# Patient Record
Sex: MALE | Race: WHITE | Employment: OTHER | URBAN - METROPOLITAN AREA
[De-identification: names, ages, dates, MRNs, and addresses within clinical notes are randomized per-mention and may not be internally consistent; named-entity substitution may affect disease eponyms.]

---

## 2017-01-03 DIAGNOSIS — I50.23 ACUTE ON CHRONIC SYSTOLIC CONGESTIVE HEART FAILURE (H): Primary | ICD-10-CM

## 2017-01-03 NOTE — TELEPHONE ENCOUNTER
Lasix       Last Written Prescription Date: 9/8/2016  Last Fill Quantity: 18, # refills: 3  Last Office Visit with [facility] or [facility] prescribing provider: 12/22/2016       POTASSIUM   Date Value Ref Range Status   12/13/2016 4.4 3.4 - 5.3 mmol/L Final     CREATININE   Date Value Ref Range Status   12/13/2016 1.07 0.66 - 1.25 mg/dL Final     BP Readings from Last 3 Encounters:   12/22/16 122/74   12/20/16 110/70   10/17/16 116/76

## 2017-01-04 RX ORDER — FUROSEMIDE 20 MG
20 TABLET ORAL DAILY
Qty: 18 TABLET | Refills: 3 | Status: SHIPPED | OUTPATIENT
Start: 2017-01-04 | End: 2017-03-16

## 2017-01-09 ENCOUNTER — OFFICE VISIT (OUTPATIENT)
Dept: CARDIOLOGY | Facility: CLINIC | Age: 82
End: 2017-01-09
Attending: INTERNAL MEDICINE
Payer: COMMERCIAL

## 2017-01-09 ENCOUNTER — HOSPITAL ENCOUNTER (OUTPATIENT)
Dept: CARDIOLOGY | Facility: CLINIC | Age: 82
Discharge: HOME | End: 2017-01-09
Attending: INTERNAL MEDICINE | Admitting: INTERNAL MEDICINE
Payer: MEDICARE

## 2017-01-09 VITALS
SYSTOLIC BLOOD PRESSURE: 112 MMHG | RESPIRATION RATE: 12 BRPM | HEIGHT: 66 IN | HEART RATE: 64 BPM | DIASTOLIC BLOOD PRESSURE: 62 MMHG | WEIGHT: 150.3 LBS | BODY MASS INDEX: 24.15 KG/M2 | OXYGEN SATURATION: 99 %

## 2017-01-09 DIAGNOSIS — I48.0 PAROXYSMAL ATRIAL FIBRILLATION (H): Primary | ICD-10-CM

## 2017-01-09 DIAGNOSIS — C61 MALIGNANT NEOPLASM OF PROSTATE (H): ICD-10-CM

## 2017-01-09 DIAGNOSIS — C61 MALIGNANT NEOPLASM OF PROSTATE (H): Primary | ICD-10-CM

## 2017-01-09 DIAGNOSIS — I42.8 CARDIOMYOPATHY, NONISCHEMIC (H): ICD-10-CM

## 2017-01-09 DIAGNOSIS — M05.79 RHEUMATOID ARTHRITIS INVOLVING MULTIPLE SITES WITH POSITIVE RHEUMATOID FACTOR (H): ICD-10-CM

## 2017-01-09 LAB
ALBUMIN SERPL-MCNC: 3.3 G/DL (ref 3.4–5)
ALP SERPL-CCNC: 63 U/L (ref 40–150)
ALT SERPL W P-5'-P-CCNC: 24 U/L (ref 0–70)
ANION GAP SERPL CALCULATED.3IONS-SCNC: 6 MMOL/L (ref 3–14)
AST SERPL W P-5'-P-CCNC: 33 U/L (ref 0–45)
BASOPHILS # BLD AUTO: 0 10E9/L (ref 0–0.2)
BASOPHILS NFR BLD AUTO: 0.8 %
BILIRUB SERPL-MCNC: 0.5 MG/DL (ref 0.2–1.3)
BUN SERPL-MCNC: 20 MG/DL (ref 7–30)
CALCIUM SERPL-MCNC: 8.8 MG/DL (ref 8.5–10.1)
CHLORIDE SERPL-SCNC: 101 MMOL/L (ref 94–109)
CO2 SERPL-SCNC: 32 MMOL/L (ref 20–32)
CREAT SERPL-MCNC: 1 MG/DL (ref 0.66–1.25)
DIFFERENTIAL METHOD BLD: ABNORMAL
EOSINOPHIL # BLD AUTO: 0 10E9/L (ref 0–0.7)
EOSINOPHIL NFR BLD AUTO: 0.6 %
ERYTHROCYTE [DISTWIDTH] IN BLOOD BY AUTOMATED COUNT: 14.5 % (ref 10–15)
GFR SERPL CREATININE-BSD FRML MDRD: 70 ML/MIN/1.7M2
GLUCOSE SERPL-MCNC: 116 MG/DL (ref 70–99)
HCT VFR BLD AUTO: 35 % (ref 40–53)
HGB BLD-MCNC: 11.2 G/DL (ref 13.3–17.7)
IMM GRANULOCYTES # BLD: 0 10E9/L (ref 0–0.4)
IMM GRANULOCYTES NFR BLD: 0.2 %
LYMPHOCYTES # BLD AUTO: 0.8 10E9/L (ref 0.8–5.3)
LYMPHOCYTES NFR BLD AUTO: 16.2 %
MCH RBC QN AUTO: 32.4 PG (ref 26.5–33)
MCHC RBC AUTO-ENTMCNC: 32 G/DL (ref 31.5–36.5)
MCV RBC AUTO: 101 FL (ref 78–100)
MONOCYTES # BLD AUTO: 0.7 10E9/L (ref 0–1.3)
MONOCYTES NFR BLD AUTO: 13.8 %
NEUTROPHILS # BLD AUTO: 3.4 10E9/L (ref 1.6–8.3)
NEUTROPHILS NFR BLD AUTO: 68.4 %
PLATELET # BLD AUTO: 261 10E9/L (ref 150–450)
POTASSIUM SERPL-SCNC: 4.8 MMOL/L (ref 3.4–5.3)
PROT SERPL-MCNC: 6.2 G/DL (ref 6.8–8.8)
PSA SERPL-MCNC: 0.53 UG/L (ref 0–4)
RBC # BLD AUTO: 3.46 10E12/L (ref 4.4–5.9)
SODIUM SERPL-SCNC: 139 MMOL/L (ref 133–144)
WBC # BLD AUTO: 5 10E9/L (ref 4–11)

## 2017-01-09 PROCEDURE — 93010 ELECTROCARDIOGRAM REPORT: CPT | Performed by: INTERNAL MEDICINE

## 2017-01-09 PROCEDURE — 99214 OFFICE O/P EST MOD 30 MIN: CPT | Performed by: INTERNAL MEDICINE

## 2017-01-09 PROCEDURE — 84153 ASSAY OF PSA TOTAL: CPT | Mod: 90 | Performed by: UROLOGY

## 2017-01-09 PROCEDURE — 80053 COMPREHEN METABOLIC PANEL: CPT | Mod: 90 | Performed by: INTERNAL MEDICINE

## 2017-01-09 PROCEDURE — 93005 ELECTROCARDIOGRAM TRACING: CPT | Performed by: REHABILITATION PRACTITIONER

## 2017-01-09 PROCEDURE — 85025 COMPLETE CBC W/AUTO DIFF WBC: CPT | Mod: 90 | Performed by: INTERNAL MEDICINE

## 2017-01-09 PROCEDURE — 36415 COLL VENOUS BLD VENIPUNCTURE: CPT | Performed by: UROLOGY

## 2017-01-09 PROCEDURE — 99000 SPECIMEN HANDLING OFFICE-LAB: CPT | Performed by: UROLOGY

## 2017-01-09 ASSESSMENT — PAIN SCALES - GENERAL: PAINLEVEL: NO PAIN (0)

## 2017-01-09 NOTE — Clinical Note
"1/9/2017    Camron Aragon MD  [facility]   [address] [place] [address]    RE: Bud JOHNSON Shaina       Dear Colleague,    I had the pleasure of seeing Bud Merritt in the [facility].    Chief Complaint: atrial fibrillation     HPI: I was happy to see Mr. Merritt in Electrophysiology consultation for the above at the request of Dr. Colon.    He reports he has been having \"spell\". It is very hard to pin him down on what he means by this. At times he reports the spells as \"dizziness\", later he described them as \"pain in his chest\". This makes it difficult to correlate with his paroxysms of atrial fibrillation. During the episodes his heart rates are fairly fast but during sinus rhythm his heart rate is relatively slow. In addition, he has first degree AV block and RBBB (bifascicular block).    In addition to the spells, he was noted to have LV systolic dysfunction. He was started on amiodarone and his EF improved. It should be noted that he was also having frequent PVCs and the reduction in PVC burden may have also contributed to partial recovery of his systolic function. He had a coronary angiogram that showed only non-obstructive disease.    The amiodarone had to be stopped due to a drop in diffusion capacity.    Current Outpatient Prescriptions   Medication Sig Dispense Refill     furosemide (LASIX) 20 MG tablet Take 1 tablet (20 mg) by mouth daily Or as directed by cardiology 18 tablet 03     traMADol (ULTRAM) 50 MG tablet TAKE 1 TABLET 3 TIMES DAILY AS NEEDED FOR MODERATE PAIN 90 tablet 0     apixaban ANTICOAGULANT (ELIQUIS) 2.5 MG tablet Take 1 tablet (2.5 mg) by mouth 2 times daily 180 tablet 3     Leuprolide Acetate (LUPRON DEPOT IM) Inject into the muscle every 4 months       leflunomide (ARAVA) 10 MG tablet Take 1 tablet (10 mg) by mouth daily 30 tablet 11     lisinopril (PRINIVIL,ZESTRIL) 10 MG tablet Take 1 tablet (10 mg) by mouth 2 times daily " 62 tablet 11     spironolactone (ALDACTONE) 25 MG tablet Take 1 tablet (25 mg) by mouth daily 30 tablet 11     acetaminophen (TYLENOL) 650 MG CR tablet Take 650 mg by mouth 2 times daily       metoprolol (TOPROL-XL) 25 MG 24 hr tablet Take 1 tablet (25 mg) by mouth daily 90 tablet 3     atorvastatin (LIPITOR) 40 MG tablet Take 1 tablet (40 mg) by mouth daily 90 tablet 3     alendronate (FOSAMAX) 70 MG tablet Take 1 tablet (70 mg) by mouth every 7 days Take with over 8 ounces water and stay upright for at least 30 minutes after dose.  Take at least 60 minutes before breakfast 12 tablet 3     predniSONE (DELTASONE) 5 MG tablet Take 1 tablet (5 mg) by mouth daily 100 tablet 4     carboxymethylcellulose (REFRESH PLUS) 0.5 % SOLN Place 1 drop into both eyes 3 times daily as needed for dry eyes       OMEPRAZOLE PO Take 20 mg by mouth daily as needed        calcium carbonate (TUMS) 500 MG chewable tablet Take 1 chew tab by mouth every 4 hours as needed for heartburn       tamsulosin (FLOMAX) 0.4 MG 24 hr capsule Take 1 capsule (0.4 mg) by mouth 2 times daily 60 capsule      ascorbic acid (VITAMIN C) 500 MG CPCR Take 500 mg by mouth daily       VITAMIN D, CHOLECALCIFEROL, PO Take 400 Units by mouth daily       calcium carbonate (OS-SHELIA 500 MG [place]. CA) 500 MG tablet Take 600 mg by mouth daily         Past Medical History   Diagnosis Date     Unspecified essential hypertension      Pure hypercholesterolemia      Rheumatoid arthritis(714.0)      Cardiomyopathy (H)      Atrial fibrillation (H) 07/01/2016       Past Surgical History   Procedure Laterality Date     Hc repair ing hernia,5+y/o,reducibl  1996     Marlex mesh repair of bilateral inguinal hernias and drainage of bilateral scrotal hydroceles.      colonoscopy thru stoma w biopsy/cautery tumor/polyp/lesion  8/31/2004     Colonoscopy  08/24/09     Arthroplasty knee Left 1/12/2016     Procedure: ARTHROPLASTY KNEE;  Surgeon: Cesar Walker DO;  Location:   "OR     Irrigation and debridement soft tissue lower extremity, combined Left 3/15/2016     Procedure: COMBINED IRRIGATION AND DEBRIDEMENT SOFT TISSUE LOWER EXTREMITY;  Surgeon: Cesar Walker DO;  Location:  OR       Family History   Problem Relation Age of Onset     CANCER Mother      CANCER Son      Unknown/Adopted Father      Alcoholism Brother        Social History   Substance Use Topics     Smoking status: Former Smoker -- 0.50 packs/day for 15 years     Types: Cigarettes     Quit date: 11/12/1998     Smokeless tobacco: Never Used      Comment: quit 15 yrs ago     Alcohol Use: No       Allergies   Allergen Reactions     Amiodarone Other (See Comments)     Drop in DLCO     No Known Drug Allergies          ROS: his only other significant complaint on the ten system review is arthritis.    Physical Examination:  Vitals: /62 mmHg  Pulse 64  Resp 12  Ht 1.676 m (5' 6\")  Wt 68.176 kg (150 lb 4.8 oz)  BMI 24.27 kg/m2  SpO2 99%  BMI= Body mass index is 24.27 kg/(m^2).    GENERAL APPEARANCE: elderly, alert and no distress  HEENT: no icterus, no xanthelasmas,  mucosa moist, no cyanosis.  NECK: no adenopathy, no asymmetry, masses, or scars, thyroid normal to palpation, carotid upstrokes are normal bilaterally, no cervical bruits (radiated murmur), JVP is not visible  CHEST: lungs clear to auscultation - no rales, rhonchi or wheezes, no use of accessory muscles, no retractions, respirations are unlabored, normal respiratory rate, no kyphosis, no scoliosis  CARDIOVASCULAR: regular rhythm, normal S1, single S2, S3, S4, click or rub, 3/6 mid peaking murmur at aortic area with radiation to the suprasternal notch and lower neck, no diastolic murmur heard, precordium quiet with normal PMI.  ABDOMEN: soft, non tender, without hepatosplenomegaly, no masses palpable, bowel sounds normal, aorta not enlarged by palpation, no abdominal bruits  EXTREMITIES: no clubbing, cyanosis or edema  NEURO: alert and " oriented to person/place/time, normal speech, gait and affect  VASC: Radial pulses normal, dorsalis pedis and posterior tibialis pulses diminished bilaterally. Warm. No vascular bruits heard.  SKIN: no ecchymoses, no rashes      Laboratory:    I personally reviewed the ECG from today. It shows sinus rhythm with first degree AV block and RBBB. I measure the QT at 432.    I also reviewed the CardioNet tracings available. They show atrial fibrillation with heart rates in the 120-130s. None are labelled as being associated with symptoms.    Results for GALLO FLOYD (MRN [id number]) as of 1/10/2017 14:39   Ref. Range 1/9/2017 10:43   Sodium Latest Ref Range: 133-144 mmol/L 139   Potassium Latest Ref Range: 3.4-5.3 mmol/L 4.8   Chloride Latest Ref Range:  mmol/L 101   Carbon Dioxide Latest Ref Range: 20-32 mmol/L 32   Urea Nitrogen Latest Ref Range: 7-30 mg/dL 20   Creatinine Latest Ref Range: 0.66-1.25 mg/dL 1.00   GFR Estimate Latest Ref Range: >60 mL/min/1.7m2 70       Assessment and recommendations:    1) Spells  2) Paroxsymal atrial fibrillation   3) Sinus node dysfunction  4) Bifascicular block    I discussed various options wth Mr. Floyd and his wife. I discussed that it is hard to know to what extent the improvement in his LVEF was due to treatment of his atrial fibrillation with amiodarone. His EF has improved and he is not in heart failure. We discussed sotalol but this is liable to worsen his sinus node disease and possible his conduction system disease.    I recommended he consider admission to start dofetilide. This drug is the least likely to exacerbate his bradycardia.    We discussed this requires a 72 hour admission for monitoring.     Finally, we discussed the possibility he may need a permanent pacemaker at some point, even without antiarrhythmic drug treatment.    I appreciate the chance to help with Mr. Floyd's care. Please let me know if you have any questions or concerns.    Sincerely,      Khoa Li MD     [facility]      CC Berhane Colon

## 2017-01-09 NOTE — MR AVS SNAPSHOT
After Visit Summary   1/9/2017    Bud Merritt    MRN: [id number]           Patient Information     Date Of Birth          5/9/1927        Visit Information        Provider Department      1/9/2017 9:30 AM Khoa Li MD [facility]        Today's Diagnoses     Paroxysmal atrial fibrillation (H)    -  1     Cardiomyopathy, nonischemic (H)           Care Instructions    Recommend admission for initiation of Tikosyn to treat your atrial fibrillation.     Treatment is recommended for two reasons:    1) your heart function improved with treatment of your atrial fibrillation   2) hopefully control of atrial fibrillation will reduce spells of dizziness, weak and shaky    The Tikosyn has to be started in the hospital for monitoring.     We also need to watch out for slow heart rhythms. Your ECG shows that the heart's conduction system (the wiring) is not working as well as it should. It is not uncommon for patients with atrial fibrillation to need a permanent pacemaker.    After you talk to your family please call the Cardiology Clinic in [place] to let us know how you'd like to proceed.            Follow-ups after your visit        Who to contact     If you have questions or need follow up information about today's clinic visit or your schedule please contact [facility] directly at [phone].  Normal or non-critical lab and imaging results will be communicated to you by MyChart, letter or phone within 4 business days after the clinic has received the results. If you do not hear from us within 7 days, please contact the clinic through MyChart or phone. If you have a critical or abnormal lab result, we will notify you by phone as soon as possible.  Submit refill requests through [facility] [employer] or call your pharmacy and they will forward the refill request to us. Please allow 3 business days for your refill to be completed.          Additional Information About Your  "Visit        MyChart Information     [employer]ii lets you send messages to your doctor, view your test results, renew your prescriptions, schedule appointments and more. To sign up, go to [email or web address] . Click on \"Log in\" on the left side of the screen, which will take you to the Welcome page. Then click on \"Sign up Now\" on the right side of the page.     You will be asked to enter the access code listed below, as well as some personal information. Please follow the directions to create your username and password.     Your access code is: [email or web address]  Expires: 3/20/2017 10:06 AM     Your access code will  in 90 days. If you need help or a new code, please call your [facility] clinic or [phone].        Care EveryWhere ID     This is your Care EveryWhere ID. This could be used by other organizations to access your [facility] medical records  [id number]        Your Vitals Were     Pulse Respirations Height BMI (Body Mass Index) Pulse Oximetry       64 12 1.676 m (5' 6\") 24.27 kg/m2 99%        Blood Pressure from Last 3 Encounters:   17 112/62   16 122/74   16 110/70    Weight from Last 3 Encounters:   17 68.176 kg (150 lb 4.8 oz)   16 66.225 kg (146 lb)   16 67.994 kg (149 lb 14.4 oz)              We Performed the Following     Follow-Up with Cardiac Advanced Practice Provider        Primary Care Provider Office Phone # Fax #    Camron Aragon [phone]       [facility] [address] [place] [address]        Thank you!     Thank you for choosing [facility]  for your care. Our goal is always to provide you with excellent care. Hearing back from our patients is one way we can continue to improve our services. Please take a few minutes to complete the written survey that you may receive in the mail after your visit with us. Thank you!             Your Updated Medication List - Protect others around you: Learn " how to safely use, store and throw away your medicines at www.disposemymeds.org.          This list is accurate as of: 1/9/17  9:38 AM.  Always use your most recent med list.                   Brand Name Dispense Instructions for use    acetaminophen 650 MG CR tablet    TYLENOL     Take 650 mg by mouth 2 times daily       alendronate 70 MG tablet    FOSAMAX    12 tablet    Take 1 tablet (70 mg) by mouth every 7 days Take with over 8 ounces water and stay upright for at least 30 minutes after dose.  Take at least 60 minutes before breakfast       apixaban ANTICOAGULANT 2.5 MG tablet    ELIQUIS    180 tablet    Take 1 tablet (2.5 mg) by mouth 2 times daily       ascorbic acid 500 MG Cpcr CR capsule    vitamin C     Take 500 mg by mouth daily       atorvastatin 40 MG tablet    LIPITOR    90 tablet    Take 1 tablet (40 mg) by mouth daily       calcium carbonate 500 MG chewable tablet    TUMS     Take 1 chew tab by mouth every 4 hours as needed for heartburn       calcium carbonate 500 MG tablet    OS-SHELIA 500 mg [place]. Ca     Take 600 mg by mouth daily       carboxymethylcellulose 0.5 % Soln ophthalmic solution    REFRESH PLUS     Place 1 drop into both eyes 3 times daily as needed for dry eyes       FLOMAX 0.4 MG capsule   Generic drug:  tamsulosin     60 capsule    Take 1 capsule (0.4 mg) by mouth 2 times daily       furosemide 20 MG tablet    LASIX    18 tablet    Take 1 tablet (20 mg) by mouth daily Or as directed by cardiology       leflunomide 10 MG tablet    ARAVA    30 tablet    Take 1 tablet (10 mg) by mouth daily       lisinopril 10 MG tablet    PRINIVIL/ZESTRIL    62 tablet    Take 1 tablet (10 mg) by mouth 2 times daily       LUPRON DEPOT IM      Inject into the muscle every 4 months       metoprolol 25 MG 24 hr tablet    TOPROL-XL    90 tablet    Take 1 tablet (25 mg) by mouth daily       OMEPRAZOLE PO      Take 20 mg by mouth daily as needed       predniSONE 5 MG tablet    DELTASONE    100 tablet    Take 1  tablet (5 mg) by mouth daily       spironolactone 25 MG tablet    ALDACTONE    30 tablet    Take 1 tablet (25 mg) by mouth daily       traMADol 50 MG tablet    ULTRAM    90 tablet    TAKE 1 TABLET 3 TIMES DAILY AS NEEDED FOR MODERATE PAIN       VITAMIN D (CHOLECALCIFEROL) PO      Take 400 Units by mouth daily

## 2017-01-09 NOTE — PATIENT INSTRUCTIONS
Recommend admission for initiation of Tikosyn to treat your atrial fibrillation.     Treatment is recommended for two reasons:    1) your heart function improved with treatment of your atrial fibrillation   2) hopefully control of atrial fibrillation will reduce spells of dizziness, weak and shaky    The Tikosyn has to be started in the hospital for monitoring.     We also need to watch out for slow heart rhythms. Your ECG shows that the heart's conduction system (the wiring) is not working as well as it should. It is not uncommon for patients with atrial fibrillation to need a permanent pacemaker.    After you talk to your family please call the Cardiology Clinic in [place] to let us know how you'd like to proceed.

## 2017-01-09 NOTE — PROGRESS NOTES
"      Clinical Cardiac Electrophysiology Consultation    Chief Complaint: atrial fibrillation     HPI: I was happy to see Mr. Merritt in Electrophysiology consultation for the above at the request of Dr. Colon.    He reports he has been having \"spell\". It is very hard to pin him down on what he means by this. At times he reports the spells as \"dizziness\", later he described them as \"pain in his chest\". This makes it difficult to correlate with his paroxysms of atrial fibrillation. During the episodes his heart rates are fairly fast but during sinus rhythm his heart rate is relatively slow. In addition, he has first degree AV block and RBBB (bifascicular block).    In addition to the spells, he was noted to have LV systolic dysfunction. He was started on amiodarone and his EF improved. It should be noted that he was also having frequent PVCs and the reduction in PVC burden may have also contributed to partial recovery of his systolic function. He had a coronary angiogram that showed only non-obstructive disease.    The amiodarone had to be stopped due to a drop in diffusion capacity.    Current Outpatient Prescriptions   Medication Sig Dispense Refill     furosemide (LASIX) 20 MG tablet Take 1 tablet (20 mg) by mouth daily Or as directed by cardiology 18 tablet 03     traMADol (ULTRAM) 50 MG tablet TAKE 1 TABLET 3 TIMES DAILY AS NEEDED FOR MODERATE PAIN 90 tablet 0     apixaban ANTICOAGULANT (ELIQUIS) 2.5 MG tablet Take 1 tablet (2.5 mg) by mouth 2 times daily 180 tablet 3     Leuprolide Acetate (LUPRON DEPOT IM) Inject into the muscle every 4 months       leflunomide (ARAVA) 10 MG tablet Take 1 tablet (10 mg) by mouth daily 30 tablet 11     lisinopril (PRINIVIL,ZESTRIL) 10 MG tablet Take 1 tablet (10 mg) by mouth 2 times daily 62 tablet 11     spironolactone (ALDACTONE) 25 MG tablet Take 1 tablet (25 mg) by mouth daily 30 tablet 11     acetaminophen (TYLENOL) 650 MG CR tablet Take 650 mg by mouth 2 times daily       " metoprolol (TOPROL-XL) 25 MG 24 hr tablet Take 1 tablet (25 mg) by mouth daily 90 tablet 3     atorvastatin (LIPITOR) 40 MG tablet Take 1 tablet (40 mg) by mouth daily 90 tablet 3     alendronate (FOSAMAX) 70 MG tablet Take 1 tablet (70 mg) by mouth every 7 days Take with over 8 ounces water and stay upright for at least 30 minutes after dose.  Take at least 60 minutes before breakfast 12 tablet 3     predniSONE (DELTASONE) 5 MG tablet Take 1 tablet (5 mg) by mouth daily 100 tablet 4     carboxymethylcellulose (REFRESH PLUS) 0.5 % SOLN Place 1 drop into both eyes 3 times daily as needed for dry eyes       OMEPRAZOLE PO Take 20 mg by mouth daily as needed        calcium carbonate (TUMS) 500 MG chewable tablet Take 1 chew tab by mouth every 4 hours as needed for heartburn       tamsulosin (FLOMAX) 0.4 MG 24 hr capsule Take 1 capsule (0.4 mg) by mouth 2 times daily 60 capsule      ascorbic acid (VITAMIN C) 500 MG CPCR Take 500 mg by mouth daily       VITAMIN D, CHOLECALCIFEROL, PO Take 400 Units by mouth daily       calcium carbonate (OS-SHELIA 500 MG [place]. [place]) 500 MG tablet Take 600 mg by mouth daily         Past Medical History   Diagnosis Date     Unspecified essential hypertension      Pure hypercholesterolemia      Rheumatoid arthritis(714.0)      Cardiomyopathy (H)      Atrial fibrillation (H) 07/01/2016       Past Surgical History   Procedure Laterality Date     Hc repair ing hernia,5+y/o,reducibl  1996     Marlex mesh repair of bilateral inguinal hernias and drainage of bilateral scrotal hydroceles.     Hc colonoscopy thru stoma w biopsy/cautery tumor/polyp/lesion  8/31/2004     Colonoscopy  08/24/09     Arthroplasty knee Left 1/12/2016     Procedure: ARTHROPLASTY KNEE;  Surgeon: Cesar Walker DO;  Location: [facility] OR     Irrigation and debridement soft tissue lower extremity, combined Left 3/15/2016     Procedure: COMBINED IRRIGATION AND DEBRIDEMENT SOFT TISSUE LOWER EXTREMITY;  Surgeon: Cesar Walker  "DO Jay;  Location:  OR       Family History   Problem Relation Age of Onset     CANCER Mother      CANCER Son      Unknown/Adopted Father      Alcoholism Brother        Social History   Substance Use Topics     Smoking status: Former Smoker -- 0.50 packs/day for 15 years     Types: Cigarettes     Quit date: 11/12/1998     Smokeless tobacco: Never Used      Comment: quit 15 yrs ago     Alcohol Use: No       Allergies   Allergen Reactions     Amiodarone Other (See Comments)     Drop in DLCO     No Known Drug Allergies          ROS: his only other significant complaint on the ten system review is arthritis.    Physical Examination:  Vitals: /62 mmHg  Pulse 64  Resp 12  Ht 1.676 m (5' 6\")  Wt 68.176 kg (150 lb 4.8 oz)  BMI 24.27 kg/m2  SpO2 99%  BMI= Body mass index is 24.27 kg/(m^2).    GENERAL APPEARANCE: elderly, alert and no distress  HEENT: no icterus, no xanthelasmas,  mucosa moist, no cyanosis.  NECK: no adenopathy, no asymmetry, masses, or scars, thyroid normal to palpation, carotid upstrokes are normal bilaterally, no cervical bruits (radiated murmur), JVP is not visible  CHEST: lungs clear to auscultation - no rales, rhonchi or wheezes, no use of accessory muscles, no retractions, respirations are unlabored, normal respiratory rate, no kyphosis, no scoliosis  CARDIOVASCULAR: regular rhythm, normal S1, single S2, S3, S4, click or rub, 3/6 mid peaking murmur at aortic area with radiation to the suprasternal notch and lower neck, no diastolic murmur heard, precordium quiet with normal PMI.  ABDOMEN: soft, non tender, without hepatosplenomegaly, no masses palpable, bowel sounds normal, aorta not enlarged by palpation, no abdominal bruits  EXTREMITIES: no clubbing, cyanosis or edema  NEURO: alert and oriented to person/place/time, normal speech, gait and affect  VASC: Radial pulses normal, dorsalis pedis and posterior tibialis pulses diminished bilaterally. Warm. No vascular bruits " heard.  SKIN: no ecchymoses, no rashes      Laboratory:    I personally reviewed the ECG from today. It shows sinus rhythm with first degree AV block and RBBB. I measure the QT at 432.    I also reviewed the CardioNet tracings available. They show atrial fibrillation with heart rates in the 120-130s. None are labelled as being associated with symptoms.    Results for GALLO FLOYD (MRN [id number]) as of 1/10/2017 14:39   Ref. Range 1/9/2017 10:43   Sodium Latest Ref Range: 133-144 mmol/L 139   Potassium Latest Ref Range: 3.4-5.3 mmol/L 4.8   Chloride Latest Ref Range:  mmol/L 101   Carbon Dioxide Latest Ref Range: 20-32 mmol/L 32   Urea Nitrogen Latest Ref Range: 7-30 mg/dL 20   Creatinine Latest Ref Range: 0.66-1.25 mg/dL 1.00   GFR Estimate Latest Ref Range: >60 mL/min/1.7m2 70       Assessment and recommendations:    1) Spells  2) Paroxsymal atrial fibrillation   3) Sinus node dysfunction  4) Bifascicular block    I discussed various options wth Mr. Floyd and his wife. I discussed that it is hard to know to what extent the improvement in his LVEF was due to treatment of his atrial fibrillation with amiodarone. His EF has improved and he is not in heart failure. We discussed sotalol but this is liable to worsen his sinus node disease and possible his conduction system disease.    I recommended he consider admission to start dofetilide. This drug is the least likely to exacerbate his bradycardia.    We discussed this requires a 72 hour admission for monitoring.     Finally, we discussed the possibility he may need a permanent pacemaker at some point, even without antiarrhythmic drug treatment.    I appreciate the chance to help with Mr. Floyd's care. Please let me know if you have any questions or concerns.    MD ARBEN Main Steven R Vats, Shashank

## 2017-01-18 DIAGNOSIS — M06.9 RHEUMATOID ARTHRITIS INVOLVING MULTIPLE SITES, UNSPECIFIED RHEUMATOID FACTOR PRESENCE: Primary | ICD-10-CM

## 2017-01-18 RX ORDER — TRAMADOL HYDROCHLORIDE 50 MG/1
TABLET ORAL
Qty: 90 TABLET | Refills: 0 | Status: SHIPPED | OUTPATIENT
Start: 2017-01-18 | End: 2017-02-15

## 2017-01-23 ENCOUNTER — TELEPHONE (OUTPATIENT)
Dept: CARDIOLOGY | Facility: CLINIC | Age: 82
End: 2017-01-23

## 2017-01-23 ENCOUNTER — HOSPITAL ENCOUNTER (INPATIENT)
Facility: CLINIC | Age: 82
LOS: 3 days | Discharge: HOME | DRG: 309 | End: 2017-01-26
Attending: INTERNAL MEDICINE | Admitting: INTERNAL MEDICINE
Payer: MEDICARE

## 2017-01-23 DIAGNOSIS — I48.0 PAROXYSMAL ATRIAL FIBRILLATION (H): Primary | ICD-10-CM

## 2017-01-23 PROBLEM — Z79.899 VISIT FOR MONITORING TIKOSYN THERAPY: Status: ACTIVE | Noted: 2017-01-23

## 2017-01-23 PROBLEM — Z51.81 VISIT FOR MONITORING TIKOSYN THERAPY: Status: ACTIVE | Noted: 2017-01-23

## 2017-01-23 LAB
ALBUMIN SERPL-MCNC: 3.2 G/DL (ref 3.4–5)
ALP SERPL-CCNC: 76 U/L (ref 40–150)
ALT SERPL W P-5'-P-CCNC: 28 U/L (ref 0–70)
ANION GAP SERPL CALCULATED.3IONS-SCNC: 8 MMOL/L (ref 3–14)
AST SERPL W P-5'-P-CCNC: 38 U/L (ref 0–45)
BILIRUB DIRECT SERPL-MCNC: 0.1 MG/DL (ref 0–0.2)
BILIRUB SERPL-MCNC: 0.5 MG/DL (ref 0.2–1.3)
BUN SERPL-MCNC: 20 MG/DL (ref 7–30)
CALCIUM SERPL-MCNC: 8.7 MG/DL (ref 8.5–10.1)
CHLORIDE SERPL-SCNC: 102 MMOL/L (ref 94–109)
CO2 SERPL-SCNC: 26 MMOL/L (ref 20–32)
CREAT SERPL-MCNC: 1.03 MG/DL (ref 0.66–1.25)
CREAT SERPL-MCNC: 1.04 MG/DL (ref 0.66–1.25)
ERYTHROCYTE [DISTWIDTH] IN BLOOD BY AUTOMATED COUNT: 13.9 % (ref 10–15)
GFR SERPL CREATININE-BSD FRML MDRD: 67 ML/MIN/1.7M2
GFR SERPL CREATININE-BSD FRML MDRD: 68 ML/MIN/1.7M2
GLUCOSE SERPL-MCNC: 111 MG/DL (ref 70–99)
HCT VFR BLD AUTO: 36 % (ref 40–53)
HGB BLD-MCNC: 11.7 G/DL (ref 13.3–17.7)
MAGNESIUM SERPL-MCNC: 2.1 MG/DL (ref 1.6–2.3)
MCH RBC QN AUTO: 32.5 PG (ref 26.5–33)
MCHC RBC AUTO-ENTMCNC: 32.5 G/DL (ref 31.5–36.5)
MCV RBC AUTO: 100 FL (ref 78–100)
PLATELET # BLD AUTO: 232 10E9/L (ref 150–450)
POTASSIUM SERPL-SCNC: 4.3 MMOL/L (ref 3.4–5.3)
POTASSIUM SERPL-SCNC: 4.8 MMOL/L (ref 3.4–5.3)
PROT SERPL-MCNC: 6.9 G/DL (ref 6.8–8.8)
RBC # BLD AUTO: 3.6 10E12/L (ref 4.4–5.9)
SODIUM SERPL-SCNC: 136 MMOL/L (ref 133–144)
TSH SERPL DL<=0.005 MIU/L-ACNC: 1.07 MU/L (ref 0.4–4)
WBC # BLD AUTO: 5.3 10E9/L (ref 4–11)

## 2017-01-23 PROCEDURE — 84132 ASSAY OF SERUM POTASSIUM: CPT | Performed by: STUDENT IN AN ORGANIZED HEALTH CARE EDUCATION/TRAINING PROGRAM

## 2017-01-23 PROCEDURE — 83735 ASSAY OF MAGNESIUM: CPT | Performed by: STUDENT IN AN ORGANIZED HEALTH CARE EDUCATION/TRAINING PROGRAM

## 2017-01-23 PROCEDURE — 99222 1ST HOSP IP/OBS MODERATE 55: CPT | Mod: AI | Performed by: INTERNAL MEDICINE

## 2017-01-23 PROCEDURE — 84443 ASSAY THYROID STIM HORMONE: CPT | Performed by: STUDENT IN AN ORGANIZED HEALTH CARE EDUCATION/TRAINING PROGRAM

## 2017-01-23 PROCEDURE — 85027 COMPLETE CBC AUTOMATED: CPT | Performed by: STUDENT IN AN ORGANIZED HEALTH CARE EDUCATION/TRAINING PROGRAM

## 2017-01-23 PROCEDURE — 93005 ELECTROCARDIOGRAM TRACING: CPT

## 2017-01-23 PROCEDURE — 80048 BASIC METABOLIC PNL TOTAL CA: CPT | Performed by: STUDENT IN AN ORGANIZED HEALTH CARE EDUCATION/TRAINING PROGRAM

## 2017-01-23 PROCEDURE — 40000802 ZZH SITE CHECK

## 2017-01-23 PROCEDURE — 36415 COLL VENOUS BLD VENIPUNCTURE: CPT | Performed by: STUDENT IN AN ORGANIZED HEALTH CARE EDUCATION/TRAINING PROGRAM

## 2017-01-23 PROCEDURE — 93010 ELECTROCARDIOGRAM REPORT: CPT | Performed by: INTERNAL MEDICINE

## 2017-01-23 PROCEDURE — 21400006 ZZH R&B CCU INTERMEDIATE UMMC

## 2017-01-23 PROCEDURE — 80076 HEPATIC FUNCTION PANEL: CPT | Performed by: STUDENT IN AN ORGANIZED HEALTH CARE EDUCATION/TRAINING PROGRAM

## 2017-01-23 PROCEDURE — 25000132 ZZH RX MED GY IP 250 OP 250 PS 637: Mod: GY | Performed by: INTERNAL MEDICINE

## 2017-01-23 PROCEDURE — A9270 NON-COVERED ITEM OR SERVICE: HCPCS | Mod: GY | Performed by: STUDENT IN AN ORGANIZED HEALTH CARE EDUCATION/TRAINING PROGRAM

## 2017-01-23 PROCEDURE — 40000141 ZZH STATISTIC PERIPHERAL IV START W/O US GUIDANCE

## 2017-01-23 PROCEDURE — A9270 NON-COVERED ITEM OR SERVICE: HCPCS | Mod: GY | Performed by: INTERNAL MEDICINE

## 2017-01-23 PROCEDURE — 82565 ASSAY OF CREATININE: CPT | Performed by: STUDENT IN AN ORGANIZED HEALTH CARE EDUCATION/TRAINING PROGRAM

## 2017-01-23 PROCEDURE — 25000132 ZZH RX MED GY IP 250 OP 250 PS 637: Mod: GY | Performed by: STUDENT IN AN ORGANIZED HEALTH CARE EDUCATION/TRAINING PROGRAM

## 2017-01-23 RX ORDER — ACETAMINOPHEN 650 MG/1
650 SUPPOSITORY RECTAL EVERY 4 HOURS PRN
Status: DISCONTINUED | OUTPATIENT
Start: 2017-01-23 | End: 2017-01-26 | Stop reason: HOSPADM

## 2017-01-23 RX ORDER — NALOXONE HYDROCHLORIDE 0.4 MG/ML
.1-.4 INJECTION, SOLUTION INTRAMUSCULAR; INTRAVENOUS; SUBCUTANEOUS
Status: DISCONTINUED | OUTPATIENT
Start: 2017-01-23 | End: 2017-01-26 | Stop reason: HOSPADM

## 2017-01-23 RX ORDER — ALENDRONATE SODIUM 70 MG/1
70 TABLET ORAL
Status: DISCONTINUED | OUTPATIENT
Start: 2017-01-30 | End: 2017-01-26 | Stop reason: HOSPADM

## 2017-01-23 RX ORDER — LEFLUNOMIDE 10 MG/1
10 TABLET ORAL DAILY
Status: DISCONTINUED | OUTPATIENT
Start: 2017-01-24 | End: 2017-01-26 | Stop reason: HOSPADM

## 2017-01-23 RX ORDER — TRAMADOL HYDROCHLORIDE 50 MG/1
50 TABLET ORAL 3 TIMES DAILY PRN
Status: DISCONTINUED | OUTPATIENT
Start: 2017-01-23 | End: 2017-01-26 | Stop reason: HOSPADM

## 2017-01-23 RX ORDER — LIDOCAINE 40 MG/G
CREAM TOPICAL
Status: DISCONTINUED | OUTPATIENT
Start: 2017-01-23 | End: 2017-01-26 | Stop reason: HOSPADM

## 2017-01-23 RX ORDER — ACETAMINOPHEN 325 MG/1
650 TABLET ORAL EVERY 4 HOURS PRN
Status: DISCONTINUED | OUTPATIENT
Start: 2017-01-23 | End: 2017-01-26 | Stop reason: HOSPADM

## 2017-01-23 RX ORDER — ACETAMINOPHEN 325 MG/1
650 TABLET ORAL 2 TIMES DAILY
Status: DISCONTINUED | OUTPATIENT
Start: 2017-01-23 | End: 2017-01-26 | Stop reason: HOSPADM

## 2017-01-23 RX ORDER — CALCIUM CARBONATE 500(1250)
1 TABLET ORAL DAILY
Status: DISCONTINUED | OUTPATIENT
Start: 2017-01-24 | End: 2017-01-26 | Stop reason: HOSPADM

## 2017-01-23 RX ORDER — SPIRONOLACTONE 25 MG/1
25 TABLET ORAL DAILY
Status: DISCONTINUED | OUTPATIENT
Start: 2017-01-24 | End: 2017-01-26 | Stop reason: HOSPADM

## 2017-01-23 RX ORDER — NITROGLYCERIN 0.4 MG/1
0.4 TABLET SUBLINGUAL EVERY 5 MIN PRN
Status: DISCONTINUED | OUTPATIENT
Start: 2017-01-23 | End: 2017-01-26 | Stop reason: HOSPADM

## 2017-01-23 RX ORDER — CALCIUM CARBONATE 500 MG/1
500 TABLET, CHEWABLE ORAL EVERY 4 HOURS PRN
Status: DISCONTINUED | OUTPATIENT
Start: 2017-01-23 | End: 2017-01-26 | Stop reason: HOSPADM

## 2017-01-23 RX ORDER — POTASSIUM CHLORIDE 750 MG/1
20-40 TABLET, EXTENDED RELEASE ORAL
Status: DISCONTINUED | OUTPATIENT
Start: 2017-01-23 | End: 2017-01-26 | Stop reason: HOSPADM

## 2017-01-23 RX ORDER — LISINOPRIL 10 MG/1
10 TABLET ORAL 2 TIMES DAILY
Status: DISCONTINUED | OUTPATIENT
Start: 2017-01-23 | End: 2017-01-26 | Stop reason: HOSPADM

## 2017-01-23 RX ORDER — FUROSEMIDE 20 MG
20 TABLET ORAL DAILY
Status: DISCONTINUED | OUTPATIENT
Start: 2017-01-24 | End: 2017-01-26 | Stop reason: HOSPADM

## 2017-01-23 RX ORDER — MAGNESIUM SULFATE HEPTAHYDRATE 40 MG/ML
4 INJECTION, SOLUTION INTRAVENOUS EVERY 4 HOURS PRN
Status: DISCONTINUED | OUTPATIENT
Start: 2017-01-23 | End: 2017-01-26 | Stop reason: HOSPADM

## 2017-01-23 RX ORDER — TAMSULOSIN HYDROCHLORIDE 0.4 MG/1
0.4 CAPSULE ORAL 2 TIMES DAILY
Status: DISCONTINUED | OUTPATIENT
Start: 2017-01-23 | End: 2017-01-26 | Stop reason: HOSPADM

## 2017-01-23 RX ORDER — METOPROLOL SUCCINATE 25 MG/1
25 TABLET, EXTENDED RELEASE ORAL DAILY
Status: DISCONTINUED | OUTPATIENT
Start: 2017-01-24 | End: 2017-01-26 | Stop reason: HOSPADM

## 2017-01-23 RX ORDER — POTASSIUM CHLORIDE 29.8 MG/ML
20 INJECTION INTRAVENOUS
Status: DISCONTINUED | OUTPATIENT
Start: 2017-01-23 | End: 2017-01-26 | Stop reason: HOSPADM

## 2017-01-23 RX ORDER — DOFETILIDE 0.12 MG/1
125 CAPSULE ORAL EVERY 12 HOURS SCHEDULED
Status: DISCONTINUED | OUTPATIENT
Start: 2017-01-24 | End: 2017-01-26 | Stop reason: HOSPADM

## 2017-01-23 RX ORDER — ALUMINA, MAGNESIA, AND SIMETHICONE 2400; 2400; 240 MG/30ML; MG/30ML; MG/30ML
15-30 SUSPENSION ORAL EVERY 4 HOURS PRN
Status: DISCONTINUED | OUTPATIENT
Start: 2017-01-23 | End: 2017-01-26 | Stop reason: HOSPADM

## 2017-01-23 RX ORDER — POTASSIUM CHLORIDE 7.45 MG/ML
10 INJECTION INTRAVENOUS
Status: DISCONTINUED | OUTPATIENT
Start: 2017-01-23 | End: 2017-01-26 | Stop reason: HOSPADM

## 2017-01-23 RX ORDER — DOFETILIDE 0.25 MG/1
250 CAPSULE ORAL EVERY 12 HOURS SCHEDULED
Status: DISCONTINUED | OUTPATIENT
Start: 2017-01-23 | End: 2017-01-23

## 2017-01-23 RX ORDER — PREDNISONE 5 MG/1
5 TABLET ORAL DAILY
Status: DISCONTINUED | OUTPATIENT
Start: 2017-01-24 | End: 2017-01-26 | Stop reason: HOSPADM

## 2017-01-23 RX ORDER — POTASSIUM CHLORIDE 1.5 G/1.58G
20-40 POWDER, FOR SOLUTION ORAL
Status: DISCONTINUED | OUTPATIENT
Start: 2017-01-23 | End: 2017-01-26 | Stop reason: HOSPADM

## 2017-01-23 RX ORDER — CARBOXYMETHYLCELLULOSE SODIUM 5 MG/ML
1 SOLUTION/ DROPS OPHTHALMIC 3 TIMES DAILY PRN
Status: DISCONTINUED | OUTPATIENT
Start: 2017-01-23 | End: 2017-01-26 | Stop reason: HOSPADM

## 2017-01-23 RX ORDER — ATORVASTATIN CALCIUM 40 MG/1
40 TABLET, FILM COATED ORAL DAILY
Status: DISCONTINUED | OUTPATIENT
Start: 2017-01-24 | End: 2017-01-26 | Stop reason: HOSPADM

## 2017-01-23 RX ADMIN — APIXABAN 2.5 MG: 2.5 TABLET, FILM COATED ORAL at 20:01

## 2017-01-23 RX ADMIN — TAMSULOSIN HYDROCHLORIDE 0.4 MG: 0.4 CAPSULE ORAL at 20:00

## 2017-01-23 RX ADMIN — LISINOPRIL 10 MG: 10 TABLET ORAL at 20:00

## 2017-01-23 RX ADMIN — DOFETILIDE 250 MCG: 0.25 CAPSULE ORAL at 20:02

## 2017-01-23 RX ADMIN — TRAMADOL HYDROCHLORIDE 50 MG: 50 TABLET, COATED ORAL at 16:32

## 2017-01-23 RX ADMIN — ACETAMINOPHEN 650 MG: 325 TABLET, FILM COATED ORAL at 20:02

## 2017-01-23 ASSESSMENT — ACTIVITIES OF DAILY LIVING (ADL)
COGNITION: 0 - NO COGNITION ISSUES REPORTED
WHICH_OF_THE_ABOVE_FUNCTIONAL_RISKS_HAD_A_RECENT_ONSET_OR_CHANGE?: FALL HISTORY

## 2017-01-23 NOTE — PLAN OF CARE
Problem: Goal Outcome Summary  Goal: Goal Outcome Summary  Outcome: No Change  Pt A/O. VSS. RA. Afib with R BBB, HR in 80s-low 100s. One episode of dizziness/SOB noted after using the bathroom, pt HR spiked to 170s. Tramadol x1 given. Plan to initiate Tikosyn therapy at 2000 tonight. Will closely monitor pt for initiation of therapy. Continue with plan of care and report changes to Cards 1.

## 2017-01-23 NOTE — PHARMACY-PHARMACOTHERAPY NOTE
Pharmacotherapy Consult    Asked to review drug interactions for new dofetilide start.  The patient is currently on atorvastatin, which is a CY inhibitor.     Up to Date:  Summary CY Inhibitors (Weak) may increase the serum concentration of Dofetilide.  Severity Moderate   Reliability Rating Fair:   Patient Management Administer CY inhibitors cautiously with dofetilide, carefully monitoring for signs and symptoms of increased dofetilide exposure.    CY mediated metabolism is expected to make a minor contribution to total dofetilide clearance.1,2 Therefore the magnitude of interaction of dofetilide with CY inhibitors is expected to be small.  Na Anthony, PharmD, pager [phone]

## 2017-01-23 NOTE — TELEPHONE ENCOUNTER
Report called to Azeb the nurse on 6C at the [facility] for patient's admission of Tikosyn and 72 hour monitoring ordered by Dr. Li.

## 2017-01-23 NOTE — H&P
"                                             CARDS 1 Admission History and Physical  Bud Merritt MRN: [id number]  5/9/1927  Date of Admission:1/23/2017  Primary care provider: Camron Aragon  ___________________________________          Assessment and Plan:   Bud Merritt is a 89 year old male pmh moderate to severe AS (valve area 1-1.1cm2 with MSG 23 mmHg), atrial fibrillation, NICM with LVEF 40-45%, RA, BPH here for planned admission for dofetilide initiation.     # Atrial fibrillation, paroxysmal  # Sinus node dysfunction  # Bifascicular block  # \"spells\"  Planned admission for dofetilide loading/monitoring. Hope to control symptoms of lightheadedness.  -dofetilide q12h to begin this evening at 8pm  -EKG 30 minutes after each dose, monitoring qtc.   -If qtc>500ms, then half the dose. If >550ms, hold the dose.  -anticoagulated on apixaban  -If not in NSR at the end of 72 hours of loading, will cardiovert.     #NICM (LVEF 40-45%)  Appears compensated and euvolemic.   -continue pta atorvastatin, lasix, lisinopril, metoprolol succinate, spironolactone    Chronic Issues  #CVA prevention  -continue pta dose-adjusted apixaban 2.5 mg BID  #BPH  -continue pta tamsulosin  #GERD  -continue pta PPI, alendronate  #Rheumatoid arthritis  -continue pta prednisone and leflunamide    FEN: regular diet, electrolyte replacement protocol  Prophylaxis: on apixaban  Consults: none  Code Status: full  Disposition: admit to [place] [address] for 72 hr dofetilide loading/monitoring    Patient seen and discussed with Dr. Li.     Beth Cohn MD  Internal Medicine PGY-3  [phone]    Attending: Patient seen and examined with Dr. Cohn ([facility] PGY3). The H&P is as noted. Any additional findings, if any, have been added to the body of the note. All labs and imaging studies reviewed personally. The assessment and recommendations outlined above reflect our joint plan and was reached after careful review and discussion of the " "case.     Khoa Li MD          Chief Complaint:   Light-headedness, \"spells\"         History of Present Illness:   Bud Merritt is a 89 year old male pmh moderate to severe AS (valve area 1-1.1cm2 with MSG 23 mmHg), atrial fibrillation, NICM with LVEF 40-45%, RA, BPH here for planned admission for dofetilide initiation.     Patient was seen in [place] by Dr. Li on 1/9/17 to address patient's \"spells\", which he reports as light-headedness or dizziness and/or pain in his chest. During episodes, HR's fast but during NSR, HR's are pretty slow. Furthermore, he has 1st Degree AV block and RBBB.    Also with LV dysfunction that improved while on amiodarone. Significant PVC burden improved on amiodarone. However this was discontinued due to decrease in diffusion capacity. Coronary angiogram with non-obstructive disease.    Patient denies current chest pain, shortness of breath, orthopnea. Endorses chronic cough and phlegm production that wakes him up in the middle of the night. Denies LE edema, abdominal fullness.     ROS:  Complete 10 point ROS was negative unless noted in the HPI.         Past Cardiac History:     Recent Cardiac Admissions: none    Last ECHO:   Interpretation Summary     Moderate to severe valvular aortic stenosis.  Left ventricular systolic function is moderately reduced.  The visual ejection fraction is estimated at 40-45%.  The left ventricle is normal in size.  There is mild concentric left ventricular hypertrophy.  There is moderate biatrial enlargement.  Mild aortic root dilatation.  The ascending aorta is Mildly dilated.  The rhythm was normal sinus.     Global LV systolic performance appears better than described on the last  study 6/27/2016, perhaps because the degree of ventricular ectopy is less  than was present on the last study ( EF preivously estimated 25 to 30%, now  40 to 45%). There appears to be some progression in the degree of aortic  stenosis ( MSG had been 18 mmHg, " now 23 mmHg). Using a LVOT diameter of 2.5  cm, continuity equation calculation of aortic valve area yeilds an TYREL of 1.0  to 1.1 cm2.  ______________________________________________________________________________           Left Ventricle  The left ventricle is normal in size. There is mild concentric left  ventricular hypertrophy. Left ventricular systolic function is moderately  reduced. The visual ejection fraction is estimated at 40-45%. No regional  wall motion abnormalities noted. There is no thrombus seen in the left  ventricle.     Right Ventricle  The right ventricle is normal in structure, function and size. There is no  mass or thrombus in the right ventricle.  Atria  There is moderate biatrial enlargement. There is no atrial shunt seen.     Mitral Valve  The mitral valve leaflets are mildly thickened. There is trace to mild mitral  regurgitation. There is no mitral valve stenosis.     Tricuspid Valve  Normal tricuspid valve. The right ventricular systolic pressure is elevated  at 33.5 mmHg. Right ventricular systolic pressure is elevated, consistent  with mild pulmonary hypertension. There is no tricuspid stenosis.     Aortic Valve  There is severe trileaflet aortic sclerosis. There is mild (1+) aortic  regurgitation. Moderate to severe valvular aortic stenosis. The mean AoV  pressure gradient is 23.9 mmHg.     Pulmonic Valve  Normal pulmonic valve. There is trace pulmonic valvular regurgitation. There  is no pulmonic valvular stenosis.     Vessels  Mild aortic root dilatation. The ascending aorta is Mildly dilated. The IVC  is normal in size and reactivity with respiration, suggesting normal central  venous pressure. The pulmonary artery is normal size.  Pericardial/Pleural  The pericardium appears normal. There is no pleural effusion.     Rhythm  The rhythm was normal sinus.     ______________________________________________________________________________  MMode/2D Measurements & Calculations  IVSd:  1.2 cm  LVIDd: 4.7 cm  LVIDs: 3.7 cm  LVPWd: 1.1 cm  FS: 20.9 %  EDV(Teich): 103.7 ml  ESV(Teich): 59.5 ml  LV mass(C)d: 201.7 grams  Ao root diam: 4.0 cm  asc Aorta Diam: 3.8 cm  LVOT diam: 2.4 cm  LVOT area: 4.6 cm2  LA Volume (BP): 81.8 ml     LA Volume Index (BP): 48.1 ml/m2        Doppler Measurements & Calculations  MV E max phyllis: 73.3 cm/sec  MV A max phyllis: 132.9 cm/sec  MV E/A: 0.55  MV dec time: 0.31 sec  Ao V2 max: 325.8 cm/sec  Ao max P.7 mmHg  Ao V2 mean: 227.5 cm/sec  Ao mean P.9 mmHg  Ao V2 VTI: 66.4 cm  TYREL(I,D): 1.0 cm2  TYREL(V,D): 1.1 cm2  LV V1 max P.3 mmHg  LV V1 max: 76.2 cm/sec  LV V1 VTI: 15.0 cm  SV(LVOT): 69.4 ml  PA acc time: 0.09 sec  TR max phyllis: 289.1 cm/sec  TR max P.5 mmHg  TYREL Index (cm2/m2): 0.61  Lateral E/e': 18.1  Medial E/e': 17.8     Last Angiogram:   Angiographic Results   Right coronary artery has diffuse 30-40% stenoses throughout. Right  coronary artery is a dominant vessel. Proximal lesion may be as tight  is 50%.  Left main appears to have distal tapering in the 40% range.  Circumflex coronary artery has diffuse disease with multiple 30-40%  stenoses.  Proximal left anterior descending artery is moderately calcified;  again with diffuse mild to moderate disease, tightest stenosis  proximally is in the 30-40% range. Mid left anterior descending artery  also is moderately calcified with 25-30% stenoses. There is a large  second diagonal that has an ostial narrowing in the 40-50% range.  No ventriculogram performed.    Conclusion:   1. Scattered mild to moderate disease throughout all coronaries.  Tightest stenosis appears to be 40-50% RCA stenosis. Right dominant  circulation. L Main is 40%  2. Aortic valve was not crossed.  3. Patient with rapid atrial fibrillation with minimal symptoms.    Recommendations:   1. Start metoprolol succinate 25 mg daily for his paroxysmal atrial  fibrillation.  2. Start anticoagulant for paroxysmal atrial fibrillation. I  will  discuss options with the family.  3. Start atorvastatin 40 mg daily for his diffuse moderate coronary  artery disease.   4. Followup in 7-10 days with an [facility] with EKG on arrival. Holter in  one month. Followup in 2-3 months with Dr. Colon with  a repeat  echocardiogram to see if cardiomyopathy has improved. Follow-up  fasting lipid profile and ALT.    VINCENT SOMERS MD          Past Medical History:     Past Medical History   Diagnosis Date     Unspecified essential hypertension      Pure hypercholesterolemia      Rheumatoid arthritis(714.0)      Cardiomyopathy (H)      Atrial fibrillation (H) 07/01/2016             Past Surgical History:      Past Surgical History   Procedure Laterality Date     Hc repair ing hernia,5+y/o,reducibl  1996     Marlex mesh repair of bilateral inguinal hernias and drainage of bilateral scrotal hydroceles.     Hc colonoscopy thru stoma w biopsy/cautery tumor/polyp/lesion  8/31/2004     Colonoscopy  08/24/09     Arthroplasty knee Left 1/12/2016     Procedure: ARTHROPLASTY KNEE;  Surgeon: Cesar Walker DO;  Location: [facility] OR     Irrigation and debridement soft tissue lower extremity, combined Left 3/15/2016     Procedure: COMBINED IRRIGATION AND DEBRIDEMENT SOFT TISSUE LOWER EXTREMITY;  Surgeon: Cesar Walker DO;  Location: [facility] OR             Social History:     Social History   Substance Use Topics     Smoking status: Former Smoker -- 0.50 packs/day for 15 years     Types: Cigarettes     Quit date: 11/12/1998     Smokeless tobacco: Never Used      Comment: quit 15 yrs ago     Alcohol Use: No             Family History:     Family History   Problem Relation Age of Onset     CANCER Mother      CANCER Son      Unknown/Adopted Father      Alcoholism Brother              Allergies:     Allergies   Allergen Reactions     Amiodarone Other (See Comments)     Drop in DLCO     No Known Drug Allergies              Medications:     No current facility-administered  "medications on file prior to encounter.  Current Outpatient Prescriptions on File Prior to Encounter:  traMADol (ULTRAM) 50 MG tablet TAKE 1 TABLET 3 TIMES DAILY AS NEEDED FOR MODERATE PAIN   furosemide (LASIX) 20 MG tablet Take 1 tablet (20 mg) by mouth daily Or as directed by cardiology   apixaban ANTICOAGULANT (ELIQUIS) 2.5 MG tablet Take 1 tablet (2.5 mg) by mouth 2 times daily   Leuprolide Acetate (LUPRON DEPOT IM) Inject into the muscle every 4 months   leflunomide (ARAVA) 10 MG tablet Take 1 tablet (10 mg) by mouth daily   lisinopril (PRINIVIL,ZESTRIL) 10 MG tablet Take 1 tablet (10 mg) by mouth 2 times daily   spironolactone (ALDACTONE) 25 MG tablet Take 1 tablet (25 mg) by mouth daily   acetaminophen (TYLENOL) 650 MG CR tablet Take 650 mg by mouth 2 times daily   metoprolol (TOPROL-XL) 25 MG 24 hr tablet Take 1 tablet (25 mg) by mouth daily   atorvastatin (LIPITOR) 40 MG tablet Take 1 tablet (40 mg) by mouth daily   alendronate (FOSAMAX) 70 MG tablet Take 1 tablet (70 mg) by mouth every 7 days Take with over 8 ounces water and stay upright for at least 30 minutes after dose.  Take at least 60 minutes before breakfast   predniSONE (DELTASONE) 5 MG tablet Take 1 tablet (5 mg) by mouth daily   carboxymethylcellulose (REFRESH PLUS) 0.5 % SOLN Place 1 drop into both eyes 3 times daily as needed for dry eyes   OMEPRAZOLE PO Take 20 mg by mouth daily as needed    calcium carbonate (TUMS) 500 MG chewable tablet Take 1 chew tab by mouth every 4 hours as needed for heartburn   tamsulosin (FLOMAX) 0.4 MG 24 hr capsule Take 1 capsule (0.4 mg) by mouth 2 times daily   ascorbic acid (VITAMIN C) 500 MG CPCR Take 500 mg by mouth daily   VITAMIN D, CHOLECALCIFEROL, PO Take 400 Units by mouth daily   calcium carbonate (OS-SHELIA 500 MG [place]. [place]) 500 MG tablet Take 600 mg by mouth daily            Physical Exam:   Blood pressure 149/79, temperature 98.3  F (36.8  C), temperature source Oral, height 1.6 m (5' 3\"), weight 65.817 " kg (145 lb 1.6 oz), SpO2 99 %.    General: NAD, pleasant  HEENT: NCAT, PERRL, EOMI, OP clear and non-erythematous  Neck: No LAD, no JVD  CV: RRR, 2/6 systolic murmur best heard at LLSB  Resp: CTAB, no rales, rhonchi, wheezes  Abd: s/nt/nd, bs normoactive  Extremities: wwp, trace LE edema  Skin: no lesions or rashes appreciated  Neuro/Psych: AAOx4, CN 2-12 grossly intact, moving all four extremities         Data:        CMP    Recent Labs  Lab 01/23/17  1338      POTASSIUM 4.8   CHLORIDE 102   CO2 26   ANIONGAP 8   *   BUN 20   CR 1.04   GFRESTIMATED 67   SHELIA 8.7   PROTTOTAL 6.9   ALBUMIN 3.2*   BILITOTAL 0.5   ALKPHOS 76   AST 38   ALT 28     CBC    Recent Labs  Lab 01/23/17  1338   WBC 5.3   RBC 3.60*   HGB 11.7*   HCT 36.0*      MCH 32.5   MCHC 32.5   RDW 13.9        EKG: sinus rhythm, first degree AV block, RBBB with rate 84. No st/t wave changes concerning for ischemia. QTc 477    IMAGING:   CXR: ordered

## 2017-01-23 NOTE — IP AVS SNAPSHOT
MRN:[id number]                      After Visit Summary   1/23/2017    Bud Merritt    MRN: [id number]           Thank you!     Thank you for choosing [facility] for your care. Our goal is always to provide you with excellent care. Hearing back from our patients is one way we can continue to improve our services. Please take a few minutes to complete the written survey that you may receive in the mail after you visit with us. Thank you!        Patient Information     Date Of Birth          5/9/1927        About your hospital stay     You were admitted on:  January 23, 2017 You last received care in the:  [facility] [facility]    You were discharged on:  January 26, 2017        Reason for your hospital stay       You were admitted for initiation of dofetilide which you tolerated well. You are currently back in a normal heart rhythm.                  Who to Call     For medical emergencies, please call 911.  For non-urgent questions about your medical care, please call your primary care provider or clinic, [phone]          Attending Provider     Provider    Khoa Li MD       Primary Care Provider Office Phone # Fax #    Camron Aragon [phone]       [facility] [address] [place] [address]        After Care Instructions     Activity       Your activity upon discharge: activity as tolerated            Diet       Follow this diet upon discharge: Orders Placed This Encounter  Regular Diet Adult                  Follow-up Appointments     Adult [facility] Follow-up and recommended labs and tests       Follow up with primary care provider, Camron Aragon, within 14 days for hospital follow- up.  The following labs/tests are recommended: bmp, mg.    Follow up with Dr. Li in [place] in 1 month.    Appointments on [place] and/or [facility] (with [facility] or [facility] provider or service). Call [phone] if you haven't heard  "regarding these appointments within 7 days of discharge.                  Pending Results     Date and Time Order Name Status Description    2017 0954 EKG 12-lead, tracing only Preliminary     2017 2242 EKG 12-lead, tracing only Preliminary     2017 1004 EKG 12-lead, tracing only Preliminary             Statement of Approval     Ordered          17 1148  I have reviewed and agree with all the recommendations and orders detailed in this document.   EFFECTIVE NOW     Approved and electronically signed by:  Luis Eduardo Stack MD             Admission Information        Provider Department Dept Phone    2017 Khoa Li MD [facility] [phone]      Your Vitals Were     Blood Pressure Pulse Temperature    103/61 mmHg 98 97.7  F (36.5  C) (Oral)    Respirations Height Weight    16 1.6 m (5' 3\") 64.864 kg (143 lb)    BMI (Body Mass Index) Pulse Oximetry       25.34 kg/m2 92%       MyChart Information     [facility] lets you send messages to your doctor, view your test results, renew your prescriptions, schedule appointments and more. To sign up, go to [email or web address] . Click on \"Log in\" on the left side of the screen, which will take you to the Welcome page. Then click on \"Sign up Now\" on the right side of the page.     You will be asked to enter the access code listed below, as well as some personal information. Please follow the directions to create your username and password.     Your access code is: [email or web address]  Expires: 3/20/2017 10:06 AM     Your access code will  in 90 days. If you need help or a new code, please call your [facility] clinic or [phone].        Care EveryWhere ID     This is your Care EveryWhere ID. This could be used by other organizations to access your [facility] medical records  [id number]           Review of your medicines      START taking        Dose / Directions    dofetilide 125 MCG capsule   Commonly known as:  TIKOSYN   Used for:  " Paroxysmal atrial fibrillation (H)        Dose:  125 mcg   Take 1 capsule (125 mcg) by mouth 2 times daily   Quantity:  60 capsule   Refills:  5         CONTINUE these medicines which have NOT CHANGED        Dose / Directions    acetaminophen 650 MG CR tablet   Commonly known as:  TYLENOL        Dose:  650 mg   Take 650 mg by mouth 2 times daily   Refills:  0       alendronate 70 MG tablet   Commonly known as:  FOSAMAX   Used for:  Osteopenia        Dose:  70 mg   Take 1 tablet (70 mg) by mouth every 7 days Take with over 8 ounces water and stay upright for at least 30 minutes after dose.  Take at least 60 minutes before breakfast   Quantity:  12 tablet   Refills:  3       apixaban ANTICOAGULANT 2.5 MG tablet   Commonly known as:  ELIQUIS   Used for:  Paroxysmal atrial fibrillation (H)        Dose:  2.5 mg   Take 1 tablet (2.5 mg) by mouth 2 times daily   Quantity:  180 tablet   Refills:  3       ascorbic acid 500 MG Cpcr CR capsule   Commonly known as:  vitamin C        Dose:  500 mg   Take 500 mg by mouth daily   Refills:  0       atorvastatin 40 MG tablet   Commonly known as:  LIPITOR   Used for:  Atherosclerosis of native coronary artery of native heart without angina pectoris        Dose:  40 mg   Take 1 tablet (40 mg) by mouth daily   Quantity:  90 tablet   Refills:  3       calcium carbonate 500 MG chewable tablet   Commonly known as:  TUMS        Dose:  1 chew tab   Take 1 chew tab by mouth every 4 hours as needed for heartburn   Refills:  0       calcium carbonate 500 MG tablet   Commonly known as:  OS-SHELIA 500 mg [place]. Ca        Dose:  600 mg   Take 600 mg by mouth daily   Refills:  0       carboxymethylcellulose 0.5 % Soln ophthalmic solution   Commonly known as:  REFRESH PLUS        Dose:  1 drop   Place 1 drop into both eyes 3 times daily as needed for dry eyes   Refills:  0       FLOMAX 0.4 MG capsule   Generic drug:  tamsulosin        Dose:  0.4 mg   Take 1 capsule (0.4 mg) by mouth 2 times daily    Quantity:  60 capsule   Refills:  0       furosemide 20 MG tablet   Commonly known as:  LASIX   Used for:  Acute on chronic systolic congestive heart failure (H)        Dose:  20 mg   Take 1 tablet (20 mg) by mouth daily Or as directed by cardiology   Quantity:  18 tablet   Refills:  03       leflunomide 10 MG tablet   Commonly known as:  ARAVA   Used for:  Rheumatoid arthritis involving multiple sites with positive rheumatoid factor (H)        Dose:  10 mg   Take 1 tablet (10 mg) by mouth daily   Quantity:  30 tablet   Refills:  11       lisinopril 10 MG tablet   Commonly known as:  PRINIVIL/ZESTRIL   Used for:  Acute on chronic systolic congestive heart failure (H)        Dose:  10 mg   Take 1 tablet (10 mg) by mouth 2 times daily   Quantity:  62 tablet   Refills:  11       LUPRON DEPOT IM        Inject into the muscle every 4 months   Refills:  0       metoprolol 25 MG 24 hr tablet   Commonly known as:  TOPROL-XL   Used for:  Atherosclerosis of native coronary artery of native heart without angina pectoris        Dose:  25 mg   Take 1 tablet (25 mg) by mouth daily   Quantity:  90 tablet   Refills:  3       OMEPRAZOLE PO        Dose:  20 mg   Take 20 mg by mouth daily as needed   Refills:  0       predniSONE 5 MG tablet   Commonly known as:  DELTASONE   Used for:  Osteopenia        Dose:  5 mg   Take 1 tablet (5 mg) by mouth daily   Quantity:  100 tablet   Refills:  4       spironolactone 25 MG tablet   Commonly known as:  ALDACTONE   Used for:  Cardiomyopathy, nonischemic (H)        Dose:  25 mg   Take 1 tablet (25 mg) by mouth daily   Quantity:  30 tablet   Refills:  11       traMADol 50 MG tablet   Commonly known as:  ULTRAM   Used for:  Rheumatoid arthritis involving multiple sites, unspecified rheumatoid factor presence (H)        TAKE 1 TABLET 3 TIMES DAILY AS NEEDED FOR MODERATE PAIN   Quantity:  90 tablet   Refills:  0       VITAMIN D (CHOLECALCIFEROL) PO        Dose:  400 Units   Take 400 Units by mouth  daily   Refills:  0            Where to get your medicines      These medications were sent to [facility] - [place], [place] - [address]  [address] [facility] [address]     Phone:  [phone]    - dofetilide 125 MCG capsule             Protect others around you: Learn how to safely use, store and throw away your medicines at www.disposemymeds.org.             Medication List: This is a list of all your medications and when to take them. Check marks below indicate your daily home schedule. Keep this list as a reference.      Medications           Morning Afternoon Evening Bedtime As Needed    acetaminophen 650 MG CR tablet   Commonly known as:  TYLENOL   Take 650 mg by mouth 2 times daily                                      alendronate 70 MG tablet   Commonly known as:  FOSAMAX   Take 1 tablet (70 mg) by mouth every 7 days Take with over 8 ounces water and stay upright for at least 30 minutes after dose.  Take at least 60 minutes before breakfast                                apixaban ANTICOAGULANT 2.5 MG tablet   Commonly known as:  ELIQUIS   Take 1 tablet (2.5 mg) by mouth 2 times daily   Last time this was given:  2.5 mg on 1/26/2017  8:04 AM                                      ascorbic acid 500 MG Cpcr CR capsule   Commonly known as:  vitamin C   Take 500 mg by mouth daily                                   atorvastatin 40 MG tablet   Commonly known as:  LIPITOR   Take 1 tablet (40 mg) by mouth daily   Last time this was given:  40 mg on 1/26/2017  8:05 AM                                   calcium carbonate 500 MG chewable tablet   Commonly known as:  TUMS   Take 1 chew tab by mouth every 4 hours as needed for heartburn                                   calcium carbonate 500 MG tablet   Commonly known as:  OS-SHELIA 500 mg [place]. Ca   Take 600 mg by mouth daily   Last time this was given:  500 mg on 1/26/2017  8:06 AM                                   carboxymethylcellulose  0.5 % Soln ophthalmic solution   Commonly known as:  REFRESH PLUS   Place 1 drop into both eyes 3 times daily as needed for dry eyes                                   dofetilide 125 MCG capsule   Commonly known as:  TIKOSYN   Take 1 capsule (125 mcg) by mouth 2 times daily   Last time this was given:  125 mcg on 1/26/2017  8:06 AM                                   FLOMAX 0.4 MG capsule   Take 1 capsule (0.4 mg) by mouth 2 times daily   Last time this was given:  0.4 mg on 1/26/2017  8:06 AM   Generic drug:  tamsulosin                                   furosemide 20 MG tablet   Commonly known as:  LASIX   Take 1 tablet (20 mg) by mouth daily Or as directed by cardiology   Last time this was given:  20 mg on 1/26/2017  8:06 AM                                   leflunomide 10 MG tablet   Commonly known as:  ARAVA   Take 1 tablet (10 mg) by mouth daily   Last time this was given:  10 mg on 1/26/2017  8:07 AM                                   lisinopril 10 MG tablet   Commonly known as:  PRINIVIL/ZESTRIL   Take 1 tablet (10 mg) by mouth 2 times daily   Last time this was given:  10 mg on 1/26/2017  8:06 AM                                      LUPRON DEPOT IM   Inject into the muscle every 4 months                                metoprolol 25 MG 24 hr tablet   Commonly known as:  TOPROL-XL   Take 1 tablet (25 mg) by mouth daily   Last time this was given:  25 mg on 1/26/2017  8:04 AM                                   OMEPRAZOLE PO   Take 20 mg by mouth daily as needed   Last time this was given:  20 mg on 1/26/2017  8:05 AM                                   predniSONE 5 MG tablet   Commonly known as:  DELTASONE   Take 1 tablet (5 mg) by mouth daily   Last time this was given:  5 mg on 1/26/2017  8:07 AM                                   spironolactone 25 MG tablet   Commonly known as:  ALDACTONE   Take 1 tablet (25 mg) by mouth daily   Last time this was given:  25 mg on 1/26/2017  8:05 AM                                    traMADol 50 MG tablet   Commonly known as:  ULTRAM   TAKE 1 TABLET 3 TIMES DAILY AS NEEDED FOR MODERATE PAIN   Last time this was given:  50 mg on 1/26/2017 10:37 AM                                   VITAMIN D (CHOLECALCIFEROL) PO   Take 400 Units by mouth daily

## 2017-01-23 NOTE — PROGRESS NOTES
Admission          1/23/2017 12:04 PM  -----------------------------------------------------------  Diagnosis: Afib, initiation/monitoring of Tikosyn therapy    Admitted from: Home  Accompanied by: Spouse  Belongings: Placed in closet; valuables sent home with family  Admission Profile: Complete  Teaching: Orientation to unit, call don't fall, use of console, meal times, visiting hours,  when to call for the RN (angina/sob/dizzyness, etc.), and enforced importance of safety   Access: PIV  Telemetry: Placed on pt  Ht./Wt.: Complete    Temp:  [98.3  F (36.8  C)] 98.3  F (36.8  C)  Heart Rate:  [86] 86  BP: (149)/(79) 149/79 mmHg  SpO2:  [99 %] 99 %

## 2017-01-23 NOTE — IP AVS SNAPSHOT
[address] [address]    Phone:  [phone]                                       After Visit Summary   1/23/2017    Bud Merritt    MRN: [id number]           After Visit Summary Signature Page     I have received my discharge instructions, and my questions have been answered. I have discussed any challenges I see with this plan with the nurse or doctor.    ..........................................................................................................................................  Patient/Patient Representative Signature      ..........................................................................................................................................  Patient Representative Print Name and Relationship to Patient    ..................................................               ................................................  Date                                            Time    ..........................................................................................................................................  Reviewed by Signature/Title    ...................................................              ..............................................  Date                                                            Time

## 2017-01-24 LAB
ANION GAP SERPL CALCULATED.3IONS-SCNC: 11 MMOL/L (ref 3–14)
BUN SERPL-MCNC: 20 MG/DL (ref 7–30)
CALCIUM SERPL-MCNC: 9.5 MG/DL (ref 8.5–10.1)
CHLORIDE SERPL-SCNC: 101 MMOL/L (ref 94–109)
CO2 SERPL-SCNC: 25 MMOL/L (ref 20–32)
CREAT SERPL-MCNC: 0.95 MG/DL (ref 0.66–1.25)
GFR SERPL CREATININE-BSD FRML MDRD: 74 ML/MIN/1.7M2
GLUCOSE SERPL-MCNC: 98 MG/DL (ref 70–99)
LACTATE BLD-SCNC: 2.2 MMOL/L (ref 0.7–2.1)
MAGNESIUM SERPL-MCNC: 2.4 MG/DL (ref 1.6–2.3)
POTASSIUM SERPL-SCNC: 4.2 MMOL/L (ref 3.4–5.3)
SODIUM SERPL-SCNC: 137 MMOL/L (ref 133–144)

## 2017-01-24 PROCEDURE — A9270 NON-COVERED ITEM OR SERVICE: HCPCS | Mod: GY | Performed by: INTERNAL MEDICINE

## 2017-01-24 PROCEDURE — 84132 ASSAY OF SERUM POTASSIUM: CPT | Performed by: STUDENT IN AN ORGANIZED HEALTH CARE EDUCATION/TRAINING PROGRAM

## 2017-01-24 PROCEDURE — 25000132 ZZH RX MED GY IP 250 OP 250 PS 637: Mod: GY | Performed by: INTERNAL MEDICINE

## 2017-01-24 PROCEDURE — 80048 BASIC METABOLIC PNL TOTAL CA: CPT | Performed by: STUDENT IN AN ORGANIZED HEALTH CARE EDUCATION/TRAINING PROGRAM

## 2017-01-24 PROCEDURE — 25000132 ZZH RX MED GY IP 250 OP 250 PS 637: Mod: GY | Performed by: STUDENT IN AN ORGANIZED HEALTH CARE EDUCATION/TRAINING PROGRAM

## 2017-01-24 PROCEDURE — 93005 ELECTROCARDIOGRAM TRACING: CPT

## 2017-01-24 PROCEDURE — 83605 ASSAY OF LACTIC ACID: CPT | Performed by: INTERNAL MEDICINE

## 2017-01-24 PROCEDURE — 25000125 ZZHC RX 250: Performed by: STUDENT IN AN ORGANIZED HEALTH CARE EDUCATION/TRAINING PROGRAM

## 2017-01-24 PROCEDURE — 36415 COLL VENOUS BLD VENIPUNCTURE: CPT | Performed by: INTERNAL MEDICINE

## 2017-01-24 PROCEDURE — A9270 NON-COVERED ITEM OR SERVICE: HCPCS | Mod: GY | Performed by: STUDENT IN AN ORGANIZED HEALTH CARE EDUCATION/TRAINING PROGRAM

## 2017-01-24 PROCEDURE — 83735 ASSAY OF MAGNESIUM: CPT | Performed by: STUDENT IN AN ORGANIZED HEALTH CARE EDUCATION/TRAINING PROGRAM

## 2017-01-24 PROCEDURE — 93010 ELECTROCARDIOGRAM REPORT: CPT | Performed by: INTERNAL MEDICINE

## 2017-01-24 PROCEDURE — 21400006 ZZH R&B CCU INTERMEDIATE UMMC

## 2017-01-24 PROCEDURE — 36415 COLL VENOUS BLD VENIPUNCTURE: CPT | Performed by: STUDENT IN AN ORGANIZED HEALTH CARE EDUCATION/TRAINING PROGRAM

## 2017-01-24 RX ADMIN — DOFETILIDE 125 MCG: 0.12 CAPSULE ORAL at 20:44

## 2017-01-24 RX ADMIN — TAMSULOSIN HYDROCHLORIDE 0.4 MG: 0.4 CAPSULE ORAL at 20:44

## 2017-01-24 RX ADMIN — TRAMADOL HYDROCHLORIDE 50 MG: 50 TABLET, COATED ORAL at 08:12

## 2017-01-24 RX ADMIN — LISINOPRIL 10 MG: 10 TABLET ORAL at 20:44

## 2017-01-24 RX ADMIN — DOFETILIDE 125 MCG: 0.12 CAPSULE ORAL at 09:05

## 2017-01-24 RX ADMIN — ACETAMINOPHEN 650 MG: 325 TABLET, FILM COATED ORAL at 20:44

## 2017-01-24 RX ADMIN — ACETAMINOPHEN 650 MG: 325 TABLET, FILM COATED ORAL at 08:11

## 2017-01-24 RX ADMIN — METOPROLOL SUCCINATE 25 MG: 25 TABLET, FILM COATED, EXTENDED RELEASE ORAL at 08:12

## 2017-01-24 RX ADMIN — ATORVASTATIN CALCIUM 40 MG: 40 TABLET, FILM COATED ORAL at 08:11

## 2017-01-24 RX ADMIN — APIXABAN 2.5 MG: 2.5 TABLET, FILM COATED ORAL at 08:11

## 2017-01-24 RX ADMIN — CALCIUM 500 MG: 500 TABLET ORAL at 08:11

## 2017-01-24 RX ADMIN — OMEPRAZOLE 20 MG: 20 CAPSULE, DELAYED RELEASE ORAL at 08:11

## 2017-01-24 RX ADMIN — SPIRONOLACTONE 25 MG: 25 TABLET ORAL at 08:11

## 2017-01-24 RX ADMIN — PREDNISONE 5 MG: 5 TABLET ORAL at 08:11

## 2017-01-24 RX ADMIN — FUROSEMIDE 20 MG: 20 TABLET ORAL at 08:11

## 2017-01-24 RX ADMIN — TAMSULOSIN HYDROCHLORIDE 0.4 MG: 0.4 CAPSULE ORAL at 08:11

## 2017-01-24 RX ADMIN — APIXABAN 2.5 MG: 2.5 TABLET, FILM COATED ORAL at 20:44

## 2017-01-24 RX ADMIN — ACETAMINOPHEN 650 MG: 325 TABLET, FILM COATED ORAL at 16:45

## 2017-01-24 RX ADMIN — LEFLUNOMIDE 10 MG: 10 TABLET, FILM COATED ORAL at 08:11

## 2017-01-24 NOTE — PROGRESS NOTES
[facility] Cardiology 1 Progress Note          Bud Merritt MRN: [id number]  5/9/1927  Date of Admission:1/23/2017  ___________________________________  Nurses/overnight notes reviewed.  Interval Hx: No acute overnight events; Pt w/o complaints, tolerating diet, and ambulating; denies N/V/F/C/CP/SOB/HA; pain is well controlled; No spells since admission    ROS: Negative except as noted in Interval History.    Medications:  Dofetilide  125 mcg po BID  Atorvastatin  apixaban  Lasix  Metoprolol  Lisinopril  spironolactone    Physical Examination:  Vitals:  Temp:  [97.8  F (36.6  C)-98.2  F (36.8  C)] 97.8  F (36.6  C)  Heart Rate:  [] 67  Resp:  [18-22] 18  BP: ()/(63-84) 95/64 mmHg  SpO2:  [92 %-98 %] 96 %    Intake/Output Summary (Last 24 hours) at 01/24/17 1543  Last data filed at 01/24/17 1200   Gross per 24 hour   Intake   1410 ml   Output    125 ml   Net   1285 ml     General: NAD, pleasant  HEENT: NCAT, PERRL, EOMI, OP clear and non-erythematous  Neck: No LAD, no JVD  CV: RRR, 2/6 systolic murmur best heard at LLSB  Resp: CTAB, no rales, rhonchi, wheezes  Abd: s/nt/nd, bs normoactive  Extremities: wwp, trace LE edema  Skin: no lesions or rashes appreciated  Neuro/Psych: AAOx4, CN 2-12 grossly intact, moving all four extremities    Labs (Last four results): Reviewed, please see EPIC for complete details.   CMP  Recent Labs  Lab 01/24/17  0735 01/23/17  1703 01/23/17  1338     --  136   POTASSIUM 4.2 4.3 4.8   CHLORIDE 101  --  102   CO2 25  --  26   ANIONGAP 11  --  8   GLC 98  --  111*   BUN 20  --  20   CR 0.95 1.03 1.04   GFRESTIMATED 74 68 67   GFRESTBLACK 90 82 81   SHELIA 9.5  --  8.7   MAG 2.4* 2.1  --    PROTTOTAL  --   --  6.9   ALBUMIN  --   --  3.2*   BILITOTAL  --   --  0.5   ALKPHOS  --   --  76   AST  --   --  38   ALT  --   --  28     CBC  Recent Labs  Lab 01/23/17  1338   WBC 5.3   RBC 3.60*   HGB 11.7*   HCT 36.0*      MCH 32.5   MCHC 32.5   RDW 13.9        INRNo lab  "results found in last 7 days.    EKG/strip results:   reviewed    Radiology: Reviewed, please see EPIC for complete details.   -none from today.    Assessment/Plan  Bud Merritt is a 89 year old male pmh moderate to severe AS (valve area 1-1.1cm2 with MSG 23 mmHg), atrial fibrillation, NICM with LVEF 40-45%, RA, BPH here for planned admission for dofetilide initiation.     # Atrial fibrillation, paroxysmal  # Sinus node dysfunction  # Bifascicular block  # \"spells\"  Planned admission for dofetilide loading/monitoring. Hope to control symptoms of lightheadedness.  -dofetilide q12h initiated evening 1/23  -EKG 2 hrs after each dose, monitoring qtc.    -If qtc>500ms, then half the dose. If >550ms, hold the dose.  -had to decrease dose this am to 125 BID. Next step would be to decrease to 125 once daily  -anticoagulated on apixaban  -If not in NSR at the end of 72 hours of loading, will cardiovert.      #NICM (LVEF 40-45%)  Appears compensated and euvolemic.    -continue pta atorvastatin, lasix, lisinopril, metoprolol succinate, spironolactone    Chronic Issues  #CVA prevention  -continue pta dose-adjusted apixaban 2.5 mg BID  #BPH  -continue pta tamsulosin  #GERD  -continue pta PPI, alendronate  #Rheumatoid arthritis  -continue pta prednisone and leflunamide    FEN: regular diet, electrolyte replacement protocol  Prophylaxis: on apixaban  Consults: none  Code Status: full  Disposition: admit to [place] [address] for 72 hr dofetilide loading/monitoring    Patient seen and discussed with Dr. Haider.    Beth Cohn MD  Internal Medicine, PGY-3  Cardiology 1 Service  [phone]    I very much appreciated the opportunity to see and assess Mr Bud Merritt in the hospital with Dr Palmer  and [place] [address] resident team. He appears to be tolerating meds well to this point. Family was present and patient's questions re plan were addressed.     I agree with the note above which summarizes my findings and current " recommendations.  Please do not hesitate to contact my office if you have any questions or concerns.      Jerome Haider MD  Cardiac Arrhythmia Service  [facility]  [phone]

## 2017-01-24 NOTE — PLAN OF CARE
Problem: Goal Outcome Summary  Goal: Goal Outcome Summary  Outcome: No Change  Patient is  A&OX4 and AVSS and on room air. Patient EKGs can be Afib with R BBB and sometimes SR with PAC/PVC.  Patient is [place] on the L-ear used hearing aid and eye glasses.  Started the patient on Tikosyn PO at 2000 and EKG was done at 2200 and provider was called.  Tikosyn dose lowered to 125mcg from 250mcg.  SBA to the bathroom with cane.  Continue with plan of care and report changes to Cards 1.

## 2017-01-24 NOTE — PROGRESS NOTES
D: Tikosyn therapy initiation.     I: Monitored vitals and assessed pt status.   Running: PIV SL  PRN: tramadol    A: A0x4. VSS. Afebrile. Intermittent afib, otherwise SR with 1st degree AVB/PACs. Held lisinopril dose this AM per MD d/t low BPs- high 80s low 90s systolic. Generalized pain r/t arthritis- controlled with PRN tramadol, scheduled tylenol. Up independently in room with cane. Very independent- doesn't like to ask for things. Tikosyn dose decreased to 125mcg this AM- EKG done 2 hours after, no QT/QTc concerns. Pt triggered sepsis protocol- lactic came back as 2.2- sx aware, not concerned. Voiding, BM today. Eating fair amounts. Pleasant, cooperative.    P: 72 hour tikosyn loading/monitoring, then dc to home. Continue to monitor Pt status and report changes to treatment team.

## 2017-01-25 LAB
INTERPRETATION ECG - MUSE: NORMAL
MAGNESIUM SERPL-MCNC: 2 MG/DL (ref 1.6–2.3)

## 2017-01-25 PROCEDURE — 36415 COLL VENOUS BLD VENIPUNCTURE: CPT | Performed by: STUDENT IN AN ORGANIZED HEALTH CARE EDUCATION/TRAINING PROGRAM

## 2017-01-25 PROCEDURE — 25000125 ZZHC RX 250: Performed by: STUDENT IN AN ORGANIZED HEALTH CARE EDUCATION/TRAINING PROGRAM

## 2017-01-25 PROCEDURE — 99232 SBSQ HOSP IP/OBS MODERATE 35: CPT | Mod: GC | Performed by: INTERNAL MEDICINE

## 2017-01-25 PROCEDURE — 83735 ASSAY OF MAGNESIUM: CPT | Performed by: STUDENT IN AN ORGANIZED HEALTH CARE EDUCATION/TRAINING PROGRAM

## 2017-01-25 PROCEDURE — 93010 ELECTROCARDIOGRAM REPORT: CPT | Performed by: INTERNAL MEDICINE

## 2017-01-25 PROCEDURE — A9270 NON-COVERED ITEM OR SERVICE: HCPCS | Mod: GY | Performed by: STUDENT IN AN ORGANIZED HEALTH CARE EDUCATION/TRAINING PROGRAM

## 2017-01-25 PROCEDURE — 93005 ELECTROCARDIOGRAM TRACING: CPT

## 2017-01-25 PROCEDURE — 25000132 ZZH RX MED GY IP 250 OP 250 PS 637: Mod: GY | Performed by: STUDENT IN AN ORGANIZED HEALTH CARE EDUCATION/TRAINING PROGRAM

## 2017-01-25 PROCEDURE — 21400006 ZZH R&B CCU INTERMEDIATE UMMC

## 2017-01-25 PROCEDURE — 25000132 ZZH RX MED GY IP 250 OP 250 PS 637: Mod: GY | Performed by: INTERNAL MEDICINE

## 2017-01-25 PROCEDURE — A9270 NON-COVERED ITEM OR SERVICE: HCPCS | Mod: GY | Performed by: INTERNAL MEDICINE

## 2017-01-25 RX ORDER — DOFETILIDE 0.12 MG/1
125 CAPSULE ORAL 2 TIMES DAILY
Qty: 60 CAPSULE | Refills: 5 | Status: SHIPPED | OUTPATIENT
Start: 2017-01-25 | End: 2017-08-15

## 2017-01-25 RX ADMIN — ACETAMINOPHEN 650 MG: 325 TABLET, FILM COATED ORAL at 08:36

## 2017-01-25 RX ADMIN — DOFETILIDE 125 MCG: 0.12 CAPSULE ORAL at 08:38

## 2017-01-25 RX ADMIN — SPIRONOLACTONE 25 MG: 25 TABLET ORAL at 08:37

## 2017-01-25 RX ADMIN — LISINOPRIL 10 MG: 10 TABLET ORAL at 08:37

## 2017-01-25 RX ADMIN — TAMSULOSIN HYDROCHLORIDE 0.4 MG: 0.4 CAPSULE ORAL at 08:35

## 2017-01-25 RX ADMIN — ATORVASTATIN CALCIUM 40 MG: 40 TABLET, FILM COATED ORAL at 08:35

## 2017-01-25 RX ADMIN — CALCIUM 500 MG: 500 TABLET ORAL at 08:37

## 2017-01-25 RX ADMIN — TAMSULOSIN HYDROCHLORIDE 0.4 MG: 0.4 CAPSULE ORAL at 20:36

## 2017-01-25 RX ADMIN — METOPROLOL SUCCINATE 25 MG: 25 TABLET, FILM COATED, EXTENDED RELEASE ORAL at 08:35

## 2017-01-25 RX ADMIN — LEFLUNOMIDE 10 MG: 10 TABLET, FILM COATED ORAL at 08:38

## 2017-01-25 RX ADMIN — DOFETILIDE 125 MCG: 0.12 CAPSULE ORAL at 20:36

## 2017-01-25 RX ADMIN — FUROSEMIDE 20 MG: 20 TABLET ORAL at 08:36

## 2017-01-25 RX ADMIN — ACETAMINOPHEN 650 MG: 325 TABLET, FILM COATED ORAL at 20:35

## 2017-01-25 RX ADMIN — TRAMADOL HYDROCHLORIDE 50 MG: 50 TABLET, COATED ORAL at 10:12

## 2017-01-25 RX ADMIN — APIXABAN 2.5 MG: 2.5 TABLET, FILM COATED ORAL at 08:38

## 2017-01-25 RX ADMIN — TRAMADOL HYDROCHLORIDE 50 MG: 50 TABLET, COATED ORAL at 03:48

## 2017-01-25 RX ADMIN — LISINOPRIL 10 MG: 10 TABLET ORAL at 20:36

## 2017-01-25 RX ADMIN — TRAMADOL HYDROCHLORIDE 50 MG: 50 TABLET, COATED ORAL at 20:36

## 2017-01-25 RX ADMIN — OMEPRAZOLE 20 MG: 20 CAPSULE, DELAYED RELEASE ORAL at 08:35

## 2017-01-25 RX ADMIN — PREDNISONE 5 MG: 5 TABLET ORAL at 08:37

## 2017-01-25 RX ADMIN — APIXABAN 2.5 MG: 2.5 TABLET, FILM COATED ORAL at 20:35

## 2017-01-25 ASSESSMENT — PAIN DESCRIPTION - DESCRIPTORS
DESCRIPTORS: ACHING
DESCRIPTORS: ACHING
DESCRIPTORS: HEADACHE

## 2017-01-25 NOTE — PLAN OF CARE
Problem: Goal Outcome Summary  Goal: Goal Outcome Summary  D:  Admitted on 1/23 for Tikosyn initiation and monitoring.  I: Monitored vitals and assessed pt status.  EKG done 2 hours after dose in the am.   A:   Pt is alert and oriented x 4, but hard of hearing, uses hearing aid and glasses on. VSS, on room air, denies any SOB, spO2 96%. Cardiac monitor show SR with 1 AV Block/PAC.  Pt sat up in the chair for the most of the day. Reports joint pain in hands, knee and feet due to arthritis, pt given prn tramadol and schedule Tylenol with effect. Pt up independently  with a cane in the room. Tolerating regular diet. Pt voiding in the urinal, reminded to use the urinal to measure urine output.    P: Continue to monitor Pt status and report changes to treatment team cards 1

## 2017-01-25 NOTE — PLAN OF CARE
Problem: Goal Outcome Summary  Goal: Goal Outcome Summary  Outcome: No Change  Patient is alert and oriented X4, denies nausea and chest pain but c/o of headache.  Ultram PO given with relief.  Patient on Tikosyn and EKG was done and provider was paged with QT/QTc value.  Patient is [place], and has hearing aid on L-ear.  Up independently to the bathroom to void and patient was provided with teaching on monitoring urine output, patient is now using urinal and voided 500cc.  Will continue with cares and notify provider of the updated in status changes.

## 2017-01-26 ENCOUNTER — CARE COORDINATION (OUTPATIENT)
Dept: CARDIOLOGY | Facility: CLINIC | Age: 82
End: 2017-01-26

## 2017-01-26 VITALS
HEIGHT: 63 IN | BODY MASS INDEX: 25.34 KG/M2 | OXYGEN SATURATION: 92 % | RESPIRATION RATE: 16 BRPM | SYSTOLIC BLOOD PRESSURE: 103 MMHG | DIASTOLIC BLOOD PRESSURE: 61 MMHG | WEIGHT: 143 LBS | TEMPERATURE: 97.7 F | HEART RATE: 98 BPM

## 2017-01-26 LAB
CREAT SERPL-MCNC: 1.11 MG/DL (ref 0.66–1.25)
GFR SERPL CREATININE-BSD FRML MDRD: 62 ML/MIN/1.7M2
INTERPRETATION ECG - MUSE: NORMAL
INTERPRETATION ECG - MUSE: NORMAL
LACTATE BLD-SCNC: 1.2 MMOL/L (ref 0.7–2.1)
MAGNESIUM SERPL-MCNC: 2 MG/DL (ref 1.6–2.3)
POTASSIUM SERPL-SCNC: 3.6 MMOL/L (ref 3.4–5.3)

## 2017-01-26 PROCEDURE — 83605 ASSAY OF LACTIC ACID: CPT | Performed by: INTERNAL MEDICINE

## 2017-01-26 PROCEDURE — A9270 NON-COVERED ITEM OR SERVICE: HCPCS | Mod: GY | Performed by: STUDENT IN AN ORGANIZED HEALTH CARE EDUCATION/TRAINING PROGRAM

## 2017-01-26 PROCEDURE — 36415 COLL VENOUS BLD VENIPUNCTURE: CPT | Performed by: INTERNAL MEDICINE

## 2017-01-26 PROCEDURE — A9270 NON-COVERED ITEM OR SERVICE: HCPCS | Mod: GY | Performed by: INTERNAL MEDICINE

## 2017-01-26 PROCEDURE — 99238 HOSP IP/OBS DSCHRG MGMT 30/<: CPT | Mod: GC | Performed by: INTERNAL MEDICINE

## 2017-01-26 PROCEDURE — 83735 ASSAY OF MAGNESIUM: CPT | Performed by: STUDENT IN AN ORGANIZED HEALTH CARE EDUCATION/TRAINING PROGRAM

## 2017-01-26 PROCEDURE — 25000132 ZZH RX MED GY IP 250 OP 250 PS 637: Mod: GY | Performed by: STUDENT IN AN ORGANIZED HEALTH CARE EDUCATION/TRAINING PROGRAM

## 2017-01-26 PROCEDURE — 25000132 ZZH RX MED GY IP 250 OP 250 PS 637: Mod: GY | Performed by: INTERNAL MEDICINE

## 2017-01-26 PROCEDURE — 93005 ELECTROCARDIOGRAM TRACING: CPT

## 2017-01-26 PROCEDURE — 93010 ELECTROCARDIOGRAM REPORT: CPT | Performed by: INTERNAL MEDICINE

## 2017-01-26 PROCEDURE — 84132 ASSAY OF SERUM POTASSIUM: CPT | Performed by: STUDENT IN AN ORGANIZED HEALTH CARE EDUCATION/TRAINING PROGRAM

## 2017-01-26 PROCEDURE — 82565 ASSAY OF CREATININE: CPT | Performed by: STUDENT IN AN ORGANIZED HEALTH CARE EDUCATION/TRAINING PROGRAM

## 2017-01-26 PROCEDURE — 25000125 ZZHC RX 250: Performed by: STUDENT IN AN ORGANIZED HEALTH CARE EDUCATION/TRAINING PROGRAM

## 2017-01-26 RX ADMIN — TRAMADOL HYDROCHLORIDE 50 MG: 50 TABLET, COATED ORAL at 04:38

## 2017-01-26 RX ADMIN — METOPROLOL SUCCINATE 25 MG: 25 TABLET, FILM COATED, EXTENDED RELEASE ORAL at 08:04

## 2017-01-26 RX ADMIN — OMEPRAZOLE 20 MG: 20 CAPSULE, DELAYED RELEASE ORAL at 08:05

## 2017-01-26 RX ADMIN — LISINOPRIL 10 MG: 10 TABLET ORAL at 08:06

## 2017-01-26 RX ADMIN — FUROSEMIDE 20 MG: 20 TABLET ORAL at 08:06

## 2017-01-26 RX ADMIN — LEFLUNOMIDE 10 MG: 10 TABLET, FILM COATED ORAL at 08:07

## 2017-01-26 RX ADMIN — TRAMADOL HYDROCHLORIDE 50 MG: 50 TABLET, COATED ORAL at 10:37

## 2017-01-26 RX ADMIN — CALCIUM 500 MG: 500 TABLET ORAL at 08:06

## 2017-01-26 RX ADMIN — PREDNISONE 5 MG: 5 TABLET ORAL at 08:07

## 2017-01-26 RX ADMIN — TAMSULOSIN HYDROCHLORIDE 0.4 MG: 0.4 CAPSULE ORAL at 08:06

## 2017-01-26 RX ADMIN — SPIRONOLACTONE 25 MG: 25 TABLET ORAL at 08:05

## 2017-01-26 RX ADMIN — ATORVASTATIN CALCIUM 40 MG: 40 TABLET, FILM COATED ORAL at 08:05

## 2017-01-26 RX ADMIN — ACETAMINOPHEN 650 MG: 325 TABLET, FILM COATED ORAL at 08:09

## 2017-01-26 RX ADMIN — APIXABAN 2.5 MG: 2.5 TABLET, FILM COATED ORAL at 08:04

## 2017-01-26 RX ADMIN — DOFETILIDE 125 MCG: 0.12 CAPSULE ORAL at 08:06

## 2017-01-26 ASSESSMENT — PAIN DESCRIPTION - DESCRIPTORS: DESCRIPTORS: ACHING

## 2017-01-26 NOTE — PLAN OF CARE
Problem: Goal Outcome Summary  Goal: Goal Outcome Summary  D:  Pt was admitted on 1/23 for Tikosyn initiation and monitoring.  I: Monitored vitals and assessed pt status.  EKG done 2 hours after dose in evening.    A:   Pt is alert and oriented x 4, but very [place]. Pt uses hearing aids and glasses on. VSS on room air. Pt denies any SOB. Pt spO2 levels upper 90s% on RA. Cardiac monitor show SR with 1 AV Block. Pt slept in recliner. Reports joint pain in hands, knee and feet due to arthritis, pt given prn tramadol x 2 and scheduled Tylenol x 1. Pt up independently  with a cane in the room. Tolerating regular diet. Pt voiding in the urinal and keeping to measure,  P: Continue to monitor Pt status and report changes to treatment team cards 1.     Jenn Richey

## 2017-01-26 NOTE — DISCHARGE SUMMARY
"                                                                 Cardiology Discharge Summary  Bud Merritt MRN: [id number]  5/9/1927  Primary care provider: Camron Aragon  ___________________________________          Date of Admission:  1/23/2017  Date of Discharge:  1/26/2017   Admitting Physician:  Khoa Li MD  Discharge Physician:  Khoa Li MD   Discharging Service:  Cards 1      Primary Provider: Camron Aragon         Reason for Admission:   Bud Merritt is a 89 year old male pmh moderate to severe AS (valve area 1-1.1cm2 with MSG 23 mmHg), atrial fibrillation, NICM with LVEF 40-45%, RA, BPH here for planned admission for dofetilide initiation.           Discharge Diagnosis:   Paroxysmal afib  Sinus node dysfunction  Bifascicular block  Fatigue  SOB  NICM  Chronic systolic heart failure/woa         Procedures & Significant Findings:   EKG with QTc of approximately 500         Consultations:   none         Hospital Course by Problem:    Paroxysmal atrial fibrillation  Patient was admitted for dofetilide initiation and monitoring. Initial dose was 250mcg BID but was reduced to 125mcg BID. EKG showed stable QTc in the range of 490-510. He converted to sinus rhythm spontaneously with frequent PACs and reports improvement in symptoms.  He will be discharged home with follow up with Dr. Li in [place] in 1 month.    Physical Exam on day of Discharge:  Blood pressure 103/61, pulse 98, temperature 97.7  F (36.5  C), temperature source Oral, resp. rate 16, height 1.6 m (5' 3\"), weight 64.864 kg (143 lb), SpO2 92 %.  General: wall appearing, cheerful, sitting comfortably in chair  HEENT: nc/at  Respiratory: clear lungs; no crackles  Heart/CV: nml s1/s2 with frequent ectopy; 3/6 mid systolic murmur with radiation to neck and axilla; no JVD  Abdomen/GI: soft, NT, NABS  Extremities/MSK: warm; no LE edema; severe arthritis of her hands  Skin: no rash on limited exam  Neuro: alert " and oriented; hard of hearing; nml speech  Psych: appropriate affect    Lines/Tubes:  none         Pending Results:   none          Discharge Medications:     Current Discharge Medication List      START taking these medications    Details   dofetilide (TIKOSYN) 125 MCG capsule Take 1 capsule (125 mcg) by mouth 2 times daily  Qty: 60 capsule, Refills: 5    Associated Diagnoses: Paroxysmal atrial fibrillation (H)         CONTINUE these medications which have NOT CHANGED    Details   traMADol (ULTRAM) 50 MG tablet TAKE 1 TABLET 3 TIMES DAILY AS NEEDED FOR MODERATE PAIN  Qty: 90 tablet, Refills: 0    Associated Diagnoses: Rheumatoid arthritis involving multiple sites, unspecified rheumatoid factor presence (H)      furosemide (LASIX) 20 MG tablet Take 1 tablet (20 mg) by mouth daily Or as directed by cardiology  Qty: 18 tablet, Refills: 03    Associated Diagnoses: Acute on chronic systolic congestive heart failure (H)      apixaban ANTICOAGULANT (ELIQUIS) 2.5 MG tablet Take 1 tablet (2.5 mg) by mouth 2 times daily  Qty: 180 tablet, Refills: 3    Associated Diagnoses: Paroxysmal atrial fibrillation (H)      Leuprolide Acetate (LUPRON DEPOT IM) Inject into the muscle every 4 months      leflunomide (ARAVA) 10 MG tablet Take 1 tablet (10 mg) by mouth daily  Qty: 30 tablet, Refills: 11    Associated Diagnoses: Rheumatoid arthritis involving multiple sites with positive rheumatoid factor (H)      lisinopril (PRINIVIL,ZESTRIL) 10 MG tablet Take 1 tablet (10 mg) by mouth 2 times daily  Qty: 62 tablet, Refills: 11    Associated Diagnoses: Acute on chronic systolic congestive heart failure (H)      spironolactone (ALDACTONE) 25 MG tablet Take 1 tablet (25 mg) by mouth daily  Qty: 30 tablet, Refills: 11    Associated Diagnoses: Cardiomyopathy, nonischemic (H)      acetaminophen (TYLENOL) 650 MG CR tablet Take 650 mg by mouth 2 times daily      metoprolol (TOPROL-XL) 25 MG 24 hr tablet Take 1 tablet (25 mg) by mouth daily  Qty:  90 tablet, Refills: 3    Associated Diagnoses: Atherosclerosis of native coronary artery of native heart without angina pectoris      atorvastatin (LIPITOR) 40 MG tablet Take 1 tablet (40 mg) by mouth daily  Qty: 90 tablet, Refills: 3    Associated Diagnoses: Atherosclerosis of native coronary artery of native heart without angina pectoris      alendronate (FOSAMAX) 70 MG tablet Take 1 tablet (70 mg) by mouth every 7 days Take with over 8 ounces water and stay upright for at least 30 minutes after dose.  Take at least 60 minutes before breakfast  Qty: 12 tablet, Refills: 3    Associated Diagnoses: Osteopenia      predniSONE (DELTASONE) 5 MG tablet Take 1 tablet (5 mg) by mouth daily  Qty: 100 tablet, Refills: 4    Associated Diagnoses: Osteopenia      carboxymethylcellulose (REFRESH PLUS) 0.5 % SOLN Place 1 drop into both eyes 3 times daily as needed for dry eyes      OMEPRAZOLE PO Take 20 mg by mouth daily as needed       calcium carbonate (TUMS) 500 MG chewable tablet Take 1 chew tab by mouth every 4 hours as needed for heartburn      tamsulosin (FLOMAX) 0.4 MG 24 hr capsule Take 1 capsule (0.4 mg) by mouth 2 times daily  Qty: 60 capsule      ascorbic acid (VITAMIN C) 500 MG CPCR Take 500 mg by mouth daily      VITAMIN D, CHOLECALCIFEROL, PO Take 400 Units by mouth daily      calcium carbonate (OS-SHELIA 500 MG [place]. CA) 500 MG tablet Take 600 mg by mouth daily                  Discharge Instructions and Follow-Up:     Discharge Procedure Orders  Reason for your hospital stay   Order Comments: You were admitted for initiation of dofetilide which you tolerated well. You are currently back in a normal heart rhythm.     Adult [facility] Follow-up and recommended labs and tests   Order Comments: Follow up with primary care provider, Camron Aragon, within 14 days for hospital follow- up.  The following labs/tests are recommended: bmp, mg.    Follow up with Dr. Li in [place] in 1 month.    Appointments on  [place] and/or [facility] (with [facility] or [facility] provider or service). Call [phone] if you haven't heard regarding these appointments within 7 days of discharge.     Activity   Order Comments: Your activity upon discharge: activity as tolerated   Order Specific Question Answer Comments   Is discharge order? Yes      Full Code     Diet   Order Comments: Follow this diet upon discharge: Orders Placed This Encounter  Regular Diet Adult   Order Specific Question Answer Comments   Is discharge order? Yes                Discharge Disposition:   Home with family         Condition on Discharge:   Discharge condition: Stable   Code status on discharge: Full Code        Date of service: 1/26/2017    The patient was discussed with Dr. Li.    Luis Eduardo Oglesby MD  Cardiovascular Medicine Fellow, PGY-5  [phone]     Staff: patient seen and examined prior to discharge. Discharge instructions (medications, and f/u appts) reviewed with the patient. The patient reports understanding these instructions and denies any questions at this time. Personal time spent on discharge: 15 minutes/woa

## 2017-01-26 NOTE — PROGRESS NOTES
This is a patient of [facility] and will be follow by one of their Care Coordinators for Post Discharge Follow up    Camron Aragon MD General   Other Patient Care Team Members Relationship

## 2017-01-26 NOTE — PLAN OF CARE
Problem: Goal Outcome Summary  Goal: Goal Outcome Summary  Outcome: Adequate for Discharge Date Met:  01/26/17  DISCHARGE                         No discharge date for patient encounter.  ----------------------------------------------------------------------------  Discharged to: Home  Via: Automobile  Accompanied by: Family, accompained by wife  Discharge Instructions: diet, activity, medications, follow up appointments, when to call the MD, aftercare instructions, and what to watchout for (i.e. s/s of infection, increasing SOB, palpitations, chest pain  Prescriptions: To be filled by [facility] per pt's request; medication list reviewed & sent with pt  Follow Up Appointments: arranged; information given  Belongings: All sent with pt  IV: out  Telemetry: off  Pt exhibits understanding of above discharge instructions; all questions answered.    Discharge Paperwork: Signed, copied, and sent home with patient.     Pt discharge home, accompanied by wife and family members. Discharge summary reviewed with the patient and all questions answered. Pt will  prescriptions from [facility].

## 2017-01-27 LAB — INTERPRETATION ECG - MUSE: NORMAL

## 2017-01-30 ENCOUNTER — TELEPHONE (OUTPATIENT)
Dept: CARDIOLOGY | Facility: CLINIC | Age: 82
End: 2017-01-30

## 2017-01-30 ENCOUNTER — CARE COORDINATION (OUTPATIENT)
Dept: CARE COORDINATION | Facility: CLINIC | Age: 82
End: 2017-01-30

## 2017-01-30 NOTE — TELEPHONE ENCOUNTER
Patient c/o nausea and dizziness since starting Tikosyn. Writer huddled with Dr. Li who recommends giving the medication a little longer to work and if patient would like to come into cardiology clinic for EKG to check to see if he is back in A Fib that would be okay. Patient has follow up with Dr. Li on 2/27/17. Dr. Li's recommendations relayed to Princess Hallman RN care coordinator who is going to call the patient.

## 2017-01-30 NOTE — PROGRESS NOTES
Received a call back from Aurelia NEW in Cardiology, who spoke with Dr. Li and recommended he try and stay on the medication a little longer.  Also offered to have pt come in for EKG if he thinks Afib is not controlled.  Called wife back and advised to recommendations. They both feel like the side effects are more from the medication as he is not feeling an irregular or fast heart rate. He would prefer not to take the medications however, they will continue a little longer to see if the symptoms go away.  Discussed using a walker or cane to move about given the dizziness, also getting up slow from laying or sitting.  Pt and wife will call back if unable to tolerate dizziness or new symptoms prior to thursdays appt.     Yumiko Hallman RN,BSN  Clinical Care Coordinator    [facility] [phone]  [facility] [phone]

## 2017-01-30 NOTE — PROGRESS NOTES
"Clinic Care Coordination Contact  OUTREACH    Referral Information:  Referral Source: CTS  Reason for Contact: Initial outreach        [facility] Utilization:   ED Visits in last year: 1  Hospital visits in last year: 3  Last PCP appointment: 12/22/16  Missed Appointments: 1    Clinical Concerns:  Current Medical Concerns: Called and spoke to wife as Bud is not able to hear on the phone, however, he is in the room.  Asked how pt was going and she states \"not well\".  States he has been feeling dizzy and nauseated from the new medication he was started on. She states he gets up at 430 am and has his breakfast and pills and again now with dinner she has his second pill. Denies any chest pain or sob. Says the room is spinning not him.  I spoke with Dr. Aragon who advised to contact cardiology.  Wife was not comfortable calling so I was able to leave a message with the cardiologist as he is in [place] today.  Will wait for response and notify pt and wife.  Otherwise pt has appt scheduled on feb 2 with PCP for follow up.     Current Behavioral Concerns: no current concerns        Clinical Pathway: None    Medication Management:  Would recommend MTM for medication review.  Wife helps with med set up     Functional Status:  Mobility Status: Independent  Equipment Currently Used at Home: cane, straight, walker, rolling  Transportation: wife drives in town only     Psychosocial: has four children one daughter who is at the [place] due to cancer, has three sons scattered around [place].   Current living arrangement:: I live in a private home with spouse  Financial/Insurance: no current concerns     Resources and Interventions:  Current Resources: None     Advanced Care Plans/Directives on file:: No      Goals:  TBD  Frequency of Care Coordination: weekly  Upcoming appointment: 02/02/17     Plan:   ~Pt will continue to take medications as directed  ~Pt or wife will call back if symptoms worsen, new ones arise, or with any " questions prior to appt.   ~Pt will use walker or cane when up and walking.   ~RN will outreach in 1-2 days.     Yumiko Hallman RN,BSN  Clinical Care Coordinator    [facility] [phone]  [facility] [phone]

## 2017-02-02 ENCOUNTER — OFFICE VISIT (OUTPATIENT)
Dept: FAMILY MEDICINE | Facility: CLINIC | Age: 82
End: 2017-02-02
Payer: COMMERCIAL

## 2017-02-02 VITALS
WEIGHT: 149 LBS | SYSTOLIC BLOOD PRESSURE: 80 MMHG | OXYGEN SATURATION: 92 % | DIASTOLIC BLOOD PRESSURE: 40 MMHG | TEMPERATURE: 96.8 F | HEART RATE: 70 BPM | RESPIRATION RATE: 28 BRPM | BODY MASS INDEX: 26.4 KG/M2

## 2017-02-02 DIAGNOSIS — Z79.899 VISIT FOR MONITORING TIKOSYN THERAPY: Primary | ICD-10-CM

## 2017-02-02 DIAGNOSIS — Z51.81 VISIT FOR MONITORING TIKOSYN THERAPY: Primary | ICD-10-CM

## 2017-02-02 DIAGNOSIS — I48.0 PAROXYSMAL ATRIAL FIBRILLATION (H): ICD-10-CM

## 2017-02-02 LAB
ANION GAP SERPL CALCULATED.3IONS-SCNC: 7 MMOL/L (ref 3–14)
BUN SERPL-MCNC: 21 MG/DL (ref 7–30)
CALCIUM SERPL-MCNC: 8.9 MG/DL (ref 8.5–10.1)
CHLORIDE SERPL-SCNC: 102 MMOL/L (ref 94–109)
CO2 SERPL-SCNC: 31 MMOL/L (ref 20–32)
CREAT SERPL-MCNC: 1.04 MG/DL (ref 0.66–1.25)
GFR SERPL CREATININE-BSD FRML MDRD: 67 ML/MIN/1.7M2
GLUCOSE SERPL-MCNC: 108 MG/DL (ref 70–99)
MAGNESIUM SERPL-MCNC: 2.2 MG/DL (ref 1.6–2.3)
POTASSIUM SERPL-SCNC: 4.6 MMOL/L (ref 3.4–5.3)
SODIUM SERPL-SCNC: 140 MMOL/L (ref 133–144)

## 2017-02-02 PROCEDURE — 80048 BASIC METABOLIC PNL TOTAL CA: CPT | Performed by: FAMILY MEDICINE

## 2017-02-02 PROCEDURE — 83735 ASSAY OF MAGNESIUM: CPT | Performed by: FAMILY MEDICINE

## 2017-02-02 PROCEDURE — 36415 COLL VENOUS BLD VENIPUNCTURE: CPT | Performed by: FAMILY MEDICINE

## 2017-02-02 PROCEDURE — 93000 ELECTROCARDIOGRAM COMPLETE: CPT | Performed by: FAMILY MEDICINE

## 2017-02-02 PROCEDURE — 99213 OFFICE O/P EST LOW 20 MIN: CPT | Performed by: FAMILY MEDICINE

## 2017-02-02 NOTE — PROGRESS NOTES
SUBJECTIVE:                                                    Bud Merritt is a 89 year old male who presents to clinic today for the following health issues:          Hospital Follow-up Visit:    Hospital/Nursing Home/IP Rehab Facility: [facility]  Date of Admission: 1/23/17  Date of Discharge: 1/26/17  Reason(s) for Admission: Paroxysmal Afib, sinus node dysfunction, bifascicular block, fatigue, SOB, NICM            Problems taking medications regularly:  None       Medication changes since discharge: Tikosyn       Problems adhering to non-medication therapy:  None    Summary of hospitalization:  [facility] discharge summary reviewed  Diagnostic Tests/Treatments reviewed.  Follow up needed: Cardiology  Other Healthcare Providers Involved in Patient s Care:         Specialist appointment - Cardiology  Update since discharge: improved.     Post Discharge Medication Reconciliation: discharge medications reconciled, continue medications without change.  Plan of care communicated with patient and family     Coding guidelines for this visit:  Type of Medical   Decision Making Face-to-Face Visit       within 7 Days of discharge Face-to-Face Visit        within 14 days of discharge   Moderate Complexity [id number]   High Complexity [id number]                Problem list and histories reviewed & adjusted, as indicated.  Additional history: as documented    SUBJECTIVE:  Bud  is a 89 year old male who presents for:  Follow-up from his hospitalization for paroxysmal atrial fibrillation where he was started on dofetilide and titrated. He initially was on 250  g twice a day but then this had to be reduced to 125 because of prolonged QTc. He seems to be doing well. He was discharged and had converted to sinus rhythm. Still fatigues fairly easily. He still feels when he describes as spells. Much less frequent than before. It was requested that a basic chemistry panel and magnesium and an  EKG be done today and discuss this with him.    Past Medical History   Diagnosis Date     Unspecified essential hypertension      Pure hypercholesterolemia      Rheumatoid arthritis(714.0)      Cardiomyopathy (H)      Atrial fibrillation (H) 07/01/2016     Past Surgical History   Procedure Laterality Date     Hc repair ing hernia,5+y/o,reducibl  1996     Marlex mesh repair of bilateral inguinal hernias and drainage of bilateral scrotal hydroceles.     Hc colonoscopy thru stoma w biopsy/cautery tumor/polyp/lesion  8/31/2004     Colonoscopy  08/24/09     Arthroplasty knee Left 1/12/2016     Procedure: ARTHROPLASTY KNEE;  Surgeon: Cesar Walker DO;  Location: [facility] OR     Irrigation and debridement soft tissue lower extremity, combined Left 3/15/2016     Procedure: COMBINED IRRIGATION AND DEBRIDEMENT SOFT TISSUE LOWER EXTREMITY;  Surgeon: Cesar Walker DO;  Location: [facility] OR     Social History   Substance Use Topics     Smoking status: Former Smoker -- 0.50 packs/day for 15 years     Types: Cigarettes     Quit date: 11/12/1998     Smokeless tobacco: Never Used      Comment: quit 15 yrs ago     Alcohol Use: No     Current Outpatient Prescriptions   Medication Sig Dispense Refill     dofetilide (TIKOSYN) 125 MCG capsule Take 1 capsule (125 mcg) by mouth 2 times daily 60 capsule 5     traMADol (ULTRAM) 50 MG tablet TAKE 1 TABLET 3 TIMES DAILY AS NEEDED FOR MODERATE PAIN 90 tablet 0     furosemide (LASIX) 20 MG tablet Take 1 tablet (20 mg) by mouth daily Or as directed by cardiology 18 tablet 03     apixaban ANTICOAGULANT (ELIQUIS) 2.5 MG tablet Take 1 tablet (2.5 mg) by mouth 2 times daily 180 tablet 3     Leuprolide Acetate (LUPRON DEPOT IM) Inject into the muscle every 4 months       leflunomide (ARAVA) 10 MG tablet Take 1 tablet (10 mg) by mouth daily 30 tablet 11     lisinopril (PRINIVIL,ZESTRIL) 10 MG tablet Take 1 tablet (10 mg) by mouth 2 times daily 62 tablet 11     spironolactone (ALDACTONE) 25  MG tablet Take 1 tablet (25 mg) by mouth daily 30 tablet 11     acetaminophen (TYLENOL) 650 MG CR tablet Take 650 mg by mouth 2 times daily       metoprolol (TOPROL-XL) 25 MG 24 hr tablet Take 1 tablet (25 mg) by mouth daily 90 tablet 3     atorvastatin (LIPITOR) 40 MG tablet Take 1 tablet (40 mg) by mouth daily 90 tablet 3     alendronate (FOSAMAX) 70 MG tablet Take 1 tablet (70 mg) by mouth every 7 days Take with over 8 ounces water and stay upright for at least 30 minutes after dose.  Take at least 60 minutes before breakfast 12 tablet 3     predniSONE (DELTASONE) 5 MG tablet Take 1 tablet (5 mg) by mouth daily 100 tablet 4     carboxymethylcellulose (REFRESH PLUS) 0.5 % SOLN Place 1 drop into both eyes 3 times daily as needed for dry eyes       OMEPRAZOLE PO Take 20 mg by mouth daily as needed        calcium carbonate (TUMS) 500 MG chewable tablet Take 1 chew tab by mouth every 4 hours as needed for heartburn       tamsulosin (FLOMAX) 0.4 MG 24 hr capsule Take 1 capsule (0.4 mg) by mouth 2 times daily 60 capsule      ascorbic acid (VITAMIN C) 500 MG CPCR Take 500 mg by mouth daily       VITAMIN D, CHOLECALCIFEROL, PO Take 400 Units by mouth daily       calcium carbonate (OS-SHELIA 500 MG [place]. CA) 500 MG tablet Take 600 mg by mouth daily         REVIEW OF SYSTEMS:   5 point ROS negative except as noted above in HPI, including Gen., Resp, CV, GI &  system review.     OBJECTIVE:  Vitals: BP 80/40 mmHg  Pulse 70  Temp(Src) 96.8  F (36  C) (Temporal)  Resp 28  Wt 149 lb (67.586 kg)  SpO2 92%  BMI= Body mass index is 26.4 kg/(m^2).  Appears in no distress. Somewhat frail-appearing. Alert and oriented. Mucous membranes are moist. Neck with no JVD. Lungs with distant breath sounds but clear. Heart with a regular rhythm with 3/6 murmur. Abdomen soft. Extremities with minimal edema at this time. EKG was performed and shows a QTc of 486. Magnesium level was normal at 2.2 basic chemistry panel was normal with a blood  sugar of 108 but he was not fasting.    ASSESSMENT:  Paroxysmal atrial fibrillation with monitoring of start of total dofetilide therapy    PLAN:  He'll continue on the same dose. He is to follow-up with cardiology in a couple of weeks. Report back if he starts having more spells or any other problems with the medication.        Camron Aragon MD  [facility]

## 2017-02-02 NOTE — MR AVS SNAPSHOT
"              After Visit Summary   2/2/2017    Bud Merritt    MRN: [id number]           Patient Information     Date Of Birth          5/9/1927        Visit Information        Provider Department      2/2/2017 9:20 AM Camron Aragon MD [facility]        Today's Diagnoses     Visit for monitoring Tikosyn therapy    -  1     Paroxysmal atrial fibrillation (H)            Follow-ups after your visit        Your next 10 appointments already scheduled     Feb 27, 2017  8:30 AM   Return Visit with Khoa Li MD   [facility]    [address] [address]   [phone]            Mar 13, 2017 12:45 PM   Return Visit with Mak Shaver MD   [facility] ([facility]    [address] [place] [address] [phone]              Who to contact     If you have questions or need follow up information about today's clinic visit or your schedule please contact [facility] directly at [phone].  Normal or non-critical lab and imaging results will be communicated to you by zeenworldhart, letter or phone within 4 business days after the clinic has received the results. If you do not hear from us within 7 days, please contact the clinic through zeenworldhart or phone. If you have a critical or abnormal lab result, we will notify you by phone as soon as possible.  Submit refill requests through [employer] or call your pharmacy and they will forward the refill request to us. Please allow 3 business days for your refill to be completed.          Additional Information About Your Visit        MyChart Information     [employer]last[employer] lets you send messages to your doctor, view your test results, renew your prescriptions, schedule appointments and more. To sign up, go to [email or web address] . Click on \"Log in\" on the left side of the screen, which will take you to the Welcome page. Then click " "on \"Sign up Now\" on the right side of the page.     You will be asked to enter the access code listed below, as well as some personal information. Please follow the directions to create your username and password.     Your access code is: [email or web address]  Expires: 3/20/2017 10:06 AM     Your access code will  in 90 days. If you need help or a new code, please call your [place] clinic or [phone].        Care EveryWhere ID     This is your Care EveryWhere ID. This could be used by other organizations to access your [place] medical records  [id number]        Your Vitals Were     Pulse Temperature Respirations Pulse Oximetry          70 96.8  F (36  C) (Temporal) 28 92%         Blood Pressure from Last 3 Encounters:   17 80/40   17 103/61   17 112/62    Weight from Last 3 Encounters:   17 149 lb (67.586 kg)   17 143 lb (64.864 kg)   17 150 lb 4.8 oz (68.176 kg)              We Performed the Following     Basic metabolic panel  (Ca, Cl, CO2, Creat, Gluc, K, Na, BUN)     Magnesium        Primary Care Provider Office Phone # Fax #    Camron Aragon [phone]       [facility] [address] [place] [address]        Thank you!     Thank you for choosing [facility]  for your care. Our goal is always to provide you with excellent care. Hearing back from our patients is one way we can continue to improve our services. Please take a few minutes to complete the written survey that you may receive in the mail after your visit with us. Thank you!             Your Updated Medication List - Protect others around you: Learn how to safely use, store and throw away your medicines at www.disposemymeds.org.          This list is accurate as of: 17 10:22 AM.  Always use your most recent med list.                   Brand Name Dispense Instructions for use    acetaminophen 650 MG CR tablet    TYLENOL     Take 650 mg by mouth 2 " times daily       alendronate 70 MG tablet    FOSAMAX    12 tablet    Take 1 tablet (70 mg) by mouth every 7 days Take with over 8 ounces water and stay upright for at least 30 minutes after dose.  Take at least 60 minutes before breakfast       apixaban ANTICOAGULANT 2.5 MG tablet    ELIQUIS    180 tablet    Take 1 tablet (2.5 mg) by mouth 2 times daily       ascorbic acid 500 MG Cpcr CR capsule    vitamin C     Take 500 mg by mouth daily       atorvastatin 40 MG tablet    LIPITOR    90 tablet    Take 1 tablet (40 mg) by mouth daily       calcium carbonate 500 MG chewable tablet    TUMS     Take 1 chew tab by mouth every 4 hours as needed for heartburn       calcium carbonate 500 MG tablet    OS-SHELIA 500 mg Ramona. Ca     Take 600 mg by mouth daily       carboxymethylcellulose 0.5 % Soln ophthalmic solution    REFRESH PLUS     Place 1 drop into both eyes 3 times daily as needed for dry eyes       dofetilide 125 MCG capsule    TIKOSYN    60 capsule    Take 1 capsule (125 mcg) by mouth 2 times daily       FLOMAX 0.4 MG capsule   Generic drug:  tamsulosin     60 capsule    Take 1 capsule (0.4 mg) by mouth 2 times daily       furosemide 20 MG tablet    LASIX    18 tablet    Take 1 tablet (20 mg) by mouth daily Or as directed by cardiology       leflunomide 10 MG tablet    ARAVA    30 tablet    Take 1 tablet (10 mg) by mouth daily       lisinopril 10 MG tablet    PRINIVIL/ZESTRIL    62 tablet    Take 1 tablet (10 mg) by mouth 2 times daily       LUPRON DEPOT IM      Inject into the muscle every 4 months       metoprolol 25 MG 24 hr tablet    TOPROL-XL    90 tablet    Take 1 tablet (25 mg) by mouth daily       OMEPRAZOLE PO      Take 20 mg by mouth daily as needed       predniSONE 5 MG tablet    DELTASONE    100 tablet    Take 1 tablet (5 mg) by mouth daily       spironolactone 25 MG tablet    ALDACTONE    30 tablet    Take 1 tablet (25 mg) by mouth daily       traMADol 50 MG tablet    ULTRAM    90 tablet    TAKE 1 TABLET 3  TIMES DAILY AS NEEDED FOR MODERATE PAIN       VITAMIN D (CHOLECALCIFEROL) PO      Take 400 Units by mouth daily

## 2017-02-02 NOTE — NURSING NOTE
"Chief Complaint   Patient presents with     Hospital F/U       Initial BP 80/40 mmHg  Pulse 70  Temp(Src) 96.8  F (36  C) (Temporal)  Resp 28  Wt 149 lb (67.586 kg)  SpO2 92% Estimated body mass index is 26.4 kg/(m^2) as calculated from the following:    Height as of 1/23/17: 5' 3\" (1.6 m).    Weight as of this encounter: 149 lb (67.586 kg).  BP completed using cuff size: regular    "

## 2017-02-06 ENCOUNTER — TRANSFERRED RECORDS (OUTPATIENT)
Dept: HEALTH INFORMATION MANAGEMENT | Facility: CLINIC | Age: 82
End: 2017-02-06

## 2017-02-06 DIAGNOSIS — C61 MALIGNANT NEOPLASM OF PROSTATE (H): Primary | ICD-10-CM

## 2017-02-13 ENCOUNTER — TELEPHONE (OUTPATIENT)
Dept: RHEUMATOLOGY | Facility: CLINIC | Age: 82
End: 2017-02-13

## 2017-02-13 NOTE — TELEPHONE ENCOUNTER
Reason for Call:  Other back pain    Detailed comments: Wife states pt has appt 03/13/17 but is having severe back pain and trouble walking, needs another injection asap, please call to advise    Phone Number Patient can be reached at: Home number on file [phone] (home)    Best Time: today    Can we leave a detailed message on this number? YES    Call taken on 2/13/2017 at 2:17 PM by Pat Zuniga

## 2017-02-13 NOTE — TELEPHONE ENCOUNTER
Call placed.  Wants his Hip injected again.  Last done in Oct.  (over 3 months ago)    Scheduled for Injection ONLY appt on Wed at 9am.    Letting Dr Shaver know.    Munira Wilkes RN  [facility]

## 2017-02-15 ENCOUNTER — OFFICE VISIT (OUTPATIENT)
Dept: RHEUMATOLOGY | Facility: CLINIC | Age: 82
End: 2017-02-15
Payer: COMMERCIAL

## 2017-02-15 VITALS — HEART RATE: 64 BPM | SYSTOLIC BLOOD PRESSURE: 114 MMHG | DIASTOLIC BLOOD PRESSURE: 64 MMHG | HEIGHT: 65 IN

## 2017-02-15 DIAGNOSIS — M05.79 RHEUMATOID ARTHRITIS INVOLVING MULTIPLE SITES WITH POSITIVE RHEUMATOID FACTOR (H): Primary | ICD-10-CM

## 2017-02-15 DIAGNOSIS — M06.9 RHEUMATOID ARTHRITIS INVOLVING MULTIPLE SITES, UNSPECIFIED RHEUMATOID FACTOR PRESENCE: ICD-10-CM

## 2017-02-15 DIAGNOSIS — M85.80 OSTEOPENIA: ICD-10-CM

## 2017-02-15 DIAGNOSIS — M70.61 TROCHANTERIC BURSITIS OF BOTH HIPS: ICD-10-CM

## 2017-02-15 DIAGNOSIS — M70.62 TROCHANTERIC BURSITIS OF BOTH HIPS: ICD-10-CM

## 2017-02-15 PROCEDURE — 20610 DRAIN/INJ JOINT/BURSA W/O US: CPT | Mod: 50 | Performed by: INTERNAL MEDICINE

## 2017-02-15 RX ORDER — PREDNISONE 5 MG/1
5 TABLET ORAL DAILY
Qty: 100 TABLET | Refills: 4 | Status: SHIPPED | OUTPATIENT
Start: 2017-02-15 | End: 2017-11-15

## 2017-02-15 RX ORDER — LIDOCAINE HYDROCHLORIDE 10 MG/ML
2 INJECTION, SOLUTION INFILTRATION; PERINEURAL ONCE
Qty: 4 ML | Refills: 0 | OUTPATIENT
Start: 2017-02-15 | End: 2017-02-15

## 2017-02-15 RX ORDER — TRAMADOL HYDROCHLORIDE 50 MG/1
TABLET ORAL
Qty: 90 TABLET | Refills: 0 | Status: SHIPPED | OUTPATIENT
Start: 2017-02-15 | End: 2017-03-24

## 2017-02-15 NOTE — MR AVS SNAPSHOT
"              After Visit Summary   2/15/2017    Bud Merritt    MRN: [id number]           Patient Information     Date Of Birth          5/9/1927        Visit Information        Provider Department      2/15/2017 9:00 AM Mak Shaver MD [facility]        Today's Diagnoses     Rheumatoid arthritis involving multiple sites with positive rheumatoid factor (H)    -  1    Trochanteric bursitis of both hips        Osteopenia           Follow-ups after your visit        Your next 10 appointments already scheduled     Feb 27, 2017  8:30 AM CST   Return Visit with Khoa Li MD   [facility]    [address] [address]   [phone]            Mar 13, 2017 12:45 PM CDT   Return Visit with Mak Shaver MD   [facility] ([facility])    [facility] [id number] [phone]              Who to contact     If you have questions or need follow up information about today's clinic visit or your schedule please contact [facility] directly at [phone].  Normal or non-critical lab and imaging results will be communicated to you by MyChart, letter or phone within 4 business days after the clinic has received the results. If you do not hear from us within 7 days, please contact the clinic through [facility] or phone. If you have a critical or abnormal lab result, we will notify you by phone as soon as possible.  Submit refill requests through [employer] or call your pharmacy and they will forward the refill request to us. Please allow 3 business days for your refill to be completed.          Additional Information About Your Visit        MyChart Information     [employer] lets you send messages to your doctor, view your test results, renew your prescriptions, schedule appointments and more. To sign up, go to [email or web address] . Click on \"Log in\" on the left " "side of the screen, which will take you to the Welcome page. Then click on \"Sign up Now\" on the right side of the page.     You will be asked to enter the access code listed below, as well as some personal information. Please follow the directions to create your username and password.     Your access code is: [email or web address]  Expires: 3/20/2017 10:06 AM     Your access code will  in 90 days. If you need help or a new code, please call your [place] clinic or [phone].        Care EveryWhere ID     This is your Care EveryWhere ID. This could be used by other organizations to access your [place] medical records  [id number]        Your Vitals Were     Pulse Height                64 5' 5\" (1.651 m)           Blood Pressure from Last 3 Encounters:   02/15/17 114/64   17 (!) 80/40   17 103/61    Weight from Last 3 Encounters:   17 149 lb (67.6 kg)   17 143 lb (64.9 kg)   17 150 lb 4.8 oz (68.2 kg)              We Performed the Following     Kenalog 40 MG  []     Large Joint/Bursa injection and/or drainage (Shoulder, Knee)          Today's Medication Changes          These changes are accurate as of: 2/15/17  1:51 PM.  If you have any questions, ask your nurse or doctor.               Start taking these medicines.        Dose/Directions    lidocaine 1 % injection   Used for:  Trochanteric bursitis of both hips   Started by:  Mak Shaver MD        Dose:  2 mL   2 mLs by INTRA-ARTICULAR route once for 1 dose   Quantity:  4 mL   Refills:  0            Where to get your medicines      These medications were sent to [id number] - [place], [place]  [address]  [address], [place] [address]     Phone:  [phone]     predniSONE 5 MG tablet         Some of these will need a paper prescription and others can be bought over the counter.  Ask your nurse if you have questions.     You don't need a prescription for these medications     lidocaine 1 % " injection                Primary Care Provider Office Phone # Fax #    Camron Aragon [phone]       [facility] [address] [place] [address]        Thank you!     Thank you for choosing [facility]  for your care. Our goal is always to provide you with excellent care. Hearing back from our patients is one way we can continue to improve our services. Please take a few minutes to complete the written survey that you may receive in the mail after your visit with us. Thank you!             Your Updated Medication List - Protect others around you: Learn how to safely use, store and throw away your medicines at www.disposemymeds.org.          This list is accurate as of: 2/15/17  1:51 PM.  Always use your most recent med list.                   Brand Name Dispense Instructions for use    acetaminophen 650 MG CR tablet    TYLENOL     Take 650 mg by mouth 2 times daily       alendronate 70 MG tablet    FOSAMAX    12 tablet    Take 1 tablet (70 mg) by mouth every 7 days Take with over 8 ounces water and stay upright for at least 30 minutes after dose.  Take at least 60 minutes before breakfast       apixaban ANTICOAGULANT 2.5 MG tablet    ELIQUIS    180 tablet    Take 1 tablet (2.5 mg) by mouth 2 times daily       ascorbic acid 500 MG Cpcr CR capsule    vitamin C     Take 500 mg by mouth daily       atorvastatin 40 MG tablet    LIPITOR    90 tablet    Take 1 tablet (40 mg) by mouth daily       calcium carbonate 500 MG chewable tablet    TUMS     Take 1 chew tab by mouth every 4 hours as needed for heartburn       calcium carbonate 500 MG tablet    OS-SHELIA 500 mg [place]ayp. Ca     Take 600 mg by mouth daily       carboxymethylcellulose 0.5 % Soln ophthalmic solution    REFRESH PLUS     Place 1 drop into both eyes 3 times daily as needed for dry eyes       dofetilide 125 MCG capsule    TIKOSYN    60 capsule    Take 1 capsule (125 mcg) by mouth 2 times daily       FLOMAX  0.4 MG capsule   Generic drug:  tamsulosin     60 capsule    Take 1 capsule (0.4 mg) by mouth 2 times daily       furosemide 20 MG tablet    LASIX    18 tablet    Take 1 tablet (20 mg) by mouth daily Or as directed by cardiology       leflunomide 10 MG tablet    ARAVA    30 tablet    Take 1 tablet (10 mg) by mouth daily       lidocaine 1 % injection     4 mL    2 mLs by INTRA-ARTICULAR route once for 1 dose       lisinopril 10 MG tablet    PRINIVIL/ZESTRIL    62 tablet    Take 1 tablet (10 mg) by mouth 2 times daily       LUPRON DEPOT IM      Inject into the muscle every 4 months       metoprolol 25 MG 24 hr tablet    TOPROL-XL    90 tablet    Take 1 tablet (25 mg) by mouth daily       OMEPRAZOLE PO      Take 20 mg by mouth daily as needed       predniSONE 5 MG tablet    DELTASONE    100 tablet    Take 1 tablet (5 mg) by mouth daily       spironolactone 25 MG tablet    ALDACTONE    30 tablet    Take 1 tablet (25 mg) by mouth daily       traMADol 50 MG tablet    ULTRAM    90 tablet    TAKE 1 TABLET 3 TIMES DAILY AS NEEDED FOR MODERATE PAIN       VITAMIN D (CHOLECALCIFEROL) PO      Take 400 Units by mouth daily

## 2017-02-15 NOTE — NURSING NOTE
The following medication was given:     MEDICATION: Kenalog 40 mg  ROUTE: IA  SITE: L and R hip/[facility]  DOSE: 40 mg per hip  LOT #: [id number]  :  [employer]  EXPIRATION DATE:  Jun 2018  NDC#: 5459-2655-03

## 2017-02-15 NOTE — PROGRESS NOTES
SUBJECTIVE:  Mr. Gallo Floyd comes back in early.  He is having pain in both hips and would like to get these injected again.  When last performed these were quite helpful.  He was recently hospitalized for paroxysmal atrial fibrillation.  He has had labs performed then that would be acceptable for his leflunomide monitoring.  He admits to having a flare-up, currently with some pain and swelling in his hands but not severe enough he wants to do anything different about it.      PHYSICAL EXAMINATION:  Blood pressure 114/64, pulse 64.  Tenderness over both trochanteric bursas.  There is mild synovitis with an effusion in the left wrist.      IMPRESSION:   1.  Bilateral trochanter bursitis.   2.  Rheumatoid arthritis.        RECOMMENDATIONS:   1.  We will inject both hips today.   2.  Continue leflunomide 10 mg daily.   3.  Continue Prednisone 5 mg daily.   4.  Routine follow up with me in 3 months.      PROCEDURE NOTE:  After informed verbal consent was obtained, under sterile technique, after the use of ethyl chloride for anesthetic, injected 40 mg of Kenalog with 2 cc of lidocaine into both trochanteric bursae without complication.  The patient tolerated the procedure well.         UZMA TAYLOR MD             D: 02/15/2017 09:56   T: 02/15/2017 13:44   MT:       Name:     GALLO FLOYD   MRN:      [id number]        Account:      [id number]   :      1927           Visit Date:   02/15/2017      Document: [id number]

## 2017-02-15 NOTE — NURSING NOTE
"Initial /64  Pulse 64  Ht 5' 5\" (1.651 m) Estimated body mass index is 26.39 kg/(m^2) as calculated from the following:    Height as of 1/23/17: 5' 3\" (1.6 m).    Weight as of 2/2/17: 149 lb (67.6 kg). .    "

## 2017-02-27 ENCOUNTER — OFFICE VISIT (OUTPATIENT)
Dept: CARDIOLOGY | Facility: CLINIC | Age: 82
End: 2017-02-27
Payer: COMMERCIAL

## 2017-02-27 ENCOUNTER — HOSPITAL ENCOUNTER (OUTPATIENT)
Dept: CARDIOLOGY | Facility: CLINIC | Age: 82
Discharge: HOME | DRG: 193 | End: 2017-02-27
Attending: INTERNAL MEDICINE | Admitting: INTERNAL MEDICINE
Payer: MEDICARE

## 2017-02-27 ENCOUNTER — HOSPITAL ENCOUNTER (OUTPATIENT)
Dept: CARDIOLOGY | Facility: CLINIC | Age: 82
DRG: 193 | End: 2017-02-27
Attending: INTERNAL MEDICINE
Payer: MEDICARE

## 2017-02-27 VITALS
SYSTOLIC BLOOD PRESSURE: 130 MMHG | HEART RATE: 64 BPM | WEIGHT: 156.1 LBS | DIASTOLIC BLOOD PRESSURE: 72 MMHG | OXYGEN SATURATION: 98 % | HEIGHT: 65 IN | BODY MASS INDEX: 26.01 KG/M2

## 2017-02-27 DIAGNOSIS — I44.0 FIRST DEGREE AV BLOCK: ICD-10-CM

## 2017-02-27 DIAGNOSIS — I45.10 RBBB: ICD-10-CM

## 2017-02-27 DIAGNOSIS — R29.6 RECURRENT FALLS: ICD-10-CM

## 2017-02-27 DIAGNOSIS — I48.0 PAROXYSMAL ATRIAL FIBRILLATION (H): ICD-10-CM

## 2017-02-27 DIAGNOSIS — I48.0 PAROXYSMAL ATRIAL FIBRILLATION (H): Primary | ICD-10-CM

## 2017-02-27 PROCEDURE — 93272 ECG/REVIEW INTERPRET ONLY: CPT | Performed by: INTERNAL MEDICINE

## 2017-02-27 PROCEDURE — 99214 OFFICE O/P EST MOD 30 MIN: CPT | Performed by: INTERNAL MEDICINE

## 2017-02-27 PROCEDURE — 93010 ELECTROCARDIOGRAM REPORT: CPT | Performed by: INTERNAL MEDICINE

## 2017-02-27 NOTE — LETTER
"2/27/2017    Camron Aragon MD  [facility]   [address]   [place] [address]    RE: Bud Merritt       Dear Colleague,    I had the pleasure of seeing Bud Merritt in the [facility].    Chief Complaint: atrial fibrillation     HPI: I was happy to see Mr. Merritt in Electrophysiology consultation for the above at the request of Dr. Colon.    He reports he has been having \"spell\". It is very hard to pin him down on what he means by this. At times he reports the spells as \"dizziness\", later he described them as \"pain in his chest\". This makes it difficult to correlate with his paroxysms of atrial fibrillation. During the episodes his heart rates are fairly fast but during sinus rhythm his heart rate is relatively slow. In addition, he has first degree AV block and RBBB (bifascicular block).    In addition to the spells, he was noted to have LV systolic dysfunction. He was started on amiodarone and his EF improved. It should be noted that he was also having frequent PVCs and the reduction in PVC burden may have also contributed to partial recovery of his systolic function. He had a coronary angiogram that showed only non-obstructive disease.    The amiodarone had to be stopped due to a drop in diffusion capacity.    [id number] Interval history: He was admitted and started on dofetilide. His ECG today (personal review) shows sinus rhythm, first-degree AV block and RBBB. The QT is 400 msec by my measurement. Overall, he reports feeling better. He has less heart pounding and shortness of breath. At the moment his primary complaint is right hip pain. In the past cortisone shots have helped.     He had a fall while reaching for something. He thinks his leg gave out. He remembers falling and does not believe he was unconscious.     Current Outpatient Prescriptions   Medication Sig Dispense Refill     predniSONE (DELTASONE) 5 MG tablet Take 1 tablet (5 mg) by mouth " daily 100 tablet 4     traMADol (ULTRAM) 50 MG tablet TAKE 1 TABLET 3 TIMES DAILY AS NEEDED FOR MODERATE PAIN 90 tablet 0     dofetilide (TIKOSYN) 125 MCG capsule Take 1 capsule (125 mcg) by mouth 2 times daily 60 capsule 5     furosemide (LASIX) 20 MG tablet Take 1 tablet (20 mg) by mouth daily Or as directed by cardiology 18 tablet 03     apixaban ANTICOAGULANT (ELIQUIS) 2.5 MG tablet Take 1 tablet (2.5 mg) by mouth 2 times daily 180 tablet 3     Leuprolide Acetate (LUPRON DEPOT IM) Inject into the muscle every 4 months       leflunomide (ARAVA) 10 MG tablet Take 1 tablet (10 mg) by mouth daily 30 tablet 11     lisinopril (PRINIVIL,ZESTRIL) 10 MG tablet Take 1 tablet (10 mg) by mouth 2 times daily 62 tablet 11     spironolactone (ALDACTONE) 25 MG tablet Take 1 tablet (25 mg) by mouth daily 30 tablet 11     acetaminophen (TYLENOL) 650 MG CR tablet Take 650 mg by mouth 2 times daily       metoprolol (TOPROL-XL) 25 MG 24 hr tablet Take 1 tablet (25 mg) by mouth daily 90 tablet 3     atorvastatin (LIPITOR) 40 MG tablet Take 1 tablet (40 mg) by mouth daily 90 tablet 3     alendronate (FOSAMAX) 70 MG tablet Take 1 tablet (70 mg) by mouth every 7 days Take with over 8 ounces water and stay upright for at least 30 minutes after dose.  Take at least 60 minutes before breakfast 12 tablet 3     carboxymethylcellulose (REFRESH PLUS) 0.5 % SOLN Place 1 drop into both eyes 3 times daily as needed for dry eyes       OMEPRAZOLE PO Take 20 mg by mouth daily as needed        calcium carbonate (TUMS) 500 MG chewable tablet Take 1 chew tab by mouth every 4 hours as needed for heartburn       tamsulosin (FLOMAX) 0.4 MG 24 hr capsule Take 1 capsule (0.4 mg) by mouth 2 times daily 60 capsule      ascorbic acid (VITAMIN C) 500 MG CPCR Take 500 mg by mouth daily       VITAMIN D, CHOLECALCIFEROL, PO Take 400 Units by mouth daily       calcium carbonate (OS-SHELIA 500 MG [place]. [place]) 500 MG tablet Take 600 mg by mouth daily         Past Medical  "History   Diagnosis Date     Atrial fibrillation (H) 07/01/2016     Cardiomyopathy (H)      Pure hypercholesterolemia      Rheumatoid arthritis(714.0)      Unspecified essential hypertension        Past Surgical History   Procedure Laterality Date     Hc repair ing hernia,5+y/o,reducibl  1996     Marlex mesh repair of bilateral inguinal hernias and drainage of bilateral scrotal hydroceles.     Hc colonoscopy thru stoma w biopsy/cautery tumor/polyp/lesion  8/31/2004     Colonoscopy  08/24/09     Arthroplasty knee Left 1/12/2016     Procedure: ARTHROPLASTY KNEE;  Surgeon: Cesar Walker DO;  [facility]     Irrigation and debridement soft tissue lower extremity, combined Left 3/15/2016     Procedure: COMBINED IRRIGATION AND DEBRIDEMENT SOFT TISSUE LOWER EXTREMITY;  Surgeon: Cesar Walker DO;  Location: [facility] OR       Family History   Problem Relation Age of Onset     CANCER Mother      CANCER Son      Unknown/Adopted Father      Alcoholism Brother        Social History   Substance Use Topics     Smoking status: Former Smoker     Packs/day: 0.50     Years: 15.00     Types: Cigarettes     Quit date: 11/12/1998     Smokeless tobacco: Never Used      Comment: quit 15 yrs ago     Alcohol use No       Allergies   Allergen Reactions     Amiodarone Other (See Comments)     Drop in DLCO     No Known Drug Allergies          ROS: his only other significant complaint on the ten system review is arthritis. [id number]    Physical Examination:  Vitals: /72 (BP Location: Right arm, Patient Position: Fowlers, Cuff Size: Adult Regular)  Pulse 64  Ht 1.651 m (5' 5\")  Wt 70.8 kg (156 lb 1.6 oz)  SpO2 98%  BMI 25.98 kg/m2  BMI= Body mass index is 25.98 kg/(m^2).    GENERAL APPEARANCE: elderly, alert and no distress  HEENT: no icterus,  mucosa moist, no cyanosis.  NECK: no cervical bruits (radiated murmur), JVP is not visible  CHEST: lungs clear to auscultation, respirations are unlabored, normal " "respiratory rate  CARDIOVASCULAR: regular rhythm, normal S1, single S2, S3, S4, click or rub, 3/6 mid peaking murmur at aortic area with radiation to the suprasternal notch and lower neck, no diastolic murmur heard, precordium quiet  EXTREMITIES: no clubbing, cyanosis or edema  NEURO: alert and oriented to person/place/time, normal speech, gait and affect, hard of hearing  VASC: warm and well perfused  SKIN: abrasions on both elbows      Laboratory:    I personally reviewed the ECG from today. It shows sinus rhythm with first degree AV block and RBBB. I measure the QT at 400. [id number]    Previous studies:    I also reviewed the CardioNet tracings available. They show atrial fibrillation with heart rates in the 120-130s. None are labelled as being associated with symptoms.    Results for GALLO FLOYD (MRN [id number]) as of 1/10/2017 14:39   Ref. Range 1/9/2017 10:43   Sodium Latest Ref Range: 133-144 mmol/L 139   Potassium Latest Ref Range: 3.4-5.3 mmol/L 4.8   Chloride Latest Ref Range:  mmol/L 101   Carbon Dioxide Latest Ref Range: 20-32 mmol/L 32   Urea Nitrogen Latest Ref Range: 7-30 mg/dL 20   Creatinine Latest Ref Range: 0.66-1.25 mg/dL 1.00   GFR Estimate Latest Ref Range: >60 mL/min/1.7m2 70       Assessment and recommendations:    1) Spells  2) Paroxsymal atrial fibrillation   3) Sinus node dysfunction  4) Bifascicular block  5) Fall    Overall, feels better. He does not believe he \"passed out\" but his fall is concerning. We discussed possible need for permanent pacemaker again, though he has not had significant AV block or pauses.    - repeat cardionet monitor to assess efficacy of dofetilide and monitor for bradyarrhythmia, discussed implantable loop recorder but he would prefer to avoid having to travel to the cities if possible.  - decrease Toprol to one-half tablet (12.5 mg) daily    Will make arrangements for follow-up here in [place].    I appreciate the chance to help with Mr. Floyd's " care. Please let me know if you have any questions or concerns.    Sincerely,     Khoa Li MD     [facility] [place]    CC Berhane Colon

## 2017-02-27 NOTE — MR AVS SNAPSHOT
After Visit Summary   2/27/2017    Bud Merritt    MRN: [id number]           Patient Information     Date Of Birth          5/9/1927        Visit Information        Provider Department      2/27/2017 8:30 AM Khoa Li MD [facility]        Today's Diagnoses     Paroxysmal atrial fibrillation (H)    -  1    RBBB        First degree AV block        Recurrent falls          Care Instructions    Seven day monitor to look for slow heart rhythm or heart block.    Decrease Toprol to one-half tablet (12.5 mg) daily.            Follow-ups after your visit        Additional Services     Follow-Up with Cardiac Advanced Practice Provider           Follow-Up with Electrophysiologist                 Future tests that were ordered for you today     Open Future Orders        Priority Expected Expires Ordered    Follow-Up with Cardiac Advanced Practice Provider Routine 3/27/2017 2/27/2018 2/27/2017    Follow-Up with Electrophysiologist Routine 5/27/2017 2/27/2018 2/27/2017    Cardiac Event Monitor - Peds/Adult Routine  2/27/2018 2/27/2017            Who to contact     If you have questions or need follow up information about today's clinic visit or your schedule please contact [facility] directly at [phone].  Normal or non-critical lab and imaging results will be communicated to you by MyChart, letter or phone within 4 business days after the clinic has received the results. If you do not hear from us within 7 days, please contact the clinic through MyChart or phone. If you have a critical or abnormal lab result, we will notify you by phone as soon as possible.  Submit refill requests through [employer] or call your pharmacy and they will forward the refill request to us. Please allow 3 business days for your refill to be completed.          Additional Information About Your Visit        MyChart Information     [employer] lets you send messages to your doctor, view your test  "results, renew your prescriptions, schedule appointments and more. To sign up, go to [email or web address] . Click on \"Log in\" on the left side of the screen, which will take you to the Welcome page. Then click on \"Sign up Now\" on the right side of the page.     You will be asked to enter the access code listed below, as well as some personal information. Please follow the directions to create your username and password.     Your access code is: [email or web address]  Expires: 3/20/2017 10:06 AM     Your access code will  in 90 days. If you need help or a new code, please call your [place] clinic or [phone].        Care EveryWhere ID     This is your Care EveryWhere ID. This could be used by other organizations to access your [place] medical records  [id number]        Your Vitals Were     Pulse Height Pulse Oximetry BMI (Body Mass Index)          64 1.651 m (5' 5\") 98% 25.98 kg/m2         Blood Pressure from Last 3 Encounters:   17 130/72   02/15/17 114/64   17 (!) 80/40    Weight from Last 3 Encounters:   17 70.8 kg (156 lb 1.6 oz)   17 67.6 kg (149 lb)   17 64.9 kg (143 lb)              We Performed the Following     EKG 12-LEAD CLINIC READ        Primary Care Provider Office Phone # Fax #    Camron Aragon [phone]       [facility] [address] [place] [address]        Thank you!     Thank you for choosing [facility]  for your care. Our goal is always to provide you with excellent care. Hearing back from our patients is one way we can continue to improve our services. Please take a few minutes to complete the written survey that you may receive in the mail after your visit with us. Thank you!             Your Updated Medication List - Protect others around you: Learn how to safely use, store and throw away your medicines at www.disposemymeds.org.          This list is accurate as of: 17  9:26 AM.  Always " use your most recent med list.                   Brand Name Dispense Instructions for use    acetaminophen 650 MG CR tablet    TYLENOL     Take 650 mg by mouth 2 times daily       alendronate 70 MG tablet    FOSAMAX    12 tablet    Take 1 tablet (70 mg) by mouth every 7 days Take with over 8 ounces water and stay upright for at least 30 minutes after dose.  Take at least 60 minutes before breakfast       apixaban ANTICOAGULANT 2.5 MG tablet    ELIQUIS    180 tablet    Take 1 tablet (2.5 mg) by mouth 2 times daily       ascorbic acid 500 MG Cpcr CR capsule    vitamin C     Take 500 mg by mouth daily       atorvastatin 40 MG tablet    LIPITOR    90 tablet    Take 1 tablet (40 mg) by mouth daily       calcium carbonate 500 MG chewable tablet    TUMS     Take 1 chew tab by mouth every 4 hours as needed for heartburn       calcium carbonate 500 MG tablet    OS-SHELIA 500 mg [place]. [place]     Take 600 mg by mouth daily       carboxymethylcellulose 0.5 % Soln ophthalmic solution    REFRESH PLUS     Place 1 drop into both eyes 3 times daily as needed for dry eyes       dofetilide 125 MCG capsule    TIKOSYN    60 capsule    Take 1 capsule (125 mcg) by mouth 2 times daily       FLOMAX 0.4 MG capsule   Generic drug:  tamsulosin     60 capsule    Take 1 capsule (0.4 mg) by mouth 2 times daily       furosemide 20 MG tablet    LASIX    18 tablet    Take 1 tablet (20 mg) by mouth daily Or as directed by cardiology       leflunomide 10 MG tablet    ARAVA    30 tablet    Take 1 tablet (10 mg) by mouth daily       lisinopril 10 MG tablet    PRINIVIL/ZESTRIL    62 tablet    Take 1 tablet (10 mg) by mouth 2 times daily       LUPRON DEPOT IM      Inject into the muscle every 4 months       metoprolol 25 MG 24 hr tablet    TOPROL-XL    90 tablet    Take 1 tablet (25 mg) by mouth daily       OMEPRAZOLE PO      Take 20 mg by mouth daily as needed       predniSONE 5 MG tablet    DELTASONE    100 tablet    Take 1 tablet (5 mg) by mouth daily        spironolactone 25 MG tablet    ALDACTONE    30 tablet    Take 1 tablet (25 mg) by mouth daily       traMADol 50 MG tablet    ULTRAM    90 tablet    TAKE 1 TABLET 3 TIMES DAILY AS NEEDED FOR MODERATE PAIN       VITAMIN D (CHOLECALCIFEROL) PO      Take 400 Units by mouth daily

## 2017-02-27 NOTE — PROGRESS NOTES
"      Clinical Cardiac Electrophysiology Consultation    Chief Complaint: atrial fibrillation     HPI: I was happy to see Mr. Merritt in Electrophysiology consultation for the above at the request of Dr. Colon.    He reports he has been having \"spell\". It is very hard to pin him down on what he means by this. At times he reports the spells as \"dizziness\", later he described them as \"pain in his chest\". This makes it difficult to correlate with his paroxysms of atrial fibrillation. During the episodes his heart rates are fairly fast but during sinus rhythm his heart rate is relatively slow. In addition, he has first degree AV block and RBBB (bifascicular block).    In addition to the spells, he was noted to have LV systolic dysfunction. He was started on amiodarone and his EF improved. It should be noted that he was also having frequent PVCs and the reduction in PVC burden may have also contributed to partial recovery of his systolic function. He had a coronary angiogram that showed only non-obstructive disease.    The amiodarone had to be stopped due to a drop in diffusion capacity.    [id number] Interval history: He was admitted and started on dofetilide. His ECG today (personal review) shows sinus rhythm, first-degree AV block and RBBB. The QT is 400 msec by my measurement. Overall, he reports feeling better. He has less heart pounding and shortness of breath. At the moment his primary complaint is right hip pain. In the past cortisone shots have helped.     He had a fall while reaching for something. He thinks his leg gave out. He remembers falling and does not believe he was unconscious.     Current Outpatient Prescriptions   Medication Sig Dispense Refill     predniSONE (DELTASONE) 5 MG tablet Take 1 tablet (5 mg) by mouth daily 100 tablet 4     traMADol (ULTRAM) 50 MG tablet TAKE 1 TABLET 3 TIMES DAILY AS NEEDED FOR MODERATE PAIN 90 tablet 0     dofetilide (TIKOSYN) 125 MCG capsule Take 1 capsule (125 mcg) by " mouth 2 times daily 60 capsule 5     furosemide (LASIX) 20 MG tablet Take 1 tablet (20 mg) by mouth daily Or as directed by cardiology 18 tablet 03     apixaban ANTICOAGULANT (ELIQUIS) 2.5 MG tablet Take 1 tablet (2.5 mg) by mouth 2 times daily 180 tablet 3     Leuprolide Acetate (LUPRON DEPOT IM) Inject into the muscle every 4 months       leflunomide (ARAVA) 10 MG tablet Take 1 tablet (10 mg) by mouth daily 30 tablet 11     lisinopril (PRINIVIL,ZESTRIL) 10 MG tablet Take 1 tablet (10 mg) by mouth 2 times daily 62 tablet 11     spironolactone (ALDACTONE) 25 MG tablet Take 1 tablet (25 mg) by mouth daily 30 tablet 11     acetaminophen (TYLENOL) 650 MG CR tablet Take 650 mg by mouth 2 times daily       metoprolol (TOPROL-XL) 25 MG 24 hr tablet Take 1 tablet (25 mg) by mouth daily 90 tablet 3     atorvastatin (LIPITOR) 40 MG tablet Take 1 tablet (40 mg) by mouth daily 90 tablet 3     alendronate (FOSAMAX) 70 MG tablet Take 1 tablet (70 mg) by mouth every 7 days Take with over 8 ounces water and stay upright for at least 30 minutes after dose.  Take at least 60 minutes before breakfast 12 tablet 3     carboxymethylcellulose (REFRESH PLUS) 0.5 % SOLN Place 1 drop into both eyes 3 times daily as needed for dry eyes       OMEPRAZOLE PO Take 20 mg by mouth daily as needed        calcium carbonate (TUMS) 500 MG chewable tablet Take 1 chew tab by mouth every 4 hours as needed for heartburn       tamsulosin (FLOMAX) 0.4 MG 24 hr capsule Take 1 capsule (0.4 mg) by mouth 2 times daily 60 capsule      ascorbic acid (VITAMIN C) 500 MG CPCR Take 500 mg by mouth daily       VITAMIN D, CHOLECALCIFEROL, PO Take 400 Units by mouth daily       calcium carbonate (OS-SHELIA 500 MG [place]. [place]) 500 MG tablet Take 600 mg by mouth daily         Past Medical History   Diagnosis Date     Atrial fibrillation (H) 07/01/2016     Cardiomyopathy (H)      Pure hypercholesterolemia      Rheumatoid arthritis(714.0)      Unspecified essential hypertension   "      Past Surgical History   Procedure Laterality Date     Hc repair ing hernia,5+y/o,reducibl  1996     Marlex mesh repair of bilateral inguinal hernias and drainage of bilateral scrotal hydroceles.     Hc colonoscopy thru stoma w biopsy/cautery tumor/polyp/lesion  8/31/2004     Colonoscopy  08/24/09     Arthroplasty knee Left 1/12/2016     Procedure: ARTHROPLASTY KNEE;  Surgeon: Cesar Walker DO;  [facility]: [facility]     Irrigation and debridement soft tissue lower extremity, combined Left 3/15/2016     Procedure: COMBINED IRRIGATION AND DEBRIDEMENT SOFT TISSUE LOWER EXTREMITY;  Surgeon: Cesar Walker DO;  Location: [facility]       Family History   Problem Relation Age of Onset     CANCER Mother      CANCER Son      Unknown/Adopted Father      Alcoholism Brother        Social History   Substance Use Topics     Smoking status: Former Smoker     Packs/day: 0.50     Years: 15.00     Types: Cigarettes     Quit date: 11/12/1998     Smokeless tobacco: Never Used      Comment: quit 15 yrs ago     Alcohol use No       Allergies   Allergen Reactions     Amiodarone Other (See Comments)     Drop in DLCO     No Known Drug Allergies          ROS: his only other significant complaint on the ten system review is arthritis. [id number]    Physical Examination:  Vitals: /72 (BP Location: Right arm, Patient Position: Fowlers, Cuff Size: Adult Regular)  Pulse 64  Ht 1.651 m (5' 5\")  Wt 70.8 kg (156 lb 1.6 oz)  SpO2 98%  BMI 25.98 kg/m2  BMI= Body mass index is 25.98 kg/(m^2).    GENERAL APPEARANCE: elderly, alert and no distress  HEENT: no icterus,  mucosa moist, no cyanosis.  NECK: no cervical bruits (radiated murmur), JVP is not visible  CHEST: lungs clear to auscultation, respirations are unlabored, normal respiratory rate  CARDIOVASCULAR: regular rhythm, normal S1, single S2, S3, S4, click or rub, 3/6 mid peaking murmur at aortic area with radiation to the suprasternal notch and lower neck, no diastolic " "murmur heard, precordium quiet  EXTREMITIES: no clubbing, cyanosis or edema  NEURO: alert and oriented to person/place/time, normal speech, gait and affect, hard of hearing  VASC: warm and well perfused  SKIN: abrasions on both elbows      Laboratory:    I personally reviewed the ECG from today. It shows sinus rhythm with first degree AV block and RBBB. I measure the QT at 400. [id number]    Previous studies:    I also reviewed the CardioNet tracings available. They show atrial fibrillation with heart rates in the 120-130s. None are labelled as being associated with symptoms.    Results for GALLO FLOYD (MRN [id number]) as of 1/10/2017 14:39   Ref. Range 1/9/2017 10:43   Sodium Latest Ref Range: 133-144 mmol/L 139   Potassium Latest Ref Range: 3.4-5.3 mmol/L 4.8   Chloride Latest Ref Range:  mmol/L 101   Carbon Dioxide Latest Ref Range: 20-32 mmol/L 32   Urea Nitrogen Latest Ref Range: 7-30 mg/dL 20   Creatinine Latest Ref Range: 0.66-1.25 mg/dL 1.00   GFR Estimate Latest Ref Range: >60 mL/min/1.7m2 70       Assessment and recommendations:    1) Spells  2) Paroxsymal atrial fibrillation   3) Sinus node dysfunction  4) Bifascicular block  5) Fall    Overall, feels better. He does not believe he \"passed out\" but his fall is concerning. We discussed possible need for permanent pacemaker again, though he has not had significant AV block or pauses.    - repeat cardionet monitor to assess efficacy of dofetilide and monitor for bradyarrhythmia, discussed implantable loop recorder but he would prefer to avoid having to travel to the cities if possible.  - decrease Toprol to one-half tablet (12.5 mg) daily    Will make arrangements for follow-up here in [place].      I appreciate the chance to help with Mr. Floyd's care. Please let me know if you have any questions or concerns.    MD ARBEN Main Steven R Vats, Shashank  "

## 2017-02-27 NOTE — PATIENT INSTRUCTIONS
Seven day monitor to look for slow heart rhythm or heart block.    Decrease Toprol to one-half tablet (12.5 mg) daily.

## 2017-02-27 NOTE — LETTER
"2/27/2017      RE: Bud Merritt  [id number]  [facility] [address]       Dear Colleague,    Thank you for the opportunity to participate in the care of your patient, Bud Merritt, at the [facility]. Please see a copy of my visit note below.          Clinical Cardiac Electrophysiology Consultation    Chief Complaint: atrial fibrillation     HPI: I was happy to see Mr. Merritt in Electrophysiology consultation for the above at the request of Dr. Colon.    He reports he has been having \"spell\". It is very hard to pin him down on what he means by this. At times he reports the spells as \"dizziness\", later he described them as \"pain in his chest\". This makes it difficult to correlate with his paroxysms of atrial fibrillation. During the episodes his heart rates are fairly fast but during sinus rhythm his heart rate is relatively slow. In addition, he has first degree AV block and RBBB (bifascicular block).    In addition to the spells, he was noted to have LV systolic dysfunction. He was started on amiodarone and his EF improved. It should be noted that he was also having frequent PVCs and the reduction in PVC burden may have also contributed to partial recovery of his systolic function. He had a coronary angiogram that showed only non-obstructive disease.    The amiodarone had to be stopped due to a drop in diffusion capacity.    [id number] Interval history: He was admitted and started on dofetilide. His ECG today (personal review) shows sinus rhythm, first-degree AV block and RBBB. The QT is 400 msec by my measurement. Overall, he reports feeling better. He has less heart pounding and shortness of breath. At the moment his primary complaint is right hip pain. In the past cortisone shots have helped.     He had a fall while reaching for something. He thinks his leg gave out. He remembers falling and does not believe he was unconscious.     Current " Outpatient Prescriptions   Medication Sig Dispense Refill     predniSONE (DELTASONE) 5 MG tablet Take 1 tablet (5 mg) by mouth daily 100 tablet 4     traMADol (ULTRAM) 50 MG tablet TAKE 1 TABLET 3 TIMES DAILY AS NEEDED FOR MODERATE PAIN 90 tablet 0     dofetilide (TIKOSYN) 125 MCG capsule Take 1 capsule (125 mcg) by mouth 2 times daily 60 capsule 5     furosemide (LASIX) 20 MG tablet Take 1 tablet (20 mg) by mouth daily Or as directed by cardiology 18 tablet 03     apixaban ANTICOAGULANT (ELIQUIS) 2.5 MG tablet Take 1 tablet (2.5 mg) by mouth 2 times daily 180 tablet 3     Leuprolide Acetate (LUPRON DEPOT IM) Inject into the muscle every 4 months       leflunomide (ARAVA) 10 MG tablet Take 1 tablet (10 mg) by mouth daily 30 tablet 11     lisinopril (PRINIVIL,ZESTRIL) 10 MG tablet Take 1 tablet (10 mg) by mouth 2 times daily 62 tablet 11     spironolactone (ALDACTONE) 25 MG tablet Take 1 tablet (25 mg) by mouth daily 30 tablet 11     acetaminophen (TYLENOL) 650 MG CR tablet Take 650 mg by mouth 2 times daily       metoprolol (TOPROL-XL) 25 MG 24 hr tablet Take 1 tablet (25 mg) by mouth daily 90 tablet 3     atorvastatin (LIPITOR) 40 MG tablet Take 1 tablet (40 mg) by mouth daily 90 tablet 3     alendronate (FOSAMAX) 70 MG tablet Take 1 tablet (70 mg) by mouth every 7 days Take with over 8 ounces water and stay upright for at least 30 minutes after dose.  Take at least 60 minutes before breakfast 12 tablet 3     carboxymethylcellulose (REFRESH PLUS) 0.5 % SOLN Place 1 drop into both eyes 3 times daily as needed for dry eyes       OMEPRAZOLE PO Take 20 mg by mouth daily as needed        calcium carbonate (TUMS) 500 MG chewable tablet Take 1 chew tab by mouth every 4 hours as needed for heartburn       tamsulosin (FLOMAX) 0.4 MG 24 hr capsule Take 1 capsule (0.4 mg) by mouth 2 times daily 60 capsule      ascorbic acid (VITAMIN C) 500 MG CPCR Take 500 mg by mouth daily       VITAMIN D, CHOLECALCIFEROL, PO Take 400 Units  "by mouth daily       calcium carbonate (OS-SHELIA 500 MG [place]. [place]) 500 MG tablet Take 600 mg by mouth daily         Past Medical History   Diagnosis Date     Atrial fibrillation (H) 07/01/2016     Cardiomyopathy (H)      Pure hypercholesterolemia      Rheumatoid arthritis(714.0)      Unspecified essential hypertension        Past Surgical History   Procedure Laterality Date     Hc repair ing hernia,5+y/o,reducibl  1996     Marlex mesh repair of bilateral inguinal hernias and drainage of bilateral scrotal hydroceles.     Hc colonoscopy thru stoma w biopsy/cautery tumor/polyp/lesion  8/31/2004     Colonoscopy  08/24/09     Arthroplasty knee Left 1/12/2016     Procedure: ARTHROPLASTY KNEE;  Surgeon: Cesar Walker DO;  Location: [facility] OR     Irrigation and debridement soft tissue lower extremity, combined Left 3/15/2016     Procedure: COMBINED IRRIGATION AND DEBRIDEMENT SOFT TISSUE LOWER EXTREMITY;  Surgeon: Cesar Walker DO;  Location: [facility] OR       Family History   Problem Relation Age of Onset     CANCER Mother      CANCER Son      Unknown/Adopted Father      Alcoholism Brother        Social History   Substance Use Topics     Smoking status: Former Smoker     Packs/day: 0.50     Years: 15.00     Types: Cigarettes     Quit date: 11/12/1998     Smokeless tobacco: Never Used      Comment: quit 15 yrs ago     Alcohol use No       Allergies   Allergen Reactions     Amiodarone Other (See Comments)     Drop in DLCO     No Known Drug Allergies          ROS: his only other significant complaint on the ten system review is arthritis. [id number]    Physical Examination:  Vitals: /72 (BP Location: Right arm, Patient Position: Fowlers, Cuff Size: Adult Regular)  Pulse 64  Ht 1.651 m (5' 5\")  Wt 70.8 kg (156 lb 1.6 oz)  SpO2 98%  BMI 25.98 kg/m2  BMI= Body mass index is 25.98 kg/(m^2).    GENERAL APPEARANCE: elderly, alert and no distress  HEENT: no icterus,  mucosa moist, no cyanosis.  NECK: no cervical " "bruits (radiated murmur), JVP is not visible  CHEST: lungs clear to auscultation, respirations are unlabored, normal respiratory rate  CARDIOVASCULAR: regular rhythm, normal S1, single S2, S3, S4, click or rub, 3/6 mid peaking murmur at aortic area with radiation to the suprasternal notch and lower neck, no diastolic murmur heard, precordium quiet  EXTREMITIES: no clubbing, cyanosis or edema  NEURO: alert and oriented to person/place/time, normal speech, gait and affect, hard of hearing  VASC: warm and well perfused  SKIN: abrasions on both elbows      Laboratory:    I personally reviewed the ECG from today. It shows sinus rhythm with first degree AV block and RBBB. I measure the QT at 400. [id number]    Previous studies:    I also reviewed the [employer] tracings available. They show atrial fibrillation with heart rates in the 120-130s. None are labelled as being associated with symptoms.    Results for GALLO FLOYD (MRN [id number]) as of 1/10/2017 14:39   Ref. Range 1/9/2017 10:43   Sodium Latest Ref Range: 133-144 mmol/L 139   Potassium Latest Ref Range: 3.4-5.3 mmol/L 4.8   Chloride Latest Ref Range:  mmol/L 101   Carbon Dioxide Latest Ref Range: 20-32 mmol/L 32   Urea Nitrogen Latest Ref Range: 7-30 mg/dL 20   Creatinine Latest Ref Range: 0.66-1.25 mg/dL 1.00   GFR Estimate Latest Ref Range: >60 mL/min/1.7m2 70       Assessment and recommendations:    1) Spells  2) Paroxsymal atrial fibrillation   3) Sinus node dysfunction  4) Bifascicular block  5) Fall    Overall, feels better. He does not believe he \"passed out\" but his fall is concerning. We discussed possible need for permanent pacemaker again, though he has not had significant AV block or pauses.    - repeat cardionet monitor to assess efficacy of dofetilide and monitor for bradyarrhythmia, discussed implantable loop recorder but he would prefer to avoid having to travel to the cities if possible.  - decrease Toprol to one-half tablet (12.5 mg) " daily    Will make arrangements for follow-up here in [place].      I appreciate the chance to help with Mr. Merritt's care. Please let me know if you have any questions or concerns.    MD ARBEN Main Steven R Vats, Shashank

## 2017-02-28 ENCOUNTER — CARE COORDINATION (OUTPATIENT)
Dept: CARE COORDINATION | Facility: CLINIC | Age: 82
End: 2017-02-28

## 2017-03-02 ENCOUNTER — HOSPITAL ENCOUNTER (INPATIENT)
Facility: CLINIC | Age: 82
LOS: 2 days | Discharge: ANOTHER HOSPITAL | DRG: 193 | End: 2017-03-04
Attending: FAMILY MEDICINE | Admitting: FAMILY MEDICINE
Payer: MEDICARE

## 2017-03-02 ENCOUNTER — APPOINTMENT (OUTPATIENT)
Dept: GENERAL RADIOLOGY | Facility: CLINIC | Age: 82
DRG: 193 | End: 2017-03-02
Attending: FAMILY MEDICINE
Payer: MEDICARE

## 2017-03-02 DIAGNOSIS — R19.7 DIARRHEA, UNSPECIFIED TYPE: ICD-10-CM

## 2017-03-02 DIAGNOSIS — R11.0 NAUSEA: ICD-10-CM

## 2017-03-02 DIAGNOSIS — I48.0 PAROXYSMAL ATRIAL FIBRILLATION (H): ICD-10-CM

## 2017-03-02 PROBLEM — J96.01 ACUTE RESPIRATORY FAILURE WITH HYPOXIA (H): Status: ACTIVE | Noted: 2017-03-02

## 2017-03-02 PROBLEM — J44.1 COPD EXACERBATION (H): Status: ACTIVE | Noted: 2017-03-02

## 2017-03-02 PROBLEM — R53.1 WEAKNESS GENERALIZED: Status: ACTIVE | Noted: 2017-03-02

## 2017-03-02 PROBLEM — I95.2 HYPOTENSION DUE TO DRUGS: Status: ACTIVE | Noted: 2017-03-02

## 2017-03-02 PROBLEM — J10.1 INFLUENZA A: Status: ACTIVE | Noted: 2017-03-02

## 2017-03-02 PROBLEM — N30.00 ACUTE CYSTITIS WITHOUT HEMATURIA: Status: ACTIVE | Noted: 2017-03-02

## 2017-03-02 PROBLEM — R05.9 COUGH: Status: ACTIVE | Noted: 2017-03-02

## 2017-03-02 PROBLEM — I48.91 ATRIAL FIBRILLATION WITH RAPID VENTRICULAR RESPONSE (H): Status: ACTIVE | Noted: 2017-03-02

## 2017-03-02 LAB
ALBUMIN UR-MCNC: NEGATIVE MG/DL
ANION GAP SERPL CALCULATED.3IONS-SCNC: 11 MMOL/L (ref 3–14)
ANION GAP SERPL CALCULATED.3IONS-SCNC: 13 MMOL/L (ref 3–14)
APPEARANCE UR: CLEAR
BACTERIA #/AREA URNS HPF: ABNORMAL /HPF
BASOPHILS # BLD AUTO: 0 10E9/L (ref 0–0.2)
BASOPHILS NFR BLD AUTO: 0.3 %
BILIRUB UR QL STRIP: NEGATIVE
BUN SERPL-MCNC: 25 MG/DL (ref 7–30)
BUN SERPL-MCNC: 28 MG/DL (ref 7–30)
CALCIUM SERPL-MCNC: 7.4 MG/DL (ref 8.5–10.1)
CALCIUM SERPL-MCNC: 8.1 MG/DL (ref 8.5–10.1)
CHLORIDE SERPL-SCNC: 103 MMOL/L (ref 94–109)
CHLORIDE SERPL-SCNC: 99 MMOL/L (ref 94–109)
CO2 SERPL-SCNC: 24 MMOL/L (ref 20–32)
CO2 SERPL-SCNC: 24 MMOL/L (ref 20–32)
COLOR UR AUTO: YELLOW
CREAT SERPL-MCNC: 0.97 MG/DL (ref 0.66–1.25)
CREAT SERPL-MCNC: 1.25 MG/DL (ref 0.66–1.25)
D DIMER PPP FEU-MCNC: 0.7 UG/ML FEU (ref 0–0.5)
DIFFERENTIAL METHOD BLD: ABNORMAL
EOSINOPHIL # BLD AUTO: 0 10E9/L (ref 0–0.7)
EOSINOPHIL NFR BLD AUTO: 0 %
ERYTHROCYTE [DISTWIDTH] IN BLOOD BY AUTOMATED COUNT: 14.2 % (ref 10–15)
FLUAV+FLUBV AG SPEC QL: ABNORMAL
FLUAV+FLUBV AG SPEC QL: POSITIVE
GFR SERPL CREATININE-BSD FRML MDRD: 54 ML/MIN/1.7M2
GFR SERPL CREATININE-BSD FRML MDRD: 73 ML/MIN/1.7M2
GLUCOSE SERPL-MCNC: 77 MG/DL (ref 70–99)
GLUCOSE SERPL-MCNC: 96 MG/DL (ref 70–99)
GLUCOSE UR STRIP-MCNC: NEGATIVE MG/DL
HCT VFR BLD AUTO: 34.9 % (ref 40–53)
HEMOCCULT STL QL: NEGATIVE
HGB BLD-MCNC: 11.3 G/DL (ref 13.3–17.7)
HGB UR QL STRIP: ABNORMAL
IMM GRANULOCYTES # BLD: 0 10E9/L (ref 0–0.4)
IMM GRANULOCYTES NFR BLD: 0.3 %
INR PPP: 0.94 (ref 0.86–1.14)
KETONES UR STRIP-MCNC: 15 MG/DL
LEUKOCYTE ESTERASE UR QL STRIP: ABNORMAL
LYMPHOCYTES # BLD AUTO: 0.7 10E9/L (ref 0.8–5.3)
LYMPHOCYTES NFR BLD AUTO: 10.9 %
MAGNESIUM SERPL-MCNC: 2.1 MG/DL (ref 1.6–2.3)
MCH RBC QN AUTO: 32.2 PG (ref 26.5–33)
MCHC RBC AUTO-ENTMCNC: 32.4 G/DL (ref 31.5–36.5)
MCV RBC AUTO: 99 FL (ref 78–100)
MONOCYTES # BLD AUTO: 0.8 10E9/L (ref 0–1.3)
MONOCYTES NFR BLD AUTO: 12.8 %
NEUTROPHILS # BLD AUTO: 4.7 10E9/L (ref 1.6–8.3)
NEUTROPHILS NFR BLD AUTO: 75.7 %
NITRATE UR QL: POSITIVE
NT-PROBNP SERPL-MCNC: 352 PG/ML (ref 0–1800)
PH UR STRIP: 5.5 PH (ref 5–7)
PLATELET # BLD AUTO: 223 10E9/L (ref 150–450)
POTASSIUM SERPL-SCNC: 3.7 MMOL/L (ref 3.4–5.3)
POTASSIUM SERPL-SCNC: 4.3 MMOL/L (ref 3.4–5.3)
RBC # BLD AUTO: 3.51 10E12/L (ref 4.4–5.9)
RBC #/AREA URNS AUTO: ABNORMAL /HPF (ref 0–2)
SODIUM SERPL-SCNC: 136 MMOL/L (ref 133–144)
SODIUM SERPL-SCNC: 138 MMOL/L (ref 133–144)
SP GR UR STRIP: 1.02 (ref 1–1.03)
SPECIMEN SOURCE: ABNORMAL
URN SPEC COLLECT METH UR: ABNORMAL
UROBILINOGEN UR STRIP-ACNC: 0.2 EU/DL (ref 0.2–1)
WBC # BLD AUTO: 6.2 10E9/L (ref 4–11)
WBC #/AREA URNS AUTO: ABNORMAL /HPF (ref 0–2)

## 2017-03-02 PROCEDURE — 25000128 H RX IP 250 OP 636: Performed by: FAMILY MEDICINE

## 2017-03-02 PROCEDURE — G0378 HOSPITAL OBSERVATION PER HR: HCPCS

## 2017-03-02 PROCEDURE — 93005 ELECTROCARDIOGRAM TRACING: CPT

## 2017-03-02 PROCEDURE — 87186 SC STD MICRODIL/AGAR DIL: CPT | Performed by: FAMILY MEDICINE

## 2017-03-02 PROCEDURE — 93010 ELECTROCARDIOGRAM REPORT: CPT | Performed by: FAMILY MEDICINE

## 2017-03-02 PROCEDURE — 80048 BASIC METABOLIC PNL TOTAL CA: CPT | Performed by: FAMILY MEDICINE

## 2017-03-02 PROCEDURE — 83880 ASSAY OF NATRIURETIC PEPTIDE: CPT | Performed by: FAMILY MEDICINE

## 2017-03-02 PROCEDURE — 87804 INFLUENZA ASSAY W/OPTIC: CPT | Performed by: FAMILY MEDICINE

## 2017-03-02 PROCEDURE — 25000132 ZZH RX MED GY IP 250 OP 250 PS 637: Mod: GY | Performed by: FAMILY MEDICINE

## 2017-03-02 PROCEDURE — 96376 TX/PRO/DX INJ SAME DRUG ADON: CPT

## 2017-03-02 PROCEDURE — 25000125 ZZHC RX 250: Performed by: FAMILY MEDICINE

## 2017-03-02 PROCEDURE — 99285 EMERGENCY DEPT VISIT HI MDM: CPT | Mod: 25 | Performed by: FAMILY MEDICINE

## 2017-03-02 PROCEDURE — A9270 NON-COVERED ITEM OR SERVICE: HCPCS | Mod: GY | Performed by: FAMILY MEDICINE

## 2017-03-02 PROCEDURE — 96375 TX/PRO/DX INJ NEW DRUG ADDON: CPT

## 2017-03-02 PROCEDURE — 85025 COMPLETE CBC W/AUTO DIFF WBC: CPT | Performed by: FAMILY MEDICINE

## 2017-03-02 PROCEDURE — 81001 URINALYSIS AUTO W/SCOPE: CPT | Performed by: FAMILY MEDICINE

## 2017-03-02 PROCEDURE — A9270 NON-COVERED ITEM OR SERVICE: HCPCS | Mod: GY | Performed by: PHYSICIAN ASSISTANT

## 2017-03-02 PROCEDURE — 99291 CRITICAL CARE FIRST HOUR: CPT | Performed by: FAMILY MEDICINE

## 2017-03-02 PROCEDURE — 51702 INSERT TEMP BLADDER CATH: CPT

## 2017-03-02 PROCEDURE — 87088 URINE BACTERIA CULTURE: CPT | Performed by: FAMILY MEDICINE

## 2017-03-02 PROCEDURE — 99285 EMERGENCY DEPT VISIT HI MDM: CPT | Mod: 25

## 2017-03-02 PROCEDURE — 20000003 ZZH R&B ICU

## 2017-03-02 PROCEDURE — 94640 AIRWAY INHALATION TREATMENT: CPT

## 2017-03-02 PROCEDURE — 87086 URINE CULTURE/COLONY COUNT: CPT | Performed by: FAMILY MEDICINE

## 2017-03-02 PROCEDURE — 96361 HYDRATE IV INFUSION ADD-ON: CPT

## 2017-03-02 PROCEDURE — 96374 THER/PROPH/DIAG INJ IV PUSH: CPT

## 2017-03-02 PROCEDURE — 36415 COLL VENOUS BLD VENIPUNCTURE: CPT | Performed by: FAMILY MEDICINE

## 2017-03-02 PROCEDURE — 82272 OCCULT BLD FECES 1-3 TESTS: CPT | Performed by: FAMILY MEDICINE

## 2017-03-02 PROCEDURE — 85610 PROTHROMBIN TIME: CPT | Performed by: FAMILY MEDICINE

## 2017-03-02 PROCEDURE — 25000132 ZZH RX MED GY IP 250 OP 250 PS 637: Mod: GY | Performed by: PHYSICIAN ASSISTANT

## 2017-03-02 PROCEDURE — 71010 XR CHEST PORT 1 VW: CPT | Mod: TC

## 2017-03-02 PROCEDURE — 85379 FIBRIN DEGRADATION QUANT: CPT | Performed by: FAMILY MEDICINE

## 2017-03-02 PROCEDURE — 83735 ASSAY OF MAGNESIUM: CPT | Performed by: FAMILY MEDICINE

## 2017-03-02 RX ORDER — TRAMADOL HYDROCHLORIDE 50 MG/1
50 TABLET ORAL EVERY 6 HOURS PRN
Status: DISCONTINUED | OUTPATIENT
Start: 2017-03-02 | End: 2017-03-04 | Stop reason: HOSPADM

## 2017-03-02 RX ORDER — IPRATROPIUM BROMIDE AND ALBUTEROL SULFATE 2.5; .5 MG/3ML; MG/3ML
3 SOLUTION RESPIRATORY (INHALATION)
Status: DISCONTINUED | OUTPATIENT
Start: 2017-03-02 | End: 2017-03-04 | Stop reason: HOSPADM

## 2017-03-02 RX ORDER — HYDROMORPHONE HYDROCHLORIDE 1 MG/ML
0.5 INJECTION, SOLUTION INTRAMUSCULAR; INTRAVENOUS; SUBCUTANEOUS
Status: COMPLETED | OUTPATIENT
Start: 2017-03-02 | End: 2017-03-02

## 2017-03-02 RX ORDER — LISINOPRIL 10 MG/1
10 TABLET ORAL 2 TIMES DAILY
Status: DISCONTINUED | OUTPATIENT
Start: 2017-03-02 | End: 2017-03-02

## 2017-03-02 RX ORDER — SODIUM CHLORIDE 9 MG/ML
1000 INJECTION, SOLUTION INTRAVENOUS CONTINUOUS
Status: DISCONTINUED | OUTPATIENT
Start: 2017-03-02 | End: 2017-03-03

## 2017-03-02 RX ORDER — PROCHLORPERAZINE 25 MG
12.5 SUPPOSITORY, RECTAL RECTAL EVERY 12 HOURS PRN
Status: DISCONTINUED | OUTPATIENT
Start: 2017-03-02 | End: 2017-03-04 | Stop reason: HOSPADM

## 2017-03-02 RX ORDER — DOFETILIDE 0.12 MG/1
125 CAPSULE ORAL 2 TIMES DAILY
Status: DISCONTINUED | OUTPATIENT
Start: 2017-03-02 | End: 2017-03-04 | Stop reason: HOSPADM

## 2017-03-02 RX ORDER — OSELTAMIVIR PHOSPHATE 30 MG/1
30 CAPSULE ORAL 2 TIMES DAILY
Status: DISCONTINUED | OUTPATIENT
Start: 2017-03-02 | End: 2017-03-04 | Stop reason: HOSPADM

## 2017-03-02 RX ORDER — LIDOCAINE 40 MG/G
CREAM TOPICAL
Status: DISCONTINUED | OUTPATIENT
Start: 2017-03-02 | End: 2017-03-02

## 2017-03-02 RX ORDER — CALCIUM CARBONATE 500 MG/1
500 TABLET, CHEWABLE ORAL EVERY 4 HOURS PRN
Status: DISCONTINUED | OUTPATIENT
Start: 2017-03-02 | End: 2017-03-04 | Stop reason: HOSPADM

## 2017-03-02 RX ORDER — SODIUM CHLORIDE 9 MG/ML
1000 INJECTION, SOLUTION INTRAVENOUS CONTINUOUS
Status: DISCONTINUED | OUTPATIENT
Start: 2017-03-02 | End: 2017-03-02

## 2017-03-02 RX ORDER — ONDANSETRON 4 MG/1
4 TABLET, ORALLY DISINTEGRATING ORAL EVERY 6 HOURS PRN
Status: DISCONTINUED | OUTPATIENT
Start: 2017-03-02 | End: 2017-03-04 | Stop reason: HOSPADM

## 2017-03-02 RX ORDER — IBUPROFEN 200 MG
400 TABLET ORAL EVERY 6 HOURS PRN
Status: DISCONTINUED | OUTPATIENT
Start: 2017-03-02 | End: 2017-03-04 | Stop reason: HOSPADM

## 2017-03-02 RX ORDER — FUROSEMIDE 10 MG/ML
20 INJECTION INTRAMUSCULAR; INTRAVENOUS ONCE
Status: COMPLETED | OUTPATIENT
Start: 2017-03-02 | End: 2017-03-03

## 2017-03-02 RX ORDER — DILTIAZEM HYDROCHLORIDE 5 MG/ML
25 INJECTION INTRAVENOUS ONCE
Status: COMPLETED | OUTPATIENT
Start: 2017-03-02 | End: 2017-03-02

## 2017-03-02 RX ORDER — HYDROMORPHONE HYDROCHLORIDE 1 MG/ML
.5-1 INJECTION, SOLUTION INTRAMUSCULAR; INTRAVENOUS; SUBCUTANEOUS
Status: DISCONTINUED | OUTPATIENT
Start: 2017-03-02 | End: 2017-03-02

## 2017-03-02 RX ORDER — OSELTAMIVIR PHOSPHATE 75 MG/1
75 CAPSULE ORAL 2 TIMES DAILY
Status: DISCONTINUED | OUTPATIENT
Start: 2017-03-02 | End: 2017-03-02

## 2017-03-02 RX ORDER — ONDANSETRON 2 MG/ML
4 INJECTION INTRAMUSCULAR; INTRAVENOUS EVERY 6 HOURS PRN
Status: DISCONTINUED | OUTPATIENT
Start: 2017-03-02 | End: 2017-03-04 | Stop reason: HOSPADM

## 2017-03-02 RX ORDER — HYDROMORPHONE HYDROCHLORIDE 1 MG/ML
.3-.5 INJECTION, SOLUTION INTRAMUSCULAR; INTRAVENOUS; SUBCUTANEOUS
Status: DISCONTINUED | OUTPATIENT
Start: 2017-03-02 | End: 2017-03-03

## 2017-03-02 RX ORDER — NALOXONE HYDROCHLORIDE 0.4 MG/ML
.1-.4 INJECTION, SOLUTION INTRAMUSCULAR; INTRAVENOUS; SUBCUTANEOUS
Status: DISCONTINUED | OUTPATIENT
Start: 2017-03-02 | End: 2017-03-04 | Stop reason: HOSPADM

## 2017-03-02 RX ORDER — HYDROMORPHONE HYDROCHLORIDE 1 MG/ML
.3-.5 INJECTION, SOLUTION INTRAMUSCULAR; INTRAVENOUS; SUBCUTANEOUS
Status: COMPLETED | OUTPATIENT
Start: 2017-03-02 | End: 2017-03-02

## 2017-03-02 RX ORDER — ACETAMINOPHEN 325 MG/1
650 TABLET ORAL EVERY 4 HOURS PRN
Status: DISCONTINUED | OUTPATIENT
Start: 2017-03-02 | End: 2017-03-04 | Stop reason: HOSPADM

## 2017-03-02 RX ORDER — PROCHLORPERAZINE MALEATE 5 MG
5 TABLET ORAL EVERY 6 HOURS PRN
Status: DISCONTINUED | OUTPATIENT
Start: 2017-03-02 | End: 2017-03-04 | Stop reason: HOSPADM

## 2017-03-02 RX ORDER — LIDOCAINE 40 MG/G
CREAM TOPICAL
Status: DISCONTINUED | OUTPATIENT
Start: 2017-03-02 | End: 2017-03-04 | Stop reason: HOSPADM

## 2017-03-02 RX ORDER — PREDNISONE 10 MG/1
10 TABLET ORAL 2 TIMES DAILY WITH MEALS
Status: DISCONTINUED | OUTPATIENT
Start: 2017-03-02 | End: 2017-03-04 | Stop reason: HOSPADM

## 2017-03-02 RX ORDER — ONDANSETRON 2 MG/ML
4 INJECTION INTRAMUSCULAR; INTRAVENOUS EVERY 30 MIN PRN
Status: DISCONTINUED | OUTPATIENT
Start: 2017-03-02 | End: 2017-03-02

## 2017-03-02 RX ADMIN — IPRATROPIUM BROMIDE AND ALBUTEROL SULFATE 3 ML: .5; 3 SOLUTION RESPIRATORY (INHALATION) at 20:34

## 2017-03-02 RX ADMIN — HYDROMORPHONE HYDROCHLORIDE 1 MG: 10 INJECTION, SOLUTION INTRAMUSCULAR; INTRAVENOUS; SUBCUTANEOUS at 14:19

## 2017-03-02 RX ADMIN — ONDANSETRON HYDROCHLORIDE 4 MG: 2 SOLUTION INTRAMUSCULAR; INTRAVENOUS at 07:33

## 2017-03-02 RX ADMIN — HYDROMORPHONE HYDROCHLORIDE 0.5 MG: 10 INJECTION, SOLUTION INTRAMUSCULAR; INTRAVENOUS; SUBCUTANEOUS at 18:27

## 2017-03-02 RX ADMIN — METOPROLOL SUCCINATE 12.5 MG: 25 TABLET, FILM COATED, EXTENDED RELEASE ORAL at 20:18

## 2017-03-02 RX ADMIN — SODIUM CHLORIDE 1000 ML: 9 INJECTION, SOLUTION INTRAVENOUS at 11:06

## 2017-03-02 RX ADMIN — LIDOCAINE HYDROCHLORIDE 10 ML: 20 JELLY TOPICAL at 23:35

## 2017-03-02 RX ADMIN — ACETAMINOPHEN 650 MG: 325 TABLET ORAL at 18:27

## 2017-03-02 RX ADMIN — DILTIAZEM HYDROCHLORIDE 25 MG: 5 INJECTION INTRAVENOUS at 10:30

## 2017-03-02 RX ADMIN — HYDROMORPHONE HYDROCHLORIDE 0.5 MG: 10 INJECTION, SOLUTION INTRAMUSCULAR; INTRAVENOUS; SUBCUTANEOUS at 07:36

## 2017-03-02 RX ADMIN — LISINOPRIL 10 MG: 10 TABLET ORAL at 20:18

## 2017-03-02 RX ADMIN — PREDNISONE 10 MG: 10 TABLET ORAL at 20:19

## 2017-03-02 RX ADMIN — IPRATROPIUM BROMIDE AND ALBUTEROL SULFATE 3 ML: .5; 3 SOLUTION RESPIRATORY (INHALATION) at 09:50

## 2017-03-02 RX ADMIN — NOREPINEPHRINE BITARTRATE 0.03 MCG/KG/MIN: 1 INJECTION INTRAVENOUS at 23:00

## 2017-03-02 RX ADMIN — SODIUM CHLORIDE 1000 ML: 9 INJECTION, SOLUTION INTRAVENOUS at 22:17

## 2017-03-02 RX ADMIN — TRAMADOL HYDROCHLORIDE 50 MG: 50 TABLET, COATED ORAL at 20:34

## 2017-03-02 RX ADMIN — SODIUM CHLORIDE 1000 ML: 9 INJECTION, SOLUTION INTRAVENOUS at 23:30

## 2017-03-02 RX ADMIN — IBUPROFEN 400 MG: 400 TABLET ORAL at 20:34

## 2017-03-02 RX ADMIN — CEPHALEXIN 250 MG: 250 CAPSULE ORAL at 14:30

## 2017-03-02 RX ADMIN — SODIUM CHLORIDE 1000 ML: 9 INJECTION, SOLUTION INTRAVENOUS at 07:33

## 2017-03-02 RX ADMIN — SODIUM CHLORIDE 1000 ML: 9 INJECTION, SOLUTION INTRAVENOUS at 22:30

## 2017-03-02 RX ADMIN — HYDROMORPHONE HYDROCHLORIDE 1 MG: 10 INJECTION, SOLUTION INTRAMUSCULAR; INTRAVENOUS; SUBCUTANEOUS at 11:06

## 2017-03-02 ASSESSMENT — ENCOUNTER SYMPTOMS
SHORTNESS OF BREATH: 0
FEVER: 0
ACTIVITY CHANGE: 0
ARTHRALGIAS: 1
WOUND: 0
COUGH: 0
WEAKNESS: 0
DIZZINESS: 0
NAUSEA: 1
DIFFICULTY URINATING: 0
DYSURIA: 0
VOMITING: 0
DIARRHEA: 1
LIGHT-HEADEDNESS: 0
MYALGIAS: 1
ABDOMINAL PAIN: 0
FREQUENCY: 0
CHILLS: 0
APPETITE CHANGE: 1
ABDOMINAL DISTENTION: 0

## 2017-03-02 NOTE — ED NOTES
Pt voided 250 cc of elizabeth colored urine and was incontinent of stool. Cleaned pt and assisted back to bed. Awaiting further plan of care.

## 2017-03-02 NOTE — ED NOTES
"First liter of fluid infused. Pt has not urinated and sounds \"wet\" increased audible wheezing noted.  "

## 2017-03-02 NOTE — ED NOTES
ED Nursing criteria listed below was addressed during verbal handoff:     Abnormal vitals: Yes  Abnormal results: Yes see labs.   Med Reconciliation completed: Yes  Meds given in ED: Yes   Any Overdue Meds: No  Core Measures: No  Isolation: Yes enteric   Special needs: Yes  Skin assessment: Yes    Observation Patient  Education provided: Yes

## 2017-03-02 NOTE — PROGRESS NOTES
S-(situation): Patient registered to Observation. Patient arrived to room 254 via ED from cart.    B-(background): Diarreha, cough, fever, weakness for last 3 days.    A-(assessment): Fever 100.6, otherwise VSS, generalized pain, +3 edema bilateral feet, coarse crackles/wheezes bilateral lungs.    R-(recommendations): Orders and observation goals reviewed with patient.    Nursing Observation criteria listed below was met:    Skin issues/needs documented:Yes  Isolation needs addressed, if appropriate: Yes  Fall Prevention: Education given and documented: Yes  Education Assessment documented:Yes  Education Documented (Pre-existing chronic infection such as, MRSA/VRE need education on admission): N/A  New medication patient education completed and documented (Possible Side Effects of Common Medications handout): Yes  Home medications if not able to send immediately home with family stored here: NA  Reminder note placed in discharge instructions: NA  Patient has discharge needs (If yes, please explain): No

## 2017-03-02 NOTE — ED NOTES
"\"what did you give me? I haven't felt this good in so long!\" resting on cart. No further stool or urine out.  "

## 2017-03-02 NOTE — ED NOTES
Bladder scan showing 340cc urine. Pt having difficulty with emptying bladder. Dr. Foster informed prior to hanging additional fluids. Will offer straight cath

## 2017-03-02 NOTE — ED PROVIDER NOTES
History     Chief Complaint   Patient presents with     Diarrhea     Dysphagia     The history is provided by the patient and the spouse.     Bud Merritt is a 89 year old male who is here with concerns of diarrhea.  For the last several days he has had diarrhea and has been unable to eat anything.  Today he was unable to swallow his pills and his wife is concerned that he is getting dehydrated.  He's had a lot of pain from arthritis and is hoping that we can give him some medicine for that pain as well.  He does have a history of active heart disease.  He saw Dr. Connor at the cardiology clinic here in [place] about one week ago.  They made some adjustments to his medicines and started him on an event monitor.  There is discussion about giving the patient pacemaker at some point.    Nursing Note:  Pt has not been able to eat for the last three days. Pt has been trying to drink water. Pt has had diarrhea since Tuesday, slowly down some. Pt has had a cough since Monday. Pt was unable to swallow his pills this morning. Pt has pain all over his body this is not new for him due to arthritis. Wife is concerned pt is dehydrated and not able to swallow his morning pills.       I have reviewed the Medications, Allergies, Past Medical and Surgical History, and Social History in the Epic system.    Review of Systems   Constitutional: Positive for appetite change. Negative for activity change, chills and fever.   Respiratory: Negative for cough and shortness of breath.    Gastrointestinal: Positive for diarrhea and nausea. Negative for abdominal distention, abdominal pain and vomiting.   Endocrine: Negative for cold intolerance and heat intolerance.   Genitourinary: Positive for decreased urine volume. Negative for difficulty urinating, dysuria and frequency.   Musculoskeletal: Positive for arthralgias and myalgias.   Skin: Negative for rash and wound.   Neurological: Negative for dizziness, weakness and  "light-headedness.   All other systems reviewed and are negative.      Physical Exam   BP: 139/87  Heart Rate: 86  Temp: 98  F (36.7  C)  Resp: 20  Height: 167.6 cm (5' 6\")  Weight: 71.2 kg (157 lb)  SpO2: 99 %    Physical Exam   Constitutional: He is oriented to person, place, and time. He appears well-developed and well-nourished. No distress.   HENT:   Head: Normocephalic and atraumatic.   Right Ear: External ear normal.   Left Ear: External ear normal.   Nose: Nose normal.   Mouth/Throat: Oropharynx is clear and moist.   Eyes: Conjunctivae and EOM are normal.   Neck: Normal range of motion.   Cardiovascular: Normal rate, regular rhythm and intact distal pulses.    Murmur heard.  Pulmonary/Chest: Effort normal and breath sounds normal. No respiratory distress. He has no wheezes. He has no rales. He exhibits no tenderness.   Abdominal: Soft. Bowel sounds are normal. He exhibits no distension. There is no tenderness.   Musculoskeletal:   Joint deformity consistent with RA   Neurological: He is alert and oriented to person, place, and time.   Skin: Skin is warm and dry.   Nursing note and vitals reviewed.      ED Course     ED Course     Procedures          EKG Interpretation:      Interpreted by Jerome Foster  Time reviewed: 0800   Symptoms at time of EKG: diarrhea and nausea   Rhythm: Atrial fibrillation - controlled and 1 degree AV block  Rate: Normal  Axis: Normal  Ectopy: None  Conduction: Right bundle branch block (complete)  ST Segments/ T Waves: No ST-T wave changes and No acute ischemic changes  Q Waves: III and aVf  Comparison to prior: Unchanged    Clinical Impression: atrial fib, controlled rate, RBBB, first degree AV block        Results for orders placed or performed during the hospital encounter of 03/02/17 (from the past 24 hour(s))   CBC with platelets differential   Result Value Ref Range    WBC 6.2 4.0 - 11.0 10e9/L    RBC Count 3.51 (L) 4.4 - 5.9 10e12/L    Hemoglobin 11.3 (L) 13.3 - 17.7 " g/dL    Hematocrit 34.9 (L) 40.0 - 53.0 %    MCV 99 78 - 100 fl    MCH 32.2 26.5 - 33.0 pg    MCHC 32.4 31.5 - 36.5 g/dL    RDW 14.2 10.0 - 15.0 %    Platelet Count 223 150 - 450 10e9/L    Diff Method Automated Method     % Neutrophils 75.7 %    % Lymphocytes 10.9 %    % Monocytes 12.8 %    % Eosinophils 0.0 %    % Basophils 0.3 %    % Immature Granulocytes 0.3 %    Absolute Neutrophil 4.7 1.6 - 8.3 10e9/L    Absolute Lymphocytes 0.7 (L) 0.8 - 5.3 10e9/L    Absolute Monocytes 0.8 0.0 - 1.3 10e9/L    Absolute Eosinophils 0.0 0.0 - 0.7 10e9/L    Absolute Basophils 0.0 0.0 - 0.2 10e9/L    Abs Immature Granulocytes 0.0 0 - 0.4 10e9/L   INR   Result Value Ref Range    INR 0.94 0.86 - 1.14   Basic metabolic panel   Result Value Ref Range    Sodium 136 133 - 144 mmol/L    Potassium 4.3 3.4 - 5.3 mmol/L    Chloride 99 94 - 109 mmol/L    Carbon Dioxide 24 20 - 32 mmol/L    Anion Gap 13 3 - 14 mmol/L    Glucose 77 70 - 99 mg/dL    Urea Nitrogen 28 7 - 30 mg/dL    Creatinine 0.97 0.66 - 1.25 mg/dL    GFR Estimate 73 >60 mL/min/1.7m2    GFR Estimate If Black 88 >60 mL/min/1.7m2    Calcium 8.1 (L) 8.5 - 10.1 mg/dL   D dimer quantitative   Result Value Ref Range    D Dimer 0.7 (H) 0.0 - 0.50 ug/ml FEU   NT pro BNP   Result Value Ref Range    N-Terminal Pro BNP Inpatient 352 0 - 1800 pg/mL   UA with Microscopic   Result Value Ref Range    Color Urine Yellow     Appearance Urine Clear     Glucose Urine Negative NEG mg/dL    Bilirubin Urine Negative NEG    Ketones Urine 15 (A) NEG mg/dL    Specific Gravity Urine 1.020 1.003 - 1.035    pH Urine 5.5 5.0 - 7.0 pH    Protein Albumin Urine Negative NEG mg/dL    Urobilinogen Urine 0.2 0.2 - 1.0 EU/dL    Nitrite Urine Positive (A) NEG    Blood Urine Trace (A) NEG    Leukocyte Esterase Urine Small (A) NEG    Source Unspecified Urine     WBC Urine 5-10 (A) 0 - 2 /HPF    RBC Urine O - 2 0 - 2 /HPF    Bacteria Urine Moderate (A) NEG /HPF   Chest  XR, 1 view portable    Narrative    XR CHEST  PORT 1 VW 3/2/2017 12:58 PM    COMPARISON: 7/7/2016    HISTORY: Hypoxia      Impression    IMPRESSION: Enlarged cardiac silhouette is again seen, not  significantly changed. Mild bibasilar atelectasis. No significant  pleural effusion. No pneumothorax. No appreciable edema.    CAROL MARCELA         Assessments & Plan (with Medical Decision Making)  Christopher is an 89-year-old male here with several days history of diarrhea.  Today he was so nauseous she was not able to take any food or pills and his wife is concerned he is getting dehydrated.  He did see Dr. Connor in cardiology clinic about 1 week ago.  At that time they placed an event monitor.  Here in the ED his exam is relatively unremarkable.  He does have decreased lung sounds bilaterally and had oxygen desaturation into the 88% range so we did start him on 2 L of oxygen.  His cardiac monitor is been unremarkable here in the ED, but the event monitor that if he went home on short run of SVT with a rate of 160 bpm at 2:05 AM today.  He has a history of bradycardia arrhythmias as well.  His labs today show evidence of urinary tract infection with a normal white count, normal basic metabolic panel, and normal BNP.  The patient's d-dimer is elevated at 0.7, but given his age adjustment this is in the normal range.  I did speak with the cardiology staff [facility] about this patient and they feel like his heart is likely stable and short runs of SVT are not life threatening.  They recommended we would treat his volume status and diarrhea and if the patient does not stabilize they would accept him in transfer.  They were very clear to say that at any point, if we are not comfortable keeping the patient, they would be glad to accept the patient in transfer.  This patient will be an observation patient.  I did speak with Dr. Larson and I did write observation orders.       I have reviewed the nursing notes.    I have reviewed the findings,  diagnosis, plan and need for follow up with the patient.    New Prescriptions    No medications on file       Final diagnoses:   Diarrhea, unspecified type   Nausea   Paroxysmal atrial fibrillation (H)       3/2/2017   [facility] EMERGENCY DEPARTMENT     Jerome Foster MD  03/13/17 0903

## 2017-03-02 NOTE — ED NOTES
Pt has not been able to eat for the last three days.  Pt has been trying to drink water.  Pt has had diarrhea since Tuesday, slowly down some.  Pt has had a cough since Monday.  Pt was unable to swallow his pills this morning.  Pt has pain all over his body this is not new for him due to arthritis.  Wife is concerned pt is dehydrated and not able to swallow his morning pills.   Pt is currently on a Holter monitor, on until Monday.

## 2017-03-03 LAB
ANION GAP SERPL CALCULATED.3IONS-SCNC: 13 MMOL/L (ref 3–14)
BASE DEFICIT BLDV-SCNC: 7.9 MMOL/L
BUN SERPL-MCNC: 25 MG/DL (ref 7–30)
CALCIUM SERPL-MCNC: 7.1 MG/DL (ref 8.5–10.1)
CHLORIDE SERPL-SCNC: 106 MMOL/L (ref 94–109)
CO2 SERPL-SCNC: 19 MMOL/L (ref 20–32)
CREAT SERPL-MCNC: 1.2 MG/DL (ref 0.66–1.25)
ERYTHROCYTE [DISTWIDTH] IN BLOOD BY AUTOMATED COUNT: 14.5 % (ref 10–15)
GFR SERPL CREATININE-BSD FRML MDRD: 57 ML/MIN/1.7M2
GLUCOSE SERPL-MCNC: 129 MG/DL (ref 70–99)
HCO3 BLDV-SCNC: 19 MMOL/L (ref 21–28)
HCT VFR BLD AUTO: 34.1 % (ref 40–53)
HGB BLD-MCNC: 10.6 G/DL (ref 13.3–17.7)
MCH RBC QN AUTO: 31.6 PG (ref 26.5–33)
MCHC RBC AUTO-ENTMCNC: 31.1 G/DL (ref 31.5–36.5)
MCV RBC AUTO: 102 FL (ref 78–100)
O2/TOTAL GAS SETTING VFR VENT: 45 %
PCO2 BLDV: 45 MM HG (ref 40–50)
PH BLDV: 7.24 PH (ref 7.32–7.43)
PLATELET # BLD AUTO: 222 10E9/L (ref 150–450)
PO2 BLDV: 23 MM HG (ref 25–47)
POTASSIUM SERPL-SCNC: 4.8 MMOL/L (ref 3.4–5.3)
RBC # BLD AUTO: 3.35 10E12/L (ref 4.4–5.9)
SODIUM SERPL-SCNC: 138 MMOL/L (ref 133–144)
WBC # BLD AUTO: 11.3 10E9/L (ref 4–11)

## 2017-03-03 PROCEDURE — 25000132 ZZH RX MED GY IP 250 OP 250 PS 637: Mod: GY | Performed by: FAMILY MEDICINE

## 2017-03-03 PROCEDURE — 85027 COMPLETE CBC AUTOMATED: CPT | Performed by: FAMILY MEDICINE

## 2017-03-03 PROCEDURE — 36415 COLL VENOUS BLD VENIPUNCTURE: CPT | Performed by: FAMILY MEDICINE

## 2017-03-03 PROCEDURE — 82803 BLOOD GASES ANY COMBINATION: CPT | Performed by: FAMILY MEDICINE

## 2017-03-03 PROCEDURE — 25000128 H RX IP 250 OP 636: Performed by: FAMILY MEDICINE

## 2017-03-03 PROCEDURE — 94640 AIRWAY INHALATION TREATMENT: CPT

## 2017-03-03 PROCEDURE — 25000132 ZZH RX MED GY IP 250 OP 250 PS 637: Mod: GY | Performed by: PHYSICIAN ASSISTANT

## 2017-03-03 PROCEDURE — A9270 NON-COVERED ITEM OR SERVICE: HCPCS | Mod: GY | Performed by: FAMILY MEDICINE

## 2017-03-03 PROCEDURE — A9270 NON-COVERED ITEM OR SERVICE: HCPCS | Mod: GY | Performed by: PHYSICIAN ASSISTANT

## 2017-03-03 PROCEDURE — 99207 ZZC CDG-MDM COMPONENT: MEETS HIGH - UP CODED: CPT | Performed by: FAMILY MEDICINE

## 2017-03-03 PROCEDURE — 25000125 ZZHC RX 250: Performed by: FAMILY MEDICINE

## 2017-03-03 PROCEDURE — 87081 CULTURE SCREEN ONLY: CPT | Performed by: FAMILY MEDICINE

## 2017-03-03 PROCEDURE — 40000893 ZZH STATISTIC PT IP EVAL DEFER: Performed by: PHYSICAL THERAPIST

## 2017-03-03 PROCEDURE — 80048 BASIC METABOLIC PNL TOTAL CA: CPT | Performed by: FAMILY MEDICINE

## 2017-03-03 PROCEDURE — 20000003 ZZH R&B ICU

## 2017-03-03 PROCEDURE — 99233 SBSQ HOSP IP/OBS HIGH 50: CPT | Performed by: FAMILY MEDICINE

## 2017-03-03 RX ORDER — ALBUTEROL SULFATE 0.83 MG/ML
2.5 SOLUTION RESPIRATORY (INHALATION)
Status: DISCONTINUED | OUTPATIENT
Start: 2017-03-03 | End: 2017-03-04 | Stop reason: HOSPADM

## 2017-03-03 RX ORDER — HYDROCODONE BITARTRATE AND ACETAMINOPHEN 5; 325 MG/1; MG/1
1 TABLET ORAL EVERY 6 HOURS PRN
Status: DISCONTINUED | OUTPATIENT
Start: 2017-03-03 | End: 2017-03-04 | Stop reason: HOSPADM

## 2017-03-03 RX ORDER — HYDROMORPHONE HYDROCHLORIDE 1 MG/ML
.3-.5 INJECTION, SOLUTION INTRAMUSCULAR; INTRAVENOUS; SUBCUTANEOUS
Status: DISCONTINUED | OUTPATIENT
Start: 2017-03-03 | End: 2017-03-04 | Stop reason: HOSPADM

## 2017-03-03 RX ORDER — SODIUM CHLORIDE 9 MG/ML
1000 INJECTION, SOLUTION INTRAVENOUS CONTINUOUS
Status: DISCONTINUED | OUTPATIENT
Start: 2017-03-03 | End: 2017-03-03 | Stop reason: CLARIF

## 2017-03-03 RX ORDER — DILTIAZEM HYDROCHLORIDE 5 MG/ML
15 INJECTION INTRAVENOUS ONCE
Status: COMPLETED | OUTPATIENT
Start: 2017-03-03 | End: 2017-03-03

## 2017-03-03 RX ORDER — IPRATROPIUM BROMIDE AND ALBUTEROL SULFATE 2.5; .5 MG/3ML; MG/3ML
3 SOLUTION RESPIRATORY (INHALATION)
Status: DISCONTINUED | OUTPATIENT
Start: 2017-03-03 | End: 2017-03-04 | Stop reason: HOSPADM

## 2017-03-03 RX ADMIN — CEPHALEXIN 250 MG: 250 CAPSULE ORAL at 15:26

## 2017-03-03 RX ADMIN — ACETAMINOPHEN 650 MG: 325 TABLET ORAL at 06:49

## 2017-03-03 RX ADMIN — TRAMADOL HYDROCHLORIDE 50 MG: 50 TABLET, COATED ORAL at 09:44

## 2017-03-03 RX ADMIN — PREDNISONE 10 MG: 10 TABLET ORAL at 19:53

## 2017-03-03 RX ADMIN — PREDNISONE 10 MG: 10 TABLET ORAL at 09:21

## 2017-03-03 RX ADMIN — DOFETILIDE 125 MCG: 0.12 CAPSULE ORAL at 00:40

## 2017-03-03 RX ADMIN — SODIUM CHLORIDE 1000 ML: 9 INJECTION, SOLUTION INTRAVENOUS at 02:35

## 2017-03-03 RX ADMIN — HYDROMORPHONE HYDROCHLORIDE 0.5 MG: 10 INJECTION, SOLUTION INTRAMUSCULAR; INTRAVENOUS; SUBCUTANEOUS at 20:22

## 2017-03-03 RX ADMIN — CEPHALEXIN 250 MG: 250 CAPSULE ORAL at 00:40

## 2017-03-03 RX ADMIN — DOFETILIDE 125 MCG: 0.12 CAPSULE ORAL at 09:21

## 2017-03-03 RX ADMIN — DILTIAZEM HYDROCHLORIDE 15 MG: 5 INJECTION INTRAVENOUS at 19:11

## 2017-03-03 RX ADMIN — FUROSEMIDE 20 MG: 10 INJECTION, SOLUTION INTRAVENOUS at 11:36

## 2017-03-03 RX ADMIN — DOFETILIDE 125 MCG: 0.12 CAPSULE ORAL at 21:20

## 2017-03-03 RX ADMIN — OSELTAMIVIR PHOSPHATE 30 MG: 30 CAPSULE ORAL at 09:21

## 2017-03-03 RX ADMIN — SODIUM CHLORIDE 1000 ML: 9 INJECTION, SOLUTION INTRAVENOUS at 00:39

## 2017-03-03 RX ADMIN — ACETAMINOPHEN 650 MG: 325 TABLET ORAL at 00:47

## 2017-03-03 RX ADMIN — METOPROLOL SUCCINATE 12.5 MG: 25 TABLET, FILM COATED, EXTENDED RELEASE ORAL at 09:21

## 2017-03-03 RX ADMIN — OSELTAMIVIR PHOSPHATE 30 MG: 30 CAPSULE ORAL at 00:40

## 2017-03-03 RX ADMIN — APIXABAN 2.5 MG: 2.5 TABLET, FILM COATED ORAL at 21:19

## 2017-03-03 RX ADMIN — HYDROMORPHONE HYDROCHLORIDE 0.5 MG: 10 INJECTION, SOLUTION INTRAMUSCULAR; INTRAVENOUS; SUBCUTANEOUS at 12:03

## 2017-03-03 RX ADMIN — IPRATROPIUM BROMIDE AND ALBUTEROL SULFATE 3 ML: .5; 3 SOLUTION RESPIRATORY (INHALATION) at 10:26

## 2017-03-03 RX ADMIN — IPRATROPIUM BROMIDE AND ALBUTEROL SULFATE 3 ML: .5; 3 SOLUTION RESPIRATORY (INHALATION) at 19:53

## 2017-03-03 RX ADMIN — CEPHALEXIN 250 MG: 250 CAPSULE ORAL at 21:20

## 2017-03-03 RX ADMIN — APIXABAN 2.5 MG: 2.5 TABLET, FILM COATED ORAL at 09:20

## 2017-03-03 RX ADMIN — CEPHALEXIN 250 MG: 250 CAPSULE ORAL at 07:02

## 2017-03-03 RX ADMIN — SODIUM CHLORIDE 1000 ML: 9 INJECTION, SOLUTION INTRAVENOUS at 10:23

## 2017-03-03 ASSESSMENT — ACTIVITIES OF DAILY LIVING (ADL)
DRESS: 0-->INDEPENDENT
NUMBER_OF_TIMES_PATIENT_HAS_FALLEN_WITHIN_LAST_SIX_MONTHS: 1
AMBULATION: 1-->ASSISTIVE EQUIPMENT
PRIOR_FUNCTIONAL_LEVEL_COMMENT: SORE THROAT
FALL_HISTORY_WITHIN_LAST_SIX_MONTHS: YES
TRANSFERRING: 1-->ASSISTIVE EQUIPMENT
COGNITION: 0 - NO COGNITION ISSUES REPORTED
WHICH_OF_THE_ABOVE_FUNCTIONAL_RISKS_HAD_A_RECENT_ONSET_OR_CHANGE?: EATING
TOILETING: 0-->INDEPENDENT
RETIRED_EATING: 0-->INDEPENDENT
BATHING: 0-->INDEPENDENT
RETIRED_COMMUNICATION: 0-->UNDERSTANDS/COMMUNICATES WITHOUT DIFFICULTY
SWALLOWING: 2-->DIFFICULTY SWALLOWING FOODS

## 2017-03-03 ASSESSMENT — PAIN DESCRIPTION - DESCRIPTORS
DESCRIPTORS: ACHING;HEADACHE
DESCRIPTORS: ACHING;HEADACHE

## 2017-03-03 NOTE — PROGRESS NOTES
[facility]  Intensive Care Progress Note          Assessment and Plan: 89-year-old male admitted yesterday with weakness and diarrhea. Last evening was feeling more ill and noted to have tachycardia secondary to atrial fibrillation, hypotension and hypoxemia. He had just been noted to be influenza a positive. Is transferred to the ICU, started on assisted noninvasive chemical ventilation and started on levophed infusion. Clinically he was dry, so is given IV boluses of saline. Overnight his pressure is have improved with the need for  Levophed trending downward. He is alert, but tired, respiratory status has improved, still on BiPAP this morning.   At this time will attempt to wean him off BiPAP support . Continue to wean off levophed as able. Continue symptomatic care for influenza A.      Summary Statement  Neurology:  stable, no changes    Cardiovascular / Hemodynamics:  blood pressure controlled with low dose norepinephrine infusion. He is now in sinus rhythm    Pulmonary:  lungs clear, continues on BiPAP support    GI and Nutrition:  no more diarrhea. Has not taken anything orally since last evening    Renal, Fluid and Electrolytes:  on saline infusion. Urine output improved.    Infectious Disease:  influenza a positive, started on Tamiflu last evening    Hematology and Oncology:  stable    Endocrinology:  no issues    Intensive Care: Central line: No central line present  Arterial line: No arterial line present  Restraint:Not needed   Others:    24 hour goals:  wean off bipap, wean off pressers    Billing:             Problem list:     Diarrhea    Weakness generalized    COPD exacerbation (H)    Influenza A    Acute respiratory failure with hypoxia (H)    Atrial fibrillation with rapid ventricular response (H)    Paroxysmal atrial fibrillation (H)    Cough    Acute cystitis without hematuria    Hypotension due to drugs    Rheumatoid arthritis involving multiple sites with positive rheumatoid  factor (H)    Hyperlipidemia LDL goal <130    Mitral regurgitation and aortic stenosis - both moderate by echo 12/2015    * No resolved hospital problems. *            Key events - last 24 hours:   transferred to ICU last evening, started on assisted mechanical ventilation. Required pressor support over evening hours            Hospital Course:          Date: Event / P / D / Treatment:              Medications:     Current Facility-Administered Medications Ordered in Epic   Medication Dose Route Frequency Last Rate Last Dose     0.9% sodium chloride infusion  1,000 mL Intravenous Continuous 125 mL/hr at 03/03/17 0235 1,000 mL at 03/03/17 0235     [START ON 3/4/2017] pneumococcal vaccine (PNEUMOVAX 23-rakel) injection 0.5 mL  0.5 mL Intramuscular Prior to discharge         [START ON 3/4/2017] influenza Vac Split High-Dose (FLUZONE) injection 0.5 mL  0.5 mL Intramuscular Prior to discharge         sodium chloride (PF) 0.9% PF flush 3 mL  3 mL Intracatheter Q8H         ipratropium - albuterol 0.5 mg/2.5 mg/3 mL (DUONEB) neb solution 3 mL  3 mL Nebulization Q2H PRN   3 mL at 03/02/17 2034     naloxone (NARCAN) injection 0.1-0.4 mg  0.1-0.4 mg Intravenous Q2 Min PRN         lidocaine 1 % 1 mL  1 mL Other Q1H PRN         lidocaine (LMX4) kit   Topical Q1H PRN         sodium chloride (PF) 0.9% PF flush 3 mL  3 mL Intracatheter Q1H PRN         cephalexin (KEFLEX) capsule 250 mg  250 mg Oral Q8H JOSETTE   250 mg at 03/03/17 0040     acetaminophen (TYLENOL) tablet 650 mg  650 mg Oral Q4H PRN   650 mg at 03/03/17 0047     apixaban ANTICOAGULANT (ELIQUIS) tablet 2.5 mg  2.5 mg Oral BID   2.5 mg at 03/03/17 0041     calcium carbonate (TUMS) chewable tablet 500 mg  500 mg Oral Q4H PRN         dofetilide (TIKOSYN) capsule 125 mcg  125 mcg Oral BID   125 mcg at 03/03/17 0040     metoprolol (TOPROL-XL) half-tab 12.5 mg  12.5 mg Oral Daily   12.5 mg at 03/02/17 2018     predniSONE (DELTASONE) tablet 10 mg  10 mg Oral BID w/meals   10 mg at  03/02/17 2019     traMADol (ULTRAM) tablet 50 mg  50 mg Oral Q6H PRN   50 mg at 03/02/17 2034     ondansetron (ZOFRAN-ODT) ODT tab 4 mg  4 mg Oral Q6H PRN        Or     ondansetron (ZOFRAN) injection 4 mg  4 mg Intravenous Q6H PRN         prochlorperazine (COMPAZINE) injection 5 mg  5 mg Intravenous Q6H PRN        Or     prochlorperazine (COMPAZINE) tablet 5 mg  5 mg Oral Q6H PRN        Or     prochlorperazine (COMPAZINE) Suppository 12.5 mg  12.5 mg Rectal Q12H PRN         ibuprofen (ADVIL/MOTRIN) tablet 400 mg  400 mg Oral Q6H PRN   400 mg at 03/02/17 2034     HYDROmorphone (PF) (DILAUDID) injection 0.3-0.5 mg  0.3-0.5 mg Intravenous Q2H PRN         furosemide (LASIX) injection 20 mg  20 mg Intravenous Once   20 mg at 03/03/17 0055     Patient is already receiving anticoagulation with heparin, enoxaparin (LOVENOX), warfarin (COUMADIN)  or other anticoagulant medication   Does not apply Continuous PRN         norepinephrine (LEVOPHED) 16 mg in D5W 250 mL infusion  0.03-0.4 mcg/kg/min Intravenous Continuous 19.4 mL/hr at 03/03/17 0346 0.3 mcg/kg/min at 03/03/17 0346     oseltamivir (TAMIFLU) capsule 30 mg  30 mg Oral BID   30 mg at 03/03/17 0040     lidocaine (XYLOCAINE) 2 % topical gel             No current [facility]-ordered outpatient prescriptions on file.            Review of Systems:   Constitutional: feeling tired and generalized body aches.  Skin: negative  Musculoskeletal: generalized body aches  Eyes: negative  ENT: negative  Endocrine: negative  Respiratory: denies cough. Feeling comfortable with use of BiPAP  Cardiovascular: negative  Heme: negative  Lymph: negative  GI: denies diarrhea. Abdomen is somewhat tender, but denies nausea  : has Parrish catheter in place  Neuro: as above  Psych: as above            Physical Exam:   Temp: 99.9  F (37.7  C) Temp  Min: 97.4  F (36.3  C)  Max: 100.6  F (38.1  C)  Resp: 18 Resp  Min: 7  Max: 24  SpO2: 95 % SpO2  Min: 89 %  Max: 99 %  Pulse: 95 Pulse  Min: 95  Max:  97  Heart Rate: 65 Heart Rate  Min: 64  Max: 112  BP: 106/72 Systolic (24hrs), Av , Min:72 , Max:149   Diastolic (24hrs), Av, Min:43, Max:89      Vital Sign Ranges  Temperature Temp  Av  F (37.2  C)  Min: 97.4  F (36.3  C)  Max: 100.6  F (38.1  C)   Blood pressure Systolic (24hrs), Av , Min:72 , Max:149        Diastolic (24hrs), Av, Min:43, Max:89      Pulse Pulse  Av  Min: 95  Max: 97   Respirations Resp  Av  Min: 7  Max: 24   Pulse oximetry SpO2  Av.1 %  Min: 89 %  Max: 99 %         Intake/Output Summary (Last 24 hours) at 17 0630  Last data filed at 17 0300   Gross per 24 hour   Intake              420 ml   Output              675 ml   Net             -255 ml         General Appearance:   Comfortable, no pain or respiratory distress   HEENT:   Head is atraumatic  Trachea is midline   Cardiovascular:   Normal and symmetrical radial, femoral and carotid pulses  Regular rate and rhythm   Chest and Lungs:   Symmetrical chest shape and movements with each tidal breath   Abdomen and Genitourinary::   Non-distended  Soft  diffusely slightly tender   Parrish catheter in place    Extremities and Skin:   Skin is intact  Edema is present: lower extremities symmetrical (moderate)   Neuro:   Alert and oriented x 3     Dressings:   No dressings present   Drains and Tubes:   No drains or tubes present   Intravascular Access and Device:   No central lines or devices in place   Additional exam findings:   None             Data:   All lab results reviewed  Results for orders placed or performed during the hospital encounter of 17 (from the past 24 hour(s))   CBC with platelets differential   Result Value Ref Range    WBC 6.2 4.0 - 11.0 10e9/L    RBC Count 3.51 (L) 4.4 - 5.9 10e12/L    Hemoglobin 11.3 (L) 13.3 - 17.7 g/dL    Hematocrit 34.9 (L) 40.0 - 53.0 %    MCV 99 78 - 100 fl    MCH 32.2 26.5 - 33.0 pg    MCHC 32.4 31.5 - 36.5 g/dL    RDW 14.2 10.0 - 15.0 %    Platelet Count  223 150 - 450 10e9/L    Diff Method Automated Method     % Neutrophils 75.7 %    % Lymphocytes 10.9 %    % Monocytes 12.8 %    % Eosinophils 0.0 %    % Basophils 0.3 %    % Immature Granulocytes 0.3 %    Absolute Neutrophil 4.7 1.6 - 8.3 10e9/L    Absolute Lymphocytes 0.7 (L) 0.8 - 5.3 10e9/L    Absolute Monocytes 0.8 0.0 - 1.3 10e9/L    Absolute Eosinophils 0.0 0.0 - 0.7 10e9/L    Absolute Basophils 0.0 0.0 - 0.2 10e9/L    Abs Immature Granulocytes 0.0 0 - 0.4 10e9/L   INR   Result Value Ref Range    INR 0.94 0.86 - 1.14   Basic metabolic panel   Result Value Ref Range    Sodium 136 133 - 144 mmol/L    Potassium 4.3 3.4 - 5.3 mmol/L    Chloride 99 94 - 109 mmol/L    Carbon Dioxide 24 20 - 32 mmol/L    Anion Gap 13 3 - 14 mmol/L    Glucose 77 70 - 99 mg/dL    Urea Nitrogen 28 7 - 30 mg/dL    Creatinine 0.97 0.66 - 1.25 mg/dL    GFR Estimate 73 >60 mL/min/1.7m2    GFR Estimate If Black 88 >60 mL/min/1.7m2    Calcium 8.1 (L) 8.5 - 10.1 mg/dL   D dimer quantitative   Result Value Ref Range    D Dimer 0.7 (H) 0.0 - 0.50 ug/ml FEU   NT pro BNP   Result Value Ref Range    N-Terminal Pro BNP Inpatient 352 0 - 1800 pg/mL   UA with Microscopic   Result Value Ref Range    Color Urine Yellow     Appearance Urine Clear     Glucose Urine Negative NEG mg/dL    Bilirubin Urine Negative NEG    Ketones Urine 15 (A) NEG mg/dL    Specific Gravity Urine 1.020 1.003 - 1.035    pH Urine 5.5 5.0 - 7.0 pH    Protein Albumin Urine Negative NEG mg/dL    Urobilinogen Urine 0.2 0.2 - 1.0 EU/dL    Nitrite Urine Positive (A) NEG    Blood Urine Trace (A) NEG    Leukocyte Esterase Urine Small (A) NEG    Source Unspecified Urine     WBC Urine 5-10 (A) 0 - 2 /HPF    RBC Urine O - 2 0 - 2 /HPF    Bacteria Urine Moderate (A) NEG /HPF   Chest  XR, 1 view portable    Narrative    XR CHEST PORT 1 VW 3/2/2017 12:58 PM    COMPARISON: 7/7/2016    HISTORY: Hypoxia      Impression    IMPRESSION: Enlarged cardiac silhouette is again seen, not  significantly  changed. Mild bibasilar atelectasis. No significant  pleural effusion. No pneumothorax. No appreciable edema.    CAROL MEDRANO   Occult blood stool   Result Value Ref Range    Occult Blood Negative NEG   Influenza A/B antigen   Result Value Ref Range    Influenza A/B Agn Specimen Nasopharyngeal     Influenza A Positive (A) NEG    Influenza B  NEG     Negative   Test results must be correlated with clinical data. If necessary, results   should be confirmed by a molecular assay or viral culture.     Basic metabolic panel   Result Value Ref Range    Sodium 138 133 - 144 mmol/L    Potassium 3.7 3.4 - 5.3 mmol/L    Chloride 103 94 - 109 mmol/L    Carbon Dioxide 24 20 - 32 mmol/L    Anion Gap 11 3 - 14 mmol/L    Glucose 96 70 - 99 mg/dL    Urea Nitrogen 25 7 - 30 mg/dL    Creatinine 1.25 0.66 - 1.25 mg/dL    GFR Estimate 54 (L) >60 mL/min/1.7m2    GFR Estimate If Black 66 >60 mL/min/1.7m2    Calcium 7.4 (L) 8.5 - 10.1 mg/dL   Magnesium   Result Value Ref Range    Magnesium 2.1 1.6 - 2.3 mg/dL   CBC with platelets   Result Value Ref Range    WBC 11.3 (H) 4.0 - 11.0 10e9/L    RBC Count 3.35 (L) 4.4 - 5.9 10e12/L    Hemoglobin 10.6 (L) 13.3 - 17.7 g/dL    Hematocrit 34.1 (L) 40.0 - 53.0 %     (H) 78 - 100 fl    MCH 31.6 26.5 - 33.0 pg    MCHC 31.1 (L) 31.5 - 36.5 g/dL    RDW 14.5 10.0 - 15.0 %    Platelet Count 222 150 - 450 10e9/L   Basic metabolic panel   Result Value Ref Range    Sodium 138 133 - 144 mmol/L    Potassium 4.8 3.4 - 5.3 mmol/L    Chloride 106 94 - 109 mmol/L    Carbon Dioxide 19 (L) 20 - 32 mmol/L    Anion Gap 13 3 - 14 mmol/L    Glucose 129 (H) 70 - 99 mg/dL    Urea Nitrogen 25 7 - 30 mg/dL    Creatinine 1.20 0.66 - 1.25 mg/dL    GFR Estimate 57 (L) >60 mL/min/1.7m2    GFR Estimate If Black 69 >60 mL/min/1.7m2    Calcium 7.1 (L) 8.5 - 10.1 mg/dL   Blood gas venous   Result Value Ref Range    Ph Venous 7.24 (L) 7.32 - 7.43 pH    PCO2 Venous 45 40 - 50 mm Hg    PO2 Venous 23 (L) 25 - 47 mm Hg     Bicarbonate Venous 19 (L) 21 - 28 mmol/L    Base Deficit Venous 7.9 mmol/L    FIO2 45

## 2017-03-03 NOTE — PROGRESS NOTES
Pt remains concerned about having another episode of diarrhea despite only 1 formed stool for the last 24 hours. Will discontinue enteric precautions.  Good urine output with lasix, stein remains in place. On 2 lpm nasal cannula with Spo2 92%.

## 2017-03-03 NOTE — PROGRESS NOTES
Influenza swab pos for Influenza A.  Clinically more ill this PM with increased lung congestion, increased sweats, body aches and pain  Will start on tamiflu for 5 days  Change to IP status, likely will be in hospital for more than 2 nites.  Electronically signed by DEIRDRE Teran on March 2, 2017

## 2017-03-03 NOTE — PROGRESS NOTES
S- Transfer to 218 from 254.    B- influenza a, a-fib, copd    A- Brief systems assessment: lung sounds coarse with expiratory wheezes, diaphoretic, pale, blurred/fuzzy vision    R- Transfer to ICU per physician orders. Continue to monitor pt and update physician as needed.      Code status: Full Code  Skin: intact  Fall Risk: Yes- Department fall risk interventions implemented.  Isolation: Contact  Core Measures: none  Medication drips upon transfer: NS bolus

## 2017-03-03 NOTE — PROGRESS NOTES
"CLINICAL NUTRITION SERVICES  -  ASSESSMENT NOTE      RECOMMENDATIONS FOR MD/PROVIDER TO ORDER:    Recommendations Ordered by Registered Dietitian (RD): None   Future/Additional Recommendations: Continue to monitor and encourage PO intake.   Malnutrition: Does not meet criteria        REASON FOR ASSESSMENT  Bud Merritt is a 89 year old male seen by Registered Dietitian for Admission Nutrition Risk Screen - Reduced oral intake over last month.      NUTRITION HISTORY  Information obtained from chart and pt. Noted presented to ED with dx. diarrhea, dysphagia. Noted pt. has had poor appetite, unable to eat anything past 3 days. Noted wife is concerned of dehydration. Pt is also experiencing nausea, decreased urine output. Noted ate 50% of meal last night. Spoke with pt. and family about past eating habits and any wt. loss over past year- wife reported pt. eats smaller meals throughout the day (eats constantly, but usually cannot finish a larger meal). Wife reports pt. has not eaten well for 4 days now.     CURRENT NUTRITION ORDERS  Diet Order:     Clear Liquid     Current Intake/Tolerance:  Eating 50% of meals per flowsheets.      PHYSICAL FINDINGS  Observed  None noted  Obtained from Chart/Interdisciplinary Team  None noted    ANTHROPOMETRICS  Height: 5' 5\"  Weight: 152 lbs 1.88 oz  Body mass index is 25.31 kg/(m^2).  Weight Status:  Overweight BMI 25-29.9  IBW: 136 lbs  % IBW: 111%  Weight History:   Wt Readings from Last 10 Encounters:   03/02/17 69 kg (152 lb 1.9 oz)   02/27/17 70.8 kg (156 lb 1.6 oz)   02/02/17 67.6 kg (149 lb)   01/26/17 64.9 kg (143 lb)   01/09/17 68.2 kg (150 lb 4.8 oz)   12/22/16 66.2 kg (146 lb)   12/20/16 68 kg (149 lb 14.4 oz)   09/29/16 66 kg (145 lb 8 oz)   09/08/16 67.5 kg (148 lb 14.4 oz)   08/11/16 66.1 kg (145 lb 11.2 oz)     LABS  Labs reviewed.  GFR: 54, 57    MEDICATIONS  Medications reviewed.  IFV @ 125 mL/hr, CalCarb, Keflex, Lasix, Metoprolol    Dosing Weight 136 " lbs    ASSESSED NUTRITION NEEDS:  Estimated Energy Needs: 3642-9629 kcals (25-30 Kcal/Kg)  Justification: maintenance  Estimated Protein Needs: 55-69 grams protein (0.8-1 g pro/Kg)  Justification: maintenance  Estimated Fluid Needs: 8644-6594  mL (1 mL/Kcal)  Justification: maintenance or per  recommendation    MALNUTRITION:  % Weight Loss:  Weight loss does not meet criteria for malnutrition   % Intake:  Decreased intake does not meet criteria for malnutrition   Subcutaneous Fat Loss:  None observed  Muscle Loss:  None observed  Fluid Retention:  None noted    Malnutrition Diagnosis: Patient does not meet two of the above criteria necessary for diagnosing malnutrition      NUTRITION DIAGNOSIS:  Inadequate oral intake related to decreased appetite, diarrhea, nausea as evidenced by noted consumption 50% of meals, decreased urine output.      NUTRITION INTERVENTIONS  Recommendations / Nutrition Prescription  Continue to monitor and encourage PO intake.    Implementation  Nutrition education: Did not provide education at this time.      Nutrition Goals  Intake of meals/snacks/supplements >75%.      MONITORING AND EVALUATION:  Progress towards goals will be monitored and evaluated per protocol and Practice Guidelines      Sydnie Bay RD, LD  Clinical Dietitian  [phone]

## 2017-03-03 NOTE — PROGRESS NOTES
S-(situation): PT Eval and treat orders received.     B-(background): Patient admitted for weakness, inability to eat. Weakness and disposition planning requested by PT team    A-(assessment): Patient, via rounding with team this am, is not appropriate for AM eval today.    R-(recommendations): Defer PT eval until patient better able to tolerate. PM or tomorrow  Valarie Bermeo................... PT, DPT, CLT   3/3/2017, 9:57 AM  [phone]

## 2017-03-03 NOTE — H&P
[facility]    History and Physical  Hospitalist       Date of Admission:  3/2/2017  Date of Service (when I saw the patient): 03/02/17    Assessment & Plan   Bud Merritt is a 89 year old male who presents with not feeling well over the past several days. Started with a cough 3 days ago followed by diarrhea for the past 2 days. He has had decreased appetite not able to swallow due to the sense of nausea. Today feeling increasingly week. Wife probably are concerned that he was dehydrated. History significant for paroxysmal atrial fibrillation taking eliquis and takingTikosyn. He has no history of RA on daily steroids. On admission to the emergency room there was concern he was dehydrated and was given IV fluids. Shortly after that he became more dyspneic and wet sounding. This is improved with a nebulization treatment. Laboratory work-up suggests a probable UTI, although he has no symptoms. Chest x-ray is not showing any acute infiltrate in his lab work is otherwise normal. Patient will be registered observation with carefully dose IV fluids, nausea medications. Stools have been sent for culture including Clostridium difficile. Will have them check for influenza. Low order nebulization treatments and will increase his steroid dosing for stress. AT this point it is not clear if these can improve fairly quickly, he will be registered observation and reevaluated tomorrow. Cultures of the urine is pending. Will have physical therapy see him tomorrow to check for safety at home.    Active Problems:    Diarrhea    Assessment: present for several days with increasing weakness in some nausea. No obvious blood in the stools. This point is not clear whether this is infectious versus other cause    Plan: IV fluids carefully given. Cultures pending.    Weakness generalized    Assessment: probably secondary to the diarrhea, although consider UTI.    Plan: IV fluids, stress dose steroids. PT  evaluate tomorrow    COPD exacerbation (H)    Assessment: increased wheezing on exam with history of mild obstructive, moderate restrictive disease on PFTs in past. It has some mild hypoxemia in the ED    Plan: duo nebs  ordered. Stress steroids ordered.  Supplemental oxygen as necessary    Paroxysmal atrial fibrillation (H)    Assessment: chronic, followed by cardiology. Currently has monitor on place which was interrogated showing an episode of SVT this morning. Cardiology is not thinking that this is a cause for his symptoms    Plan: monitor on telemetry. Continue all medications. Cardiology to follow.    Cough    Assessment: present since 3 days ago. Chest x-ray unremarkable for infiltrate.    Plan: check influenza titer    Acute cystitis without hematuria    Assessment: not have any symptoms, increased WBCs on sample    Plan: culture pending. ED has started him on oral Keflex    Rheumatoid arthritis involving multiple sites with positive rheumatoid factor (H)    Assessment: ongoing, taking Arava and daily low-dose prednisone    Plan: hold arava at this point, stress dosing prednisone for now    Hyperlipidemia LDL goal <130    Assessment: taking Lipitor    Plan: hold    Mitral regurgitation and aortic stenosis - both moderate by echo 12/2015    Assessment: noted in past. No signs of increased failure with normal BNP    Plan: follow  DVT Prophylaxis: taking eliquis  Code Status: Full Code    Disposition: Expected discharge in 1-2 days once feeling stronger.    Cullen Teran MD    Primary Care Physician   Camron Aragon    Chief Complaint   89-year-old male not feeling well over the past 3 days    History is obtained from the patient, electronic health record and emergency department physician    History of Present Illness   Bud Merritt is a 89 year old male who presents with feeling we cannot well for the past 3 days. Started with a cough 3 days ago in the sense that he was nauseated, having  difficulty swallowing. Diarrhea started 2 days ago frequent, loose, without blood. Has not eaten well. Feeling more weak. Wife was concerned that he might be dehydrated. He has known history of paroxysmal atrial fibrillation and known mitral regurgitation aortic stenosis followed by cardiology. Please have a cough, as had some increased wheezing. Cough has been dry without productivity. He's been noted to have moderate COPD in the past. He is on chronic steroids, prednisone 5 mg daily, due to rheumatoid arthritis. He has not vomited. He denies dysuria or frequency. He does take eliquis daily. He denies fever, has felt chilled.    Past Medical History    I have reviewed this patient's medical history and updated it with pertinent information if needed.   Past Medical History   Diagnosis Date     Atrial fibrillation (H) 07/01/2016     Cardiomyopathy (H)      COPD exacerbation (H) 3/2/2017     Paroxysmal atrial fibrillation (H) 7/6/2016     Pure hypercholesterolemia      Rheumatoid arthritis(714.0)      Unspecified essential hypertension      Weakness generalized 3/2/2017       Past Surgical History   I have reviewed this patient's surgical history and updated it with pertinent information if needed.  Past Surgical History   Procedure Laterality Date     Hc repair ing hernia,5+y/o,reducibl  1996     Marlex mesh repair of bilateral inguinal hernias and drainage of bilateral scrotal hydroceles.     Hc colonoscopy thru stoma w biopsy/cautery tumor/polyp/lesion  8/31/2004     Colonoscopy  08/24/09     Arthroplasty knee Left 1/12/2016     Procedure: ARTHROPLASTY KNEE;  Surgeon: Cesar Walker DO;  Location:  OR     Irrigation and debridement soft tissue lower extremity, combined Left 3/15/2016     Procedure: COMBINED IRRIGATION AND DEBRIDEMENT SOFT TISSUE LOWER EXTREMITY;  Surgeon: Cesar Walker DO;  Location: [facility] OR       Prior to Admission Medications   Prior to Admission Medications    Prescriptions Last Dose Informant Patient Reported? Taking?   Leuprolide Acetate (LUPRON DEPOT IM) Unknown at Unknown time  Yes No   Sig: Inject into the muscle every 4 months   VITAMIN D, CHOLECALCIFEROL, PO   Yes No   Sig: Take 400 Units by mouth daily   acetaminophen (TYLENOL) 650 MG CR tablet 3/2/2017 at 0200  Yes Yes   Sig: Take 650 mg by mouth 2 times daily   alendronate (FOSAMAX) 70 MG tablet 3/1/2017 at 0800  No Yes   Sig: Take 1 tablet (70 mg) by mouth every 7 days Take with over 8 ounces water and stay upright for at least 30 minutes after dose.  Take at least 60 minutes before breakfast   apixaban ANTICOAGULANT (ELIQUIS) 2.5 MG tablet 3/1/2017 at 1600  No Yes   Sig: Take 1 tablet (2.5 mg) by mouth 2 times daily   ascorbic acid (VITAMIN C) 500 MG CPCR 3/1/2017 at 0800  Yes Yes   Sig: Take 500 mg by mouth daily   atorvastatin (LIPITOR) 40 MG tablet 3/1/2017 at 0800  No Yes   Sig: Take 1 tablet (40 mg) by mouth daily   calcium carbonate (OS-SHELIA 500 MG [place]. [place]) 500 MG tablet 3/1/2017 at 0800  Yes Yes   Sig: Take 600 mg by mouth daily   calcium carbonate (TUMS) 500 MG chewable tablet More than a month at Unknown time  Yes No   Sig: Take 1 chew tab by mouth every 4 hours as needed for heartburn   dofetilide (TIKOSYN) 125 MCG capsule 3/1/2017 at 1600  No Yes   Sig: Take 1 capsule (125 mcg) by mouth 2 times daily   furosemide (LASIX) 20 MG tablet 3/1/2017 at 0800  No Yes   Sig: Take 1 tablet (20 mg) by mouth daily Or as directed by cardiology   leflunomide (ARAVA) 10 MG tablet 3/1/2017 at 0800  No Yes   Sig: Take 1 tablet (10 mg) by mouth daily   lisinopril (PRINIVIL,ZESTRIL) 10 MG tablet 3/1/2017 at 1600  No Yes   Sig: Take 1 tablet (10 mg) by mouth 2 times daily   metoprolol (TOPROL-XL) 25 MG 24 hr tablet 3/1/2017 at 0800  No Yes   Sig: Take 1 tablet (25 mg) by mouth daily   predniSONE (DELTASONE) 5 MG tablet 3/1/2017 at 0800  No Yes   Sig: Take 1 tablet (5 mg) by mouth daily   spironolactone (ALDACTONE) 25  MG tablet 3/1/2017 at 0800  No Yes   Sig: Take 1 tablet (25 mg) by mouth daily   tamsulosin (FLOMAX) 0.4 MG 24 hr capsule   Yes No   Sig: Take 1 capsule (0.4 mg) by mouth 2 times daily   traMADol (ULTRAM) 50 MG tablet 3/2/2017 at 0200  No Yes   Sig: TAKE 1 TABLET 3 TIMES DAILY AS NEEDED FOR MODERATE PAIN      Facility-Administered Medications: None     Allergies   Allergies   Allergen Reactions     Amiodarone Other (See Comments)     Drop in DLCO     No Known Drug Allergies        Social History   I have reviewed this patient's social history and updated it with pertinent information if needed. Bud Merritt  reports that he quit smoking about 18 years ago. His smoking use included Cigarettes. He has a 7.50 pack-year smoking history. He has never used smokeless tobacco. He reports that he does not drink alcohol or use illicit drugs.    Family History   I have reviewed this patient's family history and updated it with pertinent information if needed.   Family History   Problem Relation Age of Onset     CANCER Mother      CANCER Son      Unknown/Adopted Father      Alcoholism Brother        Review of Systems   The 10 point Review of Systems is negative other than noted in the HPI or here.     Physical Exam   Temp: 100.6  F (38.1  C) Temp src: Oral BP: 129/70 Pulse: 97 Heart Rate: 97 Resp: 20 SpO2: 98 % O2 Device: Nasal cannula Oxygen Delivery: 2 LPM  Vital Signs with Ranges  Temp:  [98  F (36.7  C)-100.6  F (38.1  C)] 100.6  F (38.1  C)  Pulse:  [97] 97  Heart Rate:  [] 97  Resp:  [13-24] 20  BP: (109-149)/(62-89) 129/70  SpO2:  [77 %-99 %] 98 %  152 lbs 1.88 oz    EXAM:  Constitutional: alert, mild distress, cooperative and frail appearing. Mild dyspnea talks. Has an audible wheeze.  Cardiovascular: irregular rhythm, murmur of aortic stenosis.  Respiratory: diminished breath sounds with bilateral excitatory wheeze.  Psychiatric: mentation appears normal and affect normal/bright  Head: Normocephalic. No  masses, lesions, tenderness or abnormalities  Neck: Neck supple. No adenopathy. Thyroid symmetric, normal size,  ENT: ENT exam normal, no neck nodes or sinus tenderness  Abdomen: Abdomen soft, non-tender. BS normal. No masses, organomegaly  NEURO: nonfocal, moving arms and legs equally.  SKIN: scab lesions on right elbow. Skin is overall dry, multiple bruises on the arms.  LYMPH: Normal cervical lymph nodes  JOINT/EXTREMITIES: peripheral edema to the midcalf. He has rheumatoid arthritis changes involving his hands     Data   Data reviewed today:  I personally reviewed the EKG tracing showing Atrial fibrillation with right bundle branch block, no acute changes and the chest x-ray image(s) showing Enlarged heart border, no signs of infiltrate.    Recent Labs  Lab 03/02/17  0730   WBC 6.2   HGB 11.3*   MCV 99      INR 0.94      POTASSIUM 4.3   CHLORIDE 99   CO2 24   BUN 28   CR 0.97   ANIONGAP 13   SHELIA 8.1*   GLC 77       Recent Results (from the past 24 hour(s))   Chest  XR, 1 view portable    Narrative    XR CHEST PORT 1 VW 3/2/2017 12:58 PM    COMPARISON: 7/7/2016    HISTORY: Hypoxia      Impression    IMPRESSION: Enlarged cardiac silhouette is again seen, not  significantly changed. Mild bibasilar atelectasis. No significant  pleural effusion. No pneumothorax. No appreciable edema.    CAROL MEDRANO

## 2017-03-03 NOTE — PROGRESS NOTES
S-(situation): Patient changed to inpatient status    B-(background): Patient status change due to observation goals not being met. Positive influenza A, rapid response due to respiratory status    A-(assessment): patient alert and oriented, lungs course with some expiratory wheezing. He is on BiPAP fio2 80%. Blood pressure boarderline on levophed at 0.4 mcg/kg/min. He is receiving IV fluid boluses to increase blood pressure. Parrish placed for retention and frequent I and O.     R-(recommendations):  Wean oxygen as able, titrate levophed if able to maintain map >65. Will monitor patient per MD orders. yes      Inpatient nursing criteria listed below were met:    Adult Profile completedYes  Health care directives status obtained and documented: Yes  Core Measures assessed (Stroke/TIA, Acute MI, Pneumonia and SSI): Yes  VTE prophylaxis orders: anticoagulant  (FYI: Asprin is not an approved anticoagulation for DVT prophylaxis)  SCD's Documented: Yes  Vaccine assessment done and vaccines ordered if appropriate. Yes  MRSA swab completed for patient 55 years and older (exclude SALIMA and TKA): Yes  My Chart patient sign up addressed and documented: not able at this time due to patient condition  Care Plan initiated: Yes  Discharge planning review completed (admission navigator) Yes

## 2017-03-03 NOTE — PLAN OF CARE
Problem: Goal Outcome Summary  Goal: Goal Outcome Summary  Outcome: No Change  Patient blood pressure improving weaning levophed to keep blood pressure map > 65, BiPAP over night tolerated well. Will remove and place back on nasal cannula . Lungs course with expiratory wheezes. Tylenol for a head ache and urine output increasing at this time. Vitals stable.

## 2017-03-03 NOTE — PROGRESS NOTES
Patient was feeling worse this evening. Just diagnosed with influenza a complaint of diffuse body aches and shortness of breath. Nurses describe that he looked somewhat gray. Noted to be an more rapid rhythm with heart rate in the one 60s, atrial fibrillation with RVR. Oxygenation was dropping despite increasing oxygen through nasal cannula. A rapid response was called. IV bolus was given. ER physician ordered 25 mg of diltiazem. This slowed the heart rate, but blood pressure dropped with systolic in the 50s. Because of hypotension likely due to Cardizem bolus combined with influenza with secondary SI RS, and increasing respiratory distress with hypoxemia, patient was transferred to the ICU. Non-invasive mechanical ventilation was instituted with improvement in his oxygen saturation's. Blood pressure despite bolus remained low with heart rate in the low 100s. Levophed infusion was ordered. Continue IV fluids, monitor pressures. If not improving with low-dose levophed, will likely need to place a central line to better decide on the amount of fluid necessary. Tamiflu regionally dosed has been ordered. His oral antiarrhythmic, Tycosin, has arrived and will give this when able. Critical care time with patient was 40 minutes.    Electronically signed by DEIRDRE Teran on March 2, 2017

## 2017-03-04 ENCOUNTER — HOSPITAL ENCOUNTER (INPATIENT)
Facility: CLINIC | Age: 82
LOS: 10 days | Discharge: HOME HEALTH | DRG: 227 | End: 2017-03-14
Attending: INTERNAL MEDICINE | Admitting: INTERNAL MEDICINE
Payer: MEDICARE

## 2017-03-04 VITALS
WEIGHT: 152.12 LBS | HEIGHT: 65 IN | SYSTOLIC BLOOD PRESSURE: 126 MMHG | RESPIRATION RATE: 22 BRPM | TEMPERATURE: 97.7 F | BODY MASS INDEX: 25.34 KG/M2 | OXYGEN SATURATION: 93 % | HEART RATE: 95 BPM | DIASTOLIC BLOOD PRESSURE: 89 MMHG

## 2017-03-04 DIAGNOSIS — R53.81 PHYSICAL DECONDITIONING: ICD-10-CM

## 2017-03-04 DIAGNOSIS — M05.79 RHEUMATOID ARTHRITIS INVOLVING MULTIPLE SITES WITH POSITIVE RHEUMATOID FACTOR (H): ICD-10-CM

## 2017-03-04 DIAGNOSIS — K21.9 GASTROESOPHAGEAL REFLUX DISEASE WITHOUT ESOPHAGITIS: ICD-10-CM

## 2017-03-04 DIAGNOSIS — J44.1 COPD EXACERBATION (H): ICD-10-CM

## 2017-03-04 DIAGNOSIS — I10 ESSENTIAL HYPERTENSION WITH GOAL BLOOD PRESSURE LESS THAN 140/90: ICD-10-CM

## 2017-03-04 DIAGNOSIS — I48.0 PAROXYSMAL ATRIAL FIBRILLATION (H): ICD-10-CM

## 2017-03-04 DIAGNOSIS — M81.0 OSTEOPOROSIS: ICD-10-CM

## 2017-03-04 DIAGNOSIS — Z95.810 CARDIAC RESYNCHRONIZATION THERAPY DEFIBRILLATOR (CRT-D) IN PLACE: Primary | ICD-10-CM

## 2017-03-04 PROBLEM — I95.2 HYPOTENSION DUE TO DRUGS: Status: RESOLVED | Noted: 2017-03-02 | Resolved: 2017-03-04

## 2017-03-04 PROBLEM — I48.91 ATRIAL FIBRILLATION (H): Status: ACTIVE | Noted: 2017-03-04

## 2017-03-04 LAB
ANION GAP SERPL CALCULATED.3IONS-SCNC: 13 MMOL/L (ref 3–14)
BACTERIA SPEC CULT: ABNORMAL
BACTERIA SPEC CULT: NORMAL
BASOPHILS # BLD AUTO: 0 10E9/L (ref 0–0.2)
BASOPHILS NFR BLD AUTO: 0 %
BUN SERPL-MCNC: 24 MG/DL (ref 7–30)
CALCIUM SERPL-MCNC: 7 MG/DL (ref 8.5–10.1)
CHLORIDE SERPL-SCNC: 108 MMOL/L (ref 94–109)
CO2 SERPL-SCNC: 20 MMOL/L (ref 20–32)
CREAT SERPL-MCNC: 0.88 MG/DL (ref 0.66–1.25)
DIFFERENTIAL METHOD BLD: ABNORMAL
EOSINOPHIL # BLD AUTO: 0 10E9/L (ref 0–0.7)
EOSINOPHIL NFR BLD AUTO: 0.3 %
ERYTHROCYTE [DISTWIDTH] IN BLOOD BY AUTOMATED COUNT: 14.2 % (ref 10–15)
GFR SERPL CREATININE-BSD FRML MDRD: 81 ML/MIN/1.7M2
GLUCOSE SERPL-MCNC: 139 MG/DL (ref 70–99)
HCT VFR BLD AUTO: 32.3 % (ref 40–53)
HGB BLD-MCNC: 10.4 G/DL (ref 13.3–17.7)
IMM GRANULOCYTES # BLD: 0 10E9/L (ref 0–0.4)
IMM GRANULOCYTES NFR BLD: 0.3 %
LACTATE BLD-SCNC: 1.9 MMOL/L (ref 0.7–2.1)
LYMPHOCYTES # BLD AUTO: 0.5 10E9/L (ref 0.8–5.3)
LYMPHOCYTES NFR BLD AUTO: 7.3 %
MCH RBC QN AUTO: 31.7 PG (ref 26.5–33)
MCHC RBC AUTO-ENTMCNC: 32.2 G/DL (ref 31.5–36.5)
MCV RBC AUTO: 99 FL (ref 78–100)
MICRO REPORT STATUS: ABNORMAL
MICRO REPORT STATUS: NORMAL
MICROORGANISM SPEC CULT: ABNORMAL
MONOCYTES # BLD AUTO: 0.3 10E9/L (ref 0–1.3)
MONOCYTES NFR BLD AUTO: 4.9 %
NEUTROPHILS # BLD AUTO: 5.8 10E9/L (ref 1.6–8.3)
NEUTROPHILS NFR BLD AUTO: 87.2 %
PLATELET # BLD AUTO: 190 10E9/L (ref 150–450)
POTASSIUM SERPL-SCNC: 4 MMOL/L (ref 3.4–5.3)
RBC # BLD AUTO: 3.28 10E12/L (ref 4.4–5.9)
SODIUM SERPL-SCNC: 141 MMOL/L (ref 133–144)
SPECIMEN SOURCE: ABNORMAL
SPECIMEN SOURCE: NORMAL
WBC # BLD AUTO: 6.7 10E9/L (ref 4–11)

## 2017-03-04 PROCEDURE — 93005 ELECTROCARDIOGRAM TRACING: CPT

## 2017-03-04 PROCEDURE — 99239 HOSP IP/OBS DSCHRG MGMT >30: CPT | Performed by: FAMILY MEDICINE

## 2017-03-04 PROCEDURE — 94640 AIRWAY INHALATION TREATMENT: CPT

## 2017-03-04 PROCEDURE — 25000125 ZZHC RX 250: Performed by: FAMILY MEDICINE

## 2017-03-04 PROCEDURE — A9270 NON-COVERED ITEM OR SERVICE: HCPCS | Mod: GY | Performed by: FAMILY MEDICINE

## 2017-03-04 PROCEDURE — 93010 ELECTROCARDIOGRAM REPORT: CPT | Performed by: INTERNAL MEDICINE

## 2017-03-04 PROCEDURE — 25000128 H RX IP 250 OP 636: Performed by: FAMILY MEDICINE

## 2017-03-04 PROCEDURE — 36415 COLL VENOUS BLD VENIPUNCTURE: CPT | Performed by: FAMILY MEDICINE

## 2017-03-04 PROCEDURE — 21400003 ZZH R&B CCU CRITICAL UMMC

## 2017-03-04 PROCEDURE — 83605 ASSAY OF LACTIC ACID: CPT | Performed by: FAMILY MEDICINE

## 2017-03-04 PROCEDURE — 25000132 ZZH RX MED GY IP 250 OP 250 PS 637: Mod: GY | Performed by: FAMILY MEDICINE

## 2017-03-04 PROCEDURE — 80048 BASIC METABOLIC PNL TOTAL CA: CPT | Performed by: FAMILY MEDICINE

## 2017-03-04 PROCEDURE — 85025 COMPLETE CBC W/AUTO DIFF WBC: CPT | Performed by: FAMILY MEDICINE

## 2017-03-04 RX ORDER — SPIRONOLACTONE 25 MG/1
25 TABLET ORAL DAILY
Status: DISCONTINUED | OUTPATIENT
Start: 2017-03-05 | End: 2017-03-14 | Stop reason: HOSPADM

## 2017-03-04 RX ORDER — LIDOCAINE 40 MG/G
CREAM TOPICAL
Status: DISCONTINUED | OUTPATIENT
Start: 2017-03-04 | End: 2017-03-11

## 2017-03-04 RX ORDER — METOPROLOL SUCCINATE 25 MG/1
25 TABLET, EXTENDED RELEASE ORAL DAILY
Status: DISCONTINUED | OUTPATIENT
Start: 2017-03-05 | End: 2017-03-07

## 2017-03-04 RX ORDER — LEFLUNOMIDE 10 MG/1
10 TABLET ORAL DAILY
Status: DISCONTINUED | OUTPATIENT
Start: 2017-03-05 | End: 2017-03-14 | Stop reason: HOSPADM

## 2017-03-04 RX ORDER — SENNOSIDES 8.6 MG
650 CAPSULE ORAL 2 TIMES DAILY
Status: DISCONTINUED | OUTPATIENT
Start: 2017-03-04 | End: 2017-03-05 | Stop reason: DRUGHIGH

## 2017-03-04 RX ORDER — ALENDRONATE SODIUM 70 MG/1
70 TABLET ORAL
Status: DISCONTINUED | OUTPATIENT
Start: 2017-03-04 | End: 2017-03-05

## 2017-03-04 RX ORDER — CEFDINIR 300 MG/1
300 CAPSULE ORAL 2 TIMES DAILY
Status: DISCONTINUED | OUTPATIENT
Start: 2017-03-04 | End: 2017-03-04 | Stop reason: HOSPADM

## 2017-03-04 RX ORDER — PREDNISONE 5 MG/1
5 TABLET ORAL DAILY
Status: DISCONTINUED | OUTPATIENT
Start: 2017-03-05 | End: 2017-03-05 | Stop reason: DRUGHIGH

## 2017-03-04 RX ORDER — OSELTAMIVIR PHOSPHATE 30 MG/1
30 CAPSULE ORAL 2 TIMES DAILY
Qty: 50 CAPSULE | Status: ON HOLD | COMMUNITY
Start: 2017-03-04 | End: 2017-03-14

## 2017-03-04 RX ORDER — FUROSEMIDE 10 MG/ML
20 INJECTION INTRAMUSCULAR; INTRAVENOUS ONCE
Status: COMPLETED | OUTPATIENT
Start: 2017-03-05 | End: 2017-03-05

## 2017-03-04 RX ORDER — CALCIUM CARBONATE 500 MG/1
500 TABLET, CHEWABLE ORAL EVERY 4 HOURS PRN
Status: DISCONTINUED | OUTPATIENT
Start: 2017-03-04 | End: 2017-03-14 | Stop reason: HOSPADM

## 2017-03-04 RX ORDER — ALBUTEROL SULFATE 0.83 MG/ML
3 SOLUTION RESPIRATORY (INHALATION) EVERY 4 HOURS PRN
Status: DISCONTINUED | OUTPATIENT
Start: 2017-03-04 | End: 2017-03-09 | Stop reason: CLARIF

## 2017-03-04 RX ORDER — PREDNISONE 20 MG/1
40 TABLET ORAL DAILY
Status: COMPLETED | OUTPATIENT
Start: 2017-03-05 | End: 2017-03-06

## 2017-03-04 RX ORDER — NALOXONE HYDROCHLORIDE 0.4 MG/ML
.1-.4 INJECTION, SOLUTION INTRAMUSCULAR; INTRAVENOUS; SUBCUTANEOUS
Status: DISCONTINUED | OUTPATIENT
Start: 2017-03-04 | End: 2017-03-05

## 2017-03-04 RX ORDER — TRAMADOL HYDROCHLORIDE 50 MG/1
50 TABLET ORAL 3 TIMES DAILY PRN
Status: DISCONTINUED | OUTPATIENT
Start: 2017-03-04 | End: 2017-03-05

## 2017-03-04 RX ORDER — LISINOPRIL 10 MG/1
10 TABLET ORAL 2 TIMES DAILY
Status: DISCONTINUED | OUTPATIENT
Start: 2017-03-05 | End: 2017-03-14 | Stop reason: HOSPADM

## 2017-03-04 RX ORDER — DOFETILIDE 0.12 MG/1
125 CAPSULE ORAL 2 TIMES DAILY
Status: DISCONTINUED | OUTPATIENT
Start: 2017-03-04 | End: 2017-03-14 | Stop reason: HOSPADM

## 2017-03-04 RX ORDER — NALOXONE HYDROCHLORIDE 0.4 MG/ML
.1-.4 INJECTION, SOLUTION INTRAMUSCULAR; INTRAVENOUS; SUBCUTANEOUS
Status: DISCONTINUED | OUTPATIENT
Start: 2017-03-04 | End: 2017-03-11

## 2017-03-04 RX ORDER — ASCORBIC ACID 500 MG
500 TABLET ORAL DAILY
Status: DISCONTINUED | OUTPATIENT
Start: 2017-03-05 | End: 2017-03-09

## 2017-03-04 RX ORDER — TAMSULOSIN HYDROCHLORIDE 0.4 MG/1
0.4 CAPSULE ORAL 2 TIMES DAILY
Status: DISCONTINUED | OUTPATIENT
Start: 2017-03-05 | End: 2017-03-14 | Stop reason: HOSPADM

## 2017-03-04 RX ORDER — CEFDINIR 300 MG/1
300 CAPSULE ORAL 2 TIMES DAILY
Status: DISCONTINUED | OUTPATIENT
Start: 2017-03-05 | End: 2017-03-07

## 2017-03-04 RX ORDER — ATORVASTATIN CALCIUM 40 MG/1
40 TABLET, FILM COATED ORAL DAILY
Status: DISCONTINUED | OUTPATIENT
Start: 2017-03-05 | End: 2017-03-14 | Stop reason: HOSPADM

## 2017-03-04 RX ORDER — DILTIAZEM HYDROCHLORIDE 5 MG/ML
15 INJECTION INTRAVENOUS ONCE
Status: COMPLETED | OUTPATIENT
Start: 2017-03-04 | End: 2017-03-04

## 2017-03-04 RX ORDER — CEFDINIR 300 MG/1
300 CAPSULE ORAL 2 TIMES DAILY
Refills: 0 | Status: ON HOLD | COMMUNITY
Start: 2017-03-04 | End: 2017-03-14

## 2017-03-04 RX ORDER — CALCIUM CARBONATE 500(1250)
625 TABLET ORAL DAILY
Status: DISCONTINUED | OUTPATIENT
Start: 2017-03-05 | End: 2017-03-05

## 2017-03-04 RX ORDER — AMOXICILLIN 250 MG
1-2 CAPSULE ORAL 2 TIMES DAILY
Status: DISCONTINUED | OUTPATIENT
Start: 2017-03-04 | End: 2017-03-14 | Stop reason: HOSPADM

## 2017-03-04 RX ORDER — CEFDINIR 250 MG/5ML
300 POWDER, FOR SUSPENSION ORAL 2 TIMES DAILY
Status: DISCONTINUED | OUTPATIENT
Start: 2017-03-04 | End: 2017-03-04

## 2017-03-04 RX ORDER — IPRATROPIUM BROMIDE AND ALBUTEROL SULFATE 2.5; .5 MG/3ML; MG/3ML
3 SOLUTION RESPIRATORY (INHALATION) EVERY 4 HOURS
Status: DISCONTINUED | OUTPATIENT
Start: 2017-03-05 | End: 2017-03-05

## 2017-03-04 RX ORDER — OSELTAMIVIR PHOSPHATE 30 MG/1
30 CAPSULE ORAL 2 TIMES DAILY
Status: DISCONTINUED | OUTPATIENT
Start: 2017-03-05 | End: 2017-03-09

## 2017-03-04 RX ADMIN — SODIUM CHLORIDE 500 ML: 9 INJECTION, SOLUTION INTRAVENOUS at 08:27

## 2017-03-04 RX ADMIN — PREDNISONE 10 MG: 10 TABLET ORAL at 18:38

## 2017-03-04 RX ADMIN — TRAMADOL HYDROCHLORIDE 50 MG: 50 TABLET, COATED ORAL at 14:50

## 2017-03-04 RX ADMIN — IPRATROPIUM BROMIDE AND ALBUTEROL SULFATE 3 ML: .5; 3 SOLUTION RESPIRATORY (INHALATION) at 00:14

## 2017-03-04 RX ADMIN — DILTIAZEM HYDROCHLORIDE 15 MG: 5 INJECTION INTRAVENOUS at 07:01

## 2017-03-04 RX ADMIN — DOFETILIDE 125 MCG: 0.12 CAPSULE ORAL at 10:17

## 2017-03-04 RX ADMIN — HYDROCODONE BITARTRATE AND ACETAMINOPHEN 1 TABLET: 5; 325 TABLET ORAL at 06:27

## 2017-03-04 RX ADMIN — TRAMADOL HYDROCHLORIDE 50 MG: 50 TABLET, COATED ORAL at 09:53

## 2017-03-04 RX ADMIN — CEFDINIR 300 MG: 300 CAPSULE ORAL at 10:40

## 2017-03-04 RX ADMIN — PREDNISONE 10 MG: 10 TABLET ORAL at 10:16

## 2017-03-04 RX ADMIN — OSELTAMIVIR PHOSPHATE 30 MG: 30 CAPSULE ORAL at 10:16

## 2017-03-04 RX ADMIN — DILTIAZEM HYDROCHLORIDE 5 MG/HR: 5 INJECTION INTRAVENOUS at 08:06

## 2017-03-04 RX ADMIN — HYDROMORPHONE HYDROCHLORIDE 0.3 MG: 10 INJECTION, SOLUTION INTRAMUSCULAR; INTRAVENOUS; SUBCUTANEOUS at 08:13

## 2017-03-04 RX ADMIN — METOPROLOL SUCCINATE 12.5 MG: 25 TABLET, FILM COATED, EXTENDED RELEASE ORAL at 10:15

## 2017-03-04 RX ADMIN — APIXABAN 2.5 MG: 2.5 TABLET, FILM COATED ORAL at 10:16

## 2017-03-04 RX ADMIN — OSELTAMIVIR PHOSPHATE 30 MG: 30 CAPSULE ORAL at 00:00

## 2017-03-04 RX ADMIN — DILTIAZEM HYDROCHLORIDE 15 MG/HR: 5 INJECTION INTRAVENOUS at 15:34

## 2017-03-04 RX ADMIN — CEPHALEXIN 250 MG: 250 CAPSULE ORAL at 06:17

## 2017-03-04 RX ADMIN — IBUPROFEN 400 MG: 400 TABLET ORAL at 00:23

## 2017-03-04 RX ADMIN — IPRATROPIUM BROMIDE AND ALBUTEROL SULFATE 3 ML: .5; 3 SOLUTION RESPIRATORY (INHALATION) at 07:56

## 2017-03-04 NOTE — PROGRESS NOTES
Sudden onset of SOB, chest pain. Came on while standing, and noted to be in rapid rhythm. Appears on EKG to be atrial fib with RVR, rate 125-140. Pressures somewhat low with MAP at 72. Was given IV bolus of diltiazem. 15 mg with prompt drop in rate to 80, with improved pressures. Prior to RVR, was in sinus with rate at 60.  Will continue to follow, Electronically signed by DEIRDRE Teran on March 3, 2017

## 2017-03-04 NOTE — LETTER
Transition Communication Hand-off for Care Transitions to Next Level of Care Provider    Name: Bud Merritt  MRN #: [id number]  Primary Care Provider: Camron Aragon     Primary Clinic: [facility] [address] [place] [address]     Reason for Hospitalization:  atrial fibrilation  rapid ventricular response  Atrial fibrillation (H)  Admit Date/Time: 3/4/2017  9:18 PM  Discharge Date: 3/14/17  Payor Source: Payor: BCBS / Plan: BCBS PLATINUM BLUE / Product Type: PPO /     Readmission Assessment Measure (ORIANA) Risk Score/category: High         Reason for Communication Hand-off Referral: Other High risk readmission due to fragility and not safe to dc home.    Discharge Plan:       Concern for non-adherence with plan of care:   Y/N Y - pt refusing safer levels of care/services.  Discharge Needs Assessment:  Needs       Most Recent Value    Equipment Currently Used at Home cane, straight, walker, rolling    Transportation Available family or friend will provide    Home Care [facility] [phone], Fax: [phone]          Already enrolled in Tele-monitoring program and name of program:  Unknown, would benefit from frequent follow ups.  Follow-up specialty is recommended: Yes    Follow-up plan:  Future Appointments  Date Time Provider Department Center   3/14/2017 7:00 PM Tomas Kat, PT [facility]   3/15/2017 4:00 AM Deloris Parra, OT [facility]   3/28/2017 8:40 AM Fide Medina PA-C [facility]   4/5/2017 10:00 AM DEVICE CHECK RN CARD [facility]   4/5/2017 10:30 AM Khoa Li MD [facility]       Any outstanding tests or procedures:        Referrals     Future Labs/Procedures    Home care nursing referral     Comments:    [facility]   Phone: [phone]   Fax: [phone]    For Palliative Care Program.  RN ezequiel post hospitalization.   Assess: vital signs, respiratory and cardiac status, activity  tolerance, hydration, nutritional status.   Med set up and management.  HHA for assist with ADL's.   PT/OT eval and treat for deconditioning, strengthening, and endurance.   SW for referral to community resources.     Your provider has ordered home care nursing services. If you have not been contacted within 2 days of your discharge please call the inpatient department phone number at [phone] .    Home Care OT Referral for Hospital Discharge     Comments:    OT to eval and treat    Your provider has ordered home care - occupational therapy. If you have not been contacted within 2 days of your discharge please call the department phone number listed on the top of this document.    Home Care PT Referral for Hospital Discharge     Comments:    PT to eval and treat    Your provider has ordered home care - physical therapy. If you have not been contacted within 2 days of your discharge please call the department phone number listed on the top of this document.    Medication Therapy Management Referral     Comments:    Reason for referral:  on more than 5 medications and managing chronic disease and ED or hospital visit in last 6 months    This service is designed to help you get the most from your medications.  A specially trained pharmacist will work closely with you and your doctors  to solve any problems related to your medications and to help you get the   best results from taking them.      The Medication Therapy Management staff will call you to schedule an appointment.    Occupational Therapy Referral     Comments:    Lymphedema wraps    Physical Therapy Referral     Comments:    *This therapy referral will be filtered to a centralized scheduling office at [facility] and the patient will receive a call to schedule an appointment at a [place] location most convenient for them. *     [facility] provides Physical Therapy evaluation and treatment and many specialty  services across the [facility] system.  If requesting a specialty program, please choose from the list below.    If you have not heard from the scheduling office within 2 business days, please call [phone] for all locations, with the exception of [place], please call [phone].  Treatment: Evaluation & Treatment  Special Instructions/Modalities: As indicated  Special Programs: As indicated    Please be aware that coverage of these services is subject to the terms and limitations of your health insurance plan.  Call member services at your health plan with any benefit or coverage questions.      **Note to Provider:  If you are referring outside of [facility] for the therapy appointment, please list the name of the location in the  special instructions  above, print the referral and give to the patient to schedule the appointment.            Key Recommendations:      Lyudmila Fan    AVS/Discharge Summary is the source of truth; this is a helpful guide for improved communication of patient story

## 2017-03-04 NOTE — IP AVS SNAPSHOT
[address] [address]    Phone:  [phone]                                       After Visit Summary   3/4/2017    Bud Merritt    MRN: [id number]           After Visit Summary Signature Page     I have received my discharge instructions, and my questions have been answered. I have discussed any challenges I see with this plan with the nurse or doctor.    ..........................................................................................................................................  Patient/Patient Representative Signature      ..........................................................................................................................................  Patient Representative Print Name and Relationship to Patient    ..................................................               ................................................  Date                                            Time    ..........................................................................................................................................  Reviewed by Signature/Title    ...................................................              ..............................................  Date                                                            Time

## 2017-03-04 NOTE — DISCHARGE SUMMARY
[facility] Discharge Summary    Bud Merritt MRN# [id number]   Age: 89 year old YOB: 1927     Date of Admission:  3/2/2017  Date of Discharge::  3/4/2017  Admitting Physician:  Cullen Teran MD  Discharge Physician:  Sejal Perez MD    Home clinic: [facility]          Admission Diagnoses:   Nausea [R11.0]  Paroxysmal atrial fibrillation (H) [I48.0]  Diarrhea, unspecified type [R19.7]          Discharge Diagnosis:   Principal Problem:    Atrial fibrillation with rapid ventricular response (H)    Assessment: Patient with a history of paroxysmal atrial fibrillation as well as bradycardia arrhythmias, supraventricular tachycardia, currently undergoing evaluation as an outpatient for an EP specialist and had an event monitor in place prior to this hospitalization. Several times during this hospitalization patient has had episodes of paroxysmal atrial fibrillation that has converted following IV Cardizem in addition to continuation of home regimen which includes metoprolol XL 12.5 mg daily and Tikosyn 125 mcg BID.  This morning patient has once more gone into atrial fibrillation with rapid ventricular rate in the 170-180 range, during which he was severely hypotensive and symptomatic. He was given a Cardizem bolus which did not improve his heart rate or blood pressures and patient remained in atrial fib. He was then initiated on Cardizem drip and titrated more rapidly up to maximum dosing. Of note patient has remained in atrial fibrillation since onset at approximate 6:30 this morning, however his heart rate will fluctuate wildly from being in the 50s to being in the 170s, sometimes over a matter of 10 minutes. When the patient's heart rate is low or within the normal range he is asymptomatic and blood pressures are stable, however when the heart rate increases into the 150-170ss, patient is once more hypotensive with blood pressures in the 60s to 70s systolic  and feels symptomatic with headache, facial flushing, and feelings of being unwell.    Plan:  Did discuss with Dr. Vicky Dai, cardiologist on-call for the [facility]. Given patient's allergy to amiodarone as well as current use of an antiarrhythmic, beta blocker, and max dosing on diltiazem drip for calcium channel blocker, it is felt the best course of action would be to transfer the patient to the [facility] for ongoing evaluation and management. Patient is discharged in stable condition. Of note, as it did take several hours for an appropriate that to be found, patient was continued on the current therapy. He did have slight improvement in his stabilization of heart rate in the 2 hours prior to transfer with fewer heart rates in the 140-160 range and no hypotension noted.    Active Problems:    Acute cystitis without hematuria    Assessment:  Urine culture has grown out Citrobacter    Plan:  continue with Omnicef during time of transfer. Ongoing management per accepting providers recommendations      Influenza A    Assessment:  present at time of admission, causing respiratory failure during initial hospitalization requiring BiPAP.. Patient has been progressively improving and was on 2 L of nasal cannula oxygen throughout the day. Of note, patient likely could've been titrated downward further, however given his fluctuating heart rate decision was made to keep patient on the 2 L nasal cannula for cardiac support.    Plan:  continue with with Tamiflu and O2 supplementation at time of transfer      Acute respiratory failure with hypoxia (H)    Assessment:  secondary to influenza A, progressively improving    Plan:  discharge with plan as above      Mitral regurgitation and aortic stenosis - both moderate by echo 12/2015    Assessment:   Chronic    Plan:  ongoing management per accepting providers recommendations      Paroxysmal atrial fibrillation (H); history of deena  arrhythmias and paroxysmal atrial fibrillation    Assessment:  complicated by persistent A. fib as above    Plan:  proceed with discharge plan as above      Diarrhea    Assessment:  present prior to hospitalization, however patient has not had any bowel movements except for one formed bowel movement since his hospitalization began    Plan:  ongoing monitoring but no further investigation is needed at this time      Weakness generalized    Assessment:  likely secondary to acute complications as above    Plan:  patient will need evaluation by physical therapy once his further stabilized and improved      Cough    Assessment:  likely secondary to influenza    Plan:  continue treatment above without change      COPD exacerbation (H)    Assessment:  Suspected initially prior to influenza diagnosis. Patient did receive stress dose response steroids for 3 days    Plan:  ongoing management per accepting providers recommendations      Rheumatoid arthritis involving multiple sites with positive rheumatoid factor (H)    Assessment:  on prednisone 5 mg daily, patient has received stress dose response steroids for the past 3 days     Plan:  ongoing management per accepting providers recommendations      Hyperlipidemia LDL goal <130    Assessment:  on Lipitor as an outpatient, has been held during this hospitalization    Plan: Ongoing management per accepting providers recommendations        Procedures:   No procedures performed during this admission          Medications Prior to Admission:     Prescriptions Prior to Admission   Medication Sig Dispense Refill Last Dose     predniSONE (DELTASONE) 5 MG tablet Take 1 tablet (5 mg) by mouth daily 100 tablet 4 3/1/2017 at 0800     traMADol (ULTRAM) 50 MG tablet TAKE 1 TABLET 3 TIMES DAILY AS NEEDED FOR MODERATE PAIN 90 tablet 0 3/2/2017 at 0200     dofetilide (TIKOSYN) 125 MCG capsule Take 1 capsule (125 mcg) by mouth 2 times daily 60 capsule 5 3/1/2017 at 1600     furosemide (LASIX)  20 MG tablet Take 1 tablet (20 mg) by mouth daily Or as directed by cardiology 18 tablet 03 3/1/2017 at 0800     apixaban ANTICOAGULANT (ELIQUIS) 2.5 MG tablet Take 1 tablet (2.5 mg) by mouth 2 times daily 180 tablet 3 3/1/2017 at 1600     leflunomide (ARAVA) 10 MG tablet Take 1 tablet (10 mg) by mouth daily 30 tablet 11 3/1/2017 at 0800     lisinopril (PRINIVIL,ZESTRIL) 10 MG tablet Take 1 tablet (10 mg) by mouth 2 times daily 62 tablet 11 3/1/2017 at 1600     spironolactone (ALDACTONE) 25 MG tablet Take 1 tablet (25 mg) by mouth daily 30 tablet 11 3/1/2017 at 0800     acetaminophen (TYLENOL) 650 MG CR tablet Take 650 mg by mouth 2 times daily   3/2/2017 at 0200     metoprolol (TOPROL-XL) 25 MG 24 hr tablet Take 1 tablet (25 mg) by mouth daily 90 tablet 3 3/1/2017 at 0800     atorvastatin (LIPITOR) 40 MG tablet Take 1 tablet (40 mg) by mouth daily 90 tablet 3 3/1/2017 at 0800     alendronate (FOSAMAX) 70 MG tablet Take 1 tablet (70 mg) by mouth every 7 days Take with over 8 ounces water and stay upright for at least 30 minutes after dose.  Take at least 60 minutes before breakfast 12 tablet 3 3/1/2017 at 0800     ascorbic acid (VITAMIN C) 500 MG CPCR Take 500 mg by mouth daily   3/1/2017 at 0800     calcium carbonate (OS-SHELIA 500 MG [place]. [place]) 500 MG tablet Take 600 mg by mouth daily   3/1/2017 at 0800     Leuprolide Acetate (LUPRON DEPOT IM) Inject into the muscle every 4 months   Unknown at Unknown time     calcium carbonate (TUMS) 500 MG chewable tablet Take 1 chew tab by mouth every 4 hours as needed for heartburn   More than a month at Unknown time     tamsulosin (FLOMAX) 0.4 MG 24 hr capsule Take 1 capsule (0.4 mg) by mouth 2 times daily 60 capsule  Taking     VITAMIN D, CHOLECALCIFEROL, PO Take 400 Units by mouth daily   Taking             Discharge Medications:     Current Discharge Medication List      START taking these medications    Details   oseltamivir (TAMIFLU) 30 MG capsule Take 1 capsule (30 mg) by  mouth 2 times daily  Qty: 50 capsule      diltiazem (CARDIZEM) 125 mg in NaCl 125 mL infusion Inject 5-15 mg/hr into the vein continuous      cefdinir (OMNICEF) 300 MG capsule Take 1 capsule (300 mg) by mouth 2 times daily  Refills: 0         CONTINUE these medications which have NOT CHANGED    Details   predniSONE (DELTASONE) 5 MG tablet Take 1 tablet (5 mg) by mouth daily  Qty: 100 tablet, Refills: 4    Associated Diagnoses: Osteopenia      traMADol (ULTRAM) 50 MG tablet TAKE 1 TABLET 3 TIMES DAILY AS NEEDED FOR MODERATE PAIN  Qty: 90 tablet, Refills: 0    Associated Diagnoses: Rheumatoid arthritis involving multiple sites, unspecified rheumatoid factor presence (H)      dofetilide (TIKOSYN) 125 MCG capsule Take 1 capsule (125 mcg) by mouth 2 times daily  Qty: 60 capsule, Refills: 5    Associated Diagnoses: Paroxysmal atrial fibrillation (H)      furosemide (LASIX) 20 MG tablet Take 1 tablet (20 mg) by mouth daily Or as directed by cardiology  Qty: 18 tablet, Refills: 03    Associated Diagnoses: Acute on chronic systolic congestive heart failure (H)      apixaban ANTICOAGULANT (ELIQUIS) 2.5 MG tablet Take 1 tablet (2.5 mg) by mouth 2 times daily  Qty: 180 tablet, Refills: 3    Associated Diagnoses: Paroxysmal atrial fibrillation (H)      leflunomide (ARAVA) 10 MG tablet Take 1 tablet (10 mg) by mouth daily  Qty: 30 tablet, Refills: 11    Associated Diagnoses: Rheumatoid arthritis involving multiple sites with positive rheumatoid factor (H)      lisinopril (PRINIVIL,ZESTRIL) 10 MG tablet Take 1 tablet (10 mg) by mouth 2 times daily  Qty: 62 tablet, Refills: 11    Associated Diagnoses: Acute on chronic systolic congestive heart failure (H)      spironolactone (ALDACTONE) 25 MG tablet Take 1 tablet (25 mg) by mouth daily  Qty: 30 tablet, Refills: 11    Associated Diagnoses: Cardiomyopathy, nonischemic (H)      acetaminophen (TYLENOL) 650 MG CR tablet Take 650 mg by mouth 2 times daily      metoprolol (TOPROL-XL) 25  MG 24 hr tablet Take 1 tablet (25 mg) by mouth daily  Qty: 90 tablet, Refills: 3    Associated Diagnoses: Atherosclerosis of native coronary artery of native heart without angina pectoris      atorvastatin (LIPITOR) 40 MG tablet Take 1 tablet (40 mg) by mouth daily  Qty: 90 tablet, Refills: 3    Associated Diagnoses: Atherosclerosis of native coronary artery of native heart without angina pectoris      alendronate (FOSAMAX) 70 MG tablet Take 1 tablet (70 mg) by mouth every 7 days Take with over 8 ounces water and stay upright for at least 30 minutes after dose.  Take at least 60 minutes before breakfast  Qty: 12 tablet, Refills: 3    Associated Diagnoses: Osteopenia      ascorbic acid (VITAMIN C) 500 MG CPCR Take 500 mg by mouth daily      calcium carbonate (OS-SHELIA 500 MG [place]. [place]) 500 MG tablet Take 600 mg by mouth daily      Leuprolide Acetate (LUPRON DEPOT IM) Inject into the muscle every 4 months      calcium carbonate (TUMS) 500 MG chewable tablet Take 1 chew tab by mouth every 4 hours as needed for heartburn      tamsulosin (FLOMAX) 0.4 MG 24 hr capsule Take 1 capsule (0.4 mg) by mouth 2 times daily  Qty: 60 capsule      VITAMIN D, CHOLECALCIFEROL, PO Take 400 Units by mouth daily                   Consultations:     Consultation during this admission received from cardiology - Dr. Vicky Dai has accepted the patient in transfer          Brief History of Illness:    Patient is a 89-year-old gentleman who presented to the emergency room initially with complaint of increased weakness as well as concern for nausea and vomiting for the past 3 days prior to presentation. In the emergency room he was found to have tight wheezy lung sounds but no pneumonia or other bacterial infection on evaluation.  He was thought to be clinically dehydrated and decision was made to admit the patient for observation stay for further rehydration, stool culture evaluation, and clinical monitoring.          Hospital Course:    On  presentation to the floor, patient was noted to have a significant cough with tight wheezy lung sounds and O2 supplementation needs that were not noted prior to presentation to the floor and influenza testing was performed. Patient was found to be influenza A positive and was started on droplet isolation as well as Tamiflu. He was continued with IV fluids and did not have a bowel movement for over 24 hours after presentation. The evening of presentation the patient did have sudden acute worsening with respiratory failure as well as atrial fibrillation with rapid ventricular rate in the 160-170 range with hypotension present. A rapid response was called. Patient was given 25 mg of IV Cardizem which did provide rate control, however also causes severe hypotension and patient continued to have respiratory failure with severe hypoxia. He was transitioned into the intensive care unit and initiated on BiPAP. He also required levophed for pressure support, after multiple fluid boluses did not resolve his hypotension.  Patient spontaneously converted back to normal sinus rhythm and over the next few hours was able to wean off pressor support. As his respiratory status improved, he was also weaned off BiPAP and placed on 2 L nasal cannula oxygen. From a respiratory standpoint patient continued to improve throughout his hospitalization, however patient did have several more episodes of paroxysmal atrial fibrillation, which would normally converted following 15 mg of Cardizem IV which did not cause severe hypotension. On the morning of transfer, patient had another episode of atrial fibrillation, however this time it was resistant to the Cardizem bolus and therefore he was started on a diltiazem drip, however even at max dosing it was noted that patient remained in atrial fibrillation and more concerning leave that his heart rate continued to fluctuate widely from being in the 50s to the 170s.  When his heart rate is elevated,  he wants more becomes hypotensive. He is transferring to the [facility] for ongoing cardiac evaluation and monitoring.         Physical Exam:   Vitals were reviewed - patient continues to fluctuate in HR control even on max cardiazem drip.  Currently HR in in the 80-90 range in a fib with blood pressure stable  Constitutional:   awake, alert, cooperative, no apparent distress at rest, and appears stated age     Lungs:   No increased work of breathing, decreased air exchange, mild crackles diffusely but no wheezing, improving     Cardiovascular:   Irregularly irregular rate - currently is in the 80-90 range, no murmur     Neurologic:   Awake, alert, oriented to name, place and time.       Skin:   normal skin color, texture, turgor              Discharge Instructions and Follow-Up:   Discharge diet: NPO in transfer   Discharge activity: Activity as tolerated with nursing assistance   Discharge follow-up: Follow up to be determined following hospital completion           Discharge Disposition:   Transferred to [facility]       Attestation:  I have reviewed today's vital signs, notes, medications, labs and imaging.    More than 30 minutes was spent on this discharge.      Sejal Perez MD    Note: Chart documentation done in part with Dragon Voice Recognition software. Although reviewed after completion, some word and grammatical errors may remain.

## 2017-03-04 NOTE — PLAN OF CARE
Problem: Cardiac Rhythm Management Device (Adult)  Goal: Signs and Symptoms of Listed Potential Problems Will be Absent or Manageable (Cardiac Rhythm Management Device)  Signs and symptoms of listed potential problems will be absent or manageable by discharge/transition of care (reference Cardiac Rhythm Management Device (Adult) CPG).   Outcome: Improving  Note that pt has had a more controlled heart rate this afternoon.  Note that  Pt does flip from SR to Afib.  HR 80-90's, BP has improved.  Remains on cardizem gtt, plan is to transfer pt to [facility] for cardiac issues.  Family is aware of this.  Pt was able to stand at beside for a short time and wally this act fine.

## 2017-03-04 NOTE — PLAN OF CARE
Problem: Goal Outcome Summary  Goal: Goal Outcome Summary  Outcome: Improving  Pt is now on RA. Maintaining sats.  97% at rest.

## 2017-03-04 NOTE — PROVIDER NOTIFICATION
"S:  AFib w/RVR  B:  Influenza A/urosepsis  A:  Pt sitting up in bed eating.  Did c/o severe headache prior.  Took keflex and norco.  While eating pt went from SR/RT BBB to afib with a rapid RVR.  Pt stated that he could \"feel it\". BP 75/43, 90/36.  BP tolerated cardizem.  O2 applied at 2l/nc, attempted vagal maneuvers. Notified MD.  15mg IV cardizem given.  Pt now 120's-140's heart rate. /86. Pt cont to c/o a severe headache.  R:  Monitor heart rate/rhythm, BP. And pain.   "

## 2017-03-04 NOTE — PROGRESS NOTES
S-(situation): Note    B-(background): Cardiac    A-(assessment): Note that pt heart rate increased this morning to the 170-180's, cardizem bolus given prior to 0700 which slowed it for a very short time, note that BP became very low and pt had a very severe headache.  During this time frame pt was started on a Cardizem gtt and also given a 500ml bolus of NS.  Diluadid 0.3mg IV given for headache.  Please review VS F/S  Cardizem gtt started at 5mg/hr, increased to 10mg/hr and then to 15mg/hr.  Pt heart is trying to slow down and BP are trending upward.      R-(recommendations): Cont to monitor.

## 2017-03-04 NOTE — PROGRESS NOTES
S-(situation): PT Eval and treat orders received.      B-(background): Patient admitted for weakness, inability to eat.      A-(assessment): Patient remains inappropriate for PT eval today per medical status per ICU nurse     R-(recommendations): Defer PT eval until patient better able to tolerate, will check back tomorrow

## 2017-03-04 NOTE — IP AVS SNAPSHOT
MRN:[id number]                      After Visit Summary   3/4/2017    Bud Merritt    MRN: [id number]           Thank you!     Thank you for choosing [facility] for your care. Our goal is always to provide you with excellent care. Hearing back from our patients is one way we can continue to improve our services. Please take a few minutes to complete the written survey that you may receive in the mail after you visit with us. Thank you!        Patient Information     Date Of Birth          5/9/1927        About your hospital stay     You were admitted on:  March 4, 2017 You last received care in the:  [address] [facility] [facility]    You were discharged on:  March 14, 2017        Reason for your hospital stay       You were admitted with COPD exacerbation from influenza infection. You were given medications and this improved. You also underwent pacemaker placement for abnormal heart rates and rhythms in your heart. You should follow-up with cardiology in one month to discuss ablation where they also will review your pacemaker to check your heart rhythm. You also filled out an advanced directive to help your future healthcare providers in providing the best care that also match your wishes.                  Who to Call     For medical emergencies, please call 911.  For non-urgent questions about your medical care, please call your primary care provider or clinic, [phone]          Attending Provider     Provider Specialty    Vicky Dai MD Cardiology       Primary Care Provider Office Phone # Fax #    Camron Aragon [phone]       [facility] [address] [place] [address]         When to contact your care team       Call your primary doctor if you have any of the following:  increased shortness of breath or fluttering in chest.                  After Care Instructions     Activity       Your activity upon discharge: activity as tolerated             Diet       Follow this diet upon discharge: Orders Placed This Encounter      Low Saturated Fat Na <2400 mg            Wound care and dressings       Instructions to care for your wound at home: as directed.                  Follow-up Appointments     Adult [facility] Follow-up and recommended labs and tests       Follow up with primary care provider, Camron Aragon, within 7 days for hospital follow- up.  No follow up labs or test are needed.  Follow up with Dr. Li in Cardiology EP Clinic to discuss possible AV node ablation in 1 month with PPM interrogation at appointment.   Follow up with Dr. Colon ([facility]) cardiologist to discuss TAVR in 2-3 months.    Appointments on [place] and/or [facility] (with [facility] or [facility] provider or service). Call [phone] if you haven't heard regarding these appointments within 7 days of discharge.                  Your next 10 appointments already scheduled     Mar 28, 2017  8:40 AM CDT   Return Visit with Fide Meidna PA-C   [facility] ([facility]    [address] [place] [id number]   [phone]            Apr 05, 2017 10:00 AM CDT   Nurse Only with DEVICE CHECK RN CARD   [facility]    [address] [place] [id number] [phone]            Apr 05, 2017 10:30 AM CDT   New [facility] Li [facility]   [facility]    [address] [place] [id number] [phone]              Additional Services     Home Care OT Referral for Hospital Discharge       OT to eval and treat    Your provider has ordered home care - occupational therapy. If you have not been contacted within 2 days of your discharge please call the department phone number listed on the top of this document.            Home Care PT Referral for Hospital Discharge       PT to eval and treat    Your provider has  ordered home care - physical therapy. If you have not been contacted within 2 days of your discharge please call the department phone number listed on the top of this document.            Home care nursing referral       [facility]   Phone: [phone]   Fax: [phone]    For Palliative Care Program.  RN ezequiel post hospitalization.   Assess: vital signs, respiratory and cardiac status, activity tolerance, hydration, nutritional status.   Med set up and management.  HHA for assist with ADL's.   PT/OT eval and treat for deconditioning, strengthening, and endurance.   SW for referral to community resources.     Your provider has ordered home care nursing services. If you have not been contacted within 2 days of your discharge please call the inpatient department phone number at [phone] .            Medication Therapy Management Referral       Reason for referral:  on more than 5 medications and managing chronic disease and ED or hospital visit in last 6 months    This service is designed to help you get the most from your medications.  A specially trained pharmacist will work closely with you and your doctors  to solve any problems related to your medications and to help you get the   best results from taking them.      The Medication Therapy Management staff will call you to schedule an appointment.            Occupational Therapy Referral       Lymphedema wraps            Physical Therapy Referral       *This therapy referral will be filtered to a centralized scheduling office at [facility] and the patient will receive a call to schedule an appointment at a [facility] location most convenient for them. *     [facility] provides Physical Therapy evaluation and treatment and many specialty services across the [facility] system.  If requesting a specialty program, please choose from the list below.    If you have not heard from the scheduling office within 2  "business days, please call [phone] for all locations, with the exception of [place], please call [phone].  Treatment: Evaluation & Treatment  Special Instructions/Modalities: As indicated  Special Programs: As indicated    Please be aware that coverage of these services is subject to the terms and limitations of your health insurance plan.  Call member services at your health plan with any benefit or coverage questions.      **Note to Provider:  If you are referring outside of [facility] for the therapy appointment, please list the name of the location in the  special instructions  above, print the referral and give to the patient to schedule the appointment.                  Pending Results     Date and Time Order Name Status Description    3/14/2017 0022 EKG 12-lead, tracing only Preliminary     3/7/2017 0040 EKG 12-lead, tracing only Preliminary     3/5/2017 2100 EKG 12-lead, tracing only Preliminary             Statement of Approval     Ordered          03/14/17 1330  I have reviewed and agree with all the recommendations and orders detailed in this document.  EFFECTIVE NOW     Approved and electronically signed by:  Blossom Vines MD             Admission Information     Date & Time Provider Department Dept. Phone    3/4/2017 Vicky Dai MD [address] [facility] [phone]      Your Vitals Were     Blood Pressure Pulse Temperature Respirations Height Weight    96/61 (BP Location: Left arm) 80 97.8  F (36.6  C) (Oral) 18 1.676 m (5' 6\") 71.7 kg (158 lb 1.6 oz)    Pulse Oximetry BMI (Body Mass Index)                95% 25.52 kg/m2          [email or web address] Information     [employer] lets you send messages to your doctor, view your test results, renew your prescriptions, schedule appointments and more. To sign up, go to [email or web address]/[email or web address] . Click on \"Log in\" on the left side of the screen, which will take you to the Welcome page. Then click on \"Sign up Now\" on the right side of the page.     You " will be asked to enter the access code listed below, as well as some personal information. Please follow the directions to create your username and password.     Your access code is: [email or web address]  Expires: 3/20/2017 11:06 AM     Your access code will  in 90 days. If you need help or a new code, please call your [facility] clinic or [phone].        Care EveryWhere ID     This is your Care EveryWhere ID. 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        Dose:  500 mg   Take 500 mg by mouth 3 times daily for 5 days   Quantity:  15 tablet   Refills:  0       fluticasone-vilanterol 100-25 MCG/INH oral inhaler   Commonly known as:  BREO ELLIPTA   Used for:  COPD exacerbation (H)        Dose:  1 puff   Inhale 1 puff into the lungs daily   Quantity:  60 Inhaler   Refills:  0       metoprolol 50 MG tablet   Commonly known as:  LOPRESSOR   Used for:  Paroxysmal atrial fibrillation (H)        Dose:  50 mg   Take 1 tablet (50 mg) by mouth 2 times daily   Quantity:  120 tablet   Refills:  0       omeprazole 20 MG CR capsule   Commonly known as:  priLOSEC   Used for:  Gastroesophageal reflux disease without esophagitis        Dose:  20 mg   Take 1 capsule (20 mg) by mouth 2 times daily (before meals)   Quantity:  60 capsule   Refills:  0       umeclidinium 62.5 MCG/INH oral inhaler   Commonly known as:  INCRUSE ELLIPTA   Used for:  COPD exacerbation (H)        Dose:  1 puff   Inhale 1 puff into the lungs daily   Quantity:  30 Inhaler   Refills:  0         CONTINUE these medicines which have NOT CHANGED        Dose / Directions    acetaminophen 650 MG CR tablet   Commonly known as:  TYLENOL        Dose:  650 mg   Take 650 mg by mouth 2 times daily   Refills:  0       alendronate 70 MG  tablet   Commonly known as:  FOSAMAX   Used for:  Osteopenia        Dose:  70 mg   Take 1 tablet (70 mg) by mouth every 7 days Take with over 8 ounces water and stay upright for at least 30 minutes after dose.  Take at least 60 minutes before breakfast   Quantity:  12 tablet   Refills:  3       apixaban ANTICOAGULANT 2.5 MG tablet   Commonly known as:  ELIQUIS   Used for:  Paroxysmal atrial fibrillation (H)        Dose:  2.5 mg   Take 1 tablet (2.5 mg) by mouth 2 times daily   Quantity:  180 tablet   Refills:  3       ascorbic acid 500 MG Cpcr CR capsule   Commonly known as:  vitamin C        Dose:  500 mg   Take 500 mg by mouth daily   Refills:  0       atorvastatin 40 MG tablet   Commonly known as:  LIPITOR   Used for:  Atherosclerosis of native coronary artery of native heart without angina pectoris        Dose:  40 mg   Take 1 tablet (40 mg) by mouth daily   Quantity:  90 tablet   Refills:  3       calcium carbonate 500 MG chewable tablet   Commonly known as:  TUMS        Dose:  1 chew tab   Take 1 chew tab by mouth every 4 hours as needed for heartburn   Refills:  0       calcium carbonate 500 MG tablet   Commonly known as:  OS-SHELIA 500 mg [place]. Ca        Dose:  600 mg   Take 600 mg by mouth daily   Refills:  0       dofetilide 125 MCG capsule   Commonly known as:  TIKOSYN   Used for:  Paroxysmal atrial fibrillation (H)        Dose:  125 mcg   Take 1 capsule (125 mcg) by mouth 2 times daily   Quantity:  60 capsule   Refills:  5       FLOMAX 0.4 MG capsule   Generic drug:  tamsulosin        Dose:  0.4 mg   Take 1 capsule (0.4 mg) by mouth 2 times daily   Quantity:  60 capsule   Refills:  0       furosemide 20 MG tablet   Commonly known as:  LASIX   Used for:  Acute on chronic systolic congestive heart failure (H)        Dose:  20 mg   Take 1 tablet (20 mg) by mouth daily Or as directed by cardiology   Quantity:  18 tablet   Refills:  03       leflunomide 10 MG tablet   Commonly known as:  ARAVA   Used for:   Rheumatoid arthritis involving multiple sites with positive rheumatoid factor (H)        Dose:  10 mg   Take 1 tablet (10 mg) by mouth daily   Quantity:  30 tablet   Refills:  11       lisinopril 10 MG tablet   Commonly known as:  PRINIVIL/ZESTRIL   Used for:  Acute on chronic systolic congestive heart failure (H)        Dose:  10 mg   Take 1 tablet (10 mg) by mouth 2 times daily   Quantity:  62 tablet   Refills:  11       LUPRON DEPOT IM        Inject into the muscle every 4 months   Refills:  0       predniSONE 5 MG tablet   Commonly known as:  DELTASONE   Used for:  Osteopenia        Dose:  5 mg   Take 1 tablet (5 mg) by mouth daily   Quantity:  100 tablet   Refills:  4       spironolactone 25 MG tablet   Commonly known as:  ALDACTONE   Used for:  Cardiomyopathy, nonischemic (H)        Dose:  25 mg   Take 1 tablet (25 mg) by mouth daily   Quantity:  30 tablet   Refills:  11       traMADol 50 MG tablet   Commonly known as:  ULTRAM   Used for:  Rheumatoid arthritis involving multiple sites, unspecified rheumatoid factor presence (H)        TAKE 1 TABLET 3 TIMES DAILY AS NEEDED FOR MODERATE PAIN   Quantity:  90 tablet   Refills:  0       VITAMIN D (CHOLECALCIFEROL) PO        Dose:  400 Units   Take 400 Units by mouth daily   Refills:  0         STOP taking     cefdinir 300 MG capsule   Commonly known as:  OMNICEF           diltiazem (CARDIZEM) 125 mg in NaCl 125 mL infusion           metoprolol 25 MG 24 hr tablet   Commonly known as:  TOPROL-XL           oseltamivir 30 MG capsule   Commonly known as:  TAMIFLU                Where to get your medicines      These medications were sent to [facility] [place], [place] - [address] [address]     Phone:  [phone]     fluticasone-vilanterol 100-25 MCG/INH oral inhaler    metoprolol 50 MG tablet    omeprazole 20 MG CR capsule    umeclidinium 62.5 MCG/INH oral inhaler         Some of these will need a paper  prescription and others can be bought over the counter. Ask your nurse if you have questions.     Bring a paper prescription for each of these medications     cephalexin 500 MG tablet                Protect others around you: Learn how to safely use, store and throw away your medicines at www.disposemymeds.org.             Medication List: This is a list of all your medications and when to take them. Check marks below indicate your daily home schedule. Keep this list as a reference.      Medications           Morning Afternoon Evening Bedtime As Needed    acetaminophen 650 MG CR tablet   Commonly known as:  TYLENOL   Take 650 mg by mouth 2 times daily                                      alendronate 70 MG tablet   Commonly known as:  FOSAMAX   Take 1 tablet (70 mg) by mouth every 7 days Take with over 8 ounces water and stay upright for at least 30 minutes after dose.  Take at least 60 minutes before breakfast                                   apixaban ANTICOAGULANT 2.5 MG tablet   Commonly known as:  ELIQUIS   Take 1 tablet (2.5 mg) by mouth 2 times daily   Last time this was given:  2.5 mg on 3/14/2017  8:46 AM                                      ascorbic acid 500 MG Cpcr CR capsule   Commonly known as:  vitamin C   Take 500 mg by mouth daily                                   atorvastatin 40 MG tablet   Commonly known as:  LIPITOR   Take 1 tablet (40 mg) by mouth daily   Last time this was given:  40 mg on 3/14/2017  8:47 AM                                   calcium carbonate 500 MG chewable tablet   Commonly known as:  TUMS   Take 1 chew tab by mouth every 4 hours as needed for heartburn                                   calcium carbonate 500 MG tablet   Commonly known as:  OS-SHELIA 500 mg [place]. Ca   Take 600 mg by mouth daily   Last time this was given:  1,250 mg on 3/14/2017  8:47 AM                                   cephalexin 500 MG tablet   Take 500 mg by mouth 3 times daily for 5 days                                          dofetilide 125 MCG capsule   Commonly known as:  TIKOSYN   Take 1 capsule (125 mcg) by mouth 2 times daily   Last time this was given:  125 mcg on 3/14/2017  8:47 AM                                      FLOMAX 0.4 MG capsule   Take 1 capsule (0.4 mg) by mouth 2 times daily   Last time this was given:  0.4 mg on 3/14/2017  8:47 AM   Generic drug:  tamsulosin                                      fluticasone-vilanterol 100-25 MCG/INH oral inhaler   Commonly known as:  BREO ELLIPTA   Inhale 1 puff into the lungs daily   Last time this was given:  1 puff on 3/10/2017  8:11 AM                                   furosemide 20 MG tablet   Commonly known as:  LASIX   Take 1 tablet (20 mg) by mouth daily Or as directed by cardiology   Last time this was given:  20 mg on 3/14/2017  8:47 AM                                   leflunomide 10 MG tablet   Commonly known as:  ARAVA   Take 1 tablet (10 mg) by mouth daily   Last time this was given:  10 mg on 3/14/2017  8:47 AM                                   lisinopril 10 MG tablet   Commonly known as:  PRINIVIL/ZESTRIL   Take 1 tablet (10 mg) by mouth 2 times daily   Last time this was given:  10 mg on 3/14/2017  8:45 AM                                      LUPRON DEPOT IM   Inject into the muscle every 4 months         1x every 4 months                       metoprolol 50 MG tablet   Commonly known as:  LOPRESSOR   Take 1 tablet (50 mg) by mouth 2 times daily   Last time this was given:  50 mg on 3/14/2017  8:46 AM                                      omeprazole 20 MG CR capsule   Commonly known as:  priLOSEC   Take 1 capsule (20 mg) by mouth 2 times daily (before meals)   Last time this was given:  20 mg on 3/14/2017  8:46 AM                                      predniSONE 5 MG tablet   Commonly known as:  DELTASONE   Take 1 tablet (5 mg) by mouth daily   Last time this was given:  5 mg on 3/14/2017  8:47 AM                                   spironolactone 25 MG  tablet   Commonly known as:  ALDACTONE   Take 1 tablet (25 mg) by mouth daily   Last time this was given:  25 mg on 3/14/2017  8:45 AM                                   traMADol 50 MG tablet   Commonly known as:  ULTRAM   TAKE 1 TABLET 3 TIMES DAILY AS NEEDED FOR MODERATE PAIN   Last time this was given:  50 mg on 3/13/2017 11:36 AM                                   umeclidinium 62.5 MCG/INH oral inhaler   Commonly known as:  INCRUSE ELLIPTA   Inhale 1 puff into the lungs daily   Last time this was given:  1 puff on 3/10/2017  8:11 AM                                   VITAMIN D (CHOLECALCIFEROL) PO   Take 400 Units by mouth daily   Last time this was given:  400 Units on 3/14/2017  8:47 AM

## 2017-03-04 NOTE — PLAN OF CARE
Problem: Goal Outcome Summary  Goal: Goal Outcome Summary  Outcome: Improving  Pt afebrile.  Maintaining sats with 1 l/nc.  Denies dyspnea at rest.  Hesitant to cough due to left sided chest pain associated with coughing.  Denied need for narcotic did take an ibuprofen.  Cough is non-productive tonight.  Lung sounds coarse cx.  ILENE present, states this is the worse it has ever been.  BP good with maps in the 90's.  Pt is saline lock and still has stein cath.  Moves well to chair.  Appt. Improving.  PT to see today.

## 2017-03-04 NOTE — PLAN OF CARE
Problem: Goal Outcome Summary  Goal: Goal Outcome Summary  Outcome: Improved    Neuro:  A & O x4.  Very [place] - use L ear, hearing aid  Resp:  RR 35, lung sounds course crackles, inspiratory wheezing. Pain on deep inspiration, gave dilaudid .5mg, 20 mg lasix  - sat on edge of bed most of the day, good coughing, using incentive spirometer regularly. Pt stated that he felt much better. Cards: Approx 1800 Pt went into a-fib with RVR - very symptomatic.  Pressed event button on Holter monitor     JENNYFER stein in place, great UOP with lasix 100-200cc hour, improved resp status.   GI - loud overactive bowel sounds, 1 formed stool, 1 very small loose stool in late afternoon.  Skin:  3+ pitting edema in lower legs feet, cool to touch, mottled. But much improved with Levo off.    Plan: Continue to monitor, notify physician if condition changes.

## 2017-03-04 NOTE — PROGRESS NOTES
Call to Physician - Pt was standing up next to bed, during a linen change when he developed a sudden onset of dizziness.  Noticed on the monitor that patient was in A-Fib with RVR at 170.    Associated symptoms - dramatic increase in SOB, severe pain in between shoulder blades, dizzy, nauseated, hypotensive 80/60.    Dr Teran ordered 15 mg diltiazem, given with immediate results. Pt down to HR 70, remained hypotensive for 20 minutes, and recovered. BP.  SOB resolved after 1/2 hour.

## 2017-03-04 NOTE — PLAN OF CARE
Problem: Cardiac Rhythm Management Device (Adult)  Goal: Signs and Symptoms of Listed Potential Problems Will be Absent or Manageable (Cardiac Rhythm Management Device)  Signs and symptoms of listed potential problems will be absent or manageable by discharge/transition of care (reference Cardiac Rhythm Management Device (Adult) CPG).  Outcome: Improving  Note that pt is on Cardizem gtt for rapid HR which had been up to the 170's.  Pt states that he has been having these issues at home for the past 2 yrs.  Pt states that these usually occur after he eats or takes a shower, he develops a severe headache prior to the onset of these episodes.   At this time his cardizem gtt is at 15mg/hr.   The rate remains irritable from 80'-150's.  BP has also been very unstable this morning please review VS.

## 2017-03-05 ENCOUNTER — APPOINTMENT (OUTPATIENT)
Dept: GENERAL RADIOLOGY | Facility: CLINIC | Age: 82
DRG: 227 | End: 2017-03-05
Attending: INTERNAL MEDICINE
Payer: MEDICARE

## 2017-03-05 LAB
ALBUMIN SERPL-MCNC: 2.2 G/DL (ref 3.4–5)
ALBUMIN UR-MCNC: NEGATIVE MG/DL
ALP SERPL-CCNC: 56 U/L (ref 40–150)
ALT SERPL W P-5'-P-CCNC: 36 U/L (ref 0–70)
ANION GAP SERPL CALCULATED.3IONS-SCNC: 8 MMOL/L (ref 3–14)
APPEARANCE UR: CLEAR
AST SERPL W P-5'-P-CCNC: 102 U/L (ref 0–45)
BILIRUB SERPL-MCNC: 0.4 MG/DL (ref 0.2–1.3)
BILIRUB UR QL STRIP: NEGATIVE
BUN SERPL-MCNC: 28 MG/DL (ref 7–30)
CALCIUM SERPL-MCNC: 7.5 MG/DL (ref 8.5–10.1)
CHLORIDE SERPL-SCNC: 109 MMOL/L (ref 94–109)
CO2 SERPL-SCNC: 22 MMOL/L (ref 20–32)
COLOR UR AUTO: ABNORMAL
CREAT SERPL-MCNC: 0.96 MG/DL (ref 0.66–1.25)
ERYTHROCYTE [DISTWIDTH] IN BLOOD BY AUTOMATED COUNT: 14.3 % (ref 10–15)
GFR SERPL CREATININE-BSD FRML MDRD: 74 ML/MIN/1.7M2
GLUCOSE SERPL-MCNC: 91 MG/DL (ref 70–99)
GLUCOSE UR STRIP-MCNC: NEGATIVE MG/DL
GRAM STN SPEC: ABNORMAL
HCT VFR BLD AUTO: 31.2 % (ref 40–53)
HGB BLD-MCNC: 10.2 G/DL (ref 13.3–17.7)
HGB UR QL STRIP: NEGATIVE
KETONES UR STRIP-MCNC: NEGATIVE MG/DL
LEUKOCYTE ESTERASE UR QL STRIP: NEGATIVE
Lab: ABNORMAL
MAGNESIUM SERPL-MCNC: 2.4 MG/DL (ref 1.6–2.3)
MCH RBC QN AUTO: 32 PG (ref 26.5–33)
MCHC RBC AUTO-ENTMCNC: 32.7 G/DL (ref 31.5–36.5)
MCV RBC AUTO: 98 FL (ref 78–100)
MICRO REPORT STATUS: ABNORMAL
NITRATE UR QL: NEGATIVE
PH UR STRIP: 6 PH (ref 5–7)
PLATELET # BLD AUTO: 173 10E9/L (ref 150–450)
POTASSIUM SERPL-SCNC: 4.3 MMOL/L (ref 3.4–5.3)
PROT SERPL-MCNC: 5.3 G/DL (ref 6.8–8.8)
RBC # BLD AUTO: 3.19 10E12/L (ref 4.4–5.9)
RBC #/AREA URNS AUTO: 0 /HPF (ref 0–2)
SODIUM SERPL-SCNC: 139 MMOL/L (ref 133–144)
SP GR UR STRIP: 1 (ref 1–1.03)
SPECIMEN SOURCE: ABNORMAL
URN SPEC COLLECT METH UR: ABNORMAL
UROBILINOGEN UR STRIP-MCNC: NORMAL MG/DL (ref 0–2)
WBC # BLD AUTO: 6.8 10E9/L (ref 4–11)
WBC #/AREA URNS AUTO: <1 /HPF (ref 0–2)

## 2017-03-05 PROCEDURE — 94640 AIRWAY INHALATION TREATMENT: CPT

## 2017-03-05 PROCEDURE — 25000128 H RX IP 250 OP 636: Performed by: INTERNAL MEDICINE

## 2017-03-05 PROCEDURE — 36415 COLL VENOUS BLD VENIPUNCTURE: CPT | Performed by: INTERNAL MEDICINE

## 2017-03-05 PROCEDURE — 25000132 ZZH RX MED GY IP 250 OP 250 PS 637: Mod: GY

## 2017-03-05 PROCEDURE — 99222 1ST HOSP IP/OBS MODERATE 55: CPT | Mod: GC | Performed by: INTERNAL MEDICINE

## 2017-03-05 PROCEDURE — A9270 NON-COVERED ITEM OR SERVICE: HCPCS | Mod: GY | Performed by: INTERNAL MEDICINE

## 2017-03-05 PROCEDURE — 25000132 ZZH RX MED GY IP 250 OP 250 PS 637: Mod: GY | Performed by: INTERNAL MEDICINE

## 2017-03-05 PROCEDURE — 87070 CULTURE OTHR SPECIMN AEROBIC: CPT | Performed by: INTERNAL MEDICINE

## 2017-03-05 PROCEDURE — 83735 ASSAY OF MAGNESIUM: CPT | Performed by: INTERNAL MEDICINE

## 2017-03-05 PROCEDURE — 80053 COMPREHEN METABOLIC PANEL: CPT | Performed by: INTERNAL MEDICINE

## 2017-03-05 PROCEDURE — 71010 XR CHEST PORT 1 VW: CPT

## 2017-03-05 PROCEDURE — 85027 COMPLETE CBC AUTOMATED: CPT | Performed by: INTERNAL MEDICINE

## 2017-03-05 PROCEDURE — 87205 SMEAR GRAM STAIN: CPT | Performed by: INTERNAL MEDICINE

## 2017-03-05 PROCEDURE — 81001 URINALYSIS AUTO W/SCOPE: CPT | Performed by: INTERNAL MEDICINE

## 2017-03-05 PROCEDURE — 87186 SC STD MICRODIL/AGAR DIL: CPT | Performed by: INTERNAL MEDICINE

## 2017-03-05 PROCEDURE — 93005 ELECTROCARDIOGRAM TRACING: CPT

## 2017-03-05 PROCEDURE — A9270 NON-COVERED ITEM OR SERVICE: HCPCS | Mod: GY

## 2017-03-05 PROCEDURE — 87077 CULTURE AEROBIC IDENTIFY: CPT | Performed by: INTERNAL MEDICINE

## 2017-03-05 PROCEDURE — 40000274 ZZH STATISTIC RCP CONSULT EA 30 MIN

## 2017-03-05 PROCEDURE — 21400003 ZZH R&B CCU CRITICAL UMMC

## 2017-03-05 PROCEDURE — 87106 FUNGI IDENTIFICATION YEAST: CPT | Performed by: INTERNAL MEDICINE

## 2017-03-05 PROCEDURE — 25000125 ZZHC RX 250: Performed by: INTERNAL MEDICINE

## 2017-03-05 PROCEDURE — 93010 ELECTROCARDIOGRAM REPORT: CPT | Mod: 76 | Performed by: INTERNAL MEDICINE

## 2017-03-05 RX ORDER — FUROSEMIDE 10 MG/ML
20 INJECTION INTRAMUSCULAR; INTRAVENOUS ONCE
Status: COMPLETED | OUTPATIENT
Start: 2017-03-05 | End: 2017-03-05

## 2017-03-05 RX ORDER — FUROSEMIDE 20 MG
20 TABLET ORAL DAILY
Status: DISCONTINUED | OUTPATIENT
Start: 2017-03-06 | End: 2017-03-07

## 2017-03-05 RX ORDER — TRAMADOL HYDROCHLORIDE 50 MG/1
50 TABLET ORAL EVERY 6 HOURS PRN
Status: DISCONTINUED | OUTPATIENT
Start: 2017-03-05 | End: 2017-03-14 | Stop reason: HOSPADM

## 2017-03-05 RX ORDER — NYSTATIN 100000/ML
500000 SUSPENSION, ORAL (FINAL DOSE FORM) ORAL 4 TIMES DAILY
Status: DISCONTINUED | OUTPATIENT
Start: 2017-03-05 | End: 2017-03-11

## 2017-03-05 RX ORDER — IPRATROPIUM BROMIDE AND ALBUTEROL SULFATE 2.5; .5 MG/3ML; MG/3ML
3 SOLUTION RESPIRATORY (INHALATION) EVERY 4 HOURS PRN
Status: DISCONTINUED | OUTPATIENT
Start: 2017-03-05 | End: 2017-03-06

## 2017-03-05 RX ORDER — CALCIUM CARBONATE 500(1250)
1250 TABLET ORAL DAILY
Status: DISCONTINUED | OUTPATIENT
Start: 2017-03-05 | End: 2017-03-14 | Stop reason: HOSPADM

## 2017-03-05 RX ORDER — PHENAZOPYRIDINE HYDROCHLORIDE 100 MG/1
100 TABLET, FILM COATED ORAL
Status: COMPLETED | OUTPATIENT
Start: 2017-03-05 | End: 2017-03-05

## 2017-03-05 RX ORDER — LANOLIN ALCOHOL/MO/W.PET/CERES
3 CREAM (GRAM) TOPICAL
Status: DISCONTINUED | OUTPATIENT
Start: 2017-03-05 | End: 2017-03-14 | Stop reason: HOSPADM

## 2017-03-05 RX ORDER — ACETAMINOPHEN 325 MG/1
650 TABLET ORAL EVERY 6 HOURS PRN
Status: DISCONTINUED | OUTPATIENT
Start: 2017-03-05 | End: 2017-03-14 | Stop reason: HOSPADM

## 2017-03-05 RX ORDER — LORAZEPAM 0.5 MG/1
0.5 TABLET ORAL ONCE
Status: DISCONTINUED | OUTPATIENT
Start: 2017-03-05 | End: 2017-03-07

## 2017-03-05 RX ORDER — BUDESONIDE AND FORMOTEROL FUMARATE DIHYDRATE 160; 4.5 UG/1; UG/1
2 AEROSOL RESPIRATORY (INHALATION)
Status: DISCONTINUED | OUTPATIENT
Start: 2017-03-05 | End: 2017-03-05

## 2017-03-05 RX ADMIN — LEFLUNOMIDE 10 MG: 10 TABLET, FILM COATED ORAL at 08:42

## 2017-03-05 RX ADMIN — SPIRONOLACTONE 25 MG: 25 TABLET ORAL at 08:48

## 2017-03-05 RX ADMIN — DOFETILIDE 125 MCG: 0.12 CAPSULE ORAL at 20:33

## 2017-03-05 RX ADMIN — FUROSEMIDE 20 MG: 10 INJECTION, SOLUTION INTRAVENOUS at 01:50

## 2017-03-05 RX ADMIN — TRAMADOL HYDROCHLORIDE 50 MG: 50 TABLET, COATED ORAL at 00:16

## 2017-03-05 RX ADMIN — PHENAZOPYRIDINE HYDROCHLORIDE 100 MG: 100 TABLET ORAL at 06:43

## 2017-03-05 RX ADMIN — ACETAMINOPHEN 650 MG: 325 TABLET, FILM COATED ORAL at 06:43

## 2017-03-05 RX ADMIN — DOFETILIDE 125 MCG: 0.12 CAPSULE ORAL at 09:56

## 2017-03-05 RX ADMIN — TAMSULOSIN HYDROCHLORIDE 0.4 MG: 0.4 CAPSULE ORAL at 08:49

## 2017-03-05 RX ADMIN — ACETAMINOPHEN 650 MG: 325 TABLET, FILM COATED ORAL at 19:51

## 2017-03-05 RX ADMIN — CALCIUM 1250 MG: 500 TABLET ORAL at 08:47

## 2017-03-05 RX ADMIN — CHOLECALCIFEROL TAB 10 MCG (400 UNIT) 400 UNITS: 10 TAB at 08:48

## 2017-03-05 RX ADMIN — FLUTICASONE FUROATE AND VILANTEROL TRIFENATATE 1 PUFF: 100; 25 POWDER RESPIRATORY (INHALATION) at 16:41

## 2017-03-05 RX ADMIN — PHENAZOPYRIDINE HYDROCHLORIDE 100 MG: 100 TABLET ORAL at 12:54

## 2017-03-05 RX ADMIN — SENNOSIDES AND DOCUSATE SODIUM 2 TABLET: 8.6; 5 TABLET ORAL at 20:32

## 2017-03-05 RX ADMIN — NYSTATIN 500000 UNITS: 100000 SUSPENSION ORAL at 20:32

## 2017-03-05 RX ADMIN — PHENAZOPYRIDINE HYDROCHLORIDE 100 MG: 100 TABLET ORAL at 20:33

## 2017-03-05 RX ADMIN — FUROSEMIDE 20 MG: 10 INJECTION, SOLUTION INTRAVENOUS at 09:04

## 2017-03-05 RX ADMIN — CEFDINIR 300 MG: 300 CAPSULE ORAL at 20:33

## 2017-03-05 RX ADMIN — ATORVASTATIN CALCIUM 40 MG: 40 TABLET, FILM COATED ORAL at 08:48

## 2017-03-05 RX ADMIN — MELATONIN TAB 3 MG 3 MG: 3 TAB at 03:20

## 2017-03-05 RX ADMIN — ACETAMINOPHEN 650 MG: 325 TABLET, FILM COATED ORAL at 12:53

## 2017-03-05 RX ADMIN — IPRATROPIUM BROMIDE AND ALBUTEROL SULFATE 3 ML: .5; 3 SOLUTION RESPIRATORY (INHALATION) at 08:33

## 2017-03-05 RX ADMIN — FUROSEMIDE 20 MG: 10 INJECTION, SOLUTION INTRAVENOUS at 02:58

## 2017-03-05 RX ADMIN — TRAMADOL HYDROCHLORIDE 50 MG: 50 TABLET, COATED ORAL at 06:43

## 2017-03-05 RX ADMIN — APIXABAN 2.5 MG: 2.5 TABLET, FILM COATED ORAL at 08:44

## 2017-03-05 RX ADMIN — LISINOPRIL 10 MG: 10 TABLET ORAL at 20:33

## 2017-03-05 RX ADMIN — TRAMADOL HYDROCHLORIDE 50 MG: 50 TABLET, COATED ORAL at 12:54

## 2017-03-05 RX ADMIN — METOPROLOL SUCCINATE 25 MG: 25 TABLET, FILM COATED, EXTENDED RELEASE ORAL at 08:49

## 2017-03-05 RX ADMIN — OXYCODONE HYDROCHLORIDE AND ACETAMINOPHEN 500 MG: 500 TABLET ORAL at 08:46

## 2017-03-05 RX ADMIN — TRAMADOL HYDROCHLORIDE 50 MG: 50 TABLET, COATED ORAL at 19:51

## 2017-03-05 RX ADMIN — PREDNISONE 40 MG: 20 TABLET ORAL at 08:44

## 2017-03-05 RX ADMIN — OSELTAMIVIR PHOSPHATE 30 MG: 30 CAPSULE ORAL at 20:32

## 2017-03-05 RX ADMIN — CEFDINIR 300 MG: 300 CAPSULE ORAL at 08:49

## 2017-03-05 RX ADMIN — LISINOPRIL 10 MG: 10 TABLET ORAL at 08:48

## 2017-03-05 RX ADMIN — OSELTAMIVIR PHOSPHATE 30 MG: 30 CAPSULE ORAL at 08:48

## 2017-03-05 RX ADMIN — APIXABAN 2.5 MG: 2.5 TABLET, FILM COATED ORAL at 20:33

## 2017-03-05 RX ADMIN — TAMSULOSIN HYDROCHLORIDE 0.4 MG: 0.4 CAPSULE ORAL at 20:32

## 2017-03-05 NOTE — PLAN OF CARE
Problem: Goal Outcome Summary  Goal: Goal Outcome Summary  1) Monitor and tx to facilitate hemodynamic stability.  2) Monitor and tx pain to facilitate adequate pain control.  3) Educate and/or reinforce heart healthy habits.  4) Maintain fall and infection precautions.  Outcome: No Change  Pt admitted 3/4 from [facility] with Afib/RVR, few years of headache and b/l UL discomfort. SR with frequent PVC's, VSS on room air overnight. Pt reports pain in throat and abdomin r/t to frequent urination. Pt started on Pyridium scheduled. PRN orders placed for Acetaminophen and Belladonna suppository. Pt received acetaminophen x 1 overnight and Tramadol x 2.  Dilt gtt stop on arrival and plan is for pt to receive PO Dilt today. Parrish in r/t obstruction. Red/blanchable coccyx. Abrasion on right antecubital r/t fall at home (also fell on left ribs). Pt skin appears thin and very ecchymotic. Bed alarm on all night. Pt received 40 mg total of IV lasix overnight. Pt has coarse crackles in bases of lungs. Pt has 4+ pitting edema in LE. Pt reports he hadn't slept in 4 days and wanted to be left alone overnight. Pt received Melatonin and did rest for about 3 hours. Pt refused weight early this AM. Days will attempt. Sputum sent and UA/UC sent to lab. RN will continue to monitor and notify team with changes. Jenn Richey

## 2017-03-05 NOTE — PROGRESS NOTES
S-(situation): Note    B-(background): N/V/D, Rapid HR    A-(assessment): Note that pt has been fairly controlled heart rate this afternoon on cardizem which is 15mg/hr.  BP has improved.  Pt with general aches and discomfort r/t to his RA.  Plan to transfer to [facility].  Bed is finally available and report to be given at 1915.  Family is aware of transfer.  Report will be given to receiving RN    R-(recommendations): Cont. poc as ordered.

## 2017-03-05 NOTE — PROGRESS NOTES
Admission    Diagnosis: Afib, needs Pacemaker; few years of headache and b/l UE discomfort  Admitted from: [facility]  Via: Stretcher  Accompanied by: family  Belongings: Placed in closet; valuables sent home with family, declined sending any items to security.  Admission Profile: Complete  Teaching: orientation to unit, call don't fall, use of console, meal times, visiting hours, when to call for the RN (angina/sob/dizzyness, etc.), and enforced importance of safety   Access: PIV Left and Right  Telemetry: Placed on patient  Height/Weight: Complete    Pt declined Flu and Pneumonia vaccine.

## 2017-03-05 NOTE — PLAN OF CARE
Problem: Goal Outcome Summary  Goal: Goal Outcome Summary  1) Monitor and tx to facilitate hemodynamic stability.  2) Monitor and tx pain to facilitate adequate pain control.  3) Educate and/or reinforce heart healthy habits.  4) Maintain fall and infection precautions.  Outcome: No Change  Pt admitted 3/4 from [place] with Afib/RVR, few years of headache and b/l UL discomfort.  SR with frequent PVC's, VS'S on 2 L NC (continuous SpO2) and denied pain.  Dilt gtt stop on arrival r/t tubing incompatible with our pumps, awaiting orders.  Parrish in r/t obstruction.  Red/blanchable coccyx.  Abrasion on right antecubital r/t fall at home (also fell on left ribs).  Bed alarm on.  Otherwise, pt resting and up assist of one with walker.  Continue to monitor and with POC.

## 2017-03-05 NOTE — H&P
"[facility]  Internal Medicine History and Physical    Name: Bud Merritt MRN#: [id number]   Age: 89 year old YOB: 1927       Assessment and Plan   Bud Merritt is a 89 year old with history of paroxysmal Afib, HTN, COPD, HLD, RA on prednisone, hx of malignant prostate neoplasm who is transferred from  [facility] for ongoing management of paroxysmal Afib.     Cardiac:   #Paroxysmal atrial fibrillation  #Sinus node dysfunction  #Bradycardic episodes  #Mitral Regurgitation, severe Aortic Stenosis   #HTN  During last hospitalization had several episodes of symptomatic unstable afib requiring IV Cardizem pushes and one episode requiring Cardizem drip. Transferred to [facility] for ongoing mgmt, given pt's allergy to amiodarone as well as current use of anti-arrhythmic, beta blocker, and max dosing on dilt drip. Last seen by Dr. Li (EP). Ongoing \"spells,\" upon which pt is not quite able to elaborate.  At time of admission was in sinus without any diltiazem.   3/4 EKG: NSR, 1st deg AV Block and frequent PAC's   12/2016 ECHO: LVEF 40-45%, LVH, mild aortic root dilation, trace MR, severe tri-leaflet aortic sclerosis with 1+ AR and mod-severe AoS.   --Continue Dofetilide 125mcg PO BID (Had to be reduced from 250 BID at OSH due to prolonged QTc)   --Hold home Lasix 20mg PO, giving 20mg Lasix IV once on admit  --Continue home lisinopril 10mg PO BID   --Continue home metoprolol XL 25mg PO daily   --Continue home spironolactone 25mg PO daily   --Day team to consider EP consult for potential pacemaker   --PRN diltiazem for unstable Afib   --Will need periodic EKG's for QTc monitoring     #Acute cystitis without hematuria  Urine culture has grown out Citrobacter. Will continue OSH initiated antibiotics overnight on night of admission. Pt denies symptoms at time of admission, but has Parrish in place.   --Continue Cefdinir 300mg PO BID, duration TBD by primary team  --UTI pain: " Phenazopyridine, B&O suppository, Tylenol PRN      #Acute Hypoxemic Respiratory Failure   Likely 2/2 to Influenza A triggering COPD Exacerbation. Initially required BiPAP in [facility]. Pt arrived on 2LNC after transfer to [facility] with note that it could've been titrated downward further, but was kept on for cardiac support due to given his fluctuating heart rate   --Continue Oseltamivir 30mg BID    #Recent Fall:  #Left rib bruises  Pt unable to recall mechanism or if he lost consciousness.  Bruised ribs, ongoing pain.   --Fall precautions  --If head trauma will need to consider head CT given anticoagulation   --Encourage incentive spirometer use given splinting from rib pain      #Generalized weakness and frailty:   --Likely acute on chronic, secondary to acute illness.    --PT/OT/Care coordination      #COPD exacerbation  Ongoing cough and wheezing. Suspected initially prior to influenza diagnosis. Patient did receive stress dose response steroids for 3 days  --Continue prednisone 40mg to complete 5 day course (3/5, 3/6) *Hold pred 5 for RA while on this.   --Respiratory Care consult  --Duonebs q4h / Albuterol 2.5mg q4h prn; will need to be wary of tachycardia (can consider levalbuterol if becomes an issue)     #Rheumatoid arthritis involving multiple sites with positive rheumatoid factor  Follows with Dr. Shaver, last seen 2/15/17 when he receved steroid injections for bilateral trochanteric bursitis. Also noted to have mild synovitis with L wrist effusion.   --Continue prednisone 5 mg daily after completion of 5 day prednisone course for COPD (per documentation, patient has received stress dose response steroids for 3 days prior to transfer to [facility])   --Continue leflunomide 10mg daily   --Continue tramadol TID PRN for pain      #History of Malignant Neoplasm of the Prostaste Gland:  Follows with [facility], last office visit 2/14/17 (scanned into chart). Per note, PSA was down to 0.53 last month  "with good voiding, no heme/dysuria. Was given Lupron 30mg [place] IM to LUOQ buttocks.  --Continue tamsulosin .4mg PO daily     #Hyperlipidemia LDL goal <130  --Continue home Lipitor     ##Other  - Prophylaxis:   -DVT: On Apixiban    -GI: None   -Bowel: senna-docusate  - FEN: Cardiac diet  - IVF: Diuresing   - Electrolyte Protocol:    - Telemetry: Yes  - Family: Wife, Na, will be coming to hospital tomorrow  - Code status: Full Code, confirmed on admission    Disposition:  Admit to [place] [address]     Ailin Lee  PGY-2  Internal Medicine  [phone]    This patient will be formally staffed in AM.         Chief Complaint   \"Spells\"  Transfer from [facility] for ongoing mgmt of paroxysmal Afib       History of Present Illness    Bud Merritt is a 89 year old with history of paroxysmal Afib (on Apixiban, Tikosyn, and Metoprolol XL), HTN, COPD, HLD, RA on prednisone, hx of malignant prostate neoplasm who is transferred from  [facility] for ongoing management of paroxysmal Afib.     Pt has a history of paroxysmal Afib, bradycardia arrhythmias, SVT, and was last seen by Dr. Li in mid-February. Pt was admitted to [facility] from 3/2-3/4. During hospitalization pt had episodes of pAfib that converted following IV Cardizem in addition to his home regimen of metoprolol XL 12.5mg daily and Tikosyn 125mcg BID.  Over course of hospitalization, pt's HR reportedly fluctuated wildly from 50's to 170's in a very short time span. Per DCS, when pt's HR is normal or low, he is asymptomatic with good BP's. When HR's enter 150's-170's, pt is hypotensive with BP's in the 60's-70's systolic with HA, facial flushing, sense of unwellness. The physician discussed with Dr. Vicky Dai regarding transfer: \"Given patient's allergy to amiodarone as well as current use of an antiarrhythmic, beta blocker, and max dosing on diltiazem drip for calcium channel blocker, it is felt the best course of action would be to transfer the " "patient to the [facility] for ongoing evaluation and management. Patient is discharged in stable condition. Of note, as it did take several hours for an appropriate that to be found, patient was continued on the current therapy. He did have slight improvement in his stabilization of heart rate in the 2 hours prior to transfer with fewer heart rates in the 140-160 range and no hypotension noted.\"    At time of admit, pt is in NSR with HR in 80's, asymptomatic while on     Ongoing cough. No fevers, chills. Parrish in place. Denies abdominal pain. States that diarrhea has resolved.       Review of Systems   All 12 systems reviewed.  Relevant positives/negatives noted in HPI above      Past Medical History   (Reviewed and updated)  Past Medical History   Diagnosis Date     Atrial fibrillation (H) 07/01/2016     Cardiomyopathy (H)      COPD exacerbation (H) 3/2/2017     Paroxysmal atrial fibrillation (H) 7/6/2016     Pure hypercholesterolemia      Rheumatoid arthritis(714.0)      Unspecified essential hypertension      Weakness generalized 3/2/2017          Past Surgical History   (Reviewed and updated)  Past Surgical History   Procedure Laterality Date     Hc repair ing hernia,5+y/o,reducibl  1996     Marlex mesh repair of bilateral inguinal hernias and drainage of bilateral scrotal hydroceles.     Hc colonoscopy thru stoma w biopsy/cautery tumor/polyp/lesion  8/31/2004     Colonoscopy  08/24/09     Arthroplasty knee Left 1/12/2016     Procedure: ARTHROPLASTY KNEE;  Surgeon: Cesar Walker DO;  Location: [facility] OR     Irrigation and debridement soft tissue lower extremity, combined Left 3/15/2016     Procedure: COMBINED IRRIGATION AND DEBRIDEMENT SOFT TISSUE LOWER EXTREMITY;  Surgeon: Cesar Walker DO;  Location: [facility] OR         Allergies   (Reviewed and updated)   Allergies   Allergen Reactions     Amiodarone Other (See Comments)     Drop in DLCO     No Known Drug Allergies          " Medications   (Reviewed and updated)    Current Facility-Administered Medications on File Prior to Encounter:  [COMPLETED] diltiazem (CARDIZEM) injection 15 mg   [COMPLETED] 0.9% sodium chloride BOLUS   [DISCONTINUED] diltiazem (CARDIZEM) 125 mg in NaCl 0.9 % 125 mL infusion   [DISCONTINUED] diltiazem (CARDIZEM) 125 mg in NaCl 0.9 % 125 mL infusion   [DISCONTINUED] cefdinir (OMNICEF) suspension 300 mg   [DISCONTINUED] cefdinir (OMNICEF) capsule 300 mg   [DISCONTINUED] pneumococcal vaccine (PNEUMOVAX 23-rakel) injection 0.5 mL   [DISCONTINUED] influenza Vac Split High-Dose (FLUZONE) injection 0.5 mL   [DISCONTINUED] albuterol neb solution 2.5 mg   [DISCONTINUED] ipratropium - albuterol 0.5 mg/2.5 mg/3 mL (DUONEB) neb solution 3 mL   [DISCONTINUED] HYDROmorphone (PF) (DILAUDID) injection 0.3-0.5 mg   [DISCONTINUED] HYDROcodone-acetaminophen (NORCO) 5-325 MG per tablet 1 tablet   [DISCONTINUED] sodium chloride (PF) 0.9% PF flush 3 mL   [DISCONTINUED] ipratropium - albuterol 0.5 mg/2.5 mg/3 mL (DUONEB) neb solution 3 mL   [DISCONTINUED] naloxone (NARCAN) injection 0.1-0.4 mg   [DISCONTINUED] lidocaine 1 % 1 mL   [DISCONTINUED] lidocaine (LMX4) kit   [DISCONTINUED] sodium chloride (PF) 0.9% PF flush 3 mL   [DISCONTINUED] cephalexin (KEFLEX) capsule 250 mg   [DISCONTINUED] acetaminophen (TYLENOL) tablet 650 mg   [DISCONTINUED] apixaban ANTICOAGULANT (ELIQUIS) tablet 2.5 mg   [DISCONTINUED] calcium carbonate (TUMS) chewable tablet 500 mg   [DISCONTINUED] dofetilide (TIKOSYN) capsule 125 mcg   [DISCONTINUED] metoprolol (TOPROL-XL) half-tab 12.5 mg   [DISCONTINUED] predniSONE (DELTASONE) tablet 10 mg   [DISCONTINUED] traMADol (ULTRAM) tablet 50 mg   [DISCONTINUED] ondansetron (ZOFRAN-ODT) ODT tab 4 mg   [DISCONTINUED] ondansetron (ZOFRAN) injection 4 mg   [DISCONTINUED] prochlorperazine (COMPAZINE) injection 5 mg   [DISCONTINUED] prochlorperazine (COMPAZINE) tablet 5 mg   [DISCONTINUED] prochlorperazine (COMPAZINE) Suppository  12.5 mg   [DISCONTINUED] ibuprofen (ADVIL/MOTRIN) tablet 400 mg   [DISCONTINUED] Patient is already receiving anticoagulation with heparin, enoxaparin (LOVENOX), warfarin (COUMADIN)  or other anticoagulant medication   [DISCONTINUED] norepinephrine (LEVOPHED) 16 mg in D5W 250 mL infusion   [DISCONTINUED] oseltamivir (TAMIFLU) capsule 30 mg     Current Outpatient Prescriptions on File Prior to Encounter:  oseltamivir (TAMIFLU) 30 MG capsule Take 1 capsule (30 mg) by mouth 2 times daily   diltiazem (CARDIZEM) 125 mg in NaCl 125 mL infusion Inject 5-15 mg/hr into the vein continuous   cefdinir (OMNICEF) 300 MG capsule Take 1 capsule (300 mg) by mouth 2 times daily   predniSONE (DELTASONE) 5 MG tablet Take 1 tablet (5 mg) by mouth daily   traMADol (ULTRAM) 50 MG tablet TAKE 1 TABLET 3 TIMES DAILY AS NEEDED FOR MODERATE PAIN   dofetilide (TIKOSYN) 125 MCG capsule Take 1 capsule (125 mcg) by mouth 2 times daily   furosemide (LASIX) 20 MG tablet Take 1 tablet (20 mg) by mouth daily Or as directed by cardiology   apixaban ANTICOAGULANT (ELIQUIS) 2.5 MG tablet Take 1 tablet (2.5 mg) by mouth 2 times daily   Leuprolide Acetate (LUPRON DEPOT IM) Inject into the muscle every 4 months   leflunomide (ARAVA) 10 MG tablet Take 1 tablet (10 mg) by mouth daily   lisinopril (PRINIVIL,ZESTRIL) 10 MG tablet Take 1 tablet (10 mg) by mouth 2 times daily   spironolactone (ALDACTONE) 25 MG tablet Take 1 tablet (25 mg) by mouth daily   acetaminophen (TYLENOL) 650 MG CR tablet Take 650 mg by mouth 2 times daily   metoprolol (TOPROL-XL) 25 MG 24 hr tablet Take 1 tablet (25 mg) by mouth daily   atorvastatin (LIPITOR) 40 MG tablet Take 1 tablet (40 mg) by mouth daily   alendronate (FOSAMAX) 70 MG tablet Take 1 tablet (70 mg) by mouth every 7 days Take with over 8 ounces water and stay upright for at least 30 minutes after dose.  Take at least 60 minutes before breakfast   calcium carbonate (TUMS) 500 MG chewable tablet Take 1 chew tab by mouth  "every 4 hours as needed for heartburn   tamsulosin (FLOMAX) 0.4 MG 24 hr capsule Take 1 capsule (0.4 mg) by mouth 2 times daily   ascorbic acid (VITAMIN C) 500 MG CPCR Take 500 mg by mouth daily   VITAMIN D, CHOLECALCIFEROL, PO Take 400 Units by mouth daily   calcium carbonate (OS-SHELIA 500 MG [place]. [place]) 500 MG tablet Take 600 mg by mouth daily         Family History   (Reviewed and updated)  Family History   Problem Relation Age of Onset     CANCER Mother      CANCER Son      Unknown/Adopted Father      Alcoholism Brother          Social History   (Reviewed and updated)  Social History   Substance Use Topics     Smoking status: Former Smoker     Packs/day: 0.50     Years: 15.00     Types: Cigarettes     Quit date: 11/12/1998     Smokeless tobacco: Never Used      Comment: quit 15 yrs ago     Alcohol use No           Physical Exam   Vitals: Blood pressure 101/77, temperature 98.3  F (36.8  C), temperature source Oral, resp. rate 22, height 1.676 m (5' 6\"), weight 74.4 kg (164 lb), SpO2 94 %.  Gen: Elderly, tired, Resting comfortably, in no acute distress  Head: Normocephalic, atraumatic   Eyes: NELSON, EOMI   ENT: [place] with hearing aid in place, No sinus tenderness, oropharynx clear with no erythema or exudates, no LAD, neck supple, JVD at midneck   CV: Distant heart sounds very difficult to auscultate over raspy breath sounds and wheezing, normal rate, regular rhythm with intermittent ectopy, +systolic murmur   Resp: Somewhat loud exhaling but not in respiratory distress on 2LNC, good air movement bilaterally with +inspiratory and expiratory wheezing.  Abd: +BS, soft, NTND, no costovertebral angle tenderness, no peritoneal signs   Ext: Warm, well-perfused, 2+ edema to knees       Data   Labs:  BMP    Recent Labs  Lab 03/04/17  0722 03/03/17  0530 03/02/17  2309 03/02/17  0730    138 138 136   POTASSIUM 4.0 4.8 3.7 4.3   CHLORIDE 108 106 103 99   SHELIA 7.0* 7.1* 7.4* 8.1*   CO2 20 19* 24 24   BUN 24 25 25 28   CR " 0.88 1.20 1.25 0.97   * 129* 96 77     CBC    Recent Labs  Lab 03/04/17  0722 03/03/17  0530 03/02/17  0730   WBC 6.7 11.3* 6.2   RBC 3.28* 3.35* 3.51*   HGB 10.4* 10.6* 11.3*   HCT 32.3* 34.1* 34.9*   MCV 99 102* 99   MCH 31.7 31.6 32.2   MCHC 32.2 31.1* 32.4   RDW 14.2 14.5 14.2    222 223     INR    Recent Labs  Lab 03/02/17  0730   INR 0.94     EKG:  3/4 NSR, 1st deg AV Block and frequent PAC's   (Reviewed by me and Cardiology Fellow)     Microbiology:  -3/2 Influenza A +  -3/4 UCx + >100K citrobacter koseri    Imaging  All images reviewed.  Relevant imaging noted below    3/2 CXR   Enlarged cardiac silhouette. No significant pleural effusion. No pneumothorax. No appreciable edema.       ATTENDING ATTESTATION:  Patient has been seen and evaluated by me on March 5, 2017. I have reviewed the medications, laboratory, imaging, and other relevant results.  I agree with the above note which I have edited, as necessary.  I have discussed my assessment and plan with the house staff.    Patient in NSR with PAF with RVR. He is on Tikosyn. He also has influenza and UTI that is predisposing him to rapid ventricular rates. He is on antibiotics. Continue with beta blocker. Will ask EP to see him on Monday.    Vicky Dai MD, MS  Staff Cardiologist, [facility]  Pager: [phone]

## 2017-03-05 NOTE — PLAN OF CARE
"Problem: Goal Outcome Summary  Goal: Goal Outcome Summary  1) Monitor and tx to facilitate hemodynamic stability.  2) Monitor and tx pain to facilitate adequate pain control.  3) Educate and/or reinforce heart healthy habits.  4) Maintain fall and infection precautions.   Outcome: No Change  /74  Pulse 73  Temp 97.6  F (36.4  C) (Oral)  Resp 20  Ht 1.676 m (5' 6\")  Wt 74.4 kg (164 lb)  SpO2 98%  BMI 26.47 kg/m2         Pt was in  SR with frequent PVC's this morning, then had 2 small burst of AFib with RVR around 12:45pm, HR up to 145. Each episode lasted approximately 10 minutes and converted back to SR without any interventions. Stat EKG done, showed SR with PVC's. Pt asymptomatic, denies chest palpitations, or SOB. Team aware. EP consult ordered. K+ Mg wnl. Got IV Lasix 20mg this am.       "

## 2017-03-05 NOTE — PROGRESS NOTES
S-(situation):  Transfer to [facility] Via ambulance with ACLS.    B-(background): afib w/ RVR. Influenza A.    A-(assessment): Pt a/o x4.  Anxious. Afebrile. /84.  Will be going with card gtt at 15mg/hr.  L/s coarse with crackles.  Has a dry cough.  ILENE still present.      R-(recommendations): Transfer to [facility] per physician orders. Report called to Diomed[facility] NEW by Esperanza Lr RN. Continue to monitor pt and update physician as needed.      Code status: Full Code  Skin: scabs and bruises. Red coccyx.   Fall Risk: Yes- Department fall risk interventions implemented.  Isolation: Droplet  Core Measures (Stroke/TIA, MI, Pneumonia and SSI): n/a  Medication drips upon transfer: cardizem  Pneumonia Vaccine verified at discharge: No  Influenza status verified at discharge:  Yes  Belongings gathered and given to paramedic.

## 2017-03-05 NOTE — PROGRESS NOTES
[facility] Progress Note          Assessment and Plan:   Bud Merritt is a 89 year old with history of paroxysmal Afib, HTN, COPD, HLD, RA on prednisone, hx of malignant prostate neoplasm who is transferred from [facility] for ongoing management of paroxysmal Afib.     # Acute Hypoxemic Respiratory Failure, resolved  # Moderate COPD, with exacerbation secondary influenza A  No longer hypoxic, but briefly required BiPAP at [facility]. Will monitor for bacterial superinfection.  - continue Oseltamivir 30mg BID (Day 3 of 5)  - continue prednisone 40 mg (Day 4 of 5)  - start symbicort (new this admission)  - duonebs q4h  - flutter valve  - RT Chronic Disease consult    #Paroxysmal atrial fibrillation with RVR, resolved  #Sinus node dysfunction, possible tachy-deena  Now in sinus rhythm. No further episodes of symptomatic tachycardia or bradycardia. If he has no recurrences of these episodes, will likely be able to follow up with Dr Li as an outpatient regarding evaluation for PPM.  - continue home metoprolol  - continue dofetilide (was decreased to 125 mcg BID piror to transfer)  - continue apixaban    #Acute cystitis without hematuria  Urine culture has grown out Citrobacter. Will treat for 7 day course.  - cefdinir 300mg PO BID (Day 4 of 7)  - remove stein  - phenazopyridine, Tylenol PRN     # Hx of falls, weakness  - PT/OT consult  - fall precautions    # Chronic systolic heart failure, nonischemic, EF 40-45%  Had recovery of EF form 25-30%. Mildly volume up.  - continue metoprolol, lisinopril, spironolactone  - lasix IV today, restart home PO in AM    Chronic medical problems:  #Mitral Regurgitation, moderate Aortic Stenosis  #HTN - continue metoprolol, lisinopril, spironolactone  #RA - holding home prednisone (5 mg daily), continue leflunomide, tramadol PRN   #Prostate cancer on lupron - continue tamsulosin  #Hyperlipidemia - continue home Lipitor     FEN: cardiac  diet  Proph: on apixaban  Dispo: likely 1-2 days pending respiratory status    Code: FULL    I have discussed this patient and plan with Dr. Dai.    Bernard Barreto, PGY-2  Internal Medicine  [phone]        Interval History:   Notes reviewed. No acute events overnight.    Still short of breath, but improving. Coughing up sputum. No chest pain.        Review Of Systems   Negative except as stated above.          Medications:     Current Facility-Administered Medications   Medication     melatonin tablet 3 mg     LORazepam (ATIVAN) tablet 0.5 mg     acetaminophen (TYLENOL) tablet 650 mg     opium-belladonna (B&O SUPPRETTES) 30-16.2 MG per suppository 0.5 suppository     phenazopyridine (PYRIDIUM) tablet 100 mg     naloxone (NARCAN) injection 0.1-0.4 mg     lidocaine 1 % 1 mL     lidocaine (LMX4) kit     sodium chloride (PF) 0.9% PF flush 3 mL     sodium chloride (PF) 0.9% PF flush 3 mL     Patient is already receiving anticoagulation with heparin, enoxaparin (LOVENOX), warfarin (COUMADIN)  or other anticoagulant medication     senna-docusate (SENOKOT-S;PERICOLACE) 8.6-50 MG per tablet 1-2 tablet     apixaban ANTICOAGULANT (ELIQUIS) tablet 2.5 mg     ascorbic acid (VITAMIN C) tablet 500 mg     atorvastatin (LIPITOR) tablet 40 mg     calcium carbonate (OS-SHELIA 500 mg [place]. [place]) tablet 625 mg     calcium carbonate (TUMS) chewable tablet 500 mg     cefdinir (OMNICEF) capsule 300 mg     dofetilide (TIKOSYN) capsule 125 mcg     leflunomide (ARAVA) tablet 10 mg     metoprolol (TOPROL-XL) 24 hr tablet 25 mg     lisinopril (PRINIVIL/ZESTRIL) tablet 10 mg     oseltamivir (TAMIFLU) capsule 30 mg     spironolactone (ALDACTONE) tablet 25 mg     tamsulosin (FLOMAX) capsule 0.4 mg     traMADol (ULTRAM) tablet 50 mg     cholecalciferol (vitamin D) tablet 400 Units     albuterol neb solution 2.5 mg     ipratropium - albuterol 0.5 mg/2.5 mg/3 mL (DUONEB) neb solution 3 mL     predniSONE (DELTASONE) tablet 40 mg        Blood pressure  "115/84, temperature 98  F (36.7  C), temperature source Oral, resp. rate 20, height 1.676 m (5' 6\"), weight 74.4 kg (164 lb), SpO2 95 %.    General: lying in bed, tachypneic, and having difficulty finishing sentences  CV: very hard to hear, but seems regular at apex (only able to auscultate during exhalation)  Chest: mild crackles in left base with inspiratory wheeze; diffuse expiratory wheezes  Abdomen: Soft, nontender, nondistended. +BS  Extremities: 2+ pitting edema to mid shin bilaterally  Neuro:  Alert, face symmetric, moving all extremities         Data:   Reviewed. Details in EPIC.      ATTENDING ATTESTATION:  Patient has been seen and evaluated by me on March 5, 2017. I have reviewed the medications, laboratory, imaging, and other relevant results.  I agree with the above note which I have edited, as necessary.  I have discussed my assessment and plan with the house staff.    Vicky Dai MD, MS  Staff Cardiologist, [facility]  Pager: [phone]      "

## 2017-03-06 ENCOUNTER — APPOINTMENT (OUTPATIENT)
Dept: OCCUPATIONAL THERAPY | Facility: CLINIC | Age: 82
DRG: 227 | End: 2017-03-06
Attending: INTERNAL MEDICINE
Payer: MEDICARE

## 2017-03-06 ENCOUNTER — APPOINTMENT (OUTPATIENT)
Dept: CARDIOLOGY | Facility: CLINIC | Age: 82
DRG: 227 | End: 2017-03-06
Attending: INTERNAL MEDICINE
Payer: MEDICARE

## 2017-03-06 ENCOUNTER — APPOINTMENT (OUTPATIENT)
Dept: PHYSICAL THERAPY | Facility: CLINIC | Age: 82
DRG: 227 | End: 2017-03-06
Attending: INTERNAL MEDICINE
Payer: MEDICARE

## 2017-03-06 LAB
ALBUMIN SERPL-MCNC: 2.3 G/DL (ref 3.4–5)
ALP SERPL-CCNC: 56 U/L (ref 40–150)
ALT SERPL W P-5'-P-CCNC: 38 U/L (ref 0–70)
ANION GAP SERPL CALCULATED.3IONS-SCNC: 10 MMOL/L (ref 3–14)
AST SERPL W P-5'-P-CCNC: 78 U/L (ref 0–45)
BILIRUB SERPL-MCNC: 0.5 MG/DL (ref 0.2–1.3)
BUN SERPL-MCNC: 33 MG/DL (ref 7–30)
CALCIUM SERPL-MCNC: 8.1 MG/DL (ref 8.5–10.1)
CHLORIDE SERPL-SCNC: 107 MMOL/L (ref 94–109)
CHOLEST SERPL-MCNC: 139 MG/DL
CO2 SERPL-SCNC: 24 MMOL/L (ref 20–32)
CREAT SERPL-MCNC: 0.88 MG/DL (ref 0.66–1.25)
GFR SERPL CREATININE-BSD FRML MDRD: 81 ML/MIN/1.7M2
GLUCOSE SERPL-MCNC: 87 MG/DL (ref 70–99)
HDLC SERPL-MCNC: 34 MG/DL
INTERPRETATION ECG - MUSE: NORMAL
LACTATE BLD-SCNC: 1.3 MMOL/L (ref 0.7–2.1)
LDLC SERPL CALC-MCNC: 74 MG/DL
MAGNESIUM SERPL-MCNC: 2.3 MG/DL (ref 1.6–2.3)
NONHDLC SERPL-MCNC: 105 MG/DL
POTASSIUM SERPL-SCNC: 4.4 MMOL/L (ref 3.4–5.3)
POTASSIUM SERPL-SCNC: 4.4 MMOL/L (ref 3.4–5.3)
PROT SERPL-MCNC: 5.5 G/DL (ref 6.8–8.8)
SODIUM SERPL-SCNC: 140 MMOL/L (ref 133–144)
TRIGL SERPL-MCNC: 156 MG/DL

## 2017-03-06 PROCEDURE — 99232 SBSQ HOSP IP/OBS MODERATE 35: CPT | Mod: 25 | Performed by: INTERNAL MEDICINE

## 2017-03-06 PROCEDURE — A9270 NON-COVERED ITEM OR SERVICE: HCPCS | Mod: GY | Performed by: INTERNAL MEDICINE

## 2017-03-06 PROCEDURE — 25500064 ZZH RX 255 OP 636: Performed by: INTERNAL MEDICINE

## 2017-03-06 PROCEDURE — 97535 SELF CARE MNGMENT TRAINING: CPT | Mod: GO

## 2017-03-06 PROCEDURE — 25000128 H RX IP 250 OP 636: Performed by: STUDENT IN AN ORGANIZED HEALTH CARE EDUCATION/TRAINING PROGRAM

## 2017-03-06 PROCEDURE — 84132 ASSAY OF SERUM POTASSIUM: CPT | Performed by: STUDENT IN AN ORGANIZED HEALTH CARE EDUCATION/TRAINING PROGRAM

## 2017-03-06 PROCEDURE — 25000132 ZZH RX MED GY IP 250 OP 250 PS 637: Mod: GY | Performed by: STUDENT IN AN ORGANIZED HEALTH CARE EDUCATION/TRAINING PROGRAM

## 2017-03-06 PROCEDURE — 25000132 ZZH RX MED GY IP 250 OP 250 PS 637: Mod: GY

## 2017-03-06 PROCEDURE — 25000132 ZZH RX MED GY IP 250 OP 250 PS 637: Mod: GY | Performed by: INTERNAL MEDICINE

## 2017-03-06 PROCEDURE — 36415 COLL VENOUS BLD VENIPUNCTURE: CPT | Performed by: STUDENT IN AN ORGANIZED HEALTH CARE EDUCATION/TRAINING PROGRAM

## 2017-03-06 PROCEDURE — 25000125 ZZHC RX 250: Performed by: STUDENT IN AN ORGANIZED HEALTH CARE EDUCATION/TRAINING PROGRAM

## 2017-03-06 PROCEDURE — 25000125 ZZHC RX 250: Performed by: INTERNAL MEDICINE

## 2017-03-06 PROCEDURE — 40000264 ECHO LIMITED WITH LUMASON

## 2017-03-06 PROCEDURE — A9270 NON-COVERED ITEM OR SERVICE: HCPCS | Mod: GY

## 2017-03-06 PROCEDURE — 97116 GAIT TRAINING THERAPY: CPT | Mod: GP

## 2017-03-06 PROCEDURE — 93308 TTE F-UP OR LMTD: CPT | Mod: 26 | Performed by: INTERNAL MEDICINE

## 2017-03-06 PROCEDURE — 83735 ASSAY OF MAGNESIUM: CPT | Performed by: STUDENT IN AN ORGANIZED HEALTH CARE EDUCATION/TRAINING PROGRAM

## 2017-03-06 PROCEDURE — 97165 OT EVAL LOW COMPLEX 30 MIN: CPT | Mod: GO

## 2017-03-06 PROCEDURE — 93321 DOPPLER ECHO F-UP/LMTD STD: CPT | Mod: 26 | Performed by: INTERNAL MEDICINE

## 2017-03-06 PROCEDURE — 93005 ELECTROCARDIOGRAM TRACING: CPT

## 2017-03-06 PROCEDURE — 93010 ELECTROCARDIOGRAM REPORT: CPT | Performed by: INTERNAL MEDICINE

## 2017-03-06 PROCEDURE — 25000125 ZZHC RX 250

## 2017-03-06 PROCEDURE — 40000275 ZZH STATISTIC RCP TIME EA 10 MIN

## 2017-03-06 PROCEDURE — 93325 DOPPLER ECHO COLOR FLOW MAPG: CPT | Mod: 26 | Performed by: INTERNAL MEDICINE

## 2017-03-06 PROCEDURE — 21400003 ZZH R&B CCU CRITICAL UMMC

## 2017-03-06 PROCEDURE — 97161 PT EVAL LOW COMPLEX 20 MIN: CPT | Mod: GP

## 2017-03-06 PROCEDURE — 93308 TTE F-UP OR LMTD: CPT

## 2017-03-06 PROCEDURE — 36415 COLL VENOUS BLD VENIPUNCTURE: CPT | Performed by: INTERNAL MEDICINE

## 2017-03-06 PROCEDURE — 80053 COMPREHEN METABOLIC PANEL: CPT | Performed by: INTERNAL MEDICINE

## 2017-03-06 PROCEDURE — 40000193 ZZH STATISTIC PT WARD VISIT

## 2017-03-06 PROCEDURE — 94640 AIRWAY INHALATION TREATMENT: CPT | Mod: 76

## 2017-03-06 PROCEDURE — 83605 ASSAY OF LACTIC ACID: CPT | Performed by: INTERNAL MEDICINE

## 2017-03-06 PROCEDURE — 80061 LIPID PANEL: CPT | Performed by: INTERNAL MEDICINE

## 2017-03-06 PROCEDURE — 99222 1ST HOSP IP/OBS MODERATE 55: CPT | Mod: 25 | Performed by: NURSE PRACTITIONER

## 2017-03-06 PROCEDURE — 40000133 ZZH STATISTIC OT WARD VISIT

## 2017-03-06 PROCEDURE — 40000274 ZZH STATISTIC RCP CONSULT EA 30 MIN

## 2017-03-06 RX ORDER — IPRATROPIUM BROMIDE AND ALBUTEROL SULFATE 2.5; .5 MG/3ML; MG/3ML
3 SOLUTION RESPIRATORY (INHALATION)
Status: DISCONTINUED | OUTPATIENT
Start: 2017-03-06 | End: 2017-03-06

## 2017-03-06 RX ORDER — FUROSEMIDE 10 MG/ML
20 INJECTION INTRAMUSCULAR; INTRAVENOUS ONCE
Status: COMPLETED | OUTPATIENT
Start: 2017-03-06 | End: 2017-03-06

## 2017-03-06 RX ORDER — LEVALBUTEROL INHALATION SOLUTION 1.25 MG/3ML
1.25 SOLUTION RESPIRATORY (INHALATION) 3 TIMES DAILY
Status: DISCONTINUED | OUTPATIENT
Start: 2017-03-06 | End: 2017-03-09

## 2017-03-06 RX ADMIN — APIXABAN 2.5 MG: 2.5 TABLET, FILM COATED ORAL at 08:16

## 2017-03-06 RX ADMIN — ACETAMINOPHEN 650 MG: 325 TABLET, FILM COATED ORAL at 09:26

## 2017-03-06 RX ADMIN — APIXABAN 2.5 MG: 2.5 TABLET, FILM COATED ORAL at 19:50

## 2017-03-06 RX ADMIN — SPIRONOLACTONE 25 MG: 25 TABLET ORAL at 08:16

## 2017-03-06 RX ADMIN — FLUTICASONE FUROATE AND VILANTEROL TRIFENATATE 1 PUFF: 100; 25 POWDER RESPIRATORY (INHALATION) at 08:16

## 2017-03-06 RX ADMIN — OXYCODONE HYDROCHLORIDE AND ACETAMINOPHEN 500 MG: 500 TABLET ORAL at 08:15

## 2017-03-06 RX ADMIN — CEFDINIR 300 MG: 300 CAPSULE ORAL at 08:16

## 2017-03-06 RX ADMIN — IPRATROPIUM BROMIDE AND ALBUTEROL SULFATE 3 ML: .5; 3 SOLUTION RESPIRATORY (INHALATION) at 13:17

## 2017-03-06 RX ADMIN — METOPROLOL TARTRATE 5 MG: 5 INJECTION INTRAVENOUS at 22:41

## 2017-03-06 RX ADMIN — LEFLUNOMIDE 10 MG: 10 TABLET, FILM COATED ORAL at 08:16

## 2017-03-06 RX ADMIN — OSELTAMIVIR PHOSPHATE 30 MG: 30 CAPSULE ORAL at 08:15

## 2017-03-06 RX ADMIN — DOFETILIDE 125 MCG: 0.12 CAPSULE ORAL at 19:50

## 2017-03-06 RX ADMIN — OSELTAMIVIR PHOSPHATE 30 MG: 30 CAPSULE ORAL at 19:50

## 2017-03-06 RX ADMIN — LEVALBUTEROL HYDROCHLORIDE 1.25 MG: 1.25 SOLUTION RESPIRATORY (INHALATION) at 21:03

## 2017-03-06 RX ADMIN — METOPROLOL TARTRATE 12.5 MG: 25 TABLET, FILM COATED ORAL at 21:42

## 2017-03-06 RX ADMIN — LISINOPRIL 10 MG: 10 TABLET ORAL at 08:16

## 2017-03-06 RX ADMIN — CALCIUM 1250 MG: 500 TABLET ORAL at 08:15

## 2017-03-06 RX ADMIN — FUROSEMIDE 20 MG: 20 TABLET ORAL at 08:15

## 2017-03-06 RX ADMIN — ACETAMINOPHEN 650 MG: 325 TABLET, FILM COATED ORAL at 19:50

## 2017-03-06 RX ADMIN — CEFDINIR 300 MG: 300 CAPSULE ORAL at 19:50

## 2017-03-06 RX ADMIN — TRAMADOL HYDROCHLORIDE 50 MG: 50 TABLET, COATED ORAL at 03:19

## 2017-03-06 RX ADMIN — TRAMADOL HYDROCHLORIDE 50 MG: 50 TABLET, COATED ORAL at 09:26

## 2017-03-06 RX ADMIN — ATORVASTATIN CALCIUM 40 MG: 40 TABLET, FILM COATED ORAL at 08:15

## 2017-03-06 RX ADMIN — PREDNISONE 40 MG: 20 TABLET ORAL at 08:15

## 2017-03-06 RX ADMIN — FUROSEMIDE 20 MG: 10 INJECTION, SOLUTION INTRAVENOUS at 17:17

## 2017-03-06 RX ADMIN — TRAMADOL HYDROCHLORIDE 50 MG: 50 TABLET, COATED ORAL at 16:13

## 2017-03-06 RX ADMIN — TAMSULOSIN HYDROCHLORIDE 0.4 MG: 0.4 CAPSULE ORAL at 19:50

## 2017-03-06 RX ADMIN — CHOLECALCIFEROL TAB 10 MCG (400 UNIT) 400 UNITS: 10 TAB at 08:15

## 2017-03-06 RX ADMIN — SULFUR HEXAFLUORIDE 5 ML: KIT at 12:17

## 2017-03-06 RX ADMIN — METOPROLOL SUCCINATE 25 MG: 25 TABLET, FILM COATED, EXTENDED RELEASE ORAL at 08:15

## 2017-03-06 RX ADMIN — TAMSULOSIN HYDROCHLORIDE 0.4 MG: 0.4 CAPSULE ORAL at 08:15

## 2017-03-06 RX ADMIN — ACETAMINOPHEN 650 MG: 325 TABLET, FILM COATED ORAL at 03:19

## 2017-03-06 RX ADMIN — LISINOPRIL 10 MG: 10 TABLET ORAL at 19:50

## 2017-03-06 RX ADMIN — DOFETILIDE 125 MCG: 0.12 CAPSULE ORAL at 08:16

## 2017-03-06 ASSESSMENT — PAIN DESCRIPTION - DESCRIPTORS
DESCRIPTORS: ACHING

## 2017-03-06 NOTE — PLAN OF CARE
Problem: Goal Outcome Summary  Goal: Goal Outcome Summary  1) Monitor and tx to facilitate hemodynamic stability.  2) Monitor and tx pain to facilitate adequate pain control.  3) Educate and/or reinforce heart healthy habits.  4) Maintain fall and infection precautions.   Outcome: No Change  D: Admitted 3/4 from [facility] for ongoing management of paroxysmal A-fib  I/A: A&Ox4. VSS on 2L NC. Intermittently dyspneic. Cont. pulse ox for monitoring, O2 sats maintained >90%. SR 80s-90s with frequent PVCs. (prolonged episode of A-tach- see provider notification note). C/o headache and generalized pain partially relieved by PRN tylenol and tramadol (pt. also says watered down coffee seemed to help). Adequate uop. Bed alarm on for safety. Slept well between cares.    P: Continue to monitor and notify Cards 1 with questions/concerns.

## 2017-03-06 NOTE — PROGRESS NOTES
"CLINICAL NUTRITION SERVICES - ASSESSMENT NOTE     Nutrition Prescription    RECOMMENDATIONS FOR MDs/PROVIDERS TO ORDER:  None at this time    Malnutrition Status:    Severe malnutrition in the context of chronic illness     Recommendations already ordered by Registered Dietitian (RD):  -Continue diet as ordered  -Medical food supplement therapy - Magic cup once daily at 2:00pm    Future/Additional Recommendations:  -Monitor PO intake and ONS and need for increased ONS and calorie counts  -Monitor need for Thiamin supplementation      REASON FOR ASSESSMENT  Bud Merritt is a/an 89 year old male assessed by the dietitian for Admission Nutrition Risk Screen for reduced oral intake over the last month    NUTRITION HISTORY  -Pt follows a regular diet at home however reports avoiding some foods high Na such as pickles. Pt reports NKFA but avoids pork. Pt reports only consuming 2 meals and grazing on some snacks per day.  -Diet: B 4:30am pancakes and eggs or Persian toast or oatmeal; 3-4:00pm spaghetti and hash browns; snacks include cookies and only reported beverage is water.   -Pt reports very poor appetite over the last 2 years due to medications and taste changes. - suspect pt is not meeting energy and protein needs.  -Pt states that he has tried Ensure/Boost in the past and it has made him sick. Would be open to trying a magic cup.      CURRENT NUTRITION ORDERS  Diet: Low Saturated Fat/2400 mg Sodium  Intake/Tolerance: Patient ordered 3 meals yesterday (882 kcal and 56g Pro) however, pt reports only consuming half of the meals ordered here.     LABS  Labs reviewed    MEDICATIONS  Medications reviewed    ANTHROPOMETRICS  Height: 167.6 cm (5' 6\")  Most Recent Weight: 72.5 kg (159 lb 14.4 oz)    IBW: 64.5 kg  BMI: Overweight BMI 25-29.9  Weight History:   Wt Readings from Last 10 Encounters:   03/06/17 72.5 kg (159 lb 14.4 oz)   03/02/17 69 kg (152 lb 1.9 oz)   02/27/17 70.8 kg (156 lb 1.6 oz)   02/02/17 67.6 kg (149 " lb)   01/26/17 64.9 kg (143 lb)   01/09/17 68.2 kg (150 lb 4.8 oz)   12/22/16 66.2 kg (146 lb)   12/20/16 68 kg (149 lb 14.4 oz)   09/29/16 66 kg (145 lb 8 oz)   09/08/16 67.5 kg (148 lb 14.4 oz)   - Wt gain of 4.9kg (7.2%) over the last month - difficult to assess due to fluid status     Dosing Weight: 72.5 kg (based on actual wt on 3/6, IBW 64.5)    ASSESSED NUTRITION NEEDS  Estimated Energy Needs: 9179-3137 kcals/day (25 - 30 kcals/kg)  Justification: Maintenance  Estimated Protein Needs: 87-109grams protein/day (1.2 - 1.5 grams of pro/kg)  Justification: Increased needs  Estimated Fluid Needs: 1929-6756 mL/day (1 mL/kcal) - or per MD recommendations   Justification: Maintenance    PHYSICAL FINDINGS  See malnutrition section below.    MALNUTRITION  % Intake: </= 50% for >/= 5 days (severe)  % Weight Loss: None noted- difficult to assess due to fluid status   Subcutaneous Fat Loss: None observed - difficult to assess due to fluid status  Muscle Loss: Temporal:  Mild to moderate- difficult to assess due to fluid status  Fluid Accumulation/Edema: Moderate 3+ Leg, Ankle and foot  Malnutrition Diagnosis: Severe malnutrition in the context of chronic illness     NUTRITION DIAGNOSIS  Inadequate protein-energy intake related to poor appetite and taste changes as evidenced by reports of decreased intake of 50% over the last 5 days and prolonged inadequate intake over the last 2 years.    INTERVENTIONS  Implementation  Nutrition Education: Provided education on role of RD, ONS and importance of three meals per day   Medical food supplement therapy - Magic cup once daily at 2:00pm (any flavor)  Multivitamin/mineral supplement therapy     Goals  Patient to consume % of nutritionally adequate meal trays TID, or the equivalent with supplements/snacks.     Monitoring/Evaluation  Progress toward goals will be monitored and evaluated per protocol.      Glo Perez, Dietetic Intern   P: [phone]    I have read the  assessment by the dietetic intern and agree with the note.  Jerica Wall MS/RD/LD/CNSC  6C RD Pager: [phone]

## 2017-03-06 NOTE — PLAN OF CARE
"Problem: Goal Outcome Summary  Goal: Goal Outcome Summary  1) Monitor and tx to facilitate hemodynamic stability.  2) Monitor and tx pain to facilitate adequate pain control.  3) Educate and/or reinforce heart healthy habits.  4) Maintain fall and infection precautions.   Outcome: No Change  /78 (BP Location: Left arm)  Pulse 73  Temp 97.8  F (36.6  C) (Oral)  Resp 28  Ht 1.676 m (5' 6\")  Wt 74.4 kg (164 lb)  SpO2 97%  BMI 26.47 kg/m2        VSS. Parrish removed at 5pm, needs to void by 11pm.       "

## 2017-03-06 NOTE — PROGRESS NOTES
[facility]  CARDIOLGOY PROGRESS NOTE  Bud Merritt [id number]  03/06/2017   ___________________________________  ASSESSMENT & PLAN:  Bud Merritt is a 89 year old with history of paroxysmal Afib, HTN, COPD, HLD, RA on prednisone, hx of malignant prostate neoplasm who is transferred from [facility] for ongoing management of paroxysmal Afib and influenza A.     Plan for today:   - TTE with improved heart rate to assess cardiac function   - Scheduled duonebs   - EP consult recommendations     # Acute Hypoxemic Respiratory Failure, resolved  # Moderate COPD, with exacerbation secondary influenza A  No longer hypoxic, but briefly required BiPAP at [facility]. Will monitor for bacterial superinfection.  - Continue Tamiflu 30 mg BID (Day 4 of 5)  - Continue prednisone 40 mg (Day 5 of 5)  - Duonebs QID + albuterol nebs PRN  - Flutter valve  - Droplet precautions  - RT Chronic Disease consult     # Paroxysmal atrial fibrillation with RVR, resolved  # Sinus node dysfunction, possible tachy-deena vs SSS  Now in sinus rhythm with intermittent bursts of symptomatic tachycardia. No episodes of bradycardia.  - Continue home metoprolol  - Continue dofetilide (was decreased to 125 mcg BID piror to transfer)  - Continue apixaban  - EP consult, appreciate recommendations     # Acute cystitis without hematuria  Urine culture has grown out Citrobacter. Will treat for 7 day course.  - Cefdinir 300mg PO BID (Day 5 of 7)  - Parrish discontinued  - Phenazopyridine, Tylenol PRN      # Hx of falls, weakness  - PT/OT consult  - Fall precautions     # Chronic systolic heart failure, nonischemic, EF 40-45%  Had recovery of EF form 25-30%. Mildly volume up.  - Continue metoprolol, lisinopril, spironolactone  - Continue home furosemide 20 mg daily     Chronic medical problems:  # Mitral Regurgitation, Moderate Aortic Stenosis  # HTN - Continue metoprolol, lisinopril, spironolactone  # RA - Holding home prednisone (5 mg  "daily) - resume following burst, continue leflunomide, tramadol PRN   # Prostate cancer on lupron - Continue tamsulosin  # Hyperlipidemia - Continue home atorvastatin     FEN: Cardiac diet  Proph: Apixaban  Dispo: Inpatient - likely 2-3 days or more.  Code Status: Full Code     Patient seen and discussed with staff physician, Dr. Farnsworth, who is agreeable with above plan.    Blossom Vines MD  PGY-3, Internal Medicine  Pager: [phone]    24 hour events:  No acute events overnight - ongoing episodes of tachycardia to 140s-150s, patient is symptomatic with episodes (feels crummy, occasional palpitations). Nursing notes reviewed. No chest pain. Shortness of breath improved. No abdominal pain.     OBJECTIVE:  Vitals:  VS: /72 (BP Location: Left arm)  Pulse 73  Temp 98.3  F (36.8  C) (Oral)  Resp 22  Ht 1.676 m (5' 6\")  Wt 72.5 kg (159 lb 14.4 oz)  SpO2 97%  BMI 25.81 kg/m2   GEN: Sitting in bed, moderate distress   CV:  Tachycardic, regular, no murmur  PULM: Coarse breath sounds with referred upper airway sounds, no wheezing  ABD: Normoactive bowel sounds, soft, ND, NT  SKIN: No rashes noted  EXT: WWP, 2+ BLE edema  NEURO: no focal deficits      DIAGNOSTIC STUDIES  CMP  Recent Labs  Lab 03/06/17  0848 03/05/17  0821 03/04/17  0722 03/03/17  0530 03/02/17  2309    139 141 138 138   POTASSIUM 4.4 4.3 4.0 4.8 3.7   CHLORIDE 107 109 108 106 103   CO2 24 22 20 19* 24   ANIONGAP 10 8 13 13 11   GLC 87 91 139* 129* 96   BUN 33* 28 24 25 25   CR 0.88 0.96 0.88 1.20 1.25   GFRESTIMATED 81 74 81 57* 54*   GFRESTBLACK >90African American GFR Calc 89 >90African American GFR Calc 69 66   SHELIA 8.1* 7.5* 7.0* 7.1* 7.4*   MAG  --  2.4*  --   --  2.1   PROTTOTAL 5.5* 5.3*  --   --   --    ALBUMIN 2.3* 2.2*  --   --   --    BILITOTAL 0.5 0.4  --   --   --    ALKPHOS 56 56  --   --   --    AST 78* 102*  --   --   --    ALT 38 36  --   --   --      CBC  Recent Labs  Lab 03/05/17  0821 03/04/17  0722 03/03/17  0530 " 03/02/17  0730   WBC 6.8 6.7 11.3* 6.2   RBC 3.19* 3.28* 3.35* 3.51*   HGB 10.2* 10.4* 10.6* 11.3*   HCT 31.2* 32.3* 34.1* 34.9*   MCV 98 99 102* 99   MCH 32.0 31.7 31.6 32.2   MCHC 32.7 32.2 31.1* 32.4   RDW 14.3 14.2 14.5 14.2    190 222 223     INR  Recent Labs  Lab 03/02/17  0730   INR 0.94     Patient has been seen and evaluated by me on March 6, 2017. I have reviewed the medications, laboratory, imaging, and other relevant results.  I agree with the above note which I have edited, as necessary.  I have discussed my assessment and plan with the house staff.    Rao Farnsworth MD, PhD  Professor of Medicine  Division of Cardiology

## 2017-03-06 NOTE — PROGRESS NOTES
At 0345 Cards Cross-cover notified about patient's sustained HR in the 140s and suspect for A-fib/flutter conversion (converted sometime between 0200 and 0330, difficult to assess how long and at what time exactly conversion occured as the patient's HR was not sustained in the 140s until after 0330). Stat EKG obtained and revealed A-tach (per Cards MD interpretation) in the 140s. At 0400 patient's HR returned to the 80s-90s but still appeared dysrhythmic. Cards Cross-cover requested additional stat EKG and at this time confirms SR 80s. VSS. Continue to monitor.

## 2017-03-06 NOTE — PROGRESS NOTES
"   03/06/17 1500   Quick Adds   Type of Visit Initial PT Evaluation   Living Environment   Lives With spouse   Living Arrangements house   Home Accessibility bed and bath on same level   Number of Stairs to Enter Home 0   Number of Stairs Within Home 0   Transportation Available family or friend will provide   Living Environment Comment Pt lives at home with wife. Home is a multi-level home, however reports he only uses the basement which he can enter into via garage, no steps to enter. Pt has bedroom and bathroom downstairs.  Reports he was a  and very active, still \"splitting logs in the woods\".   Self-Care   Usual Activity Tolerance good   Current Activity Tolerance fair   Equipment Currently Used at Home cane, straight;walker, rolling   Activity/Exercise/Self-Care Comment Pt reports being very active at baseline with SEC.    Functional Level Prior   Ambulation 1-->assistive equipment   Transferring 1-->assistive equipment   Toileting 1-->assistive equipment   Communication 0-->understands/communicates without difficulty   Cognition 0 - no cognition issues reported   Fall history within last six months yes   Number of times patient has fallen within last six months 1  (pt reports more than one but \"does not keep track\".  )   Which of the above functional risks had a recent onset or change? ambulation;transferring   Prior Functional Level Comment IND with all mobility and ADL   General Information   Onset of Illness/Injury or Date of Surgery - Date 03/04/17   Referring Physician Ailin Lee MD   Patient/Family Goals Statement return to PLOF   Pertinent History of Current Problem (include personal factors and/or comorbidities that impact the POC) 89 year old with history of paroxysmal Afib, HTN, COPD, HLD, RA on prednisone, hx of malignant prostate neoplasm who is transferred from [facility] for ongoing management of paroxysmal Afib.    Precautions/Limitations fall precautions   General " "Observations Pt reports sitting in bed too long and feels weak.     General Info Comments [place]   Cognitive Status Examination   Orientation orientation to person, place and time   Level of Consciousness alert   Follows Commands and Answers Questions 100% of the time   Personal Safety and Judgment intact   Memory intact   Posture    Posture Kyphosis   Range of Motion (ROM)   ROM Comment WFL   Strength   Strength Comments WFL   Bed Mobility   Bed Mobility Comments Declines bed mobility.  Reports only sleeping in recliner at home secondary to arthritis.    Transfer Skills   Transfer Comments CGAx1 from STS and transfers with FWW.    Gait   Gait Comments Ambualtes 30ft with FWW and CGAx1.    General Therapy Interventions   Planned Therapy Interventions balance training;gait training;neuromuscular re-education;strengthening;transfer training;risk factor education;home program guidelines;progressive activity/exercise   Clinical Impression   Criteria for Skilled Therapeutic Intervention yes, treatment indicated   PT Diagnosis impaired functional mobility   Influenced by the following impairments arthritic pain, weakness, fatigue, poor appetite.    Functional limitations due to impairments IND mobility   Clinical Presentation Evolving/Changing   Clinical Presentation Rationale Pt demonstrates good initial mobility but poor apetite, weakness and pain limit mobility   Clinical Decision Making (Complexity) Low complexity   Therapy Frequency` other (see comments)   Predicted Duration of Therapy Intervention (days/wks) (4x/wk)   Anticipated Discharge Disposition Home with Assist   Risk & Benefits of therapy have been explained Yes   Patient, Family & other staff in agreement with plan of care Yes   [facility] [employer]-PAC TM \"6 Clicks\"   2016, Trustees of [facility], under license to [employer].  All rights reserved.   6 Clicks Short Forms Basic Mobility Inpatient Short Form   [facility] AM-PAC  \"6 Clicks\" V.2 " Basic Mobility Inpatient Short Form   1. Turning from your back to your side while in a flat bed without using bedrails? 2 - A Lot   2. Moving from lying on your back to sitting on the side of a flat bed without using bedrails? 2 - A Lot   3. Moving to and from a bed to a chair (including a wheelchair)? 3 - A Little   4. Standing up from a chair using your arms (e.g., wheelchair, or bedside chair)? 3 - A Little   5. To walk in hospital room? 3 - A Little   6. Climbing 3-5 steps with a railing? 2 - A Lot   Basic Mobility Raw Score (Score out of 24.Lower scores equate to lower levels of function) 15   Total Evaluation Time   Total Evaluation Time (Minutes) 12

## 2017-03-06 NOTE — PROGRESS NOTES
" 03/06/17 0944   Quick Adds   Type of Visit Initial Occupational Therapy Evaluation   Living Environment   Lives With spouse   Living Arrangements house   Home Accessibility bed and bath on same level   Number of Stairs to Enter Home 0   Number of Stairs Within Home 0   Transportation Available family or friend will provide   Living Environment Comment Pt lives at home with wife. Home is a multi-level home, however reports he only uses the basement which he can enter into via garage, no steps to enter. Pt has bedroom and bathroom downstairs.    Self-Care   Dominant Hand right   Usual Activity Tolerance poor   Current Activity Tolerance poor   Regular Exercise no   Equipment Currently Used at Home grab bar;shower chair;walker, janneth;walker, rolling;raised toilet   Activity/Exercise/Self-Care Comment Pt reports declining activity tolerance due to weakness and \"not feeling well\"   Functional Level Prior   Ambulation 1-->assistive equipment   Transferring 2-->assistive person   Toileting 1-->assistive equipment   Bathing 1-->assistive equipment   Dressing 2-->assistive person   Eating 0-->independent   Communication 0-->understands/communicates without difficulty   Swallowing 0-->swallows foods/liquids without difficulty   Cognition 1 - attention or memory deficits   Fall history within last six months yes   Number of times patient has fallen within last six months 1   Which of the above functional risks had a recent onset or change? ambulation;transferring;fall history;cognition;dressing   General Information   Onset of Illness/Injury or Date of Surgery - Date 03/04/17   Referring Physician Ailin Lee MD   Patient/Family Goals Statement To return home   Additional Occupational Profile Info/Pertinent History of Current Problem Bud Merritt is a 89 year old with history of paroxysmal Afib, HTN, COPD, HLD, RA on prednisone, hx of malignant prostate neoplasm who is transferred from [facility] for " ongoing management of paroxysmal Afib.    Precautions/Limitations fall precautions   Weight-Bearing Status - LUE full weight-bearing   Weight-Bearing Status - RUE full weight-bearing   Weight-Bearing Status - LLE full weight-bearing   Weight-Bearing Status - RLE full weight-bearing   Heart Disease Risk Factors Lack of physical activity;Stress;Family history;Medical history;Gender;Age   General Observations Pt supine on 3L O2 with resting HR 100s, SpO2 96-98%   General Info Comments Up with assist   Cognitive Status Examination   Orientation orientation to person, place and time   Level of Consciousness alert   Able to Follow Commands mild impairment  (Pt hard of hearing)   Personal Safety (Cognitive) WNL/WFL   Memory impaired   Attention Reports problems attending   Cognitive Comment Pt reports noticing changes in cognition including memory and attention   Visual Perception   Visual Perception No deficits were identified;Wears glasses   Sensory Examination   Sensory Quick Adds No deficits were identified   Pain Assessment   Patient Currently in Pain Yes, see Vital Sign flowsheet   Integumentary/Edema   Integumentary/Edema no deficits were identifed   Posture   Posture forward head position   Range of Motion (ROM)   ROM Quick Adds Other (describe)   ROM Comment Pt with decreased ROM in B hands due to arthritis   Strength   Manual Muscle Testing Quick Adds Other   Strength Comments Pt with decreased strength and deconditioning   Hand Strength   Hand Strength Comments Pt is limited due to arthritis, demonstrated weak grasp in bilateral hands   Muscle Tone Assessment   Muscle Tone Quick Adds No deficits were identified   Coordination   Fine Motor Coordination Impaired   Functional Limitations Decreased speed;Fine motor ADL performance impaired   Coordination Comments Pt with basline arthritis in B hands impacting fine motor tasks   Mobility   Bed Mobility Comments Min A    Transfer Skill: Sit to Stand   Level of  "[place]: Sit/Stand contact guard   Physical Assist/Nonphysical Assist: Sit/Stand set-up required;supervision   Transfer Skill: Sit to Stand full weight-bearing   Assistive Device for Transfer: Sit/Stand standard walker   Grooming   Level of [place]: Grooming contact guard   Physical Assist/Nonphysical Assist: Grooming set-up required;1 person assist   Instrumental Activities of Daily Living (IADL)   IADL Comments Wife fully assists with IADLs as needed.   Activities of Daily Living Analysis   Impairments Contributing to Impaired Activities of Daily Living balance impaired;cognition impaired;flexibility decreased;pain;postural control impaired;ROM decreased;strength decreased   General Therapy Interventions   Planned Therapy Interventions ADL retraining;IADL retraining;balance training;cognition;fine motor coordination training;ROM;strengthening;home program guidelines;progressive activity/exercise;risk factor education   Clinical Impression   Criteria for Skilled Therapeutic Interventions Met yes, treatment indicated   OT Diagnosis Decreased independence in ADLs/IADLs   Influenced by the following impairments SOB, decreased strength/activity tolerance, pain, decreased fine motor dexterity/strength, weakness in RLE due to bursitis   Assessment of Occupational Performance 3-5 Performance Deficits   Identified Performance Deficits bathing, dressing, transferring, community mobility   Clinical Decision Making (Complexity) Low complexity   Therapy Frequency 5 times/wk   Predicted Duration of Therapy Intervention (days/wks) 2 weeks   Anticipated Equipment Needs at Discharge (TBD)   Anticipated Discharge Disposition Transitional Care Facility   Risks and Benefits of Treatment have been explained. Yes   Patient, Family & other staff in agreement with plan of care Yes   [facility] AM-PAC  \"6 Clicks\" Daily Activity Inpatient Short Form   1. Putting on and taking off regular lower body clothing? 2 - A Lot   2. " Bathing (including washing, rinsing, drying)? 2 - A Lot   3. Toileting, which includes using toilet, bedpan or urinal? 3 - A Little   4. Putting on and taking off regular upper body clothing? 3 - A Little   5. Taking care of personal grooming such as brushing teeth? 3 - A Little   6. Eating meals? 3 - A Little   Daily Activity Raw Score (Score out of 24.Lower scores equate to lower levels of function) 16   Total Evaluation Time   Total Evaluation Time (Minutes) 8

## 2017-03-06 NOTE — PROGRESS NOTES
Pt currently hospitalized will follow up at disposition.     Yumiko Hallman RN,BSN  Clinical Care Coordinator    [facility] [phone]  [facility] [phone]

## 2017-03-06 NOTE — CONSULTS
Electrophysiology Consultation Note   EP Attending: .   Reason for consultation: AF.   Provider requesting consultation: , Cardiology I Service.  Date of Service: 3/6/2017      HPI:   Mr. Merritt is an 89 year old male who has a past medical history significant for PAF (CHADVASC 4 on Eliquis), SVT, HTN, COPD, HLD, RA on prednisone, hx of malignant prostate neoplasm, and cardiomyopathy LVEF 40-45%. He was admitted to [facility] after presenting with AF with RVR. He was placed on diltiazem gtt and he spontaneously converted. Over course of hospitalization at [facility], patient's HRs reportedly fluctuated from 50's to 170's. He was reportedly hypotensive with RVR and reported symptoms of HA, facial flushing, and generalized sense of unwellness. Given patient's allergy to amiodarone as well as current use of an antiarrhythmic, beta blocker, and max dosing on diltiazem drip for calcium channel blocker,decision was made to transfer to [facility] for further evaluation and management. He had 2 runs of AF with RVR yesterday HRs up to 140's. Each episode lasting about 10 minutes and spontaneously resolving. This morning he had two more episodes of AF/AT with RVR that spontaneously converted. He is being treated for COPD exacerbation and secondary influenza A. He has a known history of PAF for which he had been on amiodarone that needed to be stopped due to reduced DLCO. He was then transitioned to dofetilide which he had been on as an outpatient. Most recent echo shows LVEF 40-45% with moderate to severe AS. Renal function and electrolytes stable. He reports feeling unwell today and more SOB. Current cardiac medications include: Eliquis, Lipitor, Dofetilide, Lasix, Lisinopril, Toprol XL, and Spironolactone.       Past Medical History:   Past Medical History   Diagnosis Date     Atrial fibrillation (H) 07/01/2016     Cardiomyopathy (H)      COPD exacerbation (H) 3/2/2017     Paroxysmal atrial fibrillation  (H) 7/6/2016     Pure hypercholesterolemia      Rheumatoid arthritis(714.0)      Unspecified essential hypertension      Weakness generalized 3/2/2017     Past Surgical History:   Past Surgical History   Procedure Laterality Date     Hc repair ing hernia,5+y/o,reducibl  1996     Marlex mesh repair of bilateral inguinal hernias and drainage of bilateral scrotal hydroceles.     Hc colonoscopy thru stoma w biopsy/cautery tumor/polyp/lesion  8/31/2004     Colonoscopy  08/24/09     Arthroplasty knee Left 1/12/2016     Procedure: ARTHROPLASTY KNEE;  Surgeon: Cesar Walker DO;  Location: [facility] OR     Irrigation and debridement soft tissue lower extremity, combined Left 3/15/2016     Procedure: COMBINED IRRIGATION AND DEBRIDEMENT SOFT TISSUE LOWER EXTREMITY;  Surgeon: Cesar Walker DO;  Location: [facility] OR     Allergies: Per MAR     Allergies   Allergen Reactions     Amiodarone Other (See Comments)     Drop in DLCO     No Known Drug Allergies      Medications:   Per MAR current outpatient cardiovascular medications include: Prescriptions Prior to Admission   Medication Sig Dispense Refill Last Dose     oseltamivir (TAMIFLU) 30 MG capsule Take 1 capsule (30 mg) by mouth 2 times daily 50 capsule       diltiazem (CARDIZEM) 125 mg in NaCl 125 mL infusion Inject 5-15 mg/hr into the vein continuous        cefdinir (OMNICEF) 300 MG capsule Take 1 capsule (300 mg) by mouth 2 times daily  0      predniSONE (DELTASONE) 5 MG tablet Take 1 tablet (5 mg) by mouth daily 100 tablet 4 3/1/2017 at 0800     traMADol (ULTRAM) 50 MG tablet TAKE 1 TABLET 3 TIMES DAILY AS NEEDED FOR MODERATE PAIN 90 tablet 0 3/2/2017 at 0200     dofetilide (TIKOSYN) 125 MCG capsule Take 1 capsule (125 mcg) by mouth 2 times daily 60 capsule 5 3/1/2017 at 1600     furosemide (LASIX) 20 MG tablet Take 1 tablet (20 mg) by mouth daily Or as directed by cardiology 18 tablet 03 3/1/2017 at 0800     apixaban ANTICOAGULANT (ELIQUIS) 2.5 MG tablet Take 1  tablet (2.5 mg) by mouth 2 times daily 180 tablet 3 3/1/2017 at 1600     Leuprolide Acetate (LUPRON DEPOT IM) Inject into the muscle every 4 months   Unknown at Unknown time     leflunomide (ARAVA) 10 MG tablet Take 1 tablet (10 mg) by mouth daily 30 tablet 11 3/1/2017 at 0800     lisinopril (PRINIVIL,ZESTRIL) 10 MG tablet Take 1 tablet (10 mg) by mouth 2 times daily 62 tablet 11 3/1/2017 at 1600     spironolactone (ALDACTONE) 25 MG tablet Take 1 tablet (25 mg) by mouth daily 30 tablet 11 3/1/2017 at 0800     acetaminophen (TYLENOL) 650 MG CR tablet Take 650 mg by mouth 2 times daily   3/2/2017 at 0200     metoprolol (TOPROL-XL) 25 MG 24 hr tablet Take 1 tablet (25 mg) by mouth daily 90 tablet 3 3/1/2017 at 0800     atorvastatin (LIPITOR) 40 MG tablet Take 1 tablet (40 mg) by mouth daily 90 tablet 3 3/1/2017 at 0800     alendronate (FOSAMAX) 70 MG tablet Take 1 tablet (70 mg) by mouth every 7 days Take with over 8 ounces water and stay upright for at least 30 minutes after dose.  Take at least 60 minutes before breakfast 12 tablet 3 3/1/2017 at 0800     calcium carbonate (TUMS) 500 MG chewable tablet Take 1 chew tab by mouth every 4 hours as needed for heartburn   More than a month at Unknown time     tamsulosin (FLOMAX) 0.4 MG 24 hr capsule Take 1 capsule (0.4 mg) by mouth 2 times daily 60 capsule  Taking     ascorbic acid (VITAMIN C) 500 MG CPCR Take 500 mg by mouth daily   3/1/2017 at 0800     VITAMIN D, CHOLECALCIFEROL, PO Take 400 Units by mouth daily   Taking     calcium carbonate (OS-SHELIA 500 MG [place]) 500 MG tablet Take 600 mg by mouth daily   3/1/2017 at 0800     No current outpatient prescriptions on file.     Current Facility-Administered Medications   Medication Dose Route Frequency     LORazepam  0.5 mg Oral Once     calcium carbonate  1,250 mg Oral Daily     furosemide  20 mg Oral Daily     fluticasone-vilanterol  1 puff Inhalation Daily     nystatin  500,000 Units Oral 4x Daily     sodium chloride  "(PF)  3 mL Intracatheter Q8H     senna-docusate  1-2 tablet Oral BID     apixaban ANTICOAGULANT  2.5 mg Oral BID     ascorbic acid  500 mg Oral Daily     atorvastatin  40 mg Oral Daily     cefdinir  300 mg Oral BID     dofetilide  125 mcg Oral BID     leflunomide  10 mg Oral Daily     metoprolol  25 mg Oral Daily     lisinopril  10 mg Oral BID     oseltamivir  30 mg Oral BID     spironolactone  25 mg Oral Daily     tamsulosin  0.4 mg Oral BID     cholecalciferol  400 Units Oral Daily     predniSONE  40 mg Oral Daily     Family History:   Family History   Problem Relation Age of Onset     CANCER Mother      CANCER Son      Unknown/Adopted Father      Alcoholism Brother      Social History:   Social History   Substance Use Topics     Smoking status: Former Smoker     Packs/day: 0.50     Years: 15.00     Types: Cigarettes     Quit date: 11/12/1998     Smokeless tobacco: Never Used      Comment: quit 15 yrs ago     Alcohol use No       ROS:   A comprehensive 10 point ROS was negative other than as mentioned in HPI.    Physical Examination:   VITALS: /83 (BP Location: Left arm)  Pulse 73  Temp 98.3  F (36.8  C) (Oral)  Resp 22  Ht 1.676 m (5' 6\")  Wt 72.5 kg (159 lb 14.4 oz)  SpO2 96%  BMI 25.81 kg/m2  GENERAL APPEARANCE: AxO, NAD   HEENT: NCAT, EOMI, MMM. PERRLA.   NECK: Supple.   CHEST: Fine crackles in bases.   CARDIOVASCULAR: S1S2, Reg, No m/r/g.   ABDOMEN: NT, ND, BS+, soft.   EXTREMITIES: Trace pedal edema.   NEURO: Grossly nonfocal.   PSYCH: Normal affect.  SKIN: Warm and dry.   Data:   Labs:  BMP  Recent Labs  Lab 03/05/17  0821 03/04/17  0722 03/03/17  0530 03/02/17  2309    141 138 138   POTASSIUM 4.3 4.0 4.8 3.7   CHLORIDE 109 108 106 103   SHELIA 7.5* 7.0* 7.1* 7.4*   CO2 22 20 19* 24   BUN 28 24 25 25   CR 0.96 0.88 1.20 1.25   GLC 91 139* 129* 96     CBC  Recent Labs  Lab 03/05/17  0821 03/04/17  0722 03/03/17  0530 03/02/17  0730   WBC 6.8 6.7 11.3* 6.2   RBC 3.19* 3.28* 3.35* 3.51*   HGB 10.2* " 10.4* 10.6* 11.3*   HCT 31.2* 32.3* 34.1* 34.9*   MCV 98 99 102* 99   MCH 32.0 31.7 31.6 32.2   MCHC 32.7 32.2 31.1* 32.4   RDW 14.3 14.2 14.5 14.2    190 222 223     INR  Recent Labs  Lab 17  0730   INR 0.94     No results found for: CKTOTAL, CKMB, TROPN  Cholesterol (mg/dL)   Date Value   2013 204 (H)   02/10/2012 212 (H)   2003 209 (H)   2002 244 (H)     Cholesterol/HDL Ratio (no units)   Date Value   2013 3.0   02/10/2012 3.0   2003 3   2002 4.07     HDL Cholesterol (mg/dL)   Date Value   2013 68   02/10/2012 69   2012 67   2003 66     LDL Cholesterol Calculated (mg/dL)   Date Value   2013 124   02/10/2012 124   2003 126   2002 163 (H)     EK/2016 ECHO:   Interpretation Summary     Moderate to severe valvular aortic stenosis.  Left ventricular systolic function is moderately reduced.  The visual ejection fraction is estimated at 40-45%.  The left ventricle is normal in size.  There is mild concentric left ventricular hypertrophy.  There is moderate biatrial enlargement.  Mild aortic root dilatation.  The ascending aorta is Mildly dilated.  The rhythm was normal sinus.     Global LV systolic performance appears better than described on the last  study 2016, perhaps because the degree of ventricular ectopy is less  than was present on the last study ( EF preivously estimated 25 to 30%, now  40 to 45%). There appears to be some progression in the degree of aortic  stenosis ( MSG had been 18 mmHg, now 23 mmHg). Using a LVOT diameter of 2.5  cm, continuity equation calculation of aortic valve area yeilds an TYREL of 1.0  to 1.1 cm2.    Assessment:   Mr. Merritt is an 89 year old male who has a past medical history significant for PAF (CHADVASC 4 on Eliquis), SVT, HTN, COPD, HLD, RA on prednisone, hx of malignant prostate neoplasm, and cardiomyopathy LVEF 40-45%. He was admitted to [facility] after presenting with AF with  RVR. He was placed on diltiazem gtt and he spontaneously converted. Over course of hospitalization at [facility], patient's HRs reportedly fluctuated from 50's to 170's. He was reportedly hypotensive with RVR and reported symptoms of HA, facial flushing, and generalized sense of unwellness. Given patient's allergy to amiodarone as well as current use of an antiarrhythmic, beta blocker, and max dosing on diltiazem drip for calcium channel blocker,decision was made to transfer to [facility] for further evaluation and management. He had 2 runs of AF with RVR yesterday HRs up to 140's. Each episode lasting about 10 minutes and spontaneously resolving. This morning he had two more episodes of AF/AT with RVR that spontaneously converted. He is being treated for COPD exacerbation and secondary influenza A. He has a known history of PAF for which he had been on amiodarone that needed to be stopped due to reduced DLCO. He was then transitioned to dofetilide which he had been on as an outpatient. Most recent echo shows LVEF 40-45% with moderate to severe AS. Renal function and electrolytes stable. He reports feeling unwell today and more SOB. Current cardiac medications include: Eliquis, Lipitor, Dofetilide, Lasix, Lisinopril, Toprol XL, and Spironolactone.     EP Recommendations:  We discussed in detail with the patient management/treatment options for Kylee includin. Stroke Prophylaxis:  CHADSVASC=++age, +HTN, +HF  4, corresponding to a 4.0% annual stroke / systemic emolism event rate. indicating need for long term oral anticoagulation.  He is appropriately on Eliquis. No bleeding issues.   2. Rate Control: Continue Metoprolol and up titrate as possible.   3. Rhythm Control: He had previously been on amiodarone which had to be stopped due to reduced DLCO. He was alternatively placed on dofetilide. This appears to be relatively ineffective as he is having frequent PAF episodes on dofetilide. We would avoid Flecainide given  structural heart disease (AS and reduced LVEF). Limited other AAT options. We discussed with his primary EP (Dr. Li) who recommends CRTP implant followed by AVN ablation may be the best way to control this. We can consider this when his respiratory status improves.   4. Risk Factor Management: Continue Statin, maintain tight BP control, and KATHERIN evaluation as indicated.      The patient states understanding and is agreeable with plan.   Thank you much for allowing us to participate in the care of this pleasant patient.     The patient was discussed w/ Dr. Nowak.  The above note reflects our joint plan.    POLLO Alvares CNP  Electrophysiology Consult Service  Pager: [phone]    EP STAFF NOTE  Patient seen and examined and management plan personally reviewed with the patient. I agree with the note above by Estelita Trevino, EP Nurse. Changes in the physical examination, assessment and plan have been incorporated into the note by myself, as to make it a single cohesive document. Patient seen on 3/6/2017.  Deshawn Nowak MD [facility]  Cardiology - Electrophysiology

## 2017-03-06 NOTE — PLAN OF CARE
Problem: Goal Outcome Summary  Goal: Goal Outcome Summary  1) Monitor and tx to facilitate hemodynamic stability.  2) Monitor and tx pain to facilitate adequate pain control.  3) Educate and/or reinforce heart healthy habits.  4) Maintain fall and infection precautions.   OT/6C: Evaluation completed, treatment initiated. Pt required Min A for bed mobility, increased rest breaks due to SOB and weakness. Sit > stand at 2WW with CGA, CGA to complete standing ADLs. Pt demonstrated forward flexed posture due to weakness and fatigue, pt unable to correct with VCs. Educated pt on PLB as sats on 2L decreased to 88%, with increased time able to increase sats to 92-94%. HR at rest prior to activity in 100s, during activity increased to 110s-120s. X 1 instance of 123.     Recommendation: pt would benefit from TCU as pt is limited by decreased strength/activity tolerance, SOB, and weakness impacting pt's independence in ADLs/IADLs.

## 2017-03-06 NOTE — PROVIDER NOTIFICATION
CARDS 1 notified of pt triggering sepsis protocol. BP soft this am post lisinopril 84/64. Dyspneic;  95% on RA. MD states team will discuss pt and are currently rounding.     Addendum- MD also notified of pt in A-flutter with rates uncontrolled up to 160 at times.

## 2017-03-07 ENCOUNTER — APPOINTMENT (OUTPATIENT)
Dept: OCCUPATIONAL THERAPY | Facility: CLINIC | Age: 82
DRG: 227 | End: 2017-03-07
Attending: INTERNAL MEDICINE
Payer: MEDICARE

## 2017-03-07 ENCOUNTER — APPOINTMENT (OUTPATIENT)
Dept: GENERAL RADIOLOGY | Facility: CLINIC | Age: 82
DRG: 227 | End: 2017-03-07
Attending: STUDENT IN AN ORGANIZED HEALTH CARE EDUCATION/TRAINING PROGRAM
Payer: MEDICARE

## 2017-03-07 LAB
ANION GAP SERPL CALCULATED.3IONS-SCNC: 10 MMOL/L (ref 3–14)
ANION GAP SERPL CALCULATED.3IONS-SCNC: 8 MMOL/L (ref 3–14)
BACTERIA SPEC CULT: ABNORMAL
BUN SERPL-MCNC: 37 MG/DL (ref 7–30)
BUN SERPL-MCNC: 39 MG/DL (ref 7–30)
CALCIUM SERPL-MCNC: 8.4 MG/DL (ref 8.5–10.1)
CALCIUM SERPL-MCNC: 8.4 MG/DL (ref 8.5–10.1)
CHLORIDE SERPL-SCNC: 102 MMOL/L (ref 94–109)
CHLORIDE SERPL-SCNC: 103 MMOL/L (ref 94–109)
CO2 SERPL-SCNC: 24 MMOL/L (ref 20–32)
CO2 SERPL-SCNC: 26 MMOL/L (ref 20–32)
CREAT SERPL-MCNC: 1.1 MG/DL (ref 0.66–1.25)
CREAT SERPL-MCNC: 1.16 MG/DL (ref 0.66–1.25)
ERYTHROCYTE [DISTWIDTH] IN BLOOD BY AUTOMATED COUNT: 14.3 % (ref 10–15)
GFR SERPL CREATININE-BSD FRML MDRD: 59 ML/MIN/1.7M2
GFR SERPL CREATININE-BSD FRML MDRD: 63 ML/MIN/1.7M2
GLUCOSE SERPL-MCNC: 75 MG/DL (ref 70–99)
GLUCOSE SERPL-MCNC: 90 MG/DL (ref 70–99)
HCT VFR BLD AUTO: 33.3 % (ref 40–53)
HGB BLD-MCNC: 10.7 G/DL (ref 13.3–17.7)
INTERPRETATION ECG - MUSE: NORMAL
LACTATE BLD-SCNC: 2.2 MMOL/L (ref 0.7–2.1)
LACTATE BLD-SCNC: 2.3 MMOL/L (ref 0.7–2.1)
LACTATE BLD-SCNC: 3.3 MMOL/L (ref 0.7–2.1)
Lab: ABNORMAL
MAGNESIUM SERPL-MCNC: 2.2 MG/DL (ref 1.6–2.3)
MAGNESIUM SERPL-MCNC: 2.4 MG/DL (ref 1.6–2.3)
MCH RBC QN AUTO: 31.8 PG (ref 26.5–33)
MCHC RBC AUTO-ENTMCNC: 32.1 G/DL (ref 31.5–36.5)
MCV RBC AUTO: 99 FL (ref 78–100)
MICRO REPORT STATUS: ABNORMAL
MICROORGANISM SPEC CULT: ABNORMAL
PLATELET # BLD AUTO: 226 10E9/L (ref 150–450)
POTASSIUM SERPL-SCNC: 4.2 MMOL/L (ref 3.4–5.3)
POTASSIUM SERPL-SCNC: 4.2 MMOL/L (ref 3.4–5.3)
PROCALCITONIN SERPL-MCNC: 2.87 NG/ML
RBC # BLD AUTO: 3.36 10E12/L (ref 4.4–5.9)
SODIUM SERPL-SCNC: 137 MMOL/L (ref 133–144)
SODIUM SERPL-SCNC: 138 MMOL/L (ref 133–144)
SPECIMEN SOURCE: ABNORMAL
WBC # BLD AUTO: 6.2 10E9/L (ref 4–11)

## 2017-03-07 PROCEDURE — 36415 COLL VENOUS BLD VENIPUNCTURE: CPT | Performed by: INTERNAL MEDICINE

## 2017-03-07 PROCEDURE — 40000275 ZZH STATISTIC RCP TIME EA 10 MIN

## 2017-03-07 PROCEDURE — 36415 COLL VENOUS BLD VENIPUNCTURE: CPT | Performed by: STUDENT IN AN ORGANIZED HEALTH CARE EDUCATION/TRAINING PROGRAM

## 2017-03-07 PROCEDURE — 85027 COMPLETE CBC AUTOMATED: CPT | Performed by: STUDENT IN AN ORGANIZED HEALTH CARE EDUCATION/TRAINING PROGRAM

## 2017-03-07 PROCEDURE — 25000128 H RX IP 250 OP 636: Performed by: STUDENT IN AN ORGANIZED HEALTH CARE EDUCATION/TRAINING PROGRAM

## 2017-03-07 PROCEDURE — 83605 ASSAY OF LACTIC ACID: CPT | Performed by: INTERNAL MEDICINE

## 2017-03-07 PROCEDURE — 99232 SBSQ HOSP IP/OBS MODERATE 35: CPT | Mod: GC | Performed by: INTERNAL MEDICINE

## 2017-03-07 PROCEDURE — 25000132 ZZH RX MED GY IP 250 OP 250 PS 637: Mod: GY | Performed by: INTERNAL MEDICINE

## 2017-03-07 PROCEDURE — 83735 ASSAY OF MAGNESIUM: CPT | Performed by: STUDENT IN AN ORGANIZED HEALTH CARE EDUCATION/TRAINING PROGRAM

## 2017-03-07 PROCEDURE — 80048 BASIC METABOLIC PNL TOTAL CA: CPT | Performed by: STUDENT IN AN ORGANIZED HEALTH CARE EDUCATION/TRAINING PROGRAM

## 2017-03-07 PROCEDURE — A9270 NON-COVERED ITEM OR SERVICE: HCPCS | Mod: GY | Performed by: INTERNAL MEDICINE

## 2017-03-07 PROCEDURE — 93010 ELECTROCARDIOGRAM REPORT: CPT | Performed by: INTERNAL MEDICINE

## 2017-03-07 PROCEDURE — 21400003 ZZH R&B CCU CRITICAL UMMC

## 2017-03-07 PROCEDURE — 25000128 H RX IP 250 OP 636: Performed by: INTERNAL MEDICINE

## 2017-03-07 PROCEDURE — 93005 ELECTROCARDIOGRAM TRACING: CPT

## 2017-03-07 PROCEDURE — 40000275 ZZH STATISTIC RCP TIME EA 10 MIN: Performed by: OPTOMETRIST

## 2017-03-07 PROCEDURE — 84145 PROCALCITONIN (PCT): CPT | Performed by: STUDENT IN AN ORGANIZED HEALTH CARE EDUCATION/TRAINING PROGRAM

## 2017-03-07 PROCEDURE — 25000128 H RX IP 250 OP 636

## 2017-03-07 PROCEDURE — 40000133 ZZH STATISTIC OT WARD VISIT: Performed by: OCCUPATIONAL THERAPIST

## 2017-03-07 PROCEDURE — 83605 ASSAY OF LACTIC ACID: CPT | Performed by: STUDENT IN AN ORGANIZED HEALTH CARE EDUCATION/TRAINING PROGRAM

## 2017-03-07 PROCEDURE — 25000125 ZZHC RX 250: Performed by: STUDENT IN AN ORGANIZED HEALTH CARE EDUCATION/TRAINING PROGRAM

## 2017-03-07 PROCEDURE — 87040 BLOOD CULTURE FOR BACTERIA: CPT | Performed by: STUDENT IN AN ORGANIZED HEALTH CARE EDUCATION/TRAINING PROGRAM

## 2017-03-07 PROCEDURE — 25000132 ZZH RX MED GY IP 250 OP 250 PS 637: Mod: GY | Performed by: STUDENT IN AN ORGANIZED HEALTH CARE EDUCATION/TRAINING PROGRAM

## 2017-03-07 PROCEDURE — 36415 COLL VENOUS BLD VENIPUNCTURE: CPT

## 2017-03-07 PROCEDURE — A9270 NON-COVERED ITEM OR SERVICE: HCPCS | Mod: GY

## 2017-03-07 PROCEDURE — 93005 ELECTROCARDIOGRAM TRACING: CPT | Performed by: OPTOMETRIST

## 2017-03-07 PROCEDURE — 94640 AIRWAY INHALATION TREATMENT: CPT

## 2017-03-07 PROCEDURE — 25000132 ZZH RX MED GY IP 250 OP 250 PS 637: Mod: GY

## 2017-03-07 PROCEDURE — 25000125 ZZHC RX 250

## 2017-03-07 PROCEDURE — 71010 XR CHEST PORT 1 VW: CPT

## 2017-03-07 PROCEDURE — 83605 ASSAY OF LACTIC ACID: CPT

## 2017-03-07 PROCEDURE — A9270 NON-COVERED ITEM OR SERVICE: HCPCS | Mod: GY | Performed by: STUDENT IN AN ORGANIZED HEALTH CARE EDUCATION/TRAINING PROGRAM

## 2017-03-07 PROCEDURE — 97535 SELF CARE MNGMENT TRAINING: CPT | Mod: GO | Performed by: OCCUPATIONAL THERAPIST

## 2017-03-07 PROCEDURE — 94640 AIRWAY INHALATION TREATMENT: CPT | Mod: 76

## 2017-03-07 PROCEDURE — 25000125 ZZHC RX 250: Performed by: INTERNAL MEDICINE

## 2017-03-07 RX ORDER — PREDNISONE 5 MG/1
5 TABLET ORAL DAILY
Status: DISCONTINUED | OUTPATIENT
Start: 2017-03-08 | End: 2017-03-07

## 2017-03-07 RX ORDER — FUROSEMIDE 10 MG/ML
20 INJECTION INTRAMUSCULAR; INTRAVENOUS ONCE
Status: COMPLETED | OUTPATIENT
Start: 2017-03-07 | End: 2017-03-07

## 2017-03-07 RX ORDER — PIPERACILLIN SODIUM, TAZOBACTAM SODIUM 4; .5 G/20ML; G/20ML
4.5 INJECTION, POWDER, LYOPHILIZED, FOR SOLUTION INTRAVENOUS EVERY 6 HOURS
Status: DISCONTINUED | OUTPATIENT
Start: 2017-03-07 | End: 2017-03-09

## 2017-03-07 RX ORDER — DOXYCYCLINE 100 MG/1
100 CAPSULE ORAL EVERY 12 HOURS SCHEDULED
Status: DISCONTINUED | OUTPATIENT
Start: 2017-03-07 | End: 2017-03-07

## 2017-03-07 RX ORDER — DIGOXIN 250 MCG
500 TABLET ORAL ONCE
Status: COMPLETED | OUTPATIENT
Start: 2017-03-07 | End: 2017-03-07

## 2017-03-07 RX ORDER — METOPROLOL TARTRATE 25 MG/1
25 TABLET, FILM COATED ORAL 2 TIMES DAILY
Status: DISCONTINUED | OUTPATIENT
Start: 2017-03-07 | End: 2017-03-13

## 2017-03-07 RX ORDER — PREDNISONE 5 MG/1
5 TABLET ORAL DAILY
Status: DISCONTINUED | OUTPATIENT
Start: 2017-03-07 | End: 2017-03-14 | Stop reason: HOSPADM

## 2017-03-07 RX ADMIN — DOFETILIDE 125 MCG: 0.12 CAPSULE ORAL at 21:10

## 2017-03-07 RX ADMIN — TAMSULOSIN HYDROCHLORIDE 0.4 MG: 0.4 CAPSULE ORAL at 21:06

## 2017-03-07 RX ADMIN — OSELTAMIVIR PHOSPHATE 30 MG: 30 CAPSULE ORAL at 08:18

## 2017-03-07 RX ADMIN — TAMSULOSIN HYDROCHLORIDE 0.4 MG: 0.4 CAPSULE ORAL at 08:19

## 2017-03-07 RX ADMIN — LEVALBUTEROL HYDROCHLORIDE 1.25 MG: 1.25 SOLUTION RESPIRATORY (INHALATION) at 14:14

## 2017-03-07 RX ADMIN — ATORVASTATIN CALCIUM 40 MG: 40 TABLET, FILM COATED ORAL at 08:19

## 2017-03-07 RX ADMIN — DOFETILIDE 125 MCG: 0.12 CAPSULE ORAL at 08:19

## 2017-03-07 RX ADMIN — CALCIUM 1250 MG: 500 TABLET ORAL at 08:19

## 2017-03-07 RX ADMIN — NYSTATIN 500000 UNITS: 100000 SUSPENSION ORAL at 21:06

## 2017-03-07 RX ADMIN — LISINOPRIL 10 MG: 10 TABLET ORAL at 08:18

## 2017-03-07 RX ADMIN — NYSTATIN 500000 UNITS: 100000 SUSPENSION ORAL at 12:04

## 2017-03-07 RX ADMIN — ACETAMINOPHEN 650 MG: 325 TABLET, FILM COATED ORAL at 18:26

## 2017-03-07 RX ADMIN — METOPROLOL TARTRATE 2.5 MG: 5 INJECTION INTRAVENOUS at 08:33

## 2017-03-07 RX ADMIN — NYSTATIN 500000 UNITS: 100000 SUSPENSION ORAL at 08:18

## 2017-03-07 RX ADMIN — TRAMADOL HYDROCHLORIDE 50 MG: 50 TABLET, COATED ORAL at 18:26

## 2017-03-07 RX ADMIN — ACETAMINOPHEN 650 MG: 325 TABLET, FILM COATED ORAL at 11:59

## 2017-03-07 RX ADMIN — NYSTATIN 500000 UNITS: 100000 SUSPENSION ORAL at 16:15

## 2017-03-07 RX ADMIN — APIXABAN 2.5 MG: 2.5 TABLET, FILM COATED ORAL at 08:19

## 2017-03-07 RX ADMIN — LEFLUNOMIDE 10 MG: 10 TABLET, FILM COATED ORAL at 08:19

## 2017-03-07 RX ADMIN — PIPERACILLIN SODIUM,TAZOBACTAM SODIUM 4.5 G: 4; .5 INJECTION, POWDER, FOR SOLUTION INTRAVENOUS at 21:06

## 2017-03-07 RX ADMIN — LISINOPRIL 10 MG: 10 TABLET ORAL at 21:06

## 2017-03-07 RX ADMIN — CHOLECALCIFEROL TAB 10 MCG (400 UNIT) 400 UNITS: 10 TAB at 08:19

## 2017-03-07 RX ADMIN — CEFDINIR 300 MG: 300 CAPSULE ORAL at 08:19

## 2017-03-07 RX ADMIN — SPIRONOLACTONE 25 MG: 25 TABLET ORAL at 08:19

## 2017-03-07 RX ADMIN — ACETAMINOPHEN 650 MG: 325 TABLET, FILM COATED ORAL at 05:58

## 2017-03-07 RX ADMIN — DOXYCYCLINE HYCLATE 100 MG: 100 CAPSULE, GELATIN COATED ORAL at 12:04

## 2017-03-07 RX ADMIN — LEVALBUTEROL HYDROCHLORIDE 1.25 MG: 1.25 SOLUTION RESPIRATORY (INHALATION) at 08:55

## 2017-03-07 RX ADMIN — TRAMADOL HYDROCHLORIDE 50 MG: 50 TABLET, COATED ORAL at 06:56

## 2017-03-07 RX ADMIN — TRAMADOL HYDROCHLORIDE 50 MG: 50 TABLET, COATED ORAL at 01:02

## 2017-03-07 RX ADMIN — TRAMADOL HYDROCHLORIDE 50 MG: 50 TABLET, COATED ORAL at 11:59

## 2017-03-07 RX ADMIN — PREDNISONE 5 MG: 5 TABLET ORAL at 13:57

## 2017-03-07 RX ADMIN — FLUTICASONE FUROATE AND VILANTEROL TRIFENATATE 1 PUFF: 100; 25 POWDER RESPIRATORY (INHALATION) at 08:18

## 2017-03-07 RX ADMIN — OXYCODONE HYDROCHLORIDE AND ACETAMINOPHEN 500 MG: 500 TABLET ORAL at 08:19

## 2017-03-07 RX ADMIN — METOPROLOL TARTRATE 25 MG: 25 TABLET ORAL at 21:06

## 2017-03-07 RX ADMIN — FUROSEMIDE 20 MG: 10 INJECTION, SOLUTION INTRAVENOUS at 08:33

## 2017-03-07 RX ADMIN — VANCOMYCIN HYDROCHLORIDE 1250 MG: 10 INJECTION, POWDER, LYOPHILIZED, FOR SOLUTION INTRAVENOUS at 18:26

## 2017-03-07 RX ADMIN — APIXABAN 2.5 MG: 2.5 TABLET, FILM COATED ORAL at 21:06

## 2017-03-07 RX ADMIN — OSELTAMIVIR PHOSPHATE 30 MG: 30 CAPSULE ORAL at 21:06

## 2017-03-07 RX ADMIN — DIGOXIN 500 MCG: 250 TABLET ORAL at 01:01

## 2017-03-07 RX ADMIN — PIPERACILLIN SODIUM,TAZOBACTAM SODIUM 4.5 G: 4; .5 INJECTION, POWDER, FOR SOLUTION INTRAVENOUS at 16:15

## 2017-03-07 RX ADMIN — METOPROLOL TARTRATE 25 MG: 25 TABLET ORAL at 08:34

## 2017-03-07 RX ADMIN — METOPROLOL TARTRATE 5 MG: 5 INJECTION INTRAVENOUS at 12:36

## 2017-03-07 ASSESSMENT — PAIN DESCRIPTION - DESCRIPTORS
DESCRIPTORS: ACHING
DESCRIPTORS: ACHING

## 2017-03-07 NOTE — PHARMACY-VANCOMYCIN DOSING SERVICE
Pharmacy Vancomycin Initial Note  Date of Service 2017  Patient's  [id number]  89 year old, male    Indication: [facility]-Associated Pneumonia    Current estimated CrCl = Estimated Creatinine Clearance: 45.7 mL/min (based on Cr of 1.1).    Creatinine for last 3 days  3/5/2017:  8:21 AM Creatinine 0.96 mg/dL  3/6/2017:  8:48 AM Creatinine 0.88 mg/dL  3/7/2017:  7:10 AM Creatinine 1.10 mg/dL    Recent Vancomycin Level(s) for last 3 days  No results found for requested labs within last 72 hours.      Vancomycin IV Administrations (past 72 hours)      No vancomycin orders with administrations in past 72 hours.                Nephrotoxins and other renal medications (Future)    Start     Dose/Rate Route Frequency Ordered Stop    17 1600  vancomycin (VANCOCIN) 1,250 mg in NaCl 0.9 % 250 mL intermittent infusion      1,250 mg Intravenous EVERY 24 HOURS 17 1504      17 1500  piperacillin-tazobactam (ZOSYN) 4.5 g vial to attach to  mL bag      4.5 g  over 1 Hours Intravenous EVERY 6 HOURS 17 1457      17 0219  lisinopril (PRINIVIL/ZESTRIL) tablet 10 mg      10 mg Oral 2 TIMES DAILY 17 2319            Contrast Orders - past 72 hours (72h ago through future)    Start     Dose/Rate Route Frequency Ordered Stop    17 1230  sulfur hexafluoride microsphere (LUMASON) 60.7-25 MG injection 5 mL      5 mL Intravenous ONCE 17 1216 17 1217                Plan:  1.  Start vancomycin  1250 mg IV q24h.   2.  Goal Trough Level: 15-20 mg/L   3.  Pharmacy will check trough levels as appropriate in 1-3 Days.      Diamante Anthony, PharmD, pager [phone]

## 2017-03-07 NOTE — SIGNIFICANT EVENT
- Pt was in AFib for most of the night, with rates 120s-150s initially. We gave him additional Lopressor 12.5 and then Metop 5 mg IV. Because of his AF and RVR we werent able to get accurate cuff pressures on him (pressures varied from 150/140 to 50/20), nonetheless he was mentating fine all along.  - Despite the metop he remained in AF/RVR, so I gave him Dig 500 at about 1 AM, at about 4 AM he converted to NSR.  - However at 4-53 he went into what seems to be SVT, triggered by a PAC per tele review with an almost fixed rate ~143 bpm.  - I performed carotid sinus massage at about 5-30 AM and he briefly came down to 60s (only for a couple of seconds), but the moment I released carotid sinus pressure his rate went back to 140s. Once again with valsalva he would go back to NSR but kept bouncing between NSR ans SVT at 143.  - Last evening his K was 4.4 and Mg was 2.3  - Remained hemodynamically stable throughout.         Bobby Catalan MD  Cardiovascular Disease Fellow  Pager: [phone]

## 2017-03-07 NOTE — PLAN OF CARE
Problem: Goal Outcome Summary  Goal: Goal Outcome Summary  1) Monitor and tx to facilitate hemodynamic stability.  2) Monitor and tx pain to facilitate adequate pain control.  3) Educate and/or reinforce heart healthy habits.  4) Maintain fall and infection precautions.   OT 6C: Pt CGA for sit<>stands and to ambulate in room with FWW. Pt completes ~10 min standing ADLs. Pt limited by weakness, fatigue, and SOB. Pt would benefit from TCU to increase endurance and IND with ADLs, but may recommend discharge home with A from spouse pending improved strength/endurance.

## 2017-03-07 NOTE — PLAN OF CARE
Problem: Arrhythmia/Dysrhythmia (Symptomatic) (Adult)  Goal: Signs and Symptoms of Listed Potential Problems Will be Absent or Manageable (Arrhythmia/Dysrhythmia)  Signs and symptoms of listed potential problems will be absent or manageable by discharge/transition of care (reference Arrhythmia/Dysrhythmia (Symptomatic) (Adult) CPG).   Outcome: Declining  D: Admitted 3/4 with Paroxysmal Afib + Influenza A.     HRs staying elevated overnight.  Metoprolol 12.5mg  (x1)  Metoprolol 5mg IV push. (x1)  Digoxin 500mcg (x1)     Converted to NSR @ 04:11. Then began SVT (140s) shortly after. MD attempted Carotid massage, with some results, but patient c/o lightheadedness. No further interventions required at this time, monitoring HR closely and encouraging valsalva maneuvers. Patient noted to switch between Rhythms frequently, maintaining NSR for brief episodes then back to SVT in the 140s.     Weight down 3-4lbs overnight.     I: Monitored vitals and assessed pt status.   PRN: Tylenol + Ultram.     A: A0x4. VSS, on RA-2L NC. NSR. Afebrile. Patient reporting CP with HRs in 160-170s. EKG ordered. PO metoprolol given one time. MDs informed, Metoprolol IV given with VSS, as afib continues. Lytes checked. Digoxin ordered later, as HR still persistently 120-145. 12 lead order 2 hours post dose, per MD. Generalized pain controlled with PRN medications. 650cc of UO. Patient slept between cares.      Temp:  [97.5  F (36.4  C)-98.8  F (37.1  C)] 98  F (36.7  C)  Heart Rate:  [] 143  Resp:  [20-24] 22  BP: ()/(57-93) 106/80  FiO2 (%):  [2.5 %] 2.5 %  SpO2:  [93 %-97 %] 93 %       P: Continue to monitor Pt status and report changes to treatment team.

## 2017-03-07 NOTE — PROGRESS NOTES
[facility]  CARDIOLOGY PROGRESS NOTE  Bud Merritt [id number]  03/07/2017   ___________________________________  ASSESSMENT & PLAN:  Bud Merritt is a 89 year old with history of paroxysmal Afib, HTN, COPD, HLD, RA on prednisone, hx of malignant prostate neoplasm who is transferred from [facility] for ongoing management of paroxysmal Afib and influenza A.      Plan for today:  - Continued IV Lasix  - IV metoprolol, increase PO   - Start doxycycline for COPD exacerbation      # Acute Hypoxemic Respiratory Failure, resolved  # Moderate COPD, with exacerbation secondary influenza A  No longer hypoxic, but briefly required BiPAP at [facility]. Will monitor for bacterial superinfection.  - Continue Tamiflu 30 mg BID (Day 4 of 5)  - Restart home prednisone 5 mg daily  - Duonebs QID + albuterol nebs PRN  - Flutter valve  - Droplet precautions  - RT Chronic Disease consult  - Start doxycycline 100 mg BID      # Paroxysmal atrial fibrillation with RVR, resolved  # Sinus node dysfunction, possible tachy-deena vs SSS  Now in sinus rhythm with intermittent bursts of symptomatic tachycardia. No episodes of bradycardia.  - Continue home metoprolol - increase to 25 mg BID  - Continue dofetilide 125 mcg BID  - Continue apixaban  - EP consult, appreciate recommendations --> planning for pacemaker and ablation once respiratory status stable  - IV metoprolol PRN for SVT      # Acute cystitis without hematuria  Urine culture has grown out Citrobacter. Will treat for 7 day course.  - Doxycycline as above  - Phenazopyridine, Tylenol PRN       # Hx of falls, weakness  - PT/OT consult  - Fall precautions      # Chronic systolic heart failure, nonischemic, EF 40-45%  Had recovery of EF form 25-30%. Mildly volume up.  - Continue metoprolol, lisinopril, spironolactone  - IV Furosemide - 20 mg this AM, consider PM dosing based on UOP      Chronic medical problems:  # Mitral Regurgitation, Severe Aortic Stenosis  #  "HTN - Continue metoprolol, lisinopril, spironolactone  # RA - Restart home prednisone (5 mg daily) - resume following burst, continue leflunomide, tramadol PRN   # Prostate cancer on lupron - Continue tamsulosin  # Hyperlipidemia - Continue home atorvastatin      FEN: Cardiac diet  Proph: Apixaban  Dispo: Inpatient  Code Status: Full Code    Patient seen and discussed with staff physician, Dr. Farnsworth, who is agreeable with above plan.    Blossom Vines MD  PGY-3, Internal Medicine  Pager: [phone]    24 hour events:  Overnight significant episodes of SVT to 140s lasting sometimes hours. Given digoxin, metoprolol with intermittent conversion to NSR. Also resolved occasionally with vagal maneuvers or carotid massage. Patient feels better this morning but overall feels terrible during SVT episodes with headaches and overall feeling ill. Continues to go in and out of SVT.     OBJECTIVE:  Vitals:  VS: /72  Pulse 73  Temp 98.5  F (36.9  C) (Oral)  Resp 22  Ht 1.676 m (5' 6\")  Wt 71 kg (156 lb 8 oz)  SpO2 94%  BMI 25.26 kg/m2   GEN: A&Ox3, NAD, comfortable; hard of hearing  CV:  RRR, 3/6 systolic ejection murmur  PULM:  CTA B/L, bibasilar crackles  ABD: Normoactive bowel sounds, soft, ND, NT  SKIN: No rashes noted  EXT: WWP, 1+ BLE edema  NEURO: no focal deficits      DIAGNOSTIC STUDIES  CMP  Recent Labs  Lab 03/07/17  0710 03/06/17  2038 03/06/17  0848 03/05/17  0821 03/04/17  0722  03/02/17  2309     --  140 139 141  < > 138   POTASSIUM 4.2 4.4 4.4 4.3 4.0  < > 3.7   CHLORIDE 103  --  107 109 108  < > 103   CO2 24  --  24 22 20  < > 24   ANIONGAP 10  --  10 8 13  < > 11   GLC 90  --  87 91 139*  < > 96   BUN 37*  --  33* 28 24  < > 25   CR 1.10  --  0.88 0.96 0.88  < > 1.25   GFRESTIMATED 63  --  81 74 81  < > 54*   GFRESTBLACK 76  --  >90African American GFR Calc 89 >90African American GFR Calc  < > 66   SHELIA 8.4*  --  8.1* 7.5* 7.0*  < > 7.4*   MAG 2.4* 2.3  --  2.4*  --   --  2.1   PROTTOTAL  --   --  " 5.5* 5.3*  --   --   --    ALBUMIN  --   --  2.3* 2.2*  --   --   --    BILITOTAL  --   --  0.5 0.4  --   --   --    ALKPHOS  --   --  56 56  --   --   --    AST  --   --  78* 102*  --   --   --    ALT  --   --  38 36  --   --   --    < > = values in this interval not displayed.  CBC  Recent Labs  Lab 03/07/17  0710 03/05/17  0821 03/04/17  0722 03/03/17  0530   WBC 6.2 6.8 6.7 11.3*   RBC 3.36* 3.19* 3.28* 3.35*   HGB 10.7* 10.2* 10.4* 10.6*   HCT 33.3* 31.2* 32.3* 34.1*   MCV 99 98 99 102*   MCH 31.8 32.0 31.7 31.6   MCHC 32.1 32.7 32.2 31.1*   RDW 14.3 14.3 14.2 14.5    173 190 222     INR  Recent Labs  Lab 03/02/17  0730   INR 0.94     Patient has been seen and evaluated by me on March 7, 2017. I have reviewed the medications, laboratory, imaging, and other relevant results. I agree with the above note which I have edited, as necessary. I have discussed my assessment and plan with the house staff.    Rao Farnsworth MD, PhD  Professor of Medicine  Division of Cardiology

## 2017-03-07 NOTE — PLAN OF CARE
Problem: Goal Outcome Summary  Goal: Goal Outcome Summary  1) Monitor and tx to facilitate hemodynamic stability.  2) Monitor and tx pain to facilitate adequate pain control.  3) Educate and/or reinforce heart healthy habits.  4) Maintain fall and infection precautions.   D- Paroxsymal a-fib and influenza A positive.   I/A- SR 70-80 with episodes of symptomatic aflutter rates in 150s. 2L O2 via NC. Dyspneic. Duonebs scheduled.  Sepsis protocol triggered, lactate 1.3. VSS, BP soft post cardiac meds this am, recovered. Very poor appetite as pt believes food triggeres his aflutter, pt educated on rhythm.   P- Continue to monitor and notify team of changes.

## 2017-03-08 ENCOUNTER — APPOINTMENT (OUTPATIENT)
Dept: OCCUPATIONAL THERAPY | Facility: CLINIC | Age: 82
DRG: 227 | End: 2017-03-08
Attending: INTERNAL MEDICINE
Payer: MEDICARE

## 2017-03-08 ENCOUNTER — APPOINTMENT (OUTPATIENT)
Dept: GENERAL RADIOLOGY | Facility: CLINIC | Age: 82
DRG: 227 | End: 2017-03-08
Attending: STUDENT IN AN ORGANIZED HEALTH CARE EDUCATION/TRAINING PROGRAM
Payer: MEDICARE

## 2017-03-08 ENCOUNTER — APPOINTMENT (OUTPATIENT)
Dept: CT IMAGING | Facility: CLINIC | Age: 82
DRG: 227 | End: 2017-03-08
Attending: STUDENT IN AN ORGANIZED HEALTH CARE EDUCATION/TRAINING PROGRAM
Payer: MEDICARE

## 2017-03-08 ENCOUNTER — APPOINTMENT (OUTPATIENT)
Dept: ULTRASOUND IMAGING | Facility: CLINIC | Age: 82
DRG: 227 | End: 2017-03-08
Attending: STUDENT IN AN ORGANIZED HEALTH CARE EDUCATION/TRAINING PROGRAM
Payer: MEDICARE

## 2017-03-08 ENCOUNTER — APPOINTMENT (OUTPATIENT)
Dept: PHYSICAL THERAPY | Facility: CLINIC | Age: 82
DRG: 227 | End: 2017-03-08
Attending: INTERNAL MEDICINE
Payer: MEDICARE

## 2017-03-08 LAB
ALBUMIN SERPL-MCNC: 2.5 G/DL (ref 3.4–5)
ALP SERPL-CCNC: 52 U/L (ref 40–150)
ALT SERPL W P-5'-P-CCNC: 32 U/L (ref 0–70)
ANION GAP SERPL CALCULATED.3IONS-SCNC: 10 MMOL/L (ref 3–14)
ANION GAP SERPL CALCULATED.3IONS-SCNC: 12 MMOL/L (ref 3–14)
AST SERPL W P-5'-P-CCNC: 45 U/L (ref 0–45)
BILIRUB SERPL-MCNC: 0.7 MG/DL (ref 0.2–1.3)
BUN SERPL-MCNC: 28 MG/DL (ref 7–30)
BUN SERPL-MCNC: 32 MG/DL (ref 7–30)
CALCIUM SERPL-MCNC: 8 MG/DL (ref 8.5–10.1)
CALCIUM SERPL-MCNC: 8.2 MG/DL (ref 8.5–10.1)
CHLORIDE SERPL-SCNC: 104 MMOL/L (ref 94–109)
CHLORIDE SERPL-SCNC: 104 MMOL/L (ref 94–109)
CK SERPL-CCNC: 180 U/L (ref 30–300)
CO2 SERPL-SCNC: 23 MMOL/L (ref 20–32)
CO2 SERPL-SCNC: 25 MMOL/L (ref 20–32)
CREAT SERPL-MCNC: 1.06 MG/DL (ref 0.66–1.25)
CREAT SERPL-MCNC: 1.06 MG/DL (ref 0.66–1.25)
CRP SERPL-MCNC: 15 MG/L (ref 0–8)
ERYTHROCYTE [DISTWIDTH] IN BLOOD BY AUTOMATED COUNT: 14 % (ref 10–15)
GFR SERPL CREATININE-BSD FRML MDRD: 66 ML/MIN/1.7M2
GFR SERPL CREATININE-BSD FRML MDRD: 66 ML/MIN/1.7M2
GLUCOSE SERPL-MCNC: 74 MG/DL (ref 70–99)
GLUCOSE SERPL-MCNC: 74 MG/DL (ref 70–99)
HCT VFR BLD AUTO: 31.8 % (ref 40–53)
HGB BLD-MCNC: 10.1 G/DL (ref 13.3–17.7)
LACTATE BLD-SCNC: 1.2 MMOL/L (ref 0.7–2.1)
LACTATE BLD-SCNC: 2.3 MMOL/L (ref 0.7–2.1)
LIPASE SERPL-CCNC: 292 U/L (ref 73–393)
MAGNESIUM SERPL-MCNC: 2.1 MG/DL (ref 1.6–2.3)
MCH RBC QN AUTO: 31.6 PG (ref 26.5–33)
MCHC RBC AUTO-ENTMCNC: 31.8 G/DL (ref 31.5–36.5)
MCV RBC AUTO: 99 FL (ref 78–100)
PLATELET # BLD AUTO: 203 10E9/L (ref 150–450)
POTASSIUM SERPL-SCNC: 4 MMOL/L (ref 3.4–5.3)
POTASSIUM SERPL-SCNC: 4.2 MMOL/L (ref 3.4–5.3)
PROT SERPL-MCNC: 5.4 G/DL (ref 6.8–8.8)
RBC # BLD AUTO: 3.2 10E12/L (ref 4.4–5.9)
SODIUM SERPL-SCNC: 139 MMOL/L (ref 133–144)
SODIUM SERPL-SCNC: 140 MMOL/L (ref 133–144)
WBC # BLD AUTO: 5.1 10E9/L (ref 4–11)

## 2017-03-08 PROCEDURE — 40000275 ZZH STATISTIC RCP TIME EA 10 MIN

## 2017-03-08 PROCEDURE — A9270 NON-COVERED ITEM OR SERVICE: HCPCS | Mod: GY | Performed by: INTERNAL MEDICINE

## 2017-03-08 PROCEDURE — 25500064 ZZH RX 255 OP 636: Performed by: RADIOLOGY

## 2017-03-08 PROCEDURE — 83735 ASSAY OF MAGNESIUM: CPT | Performed by: STUDENT IN AN ORGANIZED HEALTH CARE EDUCATION/TRAINING PROGRAM

## 2017-03-08 PROCEDURE — 25000132 ZZH RX MED GY IP 250 OP 250 PS 637: Mod: GY | Performed by: INTERNAL MEDICINE

## 2017-03-08 PROCEDURE — 97110 THERAPEUTIC EXERCISES: CPT | Mod: GP

## 2017-03-08 PROCEDURE — 93930 UPPER EXTREMITY STUDY: CPT

## 2017-03-08 PROCEDURE — 85027 COMPLETE CBC AUTOMATED: CPT | Performed by: STUDENT IN AN ORGANIZED HEALTH CARE EDUCATION/TRAINING PROGRAM

## 2017-03-08 PROCEDURE — 74000 XR ABDOMEN PORT F1 VW: CPT

## 2017-03-08 PROCEDURE — 25000132 ZZH RX MED GY IP 250 OP 250 PS 637: Mod: GY | Performed by: STUDENT IN AN ORGANIZED HEALTH CARE EDUCATION/TRAINING PROGRAM

## 2017-03-08 PROCEDURE — 93925 LOWER EXTREMITY STUDY: CPT

## 2017-03-08 PROCEDURE — A9270 NON-COVERED ITEM OR SERVICE: HCPCS | Mod: GY

## 2017-03-08 PROCEDURE — 80053 COMPREHEN METABOLIC PANEL: CPT | Performed by: STUDENT IN AN ORGANIZED HEALTH CARE EDUCATION/TRAINING PROGRAM

## 2017-03-08 PROCEDURE — 82550 ASSAY OF CK (CPK): CPT | Performed by: STUDENT IN AN ORGANIZED HEALTH CARE EDUCATION/TRAINING PROGRAM

## 2017-03-08 PROCEDURE — A9270 NON-COVERED ITEM OR SERVICE: HCPCS | Mod: GY | Performed by: STUDENT IN AN ORGANIZED HEALTH CARE EDUCATION/TRAINING PROGRAM

## 2017-03-08 PROCEDURE — 97535 SELF CARE MNGMENT TRAINING: CPT | Mod: GO | Performed by: OCCUPATIONAL THERAPIST

## 2017-03-08 PROCEDURE — 99232 SBSQ HOSP IP/OBS MODERATE 35: CPT | Mod: 24 | Performed by: INTERNAL MEDICINE

## 2017-03-08 PROCEDURE — 93005 ELECTROCARDIOGRAM TRACING: CPT

## 2017-03-08 PROCEDURE — 70450 CT HEAD/BRAIN W/O DYE: CPT

## 2017-03-08 PROCEDURE — 94640 AIRWAY INHALATION TREATMENT: CPT

## 2017-03-08 PROCEDURE — 71275 CT ANGIOGRAPHY CHEST: CPT

## 2017-03-08 PROCEDURE — 97530 THERAPEUTIC ACTIVITIES: CPT | Mod: GP

## 2017-03-08 PROCEDURE — 40000133 ZZH STATISTIC OT WARD VISIT: Performed by: OCCUPATIONAL THERAPIST

## 2017-03-08 PROCEDURE — 25000128 H RX IP 250 OP 636

## 2017-03-08 PROCEDURE — 83605 ASSAY OF LACTIC ACID: CPT | Performed by: STUDENT IN AN ORGANIZED HEALTH CARE EDUCATION/TRAINING PROGRAM

## 2017-03-08 PROCEDURE — 83690 ASSAY OF LIPASE: CPT | Performed by: STUDENT IN AN ORGANIZED HEALTH CARE EDUCATION/TRAINING PROGRAM

## 2017-03-08 PROCEDURE — 97530 THERAPEUTIC ACTIVITIES: CPT | Mod: GO | Performed by: OCCUPATIONAL THERAPIST

## 2017-03-08 PROCEDURE — 25000125 ZZHC RX 250: Performed by: INTERNAL MEDICINE

## 2017-03-08 PROCEDURE — 80048 BASIC METABOLIC PNL TOTAL CA: CPT | Performed by: STUDENT IN AN ORGANIZED HEALTH CARE EDUCATION/TRAINING PROGRAM

## 2017-03-08 PROCEDURE — 25000132 ZZH RX MED GY IP 250 OP 250 PS 637: Mod: GY

## 2017-03-08 PROCEDURE — 86140 C-REACTIVE PROTEIN: CPT | Performed by: STUDENT IN AN ORGANIZED HEALTH CARE EDUCATION/TRAINING PROGRAM

## 2017-03-08 PROCEDURE — 40000193 ZZH STATISTIC PT WARD VISIT

## 2017-03-08 PROCEDURE — 25000125 ZZHC RX 250: Performed by: RADIOLOGY

## 2017-03-08 PROCEDURE — 93010 ELECTROCARDIOGRAM REPORT: CPT | Performed by: INTERNAL MEDICINE

## 2017-03-08 PROCEDURE — 21400003 ZZH R&B CCU CRITICAL UMMC

## 2017-03-08 PROCEDURE — 25000125 ZZHC RX 250: Performed by: STUDENT IN AN ORGANIZED HEALTH CARE EDUCATION/TRAINING PROGRAM

## 2017-03-08 PROCEDURE — 36415 COLL VENOUS BLD VENIPUNCTURE: CPT | Performed by: STUDENT IN AN ORGANIZED HEALTH CARE EDUCATION/TRAINING PROGRAM

## 2017-03-08 PROCEDURE — 94640 AIRWAY INHALATION TREATMENT: CPT | Mod: 76

## 2017-03-08 PROCEDURE — 25000125 ZZHC RX 250

## 2017-03-08 PROCEDURE — 25000128 H RX IP 250 OP 636: Performed by: STUDENT IN AN ORGANIZED HEALTH CARE EDUCATION/TRAINING PROGRAM

## 2017-03-08 RX ORDER — OXYCODONE HYDROCHLORIDE 5 MG/1
5 TABLET ORAL ONCE
Status: COMPLETED | OUTPATIENT
Start: 2017-03-08 | End: 2017-03-08

## 2017-03-08 RX ORDER — IOPAMIDOL 755 MG/ML
100 INJECTION, SOLUTION INTRAVASCULAR ONCE
Status: COMPLETED | OUTPATIENT
Start: 2017-03-08 | End: 2017-03-08

## 2017-03-08 RX ORDER — IBUPROFEN 200 MG
400 TABLET ORAL ONCE
Status: COMPLETED | OUTPATIENT
Start: 2017-03-08 | End: 2017-03-08

## 2017-03-08 RX ORDER — SODIUM CHLORIDE 9 MG/ML
INJECTION, SOLUTION INTRAVENOUS CONTINUOUS
Status: ACTIVE | OUTPATIENT
Start: 2017-03-08 | End: 2017-03-08

## 2017-03-08 RX ADMIN — DOFETILIDE 125 MCG: 0.12 CAPSULE ORAL at 19:47

## 2017-03-08 RX ADMIN — SODIUM CHLORIDE: 9 INJECTION, SOLUTION INTRAVENOUS at 10:45

## 2017-03-08 RX ADMIN — ACETAMINOPHEN 650 MG: 325 TABLET, FILM COATED ORAL at 16:00

## 2017-03-08 RX ADMIN — TAMSULOSIN HYDROCHLORIDE 0.4 MG: 0.4 CAPSULE ORAL at 08:22

## 2017-03-08 RX ADMIN — PIPERACILLIN SODIUM,TAZOBACTAM SODIUM 4.5 G: 4; .5 INJECTION, POWDER, FOR SOLUTION INTRAVENOUS at 03:00

## 2017-03-08 RX ADMIN — CALCIUM 1250 MG: 500 TABLET ORAL at 08:22

## 2017-03-08 RX ADMIN — TRAMADOL HYDROCHLORIDE 50 MG: 50 TABLET, COATED ORAL at 11:53

## 2017-03-08 RX ADMIN — METOPROLOL TARTRATE 25 MG: 25 TABLET ORAL at 19:44

## 2017-03-08 RX ADMIN — IOPAMIDOL 100 ML: 755 INJECTION, SOLUTION INTRAVENOUS at 11:11

## 2017-03-08 RX ADMIN — SODIUM CHLORIDE, PRESERVATIVE FREE 90 ML: 5 INJECTION INTRAVENOUS at 11:11

## 2017-03-08 RX ADMIN — PIPERACILLIN SODIUM,TAZOBACTAM SODIUM 4.5 G: 4; .5 INJECTION, POWDER, FOR SOLUTION INTRAVENOUS at 08:42

## 2017-03-08 RX ADMIN — NYSTATIN 500000 UNITS: 100000 SUSPENSION ORAL at 19:44

## 2017-03-08 RX ADMIN — SODIUM CHLORIDE: 9 INJECTION, SOLUTION INTRAVENOUS at 17:55

## 2017-03-08 RX ADMIN — OSELTAMIVIR PHOSPHATE 30 MG: 30 CAPSULE ORAL at 08:22

## 2017-03-08 RX ADMIN — UMECLIDINIUM 1 PUFF: 62.5 AEROSOL, POWDER ORAL at 11:53

## 2017-03-08 RX ADMIN — APIXABAN 2.5 MG: 2.5 TABLET, FILM COATED ORAL at 19:44

## 2017-03-08 RX ADMIN — SPIRONOLACTONE 25 MG: 25 TABLET ORAL at 08:22

## 2017-03-08 RX ADMIN — LISINOPRIL 10 MG: 10 TABLET ORAL at 08:22

## 2017-03-08 RX ADMIN — CHOLECALCIFEROL TAB 10 MCG (400 UNIT) 400 UNITS: 10 TAB at 08:21

## 2017-03-08 RX ADMIN — NYSTATIN 500000 UNITS: 100000 SUSPENSION ORAL at 11:53

## 2017-03-08 RX ADMIN — PIPERACILLIN SODIUM,TAZOBACTAM SODIUM 4.5 G: 4; .5 INJECTION, POWDER, FOR SOLUTION INTRAVENOUS at 21:08

## 2017-03-08 RX ADMIN — TRAMADOL HYDROCHLORIDE 50 MG: 50 TABLET, COATED ORAL at 04:27

## 2017-03-08 RX ADMIN — APIXABAN 2.5 MG: 2.5 TABLET, FILM COATED ORAL at 08:21

## 2017-03-08 RX ADMIN — OSELTAMIVIR PHOSPHATE 30 MG: 30 CAPSULE ORAL at 19:44

## 2017-03-08 RX ADMIN — LISINOPRIL 10 MG: 10 TABLET ORAL at 19:44

## 2017-03-08 RX ADMIN — IBUPROFEN 400 MG: 200 TABLET, FILM COATED ORAL at 05:29

## 2017-03-08 RX ADMIN — TAMSULOSIN HYDROCHLORIDE 0.4 MG: 0.4 CAPSULE ORAL at 19:44

## 2017-03-08 RX ADMIN — NYSTATIN 500000 UNITS: 100000 SUSPENSION ORAL at 08:21

## 2017-03-08 RX ADMIN — PIPERACILLIN SODIUM,TAZOBACTAM SODIUM 4.5 G: 4; .5 INJECTION, POWDER, FOR SOLUTION INTRAVENOUS at 15:01

## 2017-03-08 RX ADMIN — LEVALBUTEROL HYDROCHLORIDE 1.25 MG: 1.25 SOLUTION RESPIRATORY (INHALATION) at 14:12

## 2017-03-08 RX ADMIN — TRAMADOL HYDROCHLORIDE 50 MG: 50 TABLET, COATED ORAL at 17:55

## 2017-03-08 RX ADMIN — ATORVASTATIN CALCIUM 40 MG: 40 TABLET, FILM COATED ORAL at 08:22

## 2017-03-08 RX ADMIN — METOPROLOL TARTRATE 25 MG: 25 TABLET ORAL at 08:22

## 2017-03-08 RX ADMIN — LEVALBUTEROL HYDROCHLORIDE 1.25 MG: 1.25 SOLUTION RESPIRATORY (INHALATION) at 20:47

## 2017-03-08 RX ADMIN — OXYCODONE HYDROCHLORIDE AND ACETAMINOPHEN 500 MG: 500 TABLET ORAL at 08:22

## 2017-03-08 RX ADMIN — PREDNISONE 5 MG: 5 TABLET ORAL at 08:22

## 2017-03-08 RX ADMIN — LEFLUNOMIDE 10 MG: 10 TABLET, FILM COATED ORAL at 08:22

## 2017-03-08 RX ADMIN — OXYCODONE HYDROCHLORIDE 5 MG: 5 TABLET ORAL at 18:42

## 2017-03-08 RX ADMIN — FLUTICASONE FUROATE AND VILANTEROL TRIFENATATE 1 PUFF: 100; 25 POWDER RESPIRATORY (INHALATION) at 08:23

## 2017-03-08 RX ADMIN — DOFETILIDE 125 MCG: 0.12 CAPSULE ORAL at 08:22

## 2017-03-08 RX ADMIN — NYSTATIN 500000 UNITS: 100000 SUSPENSION ORAL at 16:00

## 2017-03-08 RX ADMIN — VANCOMYCIN HYDROCHLORIDE 1250 MG: 10 INJECTION, POWDER, LYOPHILIZED, FOR SOLUTION INTRAVENOUS at 16:00

## 2017-03-08 ASSESSMENT — PAIN DESCRIPTION - DESCRIPTORS
DESCRIPTORS: ACHING

## 2017-03-08 NOTE — PLAN OF CARE
Problem: Goal Outcome Summary  Goal: Goal Outcome Summary  1) Monitor and tx to facilitate hemodynamic stability.  2) Monitor and tx pain to facilitate adequate pain control.  3) Educate and/or reinforce heart healthy habits.  4) Maintain fall and infection precautions.   Outcome: No Change  Pt A/Ox4, VSS mostly in SR 70-80's while occasionally going into SVT in 120-140's. Pt denies and dizziness. Pt refused to use O2, currently 95% on RA, pt feels somewhat SOB. Pt up with assist 1. ABX given. Pt is Agdaagux. Blood cultures pending. Tramadol given for HA. Pt has been NPO since midnight for possible pacemaker placement today. Continue to monitor and notify MD of questions or concerns.

## 2017-03-08 NOTE — PROGRESS NOTES
[facility]  CARDIOLOGY PROGRESS NOTE  Bud Merritt [id number]  03/08/2017   ___________________________________  ASSESSMENT & PLAN:  Bud Merritt is a 89 year old with history of paroxysmal Afib, HTN, COPD, HLD, RA on prednisone, hx of malignant prostate neoplasm who is transferred from [facility] for ongoing management of paroxysmal Afib and influenza A.       # Cyanosis of Fingers, Toes  # Elevated Lactate, resolved  Vital signs and labs have actually remained wnl aside from some hypotension documented with tachycardia (although suspect these were falsely low). Extremities are cool overall. DDx: developing cardiogenic shock, blue toe syndrome (emboli), septic emboli, less likely septic shock, aneurysm.    - CT Chest Angio   - BLE/BUE Dopplers U/S   - Repeat lactate 2.2    # Headache  Usually correlating with elevated heart rates although this morning present without tachycardia. Patient is on anti-coagulation which is concerning. Could also be caffeine headache in setting of frequent NPO.    - CT Head Non-Contrast      # Acute Hypoxemic Respiratory Failure, resolved  # Moderate COPD, with exacerbation secondary influenza A  No longer hypoxic, but briefly required BiPAP at [facility]. Will monitor for bacterial superinfection.  - Last day of Tamiflu 30 mg BID (Day 5 of 5)  - Restart home prednisone 5 mg daily  - Duonebs QID + albuterol nebs PRN  - Flutter valve  - Droplet precautions  - RT Chronic Disease consult      # Paroxysmal atrial fibrillation with RVR, resolved  # Sinus node dysfunction, possible tachy-deena vs SSS  Now in sinus rhythm with intermittent bursts of symptomatic tachycardia. No episodes of bradycardia.  - Continue home metoprolol - increase to 25 mg BID  - Continue dofetilide 125 mcg BID  - Continue apixaban  - EP consult, appreciate recommendations --> planning for pacemaker and ablation once respiratory status stable  - IV metoprolol PRN for SVT      # Acute  "cystitis without hematuria  Urine culture has grown out Citrobacter. Will treat for 7 day course.  - Currently on broad spectrum antibiotics - vancomycin/zosyn  - Phenazopyridine, Tylenol PRN       # Hx of falls, weakness  - PT/OT consult  - Fall precautions      # Chronic systolic heart failure, nonischemic, EF 40-45%  Had recovery of EF form 25-30%. Mildly volume up.  - Continue metoprolol, lisinopril, spironolactone  - Hold further IV diuresis      Chronic medical problems:  # Mitral Regurgitation, Severe Aortic Stenosis  # HTN - Continue metoprolol, lisinopril, spironolactone  # RA - Continue home prednisone (5 mg daily), continue leflunomide, tramadol PRN   # Prostate cancer on lupron - Continue tamsulosin  # Hyperlipidemia - Continue home atorvastatin      FEN: Regular diet  Proph: Apixaban  Dispo: Inpatient  Code Status: Full Code     Patient seen and discussed with staff physician, Dr. Farnsworth, who is agreeable with above plan.    Blossom Vines MD  PGY-3, Internal Medicine  Pager: [phone]    24 hour events:  No acute events overnight. Nursing notes reviewed. Feeling \"horrible\" and wants to jump out a window. Is unable to tell me what is bothering him other than a headache. No chest pain or shortness of breath. Wants to go home with his wife, feels like he is getting worse.     OBJECTIVE:  Vitals:  VS: /84 (BP Location: Left arm)  Pulse 73  Temp 98.4  F (36.9  C) (Oral)  Resp 18  Ht 1.676 m (5' 6\")  Wt 71 kg (156 lb 8 oz)  SpO2 97%  BMI 25.26 kg/m2   GEN: A&Ox3, uncomfortable, grabbing head  CV:  RRR, no murmur  PULM:  CTA B/L, end-expiratory wheezing  ABD: Normoactive bowel sounds, soft, ND, NT  SKIN: No rashes noted  EXT: WWP, 1-2+ BLE edema, cool extremities and fingers/toes with cyanosis  NEURO: AAOx3, no focal deficits      DIAGNOSTIC STUDIES  CMP  Recent Labs  Lab 03/07/17  1508 03/07/17  0710 03/06/17  2038 03/06/17  0848 03/05/17  0821    138  --  140 139   POTASSIUM 4.2 4.2 4.4 4.4 " 4.3   CHLORIDE 102 103  --  107 109   CO2 26 24  --  24 22   ANIONGAP 8 10  --  10 8   GLC 75 90  --  87 91   BUN 39* 37*  --  33* 28   CR 1.16 1.10  --  0.88 0.96   GFRESTIMATED 59* 63  --  81 74   GFRESTBLACK 72 76  --  >90African American GFR Calc 89   SHELIA 8.4* 8.4*  --  8.1* 7.5*   MAG 2.2 2.4* 2.3  --  2.4*   PROTTOTAL  --   --   --  5.5* 5.3*   ALBUMIN  --   --   --  2.3* 2.2*   BILITOTAL  --   --   --  0.5 0.4   ALKPHOS  --   --   --  56 56   AST  --   --   --  78* 102*   ALT  --   --   --  38 36     CBC  Recent Labs  Lab 03/07/17  0710 03/05/17  0821 03/04/17  0722 03/03/17  0530   WBC 6.2 6.8 6.7 11.3*   RBC 3.36* 3.19* 3.28* 3.35*   HGB 10.7* 10.2* 10.4* 10.6*   HCT 33.3* 31.2* 32.3* 34.1*   MCV 99 98 99 102*   MCH 31.8 32.0 31.7 31.6   MCHC 32.1 32.7 32.2 31.1*   RDW 14.3 14.3 14.2 14.5    173 190 222     INR  Recent Labs  Lab 03/02/17  0730   INR 0.94     I interviewed and examined the patient with the house staff.  I agree with the assessment and plan as documented.      Rao Farnsworth MD, PhD  Professor of Medicine  Division of Cardiology

## 2017-03-08 NOTE — PLAN OF CARE
Problem: Goal Outcome Summary  Goal: Goal Outcome Summary  1) Monitor and tx to facilitate hemodynamic stability.  2) Monitor and tx pain to facilitate adequate pain control.  3) Educate and/or reinforce heart healthy habits.  4) Maintain fall and infection precautions.   D- Paroxsymal a-fib and influenza A positive.   I/A- SR 60-90 with episodes of SVT rates 120-150 throughout shift. Pt asymptomatic with SVT today (unlike previously). IV Metoprolol given x2 today. BP soft 90/60s manually checked throughout shift. 2L O2 via NC. Dyspnea appears to slightly improving. Duonebs scheduled. Sepsis protocol triggered, lactate 2.3, 3.3 and 2.3. Started on IV Vanco and Zosyn. CXR today, see report.  Very poor appetite, MD aware, dietician consult? NPO p MN for possible pacer tomorrow.   P- Continue to monitor and notify team of changes.

## 2017-03-08 NOTE — PROGRESS NOTES
[facility] [place]   Antimicrobial Management Team (AMT) Note              To: Cards1  Unit: 6C  Allergies   Allergen Reactions     Amiodarone Other (See Comments)     Drop in DLCO     No Known Drug Allergies        Brief Summary: Bud Merritt is a 89 year old man with PMH of pAFib, HTN, COPD, RA on prednisone, and malignant prostate neoplasm who was transferred from [facility] on 3/4/2017 for management on pAFib.     HPI: Initially he presented to [facility] on 3/2 for cough, diarrhea, and generalized weakness. Initial work up was unremarkable except for low grade fever and dehydration requiring IVF. A UCx was collected despite being asx and grew > 100K citrobacter and he was started on cefdinir. His respiratory status worsened with diffuse body aches and SOB and was found to have influenza A. On 3/3 he was started on tamiflu. Later he developed Afib with RVR without improvement and was transferred to [facility] for further management.  On 3/7 sepsis protocol was triggered given elevated lactate (2.2), low BPs and he was initiated on empiric pipercillin/tazobactam and vancomycin    Interval History  3/2: Admitted to [facility], UCx Citrobacter, positive Influenza A, CXR unremarkable  3/4: Admitted to [facility]  3/5: CXR perihilar opacities c/f edema, sputum CXR with K. pneumoniae  3/6: ECHO EF 20-25%  3/7: CXR: stable perihilar and babasilar opacities, PCT 2.87  Assessment:   Acute respiratory failure 2/2 influenza A with possible bacterial superinfection.  Since transfer he has remained on 2L NC and he has been afebrile. PCT was elevated on 3/7 indicating a possible bacterial process. CXR found stable perihilar and bibasilar opacities. He has no history of MRSA and 3/3 MRSA nares was negative. Sputum culture from 3/5 grew K. Pneumoniae.   He is on day 2 of empiric zosyn and vancomycin and day 6 tamiflu. Would discontinue tamiflu given completion of a 5 day course of therapy. Would discontinue  "vancomycin given no history of MRSA. Would target therapy based on susceptibility pattern of isolated K. pneumoniae    Recommendations:  1. Discontinue tamiflu given he has completed a 5 day course  2. Discontinue vancomycin given no history of MRSA  3. De-escalate piperacillin/tazobactam to ceftriaxone 1g IV Q24H targeting K. pneumoniae from the sputum     Discussed with ID Staff - MD Clementina Ramos, PharmD  PGY-2 Infectious Diseases Pharmacy Resident  Office Ph: [phone]  Pager: [phone]    Current Antibiotics    piperacillin-tazobactam  4.5 g Intravenous Q6H     vancomycin (VANCOCIN) IV  1,250 mg Intravenous Q24H     oseltamivir  30 mg Oral BID     Vitals and other clinical features  /84 (BP Location: Left arm)  Pulse 73  Temp 98.4  F (36.9  C) (Oral)  Resp 18  Ht 1.676 m (5' 6\")  Wt 71 kg (156 lb 8 oz)  SpO2 97%  BMI 25.26 kg/m2    Temperature curve:      Labs  Recent Labs   Lab Test  03/08/17   0724  03/07/17   0710  03/05/17   0821  03/04/17   0722  03/03/17   0530  03/02/17   0730   01/09/17   1043   04/18/16   1156  01/25/16   0345   12/17/15   1058   WBC  5.1  6.2  6.8  6.7  11.3*  6.2   < >  5.0   < >  4.2  4.7   < >  7.9   ANEU   --    --    --   5.8   --   4.7   --   3.4   --   2.6  2.3   --   6.0   ALYM   --    --    --   0.5*   --   0.7*   --   0.8   --   1.0  1.6   --   1.1   JAYCEE   --    --    --   0.3   --   0.8   --   0.7   --   0.6  0.7   --   0.8   AEOS   --    --    --   0.0   --   0.0   --   0.0   --   0.0  0.1   --   0.0   HGB  10.1*  10.7*  10.2*  10.4*  10.6*  11.3*   < >  11.2*   < >  12.4*  10.0*   < >  12.5*   HCT  31.8*  33.3*  31.2*  32.3*  34.1*  34.9*   < >  35.0*   < >  38.7*  31.6*   < >  38.6*   MCV  99  99  98  99  102*  99   < >  101*   < >  93  98   < >  97   PLT  203  226  173  190  222  223   < >  261   < >  296  395   < >  339    < > = values in this interval not displayed.       Estimated Creatinine Clearance: 47.4 mL/min (based on Cr of " 1.06).  Recent Labs   Lab Test  03/08/17   0724  03/07/17   1508  03/07/17   0710  03/06/17   0848  03/05/17   0821  03/04/17   0722   CR  1.06  1.16  1.10  0.88  0.96  0.88       Recent Labs   Lab Test  03/06/17   0848  03/05/17   0821  01/23/17   1338  01/09/17   1043  08/11/16   0856  04/18/16   1156   BILITOTAL  0.5  0.4  0.5  0.5  0.4  0.4   ALKPHOS  56  56  76  63  70  92   ALBUMIN  2.3*  2.2*  3.2*  3.3*  3.2*  3.3*   AST  78*  102*  38  33  43  40   ALT  38  36  28  24  33  28       Recent Labs   Lab Test  03/07/17   2145  03/07/17   1741  03/07/17   1256  03/06/17   0848  03/04/17   0722  01/26/17   0814  01/24/17   0944  10/12/15   1347  03/15/15   2100  11/03/14   1404  06/02/14   1025   LACT  2.2*  3.3*  2.3*  1.3  1.9  1.2  2.2*   --    --    --    --    CRP   --    --    --    --    --    --    --   3.8  19.2*  4.2  15.5*       Culture results with specimen source  Culture Micro   Date Value Ref Range Status   03/07/2017 No growth after 15 hours  Final   03/07/2017 No growth after 15 hours  Final   03/05/2017 (A)  Final    Heavy growth Normal alvarado  Light growth Klebsiella pneumoniae  Heavy growth Candida albicans / dubliniensis Candida albicans and Candida   dubliniensis are not routinely speciated Susceptibility testing not routinely   done     03/03/2017 No MRSA isolated  Final   03/02/2017 >100,000 colonies/mL Citrobacter koseri (A)  Final    Specimen Description   Date Value Ref Range Status   03/07/2017 Blood Right Arm  Final   03/07/2017 Blood Left Arm  Final   03/05/2017 Sputum  Final   03/05/2017 Sputum  Final   03/03/2017 Nares  Final   03/02/2017 Unspecified Urine  Final          Recent Labs  Lab 03/07/17  1510 03/07/17  1509 03/05/17  0700 03/03/17  0850 03/02/17  1000   CULT No growth after 15 hours No growth after 15 hours Heavy growth Normal floraLight growth Klebsiella pneumoniaeHeavy growth Candida albicans / dubliniensis Candida albicans and Candida dubliniensis are not routinely  speciated Susceptibility testing not routinely done* No MRSA isolated >100,000 colonies/mL Citrobacter koseri*   SDES Blood Right Arm Blood Left Arm Sputum  Sputum Nares Unspecified Urine       Urine Studies     Recent Labs   Lab Test  03/05/17   0700  03/02/17   1000  12/17/15   1107  03/15/15   2016   URINEPH  6.0  5.5  7.5*  6.0   NITRITE  Negative  Positive*  Positive*  Positive*   LEUKEST  Negative  Small*  Small*  Moderate*   WBCU  <1  5-10*  25-50*  *       Imaging:  Results for orders placed or performed during the hospital encounter of 03/04/17   XR Chest Port 1 View    Narrative    EXAM: XR CHEST PORT 1 VW  3/5/2017 3:32 AM      HISTORY: Assess for edema or pneumonia    COMPARISON: 3/2/2017    FINDINGS: Enlarged cardiac silhouette. Stable bibasilar opacities. No  pneumothorax. No pleural effusion. Perihilar opacities present.       Impression    IMPRESSION:   1. New perihilar opacities, likely pulmonary edema although infection  cannot be excluded.  2. Stable bibasilar atelectasis/consolidation.         I have personally reviewed the examination and initial interpretation  and I agree with the findings.    JAELYN ARMENTA MD   XR Chest Port 1 View    Narrative    XR CHEST PORT 1 VW  3/7/2017 3:13 PM    History: Elevated lactate, influenza.    Comparison: Radiograph 3/5/2017.    Findings:   AP view of the chest is obtained. Trachea is midline. The  cardiomediastinal silhouette remains moderately enlarged. Lung volumes  are slightly reduced from the comparison. There are stable perihilar  and bibasilar pulmonary opacities. No pneumothorax. No pleural  effusion. Old rib fracture deformities of the left posterior lateral  ribs. Degenerative/posttraumatic abnormality of the left greater than  right humeral heads.      Impression    IMPRESSION:  1.  Stable perihilar and bibasilar opacities which could represent  atelectasis, infection, or pulmonary edema.  2.  Stable cardiomegaly.    I have personally  reviewed the examination and initial interpretation  and I agree with the findings.    VALENTÍN PURDY MD

## 2017-03-08 NOTE — PLAN OF CARE
Problem: Goal Outcome Summary  Goal: Goal Outcome Summary  1) Monitor and tx to facilitate hemodynamic stability.  2) Monitor and tx pain to facilitate adequate pain control.  3) Educate and/or reinforce heart healthy habits.  4) Maintain fall and infection precautions.   PT 6C: ambulates in room with FWW ~20 feet, limited tolerance for therapy reporting headache and upset stomach. Provided transfer training for increased indep with sit to stands; initially require mod A x1 progressing to SBA.      Recommend discharge to TCU in order to increase indep with functional mobility; per notes, patient and family refusing TCU, if patient to return home, highly recommend  PT/OT services.

## 2017-03-08 NOTE — PLAN OF CARE
Problem: Goal Outcome Summary  Goal: Goal Outcome Summary  1) Monitor and tx to facilitate hemodynamic stability.  2) Monitor and tx pain to facilitate adequate pain control.  3) Educate and/or reinforce heart healthy habits.  4) Maintain fall and infection precautions.   OT 6C: Pt instructed in BUE strengthening exercises. Pt required max verbal and tactile cues for safe sit to stand transfers, wanting to reach for FWW and bed rails to stand. Pt ranged from CGA-mod A for sit <-> stand transfers during session. Pt c/o fatigue and hip pain, VSS on RA. Pt exhibited limited tolerance for activity today. Pt limited by pain, arthritis, and activity tolerance. Recommend discharge to TCU to increase safety and independence with ADLs and functional mobility.

## 2017-03-08 NOTE — PROGRESS NOTES
Care Coordinator Progress Note     Admission Date/Time:  3/4/2017  Attending MD:  Vicky Dai MD  Data  Chart reviewed, discussed with interdisciplinary team.   Patient with history of paroxysmal Afib, HTN, COPD, HLD, RA on prednisone, hx of malignant prostate neoplasm who is transferred from [facility] for ongoing management of paroxysmal Afib and influenza A.     Concerns with insurance coverage for discharge needs: None.  Current Living Situation: Patient lives with spouse. Pt was independent with his cares prior to this admission.  Support System: Supportive and Involved wife and children  Services Involved: None currently  Transportation: Family or Friend will provide  Barriers to Discharge: Medical plan of care    Coordination of Care and Referrals: Provided patient/family with options for home care. Met with pt, his wife and son, introduced role of RNCC.      Assessment  PT currently recommending TCU, but may progress to home with assist from spouse pending improved strength/endurance. Pt states he does not want to go to a TCU, he would like to discharge to home with assistance from his wife.  Pt currently receiving IV Vancomycin q 24 hours inpatient.  Plan  Anticipated Discharge Date:  TBD  Anticipated Discharge Plan:  TBD pending plan of care.   CC will continue to monitor patient's medical condition and progress towards discharge.    Dyan Javed RN BSN  6C Unit Care Coordinator  Phone number: [phone]  Pager: [phone]

## 2017-03-09 ENCOUNTER — APPOINTMENT (OUTPATIENT)
Dept: PHYSICAL THERAPY | Facility: CLINIC | Age: 82
DRG: 227 | End: 2017-03-09
Attending: INTERNAL MEDICINE
Payer: MEDICARE

## 2017-03-09 DIAGNOSIS — M85.80 OSTEOPENIA: ICD-10-CM

## 2017-03-09 LAB
ANION GAP SERPL CALCULATED.3IONS-SCNC: 12 MMOL/L (ref 3–14)
BUN SERPL-MCNC: 24 MG/DL (ref 7–30)
CALCIUM SERPL-MCNC: 8.1 MG/DL (ref 8.5–10.1)
CHLORIDE SERPL-SCNC: 107 MMOL/L (ref 94–109)
CO2 SERPL-SCNC: 21 MMOL/L (ref 20–32)
CREAT SERPL-MCNC: 0.94 MG/DL (ref 0.66–1.25)
ERYTHROCYTE [DISTWIDTH] IN BLOOD BY AUTOMATED COUNT: 14.4 % (ref 10–15)
GFR SERPL CREATININE-BSD FRML MDRD: 76 ML/MIN/1.7M2
GLUCOSE BLDC GLUCOMTR-MCNC: 148 MG/DL (ref 70–99)
GLUCOSE SERPL-MCNC: 58 MG/DL (ref 70–99)
HCT VFR BLD AUTO: 33.8 % (ref 40–53)
HGB BLD-MCNC: 10.7 G/DL (ref 13.3–17.7)
INTERPRETATION ECG - MUSE: NORMAL
MAGNESIUM SERPL-MCNC: 2 MG/DL (ref 1.6–2.3)
MCH RBC QN AUTO: 31.4 PG (ref 26.5–33)
MCHC RBC AUTO-ENTMCNC: 31.7 G/DL (ref 31.5–36.5)
MCV RBC AUTO: 99 FL (ref 78–100)
PLATELET # BLD AUTO: 248 10E9/L (ref 150–450)
POTASSIUM SERPL-SCNC: 3.6 MMOL/L (ref 3.4–5.3)
POTASSIUM SERPL-SCNC: 3.9 MMOL/L (ref 3.4–5.3)
RBC # BLD AUTO: 3.41 10E12/L (ref 4.4–5.9)
SODIUM SERPL-SCNC: 139 MMOL/L (ref 133–144)
WBC # BLD AUTO: 5.8 10E9/L (ref 4–11)

## 2017-03-09 PROCEDURE — A9270 NON-COVERED ITEM OR SERVICE: HCPCS | Mod: GY | Performed by: INTERNAL MEDICINE

## 2017-03-09 PROCEDURE — 85027 COMPLETE CBC AUTOMATED: CPT | Performed by: STUDENT IN AN ORGANIZED HEALTH CARE EDUCATION/TRAINING PROGRAM

## 2017-03-09 PROCEDURE — 36415 COLL VENOUS BLD VENIPUNCTURE: CPT | Performed by: STUDENT IN AN ORGANIZED HEALTH CARE EDUCATION/TRAINING PROGRAM

## 2017-03-09 PROCEDURE — 21400003 ZZH R&B CCU CRITICAL UMMC

## 2017-03-09 PROCEDURE — 87252 VIRUS INOCULATION TISSUE: CPT | Performed by: INTERNAL MEDICINE

## 2017-03-09 PROCEDURE — 25800025 ZZH RX 258

## 2017-03-09 PROCEDURE — 25000128 H RX IP 250 OP 636: Performed by: INTERNAL MEDICINE

## 2017-03-09 PROCEDURE — 84132 ASSAY OF SERUM POTASSIUM: CPT | Performed by: STUDENT IN AN ORGANIZED HEALTH CARE EDUCATION/TRAINING PROGRAM

## 2017-03-09 PROCEDURE — 25000125 ZZHC RX 250: Performed by: INTERNAL MEDICINE

## 2017-03-09 PROCEDURE — 99232 SBSQ HOSP IP/OBS MODERATE 35: CPT | Mod: 24 | Performed by: INTERNAL MEDICINE

## 2017-03-09 PROCEDURE — 25000132 ZZH RX MED GY IP 250 OP 250 PS 637: Mod: GY | Performed by: INTERNAL MEDICINE

## 2017-03-09 PROCEDURE — 97530 THERAPEUTIC ACTIVITIES: CPT | Mod: GP | Performed by: PHYSICAL THERAPIST

## 2017-03-09 PROCEDURE — 99232 SBSQ HOSP IP/OBS MODERATE 35: CPT | Mod: 25 | Performed by: NURSE PRACTITIONER

## 2017-03-09 PROCEDURE — 40000115 ZZH STATISTIC NURSE TLC VISIT: Performed by: NURSE PRACTITIONER

## 2017-03-09 PROCEDURE — 00000146 ZZHCL STATISTIC GLUCOSE BY METER IP

## 2017-03-09 PROCEDURE — 25000128 H RX IP 250 OP 636: Performed by: STUDENT IN AN ORGANIZED HEALTH CARE EDUCATION/TRAINING PROGRAM

## 2017-03-09 PROCEDURE — 40000274 ZZH STATISTIC RCP CONSULT EA 30 MIN

## 2017-03-09 PROCEDURE — 99207 ZZC CDG-CODE CATEGORY CHANGED: CPT | Performed by: NURSE PRACTITIONER

## 2017-03-09 PROCEDURE — 40000193 ZZH STATISTIC PT WARD VISIT: Performed by: PHYSICAL THERAPIST

## 2017-03-09 PROCEDURE — 99221 1ST HOSP IP/OBS SF/LOW 40: CPT | Performed by: NURSE PRACTITIONER

## 2017-03-09 PROCEDURE — A9270 NON-COVERED ITEM OR SERVICE: HCPCS | Mod: GY | Performed by: STUDENT IN AN ORGANIZED HEALTH CARE EDUCATION/TRAINING PROGRAM

## 2017-03-09 PROCEDURE — A9270 NON-COVERED ITEM OR SERVICE: HCPCS | Mod: GY

## 2017-03-09 PROCEDURE — 80048 BASIC METABOLIC PNL TOTAL CA: CPT | Performed by: STUDENT IN AN ORGANIZED HEALTH CARE EDUCATION/TRAINING PROGRAM

## 2017-03-09 PROCEDURE — 25000132 ZZH RX MED GY IP 250 OP 250 PS 637: Mod: GY | Performed by: STUDENT IN AN ORGANIZED HEALTH CARE EDUCATION/TRAINING PROGRAM

## 2017-03-09 PROCEDURE — 83735 ASSAY OF MAGNESIUM: CPT | Performed by: STUDENT IN AN ORGANIZED HEALTH CARE EDUCATION/TRAINING PROGRAM

## 2017-03-09 PROCEDURE — 25000125 ZZHC RX 250: Performed by: STUDENT IN AN ORGANIZED HEALTH CARE EDUCATION/TRAINING PROGRAM

## 2017-03-09 PROCEDURE — 40000275 ZZH STATISTIC RCP TIME EA 10 MIN

## 2017-03-09 PROCEDURE — 25000132 ZZH RX MED GY IP 250 OP 250 PS 637: Mod: GY

## 2017-03-09 RX ORDER — LEVALBUTEROL INHALATION SOLUTION 1.25 MG/3ML
1.25 SOLUTION RESPIRATORY (INHALATION) EVERY 6 HOURS PRN
Status: DISCONTINUED | OUTPATIENT
Start: 2017-03-09 | End: 2017-03-14 | Stop reason: HOSPADM

## 2017-03-09 RX ORDER — DOXYCYCLINE 100 MG/1
100 CAPSULE ORAL EVERY 12 HOURS SCHEDULED
Status: DISCONTINUED | OUTPATIENT
Start: 2017-03-09 | End: 2017-03-13

## 2017-03-09 RX ORDER — NICOTINE POLACRILEX 4 MG
15-30 LOZENGE BUCCAL
Status: DISCONTINUED | OUTPATIENT
Start: 2017-03-09 | End: 2017-03-14 | Stop reason: HOSPADM

## 2017-03-09 RX ORDER — DEXTROSE MONOHYDRATE 25 G/50ML
25-50 INJECTION, SOLUTION INTRAVENOUS
Status: DISCONTINUED | OUTPATIENT
Start: 2017-03-09 | End: 2017-03-14 | Stop reason: HOSPADM

## 2017-03-09 RX ORDER — CAFFEINE 200 MG
200 TABLET ORAL ONCE
Status: COMPLETED | OUTPATIENT
Start: 2017-03-09 | End: 2017-03-09

## 2017-03-09 RX ORDER — CEFTRIAXONE 2 G/1
2 INJECTION, POWDER, FOR SOLUTION INTRAMUSCULAR; INTRAVENOUS EVERY 24 HOURS
Status: COMPLETED | OUTPATIENT
Start: 2017-03-09 | End: 2017-03-11

## 2017-03-09 RX ORDER — DEXTROSE MONOHYDRATE 25 G/50ML
INJECTION, SOLUTION INTRAVENOUS
Status: COMPLETED
Start: 2017-03-09 | End: 2017-03-09

## 2017-03-09 RX ADMIN — TAMSULOSIN HYDROCHLORIDE 0.4 MG: 0.4 CAPSULE ORAL at 19:56

## 2017-03-09 RX ADMIN — DOFETILIDE 125 MCG: 0.12 CAPSULE ORAL at 07:54

## 2017-03-09 RX ADMIN — OMEPRAZOLE 20 MG: 20 CAPSULE, DELAYED RELEASE ORAL at 12:13

## 2017-03-09 RX ADMIN — CEFTRIAXONE 2 G: 2 INJECTION, POWDER, FOR SOLUTION INTRAMUSCULAR; INTRAVENOUS at 15:58

## 2017-03-09 RX ADMIN — PIPERACILLIN SODIUM,TAZOBACTAM SODIUM 4.5 G: 4; .5 INJECTION, POWDER, FOR SOLUTION INTRAVENOUS at 08:59

## 2017-03-09 RX ADMIN — METOPROLOL TARTRATE 5 MG: 5 INJECTION INTRAVENOUS at 03:52

## 2017-03-09 RX ADMIN — PREDNISONE 5 MG: 5 TABLET ORAL at 07:54

## 2017-03-09 RX ADMIN — ATORVASTATIN CALCIUM 40 MG: 40 TABLET, FILM COATED ORAL at 07:52

## 2017-03-09 RX ADMIN — DEXTROSE MONOHYDRATE 25 ML: 500 INJECTION PARENTERAL at 21:25

## 2017-03-09 RX ADMIN — CALCIUM 1250 MG: 500 TABLET ORAL at 07:52

## 2017-03-09 RX ADMIN — CHOLECALCIFEROL TAB 10 MCG (400 UNIT) 400 UNITS: 10 TAB at 09:01

## 2017-03-09 RX ADMIN — FLUTICASONE FUROATE AND VILANTEROL TRIFENATATE 1 PUFF: 100; 25 POWDER RESPIRATORY (INHALATION) at 07:56

## 2017-03-09 RX ADMIN — LISINOPRIL 10 MG: 10 TABLET ORAL at 19:55

## 2017-03-09 RX ADMIN — TRAMADOL HYDROCHLORIDE 50 MG: 50 TABLET, COATED ORAL at 13:24

## 2017-03-09 RX ADMIN — TRAMADOL HYDROCHLORIDE 50 MG: 50 TABLET, COATED ORAL at 07:13

## 2017-03-09 RX ADMIN — TAMSULOSIN HYDROCHLORIDE 0.4 MG: 0.4 CAPSULE ORAL at 08:05

## 2017-03-09 RX ADMIN — OSELTAMIVIR PHOSPHATE 30 MG: 30 CAPSULE ORAL at 08:07

## 2017-03-09 RX ADMIN — TRAMADOL HYDROCHLORIDE 50 MG: 50 TABLET, COATED ORAL at 19:56

## 2017-03-09 RX ADMIN — UMECLIDINIUM 1 PUFF: 62.5 AEROSOL, POWDER ORAL at 07:56

## 2017-03-09 RX ADMIN — METOPROLOL TARTRATE 25 MG: 25 TABLET ORAL at 07:52

## 2017-03-09 RX ADMIN — DOFETILIDE 125 MCG: 0.12 CAPSULE ORAL at 19:55

## 2017-03-09 RX ADMIN — NYSTATIN 500000 UNITS: 100000 SUSPENSION ORAL at 07:54

## 2017-03-09 RX ADMIN — TRAMADOL HYDROCHLORIDE 50 MG: 50 TABLET, COATED ORAL at 00:00

## 2017-03-09 RX ADMIN — ACETAMINOPHEN 650 MG: 325 TABLET, FILM COATED ORAL at 19:55

## 2017-03-09 RX ADMIN — PIPERACILLIN SODIUM,TAZOBACTAM SODIUM 4.5 G: 4; .5 INJECTION, POWDER, FOR SOLUTION INTRAVENOUS at 14:40

## 2017-03-09 RX ADMIN — SPIRONOLACTONE 25 MG: 25 TABLET ORAL at 07:53

## 2017-03-09 RX ADMIN — METOPROLOL TARTRATE 25 MG: 25 TABLET ORAL at 19:13

## 2017-03-09 RX ADMIN — LISINOPRIL 10 MG: 10 TABLET ORAL at 07:53

## 2017-03-09 RX ADMIN — DOXYCYCLINE HYCLATE 100 MG: 100 CAPSULE ORAL at 09:06

## 2017-03-09 RX ADMIN — OMEPRAZOLE 20 MG: 20 CAPSULE, DELAYED RELEASE ORAL at 15:58

## 2017-03-09 RX ADMIN — NYSTATIN 500000 UNITS: 100000 SUSPENSION ORAL at 12:13

## 2017-03-09 RX ADMIN — NYSTATIN 500000 UNITS: 100000 SUSPENSION ORAL at 19:56

## 2017-03-09 RX ADMIN — ACETAMINOPHEN 650 MG: 325 TABLET, FILM COATED ORAL at 13:24

## 2017-03-09 RX ADMIN — CAFFEINE 200 MG: 200 TABLET ORAL at 15:58

## 2017-03-09 RX ADMIN — PIPERACILLIN SODIUM,TAZOBACTAM SODIUM 4.5 G: 4; .5 INJECTION, POWDER, FOR SOLUTION INTRAVENOUS at 03:02

## 2017-03-09 RX ADMIN — NYSTATIN 500000 UNITS: 100000 SUSPENSION ORAL at 15:58

## 2017-03-09 RX ADMIN — OXYCODONE HYDROCHLORIDE AND ACETAMINOPHEN 500 MG: 500 TABLET ORAL at 07:53

## 2017-03-09 RX ADMIN — APIXABAN 2.5 MG: 2.5 TABLET, FILM COATED ORAL at 07:53

## 2017-03-09 RX ADMIN — LEFLUNOMIDE 10 MG: 10 TABLET, FILM COATED ORAL at 07:54

## 2017-03-09 RX ADMIN — DOXYCYCLINE HYCLATE 100 MG: 100 CAPSULE ORAL at 19:55

## 2017-03-09 RX ADMIN — SODIUM CHLORIDE 250 ML: 9 INJECTION, SOLUTION INTRAVENOUS at 21:18

## 2017-03-09 RX ADMIN — METOPROLOL TARTRATE 2.5 MG: 5 INJECTION INTRAVENOUS at 18:44

## 2017-03-09 RX ADMIN — APIXABAN 2.5 MG: 2.5 TABLET, FILM COATED ORAL at 19:56

## 2017-03-09 ASSESSMENT — PAIN DESCRIPTION - DESCRIPTORS
DESCRIPTORS: HEADACHE
DESCRIPTORS: ACHING

## 2017-03-09 NOTE — PLAN OF CARE
Problem: Goal Outcome Summary  Goal: Goal Outcome Summary  1) Monitor and tx to facilitate hemodynamic stability.  2) Monitor and tx pain to facilitate adequate pain control.  3) Educate and/or reinforce heart healthy habits.  4) Maintain fall and infection precautions.   Outcome: No Change  A&Ox4, [place].  Pt SR most of the day, with intermittent Afib/Aflutter 130s-150s for 1-2 minutes maximum at a time.  PRN tramadol and tylenol given for headache and abdominal discomfort.  Pt fingers and toes cyanotic on exam this AM.  Service aware.  Head and chest CT complete.  Upper and lower extremity US complete.  Pt complained of increasing abdominal pain at 1800.  PRN tramadol given and service notified.  Abdominal xray and labs drawn.  NS running at 75 ml/hr.  Pt up w/1 to bedside commode.  Room service with assist ordered.  Pt has poor appetite.  PO intake encouraged.  Will continue to monitor and notify physician with any concerns or questions.

## 2017-03-09 NOTE — CONSULTS
"[facility]  Palliative Care Consultation         Bud Merritt MRN# [id number]   Age: 89 year old YOB: 1927   Date of Admission: 3/4/2017    Reason for consult: Goals of care  Patient and family support       Requesting physician/service: Michael Lopez MD  Cards 1 Team       Recommendations        Headache: Patient rocking in chair with eyes closed and head down. Reports \"terrible\" headache. He denies changes in vision or photophobia. He attributes his headache to decreased oral intake and says it has happened for the last 2 years when he doesn't eat enough. He reports trying many different medications to treat theses headaches in the past, but the only thing he has found helpful is coffee. Unfortunately Bud does not find the coffee in the hospital drinkable. He has also tried Acetaminophen and Tramadol without relief.   -Discussed with primary team and caffeine tablet ordered    Goals of Care:  -DNR/DNI (changed from full code earlier today, with primary team)  -Ongoing goals of care conversations are appropriate    Discussion:  I met with Bud today to introduce the palliative care service and assess needs, but unfortunately he was in a great deal of pain. Our conversation was mainly about his headache, as it was very distressing for him. I will follow up with him tomorrow to discuss goals of care.     POLST: not discussed today     Disease Process/es & Symptoms     Metastatic prostate cancer ongoing treatment   Abdominal pain x 3 days  Cystitis antibiotics x 7 days  Paroxysmal atrial fib now in sinus rhythm  Cyanotic fingers/toes likely due to hypotension with symptomatic tachycardia  COPD moderate, with exacerbation 2/2 influenza A   RA on prednisone  Chronic systolic heart failure nonischemic, EF 40-45%  Deconditioning with a history of falls  Chronic medical problems MR, severe AS, HTN, HLD, sinus node dysfunction      Support/Coping     Patient declined to " "discuss support/coping    Plan for psychosocial/spiritual support/follow-up from palliative team: Will discuss case during palliative interdisciplinary team rounds and involve LICSW and  as needed.     Decision-Making & Goals of Care     Discussion/counseling today about prognosis/goals of care/decisions:   Yes, see discussion above  Patient has a completed health care directive available in the chart (Y/N): no  Health care agent (only if patient has an available, complete HCD): n/a  There is no POLST form on file for this patient  Code Status: DNR/DNI   Patient has decision-making capacity (Y/N): yes    Coordination of Care     Findings & plan of care discussed with: primary team  Follow-up plan from palliative team: will continue to follow  Thank you for involving us in the patient's care.     POLLO Underwood CNP  Palliative Care Consult Team  Pager: [phone]    40 minutes spent with patient, with >50% counseling and in care coordination.          Chief Complaint     \"I have a terrible headache\"         History of Present Illness     History obtained from: chart review    Mr. Bud Merritt in an 89 year old male with an extensive medical history. He was transferred from [facility] to [facility] for ongoing management of paroxysmal atrial fibrillation and influenza A. Palliative Care was consulted for goals of care, in the setting of influenza A, metastatic prostate cancer, and multiple comorbidities.           Past Medical History:   This patient  has a past medical history of Atrial fibrillation (H) (07/01/2016); Cardiomyopathy (H); COPD exacerbation (H) (3/2/2017); Paroxysmal atrial fibrillation (H) (7/6/2016); Pure hypercholesterolemia; Rheumatoid arthritis(714.0); Unspecified essential hypertension; and Weakness generalized (3/2/2017).           Past Surgical History:   This patient  has a past surgical history that includes REPAIR ING HERNIA,5+Y/O,REDUCIBL (1996); COLONOSCOPY THRU STOMA W BIOPSY/CAUTERY " TUMOR/POLYP/LESION (8/31/2004); colonoscopy (08/24/09); Arthroplasty knee (Left, 1/12/2016); and Irrigation and debridement soft tissue lower extremity, combined (Left, 3/15/2016).            Social/Spiritual History:     Living situation: not discussed, patient declined  Family system: not discussed, patient declined  Functional status (needs help with ADLs or IADLs): not discussed, patient declined  Employment/education: not discussed, patient declined  Use of community resources: not discussed, patient declined  Activities/interests: not discussed, patient declined  History of substance use/abuse: not discussed, patient declined            Family History:   The family history includes Alcoholism in his brother; CANCER in his mother and son; Unknown/Adopted in his father.              Allergies:   This patient is allergic to amiodarone and no known drug allergies.           Medications:   I have reviewed this patient's medication profile and medications during this hospitalization.    Pain:  Acetaminophen 650mg q6h prn--x1  Tramadol 50 mg q6h prn--x3    Indigestion:  Tums prn--0    Insomnia:  Melatonin 3 mg q HS prn--0           Review of Systems:   The comprehensive review of systems is negative other than noted here and in the HPI.    Palliative Symptom Review (0=no symptom/no concern, 1=mild, 2=moderate, 3=severe):      Pain: 3 (head)          Physical Exam:   Vitals were reviewed  Temp: 97.7  F (36.5  C) Temp src: Oral BP: 127/81   Heart Rate: 85 Resp: 16 SpO2: 97 % O2 Device: None (Room air)    Constitutional: Elderly male, seen sitting in chair. In distress r/t severe headache.   Head: Normocephalic. Atraumatic. Face symmetrical.   Pulm: Non-labored breathing.   Skin: Warm and dry. No concerning rashes or lesions on exposed areas.   Neuropsych: Oriented to person, place and situation. Speech clear. Memory and insight intact.          Data Reviewed:     ROUTINE ICU LABS (Last four results)  CMP  Recent  Labs  Lab 03/09/17  0804 03/08/17  1907 03/08/17  0724 03/07/17  1508 03/07/17  0710 03/06/17  2038 03/06/17  0848 03/05/17  0821    139 140 137 138  --  140 139   POTASSIUM 3.6 4.2 4.0 4.2 4.2 4.4 4.4 4.3   CHLORIDE 107 104 104 102 103  --  107 109   CO2 21 23 25 26 24  --  24 22   ANIONGAP 12 12 10 8 10  --  10 8   GLC 58* 74 74 75 90  --  87 91   BUN 24 28 32* 39* 37*  --  33* 28   CR 0.94 1.06 1.06 1.16 1.10  --  0.88 0.96   GFRESTIMATED 76 66 66 59* 63  --  81 74   GFRESTBLACK >90African American GFR Calc 79 79 72 76  --  >90African American GFR Calc 89   SHELIA 8.1* 8.2* 8.0* 8.4* 8.4*  --  8.1* 7.5*   MAG  --   --  2.1 2.2 2.4* 2.3  --  2.4*   PROTTOTAL  --  5.4*  --   --   --   --  5.5* 5.3*   ALBUMIN  --  2.5*  --   --   --   --  2.3* 2.2*   BILITOTAL  --  0.7  --   --   --   --  0.5 0.4   ALKPHOS  --  52  --   --   --   --  56 56   AST  --  45  --   --   --   --  78* 102*   ALT  --  32  --   --   --   --  38 36     CBC  Recent Labs  Lab 03/09/17  0804 03/08/17  0724 03/07/17  0710 03/05/17  0821   WBC 5.8 5.1 6.2 6.8   RBC 3.41* 3.20* 3.36* 3.19*   HGB 10.7* 10.1* 10.7* 10.2*   HCT 33.8* 31.8* 33.3* 31.2*   MCV 99 99 99 98   MCH 31.4 31.6 31.8 32.0   MCHC 31.7 31.8 32.1 32.7   RDW 14.4 14.0 14.3 14.3    203 226 173

## 2017-03-09 NOTE — PROGRESS NOTES
[facility]  CARDIOLOGY PROGRESS NOTE  Bud Merritt [id number]  03/09/2017   ___________________________________  ASSESSMENT & PLAN:  Bud Merritt is a 89 year old with history of paroxysmal Afib, HTN, COPD, HLD, RA on prednisone, hx of malignant prostate neoplasm who is transferred from [facility] for ongoing management of paroxysmal Afib and influenza A.       # Diffuse Abdominal pain  -Differential is broad. Query mesenteric ischemia  -GI consulted      # Headache  Usually correlating with elevated heart rates although this morning present without tachycardia. Could also be caffeine headache in setting of frequent NPO.    - CT Head Non-Contrast unremarkable    #Goals of care  - I discussed with him and his son about everything that's going on including lung, cardiac and GI issues. For now, he wants to continue to have testing done and get treatment for all of these issues. However, he has agreed to be DNR/DNI (from full code)    # Cyanosis of Fingers, Toes  # Elevated Lactate, resolved  Vital signs and labs have actually remained wnl aside from some hypotension documented with tachycardia (although suspect these were falsely low). Extremities are cool overall. DDx: developing cardiogenic shock, blue toe syndrome (emboli), septic emboli, less likely septic shock, aneurysm.    - CT Chest Angio,BLE/BUE Dopplers U/S unremarkable   - Repeat lactate last evening normalized. Got fluids after CT to prevent contrast induced kidney injury      # Acute Hypoxemic Respiratory Failure, resolved  # Moderate COPD, with exacerbation secondary influenza A  No longer hypoxic, but briefly required BiPAP at [facility]. Will monitor for bacterial superinfection.  -  finished Tamiflu 30 mg BID (5 Days)  - Restarted home prednisone 5 mg daily  - Duonebs QID + albuterol nebs PRN  - Flutter valve  - Droplet precautions  - RT Chronic Disease consult      # Paroxysmal atrial fibrillation with RVR, resolved  #  "Sinus node dysfunction, possible tachy-deena vs SSS  Now in sinus rhythm with intermittent bursts of symptomatic tachycardia. No episodes of bradycardia.  - Continue home metoprolol - increased to 25 mg BID  - Continue dofetilide 125 mcg BID  - Continue apixaban  - EP consult, appreciate recommendations --> planning for pacemaker and ablation once respiratory status stable  - IV metoprolol PRN for SVT      # Acute cystitis without hematuria  Urine culture has grown out Citrobacter. Will treat for 7 day course.  - Currently on broad spectrum antibiotics - vancomycin/zosyn  - Phenazopyridine, Tylenol PRN       # Hx of falls, weakness  - PT/OT consult  - Fall precautions      # Chronic systolic heart failure, nonischemic, EF 40-45%  Had recovery of EF form 25-30%. Mildly volume up.  - Continue metoprolol, lisinopril, spironolactone  - Hold further IV diuresis      Chronic medical problems:  # Mitral Regurgitation, Severe Aortic Stenosis  # HTN - Continue metoprolol, lisinopril, spironolactone  # RA - Continue home prednisone (5 mg daily), continue leflunomide, tramadol PRN   # Prostate cancer on lupron - Continue tamsulosin  # Hyperlipidemia - Continue home atorvastatin      FEN: Regular diet  Proph: Apixaban  Dispo: Inpatient  Code Status: DNR/DNI     Patient seen and discussed with staff physician, Dr. Farnsworth, who is agreeable with above plan.    Michael Lopez  General Cardiology Fellow, PGY4  Pager [phone]    24 hour events:  No acute events overnight. Has multiple SVT episodes overnight with HR 150s. Did not sleep well b/c of diffuse abdominal pain. Has had it for last 3 days. Has headache and abdominal pain this morning     OBJECTIVE:  Vitals:  VS: /77 (BP Location: Left arm)  Pulse 73  Temp 97.9  F (36.6  C) (Oral)  Resp 20  Ht 1.676 m (5' 6\")  Wt 71 kg (156 lb 8 oz)  SpO2 96%  BMI 25.26 kg/m2   GEN: alert, uncomfortable,   CV:  tachycardia, S1 &S2, systolic murmur at 1st right ICS  PULM:  Crackles at bases " b/l. Decreased breath sounds b/l  ABD: Normoactive bowel sounds, soft, nondistended  SKIN: No rashes noted  EXT: WWP, 1-2+ BLE edema, cool extremities   NEURO: AAOx3, no focal deficits      DIAGNOSTIC STUDIES  CMP    Recent Labs  Lab 03/09/17  0804 03/08/17  1907 03/08/17  0724 03/07/17  1508 03/07/17  0710 03/06/17  2038 03/06/17  0848 03/05/17  0821    139 140 137 138  --  140 139   POTASSIUM 3.6 4.2 4.0 4.2 4.2 4.4 4.4 4.3   CHLORIDE 107 104 104 102 103  --  107 109   CO2 21 23 25 26 24  --  24 22   ANIONGAP 12 12 10 8 10  --  10 8   GLC 58* 74 74 75 90  --  87 91   BUN 24 28 32* 39* 37*  --  33* 28   CR 0.94 1.06 1.06 1.16 1.10  --  0.88 0.96   GFRESTIMATED 76 66 66 59* 63  --  81 74   GFRESTBLACK >90African American GFR Calc 79 79 72 76  --  >90African American GFR Calc 89   SHELIA 8.1* 8.2* 8.0* 8.4* 8.4*  --  8.1* 7.5*   MAG  --   --  2.1 2.2 2.4* 2.3  --  2.4*   PROTTOTAL  --  5.4*  --   --   --   --  5.5* 5.3*   ALBUMIN  --  2.5*  --   --   --   --  2.3* 2.2*   BILITOTAL  --  0.7  --   --   --   --  0.5 0.4   ALKPHOS  --  52  --   --   --   --  56 56   AST  --  45  --   --   --   --  78* 102*   ALT  --  32  --   --   --   --  38 36     CBC    Recent Labs  Lab 03/09/17  0804 03/08/17  0724 03/07/17  0710 03/05/17  0821   WBC 5.8 5.1 6.2 6.8   RBC 3.41* 3.20* 3.36* 3.19*   HGB 10.7* 10.1* 10.7* 10.2*   HCT 33.8* 31.8* 33.3* 31.2*   MCV 99 99 99 98   MCH 31.4 31.6 31.8 32.0   MCHC 31.7 31.8 32.1 32.7   RDW 14.4 14.0 14.3 14.3    203 226 173     INRNo lab results found in last 7 days.     I interviewed and examined the patient with the house staff.  I agree with the assessment and plan as documented.      Rao Farnsworth MD, PhD  Professor of Medicine  Division of Cardiology

## 2017-03-09 NOTE — PROVIDER NOTIFICATION
Patient having more frequent flips between SR (80-90s) and Aflutter (150-160s). Max time in Aflutter 5 min. Patient symptomatic with lightheadedness and feeling dizzy. BP low but attributed to being in aflutter per trends. SaO2 89% on RA, pt refusing O2 therapy. [place] notified and 5mg IV metoprolol ordered.     Metoprolol given at 0354. Will continue to assess/monitor and notify sx with pertinent changes.     ADD: patient has been in SR with rates 70-80s since ~0400 (after metoprolol admin).

## 2017-03-09 NOTE — PLAN OF CARE
"Problem: Goal Outcome Summary  Goal: Goal Outcome Summary  1) Monitor and tx to facilitate hemodynamic stability.  2) Monitor and tx pain to facilitate adequate pain control.  3) Educate and/or reinforce heart healthy habits.  4) Maintain fall and infection precautions.   Outcome: No Change     Pt in SR but frequently flipping into Aflutter with rates 140s-170 (see provider notification). Very symptomatic, using prn tramadol for headache pain associated with aflutter. SaO2 89% on RA, but pt refuses O2, stating he cannot tolerate it. Alert and oriented, [place]. Pt complains of abdominal discomfort, says it is \"boiling in there\", up to bedside commode with Ax1. Loose stool x1. IV abx as ordered. Plan is for possible pacemaker and ablation. Continue to monitor and notify Cards 1 with pertinent changes.       "

## 2017-03-09 NOTE — TELEPHONE ENCOUNTER
Routing refill request to provider for review/approval because:  Drug not active on patient's medication list    Elena Dias RN

## 2017-03-09 NOTE — PLAN OF CARE
Problem: Goal Outcome Summary  Goal: Goal Outcome Summary  1) Monitor and tx to facilitate hemodynamic stability.  2) Monitor and tx pain to facilitate adequate pain control.  3) Educate and/or reinforce heart healthy habits.  4) Maintain fall and infection precautions.   OT 6C: Cx- Despite max encouragement, pt refused therapy this afternoon 2/2 severe headache.

## 2017-03-09 NOTE — PLAN OF CARE
Problem: Goal Outcome Summary  Goal: Goal Outcome Summary  1) Monitor and tx to facilitate hemodynamic stability.  2) Monitor and tx pain to facilitate adequate pain control.  3) Educate and/or reinforce heart healthy habits.  4) Maintain fall and infection precautions.   A&Ox4. [place]. VSS on RA except for episodes of aflutter HR up to 170s lasting >20 minutes. (1x IV metoprolol 2.5mg given at 1850). Does have symptomatic dyspnea and desaturation w/ aflutter, refusing supplemental oxygen d/t sores. C/o headache entirety of day preventing activity, team aware. PRN (acetaminophen, ultram, caffeine) providing some relief. Poor food intake today d/t mouth soreness and loose stools, consuming only 1 cup hot chocolate and water with pills. Moderate LE edema R>L, encouraging elevating legs, patient refusing d/t belief would add pressure to head. IV antibiotic administration continues. Patient seems frustrated and wanting to discharge to home. Son visited and updated by team. Wife also updated and states she will visit in early morning. Will continue POC, Cards 1 primary. Stool sample still needs collecting.

## 2017-03-09 NOTE — PROGRESS NOTES
Electrophysiology Consult Service  Follow up Note   EP Attending:    Date of Service: 3/9/2017     S: We continue to follow this patient for atrial tachyarrhythmia management.  He continues to have frequent symptomatic runs of AT vs atypical AFL on telemetry. No episodes of bradycardia noted. An updated Echo showed LVEF ~25% and he has been undergoing diuresis. He was also found to have metastatic prostate CA. CTA showed no aortic dissection, intramural hematoma, or penetrating atherosclerotic ulcer.   HPI:   Mr. Merritt is an 89 year old male who has a past medical history significant for PAF (CHADVASC 4 on Eliquis), SVT, HTN, COPD, HLD, RA on prednisone, hx of malignant prostate neoplasm, and cardiomyopathy LVEF 40-45%. He was admitted to [facility] after presenting with AF with RVR. He was placed on diltiazem gtt and he spontaneously converted. Over course of hospitalization at [facility], patient's HRs reportedly fluctuated from 50's to 170's. He was reportedly hypotensive with RVR and reported symptoms of HA, facial flushing, and generalized sense of unwellness. Given patient's allergy to amiodarone as well as current use of an antiarrhythmic, beta blocker, and max dosing on diltiazem drip for calcium channel blocker,decision was made to transfer [facility] for further evaluation and management. He had 2 runs of AF with RVR yesterday HRs up to 140's. Each episode lasting about 10 minutes and spontaneously resolving. This morning he had two more episodes of AF/AT with RVR that spontaneously converted. He is being treated for COPD exacerbation and secondary influenza A. He has a known history of PAF for which he had been on amiodarone that needed to be stopped due to reduced DLCO. He was then transitioned to dofetilide which he had been on as an outpatient. Most recent echo shows LVEF 40-45% with moderate to severe AS. Renal function and electrolytes stable. He reports feeling unwell today and more  "SOB. Current cardiac medications include: Eliquis, Lipitor, Dofetilide, Lasix, Lisinopril, Toprol XL, and Spironolactone.   O:   Vitals: /77 (BP Location: Left arm)  Pulse 73  Temp 97.9  F (36.6  C) (Oral)  Resp 20  Ht 1.676 m (5' 6\")  Wt 71 kg (156 lb 8 oz)  SpO2 96%  BMI 25.26 kg/m2  GENERAL APPEARANCE: AxO, NAD   HEENT: NCAT, EOMI, MMM. PERRLA.   NECK: Supple.   CHEST: Fine crackles in bases.   CARDIOVASCULAR: S1S2, Reg, No m/r/g.   ABDOMEN: NT, ND, BS+, soft.   EXTREMITIES: Trace pedal edema.   NEURO: Grossly nonfocal.   PSYCH: Normal affect.  SKIN: Warm and dry.    Data:  Labs:  BMP  Recent Labs  Lab 03/09/17  0804 03/08/17  1907 03/08/17  0724 03/07/17  1508    139 140 137   POTASSIUM 3.6 4.2 4.0 4.2   CHLORIDE 107 104 104 102   SHELIA 8.1* 8.2* 8.0* 8.4*   CO2 21 23 25 26   BUN 24 28 32* 39*   CR 0.94 1.06 1.06 1.16   GLC 58* 74 74 75     CBC  Recent Labs  Lab 03/09/17  0804 03/08/17  0724 03/07/17  0710 03/05/17  0821   WBC 5.8 5.1 6.2 6.8   RBC 3.41* 3.20* 3.36* 3.19*   HGB 10.7* 10.1* 10.7* 10.2*   HCT 33.8* 31.8* 33.3* 31.2*   MCV 99 99 99 98   MCH 31.4 31.6 31.8 32.0   MCHC 31.7 31.8 32.1 32.7   RDW 14.4 14.0 14.3 14.3    203 226 173     Tele shows         EKG:           Meds per EPIC EMR:  Current Facility-Administered Medications   Medication     doxycycline (VIBRAMYCIN) capsule 100 mg     levalbuterol (XOPENEX) neb solution 1.25 mg     umeclidinium (INCRUSE ELLIPTA) 62.5 MCG/INH oral inhaler 1 puff     metoprolol (LOPRESSOR) tablet 25 mg     predniSONE (DELTASONE) tablet 5 mg     piperacillin-tazobactam (ZOSYN) 4.5 g vial to attach to  mL bag     vancomycin (VANCOCIN) 1,250 mg in NaCl 0.9 % 250 mL intermittent infusion     melatonin tablet 3 mg     acetaminophen (TYLENOL) tablet 650 mg     opium-belladonna (B&O SUPPRETTES) 30-16.2 MG per suppository 0.5 suppository     calcium carbonate (OS-SHELIA 500 mg [place]. Ca) tablet 1,250 mg     fluticasone-vilanterol (BREO ELLIPTA) 100-25 " MCG/INH oral inhaler 1 puff     traMADol (ULTRAM) tablet 50 mg     nystatin (MYCOSTATIN) suspension 500,000 Units     naloxone (NARCAN) injection 0.1-0.4 mg     lidocaine 1 % 1 mL     lidocaine (LMX4) kit     sodium chloride (PF) 0.9% PF flush 3 mL     sodium chloride (PF) 0.9% PF flush 3 mL     Patient is already receiving anticoagulation with heparin, enoxaparin (LOVENOX), warfarin (COUMADIN)  or other anticoagulant medication     senna-docusate (SENOKOT-S;PERICOLACE) 8.6-50 MG per tablet 1-2 tablet     apixaban ANTICOAGULANT (ELIQUIS) tablet 2.5 mg     ascorbic acid (VITAMIN C) tablet 500 mg     atorvastatin (LIPITOR) tablet 40 mg     calcium carbonate (TUMS) chewable tablet 500 mg     dofetilide (TIKOSYN) capsule 125 mcg     leflunomide (ARAVA) tablet 10 mg     lisinopril (PRINIVIL/ZESTRIL) tablet 10 mg     oseltamivir (TAMIFLU) capsule 30 mg     spironolactone (ALDACTONE) tablet 25 mg     tamsulosin (FLOMAX) capsule 0.4 mg     cholecalciferol (vitamin D) tablet 400 Units     Echo:  Recent Results (from the past 4320 hour(s))   ECHO COMPLETE WITH OPTISON    Narrative    Interpretation Summary                    [facility]  Echocardiography Laboratory  [address] [address]        Name: GALLO FLOYD  MRN: [id number]  : 1927  Study Date: 2016 10:06 AM  Age: 89 yrs  Gender: Male  Patient Location: [facility]  Reason For Study: Cardiomyopathy, unspecified  History: HTN,AS,Atrial Fibrillation,Mitral Insufficiency  Ordering Physician: RG ELIZABETH  Referring Physician: Camron Aragon MD  Performed By: Mer Lee     BSA: 1.7 m2  Height: 65 in  Weight: 140 lb  HR: 74  BP: 132/78 mmHg  ______________________________________________________________________________        Procedure  Complete Echo Adult. Contrast Optison.  ______________________________________________________________________________     Interpretation Summary     Moderate to severe valvular aortic  stenosis.  Left ventricular systolic function is moderately reduced.  The visual ejection fraction is estimated at 40-45%.  The left ventricle is normal in size.  There is mild concentric left ventricular hypertrophy.  There is moderate biatrial enlargement.  Mild aortic root dilatation.  The ascending aorta is Mildly dilated.  The rhythm was normal sinus.     Global LV systolic performance appears better than described on the last  study 6/27/2016, perhaps because the degree of ventricular ectopy is less  than was present on the last study ( EF preivously estimated 25 to 30%, now  40 to 45%). There appears to be some progression in the degree of aortic  stenosis ( MSG had been 18 mmHg, now 23 mmHg). Using a LVOT diameter of 2.5  cm, continuity equation calculation of aortic valve area yeilds an TYREL of 1.0  to 1.1 cm2.  ______________________________________________________________________________           Left Ventricle  The left ventricle is normal in size. There is mild concentric left  ventricular hypertrophy. Left ventricular systolic function is moderately  reduced. The visual ejection fraction is estimated at 40-45%. No regional  wall motion abnormalities noted. There is no thrombus seen in the left  ventricle.     Right Ventricle  The right ventricle is normal in structure, function and size. There is no  mass or thrombus in the right ventricle.  Atria  There is moderate biatrial enlargement. There is no atrial shunt seen.     Mitral Valve  The mitral valve leaflets are mildly thickened. There is trace to mild mitral  regurgitation. There is no mitral valve stenosis.     Tricuspid Valve  Normal tricuspid valve. The right ventricular systolic pressure is elevated  at 33.5 mmHg. Right ventricular systolic pressure is elevated, consistent  with mild pulmonary hypertension. There is no tricuspid stenosis.     Aortic Valve  There is severe trileaflet aortic sclerosis. There is mild (1+) aortic  regurgitation.  Moderate to severe valvular aortic stenosis. The mean AoV  pressure gradient is 23.9 mmHg.     Pulmonic Valve  Normal pulmonic valve. There is trace pulmonic valvular regurgitation. There  is no pulmonic valvular stenosis.     Vessels  Mild aortic root dilatation. The ascending aorta is Mildly dilated. The IVC  is normal in size and reactivity with respiration, suggesting normal central  venous pressure. The pulmonary artery is normal size.  Pericardial/Pleural  The pericardium appears normal. There is no pleural effusion.     Rhythm  The rhythm was normal sinus.     ______________________________________________________________________________  MMode/2D Measurements & Calculations  IVSd: 1.2 cm  LVIDd: 4.7 cm  LVIDs: 3.7 cm  LVPWd: 1.1 cm  FS: 20.9 %  EDV(Teich): 103.7 ml  ESV(Teich): 59.5 ml  LV mass(C)d: 201.7 grams  Ao root diam: 4.0 cm  asc Aorta Diam: 3.8 cm  LVOT diam: 2.4 cm  LVOT area: 4.6 cm2  LA Volume (BP): 81.8 ml     LA Volume Index (BP): 48.1 ml/m2        Doppler Measurements & Calculations  MV E max phyllis: 73.3 cm/sec  MV A max phyllis: 132.9 cm/sec  MV E/A: 0.55  MV dec time: 0.31 sec  Ao V2 max: 325.8 cm/sec  Ao max P.7 mmHg  Ao V2 mean: 227.5 cm/sec  Ao mean P.9 mmHg  Ao V2 VTI: 66.4 cm  TYREL(I,D): 1.0 cm2  TYREL(V,D): 1.1 cm2  LV V1 max P.3 mmHg  LV V1 max: 76.2 cm/sec  LV V1 VTI: 15.0 cm  SV(LVOT): 69.4 ml  PA acc time: 0.09 sec  TR max phyllis: 289.1 cm/sec  TR max P.5 mmHg  TYREL Index (cm2/m2): 0.61  Lateral E/e': 18.1  [facility] E/e': 17.8              ______________________________________________________________________________        Report approved by: JHOAN Benítez 2016 12:48 PM          A:   Mr. Merritt is an 89 year old male who has a past medical history significant for PAF (CHADVASC 4 on Eliquis), SVT, HTN, COPD, HLD, RA on prednisone, hx of malignant prostate neoplasm, and cardiomyopathy LVEF 40-45%. He was admitted to [facility] after presenting with AF with RVR.  He was placed on diltiazem gtt and he spontaneously converted. Over course of hospitalization at [facility], patient's HRs reportedly fluctuated from 50's to 170's. He was reportedly hypotensive with RVR and reported symptoms of HA, facial flushing, and generalized sense of unwellness. Given patient's allergy to amiodarone as well as current use of an antiarrhythmic, beta blocker, and max dosing on diltiazem drip for calcium channel blocker,decision was made to transfer to [facility] for further evaluation and management. He had 2 runs of AF with RVR yesterday HRs up to 140's. Each episode lasting about 10 minutes and spontaneously resolving. This morning he had two more episodes of AF/AT with RVR that spontaneously converted. He is being treated for COPD exacerbation and secondary influenza A. He has a known history of PAF for which he had been on amiodarone that needed to be stopped due to reduced DLCO. He was then transitioned to dofetilide which he had been on as an outpatient. Most recent echo shows LVEF 40-45% with moderate to severe AS. Renal function and electrolytes stable. He reports feeling unwell today and more SOB. Current cardiac medications include: Eliquis, Lipitor, Dofetilide, Lasix, Lisinopril, Toprol XL, and Spironolactone. We continue to follow this patient for atrial tachyarrhythmia management.  He continues to have frequent symptomatic runs of AT vs atypical AFL on telemetry. No episodes of bradycardia noted. An updated Echo showed LVEF ~25% and he has been undergoing diuresis. He was also found to have metastatic prostate CA. CTA showed no aortic dissection, intramural hematoma, or penetrating atherosclerotic ulcer.     EP recommendations:   He has documented AF and is also having runs of more organized atrial tachyarrhythmia on telemetry (likely AT vs atypical AFL).   We discussed in detail with the patient management/treatment options for AF/AFL includin. Stroke Prophylaxis:  CHADSVASC=++age, +HTN, +HF 4, corresponding to a 4.0% annual stroke / systemic emolism event rate. indicating need for long term oral anticoagulation. He is appropriately on Eliquis. No bleeding issues.   2. Rate Control: Continue Metoprolol and up titrate as possible.   3. Rhythm Control: He had previously been on amiodarone which had to be stopped due to reduced DLCO. He was alternatively placed on dofetilide. This appears to be relatively ineffective as he is having frequent PAF episodes on dofetilide. We would avoid Flecainide given structural heart disease (AS and reduced LVEF). Limited other AAT options. We discussed with his primary EP (Dr. Li) who recommends CRTP implant followed by AVN ablation may be the best way to control this. We will plan for CRTP implant when cleared by primary team. Given metastatic prostate CA and patient recently changing to DNR/DNI, we would not pursue ICD at this stage as it would not be aligned with patient's goals of care.    4. Risk Factor Management: Continue Statin, maintain tight BP control, and KATHERIN evaluation as indicated.   The patient states understanding and is agreeable with plan.   Thank you much for allowing us to participate in the care of this pleasant patient.   This patient was discussed with  and the note above reflects our joint assessment and plan.   POLLO Alvares CNP  Electrophysiology Consult Service  Pager: [phone]    EP STAFF NOTE  Patient seen and examined and management plan personally reviewed with the patient. I agree with the note above by Estelita Trevino, EP Nurse. Changes in the physical examination, assessment and plan have been incorporated into the note by myself, as to make it a single cohesive document.  Deshawn Nowak MD [facility]  Cardiology - Electrophysiology

## 2017-03-09 NOTE — CONSULTS
"    GASTROENTEROLOGY CONSULTATION      Date of Admission:  3/4/2017          ASSESSMENT AND RECOMMENDATIONS:   Assessment:  89 year old male with a history of A-fib, severe aortic stenosis, CHF, RA, metastatic prostate cancer who GI was consulted on for abdominal pain. Overall, assessment of his current status is limited by him being a poor historian. His abdominal pain appears to be diffuse and more in the upper quadrants, but is difficult to assess. When visited later in day, his abdominal pain had resolved. He has had loose stools, first on admission to [facility] and now again today, so he may have some pain related to a gastroenteritis or colitis. Given he has been on antibiotics, it would be reasonable to check for C. Diff. In addition, he has been on high dose steroids, vasopressors, and been acutely ill, thus may have a degree of stress gastritis causing abdominal pain, as he has had a \"boiling\" sensation and h/o intermittent GERD at home. In addition, he has A-fib and given his age likely has vascular disease, along with recent episodes of hypotension, so transient acute on chronic mesenteric ischemia is possible (lactate was elevated but has resolved), but hasn't had any bloody stools. LFTs and lipase are normal. Adrenal insufficiency should also be considered if he is hypotensive, but his labs do not suggest this. On brief review of his medications, which he thinks are the primary cause - dofetilide associated with abdominal pain/diarrhea in 3-5%, leflunomide with diarrhea 17 -27% (apparently had abd pain and diarrhea with this in the past) - these seem less likely to be causing an issue now as he has been on these for some time.      Recommendations:  - C. Diff PCR if has continued loose stools (ordered)  - Start PPI for possible gastritis component (ordered), suspecting functional dyspepsia  - Serial abdominal exams and assessment of pain, and monitor stool output   - Continue to encourage PO nutrition "   - Consider CTA of abdomen if pain worsening or not improving tomorrow to evaluate for intra-abdominal pathology and possible mesenteric ischemia (evaluates for large-vessel disease, small-vessel disease is a clinical diagnosis - both would be managed supportively)    Gastroenterology will continue to follow    Thank you for involving us in this patient's care. Please do not hesitate to contact the GI service with any questions or concerns.     Pt care plan discussed with Dr. Cooney, GI staff physician.    Cesar Moser MD  [id number]  [phone]    ATTENDING ATTESTATION:    REFERRING PROVIDER: BRITTANY Farnsworth  DATE SEEN: 3/9/17      Patient was discussed, seen, and examined by me, Cesar Cooney. The plan of care and pertinent data/imaging were also reviewed with the GI Consult team and GI Fellow as well. Agree with the joint assessment and plan as delineated above.    Please contact me with any further questions.    Cesar Cooney MD    [facility] - Department of Medicine  Division of Gastroenterology  [phone]    -------------------------------------------------------------------------------------------------------------------          Chief Complaint:   We were asked by cardiology to evaluate this patient with abdominal pain    History is obtained from the patient and the medical record.     History of Present Illness   Bud Merritt is a 89 year old male with a history of A-fib, severe aortic stenosis, CHF, RA, metastatic prostate cancer who GI was consulted on for abdominal pain. He was initially admitted at [facility] for cough, diarrhea, weakness, A-fib, and was started on stress dose steroids, COPD treatment, and cardiology was consulted. He was started on Keflex for a UTI. He was positive for Influenza A and started on Tamiflu. He had low BPs with A-fib and needed levophed briefly, and was on BiPap for hypoxia for a short period of time. His diarrhea resolved  "spontaneously while he was there. He was transferred to the [facility] for A-fib management given it was difficult to control. Since arrival here, he completed tamiflu and burst of steroids, EP was consulted and are planning for ablation and pacemaker once stable. He had elevated lactate and CT chest angio was done without significant abnormalities. He was started on broad abx with vanc/zosyn. Per his team, he has had increasing abdominal pain diffusely over the past few days.    On interview, the patient is a very poor historian making accurate history difficult. He says that his \"whole system is screwed up\" and it is hard for him to separate his symptoms. He says the most prominent symptom is his headache. When asked about abdominal pain, he says he is having it but it is all part of him not feeling well and he is not able to localize it and he can't point where it hurts. Pain not worse after eating. He does note that he has some \"boiling\" feeling prior to having BM and that he feels like food is going right through him and he has diarrhea. His nurse notes he did have a loose watery stool today after it being soft and formed, but he has not had bloody or black stools. His is nauseous and has no appetite, but hasn't had any vomiting. He says that diarrhea started prior to him coming in originally. Never had anything like this before. He does say he has off and on pain with swallowing, but then notes that it doesn't hurt but it just goes down the wrong tube sometimes. Doesn't have food getting stuck. No reflux symptoms, does occasionally get upset stomach when he eats rich foods, but can't remember which medication he takes as needed when this happens. He believes that all of his symptoms are related to his \"heart medications.\" He has also gotten a few doses of NSAIDs since admission 3/2.               Previous Endoscopy:   Colon 2009:   Diverticula throughout, no other abnormalities    No EGD in past.     Past Medical " History    Reviewed and edited as appropriate  Past Medical History   Diagnosis Date     Atrial fibrillation (H) 07/01/2016     Cardiomyopathy (H)      COPD exacerbation (H) 3/2/2017     Paroxysmal atrial fibrillation (H) 7/6/2016     Pure hypercholesterolemia      Rheumatoid arthritis(714.0)      Unspecified essential hypertension      Weakness generalized 3/2/2017       Past Surgical History   Reviewed and edited as appropriate   Past Surgical History   Procedure Laterality Date     Hc repair ing hernia,5+y/o,reducibl  1996     Marlex mesh repair of bilateral inguinal hernias and drainage of bilateral scrotal hydroceles.     Hc colonoscopy thru stoma w biopsy/cautery tumor/polyp/lesion  8/31/2004     Colonoscopy  08/24/09     Arthroplasty knee Left 1/12/2016     Procedure: ARTHROPLASTY KNEE;  Surgeon: Cesar Walker DO;  Location: [facility] OR     Irrigation and debridement soft tissue lower extremity, combined Left 3/15/2016     Procedure: COMBINED IRRIGATION AND DEBRIDEMENT SOFT TISSUE LOWER EXTREMITY;  Surgeon: Cesar Walker DO;  Location: [facility] OR       Social History   Reviewed and edited as appropriate  Social History     Social History     Marital status:      Spouse name: N/A     Number of children: N/A     Years of education: N/A     Occupational History     Not on file.     Social History Main Topics     Smoking status: Former Smoker     Packs/day: 0.50     Years: 15.00     Types: Cigarettes     Quit date: 11/12/1998     Smokeless tobacco: Never Used      Comment: quit 15 yrs ago     Alcohol use No     Drug use: No     Sexual activity: Yes     Other Topics Concern     Caffeine Concern Yes     8 cups coffee day     Sleep Concern Yes     Weight Concern Yes     weight loss     Special Diet No     Exercise Yes     split firewood daily     Social History Narrative       Family History   Reviewed and edited as appropriate  Family History   Problem Relation Age of Onset     CANCER Mother       CANCER Son      Unknown/Adopted Father      Alcoholism Brother         Allergies   Reviewed and edited as appropriate     Allergies   Allergen Reactions     Amiodarone Other (See Comments)     Drop in DLCO     No Known Drug Allergies        Prior to Admission Medications    Prescriptions Prior to Admission   Medication Sig Dispense Refill Last Dose     oseltamivir (TAMIFLU) 30 MG capsule Take 1 capsule (30 mg) by mouth 2 times daily 50 capsule       diltiazem (CARDIZEM) 125 mg in NaCl 125 mL infusion Inject 5-15 mg/hr into the vein continuous        cefdinir (OMNICEF) 300 MG capsule Take 1 capsule (300 mg) by mouth 2 times daily  0      predniSONE (DELTASONE) 5 MG tablet Take 1 tablet (5 mg) by mouth daily 100 tablet 4 3/1/2017 at 0800     traMADol (ULTRAM) 50 MG tablet TAKE 1 TABLET 3 TIMES DAILY AS NEEDED FOR MODERATE PAIN 90 tablet 0 3/2/2017 at 0200     dofetilide (TIKOSYN) 125 MCG capsule Take 1 capsule (125 mcg) by mouth 2 times daily 60 capsule 5 3/1/2017 at 1600     furosemide (LASIX) 20 MG tablet Take 1 tablet (20 mg) by mouth daily Or as directed by cardiology 18 tablet 03 3/1/2017 at 0800     apixaban ANTICOAGULANT (ELIQUIS) 2.5 MG tablet Take 1 tablet (2.5 mg) by mouth 2 times daily 180 tablet 3 3/1/2017 at 1600     Leuprolide Acetate (LUPRON DEPOT IM) Inject into the muscle every 4 months   Unknown at Unknown time     leflunomide (ARAVA) 10 MG tablet Take 1 tablet (10 mg) by mouth daily 30 tablet 11 3/1/2017 at 0800     lisinopril (PRINIVIL,ZESTRIL) 10 MG tablet Take 1 tablet (10 mg) by mouth 2 times daily 62 tablet 11 3/1/2017 at 1600     spironolactone (ALDACTONE) 25 MG tablet Take 1 tablet (25 mg) by mouth daily 30 tablet 11 3/1/2017 at 0800     acetaminophen (TYLENOL) 650 MG CR tablet Take 650 mg by mouth 2 times daily   3/2/2017 at 0200     metoprolol (TOPROL-XL) 25 MG 24 hr tablet Take 1 tablet (25 mg) by mouth daily 90 tablet 3 3/1/2017 at 0800     atorvastatin (LIPITOR) 40 MG tablet Take 1  "tablet (40 mg) by mouth daily 90 tablet 3 3/1/2017 at 0800     alendronate (FOSAMAX) 70 MG tablet Take 1 tablet (70 mg) by mouth every 7 days Take with over 8 ounces water and stay upright for at least 30 minutes after dose.  Take at least 60 minutes before breakfast 12 tablet 3 3/1/2017 at 0800     calcium carbonate (TUMS) 500 MG chewable tablet Take 1 chew tab by mouth every 4 hours as needed for heartburn   More than a month at Unknown time     tamsulosin (FLOMAX) 0.4 MG 24 hr capsule Take 1 capsule (0.4 mg) by mouth 2 times daily 60 capsule  Taking     ascorbic acid (VITAMIN C) 500 MG CPCR Take 500 mg by mouth daily   3/1/2017 at 0800     VITAMIN D, CHOLECALCIFEROL, PO Take 400 Units by mouth daily   Taking     calcium carbonate (OS-SHELIA 500 MG [place]. [place]) 500 MG tablet Take 600 mg by mouth daily   3/1/2017 at 0800        Review of Systems   A complete review of systems was performed and is negative except as noted in the HPI      Physical Exam   /77 (BP Location: Left arm)  Pulse 73  Temp 97.9  F (36.6  C) (Oral)  Resp 20  Ht 1.676 m (5' 6\")  Wt 71 kg (156 lb 8 oz)  SpO2 96%  BMI 25.26 kg/m2  Wt:   Wt Readings from Last 2 Encounters:   03/07/17 71 kg (156 lb 8 oz)   03/02/17 69 kg (152 lb 1.9 oz)      Constitutional: difficult historian, sitting up in chair, appears intermittently in distress from headache   Eyes: Sclera anicteric/injected  Ears/nose/mouth/throat: mucus membranes moist  Neck: supple, small lipoma in L supraclavicular region  CV: 3+ LE edema, midsystolic murmur present   Respiratory: Unlabored breathing  Abd: Nondistended, +bs, no peritoneal signs, says it hurts diffusely, more in upper quadrants, however says \"anyone would have pain when you push on their abdomen\"  Rectal: deferred, did not want to get in bed   Neuro: Alert, oriented to place, doesn't know date    Data   Labs and imaging below were independently reviewed and interpreted    BMP  Recent Labs  Lab 03/09/17  0804 03/08/17  1907 " 03/08/17  0724 03/07/17  1508    139 140 137   POTASSIUM 3.6 4.2 4.0 4.2   CHLORIDE 107 104 104 102   SHELIA 8.1* 8.2* 8.0* 8.4*   CO2 21 23 25 26   BUN 24 28 32* 39*   CR 0.94 1.06 1.06 1.16   GLC 58* 74 74 75     CBC  Recent Labs  Lab 03/09/17  0804 03/08/17  0724 03/07/17  0710 03/05/17  0821   WBC 5.8 5.1 6.2 6.8   RBC 3.41* 3.20* 3.36* 3.19*   HGB 10.7* 10.1* 10.7* 10.2*   HCT 33.8* 31.8* 33.3* 31.2*   MCV 99 99 99 98   MCH 31.4 31.6 31.8 32.0   MCHC 31.7 31.8 32.1 32.7   RDW 14.4 14.0 14.3 14.3    203 226 173     INRNo lab results found in last 7 days.  LFTs  Recent Labs  Lab 03/08/17  1907 03/06/17  0848 03/05/17  0821   ALKPHOS 52 56 56   AST 45 78* 102*   ALT 32 38 36   BILITOTAL 0.7 0.5 0.4   PROTTOTAL 5.4* 5.5* 5.3*   ALBUMIN 2.5* 2.3* 2.2*      PANC  Recent Labs  Lab 03/08/17  1907   LIPASE 292       Imaging:  CT head  Impression:   1. No hemorrhage or other acute intracranial pathology.   2. 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    CTA chest:   Impression:  1. No aortic dissection, intramural hematoma, or penetrating  atherosclerotic ulcer. No thoracic aortic aneurysm. Ectasia of the  ascending aorta measuring up to 4.5 cm.  2. Pulmonary edema and bilateral lower lobe bronchial wall thickening,  retained secretions, and left lower lobe groundglass nodularity.  Overall findings suggest infection, including atypical infectious  process, COPD exacerbation. Trace left and small right pleural  effusions.  3. Innumerable sclerotic foci throughout the axial skeleton  representing bone metastases. Bone scintigraphy could be performed for  further evaluation if clinically indicated.  4. Pulmonary nodules as described above. Recommend continued attention  on follow-up. Follow-up CT in 3-6 months can be considered.    AXR:   IMPRESSION: No evidence of free air or obstruction.

## 2017-03-09 NOTE — PLAN OF CARE
Problem: Goal Outcome Summary  Goal: Goal Outcome Summary  1) Monitor and tx to facilitate hemodynamic stability.  2) Monitor and tx pain to facilitate adequate pain control.  3) Educate and/or reinforce heart healthy habits.  4) Maintain fall and infection precautions.   PT 6C: Min A x 1 for sit <> stands up to FWW; gait x 30' with FWW and CGA + assist for IV pole. Pt c/o headache onset with ambulation, requests return to chair. Declines LE strengthening exercises due to RLE pain and increased headache pain. RN aware of pain. HR 88 bpm at rest, up to 103 bpm with activity.     Recommend discharge to TCU to improve functional mobility, activity tolerance, strength and balance.

## 2017-03-10 ENCOUNTER — TRANSFERRED RECORDS (OUTPATIENT)
Dept: HEALTH INFORMATION MANAGEMENT | Facility: CLINIC | Age: 82
End: 2017-03-10

## 2017-03-10 ENCOUNTER — APPOINTMENT (OUTPATIENT)
Dept: CARDIOLOGY | Facility: CLINIC | Age: 82
DRG: 227 | End: 2017-03-10
Attending: NURSE PRACTITIONER
Payer: MEDICARE

## 2017-03-10 ENCOUNTER — APPOINTMENT (OUTPATIENT)
Dept: GENERAL RADIOLOGY | Facility: CLINIC | Age: 82
DRG: 227 | End: 2017-03-10
Attending: INTERNAL MEDICINE
Payer: MEDICARE

## 2017-03-10 ENCOUNTER — APPOINTMENT (OUTPATIENT)
Dept: OCCUPATIONAL THERAPY | Facility: CLINIC | Age: 82
DRG: 227 | End: 2017-03-10
Attending: INTERNAL MEDICINE
Payer: MEDICARE

## 2017-03-10 LAB
ANION GAP SERPL CALCULATED.3IONS-SCNC: 11 MMOL/L (ref 3–14)
BASOPHILS # BLD AUTO: 0 10E9/L (ref 0–0.2)
BASOPHILS NFR BLD AUTO: 0 %
BUN SERPL-MCNC: 21 MG/DL (ref 7–30)
C DIFF TOX B STL QL: NORMAL
CALCIUM SERPL-MCNC: 8.5 MG/DL (ref 8.5–10.1)
CHLORIDE SERPL-SCNC: 105 MMOL/L (ref 94–109)
CO2 SERPL-SCNC: 23 MMOL/L (ref 20–32)
CREAT SERPL-MCNC: 0.94 MG/DL (ref 0.66–1.25)
DIFFERENTIAL METHOD BLD: ABNORMAL
DIGOXIN SERPL-MCNC: 0.5 UG/L (ref 0.5–2)
EOSINOPHIL # BLD AUTO: 0.1 10E9/L (ref 0–0.7)
EOSINOPHIL NFR BLD AUTO: 1.4 %
ERYTHROCYTE [DISTWIDTH] IN BLOOD BY AUTOMATED COUNT: 14.5 % (ref 10–15)
GFR SERPL CREATININE-BSD FRML MDRD: 75 ML/MIN/1.7M2
GLUCOSE BLDC GLUCOMTR-MCNC: 108 MG/DL (ref 70–99)
GLUCOSE BLDC GLUCOMTR-MCNC: 112 MG/DL (ref 70–99)
GLUCOSE BLDC GLUCOMTR-MCNC: 119 MG/DL (ref 70–99)
GLUCOSE BLDC GLUCOMTR-MCNC: 121 MG/DL (ref 70–99)
GLUCOSE BLDC GLUCOMTR-MCNC: 138 MG/DL (ref 70–99)
GLUCOSE BLDC GLUCOMTR-MCNC: 24 MG/DL (ref 70–99)
GLUCOSE BLDC GLUCOMTR-MCNC: 42 MG/DL (ref 70–99)
GLUCOSE BLDC GLUCOMTR-MCNC: 48 MG/DL (ref 70–99)
GLUCOSE BLDC GLUCOMTR-MCNC: 51 MG/DL (ref 70–99)
GLUCOSE BLDC GLUCOMTR-MCNC: 58 MG/DL (ref 70–99)
GLUCOSE BLDC GLUCOMTR-MCNC: 66 MG/DL (ref 70–99)
GLUCOSE BLDC GLUCOMTR-MCNC: 72 MG/DL (ref 70–99)
GLUCOSE BLDC GLUCOMTR-MCNC: 93 MG/DL (ref 70–99)
GLUCOSE SERPL-MCNC: 119 MG/DL (ref 70–99)
HCT VFR BLD AUTO: 32.9 % (ref 40–53)
HGB BLD-MCNC: 10.6 G/DL (ref 13.3–17.7)
IMM GRANULOCYTES # BLD: 0 10E9/L (ref 0–0.4)
IMM GRANULOCYTES NFR BLD: 0.6 %
LAB SCANNED RESULT: NORMAL
LACTATE BLD-SCNC: 1.3 MMOL/L (ref 0.7–2.1)
LYMPHOCYTES # BLD AUTO: 1 10E9/L (ref 0.8–5.3)
LYMPHOCYTES NFR BLD AUTO: 18.7 %
MCH RBC QN AUTO: 31.6 PG (ref 26.5–33)
MCHC RBC AUTO-ENTMCNC: 32.2 G/DL (ref 31.5–36.5)
MCV RBC AUTO: 98 FL (ref 78–100)
MONOCYTES # BLD AUTO: 0.6 10E9/L (ref 0–1.3)
MONOCYTES NFR BLD AUTO: 12.4 %
NEUTROPHILS # BLD AUTO: 3.4 10E9/L (ref 1.6–8.3)
NEUTROPHILS NFR BLD AUTO: 66.9 %
NRBC # BLD AUTO: 0 10*3/UL
NRBC BLD AUTO-RTO: 0 /100
PLATELET # BLD AUTO: 261 10E9/L (ref 150–450)
POTASSIUM SERPL-SCNC: 3.6 MMOL/L (ref 3.4–5.3)
PSA SERPL-MCNC: 1.52 UG/L (ref 0–4)
RBC # BLD AUTO: 3.35 10E12/L (ref 4.4–5.9)
SODIUM SERPL-SCNC: 139 MMOL/L (ref 133–144)
SPECIMEN SOURCE: NORMAL
WBC # BLD AUTO: 5.1 10E9/L (ref 4–11)

## 2017-03-10 PROCEDURE — 27210807 ZZH SHEATH CR6

## 2017-03-10 PROCEDURE — A9270 NON-COVERED ITEM OR SERVICE: HCPCS | Mod: GY | Performed by: INTERNAL MEDICINE

## 2017-03-10 PROCEDURE — 27210795 ZZH PAD DEFIB QUICK CR4

## 2017-03-10 PROCEDURE — 02HK3KZ INSERTION OF DEFIBRILLATOR LEAD INTO RIGHT VENTRICLE, PERCUTANEOUS APPROACH: ICD-10-PCS | Performed by: INTERNAL MEDICINE

## 2017-03-10 PROCEDURE — 27210957 ZZH ACCESS EP PROC CR5

## 2017-03-10 PROCEDURE — 25000125 ZZHC RX 250: Performed by: INTERNAL MEDICINE

## 2017-03-10 PROCEDURE — 33225 L VENTRIC PACING LEAD ADD-ON: CPT | Mod: 79 | Performed by: INTERNAL MEDICINE

## 2017-03-10 PROCEDURE — 25000128 H RX IP 250 OP 636: Performed by: INTERNAL MEDICINE

## 2017-03-10 PROCEDURE — C1892 INTRO/SHEATH,FIXED,PEEL-AWAY: HCPCS

## 2017-03-10 PROCEDURE — C2621 PMKR, OTHER THAN SING/DUAL: HCPCS

## 2017-03-10 PROCEDURE — 25000125 ZZHC RX 250: Performed by: STUDENT IN AN ORGANIZED HEALTH CARE EDUCATION/TRAINING PROGRAM

## 2017-03-10 PROCEDURE — 99153 MOD SED SAME PHYS/QHP EA: CPT

## 2017-03-10 PROCEDURE — 80162 ASSAY OF DIGOXIN TOTAL: CPT | Performed by: INTERNAL MEDICINE

## 2017-03-10 PROCEDURE — A9270 NON-COVERED ITEM OR SERVICE: HCPCS | Mod: GY | Performed by: STUDENT IN AN ORGANIZED HEALTH CARE EDUCATION/TRAINING PROGRAM

## 2017-03-10 PROCEDURE — 33208 INSRT HEART PM ATRIAL & VENT: CPT

## 2017-03-10 PROCEDURE — 02H63KZ INSERTION OF DEFIBRILLATOR LEAD INTO RIGHT ATRIUM, PERCUTANEOUS APPROACH: ICD-10-PCS | Performed by: INTERNAL MEDICINE

## 2017-03-10 PROCEDURE — C1900 LEAD, CORONARY VENOUS: HCPCS

## 2017-03-10 PROCEDURE — 84153 ASSAY OF PSA TOTAL: CPT | Performed by: INTERNAL MEDICINE

## 2017-03-10 PROCEDURE — 21400003 ZZH R&B CCU CRITICAL UMMC

## 2017-03-10 PROCEDURE — 83605 ASSAY OF LACTIC ACID: CPT | Performed by: INTERNAL MEDICINE

## 2017-03-10 PROCEDURE — 99152 MOD SED SAME PHYS/QHP 5/>YRS: CPT | Performed by: INTERNAL MEDICINE

## 2017-03-10 PROCEDURE — 25000132 ZZH RX MED GY IP 250 OP 250 PS 637: Mod: GY | Performed by: INTERNAL MEDICINE

## 2017-03-10 PROCEDURE — 36415 COLL VENOUS BLD VENIPUNCTURE: CPT | Performed by: INTERNAL MEDICINE

## 2017-03-10 PROCEDURE — C1898 LEAD, PMKR, OTHER THAN TRANS: HCPCS

## 2017-03-10 PROCEDURE — C1730 CATH, EP, 19 OR FEW ELECT: HCPCS

## 2017-03-10 PROCEDURE — 99232 SBSQ HOSP IP/OBS MODERATE 35: CPT | Mod: 24 | Performed by: INTERNAL MEDICINE

## 2017-03-10 PROCEDURE — 99152 MOD SED SAME PHYS/QHP 5/>YRS: CPT

## 2017-03-10 PROCEDURE — 25000132 ZZH RX MED GY IP 250 OP 250 PS 637: Mod: GY | Performed by: STUDENT IN AN ORGANIZED HEALTH CARE EDUCATION/TRAINING PROGRAM

## 2017-03-10 PROCEDURE — 33225 L VENTRIC PACING LEAD ADD-ON: CPT

## 2017-03-10 PROCEDURE — 87493 C DIFF AMPLIFIED PROBE: CPT | Performed by: STUDENT IN AN ORGANIZED HEALTH CARE EDUCATION/TRAINING PROGRAM

## 2017-03-10 PROCEDURE — 40000133 ZZH STATISTIC OT WARD VISIT: Performed by: OCCUPATIONAL THERAPIST

## 2017-03-10 PROCEDURE — 27210784 ZZH KIT PACEMAKER CR8

## 2017-03-10 PROCEDURE — 00000146 ZZHCL STATISTIC GLUCOSE BY METER IP

## 2017-03-10 PROCEDURE — 02H43KZ INSERTION OF DEFIBRILLATOR LEAD INTO CORONARY VEIN, PERCUTANEOUS APPROACH: ICD-10-PCS | Performed by: INTERNAL MEDICINE

## 2017-03-10 PROCEDURE — 71010 XR CHEST PORT 1 VW: CPT

## 2017-03-10 PROCEDURE — 25800025 ZZH RX 258: Performed by: STUDENT IN AN ORGANIZED HEALTH CARE EDUCATION/TRAINING PROGRAM

## 2017-03-10 PROCEDURE — C1769 GUIDE WIRE: HCPCS

## 2017-03-10 PROCEDURE — 27210823 ZZH BALLOON TIP PRESSURE CR9

## 2017-03-10 PROCEDURE — 85025 COMPLETE CBC W/AUTO DIFF WBC: CPT | Performed by: INTERNAL MEDICINE

## 2017-03-10 PROCEDURE — 97530 THERAPEUTIC ACTIVITIES: CPT | Mod: GO | Performed by: OCCUPATIONAL THERAPIST

## 2017-03-10 PROCEDURE — 99153 MOD SED SAME PHYS/QHP EA: CPT | Performed by: INTERNAL MEDICINE

## 2017-03-10 PROCEDURE — 80048 BASIC METABOLIC PNL TOTAL CA: CPT | Performed by: INTERNAL MEDICINE

## 2017-03-10 PROCEDURE — 0JH609Z INSERTION OF CARDIAC RESYNCHRONIZATION DEFIBRILLATOR PULSE GENERATOR INTO CHEST SUBCUTANEOUS TISSUE AND FASCIA, OPEN APPROACH: ICD-10-PCS | Performed by: INTERNAL MEDICINE

## 2017-03-10 PROCEDURE — 93010 ELECTROCARDIOGRAM REPORT: CPT | Mod: 59 | Performed by: INTERNAL MEDICINE

## 2017-03-10 PROCEDURE — 33249 INSJ/RPLCMT DEFIB W/LEAD(S): CPT | Mod: 79 | Performed by: INTERNAL MEDICINE

## 2017-03-10 PROCEDURE — 40000802 ZZH SITE CHECK

## 2017-03-10 PROCEDURE — 25000128 H RX IP 250 OP 636: Performed by: NURSE PRACTITIONER

## 2017-03-10 RX ORDER — LIDOCAINE 40 MG/G
CREAM TOPICAL
Status: DISCONTINUED | OUTPATIENT
Start: 2017-03-10 | End: 2017-03-14 | Stop reason: HOSPADM

## 2017-03-10 RX ORDER — ALENDRONATE SODIUM 70 MG/1
70 TABLET ORAL
Qty: 12 TABLET | Refills: 3 | Status: SHIPPED
Start: 2017-03-10 | End: 2017-03-14

## 2017-03-10 RX ORDER — PROMETHAZINE HYDROCHLORIDE 25 MG/ML
6.25-25 INJECTION, SOLUTION INTRAMUSCULAR; INTRAVENOUS EVERY 4 HOURS PRN
Status: DISCONTINUED | OUTPATIENT
Start: 2017-03-10 | End: 2017-03-10 | Stop reason: HOSPADM

## 2017-03-10 RX ORDER — PROTAMINE SULFATE 10 MG/ML
5-40 INJECTION, SOLUTION INTRAVENOUS EVERY 10 MIN PRN
Status: DISCONTINUED | OUTPATIENT
Start: 2017-03-10 | End: 2017-03-10 | Stop reason: HOSPADM

## 2017-03-10 RX ORDER — LIDOCAINE 40 MG/G
CREAM TOPICAL
Status: DISCONTINUED | OUTPATIENT
Start: 2017-03-10 | End: 2017-03-10

## 2017-03-10 RX ORDER — SODIUM CHLORIDE 9 MG/ML
INJECTION, SOLUTION INTRAVENOUS CONTINUOUS
Status: DISCONTINUED | OUTPATIENT
Start: 2017-03-10 | End: 2017-03-10

## 2017-03-10 RX ORDER — NALOXONE HYDROCHLORIDE 0.4 MG/ML
.2-.4 INJECTION, SOLUTION INTRAMUSCULAR; INTRAVENOUS; SUBCUTANEOUS
Status: ACTIVE | OUTPATIENT
Start: 2017-03-10 | End: 2017-03-11

## 2017-03-10 RX ORDER — LORAZEPAM 2 MG/ML
.5-2 INJECTION INTRAMUSCULAR EVERY 10 MIN PRN
Status: DISCONTINUED | OUTPATIENT
Start: 2017-03-10 | End: 2017-03-10 | Stop reason: HOSPADM

## 2017-03-10 RX ORDER — NALOXONE HYDROCHLORIDE 0.4 MG/ML
.1-.4 INJECTION, SOLUTION INTRAMUSCULAR; INTRAVENOUS; SUBCUTANEOUS
Status: DISCONTINUED | OUTPATIENT
Start: 2017-03-10 | End: 2017-03-14 | Stop reason: HOSPADM

## 2017-03-10 RX ORDER — FLUMAZENIL 0.1 MG/ML
0.2 INJECTION, SOLUTION INTRAVENOUS
Status: DISCONTINUED | OUTPATIENT
Start: 2017-03-10 | End: 2017-03-10 | Stop reason: HOSPADM

## 2017-03-10 RX ORDER — IOPAMIDOL 755 MG/ML
20 INJECTION, SOLUTION INTRAVASCULAR ONCE
Status: DISCONTINUED | OUTPATIENT
Start: 2017-03-10 | End: 2017-03-11 | Stop reason: CLARIF

## 2017-03-10 RX ORDER — PROTAMINE SULFATE 10 MG/ML
1-5 INJECTION, SOLUTION INTRAVENOUS
Status: DISCONTINUED | OUTPATIENT
Start: 2017-03-10 | End: 2017-03-10 | Stop reason: HOSPADM

## 2017-03-10 RX ORDER — ADENOSINE 3 MG/ML
6-12 INJECTION, SOLUTION INTRAVENOUS EVERY 5 MIN PRN
Status: DISCONTINUED | OUTPATIENT
Start: 2017-03-10 | End: 2017-03-10 | Stop reason: HOSPADM

## 2017-03-10 RX ORDER — NICOTINE POLACRILEX 4 MG
LOZENGE BUCCAL
Status: DISPENSED
Start: 2017-03-10 | End: 2017-03-11

## 2017-03-10 RX ORDER — HEPARIN SODIUM 1000 [USP'U]/ML
1000-10000 INJECTION, SOLUTION INTRAVENOUS; SUBCUTANEOUS EVERY 5 MIN PRN
Status: DISCONTINUED | OUTPATIENT
Start: 2017-03-10 | End: 2017-03-10 | Stop reason: HOSPADM

## 2017-03-10 RX ORDER — CEFAZOLIN SODIUM 1 G/3ML
1 INJECTION, POWDER, FOR SOLUTION INTRAMUSCULAR; INTRAVENOUS EVERY 8 HOURS
Status: DISPENSED | OUTPATIENT
Start: 2017-03-10 | End: 2017-03-11

## 2017-03-10 RX ORDER — ONDANSETRON 2 MG/ML
4 INJECTION INTRAMUSCULAR; INTRAVENOUS EVERY 4 HOURS PRN
Status: DISCONTINUED | OUTPATIENT
Start: 2017-03-10 | End: 2017-03-10 | Stop reason: HOSPADM

## 2017-03-10 RX ORDER — CEFAZOLIN SODIUM 2 G/100ML
2 INJECTION, SOLUTION INTRAVENOUS
Status: COMPLETED | OUTPATIENT
Start: 2017-03-10 | End: 2017-03-10

## 2017-03-10 RX ORDER — CEPHALEXIN 500 MG/1
500 TABLET ORAL 3 TIMES DAILY
Qty: 15 TABLET | Refills: 0 | Status: SHIPPED | OUTPATIENT
Start: 2017-03-10 | End: 2017-03-14

## 2017-03-10 RX ORDER — DIPHENHYDRAMINE HYDROCHLORIDE 50 MG/ML
25-50 INJECTION INTRAMUSCULAR; INTRAVENOUS
Status: DISCONTINUED | OUTPATIENT
Start: 2017-03-10 | End: 2017-03-10 | Stop reason: HOSPADM

## 2017-03-10 RX ORDER — FLUMAZENIL 0.1 MG/ML
0.2 INJECTION, SOLUTION INTRAVENOUS
Status: ACTIVE | OUTPATIENT
Start: 2017-03-10 | End: 2017-03-11

## 2017-03-10 RX ORDER — FENTANYL CITRATE 50 UG/ML
25-50 INJECTION, SOLUTION INTRAMUSCULAR; INTRAVENOUS
Status: ACTIVE | OUTPATIENT
Start: 2017-03-10 | End: 2017-03-11

## 2017-03-10 RX ORDER — KETOROLAC TROMETHAMINE 15 MG/ML
15 INJECTION, SOLUTION INTRAMUSCULAR; INTRAVENOUS
Status: DISCONTINUED | OUTPATIENT
Start: 2017-03-10 | End: 2017-03-10 | Stop reason: HOSPADM

## 2017-03-10 RX ORDER — DOBUTAMINE HYDROCHLORIDE 200 MG/100ML
5-40 INJECTION INTRAVENOUS CONTINUOUS PRN
Status: DISCONTINUED | OUTPATIENT
Start: 2017-03-10 | End: 2017-03-10 | Stop reason: HOSPADM

## 2017-03-10 RX ORDER — FENTANYL CITRATE 50 UG/ML
25-50 INJECTION, SOLUTION INTRAMUSCULAR; INTRAVENOUS
Status: DISCONTINUED | OUTPATIENT
Start: 2017-03-10 | End: 2017-03-10 | Stop reason: HOSPADM

## 2017-03-10 RX ORDER — NALOXONE HYDROCHLORIDE 0.4 MG/ML
0.4 INJECTION, SOLUTION INTRAMUSCULAR; INTRAVENOUS; SUBCUTANEOUS EVERY 5 MIN PRN
Status: DISCONTINUED | OUTPATIENT
Start: 2017-03-10 | End: 2017-03-10 | Stop reason: HOSPADM

## 2017-03-10 RX ORDER — FUROSEMIDE 10 MG/ML
20-100 INJECTION INTRAMUSCULAR; INTRAVENOUS
Status: DISCONTINUED | OUTPATIENT
Start: 2017-03-10 | End: 2017-03-10 | Stop reason: HOSPADM

## 2017-03-10 RX ADMIN — METOPROLOL TARTRATE 25 MG: 25 TABLET ORAL at 21:29

## 2017-03-10 RX ADMIN — LEFLUNOMIDE 10 MG: 10 TABLET, FILM COATED ORAL at 08:11

## 2017-03-10 RX ADMIN — PREDNISONE 5 MG: 5 TABLET ORAL at 08:11

## 2017-03-10 RX ADMIN — TRAMADOL HYDROCHLORIDE 50 MG: 50 TABLET, COATED ORAL at 04:57

## 2017-03-10 RX ADMIN — SPIRONOLACTONE 25 MG: 25 TABLET ORAL at 08:12

## 2017-03-10 RX ADMIN — CEFAZOLIN SODIUM 2 G: 2 INJECTION, SOLUTION INTRAVENOUS at 14:27

## 2017-03-10 RX ADMIN — ACETAMINOPHEN 650 MG: 325 TABLET, FILM COATED ORAL at 04:57

## 2017-03-10 RX ADMIN — OMEPRAZOLE 20 MG: 20 CAPSULE, DELAYED RELEASE ORAL at 08:11

## 2017-03-10 RX ADMIN — LISINOPRIL 10 MG: 10 TABLET ORAL at 21:29

## 2017-03-10 RX ADMIN — CALCIUM 1250 MG: 500 TABLET ORAL at 08:12

## 2017-03-10 RX ADMIN — CEFAZOLIN 1 G: 330 INJECTION, POWDER, FOR SOLUTION INTRAMUSCULAR; INTRAVENOUS at 21:52

## 2017-03-10 RX ADMIN — DOFETILIDE 125 MCG: 0.12 CAPSULE ORAL at 08:13

## 2017-03-10 RX ADMIN — FENTANYL CITRATE 25 MCG: 50 INJECTION, SOLUTION INTRAMUSCULAR; INTRAVENOUS at 16:09

## 2017-03-10 RX ADMIN — FENTANYL CITRATE 25 MCG: 50 INJECTION, SOLUTION INTRAMUSCULAR; INTRAVENOUS at 16:34

## 2017-03-10 RX ADMIN — APIXABAN 2.5 MG: 2.5 TABLET, FILM COATED ORAL at 21:25

## 2017-03-10 RX ADMIN — FLUTICASONE FUROATE AND VILANTEROL TRIFENATATE 1 PUFF: 100; 25 POWDER RESPIRATORY (INHALATION) at 08:11

## 2017-03-10 RX ADMIN — DOXYCYCLINE HYCLATE 100 MG: 100 CAPSULE ORAL at 21:23

## 2017-03-10 RX ADMIN — TAMSULOSIN HYDROCHLORIDE 0.4 MG: 0.4 CAPSULE ORAL at 08:11

## 2017-03-10 RX ADMIN — DOFETILIDE 125 MCG: 0.12 CAPSULE ORAL at 21:24

## 2017-03-10 RX ADMIN — LISINOPRIL 10 MG: 10 TABLET ORAL at 08:11

## 2017-03-10 RX ADMIN — DEXTROSE MONOHYDRATE 25 ML: 500 INJECTION PARENTERAL at 05:35

## 2017-03-10 RX ADMIN — TRAMADOL HYDROCHLORIDE 50 MG: 50 TABLET, COATED ORAL at 19:13

## 2017-03-10 RX ADMIN — METOPROLOL TARTRATE 25 MG: 25 TABLET ORAL at 06:58

## 2017-03-10 RX ADMIN — CHOLECALCIFEROL TAB 10 MCG (400 UNIT) 400 UNITS: 10 TAB at 08:11

## 2017-03-10 RX ADMIN — UMECLIDINIUM 1 PUFF: 62.5 AEROSOL, POWDER ORAL at 08:11

## 2017-03-10 RX ADMIN — ATORVASTATIN CALCIUM 40 MG: 40 TABLET, FILM COATED ORAL at 08:11

## 2017-03-10 RX ADMIN — CEFTRIAXONE 2 G: 2 INJECTION, POWDER, FOR SOLUTION INTRAMUSCULAR; INTRAVENOUS at 19:13

## 2017-03-10 RX ADMIN — FENTANYL CITRATE 25 MCG: 50 INJECTION, SOLUTION INTRAMUSCULAR; INTRAVENOUS at 16:57

## 2017-03-10 RX ADMIN — DEXTROSE MONOHYDRATE 25 ML: 500 INJECTION PARENTERAL at 05:53

## 2017-03-10 RX ADMIN — MIDAZOLAM HYDROCHLORIDE 0.5 MG: 1 INJECTION, SOLUTION INTRAMUSCULAR; INTRAVENOUS at 14:39

## 2017-03-10 RX ADMIN — FENTANYL CITRATE 25 MCG: 50 INJECTION, SOLUTION INTRAMUSCULAR; INTRAVENOUS at 14:38

## 2017-03-10 RX ADMIN — SODIUM CHLORIDE: 9 INJECTION, SOLUTION INTRAVENOUS at 12:57

## 2017-03-10 RX ADMIN — METOPROLOL TARTRATE 2.5 MG: 5 INJECTION INTRAVENOUS at 06:56

## 2017-03-10 RX ADMIN — DOXYCYCLINE HYCLATE 100 MG: 100 CAPSULE ORAL at 08:11

## 2017-03-10 RX ADMIN — TAMSULOSIN HYDROCHLORIDE 0.4 MG: 0.4 CAPSULE ORAL at 21:25

## 2017-03-10 RX ADMIN — DEXTROSE MONOHYDRATE 50 ML: 500 INJECTION PARENTERAL at 18:55

## 2017-03-10 RX ADMIN — ACETAMINOPHEN 650 MG: 325 TABLET, FILM COATED ORAL at 23:46

## 2017-03-10 RX ADMIN — DEXTROSE MONOHYDRATE 50 ML: 500 INJECTION PARENTERAL at 19:11

## 2017-03-10 ASSESSMENT — PAIN DESCRIPTION - DESCRIPTORS
DESCRIPTORS: HEADACHE

## 2017-03-10 NOTE — PLAN OF CARE
Problem: Goal Outcome Summary  Goal: Goal Outcome Summary  1) Monitor and tx to facilitate hemodynamic stability.  2) Monitor and tx pain to facilitate adequate pain control.  3) Educate and/or reinforce heart healthy habits.  4) Maintain fall and infection precautions.   Outcome: No Change  D/I/A: Patient's VSS. SR with first degree block, frequent PVCs and PACs, no runs of a-fib this shift. Patient reports mouth pain, canker sores on inner lips. Declining nystatin, using salt water rinses. Patient had extravasation to R arm PIV this morning, using cold packs, MD (awaiting to see if antidote is needing to be administered) and vascular access aware. Coccyx red, but blachable- using barrier cream, patient denies most repositioning insisting in sitting in the chair. Family at bedside part of the day.     P: Continue to monitor and notify MDs as needed. Currently in cath lab for PPM placement.

## 2017-03-10 NOTE — PROGRESS NOTES
Brief Social Work Note    SW called to leave a message for Palliative team to assist with completion of POLST form.  Per RN[facility] plan is for pt to remain hospitalized through the weekend.  SW to follow if pt is needing additional discharge services.    PRINCESS Wang, APSW  6C Unit   Pager: [phone]  Phone: [phone]

## 2017-03-10 NOTE — PLAN OF CARE
Problem: Goal Outcome Summary  Goal: Goal Outcome Summary  1) Monitor and tx to facilitate hemodynamic stability.  2) Monitor and tx pain to facilitate adequate pain control.  3) Educate and/or reinforce heart healthy habits.  4) Maintain fall and infection precautions.   PT 6C: CX - Pt off unit for procedure.

## 2017-03-10 NOTE — PROGRESS NOTES
"[facility]  GASTROENTEROLOGY PROGRESS NOTE  Bud Merritt [id number]   03/10/2017    IMPRESSION:  Bud Merritt is a 89 year old male with h/o A-fib, severe aortic stenosis, CHF, RA, and metastatic prostate cancer admitted with influenza and paroxysmal afib. Hospital course c/b abdominal pain which was likely secondary to atypical chest pain, erosive gastritis or transient ischemia, now resolved.       RECOMMENDATIONS:  -Low dose PPI BID x 1 month and then daily based on symptoms  -GI cocktail PRN  -Bowel regimen     Will sign off.     Patient discussed with GI staff, Dr. Talbert. Please page with questions.     Ramya Maddox MD  GI Fellow  [phone]        SUBJECTIVE:  No acute overnight events. Denies ever having abdominal pain. Boiling sensation in stomach improved. Having BMs.    OBJECTIVE:  VS: /78 (BP Location: Right arm)  Pulse 73  Temp 96.5  F (35.8  C) (Axillary)  Resp 18  Ht 1.676 m (5' 6\")  Wt 71.3 kg (157 lb 3.2 oz)  SpO2 97%  BMI 25.37 kg/m2   GEN: NAD; comfortable.  HEENT: NCAT; NL voice.  Resp: Breathing comfortably   Abdominal: Soft; NTND  Skin: No jaundice.  Extremities: Moving all extremities.   Neurological: Alert    REVIEW OF LABORATORY, PATHOLOGY AND IMAGING RESULTS:  BMP  Recent Labs  Lab 03/10/17  0731 03/09/17  2134 03/09/17  0804 03/08/17  1907 03/08/17  0724     --  139 139 140   POTASSIUM 3.6 3.9 3.6 4.2 4.0   CHLORIDE 105  --  107 104 104   SHELIA 8.5  --  8.1* 8.2* 8.0*   CO2 23  --  21 23 25   BUN 21  --  24 28 32*   CR 0.94  --  0.94 1.06 1.06   *  --  58* 74 74     CBC  Recent Labs  Lab 03/10/17  0731 03/09/17  0804 03/08/17  0724 03/07/17  0710   WBC 5.1 5.8 5.1 6.2   RBC 3.35* 3.41* 3.20* 3.36*   HGB 10.6* 10.7* 10.1* 10.7*   HCT 32.9* 33.8* 31.8* 33.3*   MCV 98 99 99 99   MCH 31.6 31.4 31.6 31.8   MCHC 32.2 31.7 31.8 32.1   RDW 14.5 14.4 14.0 14.3    248 203 226     INRNo lab results found in last 7 days.  LFTs  Recent Labs  Lab 03/08/17  1907 " 03/06/17  0848 03/05/17  0821   ALKPHOS 52 56 56   AST 45 78* 102*   ALT 32 38 36   BILITOTAL 0.7 0.5 0.4   PROTTOTAL 5.4* 5.5* 5.3*   ALBUMIN 2.5* 2.3* 2.2*      PANC  Recent Labs  Lab 03/08/17  1907   LIPASE 292

## 2017-03-10 NOTE — PLAN OF CARE
Problem: Goal Outcome Summary  Goal: Goal Outcome Summary  1) Monitor and tx to facilitate hemodynamic stability.  2) Monitor and tx pain to facilitate adequate pain control.  3) Educate and/or reinforce heart healthy habits.  4) Maintain fall and infection precautions.   Outcome: No Change     D/I/A: Pt in SR with frequent PVCs most of shift. Occasionally flipping into aflutter with rates up to 180. Other VSS, on RA. Alert and oriented, [place]. Pt complains of headache related to aflutter, prn tylenol and tramadol given with some relief. Patient experiencing hypoglycemia x2, corrected with D50 injections. R PIV infiltrated with D50, extravasation kit ordered, initial pictures taken. In chair all of shift, refusing to be in bed overnight. Up with Ax1 to bedside commode, using urinal with adequate UO. Encouraging PO intake, although patient refusing d/t sores in mouth (Herpes virus swab collected) and upset stomach.   P: Need stool sample collection for cdif rule out. Encourage PO intake, assess for S/S of hypoglycemia. Continue to monitor and notify Cards 1 with pertinent changes.

## 2017-03-10 NOTE — PROGRESS NOTES
"  [facility] [place]  CARDIOLOGY PROGRESS NOTE  Bud Merritt [id number]  03/10/2017   ___________________________________  ASSESSMENT & PLAN:  Bud Merritt is a 89 year old with history of paroxysmal Afib, HTN, COPD, HLD, RA on prednisone, hx of malignant prostate neoplasm who is transferred from [facility] for ongoing management of paroxysmal Afib and influenza A.       # Diffuse Abdominal Pain, resolved  Patient is adamant he \"never had abdominal pain and you are making this all up\". Patient developed acute onset pain evening of 3/8/17 witness by myself, diffuse TTP and hollering out for doctor. Now denies this ever happened. GI was consulted who felt pain is possibly related to gastritis.    - Continue PPI   - Monitor abdominal pain with daily exams, more as needed     # Headache  Usually correlating with elevated heart rates although this morning present without tachycardia. Could be caffeine headache in setting of frequent NPO and missed coffee - did improve with caffeine administration yesterday. CT Head Non-Contrast unremarkable.    - Caffeine PRN     # Goals of Care  Dr. Lopez discussed with him and his son on 3/10/17 about clinical course including lung, cardiac and GI issues. For now, he wishes to continue to have testing done and get treatment for all of these issues. However, he has agreed to be DNR/DNI.     # Cyanosis of Fingers, Toes  # Elevated Lactate, resolved  Vital signs and labs have actually remained wnl aside from some hypotension documented with tachycardia (although suspect these were falsely low). Extremities are cool overall. DDx: developing cardiogenic shock, blue toe syndrome (emboli), septic emboli, less likely septic shock, aneurysm. CT Chest Angio, BLE/BUE Dopplers U/S unremarkable. Lactate resolved.      # Acute Hypoxemic Respiratory Failure, resolved  # Moderate COPD, with exacerbation secondary influenza A  No longer hypoxic, but briefly required BiPAP at [facility]. Will monitor for bacterial superinfection. Completed Tamiflu 5-day course and prednisone burst.  - Restarted home prednisone 5 mg daily  - Duonebs QID + albuterol nebs PRN  - Ceftriaxone and doxycycline (5-7 day total)  - Flutter valve  - Droplet precautions  - RT Chronic Disease consult      # Paroxysmal atrial fibrillation with RVR, resolved  # Sinus node dysfunction, possible tachy-deena vs SSS  Now in sinus rhythm with intermittent bursts of symptomatic tachycardia. No episodes of bradycardia.  - Continue home metoprolol - increased to 25 mg BID; consider increasing further as needed  - Continue dofetilide 125 mcg BID  - Continue apixaban  - EP consult, appreciate recommendations --> likely PPM placement today with AV node ablation in future  - IV metoprolol PRN for SVT      # Acute cystitis without hematuria  Urine culture has grown out Citrobacter. Will treat for 7 day course.  - Currently on broad spectrum antibiotics - vancomycin/zosyn  - Phenazopyridine, Tylenol PRN       # Hx of falls, weakness  - PT/OT consult  - Fall precautions      # Chronic systolic heart failure, nonischemic, EF 40-45%  Had recovery of EF form 25-30%. Mildly volume up.  - Continue metoprolol, lisinopril, spironolactone  - Hold further IV diuresis      Chronic medical problems:  # Mitral Regurgitation, Severe Aortic Stenosis  # HTN - Continue metoprolol, lisinopril, spironolactone  # RA - Continue home prednisone (5 mg daily), continue leflunomide, tramadol PRN   # Prostate cancer on lupron - Continue tamsulosin  # Hyperlipidemia - Continue home atorvastatin      FEN: Regular diet  Proph: Apixaban - hold this AM with possible pacemaker placement  Dispo: Inpatient  Code Status: DNR/DNI     Patient seen and discussed with staff physician, Dr. Farnsworth, who is agreeable with above plan.    Blossom Vines MD  PGY-3, Internal Medicine  Pager: [phone]    24 hour events:  No acute events overnight. Nursing notes reviewed.  "Headache improved. No abdominal pain. Shortness of breath stable. No chest pain. Stating if we don't start putting his PPM in he will start refusing all tests and medications.     OBJECTIVE:  Vitals:  VS: /76  Pulse 73  Temp (P) 96.5  F (35.8  C) (Axillary)  Resp (P) 16  Ht 1.676 m (5' 6\")  Wt 71.3 kg (157 lb 3.2 oz)  SpO2 (P) 97%  BMI 25.37 kg/m2   GEN: A&Ox3, NAD, comfortable  CV:  IRR, 2/6 systolic murmur  PULM:  CTA B/L, end-expiratory wheezing  ABD: Normoactive bowel sounds, soft, ND, NT  SKIN: No rashes noted  EXT: WWP, 2+ BLE edema  NEURO: AAOx3, no focal deficits      DIAGNOSTIC STUDIES  CMP  Recent Labs  Lab 03/10/17  0731 03/09/17  2134 03/09/17  0804 03/08/17  1907 03/08/17  0724 03/07/17  1508 03/07/17  0710  03/06/17  0848 03/05/17  0821     --  139 139 140 137 138  --  140 139   POTASSIUM 3.6 3.9 3.6 4.2 4.0 4.2 4.2  < > 4.4 4.3   CHLORIDE 105  --  107 104 104 102 103  --  107 109   CO2 23  --  21 23 25 26 24  --  24 22   ANIONGAP 11  --  12 12 10 8 10  --  10 8   *  --  58* 74 74 75 90  --  87 91   BUN 21  --  24 28 32* 39* 37*  --  33* 28   CR 0.94  --  0.94 1.06 1.06 1.16 1.10  --  0.88 0.96   GFRESTIMATED 75  --  76 66 66 59* 63  --  81 74   GFRESTBLACK >90African American GFR Calc  --  >90African American GFR Calc 79 79 72 76  --  >90African American GFR Calc 89   SHELIA 8.5  --  8.1* 8.2* 8.0* 8.4* 8.4*  --  8.1* 7.5*   MAG  --  2.0  --   --  2.1 2.2 2.4*  < >  --  2.4*   PROTTOTAL  --   --   --  5.4*  --   --   --   --  5.5* 5.3*   ALBUMIN  --   --   --  2.5*  --   --   --   --  2.3* 2.2*   BILITOTAL  --   --   --  0.7  --   --   --   --  0.5 0.4   ALKPHOS  --   --   --  52  --   --   --   --  56 56   AST  --   --   --  45  --   --   --   --  78* 102*   ALT  --   --   --  32  --   --   --   --  38 36   < > = values in this interval not displayed.  CBC  Recent Labs  Lab 03/10/17  0731 03/09/17  0804 03/08/17  0724 03/07/17  0710   WBC 5.1 5.8 5.1 6.2   RBC 3.35* 3.41* 3.20* 3.36* "   HGB 10.6* 10.7* 10.1* 10.7*   HCT 32.9* 33.8* 31.8* 33.3*   MCV 98 99 99 99   MCH 31.6 31.4 31.6 31.8   MCHC 32.2 31.7 31.8 32.1   RDW 14.5 14.4 14.0 14.3    248 203 226     INRNo lab results found in last 7 days.    I interviewed and examined the patient with the house staff.  I agree with the assessment and plan as documented.      Rao Farnsworth MD, PhD  Professor of Medicine  Division of Cardiology

## 2017-03-10 NOTE — OP NOTE
EP PROCEDURE NOTE    Procedures:  1. CRT-P device implantation.  2. Coronary sinus venogram.  3. Fluoroscopy.  4. Left subclavian venogram.    Cardiologist: Luisa TEMPLETON Fellow: Nilesh    Procedure performed in:  Cardiac Catheterization Laboratory Room 1    Pre-operative Diagnosis:    1.  Atrial fibrillation with rapid ventricular response, refractory to medical therapy  2.  Supraventricular tachycardia  3.  CHF due to presumed NICM, EF=40-45%, NYHA class 3  4.  Planned AV junction ablation    Device Indication: planned AV junction ablation with LVEF <50% (BLOCK-HF protocol)  Post-operative diagnosis:   1.  Atrial fibrillation with rapid ventricular response, refractory to medical therapy  2.  Supraventricular tachycardia  3.  CHF due to presumed NICM, EF=40-45%, NYHA class 3  4.  Planned AV junction ablation  5.  Successful implantation of CRT-P device, planned AV junction ablation in the future      Complications: None apparent.     Fluoroscopy time/dose: 42.4 minutes, 4134 cGycm2.  Estimated Blood Loss: 10 cc  IV contrast:  20 cc    Clinical Profile:  Bud Merritt is an 89 year old male with PAF with RVR, prostate cancer, recurrent SVT, and cardiomyopathy LVEF 40-45%.  Management of his AF and SVT has been difficult, with symptomatic hypotension and limited tolerability of beta-blockers and diltiazem.  He has had prior complications with amiodarone.  He has been seen by Dr. Nowak and Dr. Li.  I was asked to place a CRT-P in anticipation of an AV junction ablation, possibly next week.  He is thus receiving a CRT device under the criteria of the BLOCK-HF trial.    PROCEDURE  The risks and benefits of the procedure were explained to the patient in full.  The risks of the procedure include, but are not limited to: pain, bleeding, blood transfusion reactions, infection, pneumothorax, perforation of vessels or heart, pericardial effusion, and death. Informed Consent was obtained. The patient was brought to  the EP lab in a fasting and hemodynamically stable condition. The patient was prepped and draped in a sterile fashion.   Sedation: This procedure was performed under moderate sedation under the supervision of the the Staff Physician. The patient received 0.5 mg Versed and 100 mcg Fentanyl for a total procedural sedation time of 200 minutes. Heart rate, blood pressure, oxygen saturation, and patient responses were monitored throughout the procedure with the assistance of the RN.     The chest wall was vigorously washed, prepped, and sterilely draped. The chest wall was anesthetized with 2% lidocaine. A subclavian venogram was performed.  The vein was widely patent with clear access over the first rib.    A 5 cm incision was made approximately 2 fingerbreadths from the clavicle. The subcutaneous tissue was carefully dissected down to the pectoral fascia. The dissection was carried inferiorly to form a subcutaneous pocket with adequate hemostasis provided by electrocautery. The subclavian vein was accessed via the pocket and over the first rib under fluoroscopic guidance, and three separate guidewires were inserted down into the level of the IVC.    A peel away sheath was inserted over one guidewire. The guidewire and dilator were removed. A right ventricular lead was inserted through the sheath down to the RV apical septum and was screwed into the myocardium. There was very good sensing and pacing threshold at this position. Ten volt Pacing was performed without diaphragmatic stimulation. The sheath was then peeled away, and the sleeve adapter was advanced over the lead. The lead was then secured to the muscle using 0-Ethibond suture.     A peel away sheath was inserted over a second guidewire. The guidewire and dilator were removed. A right atrial lead was inserted through the sheath and positioned the lead at the RA appendage. The lead was screwed into the myocardium. There was very good sensing and pacing threshold  at this position. Ten volt pacing did not produce diaphragmatic stimulation. The sheath was then peeled away. The right atrial lead was sutured down into the underlying muscle using 0-ethibond.      We next moved to the coronary sinus lead. A peel away sheath was inserted over the remaining guidewire.  A multipurpose CS catheter with a Organic Motionter Supra-CS catheter was inserted through the peel away sheath.  The coronary sinus was cannulated and the CS sheath was advanced into the coronary sinus. A balloon catheter was advanced into the CS past the CS sheath, and we performed a CS venogram in [place] and WILSON.  There was a lateral vein that quickly branched into a fairly straight but rapidly tapering branch, and a slightly larger but extremely tortuous branch.  There was a small anterolateral branch and the main vessel continued anteriorly.  Using a 90 small subselect catheter, a Whisper wire (after also trying a BMW wire) over-the-wire technique, we advanced the CS lead initially into the smaller sub-branch of the davey-lateral branch. There was intermittent chest wall stimulation with 5 V pacing and a threshold of 2.5 to 2.8 V in all configurations tested.  We were unable to enter the more tortuous branch.  We then advanced the lead into the small anterolateral branch. This showed very good sensing and pacing threshold at this position. There was no diaphragmatic stimulation threshold at 10 V. The coronary sinus sheath and the outer sheath were then peeled away and removed under fluoroscopy without disturbing the newly implanted coronary sinus lead. The coronary sinus lead was securely sutured to the pectoral muscle using 0 Ethibond. Adequate slack was placed in all leads so that they would not be compromised by maximum inspiration.     The pocket was then vigorously washed with antibiotic solution. The right atrial, right ventricular, and coronary sinus leads were inserted into the pulse generator. The pulse  generator and the lead were inserted into the subcutaneous pocket and secured with a 0-Ethibond suture.    The pocket was then closed in 3 layers using 2-0 Vicryl for the deep layers and 4-0 Vicryl for the subcuticular layer. Steri-Strips and a dressing were then applied over the incision site, and the patient was transported to a monitored bed.    Dr. Moran was present throughout the entire procedure.    EQUIPMENT  1.The Pulse Generator is a  Quorum model C6TR01, Serial [id number].   2.The RA Lead is a  Medtronic model 5076-45, Serial [id number].   3.The RV Lead is a Medtronic model 5076-52, Serial [id number].  4.The CS Lead is a Medtronic model 4396-88, Serial [id number].    MEASUREMENTS  The RA lead is sensing P waves of 3.5 mV and a pacing threshold of 0.5 V @ 0.4 msec with an impedance of 437 ohms.  The RV lead is sensing R waves of 14.5 mV and a pacing threshold of 0.5 V @ 0.4 msec with an impedance of 475 ohms.  The LV lead has a pacing threshold of 1.25 V @ 0.4 msec with an impedance of 874 ohms.     FINAL PROGRAMMING  Kevin Settings:  -Pacing mode: DDD.  -Lower Rate Limit: 60 ppm.  -Upper Rate Limit: 130 ppm.  -LV Lead pacing configuration:  Bipolar (LV tip to LV ring)    PLAN  1. Wound care.  2. Antibiotics.    3. CXR and Device interrogation in a.m.

## 2017-03-10 NOTE — PROGRESS NOTES
[facility], [place]   Palliative Care Daily Progress Note          Recommendations, Patient/Family Counseling & Coordination     Headache: Reports headache has been better, but patient feels like it is returning.   -Consider adding Caffeine 200 mg q4h prn headache, as this has miya helpful in the past.    Goals of Care: Limited discussion, as patient was not feeling up to discussing in depth.   He said his ablation was cancelled today, so he would like to go home and return in a few weeks. He remains agreeable to ablation. He is worried because it seems to him that the longer he stays in the hospital the worse he gets. During our limited conversation, it seems his treatment goals are life extending. He did however acknowledge his advanced age and said he knows he will likely not survive much longer, but did not wish to expand on this.    -Ongoing goals of care conversations are appropriate       Thank you for the opportunity to continue to participate in the care of this patient and family.  Please feel free to contact on-call palliative provider with any emergent needs.  We can be reached via team pager [phone] (answered 8-4:30 Monday-Friday); after-hours answering service ([phone]).    POLLO Underwood CNP  Palliative Care Consult Team  Pager: [phone]    25 minutes spent with patient, with >50% counseling and in care coordination.      Assessment      1) Diagnoses & symptoms:        Metastatic prostate cancer ongoing treatment   Abdominal pain x 3 days, now resolved  Cystitis antibiotics x 7 days  Paroxysmal atrial fib now in sinus rhythm  Cyanotic fingers/toes likely due to hypotension with symptomatic tachycardia  COPD moderate, with exacerbation 2/2 influenza A   RA on prednisone  Chronic systolic heart failure nonischemic, EF 40-45%  Deconditioning with a history of falls  Chronic medical problems MR, severe AS, HTN, HLD, sinus node dysfunction      2)   Psychosocial/Spiritual Needs:   Ongoing: Will discuss case during palliative interdisciplinary team rounds and involve LICSW and  as needed.              Interval History:   Ablation cancelled for today. Nausea resolved with Zofran. No acute events overnight.               Review of Systems:   Palliative Symptom Review (0=no symptom/no concern, 1=mild, 2=moderate, 3=severe):      Pain: 1      Fatigue: 3      Nausea: 1      Constipation: 0      Diarrhea: 1      Depressive Symptoms: 1      Anxiety: 1      Drowsiness: 0      Poor Appetite: 0      Shortness of Breath: 1      Insomnia: 0      Other: 0      Overall (0 good/no concerns, 3 very poor):  2          Medications:   I have reviewed this patient's medication profile and medications given in past 24 hours.    Pain/Headache:  Acetaminophen 650 q6h prn--0  Tramadol 50 mg q6h prn--x1    Constipation:  Senna-docusate 1-2 tabs BID--not taking    Nausea:  Ondansetron 4 mg q4h prn--x2    Indigestion:  Tums prn--not taking     Insomnia:  Melatonin 3 mg q HS prn--not taking           Physical Exam:   Vitals were reviewed  Temp: 96.5  F (35.8  C) Temp src: Oral BP: 129/78   Heart Rate: 70 Resp: 18 SpO2: 97 % O2 Device: None (Room air)    Constitutional: Elderly male, seen sitting in chair. NAD.  Head: Normocephalic. Atraumatic. Face symmetrical.   Pulm: Non-labored breathing, on room air.   Skin: BUE ecchymosis, L>R. Large blister on the top of right foot.   Ext: Fingers and toes cool to touch. 3+ BLE edema. 1+ BUE pulses.   Neuropsych: Oriented to person, place and situation. Speech clear. Memory and insight intact. Flat affect.            Data Reviewed:   ROUTINE ICU LABS (Last four results)  CMP  Recent Labs  Lab 03/10/17  0731 03/09/17  2134 03/09/17  0804 03/08/17  1907 03/08/17  0724 03/07/17  1508 03/07/17  0710  03/06/17  0848 03/05/17  0821     --  139 139 140 137 138  --  140 139   POTASSIUM 3.6 3.9 3.6 4.2 4.0 4.2 4.2  < > 4.4 4.3   CHLORIDE 105  --   107 104 104 102 103  --  107 109   CO2 23  --  21 23 25 26 24  --  24 22   ANIONGAP 11  --  12 12 10 8 10  --  10 8   *  --  58* 74 74 75 90  --  87 91   BUN 21  --  24 28 32* 39* 37*  --  33* 28   CR 0.94  --  0.94 1.06 1.06 1.16 1.10  --  0.88 0.96   GFRESTIMATED 75  --  76 66 66 59* 63  --  81 74   GFRESTBLACK >90African American GFR Calc  --  >90African American GFR Calc 79 79 72 76  --  >90African American GFR Calc 89   SHELIA 8.5  --  8.1* 8.2* 8.0* 8.4* 8.4*  --  8.1* 7.5*   MAG  --  2.0  --   --  2.1 2.2 2.4*  < >  --  2.4*   PROTTOTAL  --   --   --  5.4*  --   --   --   --  5.5* 5.3*   ALBUMIN  --   --   --  2.5*  --   --   --   --  2.3* 2.2*   BILITOTAL  --   --   --  0.7  --   --   --   --  0.5 0.4   ALKPHOS  --   --   --  52  --   --   --   --  56 56   AST  --   --   --  45  --   --   --   --  78* 102*   ALT  --   --   --  32  --   --   --   --  38 36   < > = values in this interval not displayed.  CBC  Recent Labs  Lab 03/10/17  0731 03/09/17  0804 03/08/17  0724 03/07/17  0710   WBC 5.1 5.8 5.1 6.2   RBC 3.35* 3.41* 3.20* 3.36*   HGB 10.6* 10.7* 10.1* 10.7*   HCT 32.9* 33.8* 31.8* 33.3*   MCV 98 99 99 99   MCH 31.6 31.4 31.6 31.8   MCHC 32.2 31.7 31.8 32.1   RDW 14.5 14.4 14.0 14.3    248 203 226

## 2017-03-10 NOTE — PLAN OF CARE
Problem: Goal Outcome Summary  Goal: Goal Outcome Summary  1) Monitor and tx to facilitate hemodynamic stability.  2) Monitor and tx pain to facilitate adequate pain control.  3) Educate and/or reinforce heart healthy habits.  4) Maintain fall and infection precautions.      OT-6c: Significant encouragement needed for participation. Brief standing exercise performed x 3 min. Pt reporting headache, then refusing further therapy. Rec d/c to TCU.

## 2017-03-10 NOTE — PROVIDER NOTIFICATION
Patient sustaining aflutter, now with rates up to 180bpm, pt symptomatic with headache and overall feeling unwell. Crosscover notified, 2.5mg IV push metoprolol ordered and given. Morning dose of po metoprolol given early as well. Will continue to monitor and update team as needed.

## 2017-03-10 NOTE — PROVIDER NOTIFICATION
Patient has sustained Aflutter since ~1800 despite getting 2.5mg metoprolol push at 1845. Rates 110-160s. Patient symptomatic with lightheadedness and headache. Crosscover notified, 250ml NS bolus ordered. Magnesium and potassium drawn. Will continue to assess and watch for lab results and replace if necessary.     Writer checked pts blood sugar d/t pt seeming more lethargic and not eating all day. FSG 48 in one finger and 30 in another with recheck (values not showing up in Epic). Crosscover notified. Patient refusing to drink apple juice d/t sores in mouth. D50 25ml injection ordered and administered via PIV. Recheck 15min post administration 148. Will continue to assess and try to encourage PO intake. Pt very stubborn about not eating because it makes him have to go to the bathroom and makes his stomach very upset. Will pass on information to day nurse tomorrow, consider another nutrition consult.

## 2017-03-11 ENCOUNTER — APPOINTMENT (OUTPATIENT)
Dept: GENERAL RADIOLOGY | Facility: CLINIC | Age: 82
DRG: 227 | End: 2017-03-11
Attending: INTERNAL MEDICINE
Payer: MEDICARE

## 2017-03-11 LAB
ANION GAP SERPL CALCULATED.3IONS-SCNC: 9 MMOL/L (ref 3–14)
BUN SERPL-MCNC: 17 MG/DL (ref 7–30)
CALCIUM SERPL-MCNC: 8.1 MG/DL (ref 8.5–10.1)
CHLORIDE SERPL-SCNC: 108 MMOL/L (ref 94–109)
CO2 SERPL-SCNC: 22 MMOL/L (ref 20–32)
CREAT SERPL-MCNC: 0.77 MG/DL (ref 0.66–1.25)
ERYTHROCYTE [DISTWIDTH] IN BLOOD BY AUTOMATED COUNT: 14.7 % (ref 10–15)
GFR SERPL CREATININE-BSD FRML MDRD: ABNORMAL ML/MIN/1.7M2
GLUCOSE BLDC GLUCOMTR-MCNC: 76 MG/DL (ref 70–99)
GLUCOSE BLDC GLUCOMTR-MCNC: 78 MG/DL (ref 70–99)
GLUCOSE BLDC GLUCOMTR-MCNC: 80 MG/DL (ref 70–99)
GLUCOSE BLDC GLUCOMTR-MCNC: 85 MG/DL (ref 70–99)
GLUCOSE BLDC GLUCOMTR-MCNC: 94 MG/DL (ref 70–99)
GLUCOSE BLDC GLUCOMTR-MCNC: 95 MG/DL (ref 70–99)
GLUCOSE SERPL-MCNC: 88 MG/DL (ref 70–99)
HCT VFR BLD AUTO: 30.2 % (ref 40–53)
HGB BLD-MCNC: 9.9 G/DL (ref 13.3–17.7)
LACTATE BLD-SCNC: 1.5 MMOL/L (ref 0.7–2.1)
MAGNESIUM SERPL-MCNC: 1.8 MG/DL (ref 1.6–2.3)
MCH RBC QN AUTO: 31.7 PG (ref 26.5–33)
MCHC RBC AUTO-ENTMCNC: 32.8 G/DL (ref 31.5–36.5)
MCV RBC AUTO: 97 FL (ref 78–100)
PLATELET # BLD AUTO: 219 10E9/L (ref 150–450)
POTASSIUM SERPL-SCNC: 3.2 MMOL/L (ref 3.4–5.3)
POTASSIUM SERPL-SCNC: 3.9 MMOL/L (ref 3.4–5.3)
RBC # BLD AUTO: 3.12 10E12/L (ref 4.4–5.9)
SODIUM SERPL-SCNC: 140 MMOL/L (ref 133–144)
SPECIMEN SOURCE: ABNORMAL
STATUS - QUEST: ABNORMAL
VZV SPEC QL CULT: ABNORMAL
WBC # BLD AUTO: 5.6 10E9/L (ref 4–11)

## 2017-03-11 PROCEDURE — A9270 NON-COVERED ITEM OR SERVICE: HCPCS | Mod: GY | Performed by: STUDENT IN AN ORGANIZED HEALTH CARE EDUCATION/TRAINING PROGRAM

## 2017-03-11 PROCEDURE — A9270 NON-COVERED ITEM OR SERVICE: HCPCS | Mod: GY | Performed by: INTERNAL MEDICINE

## 2017-03-11 PROCEDURE — 99233 SBSQ HOSP IP/OBS HIGH 50: CPT | Mod: 24 | Performed by: INTERNAL MEDICINE

## 2017-03-11 PROCEDURE — 36415 COLL VENOUS BLD VENIPUNCTURE: CPT | Performed by: INTERNAL MEDICINE

## 2017-03-11 PROCEDURE — 36415 COLL VENOUS BLD VENIPUNCTURE: CPT | Performed by: STUDENT IN AN ORGANIZED HEALTH CARE EDUCATION/TRAINING PROGRAM

## 2017-03-11 PROCEDURE — 93005 ELECTROCARDIOGRAM TRACING: CPT

## 2017-03-11 PROCEDURE — 25000132 ZZH RX MED GY IP 250 OP 250 PS 637: Mod: GY | Performed by: STUDENT IN AN ORGANIZED HEALTH CARE EDUCATION/TRAINING PROGRAM

## 2017-03-11 PROCEDURE — 83605 ASSAY OF LACTIC ACID: CPT | Performed by: INTERNAL MEDICINE

## 2017-03-11 PROCEDURE — 00000146 ZZHCL STATISTIC GLUCOSE BY METER IP

## 2017-03-11 PROCEDURE — 25000132 ZZH RX MED GY IP 250 OP 250 PS 637: Mod: GY | Performed by: INTERNAL MEDICINE

## 2017-03-11 PROCEDURE — 83735 ASSAY OF MAGNESIUM: CPT | Performed by: STUDENT IN AN ORGANIZED HEALTH CARE EDUCATION/TRAINING PROGRAM

## 2017-03-11 PROCEDURE — 71020 XR CHEST 2 VW: CPT

## 2017-03-11 PROCEDURE — 25000128 H RX IP 250 OP 636: Performed by: INTERNAL MEDICINE

## 2017-03-11 PROCEDURE — 84132 ASSAY OF SERUM POTASSIUM: CPT | Performed by: STUDENT IN AN ORGANIZED HEALTH CARE EDUCATION/TRAINING PROGRAM

## 2017-03-11 PROCEDURE — 21400003 ZZH R&B CCU CRITICAL UMMC

## 2017-03-11 PROCEDURE — 93010 ELECTROCARDIOGRAM REPORT: CPT | Performed by: INTERNAL MEDICINE

## 2017-03-11 PROCEDURE — 25000125 ZZHC RX 250: Performed by: STUDENT IN AN ORGANIZED HEALTH CARE EDUCATION/TRAINING PROGRAM

## 2017-03-11 PROCEDURE — 80048 BASIC METABOLIC PNL TOTAL CA: CPT | Performed by: STUDENT IN AN ORGANIZED HEALTH CARE EDUCATION/TRAINING PROGRAM

## 2017-03-11 PROCEDURE — 99232 SBSQ HOSP IP/OBS MODERATE 35: CPT | Mod: 24 | Performed by: INTERNAL MEDICINE

## 2017-03-11 PROCEDURE — 85027 COMPLETE CBC AUTOMATED: CPT | Performed by: STUDENT IN AN ORGANIZED HEALTH CARE EDUCATION/TRAINING PROGRAM

## 2017-03-11 RX ORDER — NYSTATIN 100000/ML
500000 SUSPENSION, ORAL (FINAL DOSE FORM) ORAL 4 TIMES DAILY
Status: DISCONTINUED | OUTPATIENT
Start: 2017-03-11 | End: 2017-03-14 | Stop reason: HOSPADM

## 2017-03-11 RX ORDER — OXYCODONE HYDROCHLORIDE 5 MG/1
5 TABLET ORAL ONCE
Status: COMPLETED | OUTPATIENT
Start: 2017-03-11 | End: 2017-03-11

## 2017-03-11 RX ORDER — CAFFEINE 200 MG
200 TABLET ORAL ONCE
Status: COMPLETED | OUTPATIENT
Start: 2017-03-11 | End: 2017-03-11

## 2017-03-11 RX ORDER — FUROSEMIDE 20 MG
20 TABLET ORAL DAILY
Status: DISCONTINUED | OUTPATIENT
Start: 2017-03-11 | End: 2017-03-14 | Stop reason: HOSPADM

## 2017-03-11 RX ORDER — POTASSIUM CHLORIDE 1500 MG/1
60 TABLET, EXTENDED RELEASE ORAL ONCE
Status: COMPLETED | OUTPATIENT
Start: 2017-03-11 | End: 2017-03-11

## 2017-03-11 RX ORDER — POTASSIUM CHLORIDE 20MEQ/15ML
60 LIQUID (ML) ORAL ONCE
Status: DISCONTINUED | OUTPATIENT
Start: 2017-03-11 | End: 2017-03-11

## 2017-03-11 RX ADMIN — CEFTRIAXONE 2 G: 2 INJECTION, POWDER, FOR SOLUTION INTRAMUSCULAR; INTRAVENOUS at 16:09

## 2017-03-11 RX ADMIN — PREDNISONE 5 MG: 5 TABLET ORAL at 08:43

## 2017-03-11 RX ADMIN — DOXYCYCLINE HYCLATE 100 MG: 100 CAPSULE ORAL at 08:43

## 2017-03-11 RX ADMIN — TRAMADOL HYDROCHLORIDE 50 MG: 50 TABLET, COATED ORAL at 08:56

## 2017-03-11 RX ADMIN — ATORVASTATIN CALCIUM 40 MG: 40 TABLET, FILM COATED ORAL at 08:43

## 2017-03-11 RX ADMIN — LEFLUNOMIDE 10 MG: 10 TABLET, FILM COATED ORAL at 08:43

## 2017-03-11 RX ADMIN — NYSTATIN 500000 UNITS: 100000 SUSPENSION ORAL at 16:13

## 2017-03-11 RX ADMIN — CEFAZOLIN 1 G: 330 INJECTION, POWDER, FOR SOLUTION INTRAMUSCULAR; INTRAVENOUS at 04:58

## 2017-03-11 RX ADMIN — OMEPRAZOLE 20 MG: 20 CAPSULE, DELAYED RELEASE ORAL at 16:13

## 2017-03-11 RX ADMIN — APIXABAN 2.5 MG: 2.5 TABLET, FILM COATED ORAL at 20:36

## 2017-03-11 RX ADMIN — POTASSIUM CHLORIDE 60 MEQ: 1500 TABLET, EXTENDED RELEASE ORAL at 13:04

## 2017-03-11 RX ADMIN — DOFETILIDE 125 MCG: 0.12 CAPSULE ORAL at 20:36

## 2017-03-11 RX ADMIN — DOFETILIDE 125 MCG: 0.12 CAPSULE ORAL at 08:43

## 2017-03-11 RX ADMIN — CHOLECALCIFEROL TAB 10 MCG (400 UNIT) 400 UNITS: 10 TAB at 08:43

## 2017-03-11 RX ADMIN — METOPROLOL TARTRATE 25 MG: 25 TABLET ORAL at 20:36

## 2017-03-11 RX ADMIN — DOXYCYCLINE HYCLATE 100 MG: 100 CAPSULE ORAL at 20:36

## 2017-03-11 RX ADMIN — SPIRONOLACTONE 25 MG: 25 TABLET ORAL at 08:43

## 2017-03-11 RX ADMIN — OMEPRAZOLE 20 MG: 20 CAPSULE, DELAYED RELEASE ORAL at 08:43

## 2017-03-11 RX ADMIN — TAMSULOSIN HYDROCHLORIDE 0.4 MG: 0.4 CAPSULE ORAL at 08:43

## 2017-03-11 RX ADMIN — ACETAMINOPHEN 650 MG: 325 TABLET, FILM COATED ORAL at 10:06

## 2017-03-11 RX ADMIN — OXYCODONE HYDROCHLORIDE 5 MG: 5 TABLET ORAL at 13:04

## 2017-03-11 RX ADMIN — LISINOPRIL 10 MG: 10 TABLET ORAL at 08:43

## 2017-03-11 RX ADMIN — CAFFEINE 200 MG: 200 TABLET ORAL at 13:14

## 2017-03-11 RX ADMIN — NYSTATIN 500000 UNITS: 100000 SUSPENSION ORAL at 20:37

## 2017-03-11 RX ADMIN — LISINOPRIL 10 MG: 10 TABLET ORAL at 20:36

## 2017-03-11 RX ADMIN — FUROSEMIDE 20 MG: 20 TABLET ORAL at 13:05

## 2017-03-11 RX ADMIN — CALCIUM 1250 MG: 500 TABLET ORAL at 08:43

## 2017-03-11 RX ADMIN — TAMSULOSIN HYDROCHLORIDE 0.4 MG: 0.4 CAPSULE ORAL at 20:36

## 2017-03-11 RX ADMIN — METOPROLOL TARTRATE 25 MG: 25 TABLET ORAL at 08:43

## 2017-03-11 RX ADMIN — TRAMADOL HYDROCHLORIDE 50 MG: 50 TABLET, COATED ORAL at 20:35

## 2017-03-11 RX ADMIN — APIXABAN 2.5 MG: 2.5 TABLET, FILM COATED ORAL at 08:43

## 2017-03-11 ASSESSMENT — PAIN DESCRIPTION - DESCRIPTORS
DESCRIPTORS: HEADACHE
DESCRIPTORS: HEADACHE
DESCRIPTORS: CONSTANT
DESCRIPTORS: ACHING
DESCRIPTORS: CONSTANT

## 2017-03-11 NOTE — PROGRESS NOTES
"    Electrophysiology Consult Service  Follow up Note   EP Attending:    Date of Service: 3/11/2017     S: Feels dizzy and tired with AF with RVR rate 150-160 bpm this morning. Spontaneously converted to NSR but still feel exhausted.    O:   Vitals: /72  Pulse 73  Temp 97.9  F (36.6  C) (Oral)  Resp 20  Ht 1.676 m (5' 6\")  Wt 69.8 kg (153 lb 12.8 oz)  SpO2 92%  BMI 24.82 kg/m2  Gen:  AxO, NAD   Lungs:  CTAB   CV:  S1S2,Reg, no M/R/G noted. No JVD.   Abd:  Soft   Ext:  No pedal edema    Data:  Labs:  BMP  Recent Labs  Lab 17  0740 03/10/17  0731 17  2134 17  0804 17  1907    139  --  139 139   POTASSIUM 3.2* 3.6 3.9 3.6 4.2   CHLORIDE 108 105  --  107 104   SHELIA 8.1* 8.5  --  8.1* 8.2*   CO2 22 23  --  21 23   BUN 17 21  --  24 28   CR 0.77 0.94  --  0.94 1.06   GLC 88 119*  --  58* 74     CBC  Recent Labs  Lab 17  0740 03/10/17  0731 17  0804 17  0724   WBC 5.6 5.1 5.8 5.1   RBC 3.12* 3.35* 3.41* 3.20*   HGB 9.9* 10.6* 10.7* 10.1*   HCT 30.2* 32.9* 33.8* 31.8*   MCV 97 98 99 99   MCH 31.7 31.6 31.4 31.6   MCHC 32.8 32.2 31.7 31.8   RDW 14.7 14.5 14.4 14.0    261 248 203     ECG 3/11/17 --> AF with RVR rate 158 bpm, RBBB QRS 150ms, QTc 457ms.    Echo:  Recent Results (from the past 4320 hour(s))   ECHO COMPLETE WITH OPTISON    Narrative    Interpretation Summary                    [facility]  Echocardiography Laboratory  [address] [place] [address]        Name: GALLO FLOYD  MRN: [id number]  : 1927  Study Date: 2016 10:06 AM  Age: 89 yrs  Gender: Male  Patient Location: [facility]  Reason For Study: Cardiomyopathy, unspecified  History: HTN,AS,Atrial Fibrillation,Mitral Insufficiency  Ordering Physician: RG ELIZABETH  Referring Physician: Camron Aragon MD  Performed By: Mer Lee     BSA: 1.7 m2  Height: 65 in  Weight: 140 lb  HR: 74  BP: 132/78 " mmHg  ______________________________________________________________________________        Procedure  Complete Echo Adult. Contrast Optison.  ______________________________________________________________________________     Interpretation Summary     Moderate to severe valvular aortic stenosis.  Left ventricular systolic function is moderately reduced.  The visual ejection fraction is estimated at 40-45%.  The left ventricle is normal in size.  There is mild concentric left ventricular hypertrophy.  There is moderate biatrial enlargement.  Mild aortic root dilatation.  The ascending aorta is Mildly dilated.  The rhythm was normal sinus.     Global LV systolic performance appears better than described on the last  study 6/27/2016, perhaps because the degree of ventricular ectopy is less  than was present on the last study ( EF preivously estimated 25 to 30%, now  40 to 45%). There appears to be some progression in the degree of aortic  stenosis ( MSG had been 18 mmHg, now 23 mmHg). Using a LVOT diameter of 2.5  cm, continuity equation calculation of aortic valve area yeilds an TYREL of 1.0  to 1.1 cm2.  ______________________________________________________________________________           Left Ventricle  The left ventricle is normal in size. There is mild concentric left  ventricular hypertrophy. Left ventricular systolic function is moderately  reduced. The visual ejection fraction is estimated at 40-45%. No regional  wall motion abnormalities noted. There is no thrombus seen in the left  ventricle.     Right Ventricle  The right ventricle is normal in structure, function and size. There is no  mass or thrombus in the right ventricle.  Atria  There is moderate biatrial enlargement. There is no atrial shunt seen.     Mitral Valve  The mitral valve leaflets are mildly thickened. There is trace to mild mitral  regurgitation. There is no mitral valve stenosis.     Tricuspid Valve  Normal tricuspid valve. The right  ventricular systolic pressure is elevated  at 33.5 mmHg. Right ventricular systolic pressure is elevated, consistent  with mild pulmonary hypertension. There is no tricuspid stenosis.     Aortic Valve  There is severe trileaflet aortic sclerosis. There is mild (1+) aortic  regurgitation. Moderate to severe valvular aortic stenosis. The mean AoV  pressure gradient is 23.9 mmHg.     Pulmonic Valve  Normal pulmonic valve. There is trace pulmonic valvular regurgitation. There  is no pulmonic valvular stenosis.     Vessels  Mild aortic root dilatation. The ascending aorta is Mildly dilated. The IVC  is normal in size and reactivity with respiration, suggesting normal central  venous pressure. The pulmonary artery is normal size.  Pericardial/Pleural  The pericardium appears normal. There is no pleural effusion.     Rhythm  The rhythm was normal sinus.     ______________________________________________________________________________  MMode/2D Measurements & Calculations  IVSd: 1.2 cm  LVIDd: 4.7 cm  LVIDs: 3.7 cm  LVPWd: 1.1 cm  FS: 20.9 %  EDV(Teich): 103.7 ml  ESV(Teich): 59.5 ml  LV mass(C)d: 201.7 grams  Ao root diam: 4.0 cm  asc Aorta Diam: 3.8 cm  LVOT diam: 2.4 cm  LVOT area: 4.6 cm2  LA Volume (BP): 81.8 ml     LA Volume Index (BP): 48.1 ml/m2        Doppler Measurements & Calculations  MV E max phyllis: 73.3 cm/sec  MV A max phyllis: 132.9 cm/sec  MV E/A: 0.55  MV dec time: 0.31 sec  Ao V2 max: 325.8 cm/sec  Ao max P.7 mmHg  Ao V2 mean: 227.5 cm/sec  Ao mean P.9 mmHg  Ao V2 VTI: 66.4 cm  TYREL(I,D): 1.0 cm2  TYREL(V,D): 1.1 cm2  LV V1 max P.3 mmHg  LV V1 max: 76.2 cm/sec  LV V1 VTI: 15.0 cm  SV(LVOT): 69.4 ml  PA acc time: 0.09 sec  TR max phyllis: 289.1 cm/sec  TR max P.5 mmHg  TYREL Index (cm2/m2): 0.61  Lateral E/e': 18.1  Medial E/e': 17.8              ______________________________________________________________________________        Report approved by: JHOAN Benítez 2016 12:48 PM           Assessment and Plan:   Mr. Merritt is an 89 year old male who has a past medical history significant for PAF (CHADVASC 4 on Eliquis), SVT, HTN, COPD, HLD, RA on prednisone, hx of malignant prostate neoplasm, and cardiomyopathy LVEF 40-45%.  He has been having multiple recurrent AF with RVR episodes with limited use of medication control with betablocker and diltiazem due to symptomatic hypotension, prior complications with amiodarone. Now on metoprolol 25mg PO BID and dofetilide 125mg PO BID but still having recurrent AF with RVR and BP 82/53mmHg. S/p CRT-P implant on 3/10/17.   - Plan for AV junction ablation on Monday 3/13/17.   - Meanwhile if he has recurrent episode of symptomatic AF with RVR, due to low BP, can try digoxin 0.5mg IV then 0.25mg IV 6 hours after first dose for rate control.  - continue apixaban. Hold apixaban Sunday evening dose for procedure on Monday.    This patient was discussed with  and the note above reflects our joint assessment and plan.   Raymond Head  EP fellow     I very much appreciated the opportunity to see and assess Mr Bud Merritt in the hospital with CV EP Fellow Dr Head. We reviewed patient's history and discussed plan for AV junction ablation with him. Risks were explained and will be revisited on Monday.    I agree with the note above which  summarizes my findings and current recommendations.  Please do not hesitate to contact my office if you have any questions or concerns.      Jerome Haider MD  Cardiac Arrhythmia Service  [facility]  [phone]

## 2017-03-11 NOTE — PLAN OF CARE
"Problem: Goal Outcome Summary  Goal: Goal Outcome Summary  1) Monitor and tx to facilitate hemodynamic stability.  2) Monitor and tx pain to facilitate adequate pain control.  3) Educate and/or reinforce heart healthy habits.  4) Maintain fall and infection precautions.   PT 6C: cancel. Patient refusing to work with therapy, stating he is nauseous and \"going to lose it all\" if he moves.       "

## 2017-03-11 NOTE — PLAN OF CARE
Problem: Goal Outcome Summary  Goal: Goal Outcome Summary  1) Monitor and tx to facilitate hemodynamic stability.  2) Monitor and tx pain to facilitate adequate pain control.  3) Educate and/or reinforce heart healthy habits.  4) Maintain fall and infection precautions.   Outcome: No Change     D: Pt admitted with PAF awaiting ablation next week, s/p PPM placement 3/10/17. Hx NICM.  A/I: PPM dressing c/d/i. Sling on L arm. CMS intact (except fingers on both hands baseline cyanotic at times). Pt c/o headache treated with tylenol. Pt refused repositioning despite education on importance/risks. Legs elevated and heels suspended as pt allowed. Continues with pitting edema in bilateral legs & feet. BG monitored- Pt was hypoglycemic at start of shift but maintained BG>75 thereafter. IVABX administered for PPM site ppx. Up to commode x1 with loose stool. Used urinal independently. Incontinence brief in place. Barrier cream on reddened coccyx. Pt was tachycardic up to 160's with activity and intermittently thereafter (appeared AF/flutter on monitor with occasional pacing). Pt A/V/AV paced overnight 60's-110's with one episode (88bts) A fib around 0230. Cross cover aware of HR and BG. Pt [place]- wearing hearing aids bilaterally. Bed alarm on overnight for pt safety. Updated spouse by telephone. Pt hopeful to d/c today.   P: Plan for CXR and pacer interrogation today and return for ablation. Monitor and assess pt condition and contact treatment team with questions or concerns.

## 2017-03-11 NOTE — PROGRESS NOTES
CLINICAL NUTRITION SERVICES - Brief note:   Consult received for Nutrition Artur < 3  Pt has been seen Assessed by unit RD on 3/6 ( please refer to the note for details).     Chart reviewed:   Diet: Regular + Magic cup once daily   Intake/Tolerance: 75% of small meal intake this morning.   K+: 3.2 - L ( On lasix)     Intervention:  Ordered calorie count to quantify PO intake  Ordered Ensure plus in between meals to improve kcal /protein intake    Follow Up / Monitoring:   Will follow per protocol.      Krystal Garner RD/RUFINO  Weekend Pager [phone]

## 2017-03-11 NOTE — PROGRESS NOTES
"  [facility] [place]  CARDIOLOGY PROGRESS NOTE  Bud Merritt [id number]  03/11/2017   ___________________________________  ASSESSMENT & PLAN:  Bud Merritt is a 89 year old with history of paroxysmal Afib, HTN, COPD, HLD, RA on prednisone, hx of malignant prostate neoplasm who is transferred from [facility] for ongoing management of paroxysmal Afib and influenza A.     Changes Today:   - Start furosemide 20 mg daily   - Caffeine pill for headache   - Oxycodone for headache, joint pains   - If goes into sustained a fib RVR again would start digoxin load    # Paroxysmal atrial fibrillation with RVR, resolved  # Sinus node dysfunction, possible tachy-deena vs SSS s/p PPM 3/10/17  Now in sinus rhythm with intermittent bursts of symptomatic tachycardia. No episodes of bradycardia.  - Continue home metoprolol 25 mg BID  - Continue dofetilide 125 mcg BID  - Continue apixaban 2.5 mg BID  - EP consult, appreciate recommendations  - If back in RVR would start digoxin load      # Diffuse Abdominal Pain, resolved  Patient is adamant he \"never had abdominal pain and you are making this all up\". Patient developed acute onset pain evening of 3/8/17 witness by myself, diffuse TTP and hollering out for doctor. Now denies this ever happened. GI was consulted who felt pain is possibly related to gastritis.   - Continue PPI      # Headache  Usually correlating with elevated heart rates although this morning present without tachycardia. Could be caffeine headache in setting of frequent NPO and missed coffee - did improve with caffeine administration yesterday. CT Head Non-Contrast unremarkable.   - Caffeine PRN  - Oxycodone x 1 today      # Goals of Care  Dr. Lopez discussed with him and his son on 3/10/17 about clinical course including lung, cardiac and GI issues. For now, he wishes to continue to have testing done and get treatment for all of these issues. However, he has agreed to be DNR/DNI.      # Cyanosis of Fingers, " Toes; resolved  # Elevated Lactate, resolved  Vital signs and labs have actually remained wnl aside from some hypotension documented with tachycardia (although suspect these were falsely low). Extremities are cool overall. DDx: developing cardiogenic shock, blue toe syndrome (emboli), septic emboli, less likely septic shock, aneurysm. CT Chest Angio, BLE/BUE Dopplers U/S unremarkable. Lactate resolved.      # Acute Hypoxemic Respiratory Failure, resolved  # Moderate COPD, with exacerbation secondary influenza A  No longer hypoxic, but briefly required BiPAP at [facility]. Will monitor for bacterial superinfection. Completed Tamiflu 5-day course and prednisone burst.  - Restarted home prednisone 5 mg daily  - Duonebs QID + albuterol nebs PRN  - Ceftriaxone and doxycycline (until 3/13/17 - Monday)  - Flutter valve  - Droplet precautions  - RT Chronic Disease consult      # Acute cystitis without hematuria  Urine culture has grown out Citrobacter. Completed treatment.      # Hx of falls, weakness  - PT/OT consult  - Fall precautions      # Chronic systolic heart failure, nonischemic, EF 40-45%  Had recovery of EF form 25-30%. Mildly volume up.  - Continue metoprolol, lisinopril, spironolactone  - Start Lasix 20 mg PO daily today      Chronic medical problems:  # Mitral Regurgitation, Severe Aortic Stenosis  # HTN - Continue metoprolol, lisinopril, spironolactone  # RA - Continue home prednisone (5 mg daily), continue leflunomide, tramadol PRN   # Prostate cancer on lupron - Continue tamsulosin  # Hyperlipidemia - Continue home atorvastatin      FEN: Regular diet  Proph: Apixaban - hold this AM with possible pacemaker placement  Dispo: Inpatient  Code Status: DNR/DNI     Patient seen and discussed with staff physician, Dr. Farnsworth, who is agreeable with above plan.    Blossom Vines MD  PGY-3, Internal Medicine  Pager: [phone]    24 hour events:  No acute events overnight. Nursing notes reviewed. This  "morning went into tachycardia with rates to 160s. He feels crummy again with a headache. Doesn't feel like eating. Can't tell us what is wrong, but \"he is worse than when he came in\". No chest pain, shortness of breath or abdominal pain.     OBJECTIVE:  Vitals:  VS: BP 92/63  Pulse 73  Temp 97.6  F (36.4  C) (Oral)  Resp 22  Ht 1.676 m (5' 6\")  Wt 69.8 kg (153 lb 12.8 oz)  SpO2 94%  BMI 24.82 kg/m2   GEN: A&Ox3, appears uncomfortable  CV:  RRR, no murmur  PULM:  CTA B/L, end-expiratory wheezing  ABD: Normoactive bowel sounds, soft, ND, NT  SKIN: No rashes noted  EXT: WWP, 2+ BLE edema  NEURO: AAOx3, no focal deficits    DIAGNOSTIC STUDIES  CMP  Recent Labs  Lab 03/11/17  0740 03/10/17  0731 03/09/17  2134 03/09/17  0804 03/08/17  1907 03/08/17  0724 03/07/17  1508  03/06/17  0848 03/05/17  0821    139  --  139 139 140 137  < > 140 139   POTASSIUM 3.2* 3.6 3.9 3.6 4.2 4.0 4.2  < > 4.4 4.3   CHLORIDE 108 105  --  107 104 104 102  < > 107 109   CO2 22 23  --  21 23 25 26  < > 24 22   ANIONGAP 9 11  --  12 12 10 8  < > 10 8   GLC 88 119*  --  58* 74 74 75  < > 87 91   BUN 17 21  --  24 28 32* 39*  < > 33* 28   CR 0.77 0.94  --  0.94 1.06 1.06 1.16  < > 0.88 0.96   GFRESTIMATED >90Non  GFR Calc 75  --  76 66 66 59*  < > 81 74   GFRESTBLACK >90African American GFR Calc >90African American GFR Calc  --  >90African American GFR Calc 79 79 72  < > >90African American GFR Calc 89   SHELIA 8.1* 8.5  --  8.1* 8.2* 8.0* 8.4*  < > 8.1* 7.5*   MAG 1.8  --  2.0  --   --  2.1 2.2  < >  --  2.4*   PROTTOTAL  --   --   --   --  5.4*  --   --   --  5.5* 5.3*   ALBUMIN  --   --   --   --  2.5*  --   --   --  2.3* 2.2*   BILITOTAL  --   --   --   --  0.7  --   --   --  0.5 0.4   ALKPHOS  --   --   --   --  52  --   --   --  56 56   AST  --   --   --   --  45  --   --   --  78* 102*   ALT  --   --   --   --  32  --   --   --  38 36   < > = values in this interval not displayed.  CBC  Recent Labs  Lab 03/11/17  0740 " 03/10/17  0731 03/09/17  0804 03/08/17  0724   WBC 5.6 5.1 5.8 5.1   RBC 3.12* 3.35* 3.41* 3.20*   HGB 9.9* 10.6* 10.7* 10.1*   HCT 30.2* 32.9* 33.8* 31.8*   MCV 97 98 99 99   MCH 31.7 31.6 31.4 31.6   MCHC 32.8 32.2 31.7 31.8   RDW 14.7 14.5 14.4 14.0    261 248 203     INRNo lab results found in last 7 days.     I interviewed and examined the patient with the house staff.  I agree with the assessment and plan as documented.      Rao Farnsworth MD, PhD  Professor of Medicine  Division of Cardiology

## 2017-03-11 NOTE — PLAN OF CARE
Problem: Goal Outcome Summary  Goal: Goal Outcome Summary  1) Monitor and tx to facilitate hemodynamic stability.  2) Monitor and tx pain to facilitate adequate pain control.  3) Educate and/or reinforce heart healthy habits.  4) Maintain fall and infection precautions.   Outcome: No Change  Returned from cath lab s/p pacemaker placement around 1800, arm sling in place, drsg CDI. Glucose checked per order, glucose 40s, asymptomatic except c/o arthritis pain all over, apple juice given, D50 x 2 given, glucose now 120s. Clear liquid diet, fair appetite but does c/o mouth sores making po intake difficult, pt applied salve to lips for comfort. Pacer intermittent capturing, HR 80s, up to 160s with movement. MD notified re: glucoses, HR. Ultram given for arthritis pain. C/o headache after eating. A1-2 to commode, mixed urine/loose stool. Po cleft reddened, blanchable, intact, barrier cream applied. LS bases coarse, intermitt loose, NP cough, O2 95% on RA, encouraged DBC. Cool extremities, dusky, +2 BLE edema. Plan to check pacer in AM, CXR.

## 2017-03-11 NOTE — PLAN OF CARE
Problem: Goal Outcome Summary  Goal: Goal Outcome Summary  1) Monitor and tx to facilitate hemodynamic stability.  2) Monitor and tx pain to facilitate adequate pain control.  3) Educate and/or reinforce heart healthy habits.  4) Maintain fall and infection precautions.   Outcome: No Change  D/I/A: Patient in a-fib rates 140-160s this morning, back to paced rhythm around 1015. BPs soft during a-fib, improved once rates slow. Patient reports headache/neck pain, pain medications and caffeine given with some relief, tried heat/cold with little help. Patient had PPM interrogated this morning, follow up x-ray done. Coccyx red, but blanchable, patient refusing repositioning insisting to sit up in the chair. Poor oral intake. Patient ate breakfast, but refused lunch, offering pop/juice to maintain blood sugars. Potassium replaced per one-time order, recheck tomorrow per MD. Up with 1 assist and walker.     P: Continue to monitor and notify MDs as needed. EP planning on AV node ablation on Monday. Calorie counts to start tomorrow, 3/12.

## 2017-03-12 LAB
ANION GAP SERPL CALCULATED.3IONS-SCNC: 10 MMOL/L (ref 3–14)
BUN SERPL-MCNC: 17 MG/DL (ref 7–30)
CALCIUM SERPL-MCNC: 8.3 MG/DL (ref 8.5–10.1)
CHLORIDE SERPL-SCNC: 106 MMOL/L (ref 94–109)
CO2 SERPL-SCNC: 23 MMOL/L (ref 20–32)
CREAT SERPL-MCNC: 0.84 MG/DL (ref 0.66–1.25)
GFR SERPL CREATININE-BSD FRML MDRD: 86 ML/MIN/1.7M2
GLUCOSE BLDC GLUCOMTR-MCNC: 126 MG/DL (ref 70–99)
GLUCOSE BLDC GLUCOMTR-MCNC: 128 MG/DL (ref 70–99)
GLUCOSE BLDC GLUCOMTR-MCNC: 145 MG/DL (ref 70–99)
GLUCOSE BLDC GLUCOMTR-MCNC: 75 MG/DL (ref 70–99)
GLUCOSE SERPL-MCNC: 82 MG/DL (ref 70–99)
MAGNESIUM SERPL-MCNC: 1.9 MG/DL (ref 1.6–2.3)
POTASSIUM SERPL-SCNC: 3.6 MMOL/L (ref 3.4–5.3)
SODIUM SERPL-SCNC: 140 MMOL/L (ref 133–144)

## 2017-03-12 PROCEDURE — A9270 NON-COVERED ITEM OR SERVICE: HCPCS | Mod: GY | Performed by: STUDENT IN AN ORGANIZED HEALTH CARE EDUCATION/TRAINING PROGRAM

## 2017-03-12 PROCEDURE — 80048 BASIC METABOLIC PNL TOTAL CA: CPT | Performed by: STUDENT IN AN ORGANIZED HEALTH CARE EDUCATION/TRAINING PROGRAM

## 2017-03-12 PROCEDURE — 25000125 ZZHC RX 250: Performed by: STUDENT IN AN ORGANIZED HEALTH CARE EDUCATION/TRAINING PROGRAM

## 2017-03-12 PROCEDURE — 25000132 ZZH RX MED GY IP 250 OP 250 PS 637: Mod: GY | Performed by: INTERNAL MEDICINE

## 2017-03-12 PROCEDURE — 21400003 ZZH R&B CCU CRITICAL UMMC

## 2017-03-12 PROCEDURE — 83735 ASSAY OF MAGNESIUM: CPT | Performed by: STUDENT IN AN ORGANIZED HEALTH CARE EDUCATION/TRAINING PROGRAM

## 2017-03-12 PROCEDURE — 25000132 ZZH RX MED GY IP 250 OP 250 PS 637: Mod: GY | Performed by: STUDENT IN AN ORGANIZED HEALTH CARE EDUCATION/TRAINING PROGRAM

## 2017-03-12 PROCEDURE — A9270 NON-COVERED ITEM OR SERVICE: HCPCS | Mod: GY | Performed by: INTERNAL MEDICINE

## 2017-03-12 PROCEDURE — 25000128 H RX IP 250 OP 636: Performed by: STUDENT IN AN ORGANIZED HEALTH CARE EDUCATION/TRAINING PROGRAM

## 2017-03-12 PROCEDURE — 00000146 ZZHCL STATISTIC GLUCOSE BY METER IP

## 2017-03-12 PROCEDURE — 36415 COLL VENOUS BLD VENIPUNCTURE: CPT | Performed by: STUDENT IN AN ORGANIZED HEALTH CARE EDUCATION/TRAINING PROGRAM

## 2017-03-12 PROCEDURE — 99232 SBSQ HOSP IP/OBS MODERATE 35: CPT | Mod: 24 | Performed by: INTERNAL MEDICINE

## 2017-03-12 RX ORDER — POTASSIUM CHLORIDE 750 MG/1
20-40 TABLET, EXTENDED RELEASE ORAL
Status: DISCONTINUED | OUTPATIENT
Start: 2017-03-12 | End: 2017-03-14 | Stop reason: HOSPADM

## 2017-03-12 RX ORDER — POTASSIUM CHLORIDE 29.8 MG/ML
20 INJECTION INTRAVENOUS
Status: DISCONTINUED | OUTPATIENT
Start: 2017-03-12 | End: 2017-03-14 | Stop reason: HOSPADM

## 2017-03-12 RX ORDER — MAGNESIUM SULFATE HEPTAHYDRATE 40 MG/ML
4 INJECTION, SOLUTION INTRAVENOUS EVERY 4 HOURS PRN
Status: DISCONTINUED | OUTPATIENT
Start: 2017-03-12 | End: 2017-03-14 | Stop reason: HOSPADM

## 2017-03-12 RX ORDER — ONDANSETRON 2 MG/ML
4 INJECTION INTRAMUSCULAR; INTRAVENOUS EVERY 6 HOURS PRN
Status: DISCONTINUED | OUTPATIENT
Start: 2017-03-12 | End: 2017-03-14 | Stop reason: HOSPADM

## 2017-03-12 RX ORDER — POTASSIUM CHLORIDE 7.45 MG/ML
10 INJECTION INTRAVENOUS
Status: DISCONTINUED | OUTPATIENT
Start: 2017-03-12 | End: 2017-03-14 | Stop reason: HOSPADM

## 2017-03-12 RX ORDER — POTASSIUM CHLORIDE 1.5 G/1.58G
20-40 POWDER, FOR SOLUTION ORAL
Status: DISCONTINUED | OUTPATIENT
Start: 2017-03-12 | End: 2017-03-14 | Stop reason: HOSPADM

## 2017-03-12 RX ADMIN — NYSTATIN 500000 UNITS: 100000 SUSPENSION ORAL at 11:03

## 2017-03-12 RX ADMIN — LISINOPRIL 10 MG: 10 TABLET ORAL at 20:00

## 2017-03-12 RX ADMIN — OMEPRAZOLE 20 MG: 20 CAPSULE, DELAYED RELEASE ORAL at 16:57

## 2017-03-12 RX ADMIN — ONDANSETRON 4 MG: 2 INJECTION INTRAMUSCULAR; INTRAVENOUS at 20:00

## 2017-03-12 RX ADMIN — PREDNISONE 5 MG: 5 TABLET ORAL at 07:57

## 2017-03-12 RX ADMIN — DOFETILIDE 125 MCG: 0.12 CAPSULE ORAL at 09:29

## 2017-03-12 RX ADMIN — LISINOPRIL 10 MG: 10 TABLET ORAL at 07:57

## 2017-03-12 RX ADMIN — NYSTATIN 500000 UNITS: 100000 SUSPENSION ORAL at 16:57

## 2017-03-12 RX ADMIN — DOXYCYCLINE HYCLATE 100 MG: 100 CAPSULE ORAL at 20:00

## 2017-03-12 RX ADMIN — LEFLUNOMIDE 10 MG: 10 TABLET, FILM COATED ORAL at 07:57

## 2017-03-12 RX ADMIN — Medication 2 G: at 09:46

## 2017-03-12 RX ADMIN — METOPROLOL TARTRATE 25 MG: 25 TABLET ORAL at 07:57

## 2017-03-12 RX ADMIN — CHOLECALCIFEROL TAB 10 MCG (400 UNIT) 400 UNITS: 10 TAB at 07:58

## 2017-03-12 RX ADMIN — TAMSULOSIN HYDROCHLORIDE 0.4 MG: 0.4 CAPSULE ORAL at 07:57

## 2017-03-12 RX ADMIN — ATORVASTATIN CALCIUM 40 MG: 40 TABLET, FILM COATED ORAL at 07:57

## 2017-03-12 RX ADMIN — OMEPRAZOLE 20 MG: 20 CAPSULE, DELAYED RELEASE ORAL at 07:57

## 2017-03-12 RX ADMIN — POTASSIUM CHLORIDE 20 MEQ: 750 TABLET, EXTENDED RELEASE ORAL at 09:29

## 2017-03-12 RX ADMIN — TAMSULOSIN HYDROCHLORIDE 0.4 MG: 0.4 CAPSULE ORAL at 20:00

## 2017-03-12 RX ADMIN — TRAMADOL HYDROCHLORIDE 50 MG: 50 TABLET, COATED ORAL at 20:11

## 2017-03-12 RX ADMIN — NYSTATIN 500000 UNITS: 100000 SUSPENSION ORAL at 20:00

## 2017-03-12 RX ADMIN — MELATONIN TAB 3 MG 3 MG: 3 TAB at 20:11

## 2017-03-12 RX ADMIN — DOXYCYCLINE HYCLATE 100 MG: 100 CAPSULE ORAL at 07:57

## 2017-03-12 RX ADMIN — DOFETILIDE 125 MCG: 0.12 CAPSULE ORAL at 20:11

## 2017-03-12 RX ADMIN — SPIRONOLACTONE 25 MG: 25 TABLET ORAL at 07:58

## 2017-03-12 RX ADMIN — CALCIUM 1250 MG: 500 TABLET ORAL at 07:57

## 2017-03-12 RX ADMIN — FUROSEMIDE 20 MG: 20 TABLET ORAL at 07:57

## 2017-03-12 RX ADMIN — ONDANSETRON 4 MG: 2 INJECTION INTRAMUSCULAR; INTRAVENOUS at 08:01

## 2017-03-12 RX ADMIN — METOPROLOL TARTRATE 25 MG: 25 TABLET ORAL at 20:00

## 2017-03-12 NOTE — PROGRESS NOTES
"  [facility]  CARDIOLOGY PROGRESS NOTE  Bud Merritt [id number]  03/12/2017   ___________________________________  ASSESSMENT & PLAN:  Bud Merritt is a 89 year old with history of paroxysmal Afib, HTN, COPD, HLD, RA on prednisone, hx of malignant prostate neoplasm who is transferred from [facility] for ongoing management of paroxysmal Afib and influenza A.      Changes Today:  - Check FBG  - Lymphedema consult  - NPO @ 0000 for ablation  - Hold apixaban starting this evening for procedure tomorrow  - Will give additional IV furosemide tomorrow morning     # Paroxysmal atrial fibrillation with RVR, resolved  # Sinus node dysfunction, possible tachy-deena vs SSS s/p PPM 3/10/17  Now in sinus rhythm with intermittent bursts of symptomatic tachycardia. No episodes of bradycardia.  - Continue metoprolol 25 mg BID  - Continue dofetilide 125 mcg BID  - Continue apixaban 2.5 mg BID  - EP consult, appreciate recommendations  - If back in RVR would start digoxin load   - Ablation planned for tomorrow, 3/13      # Diffuse Abdominal Pain, resolved  Patient is adamant he \"never had abdominal pain and you are making this all up\". Patient developed acute onset pain evening of 3/8/17 witness by myself, diffuse TTP and hollering out for doctor. Now denies this ever happened. GI was consulted who felt pain is possibly related to gastritis.   - Continue PPI      # Headache, improved  Usually correlating with elevated heart rates although this morning present without tachycardia. Could be caffeine headache in setting of frequent NPO and missed coffee - did improve with caffeine administration. CT Head Non-Contrast unremarkable.       # Goals of Care  Dr. Lopez discussed with him and his son on 3/10/17 about clinical course including lung, cardiac and GI issues. For now, he wishes to continue to have testing done and get treatment for all of these issues. However, he has agreed to be DNR/DNI.      # Cyanosis of " Fingers, Toes; resolved  # Elevated Lactate, resolved  Vital signs and labs have actually remained wnl aside from some hypotension documented with tachycardia (although suspect these were falsely low). Extremities are cool overall. DDx: developing cardiogenic shock, blue toe syndrome (emboli), septic emboli, less likely septic shock, aneurysm. CT Chest Angio, BLE/BUE Dopplers U/S unremarkable. Lactate resolved.      # Acute Hypoxemic Respiratory Failure, resolved  # Moderate COPD, with exacerbation secondary influenza A  No longer hypoxic, but briefly required BiPAP at [facility]. Will monitor for bacterial superinfection. Completed Tamiflu 5-day course and prednisone burst.  - Restarted home prednisone 5 mg daily  - Duonebs QID + albuterol nebs PRN  - Ceftriaxone and doxycycline (until 3/13/17 - Monday)  - Flutter valve  - Droplet precautions  - RT Chronic Disease consult      # Acute cystitis without hematuria, resolved  Urine culture has grown out Citrobacter. Completed treatment.      # Hx of falls, weakness  PT/OT recommending TCU placement which patient plans to decline.   - PT/OT consult  - Fall precautions      # Chronic systolic heart failure, nonischemic, EF 40-45%  Had recovery of EF form 25-30%. Mildly volume up.  - Continue metoprolol, lisinopril, spironolactone  - Lasix 20 mg PO daily       Chronic medical problems:  # Mitral Regurgitation, Severe Aortic Stenosis  # HTN - Continue metoprolol, lisinopril, spironolactone  # RA - Continue home prednisone (5 mg daily), continue leflunomide, tramadol PRN   # Prostate cancer on lupron - Continue tamsulosin  # Hyperlipidemia - Continue home atorvastatin      FEN: Regular diet  Proph: Apixaban - hold this AM with possible pacemaker placement  Dispo: Inpatient  Code Status: DNR/DNI     Patient seen and discussed with staff physician, Dr. Farnsworth, who is agreeable with above plan.    Blossom Vines MD  PGY-3, Internal Medicine  Pager: [phone]  "hour events:  No acute events overnight. Nursing notes reviewed. Still having headache but improved. Ate full plate of breakfast this morning including four Icelandic toasts. Feeling better, wife at bedside. No chest pain, shortness of breath or abdominal pain.     OBJECTIVE:  Vitals:  VS: BP 91/63  Pulse 73  Temp 98.2  F (36.8  C) (Oral)  Resp 20  Ht 1.676 m (5' 6\")  Wt 69.8 kg (153 lb 12.8 oz)  SpO2 97%  BMI 24.82 kg/m2   GEN: A&Ox3, NAD, comfortable  CV:  RRR, no murmur  PULM:  CTA B/L, no wheezes or crackles  ABD: Normoactive bowel sounds, soft, ND, NT  EXT: WWP, 2+ BLE edema  NEURO: no focal deficits      DIAGNOSTIC STUDIES  CMP  Recent Labs  Lab 03/12/17  0727 03/11/17  2155 03/11/17  0740 03/10/17  0731 03/09/17  2134 03/09/17  0804 03/08/17  1907 03/08/17  0724  03/06/17  0848     --  140 139  --  139 139 140  < > 140   POTASSIUM 3.6 3.9 3.2* 3.6 3.9 3.6 4.2 4.0  < > 4.4   CHLORIDE 106  --  108 105  --  107 104 104  < > 107   CO2 23  --  22 23  --  21 23 25  < > 24   ANIONGAP 10  --  9 11  --  12 12 10  < > 10   GLC 82  --  88 119*  --  58* 74 74  < > 87   BUN 17 -- 17 21  --  24 28 32*  < > 33*   CR 0.84  --  0.77 0.94  --  0.94 1.06 1.06  < > 0.88   GFRESTIMATED 86  --  >90Non  GFR Calc 75  --  76 66 66  < > 81   GFRESTBLACK >90African American GFR Calc  --  >90African American GFR Calc >90African American GFR Calc  --  >90African American GFR Calc 79 79  < > >90African American GFR Calc   SHELIA 8.3*  --  8.1* 8.5  --  8.1* 8.2* 8.0*  < > 8.1*   MAG 1.9  --  1.8  --  2.0  --   --  2.1  < >  --    PROTTOTAL  --   --   --   --   --   --  5.4*  --   --  5.5*   ALBUMIN  --   --   --   --   --   --  2.5*  --   --  2.3*   BILITOTAL  --   --   --   --   --   --  0.7  --   --  0.5   ALKPHOS  --   --   --   --   --   --  52  --   --  56   AST  --   --   --   --   --   --  45  --   --  78*   ALT  --   --   --   --   --   --  32  --   --  38   < > = values in this interval not " displayed.  CBC  Recent Labs  Lab 03/11/17  0740 03/10/17  0731 03/09/17  0804 03/08/17  0724   WBC 5.6 5.1 5.8 5.1   RBC 3.12* 3.35* 3.41* 3.20*   HGB 9.9* 10.6* 10.7* 10.1*   HCT 30.2* 32.9* 33.8* 31.8*   MCV 97 98 99 99   MCH 31.7 31.6 31.4 31.6   MCHC 32.8 32.2 31.7 31.8   RDW 14.7 14.5 14.4 14.0    261 248 203     I interviewed and examined the patient with the house staff.  I agree with the assessment and plan as documented.      Rao Farnsworth MD, PhD  Professor of Medicine  Division of Cardiology

## 2017-03-12 NOTE — PLAN OF CARE
Problem: Goal Outcome Summary  Goal: Goal Outcome Summary  1) Monitor and tx to facilitate hemodynamic stability.  2) Monitor and tx pain to facilitate adequate pain control.  3) Educate and/or reinforce heart healthy habits.  4) Maintain fall and infection precautions.   Outcome: No Change  0249-4228: Tele paced, occ PVCs. Up SBA to commode, coccyx reddened, intact, blanchable, barrier cream applied. Encouraged shifting in chair, po intake. NPO at midnight for ablation.

## 2017-03-12 NOTE — PLAN OF CARE
Problem: Goal Outcome Summary  Goal: Goal Outcome Summary  1) Monitor and tx to facilitate hemodynamic stability.  2) Monitor and tx pain to facilitate adequate pain control.  3) Educate and/or reinforce heart healthy habits.  4) Maintain fall and infection precautions.   Outcome: No Change  D/I/A: Patient's VSS. A-fib with rates in the 180s at 0814, not sustained lasting a few minutes, otherwise paced rhythm. Patient reports mild headache, declines intervention. Potassium and magnesium replaced per protocol, recheck tomorrow. PPM site covered with pressure dressing, removed around 1400 per MD request, monitoring for further swelling/redness. Coccyx red, but still blanchable, declines repositioning. Calorie counts today, patient ate 75% of breakfast, reports concern with keeping blood sugars up, asking what he should do. Patient had visitors most of the day today.     P: Continue to monitor and notify MDs as needed. NPO at midnight for ablation tomorrow. Lymphedema to see patient tomorrow as well.

## 2017-03-12 NOTE — PROVIDER NOTIFICATION
PPM site is edematous and discolored. Tender to touch. Paged cardiology cross cover, Anna Marie Altamirano ([phone]). Requested MD assess site.

## 2017-03-12 NOTE — PLAN OF CARE
"Problem: Goal Outcome Summary  Goal: Goal Outcome Summary  1) Monitor and tx to facilitate hemodynamic stability.  2) Monitor and tx pain to facilitate adequate pain control.  3) Educate and/or reinforce heart healthy habits.  4) Maintain fall and infection precautions.   D-Admitted on 03/04/17 from [facility] with acute hypoxic respiratory failure, afib and infuenza A. S/P PPM on 03/10/17. Refusing to eat. Refuses to get out of the chair. Verbalized that he has slept in a chair for the last 2 years. He also verbalized that he slept in the bed last night and \"that was a mistake\". Generalized pain. Verbalizes frustration. PPM site is edematous and area around the dressing is discolored.  I-Encouraged to pt eat. Encouraged pt to shift position in the chair to prevent skin breakdown. Tramadol for pain. Discussed PPM site with Dr. Altamirano. Dr. Altamirano here to assess. Obtained order for pressure dressing.  A-Telemetry trend for this shift is V/AV paced  with rare pvcs. Pt verbalizes that \"nothing helps\" his pain. Only agrees to drink room temperature water.  P-Continue with current poc. Encourage po intake and position changes. Monitor PPM site.      "

## 2017-03-12 NOTE — PLAN OF CARE
Problem: Goal Outcome Summary  Goal: Goal Outcome Summary  1) Monitor and tx to facilitate hemodynamic stability.  2) Monitor and tx pain to facilitate adequate pain control.  3) Educate and/or reinforce heart healthy habits.  4) Maintain fall and infection precautions.   Outcome: No Change  Pt VSS overnight. Pt states his pain continues and nothing helps however has not had any CO pain since evening tramidol. Pt continues to avoid eating but states if IV antiemetic possible he is willing to try to eat more. Pt refuses to move from chair and states he will do more in morning. AV paced overnight with no issues noted. Will continue to monitor and address as needed.

## 2017-03-13 ENCOUNTER — APPOINTMENT (OUTPATIENT)
Dept: PHYSICAL THERAPY | Facility: CLINIC | Age: 82
DRG: 227 | End: 2017-03-13
Attending: INTERNAL MEDICINE
Payer: MEDICARE

## 2017-03-13 ENCOUNTER — ALLIED HEALTH/NURSE VISIT (OUTPATIENT)
Dept: CARDIOLOGY | Facility: CLINIC | Age: 82
DRG: 227 | End: 2017-03-13
Attending: INTERNAL MEDICINE
Payer: MEDICARE

## 2017-03-13 ENCOUNTER — APPOINTMENT (OUTPATIENT)
Dept: OCCUPATIONAL THERAPY | Facility: CLINIC | Age: 82
DRG: 227 | End: 2017-03-13
Attending: INTERNAL MEDICINE
Payer: MEDICARE

## 2017-03-13 DIAGNOSIS — Z45.018 BIVENTRICULAR PACEMAKER CHECK: Primary | ICD-10-CM

## 2017-03-13 LAB
ANION GAP SERPL CALCULATED.3IONS-SCNC: 9 MMOL/L (ref 3–14)
BACTERIA SPEC CULT: NO GROWTH
BACTERIA SPEC CULT: NO GROWTH
BUN SERPL-MCNC: 17 MG/DL (ref 7–30)
CALCIUM SERPL-MCNC: 8 MG/DL (ref 8.5–10.1)
CHLORIDE SERPL-SCNC: 107 MMOL/L (ref 94–109)
CO2 SERPL-SCNC: 25 MMOL/L (ref 20–32)
CREAT SERPL-MCNC: 0.72 MG/DL (ref 0.66–1.25)
ERYTHROCYTE [DISTWIDTH] IN BLOOD BY AUTOMATED COUNT: 15 % (ref 10–15)
GFR SERPL CREATININE-BSD FRML MDRD: ABNORMAL ML/MIN/1.7M2
GLUCOSE BLDC GLUCOMTR-MCNC: 102 MG/DL (ref 70–99)
GLUCOSE BLDC GLUCOMTR-MCNC: 104 MG/DL (ref 70–99)
GLUCOSE BLDC GLUCOMTR-MCNC: 114 MG/DL (ref 70–99)
GLUCOSE BLDC GLUCOMTR-MCNC: 47 MG/DL (ref 70–99)
GLUCOSE BLDC GLUCOMTR-MCNC: 52 MG/DL (ref 70–99)
GLUCOSE BLDC GLUCOMTR-MCNC: 81 MG/DL (ref 70–99)
GLUCOSE SERPL-MCNC: 80 MG/DL (ref 70–99)
HCT VFR BLD AUTO: 29.5 % (ref 40–53)
HGB BLD-MCNC: 9.4 G/DL (ref 13.3–17.7)
LACTATE BLD-SCNC: 1.3 MMOL/L (ref 0.7–2.1)
MCH RBC QN AUTO: 31.4 PG (ref 26.5–33)
MCHC RBC AUTO-ENTMCNC: 31.9 G/DL (ref 31.5–36.5)
MCV RBC AUTO: 99 FL (ref 78–100)
MICRO REPORT STATUS: NORMAL
MICRO REPORT STATUS: NORMAL
PLATELET # BLD AUTO: 236 10E9/L (ref 150–450)
POTASSIUM SERPL-SCNC: 3.8 MMOL/L (ref 3.4–5.3)
RBC # BLD AUTO: 2.99 10E12/L (ref 4.4–5.9)
SODIUM SERPL-SCNC: 141 MMOL/L (ref 133–144)
SPECIMEN SOURCE: NORMAL
SPECIMEN SOURCE: NORMAL
WBC # BLD AUTO: 6.2 10E9/L (ref 4–11)

## 2017-03-13 PROCEDURE — A9270 NON-COVERED ITEM OR SERVICE: HCPCS | Mod: GY | Performed by: STUDENT IN AN ORGANIZED HEALTH CARE EDUCATION/TRAINING PROGRAM

## 2017-03-13 PROCEDURE — 00000146 ZZHCL STATISTIC GLUCOSE BY METER IP

## 2017-03-13 PROCEDURE — 97535 SELF CARE MNGMENT TRAINING: CPT | Mod: GO

## 2017-03-13 PROCEDURE — 99232 SBSQ HOSP IP/OBS MODERATE 35: CPT | Performed by: NURSE PRACTITIONER

## 2017-03-13 PROCEDURE — 99231 SBSQ HOSP IP/OBS SF/LOW 25: CPT | Mod: 25 | Performed by: NURSE PRACTITIONER

## 2017-03-13 PROCEDURE — 25000132 ZZH RX MED GY IP 250 OP 250 PS 637: Mod: GY | Performed by: STUDENT IN AN ORGANIZED HEALTH CARE EDUCATION/TRAINING PROGRAM

## 2017-03-13 PROCEDURE — 85027 COMPLETE CBC AUTOMATED: CPT | Performed by: STUDENT IN AN ORGANIZED HEALTH CARE EDUCATION/TRAINING PROGRAM

## 2017-03-13 PROCEDURE — 80048 BASIC METABOLIC PNL TOTAL CA: CPT | Performed by: STUDENT IN AN ORGANIZED HEALTH CARE EDUCATION/TRAINING PROGRAM

## 2017-03-13 PROCEDURE — 25000132 ZZH RX MED GY IP 250 OP 250 PS 637: Mod: GY | Performed by: INTERNAL MEDICINE

## 2017-03-13 PROCEDURE — 97116 GAIT TRAINING THERAPY: CPT | Mod: GP

## 2017-03-13 PROCEDURE — 93005 ELECTROCARDIOGRAM TRACING: CPT | Performed by: OPTOMETRIST

## 2017-03-13 PROCEDURE — 25800025 ZZH RX 258: Performed by: STUDENT IN AN ORGANIZED HEALTH CARE EDUCATION/TRAINING PROGRAM

## 2017-03-13 PROCEDURE — A9270 NON-COVERED ITEM OR SERVICE: HCPCS | Mod: GY | Performed by: INTERNAL MEDICINE

## 2017-03-13 PROCEDURE — 36415 COLL VENOUS BLD VENIPUNCTURE: CPT | Performed by: INTERNAL MEDICINE

## 2017-03-13 PROCEDURE — 40000133 ZZH STATISTIC OT WARD VISIT

## 2017-03-13 PROCEDURE — 99231 SBSQ HOSP IP/OBS SF/LOW 25: CPT | Mod: 24 | Performed by: INTERNAL MEDICINE

## 2017-03-13 PROCEDURE — 83605 ASSAY OF LACTIC ACID: CPT | Performed by: INTERNAL MEDICINE

## 2017-03-13 PROCEDURE — 21400006 ZZH R&B CCU INTERMEDIATE UMMC

## 2017-03-13 PROCEDURE — 36415 COLL VENOUS BLD VENIPUNCTURE: CPT | Performed by: STUDENT IN AN ORGANIZED HEALTH CARE EDUCATION/TRAINING PROGRAM

## 2017-03-13 PROCEDURE — 99207 ZZC CDG-CORRECTLY CODED, REVIEWED AND AGREE: CPT | Performed by: NURSE PRACTITIONER

## 2017-03-13 PROCEDURE — 25000128 H RX IP 250 OP 636: Performed by: STUDENT IN AN ORGANIZED HEALTH CARE EDUCATION/TRAINING PROGRAM

## 2017-03-13 PROCEDURE — 25000125 ZZHC RX 250: Performed by: STUDENT IN AN ORGANIZED HEALTH CARE EDUCATION/TRAINING PROGRAM

## 2017-03-13 PROCEDURE — 40000193 ZZH STATISTIC PT WARD VISIT

## 2017-03-13 PROCEDURE — 93010 ELECTROCARDIOGRAM REPORT: CPT | Performed by: INTERNAL MEDICINE

## 2017-03-13 PROCEDURE — 97530 THERAPEUTIC ACTIVITIES: CPT | Mod: GP

## 2017-03-13 PROCEDURE — 40000275 ZZH STATISTIC RCP TIME EA 10 MIN: Performed by: OPTOMETRIST

## 2017-03-13 RX ORDER — SODIUM CHLORIDE 9 MG/ML
INJECTION, SOLUTION INTRAVENOUS CONTINUOUS
Status: DISCONTINUED | OUTPATIENT
Start: 2017-03-13 | End: 2017-03-14 | Stop reason: HOSPADM

## 2017-03-13 RX ORDER — LIDOCAINE 40 MG/G
CREAM TOPICAL
Status: DISCONTINUED | OUTPATIENT
Start: 2017-03-13 | End: 2017-03-14 | Stop reason: HOSPADM

## 2017-03-13 RX ORDER — METOPROLOL TARTRATE 50 MG
50 TABLET ORAL 2 TIMES DAILY
Status: DISCONTINUED | OUTPATIENT
Start: 2017-03-13 | End: 2017-03-14 | Stop reason: HOSPADM

## 2017-03-13 RX ADMIN — DOXYCYCLINE HYCLATE 100 MG: 100 CAPSULE ORAL at 07:58

## 2017-03-13 RX ADMIN — FUROSEMIDE 20 MG: 20 TABLET ORAL at 07:59

## 2017-03-13 RX ADMIN — POTASSIUM CHLORIDE 20 MEQ: 750 TABLET, EXTENDED RELEASE ORAL at 11:36

## 2017-03-13 RX ADMIN — METOPROLOL TARTRATE 50 MG: 50 TABLET ORAL at 20:08

## 2017-03-13 RX ADMIN — TAMSULOSIN HYDROCHLORIDE 0.4 MG: 0.4 CAPSULE ORAL at 20:08

## 2017-03-13 RX ADMIN — LISINOPRIL 10 MG: 10 TABLET ORAL at 20:08

## 2017-03-13 RX ADMIN — DEXTROSE MONOHYDRATE 25 ML: 500 INJECTION PARENTERAL at 12:27

## 2017-03-13 RX ADMIN — LISINOPRIL 10 MG: 10 TABLET ORAL at 07:58

## 2017-03-13 RX ADMIN — OMEPRAZOLE 20 MG: 20 CAPSULE, DELAYED RELEASE ORAL at 07:58

## 2017-03-13 RX ADMIN — NYSTATIN 500000 UNITS: 100000 SUSPENSION ORAL at 12:26

## 2017-03-13 RX ADMIN — NYSTATIN 500000 UNITS: 100000 SUSPENSION ORAL at 20:08

## 2017-03-13 RX ADMIN — ACETAMINOPHEN AND CODEINE PHOSPHATE 2 TABLET: 300; 30 TABLET ORAL at 20:08

## 2017-03-13 RX ADMIN — ATORVASTATIN CALCIUM 40 MG: 40 TABLET, FILM COATED ORAL at 07:57

## 2017-03-13 RX ADMIN — NYSTATIN 500000 UNITS: 100000 SUSPENSION ORAL at 15:45

## 2017-03-13 RX ADMIN — SENNOSIDES AND DOCUSATE SODIUM 1 TABLET: 8.6; 5 TABLET ORAL at 20:09

## 2017-03-13 RX ADMIN — PREDNISONE 5 MG: 5 TABLET ORAL at 07:59

## 2017-03-13 RX ADMIN — NYSTATIN 500000 UNITS: 100000 SUSPENSION ORAL at 07:57

## 2017-03-13 RX ADMIN — CALCIUM 1250 MG: 500 TABLET ORAL at 07:57

## 2017-03-13 RX ADMIN — OMEPRAZOLE 20 MG: 20 CAPSULE, DELAYED RELEASE ORAL at 16:32

## 2017-03-13 RX ADMIN — TRAMADOL HYDROCHLORIDE 50 MG: 50 TABLET, COATED ORAL at 11:36

## 2017-03-13 RX ADMIN — DOFETILIDE 125 MCG: 0.12 CAPSULE ORAL at 09:04

## 2017-03-13 RX ADMIN — SPIRONOLACTONE 25 MG: 25 TABLET ORAL at 07:58

## 2017-03-13 RX ADMIN — DOFETILIDE 125 MCG: 0.12 CAPSULE ORAL at 20:13

## 2017-03-13 RX ADMIN — METOPROLOL TARTRATE 25 MG: 25 TABLET ORAL at 07:59

## 2017-03-13 RX ADMIN — LEFLUNOMIDE 10 MG: 10 TABLET, FILM COATED ORAL at 09:04

## 2017-03-13 RX ADMIN — TAMSULOSIN HYDROCHLORIDE 0.4 MG: 0.4 CAPSULE ORAL at 07:58

## 2017-03-13 RX ADMIN — CHOLECALCIFEROL TAB 10 MCG (400 UNIT) 400 UNITS: 10 TAB at 07:57

## 2017-03-13 RX ADMIN — APIXABAN 2.5 MG: 2.5 TABLET, FILM COATED ORAL at 20:08

## 2017-03-13 RX ADMIN — SODIUM CHLORIDE: 9 INJECTION, SOLUTION INTRAVENOUS at 09:01

## 2017-03-13 ASSESSMENT — PAIN DESCRIPTION - DESCRIPTORS: DESCRIPTORS: PRESSURE;OTHER (COMMENT)

## 2017-03-13 NOTE — PROGRESS NOTES
CLINICAL NUTRITION SERVICES - REASSESSMENT NOTE     Nutrition Prescription    RECOMMENDATIONS FOR MDs/PROVIDERS TO ORDER:  If PO intake decreases would consider appetite stimulant    Malnutrition Status:    Non-severe malnutrition in the context of acute on chronic     Recommendations already ordered by Registered Dietitian (RD):  Discontinued supplements    Future/Additional Recommendations:  1. Continue diet as ordered consider liberalizing diet to encourage oral intake   2. Encourage three meals daily and oral nutrition supplements (if agreeable)   3. Continue Ca and Vitamin D as ordered - Pt on prednisone   4. Monitor BG control, BG range . - Pt on prednisone  5. Consider changing Senna to PRN  6. Consider multivitamin with minerals due to inadequate intake     EVALUATION OF THE PROGRESS TOWARD GOALS   Diet: Low saturated Fat/Na<2400 since 3/13 + Magic cup (berry) 2pm and Ensure (chocolate) HS; previously NPO, Regular diet 3/11    Intake:  Calorie counts:  3/12: 1210 kcal and 37g protein, meeting 67% of energy and 43% of protein needs.  -Per flowsheet pt consuming 25%-100% of meals  -Pt reports appetite has been increasing but still not great. Pt states that he would not like to continue to receive oral supplement, he states that they will make him sick and possibly vomit.  -Per chart, Zofran has helped his nausea. Pt feeling fatigued        NEW FINDINGS   - Wt stable over the last week    Dosing Weight: 70 kg (based on actual wt on 3/11 of 69.8kg, IBW 64.5kg)     ASSESSED NUTRITION NEEDS  Estimated Energy Needs: 7299-2123 kcals/day (25 - 30 kcals/kg)  Justification: Maintenance  Estimated Protein Needs: 84-105grams protein/day (1.2 - 1.5 grams of pro/kg)  Justification: Increased needs      MALNUTRITION  % Intake: </= 50% for >/= 5 days (severe)  % Weight Loss: None noted- difficult to assess due to fluid status   Subcutaneous Fat Loss: None observed  Muscle Loss: None observed  Fluid Accumulation/Edema:  Mild +2 BLE edema  Malnutrition Diagnosis: Non-severe malnutrition in the context of acute on chronic     Previous Goals   Patient to consume % of nutritionally adequate meal trays TID, or the equivalent with supplements/snacks.  Evaluation: Not met    Previous Nutrition Diagnosis  Inadequate protein-energy intake related to poor appetite and taste changes as evidenced by reports of decreased intake of 50% over the last 5 days and prolonged inadequate intake over the last 2 years.  Evaluation: No change    CURRENT NUTRITION DIAGNOSIS  Inadequate protein-energy intake related to continued decrease in appetite from baseline and changes in diet order preventing oral intake as evidenced by 1 day calorie count of 1210 kcal and 37g protein, meeting 67% of energy and 43% of protein needs with reports of prolonged inadequate intake over the last 2 years.    INTERVENTIONS  Implementation  Medical food supplement therapy- discontinued supplements (per pt request)   Nutrition education - provided education on high calorie and high protein food options to order    Goals  Patient to consume % of nutritionally adequate meal trays TID, or the equivalent with supplements/snacks.    Monitoring/Evaluation  Progress toward goals will be monitored and evaluated per protocol.    Glo Perez, Dietetic Intern     I have read and agree with the above nutrition reassessment, eval, and recs.   Jasmin Osorio, MS, RD, LD, [facility]   6C Pgr:  [phone]

## 2017-03-13 NOTE — PLAN OF CARE
"Problem: Goal Outcome Summary  Goal: Goal Outcome Summary  1) Monitor and tx to facilitate hemodynamic stability.  2) Monitor and tx pain to facilitate adequate pain control.  3) Educate and/or reinforce heart healthy habits.  4) Maintain fall and infection precautions.   Outcome: No Change  D/I/A: Ablation procedure scheduled this am cancelled. DNR/DNI. Patient alert, oriented x4. Patient VSS. Rhythm up to 170 BPM following activity; HR decreased to 80s within 3 minutes; BPs stable. Patient given pain medication x1, pain related to blister on top R foot; patient otherwise denies pain. Patient up with 1 assist using walker; ambulating short distances in hallway. Bilateral lower extremities severe edema (weaping fluid). Golf ball sized blister present on top of R foot: CARDS 1 team lanced blister, covered with Bacitracin and wrapped per wound care instructions. Lymphedema specialist consulted; instructed to apply ACE wraps to both legs; ACE wraps to be removed if wet. Voiding good amounts. 1 loose BM in am. Potassium replaced per protocol. BS over lunch time down to 47: low BS protocol followed until ; next blood sugar check 114. Calorie counts started today; appetite good. Patient refused positioning adjustments due to hip pain; positioning adjustments offered every 1-2 hours throughout the day. Pacer: placed 2 days ago: dressing C/D/I. Rhythm: V paced. Access: L PIV.   P: Plan to go to TCU/return home (OT/PT recommends going to TCU, patient reports \"will not take medications if not able to go home\"). May have ablation procedure completed outpatient; no date set. Update CARDS 1 if questions/concerns.           "

## 2017-03-13 NOTE — PROGRESS NOTES
Pt seen on 6C for evaluation and programming of a Medtronic Multi Lead BIV pacemaker s/p implant on 3/10/2017, per MD orders. Today his intrinsic rhythm is SR @ 75 BPM. Normal pacemaker function. 14 fast A and V rates of 131-183 BPM., durations of 10 seconds to 12 minutes: 1 second.  arrhythmias have been recorded. AP= 21.5% and = 95.6%. Pt reports he is feeling well.  Battery estimates Initializing years to SUHA. Battery voltage 3.04 V RRT is 2.77 V. Teaching provided in written and verbal forms. Plan for pt to RTC in [place] for a device and incision check. He will be followed in [place] for his device follow-ups.     RA lead:  Impedance: 399 ohms  Sensin.1 mV  Threshold: 0.80 V @ 0.50 ms    RV lead:  Impedance: 399 ohms  Sensin.4 mV  Threshold: 0.75 V @ 0.40 ms    LV lead:  Impedance: 779 ohms  Sensing: 10.4 mV  Threshold: 1.50 V @ 0.50 ms

## 2017-03-13 NOTE — MR AVS SNAPSHOT
"              After Visit Summary   3/13/2017    Bud Merritt    MRN: [id number]           Patient Information     Date Of Birth          5/9/1927        Visit Information        Provider Department      3/13/2017 5:30 AM Helder Costa [facility]        Today's Diagnoses     Biventricular pacemaker check    -  1       Follow-ups after your visit        Follow-up notes from your care team     Discussed this visit Return for Pacemaker Check.      Your next 10 appointments already scheduled     Mar 28, 2017  8:40 AM CDT   [facility] with Fide Medina PA-C   [facility]    [address] [place] [id number] [phone]              Who to contact     If you have questions or need follow up information about today's clinic visit or your schedule please contact [facility] directly at [phone].  Normal or non-critical lab and imaging results will be communicated to you by [employer], letter or phone within 4 business days after the clinic has received the results. If you do not hear from us within 7 days, please contact the clinic through [employer] or phone. If you have a critical or abnormal lab result, we will notify you by phone as soon as possible.  Submit refill requests through [employer] or call your pharmacy and they will forward the refill request to us. Please allow 3 business days for your refill to be completed.          Additional Information About Your Visit        MyChart Information     [employer] lets you send messages to your doctor, view your test results, renew your prescriptions, schedule appointments and more. To sign up, go to [email or web address] . Click on \"Log in\" on the left side of the screen, which will take you to the Welcome page. Then click on \"Sign up Now\" on the right side of the page.     You will be asked to enter the access code listed below, as well as some personal information. Please follow the directions " to create your username and password.     Your access code is: [email or web address]  Expires: 3/20/2017 11:06 AM     Your access code will  in 90 days. If you need help or a new code, please call your [facility] or [phone].        Care EveryWhere ID     This is your Care EveryWhere ID. This could be used by other organizations to access your [facility] medical records  [id number]         Blood Pressure from Last 3 Encounters:   17 119/73   17 126/89   17 130/72    Weight from Last 3 Encounters:   17 71.7 kg (158 lb 1.6 oz)   17 69 kg (152 lb 1.9 oz)   17 70.8 kg (156 lb 1.6 oz)              Today, you had the following     No orders found for display         Today's Medication Changes      Notice     This visit is during an admission. Changes to the med list made in this visit will be reflected in the After Visit Summary of the admission.             Primary Care Provider Office Phone # Fax #    Camron Aragon [phone]       [facility] [address] [place] [address]        Thank you!     Thank you for choosing [facility]  for your care. Our goal is always to provide you with excellent care. Hearing back from our patients is one way we can continue to improve our services. Please take a few minutes to complete the written survey that you may receive in the mail after your visit with us. Thank you!             Your Updated Medication List - Protect others around you: Learn how to safely use, store and throw away your medicines at www.disposemymeds.org.      Notice     This visit is during an admission. Changes to the med list made in this visit will be reflected in the After Visit Summary of the admission.

## 2017-03-13 NOTE — PROGRESS NOTES
03/13/17 4394   General Information   Discipline OT   Onset of Edema 03/04/17   Affected Body Part(s) Left LE;Right LE   Etiology Comments hypoalbuminemia, heart dysfunction, possibly also related to medications (prednisone)   Pertinent history of current problem (PT: include personal factors and/or comorbidities that impact the POC; OT: include additional occupational profile info) Bud Merritt is a 89 year old with history of paroxysmal Afib, HTN, COPD, HLD, RA on prednisone, hx of malignant prostate neoplasm who is transferred from  [facility] for ongoing management of paroxysmal Afib.    Edema Precaution Comments Large water filled blister on R foot near MTPs. Do not recommend compression until it has been lanced by MD.   Pain   Patient currently in pain Yes, see Vital Signs flowsheet   Edema Examination / Assessment   Skin Condition Pitting;Dryness   Skin Condition Comments Skin with significant 3+ pitting to knee creases, shiny and dusky in appearance with venous stasis discoloration. Skin pink/purple. Water filled blister larger than an egg present on R dorsal foot, clear/yellow in color. Toenails thick and yellow with onychomycosis. Toes red in color. Weeping noted from L LE. Blisters forming on L LE.   Capillary Refill Comments SHEKHAR   Dorsal Pedal Pulse Symmetrical   Girth Measurements   Girth Measurements Refer to separate Girth Measurement flowsheet   Sensation   Sensation (WFL) (denies n/t)   Assessment/Plan   Patient presents with Stage 2 Lymphedema   Assessment Pt with significant pitting in B LE, putting himself at risk for development of infection or further blister formation. Pt will benefit from skilled OT to initiate wrapping to B LEs, but recommend lancing of R blister first. Touched base with Resident.   Assessment of Occupational Performance (see OT eval)   Identified Performance Deficits (see OT eval)   Clinical Decision Making (Complexity) Low complexity   Planned Edema  Interventions Gradient compression bandaging;Fit for compression garment;Exercises;Precautions to prevent infection/exacerbation;Education;ADL training   Treatment Frequency 5 times/wk  (BID OT/edema)   Treatment Duration 3/17   Patient, Family and/or Staff in agreement with plan of care. Yes   Risks and benefits of treatment have been explained. Yes   Total Evaluation Time   Total Evaluation Time (Minutes) 3

## 2017-03-13 NOTE — PLAN OF CARE
Problem: Goal Outcome Summary  Goal: Goal Outcome Summary  1) Monitor and tx to facilitate hemodynamic stability.  2) Monitor and tx pain to facilitate adequate pain control.  3) Educate and/or reinforce heart healthy habits.  4) Maintain fall and infection precautions.   Edema 6C: Pt with significant edema in B LEs, soft 3+ pitting to knee creases, with large water filled blister on R foot larger than the size of an egg. Discussed with resident need for lancing prior to application of wraps. Pt will need GCB but will hold until blister is cared for. Pt also with significant weeping on L LE. Currently, level of edema in B LEs is impacting pt's balance and limiting pt's ability to ambulate as well as putting him at risk for infection. Pt will require continued edema services to promote skin integrity and greater I/ease with mobility and ADLs. Discussed this with .      As pt is significantly limited by pain, deconditioning, and B LE edema, recommend pt d/c to TCU that is able to provide edema services and OT/PT. Pt states he is discharging home, and if that is the case then he would need HH/OP lymphedema services.

## 2017-03-13 NOTE — PROGRESS NOTES
"  [facility]  CARDIOLOGY PROGRESS NOTE  Bud Merritt [id number]  03/13/2017   ___________________________________  ASSESSMENT & PLAN:  Bud Merritt is a 89 year old with history of paroxysmal Afib, HTN, COPD, HLD, RA on prednisone, hx of malignant prostate neoplasm who is transferred from [facility] for ongoing management of paroxysmal Afib and influenza A.       Changes Today:  - Lymphedema consult  - Restart apixaban  - Increase metoprolol to 50 mg BID  - Plan for d/c tomorrow if clinical course remains stable      # Paroxysmal atrial fibrillation with RVR, resolved  # Sinus node dysfunction, possible tachy-deena vs SSS s/p PPM 3/10/17  Now in sinus rhythm with intermittent bursts of symptomatic tachycardia. No episodes of bradycardia.  - Continue metoprolol 25 mg BID  - Continue dofetilide 125 mcg BID  - Continue apixaban 2.5 mg BID  - EP consult, appreciate recommendations  - If back in RVR would start digoxin load   - Ablation planned for one month    # Right Superior Foot Blister  - Lanced at bedside today  - WOC consult      # Diffuse Abdominal Pain, resolved  Patient is adamant he \"never had abdominal pain and you are making this all up\". Patient developed acute onset pain evening of 3/8/17 witness by myself, diffuse TTP and hollering out for doctor. Now denies this ever happened. GI was consulted who felt pain is possibly related to gastritis.   - Continue PPI      # Headache, improved  Usually correlating with elevated heart rates although this morning present without tachycardia. Could be caffeine headache in setting of frequent NPO and missed coffee - did improve with caffeine administration. CT Head Non-Contrast unremarkable.       # Goals of Care  Dr. Lopez discussed with him and his son on 3/10/17 about clinical course including lung, cardiac and GI issues. For now, he wishes to continue to have testing done and get treatment for all of these issues. However, he has agreed to be " DNR/DNI.      # Cyanosis of Fingers, Toes; resolved  # Elevated Lactate, resolved  Vital signs and labs have actually remained wnl aside from some hypotension documented with tachycardia (although suspect these were falsely low). Extremities are cool overall. DDx: developing cardiogenic shock, blue toe syndrome (emboli), septic emboli, less likely septic shock, aneurysm. CT Chest Angio, BLE/BUE Dopplers U/S unremarkable. Lactate resolved.      # Acute Hypoxemic Respiratory Failure, resolved  # Moderate COPD, with exacerbation secondary influenza A  No longer hypoxic, but briefly required BiPAP at [facility]. Will monitor for bacterial superinfection. Completed Tamiflu 5-day course and prednisone burst.  - Restarted home prednisone 5 mg daily  - Duonebs QID + albuterol nebs PRN  - Ceftriaxone and doxycycline (until 3/13/17 - Monday)  - Flutter valve  - Droplet precautions  - RT Chronic Disease consult      # Acute cystitis without hematuria, resolved  Urine culture has grown out Citrobacter. Completed treatment.      # Hx of falls, weakness  PT/OT recommending TCU placement which patient plans to decline.   - PT/OT consult  - Fall precautions      # Chronic systolic heart failure, nonischemic, EF 40-45%  Had recovery of EF form 25-30%. Mildly volume up.  - Continue metoprolol, lisinopril, spironolactone  - Lasix 20 mg PO daily       Chronic medical problems:  # Mitral Regurgitation, Severe Aortic Stenosis  # HTN - Continue metoprolol, lisinopril, spironolactone  # RA - Continue home prednisone (5 mg daily), continue leflunomide, tramadol PRN   # Prostate cancer on lupron - Continue tamsulosin  # Hyperlipidemia - Continue home atorvastatin      FEN: Regular diet  Proph: Apixaban - hold this AM with possible pacemaker placement  Dispo: Inpatient - discussed discharge with patient's son today who plans on picking him up tomorrow; discussed home services with RN CC and SW  Code Status: DNR/DNI     Patient seen  "and discussed with staff physician, Dr. Forte, who is agreeable with above plan.    Blossom Vines MD  PGY-3, Internal Medicine  Pager: [phone]    24 hour events:  No acute events overnight. Nursing notes reviewed. New blister on right foot. No chest pain, shortness of breath or abdominal pain. Frustrated ablation was cancelled.     OBJECTIVE:  Vitals:  VS: /72 (BP Location: Right arm)  Pulse 60  Temp 97.6  F (36.4  C) (Axillary)  Resp 18  Ht 1.676 m (5' 6\")  Wt 71.7 kg (158 lb 1.6 oz)  SpO2 98%  BMI 25.52 kg/m2   GEN: A&Ox3, NAD, comfortable  CV:  RRR, 3/6 systolic murmur  PULM:  CTA B/L  ABD: Normoactive bowel sounds, soft, ND, NT  SKIN: No rashes noted, large serous blister on right foot  EXT: WWP, 2+ BLE edema/anasarca  NEURO: AAOx3, no focal deficits      DIAGNOSTIC STUDIES  CMP  Recent Labs  Lab 03/13/17  0837 03/12/17  0727 03/11/17  2155 03/11/17  0740 03/10/17  0731 03/09/17  2134  03/08/17  1907 03/08/17  0724    140  --  140 139  --   < > 139 140   POTASSIUM 3.8 3.6 3.9 3.2* 3.6 3.9  < > 4.2 4.0   CHLORIDE 107 106  --  108 105  --   < > 104 104   CO2 25 23  --  22 23  --   < > 23 25   ANIONGAP 9 10  --  9 11  --   < > 12 10   GLC 80 82  --  88 119*  --   < > 74 74   BUN 17 17  --  17 21  --   < > 28 32*   CR 0.72 0.84  --  0.77 0.94  --   < > 1.06 1.06   GFRESTIMATED >90Non  GFR Calc 86  --  >90Non  GFR Calc 75  --   < > 66 66   GFRESTBLACK >90African American GFR Calc >90African American GFR Calc  --  >90African American GFR Calc >90African American GFR Calc  --   < > 79 79   SHELIA 8.0* 8.3*  --  8.1* 8.5  --   < > 8.2* 8.0*   MAG  --  1.9  --  1.8  --  2.0  --   --  2.1   PROTTOTAL  --   --   --   --   --   --   --  5.4*  --    ALBUMIN  --   --   --   --   --   --   --  2.5*  --    BILITOTAL  --   --   --   --   --   --   --  0.7  --    ALKPHOS  --   --   --   --   --   --   --  52  --    AST  --   --   --   --   --   --   --  45  --    ALT  --   --   -- "   --   --   --   --  32  --    < > = values in this interval not displayed.  CBC  Recent Labs  Lab 03/13/17  0837 03/11/17  0740 03/10/17  0731 03/09/17  0804   WBC 6.2 5.6 5.1 5.8   RBC 2.99* 3.12* 3.35* 3.41*   HGB 9.4* 9.9* 10.6* 10.7*   HCT 29.5* 30.2* 32.9* 33.8*   MCV 99 97 98 99   MCH 31.4 31.7 31.6 31.4   MCHC 31.9 32.8 32.2 31.7   RDW 15.0 14.7 14.5 14.4    219 261 248     INRNo lab results found in last 7 days.

## 2017-03-13 NOTE — PLAN OF CARE
"Problem: Goal Outcome Summary  Goal: Goal Outcome Summary  1) Monitor and tx to facilitate hemodynamic stability.  2) Monitor and tx pain to facilitate adequate pain control.  3) Educate and/or reinforce heart healthy habits.  4) Maintain fall and infection precautions.   PT 6C: Supine>sit with SBA and HOB elevated, sit>stand with min A. Pt ambulated ~100' with FWW and CGA, increased SOB noted, unable to obtain reliable O2 reading during or immediately following exercise. SpO2 >95% on RA at rest. Pt agitated during session, resistant to PT cues with mobility. Educated pt that TCU is safest discharge location given pt's current mobility status. Pt stated he adamantly refuses to go anywhere but home. States if he goes to a TCU \"I will give up\" and \"quit taking all of my medications\".     Recommend discharge to TCU, however pt is refusing. Therefore, recommend home with 24 hour assist and home PT/OT to increase safety and independence with functional mobility.      "

## 2017-03-13 NOTE — PROGRESS NOTES
Calorie Counts  Intake recorded for: 3/12 Kcals: 1210  Protein: 37g  # Meals Recorded: 2 meals (First - 75% 2 slices of Khmer toast with butter and syrup, 2 slices of cheddar cheese, hot cocoa)      (Second - 100% sarha food cake with shipped topping, ice cream, hot cocoa)  # Supplements Recorded: 100% Ensure Plus

## 2017-03-13 NOTE — PROGRESS NOTES
PALLIATIVE CONSULT SERVICE  SPIRITUAL ASSESSMENT Progress Note  [facility] 6C    Referred to provide chaplaincy care with Bud. Bud talked briefly of his illness circumstances and what they mean for him. Amidst the stressors experienced, he identified his Jain jackelyn as important for his coping. He is not currently a part of a [facility]. POLST was also briefly discussed. I will follow-up 2x/week as he receives ongoing support from the inpatient palliative consult service.    Sidney Light M.Div., [facility]  Palliative Consult Service   Pager [phone]

## 2017-03-13 NOTE — PLAN OF CARE
Problem: Goal Outcome Summary  Goal: Goal Outcome Summary  1) Monitor and tx to facilitate hemodynamic stability.  2) Monitor and tx pain to facilitate adequate pain control.  3) Educate and/or reinforce heart healthy habits.  4) Maintain fall and infection precautions.   Outcome: No Change  Pt VSS, No CO pain. NPO since MN for ablation today. Pt able to eat yesterday cricket without N/V after IV zofran. Pt is AV paced 60-70s. Will order am labs at 0545. Continue to monitor and address as needed.

## 2017-03-14 ENCOUNTER — APPOINTMENT (OUTPATIENT)
Dept: OCCUPATIONAL THERAPY | Facility: CLINIC | Age: 82
DRG: 227 | End: 2017-03-14
Attending: INTERNAL MEDICINE
Payer: MEDICARE

## 2017-03-14 VITALS
BODY MASS INDEX: 25.41 KG/M2 | HEART RATE: 80 BPM | TEMPERATURE: 97.8 F | DIASTOLIC BLOOD PRESSURE: 61 MMHG | WEIGHT: 158.1 LBS | OXYGEN SATURATION: 95 % | RESPIRATION RATE: 18 BRPM | SYSTOLIC BLOOD PRESSURE: 96 MMHG | HEIGHT: 66 IN

## 2017-03-14 LAB
GLUCOSE BLDC GLUCOMTR-MCNC: 79 MG/DL (ref 70–99)
INTERPRETATION ECG - MUSE: NORMAL

## 2017-03-14 PROCEDURE — 93010 ELECTROCARDIOGRAM REPORT: CPT | Performed by: INTERNAL MEDICINE

## 2017-03-14 PROCEDURE — 25000132 ZZH RX MED GY IP 250 OP 250 PS 637: Mod: GY | Performed by: STUDENT IN AN ORGANIZED HEALTH CARE EDUCATION/TRAINING PROGRAM

## 2017-03-14 PROCEDURE — 93005 ELECTROCARDIOGRAM TRACING: CPT

## 2017-03-14 PROCEDURE — 25000132 ZZH RX MED GY IP 250 OP 250 PS 637: Mod: GY | Performed by: INTERNAL MEDICINE

## 2017-03-14 PROCEDURE — 99238 HOSP IP/OBS DSCHRG MGMT 30/<: CPT | Mod: 24 | Performed by: INTERNAL MEDICINE

## 2017-03-14 PROCEDURE — 25000128 H RX IP 250 OP 636: Performed by: INTERNAL MEDICINE

## 2017-03-14 PROCEDURE — A9270 NON-COVERED ITEM OR SERVICE: HCPCS | Mod: GY | Performed by: INTERNAL MEDICINE

## 2017-03-14 PROCEDURE — A9270 NON-COVERED ITEM OR SERVICE: HCPCS | Mod: GY | Performed by: STUDENT IN AN ORGANIZED HEALTH CARE EDUCATION/TRAINING PROGRAM

## 2017-03-14 PROCEDURE — 90732 PPSV23 VACC 2 YRS+ SUBQ/IM: CPT | Performed by: INTERNAL MEDICINE

## 2017-03-14 PROCEDURE — 40000133 ZZH STATISTIC OT WARD VISIT

## 2017-03-14 PROCEDURE — 40000901 ZZH STATISTIC WOC PT EDUCATION, 0-15 MIN

## 2017-03-14 PROCEDURE — 00000146 ZZHCL STATISTIC GLUCOSE BY METER IP

## 2017-03-14 PROCEDURE — 97140 MANUAL THERAPY 1/> REGIONS: CPT | Mod: GO

## 2017-03-14 PROCEDURE — 90662 IIV NO PRSV INCREASED AG IM: CPT | Performed by: INTERNAL MEDICINE

## 2017-03-14 PROCEDURE — 25000125 ZZHC RX 250: Performed by: STUDENT IN AN ORGANIZED HEALTH CARE EDUCATION/TRAINING PROGRAM

## 2017-03-14 RX ORDER — METOPROLOL TARTRATE 50 MG
50 TABLET ORAL 2 TIMES DAILY
Qty: 120 TABLET | Refills: 0 | Status: ON HOLD
Start: 2017-03-14 | End: 2017-04-04

## 2017-03-14 RX ORDER — SODIUM CHLORIDE 9 MG/ML
INJECTION, SOLUTION INTRAVENOUS CONTINUOUS
Status: DISCONTINUED | OUTPATIENT
Start: 2017-03-14 | End: 2017-03-14 | Stop reason: HOSPADM

## 2017-03-14 RX ORDER — CEPHALEXIN 500 MG/1
500 TABLET ORAL 3 TIMES DAILY
Qty: 4 TABLET | Refills: 0 | Status: SHIPPED
Start: 2017-03-14 | End: 2017-03-15

## 2017-03-14 RX ORDER — LIDOCAINE 40 MG/G
CREAM TOPICAL
Status: DISCONTINUED | OUTPATIENT
Start: 2017-03-14 | End: 2017-03-14 | Stop reason: HOSPADM

## 2017-03-14 RX ADMIN — ATORVASTATIN CALCIUM 40 MG: 40 TABLET, FILM COATED ORAL at 08:47

## 2017-03-14 RX ADMIN — SPIRONOLACTONE 25 MG: 25 TABLET ORAL at 08:45

## 2017-03-14 RX ADMIN — FUROSEMIDE 20 MG: 20 TABLET ORAL at 08:47

## 2017-03-14 RX ADMIN — NYSTATIN 500000 UNITS: 100000 SUSPENSION ORAL at 12:05

## 2017-03-14 RX ADMIN — TAMSULOSIN HYDROCHLORIDE 0.4 MG: 0.4 CAPSULE ORAL at 08:47

## 2017-03-14 RX ADMIN — CHOLECALCIFEROL TAB 10 MCG (400 UNIT) 400 UNITS: 10 TAB at 08:47

## 2017-03-14 RX ADMIN — DOFETILIDE 125 MCG: 0.12 CAPSULE ORAL at 08:47

## 2017-03-14 RX ADMIN — INFLUENZA A VIRUS A/CALIFORNIA/7/2009 X-179A (H1N1) ANTIGEN (FORMALDEHYDE INACTIVATED), INFLUENZA A VIRUS A/HONG KONG/4801/2014 X-263B (H3N2) ANTIGEN (FORMALDEHYDE INACTIVATED), AND INFLUENZA B VIRUS B/BRISBANE/60/2008 ANTIGEN (FORMALDEHYDE INACTIVATED) 0.5 ML: 60; 60; 60 INJECTION, SUSPENSION INTRAMUSCULAR at 14:37

## 2017-03-14 RX ADMIN — CALCIUM 1250 MG: 500 TABLET ORAL at 08:47

## 2017-03-14 RX ADMIN — PREDNISONE 5 MG: 5 TABLET ORAL at 08:47

## 2017-03-14 RX ADMIN — NYSTATIN 500000 UNITS: 100000 SUSPENSION ORAL at 08:48

## 2017-03-14 RX ADMIN — LISINOPRIL 10 MG: 10 TABLET ORAL at 08:45

## 2017-03-14 RX ADMIN — APIXABAN 2.5 MG: 2.5 TABLET, FILM COATED ORAL at 08:46

## 2017-03-14 RX ADMIN — PNEUMOCOCCAL VACCINE POLYVALENT 0.5 ML
25; 25; 25; 25; 25; 25; 25; 25; 25; 25; 25; 25; 25; 25; 25; 25; 25; 25; 25; 25; 25; 25; 25 INJECTION, SOLUTION INTRAMUSCULAR; SUBCUTANEOUS at 14:39

## 2017-03-14 RX ADMIN — LEFLUNOMIDE 10 MG: 10 TABLET, FILM COATED ORAL at 08:47

## 2017-03-14 RX ADMIN — OMEPRAZOLE 20 MG: 20 CAPSULE, DELAYED RELEASE ORAL at 08:46

## 2017-03-14 RX ADMIN — METOPROLOL TARTRATE 50 MG: 50 TABLET ORAL at 08:46

## 2017-03-14 NOTE — PLAN OF CARE
Problem: Goal Outcome Summary  Goal: Goal Outcome Summary  1) Monitor and tx to facilitate hemodynamic stability.  2) Monitor and tx pain to facilitate adequate pain control.  3) Educate and/or reinforce heart healthy habits.  4) Maintain fall and infection precautions.   Occupational Therapy Discharge Summary     Reason for therapy discharge:    Discharged to home with home therapy.     Progress towards therapy goal(s). See goals on Care Plan in [facility] electronic health record for goal details.  Goals partially met.  Barriers to achieving goals:   discharge from facility.     Therapy recommendation(s):    Continued therapy is recommended.  Rationale/Recommendations:  Recommend discharge to TCU to progress independence and safety with ADLs, funcitonal transfers and to progress stregnthening/endurance. .

## 2017-03-14 NOTE — PLAN OF CARE
Problem: Goal Outcome Summary  Goal: Goal Outcome Summary  1) Monitor and tx to facilitate hemodynamic stability.  2) Monitor and tx pain to facilitate adequate pain control.  3) Educate and/or reinforce heart healthy habits.  4) Maintain fall and infection precautions.   Physical Therapy Discharge Summary     Reason for therapy discharge:    Discharged to home with home therapy.     Progress towards therapy goal(s). See goals on Care Plan in [facility] electronic health record for goal details.  Goals partially met.  Barriers to achieving goals:   limited tolerance for therapy and discharge from facility.     Therapy recommendation(s):    Continued therapy is recommended.  Rationale/Recommendations:  PT recommending d/c to TCU, however pt declined. Therefore, recomend home with home PT for increased safety and independence with functional mobility.

## 2017-03-14 NOTE — PROGRESS NOTES
Electrophysiology Consult Service  Follow up Note   EP Attending:    Date of Service: 3/13/2017      S: We continue to follow this patient for atrial tachyarrhythmia management.  He has been having decreasing amount of AT vs atypical AFL on telemetry. No episodes of bradycardia noted. An updated Echo showed LVEF ~25% and he has been undergoing diuresis. He was also found to have metastatic prostate CA. CTA showed no aortic dissection, intramural hematoma, or penetrating atherosclerotic ulcer. He underwent CRTP implant on 3/10/17. Device interrogation shows stable lead parameters and normal device function.   HPI:   Mr. Merritt is an 89 year old male who has a past medical history significant for PAF (CHADVASC 4 on Eliquis), SVT, HTN, COPD, HLD, RA on prednisone, hx of malignant prostate neoplasm, and cardiomyopathy LVEF 40-45%. He was admitted to [facility] after presenting with AF with RVR. He was placed on diltiazem gtt and he spontaneously converted. Over course of hospitalization at [facility], patient's HRs reportedly fluctuated from 50's to 170's. He was reportedly hypotensive with RVR and reported symptoms of HA, facial flushing, and generalized sense of unwellness. Given patient's allergy to amiodarone as well as current use of an antiarrhythmic, beta blocker, and max dosing on diltiazem drip for calcium channel blocker,decision was made to transfer to [facility] for further evaluation and management. He had 2 runs of AF with RVR yesterday HRs up to 140's. Each episode lasting about 10 minutes and spontaneously resolving. This morning he had two more episodes of AF/AT with RVR that spontaneously converted. He is being treated for COPD exacerbation and secondary influenza A. He has a known history of PAF for which he had been on amiodarone that needed to be stopped due to reduced DLCO. He was then transitioned to dofetilide which he had been on as an outpatient. Most recent echo shows LVEF 40-45%  "with moderate to severe AS. Renal function and electrolytes stable. He reports feeling unwell today and more SOB. Current cardiac medications include: Eliquis, Lipitor, Dofetilide, Lasix, Lisinopril, Toprol XL, and Spironolactone.   O:   Vitals: BP (!) 74/59 (BP Location: Right arm)  Pulse 60  Temp 97.8  F (36.6  C) (Oral)  Resp 18  Ht 1.676 m (5' 6\")  Wt 71.7 kg (158 lb 1.6 oz)  SpO2 96%  BMI 25.52 kg/m2  GENERAL APPEARANCE: AxO, NAD   HEENT: NCAT, EOMI, MMM. PERRLA.   NECK: Supple.   CHEST: Fine crackles in bases.   CARDIOVASCULAR: S1S2, Reg, No m/r/g.   ABDOMEN: NT, ND, BS+, soft.   EXTREMITIES: Trace pedal edema.   NEURO: Grossly nonfocal.   PSYCH: Normal affect.  SKIN: Warm and dry.    Data:  Labs:  BMP    Recent Labs  Lab 03/13/17  0837 03/12/17  0727 03/11/17  2155 03/11/17  0740 03/10/17  0731    140  --  140 139   POTASSIUM 3.8 3.6 3.9 3.2* 3.6   CHLORIDE 107 106  --  108 105   SHELIA 8.0* 8.3*  --  8.1* 8.5   CO2 25 23  --  22 23   BUN 17 17  --  17 21   CR 0.72 0.84  --  0.77 0.94   GLC 80 82  --  88 119*     CBC    Recent Labs  Lab 03/13/17  0837 03/11/17  0740 03/10/17  0731 03/09/17  0804   WBC 6.2 5.6 5.1 5.8   RBC 2.99* 3.12* 3.35* 3.41*   HGB 9.4* 9.9* 10.6* 10.7*   HCT 29.5* 30.2* 32.9* 33.8*   MCV 99 97 98 99   MCH 31.4 31.7 31.6 31.4   MCHC 31.9 32.8 32.2 31.7   RDW 15.0 14.7 14.5 14.4    219 261 248     Tele shows         EKG:           Meds per EPIC EMR:  Current Facility-Administered Medications   Medication     lidocaine 1 % 1 mL     lidocaine (LMX4) kit     sodium chloride (PF) 0.9% PF flush 3 mL     sodium chloride (PF) 0.9% PF flush 3 mL     0.9% sodium chloride infusion     apixaban ANTICOAGULANT (ELIQUIS) tablet 2.5 mg     metoprolol (LOPRESSOR) tablet 50 mg     metoprolol (LOPRESSOR) injection 5 mg     0.9% sodium chloride BOLUS     ondansetron (ZOFRAN) injection 4 mg     potassium chloride SA (K-DUR/KLOR-CON M) CR tablet 20-40 mEq     potassium chloride (KLOR-CON) Packet " 20-40 mEq     potassium chloride 10 mEq in 100 mL intermittent infusion     potassium chloride 10 mEq in 100 mL intermittent infusion with 10 mg lidocaine     potassium chloride 20 mEq in 50 mL intermittent infusion     magnesium sulfate 2 g in NS intermittent infusion (PharMEDium or FV Cmpd)     magnesium sulfate 4 g in 100 mL sterile water (premade)     furosemide (LASIX) tablet 20 mg     nystatin (MYCOSTATIN) suspension 500,000 Units     lidocaine 1 % 1 mL     lidocaine (LMX4) kit     sodium chloride (PF) 0.9% PF flush 3 mL     sodium chloride (PF) 0.9% PF flush 3 mL     acetaminophen-codeine (TYLENOL #3) 300-30 MG per tablet 1-2 tablet     naloxone (NARCAN) injection 0.1-0.4 mg     HOLD: metformin and metformin containing medications on day of procedure and 48 hours after IV contrast given     HOLD: enoxaparin (LOVENOX) Post Procedure     HOLD: heparin (IV or Subcutaneous) Post Procedure     levalbuterol (XOPENEX) neb solution 1.25 mg     omeprazole (priLOSEC) CR capsule 20 mg     lidocaine (XYLOCAINE) 2 % solution 5 mL     glucose 40 % gel 15-30 g    Or     dextrose 50 % injection 25-50 mL    Or     glucagon injection 1 mg     umeclidinium (INCRUSE ELLIPTA) 62.5 MCG/INH oral inhaler 1 puff     predniSONE (DELTASONE) tablet 5 mg     melatonin tablet 3 mg     acetaminophen (TYLENOL) tablet 650 mg     opium-belladonna (B&O SUPPRETTES) 30-16.2 MG per suppository 0.5 suppository     calcium carbonate (OS-SHELIA 500 mg Circle. Ca) tablet 1,250 mg     fluticasone-vilanterol (BREO ELLIPTA) 100-25 MCG/INH oral inhaler 1 puff     traMADol (ULTRAM) tablet 50 mg     sodium chloride (PF) 0.9% PF flush 3 mL     sodium chloride (PF) 0.9% PF flush 3 mL     Patient is already receiving anticoagulation with heparin, enoxaparin (LOVENOX), warfarin (COUMADIN)  or other anticoagulant medication     senna-docusate (SENOKOT-S;PERICOLACE) 8.6-50 MG per tablet 1-2 tablet     atorvastatin (LIPITOR) tablet 40 mg     calcium carbonate (TUMS)  chewable tablet 500 mg     dofetilide (TIKOSYN) capsule 125 mcg     leflunomide (ARAVA) tablet 10 mg     lisinopril (PRINIVIL/ZESTRIL) tablet 10 mg     spironolactone (ALDACTONE) tablet 25 mg     tamsulosin (FLOMAX) capsule 0.4 mg     cholecalciferol (vitamin D) tablet 400 Units     Echo:  Recent Results (from the past 4320 hour(s))   ECHO COMPLETE WITH OPTISON    Narrative    Interpretation Summary                    [facility]  Echocardiography Laboratory  [address] [place] [address]        Name: GALLO FLOYD  MRN: [id number]  : 1927  Study Date: 2016 10:06 AM  Age: 89 yrs  Gender: Male  Patient Location: [facility]  Reason For Study: Cardiomyopathy, unspecified  History: HTN,AS,Atrial Fibrillation,Mitral Insufficiency  Ordering Physician: RG ELIZABETH  Referring Physician: Camron Aragon MD  Performed By: Mer Lee     BSA: 1.7 m2  Height: 65 in  Weight: 140 lb  HR: 74  BP: 132/78 mmHg  ______________________________________________________________________________        Procedure  Complete Echo Adult. Contrast Optison.  ______________________________________________________________________________     Interpretation Summary     Moderate to severe valvular aortic stenosis.  Left ventricular systolic function is moderately reduced.  The visual ejection fraction is estimated at 40-45%.  The left ventricle is normal in size.  There is mild concentric left ventricular hypertrophy.  There is moderate biatrial enlargement.  Mild aortic root dilatation.  The ascending aorta is Mildly dilated.  The rhythm was normal sinus.     Global LV systolic performance appears better than described on the last  study 2016, perhaps because the degree of ventricular ectopy is less  than was present on the last study ( EF preivously estimated 25 to 30%, now  40 to 45%). There appears to be some progression in the degree of aortic  stenosis ( MSG had been 18 mmHg, now 23 mmHg).  Using a LVOT diameter of 2.5  cm, continuity equation calculation of aortic valve area yeilds an TYREL of 1.0  to 1.1 cm2.  ______________________________________________________________________________           Left Ventricle  The left ventricle is normal in size. There is mild concentric left  ventricular hypertrophy. Left ventricular systolic function is moderately  reduced. The visual ejection fraction is estimated at 40-45%. No regional  wall motion abnormalities noted. There is no thrombus seen in the left  ventricle.     Right Ventricle  The right ventricle is normal in structure, function and size. There is no  mass or thrombus in the right ventricle.  Atria  There is moderate biatrial enlargement. There is no atrial shunt seen.     Mitral Valve  The mitral valve leaflets are mildly thickened. There is trace to mild mitral  regurgitation. There is no mitral valve stenosis.     Tricuspid Valve  Normal tricuspid valve. The right ventricular systolic pressure is elevated  at 33.5 mmHg. Right ventricular systolic pressure is elevated, consistent  with mild pulmonary hypertension. There is no tricuspid stenosis.     Aortic Valve  There is severe trileaflet aortic sclerosis. There is mild (1+) aortic  regurgitation. Moderate to severe valvular aortic stenosis. The mean AoV  pressure gradient is 23.9 mmHg.     Pulmonic Valve  Normal pulmonic valve. There is trace pulmonic valvular regurgitation. There  is no pulmonic valvular stenosis.     Vessels  Mild aortic root dilatation. The ascending aorta is Mildly dilated. The IVC  is normal in size and reactivity with respiration, suggesting normal central  venous pressure. The pulmonary artery is normal size.  Pericardial/Pleural  The pericardium appears normal. There is no pleural effusion.     Rhythm  The rhythm was normal sinus.     ______________________________________________________________________________  MMode/2D Measurements & Calculations  IVSd: 1.2 cm  LVIDd:  4.7 cm  LVIDs: 3.7 cm  LVPWd: 1.1 cm  FS: 20.9 %  EDV(Teich): 103.7 ml  ESV(Teich): 59.5 ml  LV mass(C)d: 201.7 grams  Ao root diam: 4.0 cm  asc Aorta Diam: 3.8 cm  LVOT diam: 2.4 cm  LVOT area: 4.6 cm2  LA Volume (BP): 81.8 ml     LA Volume Index (BP): 48.1 ml/m2        Doppler Measurements & Calculations  MV E max phyllis: 73.3 cm/sec  MV A max phyllis: 132.9 cm/sec  MV E/A: 0.55  MV dec time: 0.31 sec  Ao V2 max: 325.8 cm/sec  Ao max P.7 mmHg  Ao V2 mean: 227.5 cm/sec  Ao mean P.9 mmHg  Ao V2 VTI: 66.4 cm  TYREL(I,D): 1.0 cm2  TYREL(V,D): 1.1 cm2  LV V1 max P.3 mmHg  LV V1 max: 76.2 cm/sec  LV V1 VTI: 15.0 cm  SV(LVOT): 69.4 ml  PA acc time: 0.09 sec  TR max phyllis: 289.1 cm/sec  TR max P.5 mmHg  TYREL Index (cm2/m2): 0.61  Lateral E/e': 18.1  Medial E/e': 17.8              ______________________________________________________________________________        Report approved by: JHOAN Benítez 2016 12:48 PM          A:   Mr. Merritt is an 89 year old male who has a past medical history significant for PAF (CHADVASC 4 on Eliquis), SVT, HTN, COPD, HLD, RA on prednisone, hx of malignant prostate neoplasm, and cardiomyopathy LVEF 40-45%. He was admitted to [facility] after presenting with AF with RVR. He was placed on diltiazem gtt and he spontaneously converted. Over course of hospitalization at [facility], patient's HRs reportedly fluctuated from 50's to 170's. He was reportedly hypotensive with RVR and reported symptoms of HA, facial flushing, and generalized sense of unwellness. Given patient's allergy to amiodarone as well as current use of an antiarrhythmic, beta blocker, and max dosing on diltiazem drip for calcium channel blocker,decision was made to transfer to [facility] for further evaluation and management. He had 2 runs of AF with RVR yesterday HRs up to 140's. Each episode lasting about 10 minutes and spontaneously resolving. This morning he had two more episodes of AF/AT with RVR that  spontaneously converted. He is being treated for COPD exacerbation and secondary influenza A. He has a known history of PAF for which he had been on amiodarone that needed to be stopped due to reduced DLCO. He was then transitioned to dofetilide which he had been on as an outpatient. Most recent echo shows LVEF 40-45% with moderate to severe AS. Renal function and electrolytes stable. He reports feeling unwell today and more SOB. Current cardiac medications include: Eliquis, Lipitor, Dofetilide, Lasix, Lisinopril, Toprol XL, and Spironolactone. We continue to follow this patient for atrial tachyarrhythmia management.  He has been having decreasing amount of AT vs atypical AFL on telemetry. No episodes of bradycardia noted. An updated Echo showed LVEF ~25% and he has been undergoing diuresis. He was also found to have metastatic prostate CA. CTA showed no aortic dissection, intramural hematoma, or penetrating atherosclerotic ulcer. He underwent CRTP implant on 3/10/17. Device interrogation shows stable lead parameters and normal device function.   EP recommendations:   He has documented AF and is also having runs of more organized atrial tachyarrhythmia on telemetry (likely AT vs atypical AFL).   We discussed in detail with the patient management/treatment options for AF/AFL includin. Stroke Prophylaxis: CHADSVASC=++age, +HTN, +HF 4, corresponding to a 4.0% annual stroke / systemic emolism event rate. indicating need for long term oral anticoagulation. He is appropriately on Eliquis. No bleeding issues.   2. Rate Control: Up titrate Metoprolol.   3. Rhythm Control: He had previously been on amiodarone which had to be stopped due to reduced DLCO. He was alternatively placed on dofetilide. This appears to be relatively ineffective as he is having frequent PAF episodes on dofetilide. We would avoid Flecainide given structural heart disease (AS and reduced LVEF). Limited other AAT options. We discussed with his  primary EP (Dr. Li) who recommends CRTP implant followed by AVN ablation may be the best way to control this. He now has CRTP implant in preporation for AVN ablation. CS lead was difficult to place and we would like to allow for further post operative healing prior to AVN ablation. We would wait until at least Wednesday 3/15/17 prior to pursing ablation, and preferably even 3-4 weeks after implant. If arrhythmias prevent discharge we will do AVN ablation on Wednesday.     4. Risk Factor Management: Continue Statin, maintain tight BP control, and KATHERIN evaluation as indicated.   The patient states understanding and is agreeable with plan.   Thank you much for allowing us to participate in the care of this pleasant patient.   This patient was discussed with  and the note above reflects our joint assessment and plan.   POLLO Alvares CNP  Electrophysiology Consult Service  Pager: [phone]

## 2017-03-14 NOTE — PROGRESS NOTES
MD walked in room and patient heart rate converted to sinus rhythm with a rate of 60's to 70's with b/p responding to better levels 95/70. Bolus written for instructed to given if b/p drops again.

## 2017-03-14 NOTE — PLAN OF CARE
Problem: Goal Outcome Summary  Goal: Goal Outcome Summary  1) Monitor and tx to facilitate hemodynamic stability.  2) Monitor and tx pain to facilitate adequate pain control.  3) Educate and/or reinforce heart healthy habits.  4) Maintain fall and infection precautions.   DISCHARGE                         3/14/2017  3:15 PM  ----------------------------------------------------------------------------  Discharged to: Home  Via: Automobile  Accompanied by: Family  Discharge Instructions: Diet, activity, medications, follow up appointments, PT/OT and home care, when to call the MD, aftercare instructions, and what to watchout for s/s of infection, increasing SOB, & chest pain.    Prescriptions: To be filled by [facility] pharmacy per pt's request; medication list reviewed & sent with pt.  Follow Up Appointments: arranged; information given  Belongings: All sent with pt  IV: out  Telemetry: off  Pt exhibits understanding of above discharge instructions; all questions answered.     Discharge Paperwork: Signed, copied, and sent home with patient.

## 2017-03-14 NOTE — PROGRESS NOTES
Calorie Counts  Intake recorded for: 3/13  Kcals: 994  Protein: 42g  # Meals Recorded: 100% Lithuanian toast with butter and syrup, hard boiled egg, 50% mac and cheese, corn, cottage cheese  # Supplements Recorded: 0

## 2017-03-14 NOTE — PROGRESS NOTES
"  Care Coordinator- Discharge Planning     Admission Date/Time:  3/4/2017  Attending MD:  Vicky Dai MD   Data  Date of initial CC assessment:  3/8/17  Chart reviewed, discussed with interdisciplinary team.   Patient was admitted for:   1. Cardiac resynchronization therapy defibrillator (CRT-D) in place    Assessment  Concerns with insurance coverage for discharge needs: None.  Current Living Situation: Patient lives with spouse. Pt was independent with his cares prior to this admission.  Support System: Supportive and Involved wife and children  Services Involved: None currently  Transportation: Family or Friend will provide  Coordination of Care and Referrals: Provided patient/family with options for home care.   Physical Therapy is recommending TCU but pt is declining. Pt said that he wants to have home care with [facility]. Pt said that his wife is available to assist at home and pt added that his neighbors are also \"very good\" to him and will help him as needed.   Intervention:        Arrangements made with [facility] (Ph: [phone] Fax: [phone]) for Palliative Care Program,  RN eval post hospitalization, assess vital signs, respiratory and cardiac status, activity tolerance, hydration, nutrition, med set up and management.HHA for assist with ADL's. PT/OT eval and treat for deconditioning, strengthening, and endurance. SW for referral to community resources.   Plan  Anticipated Discharge Date:  3/14/17  Anticipated Discharge Plan:  Discharge to home with home care.     CTS Handoff completed:  YES    KIRK DELACRUZ RN BSN  Care Coordinator    "

## 2017-03-14 NOTE — PLAN OF CARE
Problem: Goal Outcome Summary  Goal: Goal Outcome Summary  1) Monitor and tx to facilitate hemodynamic stability.  2) Monitor and tx pain to facilitate adequate pain control.  3) Educate and/or reinforce heart healthy habits.  4) Maintain fall and infection precautions.   Edema 6C: Pt with significantly reduced B LE edema with use of ACE wraps -- however, pockets of 2+-3+ pitting remain, especially in feet and calves. Pt with 1+ pitting in ankles. Skin cares completed with use of vaseline gauze over dorsal feet with drainage pads wrapped with kerlix. GCBs applied on top. OK for pt to wear compression bandages x 24 hours. However, please remove any compression if it becomes wet, causes numbness, tingling, pain, becomes soiled, or if pt becomes unable to verbalize pain. Recommend continuation of edema services in IP or HH setting to prevent risk of infection and further development of water blisters.

## 2017-03-14 NOTE — DISCHARGE SUMMARY
"                                                          Cardiology Discharge Summary  Bud Merritt MRN: [id number]  5/9/1927    Primary care provider: Camron Aragon  ___________________________________          Date of Admission:  3/4/2017  Date of Discharge:  3/14/2017   Admitting Physician:  Vicky Dia MD  Discharge Physician:  Dr. Anil Forte  Discharging Service:  Cardiology     Primary Provider: Cmaron Aragon         Reason for Admission:   Bud Merritt is a 89 year old with history of paroxysmal Afib (on Apixiban, Tikosyn, and Metoprolol XL), HTN, COPD, HLD, RA on prednisone, hx of malignant prostate neoplasm who is transferred from [facility] for ongoing management of paroxysmal Afib.      Pt has a history of paroxysmal Afib, bradycardia arrhythmias, SVT, and was last seen by Dr. Li in mid-February. Pt was admitted to [facility] from 3/2-3/4. During hospitalization pt had episodes of pAfib that converted following IV Cardizem in addition to his home regimen of metoprolol XL 12.5mg daily and Tikosyn 125mcg BID. Over course of hospitalization, pt's HR reportedly fluctuated wildly from 50's to 170's in a very short time span. Per DCS, when pt's HR is normal or low, he is asymptomatic with good BP's. When HR's enter 150's-170's, pt is hypotensive with BP's in the 60's-70's systolic with HA, facial flushing, sense of unwellness. The physician discussed with Dr. Vicky Dia regarding transfer: \"Given patient's allergy to amiodarone as well as current use of an antiarrhythmic, beta blocker, and max dosing on diltiazem drip for calcium channel blocker, it is felt the best course of action would be to transfer the patient to the [facility] for ongoing evaluation and management. Patient is discharged in stable condition. Of note, as it did take several hours for an appropriate that to be found, patient was continued on the current therapy. He did have slight improvement " "in his stabilization of heart rate in the 2 hours prior to transfer with fewer heart rates in the 140-160 range and no hypotension noted.\"     At time of admit, pt is in NSR with HR in 80's, asymptomatic while on      Ongoing cough. No fevers, chills. Parrish in place. Denies abdominal pain. States that diarrhea has resolved.           Discharge Diagnosis:   1. Paroxysmal Atrial Fibrillation with RVR, improved  2. 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 Right Superior Foot Blister  4. Diffuse Abdominal Pain of unclear etiology, resolved  5. Headache, improved  6. Encounter for Goals of Care  7. Acute Hypoxemic Respiratory Failure with exacerbation of moderate COPD 2/2 influenza A infection  8. Acute Cystitis, resolved  9. Chronic Systolic Heart Failure, Non-Ischemic, EF 40-45%  10. Physical Deconditioning with history of falls  11. Mitral Regurgitation  12. Aortic Stenosis, Severe  13. Hypertension  14. Rheumatoid Arthritis  15. Metastatic Prostate Cancer  16. Hyperlipidemia         Procedures & Significant Findings:   CT Chest 3/8/17  1. No aortic dissection, intramural hematoma, or penetrating  atherosclerotic ulcer. No thoracic aortic aneurysm. Ectasia of the  ascending aorta measuring up to 4.5 cm.  2. Pulmonary edema and bilateral lower lobe bronchial wall thickening,  retained secretions, and left lower lobe groundglass nodularity.  Overall findings suggest infection, including atypical infectious  process, COPD exacerbation. Trace left and small right pleural  effusions.  3. Innumerable sclerotic foci throughout the axial skeleton  representing bone metastases. Bone scintigraphy could be performed for  further evaluation if clinically indicated.  4. Pulmonary nodules as described above. Recommend continued attention  on follow-up. Follow-up CT in 3-6 months can be considered.    TTE 3/6/17  Moderate left ventricular dilation is present.  The Ejection Fraction is estimated at " 20-25%.  Normal contraction of basal segments with akinetic mid and distal segments.  This wallmotion abnormality is new since prior study on 12/13/2016.  Consistent with stress cardiomyopathy, if no LAD disease.  Severe aortic stenosis is present.  The aortic valve area is 0.9 cm^2, by the continuity equation.  The peak aortic velocity is 3.8 m/sec.  The mean gradient across the aortic valve is37 mmHg.  Estimated gradients are under estimation due to LV systolic dysfunction.    PPM Placement 3/10/17  Post-operative diagnosis:   1. Atrial fibrillation with rapid ventricular response, refractory to medical therapy  2. Supraventricular tachycardia  3. CHF due to presumed NICM, EF=40-45%, NYHA class 3  4. Planned AV junction ablation  5. Successful implantation of CRT-P device, planned AV junction ablation in the future         Consultations:   1. Electrophysiology Cardiology  2. Gastroenterology  3. Palliative Care  4. Social Work         Hospital Course by Problem:    # Paroxysmal atrial fibrillation with RVR, resolved  # Sinus node dysfunction, possible tachy-deena vs SSS s/p PPM 3/10/17  Now in sinus rhythm with intermittent bursts of symptomatic tachycardia. No episodes of bradycardia. He was continued on home metoprolol 25 mg BID, dofetilide 125 mcg BID and apixaban 2.5 mg BID. EP was consulted who placed PPM with plan for outpatient follow-up in one month with Dr. Li for pacemaker interrogation and possible AVN ablation. Patient was given instructions on post-pacemaker care and appropriate antibiotic prescription as below.      # Right Superior Foot Blister  Likely 2/2 anasarca in setting of malnutrition and hypoalbuminemia. Lanced at bedside day prior to discharge in sterile technique. Risks of procedure explained to patient including but not limited to infection. WOC consulted for dressing recommendations.       # Diffuse Abdominal Pain, resolved  Patient developed abdominal pain for 48-72 hours during  "hospital admission although later was adamant he \"never had abdominal pain and you are making this all up\". Patient developed acute onset pain evening of 3/8/17 witnessed by myself, diffuse TTP and hollering out for doctor. Now denies this ever happened. GI was consulted who felt pain is possibly related to gastritis. Will continue PPI for one month and then will need re-evaluation by PCP.      # Headache, improved  Usually correlating with elevated heart rates although this morning present without t achycardia. Could be caffeine headache in setting of frequent NPO and missed coffee - did improve with caffeine administration. CT Head Non-Contrast unremarkable for acute intercranial process..       # Goals of Care  Dr. Lopez discussed with him and his son on 3/10/17 about clinical course including lung, cardiac and GI issues. For now, he wishes to continue to have testing done and get treatment for all of these issues. However, he has agreed to be DNR/DNI. Patient completed advanced directive that was notarized this admission.       # Cyanosis of Fingers, Toes; resolved  # Elevated Lactate, resolved  Patient developed some cyanosis of several fingers and toes 3/8/17 with slightly elevated lactate to ~3.0. Vital signs and labs actually remained wnl aside from some hypotension documented with tachycardia (although suspect these were falsely low). Initial concern for developing cardiogenic shock, blue toe syndrome (emboli), septic emboli, less likely septic shock, aneurysm. CT Chest Angio, BLE/BUE Dopplers U/S unremarkable. Lactate resolved to normal. Still remains unclear but symptoms resolved prior to discharge.      # Acute Hypoxemic Respiratory Failure, resolved  # Moderate COPD, with exacerbation secondary influenza A  Briefly required [facility] at [facility] but improved rapidly. Completed Tamiflu and prednisone 5 day courses. He also treated a five day course of doxycycline for possible CAP or bacterial " "pneumonia following influenza, however, patient rapidly improved and antibiotics were discontinued with improved clinical course.       # Acute cystitis without hematuria, resolved  Urine culture has grown out Citrobacter. Completed seven day course of antibiotics (cefdinir).      # Hx of falls, weakness  PT/OT recommending TCU placement which patient adamantly declined with support of his family. He has been very clear in his wishes to die at home when the time comes.       # Chronic systolic heart failure, nonischemic, EF 40-45%  Had recovery of EF from 25-30%. Mildly volume up on admission with subsequent diuresis. Repeat TTE during hospital stay with decreased EF to 20-25% and akinetic mid-distal LV segments with stress cardiomyopathy. He was continued on home metoprolol, lisinopril and spironolactone and eventually his home furosemide dose of 20 mg daily was resumed. He should get repeat TTE as outpatient to assess change in cardiac function.       # Mitral Regurgitation, Severe Aortic Stenosis  TTE obtained during admission revealed aortic valve area of 0.9 cm2 with severe aortic stenosis, mild to moderate aortic valve sclerosis. Patient should follow-up with his primary cardiologist at [facility] to discuss TAVR in 2-3 months if this is in accordance with his wishes.    # HTN  No acute issues. Continued on home metoprolol, lisinopril, spironolactone.    # RA  No acute issues. Received prednisone burst as above ad then resumed on home prednisone 5 mg daily. Continued on home leflunomide, tramadol PRN.    # Prostate cancer on lupron  Metastasis noted on CT imaging. Continue tamsulosin and lupron.    # Hyperlipidemia   Continued home atorvastatin.    Physical Exam on day of Discharge:  Blood pressure 96/61, pulse 80, temperature 97.8  F (36.6  C), temperature source Oral, resp. rate 18, height 1.676 m (5' 6\"), weight 71.7 kg (158 lb 1.6 oz), SpO2 95 %.  General: Alert, NAD  HEENT: NC/AT  Neck: No JVD  Respiratory: " CTAB, no wheezes or crackles  Heart/CV: RRR, 3/6 systolic murmur heard best at upper left sternal border  Abdomen/GI: Soft, NT, ND, +BS  Extremities/MSK: Trace BLE edema with ACE wraps in place  Skin: Thin skin noted   Neuro: No focal deficits  Psych: Appropriate mood and affect         Pending Results:   None.         Discharge Medications:     Discharge Medication List as of 3/14/2017  2:45 PM      START taking these medications    Details   fluticasone-vilanterol (BREO ELLIPTA) 100-25 MCG/INH oral inhaler Inhale 1 puff into the lungs daily, Disp-60 Inhaler, R-0, Fax      umeclidinium (INCRUSE ELLIPTA) 62.5 MCG/INH oral inhaler Inhale 1 puff into the lungs daily, Disp-30 Inhaler, R-0, Fax      metoprolol (LOPRESSOR) 50 MG tablet Take 1 tablet (50 mg) by mouth 2 times daily, Disp-120 tablet, R-0, Fax      omeprazole (PRILOSEC) 20 MG CR capsule Take 1 capsule (20 mg) by mouth 2 times daily (before meals), Disp-60 capsule, R-0, Fax      cephalexin 500 MG tablet Take 500 mg by mouth 3 times daily for 5 days, Disp-15 tablet, R-0, Local Print         CONTINUE these medications which have NOT CHANGED    Details   predniSONE (DELTASONE) 5 MG tablet Take 1 tablet (5 mg) by mouth daily, Disp-100 tablet, R-4, E-Prescribe      traMADol (ULTRAM) 50 MG tablet TAKE 1 TABLET 3 TIMES DAILY AS NEEDED FOR MODERATE PAIN, Disp-90 tablet, R-0, Local Print      dofetilide (TIKOSYN) 125 MCG capsule Take 1 capsule (125 mcg) by mouth 2 times daily, Disp-60 capsule, R-5, E-Prescribe      furosemide (LASIX) 20 MG tablet Take 1 tablet (20 mg) by mouth daily Or as directed by cardiology, Disp-18 tablet, R-03, E-Prescribe      apixaban ANTICOAGULANT (ELIQUIS) 2.5 MG tablet Take 1 tablet (2.5 mg) by mouth 2 times daily, Disp-180 tablet, R-3, E-Prescribe      Leuprolide Acetate (LUPRON DEPOT IM) Inject into the muscle every 4 months, Historical      leflunomide (ARAVA) 10 MG tablet Take 1 tablet (10 mg) by mouth daily, Disp-30 tablet, R-11,  E-Prescribe      lisinopril (PRINIVIL,ZESTRIL) 10 MG tablet Take 1 tablet (10 mg) by mouth 2 times daily, Disp-62 tablet, R-11, E-Prescribe      spironolactone (ALDACTONE) 25 MG tablet Take 1 tablet (25 mg) by mouth daily, Disp-30 tablet, R-11, E-Prescribe      acetaminophen (TYLENOL) 650 MG CR tablet Take 650 mg by mouth 2 times daily, Historical      atorvastatin (LIPITOR) 40 MG tablet Take 1 tablet (40 mg) by mouth daily, Disp-90 tablet, R-3, Local Print      alendronate (FOSAMAX) 70 MG tablet Take 1 tablet (70 mg) by mouth every 7 days Take with over 8 ounces water and stay upright for at least 30 minutes after dose.  Take at least 60 minutes before breakfast, Disp-12 tablet, R-3, Fax      calcium carbonate (TUMS) 500 MG chewable tablet Take 1 chew tab by mouth every 4 hours as needed for heartburn, Historical      tamsulosin (FLOMAX) 0.4 MG 24 hr capsule Take 1 capsule (0.4 mg) by mouth 2 times daily, Disp-60 capsule, Historical      ascorbic acid (VITAMIN C) 500 MG CPCR Take 500 mg by mouth daily, Historical      VITAMIN D, CHOLECALCIFEROL, PO Take 400 Units by mouth daily, Historical      calcium carbonate (OS-SHELIA 500 MG [place]. [place]) 500 MG tablet Take 600 mg by mouth daily, Historical         STOP taking these medications       oseltamivir (TAMIFLU) 30 MG capsule Comments:   Reason for Stopping:         diltiazem (CARDIZEM) 125 mg in NaCl 125 mL infusion Comments:   Reason for Stopping:         cefdinir (OMNICEF) 300 MG capsule Comments:   Reason for Stopping:         metoprolol (TOPROL-XL) 25 MG 24 hr tablet Comments:   Reason for Stopping:                    Discharge Instructions and Follow-Up:     Discharge Procedure Orders  Medication Therapy Management Referral   Referral Type: Med Therapy Management     Home care nursing referral   Referral Type: Home Health Therapies & Aides     Physical Therapy Referral     Occupational Therapy Referral     Home Care PT Referral for Hospital Discharge   Referral Type:  Home Health Therapies & Aides     Home Care OT Referral for Hospital Discharge     MD face to face encounter   Order Comments: Documentation of Face to Face and Certification for Home Health Services    I certify that patient: Bud Merritt is under my care and that I, or a nurse practitioner or physician's assistant working with me, had a face-to-face encounter that meets the physician face-to-face encounter requirements with this patient on: 3/14/2017.    This encounter with the patient was in whole, or in part, for the following medical condition, which is the primary reason for home health care: generalized weakness, falls, paroxysmal Afib, HTN, COPD, HLD, RA on prednisone, hx of malignant prostate neoplasm.    I certify that, based on my findings, the following services are medically necessary home health services: Nursing, HHA, SW, Occupational Therapy and Physical Therapy.    My clinical findings support the need for the above services because: Nurse is needed: To assess after changes in medications or other medical regimen, to provide assessment and oversight required in the home to assure adherence to the medical plan, to teach and train about the disease and treatments for COPD, Occupational Therapy Services are needed to assess and treat cognitive ability and address ADL safety. Physical Therapy Services are needed to assess and treat the following functional impairments: weakness, deconditioning, strengthening, and endurance. HHA to assist with ADL's. SW for referral to community resources.     Further, I certify that my clinical findings support that this patient is homebound (i.e. absences from home require considerable and taxing effort and are for medical reasons or [employer] services or infrequently or of short duration when for other reasons) because: Requires assistance of another person or specialized equipment to access medical services because patient: Is unable to exit home safely on own.      Based on the above findings. I certify that this patient is confined to the home and needs intermittent skilled nursing care, physical therapy and/or speech therapy.  The patient is under my care, and I have initiated the establishment of the plan of care.  This patient will be followed by a physician who will periodically review the plan of care.  Physician/Provider to provide follow up care: Camron Aragon    Attending hospital physician (the Medicare certified [place] provider): Anil Forte MD  Physician Signature: See electronic signature associated with these discharge orders.  Date: 3/14/2017     Reason for your hospital stay   Order Comments: You were admitted with COPD exacerbation from influenza infection. You were given medications and this improved. You also underwent pacemaker placement for abnormal heart rates and rhythms in your heart. You should follow-up with cardiology in one month to discuss ablation where they also will review your pacemaker to check your heart rhythm. You also filled out an advanced directive to help your future healthcare providers in providing the best care that also match your wishes.     Activity   Order Comments: Your activity upon discharge: activity as tolerated   Order Specific Question Answer Comments   Is discharge order? Yes      When to contact your care team   Order Comments: Call your primary doctor if you have any of the following:  increased shortness of breath or fluttering in chest.     Wound care and dressings   Order Comments: Instructions to care for your wound at home: as directed.     Adult [facility] Follow-up and recommended labs and tests   Order Comments: Follow up with primary care provider, Camron Aragon, within 7 days for hospital follow- up.  No follow up labs or test are needed.  Follow up with Dr. Li in Cardiology EP Clinic to discuss possible AV node ablation in 1 month with PPM interrogation at appointment.   Follow up with   Darlene ([facility]) cardiologist to discuss TAVR in 2-3 months.    Appointments on [place] and/or [facility] (with [facility] or [facility] provider or service). Call [phone] if you haven't heard regarding these appointments within 7 days of discharge.     DNR/DNI     Diet   Order Comments: Follow this diet upon discharge: Orders Placed This Encounter     Low Saturated Fat Na <2400 mg   Order Specific Question Answer Comments   Is discharge order? Yes                Discharge Disposition:   Home in care of wife, patient declines recommended TCU despite many lengthy conversations including risk of falling and death.         Condition on Discharge:   Discharge condition: Stable   Code status on discharge: DNR / DNI        Date of service: 3/14/2017    The patient was discussed with Dr. Forte.    Blossom Vines MD  PGY-3 Internal Medicine  pager # [phone]

## 2017-03-14 NOTE — PROVIDER NOTIFICATION
MD updated on low b/p. Denies vertigo dizziness and is sitting up in chair. Does c/o of a lot of pain -Tylenol #3 given.

## 2017-03-15 ENCOUNTER — CARE COORDINATION (OUTPATIENT)
Dept: CARE COORDINATION | Facility: CLINIC | Age: 82
End: 2017-03-15

## 2017-03-15 ENCOUNTER — CARE COORDINATION (OUTPATIENT)
Dept: CARDIOLOGY | Facility: CLINIC | Age: 82
End: 2017-03-15

## 2017-03-15 LAB — INTERPRETATION ECG - MUSE: NORMAL

## 2017-03-15 NOTE — PROGRESS NOTES
"[facility]  \"Hello, my name is Marcella Isbell , and I am calling from the [facility].  I want to check in and see how you are doing, after leaving the hospital.  You may also receive a call from your Care Coordinator (care team), but I want to make sure you don t have any urgent needs.  I have a couple questions to review with you:     Post-Discharge Outreach                                                    Bud Merritt is a 89 year old male     Follow-up Appointments           Adult [facility] Follow-up and recommended labs and tests       Follow up with primary care provider, Camron Aragon, within 7 days for hospital follow- up. No follow up labs or test are needed.  Follow up with Dr. Li in Cardiology EP Clinic to discuss possible AV node ablation in 1 month with PPM interrogation at appointment.   Follow up with Dr. Colon ([facility]) cardiologist to discuss TAVR in 2-3 months.     Appointments on [place] and/or [facility] (with [facility] or [facility] provider or service). Call [phone] if you haven't heard regarding these appointments within 7 days of discharge.                       Your next 10 appointments already scheduled            Mar 28, 2017 8:40 AM CDT   Return Visit with Fide Medina PA-C   [facility] ([facility])     [address] [place] [id number] [phone]                  Apr 05, 2017 10:00 AM CDT   Nurse Only with DEVICE CHECK RN CARD   [facility] ([facility])     [address] [place] [id number] [phone]                  Apr 05, 2017 10:30 AM CDT   New Visit with Khoa Li MD   [facility] ([facility])              Care Team:    Patient Care Team       Relationship Specialty Notifications Start End    Camron Aragon MD PCP - General   9/12/02     Phone: [phone] Fax: " [phone]         [address] [address]    Elvira Hallman, RN Clinic Care Coordinator  Admissions 1/30/17     Phone: [phone] Fax: [phone]        Khoa Li MD MD Clinical Cardiac Electrophysiology  1/30/17     Phone: [phone] Fax: [phone]          [facility] [address] [facility] [address] [facility] [address]            Transition of Care Review                                                      Did you have a surgery or procedure during your hospital visit? No   If yes, do you have any of the following:     Signs of infection:  No    Pain:  No     Pain Scale (0-10) 0/10     Location: NA    Wound/incision concerns? NA    Do you have all of your medications/refills?  Yes    Are you having any side effects or questions about your medication(s)? No    Do you have any new or worsening symptoms?  Yes- is tired and was SOB after eating breakfast but is better now    Do you have any future appointments scheduled?   Yes       Next 5 appointments (look out 90 days)     Mar 28, 2017  8:40 AM CDT   Return Visit with Fide Medina PA-C   [facility] ([facility])    [address] [place] [id number] [phone]            Apr 05, 2017 10:00 AM CDT   Nurse Only with DEVICE CHECK RN CARD   [facility] ([facility])    [address] [place] [address] [phone]                      Plan                                                      Thanks for your time.  Your Care Coordinator may follow-up within the next couple days.  In the meantime if you have questions, concerns or problems call your care team.        Marcella Isbell

## 2017-03-16 ENCOUNTER — DOCUMENTATION ONLY (OUTPATIENT)
Dept: CARE COORDINATION | Facility: CLINIC | Age: 82
End: 2017-03-16

## 2017-03-16 ENCOUNTER — ALLIED HEALTH/NURSE VISIT (OUTPATIENT)
Dept: PHARMACY | Facility: CLINIC | Age: 82
End: 2017-03-16
Payer: COMMERCIAL

## 2017-03-16 DIAGNOSIS — I48.91 ATRIAL FIBRILLATION WITH RAPID VENTRICULAR RESPONSE (H): ICD-10-CM

## 2017-03-16 DIAGNOSIS — E78.5 HYPERLIPIDEMIA LDL GOAL <130: ICD-10-CM

## 2017-03-16 DIAGNOSIS — K21.9 GASTROESOPHAGEAL REFLUX DISEASE WITHOUT ESOPHAGITIS: ICD-10-CM

## 2017-03-16 DIAGNOSIS — J44.1 COPD EXACERBATION (H): ICD-10-CM

## 2017-03-16 DIAGNOSIS — M81.0 OSTEOPOROSIS: ICD-10-CM

## 2017-03-16 DIAGNOSIS — N40.0 HYPERTROPHY OF PROSTATE WITHOUT URINARY OBSTRUCTION: ICD-10-CM

## 2017-03-16 DIAGNOSIS — I50.20 SYSTOLIC HEART FAILURE, UNSPECIFIED HEART FAILURE CHRONICITY: Primary | ICD-10-CM

## 2017-03-16 DIAGNOSIS — R97.20 ELEVATED PROSTATE SPECIFIC ANTIGEN (PSA): ICD-10-CM

## 2017-03-16 DIAGNOSIS — M06.9 RHEUMATOID ARTHRITIS INVOLVING MULTIPLE SITES, UNSPECIFIED RHEUMATOID FACTOR PRESENCE: ICD-10-CM

## 2017-03-16 DIAGNOSIS — I10 ESSENTIAL HYPERTENSION WITH GOAL BLOOD PRESSURE LESS THAN 140/90: ICD-10-CM

## 2017-03-16 DIAGNOSIS — I50.23 ACUTE ON CHRONIC SYSTOLIC CONGESTIVE HEART FAILURE (H): ICD-10-CM

## 2017-03-16 PROCEDURE — 99607 MTMS BY PHARM ADDL 15 MIN: CPT | Performed by: PHARMACIST

## 2017-03-16 PROCEDURE — 99605 MTMS BY PHARM NP 15 MIN: CPT | Performed by: PHARMACIST

## 2017-03-16 NOTE — PROGRESS NOTES
SUBJECTIVE/OBJECTIVE:                                                    Bud Merritt is a 89 year old male called for a transitions of care visit.  He was discharged from [facility] on 3/14/17 for paroxysmal AFib. Visit today conducted with patient's withCharmaine, with consent from patient (he cannot hear well over the phone).    Chief Complaint: Hospital Follow-Up.    Allergies/ADRs: Reviewed in Epic  Tobacco: History of tobacco dependence - quit 25 years ago   Alcohol: not currently using  Caffeine: 2-5 cups/day of coffee (half water)  Activity: Pt is doing okay since being home.   PMH: Reviewed in Epic    Medication Adherence: no issues reported    HFrEF/HTN/AFib: Current medications include furosemide 20 mg daily, spironolactone 25 mg daily, lisinopril 10 mg twice daily, metoprolol tartrate 50 mg twice daily (wife had to confirm what had changed with this), dofetilide 125 mcg twice daily, and Eliquis 2.5 mg twice daily.  Had pacemaker placed at recent visit - denies any issues with pain has not needed to use tramadol often. Patient reports no current medication side effects.     ECHO:  Date 3/4/17, EF 20-25%  Pt is not complaining of sx of HF.    Pt is measuring daily weightsand reports stable.   Patient does not self-monitor BP.   Pt is following a low sodium diet, is avoiding EtOH.    COPD: Current medications: Pt was given Breo Ellipta daily and Incruse Ellipta daily, but has not been taking. Pt complains about his mouth getting too dry and sore when he takes.  Pt rinses their mouth after using steroid inhaler.    Pt is experiencing the following side effects: hoarseness and dry mouth.    Pt reports the following symptoms: none.    Hyperlipidemia: Current therapy includes atorvastatin 40 mg once daily.  Pt reports no significant myalgias or other side effects.     RA: Pt is taking leflunomide 10 mg daily, prednisone 5 mg daily, tramadol 50 mg TID PRN and APAP 650 mg twice daily.  Wife states issues are  stable, but ongoing. Follows with Dr. Shaver in rheumatology. Denies any issues.     BPH/Hx of Prostate Cancer: Pt is receiving a leuprolide injection every 4 months and is taking tamsulosin 0.4 mg twice daily. Denies any issues at this time.    Osteoporosis: Current therapy includes: calcium carbonate 500 mg daily, Vitamin D supplements: 400 IU and alendronate (Fosamax) 70mg weekly (Pt has been on current therapy for 4 years). Pt is experiencing side effects.  Pt is getting the following sources of dietary calcium: milk  Last vitamin D level: n/a  DEXA History: -2.9 at left hip 12/13/12  Risk factors: chronic corticosteroid use and chronic PPI use    GERD: Current medications include: Prilosec (omeprazole) 20 mg twice daily and TUMS PRN (rare use). Pt c/o no current symptoms - has had some trouble decreasing dose in past.  Patient feels that current regimen is effective.    Current labs include:  BP Readings from Last 3 Encounters:   03/14/17 96/61   03/04/17 126/89   02/27/17 130/72     Today's Vitals: There were no vitals taken for this visit. - no vitals telephone encounter  No results found for: A1C.  Lab Results   Component Value Date    CHOL 139 03/06/2017     Lab Results   Component Value Date    TRIG 156 03/06/2017     Lab Results   Component Value Date    HDL 34 03/06/2017     Lab Results   Component Value Date    LDL 74 03/06/2017     Lab Results   Component Value Date    ALT 32 03/08/2017     No results found for: UCRR, MICROL, UMALCR    Last Basic Metabolic Panel:  Lab Results   Component Value Date     03/13/2017      Lab Results   Component Value Date    POTASSIUM 3.8 03/13/2017     Lab Results   Component Value Date    CHLORIDE 107 03/13/2017     Lab Results   Component Value Date    BUN 17 03/13/2017     Lab Results   Component Value Date    CR 0.72 03/13/2017     GFR Estimate   Date Value Ref Range Status   03/13/2017 >90  Non  GFR Calc   >60 mL/min/1.7m2 Final   03/12/2017 86  >60 mL/min/1.7m2 Final     Comment:     Non  GFR Calc   03/11/2017 >90  Non  GFR Calc   >60 mL/min/1.7m2 Final     TSH   Date Value Ref Range Status   01/23/2017 1.07 0.40 - 4.00 mU/L Final     Most Recent Immunizations   Administered Date(s) Administered     Influenza (High Dose) 3 valent vaccine 03/14/2017     Influenza Vaccine IM 3yrs+ 4 Valent IIV4 11/03/2014     Pneumococcal 23 valent 03/14/2017     TD (ADULT, 7+) 05/16/2012     Tetanus 04/17/1989     ASSESSMENT:                                                       Current medications were reviewed today.      Medication Adherence: no issues identified    HFrEF/HTN/AFib: Improved. Pt is not symptomatic and BP has been well controlled - likely would benefit from recheck at PCP visit.     COPD: Needs Improvement. Given dry mouth patient would likely benefit from at least starting ICS/LABA combo product.     Hyperlipidemia: Stable. Pt is on high intensity statin which is not necessarily indicated based on 2013 ACC/AHA guidelines for lipid management, but patient is tolerating and prefers to continue. Likely reasonable.       RA: Stable.     BPH/Hx of Prostate Cancer: Stable.     Osteoporosis: Stable.    GERD: Stable. Given prednisone and Eliquis likely gaining some gastric protection.      PLAN:                                                      Post Discharge Medication Reconciliation Status: discharge medications reconciled, continue medications without change.    1. Pt to start taking Breo Ellipta daily.     I spent 40 minutes with this patient today. A copy of the visit note was provided to the patient's primary care provider.    Will follow up in 1 month or sooner if needed.    The patient was mailed a summary of these recommendations as an after visit summary.    Betito Da Silva PharmD  Medication Therapy Management Provider  Pager (voicemail): [phone]

## 2017-03-16 NOTE — MR AVS SNAPSHOT
After Visit Summary   3/16/2017    Bud Merritt    MRN: [id number]           Patient Information     Date Of Birth          5/9/1927        Visit Information        Provider Department      3/16/2017 9:00 AM Baljit Da Silva, [facility] Instructions    Recommendations from today's MTM visit:                                                    MTM (medication therapy management) is a service provided by a clinical pharmacist designed to help you get the most of out of your medicines.   Today we reviewed what your medicines are for, how to know if they are working, that your medicines are safe and how to make your medicine regimen as easy as possible.     1. You should start using one of your new inhalers to help with your breathing.  Breo Ellipta may be less likely to cause dry mouth, but you should always rinse your mouth after using.  If that dry mouth continues we can look at your other medications for causes.     Next MTM visit: I will likely call to follow-up after you see Dr. Aragon and Cardiology.    To schedule another MTM appointment, please call the clinic directly or you may call the MTM scheduling line at [phone] or toll-free at [phone].     My Clinical Pharmacist's contact information:                                                      It was a pleasure talking to your wife Charmaine today (3/16/16)!  Please feel free to contact me with any questions or concerns you have.      Betito Da Silva, Xavi  Medication Therapy Management Provider  Pager (voicemail): [phone]    You may receive a survey about the MTM services you received.  I would appreciate your feedback to help me serve you better in the future. Please fill it out and return it when you can. Your comments will be anonymous.                Follow-ups after your visit        Your next 10 appointments already scheduled     Mar 22, 2017  9:40 AM CDT   Office Visit with Camron JOHNSON  MD Mahesh   [facility]    [address] [place] [id number]   [phone]           Bring a current list of meds and any records pertaining to this visit.  For Physicals, please bring immunization records and any forms needing to be filled out.  Please arrive 10 minutes early to complete paperwork.            Mar 28, 2017  8:40 AM CDT   Return Visit with Fide Medina PA-C   [facility] ([facility])    [address] [place] [id number]   [phone]            Apr 05, 2017 10:00 AM CDT   Nurse Only with DEVICE CHECK RN CARD   [facility] ([facility])    [address] [place] [id number] [phone]            Apr 05, 2017 10:30 AM CDT   New Visit with Khoa Li MD   [facility])    [address] [place] [id number] [phone]              Who to contact     If you have questions or need follow up information about today's clinic visit or your schedule please contact [facility] directly at [phone].  Normal or non-critical lab and imaging results will be communicated to you by MyChart, letter or phone within 4 business days after the clinic has received the results. If you do not hear from us within 7 days, please contact the clinic through MyChart or phone. If you have a critical or abnormal lab result, we will notify you by phone as soon as possible.  Submit refill requests through [employer] or call your pharmacy and they will forward the refill request to us. Please allow 3 business days for your refill to be completed.          Additional Information About Your Visit        [employer] Information     [employer] lets you send messages to your doctor, view your test results, renew your prescriptions, schedule appointments and more. To sign up, go to [email or web address] .  "Click on \"Log in\" on the left side of the screen, which will take you to the Welcome page. Then click on \"Sign up Now\" on the right side of the page.     You will be asked to enter the access code listed below, as well as some personal information. Please follow the directions to create your username and password.     Your access code is: [email or web address]  Expires: 3/20/2017 11:06 AM     Your access code will  in 90 days. If you need help or a new code, please call your [facility] or [phone].        Care EveryWhere ID     This is your Care EveryWhere ID. This could be used by other organizations to access your [place] medical records  [id number]         Blood Pressure from Last 3 Encounters:   17 96/61   17 126/89   17 130/72    Weight from Last 3 Encounters:   17 158 lb 1.6 oz (71.7 kg)   17 152 lb 1.9 oz (69 kg)   17 156 lb 1.6 oz (70.8 kg)              Today, you had the following     No orders found for display       Primary Care Provider Office Phone # Fax #    Camron Aragon [phone]       [facility] [address] [place] [phone]        Thank you!     Thank you for choosing [facility]  for your care. Our goal is always to provide you with excellent care. Hearing back from our patients is one way we can continue to improve our services. Please take a few minutes to complete the written survey that you may receive in the mail after your visit with us. Thank you!             Your Updated Medication List - Protect others around you: Learn how to safely use, store and throw away your medicines at www.disposemymeds.org.          This list is accurate as of: 3/16/17 11:59 PM.  Always use your most recent med list.                   Brand Name Dispense Instructions for use    acetaminophen 650 MG CR tablet    TYLENOL     Take 650 mg by mouth 2 times daily       alendronate 70 MG tablet    FOSAMAX    " 12 tablet    Take 1 tablet (70 mg) by mouth every 7 days Take with over 8 ounces water and stay upright for at least 30 minutes after dose.  Take at least 60 minutes before breakfast       apixaban ANTICOAGULANT 2.5 MG tablet    ELIQUIS    180 tablet    Take 1 tablet (2.5 mg) by mouth 2 times daily       ascorbic acid 500 MG Cpcr CR capsule    vitamin C     Take 500 mg by mouth daily       atorvastatin 40 MG tablet    LIPITOR    90 tablet    Take 1 tablet (40 mg) by mouth daily       calcium carbonate 500 MG chewable tablet    TUMS     Take 1 chew tab by mouth every 4 hours as needed for heartburn       calcium carbonate 500 MG tablet    OS-SHELIA 500 mg [place]. Ca     Take 600 mg by mouth daily       dofetilide 125 MCG capsule    TIKOSYN    60 capsule    Take 1 capsule (125 mcg) by mouth 2 times daily       FLOMAX 0.4 MG capsule   Generic drug:  tamsulosin     60 capsule    Take 1 capsule (0.4 mg) by mouth 2 times daily       fluticasone-vilanterol 100-25 MCG/INH oral inhaler    BREO ELLIPTA    60 Inhaler    Inhale 1 puff into the lungs daily       furosemide 20 MG tablet    LASIX    18 tablet    Take 1 tablet (20 mg) by mouth daily Or as directed by cardiology       leflunomide 10 MG tablet    ARAVA    30 tablet    Take 1 tablet (10 mg) by mouth daily       lisinopril 10 MG tablet    PRINIVIL/ZESTRIL    62 tablet    Take 1 tablet (10 mg) by mouth 2 times daily       LUPRON DEPOT IM      Inject into the muscle every 4 months       metoprolol 50 MG tablet    LOPRESSOR    120 tablet    Take 1 tablet (50 mg) by mouth 2 times daily       omeprazole 20 MG CR capsule    priLOSEC    60 capsule    Take 1 capsule (20 mg) by mouth 2 times daily (before meals)       predniSONE 5 MG tablet    DELTASONE    100 tablet    Take 1 tablet (5 mg) by mouth daily       spironolactone 25 MG tablet    ALDACTONE    30 tablet    Take 1 tablet (25 mg) by mouth daily       traMADol 50 MG tablet    ULTRAM    90 tablet    TAKE 1 TABLET 3 TIMES  DAILY AS NEEDED FOR MODERATE PAIN       umeclidinium 62.5 MCG/INH oral inhaler    INCRUSE ELLIPTA    30 Inhaler    Inhale 1 puff into the lungs daily       VITAMIN D (CHOLECALCIFEROL) PO      Take 400 Units by mouth daily

## 2017-03-16 NOTE — TELEPHONE ENCOUNTER
furosemide (LASIX) 20 MG tablet      Last Written Prescription Date: 1/4/17  Last Fill Quantity: 18, # refills: 3  Last Office Visit with [facility], [facility] or [facility] prescribing provider: 2/2/17  Next 5 appointments (look out 90 days)     Mar 22, 2017  9:40 AM CDT   Office Visit with Camron Aragon MD   [facility]    [address] [place] [phone]   [phone]            Mar 28, 2017  8:40 AM CDT   Return Visit with Fide Medina PA-C   [facility] ([facility])    [address] [place] [phone]   [phone]            Apr 05, 2017 10:00 AM CDT   Nurse Only with DEVICE CHECK RN CARD   [facility] ([facility])    [address] [place] [phone]   [phone]                   Potassium   Date Value Ref Range Status   03/13/2017 3.8 3.4 - 5.3 mmol/L Final     Creatinine   Date Value Ref Range Status   03/13/2017 0.72 0.66 - 1.25 mg/dL Final     BP Readings from Last 3 Encounters:   03/14/17 96/61   03/04/17 126/89   02/27/17 130/72

## 2017-03-16 NOTE — PROGRESS NOTES
[facility] now requests orders and shares plan of care/discharge summaries for some patients through [employer].  Please REPLY TO THIS MESSAGE in order to give authorization for orders when needed.  This is considered a verbal order, you will still receive a faxed copy of orders for signature.  Thank you for your assistance in improving collaboration for our patients.    ORDER Requesting skilled nursing services of 2w1, 3w2, 2w1 for cardiac assessment, post pace maker placement, wound cares to lower extremities, edema management

## 2017-03-16 NOTE — PROGRESS NOTES
Clinic Care Coordination Contact  [facility]/Voicemail    Referral Source: [facility]  Clinical Data: Care Coordinator Outreach  Outreach attempted x 1.  Left message on voicemail with call back information and requested return call.  Plan: . Care Coordinator will try to reach patient again in 1-2 business days.    Yumiko Hallman RN,BSN  Clinical Care Coordinator    [facility] [phone]  [facility] [phone]

## 2017-03-16 NOTE — PROGRESS NOTES
Clinic Care Coordination Contact  OUTREACH    Referral Information:  Referral Source: CTS  Reason for Contact: hospital follow up        Universal Utilization:   ED Visits in last year: 1  Hospital visits in last year: 3  Last PCP appointment: 12/22/16  Missed Appointments: 1     Clinical Concerns:  Current Medical Concerns: Called and spoke with wife, states Bud is doing fine. He is up walking around and home care was out to see him today.  States he does not want to do PT because he thinks it will aggravate his hip more.  Otherwise they have a nurse coming out to care for his wounds.  Pt does have a follow up appt scheduled for 3/22 and wife states she will be driving him there.        Medication Management:  Home care RN set up     Functional Status:  Mobility Status: Independent  Equipment Currently Used at Home: cane, straight, walker, rolling  Transportation: wife     Psychosocial:  Current living arrangement:: I live in a private home with spouse  Financial/Insurance: no current concerns     Resources and Interventions:  Current Resources: [facility]     Advanced Care Plans/Directives on file:: No    Plan:   Pt will continue to be followed by  Home care RN  RN will outreach to home care to notify RN CC when pt is discharged.     Yumiko Hallman RN,BSN  Clinical Care Coordinator    [facility] [phone]  [facility] [phone]

## 2017-03-16 NOTE — PROGRESS NOTES
[facility] now requests orders and shares plan of care/discharge summaries for some patients through [employer].  Please REPLY TO THIS MESSAGE in order to give authorization for orders when needed.  This is considered a verbal order, you will still receive a faxed copy of orders for signature.  Thank you for your assistance in improving collaboration for our patients.    Wound care orders requested for BLE  Clease with wound cleanser or saline, apply adaptic or similar, cover with ABD, wrap in kerlix and ace/compression wrap. Change 3x/week and as needed    Ok to remove dressing from surgical incision from pace maker placement

## 2017-03-17 NOTE — PATIENT INSTRUCTIONS
Recommendations from today's MTM visit:                                                    MTM (medication therapy management) is a service provided by a clinical pharmacist designed to help you get the most of out of your medicines.   Today we reviewed what your medicines are for, how to know if they are working, that your medicines are safe and how to make your medicine regimen as easy as possible.     1. You should start using one of your new inhalers to help with your breathing.  Breo Ellipta may be less likely to cause dry mouth, but you should always rinse your mouth after using.  If that dry mouth continues we can look at your other medications for causes.     Next MTM visit: I will likely call to follow-up after you see Dr. Aragon and Cardiology.    To schedule another MTM appointment, please call the clinic directly or you may call the MTM scheduling line at [phone] or toll-free at [phone].     My Clinical Pharmacist's contact information:                                                      It was a pleasure talking to your wife Charmaine today (3/16/16)!  Please feel free to contact me with any questions or concerns you have.      Betito Da Silva, Xavi  Medication Therapy Management Provider  Pager (voicemail): [phone]    You may receive a survey about the MTM services you received.  I would appreciate your feedback to help me serve you better in the future. Please fill it out and return it when you can. Your comments will be anonymous.

## 2017-03-18 ENCOUNTER — DOCUMENTATION ONLY (OUTPATIENT)
Dept: CARE COORDINATION | Facility: CLINIC | Age: 82
End: 2017-03-18

## 2017-03-18 NOTE — PROGRESS NOTES
[facility] now requests orders and shares plan of care/discharge summaries for some patients through [facility].  Please REPLY TO THIS MESSAGE in order to give authorization for orders when needed.  This is considered a verbal order, you will still receive a faxed copy of orders for signature.  Thank you for your assistance in improving collaboration for our patients.    ORDER    PT/OT to eval and treat    Of note client has developed edema to his left arm and this has been weeping in addition to the weeping in the BLE/pedal region.  Stasis ulcer is present to the right superior portion of the foot.  Home care is requesting OT visit for home safety assessment, but we do not have an OT lymphedema therapist that cover this territory of home care.  Client is also verbalizing that he does not want to go outpatient for this.  Please address at OV on Wednesday 3/22.  Home care will not be making a visit this day as client is being seen in his primary clinic.      Dinorah Pfeiffer RN    [phone]  [email or web address]

## 2017-03-20 RX ORDER — FUROSEMIDE 20 MG
TABLET ORAL
Qty: 30 TABLET | Refills: 2 | Status: ON HOLD | OUTPATIENT
Start: 2017-03-20 | End: 2017-04-04

## 2017-03-21 ENCOUNTER — DOCUMENTATION ONLY (OUTPATIENT)
Dept: CARE COORDINATION | Facility: CLINIC | Age: 82
End: 2017-03-21

## 2017-03-21 NOTE — PROGRESS NOTES
[facility] now requests orders and shares plan of care/discharge summaries for some patients through [employer].  Please REPLY TO THIS MESSAGE in order to give authorization for orders when needed.  This is considered a verbal order, you will still receive a faxed copy of orders for signature.  Thank you for your assistance in improving collaboration for our patients.    ORDER  Pt seen for home PT eval 3/21/17, requesting ok for continued PT 2w2 beginning wk of 3/26/17 for gait/transfer training, therapeutic exercise/hep instruction.   Rodrigue Curiel PT  [phone]

## 2017-03-22 ENCOUNTER — OFFICE VISIT (OUTPATIENT)
Dept: FAMILY MEDICINE | Facility: CLINIC | Age: 82
End: 2017-03-22
Payer: COMMERCIAL

## 2017-03-22 VITALS
DIASTOLIC BLOOD PRESSURE: 62 MMHG | RESPIRATION RATE: 24 BRPM | BODY MASS INDEX: 26.95 KG/M2 | WEIGHT: 167 LBS | TEMPERATURE: 97.1 F | SYSTOLIC BLOOD PRESSURE: 114 MMHG | HEART RATE: 60 BPM

## 2017-03-22 DIAGNOSIS — C61 MALIGNANT NEOPLASM OF PROSTATE (H): ICD-10-CM

## 2017-03-22 DIAGNOSIS — S81.801A OPEN WOUND OF LOWER LIMB, RIGHT, INITIAL ENCOUNTER: ICD-10-CM

## 2017-03-22 DIAGNOSIS — J96.01 ACUTE RESPIRATORY FAILURE WITH HYPOXIA (H): ICD-10-CM

## 2017-03-22 DIAGNOSIS — J44.1 COPD EXACERBATION (H): Primary | ICD-10-CM

## 2017-03-22 DIAGNOSIS — I89.0 LYMPHEDEMA: ICD-10-CM

## 2017-03-22 LAB
ALBUMIN SERPL-MCNC: 2.6 G/DL (ref 3.4–5)
ALP SERPL-CCNC: 91 U/L (ref 40–150)
ALT SERPL W P-5'-P-CCNC: 20 U/L (ref 0–70)
ANION GAP SERPL CALCULATED.3IONS-SCNC: 9 MMOL/L (ref 3–14)
AST SERPL W P-5'-P-CCNC: 26 U/L (ref 0–45)
BILIRUB SERPL-MCNC: 0.3 MG/DL (ref 0.2–1.3)
BUN SERPL-MCNC: 20 MG/DL (ref 7–30)
CALCIUM SERPL-MCNC: 8.2 MG/DL (ref 8.5–10.1)
CHLORIDE SERPL-SCNC: 105 MMOL/L (ref 94–109)
CO2 SERPL-SCNC: 27 MMOL/L (ref 20–32)
CREAT SERPL-MCNC: 0.72 MG/DL (ref 0.66–1.25)
ERYTHROCYTE [DISTWIDTH] IN BLOOD BY AUTOMATED COUNT: 15.2 % (ref 10–15)
GFR SERPL CREATININE-BSD FRML MDRD: ABNORMAL ML/MIN/1.7M2
GLUCOSE SERPL-MCNC: 102 MG/DL (ref 70–99)
HCT VFR BLD AUTO: 29.7 % (ref 40–53)
HGB BLD-MCNC: 9.3 G/DL (ref 13.3–17.7)
MCH RBC QN AUTO: 31.2 PG (ref 26.5–33)
MCHC RBC AUTO-ENTMCNC: 31.3 G/DL (ref 31.5–36.5)
MCV RBC AUTO: 100 FL (ref 78–100)
PLATELET # BLD AUTO: 218 10E9/L (ref 150–450)
POTASSIUM SERPL-SCNC: 3.9 MMOL/L (ref 3.4–5.3)
PROT SERPL-MCNC: 5.8 G/DL (ref 6.8–8.8)
RBC # BLD AUTO: 2.98 10E12/L (ref 4.4–5.9)
SODIUM SERPL-SCNC: 141 MMOL/L (ref 133–144)
WBC # BLD AUTO: 6.4 10E9/L (ref 4–11)

## 2017-03-22 PROCEDURE — 85027 COMPLETE CBC AUTOMATED: CPT | Performed by: FAMILY MEDICINE

## 2017-03-22 PROCEDURE — 99496 TRANSJ CARE MGMT HIGH F2F 7D: CPT | Performed by: FAMILY MEDICINE

## 2017-03-22 PROCEDURE — 36415 COLL VENOUS BLD VENIPUNCTURE: CPT | Performed by: FAMILY MEDICINE

## 2017-03-22 PROCEDURE — 80053 COMPREHEN METABOLIC PANEL: CPT | Performed by: FAMILY MEDICINE

## 2017-03-22 RX ORDER — CEPHALEXIN 500 MG/1
500 CAPSULE ORAL 3 TIMES DAILY
Qty: 30 CAPSULE | Refills: 0 | Status: ON HOLD | OUTPATIENT
Start: 2017-03-22 | End: 2017-04-04

## 2017-03-22 ASSESSMENT — PAIN SCALES - GENERAL: PAINLEVEL: MODERATE PAIN (5)

## 2017-03-22 NOTE — PROGRESS NOTES
Labs show his serum albumin and protein are low. This can be contributing to his weeping in his legs. His diet should include protein supplementation perhaps with some dietary supplemental drinks.

## 2017-03-22 NOTE — PROGRESS NOTES
SUBJECTIVE:                                                      Patient presents for Hospital Followup Visit:    Hospital:  [facility]      Date of Admission: 3/4/17  Date of Discharge: 3/14/17  Reason(s) for Admission:   1. Paroxysmal Atrial Fibrillation with RVR, improved  2. 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 Right Superior Foot Blister  4. Diffuse Abdominal Pain of unclear etiology, resolved  5. Headache, improved  6. Encounter for Goals of Care  7. Acute Hypoxemic Respiratory Failure with exacerbation of moderate COPD 2/2 influenza A infection  8. Acute Cystitis, resolved  9. Chronic Systolic Heart Failure, Non-Ischemic, EF 40-45%  10. Physical Deconditioning with history of falls  11. Mitral Regurgitation  12. Aortic Stenosis, Severe  13. Hypertension  14. Rheumatoid Arthritis  15. Metastatic Prostate Cancer  16. Hyperlipidemia            Problems taking medications regularly:  None       Medication changes since discharge: None       Problems adhering to non-medication therapy:  None    Summary of hospitalization:  [facility] discharge summary reviewed  Diagnostic Tests/Treatments reviewed.  Follow up needed: none  Other Healthcare Providers Involved in Patient s Care:         Homecare, Hospice, Care Coordination, Specialist appointment - Cardiology and MTM  Update since discharge: stable.     ROS:  Constitutional, neuro, ENT, endocrine, pulmonary, cardiac, gastrointestinal, genitourinary, musculoskeletal, integument and psychiatric systems are negative, except as otherwise noted.    OBJECTIVE:                                                      GENERAL APPEARANCE: alert, mild distress, pale and fatigued  EYES: Eyes grossly normal to inspection, PERRL and conjunctivae and sclerae normal  HENT: ear canals and TM's normal and nose and mouth without ulcers or lesions  NECK: no adenopathy, no asymmetry, masses, or scars and thyroid normal to  palpation  RESP: Decreased air movement some wheezing.  CV: 2/6 murmur. Regular rhythm. Dusky cyanotic extremities on the fingers and hands and toes.  LYMPHATICS: normal ant/post cervical and supraclavicular nodes  Swollen lymphedema of both legs and feet.  MS: Wheelchair bound swollen legs and feet  Neuro: He is alert and oriented.  SKIN: Skin breakdown over dorsum of right foot some blistering on the left    ASSESSMENT/PLAN:                                                      #1 COPD #2 acute respiratory failure with hypoxia #3 marked lower extremity lymphedema #4 open wound of the right lower limb. #5 ICD #6 metastatic prostate cancer    Extremely complex and fragile patient. He needs close watch by a home care and hospice. I feel he may need more care than this and may need nursing home consideration. He has massive lymphedema in the legs and feet. Skin breakdown. His respiratory condition is tentative. He has pain from probably multiple metastases. Multiple issues were discussed today.  He continued to do leg wrapping at home. Watch his open sore. We did some labs today. Continue him on the same medications and will follow up in a week or 2. Over 45 minutes was spent on this encounter over half which was face-to-face with the patient and his wife discussing different therapeutic options and coordination of care    Post Discharge Medication Reconciliation: discharge medications reconciled, continue medications without change.  Issues to address: Issues identified were:   lab results, condition status changes and home care follow up.  Plan of care communicated with patient, family and other healthcare provider      Type of Medical Decision Making Face-to-Face Visit within 7 Days Face-to-Face Visit within 14 days   Moderate Complexity [id number]   High Complexity [id number]

## 2017-03-22 NOTE — NURSING NOTE
"Chief Complaint   Patient presents with     Hospital F/U       Initial /62  Pulse 60  Temp 97.1  F (36.2  C) (Tympanic)  Resp 24  Wt 167 lb (75.8 kg)  BMI 26.95 kg/m2 Estimated body mass index is 26.95 kg/(m^2) as calculated from the following:    Height as of 3/4/17: 5' 6\" (1.676 m).    Weight as of this encounter: 167 lb (75.8 kg).  Medication Reconciliation: complete   Joelle SCRUGGS MA        "

## 2017-03-23 ASSESSMENT — PATIENT HEALTH QUESTIONNAIRE - PHQ9: SUM OF ALL RESPONSES TO PHQ QUESTIONS 1-9: 15

## 2017-03-24 ENCOUNTER — DOCUMENTATION ONLY (OUTPATIENT)
Dept: CARE COORDINATION | Facility: CLINIC | Age: 82
End: 2017-03-24

## 2017-03-24 DIAGNOSIS — M06.9 RHEUMATOID ARTHRITIS INVOLVING MULTIPLE SITES, UNSPECIFIED RHEUMATOID FACTOR PRESENCE: ICD-10-CM

## 2017-03-24 RX ORDER — TRAMADOL HYDROCHLORIDE 50 MG/1
TABLET ORAL
Qty: 90 TABLET | Refills: 0 | Status: SHIPPED | OUTPATIENT
Start: 2017-03-24 | End: 2017-04-26

## 2017-03-24 NOTE — PROGRESS NOTES
[facility] now requests orders and shares plan of care/discharge summaries for some patients through [employer].  Please REPLY TO THIS MESSAGE in order to give authorization for orders when needed.  This is considered a verbal order, you will still receive a faxed copy of orders for signature.  Thank you for your assistance in improving collaboration for our patients.    ORDER When seeing patient this afternoon, he has increased redness and edema is at +4 pitting in RLE.  It appears that the patient's status has declined.  He has copious amounts of drainage and periwound skin is macerated.  Would a wound culture be appropriate? Patient will have an additional PRN visit on Sunday due to the amount of drainage.  Any recommendations to decrease the amount of drainage?  Would a diuretic be appropriate?

## 2017-03-26 ENCOUNTER — APPOINTMENT (OUTPATIENT)
Dept: GENERAL RADIOLOGY | Facility: CLINIC | Age: 82
DRG: 871 | End: 2017-03-26
Attending: FAMILY MEDICINE
Payer: MEDICARE

## 2017-03-26 ENCOUNTER — HOSPITAL ENCOUNTER (EMERGENCY)
Facility: CLINIC | Age: 82
Discharge: HOME | DRG: 871 | End: 2017-03-26
Attending: FAMILY MEDICINE | Admitting: FAMILY MEDICINE
Payer: MEDICARE

## 2017-03-26 VITALS
HEART RATE: 63 BPM | DIASTOLIC BLOOD PRESSURE: 95 MMHG | SYSTOLIC BLOOD PRESSURE: 136 MMHG | RESPIRATION RATE: 13 BRPM | BODY MASS INDEX: 26.15 KG/M2 | OXYGEN SATURATION: 98 % | TEMPERATURE: 98.5 F | WEIGHT: 162 LBS

## 2017-03-26 DIAGNOSIS — R60.0 BILATERAL LEG EDEMA: ICD-10-CM

## 2017-03-26 LAB
ALBUMIN SERPL-MCNC: 2.6 G/DL (ref 3.4–5)
ALP SERPL-CCNC: 90 U/L (ref 40–150)
ALT SERPL W P-5'-P-CCNC: 22 U/L (ref 0–70)
ANION GAP SERPL CALCULATED.3IONS-SCNC: 8 MMOL/L (ref 3–14)
AST SERPL W P-5'-P-CCNC: 29 U/L (ref 0–45)
BASOPHILS # BLD AUTO: 0.1 10E9/L (ref 0–0.2)
BASOPHILS NFR BLD AUTO: 1.1 %
BILIRUB SERPL-MCNC: 0.3 MG/DL (ref 0.2–1.3)
BUN SERPL-MCNC: 27 MG/DL (ref 7–30)
CALCIUM SERPL-MCNC: 8.2 MG/DL (ref 8.5–10.1)
CHLORIDE SERPL-SCNC: 107 MMOL/L (ref 94–109)
CO2 SERPL-SCNC: 25 MMOL/L (ref 20–32)
CREAT SERPL-MCNC: 0.78 MG/DL (ref 0.66–1.25)
CRP SERPL-MCNC: 3.9 MG/L (ref 0–8)
DIFFERENTIAL METHOD BLD: ABNORMAL
EOSINOPHIL # BLD AUTO: 0.1 10E9/L (ref 0–0.7)
EOSINOPHIL NFR BLD AUTO: 1.1 %
ERYTHROCYTE [DISTWIDTH] IN BLOOD BY AUTOMATED COUNT: 15.6 % (ref 10–15)
GFR SERPL CREATININE-BSD FRML MDRD: ABNORMAL ML/MIN/1.7M2
GLUCOSE SERPL-MCNC: 92 MG/DL (ref 70–99)
HCT VFR BLD AUTO: 28.6 % (ref 40–53)
HGB BLD-MCNC: 9.1 G/DL (ref 13.3–17.7)
IMM GRANULOCYTES # BLD: 0.1 10E9/L (ref 0–0.4)
IMM GRANULOCYTES NFR BLD: 1.1 %
LACTATE BLD-SCNC: 1.4 MMOL/L (ref 0.7–2.1)
LYMPHOCYTES # BLD AUTO: 0.9 10E9/L (ref 0.8–5.3)
LYMPHOCYTES NFR BLD AUTO: 16.6 %
MCH RBC QN AUTO: 31.9 PG (ref 26.5–33)
MCHC RBC AUTO-ENTMCNC: 31.8 G/DL (ref 31.5–36.5)
MCV RBC AUTO: 100 FL (ref 78–100)
MONOCYTES # BLD AUTO: 0.8 10E9/L (ref 0–1.3)
MONOCYTES NFR BLD AUTO: 15 %
NEUTROPHILS # BLD AUTO: 3.6 10E9/L (ref 1.6–8.3)
NEUTROPHILS NFR BLD AUTO: 65.1 %
NT-PROBNP SERPL-MCNC: 3650 PG/ML (ref 0–1800)
PLATELET # BLD AUTO: 228 10E9/L (ref 150–450)
POTASSIUM SERPL-SCNC: 3.9 MMOL/L (ref 3.4–5.3)
PROT SERPL-MCNC: 5.9 G/DL (ref 6.8–8.8)
RBC # BLD AUTO: 2.85 10E12/L (ref 4.4–5.9)
SODIUM SERPL-SCNC: 140 MMOL/L (ref 133–144)
WBC # BLD AUTO: 5.5 10E9/L (ref 4–11)

## 2017-03-26 PROCEDURE — 99284 EMERGENCY DEPT VISIT MOD MDM: CPT | Performed by: FAMILY MEDICINE

## 2017-03-26 RX ORDER — FUROSEMIDE 10 MG/ML
20 INJECTION INTRAMUSCULAR; INTRAVENOUS ONCE
Status: COMPLETED | OUTPATIENT
Start: 2017-03-26 | End: 2017-03-26

## 2017-03-26 RX ORDER — LIDOCAINE 40 MG/G
CREAM TOPICAL
Status: DISCONTINUED | OUTPATIENT
Start: 2017-03-26 | End: 2017-03-26 | Stop reason: HOSPADM

## 2017-03-26 RX ADMIN — FUROSEMIDE 20 MG: 10 INJECTION, SOLUTION INTRAVENOUS at 18:51

## 2017-03-26 ASSESSMENT — ENCOUNTER SYMPTOMS
WOUND: 1
FEVER: 0
CHILLS: 1
GASTROINTESTINAL NEGATIVE: 1
SHORTNESS OF BREATH: 0
APPETITE CHANGE: 0
ACTIVITY CHANGE: 0

## 2017-03-26 NOTE — ED NOTES
Pt comes in for possible infection in both of his legs. Home healthcare nurse advised pt and his wife to be seen.

## 2017-03-26 NOTE — DISCHARGE INSTRUCTIONS
1.  Thank you for allowing us to take care of you today and putting your trust in us.    2.  It does not appear that your leg is infected, most of the drainage appears to be from the bad swelling in your legs.  We have wrapped your leg and something called an unaboot leave this on until your follow-up appointment tomorrow.    3.  I am going to have you increase your Lasix (furosemide) to 40 mg a day for the next 3 days.  You have 20 mg tablets, so you will take 2 tablets now at the same time, on Monday, Tuesday and Wednesday.    4.  I have set up a follow-up appointment with Dr. Aragon tomorrow at 4:40 PM.  They can take a look at your leg again and also do a dressing change.  You also can discuss with him about increasing home healthcare possibly.    5.  Please return to the emergency department if you develop a fever or worsening shortness of breath.

## 2017-03-26 NOTE — ED PROVIDER NOTES
History     Chief Complaint   Patient presents with     Wound Check     The history is provided by the patient.     Bud Merritt is a 89 year old male who presents to the ED with a wound check. Patient's home care nurse came to there house today concerned with an infection in bilateral legs. His legs are swollen and draining. There is an open wound to right lower extremity. A home care nurse has been coming everyday to change his bandages. Today it was noted his right foot has worsened. His wife states his left foot is blistered as well. He was started on Keflex 4 days ago for possible infection with no improvement. He has been eating and drinking well. He is able to ambulate on his own. Patient does complain of chills but denies fever. No chest pain, shortness of breath, bowel or bladder symptoms noted. Patient was recently in the hospital for atrial fibrillation.       Patient Active Problem List   Diagnosis     Hypertrophy of prostate without urinary obstruction     Rheumatoid arthritis involving multiple sites with positive rheumatoid factor (H)     Hyperlipidemia LDL goal <130     Osteoporosis     Encounter for long-term current use of medication     Elevated prostate specific antigen (PSA)     Rheumatoid arthritis involving multiple sites, unspecified rheumatoid factor presence (H)     Status post total left knee replacement     Mitral regurgitation and aortic stenosis - both moderate by echo 12/2015     Postoperative delirium     Infected superficial injury of knee, left, subsequent encounter     Essential hypertension with goal blood pressure less than 140/90     Paroxysmal atrial fibrillation (H)     Visit for monitoring Tikosyn therapy     Diarrhea     Weakness generalized     Cough     Acute cystitis without hematuria     COPD exacerbation (H)     Influenza A     Acute respiratory failure with hypoxia (H)     Atrial fibrillation with rapid ventricular response (H)     Atrial fibrillation (H)      Malignant neoplasm of prostate (H)     Past Medical History:   Diagnosis Date     Atrial fibrillation (H) 07/01/2016     Cardiomyopathy (H)      COPD exacerbation (H) 3/2/2017     Paroxysmal atrial fibrillation (H) 7/6/2016     Pure hypercholesterolemia      Rheumatoid arthritis(714.0)      Unspecified essential hypertension      Weakness generalized 3/2/2017       Past Surgical History:   Procedure Laterality Date     ARTHROPLASTY KNEE Left 1/12/2016    Procedure: ARTHROPLASTY KNEE;  Surgeon: Cesar Walker DO;  Location: [facility] OR     COLONOSCOPY  08/24/09     HC COLONOSCOPY THRU STOMA W BIOPSY/CAUTERY TUMOR/POLYP/LESION  8/31/2004     HC REPAIR ING HERNIA,5+Y/O,REDUCIBL  1996    Marlex mesh repair of bilateral inguinal hernias and drainage of bilateral scrotal hydroceles.     IRRIGATION AND DEBRIDEMENT SOFT TISSUE LOWER EXTREMITY, COMBINED Left 3/15/2016    Procedure: COMBINED IRRIGATION AND DEBRIDEMENT SOFT TISSUE LOWER EXTREMITY;  Surgeon: Cesar Walker DO;  Location: [facility] OR       Family History   Problem Relation Age of Onset     CANCER Mother      CANCER Son      Unknown/Adopted Father      Alcoholism Brother        Social History   Substance Use Topics     Smoking status: Former Smoker     Packs/day: 0.50     Years: 15.00     Types: Cigarettes     Quit date: 11/12/1998     Smokeless tobacco: Never Used      Comment: quit 15 yrs ago     Alcohol use No        Immunization History   Administered Date(s) Administered     Influenza (High Dose) 3 valent vaccine 03/14/2017     Influenza Vaccine IM 3yrs+ 4 Valent IIV4 11/03/2014     Pneumococcal 23 valent 03/14/2017     TD (ADULT, 7+) 08/01/2004, 02/13/2010, 05/16/2012     Tetanus 04/17/1989          Allergies   Allergen Reactions     Amiodarone Other (See Comments)     Drop in DLCO     No Known Drug Allergies        Current Outpatient Prescriptions   Medication Sig Dispense Refill     traMADol (ULTRAM) 50 MG tablet TAKE 1 TABLET 3 TIMES DAILY AS  NEEDED FOR MODERATE PAIN 90 tablet 0     cephALEXin (KEFLEX) 500 MG capsule Take 1 capsule (500 mg) by mouth 3 times daily 30 capsule 0     furosemide (LASIX) 20 MG tablet TAKE 1 TABLET (20 MG) BY MOUTH DAILY OR AS DIRECTED BY CARDIOLOGY 30 tablet 2     fluticasone-vilanterol (BREO ELLIPTA) 100-25 MCG/INH oral inhaler Inhale 1 puff into the lungs daily 60 Inhaler 0     umeclidinium (INCRUSE ELLIPTA) 62.5 MCG/INH oral inhaler Inhale 1 puff into the lungs daily 30 Inhaler 0     metoprolol (LOPRESSOR) 50 MG tablet Take 1 tablet (50 mg) by mouth 2 times daily 120 tablet 0     omeprazole (PRILOSEC) 20 MG CR capsule Take 1 capsule (20 mg) by mouth 2 times daily (before meals) 60 capsule 0     predniSONE (DELTASONE) 5 MG tablet Take 1 tablet (5 mg) by mouth daily 100 tablet 4     dofetilide (TIKOSYN) 125 MCG capsule Take 1 capsule (125 mcg) by mouth 2 times daily 60 capsule 5     apixaban ANTICOAGULANT (ELIQUIS) 2.5 MG tablet Take 1 tablet (2.5 mg) by mouth 2 times daily 180 tablet 3     Leuprolide Acetate (LUPRON DEPOT IM) Inject into the muscle every 4 months       leflunomide (ARAVA) 10 MG tablet Take 1 tablet (10 mg) by mouth daily 30 tablet 11     lisinopril (PRINIVIL,ZESTRIL) 10 MG tablet Take 1 tablet (10 mg) by mouth 2 times daily 62 tablet 11     spironolactone (ALDACTONE) 25 MG tablet Take 1 tablet (25 mg) by mouth daily 30 tablet 11     acetaminophen (TYLENOL) 650 MG CR tablet Take 650 mg by mouth 2 times daily       atorvastatin (LIPITOR) 40 MG tablet Take 1 tablet (40 mg) by mouth daily 90 tablet 3     alendronate (FOSAMAX) 70 MG tablet Take 1 tablet (70 mg) by mouth every 7 days Take with over 8 ounces water and stay upright for at least 30 minutes after dose.  Take at least 60 minutes before breakfast 12 tablet 3     calcium carbonate (TUMS) 500 MG chewable tablet Take 1 chew tab by mouth every 4 hours as needed for heartburn       tamsulosin (FLOMAX) 0.4 MG 24 hr capsule Take 1 capsule (0.4 mg) by mouth 2  times daily 60 capsule      ascorbic acid (VITAMIN C) 500 MG CPCR Take 500 mg by mouth daily       VITAMIN D, CHOLECALCIFEROL, PO Take 400 Units by mouth daily       calcium carbonate (OS-SHELIA 500 MG [place]. [place]) 500 MG tablet Take 600 mg by mouth daily         I have reviewed the Medications, Allergies, Past Medical and Surgical History, and Social History in the Epic system.    Review of Systems   Constitutional: Positive for chills. Negative for activity change, appetite change and fever.   Respiratory: Negative for shortness of breath.    Cardiovascular: Positive for leg swelling (bilaterally). Negative for chest pain.   Gastrointestinal: Negative.    Genitourinary: Negative.    Skin: Positive for wound (right foot).        Positive for weeping to bilateral legs.        Physical Exam   BP: 123/71  Pulse: 63  Temp: 98.5  F (36.9  C)  Resp: 20  Weight: 73.5 kg (162 lb)  SpO2: 100 %  Physical Exam   Constitutional: He is oriented to person, place, and time. He appears well-developed and well-nourished. No distress.   HENT:   Head: Normocephalic and atraumatic.   Eyes: Conjunctivae are normal. Pupils are equal, round, and reactive to light.   Cardiovascular: Normal rate and regular rhythm.    No murmur heard.  Normal pulses to bilateral feet.    Pulmonary/Chest: Effort normal and breath sounds normal. No respiratory distress.   Abdominal: Soft. Bowel sounds are normal. There is no tenderness.   Musculoskeletal:   4+ pitting edema to lower extremities.     Neurological: He is alert and oriented to person, place, and time.   Skin: Skin is warm and dry. No rash noted. He is not diaphoretic.   Open abrasion to dorsum of right foot. Large blister to dorsum of left foot. There is light erythema to toes and mid bilateral feet.    Psychiatric: He has a normal mood and affect. His behavior is normal.   Nursing note and vitals reviewed.      ED Course     ED Course     Procedures          Results for orders placed or performed  during the hospital encounter of 03/26/17   XR Chest Port 1 View    Narrative    CHEST PORTABLE ONE VIEW   3/26/2017 6:00 PM     HISTORY: Cough.    COMPARISON: 3/11/17    FINDINGS: Tortuous thoracic aorta. Minimal fibrosis left base. Hiatal  hernia is likely. No interval change.      Impression    IMPRESSION: Negative.   CBC with platelets differential   Result Value Ref Range    WBC 5.5 4.0 - 11.0 10e9/L    RBC Count 2.85 (L) 4.4 - 5.9 10e12/L    Hemoglobin 9.1 (L) 13.3 - 17.7 g/dL    Hematocrit 28.6 (L) 40.0 - 53.0 %     78 - 100 fl    MCH 31.9 26.5 - 33.0 pg    MCHC 31.8 31.5 - 36.5 g/dL    RDW 15.6 (H) 10.0 - 15.0 %    Platelet Count 228 150 - 450 10e9/L    Diff Method Automated Method     % Neutrophils 65.1 %    % Lymphocytes 16.6 %    % Monocytes 15.0 %    % Eosinophils 1.1 %    % Basophils 1.1 %    % Immature Granulocytes 1.1 %    Absolute Neutrophil 3.6 1.6 - 8.3 10e9/L    Absolute Lymphocytes 0.9 0.8 - 5.3 10e9/L    Absolute Monocytes 0.8 0.0 - 1.3 10e9/L    Absolute Eosinophils 0.1 0.0 - 0.7 10e9/L    Absolute Basophils 0.1 0.0 - 0.2 10e9/L    Abs Immature Granulocytes 0.1 0 - 0.4 10e9/L   Comprehensive metabolic panel   Result Value Ref Range    Sodium 140 133 - 144 mmol/L    Potassium 3.9 3.4 - 5.3 mmol/L    Chloride 107 94 - 109 mmol/L    Carbon Dioxide 25 20 - 32 mmol/L    Anion Gap 8 3 - 14 mmol/L    Glucose 92 70 - 99 mg/dL    Urea Nitrogen 27 7 - 30 mg/dL    Creatinine 0.78 0.66 - 1.25 mg/dL    GFR Estimate >90  Non  GFR Calc   >60 mL/min/1.7m2    GFR Estimate If Black >90   GFR Calc   >60 mL/min/1.7m2    Calcium 8.2 (L) 8.5 - 10.1 mg/dL    Bilirubin Total 0.3 0.2 - 1.3 mg/dL    Albumin 2.6 (L) 3.4 - 5.0 g/dL    Protein Total 5.9 (L) 6.8 - 8.8 g/dL    Alkaline Phosphatase 90 40 - 150 U/L    ALT 22 0 - 70 U/L    AST 29 0 - 45 U/L   Lactic acid whole blood   Result Value Ref Range    Lactic Acid 1.4 0.7 - 2.1 mmol/L   Nt probnp inpatient (BNP)   Result Value Ref  Range    N-Terminal Pro BNP Inpatient 3650 (H) 0 - 1800 pg/mL   CRP inflammation   Result Value Ref Range    CRP Inflammation 3.9 0.0 - 8.0 mg/L       No results found for this or any previous visit (from the past 24 hour(s)).  Medications   lidocaine 1 % 1 mL (not administered)   lidocaine (LMX4) kit (not administered)   sodium chloride (PF) 0.9% PF flush 3 mL (not administered)   sodium chloride (PF) 0.9% PF flush 3 mL (not administered)   furosemide (LASIX) injection 20 mg (not administered)     Bud Merritt is a 89 year old male presenting with an increase in swelling and weeping to bilateral legs. His vitals were stable upon arrival. On exam, there is an open abrasion to dorsum of right foot. Large blister to dorsum of left foot. There is light erythema to toes and mid bilateral feet. 4+ pitting edema to bilateral legs as well.  Patients labs were reviewed and there are no signs of infection.  Patient has a normal white blood cell count and CRP.  Patients BNP was elevated but there is no signs of fluid or edema on the chest x-ray.  Patient also states that his weight has been stable over the past week.  I think most of his drainage and light pink erythema in his legs are from the swelling.  If we can work on this more aggressively I think these things should improve.  I've wrapped his legs in an yolette boot here.  I'm then I have them increases Lasix for the next couple of days.  I will also give him a small IV dose Lasix here before he leaves.  I have set up a follow-up appointment with his primary care doctor tomorrow for recheck.    Assessments & Plan (with Medical Decision Making)  bilateral leg edema      I have reviewed the nursing notes.    I have reviewed the findings, diagnosis, plan and need for follow up with the patient.    New Prescriptions    No medications on file       Final diagnoses:   Bilateral leg edema   This document serves as a record of services personally performed by Benito Mesa  MD Johnathon. It was created on their behalf by Liliya Miranda, a trained medical scribe. The creation of this record is based on the provider's personal observations and the statements of the patient. This document has been checked and approved by the attending provider.   Note: Chart documentation done in part with Dragon Voice Recognition software. Although reviewed after completion, some word and grammatical errors may remain.        3/26/2017   [facility] EMERGENCY DEPARTMENT     Benito Mesa MD  03/26/17 9523

## 2017-03-26 NOTE — ED AVS SNAPSHOT
[facility] Emergency Department    [address] [place] [address]    Phone:  [phone]    Fax:  [phone]                                       Bud Merritt   MRN: [id number]    Department:  [facility] Emergency Department   Date of Visit:  3/26/2017           After Visit Summary Signature Page     I have received my discharge instructions, and my questions have been answered. I have discussed any challenges I see with this plan with the nurse or doctor.    ..........................................................................................................................................  Patient/Patient Representative Signature      ..........................................................................................................................................  Patient Representative Print Name and Relationship to Patient    ..................................................               ................................................  Date                                            Time    ..........................................................................................................................................  Reviewed by Signature/Title    ...................................................              ..............................................  Date                                                            Time

## 2017-03-26 NOTE — ED AVS SNAPSHOT
[facility]    [address] [place] [address]    Phone:  [phone]    Fax:  [phone]                                       Bud Merritt   MRN: [id number]    Department:  [facility]   Date of Visit:  3/26/2017           Patient Information     Date Of Birth          5/9/1927        Your diagnoses for this visit were:     Bilateral leg edema        You were seen by Benito Mesa MD.      Follow-up Information     Go to Camron Aragon MD.    Specialty:  Family Practice    Why:  tomorrow at 440pm    Contact information:    [facility]  [address] [place] [id number] [phone]          Discharge Instructions       1.  Thank you for allowing us to take care of you today and putting your trust in us.    2.  It does not appear that your leg is infected, most of the drainage appears to be from the bad swelling in your legs.  We have wrapped your leg and something called an unaboot leave this on until your follow-up appointment tomorrow.    3.  I am going to have you increase your Lasix (furosemide) to 40 mg a day for the next 3 days.  You have 20 mg tablets, so you will take 2 tablets now at the same time, on Monday, Tuesday and Wednesday.    4.  I have set up a follow-up appointment with Dr. Aragon tomorrow at 4:40 PM.  They can take a look at your leg again and also do a dressing change.  You also can discuss with him about increasing home healthcare possibly.    5.  Please return to the emergency department if you develop a fever or worsening shortness of breath.        Discharge References/Attachments     PERIPHERAL EDEMA, BILATERAL (ENGLISH)    COPING WITH EDEMA (ENGLISH)      Future Appointments        Provider Department Dept Phone Center    3/27/2017 4:40 PM Camron Aragon MD [facility] [phone] [facility]    3/28/2017 8:40 AM Fide Medina PA-C [facility]  [place] [phone] [facility]    4/5/2017 10:00 AM Device Check RN Card [facility] [phone] [place]    4/5/2017 10:30 AM Khoa Li MD [facility] [phone] [place]      24 Hour Appointment Hotline       To make an appointment at any [facility], call [phone] ([phone]). If you don't have a family doctor or clinic, we will help you find one. [facility] are conveniently located to serve the needs of you and your family.             Review of your medicines      Our records show that you are taking the medicines listed below. If these are incorrect, please call your family doctor or clinic.        Dose / Directions Last dose taken    acetaminophen 650 MG CR tablet   Commonly known as:  TYLENOL   Dose:  650 mg        Take 650 mg by mouth 2 times daily   Refills:  0        alendronate 70 MG tablet   Commonly known as:  FOSAMAX   Dose:  70 mg   Quantity:  12 tablet        Take 1 tablet (70 mg) by mouth every 7 days Take with over 8 ounces water and stay upright for at least 30 minutes after dose.  Take at least 60 minutes before breakfast   Refills:  3        apixaban ANTICOAGULANT 2.5 MG tablet   Commonly known as:  ELIQUIS   Dose:  2.5 mg   Quantity:  180 tablet        Take 1 tablet (2.5 mg) by mouth 2 times daily   Refills:  3        ascorbic acid 500 MG Cpcr CR capsule   Commonly known as:  vitamin C   Dose:  500 mg        Take 500 mg by mouth daily   Refills:  0        atorvastatin 40 MG tablet   Commonly known as:  LIPITOR   Dose:  40 mg   Quantity:  90 tablet        Take 1 tablet (40 mg) by mouth daily   Refills:  3        calcium carbonate 500 MG chewable tablet   Commonly known as:  TUMS   Dose:  1 chew tab        Take 1 chew tab by mouth every 4 hours as needed for heartburn   Refills:  0        calcium carbonate 500 MG tablet   Commonly known as:  OS-SHELIA 500 mg [place]. Ca   Dose:  600 mg        Take 600 mg by mouth daily    Refills:  0        cephALEXin 500 MG capsule   Commonly known as:  KEFLEX   Dose:  500 mg   Quantity:  30 capsule        Take 1 capsule (500 mg) by mouth 3 times daily   Refills:  0        dofetilide 125 MCG capsule   Commonly known as:  TIKOSYN   Dose:  125 mcg   Quantity:  60 capsule        Take 1 capsule (125 mcg) by mouth 2 times daily   Refills:  5        FLOMAX 0.4 MG capsule   Dose:  0.4 mg   Quantity:  60 capsule   Generic drug:  tamsulosin        Take 1 capsule (0.4 mg) by mouth 2 times daily   Refills:  0        fluticasone-vilanterol 100-25 MCG/INH oral inhaler   Commonly known as:  BREO ELLIPTA   Dose:  1 puff   Quantity:  60 Inhaler        Inhale 1 puff into the lungs daily   Refills:  0        furosemide 20 MG tablet   Commonly known as:  LASIX   Quantity:  30 tablet        TAKE 1 TABLET (20 MG) BY MOUTH DAILY OR AS DIRECTED BY CARDIOLOGY   Refills:  2        leflunomide 10 MG tablet   Commonly known as:  ARAVA   Dose:  10 mg   Quantity:  30 tablet        Take 1 tablet (10 mg) by mouth daily   Refills:  11        lisinopril 10 MG tablet   Commonly known as:  PRINIVIL/ZESTRIL   Dose:  10 mg   Quantity:  62 tablet        Take 1 tablet (10 mg) by mouth 2 times daily   Refills:  11        LUPRON DEPOT IM        Inject into the muscle every 4 months   Refills:  0        metoprolol 50 MG tablet   Commonly known as:  LOPRESSOR   Dose:  50 mg   Quantity:  120 tablet        Take 1 tablet (50 mg) by mouth 2 times daily   Refills:  0        omeprazole 20 MG CR capsule   Commonly known as:  priLOSEC   Dose:  20 mg   Quantity:  60 capsule        Take 1 capsule (20 mg) by mouth 2 times daily (before meals)   Refills:  0        predniSONE 5 MG tablet   Commonly known as:  DELTASONE   Dose:  5 mg   Quantity:  100 tablet        Take 1 tablet (5 mg) by mouth daily   Refills:  4        spironolactone 25 MG tablet   Commonly known as:  ALDACTONE   Dose:  25 mg   Quantity:  30 tablet        Take 1 tablet (25 mg) by mouth  "daily   Refills:  11        traMADol 50 MG tablet   Commonly known as:  ULTRAM   Quantity:  90 tablet        TAKE 1 TABLET 3 TIMES DAILY AS NEEDED FOR MODERATE PAIN   Refills:  0        umeclidinium 62.5 MCG/INH oral inhaler   Commonly known as:  INCRUSE ELLIPTA   Dose:  1 puff   Quantity:  30 Inhaler        Inhale 1 puff into the lungs daily   Refills:  0        VITAMIN D (CHOLECALCIFEROL) PO   Dose:  400 Units        Take 400 Units by mouth daily   Refills:  0                Procedures and tests performed during your visit     CBC with platelets differential    CRP inflammation    Comprehensive metabolic panel    Lactic acid whole blood    Nt probnp inpatient (BNP)    Peripheral IV catheter    XR Chest Port 1 View      Orders Needing Specimen Collection     None      Pending Results     Date and Time Order Name Status Description    3/26/2017 1749 XR Chest Port 1 View Preliminary             Pending Culture Results     No orders found from 3/24/2017 to 3/27/2017.            Thank you for choosing [facility]       Thank you for choosing [facility] for your care. Our goal is always to provide you with excellent care. Hearing back from our patients is one way we can continue to improve our services. Please take a few minutes to complete the written survey that you may receive in the mail after you visit with us. Thank you!        [facility] Information     [facility] lets you send messages to your doctor, view your test results, renew your prescriptions, schedule appointments and more. To sign up, go to [email or web address] . Click on \"Log in\" on the left side of the screen, which will take you to the Welcome page. Then click on \"Sign up Now\" on the right side of the page.     You will be asked to enter the access code listed below, as well as some personal information. Please follow the directions to create your username and password.     Your access code is: [email or web address]  Expires: 6/20/2017 10:56 AM     Your access code " will  in 90 days. If you need help or a new code, please call your [place] clinic or [phone].        Care EveryWhere ID     This is your Care EveryWhere ID. This could be used by other organizations to access your [place] medical records  [id number]        After Visit Summary       This is your record. Keep this with you and show to your community pharmacist(s) and doctor(s) at your next visit.

## 2017-03-27 ENCOUNTER — HOSPITAL ENCOUNTER (OUTPATIENT)
Dept: WOUND CARE | Facility: CLINIC | Age: 82
Discharge: HOME | DRG: 871 | End: 2017-03-27
Attending: FAMILY MEDICINE | Admitting: FAMILY MEDICINE
Payer: MEDICARE

## 2017-03-27 ENCOUNTER — HOSPITAL ENCOUNTER (INPATIENT)
Facility: CLINIC | Age: 82
LOS: 8 days | Discharge: HOME HEALTH | DRG: 871 | End: 2017-04-04
Attending: FAMILY MEDICINE | Admitting: FAMILY MEDICINE
Payer: MEDICARE

## 2017-03-27 DIAGNOSIS — L97.919 VENOUS STASIS ULCERS OF BOTH LOWER EXTREMITIES (H): Primary | ICD-10-CM

## 2017-03-27 DIAGNOSIS — L03.115 CELLULITIS OF RIGHT LOWER EXTREMITY: ICD-10-CM

## 2017-03-27 DIAGNOSIS — I50.23 ACUTE ON CHRONIC SYSTOLIC CONGESTIVE HEART FAILURE (H): ICD-10-CM

## 2017-03-27 DIAGNOSIS — M79.606 ACUTE LEG PAIN, UNSPECIFIED LATERALITY: ICD-10-CM

## 2017-03-27 DIAGNOSIS — I83.019 VENOUS STASIS ULCERS OF BOTH LOWER EXTREMITIES (H): Primary | ICD-10-CM

## 2017-03-27 DIAGNOSIS — I48.0 PAROXYSMAL ATRIAL FIBRILLATION (H): ICD-10-CM

## 2017-03-27 DIAGNOSIS — I48.91 ATRIAL FIBRILLATION WITH RAPID VENTRICULAR RESPONSE (H): ICD-10-CM

## 2017-03-27 DIAGNOSIS — T14.8XXA OPEN WOUND: Primary | ICD-10-CM

## 2017-03-27 DIAGNOSIS — L97.929 VENOUS STASIS ULCERS OF BOTH LOWER EXTREMITIES (H): Primary | ICD-10-CM

## 2017-03-27 DIAGNOSIS — T14.8XXA OPEN WOUND: ICD-10-CM

## 2017-03-27 DIAGNOSIS — I83.029 VENOUS STASIS ULCERS OF BOTH LOWER EXTREMITIES (H): Primary | ICD-10-CM

## 2017-03-27 LAB
ALBUMIN SERPL-MCNC: 2.7 G/DL (ref 3.4–5)
ALP SERPL-CCNC: 74 U/L (ref 40–150)
ALT SERPL W P-5'-P-CCNC: 22 U/L (ref 0–70)
ANION GAP SERPL CALCULATED.3IONS-SCNC: 11 MMOL/L (ref 3–14)
APTT PPP: 32 SEC (ref 22–37)
AST SERPL W P-5'-P-CCNC: 28 U/L (ref 0–45)
BASOPHILS # BLD AUTO: 0 10E9/L (ref 0–0.2)
BASOPHILS NFR BLD AUTO: 0.5 %
BILIRUB SERPL-MCNC: 0.6 MG/DL (ref 0.2–1.3)
BUN SERPL-MCNC: 27 MG/DL (ref 7–30)
CALCIUM SERPL-MCNC: 8.1 MG/DL (ref 8.5–10.1)
CHLORIDE SERPL-SCNC: 101 MMOL/L (ref 94–109)
CO2 SERPL-SCNC: 26 MMOL/L (ref 20–32)
CREAT SERPL-MCNC: 0.81 MG/DL (ref 0.66–1.25)
DIFFERENTIAL METHOD BLD: ABNORMAL
EOSINOPHIL # BLD AUTO: 0 10E9/L (ref 0–0.7)
EOSINOPHIL NFR BLD AUTO: 0 %
ERYTHROCYTE [DISTWIDTH] IN BLOOD BY AUTOMATED COUNT: 15.5 % (ref 10–15)
GFR SERPL CREATININE-BSD FRML MDRD: ABNORMAL ML/MIN/1.7M2
GLUCOSE SERPL-MCNC: 94 MG/DL (ref 70–99)
HCT VFR BLD AUTO: 28 % (ref 40–53)
HGB BLD-MCNC: 8.9 G/DL (ref 13.3–17.7)
IMM GRANULOCYTES # BLD: 0 10E9/L (ref 0–0.4)
IMM GRANULOCYTES NFR BLD: 0.2 %
INR PPP: 1.01 (ref 0.86–1.14)
LACTATE BLD-SCNC: 1.7 MMOL/L (ref 0.7–2.1)
LYMPHOCYTES # BLD AUTO: 0.6 10E9/L (ref 0.8–5.3)
LYMPHOCYTES NFR BLD AUTO: 9.7 %
MCH RBC QN AUTO: 31.3 PG (ref 26.5–33)
MCHC RBC AUTO-ENTMCNC: 31.8 G/DL (ref 31.5–36.5)
MCV RBC AUTO: 99 FL (ref 78–100)
MONOCYTES # BLD AUTO: 0.6 10E9/L (ref 0–1.3)
MONOCYTES NFR BLD AUTO: 10.8 %
NEUTROPHILS # BLD AUTO: 4.5 10E9/L (ref 1.6–8.3)
NEUTROPHILS NFR BLD AUTO: 78.8 %
PLATELET # BLD AUTO: 237 10E9/L (ref 150–450)
POTASSIUM SERPL-SCNC: 3.7 MMOL/L (ref 3.4–5.3)
PROT SERPL-MCNC: 5.7 G/DL (ref 6.8–8.8)
RBC # BLD AUTO: 2.84 10E12/L (ref 4.4–5.9)
SODIUM SERPL-SCNC: 138 MMOL/L (ref 133–144)
WBC # BLD AUTO: 5.7 10E9/L (ref 4–11)

## 2017-03-27 PROCEDURE — 96365 THER/PROPH/DIAG IV INF INIT: CPT

## 2017-03-27 PROCEDURE — 99285 EMERGENCY DEPT VISIT HI MDM: CPT | Performed by: FAMILY MEDICINE

## 2017-03-27 PROCEDURE — 80053 COMPREHEN METABOLIC PANEL: CPT | Performed by: FAMILY MEDICINE

## 2017-03-27 PROCEDURE — 87040 BLOOD CULTURE FOR BACTERIA: CPT | Performed by: FAMILY MEDICINE

## 2017-03-27 PROCEDURE — 96376 TX/PRO/DX INJ SAME DRUG ADON: CPT

## 2017-03-27 PROCEDURE — 83605 ASSAY OF LACTIC ACID: CPT | Performed by: FAMILY MEDICINE

## 2017-03-27 PROCEDURE — 99285 EMERGENCY DEPT VISIT HI MDM: CPT | Mod: 25

## 2017-03-27 PROCEDURE — 85610 PROTHROMBIN TIME: CPT | Performed by: FAMILY MEDICINE

## 2017-03-27 PROCEDURE — 85025 COMPLETE CBC W/AUTO DIFF WBC: CPT | Performed by: FAMILY MEDICINE

## 2017-03-27 PROCEDURE — 85730 THROMBOPLASTIN TIME PARTIAL: CPT | Performed by: FAMILY MEDICINE

## 2017-03-27 PROCEDURE — 96375 TX/PRO/DX INJ NEW DRUG ADDON: CPT

## 2017-03-27 PROCEDURE — 25000125 ZZHC RX 250: Performed by: FAMILY MEDICINE

## 2017-03-27 PROCEDURE — 12000000 ZZH R&B MED SURG/OB

## 2017-03-27 PROCEDURE — 25000128 H RX IP 250 OP 636: Performed by: FAMILY MEDICINE

## 2017-03-27 PROCEDURE — 96367 TX/PROPH/DG ADDL SEQ IV INF: CPT

## 2017-03-27 PROCEDURE — 99222 1ST HOSP IP/OBS MODERATE 55: CPT | Mod: AI | Performed by: FAMILY MEDICINE

## 2017-03-27 RX ORDER — HYDROCODONE BITARTRATE AND ACETAMINOPHEN 5; 325 MG/1; MG/1
1 TABLET ORAL EVERY 6 HOURS PRN
Qty: 30 TABLET | Refills: 0 | Status: SHIPPED | OUTPATIENT
Start: 2017-03-27 | End: 2017-04-17

## 2017-03-27 RX ORDER — LIDOCAINE 40 MG/G
CREAM TOPICAL
Status: DISCONTINUED | OUTPATIENT
Start: 2017-03-27 | End: 2017-03-28

## 2017-03-27 RX ORDER — SODIUM CHLORIDE 9 MG/ML
1000 INJECTION, SOLUTION INTRAVENOUS CONTINUOUS
Status: DISCONTINUED | OUTPATIENT
Start: 2017-03-27 | End: 2017-03-28

## 2017-03-27 RX ORDER — FENTANYL CITRATE 50 UG/ML
25-50 INJECTION, SOLUTION INTRAMUSCULAR; INTRAVENOUS
Status: DISCONTINUED | OUTPATIENT
Start: 2017-03-27 | End: 2017-04-04 | Stop reason: HOSPADM

## 2017-03-27 RX ORDER — METOPROLOL SUCCINATE 25 MG/1
TABLET, EXTENDED RELEASE ORAL
Status: ON HOLD | COMMUNITY
Start: 2017-03-15 | End: 2017-04-04

## 2017-03-27 RX ADMIN — SODIUM CHLORIDE 500 ML: 9 INJECTION, SOLUTION INTRAVENOUS at 21:13

## 2017-03-27 RX ADMIN — FENTANYL CITRATE 50 MCG: 50 INJECTION, SOLUTION INTRAMUSCULAR; INTRAVENOUS at 23:05

## 2017-03-27 RX ADMIN — FENTANYL CITRATE 50 MCG: 50 INJECTION, SOLUTION INTRAMUSCULAR; INTRAVENOUS at 21:17

## 2017-03-27 RX ADMIN — TAZOBACTAM SODIUM AND PIPERACILLIN SODIUM 4.5 G: 500; 4 INJECTION, SOLUTION INTRAVENOUS at 21:34

## 2017-03-27 RX ADMIN — VANCOMYCIN HYDROCHLORIDE 1500 MG: 1 INJECTION, POWDER, LYOPHILIZED, FOR SOLUTION INTRAVENOUS at 22:06

## 2017-03-27 ASSESSMENT — ENCOUNTER SYMPTOMS: FEVER: 0

## 2017-03-27 NOTE — IP AVS SNAPSHOT
[address] [address]    Phone:  [phone]    Fax:  [phone]                                       After Visit Summary   3/27/2017    Bud Merritt    MRN: [id number]           After Visit Summary Signature Page     I have received my discharge instructions, and my questions have been answered. I have discussed any challenges I see with this plan with the nurse or doctor.    ..........................................................................................................................................  Patient/Patient Representative Signature      ..........................................................................................................................................  Patient Representative Print Name and Relationship to Patient    ..................................................               ................................................  Date                                            Time    ..........................................................................................................................................  Reviewed by Signature/Title    ...................................................              ..............................................  Date                                                            Time

## 2017-03-27 NOTE — PROGRESS NOTES
Reason For Visit: Bud Merritt, 89 year old male, seen as outpatient to evaluate and treat bilateral lower leg edema and stasis ulcer. Referred by ASHLYN Aragon MD. Patient presents by self today, his wife is also present with him today.      History: Patient with complex past medical history.  He is current under a [facility] episode.  Concern for wound clinic visit today is bilateral lower leg edema with copious amounts of drainage.  He was seen in the ED yesterday after home care nurse felt his legs had signs of infection.  He was given in the ED IV lasix and was instructed to increase his lasix dose for the next few days.  He is currently on an oral antibiotic which he is 5 days into the course ordered.       Personal/social history: Patient is a former , , he lives with his wife in [facility].      Objective:   Physical appearance: frail, in obvious pain when sitting, cant get comfortable due to back and right leg pain per pt is more related to RA than to the wounds or edema.  Ambulation: Is nonambulatory, able to stand with moderate assist, transfer with moderate assist for strength and balance.  He was able to pivot transfer out of his wheelchair into the car when leaving.  Current treatment plan: pt had Unna's boot viscopaste wrap in place with cotton cast batting secured with ace wraps.  Last changed: yesterday  Drainage: copious serous and tints of green noted.    Removal of dressing reveals. Dressing totally saturated with serous and green tinged drainage.  Most weeping legs, some intact blister and some ruptured, edema in both legs and hypopigmented skin around open areas.      Wound #1 Right anterior lower leg, midtibial, venous stasis ulcer  Stage/tissue depth: partial thickness  0.8 cm L x 1 cm W x 0.1 cm D  Tunneling: no  Undermining: no  Wound bed type/amount: red nongranular tissue; NA fluctuant  Wound Edges: flush with surrounding skin  Periwound: edematous  and hypopigmented  Odor: no  Pain: yes 5/10 at wounds , 10/10 RA pain from right hip radiates down the leg      Wound #2 Right foot, dorsal aspect, venous stasis ulcer  Stage/tissue depth: partial thickness  4.6 cm L x 7 cm W x 0.1 cm D  Tunneling: no  Undermining: no  Wound bed type/amount: red nongranular tissue; NA fluctuant  Wound Edges: flush with surrounding skin  Periwound: edematous and hypopigmented  Odor: no  Pain: yes 5/10 at wounds , 10/10 RA pain from right hip radiates down the leg    Wound #3 Right lateral ankle, venous stasis ulcer, intact blister  Stage/tissue depth: partial thickness  1.5 cm L x 1.5 cm W x NA cm D, blister is raised approximately 0.5 cm.  Tunneling: no  Undermining: no  Wound bed type/amount: intact serous filled blister; NA fluctuant  Wound Edges: NA  Periwound: edematous and hypopigmented  Odor: no  Pain: yes 5/10 at wounds , 10/10 RA pain from right hip radiates down the leg    Wound #4 Left anterior lower leg, midtibial, venous stasis ulcer.  Stage/tissue depth: partial thickness  0.5 cm L x 0.5 cm W x 0.1 cm D  Tunneling: no  Undermining: no  Wound bed type/amount: red nongranular tissue; NA fluctuant  Wound Edges: flush with surrounding skin  Periwound: edematous and hypopigmented  Odor: no  Pain: yes 5/10 at wounds , 10/10 RA pain from right hip radiates down the leg    Wound #5 Left foot, distal dorsal aspect at base of toes, venous stasis ulcer  Stage/tissue depth: partial thickness  3 cm L x 8 cm W x 0 cm D  Tunneling: no  Undermining: no  Wound bed type/amount: red nongranular tissue remains of ruptured blisters nonviable loose skin and intact soft weeping blisters; NA fluctuant  Wound Edges: flush with surrounding skin  Periwound: edematous and hypopigmented  Odor: no  Pain: yes 5/10 at wounds , 10/10 RA pain from right hip radiates down the leg    Wound #6 Left medial ankle, intact blister, venous stasis ulcer.  Stage/tissue depth: partial thickness  1 cm L x 1 cm W x  1 cm D  Tunneling: no  Undermining: no  Wound bed type/amount: intact serous blister; NA fluctuant  Wound Edges: NA  Periwound: edematous and hypopigmented  Odor: no  Pain: yes 5/10 at wounds , 10/10 RA pain from right hip radiates down the leg    Other concerns noted.  Not technically open wounds but pt has multiple places over the legs where there are pin point areas where there is weeping serous fluid noted, no blister, no open wound just an exit point of drainage in large to copious amounts.  These are mostly located on the anterior bilateral ankles and mid tibial areas.      Dorsalis Pedal Pulse: palpable but weak: NA doppler: NA phasic  Hair growth: scant on the lower legs and feet  Capillary Refill: less than 3 seconds  Feet/toes color: pink to pale   Nails: discolored slightly yellow and thick  R Leg: Edema plus 4 pitting edema . Ankle circumference NA cm. Calf circumference NA cm.  L Leg: Edema plus 3 pitting edema. Ankle circumference NA cm. Calf circumference NA cm.    Mobility: limited due to pain, pivot transfers with assist, is using wheelchair  Current offloading/footwear: currently unable to get shoes on due to edema  Sensation: some c/o pain but is related to RA and not neuropathy in the legs and feet  HgbA1C: NA Date: NA pt is not a diabetic  Checks Blood Glucose?:  NA Average Readings: NA  Other callousing/areas of concern: all feet and lower legs that are intact are at risk of breakdown due to edema and exposure to wound drainage.  Plantar aspect of the right foot is soft, macerated slightly    Diet: Regular  Smoking: no    Discussed: etiology of wound (venous stasis ulcers related to edema of the lower legs and feet), pathophysiology and patient specific goals for wound healing.   Education: see plan and teaching listed below    Goals of Wound Healing:   - Transition from nonhealing with copious amounts of drainage to a stage of managed drainage and edema reduction to promote healing  - Maintain  moist environment, NA wounds at this point need to be dry to limit periwound skin desquamation and spread of venous ulcers  - Control exudate, with silver alginate and diapers  - Autolytic debridement of NA necrotic/sloughy tissue  - Protect wound from outside environment, with Ace wraps and dressings  - Antimicrobial with silver alginate  - Pack open space, NA  - Promote healing by secondary intention for complete closure of wound, NA  - Offloading/pressure reduction, NA  - edema reduction and protection of intact but fragile and at risk skin.    Assessment:  Wounds are  in the inflammatory stage of wound healing    Barriers to wound healing:   Poor nutrition: inadequate supply of protein, carbohydrates, fatty acids, and trace elements essential for all phases of wound healing  Reduced Blood Supply: inadequate perfusion to heal wound, related to edema  Medication: none at this time  Chemotherapy: suppresses the immune system and inflammatory response, NA  Radiotherapy: increases production of free radical which damage cells, NA  Psychological stress: NA  Obesity: decreases tissue perfusion, No obesity but edema of LE is limiting venous return and prolongs healing  Infection: prolongs inflammatory phase, uses vital nutrients, impairs epithelialization and releases toxins  Underlying Disease: diabetes mellitis and autoimmune disorders, NA  Maceration: reduces wound tensile strength and inhibits epithelial migration, addressed by increasing the products used to absorb drainage and by using Critic aide Zinc oxide moisture barrier paste on all periwound skin and any where drainage is likely to stay in contact with the skin, e.g the plantar aspects of the feet,  Patient compliance, NA  Unrelieved pressure, NA  Immobility, lack of mobility promotes edema  Substance abuse: NA    Plan: Change in plan of care for LE wounds, edema and drainage control.  Use of Silvercel silver calcium alginate directly on the wounds to absorb  drainage and provide an antimicrobial property.  Zinc oxide moisture barrier paste to the intact skin around the wounds and around areas where legs are weeping to protect the good intact skin from moisture.  Baby diapers to the areas where skin is weeping the greatest to absorb including over the silver calcium alginate.  Secured all of it with roll gauze and ace wraps. Plan is to leave the dressing in place till I see pt in his home tomorrow Tuesday 3/28/17.  Pts wife was given written prescription for Vicodin for better pain control.    Topical care: see above  Offloading: NA  Additional recommendations: To take Vicodin as allowed, start slow as he has not had much narcotic pain medication in the past.  To take when pain is just starting to increase and not wait till the pain is high this to provide more adequate pain control while not taking more than allowed.    Wound Care: Wound cleansed with Microklenz and gauze, patted dry. Periwound protected with critic aide paste. Wound base filled with silver calcium alginate. Covered with baby diapers, followed by cotton batting. Secured with roll gauze and Ace wraps. To be changed daily.    Discussed plan of care with patient and his wife. Teaching done with patient and his wife for dressing changes; pt is not able and wife is not yet formally trained in on wound cares.  [facility] will provide cares till wife can learn and be independent with cares.    The following discharge instructions were reviewed with and sent home with the patient: see discharge instructions.      The following supplies were sent home with the patient:  4 sheets of silvercel silver calcium alginate, one ace wrap, one tube of critic aide zinc oxide paste.  Will reassess care plan tomorrow in the home under the pts home care episode.  I will order all supplies needed for the pt then under his bundled home care account for medicare and have delivered out to the home directly.    Return visit: No return  visit to clinic while the pt is under a home care episode with  home care and hospice.      Verbal, written, & demonstrative education provided.  Face to face time (excluding procedure): approximately 30 minutes.  Procedure: 30 minutes wound cares, skin cares and new dressing application.  Care plan was changed.    [phone]

## 2017-03-27 NOTE — IP AVS SNAPSHOT
MRN:[id number]                      After Visit Summary   3/27/2017    Bud Merritt    MRN: [id number]           Visit Information        Provider Department      3/27/2017  1:00 PM PH WOUND EXAM ROOM [facility]           Review of your medicines      UNREVIEWED medicines. Ask your doctor about these medicines        Dose / Directions    acetaminophen 650 MG CR tablet   Commonly known as:  TYLENOL        Dose:  650 mg   Take 650 mg by mouth 2 times daily   Refills:  0       alendronate 70 MG tablet   Commonly known as:  FOSAMAX   Used for:  Osteopenia        Dose:  70 mg   Take 1 tablet (70 mg) by mouth every 7 days Take with over 8 ounces water and stay upright for at least 30 minutes after dose.  Take at least 60 minutes before breakfast   Quantity:  12 tablet   Refills:  3       apixaban ANTICOAGULANT 2.5 MG tablet   Commonly known as:  ELIQUIS   Used for:  Paroxysmal atrial fibrillation (H)        Dose:  2.5 mg   Take 1 tablet (2.5 mg) by mouth 2 times daily   Quantity:  180 tablet   Refills:  3       ascorbic acid 500 MG Cpcr CR capsule   Commonly known as:  vitamin C        Dose:  500 mg   Take 500 mg by mouth daily   Refills:  0       atorvastatin 40 MG tablet   Commonly known as:  LIPITOR   Used for:  Atherosclerosis of native coronary artery of native heart without angina pectoris        Dose:  40 mg   Take 1 tablet (40 mg) by mouth daily   Quantity:  90 tablet   Refills:  3       calcium carbonate 500 MG chewable tablet   Commonly known as:  TUMS        Dose:  1 chew tab   Take 1 chew tab by mouth every 4 hours as needed for heartburn   Refills:  0       calcium carbonate 500 MG tablet   Commonly known as:  OS-SHELIA 500 mg [place]. Ca        Dose:  600 mg   Take 600 mg by mouth daily   Refills:  0       cephALEXin 500 MG capsule   Commonly known as:  KEFLEX   Used for:  Open wound of lower limb, right, initial encounter        Dose:  500 mg   Take 1 capsule (500 mg) by  mouth 3 times daily   Quantity:  30 capsule   Refills:  0       dofetilide 125 MCG capsule   Commonly known as:  TIKOSYN   Used for:  Paroxysmal atrial fibrillation (H)        Dose:  125 mcg   Take 1 capsule (125 mcg) by mouth 2 times daily   Quantity:  60 capsule   Refills:  5       FLOMAX 0.4 MG capsule   Generic drug:  tamsulosin        Dose:  0.4 mg   Take 1 capsule (0.4 mg) by mouth 2 times daily   Quantity:  60 capsule   Refills:  0       fluticasone-vilanterol 100-25 MCG/INH oral inhaler   Commonly known as:  BREO ELLIPTA   Used for:  COPD exacerbation (H)        Dose:  1 puff   Inhale 1 puff into the lungs daily   Quantity:  60 Inhaler   Refills:  0       furosemide 20 MG tablet   Commonly known as:  LASIX   Used for:  Acute on chronic systolic congestive heart failure (H)        TAKE 1 TABLET (20 MG) BY MOUTH DAILY OR AS DIRECTED BY CARDIOLOGY   Quantity:  30 tablet   Refills:  2       HYDROcodone-acetaminophen 5-325 MG per tablet   Commonly known as:  NORCO   Used for:  Open wound        Dose:  1 tablet   Take 1 tablet by mouth every 6 hours as needed for moderate to severe pain   Quantity:  30 tablet   Refills:  0       leflunomide 10 MG tablet   Commonly known as:  ARAVA   Used for:  Rheumatoid arthritis involving multiple sites with positive rheumatoid factor (H)        Dose:  10 mg   Take 1 tablet (10 mg) by mouth daily   Quantity:  30 tablet   Refills:  11       lisinopril 10 MG tablet   Commonly known as:  PRINIVIL/ZESTRIL   Used for:  Acute on chronic systolic congestive heart failure (H)        Dose:  10 mg   Take 1 tablet (10 mg) by mouth 2 times daily   Quantity:  62 tablet   Refills:  11       LUPRON DEPOT IM        Inject into the muscle every 4 months   Refills:  0       metoprolol 50 MG tablet   Commonly known as:  LOPRESSOR   Used for:  Paroxysmal atrial fibrillation (H)        Dose:  50 mg   Take 1 tablet (50 mg) by mouth 2 times daily   Quantity:  120 tablet   Refills:  0       omeprazole  20 MG CR capsule   Commonly known as:  priLOSEC   Used for:  Gastroesophageal reflux disease without esophagitis        Dose:  20 mg   Take 1 capsule (20 mg) by mouth 2 times daily (before meals)   Quantity:  60 capsule   Refills:  0       predniSONE 5 MG tablet   Commonly known as:  DELTASONE   Used for:  Osteopenia        Dose:  5 mg   Take 1 tablet (5 mg) by mouth daily   Quantity:  100 tablet   Refills:  4       spironolactone 25 MG tablet   Commonly known as:  ALDACTONE   Used for:  Cardiomyopathy, nonischemic (H)        Dose:  25 mg   Take 1 tablet (25 mg) by mouth daily   Quantity:  30 tablet   Refills:  11       traMADol 50 MG tablet   Commonly known as:  ULTRAM   Used for:  Rheumatoid arthritis involving multiple sites, unspecified rheumatoid factor presence (H)        TAKE 1 TABLET 3 TIMES DAILY AS NEEDED FOR MODERATE PAIN   Quantity:  90 tablet   Refills:  0       umeclidinium 62.5 MCG/INH oral inhaler   Commonly known as:  INCRUSE ELLIPTA   Used for:  COPD exacerbation (H)        Dose:  1 puff   Inhale 1 puff into the lungs daily   Quantity:  30 Inhaler   Refills:  0       VITAMIN D (CHOLECALCIFEROL) PO        Dose:  400 Units   Take 400 Units by mouth daily   Refills:  0            Where to get your medicines      Some of these will need a paper prescription and others can be bought over the counter. Ask your nurse if you have questions.     Bring a paper prescription for each of these medications     HYDROcodone-acetaminophen 5-325 MG per tablet                Protect others around you: Learn how to safely use, store and throw away your medicines at www.disposemymeds.org.         Follow-ups after your visit        Your next 10 appointments already scheduled     Mar 28, 2017  8:40 AM CDT   Return Visit with Fide Medina PA-C   [facility]    [address] [place] [id number]   [phone]            Apr 05, 2017 10:00 AM CDT   Nurse Only with  "DEVICE CHECK RN CARD   [facility] ([facility])    [address] [place] [id number] [phone]            2017 10:30 AM CDT   New Visit with Khoa Li MD   [facility] ([facility])    [id number] [place] [id number] [phone]               Care Instructions        Further instructions from your care team       Today we did the dressings on your lower legs and feet.  We used a product called Silvercel silver calcium alginate directly on the wounds to absorb drainage and provide an antimicrobial property.  We applied a zinc oxide moisture barrier paste to the intact skin around the wounds and around areas where your legs are weeping to protect the good intact skin from moisture.  We applied baby diapers to the areas where your skin is weeping the greatest to absorb.  We secured all of it with roll gauze and ace wraps. Leave the dressing in place till I see you in your home tomorrow Tuesday 3/28/17.  Any concerns or questions call your Dr Dr ASHLYN Aragon or the  home care offices at [phone].  Paresh Lorenzo RN cwocn      Additional Information About Your Visit        [employer] Information     [employer] lets you send messages to your doctor, view your test results, renew your prescriptions, schedule appointments and more. To sign up, go to [email or web address] . Click on \"Log in\" on the left side of the screen, which will take you to the Welcome page. Then click on \"Sign up Now\" on the right side of the page.     You will be asked to enter the access code listed below, as well as some personal information. Please follow the directions to create your username and password.     Your access code is: [email or web address]  Expires: 2017 10:56 AM     Your access code will  in 90 days. If you need help or a new code, please call your [facility] [place] [facility] clinic or [phone].        Care EveryWhere ID     " This is your Care EveryWhere ID. This could be used by other organizations to access your [facility] medical records  [id number]         Primary Care Provider Office Phone # Fax #    Camron Aragon [phone]      Thank you!     Thank you for choosing [facility] for your care. Our goal is always to provide you with excellent care. Hearing back from our patients is one way we can continue to improve our services. Please take a few minutes to complete the written survey that you may receive in the mail after you visit with us. Thank you!             Medication List: This is a list of all your medications and when to take them. Check marks below indicate your daily home schedule. Keep this list as a reference.      Medications           Morning Afternoon Evening Bedtime As Needed    acetaminophen 650 MG CR tablet   Commonly known as:  TYLENOL   Take 650 mg by mouth 2 times daily                                alendronate 70 MG tablet   Commonly known as:  FOSAMAX   Take 1 tablet (70 mg) by mouth every 7 days Take with over 8 ounces water and stay upright for at least 30 minutes after dose.  Take at least 60 minutes before breakfast                                apixaban ANTICOAGULANT 2.5 MG tablet   Commonly known as:  ELIQUIS   Take 1 tablet (2.5 mg) by mouth 2 times daily                                ascorbic acid 500 MG Cpcr CR capsule   Commonly known as:  vitamin C   Take 500 mg by mouth daily                                atorvastatin 40 MG tablet   Commonly known as:  LIPITOR   Take 1 tablet (40 mg) by mouth daily                                calcium carbonate 500 MG chewable tablet   Commonly known as:  TUMS   Take 1 chew tab by mouth every 4 hours as needed for heartburn                                calcium carbonate 500 MG tablet   Commonly known as:  OS-SHELIA 500 mg [place]. Ca   Take 600 mg by mouth daily                                cephALEXin 500 MG capsule   Commonly known as:   KEFLEX   Take 1 capsule (500 mg) by mouth 3 times daily                                dofetilide 125 MCG capsule   Commonly known as:  TIKOSYN   Take 1 capsule (125 mcg) by mouth 2 times daily                                FLOMAX 0.4 MG capsule   Take 1 capsule (0.4 mg) by mouth 2 times daily   Generic drug:  tamsulosin                                fluticasone-vilanterol 100-25 MCG/INH oral inhaler   Commonly known as:  BREO ELLIPTA   Inhale 1 puff into the lungs daily                                furosemide 20 MG tablet   Commonly known as:  LASIX   TAKE 1 TABLET (20 MG) BY MOUTH DAILY OR AS DIRECTED BY CARDIOLOGY                                HYDROcodone-acetaminophen 5-325 MG per tablet   Commonly known as:  NORCO   Take 1 tablet by mouth every 6 hours as needed for moderate to severe pain                                leflunomide 10 MG tablet   Commonly known as:  ARAVA   Take 1 tablet (10 mg) by mouth daily                                lisinopril 10 MG tablet   Commonly known as:  PRINIVIL/ZESTRIL   Take 1 tablet (10 mg) by mouth 2 times daily                                LUPRON DEPOT IM   Inject into the muscle every 4 months                                metoprolol 50 MG tablet   Commonly known as:  LOPRESSOR   Take 1 tablet (50 mg) by mouth 2 times daily                                omeprazole 20 MG CR capsule   Commonly known as:  priLOSEC   Take 1 capsule (20 mg) by mouth 2 times daily (before meals)                                predniSONE 5 MG tablet   Commonly known as:  DELTASONE   Take 1 tablet (5 mg) by mouth daily                                spironolactone 25 MG tablet   Commonly known as:  ALDACTONE   Take 1 tablet (25 mg) by mouth daily                                traMADol 50 MG tablet   Commonly known as:  ULTRAM   TAKE 1 TABLET 3 TIMES DAILY AS NEEDED FOR MODERATE PAIN                                umeclidinium 62.5 MCG/INH oral inhaler   Commonly known as:  INCRUSE  ELLIPTA   Inhale 1 puff into the lungs daily                                VITAMIN D (CHOLECALCIFEROL) PO   Take 400 Units by mouth daily

## 2017-03-27 NOTE — LETTER
Transition Communication Hand-off for Care Transitions to Next Level of Care Provider    Name: Bud Merritt  MRN #: [id number]  Primary Care Provider: Camron Aragon  Primary Care MD Name: Helio Aragon  Primary Clinic: [facility] [address] [place] [id number]  Primary Care [facility] Name: [facility]  Reason for Hospitalization:  Cellulitis of right lower extremity [L03.115]  Admit Date/Time: 3/27/2017  8:17 PM  Discharge Date: 4/3 or 4/4/17  Payor Source: Payor: BCBS / Plan: BCBS PLATINUM BLUE / Product Type: PPO /     Readmission Assessment Measure (ORIANA) Risk Score/category: High    Reason for Communication Hand-off Referral: Admission diagnoses: CHF  Multiple providers/specialties    Discharge Plan:       Concern for non-adherence with plan of care: No     Discharge Needs Assessment:  Needs       Most Recent Value    Equipment Currently Used at Home walker, standard, walker, rolling    Transportation Available car          Already enrolled in Tele-monitoring program and name of program:  No  Follow-up specialty is recommended: Yes    Follow-up plan:  Future Appointments  Date Time Provider Department Center   4/5/2017 10:00 AM DEVICE CHECK RN CARD [facility]   4/5/2017 10:30 AM Khoa Li MD [facility]       Any outstanding tests or procedures:              Key Recommendations:  Close monitoring of HF symptoms, with early detection and     Pat Johnson    AVS/Discharge Summary is the source of truth; this is a helpful guide for improved communication of patient story

## 2017-03-27 NOTE — DISCHARGE INSTRUCTIONS
Today we did the dressings on your lower legs and feet.  We used a product called Silvercel silver calcium alginate directly on the wounds to absorb drainage and provide an antimicrobial property.  We applied a zinc oxide moisture barrier paste to the intact skin around the wounds and around areas where your legs are weeping to protect the good intact skin from moisture.  We applied baby diapers to the areas where your skin is weeping the greatest to absorb.  We secured all of it with roll gauze and ace wraps. Leave the dressing in place till I see you in your home tomorrow Tuesday 3/28/17.  Any concerns or questions call your Dr Dr ASHLYN Aragon or the  home care offices at [phone].  Paresh Lorenzo RN cwocn

## 2017-03-27 NOTE — IP AVS SNAPSHOT
[address] [facility]    [address] [place] [address]    Phone:  [phone]                                       After Visit Summary   3/27/2017    Bud Merritt    MRN: [id number]           After Visit Summary Signature Page     I have received my discharge instructions, and my questions have been answered. I have discussed any challenges I see with this plan with the nurse or doctor.    ..........................................................................................................................................  Patient/Patient Representative Signature      ..........................................................................................................................................  Patient Representative Print Name and Relationship to Patient    ..................................................               ................................................  Date                                            Time    ..........................................................................................................................................  Reviewed by Signature/Title    ...................................................              ..............................................  Date                                                            Time

## 2017-03-27 NOTE — IP AVS SNAPSHOT
MRN:[id number]                      After Visit Summary   3/27/2017    Bud Merritt    MRN: [id number]           Thank you!     Thank you for choosing [facility] for your care. Our goal is always to provide you with excellent care. Hearing back from our patients is one way we can continue to improve our services. Please take a few minutes to complete the written survey that you may receive in the mail after you visit with us. Thank you!        Patient Information     Date Of Birth          5/9/1927        About your hospital stay     You were admitted on:  March 27, 2017 You last received care in the:  [address] [facility]    You were discharged on:  April 4, 2017        Reason for your hospital stay       1.  Cellulitis of your lower legs which caused a whole body reaction and illness - this has improved greatly with antibiotics and you will continue on two by mouth antibiotics (Augmentin and Clindamycin) as you go home to continue to treat this infection.  Please continue with the wound cares at home  2.  Ongoing irregular heart rates - more changes have been made to your heart rate medications - please follow up with Dr. Li tomorrow as scheduled to review how these are working and if any further changes are needed.                  Who to Call     For medical emergencies, please call 911.  For non-urgent questions about your medical care, please call your primary care provider or clinic, [phone]          Attending Provider     Provider Specialty    Donta Raman MD Emergency Medicine    [place], Cullen Land MD Family Practice    Cesar Do MD Internal Medicine       Primary Care Provider Office Phone # Fax #    Camron Aragon [phone]       [facility] [address] [place] [address]        After Care Instructions     Activity       Your activity upon discharge: activity as tolerated            Diet        Follow this diet upon discharge: Low salt diet            Monitor and record       weight every day.  Please inform Dr. Aragon if you have gained more than 2 lbs in one day or more than 5 lbs in a week.                  Follow-up Appointments     Follow-up and recommended labs and tests        Follow up with cardiology, Dr. Li, tomorrow as scheduled.  Follow up with primary care provider, Camron Aragon, within 7 days for hospital follow- up.                  Your next 10 appointments already scheduled     Apr 05, 2017 10:00 AM CDT   Nurse Only with DEVICE CHECK RN CARD   [facility]    [address] [place] [id number]   [phone]            Apr 05, 2017 10:30 AM CDT   New Visit with Khoa Li MD   [facility]    [address] [place] [id number]   [phone]            Apr 10, 2017 10:00 AM CDT   Office Visit with Camron Aragon MD   [facility] ([facility])    [address] [place] [id number] [phone]           Bring a current list of meds and any records pertaining to this visit.  For Physicals, please bring immunization records and any forms needing to be filled out.  Please arrive 10 minutes early to complete paperwork.              Additional Services     HOME CARE NURSING REFERRAL       **Order classes of: [facility], [facility] and [facility] will route to the Home Care and Hospice Referral Pool.  Home Care or Hospice will then contact the patient to schedule their appointment.**    If you do not hear from Home Care and Hospice, or you would like to call to schedule, please call the referring place of service that your provider has listed below.  ______________________________________________________________________    Your provider has referred you to: [facility]: [facility]  [phone]   [email or web address]    Extended Emergency Contact Information  Primary Emergency Contact: Tierra Merritt  Address: [address] [address] [place] [id number] [facility]  Home Phone: [phone]  Work Phone: none  Mobile Phone: none  Relation: Spouse  Secondary Emergency Contact: Jazmine Aragon   [facility]  Home Phone: [phone]  Work Phone: none  Mobile Phone: [phone]  Relation: Daughter    Patient Anticipated Discharge Date: 4/4/17   RN, PT, HHA to begin 24 - 48 hours after discharge.  PLEASE EVALUATE AND TREAT (Evaluation timeline is 24 - 48 hrs. Please call if there is need for a variance to this timeline).    REASON FOR REFERRAL: Assessment & Treatment: PT and RN, Wound care RN ([facility] nurse), assess need for TeleHealth RN    ADDITIONAL SERVICES NEEDED: HHA    OTHER PERTINENT INFORMATION: Patient was last seen by provider on 4/4/17 for cellulitis associated with shock, paroxysmal atrial fibrillation with RVR.    No current outpatient prescriptions on file.    Patient Active Problem List:     Hypertrophy of prostate without urinary obstruction     Rheumatoid arthritis involving multiple sites with positive rheumatoid factor (H)     Hyperlipidemia LDL goal <130     Osteoporosis     Encounter for long-term current use of medication     Elevated prostate specific antigen (PSA)     Rheumatoid arthritis involving multiple sites, unspecified rheumatoid factor presence (H)     Status post total left knee replacement     Mitral regurgitation and aortic stenosis - AS severe by echo on 3/2017     Postoperative delirium     Infected superficial injury of knee, left, subsequent encounter     Essential hypertension with goal blood pressure less than 140/90     Paroxysmal atrial fibrillation (H)     Visit for monitoring Tikosyn therapy     Diarrhea     Weakness generalized     Cough     Acute cystitis without hematuria     COPD exacerbation (H)     Influenza A      Acute respiratory failure with hypoxia (H)     Atrial fibrillation with rapid ventricular response (H)     Shock (H)     Atrial fibrillation (H)     Malignant neoplasm of prostate (H)     Cellulitis of right lower extremity     Venous stasis ulcers of both lower extremities (H)     Anemia     Cardiac pacemaker in situ     Dilated cardiomyopathy (H) dilated LV, EF 20-25% by Echo 3/4/17     Chronic systolic congestive heart failure (H)     Immunosuppression (H)      Documentation of Face to Face and Certification for Home Health Services    I certify that patient, Bud Merritt is under my care and that I, or a Nurse Practitioner or Physician's Assistant working with me, had a face-to-face encounter that meets the physician face-to-face encounter requirements with this patient on: 4/4/2017.    This encounter with the patient was in whole, or in part, for the following medical condition, which is the primary reason for Home Health Care: cellulitis with sepsis, atrial fibrillation with RVR.    I certify that, based on my findings, the following services are medically necessary Home Health Services: Nursing and Physical Therapy    My clinical findings support the need for the above services because:   Nurse is needed: To assess medical stability and improvement after changes in medications or other medical regimen, assist in managing daily weights and check BMP on 4/7/17.     Physical Therapy Services are needed to assess and treat the following functional impairments: generalized weakness and deconditioning following prolonged illness.    Wound care nurse for treatment of ongoing venous status ulcerations.  Directions.  Wounds to be changed every other day.  Use silvercel silver calcium alginate directly on the wounds to absorb drainage and provide an antimicrobial property.  Zinc oxide moisture barrier past should be applied to the intact skin around the wounds and around areas where the legs are weeping to protect  the good intact skin from moisture.  Baby diapers should be applied to the areas where the skin is weeping the greatest to absorb moisture, including over the silver calcium alginate.  Secure all of the above with a role gauze followed by ace wraps.      Further, I certify that my clinical findings support that this patient is homebound (i.e. absences from home require considerable and taxing effort and are for medical reasons or [employer] services or infrequently or of short duration when for other reasons) because: Requires assistance of another person or specialized equipment to access medical services because patient: Requires supervision of another for safe transfer outside the home.    Based on the above findings, I certify that this patient is confined to the home and needs intermittent skilled nursing care, physical therapy and/or speech therapy.  The patient is under my care, and I have initiated the establishment of the plan of care.  This patient will be followed by a physician who will periodically review the plan of care.    Physician/Provider to provide follow up care: Camron Aragon certified Physician at time of discharge: Sejal Perez MD    Please be aware that coverage of these services is subject to the terms and limitations of your health insurance plan.  Call member services at your health plan with any benefit or coverage questions.                  General Recommendations To Control Heart Failure When You Get Home     Instructions To Patients and Families: Please read and check off each of these important instructions as you do them when you get home.           Weight and symptoms      ___ Put a scale in your bathroom  ___ Post a weight chart or calendar next to the scale  ___Weigh yourself every day as soon as you get up in the morning. You should only be wearing your pajamas. Write your weight on the chart/calendar.  ___ Bring your weight chart/calendar with  "you to all appointments    ___Call your doctor if you gain 2 pounds in 1 day or 5 pounds in 1 week from your \"dry\" weight (baseline weight). Also call your doctor if you have shortness of breath that gets worse over time, leg swelling or fatigue.         Medicines and diet     ___ Make sure to take your medicines as prescribed.    ___Bring a current list of your medicines and all of your medicine bottles with you to all appointments.    ___ Limit fluids if you still have swelling or shortness of breath, or if your doctor tells you to do so.  ___ Eat less than 2000 mg of sodium (salt) every day. Read food labels, and do not add salt to meals.   ___ Heart healthy diet with low fat and low cholesterol          Activity and suggested lifestyle changes    ___ Stay active. Talk to your doctor about an exercise program that is safe for your heart.    ___ Stop smoking. Reduce alcohol use.      ___ Lose weight if you are overweight. Extra weight puts a lot of stress on the heart.          Control for Leg Swelling   ___ Keep your legs elevated (raised) as needed for swelling. If swelling is uncomfortable or elevation doesn t help, ask your doctor about using ACE wrap or Jobst stockings.          Follow-up appointments   ____ Follow up with your doctor within 7-10 days after discharge.    ___ Make sure to take your medications as prescribed and bring an accurate list of your medications and your weight chart/calendar to your follow up appointment.           Pending Results     No orders found from 3/25/2017 to 3/28/2017.            Statement of Approval     Ordered          04/04/17 1041  I have reviewed and agree with all the recommendations and orders detailed in this document.  EFFECTIVE NOW     Approved and electronically signed by:  Sejal Perez MD             Admission Information     Date & Time Provider Department Dept. Phone    3/27/2017 Cesar Do MD [address] [facility] " "[phone]      Your Vitals Were     Blood Pressure Pulse Temperature Respirations Height Weight    145/77 (BP Location: Left arm) 59 97.2  F (36.2  C) (Oral) 16 1.651 m (5' 5\") 67 kg (147 lb 11.3 oz)    Pulse Oximetry BMI (Body Mass Index)                95% 24.58 kg/m2          Care EveryWhere ID     This is your Care EveryWhere ID. This could be used by other organizations to access your [facility] medical records  [id number]           Review of your medicines      START taking        Dose / Directions    amoxicillin-clavulanate 875-125 MG per tablet   Commonly known as:  AUGMENTIN   Indication:  Skin and Soft Tissue Infection        Dose:  1 tablet   Take 1 tablet by mouth every 12 hours for 6 days   Quantity:  12 tablet   Refills:  0       clindamycin 300 MG capsule   Commonly known as:  CLEOCIN   Indication:  Skin and Soft Tissue Infection        Dose:  300 mg   Take 1 capsule (300 mg) by mouth 4 times daily for 6 days   Quantity:  24 capsule   Refills:  0       digoxin 125 MCG tablet   Commonly known as:  LANOXIN   Used for:  Paroxysmal atrial fibrillation (H)        Dose:  62.5 mcg   Take 0.5 tablets (62.5 mcg) by mouth daily   Quantity:  15 tablet   Refills:  0         CONTINUE these medicines which may have CHANGED, or have new prescriptions. If we are uncertain of the size of tablets/capsules you have at home, strength may be listed as something that might have changed.        Dose / Directions    furosemide 20 MG tablet   Commonly known as:  LASIX   This may have changed:  See the new instructions.   Used for:  Acute on chronic systolic congestive heart failure (H)        Take 2 tablets (40 mg) by mouth in the morning and 1 tablet (20 mg) by mouth in the mid afternoon.   Quantity:  90 tablet   Refills:  0       metoprolol 100 MG tablet   Commonly known as:  LOPRESSOR   This may have changed:    - medication strength  - how much to take   Used for:  Paroxysmal atrial fibrillation (H)        Dose:  100 " mg   Take 1 tablet (100 mg) by mouth 2 times daily   Quantity:  60 tablet   Refills:  0         CONTINUE these medicines which have NOT CHANGED        Dose / Directions    acetaminophen 650 MG CR tablet   Commonly known as:  TYLENOL        Dose:  650 mg   Take 650 mg by mouth 2 times daily   Refills:  0       alendronate 70 MG tablet   Commonly known as:  FOSAMAX   Used for:  Osteopenia        Dose:  70 mg   Take 1 tablet (70 mg) by mouth every 7 days Take with over 8 ounces water and stay upright for at least 30 minutes after dose.  Take at least 60 minutes before breakfast   Quantity:  12 tablet   Refills:  3       apixaban ANTICOAGULANT 2.5 MG tablet   Commonly known as:  ELIQUIS   Used for:  Paroxysmal atrial fibrillation (H)        Dose:  2.5 mg   Take 1 tablet (2.5 mg) by mouth 2 times daily   Quantity:  180 tablet   Refills:  3       ascorbic acid 500 MG Cpcr CR capsule   Commonly known as:  vitamin C        Dose:  500 mg   Take 500 mg by mouth daily   Refills:  0       atorvastatin 40 MG tablet   Commonly known as:  LIPITOR   Used for:  Atherosclerosis of native coronary artery of native heart without angina pectoris        Dose:  40 mg   Take 1 tablet (40 mg) by mouth daily   Quantity:  90 tablet   Refills:  3       calcium carbonate 500 MG chewable tablet   Commonly known as:  TUMS        Dose:  1 chew tab   Take 1 chew tab by mouth every 4 hours as needed for heartburn   Refills:  0       calcium carbonate 500 MG tablet   Commonly known as:  OS-SHELIA 500 mg [place]. Ca        Dose:  600 mg   Take 600 mg by mouth daily   Refills:  0       dofetilide 125 MCG capsule   Commonly known as:  TIKOSYN   Used for:  Paroxysmal atrial fibrillation (H)        Dose:  125 mcg   Take 1 capsule (125 mcg) by mouth 2 times daily   Quantity:  60 capsule   Refills:  5       FLOMAX 0.4 MG capsule   Generic drug:  tamsulosin        Dose:  0.4 mg   Take 1 capsule (0.4 mg) by mouth 2 times daily   Quantity:  60 capsule   Refills:  0        fluticasone-vilanterol 100-25 MCG/INH oral inhaler   Commonly known as:  BREO ELLIPTA   Used for:  COPD exacerbation (H)        Dose:  1 puff   Inhale 1 puff into the lungs daily   Quantity:  60 Inhaler   Refills:  0       HYDROcodone-acetaminophen 5-325 MG per tablet   Commonly known as:  NORCO   Used for:  Open wound        Dose:  1 tablet   Take 1 tablet by mouth every 6 hours as needed for moderate to severe pain   Quantity:  30 tablet   Refills:  0       leflunomide 10 MG tablet   Commonly known as:  ARAVA   Used for:  Rheumatoid arthritis involving multiple sites with positive rheumatoid factor (H)        Dose:  10 mg   Take 1 tablet (10 mg) by mouth daily   Quantity:  30 tablet   Refills:  11       lisinopril 10 MG tablet   Commonly known as:  PRINIVIL/ZESTRIL   Used for:  Acute on chronic systolic congestive heart failure (H)        Dose:  10 mg   Take 1 tablet (10 mg) by mouth 2 times daily   Quantity:  62 tablet   Refills:  11       LUPRON DEPOT IM        Inject into the muscle every 4 months   Refills:  0       omeprazole 20 MG CR capsule   Commonly known as:  priLOSEC   Used for:  Gastroesophageal reflux disease without esophagitis        Dose:  20 mg   Take 1 capsule (20 mg) by mouth 2 times daily (before meals)   Quantity:  60 capsule   Refills:  0       predniSONE 5 MG tablet   Commonly known as:  DELTASONE   Used for:  Osteopenia        Dose:  5 mg   Take 1 tablet (5 mg) by mouth daily   Quantity:  100 tablet   Refills:  4       spironolactone 25 MG tablet   Commonly known as:  ALDACTONE   Used for:  Cardiomyopathy, nonischemic (H)        Dose:  25 mg   Take 1 tablet (25 mg) by mouth daily   Quantity:  30 tablet   Refills:  11       traMADol 50 MG tablet   Commonly known as:  ULTRAM   Used for:  Rheumatoid arthritis involving multiple sites, unspecified rheumatoid factor presence (H)        TAKE 1 TABLET 3 TIMES DAILY AS NEEDED FOR MODERATE PAIN   Quantity:  90 tablet   Refills:  0        umeclidinium 62.5 MCG/INH oral inhaler   Commonly known as:  INCRUSE ELLIPTA   Used for:  COPD exacerbation (H)        Dose:  1 puff   Inhale 1 puff into the lungs daily   Quantity:  30 Inhaler   Refills:  0       VITAMIN D (CHOLECALCIFEROL) PO        Dose:  400 Units   Take 400 Units by mouth daily   Refills:  0         STOP taking     cephALEXin 500 MG capsule   Commonly known as:  KEFLEX           metoprolol 25 MG 24 hr tablet   Commonly known as:  TOPROL-XL                Where to get your medicines      These medications were sent to [id number] - [place], [place] - [address]   [address] , [facility] [address]     Phone:  [phone]     amoxicillin-clavulanate 875-125 MG per tablet    clindamycin 300 MG capsule    digoxin 125 MCG tablet    furosemide 20 MG tablet    metoprolol 100 MG tablet                Protect others around you: Learn how to safely use, store and throw away your medicines at www.disposemymeds.org.             Medication List: This is a list of all your medications and when to take them. Check marks below indicate your daily home schedule. Keep this list as a reference.      Medications           Morning Afternoon Evening Bedtime As Needed    acetaminophen 650 MG CR tablet   Commonly known as:  TYLENOL   Take 650 mg by mouth 2 times daily                                      alendronate 70 MG tablet   Commonly known as:  FOSAMAX   Take 1 tablet (70 mg) by mouth every 7 days Take with over 8 ounces water and stay upright for at least 30 minutes after dose.  Take at least 60 minutes before breakfast                                amoxicillin-clavulanate 875-125 MG per tablet   Commonly known as:  AUGMENTIN   Take 1 tablet by mouth every 12 hours for 6 days   Last time this was given:  1 tablet on 4/4/2017  8:38 AM                                      apixaban ANTICOAGULANT 2.5 MG tablet   Commonly known as:  ELIQUIS   Take 1 tablet (2.5 mg) by mouth 2 times daily   Last time  this was given:  2.5 mg on 4/4/2017 10:31 AM                                      ascorbic acid 500 MG Cpcr CR capsule   Commonly known as:  vitamin C   Take 500 mg by mouth daily                                   atorvastatin 40 MG tablet   Commonly known as:  LIPITOR   Take 1 tablet (40 mg) by mouth daily                                   calcium carbonate 500 MG chewable tablet   Commonly known as:  TUMS   Take 1 chew tab by mouth every 4 hours as needed for heartburn                                   calcium carbonate 500 MG tablet   Commonly known as:  OS-SHELIA 500 mg [place]. Ca   Take 600 mg by mouth daily   Last time this was given:  625 mg on 3/29/2017  8:42 AM                                   clindamycin 300 MG capsule   Commonly known as:  CLEOCIN   Take 1 capsule (300 mg) by mouth 4 times daily for 6 days   Last time this was given:  300 mg on 4/4/2017  5:35 AM                                            digoxin 125 MCG tablet   Commonly known as:  LANOXIN   Take 0.5 tablets (62.5 mcg) by mouth daily   Last time this was given:  62.5 mcg on 4/4/2017  8:39 AM                                   dofetilide 125 MCG capsule   Commonly known as:  TIKOSYN   Take 1 capsule (125 mcg) by mouth 2 times daily   Last time this was given:  125 mcg on 4/4/2017 10:32 AM                                      FLOMAX 0.4 MG capsule   Take 1 capsule (0.4 mg) by mouth 2 times daily   Last time this was given:  0.4 mg on 4/4/2017 10:28 AM   Generic drug:  tamsulosin                                      fluticasone-vilanterol 100-25 MCG/INH oral inhaler   Commonly known as:  BREO ELLIPTA   Inhale 1 puff into the lungs daily                                furosemide 20 MG tablet   Commonly known as:  LASIX   Take 2 tablets (40 mg) by mouth in the morning and 1 tablet (20 mg) by mouth in the mid afternoon.   Last time this was given:  40 mg on 4/4/2017  8:38 AM                                      HYDROcodone-acetaminophen 5-325 MG per  tablet   Commonly known as:  NORCO   Take 1 tablet by mouth every 6 hours as needed for moderate to severe pain   Last time this was given:  2 tablets on 4/4/2017  5:35 AM                                   leflunomide 10 MG tablet   Commonly known as:  ARAVA   Take 1 tablet (10 mg) by mouth daily                                   lisinopril 10 MG tablet   Commonly known as:  PRINIVIL/ZESTRIL   Take 1 tablet (10 mg) by mouth 2 times daily   Last time this was given:  10 mg on 3/28/2017  8:16 AM                                      LUPRON DEPOT IM   Inject into the muscle every 4 months                                metoprolol 100 MG tablet   Commonly known as:  LOPRESSOR   Take 1 tablet (100 mg) by mouth 2 times daily   Last time this was given:  100 mg on 4/4/2017  8:39 AM                                      omeprazole 20 MG CR capsule   Commonly known as:  priLOSEC   Take 1 capsule (20 mg) by mouth 2 times daily (before meals)   Last time this was given:  20 mg on 4/4/2017  8:38 AM                                      predniSONE 5 MG tablet   Commonly known as:  DELTASONE   Take 1 tablet (5 mg) by mouth daily   Last time this was given:  10 mg on 4/4/2017 10:33 AM                                   spironolactone 25 MG tablet   Commonly known as:  ALDACTONE   Take 1 tablet (25 mg) by mouth daily   Last time this was given:  25 mg on 4/4/2017 10:29 AM                                   traMADol 50 MG tablet   Commonly known as:  ULTRAM   TAKE 1 TABLET 3 TIMES DAILY AS NEEDED FOR MODERATE PAIN                                   umeclidinium 62.5 MCG/INH oral inhaler   Commonly known as:  INCRUSE ELLIPTA   Inhale 1 puff into the lungs daily                                   VITAMIN D (CHOLECALCIFEROL) PO   Take 400 Units by mouth daily   Last time this was given:  1,000 Units on 4/4/2017 10:33 AM                                             More Information        Patient Education    Amoxicillin Trihydrate, Clavulanate  Potassium Chewable tablet    Amoxicillin Trihydrate, Clavulanate Potassium Oral suspension    Amoxicillin Trihydrate, Clavulanate Potassium Oral tablet    Amoxicillin Trihydrate, Clavulanate Potassium Oral tablet, extended-release  Amoxicillin Trihydrate, Clavulanate Potassium Oral tablet  What is this medicine?  AMOXICILLIN; CLAVULANIC ACID (a mox i JACOB in; LOREE pisano chanell jones AS id) is a penicillin antibiotic. It is used to treat certain kinds of bacterial infections. It will not work for colds, flu, or other viral infections.  This medicine may be used for other purposes; ask your health care provider or pharmacist if you have questions.  What should I tell my health care provider before I take this medicine?  They need to know if you have any of these conditions:    bowel disease, like colitis    kidney disease    liver disease    mononucleosis    an unusual or allergic reaction to amoxicillin, penicillin, cephalosporin, other antibiotics, clavulanic acid, other medicines, foods, dyes, or preservatives    pregnant or trying to get pregnant    breast-feeding  How should I use this medicine?  Take this medicine by mouth with a full glass of water. Follow the directions on the prescription label. Take at the start of a meal. Do not crush or chew. If the tablet has a score line, you may cut it in half at the score line for easier swallowing. Take your medicine at regular intervals. Do not take your medicine more often than directed. Take all of your medicine as directed even if you think you are better. Do not skip doses or stop your medicine early.  Talk to your pediatrician regarding the use of this medicine in children. Special care may be needed.  Overdosage: If you think you have taken too much of this medicine contact a poison control center or emergency room at once.  NOTE: This medicine is only for you. Do not share this medicine with others.  What if I miss a dose?  If you miss a dose, take it as soon as you  can. If it is almost time for your next dose, take only that dose. Do not take double or extra doses.  What may interact with this medicine?    allopurinol    anticoagulants    birth control pills    methotrexate    probenecid  This list may not describe all possible interactions. Give your health care provider a list of all the medicines, herbs, non-prescription drugs, or dietary supplements you use. Also tell them if you smoke, drink alcohol, or use illegal drugs. Some items may interact with your medicine.  What should I watch for while using this medicine?  Tell your doctor or health care professional if your symptoms do not improve.  Do not treat diarrhea with over the counter products. Contact your doctor if you have diarrhea that lasts more than 2 days or if it is severe and watery.  If you have diabetes, you may get a false-positive result for sugar in your urine. Check with your doctor or health care professional.  Birth control pills may not work properly while you are taking this medicine. Talk to your doctor about using an extra method of birth control.  What side effects may I notice from receiving this medicine?  Side effects that you should report to your doctor or health care professional as soon as possible:    allergic reactions like skin rash, itching or hives, swelling of the face, lips, or tongue    breathing problems    dark urine    fever or chills, sore throat    redness, blistering, peeling or loosening of the skin, including inside the mouth    seizures    trouble passing urine or change in the amount of urine    unusual bleeding, bruising    unusually weak or tired    white patches or sores in the mouth or throat  Side effects that usually do not require medical attention (report to your doctor or health care professional if they continue or are bothersome):    diarrhea    dizziness    headache    nausea, vomiting    stomach upset    vaginal or anal irritation  This list may not describe  all possible side effects. Call your doctor for medical advice about side effects. You may report side effects to FDA at 6-583-YZK-1068.  Where should I keep my medicine?  Keep out of the reach of children.  Store at room temperature below 25 degrees C (77 degrees F). Keep container tightly closed. Throw away any unused medicine after the expiration date.  NOTE:This sheet is a summary. It may not cover all possible information. If you have questions about this medicine, talk to your doctor, pharmacist, or health care provider. Copyright  2016 Gold Standard                Patient Education    Clindamycin Hydrochloride Oral capsule    Clindamycin Palmitate Hydrochloride Oral solution    Clindamycin Phosphate Medicated topical pledget    Clindamycin Phosphate Solution for injection    Clindamycin Phosphate Topical foam    Clindamycin Phosphate Topical gel    Clindamycin Phosphate Topical lotion    Clindamycin Phosphate Topical solution    Clindamycin Phosphate Vaginal cream    Clindamycin Phosphate Vaginal suppository  Clindamycin Hydrochloride Oral capsule  What is this medicine?  CLINDAMYCIN (KLIN da MYE sin) is a lincosamide antibiotic. It is used to treat certain kinds of bacterial infections. It will not work for colds, flu, or other viral infections.  This medicine may be used for other purposes; ask your health care provider or pharmacist if you have questions.  What should I tell my health care provider before I take this medicine?  They need to know if you have any of these conditions:    kidney disease    liver disease    stomach problems like colitis    an unusual or allergic reaction to clindamycin, lincomycin, or other medicines, foods, dyes like tartrazine or preservatives    pregnant or trying to get pregnant    breast-feeding  How should I use this medicine?  Take this medicine by mouth with a full glass of water. Follow the directions on the prescription label. You can take this medicine with food or  on an empty stomach. If the medicine upsets your stomach, take it with food. Take your medicine at regular intervals. Do not take your medicine more often than directed. Take all of your medicine as directed even if you think your are better. Do not skip doses or stop your medicine early.  Talk to your pediatrician regarding the use of this medicine in children. Special care may be needed.  Overdosage: If you think you have taken too much of this medicine contact a poison control center or emergency room at once.  NOTE: This medicine is only for you. Do not share this medicine with others.  What if I miss a dose?  If you miss a dose, take it as soon as you can. If it is almost time for your next dose, take only that dose. Do not take double or extra doses.  What may interact with this medicine?    birth control pills    chloramphenicol    erythromycin    kaolin products  This list may not describe all possible interactions. Give your health care provider a list of all the medicines, herbs, non-prescription drugs, or dietary supplements you use. Also tell them if you smoke, drink alcohol, or use illegal drugs. Some items may interact with your medicine.  What should I watch for while using this medicine?  Tell your doctor or healthcare professional if your symptoms do not start to get better or if they get worse.  Do not treat diarrhea with over the counter products. Contact your doctor if you have diarrhea that lasts more than 2 days or if it is severe and watery.  What side effects may I notice from receiving this medicine?  Side effects that you should report to your doctor or health care professional as soon as possible:    allergic reactions like skin rash, itching or hives, swelling of the face, lips, or tongue    dark urine    pain on swallowing    redness, blistering, peeling or loosening of the skin, including inside the mouth    unusual bleeding or bruising    unusually weak or tired    yellowing of eyes or  skin  Side effects that usually do not require medical attention (report to your doctor or health care professional if they continue or are bothersome):    diarrhea    itching in the rectal or genital area    joint pain    nausea, vomiting    stomach pain  This list may not describe all possible side effects. Call your doctor for medical advice about side effects. You may report side effects to FDA at 2-573-KZK-0192.  Where should I keep my medicine?  Keep out of the reach of children.  Store at room temperature between 20 and 25 degrees C (68 and 77 degrees F). Throw away any unused medicine after the expiration date.  NOTE:This sheet is a summary. It may not cover all possible information. If you have questions about this medicine, talk to your doctor, pharmacist, or health care provider. Copyright  2016 Gold Standard                Patient Education    Digoxin Oral capsule, liquid filled    Digoxin Oral solution    Digoxin Oral tablet    Digoxin Solution for injection  Digoxin Oral tablet  What is this medicine?  DIGOXIN (di JOX in) is used to treat congestive heart failure and heart rhythm problems.  This medicine may be used for other purposes; ask your health care provider or pharmacist if you have questions.  What should I tell my health care provider before I take this medicine?  They need to know if you have any of these conditions:    certain heart rhythm disorders    heart disease or recent heart attack    kidney or liver disease    an unusual or allergic reaction to digoxin, other medicines, foods, dyes, or preservatives    pregnant or trying to get pregnant    breast-feeding  How should I use this medicine?  Take this medicine by mouth with a glass of water. Follow the directions on the prescription label. Take your doses at regular intervals. Do not take your medicine more often than directed.  Talk to your pediatrician regarding the use of this medicine in children. Special care may be  needed.  Overdosage: If you think you have taken too much of this medicine contact a poison control center or emergency room at once.  NOTE: This medicine is only for you. Do not share this medicine with others.  What if I miss a dose?  If you miss a dose, take it as soon as you can. If it is almost time for your next dose, take only that dose. Do not take double or extra doses.  What may interact with this medicine?    activated charcoal    albuterol    alprazolam    antacids    antiviral medicines for HIV or AIDS like ritonavir and saquinavir    calcium    certain antibiotics like azithromycin, clarithromycin, erythromycin, gentamicin, neomycin, trimethoprim, and tetracycline    certain medicines for blood pressure, heart disease, irregular heart beat    certain medicines for cancer    certain medicines for cholesterol like atorvastatin, cholestyramine, and colestipol    certain medicines for diabetes, like acarbose, exenatide, miglitol, and metformin    certain medicines for fungal infections like ketoconazole and itraconazole    certain medicines for stomach problems like omeprazole, esomeprazole, lansoprazole, rabeprazole, metoclopramide, and sucralfate    cyclosporine    diphenoxylate    epinephrine    kaolin; pectin    nefazodone    NSAIDS, medicines for pain and inflammation, like celecoxib, ibuprofen, or naproxen    penicillamine    phenytoin    propantheline    quinine    phenytoin    rifampin    succinylcholine    [place]'s Wort    sulfasalazine    teriparatide    thyroid hormones    tolvaptan  This list may not describe all possible interactions. Give your health care provider a list of all the medicines, herbs, non-prescription drugs, or dietary supplements you use. Also tell them if you smoke, drink alcohol, or use illegal drugs. Some items may interact with your medicine.  What should I watch for while using this medicine?  Visit your doctor or health care professional for regular checks on your  progress. Do not stop taking this medicine without the advice of your doctor or health care professional, even if you feel better. Do not change the brand you are taking, other brands may affect you differently.  Check your heart rate and blood pressure regularly while you are taking this medicine. Ask your doctor or health care professional what your heart rate and blood pressure should be, and when you should contact him or her. Your doctor or health care professional also may schedule regular blood tests and electrocardiograms to check your progress.  Watch your diet. Less digoxin may be absorbed from the stomach if you have a diet high in bran fiber.  Do not treat yourself for coughs, colds or allergies without asking your doctor or health care professional for advice. Some ingredients can increase possible side effects.  What side effects may I notice from receiving this medicine?  Side effects that you should report to your doctor or health care professional as soon as possible:    allergic reactions like skin rash, itching or hives, swelling of the face, lips, or tongue    changes in behavior, mood, or mental ability    changes in vision    confusion    fast, irregular heartbeat    feeling faint or lightheaded, falls    headache    nausea, vomiting    unusual bleeding, bruising    unusually weak or tired  Side effects that usually do not require medical attention (report to your doctor or health care professional if they continue or are bothersome):    breast enlargement in men and women    diarrhea  This list may not describe all possible side effects. Call your doctor for medical advice about side effects. You may report side effects to FDA at 3-746-FDA-3230.  Where should I keep my medicine?  Keep out of the reach of children.  Store at room temperature between 15 and 30 degrees C (59 and 86 degrees F). Protect from light and moisture. Throw away any unused medicine after the expiration date.  NOTE:This  sheet is a summary. It may not cover all possible information. If you have questions about this medicine, talk to your doctor, pharmacist, or health care provider. Copyright  2016 Gold Standard

## 2017-03-28 ENCOUNTER — APPOINTMENT (OUTPATIENT)
Dept: GENERAL RADIOLOGY | Facility: CLINIC | Age: 82
DRG: 871 | End: 2017-03-28
Attending: PEDIATRICS
Payer: MEDICARE

## 2017-03-28 PROBLEM — D84.9 IMMUNOSUPPRESSION (H): Status: ACTIVE | Noted: 2017-03-28

## 2017-03-28 PROBLEM — L97.929 VENOUS STASIS ULCERS OF BOTH LOWER EXTREMITIES (H): Status: ACTIVE | Noted: 2017-03-28

## 2017-03-28 PROBLEM — I83.029 VENOUS STASIS ULCERS OF BOTH LOWER EXTREMITIES (H): Status: ACTIVE | Noted: 2017-03-28

## 2017-03-28 PROBLEM — D64.9 ANEMIA: Status: ACTIVE | Noted: 2017-03-28

## 2017-03-28 PROBLEM — Z95.0 CARDIAC PACEMAKER IN SITU: Status: ACTIVE | Noted: 2017-03-28

## 2017-03-28 PROBLEM — I50.22 CHRONIC SYSTOLIC CONGESTIVE HEART FAILURE (H): Status: ACTIVE | Noted: 2017-03-28

## 2017-03-28 PROBLEM — S81.801A OPEN WOUND OF RIGHT LOWER EXTREMITY: Status: ACTIVE | Noted: 2017-03-28

## 2017-03-28 PROBLEM — L97.919 VENOUS STASIS ULCERS OF BOTH LOWER EXTREMITIES (H): Status: ACTIVE | Noted: 2017-03-28

## 2017-03-28 PROBLEM — I83.019 VENOUS STASIS ULCERS OF BOTH LOWER EXTREMITIES (H): Status: ACTIVE | Noted: 2017-03-28

## 2017-03-28 PROBLEM — I42.9 CARDIOMYOPATHY, UNSPECIFIED (H): Status: ACTIVE | Noted: 2017-03-28

## 2017-03-28 PROBLEM — L03.90 CELLULITIS: Status: ACTIVE | Noted: 2017-03-28

## 2017-03-28 LAB
ANION GAP SERPL CALCULATED.3IONS-SCNC: 9 MMOL/L (ref 3–14)
BACTERIA SPEC CULT: NORMAL
BUN SERPL-MCNC: 21 MG/DL (ref 7–30)
CALCIUM SERPL-MCNC: 7.8 MG/DL (ref 8.5–10.1)
CHLORIDE SERPL-SCNC: 103 MMOL/L (ref 94–109)
CO2 SERPL-SCNC: 25 MMOL/L (ref 20–32)
CREAT SERPL-MCNC: 0.84 MG/DL (ref 0.66–1.25)
ERYTHROCYTE [DISTWIDTH] IN BLOOD BY AUTOMATED COUNT: 15.6 % (ref 10–15)
GFR SERPL CREATININE-BSD FRML MDRD: 86 ML/MIN/1.7M2
GLUCOSE SERPL-MCNC: 84 MG/DL (ref 70–99)
HCT VFR BLD AUTO: 25.9 % (ref 40–53)
HGB BLD-MCNC: 8.3 G/DL (ref 13.3–17.7)
HGB BLD-MCNC: 8.4 G/DL (ref 13.3–17.7)
LACTATE BLD-SCNC: 1.9 MMOL/L (ref 0.7–2.1)
LACTATE BLD-SCNC: 2.6 MMOL/L (ref 0.7–2.1)
MCH RBC QN AUTO: 31.6 PG (ref 26.5–33)
MCHC RBC AUTO-ENTMCNC: 32 G/DL (ref 31.5–36.5)
MCV RBC AUTO: 99 FL (ref 78–100)
MICRO REPORT STATUS: NORMAL
PLATELET # BLD AUTO: 197 10E9/L (ref 150–450)
POTASSIUM SERPL-SCNC: 3.6 MMOL/L (ref 3.4–5.3)
RBC # BLD AUTO: 2.63 10E12/L (ref 4.4–5.9)
SODIUM SERPL-SCNC: 137 MMOL/L (ref 133–144)
SPECIMEN SOURCE: NORMAL
WBC # BLD AUTO: 6.6 10E9/L (ref 4–11)

## 2017-03-28 PROCEDURE — 40000275 ZZH STATISTIC RCP TIME EA 10 MIN

## 2017-03-28 PROCEDURE — 40000270 ZZH STATISTIC OXYGEN  O2DAILY TECH TIME

## 2017-03-28 PROCEDURE — 20000003 ZZH R&B ICU

## 2017-03-28 PROCEDURE — 85018 HEMOGLOBIN: CPT | Performed by: PEDIATRICS

## 2017-03-28 PROCEDURE — 36415 COLL VENOUS BLD VENIPUNCTURE: CPT | Performed by: FAMILY MEDICINE

## 2017-03-28 PROCEDURE — 83605 ASSAY OF LACTIC ACID: CPT | Performed by: FAMILY MEDICINE

## 2017-03-28 PROCEDURE — 40000940 XR CHEST PORT 1 VW

## 2017-03-28 PROCEDURE — 85027 COMPLETE CBC AUTOMATED: CPT | Performed by: FAMILY MEDICINE

## 2017-03-28 PROCEDURE — 86850 RBC ANTIBODY SCREEN: CPT | Performed by: PEDIATRICS

## 2017-03-28 PROCEDURE — 87081 CULTURE SCREEN ONLY: CPT | Performed by: FAMILY MEDICINE

## 2017-03-28 PROCEDURE — 25000128 H RX IP 250 OP 636: Performed by: PEDIATRICS

## 2017-03-28 PROCEDURE — 36569 INSJ PICC 5 YR+ W/O IMAGING: CPT

## 2017-03-28 PROCEDURE — 86923 COMPATIBILITY TEST ELECTRIC: CPT | Performed by: PEDIATRICS

## 2017-03-28 PROCEDURE — 84145 PROCALCITONIN (PCT): CPT | Performed by: PEDIATRICS

## 2017-03-28 PROCEDURE — 25000128 H RX IP 250 OP 636: Performed by: FAMILY MEDICINE

## 2017-03-28 PROCEDURE — 25000125 ZZHC RX 250: Performed by: FAMILY MEDICINE

## 2017-03-28 PROCEDURE — 36415 COLL VENOUS BLD VENIPUNCTURE: CPT | Performed by: PEDIATRICS

## 2017-03-28 PROCEDURE — 25000125 ZZHC RX 250: Performed by: PEDIATRICS

## 2017-03-28 PROCEDURE — 27210195 ZZH KIT POWER PICC DOUBLE LUMEN

## 2017-03-28 PROCEDURE — 25000132 ZZH RX MED GY IP 250 OP 250 PS 637: Mod: GY | Performed by: FAMILY MEDICINE

## 2017-03-28 PROCEDURE — 3E043XZ INTRODUCTION OF VASOPRESSOR INTO CENTRAL VEIN, PERCUTANEOUS APPROACH: ICD-10-PCS | Performed by: PEDIATRICS

## 2017-03-28 PROCEDURE — A9270 NON-COVERED ITEM OR SERVICE: HCPCS | Mod: GY | Performed by: FAMILY MEDICINE

## 2017-03-28 PROCEDURE — 83605 ASSAY OF LACTIC ACID: CPT | Performed by: PEDIATRICS

## 2017-03-28 PROCEDURE — 86901 BLOOD TYPING SEROLOGIC RH(D): CPT | Performed by: PEDIATRICS

## 2017-03-28 PROCEDURE — 86900 BLOOD TYPING SEROLOGIC ABO: CPT | Performed by: PEDIATRICS

## 2017-03-28 PROCEDURE — 99291 CRITICAL CARE FIRST HOUR: CPT | Performed by: PEDIATRICS

## 2017-03-28 PROCEDURE — 40000274 ZZH STATISTIC RCP CONSULT EA 30 MIN

## 2017-03-28 PROCEDURE — 80048 BASIC METABOLIC PNL TOTAL CA: CPT | Performed by: FAMILY MEDICINE

## 2017-03-28 RX ORDER — PROCHLORPERAZINE MALEATE 5 MG
5 TABLET ORAL EVERY 6 HOURS PRN
Status: DISCONTINUED | OUTPATIENT
Start: 2017-03-28 | End: 2017-04-04 | Stop reason: HOSPADM

## 2017-03-28 RX ORDER — LISINOPRIL 10 MG/1
10 TABLET ORAL 2 TIMES DAILY
Status: DISCONTINUED | OUTPATIENT
Start: 2017-03-28 | End: 2017-03-28

## 2017-03-28 RX ORDER — SPIRONOLACTONE 25 MG/1
25 TABLET ORAL DAILY
Status: DISCONTINUED | OUTPATIENT
Start: 2017-03-28 | End: 2017-03-28

## 2017-03-28 RX ORDER — DOFETILIDE 0.12 MG/1
125 CAPSULE ORAL 2 TIMES DAILY
Status: DISCONTINUED | OUTPATIENT
Start: 2017-03-28 | End: 2017-04-04 | Stop reason: HOSPADM

## 2017-03-28 RX ORDER — TAMSULOSIN HYDROCHLORIDE 0.4 MG/1
0.4 CAPSULE ORAL 2 TIMES DAILY
Status: DISCONTINUED | OUTPATIENT
Start: 2017-03-28 | End: 2017-04-04 | Stop reason: HOSPADM

## 2017-03-28 RX ORDER — HYDROCODONE BITARTRATE AND ACETAMINOPHEN 5; 325 MG/1; MG/1
1-2 TABLET ORAL EVERY 6 HOURS PRN
Status: DISCONTINUED | OUTPATIENT
Start: 2017-03-28 | End: 2017-04-04 | Stop reason: HOSPADM

## 2017-03-28 RX ORDER — ASCORBIC ACID 500 MG
500 TABLET ORAL DAILY
Status: DISCONTINUED | OUTPATIENT
Start: 2017-03-28 | End: 2017-04-04 | Stop reason: HOSPADM

## 2017-03-28 RX ORDER — LIDOCAINE 40 MG/G
CREAM TOPICAL
Status: DISCONTINUED | OUTPATIENT
Start: 2017-03-28 | End: 2017-04-04 | Stop reason: HOSPADM

## 2017-03-28 RX ORDER — ACETAMINOPHEN 325 MG/1
650 TABLET ORAL EVERY 4 HOURS PRN
Status: DISCONTINUED | OUTPATIENT
Start: 2017-03-28 | End: 2017-04-04 | Stop reason: HOSPADM

## 2017-03-28 RX ORDER — SODIUM CHLORIDE AND POTASSIUM CHLORIDE 150; 450 MG/100ML; MG/100ML
INJECTION, SOLUTION INTRAVENOUS CONTINUOUS
Status: DISCONTINUED | OUTPATIENT
Start: 2017-03-28 | End: 2017-03-29 | Stop reason: CLARIF

## 2017-03-28 RX ORDER — PREDNISONE 10 MG/1
10 TABLET ORAL DAILY
Status: DISCONTINUED | OUTPATIENT
Start: 2017-03-28 | End: 2017-03-28

## 2017-03-28 RX ORDER — CALCIUM CARBONATE 500(1250)
600 TABLET ORAL DAILY
Status: DISCONTINUED | OUTPATIENT
Start: 2017-03-28 | End: 2017-03-29 | Stop reason: DRUGHIGH

## 2017-03-28 RX ORDER — LIDOCAINE 40 MG/G
CREAM TOPICAL
Status: DISCONTINUED | OUTPATIENT
Start: 2017-03-28 | End: 2017-03-29

## 2017-03-28 RX ORDER — ONDANSETRON 4 MG/1
4 TABLET, ORALLY DISINTEGRATING ORAL EVERY 6 HOURS PRN
Status: DISCONTINUED | OUTPATIENT
Start: 2017-03-28 | End: 2017-04-04 | Stop reason: HOSPADM

## 2017-03-28 RX ORDER — DOPAMINE HYDROCHLORIDE 160 MG/100ML
2-5 INJECTION, SOLUTION INTRAVENOUS CONTINUOUS
Status: DISCONTINUED | OUTPATIENT
Start: 2017-03-28 | End: 2017-03-29

## 2017-03-28 RX ORDER — NALOXONE HYDROCHLORIDE 0.4 MG/ML
.1-.4 INJECTION, SOLUTION INTRAMUSCULAR; INTRAVENOUS; SUBCUTANEOUS
Status: DISCONTINUED | OUTPATIENT
Start: 2017-03-28 | End: 2017-04-04 | Stop reason: HOSPADM

## 2017-03-28 RX ORDER — PROCHLORPERAZINE 25 MG
12.5 SUPPOSITORY, RECTAL RECTAL EVERY 12 HOURS PRN
Status: DISCONTINUED | OUTPATIENT
Start: 2017-03-28 | End: 2017-04-04 | Stop reason: HOSPADM

## 2017-03-28 RX ORDER — CALCIUM CARBONATE 500 MG/1
500 TABLET, CHEWABLE ORAL EVERY 4 HOURS PRN
Status: DISCONTINUED | OUTPATIENT
Start: 2017-03-28 | End: 2017-04-04 | Stop reason: HOSPADM

## 2017-03-28 RX ORDER — HEPARIN SODIUM,PORCINE 10 UNIT/ML
2-5 VIAL (ML) INTRAVENOUS
Status: DISCONTINUED | OUTPATIENT
Start: 2017-03-28 | End: 2017-04-04 | Stop reason: HOSPADM

## 2017-03-28 RX ORDER — LIDOCAINE 40 MG/G
CREAM TOPICAL
Status: DISCONTINUED | OUTPATIENT
Start: 2017-03-28 | End: 2017-03-28

## 2017-03-28 RX ORDER — METOPROLOL TARTRATE 50 MG
50 TABLET ORAL 2 TIMES DAILY
Status: DISCONTINUED | OUTPATIENT
Start: 2017-03-28 | End: 2017-03-28

## 2017-03-28 RX ORDER — ONDANSETRON 2 MG/ML
4 INJECTION INTRAMUSCULAR; INTRAVENOUS EVERY 6 HOURS PRN
Status: DISCONTINUED | OUTPATIENT
Start: 2017-03-28 | End: 2017-04-04 | Stop reason: HOSPADM

## 2017-03-28 RX ORDER — FUROSEMIDE 20 MG
20 TABLET ORAL DAILY
Status: DISCONTINUED | OUTPATIENT
Start: 2017-03-28 | End: 2017-03-29

## 2017-03-28 RX ADMIN — DOFETILIDE 125 MCG: 0.12 CAPSULE ORAL at 08:17

## 2017-03-28 RX ADMIN — TAZOBACTAM SODIUM AND PIPERACILLIN SODIUM 3.38 G: 375; 3 INJECTION, SOLUTION INTRAVENOUS at 04:44

## 2017-03-28 RX ADMIN — SODIUM CHLORIDE 100 MG: 9 INJECTION, SOLUTION INTRAVENOUS at 10:04

## 2017-03-28 RX ADMIN — HYDROCODONE BITARTRATE AND ACETAMINOPHEN 1 TABLET: 5; 325 TABLET ORAL at 08:16

## 2017-03-28 RX ADMIN — APIXABAN 2.5 MG: 2.5 TABLET, FILM COATED ORAL at 00:49

## 2017-03-28 RX ADMIN — CALCIUM 625 MG: 500 TABLET ORAL at 08:16

## 2017-03-28 RX ADMIN — Medication 1 ML: at 13:45

## 2017-03-28 RX ADMIN — TAZOBACTAM SODIUM AND PIPERACILLIN SODIUM 3.38 G: 375; 3 INJECTION, SOLUTION INTRAVENOUS at 20:36

## 2017-03-28 RX ADMIN — DOFETILIDE 125 MCG: 0.12 CAPSULE ORAL at 21:00

## 2017-03-28 RX ADMIN — LISINOPRIL 10 MG: 10 TABLET ORAL at 08:16

## 2017-03-28 RX ADMIN — SPIRONOLACTONE 25 MG: 25 TABLET ORAL at 08:16

## 2017-03-28 RX ADMIN — FENTANYL CITRATE 50 MCG: 50 INJECTION, SOLUTION INTRAMUSCULAR; INTRAVENOUS at 09:37

## 2017-03-28 RX ADMIN — ONDANSETRON HYDROCHLORIDE 4 MG: 2 SOLUTION INTRAMUSCULAR; INTRAVENOUS at 13:08

## 2017-03-28 RX ADMIN — APIXABAN 2.5 MG: 2.5 TABLET, FILM COATED ORAL at 21:01

## 2017-03-28 RX ADMIN — METOPROLOL TARTRATE 50 MG: 50 TABLET, FILM COATED ORAL at 08:16

## 2017-03-28 RX ADMIN — TAMSULOSIN HYDROCHLORIDE 0.4 MG: 0.4 CAPSULE ORAL at 21:00

## 2017-03-28 RX ADMIN — OXYCODONE HYDROCHLORIDE AND ACETAMINOPHEN 500 MG: 500 TABLET ORAL at 08:16

## 2017-03-28 RX ADMIN — OMEPRAZOLE 20 MG: 20 CAPSULE, DELAYED RELEASE ORAL at 17:02

## 2017-03-28 RX ADMIN — PREDNISONE 10 MG: 10 TABLET ORAL at 08:17

## 2017-03-28 RX ADMIN — APIXABAN 2.5 MG: 2.5 TABLET, FILM COATED ORAL at 08:17

## 2017-03-28 RX ADMIN — VANCOMYCIN HYDROCHLORIDE 1500 MG: 1 INJECTION, POWDER, LYOPHILIZED, FOR SOLUTION INTRAVENOUS at 22:56

## 2017-03-28 RX ADMIN — TAZOBACTAM SODIUM AND PIPERACILLIN SODIUM 3.38 G: 375; 3 INJECTION, SOLUTION INTRAVENOUS at 08:16

## 2017-03-28 RX ADMIN — FENTANYL CITRATE 50 MCG: 50 INJECTION, SOLUTION INTRAMUSCULAR; INTRAVENOUS at 02:01

## 2017-03-28 RX ADMIN — SODIUM CHLORIDE 1000 ML: 9 INJECTION, SOLUTION INTRAVENOUS at 13:35

## 2017-03-28 RX ADMIN — FENTANYL CITRATE 25 MCG: 50 INJECTION, SOLUTION INTRAMUSCULAR; INTRAVENOUS at 05:14

## 2017-03-28 RX ADMIN — TAZOBACTAM SODIUM AND PIPERACILLIN SODIUM 3.38 G: 375; 3 INJECTION, SOLUTION INTRAVENOUS at 17:02

## 2017-03-28 RX ADMIN — TAMSULOSIN HYDROCHLORIDE 0.4 MG: 0.4 CAPSULE ORAL at 08:17

## 2017-03-28 RX ADMIN — OMEPRAZOLE 20 MG: 20 CAPSULE, DELAYED RELEASE ORAL at 08:16

## 2017-03-28 RX ADMIN — FUROSEMIDE 20 MG: 20 TABLET ORAL at 08:17

## 2017-03-28 RX ADMIN — SODIUM CHLORIDE 1000 ML: 9 INJECTION, SOLUTION INTRAVENOUS at 09:45

## 2017-03-28 RX ADMIN — VITAMIN D, TAB 1000IU (100/BT) 1000 UNITS: 25 TAB at 08:16

## 2017-03-28 RX ADMIN — SODIUM CHLORIDE 500 ML: 9 INJECTION, SOLUTION INTRAVENOUS at 18:42

## 2017-03-28 RX ADMIN — DOPAMINE HYDROCHLORIDE 2 MCG/KG/MIN: 160 INJECTION, SOLUTION INTRAVENOUS at 11:45

## 2017-03-28 RX ADMIN — HYDROCODONE BITARTRATE AND ACETAMINOPHEN 1 TABLET: 5; 325 TABLET ORAL at 00:49

## 2017-03-28 RX ADMIN — POTASSIUM CHLORIDE AND SODIUM CHLORIDE: 450; 150 INJECTION, SOLUTION INTRAVENOUS at 13:15

## 2017-03-28 ASSESSMENT — ENCOUNTER SYMPTOMS
SHORTNESS OF BREATH: 0
WEAKNESS: 0
VOMITING: 0
CHILLS: 1
NAUSEA: 0

## 2017-03-28 NOTE — PROGRESS NOTES
[facility] PICC  Procedure Note           Peripherally Inserted Central Line Catheter (PICC):       Bud Merritt  [id number]   March 28, 2017, 2:26 PM Indication: Hypotension  Medication administration  Pressure monitoring           Pause for the cause: Consent for catheter placement procedure signed  Time out completed  Patient ID's verified using two distinct indicators  All necessary equipment is present   Type of line to be used: PICC   Full barrier precautions used: Yes   Skin preparation: Chloraprep   Date of insertion: March 28, 2017, 2:00 PM   Device type: Double lumen, valved, 5.0   Catheter brand: [employer]   Lot number: [id number]   Insertion location: Right basilic vein   Method of placement: Venipuncture  MST  Ultrasound   Number of attempts: With ultrasound: 2   Without ultrasound: 0   Difficulty threading: No   Midline IV device: NA   Arm circumference: 27   Midline extremity circumference: na cm   Internal length: 42 cm   Midline visible catheter length: 1 cm   Total catheter length: 43 cm (to hub)   Tip termination: SVC/RA   Method of verification: Chest x-ray   Midline patency post placement: Positive blood return  Flushes without difficulty  Saline locked   Line flush: Line flush documented on the eMAR yes   Placement verified by: Radiologist   Catheter placed by: Rosibel Wood   Discontinuation form initiated: No   Patient tolerance: Tolerated well   PICC Educational information to patient (Information from PICC package):  No - staff to manage all line cares   VAD flushing orders entered:  Yes     Summary:  This procedure was performed without difficulty. There were no immediate complications.       Recorded by Rosibel Wood    Attestation:  I have reviewed today's vital signs, medications, labs and imaging.  Face-to-face time: 45 minutes

## 2017-03-28 NOTE — ED NOTES
ED Nursing criteria listed below was addressed during verbal handoff:  To room 251    Abnormal vitals: No  Abnormal results: Yes  Med Reconciliation completed: Yes  Meds given in ED: Yes  Any Overdue Meds: No  Core Measures: N/A  Isolation: N/A  Special needs: Yes- very [place]. Wears hearing aid in left ear  Skin assessment: Yes- multiple open wounds to BLE. Has small dressings in place from wound care clinic. Small abrasions to right top of hand. NO redness to buttock.     Observation Patient  Education provided: N/A

## 2017-03-28 NOTE — PROGRESS NOTES
[facility]    Sepsis Evaluation Progress Note    Date of Service: 03/28/2017    I was called to see Bud Merritt due to abnormal lactate. He is known to have an infection.     Physical Exam    Vital Signs:  Temp: 98  F (36.7  C) Temp src: Oral BP: 113/68 Pulse: 60 Heart Rate: 79 Resp: 18 SpO2: 94 % O2 Device: Nasal cannula Oxygen Delivery: 2 LPM    Lab:  Lactic Acid   Date Value Ref Range Status   03/28/2017 2.6 (H) 0.7 - 2.1 mmol/L Final     Comment:     Significant value called to and read back by  HALLE LEYVA AT 1817 SC         The patient is at baseline mental status.    The rest of their physical exam is significant for improved blood pressure, just weaned off dopamine, ongoing cellulitis of right leg, improved since last nite.    Assessment and Plan    The SIRS and exam findings are likely due to   sepsis.     ID: The patient is currently on the following antibiotics:  Anti-infectives (Future)    Start     Dose/Rate Route Frequency Ordered Stop    03/28/17 2300  vancomycin (VANCOCIN) 1500 mg in 0.9% NaCl 250 mL PREMIX      1,500 mg Intravenous EVERY 24 HOURS 03/28/17 1119      03/28/17 0300  piperacillin-tazobactam (ZOSYN) infusion 3.375 g      3.375 g  100 mL/hr over 30 Minutes Intravenous EVERY 6 HOURS 03/28/17 0006          Current antibiotic coverage is appropriate for source of infection.    Fluid: Fluid bolus ordered.    Lab: Repeat lactic acid ordered for AM.    Disposition: The patient will remain on the current unit. We will continue to monitor this patient closely.    Cullen Teran MD, MD

## 2017-03-28 NOTE — PROGRESS NOTES
S-(situation): Patient arrives to room 251 via cart from ED    B-(background): Pt to ed with increased bilateral lower extremity edema and increased pain in legs.    A-(assessment): VSS. Afebrile. C/o 6/10 bilateral leg pain. PRN pain medication given per MAR which has been somewhat effective. Pt encouraged to keep legs elevated. Bilateral legs are red and blotchy with several blisters (see flow sheet). Blistered areas remain covered with silver dressing. Moderate amount of clear serous drainage noted. Small area of small scattered scabs noted to coccyx area. Bilateral feet have 4+ pitting edema bilateral legs 3+ pitting edema knee high. Generalized 1+edema noted. No other complaints. LS clear. Tele shows chronic A-fib.    R-(recommendations): Orders reviewed with patient. Will monitor patient per MD orders.     Inpatient nursing criteria listed below were met:    Health care directives status obtained and documented: No  Core Measures assessed (SSI): Yes  SCD's Documented: Yes  Vaccine assessment done and vaccines ordered if appropriate: Yes  Skin issues/needs documented:Yes  Isolation needs addressed, if appropriate: NA  Fall Prevention: Care plan updated, Education given and documented Yes  MRSA swab completed for patient 55 years and older (exclude SALIMA and TKA): Yes  My Chart patient sign up addressed and documented: Yes  Care Plan initiated: Yes  Education Assessment documented:Yes  Education Documented (Pre-existing chronic infection such as, MRSA/VRE need education on admission): Yes  New medication patient education completed and documented (Possible Side Effects of Common Medications handout): Yes  Home medications if not able to send immediately home with family stored here: NA   Reminder note placed in discharge instructions: NA  Discharge planning review completed (admission navigator) Yes

## 2017-03-28 NOTE — PROGRESS NOTES
S- Transfer to ICU from [facility].    B- pt admitted for bilateral lower extremity edema and cellulitis    A- Brief systems assessment: Alert and Oriented, Afib, hypotension, 3-4+ edema to bilateral lower legs. Left arm edematous and weeping. SpO2 90's on 2L/NC.     R- Transfer to ICU per physician orders. Continue to monitor pt and update physician as needed.      Code status: DNR / DNI  Skin: small area of scabbing on coccyx.  Bilateral lower legs edematous with open areas and  drainage. Small area on left hand draining.   Fall Risk: Yes- Department fall risk interventions implemented.  Isolation: None  Core Measures:   Medication drips upon transfer: IV fluid and Solu Cortef.

## 2017-03-28 NOTE — PHARMACY-VANCOMYCIN DOSING SERVICE
Pharmacy Vancomycin Note  Date of Service 2017  Patient's  [id number]   89 year old, male    Indication: Skin and Soft Tissue Infection  Goal Trough Level: 15-20 mg/L  Day of Therapy: 2  Current Vancomycin regimen:  1500 mg IV q12h    Current estimated CrCl = Estimated Creatinine Clearance: 56.8 mL/min (based on Cr of 0.84).    Creatinine for last 3 days  3/26/2017:  4:55 PM Creatinine 0.78 mg/dL  3/27/2017:  9:10 PM Creatinine 0.81 mg/dL  3/28/2017:  9:06 AM Creatinine 0.84 mg/dL    Recent Vancomycin Levels (past 3 days)  No results found for requested labs within last 72 hours.    Vancomycin IV Administrations (past 72 hours)                   vancomycin (VANCOCIN) 1500 mg in 0.9% NaCl 250 mL PREMIX (mg) 1,500 mg New Bag 17 2206                Nephrotoxins and other renal medications (Future)    Start     Dose/Rate Route Frequency Ordered Stop    17 2300  vancomycin (VANCOCIN) 1500 mg in 0.9% NaCl 250 mL PREMIX      1,500 mg Intravenous EVERY 24 HOURS 17 1119      17 0900  furosemide (LASIX) tablet 20 mg      20 mg Oral DAILY 17 0006      17 0300  piperacillin-tazobactam (ZOSYN) infusion 3.375 g      3.375 g  100 mL/hr over 30 Minutes Intravenous EVERY 6 HOURS 17 0006               Contrast Orders - past 72 hours     None          Interpretation of levels and current regimen:    Has serum creatinine changed > 50% in last 72 hours: No    Urine output:  diminished urine output    Renal Function: Decreasing    Plan:  1.  Decrease Dose to 1500 mg IV Q24H, based on age, creatinine trending up, and borderline creatinine clearance.  2.  Pharmacy will check trough levels as appropriate in 1-3 Days.    3. Serum creatinine levels will be ordered daily for the first week of therapy and at least twice weekly for subsequent weeks.      Olga Metz        .

## 2017-03-28 NOTE — ED PROVIDER NOTES
History     Chief Complaint   Patient presents with     Leg Pain     The history is provided by the patient.     Bud Merritt is a 89 year old male who presents to the ED with worsening swelling to lower extremities. Patient was seen in ED yesterday with complaints of swelling, weeping and redness to bilateral lower legs. His Lasix was increased for the next couple of days. He has been on Keflex for the past 5 days with no improvement. Patient saw wound care today and is suppose to follow up with them again tomorrow. Since his wound care appointment, his wife states that his swelling, redness and weeping have worsened, especially on the right.     He is in significant pain, and his usual tramadol and recent prescription for Norco have been ineffective.  He states he has had a good intake of fluids today. No fevers noted at home but he has had chills.    I reviewed his note from the ED yesterday:    Bud Merritt is a 89 year old male presenting with an increase in swelling and weeping to bilateral legs. His vitals were stable upon arrival. On exam, there is an open abrasion to dorsum of right foot. Large blister to dorsum of left foot. There is light erythema to toes and mid bilateral feet. 4+ pitting edema to bilateral legs as well.  Patients labs were reviewed and there are no signs of infection. Patient has a normal white blood cell count and CRP. Patients BNP was elevated but there is no signs of fluid or edema on the chest x-ray. Patient also states that his weight has been stable over the past week.  I think most of his drainage and light pink erythema in his legs are from the swelling. If we can work on this more aggressively I think these things should improve. I've wrapped his legs in an yolette boot here. I'm then I have them increases Lasix for the next couple of days. I will also give him a small IV dose Lasix here before he leaves. I have set up a follow-up appointment with his primary care doctor  tomorrow for recheck.    Past Medical History:   Diagnosis Date     Atrial fibrillation (H) 07/01/2016     Cardiomyopathy (H)      COPD exacerbation (H) 3/2/2017     Paroxysmal atrial fibrillation (H) 7/6/2016     Pure hypercholesterolemia      Rheumatoid arthritis(714.0)      Unspecified essential hypertension      Weakness generalized 3/2/2017       Past Surgical History:   Procedure Laterality Date     ARTHROPLASTY KNEE Left 1/12/2016    Procedure: ARTHROPLASTY KNEE;  Surgeon: Cesar Walker DO;  Location: [facility] OR     COLONOSCOPY  08/24/09     HC COLONOSCOPY THRU STOMA W BIOPSY/CAUTERY TUMOR/POLYP/LESION  8/31/2004     HC REPAIR ING HERNIA,5+Y/O,REDUCIBL  1996    Marlex mesh repair of bilateral inguinal hernias and drainage of bilateral scrotal hydroceles.     IRRIGATION AND DEBRIDEMENT SOFT TISSUE LOWER EXTREMITY, COMBINED Left 3/15/2016    Procedure: COMBINED IRRIGATION AND DEBRIDEMENT SOFT TISSUE LOWER EXTREMITY;  Surgeon: Cesar Walker DO;  Location: [facility] OR       Social History     Social History     Marital status:      Spouse name: N/A     Number of children: N/A     Years of education: N/A     Occupational History     Not on file.     Social History Main Topics     Smoking status: Former Smoker     Packs/day: 0.50     Years: 15.00     Types: Cigarettes     Quit date: 11/12/1998     Smokeless tobacco: Never Used      Comment: quit 15 yrs ago     Alcohol use No     Drug use: No     Sexual activity: Yes     Other Topics Concern     Caffeine Concern Yes     8 cups coffee day     Sleep Concern Yes     Weight Concern Yes     weight loss     Special Diet No     Exercise Yes     split firewood daily     Social History Narrative          Allergies   Allergen Reactions     Amiodarone Other (See Comments)     Drop in DLCO       Med List Reviewed    I have reviewed the Medications, Allergies, Past Medical and Surgical History, and Social History in the Epic system.    Review of Systems    Constitutional: Positive for chills. Negative for fever.        He has a good intake of fluids.    Respiratory: Negative for shortness of breath.    Cardiovascular: Positive for leg swelling (bilaterally). Negative for chest pain.   Gastrointestinal: Negative for nausea and vomiting.   Skin: Positive for rash (right leg more red).        Positive for redness and weeping to bilateral legs.    Neurological: Negative for weakness (nothing focal).   All other systems reviewed and are negative.      Physical Exam   BP: 110/75  Pulse: 79  Temp: 99.6  F (37.6  C)  Resp: 20  Weight: 73.5 kg (162 lb)  SpO2: 94 %  Physical Exam   Constitutional: He is oriented to person, place, and time. He appears distressed (moderate).   Elderly gentleman in obvious distress.   HENT:   Head: Normocephalic.   Dry oral mucosa, dentures     Eyes: EOM are normal.   Neck: Neck supple.   Cardiovascular: Normal rate and regular rhythm.    Murmur heard.  Pulmonary/Chest: Effort normal and breath sounds normal. No respiratory distress.   Abdominal: Soft. There is no tenderness.   Musculoskeletal: He exhibits edema (extensive).   Neurological: He is alert and oriented to person, place, and time.   Skin: There is erythema (marked redness and warmth of right lower leg, see photos).   Open wounds and some deroofed blisters on both legs/feet   Psychiatric: He has a normal mood and affect.                              ED Course      IV placed.  Labs drawn.  IV Antibiotics started for cellulitis in the form of Zosyn, vancomycin according to the sepsis due to skin infection order set.  We'll start gentle IV normal saline fluid resuscitation while waiting for his labs to come back.  Fentanyl 50  g IV gave him good relief so he was actually able to nap briefly.  This did need to be repeated for pain control.      He will end up staying in the hospital for more aggressive IV antibiotics even if his labs come back reasonably normal.  He has essentially failed  outpatient management.    I spoke with Dr. Teran, the hospitalist on call.  He has assumed his care and wrote orders.    His white count did come back normal and his lactic acid was normal at 1.7.  He is anemic with a hemoglobin of 8.9.  Renal function was normal.  Albumin low at 2.7 total protein low at 5.7.           ED Course     Procedures          Results for orders placed or performed during the hospital encounter of 03/27/17 (from the past 24 hour(s))   Pharmacy to dose vancomycin    Narrative    Jack Massey RPH     3/27/2017  9:23 PM  Bud Merritt is a 89 year old male, Vancomycin dose: 1500 mg   IV Q12H  Indication: Sepsis and Skin and Soft Tissue Infection  Day of Therapy: 1  Renal Function: Stable  Goal Trough Level: 15-20 mg/L  Plan: Continue Current Dose  Will continue to follow daily and check levels as appropriate in   1-3 Days.  Jack Massey RPH     CBC with platelets differential   Result Value Ref Range    WBC 5.7 4.0 - 11.0 10e9/L    RBC Count 2.84 (L) 4.4 - 5.9 10e12/L    Hemoglobin 8.9 (L) 13.3 - 17.7 g/dL    Hematocrit 28.0 (L) 40.0 - 53.0 %    MCV 99 78 - 100 fl    MCH 31.3 26.5 - 33.0 pg    MCHC 31.8 31.5 - 36.5 g/dL    RDW 15.5 (H) 10.0 - 15.0 %    Platelet Count 237 150 - 450 10e9/L    Diff Method Automated Method     % Neutrophils 78.8 %    % Lymphocytes 9.7 %    % Monocytes 10.8 %    % Eosinophils 0.0 %    % Basophils 0.5 %    % Immature Granulocytes 0.2 %    Absolute Neutrophil 4.5 1.6 - 8.3 10e9/L    Absolute Lymphocytes 0.6 (L) 0.8 - 5.3 10e9/L    Absolute Monocytes 0.6 0.0 - 1.3 10e9/L    Absolute Eosinophils 0.0 0.0 - 0.7 10e9/L    Absolute Basophils 0.0 0.0 - 0.2 10e9/L    Abs Immature Granulocytes 0.0 0 - 0.4 10e9/L   INR   Result Value Ref Range    INR 1.01 0.86 - 1.14   Comprehensive metabolic panel   Result Value Ref Range    Sodium 138 133 - 144 mmol/L    Potassium 3.7 3.4 - 5.3 mmol/L    Chloride 101 94 - 109 mmol/L    Carbon Dioxide 26 20 - 32 mmol/L    Anion Gap 11 3 - 14  mmol/L    Glucose 94 70 - 99 mg/dL    Urea Nitrogen 27 7 - 30 mg/dL    Creatinine 0.81 0.66 - 1.25 mg/dL    GFR Estimate >90  Non  GFR Calc   >60 mL/min/1.7m2    GFR Estimate If Black >90   GFR Calc   >60 mL/min/1.7m2    Calcium 8.1 (L) 8.5 - 10.1 mg/dL    Bilirubin Total 0.6 0.2 - 1.3 mg/dL    Albumin 2.7 (L) 3.4 - 5.0 g/dL    Protein Total 5.7 (L) 6.8 - 8.8 g/dL    Alkaline Phosphatase 74 40 - 150 U/L    ALT 22 0 - 70 U/L    AST 28 0 - 45 U/L   Lactic acid whole blood   Result Value Ref Range    Lactic Acid 1.7 0.7 - 2.1 mmol/L   Partial thromboplastin time   Result Value Ref Range    PTT 32 22 - 37 sec        Assessments & Plan (with Medical Decision Making)    (L03.115) Cellulitis of right lower extremity  Comment:   Plan: admit, IV abx as he has failed outpatient management.      (M79.606) Acute leg pain, unspecified laterality  Comment: bilateral, right > left  Plan: Fent for pain has been helpful.        I have reviewed the nursing notes.    I have reviewed the findings, diagnosis, plan and need for follow up with the patient.    Current Discharge Medication List          Final diagnoses:   Cellulitis of right lower extremity   Acute leg pain, unspecified laterality - bilateral, right > left   This document serves as a record of services personally performed by Donta Raman MD. It was created on their behalf by Liliya Miranda, a trained medical scribe. The creation of this record is based on the provider's personal observations and the statements of the patient. This document has been checked and approved by the attending provider.   Note: Chart documentation done in part with Dragon Voice Recognition software. Although reviewed after completion, some word and grammatical errors may remain.        3/27/2017   [facility] EMERGENCY DEPARTMENT     Donta Raman MD  03/28/17 0104

## 2017-03-28 NOTE — PROGRESS NOTES
Patient nauseated, runs of tachycardia noted, discussed with Dr. Do, he states he wants to do another NS IV fluid bolus, keep it running at the same time as the maintenance fluids and dopamine.  Dr. Do did see the telemetry strips, he is thinking it is more A-fib (chronic).  Ok to back down on the dopamine while the bolus is running, then goal is to back dopamine down to 2-3 mcg/kg/min

## 2017-03-28 NOTE — PROGRESS NOTES
Pt sleeping much of shift. C/o pain in bilateral legs. PRN fentanyl given per MAR which has been effective. No other complaints this shift.

## 2017-03-28 NOTE — PROVIDER NOTIFICATION
Nurse Sepsis Evaluation Note    Any Sepsis BPA alert activated: NO  Action Taken: MD notified and MD ordered lactic    Infection Present: known    Vital signs:  Temp: 98  F (36.7  C) Temp src: Oral BP: 113/68 Pulse: 60 Heart Rate: 79 Resp: 18 SpO2: 94 % O2 Device: Nasal cannula Oxygen Delivery: 2 LPM    Labs:  WBC   Date Value Ref Range Status   03/28/2017 6.6 4.0 - 11.0 10e9/L Final     Platelet Count   Date Value Ref Range Status   03/28/2017 197 150 - 450 10e9/L Final     Lactic Acid   Date Value Ref Range Status   03/28/2017 2.6 (H) 0.7 - 2.1 mmol/L Final     Comment:     Significant value called to and read back by  HALLE LEYVA AT 1817 SC       Creatinine   Date Value Ref Range Status   03/28/2017 0.84 0.66 - 1.25 mg/dL Final     INR   Date Value Ref Range Status   03/27/2017 1.01 0.86 - 1.14 Final     Bilirubin Total   Date Value Ref Range Status   03/27/2017 0.6 0.2 - 1.3 mg/dL Final     Repeat lactic acid for am      The patient is at baseline mental status.    The rest of their physical exam is significant for:    Blood cultures drawn prior to antibiotics: blood cultures completed on admit and prior to antibiotics  The patient is currently on the following antibiotics:  Anti-infectives (Future)    Start     Dose/Rate Route Frequency Ordered Stop    03/28/17 2300  vancomycin (VANCOCIN) 1500 mg in 0.9% NaCl 250 mL PREMIX      1,500 mg Intravenous EVERY 24 HOURS 03/28/17 1119      03/28/17 0300  piperacillin-tazobactam (ZOSYN) infusion 3.375 g      3.375 g  100 mL/hr over 30 Minutes Intravenous EVERY 6 HOURS 03/28/17 0006              Fluid: Fluid bolus ordered  Bolus amount ordered normal saline 500 cc's.     Patient weight:   Wt Readings from Last 1 Encounters:   03/28/17 76 kg (167 lb 8.8 oz)         Were there delays in treatment? If yes, why? NO    Disposition: The patient will remain on the current unit. We will continue to monitor this patient closely    Bud Merritt  March 28, 2017  6:34 PM

## 2017-03-28 NOTE — PROGRESS NOTES
SPIRITUAL HEALTH SERVICES  SPIRITUAL ASSESSMENT Progress Note  [facility]      (Follow up)  Pt stated he was feeling better.   told him he looked better than earlier in the day and then provided a prayer.   is available for pt/family needs.    Greyson Vallejo M.Div., [facility]  Staff   Office tel: [phone]

## 2017-03-28 NOTE — CONSULTS
Bud Merritt is a 89 year old male, Vancomycin dose: 1500 mg IV Q12H  Indication: Sepsis and Skin and Soft Tissue Infection  Day of Therapy: 1  Renal Function: Stable  Goal Trough Level: 15-20 mg/L  Plan: Continue Current Dose  Will continue to follow daily and check levels as appropriate in 1-3 Days.  Jack Massey, RPH

## 2017-03-28 NOTE — PLAN OF CARE
Problem: Goal Outcome Summary  Goal: Goal Outcome Summary  Outcome: No Change  S-(situation): shift note     B-(background): cellulitis     A-(assessment): patient arrived in the unit at 1130 to day after having low blood pressures. He was placed on dopamine drip to keep his mean arterial pressures greater than 65. He is in a paced rhythm with some breakthrough episodes of tachycardia vs rapid afib, rates 130-140, he also has some pvc. Lungs clear but diminished and he gets short of breath with exertion. His oxygen saturation is 97% on room air.   He has had adequate urine output but is is concentrated, total 650 cc since 1130. He had a large stool today prior to the order for an occult stool. Will await the next bowel movement.    he was up to the bedside commode with great difficulty with 2 staff assist with walker and gait belt. He complained of pain to his feet when he stood. Staff utilized the lift to place him back in bed. Patient voices that he is very weak.     R-(recommendations): continue to monitor vitals signs, wean dopamine as able keeping map > 65. Notify MD of any changes.
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
## 2017-03-28 NOTE — PROGRESS NOTES
[facility] – [place], [place] Intensive Care Progress Note    Date of Service (when I saw the patient): 03/28/2017     Assessment & Plan   Bud Merritt is a 89 year old male who was admitted on 3/27/2017 with initial concern for skin and soft tissue infection in the lower extremities.  He abruptly deteriorated this morning with onset of severe potentially life-threatening hypotension and concern for possible early shock.  Cause of severe hypotension is probably multifactorial and primarily due to known severe aortic stenosis and previous vasoactive medications exacerbated by this infection and possible associated relative adrenal insufficiency.  He is also more anemic although overt bleeding is not evident, but he has been receiving therapeutic anticoagulation and did have a recent surgical procedure.  So far, septic shock has not been suspected as the primary cause.  He appears to be stabilizing now with IV fluid resuscitation and initiation of vasopressors and is not demonstrating overt signs of acute organ dysfunction so far.  He also has tolerated fluid resuscitation so far without worsening respiratory failure or other clinical signs of systolic CHF.    Principal Problem:    Cellulitis of right lower extremity    Assessment: appears to be improving clinically with empiric vancomycin and Zosyn, cultures are negative so far    Plan: continue present parenteral antibiotics pending culture results    Active Problems:    Hypotension due to drugs    Assessment: Multifactorial hypotension but appears to be intolerant of his previous doses of vasoactive medications at this time most likely due to a combination of acute infection, possible relative adrenal insufficiency associated with chronic prednisone therapy and acute infection, and underlying severe aortic stenosis, less suspicion at this time for septic shock or cardiogenic shock otherwise    Plan: continue vasopressors with  dopamine rather than levophed at this time due to favorable clinical response to dopamine and suspicion for multifactorial etiology of hypotension without high suspicion for septic shock, stop ACE inhibitor and beta blocker therapy for now, continue IV fluid therapy as tolerated from a respiratory standpoint, hold diuretics for now, increase stress doses of corticosteroids      Mitral regurgitation and aortic stenosis - AS severe by echo on 3/2017    Assessment: Worsening hemodynamic status as summarized above    Plan: as outlined above      Paroxysmal atrial fibrillation (H)    Assessment: Persistent today, fluctuating from rapid atrial fibrillation with heart rate 130 to 140 to a paced rhythm    Plan: Continue Tikosyn, resume low-dose beta blocker therapy once blood pressure has stabilized      Venous stasis ulcers of both lower extremities (H)    Assessment: Multiple ulcers in both lower extremities, recently evaluated in wound care clinic yesterday, infected ulcers probably contribute to suspected skin infection, at risk for Pseudomonas and MRSA amongst other possibilities    Plan: continue present empiric antibiotics, continue wound cares as recommended by wound care clinic      Anemia    Assessment: Mild decrease in hemoglobin from 9.3-8.3 over the last 24 hours, no overt bleeding but is receiving therapeutic anticoagulation, worsening anemia could exacerbate hypotension    Plan: follow serial hemoglobin, consider PRBC transfusion for hemoglobin less than 8 particularly if hypotension persists      Cardiomyopathy, unspecified (H)    Assessment: recent EF 20-25%, nonischemic cardiomyopathy suspected, presently not tolerating previous doses of ACE inhibitor and beta blocker because of severe hypotension today    Plan: as outlined above      Chronic systolic congestive heart failure (H)    Assessment: Appears compensated so far though at high risk for decompensation in this clinical context    Plan: holding  diuretics for now but restart once more stable hemodynamically or if his respiratory status deteriorates with concern for worsening pulmonary edema      Immunosuppression (H)    Assessment: Taking Arava and prednisone chronically for treatment of rheumatoid arthritis, puts him at risk for infection including Pseudomonas    Plan: continue present antibiotics, IV hydrocortisone administered this morning and will continue stress doses of prednisone      Rheumatoid arthritis involving multiple sites, unspecified rheumatoid factor presence (H)    Assessment: Clinically stable    Plan: as above      Essential hypertension with goal blood pressure less than 140/90    Assessment: Severely hypotensive today    Plan: as above      Malignant neoplasm of prostate (H)    Assessment: With bony metastases, clinically stable, treated with hormonal therapy chronically    Plan: no acute intervention      Cardiac pacemaker in situ    Assessment: Recently placed, appears to be functioning, no overt signs of bleeding at the pacemaker insertion site    Plan: cardiac monitoring      Lines present: PICC Line  DVT Prophylaxis: Eliquis  GI Prophylaxis: PPI  Procedures planned or completed today: Possible transfusion  Restraints: Restraints for medical healing needed: NO    Family update by me today: Yes     Cesar Do    Time Spent on this Encounter   Billing:  I spent 70 minutes bedside and on the inpatient unit today managing the critical care of Bud ALEX Carcamoon in relation to the issues listed in this note.    Interval History   General:  felt worse this morning with general malaise, dizziness, and fatigue at a time when he was severely hypotensive, now feeling better this afternoon after hypotension has resolved    Vitals: no fever and fluctuating heart rate, severely hypotensive this morning with blood pressure improving now this afternoon after boluses of IV fluids and initiation of dopamine    Intake/Output: tolerating IV fluids  and improved urinary output   Nutrition: tolerating an advancing diet   Mental status:  appeared less responsive this morning when hypotensive but now alert and oriented and responding appropriately   Respiratory: improved oxigenation and denies dyspnea, presently not requiring oxygen    Cardiovascular no chest pain and fluctuating episodes of tachycardia    Renal:  adequate urine output today and stable renal function   Neurologic: no focal deficits reported   Medications: vasopressors started and IV hydrocortisone administered this morning    Interventions:  PICC line placed today    Consults: No consultations were requested since the last visit        Medications     - MEDICATION INSTRUCTIONS -       - MEDICATION INSTRUCTIONS -       DOPamine 4 mcg/kg/min (03/28/17 1340)     0.45% sodium chloride + KCl 20 mEq/L 100 mL/hr at 03/28/17 1315       piperacillin-tazobactam  3.375 g Intravenous Q6H     apixaban ANTICOAGULANT  2.5 mg Oral BID     ascorbic acid  500 mg Oral Daily     calcium carbonate  625 mg Oral Daily     dofetilide  125 mcg Oral BID     furosemide  20 mg Oral Daily     omeprazole  20 mg Oral BID AC     tamsulosin  0.4 mg Oral BID     cholecalciferol  1,000 Units Oral Daily     sodium chloride (PF)  3 mL Intracatheter Q8H     [START ON 3/29/2017] predniSONE  15 mg Oral Daily     vancomycin (VANCOCIN) IV  1,500 mg Intravenous Q24H     sodium chloride (PF)  10 mL Intracatheter Q7 Days       Physical Exam   Temp: 98.8  F (37.1  C) Temp src: Oral Temp  Min: 98.8  F (37.1  C)  Max: 99.9  F (37.7  C) BP: 98/52 Pulse: 60 Heart Rate: 70 Resp: 18 SpO2: 97 % O2 Device: Nasal cannula Oxygen Delivery: 2 LPM  Vitals:    03/27/17 2015 03/28/17 0137   Weight: 73.5 kg (162 lb) 76 kg (167 lb 8.8 oz)     I/O last 3 completed shifts:  In: 350 [P.O.:300; I.V.:50]  Out: 600 [Urine:600]    Resp: 18  BP - Mean:  [49-86] 67     Constitutional: now appears well sitting up in bed while drinking  Neurologic: alert, normally  responsive, moves all limbs purposefully and symmetrically  Cardiovascular: regular rate and rhythm, easily palpable radial pulse with normal capillary refill  Respiratory:  Normal respiratory effort, few inspiratory crackles at the lung bases, no wheezing  GI:  Normal bowel sounds, nondistended abdomen, soft abdomen without apparent tenderness  Skin: several ulcers present on both lower extremities with surrounding erythema although the lower extremities are much less erythematous today as compared with last night  Extremities: pitting edema present in both lower extremities  Lines: No erythema or discharge at entry site for PICC Line  Current lines are required for patient management      Data   Data reviewed today:  I personally reviewed telemetry demonstrating intermittent episodes of regular narrow complex tachycardia alternating with paced rhythm.    Recent Labs  Lab 03/28/17  0906 03/27/17  2110 03/26/17  1655   WBC 6.6 5.7 5.5   HGB 8.3* 8.9* 9.1*   MCV 99 99 100    237 228   INR  --  1.01  --     138 140   POTASSIUM 3.6 3.7 3.9   CHLORIDE 103 101 107   CO2 25 26 25   BUN 21 27 27   CR 0.84 0.81 0.78   ANIONGAP 9 11 8   SHELIA 7.8* 8.1* 8.2*   GLC 84 94 92   ALBUMIN  --  2.7* 2.6*   PROTTOTAL  --  5.7* 5.9*   BILITOTAL  --  0.6 0.3   ALKPHOS  --  74 90   ALT  --  22 22   AST  --  28 29     Recent Results (from the past 24 hour(s))   XR Chest Port 1 View    Narrative    XR CHEST PORT 1 VW 3/28/2017 2:36 PM    HISTORY: PICC line placement.    COMPARISON: 3/26/2017    FINDINGS: Right PICC line tip is in the projection of the SVC/RA  junction. Marked cardiomegaly with prominence of the mediastinal  vasculature, not significantly changed from recent previous.      Impression    IMPRESSION: Right PICC line appears appropriately positioned.    ASMITA ULLOA MD      blood cultures are negative so far

## 2017-03-28 NOTE — ED NOTES
Patient here with his wife and a neighbor. He was seen in ED during the night and by a wound nurse today. C/o worse pain and swelling.

## 2017-03-29 PROBLEM — I42.0 DILATED CARDIOMYOPATHY (H): Status: ACTIVE | Noted: 2017-03-28

## 2017-03-29 PROBLEM — R57.9 SHOCK (H): Status: ACTIVE | Noted: 2017-03-02

## 2017-03-29 LAB
ABO + RH BLD: NORMAL
ABO + RH BLD: NORMAL
ANION GAP SERPL CALCULATED.3IONS-SCNC: 10 MMOL/L (ref 3–14)
BLD GP AB SCN SERPL QL: NORMAL
BLD PROD TYP BPU: NORMAL
BLD PROD TYP BPU: NORMAL
BLD UNIT ID BPU: 0
BLOOD BANK CMNT PATIENT-IMP: NORMAL
BLOOD PRODUCT CODE: NORMAL
BPU ID: NORMAL
BUN SERPL-MCNC: 21 MG/DL (ref 7–30)
CALCIUM SERPL-MCNC: 7.1 MG/DL (ref 8.5–10.1)
CHLORIDE SERPL-SCNC: 109 MMOL/L (ref 94–109)
CO2 SERPL-SCNC: 21 MMOL/L (ref 20–32)
CREAT SERPL-MCNC: 0.84 MG/DL (ref 0.66–1.25)
GFR SERPL CREATININE-BSD FRML MDRD: 86 ML/MIN/1.7M2
GLUCOSE SERPL-MCNC: 86 MG/DL (ref 70–99)
HEMOCCULT SP1 STL QL: NEGATIVE
HGB BLD-MCNC: 7.6 G/DL (ref 13.3–17.7)
HGB BLD-MCNC: 8.5 G/DL (ref 13.3–17.7)
HGB BLD-MCNC: 8.6 G/DL (ref 13.3–17.7)
LACTATE BLD-SCNC: 1 MMOL/L (ref 0.7–2.1)
MAGNESIUM SERPL-MCNC: 2 MG/DL (ref 1.6–2.3)
NUM BPU REQUESTED: 1
POTASSIUM SERPL-SCNC: 3.6 MMOL/L (ref 3.4–5.3)
PROCALCITONIN SERPL-MCNC: 9.67 NG/ML
SODIUM SERPL-SCNC: 140 MMOL/L (ref 133–144)
SPECIMEN EXP DATE BLD: NORMAL
TRANSFUSION STATUS PATIENT QL: NORMAL
TRANSFUSION STATUS PATIENT QL: NORMAL

## 2017-03-29 PROCEDURE — 80048 BASIC METABOLIC PNL TOTAL CA: CPT | Performed by: PEDIATRICS

## 2017-03-29 PROCEDURE — A9270 NON-COVERED ITEM OR SERVICE: HCPCS | Mod: GY | Performed by: FAMILY MEDICINE

## 2017-03-29 PROCEDURE — 36415 COLL VENOUS BLD VENIPUNCTURE: CPT | Performed by: PEDIATRICS

## 2017-03-29 PROCEDURE — 82274 ASSAY TEST FOR BLOOD FECAL: CPT | Performed by: PEDIATRICS

## 2017-03-29 PROCEDURE — 25000128 H RX IP 250 OP 636: Performed by: FAMILY MEDICINE

## 2017-03-29 PROCEDURE — 25000125 ZZHC RX 250: Performed by: PEDIATRICS

## 2017-03-29 PROCEDURE — 99291 CRITICAL CARE FIRST HOUR: CPT | Performed by: PEDIATRICS

## 2017-03-29 PROCEDURE — 25000132 ZZH RX MED GY IP 250 OP 250 PS 637: Mod: GY | Performed by: FAMILY MEDICINE

## 2017-03-29 PROCEDURE — P9016 RBC LEUKOCYTES REDUCED: HCPCS | Performed by: PEDIATRICS

## 2017-03-29 PROCEDURE — 85018 HEMOGLOBIN: CPT | Performed by: PEDIATRICS

## 2017-03-29 PROCEDURE — 20000003 ZZH R&B ICU

## 2017-03-29 PROCEDURE — 83735 ASSAY OF MAGNESIUM: CPT | Performed by: PEDIATRICS

## 2017-03-29 PROCEDURE — 25000125 ZZHC RX 250: Performed by: FAMILY MEDICINE

## 2017-03-29 PROCEDURE — A9270 NON-COVERED ITEM OR SERVICE: HCPCS | Mod: GY | Performed by: PEDIATRICS

## 2017-03-29 PROCEDURE — 25000128 H RX IP 250 OP 636: Performed by: PEDIATRICS

## 2017-03-29 PROCEDURE — 25000132 ZZH RX MED GY IP 250 OP 250 PS 637: Mod: GY | Performed by: PEDIATRICS

## 2017-03-29 PROCEDURE — 83605 ASSAY OF LACTIC ACID: CPT | Performed by: FAMILY MEDICINE

## 2017-03-29 RX ORDER — MAGNESIUM SULFATE HEPTAHYDRATE 40 MG/ML
2 INJECTION, SOLUTION INTRAVENOUS DAILY PRN
Status: DISCONTINUED | OUTPATIENT
Start: 2017-03-29 | End: 2017-04-04 | Stop reason: HOSPADM

## 2017-03-29 RX ORDER — IPRATROPIUM BROMIDE AND ALBUTEROL SULFATE 2.5; .5 MG/3ML; MG/3ML
3 SOLUTION RESPIRATORY (INHALATION) EVERY 4 HOURS PRN
Status: DISCONTINUED | OUTPATIENT
Start: 2017-03-29 | End: 2017-04-04 | Stop reason: HOSPADM

## 2017-03-29 RX ORDER — POTASSIUM CHLORIDE 7.45 MG/ML
10 INJECTION INTRAVENOUS
Status: DISCONTINUED | OUTPATIENT
Start: 2017-03-29 | End: 2017-04-04 | Stop reason: HOSPADM

## 2017-03-29 RX ORDER — POTASSIUM CHLORIDE 1500 MG/1
20-40 TABLET, EXTENDED RELEASE ORAL
Status: DISCONTINUED | OUTPATIENT
Start: 2017-03-29 | End: 2017-04-04 | Stop reason: HOSPADM

## 2017-03-29 RX ORDER — IBUPROFEN 200 MG
1250 CAPSULE ORAL DAILY
Status: DISCONTINUED | OUTPATIENT
Start: 2017-03-30 | End: 2017-04-04 | Stop reason: HOSPADM

## 2017-03-29 RX ORDER — METOPROLOL TARTRATE 25 MG/1
25 TABLET, FILM COATED ORAL 2 TIMES DAILY
Status: DISCONTINUED | OUTPATIENT
Start: 2017-03-29 | End: 2017-03-30

## 2017-03-29 RX ORDER — POTASSIUM CHLORIDE 29.8 MG/ML
20 INJECTION INTRAVENOUS
Status: DISCONTINUED | OUTPATIENT
Start: 2017-03-29 | End: 2017-03-29 | Stop reason: CLARIF

## 2017-03-29 RX ORDER — MAGNESIUM SULFATE HEPTAHYDRATE 40 MG/ML
4 INJECTION, SOLUTION INTRAVENOUS EVERY 4 HOURS PRN
Status: DISCONTINUED | OUTPATIENT
Start: 2017-03-29 | End: 2017-04-04 | Stop reason: HOSPADM

## 2017-03-29 RX ORDER — POTASSIUM CHLORIDE 1.5 G/1.58G
20-40 POWDER, FOR SOLUTION ORAL
Status: DISCONTINUED | OUTPATIENT
Start: 2017-03-29 | End: 2017-04-04 | Stop reason: HOSPADM

## 2017-03-29 RX ORDER — DIGOXIN 0.25 MG/ML
250 INJECTION INTRAMUSCULAR; INTRAVENOUS ONCE
Status: COMPLETED | OUTPATIENT
Start: 2017-03-29 | End: 2017-03-29

## 2017-03-29 RX ORDER — DIGOXIN 0.25 MG/ML
250 INJECTION INTRAMUSCULAR; INTRAVENOUS EVERY 8 HOURS
Status: COMPLETED | OUTPATIENT
Start: 2017-03-29 | End: 2017-03-30

## 2017-03-29 RX ADMIN — PREDNISONE 15 MG: 5 TABLET ORAL at 08:44

## 2017-03-29 RX ADMIN — APIXABAN 2.5 MG: 2.5 TABLET, FILM COATED ORAL at 08:40

## 2017-03-29 RX ADMIN — OXYCODONE HYDROCHLORIDE AND ACETAMINOPHEN 500 MG: 500 TABLET ORAL at 08:41

## 2017-03-29 RX ADMIN — OMEPRAZOLE 20 MG: 20 CAPSULE, DELAYED RELEASE ORAL at 08:44

## 2017-03-29 RX ADMIN — METOPROLOL TARTRATE 25 MG: 25 TABLET ORAL at 12:22

## 2017-03-29 RX ADMIN — TAMSULOSIN HYDROCHLORIDE 0.4 MG: 0.4 CAPSULE ORAL at 08:45

## 2017-03-29 RX ADMIN — APIXABAN 2.5 MG: 2.5 TABLET, FILM COATED ORAL at 20:24

## 2017-03-29 RX ADMIN — VANCOMYCIN HYDROCHLORIDE 1500 MG: 1 INJECTION, POWDER, LYOPHILIZED, FOR SOLUTION INTRAVENOUS at 22:24

## 2017-03-29 RX ADMIN — MAGNESIUM SULFATE IN WATER 2 G: 40 INJECTION, SOLUTION INTRAVENOUS at 12:22

## 2017-03-29 RX ADMIN — METOPROLOL TARTRATE 25 MG: 25 TABLET ORAL at 20:24

## 2017-03-29 RX ADMIN — TAMSULOSIN HYDROCHLORIDE 0.4 MG: 0.4 CAPSULE ORAL at 20:24

## 2017-03-29 RX ADMIN — DOFETILIDE 125 MCG: 0.12 CAPSULE ORAL at 08:39

## 2017-03-29 RX ADMIN — HYDROCODONE BITARTRATE AND ACETAMINOPHEN 1 TABLET: 5; 325 TABLET ORAL at 18:44

## 2017-03-29 RX ADMIN — OMEPRAZOLE 20 MG: 20 CAPSULE, DELAYED RELEASE ORAL at 16:37

## 2017-03-29 RX ADMIN — Medication: at 15:30

## 2017-03-29 RX ADMIN — POTASSIUM CHLORIDE 10 MEQ: 7.46 INJECTION, SOLUTION INTRAVENOUS at 10:34

## 2017-03-29 RX ADMIN — POTASSIUM CHLORIDE AND SODIUM CHLORIDE: 450; 150 INJECTION, SOLUTION INTRAVENOUS at 15:26

## 2017-03-29 RX ADMIN — DOFETILIDE 125 MCG: 0.12 CAPSULE ORAL at 20:24

## 2017-03-29 RX ADMIN — TAZOBACTAM SODIUM AND PIPERACILLIN SODIUM 3.38 G: 375; 3 INJECTION, SOLUTION INTRAVENOUS at 10:15

## 2017-03-29 RX ADMIN — HYDROCODONE BITARTRATE AND ACETAMINOPHEN 1 TABLET: 5; 325 TABLET ORAL at 20:24

## 2017-03-29 RX ADMIN — CALCIUM 625 MG: 500 TABLET ORAL at 08:42

## 2017-03-29 RX ADMIN — HYDROCODONE BITARTRATE AND ACETAMINOPHEN 1 TABLET: 5; 325 TABLET ORAL at 08:47

## 2017-03-29 RX ADMIN — FUROSEMIDE 2.5 MG/HR: 10 INJECTION, SOLUTION INTRAVENOUS at 14:18

## 2017-03-29 RX ADMIN — DIGOXIN 250 MCG: 0.25 INJECTION INTRAMUSCULAR; INTRAVENOUS at 16:37

## 2017-03-29 RX ADMIN — VITAMIN D, TAB 1000IU (100/BT) 1000 UNITS: 25 TAB at 08:43

## 2017-03-29 RX ADMIN — TAZOBACTAM SODIUM AND PIPERACILLIN SODIUM 3.38 G: 375; 3 INJECTION, SOLUTION INTRAVENOUS at 15:26

## 2017-03-29 RX ADMIN — METOPROLOL TARTRATE 2.5 MG: 5 INJECTION INTRAVENOUS at 08:58

## 2017-03-29 RX ADMIN — TAZOBACTAM SODIUM AND PIPERACILLIN SODIUM 3.38 G: 375; 3 INJECTION, SOLUTION INTRAVENOUS at 20:24

## 2017-03-29 RX ADMIN — DIGOXIN 250 MCG: 0.25 INJECTION INTRAMUSCULAR; INTRAVENOUS at 08:52

## 2017-03-29 RX ADMIN — HYDROCODONE BITARTRATE AND ACETAMINOPHEN 1 TABLET: 5; 325 TABLET ORAL at 12:22

## 2017-03-29 RX ADMIN — POTASSIUM CHLORIDE 10 MEQ: 7.46 INJECTION, SOLUTION INTRAVENOUS at 09:27

## 2017-03-29 RX ADMIN — POTASSIUM CHLORIDE AND SODIUM CHLORIDE: 450; 150 INJECTION, SOLUTION INTRAVENOUS at 01:46

## 2017-03-29 RX ADMIN — TAZOBACTAM SODIUM AND PIPERACILLIN SODIUM 3.38 G: 375; 3 INJECTION, SOLUTION INTRAVENOUS at 03:00

## 2017-03-29 ASSESSMENT — PAIN DESCRIPTION - DESCRIPTORS: DESCRIPTORS: CONSTANT;PRESSURE

## 2017-03-29 NOTE — CONSULTS
Reason for Referral: D/C Planning    Presenting Problem: Cellulitis (both legs)    Cognitive Behavioral Status: awake, alert and oriented    Support system: Wife/Children    Current Living Situation:   Home Setting: unknown   Living Situation: Spouse   Function Status / Assistive Device: wheeled walker    Employment/ Financial/ Insurance Concerns: retired          No Insurance issues identified      Housing Concerns: No    Assessment: Patient lives at home with his wife.  He uses a walker in the home.  Wife is there to support him as needed.  He currently has -[facility] for RN and HHA.  Patient remains in ICU and has not medically been stable enough to have a P/T eval yet.  Will re-assess after P/T assessment and assist with disposition.    Plan: MARIZOL Jimenez  [facility] [phone]/ [facility] [phone]

## 2017-03-29 NOTE — PROGRESS NOTES
S= Cardiovascular    B= Admitted 03/27 for cellulitis and edema- Atrial fibrillation    A= Tachypnic, ECG  sats 93-97% on room air, BP 72//74, HR , RR 12-28, Hgb this am 7.6, patient received 1 unit of blood in am and recheck scheduled at 2PM, Generalized weakness. Patient attempted to ambulate from bed to commode, but stated that he felt too weak to stand. Patient becomes distressed when heart rate is above 130.     R= Continue to monitor BP and Heart rate. Continue to manage atrial fib with Metoprolol.  Nory PALMER~~~~~~~~~~~

## 2017-03-29 NOTE — PROGRESS NOTES
[facility] [place]    Date of Service (when I saw the patient): 03/29/2017     Assessment & Plan   Bud Merritt is a 89 year old male who was admitted on 3/27/2017.  Signs of infection are improving, and signs of rapidly evolving and potentially life-threatening shock yesterday morning have generally subsided.  He did have recurrent narrow complex tachycardia this morning with transient hypotension at that time which has improved after blood transfusion and restarting beta blocker therapy.      Principal Problem:    Cellulitis of right lower extremity    Assessment: Improving clinically, cultures negative so far    Plan: Continue present empiric parenteral antibiotics    Active Problems:    Shock (H)    Assessment: Severe hypotension with elevated lactic acid level yesterday consistent with shock, likely multifactorial but primarily cardiogenic due to underlying severe aortic stenosis and dilated cardiomyopathy exacerbated by acute infection, probably less likely to have been primarily septic shock, has been off of vasopressors for over 12 hours now    Plan: Continue IV fluids, optimize cardiac status as much as possible cautiously titrating vasoactive medications, continue stress doses of corticosteroids, favor continued ICU monitoring another 24 hours      Mitral regurgitation and aortic stenosis - AS severe by echo on 3/2017    Assessment: Severe aortic stenosis probably contributes significantly to rapidly evolving shock yesterday morning, tolerating aggressive IV fluid resuscitation without overt signs of worsening pulmonary edema    Plan: Cautious titration of vasoactive medications and diuretics as infection subsides      Paroxysmal atrial fibrillation (H)    Assessment: Worse this morning probably due to a combination of withholding beta blocker therapy since yesterday morning, stress of acute illness, and electrolyte disturbances  with relative hypopotassemia and hypomagnesemia    Plan: Continue Tikosyn, resume beta blocker therapy starting with a lower dose than previously and titrating upward as tolerated hemodynamically, optimize potassium and magnesium levels with electrolyte replacement, load IV digoxin today and check digoxin level tomorrow      Venous stasis ulcers of both lower extremities (H)    Assessment: Persistent and likely contributed to skin infection    Plan: Continue ulcer care as recommended by wound care nurse a few days ago including dressing changes every other day, orders written today for this, start IV Lasix infusion cautiously if tolerated hemodynamically to try to reduce peripheral edema which is exacerbating his skin infection      Anemia    Assessment: Worsened this morning and probably contributed to hypotension although no overt active bleeding has been evident, multiple comorbid medical problems including shock increases risk for inadequate tissue oxygen delivery    Plan: Transfuse one unit packed red blood cells today, follow serial hemoglobin and transfuse as necessary to keep hemoglobin 8 or higher in this clinical context, check stool for occult blood      Dilated cardiomyopathy (H) dilated LV, EF 20-25% by Echo 3/4/17    Assessment: Likely decompensated precipitating shock yesterday in the context of acute infection and severe underlying aortic stenosis, now much improved    Plan: Resume beta blocker therapy, with hold ACE inhibitor therapy for now because of his tenuous hemodynamic status, restart diuretic therapy with continuous Lasix infusion cautiously and titrate dosing to optimize diuresis but reduce risk of hypotension, anticipate more aggressive diuresis until signs of peripheral edema improve unless he is not able to tolerate the Lasix infusion hemodynamically      Chronic systolic congestive heart failure (H)    Assessment: No overt signs of worsening CHF so far    Plan: As outlined above       Immunosuppression (H)    Assessment: Increases his risk for infection and atypical presentation of infection including possible atypical presentation at admission of sepsis    Plan: Continue present parental antibiotics      Rheumatoid arthritis involving multiple sites, unspecified rheumatoid factor presence (H)    Assessment: Stable clinically    Plan: No acute intervention      Essential hypertension with goal blood pressure less than 140/90    Assessment: Improved hemodynamic status though not hypertensive    Plan: As outlined above      Malignant neoplasm of prostate (H)    Assessment: Chronic    Plan: No acute changes      Cardiac pacemaker in situ    Assessment: Appears to be functioning    Plan: No acute changes, continue cardiac monitoring for another 24 hours      Lines present: PICC Line  DVT Prophylaxis: Eliquis  GI Prophylaxis: PPI  Procedures planned or completed today:  transfusion  Restraints: Restraints for medical healing needed: NO    Family update by me today: Yes     Cesar Do MD    Time Spent on this Encounter   Billing:  I spent 40 minutes bedside and on the inpatient unit today managing the critical care of Bud Merritt in relation to the issues listed in this note.    Interval History   General:  feels better, appears to be improving and more alert   Vitals: blood pressure improved overnight although now lower again this morning, no fever and heart rate continues to fluctuate but persistently tachycardic this morning    Intake/Output: tolerating IV fluids and adequate urine output    Nutrition: tolerating an advancing diet   Mental status: improved cognition and overall mental status, more alert and more responsive   Respiratory: respiratory effort about the same and oxigenation levels about the same   Cardiovascular no chest pain and episodes of palpitations   Renal: good urine output and stable renal fuction   Neurologic: no focal deficits reported   Medications: vasopressors  decreased and discontinued yesterday afternoon    Interventions: No interventions performed since the last visit   Consults: No consultations were requested since the last visit        Medications     - MEDICATION INSTRUCTIONS -       DOPamine Stopped (03/28/17 1703)     0.45% sodium chloride + KCl 20 mEq/L 100 mL/hr at 03/29/17 0146       metoprolol  25 mg Oral BID     Medication Available on Floor   Does not apply See Admin Instructions     zinc oxide 16 %   Topical Every Other Day     piperacillin-tazobactam  3.375 g Intravenous Q6H     apixaban ANTICOAGULANT  2.5 mg Oral BID     ascorbic acid  500 mg Oral Daily     calcium carbonate  625 mg Oral Daily     dofetilide  125 mcg Oral BID     omeprazole  20 mg Oral BID AC     tamsulosin  0.4 mg Oral BID     cholecalciferol  1,000 Units Oral Daily     sodium chloride (PF)  3 mL Intracatheter Q8H     predniSONE  15 mg Oral Daily     vancomycin (VANCOCIN) IV  1,500 mg Intravenous Q24H     sodium chloride (PF)  10 mL Intracatheter Q7 Days     sodium chloride (PF)  10 mL Intracatheter Q7 Days       Physical Exam   Temp: 99  F (37.2  C) Temp src: Oral Temp  Min: 97.7  F (36.5  C)  Max: 99.3  F (37.4  C) BP: 99/72 Pulse: 60 Heart Rate: 123 Resp: 23 SpO2: 94 % O2 Device: None (Room air) Oxygen Delivery: 2 LPM  Vitals:    03/27/17 2015 03/28/17 0137   Weight: 73.5 kg (162 lb) 76 kg (167 lb 8.8 oz)     I/O last 3 completed shifts:  In: 1200 [P.O.:1200]  Out: 1050 [Urine:1050]    Resp: 23  BP - Mean:  [] 63     Constitutional: No acute distress lying in bed  Neurologic: Alert, maintains wakefulness and attention, answers questions appropriately, follows simple instructions  Cardiovascular: Tachycardic with irregularly irregular rhythm, easily palpable radial pulse with normal capillary refill  Respiratory: Mildly tachypneic with otherwise normal respiratory effort, diminished breath sounds throughout, clear lung fields  GI: Normal bowel sounds, no abdominal distention,  soft abdomen without apparent tenderness  Skin: Multiple ulcers on both lower legs right greater than left which presently appear dry, now minimal erythema on the right lower leg surrounding the largest and deepest proximal skin ulcer, no significant erythema on the left lower leg, multiple areas of sloughing dry skin on the lower legs and feet  Extremities: Severe pitting edema present in both feet with moderate pitting edema in the lower legs  Lines: No erythema or discharge at entry site for PICC Line  Current lines are required for patient management      Data   Data reviewed today:  I personally reviewed telemetry.    Recent Labs  Lab 03/29/17  0532 03/28/17  1809 03/28/17  0906 03/27/17  2110 03/26/17  1655   WBC  --   --  6.6 5.7 5.5   HGB 7.6* 8.4* 8.3* 8.9* 9.1*   MCV  --   --  99 99 100   PLT  --   --  197 237 228   INR  --   --   --  1.01  --      --  137 138 140   POTASSIUM 3.6  --  3.6 3.7 3.9   CHLORIDE 109  --  103 101 107   CO2 21  --  25 26 25   BUN 21  --  21 27 27   CR 0.84  --  0.84 0.81 0.78   ANIONGAP 10  --  9 11 8   SHELIA 7.1*  --  7.8* 8.1* 8.2*   GLC 86  --  84 94 92   ALBUMIN  --   --   --  2.7* 2.6*   PROTTOTAL  --   --   --  5.7* 5.9*   BILITOTAL  --   --   --  0.6 0.3   ALKPHOS  --   --   --  74 90   ALT  --   --   --  22 22   AST  --   --   --  28 29     Recent Results (from the past 24 hour(s))   XR Chest Port 1 View    Narrative    XR CHEST PORT 1 VW 3/28/2017 2:36 PM    HISTORY: PICC line placement.    COMPARISON: 3/26/2017    FINDINGS: Right PICC line tip is in the projection of the SVC/RA  junction. Marked cardiomegaly with prominence of the mediastinal  vasculature, not significantly changed from recent previous.      Impression    IMPRESSION: Right PICC line appears appropriately positioned.    ASMITA ULLOA MD     Blood cultures are negative at 2 days  Lactic acid level this morning is 1.0, improved from 2.6 yesterday  Magnesium 2.0 today

## 2017-03-29 NOTE — PLAN OF CARE
Problem: Goal Outcome Summary  Goal: Goal Outcome Summary  Outcome: Improving  Blood pressure has been stable all night without the use of Dopamine. Heart rate irregular with telemetry showing paced rhythm and underlying A-fib. Lungs clear and diminished at bases. Occasional cough that sounds loose but is non-productive. Urine output adequate. Taking PO fluids without difficulty. Repositioned slightly every 2 hours but patient does not want to turn on his sides because his legs are painful when moved or touched. Sleeping at short intervals.

## 2017-03-29 NOTE — PHARMACY-VANCOMYCIN DOSING SERVICE
Pharmacy Vancomycin Note  Date of Service 2017  Patient's  [id number]   89 year old, male    Indication: Sepsis and Skin and Soft Tissue Infection  Goal Trough Level: 15-20 mg/L  Day of Therapy: 3   Current Vancomycin regimen:  1500 mg IV q24h    Current estimated CrCl = Estimated Creatinine Clearance: 56.8 mL/min (based on Cr of 0.84).    Creatinine for last 3 days  3/26/2017:  4:55 PM Creatinine 0.78 mg/dL  3/27/2017:  9:10 PM Creatinine 0.81 mg/dL  3/28/2017:  9:06 AM Creatinine 0.84 mg/dL  3/29/2017:  5:32 AM Creatinine 0.84 mg/dL    Recent Vancomycin Levels (past 3 days)  No results found for requested labs within last 72 hours.    Vancomycin IV Administrations (past 72 hours)                   vancomycin (VANCOCIN) 1500 mg in 0.9% NaCl 250 mL PREMIX (mg) 1,500 mg New Bag 17 2256    vancomycin (VANCOCIN) 1500 mg in 0.9% NaCl 250 mL PREMIX (mg) 1,500 mg New Bag 17 2206                Nephrotoxins and other renal medications (Future)    Start     Dose/Rate Route Frequency Ordered Stop    17 2300  vancomycin (VANCOCIN) 1500 mg in 0.9% NaCl 250 mL PREMIX      1,500 mg Intravenous EVERY 24 HOURS 17 1119      17 0300  piperacillin-tazobactam (ZOSYN) infusion 3.375 g      3.375 g  100 mL/hr over 30 Minutes Intravenous EVERY 6 HOURS 17 0006               Contrast Orders - past 72 hours     None          Interpretation of current regimen:    Has serum creatinine changed > 50% in last 72 hours: No    Urine output:  unable to determine    Renal Function: Stable    Plan:  1.  Continue Current Dose  2.  Pharmacy will check trough levels prior to fourth dose on 3/30/2017   3. Serum creatinine levels will be ordered daily for the first week of therapy and at least twice weekly for subsequent weeks.      Caryn Beebe, PharmD Student           .

## 2017-03-30 LAB
ANION GAP SERPL CALCULATED.3IONS-SCNC: 8 MMOL/L (ref 3–14)
BUN SERPL-MCNC: 21 MG/DL (ref 7–30)
CALCIUM SERPL-MCNC: 7.6 MG/DL (ref 8.5–10.1)
CHLORIDE SERPL-SCNC: 108 MMOL/L (ref 94–109)
CO2 SERPL-SCNC: 24 MMOL/L (ref 20–32)
CREAT SERPL-MCNC: 0.87 MG/DL (ref 0.66–1.25)
DIGOXIN SERPL-MCNC: 1.9 UG/L (ref 0.5–2)
GFR SERPL CREATININE-BSD FRML MDRD: 82 ML/MIN/1.7M2
GLUCOSE SERPL-MCNC: 86 MG/DL (ref 70–99)
HGB BLD-MCNC: 8.5 G/DL (ref 13.3–17.7)
MAGNESIUM SERPL-MCNC: 2.4 MG/DL (ref 1.6–2.3)
POTASSIUM SERPL-SCNC: 3.9 MMOL/L (ref 3.4–5.3)
SODIUM SERPL-SCNC: 140 MMOL/L (ref 133–144)

## 2017-03-30 PROCEDURE — A9270 NON-COVERED ITEM OR SERVICE: HCPCS | Mod: GY | Performed by: PEDIATRICS

## 2017-03-30 PROCEDURE — 99233 SBSQ HOSP IP/OBS HIGH 50: CPT | Performed by: PEDIATRICS

## 2017-03-30 PROCEDURE — 83735 ASSAY OF MAGNESIUM: CPT | Performed by: PEDIATRICS

## 2017-03-30 PROCEDURE — A9270 NON-COVERED ITEM OR SERVICE: HCPCS | Mod: GY | Performed by: FAMILY MEDICINE

## 2017-03-30 PROCEDURE — 25000128 H RX IP 250 OP 636: Performed by: FAMILY MEDICINE

## 2017-03-30 PROCEDURE — 25000128 H RX IP 250 OP 636: Performed by: PEDIATRICS

## 2017-03-30 PROCEDURE — 25000132 ZZH RX MED GY IP 250 OP 250 PS 637: Mod: GY | Performed by: FAMILY MEDICINE

## 2017-03-30 PROCEDURE — 25000125 ZZHC RX 250: Performed by: FAMILY MEDICINE

## 2017-03-30 PROCEDURE — 25000125 ZZHC RX 250: Performed by: PEDIATRICS

## 2017-03-30 PROCEDURE — 80162 ASSAY OF DIGOXIN TOTAL: CPT | Performed by: PEDIATRICS

## 2017-03-30 PROCEDURE — 80048 BASIC METABOLIC PNL TOTAL CA: CPT | Performed by: PEDIATRICS

## 2017-03-30 PROCEDURE — 25000132 ZZH RX MED GY IP 250 OP 250 PS 637: Mod: GY | Performed by: PEDIATRICS

## 2017-03-30 PROCEDURE — 85018 HEMOGLOBIN: CPT | Performed by: PEDIATRICS

## 2017-03-30 PROCEDURE — 12000000 ZZH R&B MED SURG/OB

## 2017-03-30 RX ORDER — SPIRONOLACTONE 25 MG/1
25 TABLET ORAL DAILY
Status: DISCONTINUED | OUTPATIENT
Start: 2017-03-30 | End: 2017-04-04 | Stop reason: HOSPADM

## 2017-03-30 RX ORDER — METOPROLOL TARTRATE 50 MG
50 TABLET ORAL 2 TIMES DAILY
Status: DISCONTINUED | OUTPATIENT
Start: 2017-03-30 | End: 2017-04-02

## 2017-03-30 RX ADMIN — DOFETILIDE 125 MCG: 0.12 CAPSULE ORAL at 08:41

## 2017-03-30 RX ADMIN — HYDROCODONE BITARTRATE AND ACETAMINOPHEN 1 TABLET: 5; 325 TABLET ORAL at 18:59

## 2017-03-30 RX ADMIN — HYDROCODONE BITARTRATE AND ACETAMINOPHEN 2 TABLET: 5; 325 TABLET ORAL at 02:53

## 2017-03-30 RX ADMIN — SPIRONOLACTONE 25 MG: 25 TABLET ORAL at 15:30

## 2017-03-30 RX ADMIN — DOFETILIDE 125 MCG: 0.12 CAPSULE ORAL at 21:35

## 2017-03-30 RX ADMIN — DIGOXIN 250 MCG: 0.25 INJECTION INTRAMUSCULAR; INTRAVENOUS at 00:42

## 2017-03-30 RX ADMIN — HYDROCODONE BITARTRATE AND ACETAMINOPHEN 1 TABLET: 5; 325 TABLET ORAL at 10:24

## 2017-03-30 RX ADMIN — CALCIUM 1250 MG: 500 TABLET ORAL at 08:40

## 2017-03-30 RX ADMIN — HYDROCODONE BITARTRATE AND ACETAMINOPHEN 1 TABLET: 5; 325 TABLET ORAL at 21:52

## 2017-03-30 RX ADMIN — TAMSULOSIN HYDROCHLORIDE 0.4 MG: 0.4 CAPSULE ORAL at 08:39

## 2017-03-30 RX ADMIN — TAZOBACTAM SODIUM AND PIPERACILLIN SODIUM 3.38 G: 375; 3 INJECTION, SOLUTION INTRAVENOUS at 16:05

## 2017-03-30 RX ADMIN — PREDNISONE 15 MG: 5 TABLET ORAL at 08:40

## 2017-03-30 RX ADMIN — METOPROLOL TARTRATE 25 MG: 25 TABLET ORAL at 08:39

## 2017-03-30 RX ADMIN — TAZOBACTAM SODIUM AND PIPERACILLIN SODIUM 3.38 G: 375; 3 INJECTION, SOLUTION INTRAVENOUS at 02:53

## 2017-03-30 RX ADMIN — FUROSEMIDE 5 MG/HR: 10 INJECTION, SOLUTION INTRAVENOUS at 18:35

## 2017-03-30 RX ADMIN — OMEPRAZOLE 20 MG: 20 CAPSULE, DELAYED RELEASE ORAL at 16:05

## 2017-03-30 RX ADMIN — OMEPRAZOLE 20 MG: 20 CAPSULE, DELAYED RELEASE ORAL at 08:39

## 2017-03-30 RX ADMIN — ACETAMINOPHEN 650 MG: 325 TABLET ORAL at 17:54

## 2017-03-30 RX ADMIN — VITAMIN D, TAB 1000IU (100/BT) 1000 UNITS: 25 TAB at 08:40

## 2017-03-30 RX ADMIN — METOPROLOL TARTRATE 50 MG: 50 TABLET, FILM COATED ORAL at 21:34

## 2017-03-30 RX ADMIN — TAZOBACTAM SODIUM AND PIPERACILLIN SODIUM 3.38 G: 375; 3 INJECTION, SOLUTION INTRAVENOUS at 21:33

## 2017-03-30 RX ADMIN — POTASSIUM CHLORIDE 20 MEQ: 1500 TABLET, EXTENDED RELEASE ORAL at 08:39

## 2017-03-30 RX ADMIN — VANCOMYCIN HYDROCHLORIDE 1500 MG: 1 INJECTION, POWDER, LYOPHILIZED, FOR SOLUTION INTRAVENOUS at 22:43

## 2017-03-30 RX ADMIN — TAZOBACTAM SODIUM AND PIPERACILLIN SODIUM 3.38 G: 375; 3 INJECTION, SOLUTION INTRAVENOUS at 10:24

## 2017-03-30 RX ADMIN — OXYCODONE HYDROCHLORIDE AND ACETAMINOPHEN 500 MG: 500 TABLET ORAL at 08:40

## 2017-03-30 RX ADMIN — APIXABAN 2.5 MG: 2.5 TABLET, FILM COATED ORAL at 08:39

## 2017-03-30 RX ADMIN — TAMSULOSIN HYDROCHLORIDE 0.4 MG: 0.4 CAPSULE ORAL at 21:34

## 2017-03-30 RX ADMIN — APIXABAN 2.5 MG: 2.5 TABLET, FILM COATED ORAL at 21:35

## 2017-03-30 NOTE — PROGRESS NOTES
Patient transferred to the floor to room 267 patient tolerated well report handed off to on comming staff

## 2017-03-30 NOTE — PLAN OF CARE
"Problem: Goal Outcome Summary  Goal: Goal Outcome Summary  Bilateral lower extremities remain very painful for pt. Norco given every 6 hours with some relief. Pt has been able to lift legs up and down for repositioning but will not turn from side to side. Specialty mattress in place. Unable to visualize wounds on lower extremities but weeping noted on dressing. Pt has significant edema from toes up through buttocks and bilateral arms. Left arm is much worse than the right. Lasix drip infusing. Vitals have been stable. Pt is wheezy at times. PRN neb order obtained however pt declined stating \"it comes and goes\". Pt also declined because he reports getting a sore mouth from the treatments. Pt has been able to clear with coughing.      "

## 2017-03-30 NOTE — PLAN OF CARE
Problem: Goal Outcome Summary  Goal: Goal Outcome Summary  Outcome: No Change  S-(situation): shift note     B-(background): cellulitus and atrial fib     A-(assessment): patient alert and oriented denied any complaints of shortness of air or dizziness today. Vitals stable and he is on a lasix drip at 5 mg per hour. Vitals stable with this and he is no longer in atrial fibrillation. Abdomen soft and he had a bowel movement today. He was able to stand and ambulate short distance about 4 steps with assist of 2. Patient state that he feels better today.      R-(recommendations): continue to monitor vitals and lung sounds notify MD of any changes monitor swelling and daily wieghts along with I and O.

## 2017-03-30 NOTE — PROVIDER NOTIFICATION
S-(situation): atrial fibrillation elevated rate    B-(background): cellulitis, Hx of atrial fib    A-(assessment): patients rate increased from his regular paced rhythm to a rapid rate at 160-170/ min. Patient denied any complaints of chest pain shortness of breath or dizzines. Blood pressure stable. Am medications given and Dr Do notified.     R-(recommendations): will continue to monitor and will notify MD of he dev elopes any complaints.

## 2017-03-30 NOTE — PROGRESS NOTES
[facility]    Hospitalist Progress Note    Date of Service (when I saw the patient): 03/30/2017    Assessment & Plan   Bud Merritt is a 89 year old male who was admitted on 3/27/2017 with right lower extremity cellulitis and probable sepsis that rapidly deteriorated to hypotensive shock although this was most likely of multifactorial origin including sepsis but probably more significantly his underlying severe dilated cardiomyopathy and aortic stenosis as well as suspected chronic adrenal insufficiency and acute on chronic anemia.    Principal Problem:    Cellulitis of right lower extremity    Assessment: Much improved with parenteral vancomycin and Zosyn, negative cultures so far but has risk factors for MRSA and Pseudomonas    Plan: Continue present parenteral antibiotics, recheck pro-calcitonin level in 2-3 days to help guide duration of antibiotic therapy, may need parenteral antibiotics at discharge although could consider discussing with infectious disease a possible switch to empiric oral antibiotic therapy     Active Problems:    Shock (H)    Assessment: Probably had sepsis with atypical presentation because of his severe chronic cardiac disease and chronic prednisone therapy and may well have had an element of septic shock as his lactic acid level was high and pro-calcitonin level was quite elevated, shock was also likely exacerbated by previous vasoactive medications including ACE inhibitor superimposed upon his severe aortic stenosis and dilated cardiomyopathy, shock may have been exacerbated by hypovolemia due to recent increase in diuretic therapy and worsening anemia, had severe hypotension necessitating dopamine infusion and was also treated with discontinuation of previous lisinopril and metoprolol therapy, stress doses of corticosteroids and blood transfusion, has now been normotensive for over 24 hours with blood pressure continuing to trend upward to the  hypertensive range today at least intermittently    Plan: Transfer to the floor today, continue to hold lisinopril, continue stress doses of corticosteroids, transfuse for hemoglobin less than 8      Mitral regurgitation and aortic stenosis - AS severe by echo on 3/2017    Assessment: Chronic with severe aortic stenosis for which surgical intervention is being considered, severe aortic stenosis almost certainly contributed to his hemodynamic instability and shock in the context of this acute infection    Plan: Cautious titration of vasoactive medications as acute infection subsides, close outpatient cardiology follow-up      Paroxysmal atrial fibrillation (H)    Assessment: Not thought to be the primary factor for hypotension and shock during this hospital stay although was intermittently tachycardic to about 130-140, heart rate much better controlled now after loading with IV digoxin and restarting lower dose metoprolol once hypotension resolved and with correction of electrolyte disturbances including hypopotassemia and hypomagnesemia although he continues to have episodic tachycardia, remains on chronic Tikosyn therapy which has been uninterrupted, also remains on uninterrupted anticoagulation with Eliquis    Plan: Increase metoprolol to his previous dose of 50 mg twice daily today, continue Tikosyn and Eliquis, anticipate cardiology follow-up on April 5 as previously arranged, recheck digoxin level tomorrow to decide whether to start oral digoxin for treatment of atrial fibrillation as he had not been taking digoxin recently prior to this hospital stay      Venous stasis ulcers of both lower extremities (H)    Assessment: Likely infected and source for cellulitis in the right lower extremity, at risk for MRSA and Pseudomonas, now following in wound clinic in [place] for these ulcers    Plan: Continue present antibiotics, treat leg edema aggressively to optimize opportunity of wound healing, anticipate  outpatient follow-up in wound clinic, continue present wound cares until outpatient follow-up      Anemia    Assessment: Worse than previously with drop in hemoglobin to a perez of 7.6, improved after transfusion one unit packed red blood cells, no active bleeding apparent during this hospital stay and stool testing for occult blood was negative but probably had worsening anemia from sepsis    Plan: Follow hemoglobin, consider transfusion for hemoglobin less than 8 given his comorbid medical problems      Dilated cardiomyopathy (H) dilated LV, EF 20-25% by Echo 3/4/17    Assessment: Also likely contributed to shock early in his hospital stay, previously on a combination of beta blocker and ACE inhibitor but ACE inhibitor was discontinued because of shock and has not yet been restarted, beta blocker has been restarted because of atrial fibrillation and has been tolerated hemodynamically well so far, now demonstrating probable worsening volume overload after initial IV fluid resuscitation    Plan: Continue his IV Lasix infusion cautiously to optimize his volume status but increase from 2.5 mg per hour to 5 mg per hour today if tolerated hemodynamically, prioritize restarting beta blocker therapy because of atrial fibrillation, continue to hold ACE inhibitor for now but might reconsider restarting it prior to discharge after aggressive diuresis is discontinued if his blood pressure remains normal to elevated      Chronic systolic congestive heart failure (H)    Assessment: Probably stable with ongoing peripheral edema and perhaps with some weight gain due to iatrogenic IV fluid therapy but no signs of worsening pulmonary edema, continuing to hold ACE inhibitor    Plan: As outlined above, restart spironolactone today at his previous dose      Immunosuppression (H)    Assessment: Taking prednisone and Arava chronically    Plan: Continue to withhold Arava for now, continue stress doses of steroids      Rheumatoid  arthritis involving multiple sites, unspecified rheumatoid factor presence (H)    Assessment: Clinically stable    Plan: As above      Essential hypertension with goal blood pressure less than 140/90    Assessment: Blood pressure now trending upward    Plan: As above      Malignant neoplasm of prostate (H)    Assessment: Chronic with apparent previous bone metastases, receiving Lupron chronically    Plan: No acute changes      Cardiac pacemaker in situ    Assessment: Recently placed and appears to be functioning well during this hospital stay although he has not yet had ablation and has been tachycardic intermittently from atrial fibrillation    Plan: Continue Tikosyn and increase metoprolol today to his previous dose    DVT Prophylaxis: Eliquis  Code Status: DNR/DNI    Disposition: Expected discharge in 2-3 days once volume status has improved, hemodynamically stable, plan for antibiotic treatment after discharge has been formulated, and he has a safe disposition plan.    Cesar Do MD    Interval History   He says he is feeling better today.  He denies any chest pain, palpitations, or dyspnea.  He did feel dizzy when he was up on his feet for awhile.  He continues to have burning pain around his right ankle that is worse than usual.  It seems to worsen when his leg is unwrapped and improves once his leg is rewrapped.  It does not reliably worsen with exertion.  Norco seems to help the pain.  He has been able to bear weight and ambulate short distances.  He has been afebrile.  Heart rate has been generally improved with intermittent episodes of brief tachycardia during which he has been asymptomatic.  His blood pressure is trending upward, and he has not been hypotensive for over 24 hours.  He has not required oxygen.  He is tolerating good oral intake.  He is voiding spontaneously without difficulty.    -Data reviewed today: I reviewed all new labs and imaging results over the last 24 hours. I personally  reviewed no images or EKG's today.    Physical Exam   Temp: 97.9  F (36.6  C) Temp src: Oral BP: 130/90   Heart Rate: 75 Resp: 18 SpO2: 95 % O2 Device: None (Room air)    Vitals:    03/29/17 1222 03/30/17 0049 03/30/17 1100   Weight: 79 kg (174 lb 2.6 oz) 83 kg (182 lb 15.7 oz) 81.7 kg (180 lb 1.9 oz)     Vital Signs with Ranges  Temp:  [97.9  F (36.6  C)-98.7  F (37.1  C)] 97.9  F (36.6  C)  Heart Rate:  [] 75  Resp:  [13-23] 18  BP: (103-148)/(66-90) 130/90  SpO2:  [95 %-97 %] 95 %  I/O last 3 completed shifts:  In: 2352.1 [P.O.:250; I.V.:1720.1]  Out: 1125 [Urine:1125]    Constitutional: No acute distress sitting up in bed  Respiratory: Normal respiratory effort, mildly diminished breath sounds, clear lungs  Cardiovascular: Regular rate and rhythm, good radial pulse, well perfused extremities  Skin/Integumen: Lower legs and feet are presently wrapped  Neuro: Alert, no confusion    Medications     furosemide (LASIX) infusion ADULT 2.5 mg/hr (03/29/17 1418)     - MEDICATION INSTRUCTIONS -         metoprolol  25 mg Oral BID     Medication Available on Floor   Does not apply See Admin Instructions     miconazole-stomahesive-talc-mineral oil (BUTT PASTE) topical   Topical Every Other Day     calcium carbonate  1,250 mg Oral Daily     piperacillin-tazobactam  3.375 g Intravenous Q6H     apixaban ANTICOAGULANT  2.5 mg Oral BID     ascorbic acid  500 mg Oral Daily     dofetilide  125 mcg Oral BID     omeprazole  20 mg Oral BID AC     tamsulosin  0.4 mg Oral BID     cholecalciferol  1,000 Units Oral Daily     sodium chloride (PF)  3 mL Intracatheter Q8H     predniSONE  15 mg Oral Daily     vancomycin (VANCOCIN) IV  1,500 mg Intravenous Q24H     sodium chloride (PF)  10 mL Intracatheter Q7 Days     sodium chloride (PF)  10 mL Intracatheter Q7 Days       Data   Data reviewed today:  I personally reviewed no images or EKG's today.    Recent Labs  Lab 03/30/17  0540 03/29/17  2157 03/29/17  1425 03/29/17  0532   03/28/17  0906 03/27/17  2110 03/26/17  1655   WBC  --   --   --   --   --  6.6 5.7 5.5   HGB 8.5* 8.5* 8.6* 7.6*  < > 8.3* 8.9* 9.1*   MCV  --   --   --   --   --  99 99 100   PLT  --   --   --   --   --  197 237 228   INR  --   --   --   --   --   --  1.01  --      --   --  140  --  137 138 140   POTASSIUM 3.9  --   --  3.6  --  3.6 3.7 3.9   CHLORIDE 108  --   --  109  --  103 101 107   CO2 24  --   --  21  --  25 26 25   BUN 21  --   --  21  --  21 27 27   CR 0.87  --   --  0.84  --  0.84 0.81 0.78   ANIONGAP 8  --   --  10  --  9 11 8   SHELIA 7.6*  --   --  7.1*  --  7.8* 8.1* 8.2*   GLC 86  --   --  86  --  84 94 92   ALBUMIN  --   --   --   --   --   --  2.7* 2.6*   PROTTOTAL  --   --   --   --   --   --  5.7* 5.9*   BILITOTAL  --   --   --   --   --   --  0.6 0.3   ALKPHOS  --   --   --   --   --   --  74 90   ALT  --   --   --   --   --   --  22 22   AST  --   --   --   --   --   --  28 29   < > = values in this interval not displayed.    Blood cultures remain negative at 3 days  Pro calcitonin level on March 28 was elevated at 9.67  Digoxin level today is 1.9  Stool testing for occult blood was negative yesterday  Magnesium today is 2.4

## 2017-03-30 NOTE — PROVIDER NOTIFICATION
S-(situation): rapid atrial fibrilation    B-(background): sepsis, cellulitis    A-(assessment): patient went into a rapid afib sustained rate of 170-180. Blood pressure low with a map 55-60, Dr Do made aware and digoxin, metoprolol given. Blood transfusion continues without difficulty    R-(recommendations): heart rate decreased and blood pressure better.

## 2017-03-30 NOTE — PLAN OF CARE
Problem: Goal Outcome Summary  Goal: Goal Outcome Summary  Outcome: No Change  S-(situation): shift note     B-(background): cellulitis     A-(assessment): patient had bout of atrial fib today, went back to his paced rhythm about 1130 am. Lungs clear vitals better with good blood pressures. Dressings completed as ordered with silver alginate. Patient medicated with narco for pain to his hip and his lower legs. Vitals stable on lasix drip.       R-(recommendations): continue to monitor vitals and rhythm, notify MD of any changes monitor intake an output.

## 2017-03-31 ENCOUNTER — APPOINTMENT (OUTPATIENT)
Dept: PHYSICAL THERAPY | Facility: CLINIC | Age: 82
DRG: 871 | End: 2017-03-31
Attending: FAMILY MEDICINE
Payer: MEDICARE

## 2017-03-31 LAB
ANION GAP SERPL CALCULATED.3IONS-SCNC: 8 MMOL/L (ref 3–14)
BUN SERPL-MCNC: 18 MG/DL (ref 7–30)
CALCIUM SERPL-MCNC: 7.9 MG/DL (ref 8.5–10.1)
CHLORIDE SERPL-SCNC: 103 MMOL/L (ref 94–109)
CO2 SERPL-SCNC: 26 MMOL/L (ref 20–32)
CREAT SERPL-MCNC: 0.83 MG/DL (ref 0.66–1.25)
DIGOXIN SERPL-MCNC: 1.1 UG/L (ref 0.5–2)
GFR SERPL CREATININE-BSD FRML MDRD: 87 ML/MIN/1.7M2
GLUCOSE BLDC GLUCOMTR-MCNC: 107 MG/DL (ref 70–99)
GLUCOSE SERPL-MCNC: 81 MG/DL (ref 70–99)
HGB BLD-MCNC: 11.8 G/DL (ref 13.3–17.7)
POTASSIUM SERPL-SCNC: 3.9 MMOL/L (ref 3.4–5.3)
SODIUM SERPL-SCNC: 137 MMOL/L (ref 133–144)
VANCOMYCIN SERPL-MCNC: 17.7 MG/L

## 2017-03-31 PROCEDURE — 80202 ASSAY OF VANCOMYCIN: CPT | Performed by: PEDIATRICS

## 2017-03-31 PROCEDURE — 25000132 ZZH RX MED GY IP 250 OP 250 PS 637: Mod: GY | Performed by: PEDIATRICS

## 2017-03-31 PROCEDURE — 00000146 ZZHCL STATISTIC GLUCOSE BY METER IP

## 2017-03-31 PROCEDURE — 40000193 ZZH STATISTIC PT WARD VISIT: Performed by: PHYSICAL THERAPIST

## 2017-03-31 PROCEDURE — 80162 ASSAY OF DIGOXIN TOTAL: CPT | Performed by: PEDIATRICS

## 2017-03-31 PROCEDURE — 99232 SBSQ HOSP IP/OBS MODERATE 35: CPT | Performed by: FAMILY MEDICINE

## 2017-03-31 PROCEDURE — 25000128 H RX IP 250 OP 636: Performed by: PEDIATRICS

## 2017-03-31 PROCEDURE — 12000000 ZZH R&B MED SURG/OB

## 2017-03-31 PROCEDURE — 25000132 ZZH RX MED GY IP 250 OP 250 PS 637: Mod: GY | Performed by: FAMILY MEDICINE

## 2017-03-31 PROCEDURE — 25000128 H RX IP 250 OP 636: Performed by: FAMILY MEDICINE

## 2017-03-31 PROCEDURE — 85018 HEMOGLOBIN: CPT | Performed by: PEDIATRICS

## 2017-03-31 PROCEDURE — 97162 PT EVAL MOD COMPLEX 30 MIN: CPT | Mod: GP | Performed by: PHYSICAL THERAPIST

## 2017-03-31 PROCEDURE — 25000125 ZZHC RX 250: Performed by: FAMILY MEDICINE

## 2017-03-31 PROCEDURE — A9270 NON-COVERED ITEM OR SERVICE: HCPCS | Mod: GY | Performed by: PEDIATRICS

## 2017-03-31 PROCEDURE — 80048 BASIC METABOLIC PNL TOTAL CA: CPT | Performed by: PEDIATRICS

## 2017-03-31 PROCEDURE — A9270 NON-COVERED ITEM OR SERVICE: HCPCS | Mod: GY | Performed by: FAMILY MEDICINE

## 2017-03-31 PROCEDURE — 25000125 ZZHC RX 250: Performed by: PEDIATRICS

## 2017-03-31 RX ADMIN — TAMSULOSIN HYDROCHLORIDE 0.4 MG: 0.4 CAPSULE ORAL at 08:46

## 2017-03-31 RX ADMIN — DOFETILIDE 125 MCG: 0.12 CAPSULE ORAL at 09:38

## 2017-03-31 RX ADMIN — VANCOMYCIN HYDROCHLORIDE 1500 MG: 1 INJECTION, POWDER, LYOPHILIZED, FOR SOLUTION INTRAVENOUS at 23:00

## 2017-03-31 RX ADMIN — APIXABAN 2.5 MG: 2.5 TABLET, FILM COATED ORAL at 09:38

## 2017-03-31 RX ADMIN — METOPROLOL TARTRATE 50 MG: 50 TABLET, FILM COATED ORAL at 08:47

## 2017-03-31 RX ADMIN — OMEPRAZOLE 20 MG: 20 CAPSULE, DELAYED RELEASE ORAL at 08:46

## 2017-03-31 RX ADMIN — PREDNISONE 15 MG: 5 TABLET ORAL at 08:47

## 2017-03-31 RX ADMIN — VITAMIN D, TAB 1000IU (100/BT) 1000 UNITS: 25 TAB at 08:47

## 2017-03-31 RX ADMIN — POTASSIUM CHLORIDE 20 MEQ: 1500 TABLET, EXTENDED RELEASE ORAL at 10:06

## 2017-03-31 RX ADMIN — Medication: at 09:39

## 2017-03-31 RX ADMIN — APIXABAN 2.5 MG: 2.5 TABLET, FILM COATED ORAL at 20:01

## 2017-03-31 RX ADMIN — TAMSULOSIN HYDROCHLORIDE 0.4 MG: 0.4 CAPSULE ORAL at 20:01

## 2017-03-31 RX ADMIN — OXYCODONE HYDROCHLORIDE AND ACETAMINOPHEN 500 MG: 500 TABLET ORAL at 08:46

## 2017-03-31 RX ADMIN — TAZOBACTAM SODIUM AND PIPERACILLIN SODIUM 3.38 G: 375; 3 INJECTION, SOLUTION INTRAVENOUS at 03:24

## 2017-03-31 RX ADMIN — SPIRONOLACTONE 25 MG: 25 TABLET ORAL at 08:50

## 2017-03-31 RX ADMIN — HYDROCODONE BITARTRATE AND ACETAMINOPHEN 2 TABLET: 5; 325 TABLET ORAL at 19:51

## 2017-03-31 RX ADMIN — HYDROCODONE BITARTRATE AND ACETAMINOPHEN 1 TABLET: 5; 325 TABLET ORAL at 06:01

## 2017-03-31 RX ADMIN — METOPROLOL TARTRATE 50 MG: 50 TABLET, FILM COATED ORAL at 20:02

## 2017-03-31 RX ADMIN — TAZOBACTAM SODIUM AND PIPERACILLIN SODIUM 3.38 G: 375; 3 INJECTION, SOLUTION INTRAVENOUS at 20:01

## 2017-03-31 RX ADMIN — OMEPRAZOLE 20 MG: 20 CAPSULE, DELAYED RELEASE ORAL at 16:57

## 2017-03-31 RX ADMIN — ZINC OXIDE TOPICAL OINT.: at 16:57

## 2017-03-31 RX ADMIN — FUROSEMIDE 5 MG/HR: 10 INJECTION, SOLUTION INTRAVENOUS at 13:44

## 2017-03-31 RX ADMIN — DOFETILIDE 125 MCG: 0.12 CAPSULE ORAL at 20:01

## 2017-03-31 RX ADMIN — TAZOBACTAM SODIUM AND PIPERACILLIN SODIUM 3.38 G: 375; 3 INJECTION, SOLUTION INTRAVENOUS at 09:40

## 2017-03-31 RX ADMIN — TAZOBACTAM SODIUM AND PIPERACILLIN SODIUM 3.38 G: 375; 3 INJECTION, SOLUTION INTRAVENOUS at 15:01

## 2017-03-31 RX ADMIN — CALCIUM 1250 MG: 500 TABLET ORAL at 09:38

## 2017-03-31 NOTE — PLAN OF CARE
Problem: Goal Outcome Summary  Goal: Goal Outcome Summary  History:  Admitted through ED at [facility] with redness and swelling of the R lower leg. He has been just starting to get up the last 1-2 days. He has been in hospital x 3-27-17. He lives with his wife and can enter the home without steps (walk in basement) and has everything on that floor - bed, laundry, kitchen. His wife states they have friends and neighbors that are very helpful. She understands he is not ready for home at this time.      Goal status:Improving as he is walking up to 4 steps.      Functional status: Standing with CGA x 1 with front wheeled walker x 1.5->2 minutes; min to mod assist of 2 sit->stand     Areas of progress: initial assessment    Barrier to discharge Home: Limited tolerance for standing, min-mod assist of 2 sit<->stand     Equipment needs: none at this time     Discharge disposition/rationale: TCU for endurance and transfers and gait for safety as he is requiring assist of 2 with transfers     Transport recommendations: public due to issues with transfer sit->stand     Daily with nursing continuing to work on standing/walking 1-2 times per day

## 2017-03-31 NOTE — PROGRESS NOTES
03/31/17 0939   Quick Adds   Type of Visit Initial PT Evaluation       Present no   Living Environment   Lives With spouse   Living Arrangements house   Home Accessibility no concerns   Number of Stairs to Enter Home 0   Number of Stairs Within Home 16   Transportation Available car   Living Environment Comment Everything needed is on first floor/basement entry so pt does not need to use stairs in his house.    Self-Care   Usual Activity Tolerance other (see comments)  (Ambulates around house and in community Ind w/ walker)   Current Activity Tolerance poor   Regular Exercise no   Equipment Currently Used at Home walker, standard;walker, rolling   Functional Level Prior   Ambulation 1-->assistive equipment   Transferring 1-->assistive equipment   Toileting 1-->assistive equipment   Bathing 2-->assistive person   Eating 0-->independent   Communication 0-->understands/communicates without difficulty   Swallowing 0-->swallows foods/liquids without difficulty   Cognition 0 - no cognition issues reported   Fall history within last six months yes   Number of times patient has fallen within last six months 1   General Information   Onset of Illness/Injury or Date of Surgery - Date 03/27/17   Referring Physician Dr. Teran   Patient/Family Goals Statement Return home   Pertinent History of Current Problem (include personal factors and/or comorbidities that impact the POC) Pt was admitted through ED on 3/27/17 due to R LE edema, redness and pain. Pt has a PMH including RA, prostate cancer, A fib with sever aortic stenotic, pacemaker and immunosuppression.    Precautions/Limitations fall precautions   Weight-Bearing Status - LUE full weight-bearing   Weight-Bearing Status - RUE full weight-bearing   Weight-Bearing Status - LLE full weight-bearing   Weight-Bearing Status - RLE full weight-bearing   General Observations Pt sitting in chair, hand begins weeping after taking blood pressure. Pt [place] - speak  into L ear/hearing aide   Cognitive Status Examination   Orientation orientation to person, place and time   Level of Consciousness alert   Follows Commands and Answers Questions able to follow multistep instructions   Personal Safety and Judgment other (Must comment)  (Bends over while walking to be close to grounin case of fall)   Memory intact   Cognitive Comment Wife states he appears more confused this morning than normal   Pain Assessment   Patient Currently in Pain No   Integumentary/Edema   Integumentary/Edema other (describe)   Integumentary/Edema Comments Mild swelling still present in the ankles and lower legs. Wife reports looks swelling as low as it has been in a long time   Posture    Posture Forward head position;Protracted shoulders;Kyphosis   Range of Motion (ROM)   ROM Comment Uses UE to reach forward to walker and reach back to chair for sit to stand and stand to sit. Decreased mobility in wrist and fingers due to RA. Pt demonstrates hip flexion to 90 deg. Hips and knees slightly flexed in standing position.    Strength   Strength Comments Unable to perform sit to stand independently.    Transfer Skills   Transfer Transfer Skill: Sit to Stand;Transfer Skill:  Stand to Sit   Transfer Comments Min A - Mod A of 2 for both repetitions of sit to stand transfers. Once standing pt requires CGA only. Min A  for stand to sit transfer. Pt requires verbal cues to scoot forward in chair before attempting to stand. Tolerates standing for 1.5 minutes before becoming SOB and O2 sats dropping to upper 80's.    Transfer Skill:  Sit to Stand   Level of [place]: Sit/Stand minimum assist (75% patients effort)   Physical Assist/Nonphysical Assist: Sit/Stand verbal cues;supervision;1 person assist  (2 person Min A - Mod A for sit to stand transfer)   Weightbearing Restrictions: Sit/Stand full weight-bearing   Assistive Device for Transfer: Sit/Stand rolling walker   Transfer Skill: Stand to Sit   Level of  [place]: Stand/Sit minimum assist (75% patients effort)   Physical Assist/Nonphysical Assist: Stand/Sit supervision;verbal cues;1 person assist   Weight-Bearing Restrictions: Stand/Sit full weight-bearing   Assistive Device: Stand to Sit rolling walker   Balance   Balance no deficits were identified   Balance Comments Static balance - no deficits   Muscle Tone   Muscle Tone no deficits were identified   General Therapy Interventions   Planned Therapy Interventions ADL retraining;balance training;bed mobility training;transfer training;home program guidelines;progressive activity/exercise   Clinical Impression   Criteria for Skilled Therapeutic Intervention yes, treatment indicated   PT Diagnosis Deconditioned following hospitalization, decreased transfer and ability to ambulate   Influenced by the following impairments Arthritis, shortness of breath easily, fatigue   Functional limitations due to impairments transfers, bed mobility and gait   Clinical Presentation Evolving/Changing   Clinical Presentation Rationale arthritis, swelling in hands and legs, heart (pacemaker, a- fib))   Clinical Decision Making (Complexity) Moderate complexity   Therapy Frequency` daily   Predicted Duration of Therapy Intervention (days/wks) 2-3 days   Anticipated Discharge Disposition Transitional Care Facility  (at this time, will reassess next 1-2 days)   Risk & Benefits of therapy have been explained Yes   Patient, Family & other staff in agreement with plan of care Yes   Clinical Impression Comments 84 yo male admitted with R lower leg swelling which is improving. He is having difficulty with sit->stand. Standing balance with walker and CGA x 1 is fair. He is not tolerating more than 1.5-2 minutes standing initially and less the second time due to shortness of breath and fatigue. At this time it is recommended he go to a TCU and we can reassess his transfers and gait, bed mobility over the next several days. His wife Tierra  "was present during the assessment and did not have questions about the plan of care or recommendations.    [facility] AM-PAC TM \"6 Clicks\"   2016, Trustees of [facility], under license to [employer].  All rights reserved.   6 Clicks Short Forms Basic Mobility Inpatient Short Form   [facility] AM-PAC  \"6 Clicks\" V.2 Basic Mobility Inpatient Short Form   1. Turning from your back to your side while in a flat bed without using bedrails? 3 - A Little   2. Moving from lying on your back to sitting on the side of a flat bed without using bedrails? 2 - A Lot   3. Moving to and from a bed to a chair (including a wheelchair)? 2 - A Lot   4. Standing up from a chair using your arms (e.g., wheelchair, or bedside chair)? 2 - A Lot   5. To walk in hospital room? 2 - A Lot   6. Climbing 3-5 steps with a railing? 1 - Total   Basic Mobility Raw Score (Score out of 24.Lower scores equate to lower levels of function) 12   35 minutes evaluation time  Sheila Petersen, PT  [phone]      "

## 2017-03-31 NOTE — PROGRESS NOTES
SPIRITUAL HEALTH SERVICES  SPIRITUAL ASSESSMENT Progress Note  [facility]      (Follow up)  Pt voiced his desire to return to life as it was, but also acknowledged it might not be totally possible.  His wife was present.   provided supportive listening and told pt it may be time for him to make adjustments.  In addition,  provided a prayer.  Per pt's request,  will follow up on Monday if pt is still hospitalized.    Greyson Vallejo M.Div., [facility]  Staff   Office tel: [phone]

## 2017-03-31 NOTE — PLAN OF CARE
Problem: Goal Outcome Summary  Goal: Goal Outcome Summary  Outcome: Improving  VSS on RA.  Afebrile.  LS diminished.  Pt remains on Lasix drip at 5ml/hr with frequent voids of 150-350ml, using urinal in bed independently. For a total of 1500ml out of light yellow/clear urine, during 8hr shift.  Received one dose of Norco at 0600 for pain all over.  Poor skin integrity related to previous swelling that has dramatically decreased, +2 in toes.  Weight down 5Kg.  Legs wrapped with ace wraps, unable to view wounds.  Coccyx is red, blanchable.  On specialty mattress.  Pt rolled in bed with 1 assist to change out chux.  AM labs drawn from PICC, site does have dried bloody drainage.

## 2017-03-31 NOTE — PLAN OF CARE
Problem: Goal Outcome Summary  Goal: Goal Outcome Summary  Pt up with a heavy assist of 2 and rolling walker to chair and commode. Alert and orientated and [place] (hearing aide in place on Left ear). +1 pitting edema present in arms, leg, ankles but is improving. Continues on Lasix gtt. Potassium replaced and recheck in AM. Catheter placed for accurate I & O. No pain reported this shift. 2 BM this shift. Legs are wrapped with ace wraps and awaiting the correct dressings to complete wound cares.

## 2017-03-31 NOTE — PROGRESS NOTES
[facility] Progress Note         Assessment and Plan:   Assessment:   Principal Problem:    Cellulitis of right lower extremity    Assessment: improvement with diuresis, wrapping legs, less leg pain today.     Plan: continue diuresis, current antibiotics. ASwaiting procalcitonin.   Active Problems:    Rheumatoid arthritis involving multiple sites, unspecified rheumatoid factor presence (H)    Assessment: stable    Plan: Continue to hold Arava, continue stress dose steroids    Mitral regurgitation and aortic stenosis - AS severe by echo on 3/2017    Assessment: Chronic    Plan: Follow up with cardiology as out patient    Essential hypertension with goal blood pressure less than 140/90    Assessment: -147/88-89    Plan: Continue current medications, hold ace    Paroxysmal atrial fibrillation (H)    Assessment: rate controlled    Plan: Continue     Shock (H)    Assessment: Related to severe chronic heart disease, sepsis, medications.     Plan: Normotensive, perfusing well.     Malignant neoplasm of prostate (H)    Assessment: Chronic, on Lupron    Plan: Continue outpatient management    Venous stasis ulcers of both lower extremities (H)    Assessment: lower extremities wrapped in ACE wraps, edema improving    Plan: Continue current antibiotics, Lasix drip, continue wrapping with ACE    Anemia    Assessment: Improved to 11.8 after diuresis    Plan: Recheck CBC in A.M.     Cardiac pacemaker in situ    Assessment: Recently placed.    Plan: Continue Tikosyn and metoprolol, eliquis. Follow up with cardiology    Dilated cardiomyopathy (H) dilated LV, EF 20-25% by Echo 3/4/17    Assessment: ECHO 3/4/2017 EF 20-25%    Plan: Continue Lasix drip, pt is tolerating hemodynamically. Continue to hold ACE inhibitor, consider restarting ace prior to discharge after stopping aggressive diuresis    Chronic systolic congestive heart failure (H)    Assessment: tolerating lasix drip, maintaining adequate blood pressure. Good  output, weight down    Plan: Continue lasix drip, recheck BMP in A.M.     Immunosuppression (H)    Assessment: chronic Arava and prednisone    Plan: Hold Arava                       Interval History:   Continues to improve.  He is tolerating diuresis well, maintaining blood pressure. Less pain in legs today. VSS. Complains of decreased appetite today.   No new concerns today.              Medications:     Current Facility-Administered Medications   Medication     zinc oxide 16 % (BOUDREAUXS BUTT PASTE) paste     metoprolol (LOPRESSOR) tablet 50 mg     spironolactone (ALDACTONE) tablet 25 mg     potassium chloride SA (K-DUR/KLOR-CON M) CR tablet 20-40 mEq     potassium chloride (KLOR-CON) Packet 20-40 mEq     potassium chloride 10 mEq in 100 mL intermittent infusion     potassium chloride 10 mEq in 100 mL intermittent infusion with 10 mg lidocaine     magnesium sulfate 2 g in water intermittent infusion     magnesium sulfate 4 g in 100 mL sterile water (premade)     Medication Available on Floor     furosemide (LASIX) 100 mg in NaCl 0.9 % 100 mL infusion     calcium carbonate (OSCAL 500) tablet 1,250 mg     ipratropium - albuterol 0.5 mg/2.5 mg/3 mL (DUONEB) neb solution 3 mL     piperacillin-tazobactam (ZOSYN) infusion 3.375 g     apixaban ANTICOAGULANT (ELIQUIS) tablet 2.5 mg     ascorbic acid (VITAMIN C) tablet 500 mg     calcium carbonate (TUMS) chewable tablet 500 mg     dofetilide (TIKOSYN) capsule 125 mcg     HYDROcodone-acetaminophen (NORCO) 5-325 MG per tablet 1-2 tablet     omeprazole (priLOSEC) CR capsule 20 mg     tamsulosin (FLOMAX) capsule 0.4 mg     cholecalciferol (vitamin D) tablet 1,000 Units     naloxone (NARCAN) injection 0.1-0.4 mg     lidocaine (LMX4) kit     sodium chloride (PF) 0.9% PF flush 3 mL     sodium chloride (PF) 0.9% PF flush 3 mL     acetaminophen (TYLENOL) tablet 650 mg     ondansetron (ZOFRAN-ODT) ODT tab 4 mg    Or     ondansetron (ZOFRAN) injection 4 mg     prochlorperazine  (COMPAZINE) injection 5 mg    Or     prochlorperazine (COMPAZINE) tablet 5 mg    Or     prochlorperazine (COMPAZINE) Suppository 12.5 mg     predniSONE (DELTASONE) tablet 15 mg     Patient is already receiving anticoagulation with heparin, enoxaparin (LOVENOX), warfarin (COUMADIN)  or other anticoagulant medication     heparin lock flush 10 UNIT/ML injection 2-5 mL     vancomycin (VANCOCIN) 1500 mg in 0.9% NaCl 250 mL PREMIX     sodium chloride (PF) 0.9% PF flush 10 mL     lidocaine 1 % 1 mL     sodium chloride (PF) 0.9% PF flush 10-20 mL     fentaNYL Citrate (PF) (SUBLIMAZE) injection 25-50 mcg             Physical Exam:   Vitals were reviewed  Constitutional:   awake, alert, cooperative, no apparent distress, and appears stated age     Lungs:   No increased work of breathing, good air exchange, clear to auscultation bilaterally, no crackles or wheezing     Cardiovascular:   regular rate and rhythm     Abdomen:   normal bowel sounds, soft, non-distended and non-tender     Musculoskeletal:   Bilateral lower extremity edema, both legs wrapped in ACE bandages.             Data:     Results for orders placed or performed during the hospital encounter of 03/27/17 (from the past 24 hour(s))   Digoxin level   Result Value Ref Range    Digoxin Level 1.1 0.5 - 2.0 ug/L   Basic metabolic panel   Result Value Ref Range    Sodium 137 133 - 144 mmol/L    Potassium 3.9 3.4 - 5.3 mmol/L    Chloride 103 94 - 109 mmol/L    Carbon Dioxide 26 20 - 32 mmol/L    Anion Gap 8 3 - 14 mmol/L    Glucose 81 70 - 99 mg/dL    Urea Nitrogen 18 7 - 30 mg/dL    Creatinine 0.83 0.66 - 1.25 mg/dL    GFR Estimate 87 >60 mL/min/1.7m2    GFR Estimate If Black >90   GFR Calc   >60 mL/min/1.7m2    Calcium 7.9 (L) 8.5 - 10.1 mg/dL   Hemoglobin   Result Value Ref Range    Hemoglobin 11.8 (L) 13.3 - 17.7 g/dL   Glucose by meter   Result Value Ref Range    Glucose 107 (H) 70 - 99 mg/dL     Attestation:  I have reviewed today's vital signs,  notes, medications, labs and imaging.     Fabricio Collado PA-C

## 2017-03-31 NOTE — PLAN OF CARE
Problem: Skin and Soft Tissue Infection (Adult)  Goal: Signs and Symptoms of Listed Potential Problems Will be Absent or Manageable (Skin and Soft Tissue Infection)  Signs and symptoms of listed potential problems will be absent or manageable by discharge/transition of care (reference Skin and Soft Tissue Infection (Adult) CPG).   Outcome: No Change  Note pt with multiple wounds that have drsg's on.  Sats well on R/A, VSS.  Pt remains on a Lasix gtt and cont to urinate in 100-200amts.  Eating poorly.  Note that pt's coccyx is red, blanchable and appears to have a healed wound there.  C/O general H/A's and general aches.  Analgesic's given with fair results.

## 2017-04-01 ENCOUNTER — APPOINTMENT (OUTPATIENT)
Dept: PHYSICAL THERAPY | Facility: CLINIC | Age: 82
DRG: 871 | End: 2017-04-01
Payer: MEDICARE

## 2017-04-01 LAB
ANION GAP SERPL CALCULATED.3IONS-SCNC: 9 MMOL/L (ref 3–14)
BUN SERPL-MCNC: 18 MG/DL (ref 7–30)
CALCIUM SERPL-MCNC: 8.2 MG/DL (ref 8.5–10.1)
CHLORIDE SERPL-SCNC: 100 MMOL/L (ref 94–109)
CO2 SERPL-SCNC: 30 MMOL/L (ref 20–32)
CREAT SERPL-MCNC: 0.84 MG/DL (ref 0.66–1.25)
ERYTHROCYTE [DISTWIDTH] IN BLOOD BY AUTOMATED COUNT: 15.3 % (ref 10–15)
GFR SERPL CREATININE-BSD FRML MDRD: 86 ML/MIN/1.7M2
GLUCOSE SERPL-MCNC: 84 MG/DL (ref 70–99)
HCT VFR BLD AUTO: 29 % (ref 40–53)
HGB BLD-MCNC: 9.4 G/DL (ref 13.3–17.7)
MCH RBC QN AUTO: 31.1 PG (ref 26.5–33)
MCHC RBC AUTO-ENTMCNC: 32.4 G/DL (ref 31.5–36.5)
MCV RBC AUTO: 96 FL (ref 78–100)
PLATELET # BLD AUTO: 271 10E9/L (ref 150–450)
POTASSIUM SERPL-SCNC: 3.4 MMOL/L (ref 3.4–5.3)
PROCALCITONIN SERPL-MCNC: 2.43 NG/ML
RBC # BLD AUTO: 3.02 10E12/L (ref 4.4–5.9)
SODIUM SERPL-SCNC: 139 MMOL/L (ref 133–144)
WBC # BLD AUTO: 7.2 10E9/L (ref 4–11)

## 2017-04-01 PROCEDURE — 25000125 ZZHC RX 250: Performed by: PEDIATRICS

## 2017-04-01 PROCEDURE — 25000128 H RX IP 250 OP 636: Performed by: FAMILY MEDICINE

## 2017-04-01 PROCEDURE — A9270 NON-COVERED ITEM OR SERVICE: HCPCS | Mod: GY | Performed by: PEDIATRICS

## 2017-04-01 PROCEDURE — 99232 SBSQ HOSP IP/OBS MODERATE 35: CPT | Performed by: FAMILY MEDICINE

## 2017-04-01 PROCEDURE — 80048 BASIC METABOLIC PNL TOTAL CA: CPT | Performed by: PHYSICIAN ASSISTANT

## 2017-04-01 PROCEDURE — 25000132 ZZH RX MED GY IP 250 OP 250 PS 637: Mod: GY | Performed by: FAMILY MEDICINE

## 2017-04-01 PROCEDURE — 40000193 ZZH STATISTIC PT WARD VISIT: Performed by: PHYSICAL THERAPIST

## 2017-04-01 PROCEDURE — 85027 COMPLETE CBC AUTOMATED: CPT | Performed by: PHYSICIAN ASSISTANT

## 2017-04-01 PROCEDURE — 25000132 ZZH RX MED GY IP 250 OP 250 PS 637: Mod: GY | Performed by: PEDIATRICS

## 2017-04-01 PROCEDURE — A9270 NON-COVERED ITEM OR SERVICE: HCPCS | Mod: GY | Performed by: FAMILY MEDICINE

## 2017-04-01 PROCEDURE — 97110 THERAPEUTIC EXERCISES: CPT | Mod: GP | Performed by: PHYSICAL THERAPIST

## 2017-04-01 PROCEDURE — 84145 PROCALCITONIN (PCT): CPT | Performed by: PHYSICIAN ASSISTANT

## 2017-04-01 PROCEDURE — 12000000 ZZH R&B MED SURG/OB

## 2017-04-01 PROCEDURE — 25000125 ZZHC RX 250: Performed by: FAMILY MEDICINE

## 2017-04-01 PROCEDURE — 25000128 H RX IP 250 OP 636: Performed by: PEDIATRICS

## 2017-04-01 RX ORDER — PREDNISONE 10 MG/1
10 TABLET ORAL DAILY
Status: DISCONTINUED | OUTPATIENT
Start: 2017-04-02 | End: 2017-04-04 | Stop reason: HOSPADM

## 2017-04-01 RX ORDER — SODIUM CHLORIDE 9 MG/ML
INJECTION, SOLUTION INTRAVENOUS CONTINUOUS
Status: DISCONTINUED | OUTPATIENT
Start: 2017-04-01 | End: 2017-04-03

## 2017-04-01 RX ADMIN — OXYCODONE HYDROCHLORIDE AND ACETAMINOPHEN 500 MG: 500 TABLET ORAL at 08:19

## 2017-04-01 RX ADMIN — APIXABAN 2.5 MG: 2.5 TABLET, FILM COATED ORAL at 18:43

## 2017-04-01 RX ADMIN — PREDNISONE 15 MG: 5 TABLET ORAL at 08:19

## 2017-04-01 RX ADMIN — HYDROCODONE BITARTRATE AND ACETAMINOPHEN 2 TABLET: 5; 325 TABLET ORAL at 18:43

## 2017-04-01 RX ADMIN — DOFETILIDE 125 MCG: 0.12 CAPSULE ORAL at 18:43

## 2017-04-01 RX ADMIN — VITAMIN D, TAB 1000IU (100/BT) 1000 UNITS: 25 TAB at 08:19

## 2017-04-01 RX ADMIN — HYDROCODONE BITARTRATE AND ACETAMINOPHEN 1 TABLET: 5; 325 TABLET ORAL at 09:19

## 2017-04-01 RX ADMIN — TAMSULOSIN HYDROCHLORIDE 0.4 MG: 0.4 CAPSULE ORAL at 08:19

## 2017-04-01 RX ADMIN — METOPROLOL TARTRATE 50 MG: 50 TABLET, FILM COATED ORAL at 08:19

## 2017-04-01 RX ADMIN — APIXABAN 2.5 MG: 2.5 TABLET, FILM COATED ORAL at 08:22

## 2017-04-01 RX ADMIN — TAZOBACTAM SODIUM AND PIPERACILLIN SODIUM 3.38 G: 375; 3 INJECTION, SOLUTION INTRAVENOUS at 21:48

## 2017-04-01 RX ADMIN — POTASSIUM CHLORIDE 20 MEQ: 1500 TABLET, EXTENDED RELEASE ORAL at 08:19

## 2017-04-01 RX ADMIN — VANCOMYCIN HYDROCHLORIDE 1500 MG: 1 INJECTION, POWDER, LYOPHILIZED, FOR SOLUTION INTRAVENOUS at 22:24

## 2017-04-01 RX ADMIN — OMEPRAZOLE 20 MG: 20 CAPSULE, DELAYED RELEASE ORAL at 16:04

## 2017-04-01 RX ADMIN — TAZOBACTAM SODIUM AND PIPERACILLIN SODIUM 3.38 G: 375; 3 INJECTION, SOLUTION INTRAVENOUS at 02:15

## 2017-04-01 RX ADMIN — FUROSEMIDE 5 MG/HR: 10 INJECTION, SOLUTION INTRAVENOUS at 09:42

## 2017-04-01 RX ADMIN — SODIUM CHLORIDE: 9 INJECTION, SOLUTION INTRAVENOUS at 07:19

## 2017-04-01 RX ADMIN — SPIRONOLACTONE 25 MG: 25 TABLET ORAL at 08:19

## 2017-04-01 RX ADMIN — HYDROCODONE BITARTRATE AND ACETAMINOPHEN 2 TABLET: 5; 325 TABLET ORAL at 02:15

## 2017-04-01 RX ADMIN — TAMSULOSIN HYDROCHLORIDE 0.4 MG: 0.4 CAPSULE ORAL at 18:42

## 2017-04-01 RX ADMIN — OMEPRAZOLE 20 MG: 20 CAPSULE, DELAYED RELEASE ORAL at 08:19

## 2017-04-01 RX ADMIN — TAZOBACTAM SODIUM AND PIPERACILLIN SODIUM 3.38 G: 375; 3 INJECTION, SOLUTION INTRAVENOUS at 08:41

## 2017-04-01 RX ADMIN — DOFETILIDE 125 MCG: 0.12 CAPSULE ORAL at 08:22

## 2017-04-01 RX ADMIN — CALCIUM 1250 MG: 500 TABLET ORAL at 08:22

## 2017-04-01 RX ADMIN — METOPROLOL TARTRATE 50 MG: 50 TABLET, FILM COATED ORAL at 18:42

## 2017-04-01 RX ADMIN — TAZOBACTAM SODIUM AND PIPERACILLIN SODIUM 3.38 G: 375; 3 INJECTION, SOLUTION INTRAVENOUS at 14:40

## 2017-04-01 NOTE — PLAN OF CARE
"Problem: Goal Outcome Summary  Goal: Goal Outcome Summary  Outcome: Improving  VSS, afebrile. Pt up with assist of 1 and walker to bedside commode and chair. Bilateral dressing changes done to lower extremities. Double lumen PICC infusing with good blood return, with some dried blood to dressing site. Pt had 1625 urine output on this shift via stein catheter. Pt hears well out of L ear with hearing aid in. Some redness noted to R leg after dressing changes were done, however this seems to be fading with ace wrap loosened-MD notified, will continue to monitor. Pt having pain mostly in RLE PRN norco given x1. Pt felt somewhat \"foggy\" but noted to be feeling better after getting some sleep during this shift.       "

## 2017-04-01 NOTE — PLAN OF CARE
Problem: Goal Outcome Summary  Goal: Goal Outcome Summary  Outcome: Improving  Pt.has area of redness in his right inner thigh area.His lungs are decreased with exp.wheeze.He has been up in the chair since breakfast and is visiting with company now.His legs felt like they were burning this morning so the ace wrap was removed from above his knees per his request and he received instant relief.No drainage noted through his wraps.He is putting out lots of urine per his stein and lasix drip is still infusing.

## 2017-04-01 NOTE — PLAN OF CARE
Problem: Goal Outcome Summary  Goal: Goal Outcome Summary  Outcome: No Change  VSS. Afebrile. C/o some pain/discomfort to bilateral lower extremities. PRN pain medication given per MAR which has been effective. LS wheezy throughout. Denies any SOB or chest pain. Redness remains to R inner thigh unchanged this shift. No other complaints.

## 2017-04-01 NOTE — PROGRESS NOTES
[facility] Progress Note          Assessment and Plan:   Assessment:   Principal Problem:    Cellulitis of right lower extremity    Assessment: Patient continues to improve with perennial vancomycin and Zosyn with pro-calcitonin level decreasing significantly from 9.67 down to 2.43 and patient having ongoing clinical improvement.    Plan:  Continue with vancomycin and Zosyn. If patient continues to improve, would discuss with infectious disease whether oral agent can be identified that would give adequate coverage for MRSA and pseudomonas risk in light of cellulitis of unknown etiology.    Active Problems:    Venous stasis ulcers of both lower extremities (H)    Assessment:  Likely the underlying source for cellulitis, following with wound care clinic in [place]    Plan:  Continue current antibiotics, ongoing aggressive treatment of significant leg edema and current wound care area patient will likely close follow-up with wound care following discharge. We'll continue with IV Lasix as patient is getting a good response, however anticipate transition to oral Lasix administration tomorrow if swelling continues to improve.        Dilated cardiomyopathy (H) dilated LV, EF 20-25% by Echo 3/4/17    Assessment: Patient developing worsening volume overload secondary to initial IV fluid resuscitation, has been on a Lasix infusion for the past 2 days with adequate diuresis achieved. Patient has been resumed on beta blocker but ACE inhibitor has been held secondary to aggressive diuresis.  Patient has been on room air for the past 24 hours and has significant improvement in bilateral lower extremity edema   Plan:  Continue with IV Lasix infusion today and reassess tomorrow, however transition to oral diuresis and hopeful initiation of home ACE inhibitor in the next 1-2 days as patient continues to clinically improve.       Chronic systolic congestive heart failure (H)    Assessment:  With acute worsening and concern  for acute exacerbation as above     Plan:  Proceed with plan as above       Shock (H)    Assessment:  present initially with atypical presentation secondary to severe chronic cardiac disease and chronic prednisone therapy but with elevated lactic acid level and pro-calcitonin at initial presentation. Symptoms of sepsis and shock have fully resolved     Plan:  Continue with treatment as above and monitor closely.      Rheumatoid arthritis involving multiple sites, unspecified rheumatoid factor presence (H)    Assessment: Clinically stable    Plan:  Continue current regimen without change       Mitral regurgitation and aortic stenosis - AS severe by echo on 3/2017    Assessment: Patient with severe aortic stenosis for which surgical intervention is being considered    Plan:  Continue with current regimen, patient will need outpatient cardiology follow-up      Essential hypertension with goal blood pressure less than 140/90    Assessment:  Blood pressure stable, tolerating diuresis well without difficulty    Plan:  Continue current regimen      Paroxysmal atrial fibrillation (H)    Assessment:  Rate controlled and stable    Plan:  Continue current regimen and monitor.  Patient does have close outpatient follow-up with cardiology on April 5      Malignant neoplasm of prostate (H)    Assessment:  Chronic with bone metastasis, receiving Lupron chronically    Plan:  Ongoing outpatient monitoring      Anemia    Assessment: Patient receiving 1 unit of packed red blood cells secondary to a hemoglobin perez of 7.6.  Hemoglobin has now been stable in the 8-9 range following transfusion   Plan:  Continue to monitor       Cardiac pacemaker in situ    Assessment:  Recently placed, functioning well when needed     Plan:  Continue Tikosyn and metoprolol and monitor      Immunosuppression (H)    Assessment: Taking prednisone and Arava chronically    Plan: Continue to hold Arava at this time and will continue with decreasing doses of  "prednisone following stress dose response initially given, Will decrease to prednisone 10 mg daily tomorrow.        VTE:  Eliquis   Code Status:  DNR/DNI        Interval History:   Continues to improve.  Vital signs generally better.  Patient has increased energy and activity tolerance today.  Eating and voiding well.  Tolerating medications without significant side effects.  No new concerns today.            Significant Problems:     Past Medical History:   Diagnosis Date     Atrial fibrillation (H) 07/01/2016     Cardiomyopathy (H)      COPD exacerbation (H) 3/2/2017     Paroxysmal atrial fibrillation (H) 7/6/2016     Pure hypercholesterolemia      Rheumatoid arthritis(714.0)      Unspecified essential hypertension      Weakness generalized 3/2/2017            Physical Exam:   Blood pressure 105/70, pulse 61, temperature 97.6  F (36.4  C), temperature source Oral, resp. rate 18, height 1.651 m (5' 5\"), weight 75.5 kg (166 lb 7.2 oz), SpO2 99 %.  Constitutional:   awake, alert, cooperative, no apparent distress, and appears stated age     Lungs:   No increased work of breathing, good air exchange, clear to auscultation bilaterally, no crackles or wheezing     Cardiovascular:   Normal apical impulse, regular rate and rhythm, normal S1 and S2, no S3 or S4, and no murmur noted     Abdomen:   Bowel sounds present, abdomen soft and non-tender     Musculoskeletal:   Ongoing 2+ edema bilaterally, decreased erythema in lower leg     Neurologic:   Awake, alert, oriented to name, place and time.       Skin:   Erythema ongoing but less bright red, ongoing signs of clinical improvement of cellulitis              Data:   All laboratory data reviewed    Attestation:  I have reviewed today's vital signs, notes, medications, labs and imaging.     Electronically Signed:  Sejal Perez MD    Note: Chart documentation done in part with Dragon Voice Recognition software. Although reviewed after completion, some word and " grammatical errors may remain.

## 2017-04-01 NOTE — PHARMACY-VANCOMYCIN DOSING SERVICE
Pharmacy Vancomycin Note  Date of Service 2017  Patient's  [id number]   89 year old, male    Indication: Sepsis and Skin and Soft Tissue Infection  Goal Trough Level: 15-20 mg/L  Day of Therapy: 6  Current Vancomycin regimen:  1500 mg IV q24h    Current estimated CrCl = Estimated Creatinine Clearance: 56.6 mL/min (based on Cr of 0.84).    Creatinine for last 3 days  3/30/2017:  5:40 AM Creatinine 0.87 mg/dL  3/31/2017:  5:20 AM Creatinine 0.83 mg/dL  2017:  6:30 AM Creatinine 0.84 mg/dL    Recent Vancomycin Levels (past 3 days)  3/31/2017: 10:01 PM Vancomycin Level 17.7 mg/L    Vancomycin IV Administrations (past 72 hours)                   vancomycin (VANCOCIN) 1500 mg in 0.9% NaCl 250 mL PREMIX (mg) 1,500 mg New Bag 17 2300     1,500 mg New Bag 17 2243     1,500 mg New Bag 17 2224                Nephrotoxins and other renal medications (Future)    Start     Dose/Rate Route Frequency Ordered Stop    17 1330  furosemide (LASIX) 100 mg in NaCl 0.9 % 100 mL infusion      5 mg/hr  5 mL/hr  Intravenous CONTINUOUS 17 1316      17 2300  vancomycin (VANCOCIN) 1500 mg in 0.9% NaCl 250 mL PREMIX      1,500 mg Intravenous EVERY 24 HOURS 17 1119      17 0300  piperacillin-tazobactam (ZOSYN) infusion 3.375 g      3.375 g  100 mL/hr over 30 Minutes Intravenous EVERY 6 HOURS 17 0006               Contrast Orders - past 72 hours     None          Interpretation of levels and current regimen:  Trough level is  Therapeutic    Has serum creatinine changed > 50% in last 72 hours: No    Urine output:  good urine output    Renal Function: Stable    Plan:  1.  Continue Current Dose  2.  Pharmacy will check trough levels as appropriate in 1-3 Days.    3. Serum creatinine levels will be ordered daily for the first week of therapy and at least twice weekly for subsequent weeks.      Shady Le        .

## 2017-04-01 NOTE — PLAN OF CARE
Problem: Discharge Planning  Goal: Discharge Planning (Adult, OB, Behavioral, Peds)  Outcome: Improving  PT has recommended TCU, but patient and his wife do not want him to go there. They stated that when patient went to TCU after his knee surgery they felt like he was in [facility]. Patient has been seeing [facility] nurse for cares to his legs and has been open to [facility] for RN and OT. Would benefit from RN, PT and OT, possibly HHA. Spouse states he has an appt with his Cardiologist in [place] and is hoping that patient can be discharged prior to that.      Staff to continue to work on patient's endurance for physical activity. Will offer [facility] again prior to dc.

## 2017-04-01 NOTE — PLAN OF CARE
Problem: Goal Outcome Summary  Goal: Goal Outcome Summary     Goal status:Bed mobility:  Did not assess as pt was in chair and wanted to stay in the chair.  Transfers: SBA with stand to sit, min-modA with sit to stand.  Gait:  Total of 6 steps with FWW at a time with increased SOB.  Stairs:  Did not assess pt does not have ability to perform steps just yet.     Functional status: Weakness, deconditioned, SOB.  PT stressed importance of continued rehab services once discharged from the hospital.  PT also stressed importance of looking into life alert or some other form of fall management for home since wife is unable to pick him up.     Areas of progress:Strength, mobility, transfers, gait, and self cares    Barrier to discharge Home: Level of assistance needed at this time for transfers, gait, and bed mobility.  Pt lives with his wife however she is unable to care for him well and does report some neighbors and friends are around to help.     Equipment needs: None at this time     Discharge disposition/rationale: Recommendation is TCU for further endurance, gait, and transfer training and safety.  Pt stress he does not want to go to a rehab center.  PT asked about home therapy services and pt is more open to this idea.  At this time, pt will need to improve greatly with strength and transfers in order to be able to return home.  He does have stairs to enter.     Transport recommendations: Public transportation due to issues with transfers.     Thank you for your referral.     Teresa Lindsay, PT, DPT  [facility]  [phone]

## 2017-04-02 ENCOUNTER — APPOINTMENT (OUTPATIENT)
Dept: PHYSICAL THERAPY | Facility: CLINIC | Age: 82
DRG: 871 | End: 2017-04-02
Payer: MEDICARE

## 2017-04-02 LAB
ANION GAP SERPL CALCULATED.3IONS-SCNC: 10 MMOL/L (ref 3–14)
BACTERIA SPEC CULT: NORMAL
BACTERIA SPEC CULT: NORMAL
BUN SERPL-MCNC: 21 MG/DL (ref 7–30)
CALCIUM SERPL-MCNC: 9 MG/DL (ref 8.5–10.1)
CHLORIDE SERPL-SCNC: 96 MMOL/L (ref 94–109)
CO2 SERPL-SCNC: 32 MMOL/L (ref 20–32)
CREAT SERPL-MCNC: 0.9 MG/DL (ref 0.66–1.25)
DIGOXIN SERPL-MCNC: 0.6 UG/L (ref 0.5–2)
GFR SERPL CREATININE-BSD FRML MDRD: 80 ML/MIN/1.7M2
GLUCOSE SERPL-MCNC: 84 MG/DL (ref 70–99)
GRAM STN SPEC: NORMAL
HGB BLD-MCNC: 9.7 G/DL (ref 13.3–17.7)
MAGNESIUM SERPL-MCNC: 2.2 MG/DL (ref 1.6–2.3)
MICRO REPORT STATUS: NORMAL
PHOSPHATE SERPL-MCNC: 4.3 MG/DL (ref 2.5–4.5)
POTASSIUM SERPL-SCNC: 3.7 MMOL/L (ref 3.4–5.3)
SODIUM SERPL-SCNC: 138 MMOL/L (ref 133–144)
SPECIMEN SOURCE: NORMAL

## 2017-04-02 PROCEDURE — 99233 SBSQ HOSP IP/OBS HIGH 50: CPT | Performed by: FAMILY MEDICINE

## 2017-04-02 PROCEDURE — 85018 HEMOGLOBIN: CPT | Performed by: FAMILY MEDICINE

## 2017-04-02 PROCEDURE — 25000132 ZZH RX MED GY IP 250 OP 250 PS 637: Mod: GY | Performed by: FAMILY MEDICINE

## 2017-04-02 PROCEDURE — 25000125 ZZHC RX 250: Performed by: FAMILY MEDICINE

## 2017-04-02 PROCEDURE — 83735 ASSAY OF MAGNESIUM: CPT | Performed by: FAMILY MEDICINE

## 2017-04-02 PROCEDURE — 40000193 ZZH STATISTIC PT WARD VISIT: Performed by: PHYSICAL THERAPIST

## 2017-04-02 PROCEDURE — 12000000 ZZH R&B MED SURG/OB

## 2017-04-02 PROCEDURE — 97116 GAIT TRAINING THERAPY: CPT | Mod: GP | Performed by: PHYSICAL THERAPIST

## 2017-04-02 PROCEDURE — 97530 THERAPEUTIC ACTIVITIES: CPT | Mod: GP | Performed by: PHYSICAL THERAPIST

## 2017-04-02 PROCEDURE — 80048 BASIC METABOLIC PNL TOTAL CA: CPT | Performed by: FAMILY MEDICINE

## 2017-04-02 PROCEDURE — 25000128 H RX IP 250 OP 636: Performed by: FAMILY MEDICINE

## 2017-04-02 PROCEDURE — 80162 ASSAY OF DIGOXIN TOTAL: CPT | Performed by: FAMILY MEDICINE

## 2017-04-02 PROCEDURE — 25000132 ZZH RX MED GY IP 250 OP 250 PS 637: Mod: GY | Performed by: PEDIATRICS

## 2017-04-02 PROCEDURE — A9270 NON-COVERED ITEM OR SERVICE: HCPCS | Mod: GY | Performed by: FAMILY MEDICINE

## 2017-04-02 PROCEDURE — A9270 NON-COVERED ITEM OR SERVICE: HCPCS | Mod: GY | Performed by: PEDIATRICS

## 2017-04-02 PROCEDURE — 25000128 H RX IP 250 OP 636: Performed by: PEDIATRICS

## 2017-04-02 PROCEDURE — 84100 ASSAY OF PHOSPHORUS: CPT | Performed by: FAMILY MEDICINE

## 2017-04-02 PROCEDURE — 36415 COLL VENOUS BLD VENIPUNCTURE: CPT | Performed by: FAMILY MEDICINE

## 2017-04-02 PROCEDURE — 87205 SMEAR GRAM STAIN: CPT | Performed by: FAMILY MEDICINE

## 2017-04-02 PROCEDURE — 87070 CULTURE OTHR SPECIMN AEROBIC: CPT | Performed by: FAMILY MEDICINE

## 2017-04-02 RX ORDER — FUROSEMIDE 40 MG
40 TABLET ORAL
Status: DISCONTINUED | OUTPATIENT
Start: 2017-04-02 | End: 2017-04-04 | Stop reason: HOSPADM

## 2017-04-02 RX ORDER — METOPROLOL TARTRATE 50 MG
100 TABLET ORAL 2 TIMES DAILY
Status: DISCONTINUED | OUTPATIENT
Start: 2017-04-02 | End: 2017-04-04 | Stop reason: HOSPADM

## 2017-04-02 RX ORDER — METOPROLOL TARTRATE 50 MG
50 TABLET ORAL ONCE
Status: COMPLETED | OUTPATIENT
Start: 2017-04-02 | End: 2017-04-02

## 2017-04-02 RX ORDER — CLINDAMYCIN HCL 150 MG
300 CAPSULE ORAL EVERY 6 HOURS SCHEDULED
Status: DISCONTINUED | OUTPATIENT
Start: 2017-04-02 | End: 2017-04-04 | Stop reason: HOSPADM

## 2017-04-02 RX ADMIN — CLINDAMYCIN HYDROCHLORIDE 300 MG: 150 CAPSULE ORAL at 12:19

## 2017-04-02 RX ADMIN — TAZOBACTAM SODIUM AND PIPERACILLIN SODIUM 3.38 G: 375; 3 INJECTION, SOLUTION INTRAVENOUS at 09:40

## 2017-04-02 RX ADMIN — DOFETILIDE 125 MCG: 0.12 CAPSULE ORAL at 20:41

## 2017-04-02 RX ADMIN — FUROSEMIDE 40 MG: 40 TABLET ORAL at 11:34

## 2017-04-02 RX ADMIN — APIXABAN 2.5 MG: 2.5 TABLET, FILM COATED ORAL at 09:33

## 2017-04-02 RX ADMIN — POTASSIUM CHLORIDE 20 MEQ: 1500 TABLET, EXTENDED RELEASE ORAL at 10:01

## 2017-04-02 RX ADMIN — FUROSEMIDE 40 MG: 40 TABLET ORAL at 16:51

## 2017-04-02 RX ADMIN — SPIRONOLACTONE 25 MG: 25 TABLET ORAL at 09:33

## 2017-04-02 RX ADMIN — TAMSULOSIN HYDROCHLORIDE 0.4 MG: 0.4 CAPSULE ORAL at 20:41

## 2017-04-02 RX ADMIN — CLINDAMYCIN HYDROCHLORIDE 300 MG: 150 CAPSULE ORAL at 18:35

## 2017-04-02 RX ADMIN — METOPROLOL TARTRATE 50 MG: 50 TABLET, FILM COATED ORAL at 11:34

## 2017-04-02 RX ADMIN — OXYCODONE HYDROCHLORIDE AND ACETAMINOPHEN 500 MG: 500 TABLET ORAL at 09:33

## 2017-04-02 RX ADMIN — METOPROLOL TARTRATE 50 MG: 50 TABLET, FILM COATED ORAL at 09:32

## 2017-04-02 RX ADMIN — Medication 62.5 MCG: at 11:34

## 2017-04-02 RX ADMIN — CLINDAMYCIN HYDROCHLORIDE 300 MG: 150 CAPSULE ORAL at 23:54

## 2017-04-02 RX ADMIN — APIXABAN 2.5 MG: 2.5 TABLET, FILM COATED ORAL at 19:14

## 2017-04-02 RX ADMIN — HYDROCODONE BITARTRATE AND ACETAMINOPHEN 2 TABLET: 5; 325 TABLET ORAL at 18:39

## 2017-04-02 RX ADMIN — OMEPRAZOLE 20 MG: 20 CAPSULE, DELAYED RELEASE ORAL at 09:32

## 2017-04-02 RX ADMIN — METOPROLOL TARTRATE 100 MG: 50 TABLET, FILM COATED ORAL at 20:41

## 2017-04-02 RX ADMIN — TAMSULOSIN HYDROCHLORIDE 0.4 MG: 0.4 CAPSULE ORAL at 09:33

## 2017-04-02 RX ADMIN — AMOXICILLIN AND CLAVULANATE POTASSIUM 1 TABLET: 875; 125 TABLET, FILM COATED ORAL at 19:14

## 2017-04-02 RX ADMIN — DOFETILIDE 125 MCG: 0.12 CAPSULE ORAL at 09:33

## 2017-04-02 RX ADMIN — HYDROCODONE BITARTRATE AND ACETAMINOPHEN 2 TABLET: 5; 325 TABLET ORAL at 05:46

## 2017-04-02 RX ADMIN — PREDNISONE 10 MG: 10 TABLET ORAL at 09:33

## 2017-04-02 RX ADMIN — FUROSEMIDE 5 MG/HR: 10 INJECTION, SOLUTION INTRAVENOUS at 06:32

## 2017-04-02 RX ADMIN — VITAMIN D, TAB 1000IU (100/BT) 1000 UNITS: 25 TAB at 09:33

## 2017-04-02 RX ADMIN — TAZOBACTAM SODIUM AND PIPERACILLIN SODIUM 3.38 G: 375; 3 INJECTION, SOLUTION INTRAVENOUS at 03:30

## 2017-04-02 RX ADMIN — CALCIUM 1250 MG: 500 TABLET ORAL at 09:33

## 2017-04-02 RX ADMIN — AMOXICILLIN AND CLAVULANATE POTASSIUM 1 TABLET: 875; 125 TABLET, FILM COATED ORAL at 12:19

## 2017-04-02 RX ADMIN — OMEPRAZOLE 20 MG: 20 CAPSULE, DELAYED RELEASE ORAL at 16:51

## 2017-04-02 NOTE — PLAN OF CARE
Problem: Goal Outcome Summary  Goal: Goal Outcome Summary  Outcome: Improving  Pt.has voided after catheter removed.He has wanted to sit in the chair all shift.His appetite is good.Lungs are clear.Pt.is stronger he is walking in the halls and to the bathroom for a bowel movement.Pt.was taken off Lasix drip and put on oral.He was started on oral antibiotics.Pt.is alert and compliant with nurse call light use.

## 2017-04-02 NOTE — PLAN OF CARE
Problem: Goal Outcome Summary  Goal: Goal Outcome Summary  Outcome: No Change  VSS. Afebrile. C/o pain/discomfort to bilateral lower extremities. PRN pain medication given per MAR which has been effective. Redness remains to RLE. No change noted. LS wheezy throughout diminished in bases. No cough noted. 2+ edema noted to bilateral lower extremities 3+ edema noted to bilateral feet. Pt encouraged to elevate feet. No other complaints this shift.

## 2017-04-02 NOTE — PLAN OF CARE
Problem: Goal Outcome Summary  Goal: Goal Outcome Summary     Goal status:Bed Mobility: achieved  Transfers: SBA for sit <> stand  Gait:  60 ft with FWW and SBA advanced the goal to 100 ft  Stairs:  Did not attempt today.     Functional status: Improving with strength and endurance.  Pt was able to go further today walking and tolerate more activities.     Areas of progress:Strength, endurance    Barrier to discharge Home: None at this time, pt is progressing well.     Equipment needs: None, however pt's wife is asking about meals on wheels.     Discharge disposition/rationale: Home with home care services and assistance provided by wife.     Transport recommendations: Wife will be providing transportation.     Thank you for your referral.     Teresa Lindsay, PT, DPT  [facility]  [phone]

## 2017-04-02 NOTE — PHARMACY-VANCOMYCIN DOSING SERVICE
Pharmacy Vancomycin Note  Date of Service 2017  Patient's  1927   89 year old, male    Indication: Sepsis and Skin and Soft Tissue Infection  Goal Trough Level: 15-20 mg/L  Day of Therapy: 7  Current Vancomycin regimen:  1500 mg IV q24h    Current estimated CrCl = Estimated Creatinine Clearance: 56.3 mL/min (based on Cr of 0.9).    Creatinine for last 3 days  3/31/2017:  5:20 AM Creatinine 0.83 mg/dL  2017:  6:30 AM Creatinine 0.84 mg/dL  2017:  5:50 AM Creatinine 0.90 mg/dL    Recent Vancomycin Levels (past 3 days)  3/31/2017: 10:01 PM Vancomycin Level 17.7 mg/L    Vancomycin IV Administrations (past 72 hours)                   vancomycin (VANCOCIN) 1500 mg in 0.9% NaCl 250 mL PREMIX (mg) 1,500 mg New Bag 17 2224     1,500 mg New Bag 17 2300     1,500 mg New Bag 17 2243                Nephrotoxins and other renal medications (Future)    Start     Dose/Rate Route Frequency Ordered Stop    17 1330  furosemide (LASIX) 100 mg in NaCl 0.9 % 100 mL infusion      5 mg/hr  5 mL/hr  Intravenous CONTINUOUS 17 1316      17 2300  vancomycin (VANCOCIN) 1500 mg in 0.9% NaCl 250 mL PREMIX      1,500 mg Intravenous EVERY 24 HOURS 17 1119      17 0300  piperacillin-tazobactam (ZOSYN) infusion 3.375 g      3.375 g  100 mL/hr over 30 Minutes Intravenous EVERY 6 HOURS 17 0006               Contrast Orders - past 72 hours     None          Interpretation of levels and current regimen:  Trough level is  Therapeutic    Has serum creatinine changed > 50% in last 72 hours: No    Urine output:  good urine output    Renal Function: Stable    Plan:  1.  Continue Current Dose  2.  Pharmacy will check trough levels as appropriate in 3-5 Days.    3. Serum creatinine levels will be ordered daily for the first week of therapy and at least twice weekly for subsequent weeks.      Shady Le        .

## 2017-04-02 NOTE — PLAN OF CARE
Problem: Goal Outcome Summary  Goal: Goal Outcome Summary  Outcome: Improving  VSS, afebrile. Pt up with 1 assist to commode, up in chair for most of shift. Did 10 reps of from chair to end of bed throughout shift. Seems to be moving easier today than yesterday. 1300 output from stein. PRN norco given x1 for bilateral leg pain. Pt requested all pm meds early.

## 2017-04-02 NOTE — PROGRESS NOTES
[facility] Progress Note          Assessment and Plan:   Assessment:   Principal Problem:    Cellulitis of right lower extremity    Assessment:  Patient continues to have clinical improvement of cellulitis pro calcitonin showing drastic improvement compared to early in illness course.    Plan: Did discuss this patient with  Dr. Nolasco, infectious disease specialist at the [facility] who recommends transitioning to oral Augmentin as well as Bactrim for MRSA coverage.  Given patient's current use of Tikosyn and contraindication of Bactrim secondary to high risk of torsades and cardiac death, decision made to transition to Augmentin and clindamycin. A wound culture of the skin surrounding the venous stasis wounds will be performed to evaluate for baseline Pseudomonas or MRSA for future assistance in antibiotic coverage if cellulitis were to worsen following p.o. transition    Active Problems:    Paroxysmal atrial fibrillation s/p pacemaker placement    Assessment: Patient reported having increased, heart fluttering sensations. Patient was once more hooked up to telemetry and was noted to have a ventricularly paced rhythm in the 120s to 130s range.    Plan: Did discuss this patient with Dr. Catalan, cardiologist at the [facility] and at this time he would recommend increasing metoprolol to 100 mg twice a day, starting digoxin 62.5  g daily in light of current digoxin level of 0.6 following IV dose in the ICU setting earlier this hospital stay. He is also recommending interrogation of the pacemaker to further evaluate its function since placement in early March.  We'll perform these recommendations and continue to monitor patient closely on telemetry. Consider discussion with patient's EP specialist going forward if ongoing tachycardic rhythm is present.      Acute on chronic systolic congestive heart failure (H)    Assessment: improving with patient diuresis very well and  currently 25 lbs down in weight compared to highest weight during this stay and is 5 lbs under presentation weight, not requiring O2 supplementation and no shortness of breath with exertion    Plan: We'll transition patient to p.o. Lasix, discontinue Parrish catheter, and monitor for weight stabilization.        Shock (H)    Assessment:  Present initially with atypical presentation secondary to severe chronic cardiac disease and chronic prednisone therapy but with hypotension, elevated lactic acid level and significantly elevated pro calcitonin level and severe cellulitis as above.    Plan:  We'll transition to p.o. antibiotics as above      Venous stasis ulcers of both lower extremities (H)    Assessment:  Likely contributing if not completely causing patient's cellulitis infection as above    Plan:  Ongoing outpatient wound care following discharge is recommended.      Dilated cardiomyopathy (H) dilated LV, EF 20-25% by Echo 3/4/17    Assessment: With acute exacerbation of CHF as above    Plan:  Proceed with plan as above      Mitral regurgitation and aortic stenosis - AS severe by echo on 3/2017    Assessment:  Patient with severe aortic stenosis for which surgical intervention is being considered    Plan:  Patient will follow-up with cardiology as an outpatient, continue with transition to oral diuretics as above      Essential hypertension with goal blood pressure less than 140/90    Assessment:  Blood pressures have been stable and tolerating diuresis without difficulty    Plan:  Transition to p.o. Lasix, increase metoprolol and initiate digoxin as above. Continue to monitor blood pressure closely for tolerance.       Rheumatoid arthritis involving multiple sites, unspecified rheumatoid factor presence (H)    Assessment:  Clinically stable    Plan:  Continue current regimen without change      Malignant neoplasm of prostate (H)    Assessment:  chronic with bone metastasis, receiving Lupron    Plan:  Ongoing  "outpatient follow-up      Anemia    Assessment:  patient did receive 1 unit of packed red blood cells secondary to a hemoglobin perez of 7.6. Hemoglobin has n discontinue routine monitoring ow been stable in the 8-9 range    Plan:        Cardiac pacemaker in situ    Assessment:  placed in March 2017    Plan:  Will interrogate pacemaker as above      Immunosuppression (H)    Assessment:  taking prednisone and Arava chronically in the outpatient setting    Plan: continue to hold Arava and decrease prednisone slowly back to home dosing.  Started on prednisone 10 mg daily this AM.       VTE:  Eliquis   Code Status:  DNR/DNI        Interval History:   Continues to improve from a volume and infection standpoint - erythema decreasing and procalcitonin trending downward.  Patient has ongoing edema in bilateral lower legs but no crackles and has not needed O2 support in over 24 hours.  Vital signs stable with blood pressures tolerating diuresis well.  Is having increased heart palpitations and EKG and telemetry shows paced rhythm in the 120-140 range at times and other times in atrial fibrillation with HR in the 110-140s.  Eating and voiding well.  Tolerating medications without significant side effects.             Significant Problems:     Past Medical History:   Diagnosis Date     Atrial fibrillation (H) 07/01/2016     Cardiomyopathy (H)      COPD exacerbation (H) 3/2/2017     Paroxysmal atrial fibrillation (H) 7/6/2016     Pure hypercholesterolemia      Rheumatoid arthritis(714.0)      Unspecified essential hypertension      Weakness generalized 3/2/2017            Physical Exam:   Blood pressure 132/66, pulse 92, temperature 98  F (36.7  C), temperature source Oral, resp. rate 18, height 1.651 m (5' 5\"), weight 71.5 kg (157 lb 10.1 oz), SpO2 95 %.  Constitutional:   awake, alert, cooperative, no apparent distress, and appears stated age     Lungs:   No increased work of breathing, good air exchange, clear to " auscultation bilaterally, no crackles or wheezing     Cardiovascular:   Tachycardia in the 120s present - appears regular but difficulty to gauge at that pace     Abdomen:   Bowel sounds present, abdomen soft and non-tender     Musculoskeletal:   Bilateral lower legs in ACE wraps up to mid calf today.  Patient ongoing has 1+ pitting edema above these wraps which is much improved from previous.      Neurologic:   Awake, alert, oriented to name, place and time.       Skin:   Patient having chronic skin changes but erythema associated with cellulitis is essentially resolved.              Data:   All laboratory and imaging data in the past 24 hours reviewed    Attestation:  I have reviewed today's vital signs, notes, medications, labs and imaging.     Electronically Signed:  Sejal Perez MD    Note: Chart documentation done in part with Dragon Voice Recognition software. Although reviewed after completion, some word and grammatical errors may remain.

## 2017-04-03 ENCOUNTER — APPOINTMENT (OUTPATIENT)
Dept: PHYSICAL THERAPY | Facility: CLINIC | Age: 82
DRG: 871 | End: 2017-04-03
Payer: MEDICARE

## 2017-04-03 LAB
ANION GAP SERPL CALCULATED.3IONS-SCNC: 8 MMOL/L (ref 3–14)
BUN SERPL-MCNC: 21 MG/DL (ref 7–30)
CALCIUM SERPL-MCNC: 8.7 MG/DL (ref 8.5–10.1)
CHLORIDE SERPL-SCNC: 96 MMOL/L (ref 94–109)
CO2 SERPL-SCNC: 33 MMOL/L (ref 20–32)
CREAT SERPL-MCNC: 0.85 MG/DL (ref 0.66–1.25)
GFR SERPL CREATININE-BSD FRML MDRD: 85 ML/MIN/1.7M2
GLUCOSE SERPL-MCNC: 83 MG/DL (ref 70–99)
POTASSIUM SERPL-SCNC: 3.5 MMOL/L (ref 3.4–5.3)
POTASSIUM SERPL-SCNC: 3.8 MMOL/L (ref 3.4–5.3)
SODIUM SERPL-SCNC: 137 MMOL/L (ref 133–144)

## 2017-04-03 PROCEDURE — 80048 BASIC METABOLIC PNL TOTAL CA: CPT | Performed by: FAMILY MEDICINE

## 2017-04-03 PROCEDURE — 97530 THERAPEUTIC ACTIVITIES: CPT | Mod: GP | Performed by: PHYSICAL THERAPIST

## 2017-04-03 PROCEDURE — 25000125 ZZHC RX 250: Performed by: FAMILY MEDICINE

## 2017-04-03 PROCEDURE — A9270 NON-COVERED ITEM OR SERVICE: HCPCS | Mod: GY | Performed by: FAMILY MEDICINE

## 2017-04-03 PROCEDURE — 12000007 ZZH R&B INTERMEDIATE

## 2017-04-03 PROCEDURE — 25000132 ZZH RX MED GY IP 250 OP 250 PS 637: Mod: GY | Performed by: FAMILY MEDICINE

## 2017-04-03 PROCEDURE — 99232 SBSQ HOSP IP/OBS MODERATE 35: CPT | Performed by: FAMILY MEDICINE

## 2017-04-03 PROCEDURE — 36415 COLL VENOUS BLD VENIPUNCTURE: CPT | Performed by: PEDIATRICS

## 2017-04-03 PROCEDURE — A9270 NON-COVERED ITEM OR SERVICE: HCPCS | Mod: GY | Performed by: PEDIATRICS

## 2017-04-03 PROCEDURE — 40000193 ZZH STATISTIC PT WARD VISIT: Performed by: PHYSICAL THERAPIST

## 2017-04-03 PROCEDURE — 25000132 ZZH RX MED GY IP 250 OP 250 PS 637: Mod: GY | Performed by: PEDIATRICS

## 2017-04-03 PROCEDURE — 97116 GAIT TRAINING THERAPY: CPT | Mod: GP | Performed by: PHYSICAL THERAPIST

## 2017-04-03 PROCEDURE — 84132 ASSAY OF SERUM POTASSIUM: CPT | Performed by: PEDIATRICS

## 2017-04-03 RX ADMIN — CLINDAMYCIN HYDROCHLORIDE 300 MG: 150 CAPSULE ORAL at 12:36

## 2017-04-03 RX ADMIN — POTASSIUM CHLORIDE 20 MEQ: 1500 TABLET, EXTENDED RELEASE ORAL at 19:27

## 2017-04-03 RX ADMIN — Medication 62.5 MCG: at 08:39

## 2017-04-03 RX ADMIN — AMOXICILLIN AND CLAVULANATE POTASSIUM 1 TABLET: 875; 125 TABLET, FILM COATED ORAL at 08:37

## 2017-04-03 RX ADMIN — FUROSEMIDE 40 MG: 40 TABLET ORAL at 16:21

## 2017-04-03 RX ADMIN — TAMSULOSIN HYDROCHLORIDE 0.4 MG: 0.4 CAPSULE ORAL at 08:40

## 2017-04-03 RX ADMIN — DOFETILIDE 125 MCG: 0.12 CAPSULE ORAL at 21:11

## 2017-04-03 RX ADMIN — OMEPRAZOLE 20 MG: 20 CAPSULE, DELAYED RELEASE ORAL at 16:21

## 2017-04-03 RX ADMIN — TAMSULOSIN HYDROCHLORIDE 0.4 MG: 0.4 CAPSULE ORAL at 21:11

## 2017-04-03 RX ADMIN — SPIRONOLACTONE 25 MG: 25 TABLET ORAL at 08:38

## 2017-04-03 RX ADMIN — FUROSEMIDE 40 MG: 40 TABLET ORAL at 08:39

## 2017-04-03 RX ADMIN — APIXABAN 2.5 MG: 2.5 TABLET, FILM COATED ORAL at 08:40

## 2017-04-03 RX ADMIN — OXYCODONE HYDROCHLORIDE AND ACETAMINOPHEN 500 MG: 500 TABLET ORAL at 08:38

## 2017-04-03 RX ADMIN — METOPROLOL TARTRATE 100 MG: 50 TABLET, FILM COATED ORAL at 21:11

## 2017-04-03 RX ADMIN — HYDROCODONE BITARTRATE AND ACETAMINOPHEN 2 TABLET: 5; 325 TABLET ORAL at 04:12

## 2017-04-03 RX ADMIN — HYDROCODONE BITARTRATE AND ACETAMINOPHEN 2 TABLET: 5; 325 TABLET ORAL at 19:28

## 2017-04-03 RX ADMIN — PREDNISONE 10 MG: 10 TABLET ORAL at 08:37

## 2017-04-03 RX ADMIN — METOPROLOL TARTRATE 100 MG: 50 TABLET, FILM COATED ORAL at 08:38

## 2017-04-03 RX ADMIN — APIXABAN 2.5 MG: 2.5 TABLET, FILM COATED ORAL at 18:15

## 2017-04-03 RX ADMIN — OMEPRAZOLE 20 MG: 20 CAPSULE, DELAYED RELEASE ORAL at 08:38

## 2017-04-03 RX ADMIN — DOFETILIDE 125 MCG: 0.12 CAPSULE ORAL at 08:40

## 2017-04-03 RX ADMIN — CALCIUM 1250 MG: 500 TABLET ORAL at 08:39

## 2017-04-03 RX ADMIN — CLINDAMYCIN HYDROCHLORIDE 300 MG: 150 CAPSULE ORAL at 23:02

## 2017-04-03 RX ADMIN — VITAMIN D, TAB 1000IU (100/BT) 1000 UNITS: 25 TAB at 08:38

## 2017-04-03 RX ADMIN — CLINDAMYCIN HYDROCHLORIDE 300 MG: 150 CAPSULE ORAL at 06:21

## 2017-04-03 RX ADMIN — CLINDAMYCIN HYDROCHLORIDE 300 MG: 150 CAPSULE ORAL at 18:14

## 2017-04-03 RX ADMIN — AMOXICILLIN AND CLAVULANATE POTASSIUM 1 TABLET: 875; 125 TABLET, FILM COATED ORAL at 20:07

## 2017-04-03 NOTE — PLAN OF CARE
Problem: Goal Outcome Summary  Goal: Goal Outcome Summary        Goal status:Progressing. Some dizziness today  1) Not assessed, was up in chair when PT session began  2) Sit to stand with multiple attempts and moderate verbal cues. Min A toileting to clean up after BM, needed to rest in chair prior to gait  3) Gait x 70 feet with SBA and rolling walker, no LOB noted but fatigued with gait.  4) no stairs completed today, has a threshold only to enter     Areas of progress: Feeling tired today. Some dizziness noted. /66 sitting in chair.      Functional status: SBA, low activity tolerance overall still. Gait about the same distance as yesterday.     Barrier to discharge to previous living situation: Overall activity tolerance remains deficient. Can ambulate distance equivalent of home based ambulation for toileting and basic needs but gets worn out     Equipment needs:  Patient does not have any new equipment needs identified at this time, unless eligible for meals on wheels.      Discharge disposition/rationale: Would benefit from TCU but patient is not interested. Home with home care, assist and family transport ok.

## 2017-04-03 NOTE — PLAN OF CARE
Problem: Goal Outcome Summary  Goal: Goal Outcome Summary  Outcome: No Change  VSS. Afebrile. Pt on Tele remains paced rates in 60s to 80s with frequent PVCs. Denies SOB/chest pain. C/o bilateral leg pain and a headache. LS wheezy throughout. Voiding small amounts of clear yellow urine in urinal. No other complaints.  Edema remains to bilateral feet. Redness remains to R upper thigh but fading.

## 2017-04-03 NOTE — PROGRESS NOTES
SPIRITUAL HEALTH SERVICES  SPIRITUAL ASSESSMENT Progress Note  [facility]      Follow up - pt request.  Pt talked about not feeling 90 mentally, but being very weak physically.   responded that perhaps the time had come for him to make adjustments.  Pt agreed.   then prayed that he would be guided in his adjustments and also be able to still be physically active.  Pt thanked  for the prayer.   will follow up tomorrow.    Greyson Vallejo M.Div., [facility]  Staff   Office tel: [phone]

## 2017-04-03 NOTE — PLAN OF CARE
Problem: Goal Outcome Summary  Goal: Goal Outcome Summary  Outcome: Improving  Feeling better. Wounds to lower legs improving and are covered with dressings and ace wraps. Edema in both legs is decreasing. Walked in halls which increased the swelling in legs, so trying to elevate legs now. Afebrile. Paced rhythm continues per telemetry with less ectopy today. Potassium was 3.5 and sodium was 137. Up in chair most of the day. Will see cardiologist on Wednesday as scheduled. Plan to discharge tomorrow. Will continue to monitor telemetry, labs, edema in legs, wounds.

## 2017-04-03 NOTE — PLAN OF CARE
Problem: Goal Outcome Summary  Goal: Goal Outcome Summary  Outcome: Improving  VSS, afebrile Pt up with SBA to bathroom, ambulated 75ft in the ballard with walker and 1 assist. Pt voiding without difficulty (see flowsheet for UOP)Pt had an episode of HR in the 110's while lying in bed-MD notified. Pt hasn't reported feeling any palpatations during this shift. Bilateral dressing changes were completed during this shift by VIRGEN Blake. Redness to R leg has remained unchanged. PRN norco given x1 during this shift. Pt started using pocket talker during this shift and it has helped, his hearing aid has started acting up over past couple days. Purple lumen on PICC line is not flushing, red lumen is working well at this time.

## 2017-04-03 NOTE — PROGRESS NOTES
[facility] Progress Note          Assessment and Plan:   Assessment:   Principal Problem:    Cellulitis of right lower extremity    Assessment: Patient continues to have clinical improvement of cellulitis following transition to oral clindamycin and Augmentin.    Plan:  Continue with p.o. antibiotics and monitor for ongoing improvement. Anticipate discharge home tomorrow if patient continues to have clinical improvement with oral regimen and his heart rate remains adequately controlled on new regimen.    Active Problems:    Paroxysmal atrial fibrillation (H)    Assessment: Patient having frequent paroxysmal episodes of atrial fibrillation with ventricular pacing to keep up.  Occurring much less frequently following increased dosing of metformin as well as initiation of oral digoxin    Plan: Did discuss with Dr. Li, patient's EP specialist, and he did review the pacemaker interrogation. No further change in medication recommended patients at this time the patient will be monitored overnight to ensure ongoing significant improvement in frequency of paroxysmal episodes. Patient will see Dr. Li in the clinic on Wednesday for reevaluation.       Shock (H)    Assessment:  Present initially with atypical presentation secondary to severe chronic cardiac disease and chronic prednisone therapy but with hypotension, elevated lactic acid, significantly elevated pro calcitonin level and severe cellulitis. Signs of sepsis have now fully resolved    Plan:  Continue treatment of cellulitis as above      Acute on chronic systolic congestive heart failure (H)    Assessment: Patient continues to improve and currently is 33 pounds down from highest weight and 13 pounds lower than admission weight. Patient has been transitioned to oral Lasix and is tolerating this well with creatinine normal, leg edema significantly improved, and respiratory symptoms resolved    Plan:  Continue with Lasix 40 mg twice a day, recheck  creatinine and potassium tomorrow and continue to monitor daily weight.      Venous stasis ulcers of both lower extremities (H)    Assessment:  Likely contributing if not completely causing patient's cellulitis infection    Plan:  Continue with plan as above. Patient will need ongoing outpatient wound care following discharge.      Rheumatoid arthritis involving multiple sites, unspecified rheumatoid factor presence (H)    Assessment:  Chronic and stable    Plan:  Continue home regimen without change      Mitral regurgitation and aortic stenosis - AS severe by echo on 3/2017    Assessment:  Patient with severe aortic stenosis for which surgical intervention is being considered    Plan:  Follow up with cardiology as an outpatient      Essential hypertension with goal blood pressure less than 140/90    Assessment:  Blood pressures have been stable and tolerated new medication regimen without difficulty    Plan:  Continue to monitor      Malignant neoplasm of prostate (H)    Assessment:  Chronic with bone metastasis, receiving Lupron    Plan:  Ongoing outpatient oncology follow-up      Anemia    Assessment:  Patient did receive 1 unit of packed red blood cells secondary to a hemoglobin perez of 7.6. Hemoglobin continues to be stable following this transfusion in the 9 range    Plan:  No further monitoring is needed      Cardiac pacemaker in situ    Assessment:  Placed in early March, with pacemaker interrogated and patient continuing to have frequent cardiac arrhythmias    Plan:  Proceed with plan as above      Dilated cardiomyopathy (H) dilated LV, EF 20-25% by Echo 3/4/17    Assessment:  Noted, with acute exacerbation as above now resolving    Plan:  Continue with oral diuretics and ongoing monitoring of weight stability, creatinine, and pain level       Immunosuppression (H)    Assessment:  Patient taking prednisone and Arava chronically with Arava being held during this hospitalization    Plan:  Continue to taper  "prednisone back to outpatient regimen following stress dose response during times of sepsis. Continue to hold  Arava during this hospitalization       VTE:  Eliquis  Code Status:  DNR/DNI        Interval History:   Continues to improve.  Vital signs generally better.  Patient has not had any of the \"flip flop\" sensations of the heart today.  Eating and voiding well.  Tolerating medications without significant side effects.  No new concerns today.            Significant Problems:     Past Medical History:   Diagnosis Date     Atrial fibrillation (H) 07/01/2016     Cardiomyopathy (H)      COPD exacerbation (H) 3/2/2017     Paroxysmal atrial fibrillation (H) 7/6/2016     Pure hypercholesterolemia      Rheumatoid arthritis(714.0)      Unspecified essential hypertension      Weakness generalized 3/2/2017            Physical Exam:   Blood pressure 142/89, pulse 63, temperature 96.2  F (35.7  C), temperature source Oral, resp. rate 16, height 1.651 m (5' 5\"), weight 68 kg (149 lb 14.6 oz), SpO2 94 %.  Constitutional:   awake, alert, cooperative, no apparent distress, and appears stated age     Lungs:   No increased work of breathing, good air exchange, clear to auscultation bilaterally, no crackles or wheezing     Cardiovascular:   Paced rhythm currently in the 60s.     Abdomen:   Bowel sounds present, abdomen soft and non-tender     Musculoskeletal:   Trace to 1+ edema in bilateral ankles and up to mid-calf but much improved compared to previous     Neurologic:   Awake, alert, oriented to name, place and time.             Data:   All laboratory data reviewed    Attestation:  I have reviewed today's vital signs, notes, medications, labs and imaging.     Electronically Signed:  Sejal Perez MD    Note: Chart documentation done in part with Dragon Voice Recognition software. Although reviewed after completion, some word and grammatical errors may remain.     "

## 2017-04-03 NOTE — PROGRESS NOTES
Notified MD at 1810 PM regarding changes in vital signs.      Spoke with: Forda    Orders were not obtained.    Comments: Patient had heart rate 173 lasting 30seconds followed by frequent PVC's.

## 2017-04-03 NOTE — DOWNTIME EVENT NOTE
The EMR was down for 3 hours on 4/2/2017.    Lilliana Machado RN was responsible for completing the paper charting during this time period.     The following information was re-entered into the system by Evonne Junior: MAR    The following information will remain in the paper chart: paper FERNANDEZ Junior  4/2/2017

## 2017-04-03 NOTE — PLAN OF CARE
Problem: Discharge Planning  Goal: Discharge Planning (Adult, OB, Behavioral, Peds)  Outcome: Improving  Patient continues to work on his walking endurance with PT and nursing staff. Patient to be going home with [facility], preferably TeleHealth, with RN, PT and possibly HHA.

## 2017-04-03 NOTE — PROGRESS NOTES
Patient rhythm remains for the most part 100% AV paced with occasional runs of his intrinsic rhythm with rate into the low 100s to 120.  Vitals signs have been stable, denies chest pain, pressure, and or SOA.   More PVCs noted at this time discussed aforementioned with MD and plan is to continue to follow current POC as set up during the day with cardiology and monitor Potassium level with AM labs.

## 2017-04-04 ENCOUNTER — APPOINTMENT (OUTPATIENT)
Dept: PHYSICAL THERAPY | Facility: CLINIC | Age: 82
DRG: 871 | End: 2017-04-04
Payer: MEDICARE

## 2017-04-04 VITALS
HEART RATE: 59 BPM | RESPIRATION RATE: 16 BRPM | BODY MASS INDEX: 24.61 KG/M2 | TEMPERATURE: 97.2 F | HEIGHT: 65 IN | WEIGHT: 147.71 LBS | OXYGEN SATURATION: 95 % | SYSTOLIC BLOOD PRESSURE: 145 MMHG | DIASTOLIC BLOOD PRESSURE: 77 MMHG

## 2017-04-04 LAB
ANION GAP SERPL CALCULATED.3IONS-SCNC: 10 MMOL/L (ref 3–14)
BACTERIA SPEC CULT: NO GROWTH
BUN SERPL-MCNC: 21 MG/DL (ref 7–30)
CALCIUM SERPL-MCNC: 8.9 MG/DL (ref 8.5–10.1)
CHLORIDE SERPL-SCNC: 97 MMOL/L (ref 94–109)
CO2 SERPL-SCNC: 31 MMOL/L (ref 20–32)
CREAT SERPL-MCNC: 0.79 MG/DL (ref 0.66–1.25)
DIGOXIN SERPL-MCNC: 0.6 UG/L (ref 0.5–2)
GFR SERPL CREATININE-BSD FRML MDRD: NORMAL ML/MIN/1.7M2
GLUCOSE SERPL-MCNC: 99 MG/DL (ref 70–99)
MICRO REPORT STATUS: NORMAL
POTASSIUM SERPL-SCNC: 3.9 MMOL/L (ref 3.4–5.3)
SODIUM SERPL-SCNC: 138 MMOL/L (ref 133–144)
SPECIMEN SOURCE: NORMAL

## 2017-04-04 PROCEDURE — A9270 NON-COVERED ITEM OR SERVICE: HCPCS | Mod: GY | Performed by: FAMILY MEDICINE

## 2017-04-04 PROCEDURE — 25000132 ZZH RX MED GY IP 250 OP 250 PS 637: Mod: GY | Performed by: FAMILY MEDICINE

## 2017-04-04 PROCEDURE — 25000125 ZZHC RX 250: Performed by: FAMILY MEDICINE

## 2017-04-04 PROCEDURE — 97110 THERAPEUTIC EXERCISES: CPT | Mod: GP | Performed by: PHYSICAL THERAPIST

## 2017-04-04 PROCEDURE — 80048 BASIC METABOLIC PNL TOTAL CA: CPT | Performed by: PEDIATRICS

## 2017-04-04 PROCEDURE — 25000132 ZZH RX MED GY IP 250 OP 250 PS 637: Mod: GY | Performed by: PEDIATRICS

## 2017-04-04 PROCEDURE — 40000193 ZZH STATISTIC PT WARD VISIT: Performed by: PHYSICAL THERAPIST

## 2017-04-04 PROCEDURE — 99239 HOSP IP/OBS DSCHRG MGMT >30: CPT | Performed by: FAMILY MEDICINE

## 2017-04-04 PROCEDURE — 97116 GAIT TRAINING THERAPY: CPT | Mod: GP | Performed by: PHYSICAL THERAPIST

## 2017-04-04 PROCEDURE — A9270 NON-COVERED ITEM OR SERVICE: HCPCS | Mod: GY | Performed by: PEDIATRICS

## 2017-04-04 RX ORDER — FUROSEMIDE 20 MG
TABLET ORAL
Qty: 90 TABLET | Refills: 0 | Status: SHIPPED | OUTPATIENT
Start: 2017-04-04 | End: 2017-04-27

## 2017-04-04 RX ORDER — DIGOXIN 125 MCG
62.5 TABLET ORAL DAILY
Qty: 15 TABLET | Refills: 0 | Status: SHIPPED | OUTPATIENT
Start: 2017-04-04 | End: 2017-04-20

## 2017-04-04 RX ORDER — CLINDAMYCIN HCL 300 MG
300 CAPSULE ORAL 4 TIMES DAILY
Qty: 24 CAPSULE | Refills: 0 | Status: SHIPPED | OUTPATIENT
Start: 2017-04-04 | End: 2017-04-10

## 2017-04-04 RX ORDER — METOPROLOL TARTRATE 100 MG
100 TABLET ORAL 2 TIMES DAILY
Qty: 60 TABLET | Refills: 0 | Status: SHIPPED | OUTPATIENT
Start: 2017-04-04 | End: 2017-05-16

## 2017-04-04 RX ADMIN — VITAMIN D, TAB 1000IU (100/BT) 1000 UNITS: 25 TAB at 10:33

## 2017-04-04 RX ADMIN — OXYCODONE HYDROCHLORIDE AND ACETAMINOPHEN 500 MG: 500 TABLET ORAL at 10:27

## 2017-04-04 RX ADMIN — POTASSIUM CHLORIDE 20 MEQ: 1500 TABLET, EXTENDED RELEASE ORAL at 08:44

## 2017-04-04 RX ADMIN — METOPROLOL TARTRATE 100 MG: 50 TABLET, FILM COATED ORAL at 08:39

## 2017-04-04 RX ADMIN — Medication 62.5 MCG: at 08:39

## 2017-04-04 RX ADMIN — CALCIUM 1250 MG: 500 TABLET ORAL at 10:32

## 2017-04-04 RX ADMIN — CLINDAMYCIN HYDROCHLORIDE 300 MG: 150 CAPSULE ORAL at 05:35

## 2017-04-04 RX ADMIN — OMEPRAZOLE 20 MG: 20 CAPSULE, DELAYED RELEASE ORAL at 08:38

## 2017-04-04 RX ADMIN — DOFETILIDE 125 MCG: 0.12 CAPSULE ORAL at 10:32

## 2017-04-04 RX ADMIN — FUROSEMIDE 40 MG: 40 TABLET ORAL at 08:38

## 2017-04-04 RX ADMIN — APIXABAN 2.5 MG: 2.5 TABLET, FILM COATED ORAL at 10:31

## 2017-04-04 RX ADMIN — CLINDAMYCIN HYDROCHLORIDE 300 MG: 150 CAPSULE ORAL at 12:31

## 2017-04-04 RX ADMIN — TAMSULOSIN HYDROCHLORIDE 0.4 MG: 0.4 CAPSULE ORAL at 10:28

## 2017-04-04 RX ADMIN — PREDNISONE 10 MG: 10 TABLET ORAL at 10:33

## 2017-04-04 RX ADMIN — HYDROCODONE BITARTRATE AND ACETAMINOPHEN 2 TABLET: 5; 325 TABLET ORAL at 05:35

## 2017-04-04 RX ADMIN — SPIRONOLACTONE 25 MG: 25 TABLET ORAL at 10:29

## 2017-04-04 RX ADMIN — AMOXICILLIN AND CLAVULANATE POTASSIUM 1 TABLET: 875; 125 TABLET, FILM COATED ORAL at 08:38

## 2017-04-04 NOTE — PROGRESS NOTES
S-(situation): Discharge Note    B-(background): Cellulitis of RLE and LLE    A-(assessment):    S- Skin  pale, warm, wounds on RLE and LLE exhibit serous drainage, dressing change completed   P- Pt. denies pain. Prescribed Tramadol for pain management at home.   E- Last BM 0900 on 4/4/17. No pain or burning stated upon urination.   A- Slightly limited mobility, uses walker to get around, can walk down the hospital ballard and back    D- pt. has good appetite, eats % of his meals   O- Pt. and spouse educated on medication administration and timing and the need to keep all follow up  appointments by RN    R-(recommendations): Discharge to home via spouse's car with all belongings. Alert and oriented X4. Medications called into [facility]

## 2017-04-04 NOTE — PROGRESS NOTES
SPIRITUAL HEALTH SERVICES  SPIRITUAL ASSESSMENT Progress Note  [facility]       attempted to see pt on two different occasions, but staff was preparing him for discharge.  Pt left before  could see him.    As a result, no follow up was possible.    Greyson Vallejo M.Div., [facility]  Staff   Office tel: [phone]

## 2017-04-04 NOTE — PLAN OF CARE
Problem: Goal Outcome Summary  Goal: Goal Outcome Summary        Goal status:Progressing   1) SBA Goal met  2) SBA transfers with some difficulty noted getting out of low chair. Has been offered a lift chair at times but wife states he decliens need/use of it. . Goal met  3)  SBA with 2WW  Today. Goal met with SBA not mod I     Areas of progress:Gait distance, activity tolerance     Functional status: SBA with walker for household distance ambulation and transfers    Barrier to discharge to previous living situation: None with assist and home health care     Equipment needs:  Patient does not have any new equipment needs      Discharge disposition/rationale:   Home with home health services and assist            Physical Therapy Discharge Summary     Current functional/mobility status: See above in care plan note for details.     Reason for therapy discharge:    Discharged to home with home therapy.     Progress towards therapy goal(s):See goals on Care Plan in [facility] electronic health record for goal details.  Goals partially met.  Barriers to achieving goals:   discharge from facility.           Family/caregiver training provided: education RE: role of PT      Therapy recommendation(s):    Continued therapy is recommended.  Rationale/Recommendations:  Home health PT to progress activity tolerance.      Valarie Meng................... PT, DPT, CLT          4/4/2017, 12:56 PM  [phone]

## 2017-04-04 NOTE — PROGRESS NOTES
S-(situation): Patient discharged to home with wife    B-(background): Cellulitis of RLE and LLE    A-(assessment): VSS, Ra. Afebrile. Lungs clear with bilateral crackles scattered. Tele a-fib with paced rate. No c/o pain and or n/v, up with SBA walker and gait belt. Good appetite and UOP; Wounds on L/EX right and left changed this shift with dressing POC.  Meds reviewed with spouse and pharmacy with an understanding of care and appointments set up.       R-(recommendations): Discharge instructions reviewed with . Listed belongings gathered and returned to patient.          Discharge Nursing Criteria:     Care Plan and Patient education resolved: Yes    New Medications- pt has been educated about purpose and side effects: Yes    Vaccines  Pneumonia Vaccine verified at discharge: Yes  Influenza status verified at discharge:  Yes    MISC  Prescriptions if needed, hard copies sent with patient  Yes  Home and hospital aquired medications returned to patient: Yes  Medication Bin checked and emptied on discharge Yes  Patient reports post-discharge pain management plan is effective: Yes

## 2017-04-04 NOTE — PROGRESS NOTES
Name: Bud Merritt    MRN#: [id number]    Reason for Hospitalization: Cellulitis of right lower extremity [L03.115]    Discharge Date: 4/4/2017    Patient / Family response to discharge plan: in agreement    Follow-Up Appt: Future Appointments  Date Time Provider Department Center   4/5/2017 10:00 AM DEVICE CHECK RN CARD [facility]   4/5/2017 10:30 AM Khoa Li MD [facility]   4/10/2017 10:00 AM Camron Aragon MD [facility]       Other Providers (Care Coordinator, County Services, PCA services etc): No    Discharge Disposition: home with [facility] Phone: [phone]    MARIZOL Khanna  [facility] [phone]/ [facility] [phone]

## 2017-04-04 NOTE — PLAN OF CARE
Problem: Individualization  Goal: Patient Preferences  Outcome: Improving  VSS.  Pt received norco x 1 for 7/10 bilat leg pain.  Pt slept t/o the night.

## 2017-04-04 NOTE — DISCHARGE SUMMARY
[facility] Discharge Summary    Bud Merritt MRN# [id number]   Age: 89 year old YOB: 1927     Date of Admission:  3/27/2017  Date of Discharge::  4/4/2017  Admitting Physician:  Cullen Teran MD  Discharge Physician:  Sejal Perez MD    Home clinic: [facility]          Admission Diagnoses:   Cellulitis of right lower extremity [L03.115]          Discharge Diagnosis:   Principal Problem:    Cellulitis of right lower extremity    Assessment: Patient presented with bilateral lower extremity cellulitis with concerns of sepsis and mild shock with patient least on broad-spectrum antibiotics initially with progressive improvement. Patient has been transitioned to oral clindamycin and Augmentin per infectious disease specialist recommendations and has had ongoing clinical improvement of infection    Plan:  We'll discharge patient with ongoing course completion for clindamycin and Augmentin for a total course completion of 14 days.. Patient will need close follow-up with primary care provider to ensure resolution of cellulitis. We'll have wound care in home setting to assist with management of chronic venous stasis ulcers to ensure appropriate healing and decrease risk for recurrent infection going forward.    Active Problems:    Shock (H)    Assessment:  Present initially, with slightly atypical presentation secondary to severe chronic cardiac disease and chronic prednisone therapy the patient with hypotension, elevated lactic acid, significantly elevated pro calcitonin level and severe bilateral leg cellulitis. Signs of sepsis have fully resolved prior to discharge and cellulitis continues to improve with antibiotics as well    Plan:  Discharge with plan as above      Venous stasis ulcers of both lower extremities (H)    Assessment:  Ongoing, likely the underlying etiology for cellulitis as above    Plan:  Patient will discharged with home care to assist with  wound care management to ensure ongoing healing. Patient does also follow with the [facility] and encouraged intermittent visits there to assist with any wound care management changes that are needed.      Paroxysmal atrial fibrillation (H)    Assessment: Patient is status post pacemaker placement in March 2017. Throughout this hospitalization he continued to have episodes of paroxysmal tachycardia and multiple dose adjustments were made to his medication. In the 24 hours prior to discharge the patient had a only one very brief episode of paroxysmal atrial fibrillation up into the 150s that resolved in less than 3 minutes without intervention and during which patient was asymptomatic.    Plan:  We'll discharge with ongoing Tikosyn 125  g b.i.d., digoxin 62.5  g daily, metoprolol 100 mg b.i.d. Patient does have an outpatient appointment with his cardiologist, Dr. Li tomorrow for further evaluation and treatment going forward.      Acute on chronic systolic congestive heart failure (H)    Assessment:  Developed secondary to large volume of fluid that was needed to assist patient during his septic shock episode, with patient responding to diuresis very well and appears euvolemic at time of discharge. Of note, he is currently 15 pounds under his presenting weight and this appears to be close to his appropriate dry weight.    Plan: We'll discharge with ongoing metoprolol, lisinopril, Lasix which has been increased to 40 mg in the morning and 20 in the afternoon. Patient will weigh himself daily and inform his primary care provider if he has had more than 2 pounds of weight gain in 24 hours or 5 pounds in 1 week. Home care will also assist in monitoring and further education going forward.      Rheumatoid arthritis involving multiple sites, unspecified rheumatoid factor presence (H)    Assessment:  Chronic and stable    Plan:  Continue home regimen without change      Mitral regurgitation and aortic  stenosis - AS severe by echo on 3/2017    Assessment:  Patient with severe aortic stenosis for which surgical intervention is being considered    Plan:  Follow up with cardiology as an outpatient to discuss further      Essential hypertension with goal blood pressure less than 140/90    Assessment:  Blood pressures have tolerated the regimen as above during this hospitalization without difficulty.    Plan:  Discharge with plan as above and ongoing outpatient monitoring of blood pressure.      Malignant neoplasm of prostate (H)    Assessment:  Chronic with bone metastasis, receiving Lupron    Plan:  Ongoing outpatient follow-up as previously recommended      Anemia    Assessment:  Patient did receive 1 unit of packed red blood cells secondary to a hemoglobin perez of 7.6 during time of septic shock. Hemoglobin stabilized in the mid 9 range following this transfusion    Plan:  Outpatient monitoring as needed      Cardiac pacemaker in situ    Assessment: Performed in March 2017    Plan:  Follow up with cardiology as above      Dilated cardiomyopathy (H) dilated LV, EF 20-25% by Echo 3/4/17    Assessment:  With acute worsening as above    Plan:  Discharge with plan as above      Immunosuppression (H)    Assessment:  Patient on prednisone and Arava prior to admission, with patient receiving stress dose response steroids but Arava being held    Plan: We'll resume Areva and proceed with steroid taper back down to chronic dosing.          Procedures:   1.  Patient did receive 1 unit packed red blood cells during this hospitalization          Medications Prior to Admission:     Prescriptions Prior to Admission   Medication Sig Dispense Refill Last Dose     HYDROcodone-acetaminophen (NORCO) 5-325 MG per tablet Take 1 tablet by mouth every 6 hours as needed for moderate to severe pain 30 tablet 0 3/27/2017 at 1500     traMADol (ULTRAM) 50 MG tablet TAKE 1 TABLET 3 TIMES DAILY AS NEEDED FOR MODERATE PAIN 90 tablet 0 3/27/2017  at 1800     omeprazole (PRILOSEC) 20 MG CR capsule Take 1 capsule (20 mg) by mouth 2 times daily (before meals) 60 capsule 0 3/27/2017 at 1600     predniSONE (DELTASONE) 5 MG tablet Take 1 tablet (5 mg) by mouth daily 100 tablet 4 3/27/2017 at 0330     dofetilide (TIKOSYN) 125 MCG capsule Take 1 capsule (125 mcg) by mouth 2 times daily 60 capsule 5 3/27/2017 at 1600     apixaban ANTICOAGULANT (ELIQUIS) 2.5 MG tablet Take 1 tablet (2.5 mg) by mouth 2 times daily 180 tablet 3 3/27/2017 at 0330     leflunomide (ARAVA) 10 MG tablet Take 1 tablet (10 mg) by mouth daily 30 tablet 11 3/27/2017 at 0330     lisinopril (PRINIVIL,ZESTRIL) 10 MG tablet Take 1 tablet (10 mg) by mouth 2 times daily 62 tablet 11 3/27/2017 at 1600     spironolactone (ALDACTONE) 25 MG tablet Take 1 tablet (25 mg) by mouth daily 30 tablet 11 3/27/2017 at 0330     acetaminophen (TYLENOL) 650 MG CR tablet Take 650 mg by mouth 2 times daily   3/27/2017 at 1600     atorvastatin (LIPITOR) 40 MG tablet Take 1 tablet (40 mg) by mouth daily 90 tablet 3 3/27/2017 at 0330     alendronate (FOSAMAX) 70 MG tablet Take 1 tablet (70 mg) by mouth every 7 days Take with over 8 ounces water and stay upright for at least 30 minutes after dose.  Take at least 60 minutes before breakfast 12 tablet 3 3/27/2017 at 0300     tamsulosin (FLOMAX) 0.4 MG 24 hr capsule Take 1 capsule (0.4 mg) by mouth 2 times daily 60 capsule  3/27/2017 at 1600     ascorbic acid (VITAMIN C) 500 MG CPCR Take 500 mg by mouth daily   3/27/2017 at 0330     VITAMIN D, CHOLECALCIFEROL, PO Take 400 Units by mouth daily   3/27/2017 at 0330     calcium carbonate (OS-SHELIA 500 MG [place]. [place]) 500 MG tablet Take 600 mg by mouth daily   3/27/2017 at 0330     [DISCONTINUED] metoprolol (TOPROL-XL) 25 MG 24 hr tablet         [DISCONTINUED] cephALEXin (KEFLEX) 500 MG capsule Take 1 capsule (500 mg) by mouth 3 times daily 30 capsule 0 3/27/2017 at 1200     fluticasone-vilanterol (BREO ELLIPTA) 100-25 MCG/INH oral  inhaler Inhale 1 puff into the lungs daily 60 Inhaler 0 More than a month at Unknown time     umeclidinium (INCRUSE ELLIPTA) 62.5 MCG/INH oral inhaler Inhale 1 puff into the lungs daily 30 Inhaler 0 More than a month at Unknown time     Leuprolide Acetate (LUPRON DEPOT IM) Inject into the muscle every 4 months   More than a month at Unknown time     calcium carbonate (TUMS) 500 MG chewable tablet Take 1 chew tab by mouth every 4 hours as needed for heartburn   Unknown at Unknown time             Discharge Medications:     Current Discharge Medication List      START taking these medications    Details   digoxin (LANOXIN) 125 MCG tablet Take 0.5 tablets (62.5 mcg) by mouth daily  Qty: 15 tablet, Refills: 0    Comments: Please cut pills in half for the patient  Associated Diagnoses: Paroxysmal atrial fibrillation (H)      amoxicillin-clavulanate (AUGMENTIN) 875-125 MG per tablet Take 1 tablet by mouth every 12 hours for 6 days  Qty: 12 tablet, Refills: 0    Associated Diagnoses: Cellulitis of right lower extremity      clindamycin (CLEOCIN) 300 MG capsule Take 1 capsule (300 mg) by mouth 4 times daily for 6 days  Qty: 24 capsule, Refills: 0    Associated Diagnoses: Cellulitis of right lower extremity         CONTINUE these medications which have CHANGED    Details   metoprolol (LOPRESSOR) 100 MG tablet Take 1 tablet (100 mg) by mouth 2 times daily  Qty: 60 tablet, Refills: 0    Associated Diagnoses: Paroxysmal atrial fibrillation (H)      furosemide (LASIX) 20 MG tablet Take 2 tablets (40 mg) by mouth in the morning and 1 tablet (20 mg) by mouth in the mid afternoon.  Qty: 90 tablet, Refills: 0    Associated Diagnoses: Acute on chronic systolic congestive heart failure (H)         CONTINUE these medications which have NOT CHANGED    Details   HYDROcodone-acetaminophen (NORCO) 5-325 MG per tablet Take 1 tablet by mouth every 6 hours as needed for moderate to severe pain  Qty: 30 tablet, Refills: 0    Associated  Diagnoses: Open wound      traMADol (ULTRAM) 50 MG tablet TAKE 1 TABLET 3 TIMES DAILY AS NEEDED FOR MODERATE PAIN  Qty: 90 tablet, Refills: 0    Associated Diagnoses: Rheumatoid arthritis involving multiple sites, unspecified rheumatoid factor presence (H)      omeprazole (PRILOSEC) 20 MG CR capsule Take 1 capsule (20 mg) by mouth 2 times daily (before meals)  Qty: 60 capsule, Refills: 0    Associated Diagnoses: Gastroesophageal reflux disease without esophagitis      predniSONE (DELTASONE) 5 MG tablet Take 1 tablet (5 mg) by mouth daily  Qty: 100 tablet, Refills: 4    Associated Diagnoses: Osteopenia      dofetilide (TIKOSYN) 125 MCG capsule Take 1 capsule (125 mcg) by mouth 2 times daily  Qty: 60 capsule, Refills: 5    Associated Diagnoses: Paroxysmal atrial fibrillation (H)      apixaban ANTICOAGULANT (ELIQUIS) 2.5 MG tablet Take 1 tablet (2.5 mg) by mouth 2 times daily  Qty: 180 tablet, Refills: 3    Associated Diagnoses: Paroxysmal atrial fibrillation (H)      leflunomide (ARAVA) 10 MG tablet Take 1 tablet (10 mg) by mouth daily  Qty: 30 tablet, Refills: 11    Associated Diagnoses: Rheumatoid arthritis involving multiple sites with positive rheumatoid factor (H)      lisinopril (PRINIVIL,ZESTRIL) 10 MG tablet Take 1 tablet (10 mg) by mouth 2 times daily  Qty: 62 tablet, Refills: 11    Associated Diagnoses: Acute on chronic systolic congestive heart failure (H)      spironolactone (ALDACTONE) 25 MG tablet Take 1 tablet (25 mg) by mouth daily  Qty: 30 tablet, Refills: 11    Associated Diagnoses: Cardiomyopathy, nonischemic (H)      acetaminophen (TYLENOL) 650 MG CR tablet Take 650 mg by mouth 2 times daily      atorvastatin (LIPITOR) 40 MG tablet Take 1 tablet (40 mg) by mouth daily  Qty: 90 tablet, Refills: 3    Associated Diagnoses: Atherosclerosis of native coronary artery of native heart without angina pectoris      alendronate (FOSAMAX) 70 MG tablet Take 1 tablet (70 mg) by mouth every 7 days Take with over  8 ounces water and stay upright for at least 30 minutes after dose.  Take at least 60 minutes before breakfast  Qty: 12 tablet, Refills: 3    Associated Diagnoses: Osteopenia      tamsulosin (FLOMAX) 0.4 MG 24 hr capsule Take 1 capsule (0.4 mg) by mouth 2 times daily  Qty: 60 capsule      ascorbic acid (VITAMIN C) 500 MG CPCR Take 500 mg by mouth daily      VITAMIN D, CHOLECALCIFEROL, PO Take 400 Units by mouth daily      calcium carbonate (OS-SHELIA 500 MG [place]. [place]) 500 MG tablet Take 600 mg by mouth daily      fluticasone-vilanterol (BREO ELLIPTA) 100-25 MCG/INH oral inhaler Inhale 1 puff into the lungs daily  Qty: 60 Inhaler, Refills: 0    Associated Diagnoses: COPD exacerbation (H)      umeclidinium (INCRUSE ELLIPTA) 62.5 MCG/INH oral inhaler Inhale 1 puff into the lungs daily  Qty: 30 Inhaler, Refills: 0    Associated Diagnoses: COPD exacerbation (H)      Leuprolide Acetate (LUPRON DEPOT IM) Inject into the muscle every 4 months      calcium carbonate (TUMS) 500 MG chewable tablet Take 1 chew tab by mouth every 4 hours as needed for heartburn         STOP taking these medications       metoprolol (TOPROL-XL) 25 MG 24 hr tablet Comments:   Reason for Stopping:         cephALEXin (KEFLEX) 500 MG capsule Comments:   Reason for Stopping:                     Consultations:   1.  Consultation during this admission received from cardiology - Dr. Catalan and Dr. Li regarding management of breakthrough atrial paced rhythms  2.  Telephone consultation during this admission received from Dr. Nolasco, infectious disease at the [facility] regarding treatment of cellulitis following discharge.          Brief History of Illness:   Patient is an 89-year-old woman who presented to the emergency room with signs concerning for septic shock secondary to bilateral lower extremity cellulitis.           Hospital Course:   patient initially was placed in the intensive care unit and resuscitated with  multiple fluid boluses, placed on broad-spectrum antibiotics including Zosyn and vancomycin with progressive stabilization and improvement of the sepsis and infectious symptoms. Patient also began having increased difficulty with paroxysmal atrial fibrillation during this time of acute illness and was bolused with digoxin until patient could tolerate his oral metoprolol.  As patient stabilized he began having issues with acute on chronic congestive heart failure and diuresis was required - he was initially placed on a Lasix drip and then transitioned to bolus Lasix and finally oral Lasix with weight stabilization and patient appearing euvolemic. Patient's atrial fibrillation remained paroxysmal in nature and continued to be challenging to control. In addition to patient's home Tikosyn and increased dosing of home metoprolol, patient was also continued on digoxin 62.5  g daily. Cardiology was consulted to assist in management of this paroxysmal A. Fib the patient will have close follow-up with his cardiologist as an outpatient. Infectious disease was consulted and assistance with management of transitioning to oral antibiotics, as patient did have significant venous stasis ulcers which were felt to be the underlying etiology and was transitioned to oral clindamycin and Augmentin with ongoing clinical improvement. He is discharged home with home care services including wound care as well as close follow-up with his primary care provider and cardiology at time of discharge.         Physical Exam:   Vitals were reviewed  Constitutional:   awake, alert, cooperative, no apparent distress, and appears stated age     Lungs:   No increased work of breathing, good air exchange, clear to auscultation bilaterally, no crackles or wheezing     Cardiovascular:   Paced rhythm with HR currently in the 60s     Abdomen:   Bowel sounds present, abdomen soft and non-tender     Musculoskeletal:   No edema is noted in the hands or in the  calf above ACE wrapped dressings     Neurologic:   Awake, alert, oriented to name, place and time.             Discharge Instructions and Follow-Up:   Discharge diet: Low salt   Discharge activity: Activity as tolerated   Discharge follow-up: Follow up with cardiology tomorrow as scheduled  Follow up with primary care provider within 7 days           Discharge Disposition:   Discharged to home with [facility] care services and home care [facility] nurse      Attestation:  I have reviewed today's vital signs, notes, medications, labs and imaging.    More than 30 minutes was spent on this discharge.      Sejal Perez MD    Note: Chart documentation done in part with Dragon Voice Recognition software. Although reviewed after completion, some word and grammatical errors may remain.

## 2017-04-04 NOTE — PLAN OF CARE
"Problem: Goal Outcome Summary  Goal: Goal Outcome Summary  Outcome: Improving  VSS. Telemetry shows paced rhythm.  Pt reported \"burning\" pain on heels and top of feet, reported relief following norco administration. Resting between cares. Legs dressed and wrapped in ace bandages, dressings clean, dry, intact.  Heels elevated on pillow, skin fragile, intact.  Using urinal, with clear light yellow urine output.       "

## 2017-04-05 ENCOUNTER — CARE COORDINATION (OUTPATIENT)
Dept: CARE COORDINATION | Facility: CLINIC | Age: 82
End: 2017-04-05

## 2017-04-05 ENCOUNTER — OFFICE VISIT (OUTPATIENT)
Dept: CARDIOLOGY | Facility: CLINIC | Age: 82
End: 2017-04-05
Payer: COMMERCIAL

## 2017-04-05 ENCOUNTER — ALLIED HEALTH/NURSE VISIT (OUTPATIENT)
Dept: CARDIOLOGY | Facility: CLINIC | Age: 82
End: 2017-04-05
Payer: COMMERCIAL

## 2017-04-05 VITALS
OXYGEN SATURATION: 95 % | SYSTOLIC BLOOD PRESSURE: 90 MMHG | HEART RATE: 68 BPM | DIASTOLIC BLOOD PRESSURE: 56 MMHG | BODY MASS INDEX: 24.58 KG/M2 | WEIGHT: 147.7 LBS

## 2017-04-05 DIAGNOSIS — I47.20 VENTRICULAR TACHYCARDIA (H): Primary | ICD-10-CM

## 2017-04-05 DIAGNOSIS — A41.9 SEPSIS DUE TO CELLULITIS (H): ICD-10-CM

## 2017-04-05 DIAGNOSIS — I50.22 CHRONIC SYSTOLIC CONGESTIVE HEART FAILURE (H): Primary | ICD-10-CM

## 2017-04-05 DIAGNOSIS — R29.6 RECURRENT FALLS: ICD-10-CM

## 2017-04-05 DIAGNOSIS — I48.0 PAROXYSMAL ATRIAL FIBRILLATION (H): ICD-10-CM

## 2017-04-05 DIAGNOSIS — I44.0 FIRST DEGREE AV BLOCK: ICD-10-CM

## 2017-04-05 DIAGNOSIS — I45.10 RBBB: ICD-10-CM

## 2017-04-05 DIAGNOSIS — L03.90 SEPSIS DUE TO CELLULITIS (H): ICD-10-CM

## 2017-04-05 PROCEDURE — 99215 OFFICE O/P EST HI 40 MIN: CPT | Mod: 24 | Performed by: INTERNAL MEDICINE

## 2017-04-05 PROCEDURE — 93281 PM DEVICE PROGR EVAL MULTI: CPT | Performed by: INTERNAL MEDICINE

## 2017-04-05 ASSESSMENT — PAIN SCALES - GENERAL: PAINLEVEL: MODERATE PAIN (5)

## 2017-04-05 NOTE — PROGRESS NOTES
"      Clinical Cardiac Electrophysiology Consultation    Chief Complaint: atrial fibrillation     HPI: I was happy to see Mr. Merritt in Electrophysiology consultation for the above at the request of Dr. Colon.    He reports he has been having \"spell\". It is very hard to pin him down on what he means by this. At times he reports the spells as \"dizziness\", later he described them as \"pain in his chest\". This makes it difficult to correlate with his paroxysms of atrial fibrillation. During the episodes his heart rates are fairly fast but during sinus rhythm his heart rate is relatively slow. In addition, he has first degree AV block and RBBB (bifascicular block).    In addition to the spells, he was noted to have LV systolic dysfunction. He was started on amiodarone and his EF improved. It should be noted that he was also having frequent PVCs and the reduction in PVC burden may have also contributed to partial recovery of his systolic function. He had a coronary angiogram that showed only non-obstructive disease.    The amiodarone had to be stopped due to a drop in diffusion capacity.    [id number]: He was admitted and started on dofetilide. His ECG today (personal review) shows sinus rhythm, first-degree AV block and RBBB. The QT is 400 msec by my measurement. Overall, he reports feeling better. He has less heart pounding and shortness of breath. At the moment his primary complaint is right hip pain. In the past cortisone shots have helped.     He had a fall while reaching for something. He thinks his leg gave out. He remembers falling and does not believe he was unconscious.     05Apr2017 Interval history: He underwent biV pacer placement with anticipation of AV node ablation for rate control of his atrial fibrillation. However, he was recently in the hospital with cellulitis and evidence of sepsis. He remains on an antibiotics.     He developed recurrent atrial fibrillation with rapid ventricular rates while in the " hospital. Due to hypotension his AV lisandro blocking drugs were stopped for a time.     Since discharge yesterday he reports several episodes of heart racing. I have reviewed his pacer interrogation. He is having runs of tachycardia, possibly atrial tachycardia versus VT.       Current Outpatient Prescriptions   Medication Sig Dispense Refill     metoprolol (LOPRESSOR) 100 MG tablet Take 1 tablet (100 mg) by mouth 2 times daily 60 tablet 0     digoxin (LANOXIN) 125 MCG tablet Take 0.5 tablets (62.5 mcg) by mouth daily 15 tablet 0     furosemide (LASIX) 20 MG tablet Take 2 tablets (40 mg) by mouth in the morning and 1 tablet (20 mg) by mouth in the mid afternoon. 90 tablet 0     amoxicillin-clavulanate (AUGMENTIN) 875-125 MG per tablet Take 1 tablet by mouth every 12 hours for 6 days 12 tablet 0     clindamycin (CLEOCIN) 300 MG capsule Take 1 capsule (300 mg) by mouth 4 times daily for 6 days 24 capsule 0     HYDROcodone-acetaminophen (NORCO) 5-325 MG per tablet Take 1 tablet by mouth every 6 hours as needed for moderate to severe pain 30 tablet 0     traMADol (ULTRAM) 50 MG tablet TAKE 1 TABLET 3 TIMES DAILY AS NEEDED FOR MODERATE PAIN 90 tablet 0     fluticasone-vilanterol (BREO ELLIPTA) 100-25 MCG/INH oral inhaler Inhale 1 puff into the lungs daily 60 Inhaler 0     umeclidinium (INCRUSE ELLIPTA) 62.5 MCG/INH oral inhaler Inhale 1 puff into the lungs daily 30 Inhaler 0     omeprazole (PRILOSEC) 20 MG CR capsule Take 1 capsule (20 mg) by mouth 2 times daily (before meals) 60 capsule 0     predniSONE (DELTASONE) 5 MG tablet Take 1 tablet (5 mg) by mouth daily 100 tablet 4     dofetilide (TIKOSYN) 125 MCG capsule Take 1 capsule (125 mcg) by mouth 2 times daily 60 capsule 5     apixaban ANTICOAGULANT (ELIQUIS) 2.5 MG tablet Take 1 tablet (2.5 mg) by mouth 2 times daily 180 tablet 3     Leuprolide Acetate (LUPRON DEPOT IM) Inject into the muscle every 4 months       leflunomide (ARAVA) 10 MG tablet Take 1 tablet (10 mg)  by mouth daily 30 tablet 11     lisinopril (PRINIVIL,ZESTRIL) 10 MG tablet Take 1 tablet (10 mg) by mouth 2 times daily 62 tablet 11     spironolactone (ALDACTONE) 25 MG tablet Take 1 tablet (25 mg) by mouth daily 30 tablet 11     atorvastatin (LIPITOR) 40 MG tablet Take 1 tablet (40 mg) by mouth daily 90 tablet 3     alendronate (FOSAMAX) 70 MG tablet Take 1 tablet (70 mg) by mouth every 7 days Take with over 8 ounces water and stay upright for at least 30 minutes after dose.  Take at least 60 minutes before breakfast 12 tablet 3     calcium carbonate (TUMS) 500 MG chewable tablet Take 1 chew tab by mouth every 4 hours as needed for heartburn       tamsulosin (FLOMAX) 0.4 MG 24 hr capsule Take 1 capsule (0.4 mg) by mouth 2 times daily 60 capsule      ascorbic acid (VITAMIN C) 500 MG CPCR Take 500 mg by mouth daily       VITAMIN D, CHOLECALCIFEROL, PO Take 400 Units by mouth daily       calcium carbonate (OS-SHELIA 500 MG [place]. [place]) 500 MG tablet Take 600 mg by mouth daily       acetaminophen (TYLENOL) 650 MG CR tablet Take 650 mg by mouth 2 times daily Reported on 4/5/2017         Past Medical History:   Diagnosis Date     Atrial fibrillation (H) 07/01/2016     Cardiomyopathy (H)      COPD exacerbation (H) 3/2/2017     Paroxysmal atrial fibrillation (H) 7/6/2016     Pure hypercholesterolemia      Rheumatoid arthritis(714.0)      Unspecified essential hypertension      Weakness generalized 3/2/2017       Past Surgical History:   Procedure Laterality Date     ARTHROPLASTY KNEE Left 1/12/2016    Procedure: ARTHROPLASTY KNEE;  Surgeon: Cesar Walker DO;  Location: [facility]     COLONOSCOPY  08/24/09     HC COLONOSCOPY THRU STOMA W BIOPSY/CAUTERY TUMOR/POLYP/LESION  8/31/2004     HC REPAIR ING HERNIA,5+Y/O,REDUCIBL  1996    Marlex mesh repair of bilateral inguinal hernias and drainage of bilateral scrotal hydroceles.     IMPLANT PACEMAKER  03/10/2017     IRRIGATION AND DEBRIDEMENT SOFT TISSUE LOWER EXTREMITY,  COMBINED Left 3/15/2016    Procedure: COMBINED IRRIGATION AND DEBRIDEMENT SOFT TISSUE LOWER EXTREMITY;  Surgeon: Cesar Walker DO;  Location: [facility]       Family History   Problem Relation Age of Onset     CANCER Mother      CANCER Son      Unknown/Adopted Father      Alcoholism Brother        Social History   Substance Use Topics     Smoking status: Former Smoker     Packs/day: 0.50     Years: 15.00     Types: Cigarettes     Quit date: 11/12/1998     Smokeless tobacco: Never Used      Comment: quit 15 yrs ago     Alcohol use No       Allergies   Allergen Reactions     Amiodarone Other (See Comments)     Drop in DLCO         ROS: his only other significant complaint on the ten system review is arthritis and generalized weakness. 05Apr2017/woa    Physical Examination:  Vitals: BP 90/56 (BP Location: Left arm, Patient Position: Chair, Cuff Size: Adult Regular)  Pulse 68  Wt 67 kg (147 lb 11.2 oz)  SpO2 95%  BMI 24.58 kg/m2  BMI= Body mass index is 24.58 kg/(m^2).    GENERAL APPEARANCE: elderly, alert and no distress  HEENT: no icterus,  mucosa moist, no cyanosis.  NECK: no cervical bruits (radiated murmur), JVP is not visible  CHEST: lungs clear to auscultation, respirations are unlabored, normal respiratory rate  CARDIOVASCULAR: regular rhythm, normal S1, single S2, S3, S4, click or rub, 3/6 mid peaking murmur at aortic area with radiation to the suprasternal notch and lower neck, no diastolic murmur heard, precordium quiet  EXTREMITIES: wrapped  NEURO: alert and oriented to person/place/time, in a wheelchair today, hard of hearing  VASC: warm and well perfused  SKIN: legs wrapped, wife reports continued drainage on the right      Laboratory:    Results for GALLO FLOYD (MRN [id number]) as of 4/5/2017 12:37   Ref. Range 3/28/2017 00:45 4/2/2017 13:00 4/2/2017 13:00   Specimen Description Unknown Nose Left Leg Left Leg   Culture Micro Unknown No MRSA isolated No growth    Micro Report Status Unknown  FINAL 2017 FINAL 2017 FINAL 2017       Results for GALLO FLOYD (MRN [id number]) as of 2017 12:37   Ref. Range 2017 06:30   WBC Latest Ref Range: 4.0 - 11.0 10e9/L 7.2   Hemoglobin Latest Ref Range: 13.3 - 17.7 g/dL 9.4 (L)   Hematocrit Latest Ref Range: 40.0 - 53.0 % 29.0 (L)   Platelet Count Latest Ref Range: 150 - 450 10e9/L 271       Results for GALLO FLOYD (MRN [id number]) as of 2017 12:37   Ref. Range 2017 08:00   Sodium Latest Ref Range: 133 - 144 mmol/L 138   Potassium Latest Ref Range: 3.4 - 5.3 mmol/L 3.9   Chloride Latest Ref Range: 94 - 109 mmol/L 97   Carbon Dioxide Latest Ref Range: 20 - 32 mmol/L 31   Urea Nitrogen Latest Ref Range: 7 - 30 mg/dL 21   Creatinine Latest Ref Range: 0.66 - 1.25 mg/dL 0.79   GFR Estimate Latest Ref Range: >60 mL/min/1.7m2 >90...   GFR Estimate If Black Latest Ref Range: >60 mL/min/1.7m2 >90...     [facility],[place]  Echocardiography Laboratory  [address] [place] [id number]     Name: GALLO FLOYD  MRN: [id number]  : 1927  Study Date: 2017 11:38 AM  Age: 89 yrs  Gender: Male  [place] [facility] [place] For Study: Cardiomyopathy, AS  Ordering Physician: JAX BLACKWOOD  Referring Physician: AMANDA DUFFY  Performed By: Lai Queen RDCS     BSA: 1.8 m2  Height: 66 in  Weight: 159 lb  HR: 80  BP: 120/72 mmHg  _____________________________________________________________________________  __        Procedure  Limited Portable Echo Adult. Contrast Lumason. Lumason (NDC #3790-4897-48)  given intravenously. Patient was given 5ml mixture of Lumason. 0 ml wasted.  _____________________________________________________________________________  __        Interpretation Summary  Moderate left ventricular dilation is present.  The Ejection Fraction is estimated at 20-25%.  Normal contraction of basal segments with akinetic mid and distal segments.  This wallmotion  abnormality is new since prior study on 12/13/2016.  Consistent with stress cardiomyopathy, if no LAD disease.  Severe aortic stenosis is present.  The aortic valve area is 0.9 cm^2, by the continuity equation.  The peak aortic velocity is 3.8 m/sec.  The mean gradient across the aortic valve is37 mmHg.  Estimated gradients are under estimation due to LV systolic dysfunction,.    Previous studies:    I also reviewed the CardioNet tracings available. They show atrial fibrillation with heart rates in the 120-130s. None are labelled as being associated with symptoms.      Assessment and recommendations:    1) Spells  2) Paroxsymal atrial fibrillation with rapid ventricular rates   3) Sinus node dysfunction  4) Bifascicular block  5) S/P BiV pacemaker  6) Sepsis/cellulitis    Given the poor rate control during his paroxysmal atrial fibrillation the plan had been to proceed with AV node ablation. This will leave him pacer-dependent.     I discussed with Mr. Merritt and his family that we need to be sure there is no concern that the pacemaker has gotten infected from this recent episode of cellulitis/sepsis. Pacemaker infection would require removal of the device and leads, in which case being pacer-dependent is a significant issue.     - complete antibiotics  - would wait on AV node ablation until leg wounds have healed  - 7-10 days after completing antibiotics recommend blood cultures x 2     7) Atrial tachycardia versus VT - discussed that there are no antiarrhythmic drug felt to be safe to add to dofetilide. We could stop dofetilide and try sotalol or ranolazine.     We also discussed that despite the drop in DLCO, if arrhythmias (atrial or ventricular) continue to be an issue we could consider stopping dofetilide and resuming amiodarone, which had been more effective.    I appreciate the chance to help with Mr. Merritt's care. Please let me know if you have any questions or concerns.    Khoa Li MD

## 2017-04-05 NOTE — LETTER
"4/5/2017      RE: Bud Merritt  [id number]  [facility] [id number]       Dear Colleague,    Thank you for the opportunity to participate in the care of your patient, Bud Merritt, at the [facility] at [facility]. Please see a copy of my visit note below.          Clinical Cardiac Electrophysiology Consultation    Chief Complaint: atrial fibrillation     HPI: I was happy to see Mr. Merritt in Electrophysiology consultation for the above at the request of Dr. Colon.    He reports he has been having \"spell\". It is very hard to pin him down on what he means by this. At times he reports the spells as \"dizziness\", later he described them as \"pain in his chest\". This makes it difficult to correlate with his paroxysms of atrial fibrillation. During the episodes his heart rates are fairly fast but during sinus rhythm his heart rate is relatively slow. In addition, he has first degree AV block and RBBB (bifascicular block).    In addition to the spells, he was noted to have LV systolic dysfunction. He was started on amiodarone and his EF improved. It should be noted that he was also having frequent PVCs and the reduction in PVC burden may have also contributed to partial recovery of his systolic function. He had a coronary angiogram that showed only non-obstructive disease.    The amiodarone had to be stopped due to a drop in diffusion capacity.    [id number]: He was admitted and started on dofetilide. His ECG today (personal review) shows sinus rhythm, first-degree AV block and RBBB. The QT is 400 msec by my measurement. Overall, he reports feeling better. He has less heart pounding and shortness of breath. At the moment his primary complaint is right hip pain. In the past cortisone shots have helped.     He had a fall while reaching for something. He thinks his leg gave out. He remembers falling and does not believe he was unconscious.     [id number] Interval " history: He underwent biV pacer placement with anticipation of AV node ablation for rate control of his atrial fibrillation. However, he was recently in the hospital with cellulitis and evidence of sepsis. He remains on an antibiotics.     He developed recurrent atrial fibrillation with rapid ventricular rates while in the hospital. Due to hypotension his AV lisandro blocking drugs were stopped for a time.     Since discharge yesterday he reports several episodes of heart racing. I have reviewed his pacer interrogation. He is having runs of tachycardia, possibly atrial tachycardia versus VT.       Current Outpatient Prescriptions   Medication Sig Dispense Refill     metoprolol (LOPRESSOR) 100 MG tablet Take 1 tablet (100 mg) by mouth 2 times daily 60 tablet 0     digoxin (LANOXIN) 125 MCG tablet Take 0.5 tablets (62.5 mcg) by mouth daily 15 tablet 0     furosemide (LASIX) 20 MG tablet Take 2 tablets (40 mg) by mouth in the morning and 1 tablet (20 mg) by mouth in the mid afternoon. 90 tablet 0     amoxicillin-clavulanate (AUGMENTIN) 875-125 MG per tablet Take 1 tablet by mouth every 12 hours for 6 days 12 tablet 0     clindamycin (CLEOCIN) 300 MG capsule Take 1 capsule (300 mg) by mouth 4 times daily for 6 days 24 capsule 0     HYDROcodone-acetaminophen (NORCO) 5-325 MG per tablet Take 1 tablet by mouth every 6 hours as needed for moderate to severe pain 30 tablet 0     traMADol (ULTRAM) 50 MG tablet TAKE 1 TABLET 3 TIMES DAILY AS NEEDED FOR MODERATE PAIN 90 tablet 0     fluticasone-vilanterol (BREO ELLIPTA) 100-25 MCG/INH oral inhaler Inhale 1 puff into the lungs daily 60 Inhaler 0     umeclidinium (INCRUSE ELLIPTA) 62.5 MCG/INH oral inhaler Inhale 1 puff into the lungs daily 30 Inhaler 0     omeprazole (PRILOSEC) 20 MG CR capsule Take 1 capsule (20 mg) by mouth 2 times daily (before meals) 60 capsule 0     predniSONE (DELTASONE) 5 MG tablet Take 1 tablet (5 mg) by mouth daily 100 tablet 4     dofetilide (TIKOSYN) 125  MCG capsule Take 1 capsule (125 mcg) by mouth 2 times daily 60 capsule 5     apixaban ANTICOAGULANT (ELIQUIS) 2.5 MG tablet Take 1 tablet (2.5 mg) by mouth 2 times daily 180 tablet 3     Leuprolide Acetate (LUPRON DEPOT IM) Inject into the muscle every 4 months       leflunomide (ARAVA) 10 MG tablet Take 1 tablet (10 mg) by mouth daily 30 tablet 11     lisinopril (PRINIVIL,ZESTRIL) 10 MG tablet Take 1 tablet (10 mg) by mouth 2 times daily 62 tablet 11     spironolactone (ALDACTONE) 25 MG tablet Take 1 tablet (25 mg) by mouth daily 30 tablet 11     atorvastatin (LIPITOR) 40 MG tablet Take 1 tablet (40 mg) by mouth daily 90 tablet 3     alendronate (FOSAMAX) 70 MG tablet Take 1 tablet (70 mg) by mouth every 7 days Take with over 8 ounces water and stay upright for at least 30 minutes after dose.  Take at least 60 minutes before breakfast 12 tablet 3     calcium carbonate (TUMS) 500 MG chewable tablet Take 1 chew tab by mouth every 4 hours as needed for heartburn       tamsulosin (FLOMAX) 0.4 MG 24 hr capsule Take 1 capsule (0.4 mg) by mouth 2 times daily 60 capsule      ascorbic acid (VITAMIN C) 500 MG CPCR Take 500 mg by mouth daily       VITAMIN D, CHOLECALCIFEROL, PO Take 400 Units by mouth daily       calcium carbonate (OS-SHELIA 500 MG [place]. [place]) 500 MG tablet Take 600 mg by mouth daily       acetaminophen (TYLENOL) 650 MG CR tablet Take 650 mg by mouth 2 times daily Reported on 4/5/2017         Past Medical History:   Diagnosis Date     Atrial fibrillation (H) 07/01/2016     Cardiomyopathy (H)      COPD exacerbation (H) 3/2/2017     Paroxysmal atrial fibrillation (H) 7/6/2016     Pure hypercholesterolemia      Rheumatoid arthritis(714.0)      Unspecified essential hypertension      Weakness generalized 3/2/2017       Past Surgical History:   Procedure Laterality Date     ARTHROPLASTY KNEE Left 1/12/2016    Procedure: ARTHROPLASTY KNEE;  Surgeon: Cesar Walker DO;  Location: [facility]     COLONOSCOPY   08/24/09     HC COLONOSCOPY THRU STOMA W BIOPSY/CAUTERY TUMOR/POLYP/LESION  8/31/2004     HC REPAIR ING HERNIA,5+Y/O,REDUCIBL  1996    Marlex mesh repair of bilateral inguinal hernias and drainage of bilateral scrotal hydroceles.     IMPLANT PACEMAKER  03/10/2017     IRRIGATION AND DEBRIDEMENT SOFT TISSUE LOWER EXTREMITY, COMBINED Left 3/15/2016    Procedure: COMBINED IRRIGATION AND DEBRIDEMENT SOFT TISSUE LOWER EXTREMITY;  Surgeon: Cesar Walker DO;  Location: [facility]       Family History   Problem Relation Age of Onset     CANCER Mother      CANCER Son      Unknown/Adopted Father      Alcoholism Brother        Social History   Substance Use Topics     Smoking status: Former Smoker     Packs/day: 0.50     Years: 15.00     Types: Cigarettes     Quit date: 11/12/1998     Smokeless tobacco: Never Used      Comment: quit 15 yrs ago     Alcohol use No       Allergies   Allergen Reactions     Amiodarone Other (See Comments)     Drop in DLCO         ROS: his only other significant complaint on the ten system review is arthritis and generalized weakness. [id number]/woa    Physical Examination:  Vitals: BP 90/56 (BP Location: Left arm, Patient Position: Chair, Cuff Size: Adult Regular)  Pulse 68  Wt 67 kg (147 lb 11.2 oz)  SpO2 95%  BMI 24.58 kg/m2  BMI= Body mass index is 24.58 kg/(m^2).    GENERAL APPEARANCE: elderly, alert and no distress  HEENT: no icterus,  mucosa moist, no cyanosis.  NECK: no cervical bruits (radiated murmur), JVP is not visible  CHEST: lungs clear to auscultation, respirations are unlabored, normal respiratory rate  CARDIOVASCULAR: regular rhythm, normal S1, single S2, S3, S4, click or rub, 3/6 mid peaking murmur at aortic area with radiation to the suprasternal notch and lower neck, no diastolic murmur heard, precordium quiet  EXTREMITIES: wrapped  NEURO: alert and oriented to person/place/time, in a wheelchair today, hard of hearing  VASC: warm and well perfused  SKIN: legs wrapped,  wife reports continued drainage on the right      Laboratory:    Results for GALLO FLOYD (MRN [id number]) as of 2017 12:37   Ref. Range 3/28/2017 00:45 2017 13:00 2017 13:00   Specimen Description Unknown Nose Left Leg Left Leg   Culture Micro Unknown No MRSA isolated No growth    Micro Report Status Unknown FINAL 2017 FINAL 2017 FINAL 2017       Results for GALLO FLOYD (MRN [id number]) as of 2017 12:37   Ref. Range 2017 06:30   WBC Latest Ref Range: 4.0 - 11.0 10e9/L 7.2   Hemoglobin Latest Ref Range: 13.3 - 17.7 g/dL 9.4 (L)   Hematocrit Latest Ref Range: 40.0 - 53.0 % 29.0 (L)   Platelet Count Latest Ref Range: 150 - 450 10e9/L 271       Results for GALLO FLOYD (MRN [id number]) as of 2017 12:37   Ref. Range 2017 08:00   Sodium Latest Ref Range: 133 - 144 mmol/L 138   Potassium Latest Ref Range: 3.4 - 5.3 mmol/L 3.9   Chloride Latest Ref Range: 94 - 109 mmol/L 97   Carbon Dioxide Latest Ref Range: 20 - 32 mmol/L 31   Urea Nitrogen Latest Ref Range: 7 - 30 mg/dL 21   Creatinine Latest Ref Range: 0.66 - 1.25 mg/dL 0.79   GFR Estimate Latest Ref Range: >60 mL/min/1.7m2 >90...   GFR Estimate If Black Latest Ref Range: >60 mL/min/1.7m2 >90...     [facility],[place]  Echocardiography Laboratory  [address] [place] [address]     Name: GALLO FLOYD  MRN: [id number]  : 1927  Study Date: 2017 11:38 AM  Age: 89 yrs  Gender: Male  [place] [facility] [place]  Reason For Study: Cardiomyopathy, AS  Ordering Physician: JAX BLACKWOOD  Referring Physician: AMANDA DUFFY  Performed By: Lai Queen RDCS     BSA: 1.8 m2  Height: 66 in  Weight: 159 lb  HR: 80  BP: 120/72 mmHg  _____________________________________________________________________________  __        Procedure  Limited Portable Echo Adult. Contrast Lumason. Lumason (NDC #9626-0138-33)  given intravenously. Patient was given 5ml mixture of  Lumason. 0 ml wasted.  _____________________________________________________________________________  __        Interpretation Summary  Moderate left ventricular dilation is present.  The Ejection Fraction is estimated at 20-25%.  Normal contraction of basal segments with akinetic mid and distal segments.  This wallmotion abnormality is new since prior study on 12/13/2016.  Consistent with stress cardiomyopathy, if no LAD disease.  Severe aortic stenosis is present.  The aortic valve area is 0.9 cm^2, by the continuity equation.  The peak aortic velocity is 3.8 m/sec.  The mean gradient across the aortic valve is37 mmHg.  Estimated gradients are under estimation due to LV systolic dysfunction,.    Previous studies:    I also reviewed the CardioNet tracings available. They show atrial fibrillation with heart rates in the 120-130s. None are labelled as being associated with symptoms.      Assessment and recommendations:    1) Spells  2) Paroxsymal atrial fibrillation with rapid ventricular rates   3) Sinus node dysfunction  4) Bifascicular block  5) S/P BiV pacemaker  6) Sepsis/cellulitis    Given the poor rate control during his paroxysmal atrial fibrillation the plan had been to proceed with AV node ablation. This will leave him pacer-dependent.     I discussed with Mr. Merritt and his family that we need to be sure there is no concern that the pacemaker has gotten infected from this recent episode of cellulitis/sepsis. Pacemaker infection would require removal of the device and leads, in which case being pacer-dependent is a significant issue.     - complete antibiotics  - would wait on AV node ablation until leg wounds have healed  - 7-10 days after completing antibiotics recommend blood cultures x 2     7) Atrial tachycardia versus VT - discussed that there are no antiarrhythmic drug felt to be safe to add to dofetilide. We could stop dofetilide and try sotalol or ranolazine.     We also discussed that despite the  drop in DLCO, if arrhythmias (atrial or ventricular) continue to be an issue we could consider stopping dofetilide and resuming amiodarone, which had been more effective.    I appreciate the chance to help with Mr. Merritt's care. Please let me know if you have any questions or concerns.    Khoa Li MD      Please do not hesitate to contact me if you have any questions/concerns.     Sincerely,     Khoa Li MD

## 2017-04-05 NOTE — NURSING NOTE
"Bud Merritt's goals for this visit include:   Chief Complaint   Patient presents with     RECHECK     PAF       He requests these members of his care team be copied on today's visit information: PCP    PCP: Camron Aragon    Referring Provider:  No referring provider defined for this encounter.    Chief Complaint   Patient presents with     RECHECK     PAF       Initial BP 90/56 (BP Location: Left arm, Patient Position: Chair, Cuff Size: Adult Regular)  Pulse 68  Wt 67 kg (147 lb 11.2 oz)  SpO2 95%  BMI 24.58 kg/m2 Estimated body mass index is 24.58 kg/(m^2) as calculated from the following:    Height as of 3/28/17: 1.651 m (5' 5\").    Weight as of this encounter: 67 kg (147 lb 11.2 oz).  Medication Reconciliation: complete         Medication Refills: none      Sabiha Goel CMA      "

## 2017-04-05 NOTE — PROGRESS NOTES
Pt seen in clinic for evaluation and iterative programming of his Medtronic CRT-P per MD order.  Pt just got out of the hospital yesterday from [place].  He was hospitalized for what sound like cellulitis of his lower extremities and concerns of sepsis.  He had a course of IV antibiotics inpt and he is home on oral antibiotics per infectious disease specialist.  He looks weak today but he is determined to get back to working in his garage and cutting wood.  HIs pacemaker check shows some atrial fibrillation and episodes of SVT and he does take eliquis for anticoagulation.  He complains that he feels his heart race and he is here today to see Dr Li for his heart rates. He is not pacemaker dependent, he is BiV pacing 85% of the time, and his recent ventricular rates have been elevated during his hospitalization, most likely related to the infection.  His heart rate histograms look good overall for heart rate variation and his pacemaker battery estimates 8 years left.  We will plan for him to send a remote transmission in 2 weeks and then RTC on 5/3/17 for a pacemaker check and to see Dr Li.  Normal pacemaker function.    Multi pacemaker

## 2017-04-05 NOTE — PROGRESS NOTES
Clinic Care Coordination Contact  [facility]    Referral Information:  Referral Source: IP/TCU Report    About your hospital stay            You were admitted on: March 27, 2017 You last received care in the: [address] [facility]      You were discharged on: April 4, 2017                 Reason for your hospital stay       1. Cellulitis of your lower legs which caused a whole body reaction and illness - this has improved greatly with antibiotics and you will continue on two by mouth antibiotics (Augmentin and Clindamycin) as you go home to continue to treat this infection. Please continue with the wound cares at home  2. Ongoing irregular heart rates - more changes have been made to your heart rate medications - please follow up with Dr. Li tomorrow as scheduled to review how these are working and if any further changes are needed.              Home Care Referral:  Patient Anticipated Discharge Date: 4/4/17   RN, PT, HHA to begin 24 - 48 hours after discharge.  PLEASE EVALUATE AND TREAT (Evaluation timeline is 24 - 48 hrs. Please call if there is need for a variance to this timeline).     REASON FOR REFERRAL: Assessment & Treatment: PT and RN, Wound care RN (WOC nurse), assess need for TeleHealth RN       Care Coordination Reason for Contact: [facility] F[facility]   Writer attempted to contact patient w/o answer. Left message to call me directly with any questions. Included Yumiko Hallman RN Care Coordinator phone number. Gave reminder of 4/10 PCP [facility] f[facility] visit. Spoke to Stacy Kingston at [place] home care intake. Confirmed resumption of care visit in place for tomorrow. Insured awareness of need for WOC.     Universal Utilization:   ED Visits in last year: 2  Hospital visits in last year: 4  Last PCP appointment: 03/22/17  Missed Appointments: 1     Multiple Providers or Specialists: Cardiology        Mobility Status: Independent w/Device  Equipment Currently Used at Home: cane, straight,  grab bar, walker, rolling       Psychosocial:  Current living arrangement:: I live in a private home with spouse     Resources and Interventions:  Current Resources: [facility];          Advanced Care Plans/Directives on file:: No          Future Appointments      Provider Department [place]   4/10/2017 10:00 AM Camron Aragon MD [facility]   5/3/2017 9:30 AM DEVICE CHECK RN [facility]   5/3/2017 10:00 AM Khoa Li MD [facility]           Plan: Patient in active Care Coordination with [facility] in place. Note forwarded to Yumiko Hallman RN to further assess Care Coordination involvement    Dimitri Payne RN  Clinic Care Coordinator  [facility]  [phone]

## 2017-04-05 NOTE — MR AVS SNAPSHOT
After Visit Summary   4/5/2017    Bud Merritt    MRN: [id number]           Patient Information     Date Of Birth          5/9/1927        Visit Information        Provider Department      4/5/2017 10:30 AM Khoa Li MD [facility]        Today's Diagnoses     Paroxysmal atrial fibrillation (H)        RBBB        Recurrent falls        First degree AV block          Care Instructions      The following is a summary of your office visit:    Medications started today:none    Medications stopped today:none    Medication dose change: none    Nurse contact information: Poonam Ramírez RN  Cardiology Care Coordinator  [phone] Phone  [phone] Fax    Appointments made today: Follow up and device check    Patient instructions: Also complete a 2 week remote check      If you have had any blood work, imaging or other testing completed we will be in touch within 1-2 weeks regarding the results. If you have any questions, concerns or need to schedule a follow up, please contact us at [phone]. If you are needing refills please contact your pharmacy. For urgent after hour care please call the [facility] at [phone] or the [facility] at [phone] and ask to speak to the cardiologist on call.    It was a pleasure meeting with you today. Please let us know if there is anything else we can do for you so that we can be sure you are leaving completely satisfied with your care experience.     Your Cardiology Team at [facility]  RN Care Coordinator: Poonam Landis                  Follow-ups after your visit        Your next 10 appointments already scheduled     Apr 10, 2017 10:00 AM CDT   Office Visit with Camron Aragon MD   [facility]    [address] [place] [address] [phone]           Bring a current list of meds and any records pertaining  to this visit.  For Physicals, please bring immunization records and any forms needing to be filled out.  Please arrive 10 minutes early to complete paperwork.            May 03, 2017  9:30 AM CDT   Return Visit with DEVICE CHECK RN CARD   [facility]    [id number] [place] [id number] [phone]            May 03, 2017 10:00 AM CDT   Return Visit with Khoa Li MD   [facility]    [address] [place] [address] [phone]              Who to contact     If you have questions or need follow up information about today's clinic visit or your schedule please contact [facility] directly at [phone].  Normal or non-critical lab and imaging results will be communicated to you by MyChart, letter or phone within 4 business days after the clinic has received the results. If you do not hear from us within 7 days, please contact the clinic through MyChart or phone. If you have a critical or abnormal lab result, we will notify you by phone as soon as possible.  Submit refill requests through [employer] or call your pharmacy and they will forward the refill request to us. Please allow 3 business days for your refill to be completed.          Additional Information About Your Visit        Care EveryWhere ID     This is your Care EveryWhere ID. This could be used by other organizations to access your [facility] medical records  [id number]        Your Vitals Were     Pulse Pulse Oximetry BMI (Body Mass Index)             68 95% 24.58 kg/m2          Blood Pressure from Last 3 Encounters:   04/05/17 90/56   04/04/17 145/77   03/26/17 (!) 136/95    Weight from Last 3 Encounters:   04/05/17 67 kg (147 lb 11.2 oz)   04/04/17 67 kg (147 lb 11.3 oz)   03/26/17 73.5 kg (162 lb)              We Performed the Following     Follow-Up with Cardiac Advanced Practice Provider      Follow-Up with Electrophysiologist        Primary Care Provider Office Phone # Fax #    Camron Aragon [phone]       [facility] [address] [place] [address]        Thank you!     Thank you for choosing [facility]  for your care. Our goal is always to provide you with excellent care. Hearing back from our patients is one way we can continue to improve our services. Please take a few minutes to complete the written survey that you may receive in the mail after your visit with us. Thank you!             Your Updated Medication List - Protect others around you: Learn how to safely use, store and throw away your medicines at www.disposemymeds.org.          This list is accurate as of: 4/5/17 10:56 AM.  Always use your most recent med list.                   Brand Name Dispense Instructions for use    acetaminophen 650 MG CR tablet    TYLENOL     Take 650 mg by mouth 2 times daily Reported on 4/5/2017       alendronate 70 MG tablet    FOSAMAX    12 tablet    Take 1 tablet (70 mg) by mouth every 7 days Take with over 8 ounces water and stay upright for at least 30 minutes after dose.  Take at least 60 minutes before breakfast       amoxicillin-clavulanate 875-125 MG per tablet    AUGMENTIN    12 tablet    Take 1 tablet by mouth every 12 hours for 6 days       apixaban ANTICOAGULANT 2.5 MG tablet    ELIQUIS    180 tablet    Take 1 tablet (2.5 mg) by mouth 2 times daily       ascorbic acid 500 MG Cpcr CR capsule    vitamin C     Take 500 mg by mouth daily       atorvastatin 40 MG tablet    LIPITOR    90 tablet    Take 1 tablet (40 mg) by mouth daily       calcium carbonate 500 MG chewable tablet    TUMS     Take 1 chew tab by mouth every 4 hours as needed for heartburn       calcium carbonate 500 MG tablet    OS-SHELIA 500 mg [place]. [place]     Take 600 mg by mouth daily       clindamycin 300 MG capsule    CLEOCIN    24 capsule    Take 1 capsule (300 mg) by mouth 4  times daily for 6 days       digoxin 125 MCG tablet    LANOXIN    15 tablet    Take 0.5 tablets (62.5 mcg) by mouth daily       dofetilide 125 MCG capsule    TIKOSYN    60 capsule    Take 1 capsule (125 mcg) by mouth 2 times daily       FLOMAX 0.4 MG capsule   Generic drug:  tamsulosin     60 capsule    Take 1 capsule (0.4 mg) by mouth 2 times daily       fluticasone-vilanterol 100-25 MCG/INH oral inhaler    BREO ELLIPTA    60 Inhaler    Inhale 1 puff into the lungs daily       furosemide 20 MG tablet    LASIX    90 tablet    Take 2 tablets (40 mg) by mouth in the morning and 1 tablet (20 mg) by mouth in the mid afternoon.       HYDROcodone-acetaminophen 5-325 MG per tablet    NORCO    30 tablet    Take 1 tablet by mouth every 6 hours as needed for moderate to severe pain       leflunomide 10 MG tablet    ARAVA    30 tablet    Take 1 tablet (10 mg) by mouth daily       lisinopril 10 MG tablet    PRINIVIL/ZESTRIL    62 tablet    Take 1 tablet (10 mg) by mouth 2 times daily       LUPRON DEPOT IM      Inject into the muscle every 4 months       metoprolol 100 MG tablet    LOPRESSOR    60 tablet    Take 1 tablet (100 mg) by mouth 2 times daily       omeprazole 20 MG CR capsule    priLOSEC    60 capsule    Take 1 capsule (20 mg) by mouth 2 times daily (before meals)       predniSONE 5 MG tablet    DELTASONE    100 tablet    Take 1 tablet (5 mg) by mouth daily       spironolactone 25 MG tablet    ALDACTONE    30 tablet    Take 1 tablet (25 mg) by mouth daily       traMADol 50 MG tablet    ULTRAM    90 tablet    TAKE 1 TABLET 3 TIMES DAILY AS NEEDED FOR MODERATE PAIN       umeclidinium 62.5 MCG/INH oral inhaler    INCRUSE ELLIPTA    30 Inhaler    Inhale 1 puff into the lungs daily       VITAMIN D (CHOLECALCIFEROL) PO      Take 400 Units by mouth daily

## 2017-04-05 NOTE — MR AVS SNAPSHOT
After Visit Summary   4/5/2017    Bud Merritt    MRN: [id number]           Patient Information     Date Of Birth          5/9/1927        Visit Information        Provider Department      4/5/2017 10:00 AM DEVICE CHECK RN CARD [facility]        Today's Diagnoses     Chronic systolic congestive heart failure (H)    -  1       Follow-ups after your visit        Follow-up notes from your care team     Discussed this visit Return in about 4 weeks (around 5/3/2017) for Pacemaker check, Dr Li.      Your next 10 appointments already scheduled     Apr 10, 2017 10:00 AM CDT   Office Visit with Camron Aragon MD   [facility])    [address] [id number] [phone]           Bring a current list of meds and any records pertaining to this visit.  For Physicals, please bring immunization records and any forms needing to be filled out.  Please arrive 10 minutes early to complete paperwork.            May 03, 2017  9:30 AM CDT   Return Visit with DEVICE CHECK RN CARD   [facility] ([facility])    [address] [place] [address] [phone]            May 03, 2017 10:00 AM CDT   Return Visit with Khoa Li MD   [facility]    [address] [place] [address] [phone]              Who to contact     If you have questions or need follow up information about today's clinic visit or your schedule please contact [facility] directly at [phone].  Normal or non-critical lab and imaging results will be communicated to you by MyChart, letter or phone within 4 business days after the clinic has received the results. If you do not hear from us within 7 days, please contact the clinic through MyChart or phone. If you have a critical or abnormal lab result, we will notify you by  phone as soon as possible.  Submit refill requests through [employer] or call your pharmacy and they will forward the refill request to us. Please allow 3 business days for your refill to be completed.          Additional Information About Your Visit        Care EveryWhere ID     This is your Care EveryWhere ID. This could be used by other organizations to access your [facility] medical records  [id number]         Blood Pressure from Last 3 Encounters:   04/05/17 90/56   04/04/17 145/77   03/26/17 (!) 136/95    Weight from Last 3 Encounters:   04/05/17 67 kg (147 lb 11.2 oz)   04/04/17 67 kg (147 lb 11.3 oz)   03/26/17 73.5 kg (162 lb)              We Performed the Following     PM DEVICE PROGRAMMING EVAL, MULTI LEAD PACER        Primary Care Provider Office Phone # Fax #    Camron Aragon [phone]       [facility] [address] [place] [address]        Thank you!     Thank you for choosing [facility]  for your care. Our goal is always to provide you with excellent care. Hearing back from our patients is one way we can continue to improve our services. Please take a few minutes to complete the written survey that you may receive in the mail after your visit with us. Thank you!             Your Updated Medication List - Protect others around you: Learn how to safely use, store and throw away your medicines at www.disposemymeds.org.          This list is accurate as of: 4/5/17  1:54 PM.  Always use your most recent med list.                   Brand Name Dispense Instructions for use    acetaminophen 650 MG CR tablet    TYLENOL     Take 650 mg by mouth 2 times daily Reported on 4/5/2017       alendronate 70 MG tablet    FOSAMAX    12 tablet    Take 1 tablet (70 mg) by mouth every 7 days Take with over 8 ounces water and stay upright for at least 30 minutes after dose.  Take at least 60 minutes before breakfast       amoxicillin-clavulanate 875-125 MG per  tablet    AUGMENTIN    12 tablet    Take 1 tablet by mouth every 12 hours for 6 days       apixaban ANTICOAGULANT 2.5 MG tablet    ELIQUIS    180 tablet    Take 1 tablet (2.5 mg) by mouth 2 times daily       ascorbic acid 500 MG Cpcr CR capsule    vitamin C     Take 500 mg by mouth daily       atorvastatin 40 MG tablet    LIPITOR    90 tablet    Take 1 tablet (40 mg) by mouth daily       calcium carbonate 500 MG chewable tablet    TUMS     Take 1 chew tab by mouth every 4 hours as needed for heartburn       calcium carbonate 500 MG tablet    OS-SHELIA 500 mg [place]. [place]     Take 600 mg by mouth daily       clindamycin 300 MG capsule    CLEOCIN    24 capsule    Take 1 capsule (300 mg) by mouth 4 times daily for 6 days       digoxin 125 MCG tablet    LANOXIN    15 tablet    Take 0.5 tablets (62.5 mcg) by mouth daily       dofetilide 125 MCG capsule    TIKOSYN    60 capsule    Take 1 capsule (125 mcg) by mouth 2 times daily       FLOMAX 0.4 MG capsule   Generic drug:  tamsulosin     60 capsule    Take 1 capsule (0.4 mg) by mouth 2 times daily       fluticasone-vilanterol 100-25 MCG/INH oral inhaler    BREO ELLIPTA    60 Inhaler    Inhale 1 puff into the lungs daily       furosemide 20 MG tablet    LASIX    90 tablet    Take 2 tablets (40 mg) by mouth in the morning and 1 tablet (20 mg) by mouth in the mid afternoon.       HYDROcodone-acetaminophen 5-325 MG per tablet    NORCO    30 tablet    Take 1 tablet by mouth every 6 hours as needed for moderate to severe pain       leflunomide 10 MG tablet    ARAVA    30 tablet    Take 1 tablet (10 mg) by mouth daily       lisinopril 10 MG tablet    PRINIVIL/ZESTRIL    62 tablet    Take 1 tablet (10 mg) by mouth 2 times daily       LUPRON DEPOT IM      Inject into the muscle every 4 months       metoprolol 100 MG tablet    LOPRESSOR    60 tablet    Take 1 tablet (100 mg) by mouth 2 times daily       omeprazole 20 MG CR capsule    priLOSEC    60 capsule    Take 1 capsule (20 mg) by  mouth 2 times daily (before meals)       predniSONE 5 MG tablet    DELTASONE    100 tablet    Take 1 tablet (5 mg) by mouth daily       spironolactone 25 MG tablet    ALDACTONE    30 tablet    Take 1 tablet (25 mg) by mouth daily       traMADol 50 MG tablet    ULTRAM    90 tablet    TAKE 1 TABLET 3 TIMES DAILY AS NEEDED FOR MODERATE PAIN       umeclidinium 62.5 MCG/INH oral inhaler    INCRUSE ELLIPTA    30 Inhaler    Inhale 1 puff into the lungs daily       VITAMIN D (CHOLECALCIFEROL) PO      Take 400 Units by mouth daily

## 2017-04-05 NOTE — PATIENT INSTRUCTIONS
The following is a summary of your office visit:    Medications started today:none    Medications stopped today:none    Medication dose change: none    Nurse contact information: Poonam Ramírez RN  Cardiology Care Coordinator  [phone] Phone  [phone] Fax    Appointments made today: Follow up and device check    Patient instructions: Also complete a 2 week remote check      If you have had any blood work, imaging or other testing completed we will be in touch within 1-2 weeks regarding the results. If you have any questions, concerns or need to schedule a follow up, please contact us at [phone]. If you are needing refills please contact your pharmacy. For urgent after hour care please call the [facility] at [phone] or the [facility] at [phone] and ask to speak to the cardiologist on call.    It was a pleasure meeting with you today. Please let us know if there is anything else we can do for you so that we can be sure you are leaving completely satisfied with your care experience.     Your Cardiology Team at [facility]  RN Care Coordinator: Poonam  CMA: Sabiha

## 2017-04-06 ENCOUNTER — TELEPHONE (OUTPATIENT)
Dept: FAMILY MEDICINE | Facility: CLINIC | Age: 82
End: 2017-04-06

## 2017-04-06 NOTE — TELEPHONE ENCOUNTER
Reason for Call: Request for an order or referral:    Order or referral being requested: skilled nursing 2 times a week for 1 week, 4 times a week for 4 weeks, 3 prn     Date needed: as soon as possible    Has the patient been seen by the PCP for this problem? Not Applicable    Additional comments:     Phone number Patient can be reached at:  Home number on file [phone] (home)    Best Time:  any    Can we leave a detailed message on this number?  YES    Call taken on 4/6/2017 at 2:04 PM by Tootie Ball

## 2017-04-07 ENCOUNTER — TELEPHONE (OUTPATIENT)
Dept: FAMILY MEDICINE | Facility: CLINIC | Age: 82
End: 2017-04-07

## 2017-04-07 DIAGNOSIS — L03.115 CELLULITIS OF RIGHT LOWER EXTREMITY: Primary | ICD-10-CM

## 2017-04-07 NOTE — TELEPHONE ENCOUNTER
We need to know what lab orders they are talking about? Dr. Aragon has not ordered any labs and has no idea what they are referring to. Left  for Laureen to call back. KB/CMA

## 2017-04-07 NOTE — TELEPHONE ENCOUNTER
Reason for Call: Request for an order or referral:    Order or referral being requested: delay of lab orders until Sat 4.8 or Mon 4.10, please advise    Date needed: as soon as possible    Has the patient been seen by the PCP for this problem? YES    Additional comments:     Phone number Patient can be reached at:  Other phone number:  [phone]  Laureen    Best Time:      Can we leave a detailed message on this number?  YES    Call taken on 4/7/2017 at 9:35 AM by Rachel Murray

## 2017-04-07 NOTE — TELEPHONE ENCOUNTER
Dr. Sejal vick wanted him to get a BMP on 4/7/17 and they are wondering if they can do the BMP tomorrow or Monday. There is also no order for this so would you place an order for it? Please advise. KB/CMA

## 2017-04-07 NOTE — TELEPHONE ENCOUNTER
Per Dr. Mahesh delong for BMP orders and to be done Saturday or Monday. Laureen notified via . KB/CMA

## 2017-04-10 ENCOUNTER — OFFICE VISIT (OUTPATIENT)
Dept: FAMILY MEDICINE | Facility: CLINIC | Age: 82
End: 2017-04-10
Payer: COMMERCIAL

## 2017-04-10 ENCOUNTER — HOSPITAL ENCOUNTER (OUTPATIENT)
Dept: WOUND CARE | Facility: CLINIC | Age: 82
Discharge: HOME | End: 2017-04-10
Attending: FAMILY MEDICINE | Admitting: FAMILY MEDICINE
Payer: MEDICARE

## 2017-04-10 VITALS
DIASTOLIC BLOOD PRESSURE: 60 MMHG | SYSTOLIC BLOOD PRESSURE: 110 MMHG | OXYGEN SATURATION: 97 % | TEMPERATURE: 96.9 F | HEART RATE: 60 BPM | RESPIRATION RATE: 22 BRPM

## 2017-04-10 DIAGNOSIS — I83.029 VENOUS STASIS ULCERS OF BOTH LOWER EXTREMITIES (H): Primary | ICD-10-CM

## 2017-04-10 DIAGNOSIS — I42.0 DILATED CARDIOMYOPATHY (H): ICD-10-CM

## 2017-04-10 DIAGNOSIS — L97.929 VENOUS STASIS ULCERS OF BOTH LOWER EXTREMITIES (H): Primary | ICD-10-CM

## 2017-04-10 DIAGNOSIS — L03.115 CELLULITIS OF RIGHT LOWER EXTREMITY: ICD-10-CM

## 2017-04-10 DIAGNOSIS — I48.0 PAROXYSMAL ATRIAL FIBRILLATION (H): ICD-10-CM

## 2017-04-10 DIAGNOSIS — L97.919 VENOUS STASIS ULCERS OF BOTH LOWER EXTREMITIES (H): Primary | ICD-10-CM

## 2017-04-10 DIAGNOSIS — I83.019 VENOUS STASIS ULCERS OF BOTH LOWER EXTREMITIES (H): Primary | ICD-10-CM

## 2017-04-10 LAB
ANION GAP SERPL CALCULATED.3IONS-SCNC: 8 MMOL/L (ref 3–14)
BUN SERPL-MCNC: 15 MG/DL (ref 7–30)
CALCIUM SERPL-MCNC: 8.3 MG/DL (ref 8.5–10.1)
CHLORIDE SERPL-SCNC: 103 MMOL/L (ref 94–109)
CO2 SERPL-SCNC: 29 MMOL/L (ref 20–32)
CREAT SERPL-MCNC: 0.77 MG/DL (ref 0.66–1.25)
GFR SERPL CREATININE-BSD FRML MDRD: ABNORMAL ML/MIN/1.7M2
GLUCOSE SERPL-MCNC: 104 MG/DL (ref 70–99)
POTASSIUM SERPL-SCNC: 4 MMOL/L (ref 3.4–5.3)
SODIUM SERPL-SCNC: 140 MMOL/L (ref 133–144)

## 2017-04-10 PROCEDURE — 80048 BASIC METABOLIC PNL TOTAL CA: CPT | Performed by: FAMILY MEDICINE

## 2017-04-10 PROCEDURE — 99214 OFFICE O/P EST MOD 30 MIN: CPT | Performed by: FAMILY MEDICINE

## 2017-04-10 PROCEDURE — 99214 OFFICE O/P EST MOD 30 MIN: CPT

## 2017-04-10 PROCEDURE — 36415 COLL VENOUS BLD VENIPUNCTURE: CPT | Performed by: FAMILY MEDICINE

## 2017-04-10 ASSESSMENT — PATIENT HEALTH QUESTIONNAIRE - PHQ9: 5. POOR APPETITE OR OVEREATING: NOT AT ALL

## 2017-04-10 ASSESSMENT — ANXIETY QUESTIONNAIRES
3. WORRYING TOO MUCH ABOUT DIFFERENT THINGS: NOT AT ALL
1. FEELING NERVOUS, ANXIOUS, OR ON EDGE: NOT AT ALL
GAD7 TOTAL SCORE: 0
2. NOT BEING ABLE TO STOP OR CONTROL WORRYING: NOT AT ALL
IF YOU CHECKED OFF ANY PROBLEMS ON THIS QUESTIONNAIRE, HOW DIFFICULT HAVE THESE PROBLEMS MADE IT FOR YOU TO DO YOUR WORK, TAKE CARE OF THINGS AT HOME, OR GET ALONG WITH OTHER PEOPLE: NOT DIFFICULT AT ALL
5. BEING SO RESTLESS THAT IT IS HARD TO SIT STILL: NOT AT ALL
7. FEELING AFRAID AS IF SOMETHING AWFUL MIGHT HAPPEN: NOT AT ALL
6. BECOMING EASILY ANNOYED OR IRRITABLE: NOT AT ALL

## 2017-04-10 NOTE — PROGRESS NOTES
"Bud Merritt  Gender: male  : 1927  [address]  [facility] [address] [phone] (home)     Medical Record: [id number]  Pharmacy:    [id number] - [place], [place] - [address] [facility]  Primary Care Provider: Camron Aragon    Parent's names are: Data Unavailable (mother) and Data Unavailable (father).      [facility]  April 10, 2017     Discharge Phone Call:  Key Words/Key Times      Introduction - AIDET (Acknowledge, Introduce, Duration, Explanation)      Empathy-   We are calling to see how you are since your recent stay in the hospital?     Call back COMMENTS: wife: \"He can't hear on the phone. He's doing a lot better.\"      Clinical Questions -  (f/u appts, medication side effects/purpose, ability to care for self at home) \"For your safety, it is important to us that you understand the purpose and side effects of your medications, can you tell me what your new medications are?\"     Call back COMMENTS: Saw Dr. Aragon today who sent them to see the wound doctor. Has [facility] nurse coming in every other day. Taking pain meds for right leg. Stated she understood the meds when she read the labels.      Staff Recognition -  We like to recognize staff and physicians who have done an excellent job.  Do you remember any people from your care team that you would like recognize?     Call back COMMENTS: \"I can't remember names. I have enough to think about.\"      Very Good Care -  We want to provide very good care to all patients.  How was your care?     Call back COMMENTS: The care was good, as good as it can be.\"      Opportunities for Improvement -  Our goal is to be the best.  Do you have any suggestions for things that we could improve upon?     Call back COMMENTS: No suggestions. \"You do the best you can do.\"      Thank You             "

## 2017-04-10 NOTE — MR AVS SNAPSHOT
After Visit Summary   4/10/2017    Bud Merritt    MRN: [id number]           Patient Information     Date Of Birth          5/9/1927        Visit Information        Provider Department      4/10/2017 10:00 AM Camron Aragon MD [facility]         Follow-ups after your visit        Your next 10 appointments already scheduled     May 03, 2017  9:30 AM CDT   Return Visit with DEVICE CHECK RN CARD   [facility] ([facility])    [address] [place] [id number] [phone]            May 03, 2017 10:00 AM CDT   Return Visit with Khoa Li MD   [facility] ([facility])    [id number] [place] [phone]              Who to contact     If you have questions or need follow up information about today's clinic visit or your schedule please contact [facility] directly at [phone].  Normal or non-critical lab and imaging results will be communicated to you by MyChart, letter or phone within 4 business days after the clinic has received the results. If you do not hear from us within 7 days, please contact the clinic through MyChart or phone. If you have a critical or abnormal lab result, we will notify you by phone as soon as possible.  Submit refill requests through [employer] or call your pharmacy and they will forward the refill request to us. Please allow 3 business days for your refill to be completed.          Additional Information About Your Visit        Care EveryWhere ID     This is your Care EveryWhere ID. This could be used by other organizations to access your [facility] medical records  [id number]        Your Vitals Were     Pulse Temperature Respirations Pulse Oximetry          60 96.9  F (36.1  C) (Temporal) 22 97%         Blood Pressure from Last 3 Encounters:   04/10/17 110/60   04/05/17 90/56   04/04/17 145/77    Weight from Last 3  Encounters:   04/05/17 147 lb 11.2 oz (67 kg)   04/04/17 147 lb 11.3 oz (67 kg)   03/26/17 162 lb (73.5 kg)              Today, you had the following     No orders found for display       Primary Care Provider Office Phone # Fax #    Camron Aragon [phone]       [place] [address] [place] [phone]        Thank you!     Thank you for choosing [facility]  for your care. Our goal is always to provide you with excellent care. Hearing back from our patients is one way we can continue to improve our services. Please take a few minutes to complete the written survey that you may receive in the mail after your visit with us. Thank you!             Your Updated Medication List - Protect others around you: Learn how to safely use, store and throw away your medicines at www.disposemymeds.org.          This list is accurate as of: 4/10/17 11:07 AM.  Always use your most recent med list.                   Brand Name Dispense Instructions for use    acetaminophen 650 MG CR tablet    TYLENOL     Take 650 mg by mouth 2 times daily Reported on 4/5/2017       alendronate 70 MG tablet    FOSAMAX    12 tablet    Take 1 tablet (70 mg) by mouth every 7 days Take with over 8 ounces water and stay upright for at least 30 minutes after dose.  Take at least 60 minutes before breakfast       amoxicillin-clavulanate 875-125 MG per tablet    AUGMENTIN    12 tablet    Take 1 tablet by mouth every 12 hours for 6 days       apixaban ANTICOAGULANT 2.5 MG tablet    ELIQUIS    180 tablet    Take 1 tablet (2.5 mg) by mouth 2 times daily       ascorbic acid 500 MG Cpcr CR capsule    vitamin C     Take 500 mg by mouth daily       atorvastatin 40 MG tablet    LIPITOR    90 tablet    Take 1 tablet (40 mg) by mouth daily       calcium carbonate 500 MG chewable tablet    TUMS     Take 1 chew tab by mouth every 4 hours as needed for heartburn       calcium carbonate 500 MG tablet     OS-SHELIA 500 mg [place]. [place]     Take 600 mg by mouth daily       clindamycin 300 MG capsule    CLEOCIN    24 capsule    Take 1 capsule (300 mg) by mouth 4 times daily for 6 days       digoxin 125 MCG tablet    LANOXIN    15 tablet    Take 0.5 tablets (62.5 mcg) by mouth daily       dofetilide 125 MCG capsule    TIKOSYN    60 capsule    Take 1 capsule (125 mcg) by mouth 2 times daily       FLOMAX 0.4 MG capsule   Generic drug:  tamsulosin     60 capsule    Take 1 capsule (0.4 mg) by mouth 2 times daily       fluticasone-vilanterol 100-25 MCG/INH oral inhaler    BREO ELLIPTA    60 Inhaler    Inhale 1 puff into the lungs daily       furosemide 20 MG tablet    LASIX    90 tablet    Take 2 tablets (40 mg) by mouth in the morning and 1 tablet (20 mg) by mouth in the mid afternoon.       HYDROcodone-acetaminophen 5-325 MG per tablet    NORCO    30 tablet    Take 1 tablet by mouth every 6 hours as needed for moderate to severe pain       leflunomide 10 MG tablet    ARAVA    30 tablet    Take 1 tablet (10 mg) by mouth daily       lisinopril 10 MG tablet    PRINIVIL/ZESTRIL    62 tablet    Take 1 tablet (10 mg) by mouth 2 times daily       LUPRON DEPOT IM      Inject into the muscle every 4 months       metoprolol 100 MG tablet    LOPRESSOR    60 tablet    Take 1 tablet (100 mg) by mouth 2 times daily       omeprazole 20 MG CR capsule    priLOSEC    60 capsule    Take 1 capsule (20 mg) by mouth 2 times daily (before meals)       predniSONE 5 MG tablet    DELTASONE    100 tablet    Take 1 tablet (5 mg) by mouth daily       spironolactone 25 MG tablet    ALDACTONE    30 tablet    Take 1 tablet (25 mg) by mouth daily       traMADol 50 MG tablet    ULTRAM    90 tablet    TAKE 1 TABLET 3 TIMES DAILY AS NEEDED FOR MODERATE PAIN       umeclidinium 62.5 MCG/INH oral inhaler    INCRUSE ELLIPTA    30 Inhaler    Inhale 1 puff into the lungs daily       VITAMIN D (CHOLECALCIFEROL) PO      Take 400 Units by mouth daily

## 2017-04-10 NOTE — PROGRESS NOTES
Bud Carcamoon  Gender: male  : 1927  [address]  [facility] [address]  [phone] (home)     Medical Record: [id number]  Pharmacy:    [facility] - [id number] - [place], [place] - [address]   [facility]  Primary Care Provider: Camron Aragon    Parent's names are: Data Unavailable (mother) and Data Unavailable (father).      [facility]  April 10, 2017  4/10/2017  1st call back attempt. No answer. Italo Licona RN

## 2017-04-10 NOTE — NURSING NOTE
"Chief Complaint   Patient presents with     Hospital F/U       Initial /60  Pulse 60  Temp 96.9  F (36.1  C) (Temporal)  Resp 22  SpO2 97% Estimated body mass index is 24.58 kg/(m^2) as calculated from the following:    Height as of 3/28/17: 5' 5\" (1.651 m).    Weight as of 4/5/17: 147 lb 11.2 oz (67 kg).  Medication Reconciliation: complete    "

## 2017-04-10 NOTE — PROGRESS NOTES
SUBJECTIVE:                                                    Bud Merritt is a 89 year old male who presents to clinic today for the following health issues:          Hospital Follow-up Visit:    Hospital/Nursing Home/IP Rehab Facility: [facility]  Date of Admission: 3/27/17  Date of Discharge: 4/4/17  Reason(s) for Admission: Cellulitis of right lower extremity            Problems taking medications regularly:  None       Medication changes since discharge: Digoxin, Augmentin, Clindamycin        Problems adhering to non-medication therapy:  None    Summary of hospitalization:  [facility] discharge summary reviewed  Diagnostic Tests/Treatments reviewed.  Follow up needed: Wound care and medication management  Other Healthcare Providers Involved in Patient s Care:         Homecare  Update since discharge: improved.     Post Discharge Medication Reconciliation: discharge medications reconciled, continue medications without change.  Plan of care communicated with patient and family     Coding guidelines for this visit:  Type of Medical   Decision Making Face-to-Face Visit       within 7 Days of discharge Face-to-Face Visit        within 14 days of discharge   Moderate Complexity [id number]   High Complexity [id number]                Problem list and histories reviewed & adjusted, as indicated.  Additional history: as documented        Reviewed and updated as needed this visit by clinical staff  Allergies  Meds       Reviewed and updated as needed this visit by Provider        SUBJECTIVE:  Bud  is a 89 year old male who presents for:  Follow-up of his hospitalization for cellulitis of his lower extremity the right side along with sepsis. He was hospitalized for a week. Very complex patient with multiple medical problems. Has been getting his legs wrapped with home care. Continues with weeping and has open wound on the right side with an ulceration. Breathing is better and less  edema in his legs.    Past Medical History:   Diagnosis Date     Atrial fibrillation (H) 07/01/2016     Cardiomyopathy (H)      COPD exacerbation (H) 3/2/2017     Paroxysmal atrial fibrillation (H) 7/6/2016     Pure hypercholesterolemia      Rheumatoid arthritis(714.0)      Unspecified essential hypertension      Weakness generalized 3/2/2017     Past Surgical History:   Procedure Laterality Date     ARTHROPLASTY KNEE Left 1/12/2016    Procedure: ARTHROPLASTY KNEE;  Surgeon: Cesar Walker DO;  Location: [facility] OR     COLONOSCOPY  08/24/09     HC COLONOSCOPY THRU STOMA W BIOPSY/CAUTERY TUMOR/POLYP/LESION  8/31/2004      REPAIR ING HERNIA,5+Y/O,REDUCIBL  1996    Marlex mesh repair of bilateral inguinal hernias and drainage of bilateral scrotal hydroceles.     IMPLANT PACEMAKER  03/10/2017     IRRIGATION AND DEBRIDEMENT SOFT TISSUE LOWER EXTREMITY, COMBINED Left 3/15/2016    Procedure: COMBINED IRRIGATION AND DEBRIDEMENT SOFT TISSUE LOWER EXTREMITY;  Surgeon: Cesar Walker DO;  Location: [facility] OR     Social History   Substance Use Topics     Smoking status: Former Smoker     Packs/day: 0.50     Years: 15.00     Types: Cigarettes     Quit date: 11/12/1998     Smokeless tobacco: Never Used      Comment: quit 15 yrs ago     Alcohol use No     Current Outpatient Prescriptions   Medication Sig Dispense Refill     metoprolol (LOPRESSOR) 100 MG tablet Take 1 tablet (100 mg) by mouth 2 times daily 60 tablet 0     digoxin (LANOXIN) 125 MCG tablet Take 0.5 tablets (62.5 mcg) by mouth daily 15 tablet 0     furosemide (LASIX) 20 MG tablet Take 2 tablets (40 mg) by mouth in the morning and 1 tablet (20 mg) by mouth in the mid afternoon. 90 tablet 0     amoxicillin-clavulanate (AUGMENTIN) 875-125 MG per tablet Take 1 tablet by mouth every 12 hours for 6 days 12 tablet 0     clindamycin (CLEOCIN) 300 MG capsule Take 1 capsule (300 mg) by mouth 4 times daily for 6 days 24 capsule 0     HYDROcodone-acetaminophen  (NORCO) 5-325 MG per tablet Take 1 tablet by mouth every 6 hours as needed for moderate to severe pain 30 tablet 0     traMADol (ULTRAM) 50 MG tablet TAKE 1 TABLET 3 TIMES DAILY AS NEEDED FOR MODERATE PAIN 90 tablet 0     fluticasone-vilanterol (BREO ELLIPTA) 100-25 MCG/INH oral inhaler Inhale 1 puff into the lungs daily 60 Inhaler 0     umeclidinium (INCRUSE ELLIPTA) 62.5 MCG/INH oral inhaler Inhale 1 puff into the lungs daily 30 Inhaler 0     omeprazole (PRILOSEC) 20 MG CR capsule Take 1 capsule (20 mg) by mouth 2 times daily (before meals) 60 capsule 0     predniSONE (DELTASONE) 5 MG tablet Take 1 tablet (5 mg) by mouth daily 100 tablet 4     dofetilide (TIKOSYN) 125 MCG capsule Take 1 capsule (125 mcg) by mouth 2 times daily 60 capsule 5     apixaban ANTICOAGULANT (ELIQUIS) 2.5 MG tablet Take 1 tablet (2.5 mg) by mouth 2 times daily 180 tablet 3     Leuprolide Acetate (LUPRON DEPOT IM) Inject into the muscle every 4 months       leflunomide (ARAVA) 10 MG tablet Take 1 tablet (10 mg) by mouth daily 30 tablet 11     lisinopril (PRINIVIL,ZESTRIL) 10 MG tablet Take 1 tablet (10 mg) by mouth 2 times daily 62 tablet 11     spironolactone (ALDACTONE) 25 MG tablet Take 1 tablet (25 mg) by mouth daily 30 tablet 11     acetaminophen (TYLENOL) 650 MG CR tablet Take 650 mg by mouth 2 times daily Reported on 4/5/2017       atorvastatin (LIPITOR) 40 MG tablet Take 1 tablet (40 mg) by mouth daily 90 tablet 3     alendronate (FOSAMAX) 70 MG tablet Take 1 tablet (70 mg) by mouth every 7 days Take with over 8 ounces water and stay upright for at least 30 minutes after dose.  Take at least 60 minutes before breakfast 12 tablet 3     calcium carbonate (TUMS) 500 MG chewable tablet Take 1 chew tab by mouth every 4 hours as needed for heartburn       tamsulosin (FLOMAX) 0.4 MG 24 hr capsule Take 1 capsule (0.4 mg) by mouth 2 times daily 60 capsule      ascorbic acid (VITAMIN C) 500 MG CPCR Take 500 mg by mouth daily       VITAMIN D,  CHOLECALCIFEROL, PO Take 400 Units by mouth daily       calcium carbonate (OS-SHELIA 500 MG [place]. [place]) 500 MG tablet Take 600 mg by mouth daily         REVIEW OF SYSTEMS:   5 point ROS negative except as noted above in HPI, including Gen., Resp, CV, GI &  system review.     OBJECTIVE:  Vitals: /60  Pulse 60  Temp 96.9  F (36.1  C) (Temporal)  Resp 22  SpO2 97%  BMI= There is no height or weight on file to calculate BMI.  He is alert and oriented. Appears in no distress. Mucous membranes are moist. Lungs with good air exchange. Heart was a blowing 3/6 murmur. His right lower extremity shows an ulceration on the shin as above 2 cm x 1 cm and perhaps 3 mm deep. Some scabbing. Peeling and redness of the skin. Not hot. Edema is down. He has multiple dressings and these were partially removed.    ASSESSMENT:  #1 venous stasis ulcers of both lower extremities #2 cellulitis of the right lower extremity #3 dilated cardiomyopathy #4 paroxysmal atrial fibrillation    PLAN:  We had difficulty removing the dressings from his legs and no ulcerations looked like they need attention. He was transferred over to our wound care clinic. He is finishing up on his clindamycin and his Augmentin and I don't think he needs any more of those at this time. Basic metabolic panel was done today and looks good with a glucose of 104 the only abnormal finding. Home care will continue to follow him wound clinic will continue to follow him and we should see him back in a week or 2.        Camron Aragon MD  [facility]

## 2017-04-10 NOTE — PROGRESS NOTES
Reason For Visit: Bud Merritt, 89 year old male, seen as outpatient to evaluate and treat bilateral lower leg edema and stasis ulcer. Referred by ASHLYN Aragon MD. Patient presents by self today, his wife is also present with him today.      History: Patient with complex past medical history. Was recently hospitalized and is now back home receiving home care under a [facility] episode. Pts in clinic today to see Dr Tyler and I was asked to see him for his wounds.     Personal/social history: Patient is a former , , he lives with his wife in [facility].      Objective:   Physical appearance: Frail, cant get comfortable due to back and right leg pain per.  Tired  Ambulation: Is nonambulatory, able to stand with moderate assist, transfer with moderate assist for strength and balance.   Current treatment plan: Silver Alginate to wound beds, cover with ABD pads, secure with roll gauze.  Change every other day.  Last changed: Saturday 4/8/17  Drainage: moderate to large per wifes report with wound number 1 Right Anterior lower leg, draining more than all the others     Removal of dressing reveals. Outer dressings were already removed when pt was brought to my exam room.  Silver alginate is in place over all the open wound and pts legs were covered/wraped with chucks.  Silver alginate well adhered to all the open wounds     Wound #1 Right anterior lower leg, midtibial, venous stasis ulcer  Stage/tissue depth: full thickness as of my assessment today, due to eschar on wound bed, at initial clinic visit this was partial thickness  8 cm L x 2 cm W x 0 cm D  Tunneling: no  Undermining: no  Wound bed type/amount: Soft black eschar, tan slough and eschar, red nongranular tissue; NA fluctuant  Wound Edges: flush with surrounding skin  Periwound: edematous and hypopigmented  Odor: no  Pain: yes 2/10 at wound , 10/10 RA pain from right hip radiates down the leg    Wound #2 Right foot, dorsal  aspect, venous stasis ulcer  Stage/tissue depth: partial thickness  4.5 cm L x 6 cm W x 0.1 cm D  Tunneling: no  Undermining: no  Wound bed type/amount: white nonviable tissue, red nongranular tissue; NA fluctuant  Wound Edges: flush with surrounding skin  Periwound: edematous and hypopigmented  Odor: no  Pain: yes 1 to 2/10 at wound     Wound #3 Right lateral ankle, venous stasis ulcer, healed no blister or open wound at this site as of today's visit.  Stage/tissue depth: healed partial thickness wound    Wound #4 Left anterior lower leg, midtibial, venous stasis ulcer.  Stage/tissue depth: partial thickness  1.5 cm L x 0.5 cm W x 0 cm D  Tunneling: no  Undermining: no  Wound bed type/amount: red nongranular tissue; NA fluctuant  Wound Edges: flush with surrounding skin  Periwound: edematous and hypopigmented  Odor: no  Pain: no at this site 0/10 at wound     Wound #5 Left foot, distal dorsal aspect at base of toes, venous stasis ulcer  Stage/tissue depth: partial thickness  3 cm L x 6 cm W x 0 cm D  Tunneling: no  Undermining: no  Wound bed type/amount: red nongranular tissue; NA fluctuant  Wound Edges: flush with surrounding skin  Periwound: edematous and hypopigmented  Odor: no  Pain: yes 1 to 2/10     Wound #6 Left medial ankle, intact blister, venous stasis ulcer.  Healed no blister or open wound at this site as of today's visit.  Stage/tissue depth: healed partial thickness      Dorsalis Pedal Pulse: palpable but weak: NA doppler: NA phasic  Hair growth: scant on the lower legs and feet  Capillary Refill: less than 3 seconds  Feet/toes color: some purple and cool, others, pink to pale   Nails: discolored slightly yellow and thick  R Leg: Edema plus 4 pitting edema . Ankle circumference NA cm. Calf circumference NA cm.  L Leg: Edema plus 3 pitting edema. Ankle circumference NA cm. Calf circumference NA cm.     Mobility: limited due to pain, pivot transfers with assist, is using wheelchair  Current  offloading/footwear: currently unable to get shoes on due to edema  Sensation: some c/o pain but is related to RA and not neuropathy in the legs and feet  HgbA1C: NA Date: NA pt is not a diabetic  Checks Blood Glucose?: NA Average Readings: NA  Other callousing/areas of concern: feet have peeling of the epidermal tissue, less moist overall since last visit in clinic with me, still fragile but improved.    Diet: Regular  Smoking: no     Discussed: etiology of wound (venous stasis ulcers related to edema of the lower legs and feet), pathophysiology and patient specific goals for wound healing.   Education: on change in plan of care, current wound status, debriding and how and why and how fast.  Use of new products and plans for coordination with home care.     Goals of Wound Healing:   - Transition from nonhealing with copious amounts of drainage to a stage of managed drainage and edema reduction to promote healing  - Maintain moist environment, balance of moist and dry based on wound  - Control exudate, with silver alginate, Iodosorb paste, ABD pads, roll gauze, ace wraps  - Autolytic debridement of necrotic/sloughy tissue with use of Iodosorb on eschar  - Protect wound from outside environment, with Ace wraps and dressings  - Antimicrobial with silver alginate and Iodosorb.  - Pack open space, NA  - Promote healing by secondary intention for complete closure of wound, NA  - Offloading/pressure reduction, NA  - edema reduction and protection of intact but fragile and at risk skin.     Assessment:  Wounds are in a variety of the phase of healing, stagnant to new epithelialization.      Barriers to wound healing:   Poor nutrition: inadequate supply of protein, carbohydrates, fatty acids, and trace elements essential for all phases of wound healing  Reduced Blood Supply: inadequate perfusion to heal wound, related to edema  Medication: none at this time  Chemotherapy: suppresses the immune system and inflammatory  response, NA  Radiotherapy: increases production of free radical which damage cells, NA  Psychological stress: NA  Obesity: decreases tissue perfusion, No obesity but edema of LE is limiting venous return and prolongs healing  Infection: prolongs inflammatory phase, uses vital nutrients, impairs epithelialization and releases toxins  Underlying Disease: diabetes mellitis and autoimmune disorders, NA  Maceration: reduces wound tensile strength and inhibits epithelial migration, less of a risk at this time as drainage has decreased from the wound and legs in general.  Patient compliance, NA  Unrelieved pressure, NA  Immobility, lack of mobility promotes edema  Substance abuse: NA     Plan: Change in plan of care from current home care instructions for the Right anterior lower leg.  Addition of Iodosorb to the areas of the wound with eschar and slough.   For all the open wounds continue with the use of Silver alginate over all open wounds on the legs (including over where the Iodosorb is applied  In wound 1).    Protect tiffanie wound skin with Zinc oxide paste or Triad paste.  Triad if drainage is copious or high, zinc oxide if drainage is none to moderate.  Cover wounds with ABD pad or dry gauze depending on drainage volume control need, and secure with roll gauze and tape.  Apply Ace wraps distal foot to inferior knee, use 2 if needed to fully cover at 50 % overlay.    Topical care: see above  Offloading: NA  Additional recommendations:  None at this time.  Wound Care: Wound cleansed with Microklenz and gauze, patted dry. Periwound protected with critic aide paste. Wound base of wound 1 applied Iodosorb to the eschar and slough then covered all wounds with silver calcium alginate. Covered with ABD pad over wound 1, gauze over the rest of the wounds. Secured with roll gauze and Ace wraps. To be changed every other day.     Discussed plan of care with patient and his wife. Teaching done with patient and his wife for  dressing changes; pt is not able and wife is not yet formally trained in on wound cares. [facility] will provide cares till wife can learn and be independent with cares.  [facility] will plan to see pt in his home on Friday of this week 4/14/17.     The following discharge instructions were reviewed with and sent home with the patient: see above for wound and      The following supplies were sent home with the patient: One tube of Iodosorb.    Return visit: No return visit to clinic while the pt is under a home care episode with  home care and hospice.      Verbal, written, & demonstrative education provided.  Face to face time (excluding procedure): approximately 40 minutes.  Procedure: 20 minutes wound cares, skin cares and new dressing application.  Care plan was changed.     [phone]

## 2017-04-11 ASSESSMENT — ANXIETY QUESTIONNAIRES: GAD7 TOTAL SCORE: 0

## 2017-04-17 DIAGNOSIS — K21.9 GASTROESOPHAGEAL REFLUX DISEASE WITHOUT ESOPHAGITIS: ICD-10-CM

## 2017-04-17 DIAGNOSIS — T14.8XXA OPEN WOUND: ICD-10-CM

## 2017-04-17 RX ORDER — HYDROCODONE BITARTRATE AND ACETAMINOPHEN 5; 325 MG/1; MG/1
1 TABLET ORAL EVERY 6 HOURS PRN
Qty: 30 TABLET | Refills: 0 | Status: SHIPPED | OUTPATIENT
Start: 2017-04-17 | End: 2017-04-26

## 2017-04-17 NOTE — TELEPHONE ENCOUNTER
Hydrocodone-acetaminophen (NORCO) 5-325 MG per tablet      Last Written Prescription Date:  03/27/2017  Last Fill Quantity: 30,   # refills: 0  Last Office Visit with [facility], [facility] or [facility] prescribing provider: 04/10/2017  Future Office visit:    Next 5 appointments (look out 90 days)     May 03, 2017  9:30 AM CDT   Return Visit with DEVICE CHECK RN CARD   [facility] ([facility])    [address] [place] [id number]   [phone]            May 03, 2017 10:00 AM CDT   Return Visit with Khoa Li MD   [facility] ([facility])    [address] [place] [id number]   [phone]                   Routing refill request to provider for review/approval because:  Drug not on the [facility], [facility] or [facility] refill protocol or controlled substance    Yvonne Gunderson CMA

## 2017-04-19 ENCOUNTER — ALLIED HEALTH/NURSE VISIT (OUTPATIENT)
Dept: CARDIOLOGY | Facility: CLINIC | Age: 82
End: 2017-04-19
Attending: INTERNAL MEDICINE
Payer: MEDICARE

## 2017-04-19 DIAGNOSIS — I42.0 DILATED CARDIOMYOPATHY (H): Primary | ICD-10-CM

## 2017-04-19 PROCEDURE — 93295 DEV INTERROG REMOTE 1/2/MLT: CPT | Performed by: INTERNAL MEDICINE

## 2017-04-19 PROCEDURE — 93296 REM INTERROG EVL PM/IDS: CPT | Mod: ZF

## 2017-04-19 NOTE — MR AVS SNAPSHOT
After Visit Summary   4/19/2017    Bud Merritt    MRN: [id number]           Patient Information     Date Of Birth          5/9/1927        Visit Information        Provider Department      4/19/2017 6:00 AM UC ICD REMOTE [facility]        Today's Diagnoses     Dilated cardiomyopathy (H) dilated LV, EF 20-25% by Echo 3/4/17    -  1       Follow-ups after your visit        Your next 10 appointments already scheduled     May 03, 2017  9:30 AM CDT   Return Visit with DEVICE CHECK RN CARD   [facility]    [address] [place] [address] [id number] [phone]            May 03, 2017 10:00 AM CDT   Return Visit with Khoa Li MD   [facility])    [address] [place] [address] [id number] [phone]              Who to contact     If you have questions or need follow up information about today's clinic visit or your schedule please contact [facility] directly at [phone].  Normal or non-critical lab and imaging results will be communicated to you by MyChart, letter or phone within 4 business days after the clinic has received the results. If you do not hear from us within 7 days, please contact the clinic through MyChart or phone. If you have a critical or abnormal lab result, we will notify you by phone as soon as possible.  Submit refill requests through [employer] or call your pharmacy and they will forward the refill request to us. Please allow 3 business days for your refill to be completed.          Additional Information About Your Visit        Care EveryWhere ID     This is your Care EveryWhere ID. This could be used by other organizations to access your [place] medical records  [id number]         Blood Pressure from Last 3 Encounters:   04/10/17 110/60   04/05/17 90/56   04/04/17 145/77    Weight from Last 3 Encounters:   04/05/17 67 kg (147 lb 11.2 oz)    04/04/17 67 kg (147 lb 11.3 oz)   03/26/17 73.5 kg (162 lb)              We Performed the Following     INTERROGATION DEVICE EVAL REMOTE, PACER/ICD        Primary Care Provider Office Phone # Fax #    Camron Aragon [phone]       [facility] [address] [place] [address]        Thank you!     Thank you for choosing [facility]  for your care. Our goal is always to provide you with excellent care. Hearing back from our patients is one way we can continue to improve our services. Please take a few minutes to complete the written survey that you may receive in the mail after your visit with us. Thank you!             Your Updated Medication List - Protect others around you: Learn how to safely use, store and throw away your medicines at www.disposemymeds.org.          This list is accurate as of: 4/19/17  4:25 PM.  Always use your most recent med list.                   Brand Name Dispense Instructions for use    acetaminophen 650 MG CR tablet    TYLENOL     Take 650 mg by mouth 2 times daily Reported on 4/5/2017       alendronate 70 MG tablet    FOSAMAX    12 tablet    Take 1 tablet (70 mg) by mouth every 7 days Take with over 8 ounces water and stay upright for at least 30 minutes after dose.  Take at least 60 minutes before breakfast       apixaban ANTICOAGULANT 2.5 MG tablet    ELIQUIS    180 tablet    Take 1 tablet (2.5 mg) by mouth 2 times daily       ascorbic acid 500 MG Cpcr CR capsule    vitamin C     Take 500 mg by mouth daily       atorvastatin 40 MG tablet    LIPITOR    90 tablet    Take 1 tablet (40 mg) by mouth daily       calcium carbonate 500 MG chewable tablet    TUMS     Take 1 chew tab by mouth every 4 hours as needed for heartburn       calcium carbonate 500 MG tablet    OS-SHELIA 500 mg [place]. Ca     Take 600 mg by mouth daily       digoxin 125 MCG tablet    LANOXIN    15 tablet    Take 0.5 tablets (62.5 mcg) by mouth daily       dofetilide  125 MCG capsule    TIKOSYN    60 capsule    Take 1 capsule (125 mcg) by mouth 2 times daily       FLOMAX 0.4 MG capsule   Generic drug:  tamsulosin     60 capsule    Take 1 capsule (0.4 mg) by mouth 2 times daily       fluticasone-vilanterol 100-25 MCG/INH oral inhaler    BREO ELLIPTA    60 Inhaler    Inhale 1 puff into the lungs daily       furosemide 20 MG tablet    LASIX    90 tablet    Take 2 tablets (40 mg) by mouth in the morning and 1 tablet (20 mg) by mouth in the mid afternoon.       HYDROcodone-acetaminophen 5-325 MG per tablet    NORCO    30 tablet    Take 1 tablet by mouth every 6 hours as needed for moderate to severe pain       leflunomide 10 MG tablet    ARAVA    30 tablet    Take 1 tablet (10 mg) by mouth daily       lisinopril 10 MG tablet    PRINIVIL/ZESTRIL    62 tablet    Take 1 tablet (10 mg) by mouth 2 times daily       LUPRON DEPOT IM      Inject into the muscle every 4 months       metoprolol 100 MG tablet    LOPRESSOR    60 tablet    Take 1 tablet (100 mg) by mouth 2 times daily       omeprazole 20 MG CR capsule    priLOSEC    180 capsule    Take 1 capsule (20 mg) by mouth 2 times daily (before meals)       predniSONE 5 MG tablet    DELTASONE    100 tablet    Take 1 tablet (5 mg) by mouth daily       spironolactone 25 MG tablet    ALDACTONE    30 tablet    Take 1 tablet (25 mg) by mouth daily       traMADol 50 MG tablet    ULTRAM    90 tablet    TAKE 1 TABLET 3 TIMES DAILY AS NEEDED FOR MODERATE PAIN       umeclidinium 62.5 MCG/INH oral inhaler    INCRUSE ELLIPTA    30 Inhaler    Inhale 1 puff into the lungs daily       VITAMIN D (CHOLECALCIFEROL) PO      Take 400 Units by mouth daily

## 2017-04-19 NOTE — PROGRESS NOTES
Scheduled Medtronic Carelink remote ICD transmission received and reviewed. Device transmission sent per MD orders. His presenting rhythm is AS/ BVP @60 bpm. 78 VT and 37 fast A&V episodes recorded. The available EGMs suggest AT/AF with RVR. Normal device function. AP= 39% BVP= 90.5% and VSR pacing= 0.5%. No short V-V intervals recorded. Lead trends appear stable. OptiVol thoracic impedance is near his baseline. Battery estimates 8.5 years to SUHA. Pt notified of transmission results. Plan for pt to RTC in 3 months as scheduled.

## 2017-04-20 ENCOUNTER — ALLIED HEALTH/NURSE VISIT (OUTPATIENT)
Dept: PHARMACY | Facility: CLINIC | Age: 82
End: 2017-04-20
Payer: COMMERCIAL

## 2017-04-20 DIAGNOSIS — N40.0 HYPERTROPHY OF PROSTATE WITHOUT URINARY OBSTRUCTION: ICD-10-CM

## 2017-04-20 DIAGNOSIS — I48.0 PAROXYSMAL ATRIAL FIBRILLATION (H): ICD-10-CM

## 2017-04-20 DIAGNOSIS — L97.919 VENOUS STASIS ULCERS OF BOTH LOWER EXTREMITIES (H): Primary | ICD-10-CM

## 2017-04-20 DIAGNOSIS — E78.5 HYPERLIPIDEMIA LDL GOAL <130: ICD-10-CM

## 2017-04-20 DIAGNOSIS — M81.0 OSTEOPOROSIS: ICD-10-CM

## 2017-04-20 DIAGNOSIS — I50.20 SYSTOLIC HEART FAILURE, UNSPECIFIED HEART FAILURE CHRONICITY: ICD-10-CM

## 2017-04-20 DIAGNOSIS — R97.20 ELEVATED PROSTATE SPECIFIC ANTIGEN (PSA): ICD-10-CM

## 2017-04-20 DIAGNOSIS — K21.9 GASTROESOPHAGEAL REFLUX DISEASE WITHOUT ESOPHAGITIS: ICD-10-CM

## 2017-04-20 DIAGNOSIS — I83.019 VENOUS STASIS ULCERS OF BOTH LOWER EXTREMITIES (H): Primary | ICD-10-CM

## 2017-04-20 DIAGNOSIS — I48.91 ATRIAL FIBRILLATION WITH RAPID VENTRICULAR RESPONSE (H): ICD-10-CM

## 2017-04-20 DIAGNOSIS — M06.9 RHEUMATOID ARTHRITIS INVOLVING MULTIPLE SITES, UNSPECIFIED RHEUMATOID FACTOR PRESENCE: ICD-10-CM

## 2017-04-20 DIAGNOSIS — L97.929 VENOUS STASIS ULCERS OF BOTH LOWER EXTREMITIES (H): Primary | ICD-10-CM

## 2017-04-20 DIAGNOSIS — I10 ESSENTIAL HYPERTENSION WITH GOAL BLOOD PRESSURE LESS THAN 140/90: ICD-10-CM

## 2017-04-20 DIAGNOSIS — I83.029 VENOUS STASIS ULCERS OF BOTH LOWER EXTREMITIES (H): Primary | ICD-10-CM

## 2017-04-20 PROCEDURE — 99607 MTMS BY PHARM ADDL 15 MIN: CPT | Performed by: PHARMACIST

## 2017-04-20 PROCEDURE — 99606 MTMS BY PHARM EST 15 MIN: CPT | Performed by: PHARMACIST

## 2017-04-20 RX ORDER — DIGOXIN 125 MCG
62.5 TABLET ORAL DAILY
Qty: 45 TABLET | Refills: 3 | Status: SHIPPED | OUTPATIENT
Start: 2017-04-20 | End: 2017-06-21

## 2017-04-20 NOTE — MR AVS SNAPSHOT
After Visit Summary   4/20/2017    Bud Merritt    MRN: [id number]           Patient Information     Date Of Birth          5/9/1927        Visit Information        Provider Department      4/20/2017 10:00 AM Baljit Da Silva [facility]        Today's Diagnoses     Venous stasis ulcers of both lower extremities (H)    -  1    Systolic heart failure, unspecified heart failure chronicity (H)        Atrial fibrillation with rapid ventricular response (H)        Essential hypertension with goal blood pressure less than 140/90        Hyperlipidemia LDL goal <130        Rheumatoid arthritis involving multiple sites, unspecified rheumatoid factor presence (H)        Hypertrophy of prostate without urinary obstruction        Elevated prostate specific antigen (PSA)        Osteoporosis        Gastroesophageal reflux disease without esophagitis          Care Instructions    Sent via letter.         Follow-ups after your visit        Your next 10 appointments already scheduled     May 03, 2017  9:30 AM CDT   Return Visit with DEVICE CHECK RN CARD   [facility]    [address] [place] [id number] [phone]            May 03, 2017 10:00 AM CDT   Return Visit with Khoa Li MD   [facility]    [address] [place] [id number] [phone]              Who to contact     If you have questions or need follow up information about today's clinic visit or your schedule please contact [facility] directly at [phone].  Normal or non-critical lab and imaging results will be communicated to you by MyChart, letter or phone within 4 business days after the clinic has received the results. If you do not hear from us within 7 days, please contact the clinic through MyChart or phone. If you have a critical or abnormal lab result, we will  notify you by phone as soon as possible.  Submit refill requests through [employer] or call your pharmacy and they will forward the refill request to us. Please allow 3 business days for your refill to be completed.          Additional Information About Your Visit        Care EveryWhere ID     This is your Care EveryWhere ID. This could be used by other organizations to access your [place] medical records  [id number]         Blood Pressure from Last 3 Encounters:   04/10/17 110/60   04/05/17 90/56   04/04/17 145/77    Weight from Last 3 Encounters:   04/05/17 147 lb 11.2 oz (67 kg)   04/04/17 147 lb 11.3 oz (67 kg)   03/26/17 162 lb (73.5 kg)              Today, you had the following     No orders found for display       Primary Care Provider Office Phone # Fax #    Camron Aragon [phone]       [place] [address] [place] [address]        Thank you!     Thank you for choosing [facility]  for your care. Our goal is always to provide you with excellent care. Hearing back from our patients is one way we can continue to improve our services. Please take a few minutes to complete the written survey that you may receive in the mail after your visit with us. Thank you!             Your Updated Medication List - Protect others around you: Learn how to safely use, store and throw away your medicines at www.disposemymeds.org.          This list is accurate as of: 4/20/17  3:57 PM.  Always use your most recent med list.                   Brand Name Dispense Instructions for use    acetaminophen 650 MG CR tablet    TYLENOL     Take 650 mg by mouth 2 times daily Reported on 4/5/2017       alendronate 70 MG tablet    FOSAMAX    12 tablet    Take 1 tablet (70 mg) by mouth every 7 days Take with over 8 ounces water and stay upright for at least 30 minutes after dose.  Take at least 60 minutes before breakfast       apixaban ANTICOAGULANT 2.5 MG tablet     ELIQUIS    180 tablet    Take 1 tablet (2.5 mg) by mouth 2 times daily       ascorbic acid 500 MG Cpcr CR capsule    vitamin C     Take 500 mg by mouth daily       atorvastatin 40 MG tablet    LIPITOR    90 tablet    Take 1 tablet (40 mg) by mouth daily       calcium carbonate 500 MG chewable tablet    TUMS     Take 1 chew tab by mouth every 4 hours as needed for heartburn       calcium carbonate 500 MG tablet    OS-SHELIA 500 mg [place]. Ca     Take 600 mg by mouth daily       digoxin 125 MCG tablet    LANOXIN    15 tablet    Take 0.5 tablets (62.5 mcg) by mouth daily       dofetilide 125 MCG capsule    TIKOSYN    60 capsule    Take 1 capsule (125 mcg) by mouth 2 times daily       FLOMAX 0.4 MG capsule   Generic drug:  tamsulosin     60 capsule    Take 1 capsule (0.4 mg) by mouth 2 times daily       fluticasone-vilanterol 100-25 MCG/INH oral inhaler    BREO ELLIPTA    60 Inhaler    Inhale 1 puff into the lungs daily       furosemide 20 MG tablet    LASIX    90 tablet    Take 2 tablets (40 mg) by mouth in the morning and 1 tablet (20 mg) by mouth in the mid afternoon.       HYDROcodone-acetaminophen 5-325 MG per tablet    NORCO    30 tablet    Take 1 tablet by mouth every 6 hours as needed for moderate to severe pain       leflunomide 10 MG tablet    ARAVA    30 tablet    Take 1 tablet (10 mg) by mouth daily       lisinopril 10 MG tablet    PRINIVIL/ZESTRIL    62 tablet    Take 1 tablet (10 mg) by mouth 2 times daily       LUPRON DEPOT IM      Inject into the muscle every 4 months       metoprolol 100 MG tablet    LOPRESSOR    60 tablet    Take 1 tablet (100 mg) by mouth 2 times daily       omeprazole 20 MG CR capsule    priLOSEC    180 capsule    Take 1 capsule (20 mg) by mouth 2 times daily (before meals)       predniSONE 5 MG tablet    DELTASONE    100 tablet    Take 1 tablet (5 mg) by mouth daily       spironolactone 25 MG tablet    ALDACTONE    30 tablet    Take 1 tablet (25 mg) by mouth daily       traMADol 50 MG  tablet    ULTRAM    90 tablet    TAKE 1 TABLET 3 TIMES DAILY AS NEEDED FOR MODERATE PAIN       umeclidinium 62.5 MCG/INH oral inhaler    INCRUSE ELLIPTA    30 Inhaler    Inhale 1 puff into the lungs daily       VITAMIN D (CHOLECALCIFEROL) PO      Take 400 Units by mouth daily

## 2017-04-20 NOTE — LETTER
[facility]  [address] [place] [id number]  [phone]      April 20, 2017    Bud Merritt                                                                                                                     [address] [address] [place] [id number]      Dear Bud,    It was so nice to speak with your wife Charmaine on 4/20/17.  I hope I was able to give you some useful information during our Medication Therapy Management (MTM) visit. The purpose of this visit with a clinical pharmacist was to review the medicines you received when discharged from the hospital. We want to make sure that you know which medicines to take and what they are for. We also want to make sure all your medicines are working, safe, and as easy to take as possible.    As I wrote this letter, a refill request had been sent to Dr. Aragon in our system.  I will make sure to check your chart to ensure that this had been refilled.      Feel free to call if you have any questions or concerns. By working together with you and your doctor, I hope to help you feel confident managing your medicines and improving your quality of life.       Clinton ortizes,         Betito Da Silva, PharmD  Medication Therapy Management Provider  Pager (voicemail): [phone]

## 2017-04-20 NOTE — PROGRESS NOTES
SUBJECTIVE/OBJECTIVE:                                                    Bud Merritt is a 89 year old male called for a transitions of care visit.  He was discharged from [facility] on 4/4/17 for cellulitis of lower extremeties.  Visit today conducted with patient's withCharmaine, with consent from patient (he cannot hear well over the phone).    Chief Complaint: Hospital Follow-Up.  Personal Healthcare Goals: get refill    llergies/ADRs: Reviewed in Epic  Tobacco: History of tobacco dependence - quit 25 years ago   Alcohol: not currently using  Caffeine: 2-5 cups/day of coffee (half water)  Activity: Pt is doing okay since being home.   PMH: Reviewed in Epic    Medication Adherence: issues found and discussed below    Cellulitis: Pt recently completed courses of Augmentin and Clindamycin.  Pt has been seeing wound clinic and has had a nurse come out to check on things. Legs are slightly swelled per wife and a little bit red, but wife reports RNs think things are looking better.  Denies any issues at this time.     HFrEF/HTN/AFib: Current medications include furosemide 40 mg in the morning/20 mg in mid afternoon daily, spironolactone 25 mg daily, lisinopril 10 mg twice daily, metoprolol tartrate 100 mg twice daily, dofetilide 125 mcg twice daily, digoxin 62.5 mg daily and Eliquis 2.5 mg twice daily.  Pt's pharmacy had cut tablets in half for patient and wife, but wife did not read directions closely and so was given patient two half tablets for a few days (only 5 tablets left, so likely has given this way for 1-2 weeks) - wants a refill/working with pharmacy. Started in hospital. Patient reports no current medication side effects.     ECHO:  Date 3/4/17, EF 20-25%  Pt is not complaining of sx of HF.    Pt is measuring daily weightsand reports stable.   Patient does not self-monitor BP.   Pt is following a low sodium diet, is avoiding EtOH.    Hyperlipidemia: Current therapy includes atorvastatin 40 mg  once daily.  Pt reports no significant myalgias or other side effects.     RA: Pt is taking leflunomide 10 mg daily, prednisone 5 mg daily, tramadol 50 mg TID PRN and APAP 650 mg twice daily.  Wife states issues are stable, but ongoing. Follows with Dr. Shaver in rheumatology. Denies any issues.     BPH/Hx of Prostate Cancer: Pt is receiving a leuprolide injection every 4 months and is taking tamsulosin 0.4 mg twice daily. Denies any issues at this time.    Osteoporosis: Current therapy includes: calcium carbonate 500 mg daily, Vitamin D supplements: 400 IU and alendronate (Fosamax) 70mg weekly (Pt has been on current therapy for 4 years). Pt is experiencing side effects.  Pt is getting the following sources of dietary calcium: milk  Last vitamin D level: n/a  DEXA History: -2.9 at left hip 12/13/12  Risk factors: chronic corticosteroid use and chronic PPI use    GERD: Current medications include: Prilosec (omeprazole) 20 mg twice daily and TUMS PRN (rare use). Pt c/o no current symptoms - has had some trouble decreasing dose in past.  Patient feels that current regimen is effective.    Current labs include:  BP Readings from Last 3 Encounters:   04/10/17 110/60   04/05/17 90/56   04/04/17 145/77     Today's Vitals: There were no vitals taken for this visit.  No results found for: A1C.  Lab Results   Component Value Date    CHOL 139 03/06/2017     Lab Results   Component Value Date    TRIG 156 03/06/2017     Lab Results   Component Value Date    HDL 34 03/06/2017     Lab Results   Component Value Date    LDL 74 03/06/2017     Lab Results   Component Value Date    ALT 22 03/27/2017     No results found for: UCRR, MICROL, UMALCR    Last Basic Metabolic Panel:  Lab Results   Component Value Date     04/10/2017      Lab Results   Component Value Date    POTASSIUM 4.0 04/10/2017     Lab Results   Component Value Date    CHLORIDE 103 04/10/2017     Lab Results   Component Value Date    BUN 15 04/10/2017     Lab  Results   Component Value Date    CR 0.77 04/10/2017     GFR Estimate   Date Value Ref Range Status   04/10/2017 >90  Non  GFR Calc   >60 mL/min/1.7m2 Final   04/04/2017 >90  Non  GFR Calc   >60 mL/min/1.7m2 Final   04/03/2017 85 >60 mL/min/1.7m2 Final     Comment:     Non  GFR Calc     TSH   Date Value Ref Range Status   01/23/2017 1.07 0.40 - 4.00 mU/L Final     Most Recent Immunizations   Administered Date(s) Administered     Influenza (High Dose) 3 valent vaccine 03/14/2017     Influenza Vaccine IM 3yrs+ 4 Valent IIV4 11/03/2014     Pneumococcal 23 valent 03/14/2017     TD (ADULT, 7+) 05/16/2012     Tetanus 04/17/1989     ASSESSMENT:                                                       Current medications were reviewed today.      Medication Adherence: issues discussed below    Cellulitis: Improving. Pt is established with wound care, infection site appears to be improved based on pt's wife report.     HFrEF/HTN/AFib: Stable. Pt has contacted pharmacy - encouraged her to let them know that Bud's PCP or cardiologist would be the best to refill as Dr. Perez is a hospitalist.  Confirmed with patient that she had correctly been giving  medication after initial confusion.     Hyperlipidemia: Stable. Pt is on high intensity statin which is not necessarily indicated based on 2013 ACC/AHA guidelines for lipid management, but patient is tolerating and prefers to continue. Likely reasonable.       RA: Stable.     BPH/Hx of Prostate Cancer: Stable.     Osteoporosis: Stable.    GERD: Stable. Given prednisone and Eliquis likely gaining some gastric protection.      PLAN:                                                      Post Discharge Medication Reconciliation Status: discharge medications reconciled, continue medications without change.    1. Continue current therapy.    I spent 20 minutes with this patient today. A copy of the visit note was provided to the  patient's primary care provider.    Will follow up in 1 month or sooner if needed.    The patient was mailed a summary of these recommendations as an after visit summary.    Betito Da Silva PharmD  Medication Therapy Management Provider  Pager voicemail: [phone]

## 2017-04-25 ENCOUNTER — DOCUMENTATION ONLY (OUTPATIENT)
Dept: CARE COORDINATION | Facility: CLINIC | Age: 82
End: 2017-04-25

## 2017-04-25 ENCOUNTER — TELEPHONE (OUTPATIENT)
Dept: FAMILY MEDICINE | Facility: CLINIC | Age: 82
End: 2017-04-25

## 2017-04-25 DIAGNOSIS — T14.8XXA OPEN WOUND: ICD-10-CM

## 2017-04-25 DIAGNOSIS — M06.9 RHEUMATOID ARTHRITIS INVOLVING MULTIPLE SITES, UNSPECIFIED RHEUMATOID FACTOR PRESENCE: ICD-10-CM

## 2017-04-25 NOTE — PROGRESS NOTES
This does not appear to be urgent in regards to an infection, at least from what I am reading in this description.  It is sometimes to tell the difference between a healing wound and one that is infected.  I would recommend patient be seen.  However, Dr. Aragon is in the office tomorrow and he could review this and decide differently what he wants to do.    Will have staff notify patient and then route message to Dr. Mahesh Perez MD

## 2017-04-25 NOTE — PROGRESS NOTES
"\"[facility] now requests orders and shares plan of care/discharge summaries for some patients through [facility].  Please REPLY TO THIS MESSAGE in order to give authorization for orders when needed.  This is considered a verbal order, you will still receive a faxed copy of orders for signature.  Thank you for your assistance in improving collaboration for our patients.    FYI Message    PRN visit today to perform wound cares.Wound to right shin has large amounts of serosanguinous, purulent yellow drainage drainage with yellow thick slough. Surrounding tissue is red/pink in color. There are multiple water blisters today surrounding wound. [facility] nurse will see patient tomorrow. Patient/wife are wondering if patient should be started back on antibiotic. No foul smell noted today or increased pain.     Judit Jarrett RN   [phone]    "

## 2017-04-25 NOTE — TELEPHONE ENCOUNTER
Dr. Perez, please review Dr. Aragon's clinic note from LOV: 4/10/17 below ( it was a hospital FU visit) , and the Home Care nurse's note below, from today. Wife Does NOT want to to come in and HOME CARE NURSE wondering if can start an antibiotic??    ASSESSMENT:  #1 venous stasis ulcers of both lower extremities #2 cellulitis of the right lower extremity #3 dilated cardiomyopathy #4 paroxysmal atrial fibrillation     PLAN:  We had difficulty removing the dressings from his legs and no ulcerations looked like they need attention. He was transferred over to our wound care clinic. He is finishing up on his clindamycin and his Augmentin and I don't think he needs any more of those at this time. Basic metabolic panel was done today and looks good with a glucose of 104 the only abnormal finding. Home care will continue to follow him wound clinic will continue to follow him and we should see him back in a week or 2.    VIRGEN Johnson

## 2017-04-25 NOTE — TELEPHONE ENCOUNTER
Reason for Call:  Other     Detailed comments: Jerica calling from  Home Care states pt has wounds and pt not been on antibiotics for two weeks. Jerica states theres a wound on right shin that is concerning lots of drainage, no sign of infection, slight odor. Jerica states did recommend to pt that they be seen but wife declined and states she did call out to a wound care nurse to see if they can come out to check on pts wounds. Jerica states wondering if can get pt antibiotics for this wound. Please advise and contact Jerica [phone]    Phone Number Patient can be reached at: Home number on file [phone] (home)    Best Time: ANY    Can we leave a detailed message on this number? YES    Call taken on 4/25/2017 at 10:46 AM by Sumaya Aguila

## 2017-04-26 DIAGNOSIS — S81.809D OPEN WOUND OF LOWER LIMB, UNSPECIFIED LATERALITY, SUBSEQUENT ENCOUNTER: Primary | ICD-10-CM

## 2017-04-26 RX ORDER — HYDROCODONE BITARTRATE AND ACETAMINOPHEN 5; 325 MG/1; MG/1
1 TABLET ORAL EVERY 6 HOURS PRN
Qty: 30 TABLET | Refills: 0 | Status: SHIPPED | OUTPATIENT
Start: 2017-04-26 | End: 2017-05-11

## 2017-04-26 RX ORDER — CEPHALEXIN 500 MG/1
500 CAPSULE ORAL 3 TIMES DAILY
Qty: 30 CAPSULE | Refills: 0 | Status: SHIPPED | OUTPATIENT
Start: 2017-04-26 | End: 2017-06-07

## 2017-04-26 RX ORDER — TRAMADOL HYDROCHLORIDE 50 MG/1
TABLET ORAL
Qty: 90 TABLET | Refills: 0 | Status: SHIPPED | OUTPATIENT
Start: 2017-04-26 | End: 2017-06-07

## 2017-04-27 ENCOUNTER — TELEPHONE (OUTPATIENT)
Dept: FAMILY MEDICINE | Facility: CLINIC | Age: 82
End: 2017-04-27

## 2017-04-27 DIAGNOSIS — I50.23 ACUTE ON CHRONIC SYSTOLIC CONGESTIVE HEART FAILURE (H): ICD-10-CM

## 2017-04-27 RX ORDER — FUROSEMIDE 20 MG
TABLET ORAL
Qty: 90 TABLET | Refills: 1 | Status: SHIPPED | OUTPATIENT
Start: 2017-04-27 | End: 2017-06-06

## 2017-04-27 NOTE — TELEPHONE ENCOUNTER
Reason for Call:  Other FYI    Detailed comments: 5 LB weight gain in one week, increased swelling in lower legs, and patient does report having a better appetite. No other symptoms of heart failure at this time.     Phone Number Patient can be reached at: Other phone number:  Home Care nurse Judit HERNANDEZ # [phone]    Best Time: anytime    Can we leave a detailed message on this number? YES    Call taken on 4/27/2017 at 1:56 PM by Veronica Pena

## 2017-05-01 ENCOUNTER — ALLIED HEALTH/NURSE VISIT (OUTPATIENT)
Dept: CARDIOLOGY | Facility: CLINIC | Age: 82
End: 2017-05-01
Attending: INTERNAL MEDICINE
Payer: MEDICARE

## 2017-05-01 DIAGNOSIS — I42.0 DILATED CARDIOMYOPATHY (H): Primary | ICD-10-CM

## 2017-05-01 PROCEDURE — 93296 REM INTERROG EVL PM/IDS: CPT | Mod: ZF

## 2017-05-01 PROCEDURE — 93294 REM INTERROG EVL PM/LDLS PM: CPT | Performed by: INTERNAL MEDICINE

## 2017-05-01 NOTE — MR AVS SNAPSHOT
After Visit Summary   5/1/2017    Bud Merritt    MRN: [id number]           Patient Information     Date Of Birth          5/9/1927        Visit Information        Provider Department      5/1/2017 6:00 AM UC ICD REMOTE [facility]        Today's Diagnoses     Dilated cardiomyopathy (H) dilated LV, EF 20-25% by Echo 3/4/17    -  1       Follow-ups after your visit        Your next 10 appointments already scheduled     May 03, 2017 10:00 AM CDT   Return Visit with Khoa Li MD   [facility]    [id number] [place] [id number] [phone]              Who to contact     If you have questions or need follow up information about today's clinic visit or your schedule please contact [facility] directly at [phone].  Normal or non-critical lab and imaging results will be communicated to you by MyChart, letter or phone within 4 business days after the clinic has received the results. If you do not hear from us within 7 days, please contact the clinic through MyChart or phone. If you have a critical or abnormal lab result, we will notify you by phone as soon as possible.  Submit refill requests through [employer] or call your pharmacy and they will forward the refill request to us. Please allow 3 business days for your refill to be completed.          Additional Information About Your Visit        Care EveryWhere ID     This is your Care EveryWhere ID. This could be used by other organizations to access your [facility] medical records  [id number]         Blood Pressure from Last 3 Encounters:   04/10/17 110/60   04/05/17 90/56   04/04/17 145/77    Weight from Last 3 Encounters:   04/05/17 67 kg (147 lb 11.2 oz)   04/04/17 67 kg (147 lb 11.3 oz)   03/26/17 73.5 kg (162 lb)              We Performed the Following     INTERROGATION DEVICE ZELALEM NAVARRO, PACER/ICD        Primary Care Provider Office Phone # Fax #     Camron Aragon [phone]       [facility] [address] [place] [address]        Thank you!     Thank you for choosing [facility]  for your care. Our goal is always to provide you with excellent care. Hearing back from our patients is one way we can continue to improve our services. Please take a few minutes to complete the written survey that you may receive in the mail after your visit with us. Thank you!             Your Updated Medication List - Protect others around you: Learn how to safely use, store and throw away your medicines at www.disposemymeds.org.          This list is accurate as of: 5/1/17 11:59 PM.  Always use your most recent med list.                   Brand Name Dispense Instructions for use    acetaminophen 650 MG CR tablet    TYLENOL     Take 650 mg by mouth 2 times daily Reported on 4/5/2017       alendronate 70 MG tablet    FOSAMAX    12 tablet    Take 1 tablet (70 mg) by mouth every 7 days Take with over 8 ounces water and stay upright for at least 30 minutes after dose.  Take at least 60 minutes before breakfast       apixaban ANTICOAGULANT 2.5 MG tablet    ELIQUIS    180 tablet    Take 1 tablet (2.5 mg) by mouth 2 times daily       ascorbic acid 500 MG Cpcr CR capsule    vitamin C     Take 500 mg by mouth daily       atorvastatin 40 MG tablet    LIPITOR    90 tablet    Take 1 tablet (40 mg) by mouth daily       calcium carbonate 500 MG chewable tablet    TUMS     Take 1 chew tab by mouth every 4 hours as needed for heartburn       calcium carbonate 500 MG tablet    OS-SHELIA 500 mg [place]. Ca     Take 600 mg by mouth daily       cephALEXin 500 MG capsule    KEFLEX    30 capsule    Take 1 capsule (500 mg) by mouth 3 times daily       digoxin 125 MCG tablet    LANOXIN    45 tablet    Take 0.5 tablets (62.5 mcg) by mouth daily       dofetilide 125 MCG capsule    TIKOSYN    60 capsule    Take 1 capsule (125 mcg) by mouth 2 times daily        FLOMAX 0.4 MG capsule   Generic drug:  tamsulosin     60 capsule    Take 1 capsule (0.4 mg) by mouth 2 times daily       fluticasone-vilanterol 100-25 MCG/INH oral inhaler    BREO ELLIPTA    60 Inhaler    Inhale 1 puff into the lungs daily       furosemide 20 MG tablet    LASIX    90 tablet    Take one tab in the afternoon       HYDROcodone-acetaminophen 5-325 MG per tablet    NORCO    30 tablet    Take 1 tablet by mouth every 6 hours as needed for moderate to severe pain       leflunomide 10 MG tablet    ARAVA    30 tablet    Take 1 tablet (10 mg) by mouth daily       lisinopril 10 MG tablet    PRINIVIL/ZESTRIL    62 tablet    Take 1 tablet (10 mg) by mouth 2 times daily       LUPRON DEPOT IM      Inject into the muscle every 4 months       metoprolol 100 MG tablet    LOPRESSOR    60 tablet    Take 1 tablet (100 mg) by mouth 2 times daily       omeprazole 20 MG CR capsule    priLOSEC    180 capsule    Take 1 capsule (20 mg) by mouth 2 times daily (before meals)       predniSONE 5 MG tablet    DELTASONE    100 tablet    Take 1 tablet (5 mg) by mouth daily       spironolactone 25 MG tablet    ALDACTONE    30 tablet    Take 1 tablet (25 mg) by mouth daily       traMADol 50 MG tablet    ULTRAM    90 tablet    TAKE 1 TABLET 3 TIMES DAILY AS NEEDED FOR MODERATE PAIN       umeclidinium 62.5 MCG/INH oral inhaler    INCRUSE ELLIPTA    30 Inhaler    Inhale 1 puff into the lungs daily       VITAMIN D (CHOLECALCIFEROL) PO      Take 400 Units by mouth daily

## 2017-05-02 NOTE — PROGRESS NOTES
Remote pacemaker transmission received and reviewed.  Device transmission sent per MD orders.  Patient has a Medtronic multi lead pacemaker.  Normal pacemaker function.  32 episodes recorded as NSVT - 1-3 sec, 154-186 bpm.  26 fast A & V episodes recorded - 5 sec - 2 min 53 sec, 158-182 bpm.  5 AT/AF episodes recorded - 8 seconds in duration.  Presenting EGM = AS-BVP @ 65 bpm.  AP = 31.5%.   = 99.5%.  BVP = 92.8%.  OptiVol fluid index is at baseline.  Estimated battery longevity to SUHA = 7.5 years.  Patient's wife notified of interrogation results.  Patient's wife reports that he had an upset stomach yesterday.  Plan for patient to send a remote transmission in 3 months as scheduled.  Patient has an appt with Dr. Li tomorrow in [place].  Interrogation results and EGM's scanned into epic    Remote pacemaker transmission

## 2017-05-03 ENCOUNTER — OFFICE VISIT (OUTPATIENT)
Dept: CARDIOLOGY | Facility: CLINIC | Age: 82
End: 2017-05-03
Payer: COMMERCIAL

## 2017-05-03 VITALS — SYSTOLIC BLOOD PRESSURE: 126 MMHG | OXYGEN SATURATION: 96 % | HEART RATE: 60 BPM | DIASTOLIC BLOOD PRESSURE: 72 MMHG

## 2017-05-03 DIAGNOSIS — L03.116 CELLULITIS OF LEFT LOWER EXTREMITY: ICD-10-CM

## 2017-05-03 DIAGNOSIS — Z53.9 DIAGNOSIS NOT YET DEFINED: Primary | ICD-10-CM

## 2017-05-03 DIAGNOSIS — I48.0 PAROXYSMAL ATRIAL FIBRILLATION (H): Primary | ICD-10-CM

## 2017-05-03 DIAGNOSIS — Z95.0 CARDIAC PACEMAKER IN SITU: ICD-10-CM

## 2017-05-03 DIAGNOSIS — L03.115 CELLULITIS OF RIGHT LOWER EXTREMITY: ICD-10-CM

## 2017-05-03 PROCEDURE — G0180 MD CERTIFICATION HHA PATIENT: HCPCS | Performed by: FAMILY MEDICINE

## 2017-05-03 PROCEDURE — 99214 OFFICE O/P EST MOD 30 MIN: CPT | Performed by: INTERNAL MEDICINE

## 2017-05-03 ASSESSMENT — PAIN SCALES - GENERAL: PAINLEVEL: EXTREME PAIN (8)

## 2017-05-03 NOTE — MR AVS SNAPSHOT
After Visit Summary   5/3/2017    Bud Merritt    MRN: [id number]           Patient Information     Date Of Birth          5/9/1927        Visit Information        Provider Department      5/3/2017 10:00 AM Khoa Li MD [facility]        Today's Diagnoses     Paroxysmal atrial fibrillation (H)    -  1    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onic, Bi-Ventricular- NOT dependent        Cellulitis of right lower extremity        Cellulitis of left lower extremity          Care Instructions      The following is a summary of your office visit:    Medications started today:none    Medications stopped today:none    Medication dose change: none    Nurse contact information: Poonam Ramríez RN  Cardiology Care Coordinator  [phone] Phone  [phone] Fax    Appointments made today: August follow up    Patient instructions:none      If you have had any blood work, imaging or other testing completed we will be in touch within 1-2 weeks regarding the results. If you have any questions, concerns or need to schedule a follow up, please contact us at [phone]. If you are needing refills please contact your pharmacy. For urgent after hour care please call the [facility] at [phone] or the [facility] at [phone] and ask to speak to the cardiologist on call.    It was a pleasure meeting with you today. Please let us know if there is anything else we can do for you so that we can be sure you are leaving completely satisfied with your care experience.     Your Cardiology Team at [facility]  RN Care Coordinator: Poonam Landis                  Follow-ups after your visit        Follow-up notes from your care team     Return in about 3 months (around 8/3/2017) for Camille.      Your next 10 appointments already scheduled     Aug 02, 2017  3:30 PM CDT   Return Visit with Khoa Li MD   [facility]  [facility]    [address] [address] [place] [address] [phone]              Who to contact     If you have questions or need follow up information about today's clinic visit or your schedule please contact [facility] directly at [phone].  Normal or non-critical lab and imaging results will be communicated to you by MyChart, letter or phone within 4 business days after the clinic has received the results. If you do not hear from us within 7 days, please contact the clinic through MyChart or phone. If you have a critical or abnormal lab result, we will notify you by phone as soon as possible.  Submit refill requests through [employer] or call your pharmacy and they will forward the refill request to us. Please allow 3 business days for your refill to be completed.          Additional Information About Your Visit        Care EveryWhere ID     This is your Care EveryWhere ID. This could be used by other organizations to access your [facility] medical records  [id number]        Your Vitals Were     Pulse Pulse Oximetry                60 96%           Blood Pressure from Last 3 Encounters:   05/03/17 126/72   04/10/17 110/60   04/05/17 90/56    Weight from Last 3 Encounters:   04/05/17 67 kg (147 lb 11.2 oz)   04/04/17 67 kg (147 lb 11.3 oz)   03/26/17 73.5 kg (162 lb)              Today, you had the following     No orders found for display       Primary Care Provider Office Phone # Fax #    Camron Aragon [phone]       [place] [address] [place] [address]        Thank you!     Thank you for choosing [facility]  for your care. Our goal is always to provide you with excellent care. Hearing back from our patients is one way we can continue to improve our services. Please take a few minutes to complete the written survey that you may receive in the mail after your visit with us. Thank you!              Your Updated Medication List - Protect others around you: Learn how to safely use, store and throw away your medicines at www.disposemymeds.org.          This list is accurate as of: 5/3/17 10:11 AM.  Always use your most recent med list.                   Brand Name Dispense Instructions for use    acetaminophen 650 MG CR tablet    TYLENOL     Take 650 mg by mouth 2 times daily Reported on 4/5/2017       alendronate 70 MG tablet    FOSAMAX    12 tablet    Take 1 tablet (70 mg) by mouth every 7 days Take with over 8 ounces water and stay upright for at least 30 minutes after dose.  Take at least 60 minutes before breakfast       apixaban ANTICOAGULANT 2.5 MG tablet    ELIQUIS    180 tablet    Take 1 tablet (2.5 mg) by mouth 2 times daily       ascorbic acid 500 MG Cpcr CR capsule    vitamin C     Take 500 mg by mouth daily       atorvastatin 40 MG tablet    LIPITOR    90 tablet    Take 1 tablet (40 mg) by mouth daily       calcium carbonate 500 MG chewable tablet    TUMS     Take 1 chew tab by mouth every 4 hours as needed for heartburn Reported on 5/3/2017       calcium carbonate 500 MG tablet    OS-SHELIA 500 mg [place]. Ca     Take 600 mg by mouth daily       cephALEXin 500 MG capsule    KEFLEX    30 capsule    Take 1 capsule (500 mg) by mouth 3 times daily       digoxin 125 MCG tablet    LANOXIN    45 tablet    Take 0.5 tablets (62.5 mcg) by mouth daily       dofetilide 125 MCG capsule    TIKOSYN    60 capsule    Take 1 capsule (125 mcg) by mouth 2 times daily       FLOMAX 0.4 MG capsule   Generic drug:  tamsulosin     60 capsule    Take 1 capsule (0.4 mg) by mouth 2 times daily       fluticasone-vilanterol 100-25 MCG/INH oral inhaler    BREO ELLIPTA    60 Inhaler    Inhale 1 puff into the lungs daily       furosemide 20 MG tablet    LASIX    90 tablet    Take one tab in the afternoon       HYDROcodone-acetaminophen 5-325 MG per tablet    NORCO    30 tablet    Take 1 tablet by mouth every 6 hours as needed  for moderate to severe pain       leflunomide 10 MG tablet    ARAVA    30 tablet    Take 1 tablet (10 mg) by mouth daily       lisinopril 10 MG tablet    PRINIVIL/ZESTRIL    62 tablet    Take 1 tablet (10 mg) by mouth 2 times daily       LUPRON DEPOT IM      Inject into the muscle every 4 months Reported on 5/3/2017       metoprolol 100 MG tablet    LOPRESSOR    60 tablet    Take 1 tablet (100 mg) by mouth 2 times daily       omeprazole 20 MG CR capsule    priLOSEC    180 capsule    Take 1 capsule (20 mg) by mouth 2 times daily (before meals)       predniSONE 5 MG tablet    DELTASONE    100 tablet    Take 1 tablet (5 mg) by mouth daily       spironolactone 25 MG tablet    ALDACTONE    30 tablet    Take 1 tablet (25 mg) by mouth daily       traMADol 50 MG tablet    ULTRAM    90 tablet    TAKE 1 TABLET 3 TIMES DAILY AS NEEDED FOR MODERATE PAIN       umeclidinium 62.5 MCG/INH oral inhaler    INCRUSE ELLIPTA    30 Inhaler    Inhale 1 puff into the lungs daily       VITAMIN D (CHOLECALCIFEROL) PO      Take 400 Units by mouth daily Reported on 5/3/2017

## 2017-05-03 NOTE — PATIENT INSTRUCTIONS
The following is a summary of your office visit:    Medications started today:none    Medications stopped today:none    Medication dose change: none    Nurse contact information: Poonam Ramírez RN  Cardiology Care Coordinator  [phone] Phone  [phone] Fax    Appointments made today: August follow up    Patient instructions:none      If you have had any blood work, imaging or other testing completed we will be in touch within 1-2 weeks regarding the results. If you have any questions, concerns or need to schedule a follow up, please contact us at [phone]. If you are needing refills please contact your pharmacy. For urgent after hour care please call the [facility] at [phone] or the [facility] at [phone] and ask to speak to the cardiologist on call.    It was a pleasure meeting with you today. Please let us know if there is anything else we can do for you so that we can be sure you are leaving completely satisfied with your care experience.     Your Cardiology Team at [facility]  RN Care Coordinator: Poonam  CMA: Sabiha

## 2017-05-03 NOTE — PROGRESS NOTES
"      Clinical Cardiac Electrophysiology Consultation    Chief Complaint: atrial fibrillation     HPI: I was happy to see Mr. Merritt in Electrophysiology consultation for the above at the request of Dr. Colon.    He reports he has been having \"spell\". It is very hard to pin him down on what he means by this. At times he reports the spells as \"dizziness\", later he described them as \"pain in his chest\". This makes it difficult to correlate with his paroxysms of atrial fibrillation. During the episodes his heart rates are fairly fast but during sinus rhythm his heart rate is relatively slow. In addition, he has first degree AV block and RBBB (bifascicular block).    In addition to the spells, he was noted to have LV systolic dysfunction. He was started on amiodarone and his EF improved. It should be noted that he was also having frequent PVCs and the reduction in PVC burden may have also contributed to partial recovery of his systolic function. He had a coronary angiogram that showed only non-obstructive disease.    The amiodarone had to be stopped due to a drop in diffusion capacity.    [id number]: He was admitted and started on dofetilide. His ECG today (personal review) shows sinus rhythm, first-degree AV block and RBBB. The QT is 400 msec by my measurement. Overall, he reports feeling better. He has less heart pounding and shortness of breath. At the moment his primary complaint is right hip pain. In the past cortisone shots have helped.     He had a fall while reaching for something. He thinks his leg gave out. He remembers falling and does not believe he was unconscious.     [id number]: He underwent biV pacer placement with anticipation of AV node ablation for rate control of his atrial fibrillation. However, he was recently in the hospital with cellulitis and evidence of sepsis. He remains on an antibiotics.     He developed recurrent atrial fibrillation with rapid ventricular rates while in the hospital. Due to " "hypotension his AV lisandor blocking drugs were stopped for a time.     Since discharge yesterday he reports several episodes of heart racing. I have reviewed his pacer interrogation. He is having runs of tachycardia, possibly atrial tachycardia versus VT.     03May2017 Interval history: Device interrogation from yesterday reviewed personally. He has episodes of atrial fibrillation with heart rates in the  range but the number of episodes is low and the longest was under three minutes.     His legs are slowly getting better. He is has a visiting nurse from the wound clinic. He remains on antibiotics. He denies any palpitations, chest discomfort or unusual dyspnea on exertion.    Has chronic hip pain and legs \"burn\".       Current Outpatient Prescriptions   Medication Sig Dispense Refill     furosemide (LASIX) 20 MG tablet Take one tab in the afternoon 90 tablet 1     HYDROcodone-acetaminophen (NORCO) 5-325 MG per tablet Take 1 tablet by mouth every 6 hours as needed for moderate to severe pain 30 tablet 0     traMADol (ULTRAM) 50 MG tablet TAKE 1 TABLET 3 TIMES DAILY AS NEEDED FOR MODERATE PAIN 90 tablet 0     digoxin (LANOXIN) 125 MCG tablet Take 0.5 tablets (62.5 mcg) by mouth daily 45 tablet 3     metoprolol (LOPRESSOR) 100 MG tablet Take 1 tablet (100 mg) by mouth 2 times daily 60 tablet 0     fluticasone-vilanterol (BREO ELLIPTA) 100-25 MCG/INH oral inhaler Inhale 1 puff into the lungs daily 60 Inhaler 0     predniSONE (DELTASONE) 5 MG tablet Take 1 tablet (5 mg) by mouth daily 100 tablet 4     dofetilide (TIKOSYN) 125 MCG capsule Take 1 capsule (125 mcg) by mouth 2 times daily 60 capsule 5     apixaban ANTICOAGULANT (ELIQUIS) 2.5 MG tablet Take 1 tablet (2.5 mg) by mouth 2 times daily 180 tablet 3     leflunomide (ARAVA) 10 MG tablet Take 1 tablet (10 mg) by mouth daily 30 tablet 11     lisinopril (PRINIVIL,ZESTRIL) 10 MG tablet Take 1 tablet (10 mg) by mouth 2 times daily 62 tablet 11     spironolactone " (ALDACTONE) 25 MG tablet Take 1 tablet (25 mg) by mouth daily 30 tablet 11     acetaminophen (TYLENOL) 650 MG CR tablet Take 650 mg by mouth 2 times daily Reported on 4/5/2017       atorvastatin (LIPITOR) 40 MG tablet Take 1 tablet (40 mg) by mouth daily 90 tablet 3     alendronate (FOSAMAX) 70 MG tablet Take 1 tablet (70 mg) by mouth every 7 days Take with over 8 ounces water and stay upright for at least 30 minutes after dose.  Take at least 60 minutes before breakfast 12 tablet 3     tamsulosin (FLOMAX) 0.4 MG 24 hr capsule Take 1 capsule (0.4 mg) by mouth 2 times daily 60 capsule      ascorbic acid (VITAMIN C) 500 MG CPCR Take 500 mg by mouth daily       calcium carbonate (OS-SHELIA 500 MG [place]. [place]) 500 MG tablet Take 600 mg by mouth daily       cephALEXin (KEFLEX) 500 MG capsule Take 1 capsule (500 mg) by mouth 3 times daily (Patient not taking: Reported on 5/3/2017) 30 capsule 0     omeprazole (PRILOSEC) 20 MG CR capsule Take 1 capsule (20 mg) by mouth 2 times daily (before meals) (Patient not taking: Reported on 5/3/2017) 180 capsule 3     umeclidinium (INCRUSE ELLIPTA) 62.5 MCG/INH oral inhaler Inhale 1 puff into the lungs daily (Patient not taking: Reported on 5/3/2017) 30 Inhaler 0     Leuprolide Acetate (LUPRON DEPOT IM) Inject into the muscle every 4 months Reported on 5/3/2017       calcium carbonate (TUMS) 500 MG chewable tablet Take 1 chew tab by mouth every 4 hours as needed for heartburn Reported on 5/3/2017       VITAMIN D, CHOLECALCIFEROL, PO Take 400 Units by mouth daily Reported on 5/3/2017         Past Medical History:   Diagnosis Date     Atrial fibrillation (H) 07/01/2016     Cardiomyopathy (H)      COPD exacerbation (H) 3/2/2017     Paroxysmal atrial fibrillation (H) 7/6/2016     Pure hypercholesterolemia      Rheumatoid arthritis(714.0)      Unspecified essential hypertension      Weakness generalized 3/2/2017       Past Surgical History:   Procedure Laterality Date     ARTHROPLASTY KNEE Left  1/12/2016    Procedure: ARTHROPLASTY KNEE;  Surgeon: Cesar Walker DO;  Location: [facility] OR     COLONOSCOPY  08/24/09     HC COLONOSCOPY THRU STOMA W BIOPSY/CAUTERY TUMOR/POLYP/LESION  8/31/2004     HC REPAIR ING HERNIA,5+Y/O,REDUCIBL  1996    Marlex mesh repair of bilateral inguinal hernias and drainage of bilateral scrotal hydroceles.     IMPLANT PACEMAKER  03/10/2017     IRRIGATION AND DEBRIDEMENT SOFT TISSUE LOWER EXTREMITY, COMBINED Left 3/15/2016    Procedure: COMBINED IRRIGATION AND DEBRIDEMENT SOFT TISSUE LOWER EXTREMITY;  Surgeon: Cesar Walker DO;  Location: [facility] OR       Family History   Problem Relation Age of Onset     CANCER Mother      CANCER Son      Unknown/Adopted Father      Alcoholism Brother        Social History   Substance Use Topics     Smoking status: Former Smoker     Packs/day: 0.50     Years: 15.00     Types: Cigarettes     Quit date: 11/12/1998     Smokeless tobacco: Never Used      Comment: quit 15 yrs ago     Alcohol use No       Allergies   Allergen Reactions     Amiodarone Other (See Comments)     Drop in DLCO         ROS: his only other significant complaint on the ten system review is arthritis and generalized weakness. [id number]    Physical Examination:  Vitals: /72 (BP Location: Right arm, Patient Position: Chair, Cuff Size: Adult Regular)  Pulse 60  SpO2 96%  BMI= There is no height or weight on file to calculate BMI.    GENERAL APPEARANCE: elderly, alert and no distress  HEENT: no icterus,  mucosa moist, no cyanosis.  NECK: no cervical bruits (radiated murmur), JVP is not visible  CHEST: lungs clear to auscultation, respirations are unlabored, normal respiratory rate  CARDIOVASCULAR: regular rhythm, normal S1, single S2, S3, S4, click or rub, 3/6 mid peaking murmur at aortic area with radiation to the suprasternal notch and lower neck, no diastolic murmur heard, precordium quiet  EXTREMITIES: wrapped  NEURO: alert and oriented to person/place/time,  in a wheelchair today, hard of hearing  SKIN: legs wrapped, wife reports continued drainage on the right      Laboratory:    Pacer interrogation results noted in HPI. [id number]    Previous studies:  [facility],[place]  Echocardiography Laboratory  [address] [place] [address]     Name: GALLO FLOYD  MRN: [id number]  : 1927  Study Date: 2017 11:38 AM  Age: 89 yrs  Gender: Male  Patient Location: [facility]  Reason For Study: Cardiomyopathy, AS  Ordering Physician: JAX BLACKWOOD  Referring Physician: AMANDA DUFFY  Performed By: Lai Queen RDCS     BSA: 1.8 m2  Height: 66 in  Weight: 159 lb  HR: 80  BP: 120/72 mmHg  _____________________________________________________________________________  __        Procedure  Limited Portable Echo Adult. Contrast Lumason. Lumason (NDC #5340-2232-30)  given intravenously. Patient was given 5ml mixture of Lumason. 0 ml wasted.  _____________________________________________________________________________  __        Interpretation Summary  Moderate left ventricular dilation is present.  The Ejection Fraction is estimated at 20-25%.  Normal contraction of basal segments with akinetic mid and distal segments.  This wallmotion abnormality is new since prior study on 2016.  Consistent with stress cardiomyopathy, if no LAD disease.  Severe aortic stenosis is present.  The aortic valve area is 0.9 cm^2, by the continuity equation.  The peak aortic velocity is 3.8 m/sec.  The mean gradient across the aortic valve is37 mmHg.  Estimated gradients are under estimation due to LV systolic dysfunction,.        I also reviewed the CardioNet tracings available. They show atrial fibrillation with heart rates in the 120-130s. None are labelled as being associated with symptoms.      Assessment and recommendations:    1) Spells  2) Paroxsymal atrial fibrillation with rapid ventricular rates   3) Sinus node  dysfunction  4) Bifascicular block  5) S/P BiV pacemaker  6) Sepsis/cellulitis    Pacer shows relatively little atrial fibrillation at the moment.     - complete antibiotics  - would wait on AV node ablation until leg wounds have healed  - 7-10 days after completing antibiotics recommend blood cultures x 2     7) Atrial tachycardia versus VT - discussed that there are no antiarrhythmic drug felt to be safe to add to dofetilide. We could stop dofetilide and try sotalol or ranolazine.     We also discussed that despite the drop in DLCO, if arrhythmias (atrial or ventricular) continue to be an issue we could consider stopping dofetilide and resuming amiodarone, which had been more effective.    I appreciate the chance to help with Mr. Merritt's care. Please let me know if you have any questions or concerns.    Khoa Li MD

## 2017-05-04 ENCOUNTER — TELEPHONE (OUTPATIENT)
Dept: FAMILY MEDICINE | Facility: CLINIC | Age: 82
End: 2017-05-04

## 2017-05-04 NOTE — TELEPHONE ENCOUNTER
Reason for Call:  Other update     Detailed comments: Liz from home care is calling stating that patient has a low grade fever of 99.3, complaining of headache and not feeling well. Please call if concerned    Phone Number Patient can be reached at: Home number on file [phone] (home)    Best Time: any    Can we leave a detailed message on this number? YES    Call taken on 5/4/2017 at 9:53 AM by Tootie Ball

## 2017-05-08 ENCOUNTER — CARE COORDINATION (OUTPATIENT)
Dept: CARE COORDINATION | Facility: CLINIC | Age: 82
End: 2017-05-08

## 2017-05-08 NOTE — PROGRESS NOTES
[facility] Care Coordination Outreach    Patient was enrolled in [facility] upon discharge from hospital. Patient is enrolled in Heart Failure  Program. Program will last for 90 days post discharge.    Clinical Data: variance follow-up call regarding persistent weight gain.     Action: Called [facility] home care nurse Adeline, states pt's weight has been going up and she did send a message to PCP, but did not get a response.  States she does not have a goal weight for pt so not sure where he is suppose to be at.   At hospital discharge on 4/5/17 pt weighed 149 pounds, he now has been slowly gaining weight and is up to 159 pounds, and has been complaining of increased SOB; his edema varies.  He is currently taking 20 mg daily.      Plan:   RN CC will send message to Cardiology for estimated goal weight and further recommendations on weight gain and sob.       Yumiko Hallman RN,BSN  Clinical Care Coordinator    [facility] [phone]  [facility] [phone]

## 2017-05-09 NOTE — PROGRESS NOTES
Clinic Care Coordination Contact  Care Team Conversations    Called and spoke to Adeline RN CM, advised of goal weight and to increase lasix to 40mg daily per Cardiology.  Aedline states she will call and let the wife know.     Plan:  RN will follow up per [facility] variance.

## 2017-05-11 DIAGNOSIS — T14.8XXA OPEN WOUND: ICD-10-CM

## 2017-05-12 RX ORDER — HYDROCODONE BITARTRATE AND ACETAMINOPHEN 5; 325 MG/1; MG/1
TABLET ORAL
Qty: 30 TABLET | Refills: 0 | Status: SHIPPED | OUTPATIENT
Start: 2017-05-12 | End: 2017-05-15

## 2017-05-12 NOTE — TELEPHONE ENCOUNTER
HYDROcodone-acetaminophen (NORCO) 5-325 MG per tablet     Last Written Prescription Date:  4/26/17  Last Fill Quantity: 30,   # refills: 0  Last Office Visit with [facility], [facility] or  [facility] prescribing provider: 4/10/17  Future Office visit:    Next 5 appointments (look out 90 days)     Aug 02, 2017  3:30 PM CDT   Return Visit with Khoa Li MD   [facility]    [address] [place] [id number] [phone]                   Routing refill request to provider for review/approval because:  Drug not on the [facility], [facility] or  [facility] refill protocol or controlled substance

## 2017-05-12 NOTE — TELEPHONE ENCOUNTER
Script faxed to [facility] [phone] and put in black box up front.  Valarie Fink MA

## 2017-05-15 ENCOUNTER — TELEPHONE (OUTPATIENT)
Dept: NURSING | Facility: CLINIC | Age: 82
End: 2017-05-15

## 2017-05-15 ENCOUNTER — TELEPHONE (OUTPATIENT)
Dept: FAMILY MEDICINE | Facility: CLINIC | Age: 82
End: 2017-05-15

## 2017-05-15 DIAGNOSIS — T14.8XXA OPEN WOUND: ICD-10-CM

## 2017-05-15 RX ORDER — HYDROCODONE BITARTRATE AND ACETAMINOPHEN 5; 325 MG/1; MG/1
TABLET ORAL
Qty: 30 TABLET | Refills: 0 | Status: SHIPPED | OUTPATIENT
Start: 2017-05-15 | End: 2017-06-08

## 2017-05-15 NOTE — TELEPHONE ENCOUNTER
Call Type: Triage Call    Presenting Problem: [facility] triage  Nurse Naomi  was  warm conferenced  [facility] nurse Dasha because she  insisted on speaking to clinic only.  FNA advised calling 911  because when Homecare nurse was clieaning wound it is spray of blood  , which only stops with constant pressure . Patient is 89years old.    Triage Note:  Guideline Title: Postoperative Wound Care  Recommended Disposition: Activate   Original Inclination: Did not know what to do  Override Disposition:  Intended Action: Call PCP/HCP  Physician Contacted: No  Continuous or heavy bleeding from operative site and NOT controlled with 10  minutes of steady pressure ?  YES  New or worsening signs and symptoms that may indicate shock ? NO  Physician Instructions:  Care Advice: IMMEDIATE ACTION  Control Bleeding:   - Cover wound with a clean cloth and apply firm  pressure to control bleeding.   - If bleeding continues through dressing,  add more material.   - DO NOT remove old dressing.   - Continue to apply  direct pressure.

## 2017-05-15 NOTE — TELEPHONE ENCOUNTER
Home care nurse reports that she was doing wound care on LE and she had a spraying of blood.  She was able to apply pressure to the wound and stop the active bleeding.    Within a few minutes of talking the wound was still not bleeding.  Home care nurse will have Bud's wife drive him to the ED for evaluation.   Naomi Aragon RN

## 2017-05-15 NOTE — TELEPHONE ENCOUNTER
Hydrocodon-acetaminophen (NORCO) 5-325 MG per tablet      Last Written Prescription Date:  05/12/2017  Last Fill Quantity: 30,   # refills: 0  Last Office Visit with [facility], [facility] or  [facility] prescribing provider: 04/10/2017  Future Office visit:    Next 5 appointments (look out 90 days)     Aug 02, 2017  3:30 PM CDT   [facility] with [facility]a Li MD   [facility]    [address] [place] [id number] [phone]                   Routing refill request to provider for review/approval because:  Drug not on the [place], [facility] or  [facility] refill protocol or controlled substance    Yvonne Gunderson CMA

## 2017-05-16 DIAGNOSIS — I48.0 PAROXYSMAL ATRIAL FIBRILLATION (H): ICD-10-CM

## 2017-05-16 NOTE — TELEPHONE ENCOUNTER
Script faxed to [facility] [phone] and put in black box up front.  Valarie Fink MA

## 2017-05-16 NOTE — TELEPHONE ENCOUNTER
Metoprolol       Last Written Prescription Date: 4/4/2017  Last Fill Quantity: 60, # refills: 0  Last Office Visit with [facility]YA, [facility] or [facility] prescribing provider: 4/10/2017  Next 5 appointments (look out 90 days)     Aug 02, 2017  3:30 PM CDT   Return Visit with Khoa Li MD   [facility]    [address] [place] [id number]   [phone]                   Potassium   Date Value Ref Range Status   04/10/2017 4.0 3.4 - 5.3 mmol/L Final     Creatinine   Date Value Ref Range Status   04/10/2017 0.77 0.66 - 1.25 mg/dL Final     BP Readings from Last 3 Encounters:   05/03/17 126/72   04/10/17 110/60   04/05/17 90/56

## 2017-05-18 RX ORDER — METOPROLOL TARTRATE 100 MG
100 TABLET ORAL 2 TIMES DAILY
Qty: 60 TABLET | Refills: 0 | Status: SHIPPED | OUTPATIENT
Start: 2017-05-18 | End: 2017-06-07 | Stop reason: DRUGHIGH

## 2017-05-18 NOTE — TELEPHONE ENCOUNTER
Metoprolol  Routing refill request to covering provider for review/approval because:  Drug not on the [facility] refill protocol for associated diagnosis    Ld Justice RN, BSN

## 2017-05-22 ENCOUNTER — TELEPHONE (OUTPATIENT)
Dept: FAMILY MEDICINE | Facility: CLINIC | Age: 82
End: 2017-05-22

## 2017-05-22 NOTE — TELEPHONE ENCOUNTER
Reason for Call:  Other Patient update  Detailed comments: Liz from home care states a blister opened up on patients right big toe, pretty red, no fever.  Will have a WOCN check it out.  Home care was advised that Dr Aragon is out until 5.24.      Phone Number Patient can be reached at: Other phone number:  [phone] Liz    Best Time:     Can we leave a detailed message on this number? YES    Call taken on 5/22/2017 at 9:31 AM by Rachel Murray

## 2017-05-23 ENCOUNTER — MEDICAL CORRESPONDENCE (OUTPATIENT)
Dept: HEALTH INFORMATION MANAGEMENT | Facility: CLINIC | Age: 82
End: 2017-05-23

## 2017-05-23 DIAGNOSIS — R97.20 ELEVATED PROSTATE SPECIFIC ANTIGEN (PSA): ICD-10-CM

## 2017-05-23 DIAGNOSIS — C61 PROSTATE CANCER (H): Primary | ICD-10-CM

## 2017-05-24 NOTE — TELEPHONE ENCOUNTER
Dr. Aragon said he should still be seeing wound care for this. Spoke with Liz and she stated the wound care nurse will go out and see him this week and that she just wanted to update the doctor and if he needs anything from us they will let us know. BI/GABBY

## 2017-05-31 ENCOUNTER — TELEPHONE (OUTPATIENT)
Dept: FAMILY MEDICINE | Facility: CLINIC | Age: 82
End: 2017-05-31

## 2017-05-31 DIAGNOSIS — C61 MALIGNANT NEOPLASM OF PROSTATE (H): ICD-10-CM

## 2017-05-31 LAB — PSA SERPL-MCNC: 0.29 UG/L (ref 0–4)

## 2017-05-31 PROCEDURE — 36415 COLL VENOUS BLD VENIPUNCTURE: CPT | Performed by: UROLOGY

## 2017-05-31 PROCEDURE — 84153 ASSAY OF PSA TOTAL: CPT | Performed by: UROLOGY

## 2017-05-31 NOTE — TELEPHONE ENCOUNTER
Kendy is calling back stating that it wasn't his water pill that was changed it was his Metoprolol that was upped to 100 mg twice a day. Kendy states that Dr. Sejal Perez changed this on 04/04/17.

## 2017-05-31 NOTE — TELEPHONE ENCOUNTER
Reason for Call:  Other UPDATE     Detailed comments: Kendy from home care is calling stating that Bud's water pill had been increased and since then he is having increased dizziness and low blood pressures. Today it was 90/60, and has been running 110/60 for about the past week or two. Today his weight is 149. Please call if there are questions or concerns.     Phone Number Patient can be reached at: Home number on file [phone] (home)    Best Time: ANY    Can we leave a detailed message on this number? YES    Call taken on 5/31/2017 at 9:44 AM by Tootie Ball

## 2017-05-31 NOTE — TELEPHONE ENCOUNTER
The current prescription that was sent in was for 100mg take 1 tablet two times daily. Left VM for Kendy to call back as it was an unidentified VM box.  KB/GABBY

## 2017-06-01 NOTE — TELEPHONE ENCOUNTER
Called Kendy with home care and informed her that the last dose we sent in for the Metoprolol was 100 mg twice a day I did inform her that if his bp stays low and he is still dizzy that they should get a hold of cardiology.  Valarie Fink MA

## 2017-06-02 ENCOUNTER — TELEPHONE (OUTPATIENT)
Dept: CARDIOLOGY | Facility: CLINIC | Age: 82
End: 2017-06-02

## 2017-06-02 NOTE — TELEPHONE ENCOUNTER
"[facility] Call Center    Phone Message    Name of Caller: Judit    Phone Number: Other phone number:  [phone]    Best time to return call: any    May a detailed message be left on voicemail: yes    Relation to patient: Other Name: Judit  Relationship: [facility] Nurse  Is there legal documentation in chart to discuss information with this person: NA    Reason for Call: Symptoms or Concerns     Does patient have any of the following \"red flag\" symptoms: None    Does patient have any \"Red Flag\" symptoms? No.     Current symptom or concern: Current symptoms are increased fatigue, dizziness, decreased appetite, weakness, decreased /60.  Current weight 149.     Symptoms have been present for: 2 weeks    Has patient previously been seen for this? Unknown      Are there any new or worsening symptoms? Dizziness is worse this week    Judit is requesting a call to discuss symptoms and medications.  Thank you.    Action Taken: Message routed to:  Adult Clinics: Cardiology p [id number]  "

## 2017-06-02 NOTE — TELEPHONE ENCOUNTER
"Returned call to home care nurse Adeline and says wife reports symptoms started about 2 weeks ago with intermittent dizziness. She states pt now has constant dizziness, nauseous, no appetite, stomach upset that started this week. She says pt \"will drink coffee sporadically\" during the day, and wife reports pt eats 3 small meals a day but \"nothing tastes good\".     She reports BP today was 100/60, HR 62, weight 149lbs (down 10lbs since 4/27/17), temp WNL. She contacted PCP and they advised her to contact Cardiology. She says pt refuses to go to ER. She says pt refused afternoon dose of Lasix because \"he thinks all of this is related to his water pill\".     Page sent to Dr. Li and return call received. Dr. Li advising that PCP be contacted, and does not feel symptoms are cardiac related. Contacted home care nurse Adeline and advised her of Dr. Li's recommendations. Adeline in agreement with this and states she will contact PCP.    Teresa ABREU, RN, BSN  Pulmonary Care Coordinator     "

## 2017-06-02 NOTE — TELEPHONE ENCOUNTER
"[facility] Nurse Judit called clinic to inform us that pt has increased symptoms. She states that Bud has decreased in wt since the medication change which is good but \"everything else is failing\". She states pt has loss his appetite, has increased dizziness, increased fatigue, increased weakness, and \"just looks very rough\". She also states that pt's blood pressure has decreased. Pts BP earlier this week was 98/58 and today it was 100/60. Judit states she will be reachable via phone until 1700 today. She would like a call back at [phone] with provider recommendations.  Sabiha Goel CMA    "

## 2017-06-06 DIAGNOSIS — I50.23 ACUTE ON CHRONIC SYSTOLIC CONGESTIVE HEART FAILURE (H): ICD-10-CM

## 2017-06-06 RX ORDER — FUROSEMIDE 20 MG
TABLET ORAL
Qty: 90 TABLET | Refills: 1 | Status: SHIPPED | OUTPATIENT
Start: 2017-06-06 | End: 2017-08-23

## 2017-06-07 ENCOUNTER — OFFICE VISIT (OUTPATIENT)
Dept: FAMILY MEDICINE | Facility: CLINIC | Age: 82
End: 2017-06-07
Payer: COMMERCIAL

## 2017-06-07 VITALS
DIASTOLIC BLOOD PRESSURE: 70 MMHG | WEIGHT: 151 LBS | TEMPERATURE: 97.1 F | BODY MASS INDEX: 25.13 KG/M2 | HEART RATE: 62 BPM | RESPIRATION RATE: 26 BRPM | OXYGEN SATURATION: 98 % | SYSTOLIC BLOOD PRESSURE: 104 MMHG

## 2017-06-07 DIAGNOSIS — I10 ESSENTIAL HYPERTENSION WITH GOAL BLOOD PRESSURE LESS THAN 140/90: ICD-10-CM

## 2017-06-07 DIAGNOSIS — I48.91 ATRIAL FIBRILLATION WITH RAPID VENTRICULAR RESPONSE (H): ICD-10-CM

## 2017-06-07 DIAGNOSIS — R53.1 WEAKNESS GENERALIZED: Primary | ICD-10-CM

## 2017-06-07 DIAGNOSIS — L97.929 VENOUS STASIS ULCERS OF BOTH LOWER EXTREMITIES (H): ICD-10-CM

## 2017-06-07 DIAGNOSIS — I83.029 VENOUS STASIS ULCERS OF BOTH LOWER EXTREMITIES (H): ICD-10-CM

## 2017-06-07 DIAGNOSIS — L97.919 VENOUS STASIS ULCERS OF BOTH LOWER EXTREMITIES (H): ICD-10-CM

## 2017-06-07 DIAGNOSIS — I83.019 VENOUS STASIS ULCERS OF BOTH LOWER EXTREMITIES (H): ICD-10-CM

## 2017-06-07 DIAGNOSIS — M06.9 RHEUMATOID ARTHRITIS INVOLVING MULTIPLE SITES, UNSPECIFIED RHEUMATOID FACTOR PRESENCE: ICD-10-CM

## 2017-06-07 LAB
ALBUMIN SERPL-MCNC: 3 G/DL (ref 3.4–5)
ALP SERPL-CCNC: 87 U/L (ref 40–150)
ALT SERPL W P-5'-P-CCNC: 23 U/L (ref 0–70)
ANION GAP SERPL CALCULATED.3IONS-SCNC: 9 MMOL/L (ref 3–14)
AST SERPL W P-5'-P-CCNC: 32 U/L (ref 0–45)
BASOPHILS # BLD AUTO: 0 10E9/L (ref 0–0.2)
BASOPHILS NFR BLD AUTO: 0.7 %
BILIRUB SERPL-MCNC: 0.3 MG/DL (ref 0.2–1.3)
BUN SERPL-MCNC: 21 MG/DL (ref 7–30)
CALCIUM SERPL-MCNC: 8.5 MG/DL (ref 8.5–10.1)
CHLORIDE SERPL-SCNC: 100 MMOL/L (ref 94–109)
CO2 SERPL-SCNC: 28 MMOL/L (ref 20–32)
CREAT SERPL-MCNC: 0.87 MG/DL (ref 0.66–1.25)
DIFFERENTIAL METHOD BLD: ABNORMAL
EOSINOPHIL # BLD AUTO: 0 10E9/L (ref 0–0.7)
EOSINOPHIL NFR BLD AUTO: 0.5 %
ERYTHROCYTE [DISTWIDTH] IN BLOOD BY AUTOMATED COUNT: 15 % (ref 10–15)
GFR SERPL CREATININE-BSD FRML MDRD: 82 ML/MIN/1.7M2
GLUCOSE SERPL-MCNC: 83 MG/DL (ref 70–99)
HCT VFR BLD AUTO: 30 % (ref 40–53)
HGB BLD-MCNC: 9.4 G/DL (ref 13.3–17.7)
IMM GRANULOCYTES # BLD: 0 10E9/L (ref 0–0.4)
IMM GRANULOCYTES NFR BLD: 0.2 %
LYMPHOCYTES # BLD AUTO: 1.5 10E9/L (ref 0.8–5.3)
LYMPHOCYTES NFR BLD AUTO: 27.3 %
MCH RBC QN AUTO: 30.9 PG (ref 26.5–33)
MCHC RBC AUTO-ENTMCNC: 31.3 G/DL (ref 31.5–36.5)
MCV RBC AUTO: 99 FL (ref 78–100)
MONOCYTES # BLD AUTO: 1.2 10E9/L (ref 0–1.3)
MONOCYTES NFR BLD AUTO: 22 %
NEUTROPHILS # BLD AUTO: 2.7 10E9/L (ref 1.6–8.3)
NEUTROPHILS NFR BLD AUTO: 49.3 %
PLATELET # BLD AUTO: 279 10E9/L (ref 150–450)
PLATELET # BLD EST: NORMAL 10*3/UL
POTASSIUM SERPL-SCNC: 5 MMOL/L (ref 3.4–5.3)
PROT SERPL-MCNC: 6.5 G/DL (ref 6.8–8.8)
RBC # BLD AUTO: 3.04 10E12/L (ref 4.4–5.9)
RBC MORPH BLD: NORMAL
SODIUM SERPL-SCNC: 137 MMOL/L (ref 133–144)
WBC # BLD AUTO: 5.5 10E9/L (ref 4–11)

## 2017-06-07 PROCEDURE — 99214 OFFICE O/P EST MOD 30 MIN: CPT | Performed by: FAMILY MEDICINE

## 2017-06-07 PROCEDURE — 80053 COMPREHEN METABOLIC PANEL: CPT | Performed by: FAMILY MEDICINE

## 2017-06-07 PROCEDURE — 85025 COMPLETE CBC W/AUTO DIFF WBC: CPT | Performed by: FAMILY MEDICINE

## 2017-06-07 PROCEDURE — 36415 COLL VENOUS BLD VENIPUNCTURE: CPT | Performed by: FAMILY MEDICINE

## 2017-06-07 RX ORDER — METOPROLOL TARTRATE 50 MG
50 TABLET ORAL 2 TIMES DAILY
Qty: 60 TABLET | Refills: 1 | Status: SHIPPED | OUTPATIENT
Start: 2017-06-07 | End: 2017-07-31

## 2017-06-07 RX ORDER — TRAMADOL HYDROCHLORIDE 50 MG/1
TABLET ORAL
Qty: 90 TABLET | Refills: 0 | Status: SHIPPED | OUTPATIENT
Start: 2017-06-07 | End: 2017-07-11

## 2017-06-07 NOTE — PROGRESS NOTES
SUBJECTIVE:                                                    Bud Merritt is a 89 year old male who presents to clinic today for the following health issues:      Chief Complaint   Patient presents with     Dizziness     home care and wife reports that his BP has been low     Fatigue     patients wife reports that he is also extremley weak              Problem list and histories reviewed & adjusted, as indicated.  Additional history: as documented        Reviewed and updated as needed this visit by clinical staff  Tobacco  Allergies  Meds  Soc Hx      Reviewed and updated as needed this visit by Provider        SUBJECTIVE:  Bud  is a 89 year old male who presents for:  Increased fatigue and some spells of lightheadedness especially with getting up with movement. He has many complex  issues. His health has really declined in the last year. He is quite weak. Hospitalized in a number of occasions. Has had a pacemaker implant. Follows with cardiology. He is seen in the clinic now for skin breakdown secondary to dependent edema. His edema continues but is better.    Past Medical History:   Diagnosis Date     Atrial fibrillation (H) 07/01/2016     Cardiomyopathy (H)      COPD exacerbation (H) 3/2/2017     Paroxysmal atrial fibrillation (H) 7/6/2016     Pure hypercholesterolemia      Rheumatoid arthritis(714.0)      Unspecified essential hypertension      Weakness generalized 3/2/2017     Past Surgical History:   Procedure Laterality Date     ARTHROPLASTY KNEE Left 1/12/2016    Procedure: ARTHROPLASTY KNEE;  Surgeon: Cesar Walker DO;  Location: [facility] OR     COLONOSCOPY  08/24/09      COLONOSCOPY THRU STOMA W BIOPSY/CAUTERY TUMOR/POLYP/LESION  8/31/2004      REPAIR ING HERNIA,5+Y/O,REDUCIBL  1996    Marlex mesh repair of bilateral inguinal hernias and drainage of bilateral scrotal hydroceles.     IMPLANT PACEMAKER  03/10/2017     IRRIGATION AND DEBRIDEMENT SOFT TISSUE LOWER EXTREMITY, COMBINED  Left 3/15/2016    Procedure: COMBINED IRRIGATION AND DEBRIDEMENT SOFT TISSUE LOWER EXTREMITY;  Surgeon: Cesar Walker DO;  Location: [facility] OR     Social History   Substance Use Topics     Smoking status: Former Smoker     Packs/day: 0.50     Years: 15.00     Types: Cigarettes     Quit date: 11/12/1998     Smokeless tobacco: Never Used      Comment: quit 15 yrs ago     Alcohol use No     Current Outpatient Prescriptions   Medication Sig Dispense Refill     traMADol (ULTRAM) 50 MG tablet TAKE 1 TABLET 3 TIMES DAILY AS NEEDED FOR MODERATE PAIN 90 tablet 0     metoprolol (LOPRESSOR) 50 MG tablet Take 1 tablet (50 mg) by mouth 2 times daily 60 tablet 1     furosemide (LASIX) 20 MG tablet Take one tab in the afternoon 90 tablet 1     HYDROcodone-acetaminophen (NORCO) 5-325 MG per tablet TAKE 1 TABLET BY MOUTH EVERY 6 HOURS AS NEEDED FOR MODERATE TO SEVERE PAIN 30 tablet 0     digoxin (LANOXIN) 125 MCG tablet Take 0.5 tablets (62.5 mcg) by mouth daily 45 tablet 3     fluticasone-vilanterol (BREO ELLIPTA) 100-25 MCG/INH oral inhaler Inhale 1 puff into the lungs daily 60 Inhaler 0     predniSONE (DELTASONE) 5 MG tablet Take 1 tablet (5 mg) by mouth daily 100 tablet 4     dofetilide (TIKOSYN) 125 MCG capsule Take 1 capsule (125 mcg) by mouth 2 times daily 60 capsule 5     apixaban ANTICOAGULANT (ELIQUIS) 2.5 MG tablet Take 1 tablet (2.5 mg) by mouth 2 times daily 180 tablet 3     Leuprolide Acetate (LUPRON DEPOT IM) Inject into the muscle every 4 months Reported on 5/3/2017       leflunomide (ARAVA) 10 MG tablet Take 1 tablet (10 mg) by mouth daily 30 tablet 11     lisinopril (PRINIVIL,ZESTRIL) 10 MG tablet Take 1 tablet (10 mg) by mouth 2 times daily 62 tablet 11     spironolactone (ALDACTONE) 25 MG tablet Take 1 tablet (25 mg) by mouth daily 30 tablet 11     acetaminophen (TYLENOL) 650 MG CR tablet Take 650 mg by mouth 2 times daily Reported on 4/5/2017       atorvastatin (LIPITOR) 40 MG tablet Take 1 tablet (40  mg) by mouth daily 90 tablet 3     alendronate (FOSAMAX) 70 MG tablet Take 1 tablet (70 mg) by mouth every 7 days Take with over 8 ounces water and stay upright for at least 30 minutes after dose.  Take at least 60 minutes before breakfast 12 tablet 3     calcium carbonate (TUMS) 500 MG chewable tablet Take 1 chew tab by mouth every 4 hours as needed for heartburn Reported on 5/3/2017       tamsulosin (FLOMAX) 0.4 MG 24 hr capsule Take 1 capsule (0.4 mg) by mouth 2 times daily 60 capsule      ascorbic acid (VITAMIN C) 500 MG CPCR Take 500 mg by mouth daily       VITAMIN D, CHOLECALCIFEROL, PO Take 400 Units by mouth daily Reported on 5/3/2017       calcium carbonate (OS-SHELIA 500 MG [place]. [place]) 500 MG tablet Take 600 mg by mouth daily         REVIEW OF SYSTEMS:   5 point ROS negative except as noted above in HPI, including Gen., Resp, CV, GI &  system review.     OBJECTIVE:  Vitals: /70  Pulse 62  Temp 97.1  F (36.2  C) (Temporal)  Resp 26  Wt 151 lb (68.5 kg)  SpO2 98%  BMI 25.13 kg/m2  BMI= Body mass index is 25.13 kg/(m^2).  Frail-appearing. Hard of hearing but otherwise alert and oriented. Mucous membranes are moist. Neck supple no JVD. Lungs are generally clear. Heart with regular rhythm. His lower extremities have marked edema.    ASSESSMENT:  #1 weakness #2 atrial fibrillation #3 venous stasis ulcers lower extremities #4 hypertension    PLAN:  He has been seen by many different people including hospitalist and cardiology recently. In his last hospitalization I noted that his metoprolol was bumped up to 100 mg twice a day. This may be just too large of a dose for him and may be contributing to some of this fatigue and lightheadedness. We'll going to cut this in half back to 50 mg twice a day which is where he was at. Owing to follow up in a week or 2 to see how he is doing they will check his blood pressure and pulse at home. Continue with the wound clinic for his extremities. We'll notify him with  his lab tests.        Camron Aragon MD  [facility]

## 2017-06-07 NOTE — PATIENT INSTRUCTIONS
Discontinue the metoprolol 100 mg twice a day and we will cut this dose in half to 50 mg twice a day. We will notify you with the lab work done on your way out today. We should see you back in 2 weeks to see how the blood pressure is

## 2017-06-07 NOTE — NURSING NOTE
"Chief Complaint   Patient presents with     Dizziness     home care and wife reports that his BP has been low     Fatigue     patients wife reports that he is also extremley weak        Initial /70  Pulse 62  Temp 97.1  F (36.2  C) (Temporal)  Resp 26  Wt 151 lb (68.5 kg)  SpO2 98%  BMI 25.13 kg/m2 Estimated body mass index is 25.13 kg/(m^2) as calculated from the following:    Height as of 3/28/17: 5' 5\" (1.651 m).    Weight as of this encounter: 151 lb (68.5 kg).  Medication Reconciliation: complete    "

## 2017-06-07 NOTE — MR AVS SNAPSHOT
After Visit Summary   6/7/2017    Bud Merritt    MRN: [id number]           Patient Information     Date Of Birth          5/9/1927        Visit Information        Provider Department      6/7/2017 1:20 PM Camron Aragon MD [facility]        Today's Diagnoses     Atrial fibrillation with rapid ventricular response (H)    -  1    Rheumatoid arthritis involving multiple sites, unspecified rheumatoid factor presence (H)        Essential hypertension with goal blood pressure less than 140/90          Care Instructions    Discontinue the metoprolol 100 mg twice a day and we will cut this dose in half to 50 mg twice a day. We will notify you with the lab work done on your way out today. We should see you back in 2 weeks to see how the blood pressure is          Follow-ups after your visit        Your next 10 appointments already scheduled     Jun 21, 2017 10:00 AM CDT   Return Visit with Khoa Li MD   [facility])    [address] [place] [id number] [phone]            Jul 05, 2017  1:00 PM CDT   [facility]    [address] [place] [id number] [phone]            Aug 02, 2017  3:30 PM CDT   Return Visit with Khoa Li MD   [facility])    [address] [place] [id number] [phone]              Who to contact     If you have questions or need follow up information about today's clinic visit or your schedule please contact [facility] directly at [phone].  Normal or non-critical lab and imaging results will be communicated to you by MyChart, letter or phone within 4 business days after the clinic has received the results. If you do not hear from us within 7 days, please contact the clinic through [email or web address]  or phone. If you have a critical or abnormal lab result, we will notify you by phone as soon as possible.  Submit refill requests through [employer] or call your pharmacy and they will forward the refill request to us. Please allow 3 business days for your refill to be completed.          Additional Information About Your Visit        Care EveryWhere ID     This is your Care EveryWhere ID. This could be used by other organizations to access your [facility] medical records  [id number]        Your Vitals Were     Pulse Temperature Respirations Pulse Oximetry BMI (Body Mass Index)       62 97.1  F (36.2  C) (Temporal) 26 98% 25.13 kg/m2        Blood Pressure from Last 3 Encounters:   06/07/17 104/70   05/03/17 126/72   04/10/17 110/60    Weight from Last 3 Encounters:   06/07/17 151 lb (68.5 kg)   04/05/17 147 lb 11.2 oz (67 kg)   04/04/17 147 lb 11.3 oz (67 kg)              We Performed the Following     CBC with platelets differential     Comprehensive metabolic panel (BMP + Alb, Alk Phos, ALT, AST, Total. Bili, TP)          Today's Medication Changes          These changes are accurate as of: 6/7/17  1:58 PM.  If you have any questions, ask your nurse or doctor.               These medicines have changed or have updated prescriptions.        Dose/Directions    metoprolol 50 MG tablet   Commonly known as:  LOPRESSOR   This may have changed:    - medication strength  - how much to take   Used for:  Atrial fibrillation with rapid ventricular response (H)   Changed by:  Camron Aragon MD        Dose:  50 mg   Take 1 tablet (50 mg) by mouth 2 times daily   Quantity:  60 tablet   Refills:  1            Where to get your medicines      These medications were sent to [id number] - [place], [place]  [address]   [address] [facility] [address]     Phone:  [phone]     metoprolol 50 MG tablet         Some of these will need a paper prescription and others can be bought over the counter.  Ask your nurse  if you have questions.     Bring a paper prescription for each of these medications     traMADol 50 MG tablet                Primary Care Provider Office Phone # Fax #    Camron Aragon [phone]       [facility] [address] [place] [address]        Thank you!     Thank you for choosing [facility]  for your care. Our goal is always to provide you with excellent care. Hearing back from our patients is one way we can continue to improve our services. Please take a few minutes to complete the written survey that you may receive in the mail after your visit with us. Thank you!             Your Updated Medication List - Protect others around you: Learn how to safely use, store and throw away your medicines at www.disposemymeds.org.          This list is accurate as of: 6/7/17  1:58 PM.  Always use your most recent med list.                   Brand Name Dispense Instructions for use    acetaminophen 650 MG CR tablet    TYLENOL     Take 650 mg by mouth 2 times daily Reported on 4/5/2017       alendronate 70 MG tablet    FOSAMAX    12 tablet    Take 1 tablet (70 mg) by mouth every 7 days Take with over 8 ounces water and stay upright for at least 30 minutes after dose.  Take at least 60 minutes before breakfast       apixaban ANTICOAGULANT 2.5 MG tablet    ELIQUIS    180 tablet    Take 1 tablet (2.5 mg) by mouth 2 times daily       ascorbic acid 500 MG Cpcr CR capsule    vitamin C     Take 500 mg by mouth daily       atorvastatin 40 MG tablet    LIPITOR    90 tablet    Take 1 tablet (40 mg) by mouth daily       calcium carbonate 1250 MG tablet    OS-SHELIA 500 mg [place]. [place]     Take 600 mg by mouth daily       calcium carbonate 500 MG chewable tablet    TUMS     Take 1 chew tab by mouth every 4 hours as needed for heartburn Reported on 5/3/2017       digoxin 125 MCG tablet    LANOXIN    45 tablet    Take 0.5 tablets (62.5 mcg) by mouth daily       dofetilide 125  MCG capsule    TIKOSYN    60 capsule    Take 1 capsule (125 mcg) by mouth 2 times daily       FLOMAX 0.4 MG capsule   Generic drug:  tamsulosin     60 capsule    Take 1 capsule (0.4 mg) by mouth 2 times daily       fluticasone-vilanterol 100-25 MCG/INH oral inhaler    BREO ELLIPTA    60 Inhaler    Inhale 1 puff into the lungs daily       furosemide 20 MG tablet    LASIX    90 tablet    Take one tab in the afternoon       HYDROcodone-acetaminophen 5-325 MG per tablet    NORCO    30 tablet    TAKE 1 TABLET BY MOUTH EVERY 6 HOURS AS NEEDED FOR MODERATE TO SEVERE PAIN       leflunomide 10 MG tablet    ARAVA    30 tablet    Take 1 tablet (10 mg) by mouth daily       lisinopril 10 MG tablet    PRINIVIL/ZESTRIL    62 tablet    Take 1 tablet (10 mg) by mouth 2 times daily       LUPRON DEPOT IM      Inject into the muscle every 4 months Reported on 5/3/2017       metoprolol 50 MG tablet    LOPRESSOR    60 tablet    Take 1 tablet (50 mg) by mouth 2 times daily       predniSONE 5 MG tablet    DELTASONE    100 tablet    Take 1 tablet (5 mg) by mouth daily       spironolactone 25 MG tablet    ALDACTONE    30 tablet    Take 1 tablet (25 mg) by mouth daily       traMADol 50 MG tablet    ULTRAM    90 tablet    TAKE 1 TABLET 3 TIMES DAILY AS NEEDED FOR MODERATE PAIN       VITAMIN D (CHOLECALCIFEROL) PO      Take 400 Units by mouth daily Reported on 5/3/2017

## 2017-06-08 ENCOUNTER — TRANSFERRED RECORDS (OUTPATIENT)
Dept: HEALTH INFORMATION MANAGEMENT | Facility: CLINIC | Age: 82
End: 2017-06-08

## 2017-06-08 DIAGNOSIS — T14.8XXA OPEN WOUND: ICD-10-CM

## 2017-06-08 DIAGNOSIS — C61 MALIGNANT NEOPLASM OF PROSTATE (H): Primary | ICD-10-CM

## 2017-06-08 RX ORDER — HYDROCODONE BITARTRATE AND ACETAMINOPHEN 5; 325 MG/1; MG/1
TABLET ORAL
Qty: 30 TABLET | Refills: 0 | Status: SHIPPED | OUTPATIENT
Start: 2017-06-08 | End: 2017-08-10

## 2017-06-09 NOTE — PROGRESS NOTES
Hemoglobin is still a bit low at 9.4. Increase iron rich foods in the diet such as red meats and green leafy vegetables or could take iron supplement but this can cause constipation. Chemistry panel was okay with potassium being okay blood sugar being okay and kidney function okay.

## 2017-06-16 ENCOUNTER — TELEPHONE (OUTPATIENT)
Dept: CARDIOLOGY | Facility: CLINIC | Age: 82
End: 2017-06-16

## 2017-06-16 NOTE — TELEPHONE ENCOUNTER
[facility] Call Center    Phone Message    Name of Caller: Judit    Phone Number: Other phone number:  [phone]    Best time to return call: Any    May a detailed message be left on voicemail: yes    Relation to patient: Other Name: Judit  Relationship: Homecare Nurse  Is there legal documentation in chart to discuss information with this person: TAMMIE    Reason for Call: Other: Patient has had chest pain and heart palputations last night and lasted for about a half hour. Juidt suggested the ER and patient declined and will not go. They also discuss hospice care as well. Patient states he will follow up with Dr. Li on 6/21 at his appt. Please advise.      Action Taken: Message routed to:  Adult Clinics: Cardiology p [id number]

## 2017-06-19 NOTE — TELEPHONE ENCOUNTER
"Received call back from home care nurse Judit. She states pt was seen by PCP last week and dose of metoprolol was decreased to 50mg BID. She states pt's BPs have been running \"really low\" and he has been more fatigued and dizzy for over a month (see previous encounter dated 6/2/17). She states pt \"has been going downhill\" and reports he hasn't been feeling well \"for awhile\". She says weight has been stable and leg wounds have been improving.     She says Thursday last week pt reported chest pain/tightness and palpitations and she recommended ER. She says pt denied any arm or jaw pain, but did report nausea without vomiting. He was sitting in chair when episode occurred. Pt refused and they discussed palliative/hospice care. She says pt does not have Nitro and did not take anything to treat symptoms at home; they spontaneously resolved after about 30 mins.     Pt told home care nurse he \"wanted to see the Cardiologist one more time\" before considering these steps. Follow up appt has been made with Dr. Li 6/21/17. Judit anders states pt refuses to go to ER for any symptoms because \"they are just going to give me more pills that won't make me feel worse\". She states pt has not had any more episodes since last Thursday and \"just really wants to meet with Dr. Li to see what he thinks\".     Discussed with Judit that this message would be sent to Guillermo for any recommendations prior to appt. She says she is also scheduled to see pt 6/21/17 prior to appt with Dr. Li and she will update clinic with any pertinent information. She denies any further questions or concerns at this time.    Teresa ABREU, RN, BSN  Pulmonary Care Coordinator             "

## 2017-06-19 NOTE — TELEPHONE ENCOUNTER
Attempted to contact Judit (home care nurse) and left message for call back to gather more information.    Teersa ABREU RN, BSN  Pulmonary Care Coordinator

## 2017-06-21 ENCOUNTER — ALLIED HEALTH/NURSE VISIT (OUTPATIENT)
Dept: CARDIOLOGY | Facility: CLINIC | Age: 82
End: 2017-06-21
Attending: INTERNAL MEDICINE
Payer: MEDICARE

## 2017-06-21 ENCOUNTER — OFFICE VISIT (OUTPATIENT)
Dept: CARDIOLOGY | Facility: CLINIC | Age: 82
End: 2017-06-21
Payer: COMMERCIAL

## 2017-06-21 VITALS
DIASTOLIC BLOOD PRESSURE: 75 MMHG | WEIGHT: 154.9 LBS | BODY MASS INDEX: 25.78 KG/M2 | SYSTOLIC BLOOD PRESSURE: 130 MMHG | OXYGEN SATURATION: 99 % | HEART RATE: 60 BPM

## 2017-06-21 DIAGNOSIS — I50.22 CHRONIC SYSTOLIC CONGESTIVE HEART FAILURE (H): Primary | ICD-10-CM

## 2017-06-21 DIAGNOSIS — I42.0 DILATED CARDIOMYOPATHY (H): ICD-10-CM

## 2017-06-21 DIAGNOSIS — I83.029 VENOUS STASIS ULCERS OF BOTH LOWER EXTREMITIES (H): ICD-10-CM

## 2017-06-21 DIAGNOSIS — R11.0 NAUSEA: ICD-10-CM

## 2017-06-21 DIAGNOSIS — Z95.0 CARDIAC PACEMAKER IN SITU: ICD-10-CM

## 2017-06-21 DIAGNOSIS — L97.919 VENOUS STASIS ULCERS OF BOTH LOWER EXTREMITIES (H): ICD-10-CM

## 2017-06-21 DIAGNOSIS — R42 VERTIGO: ICD-10-CM

## 2017-06-21 DIAGNOSIS — I48.0 PAROXYSMAL ATRIAL FIBRILLATION (H): ICD-10-CM

## 2017-06-21 DIAGNOSIS — Z51.81 VISIT FOR MONITORING TIKOSYN THERAPY: ICD-10-CM

## 2017-06-21 DIAGNOSIS — Z79.899 VISIT FOR MONITORING TIKOSYN THERAPY: ICD-10-CM

## 2017-06-21 DIAGNOSIS — I08.0 MITRAL REGURGITATION AND AORTIC STENOSIS: Primary | ICD-10-CM

## 2017-06-21 DIAGNOSIS — L97.929 VENOUS STASIS ULCERS OF BOTH LOWER EXTREMITIES (H): ICD-10-CM

## 2017-06-21 DIAGNOSIS — I83.019 VENOUS STASIS ULCERS OF BOTH LOWER EXTREMITIES (H): ICD-10-CM

## 2017-06-21 PROCEDURE — 99214 OFFICE O/P EST MOD 30 MIN: CPT | Performed by: INTERNAL MEDICINE

## 2017-06-21 PROCEDURE — 93296 REM INTERROG EVL PM/IDS: CPT | Mod: ZF

## 2017-06-21 PROCEDURE — 93294 REM INTERROG EVL PM/LDLS PM: CPT | Performed by: INTERNAL MEDICINE

## 2017-06-21 ASSESSMENT — PAIN SCALES - GENERAL: PAINLEVEL: MODERATE PAIN (4)

## 2017-06-21 NOTE — NURSING NOTE
"Bud Merritt's goals for this visit include:   Chief Complaint   Patient presents with     RECHECK     Symptomatic       He requests these members of his care team be copied on today's visit information: PCP    PCP: Camron Aragon    Referring Provider:  No referring provider defined for this encounter.    Chief Complaint   Patient presents with     RECHECK     Symptomatic       Initial /75 (BP Location: Left arm, Patient Position: Chair, Cuff Size: Adult Regular)  Pulse 60  Wt 70.3 kg (154 lb 14.4 oz)  SpO2 99%  BMI 25.78 kg/m2 Estimated body mass index is 25.78 kg/(m^2) as calculated from the following:    Height as of 3/28/17: 1.651 m (5' 5\").    Weight as of this encounter: 70.3 kg (154 lb 14.4 oz).  Medication Reconciliation: complete       Medication Refills: none        Sabiha Goel CMA      "

## 2017-06-21 NOTE — MR AVS SNAPSHOT
After Visit Summary   6/21/2017    Bud Merritt    MRN: [id number]           Patient Information     Date Of Birth          5/9/1927        Visit Information        Provider Department      6/21/2017 10:00 AM Khoa Li MD [facility]        Today's Diagnoses     Mitral regurgitation and aortic stenosis - AS severe by echo on 3/2017    -  1    Paroxysmal atrial fibrillation (H)        Dilated cardiomyopathy (H) dilated LV, EF 20-25% by Echo 3/4/17        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[employer], Bi-Ventricular- NOT dependent        Visit for monitoring Tikosyn therapy        Venous stasis ulcers of both lower extremities (H)        Vertigo        Nausea          Care Instructions    The following is a summary of your office visit:    Medications started today:none    Medications stopped today:Digoxin    Medication dose change: none    Nurse contact information: Poonam Ramírez RN  Cardiology Care Coordinator  [phone] Phone  [phone] Fax    Appointments made today: 3 month follow up    Patient instructions: I'll have the pacemaker nurses call to check your pacemaker.      If you have had any blood work, imaging or other testing completed we will be in touch within 1-2 weeks regarding the results. If you have any questions, concerns or need to schedule a follow up, please contact us at [phone]. If you are needing refills please contact your pharmacy. For urgent after hour care please call the [facility] at [phone] or the [facility] at [phone] and ask to speak to the cardiologist on call.    It was a pleasure meeting with you today. Please let us know if there is anything else we can do for you so that we can be sure you are leaving completely satisfied with your care experience.     Your Cardiology Team at [facility]  RN Care Coordinator: Poonam PIERRE: Sabiha                    Follow-ups after your  visit        Follow-up notes from your care team     Return in about 3 months (around 9/21/2017).      Your next 10 appointments already scheduled     Jun 22, 2017 10:00 AM CDT   [facility]    [address] [place] [id number] [phone]           Bring a current list of meds and any records pertaining to this visit.  For Physicals, please bring immunization records and any forms needing to be filled out.  Please arrive 10 minutes early to complete paperwork.            Sep 06, 2017  8:30 AM CDT   Return Visit with Khoa Li MD   [facility]    [address] [place] [id number] [phone]              Who to contact     If you have questions or need follow up information about today's clinic visit or your schedule please contact [facility] directly at [phone].  Normal or non-critical lab and imaging results will be communicated to you by MyChart, letter or phone within 4 business days after the clinic has received the results. If you do not hear from us within 7 days, please contact the clinic through MyChart or phone. If you have a critical or abnormal lab result, we will notify you by phone as soon as possible.  Submit refill requests through [employer] or call your pharmacy and they will forward the refill request to us. Please allow 3 business days for your refill to be completed.          Additional Information About Your Visit        Care EveryWhere ID     This is your Care EveryWhere ID. This could be used by other organizations to access your [facility] medical records  [id number]        Your Vitals Were     Pulse Pulse Oximetry BMI (Body Mass Index)             60 99% 25.78 kg/m2          Blood Pressure from Last 3 Encounters:   06/21/17 130/75   06/07/17 104/70   05/03/17 126/72    Weight from Last 3 Encounters:    06/21/17 70.3 kg (154 lb 14.4 oz)   06/07/17 68.5 kg (151 lb)   04/05/17 67 kg (147 lb 11.2 oz)              Today, you had the following     No orders found for display         Today's Medication Changes          These changes are accurate as of: 6/21/17 11:06 AM.  If you have any questions, ask your nurse or doctor.               Stop taking these medicines if you haven't already. Please contact your care team if you have questions.     digoxin 125 MCG tablet   Commonly known as:  LANOXIN   Stopped by:  Khoa Li MD                    Primary Care Provider Office Phone # Fax #    Camron Aragon [phone]       [facility] [place] [facility] [address] [place] [id number]        Equal Access to Services     [facility]: Hadii sammie clifton hadasho Soomaali, waaxda luqadaha, qaybta kaalmada adeegyada, waxlamont real . So [facility] [phone].    ATENCIÓN: Si habla español, tiene a mon disposición servicios gratuitos de asistencia lingüística. Jeanette al [phone].    We comply with applicable federal civil rights laws and [place] laws. We do not discriminate on the basis of race, color, national origin, age, disability sex, sexual orientation or gender identity.            Thank you!     Thank you for choosing [facility]  for your care. Our goal is always to provide you with excellent care. Hearing back from our patients is one way we can continue to improve our services. Please take a few minutes to complete the written survey that you may receive in the mail after your visit with us. Thank you!             Your Updated Medication List - Protect others around you: Learn how to safely use, store and throw away your medicines at www.disposemymeds.org.          This list is accurate as of: 6/21/17 11:06 AM.  Always use your most recent med list.                   Brand Name Dispense Instructions for use Diagnosis    acetaminophen 650 MG CR  tablet    TYLENOL     Take 650 mg by mouth 2 times daily Reported on 4/5/2017        alendronate 70 MG tablet    FOSAMAX    12 tablet    Take 1 tablet (70 mg) by mouth every 7 days Take with over 8 ounces water and stay upright for at least 30 minutes after dose.  Take at least 60 minutes before breakfast    Osteopenia       apixaban ANTICOAGULANT 2.5 MG tablet    ELIQUIS    180 tablet    Take 1 tablet (2.5 mg) by mouth 2 times daily    Paroxysmal atrial fibrillation (H)       ascorbic acid 500 MG Cpcr CR capsule    vitamin C     Take 500 mg by mouth daily        atorvastatin 40 MG tablet    LIPITOR    90 tablet    Take 1 tablet (40 mg) by mouth daily    Atherosclerosis of native coronary artery of native heart without angina pectoris       calcium carbonate 1250 MG tablet    OS-SHELIA 500 mg [place]. [place]     Take 600 mg by mouth daily        calcium carbonate 500 MG chewable tablet    TUMS     Take 1 chew tab by mouth every 4 hours as needed for heartburn Reported on 5/3/2017        dofetilide 125 MCG capsule    TIKOSYN    60 capsule    Take 1 capsule (125 mcg) by mouth 2 times daily    Paroxysmal atrial fibrillation (H)       FLOMAX 0.4 MG capsule   Generic drug:  tamsulosin     60 capsule    Take 1 capsule (0.4 mg) by mouth 2 times daily        fluticasone-vilanterol 100-25 MCG/INH oral inhaler    BREO ELLIPTA    60 Inhaler    Inhale 1 puff into the lungs daily    COPD exacerbation (H)       furosemide 20 MG tablet    LASIX    90 tablet    Take one tab in the afternoon    Acute on chronic systolic congestive heart failure (H)       HYDROcodone-acetaminophen 5-325 MG per tablet    NORCO    30 tablet    TAKE 1 TABLET BY MOUTH EVERY 6 HOURS AS NEEDED FOR MODERATE TO SEVERE PAIN    Open wound       leflunomide 10 MG tablet    ARAVA    30 tablet    Take 1 tablet (10 mg) by mouth daily    Rheumatoid arthritis involving multiple sites with positive rheumatoid factor (H)       lisinopril 10 MG tablet    PRINIVIL/ZESTRIL    62  tablet    Take 1 tablet (10 mg) by mouth 2 times daily    Acute on chronic systolic congestive heart failure (H)       LUPRON DEPOT IM      Inject into the muscle every 4 months Reported on 5/3/2017        metoprolol 50 MG tablet    LOPRESSOR    60 tablet    Take 1 tablet (50 mg) by mouth 2 times daily    Atrial fibrillation with rapid ventricular response (H)       predniSONE 5 MG tablet    DELTASONE    100 tablet    Take 1 tablet (5 mg) by mouth daily    Osteopenia       spironolactone 25 MG tablet    ALDACTONE    30 tablet    Take 1 tablet (25 mg) by mouth daily    Cardiomyopathy, nonischemic (H)       traMADol 50 MG tablet    ULTRAM    90 tablet    TAKE 1 TABLET 3 TIMES DAILY AS NEEDED FOR MODERATE PAIN    Rheumatoid arthritis involving multiple sites, unspecified rheumatoid factor presence (H)       VITAMIN D (CHOLECALCIFEROL) PO      Take 400 Units by mouth daily Reported on 5/3/2017

## 2017-06-21 NOTE — PATIENT INSTRUCTIONS
The following is a summary of your office visit:    Medications started today:none    Medications stopped today:Digoxin    Medication dose change: none    Nurse contact information: Poonam Ramírez RN  Cardiology Care Coordinator  [phone] Phone  [phone] Fax    Appointments made today: 3 month follow up    Patient instructions: I'll have the pacemaker nurses call to check your pacemaker.      If you have had any blood work, imaging or other testing completed we will be in touch within 1-2 weeks regarding the results. If you have any questions, concerns or need to schedule a follow up, please contact us at [phone]. If you are needing refills please contact your pharmacy. For urgent after hour care please call the [facility] at [phone] or the [facility] at [phone] and ask to speak to the cardiologist on call.    It was a pleasure meeting with you today. Please let us know if there is anything else we can do for you so that we can be sure you are leaving completely satisfied with your care experience.     Your Cardiology Team at [facility]  RN Care Coordinator: Poonam  CMA: Sabiha

## 2017-06-21 NOTE — PROGRESS NOTES
"      Clinical Cardiac Electrophysiology Consultation    Chief Complaint: atrial fibrillation     HPI: I was happy to see Mr. Merritt in Electrophysiology consultation for the above at the request of Dr. Colon.    He reports he has been having \"spell\". It is very hard to pin him down on what he means by this. At times he reports the spells as \"dizziness\", later he described them as \"pain in his chest\". This makes it difficult to correlate with his paroxysms of atrial fibrillation. During the episodes his heart rates are fairly fast but during sinus rhythm his heart rate is relatively slow. In addition, he has first degree AV block and RBBB (bifascicular block).    In addition to the spells, he was noted to have LV systolic dysfunction. He was started on amiodarone and his EF improved. It should be noted that he was also having frequent PVCs and the reduction in PVC burden may have also contributed to partial recovery of his systolic function. He had a coronary angiogram that showed only non-obstructive disease.    The amiodarone had to be stopped due to a drop in diffusion capacity.    [id number]: He was admitted and started on dofetilide. His ECG today (personal review) shows sinus rhythm, first-degree AV block and RBBB. The QT is 400 msec by my measurement. Overall, he reports feeling better. He has less heart pounding and shortness of breath. At the moment his primary complaint is right hip pain. In the past cortisone shots have helped.     He had a fall while reaching for something. He thinks his leg gave out. He remembers falling and does not believe he was unconscious.     [id number]: He underwent biV pacer placement with anticipation of AV node ablation for rate control of his atrial fibrillation. However, he was recently in the hospital with cellulitis and evidence of sepsis. He remains on an antibiotics.     He developed recurrent atrial fibrillation with rapid ventricular rates while in the hospital. Due to " "hypotension his AV lisandro blocking drugs were stopped for a time.     Since discharge yesterday he reports several episodes of heart racing. I have reviewed his pacer interrogation. He is having runs of tachycardia, possibly atrial tachycardia versus VT.     03May2017: Device interrogation from yesterday reviewed personally. He has episodes of atrial fibrillation with heart rates in the  range but the number of episodes is low and the longest was under three minutes.     His legs are slowly getting better. He is has a visiting nurse from the wound clinic. He remains on antibiotics. He denies any palpitations, chest discomfort or unusual dyspnea on exertion.    Has chronic hip pain and legs \"burn\".     21Jun2017 Interval history: His family reports he has no appetite and continues to lose weight.     He had an episode of chest pain a few days ago but declined to go to the ER.     His device check in May, 2017 showed only very rare atrial fibrillation and his volume status, as assessed by thoracic impedance, was normal.     He's feeling nauseated this morning after the car ride, \"motion sickness\".     He describes frequent episodes of vertigo - \"room spinning and sick to stomach\".     He's had no bleeding problems with apixaban.    Current Outpatient Prescriptions   Medication Sig Dispense Refill     HYDROcodone-acetaminophen (NORCO) 5-325 MG per tablet TAKE 1 TABLET BY MOUTH EVERY 6 HOURS AS NEEDED FOR MODERATE TO SEVERE PAIN 30 tablet 0     traMADol (ULTRAM) 50 MG tablet TAKE 1 TABLET 3 TIMES DAILY AS NEEDED FOR MODERATE PAIN 90 tablet 0     metoprolol (LOPRESSOR) 50 MG tablet Take 1 tablet (50 mg) by mouth 2 times daily 60 tablet 1     furosemide (LASIX) 20 MG tablet Take one tab in the afternoon 90 tablet 1     digoxin (LANOXIN) 125 MCG tablet Take 0.5 tablets (62.5 mcg) by mouth daily 45 tablet 3     predniSONE (DELTASONE) 5 MG tablet Take 1 tablet (5 mg) by mouth daily 100 tablet 4     dofetilide (TIKOSYN) " 125 MCG capsule Take 1 capsule (125 mcg) by mouth 2 times daily 60 capsule 5     apixaban ANTICOAGULANT (ELIQUIS) 2.5 MG tablet Take 1 tablet (2.5 mg) by mouth 2 times daily 180 tablet 3     Leuprolide Acetate (LUPRON DEPOT IM) Inject into the muscle every 4 months Reported on 5/3/2017       leflunomide (ARAVA) 10 MG tablet Take 1 tablet (10 mg) by mouth daily 30 tablet 11     lisinopril (PRINIVIL,ZESTRIL) 10 MG tablet Take 1 tablet (10 mg) by mouth 2 times daily 62 tablet 11     spironolactone (ALDACTONE) 25 MG tablet Take 1 tablet (25 mg) by mouth daily 30 tablet 11     acetaminophen (TYLENOL) 650 MG CR tablet Take 650 mg by mouth 2 times daily Reported on 4/5/2017       atorvastatin (LIPITOR) 40 MG tablet Take 1 tablet (40 mg) by mouth daily 90 tablet 3     alendronate (FOSAMAX) 70 MG tablet Take 1 tablet (70 mg) by mouth every 7 days Take with over 8 ounces water and stay upright for at least 30 minutes after dose.  Take at least 60 minutes before breakfast 12 tablet 3     tamsulosin (FLOMAX) 0.4 MG 24 hr capsule Take 1 capsule (0.4 mg) by mouth 2 times daily 60 capsule      ascorbic acid (VITAMIN C) 500 MG CPCR Take 500 mg by mouth daily       VITAMIN D, CHOLECALCIFEROL, PO Take 400 Units by mouth daily Reported on 5/3/2017       calcium carbonate (OS-SHELIA 500 MG [place]. [place]) 500 MG tablet Take 600 mg by mouth daily       fluticasone-vilanterol (BREO ELLIPTA) 100-25 MCG/INH oral inhaler Inhale 1 puff into the lungs daily (Patient not taking: Reported on 6/21/2017) 60 Inhaler 0     calcium carbonate (TUMS) 500 MG chewable tablet Take 1 chew tab by mouth every 4 hours as needed for heartburn Reported on 5/3/2017         Past Medical History:   Diagnosis Date     Atrial fibrillation (H) 07/01/2016     Cardiomyopathy (H)      COPD exacerbation (H) 3/2/2017     Paroxysmal atrial fibrillation (H) 7/6/2016     Pure hypercholesterolemia      Rheumatoid arthritis(714.0)      Unspecified essential hypertension      Weakness  generalized 3/2/2017       Past Surgical History:   Procedure Laterality Date     ARTHROPLASTY KNEE Left 1/12/2016    Procedure: ARTHROPLASTY KNEE;  Surgeon: Cesar Walker DO;  Location: [facility] OR     COLONOSCOPY  08/24/09     HC COLONOSCOPY THRU STOMA W BIOPSY/CAUTERY TUMOR/POLYP/LESION  8/31/2004     HC REPAIR ING HERNIA,5+Y/O,REDUCIBL  1996    Marlex mesh repair of bilateral inguinal hernias and drainage of bilateral scrotal hydroceles.     IMPLANT PACEMAKER  03/10/2017     IRRIGATION AND DEBRIDEMENT SOFT TISSUE LOWER EXTREMITY, COMBINED Left 3/15/2016    Procedure: COMBINED IRRIGATION AND DEBRIDEMENT SOFT TISSUE LOWER EXTREMITY;  Surgeon: Cesar Walker DO;  Location: [facility] OR       Family History   Problem Relation Age of Onset     CANCER Mother      CANCER Son      Unknown/Adopted Father      Alcoholism Brother        Social History   Substance Use Topics     Smoking status: Former Smoker     Packs/day: 0.50     Years: 15.00     Types: Cigarettes     Quit date: 11/12/1998     Smokeless tobacco: Never Used      Comment: quit 15 yrs ago     Alcohol use No       Allergies   Allergen Reactions     Amiodarone Other (See Comments)     Drop in DLCO         ROS: his only other significant complaint on the ten system review is arthritis. [id number]    Physical Examination:  Vitals: /75 (BP Location: Left arm, Patient Position: Chair, Cuff Size: Adult Regular)  Pulse 60  Wt 70.3 kg (154 lb 14.4 oz)  SpO2 99%  BMI 25.78 kg/m2  BMI= Body mass index is 25.78 kg/(m^2).    GENERAL APPEARANCE: elderly, alert and no distress  HEENT: no icterus,  mucosa moist, no cyanosis.  NECK: no cervical bruits (radiated murmur), JVP approximately 6-7 cm in seated position  CHEST: lungs clear to auscultation, respirations are unlabored, normal respiratory rate  CARDIOVASCULAR: regular rhythm, normal S1, single S2, S3, S4, click or rub, 3/6 mid peaking murmur at aortic area with radiation to the suprasternal  notch and lower neck, no diastolic murmur heard, precordium quiet  EXTREMITIES: wrapped  NEURO: alert and oriented to person/place/time, in a wheelchair today, hard of hearing  SKIN: legs wrapped, wife reports continued drainage on the right      Laboratory:    Pacer interrogation results noted in HPI. 2017/woa    Results for GALLO FLOYD (MRN [id number]) as of 2017 10:41   Ref. Range 2017 14:03   WBC Latest Ref Range: 4.0 - 11.0 10e9/L 5.5   Hemoglobin Latest Ref Range: 13.3 - 17.7 g/dL 9.4 (L)   Hematocrit Latest Ref Range: 40.0 - 53.0 % 30.0 (L)   Platelet Count Latest Ref Range: 150 - 450 10e9/L 279       Results for GALLO FLOYD (MRN [id number]) as of 2017 10:28   Ref. Range 2017 14:03   Sodium Latest Ref Range: 133 - 144 mmol/L 137   Potassium Latest Ref Range: 3.4 - 5.3 mmol/L 5.0   Chloride Latest Ref Range: 94 - 109 mmol/L 100   Carbon Dioxide Latest Ref Range: 20 - 32 mmol/L 28   Urea Nitrogen Latest Ref Range: 7 - 30 mg/dL 21   Creatinine Latest Ref Range: 0.66 - 1.25 mg/dL 0.87   GFR Estimate Latest Ref Range: >60 mL/min/1.7m2 82       Results for GALLO FLOYD (MRN [id number]) as of 2017 10:28   Ref. Range 2017 05:40   Digoxin Level Latest Ref Range: 0.5 - 2.0 ug/L 0.6       Previous studies:  [facility]  Ech[facility]  [address] [place] [id number]     Name: GALLO FLOYD  MRN: [id number]  : 1927  Study Date: 2017 11:38 AM  Age: 89 yrs  Gender: Male  Patient Location: [facility] [place] [facility]  Reason For Study: Cardiomyopathy, AS  Ordering Physician: JAX BLACKWOOD  Referring Physician: AMANDA DUFFY  Performed By: Lai Queen RDCS     BSA: 1.8 m2  Height: 66 in  Weight: 159 lb  HR: 80  BP: 120/72 mmHg  _____________________________________________________________________________  __        Procedure  Limited Portable Echo Adult. Contrast Lumason. Lumason (NDC #0569-1606-11)  given  intravenously. Patient was given 5ml mixture of Lumason. 0 ml wasted.  _____________________________________________________________________________  __        Interpretation Summary  Moderate left ventricular dilation is present.  The Ejection Fraction is estimated at 20-25%.  Normal contraction of basal segments with akinetic mid and distal segments.  This wallmotion abnormality is new since prior study on 12/13/2016.  Consistent with stress cardiomyopathy, if no LAD disease.  Severe aortic stenosis is present.  The aortic valve area is 0.9 cm^2, by the continuity equation.  The peak aortic velocity is 3.8 m/sec.  The mean gradient across the aortic valve is37 mmHg.  Estimated gradients are under estimation due to LV systolic dysfunction,.        I also reviewed the CardioNet tracings available. They show atrial fibrillation with heart rates in the 120-130s. None are labelled as being associated with symptoms.      Assessment and recommendations:    1) Spells  2) Paroxsymal atrial fibrillation with rapid ventricular rates   3) Sinus node dysfunction  4) Bifascicular block  5) S/P BiV pacemaker  6) Sepsis/cellulitis    Pacer shows relatively little atrial fibrillation at the moment.     7) Atrial tachycardia versus VT - discussed that there are no antiarrhythmic drug felt to be safe to add to dofetilide. We could stop dofetilide and try sotalol or ranolazine.     8) Recent episode of chest pain associated with palpitations    - device check to correlate with atrial fibrillation    If chest pain was related to atrial fibrillation with rapid ventricular rates we discussed proceeding with av node ablation. They understand risk of infection is higher given chronic leg wounds.     After av node ablation heart rate control will no longer be an issue.     However, I discuss with Mr. Merritt and his wife that there is no reason to expect that AV node ablation with help his appetite or vertigo, which are his primary  complaints.    9) Nausea - dig levels have been fine but he has not been having much atrial fibrillation with rapid ventricular rates.     - hold digoxin    10) Vertigo - non-cardiac    11) Severe LV systolic dysfunction - agree with decreasing metoprolol in face of low BP but will need to remain on some beta blocker, if at all possible.    12) Severe aortic stenosis - he was not interested in evaluation for TAVR in the past. He tells me today he has not changed his mind about that.     I appreciate the chance to help with Mr. Merritt's care. Please let me know if you have any questions or concerns.    Khoa Li MD

## 2017-06-21 NOTE — MR AVS SNAPSHOT
After Visit Summary   6/21/2017    Bud Merritt    MRN: [id number]           Patient Information     Date Of Birth          5/9/1927        Visit Information        Provider Department      6/21/2017 6:00 AM UC ICD REMOTE [facility]        Today's Diagnoses     Chronic systolic congestive heart failure (H)    -  1       Follow-ups after your visit        Your next 10 appointments already scheduled     Sep 06, 2017  8:30 AM CDT   Return Visit with Khoa Li MD   [facility] ([facility]    [address] [place] [id number] [phone]              Who to contact     If you have questions or need follow up information about today's clinic visit or your schedule please contact [facility] directly at [phone].  Normal or non-critical lab and imaging results will be communicated to you by MyChart, letter or phone within 4 business days after the clinic has received the results. If you do not hear from us within 7 days, please contact the clinic through MyChart or phone. If you have a critical or abnormal lab result, we will notify you by phone as soon as possible.  Submit refill requests through [employer] or call your pharmacy and they will forward the refill request to us. Please allow 3 business days for your refill to be completed.          Additional Information About Your Visit        Care EveryWhere ID     This is your Care EveryWhere ID. This could be used by other organizations to access your [place] medical records  [id number]         Blood Pressure from Last 3 Encounters:   06/22/17 110/70   06/21/17 130/75   06/07/17 104/70    Weight from Last 3 Encounters:   06/22/17 68.5 kg (151 lb)   06/21/17 70.3 kg (154 lb 14.4 oz)   06/07/17 68.5 kg (151 lb)              We Performed the Following     INTERROGATION DEVICE EVAL REMOTE, PACER/ICD          Today's Medication Changes          These changes are accurate as  of: 6/21/17 11:59 PM.  If you have any questions, ask your nurse or doctor.               Stop taking these medicines if you haven't already. Please contact your care team if you have questions.     digoxin 125 MCG tablet   Commonly known as:  LANOXIN   Stopped by:  Khoa Li MD                    Primary Care Provider Office Phone # Fax #    Camron Aragon [phone]       [facility] [address] [place] [address]        Equal Access to Services     [facility]: Hadii aad ku hadasho Soomaali, waaxda luqadaha, qaybta kaalmada adeegyada, waxay idiin hayaan adeeg kharakimberley real . So [facility] [phone].    ATENCIÓN: Si habla español, tiene a mon disposición servicios gratuitos de asistencia lingüística. [facility] [phone].    We comply with applicable federal civil rights laws and [place] laws. We do not discriminate on the basis of race, color, national origin, age, disability sex, sexual orientation or gender identity.            Thank you!     Thank you for choosing [facility]  for your care. Our goal is always to provide you with excellent care. Hearing back from our patients is one way we can continue to improve our services. Please take a few minutes to complete the written survey that you may receive in the mail after your visit with us. Thank you!             Your Updated Medication List - Protect others around you: Learn how to safely use, store and throw away your medicines at www.disposemymeds.org.          This list is accurate as of: 6/21/17 11:59 PM.  Always use your most recent med list.                   Brand Name Dispense Instructions for use Diagnosis    acetaminophen 650 MG CR tablet    TYLENOL     Take 650 mg by mouth 2 times daily Reported on 4/5/2017        alendronate 70 MG tablet    FOSAMAX    12 tablet    Take 1 tablet (70 mg) by mouth every 7 days Take with over 8 ounces water and stay upright for at least 30 minutes  after dose.  Take at least 60 minutes before breakfast    Osteopenia       apixaban ANTICOAGULANT 2.5 MG tablet    ELIQUIS    180 tablet    Take 1 tablet (2.5 mg) by mouth 2 times daily    Paroxysmal atrial fibrillation (H)       ascorbic acid 500 MG Cpcr CR capsule    vitamin C     Take 500 mg by mouth daily        atorvastatin 40 MG tablet    LIPITOR    90 tablet    Take 1 tablet (40 mg) by mouth daily    Atherosclerosis of native coronary artery of native heart without angina pectoris       calcium carbonate 1250 MG tablet    OS-SHELIA 500 mg [place]. Ca     Take 600 mg by mouth daily        calcium carbonate 500 MG chewable tablet    TUMS     Take 1 chew tab by mouth every 4 hours as needed for heartburn Reported on 5/3/2017        dofetilide 125 MCG capsule    TIKOSYN    60 capsule    Take 1 capsule (125 mcg) by mouth 2 times daily    Paroxysmal atrial fibrillation (H)       FLOMAX 0.4 MG capsule   Generic drug:  tamsulosin     60 capsule    Take 1 capsule (0.4 mg) by mouth 2 times daily        fluticasone-vilanterol 100-25 MCG/INH oral inhaler    BREO ELLIPTA    60 Inhaler    Inhale 1 puff into the lungs daily    COPD exacerbation (H)       furosemide 20 MG tablet    LASIX    90 tablet    Take one tab in the afternoon    Acute on chronic systolic congestive heart failure (H)       HYDROcodone-acetaminophen 5-325 MG per tablet    NORCO    30 tablet    TAKE 1 TABLET BY MOUTH EVERY 6 HOURS AS NEEDED FOR MODERATE TO SEVERE PAIN    Open wound       leflunomide 10 MG tablet    ARAVA    30 tablet    Take 1 tablet (10 mg) by mouth daily    Rheumatoid arthritis involving multiple sites with positive rheumatoid factor (H)       lisinopril 10 MG tablet    PRINIVIL/ZESTRIL    62 tablet    Take 1 tablet (10 mg) by mouth 2 times daily    Acute on chronic systolic congestive heart failure (H)       LUPRON DEPOT IM      Inject into the muscle every 4 months Reported on 5/3/2017        metoprolol 50 MG tablet    LOPRESSOR    60  tablet    Take 1 tablet (50 mg) by mouth 2 times daily    Atrial fibrillation with rapid ventricular response (H)       predniSONE 5 MG tablet    DELTASONE    100 tablet    Take 1 tablet (5 mg) by mouth daily    Osteopenia       spironolactone 25 MG tablet    ALDACTONE    30 tablet    Take 1 tablet (25 mg) by mouth daily    Cardiomyopathy, nonischemic (H)       traMADol 50 MG tablet    ULTRAM    90 tablet    TAKE 1 TABLET 3 TIMES DAILY AS NEEDED FOR MODERATE PAIN    Rheumatoid arthritis involving multiple sites, unspecified rheumatoid factor presence (H)       VITAMIN D (CHOLECALCIFEROL) PO      Take 400 Units by mouth daily Reported on 5/3/2017

## 2017-06-22 ENCOUNTER — OFFICE VISIT (OUTPATIENT)
Dept: FAMILY MEDICINE | Facility: CLINIC | Age: 82
End: 2017-06-22
Payer: COMMERCIAL

## 2017-06-22 VITALS
RESPIRATION RATE: 20 BRPM | DIASTOLIC BLOOD PRESSURE: 70 MMHG | HEART RATE: 62 BPM | OXYGEN SATURATION: 97 % | SYSTOLIC BLOOD PRESSURE: 110 MMHG | TEMPERATURE: 96.9 F | BODY MASS INDEX: 25.13 KG/M2 | WEIGHT: 151 LBS

## 2017-06-22 DIAGNOSIS — D64.9 ANEMIA, UNSPECIFIED TYPE: ICD-10-CM

## 2017-06-22 DIAGNOSIS — I48.0 PAROXYSMAL ATRIAL FIBRILLATION (H): ICD-10-CM

## 2017-06-22 DIAGNOSIS — R53.1 WEAKNESS GENERALIZED: Primary | ICD-10-CM

## 2017-06-22 DIAGNOSIS — H61.22 IMPACTED CERUMEN OF LEFT EAR: ICD-10-CM

## 2017-06-22 PROCEDURE — 99214 OFFICE O/P EST MOD 30 MIN: CPT | Performed by: FAMILY MEDICINE

## 2017-06-22 NOTE — MR AVS SNAPSHOT
After Visit Summary   6/22/2017    Bud Merritt    MRN: [id number]           Patient Information     Date Of Birth          5/9/1927        Visit Information        Provider Department      6/22/2017 10:00 AM Camron Aragon MD [facility]         Follow-ups after your visit        Your next 10 appointments already scheduled     Jun 22, 2017 10:00 AM CDT   Office Visit with Camron Aragon MD   [facility] ([facility])    [address] [id number] [phone]           Bring a current list of meds and any records pertaining to this visit.  For Physicals, please bring immunization records and any forms needing to be filled out.  Please arrive 10 minutes early to complete paperwork.            Sep 06, 2017  8:30 AM CDT   Return Visit with Khoa Li MD   [facility] ([facility])    [address] [place] [address] [phone]              Who to contact     If you have questions or need follow up information about today's clinic visit or your schedule please contact [facility] directly at [phone].  Normal or non-critical lab and imaging results will be communicated to you by MyChart, letter or phone within 4 business days after the clinic has received the results. If you do not hear from us within 7 days, please contact the clinic through MyChart or phone. If you have a critical or abnormal lab result, we will notify you by phone as soon as possible.  Submit refill requests through [employer] or call your pharmacy and they will forward the refill request to us. Please allow 3 business days for your refill to be completed.          Additional Information About Your Visit        Care EveryWhere ID     This is your Care EveryWhere ID. This could be used by other organizations to access your [place] medical records  [id number]        Your Vitals Were      Pulse Temperature Respirations Pulse Oximetry BMI (Body Mass Index)       62 96.9  F (36.1  C) (Temporal) 20 97% 25.13 kg/m2        Blood Pressure from Last 3 Encounters:   06/22/17 110/70   06/21/17 130/75   06/07/17 104/70    Weight from Last 3 Encounters:   06/22/17 151 lb (68.5 kg)   06/21/17 154 lb 14.4 oz (70.3 kg)   06/07/17 151 lb (68.5 kg)              Today, you had the following     No orders found for display       Primary Care Provider Office Phone # Fax #    Camron Aragon [phone]       [facility] [address] [place] [address]        Equal Access to Services     JUAN CLINE : Hadii sammie clifton hadasho Soomaali, waaxda luqadaha, qaybta kaalmada adeegyada, waxay idiin hayryan real . So [facility] [phone].    ATENCIÓN: Si habla español, tiene a mon disposición servicios gratuitos de asistencia lingüística. Llame al [phone].    We comply with applicable federal civil rights laws and [place] laws. We do not discriminate on the basis of race, color, national origin, age, disability sex, sexual orientation or gender identity.            Thank you!     Thank you for choosing [facility]  for your care. Our goal is always to provide you with excellent care. Hearing back from our patients is one way we can continue to improve our services. Please take a few minutes to complete the written survey that you may receive in the mail after your visit with us. Thank you!             Your Updated Medication List - Protect others around you: Learn how to safely use, store and throw away your medicines at www.disposemymeds.org.          This list is accurate as of: 6/22/17  9:55 AM.  Always use your most recent med list.                   Brand Name Dispense Instructions for use Diagnosis    acetaminophen 650 MG CR tablet    TYLENOL     Take 650 mg by mouth 2 times daily Reported on 4/5/2017        alendronate 70 MG tablet    FOSAMAX     12 tablet    Take 1 tablet (70 mg) by mouth every 7 days Take with over 8 ounces water and stay upright for at least 30 minutes after dose.  Take at least 60 minutes before breakfast    Osteopenia       apixaban ANTICOAGULANT 2.5 MG tablet    ELIQUIS    180 tablet    Take 1 tablet (2.5 mg) by mouth 2 times daily    Paroxysmal atrial fibrillation (H)       ascorbic acid 500 MG Cpcr CR capsule    vitamin C     Take 500 mg by mouth daily        atorvastatin 40 MG tablet    LIPITOR    90 tablet    Take 1 tablet (40 mg) by mouth daily    Atherosclerosis of native coronary artery of native heart without angina pectoris       calcium carbonate 1250 MG tablet    OS-SHELIA 500 mg [place]. Ca     Take 600 mg by mouth daily        calcium carbonate 500 MG chewable tablet    TUMS     Take 1 chew tab by mouth every 4 hours as needed for heartburn Reported on 5/3/2017        dofetilide 125 MCG capsule    TIKOSYN    60 capsule    Take 1 capsule (125 mcg) by mouth 2 times daily    Paroxysmal atrial fibrillation (H)       FLOMAX 0.4 MG capsule   Generic drug:  tamsulosin     60 capsule    Take 1 capsule (0.4 mg) by mouth 2 times daily        fluticasone-vilanterol 100-25 MCG/INH oral inhaler    BREO ELLIPTA    60 Inhaler    Inhale 1 puff into the lungs daily    COPD exacerbation (H)       furosemide 20 MG tablet    LASIX    90 tablet    Take one tab in the afternoon    Acute on chronic systolic congestive heart failure (H)       HYDROcodone-acetaminophen 5-325 MG per tablet    NORCO    30 tablet    TAKE 1 TABLET BY MOUTH EVERY 6 HOURS AS NEEDED FOR MODERATE TO SEVERE PAIN    Open wound       leflunomide 10 MG tablet    ARAVA    30 tablet    Take 1 tablet (10 mg) by mouth daily    Rheumatoid arthritis involving multiple sites with positive rheumatoid factor (H)       lisinopril 10 MG tablet    PRINIVIL/ZESTRIL    62 tablet    Take 1 tablet (10 mg) by mouth 2 times daily    Acute on chronic systolic congestive heart failure (H)       LUPRON  DEPOT IM      Inject into the muscle every 4 months Reported on 5/3/2017        metoprolol 50 MG tablet    LOPRESSOR    60 tablet    Take 1 tablet (50 mg) by mouth 2 times daily    Atrial fibrillation with rapid ventricular response (H)       predniSONE 5 MG tablet    DELTASONE    100 tablet    Take 1 tablet (5 mg) by mouth daily    Osteopenia       spironolactone 25 MG tablet    ALDACTONE    30 tablet    Take 1 tablet (25 mg) by mouth daily    Cardiomyopathy, nonischemic (H)       traMADol 50 MG tablet    ULTRAM    90 tablet    TAKE 1 TABLET 3 TIMES DAILY AS NEEDED FOR MODERATE PAIN    Rheumatoid arthritis involving multiple sites, unspecified rheumatoid factor presence (H)       VITAMIN D (CHOLECALCIFEROL) PO      Take 400 Units by mouth daily Reported on 5/3/2017

## 2017-06-22 NOTE — PROGRESS NOTES
[employer] remote pacemaker transmission received and reviewed. Device transmission sent per MD orders to assess for arrhythmias. Pt's wife states 1 week ago he was dizzy and was having palpitations. She states he is feeling okay this week.  His presenting rhythm is AS/ BVP @ 70 bpm. The device states 378 VT episodes recorded, however the EGMs show regular ventricular rhythms with the same morphology as his sinus beats. Rates are 160- 180 bpm lasting up to 5 minutes. 18 'Fast A&V' episodes recorded with rates 150- 176 bpm lasting up to 2 minutes. 37 AT/AF episodes recorded lasting up to 45 minutes. 686 V-Sensing episodes recorded. The available marker channels suggest fast A&V. The majority of these episodes were recorded from 6/13/17- 6/19/17. Normal device function. AP= 50% BVP= 97.8% and VSR pacing= 0.2%. No short V-V intervals recorded. Lead trends appear stable. OptiVol thoracic impedance suggests increasing fluid retention. Battery estimates 7 years to [facility]. Dr. Li notified with results.  Remote pacemaker transmission

## 2017-06-22 NOTE — NURSING NOTE
"Chief Complaint   Patient presents with     RECHECK     2 week recheck       Initial /70  Pulse 62  Temp 96.9  F (36.1  C) (Temporal)  Resp 20  Wt 151 lb (68.5 kg)  SpO2 97%  BMI 25.13 kg/m2 Estimated body mass index is 25.13 kg/(m^2) as calculated from the following:    Height as of 3/28/17: 5' 5\" (1.651 m).    Weight as of this encounter: 151 lb (68.5 kg).  Medication Reconciliation: complete    "

## 2017-06-24 DIAGNOSIS — I48.91 ATRIAL FIBRILLATION WITH RAPID VENTRICULAR RESPONSE (H): Primary | ICD-10-CM

## 2017-06-26 RX ORDER — METOPROLOL TARTRATE 100 MG
TABLET ORAL
Qty: 180 TABLET | Refills: 1 | Status: ON HOLD | OUTPATIENT
Start: 2017-06-26 | End: 2017-06-30

## 2017-06-26 NOTE — TELEPHONE ENCOUNTER
metoprolol (LOPRESSOR) 50 MG tablet      Last Written Prescription Date: 6/7/17  Last Fill Quantity: 60, # refills: 1    Last Office Visit with JUAN, RIVER or [facility] prescribing provider:  6/22/17   Future Office Visit:    Next 5 appointments (look out 90 days)     Sep 06, 2017  8:30 AM CDT   Return Visit with Khoa Li MD   [facility] ([facility])    [address] [place] [address] [phone]                    BP Readings from Last 3 Encounters:   06/22/17 110/70   06/21/17 130/75   06/07/17 104/70

## 2017-06-27 ENCOUNTER — TELEPHONE (OUTPATIENT)
Dept: NURSING | Facility: CLINIC | Age: 82
End: 2017-06-27

## 2017-06-27 DIAGNOSIS — I47.19 ATRIAL TACHYCARDIA (H): Primary | ICD-10-CM

## 2017-06-27 RX ORDER — LIDOCAINE 40 MG/G
CREAM TOPICAL
Status: CANCELLED | OUTPATIENT
Start: 2017-06-27

## 2017-06-27 NOTE — NURSING NOTE
Date: 6/27/2017    Time of Call: 9:21 AM        Ordering provider: Dr Li    Order:   We should probably go ahead and set him up for an AV node ablation.      Order received by: VIRGEN Adam     Follow-up/additional notes: Orders placed. Na NÚÑEZ notified.

## 2017-06-30 ENCOUNTER — APPOINTMENT (OUTPATIENT)
Dept: CARDIOLOGY | Facility: CLINIC | Age: 82
End: 2017-06-30
Attending: INTERNAL MEDICINE
Payer: MEDICARE

## 2017-06-30 ENCOUNTER — APPOINTMENT (OUTPATIENT)
Dept: MEDSURG UNIT | Facility: CLINIC | Age: 82
End: 2017-06-30
Attending: INTERNAL MEDICINE
Payer: MEDICARE

## 2017-06-30 ENCOUNTER — HOSPITAL ENCOUNTER (OUTPATIENT)
Facility: CLINIC | Age: 82
Discharge: HOME | End: 2017-07-01
Attending: INTERNAL MEDICINE | Admitting: INTERNAL MEDICINE
Payer: MEDICARE

## 2017-06-30 DIAGNOSIS — I47.19 ATRIAL TACHYCARDIA (H): ICD-10-CM

## 2017-06-30 PROBLEM — Z98.890 S/P AV NODAL ABLATION: Status: ACTIVE | Noted: 2017-06-30

## 2017-06-30 LAB
ANION GAP SERPL CALCULATED.3IONS-SCNC: 6 MMOL/L (ref 3–14)
BUN SERPL-MCNC: 13 MG/DL (ref 7–30)
CALCIUM SERPL-MCNC: 8.7 MG/DL (ref 8.5–10.1)
CHLORIDE SERPL-SCNC: 104 MMOL/L (ref 94–109)
CO2 SERPL-SCNC: 26 MMOL/L (ref 20–32)
CREAT SERPL-MCNC: 0.87 MG/DL (ref 0.66–1.25)
ERYTHROCYTE [DISTWIDTH] IN BLOOD BY AUTOMATED COUNT: 15.1 % (ref 10–15)
GFR SERPL CREATININE-BSD FRML MDRD: 82 ML/MIN/1.7M2
GLUCOSE SERPL-MCNC: 105 MG/DL (ref 70–99)
HCT VFR BLD AUTO: 32.1 % (ref 40–53)
HGB BLD-MCNC: 10.2 G/DL (ref 13.3–17.7)
INR PPP: 1.08 (ref 0.86–1.14)
MCH RBC QN AUTO: 30.2 PG (ref 26.5–33)
MCHC RBC AUTO-ENTMCNC: 31.8 G/DL (ref 31.5–36.5)
MCV RBC AUTO: 95 FL (ref 78–100)
PLATELET # BLD AUTO: 223 10E9/L (ref 150–450)
POTASSIUM SERPL-SCNC: 4.3 MMOL/L (ref 3.4–5.3)
RBC # BLD AUTO: 3.38 10E12/L (ref 4.4–5.9)
SODIUM SERPL-SCNC: 136 MMOL/L (ref 133–144)
WBC # BLD AUTO: 5.3 10E9/L (ref 4–11)

## 2017-06-30 PROCEDURE — 25000132 ZZH RX MED GY IP 250 OP 250 PS 637: Mod: GY | Performed by: NURSE PRACTITIONER

## 2017-06-30 PROCEDURE — 27210946 ZZH KIT HC TOTES DISP CR8

## 2017-06-30 PROCEDURE — 40000065 ZZH STATISTIC EKG NON-CHARGEABLE

## 2017-06-30 PROCEDURE — 02583ZZ DESTRUCTION OF CONDUCTION MECHANISM, PERCUTANEOUS APPROACH: ICD-10-PCS | Performed by: INTERNAL MEDICINE

## 2017-06-30 PROCEDURE — 85610 PROTHROMBIN TIME: CPT | Performed by: NURSE PRACTITIONER

## 2017-06-30 PROCEDURE — C2630 CATH, EP, COOL-TIP: HCPCS

## 2017-06-30 PROCEDURE — 99153 MOD SED SAME PHYS/QHP EA: CPT

## 2017-06-30 PROCEDURE — 27210807 ZZH SHEATH CR6

## 2017-06-30 PROCEDURE — 40000172 ZZH STATISTIC PROCEDURE PREP ONLY

## 2017-06-30 PROCEDURE — 99152 MOD SED SAME PHYS/QHP 5/>YRS: CPT

## 2017-06-30 PROCEDURE — 80048 BASIC METABOLIC PNL TOTAL CA: CPT | Performed by: NURSE PRACTITIONER

## 2017-06-30 PROCEDURE — 93650 ICAR CATH ABLTJ AV NODE FUNC: CPT | Mod: GC | Performed by: INTERNAL MEDICINE

## 2017-06-30 PROCEDURE — 25000128 H RX IP 250 OP 636: Performed by: NURSE PRACTITIONER

## 2017-06-30 PROCEDURE — 93609 INTRA-VNTR MAPG TCHYCAR SITE: CPT

## 2017-06-30 PROCEDURE — 93280 PM DEVICE PROGR EVAL DUAL: CPT | Mod: 26 | Performed by: INTERNAL MEDICINE

## 2017-06-30 PROCEDURE — 99153 MOD SED SAME PHYS/QHP EA: CPT | Performed by: INTERNAL MEDICINE

## 2017-06-30 PROCEDURE — 93005 ELECTROCARDIOGRAM TRACING: CPT

## 2017-06-30 PROCEDURE — 93650 ICAR CATH ABLTJ AV NODE FUNC: CPT

## 2017-06-30 PROCEDURE — 85027 COMPLETE CBC AUTOMATED: CPT | Performed by: NURSE PRACTITIONER

## 2017-06-30 PROCEDURE — 93010 ELECTROCARDIOGRAM REPORT: CPT | Mod: 59 | Performed by: INTERNAL MEDICINE

## 2017-06-30 PROCEDURE — 25000132 ZZH RX MED GY IP 250 OP 250 PS 637: Mod: GY | Performed by: INTERNAL MEDICINE

## 2017-06-30 PROCEDURE — 27210795 ZZH PAD DEFIB QUICK CR4

## 2017-06-30 PROCEDURE — 99152 MOD SED SAME PHYS/QHP 5/>YRS: CPT | Performed by: INTERNAL MEDICINE

## 2017-06-30 PROCEDURE — A9270 NON-COVERED ITEM OR SERVICE: HCPCS | Mod: GY | Performed by: NURSE PRACTITIONER

## 2017-06-30 RX ORDER — ATROPINE SULFATE 0.1 MG/ML
.5-1 INJECTION INTRAVENOUS
Status: DISCONTINUED | OUTPATIENT
Start: 2017-06-30 | End: 2017-06-30 | Stop reason: HOSPADM

## 2017-06-30 RX ORDER — LIDOCAINE 40 MG/G
CREAM TOPICAL
Status: DISCONTINUED | OUTPATIENT
Start: 2017-06-30 | End: 2017-06-30 | Stop reason: HOSPADM

## 2017-06-30 RX ORDER — FUROSEMIDE 20 MG
20 TABLET ORAL DAILY
Status: DISCONTINUED | OUTPATIENT
Start: 2017-06-30 | End: 2017-07-01 | Stop reason: HOSPADM

## 2017-06-30 RX ORDER — ATORVASTATIN CALCIUM 40 MG/1
40 TABLET, FILM COATED ORAL DAILY
Status: DISCONTINUED | OUTPATIENT
Start: 2017-06-30 | End: 2017-07-01 | Stop reason: HOSPADM

## 2017-06-30 RX ORDER — METOPROLOL TARTRATE 50 MG
50 TABLET ORAL 2 TIMES DAILY
Status: DISCONTINUED | OUTPATIENT
Start: 2017-06-30 | End: 2017-07-01 | Stop reason: HOSPADM

## 2017-06-30 RX ORDER — PROTAMINE SULFATE 10 MG/ML
5-40 INJECTION, SOLUTION INTRAVENOUS EVERY 10 MIN PRN
Status: DISCONTINUED | OUTPATIENT
Start: 2017-06-30 | End: 2017-06-30 | Stop reason: HOSPADM

## 2017-06-30 RX ORDER — LORAZEPAM 2 MG/ML
.5-2 INJECTION INTRAMUSCULAR EVERY 10 MIN PRN
Status: DISCONTINUED | OUTPATIENT
Start: 2017-06-30 | End: 2017-06-30 | Stop reason: HOSPADM

## 2017-06-30 RX ORDER — FUROSEMIDE 10 MG/ML
20-100 INJECTION INTRAMUSCULAR; INTRAVENOUS
Status: DISCONTINUED | OUTPATIENT
Start: 2017-06-30 | End: 2017-06-30 | Stop reason: HOSPADM

## 2017-06-30 RX ORDER — FENTANYL CITRATE 50 UG/ML
25-50 INJECTION, SOLUTION INTRAMUSCULAR; INTRAVENOUS
Status: DISCONTINUED | OUTPATIENT
Start: 2017-06-30 | End: 2017-06-30 | Stop reason: HOSPADM

## 2017-06-30 RX ORDER — LIDOCAINE 40 MG/G
CREAM TOPICAL
Status: DISCONTINUED | OUTPATIENT
Start: 2017-06-30 | End: 2017-07-01 | Stop reason: HOSPADM

## 2017-06-30 RX ORDER — HYDROCODONE BITARTRATE AND ACETAMINOPHEN 5; 325 MG/1; MG/1
1-2 TABLET ORAL EVERY 4 HOURS PRN
Status: DISCONTINUED | OUTPATIENT
Start: 2017-06-30 | End: 2017-07-01 | Stop reason: HOSPADM

## 2017-06-30 RX ORDER — PREDNISONE 5 MG/1
5 TABLET ORAL DAILY
Status: DISCONTINUED | OUTPATIENT
Start: 2017-07-01 | End: 2017-07-01 | Stop reason: HOSPADM

## 2017-06-30 RX ORDER — NALOXONE HYDROCHLORIDE 0.4 MG/ML
.1-.4 INJECTION, SOLUTION INTRAMUSCULAR; INTRAVENOUS; SUBCUTANEOUS
Status: DISCONTINUED | OUTPATIENT
Start: 2017-06-30 | End: 2017-07-01 | Stop reason: HOSPADM

## 2017-06-30 RX ORDER — PROTAMINE SULFATE 10 MG/ML
1-5 INJECTION, SOLUTION INTRAVENOUS
Status: DISCONTINUED | OUTPATIENT
Start: 2017-06-30 | End: 2017-06-30 | Stop reason: HOSPADM

## 2017-06-30 RX ORDER — LISINOPRIL 10 MG/1
10 TABLET ORAL 2 TIMES DAILY
Status: DISCONTINUED | OUTPATIENT
Start: 2017-06-30 | End: 2017-07-01 | Stop reason: HOSPADM

## 2017-06-30 RX ORDER — TRAMADOL HYDROCHLORIDE 50 MG/1
50 TABLET ORAL EVERY 6 HOURS PRN
Status: DISCONTINUED | OUTPATIENT
Start: 2017-06-30 | End: 2017-07-01 | Stop reason: HOSPADM

## 2017-06-30 RX ORDER — SPIRONOLACTONE 25 MG/1
25 TABLET ORAL DAILY
Status: DISCONTINUED | OUTPATIENT
Start: 2017-07-01 | End: 2017-07-01 | Stop reason: HOSPADM

## 2017-06-30 RX ORDER — ALENDRONATE SODIUM 70 MG/1
70 TABLET ORAL
Status: DISCONTINUED | OUTPATIENT
Start: 2017-06-30 | End: 2017-06-30

## 2017-06-30 RX ORDER — ADENOSINE 3 MG/ML
6-12 INJECTION, SOLUTION INTRAVENOUS EVERY 5 MIN PRN
Status: DISCONTINUED | OUTPATIENT
Start: 2017-06-30 | End: 2017-06-30 | Stop reason: HOSPADM

## 2017-06-30 RX ORDER — DOBUTAMINE HYDROCHLORIDE 200 MG/100ML
5-40 INJECTION INTRAVENOUS CONTINUOUS PRN
Status: DISCONTINUED | OUTPATIENT
Start: 2017-06-30 | End: 2017-06-30 | Stop reason: HOSPADM

## 2017-06-30 RX ORDER — PROMETHAZINE HYDROCHLORIDE 25 MG/ML
6.25-25 INJECTION, SOLUTION INTRAMUSCULAR; INTRAVENOUS EVERY 4 HOURS PRN
Status: DISCONTINUED | OUTPATIENT
Start: 2017-06-30 | End: 2017-06-30 | Stop reason: HOSPADM

## 2017-06-30 RX ORDER — ASCORBIC ACID 500 MG
500 TABLET ORAL DAILY
Status: DISCONTINUED | OUTPATIENT
Start: 2017-06-30 | End: 2017-07-01 | Stop reason: HOSPADM

## 2017-06-30 RX ORDER — DOFETILIDE 0.12 MG/1
125 CAPSULE ORAL 2 TIMES DAILY
Status: DISCONTINUED | OUTPATIENT
Start: 2017-06-30 | End: 2017-07-01 | Stop reason: HOSPADM

## 2017-06-30 RX ORDER — DIPHENHYDRAMINE HYDROCHLORIDE 50 MG/ML
25-50 INJECTION INTRAMUSCULAR; INTRAVENOUS
Status: DISCONTINUED | OUTPATIENT
Start: 2017-06-30 | End: 2017-06-30 | Stop reason: HOSPADM

## 2017-06-30 RX ORDER — FLUMAZENIL 0.1 MG/ML
0.2 INJECTION, SOLUTION INTRAVENOUS
Status: DISCONTINUED | OUTPATIENT
Start: 2017-06-30 | End: 2017-06-30 | Stop reason: HOSPADM

## 2017-06-30 RX ORDER — SODIUM CHLORIDE 9 MG/ML
INJECTION, SOLUTION INTRAVENOUS CONTINUOUS
Status: DISCONTINUED | OUTPATIENT
Start: 2017-06-30 | End: 2017-07-01 | Stop reason: HOSPADM

## 2017-06-30 RX ORDER — TAMSULOSIN HYDROCHLORIDE 0.4 MG/1
0.4 CAPSULE ORAL 2 TIMES DAILY
Status: DISCONTINUED | OUTPATIENT
Start: 2017-06-30 | End: 2017-07-01 | Stop reason: HOSPADM

## 2017-06-30 RX ORDER — NALOXONE HYDROCHLORIDE 0.4 MG/ML
0.4 INJECTION, SOLUTION INTRAMUSCULAR; INTRAVENOUS; SUBCUTANEOUS EVERY 5 MIN PRN
Status: DISCONTINUED | OUTPATIENT
Start: 2017-06-30 | End: 2017-06-30 | Stop reason: HOSPADM

## 2017-06-30 RX ORDER — LEFLUNOMIDE 10 MG/1
10 TABLET ORAL DAILY
Status: DISCONTINUED | OUTPATIENT
Start: 2017-07-01 | End: 2017-07-01 | Stop reason: HOSPADM

## 2017-06-30 RX ORDER — KETOROLAC TROMETHAMINE 30 MG/ML
15 INJECTION, SOLUTION INTRAMUSCULAR; INTRAVENOUS
Status: DISCONTINUED | OUTPATIENT
Start: 2017-06-30 | End: 2017-06-30 | Stop reason: HOSPADM

## 2017-06-30 RX ORDER — ONDANSETRON 2 MG/ML
4 INJECTION INTRAMUSCULAR; INTRAVENOUS EVERY 4 HOURS PRN
Status: DISCONTINUED | OUTPATIENT
Start: 2017-06-30 | End: 2017-06-30 | Stop reason: HOSPADM

## 2017-06-30 RX ORDER — HEPARIN SODIUM 1000 [USP'U]/ML
1000-10000 INJECTION, SOLUTION INTRAVENOUS; SUBCUTANEOUS EVERY 5 MIN PRN
Status: DISCONTINUED | OUTPATIENT
Start: 2017-06-30 | End: 2017-06-30 | Stop reason: HOSPADM

## 2017-06-30 RX ADMIN — MIDAZOLAM HYDROCHLORIDE 0.5 MG: 1 INJECTION, SOLUTION INTRAMUSCULAR; INTRAVENOUS at 11:04

## 2017-06-30 RX ADMIN — HYDROCODONE BITARTRATE AND ACETAMINOPHEN 1 TABLET: 5; 325 TABLET ORAL at 14:29

## 2017-06-30 RX ADMIN — SODIUM CHLORIDE: 9 INJECTION, SOLUTION INTRAVENOUS at 09:48

## 2017-06-30 RX ADMIN — APIXABAN 2.5 MG: 2.5 TABLET, FILM COATED ORAL at 22:20

## 2017-06-30 RX ADMIN — FENTANYL CITRATE 25 MCG: 50 INJECTION, SOLUTION INTRAMUSCULAR; INTRAVENOUS at 11:12

## 2017-06-30 RX ADMIN — MIDAZOLAM HYDROCHLORIDE 0.5 MG: 1 INJECTION, SOLUTION INTRAMUSCULAR; INTRAVENOUS at 11:35

## 2017-06-30 RX ADMIN — TAMSULOSIN HYDROCHLORIDE 0.4 MG: 0.4 CAPSULE ORAL at 22:21

## 2017-06-30 RX ADMIN — TRAMADOL HYDROCHLORIDE 50 MG: 50 TABLET, COATED ORAL at 22:20

## 2017-06-30 RX ADMIN — DOFETILIDE 125 MCG: 0.12 CAPSULE ORAL at 22:20

## 2017-06-30 RX ADMIN — FUROSEMIDE 20 MG: 20 TABLET ORAL at 14:29

## 2017-06-30 RX ADMIN — FENTANYL CITRATE 25 MCG: 50 INJECTION, SOLUTION INTRAMUSCULAR; INTRAVENOUS at 11:06

## 2017-06-30 RX ADMIN — OXYCODONE HYDROCHLORIDE AND ACETAMINOPHEN 500 MG: 500 TABLET ORAL at 14:29

## 2017-06-30 RX ADMIN — ATORVASTATIN CALCIUM 40 MG: 40 TABLET, FILM COATED ORAL at 14:28

## 2017-06-30 ASSESSMENT — PAIN DESCRIPTION - DESCRIPTORS
DESCRIPTORS: ACHING

## 2017-06-30 NOTE — PLAN OF CARE
Problem: Goal Outcome Summary  Goal: Goal Outcome Summary  Patient tolerated clear liquid diet, gave Norco with some relief for headache, right groin site intact, alert, pacemaker on the upper left chest, continue to monitor.

## 2017-06-30 NOTE — H&P
H&P from Dr. Li's clinic visit on 6/21/17 reviewed. Following today's exam there are no interval changes.   POLLO Alvares CNP  Electrophysiology Consult Service  Pager: [phone]

## 2017-06-30 NOTE — IP AVS SNAPSHOT
MRN:[id number]                      After Visit Summary   6/30/2017    Bud Merritt    MRN: [id number]           Thank you!     Thank you for choosing [facility] for your care. Our goal is always to provide you with excellent care. Hearing back from our patients is one way we can continue to improve our services. Please take a few minutes to complete the written survey that you may receive in the mail after you visit with us. Thank you!        Patient Information     Date Of Birth          5/9/1927        About your hospital stay     You were admitted on:  June 30, 2017 You last received care in the:  Unit 6D Observation [facility]    You were discharged on:  July 1, 2017       Who to Call     For medical emergencies, please call 911.  For non-urgent questions about your medical care, please call your primary care provider or clinic, [phone]          Attending Provider     Provider Specialty    Khoa Li MD Clinical Cardiac Electrophysiology       Primary Care Provider Office Phone # Fax #    Camron Aragon [phone]      After Care Instructions     Discharge Instructions - Follow up with Device Check RN        Follow up with Device Check RN in 7-10 days.            Discharge Instructions - Keep incision dry for 72 hours       Keep incision dry for 72 hours (unless Derma Wells was applied)                  Your next 10 appointments already scheduled     Aug 02, 2017  3:30 PM CDT   [facility]a Li MD   [facility]    [id number] [place] [id number] [phone]              Further instructions from your care team           Care of groin site:         Remove the Band-Aid after 24 hours. If there is minor oozing, apply another Band-aid and remove it after 12 hours.          Do NOT take a bath, use a hot tub, pool, or submerse in water for at least 3 days You may shower.           It is normal to have a small bruise or lump at the site.         Do not scrub the site.         Do not use lotion or powder near the puncture site for 3 days.         For the first 2 days: Do not stoop or squat. When you cough, sneeze or move your bowels, hold your hand over the puncture site and press gently.         Do not lift more than 10 pounds or exertional activity for 10 days.      If you start bleeding from the site in your groin:  Lie down flat and press firmly on the site.  Call your physician immediately, or, come to the emergency room.    Call 911 right away if you have bleeding that is heavy or does not stop.     Call your doctor/provider if:         You have a large or growing hard lump around the site.         The site is red, swollen, hot or tender.         Blood or fluid is draining from the site.         You have chills or a fever greater than 101 F (38 C).         Your leg or arm turns bluish, feels numb or cool.         You have hives, a rash or unusual itching.     Cardiovascular Clinic:   [address] [place] [address]. [place] [address]  Your Care Team:  EP Cardiology   Telephone Number     Estelita Li [phone]   Dede Morocho RN  Ashley Benitez RN  [phone]     For scheduling appts or procedures:    Na Albrecht   [phone]   For the Device Clinic (Pacemakers and ICD's)   RN's :   Carito Hudson  During business hours: [phone]     After business hours:   [phone]- select option 4 and ask for job code [id number].       As always, Thank you for trusting us with your health care needs          Pending Results     Date and Time Order Name Status Description    6/30/2017 1256 EKG 12-lead, tracing only Preliminary     6/30/2017 0908 EKG 12-lead, tracing only Preliminary             Statement of Approval     Ordered          07/01/17 1043  I have reviewed and agree with all the recommendations and orders detailed in this document.  EFFECTIVE NOW  "    Approved and electronically signed by:  Raymond Head MD             Admission Information     Date & Time Provider Department Dept. Phone    2017 Khoa Li MD Unit 6D Observation [facility] [place] [phone]      Your Vitals Were     Blood Pressure Pulse Temperature Respirations Height Weight    120/108 (BP Location: Right arm) 88 98.1  F (36.7  C) (Oral) 16 1.676 m (5' 6\") 68 kg (150 lb)    Pulse Oximetry BMI (Body Mass Index)                95% 24.21 kg/m2          MyChart Information     [employer] lets you send messages to your doctor, view your test results, renew your prescriptions, schedule appointments and more. To sign up, go to [email or web address] . Click on \"Log in\" on the left side of the screen, which will take you to the Welcome page. Then click on \"Sign up Now\" on the right side of the page.     You will be asked to enter the access code listed below, as well as some personal information. Please follow the directions to create your username and password.     Your access code is: [email or web address]  Expires: 2017 11:55 AM     Your access code will  in 90 days. If you need help or a new code, please call your [facility] clinic or [phone].        Care EveryWhere ID     This is your Care EveryWhere ID. This could be used by other organizations to access your [facility] medical records  [id number]        Equal Access to Services     FATOUMATA CLINE AH: Hadii sammie ku hadasho Soomaali, waaxda luqadaha, qaybta kaalmada mary anneegyada, ronel real . So [facility] [phone].    ATENCIÓN: Si habla español, tiene a mon disposición servicios gratuitos de asistencia lingüística. Llame al [phone].    We comply with applicable federal civil rights laws and [place] laws. We do not discriminate on the basis of race, color, national origin, age, disability sex, sexual orientation or gender identity.               Review of your medicines      CONTINUE these " medicines which may have CHANGED, or have new prescriptions. If we are uncertain of the size of tablets/capsules you have at home, strength may be listed as something that might have changed.        Dose / Directions    fluticasone-vilanterol 100-25 MCG/INH oral inhaler   Commonly known as:  BREO ELLIPTA   This may have changed:  additional instructions   Used for:  COPD exacerbation (H)        Dose:  1 puff   Inhale 1 puff into the lungs daily   Quantity:  60 Inhaler   Refills:  0         CONTINUE these medicines which have NOT CHANGED        Dose / Directions    acetaminophen 650 MG CR tablet   Commonly known as:  TYLENOL        Dose:  650 mg   Take 650 mg by mouth 2 times daily Reported on 4/5/2017   Refills:  0       alendronate 70 MG tablet   Commonly known as:  FOSAMAX   Used for:  Osteopenia        Dose:  70 mg   Take 1 tablet (70 mg) by mouth every 7 days Take with over 8 ounces water and stay upright for at least 30 minutes after dose.  Take at least 60 minutes before breakfast   Quantity:  12 tablet   Refills:  3       apixaban ANTICOAGULANT 2.5 MG tablet   Commonly known as:  ELIQUIS   Used for:  Paroxysmal atrial fibrillation (H)        Dose:  2.5 mg   Take 1 tablet (2.5 mg) by mouth 2 times daily   Quantity:  180 tablet   Refills:  3       ascorbic acid 500 MG Cpcr CR capsule   Commonly known as:  vitamin C        Dose:  500 mg   Take 500 mg by mouth daily   Refills:  0       atorvastatin 40 MG tablet   Commonly known as:  LIPITOR   Used for:  Atherosclerosis of native coronary artery of native heart without angina pectoris        Dose:  40 mg   Take 1 tablet (40 mg) by mouth daily   Quantity:  90 tablet   Refills:  3       calcium carbonate 1250 MG tablet   Commonly known as:  OS-SHELIA 500 mg [place]. Ca        Dose:  600 mg   Take 600 mg by mouth daily   Refills:  0       calcium carbonate 500 MG chewable tablet   Commonly known as:  TUMS        Dose:  1 chew tab   Take 1 chew tab by mouth every 4 hours as  needed for heartburn Reported on 5/3/2017   Refills:  0       dofetilide 125 MCG capsule   Commonly known as:  TIKOSYN   Used for:  Paroxysmal atrial fibrillation (H)        Dose:  125 mcg   Take 1 capsule (125 mcg) by mouth 2 times daily   Quantity:  60 capsule   Refills:  5       FLOMAX 0.4 MG capsule   Generic drug:  tamsulosin        Dose:  0.4 mg   Take 1 capsule (0.4 mg) by mouth 2 times daily   Quantity:  60 capsule   Refills:  0       furosemide 20 MG tablet   Commonly known as:  LASIX   Used for:  Acute on chronic systolic congestive heart failure (H)        Take one tab in the afternoon   Quantity:  90 tablet   Refills:  1       HYDROcodone-acetaminophen 5-325 MG per tablet   Commonly known as:  NORCO   Used for:  Open wound        TAKE 1 TABLET BY MOUTH EVERY 6 HOURS AS NEEDED FOR MODERATE TO SEVERE PAIN   Quantity:  30 tablet   Refills:  0       leflunomide 10 MG tablet   Commonly known as:  ARAVA   Used for:  Rheumatoid arthritis involving multiple sites with positive rheumatoid factor (H)        Dose:  10 mg   Take 1 tablet (10 mg) by mouth daily   Quantity:  30 tablet   Refills:  11       lisinopril 10 MG tablet   Commonly known as:  PRINIVIL/ZESTRIL   Used for:  Acute on chronic systolic congestive heart failure (H)        Dose:  10 mg   Take 1 tablet (10 mg) by mouth 2 times daily   Quantity:  62 tablet   Refills:  11       LUPRON DEPOT IM        Inject into the muscle every 4 months Reported on 5/3/2017   Refills:  0       metoprolol 50 MG tablet   Commonly known as:  LOPRESSOR   Used for:  Atrial fibrillation with rapid ventricular response (H)        Dose:  50 mg   Take 1 tablet (50 mg) by mouth 2 times daily   Quantity:  60 tablet   Refills:  1       predniSONE 5 MG tablet   Commonly known as:  DELTASONE   Used for:  Osteopenia        Dose:  5 mg   Take 1 tablet (5 mg) by mouth daily   Quantity:  100 tablet   Refills:  4       spironolactone 25 MG tablet   Commonly known as:  ALDACTONE   Used  for:  Cardiomyopathy, nonischemic (H)        Dose:  25 mg   Take 1 tablet (25 mg) by mouth daily   Quantity:  30 tablet   Refills:  11       traMADol 50 MG tablet   Commonly known as:  ULTRAM   Used for:  Rheumatoid arthritis involving multiple sites, unspecified rheumatoid factor presence (H)        TAKE 1 TABLET 3 TIMES DAILY AS NEEDED FOR MODERATE PAIN   Quantity:  90 tablet   Refills:  0       VITAMIN D (CHOLECALCIFEROL) PO        Dose:  400 Units   Take 400 Units by mouth daily Reported on 5/3/2017   Refills:  0                Protect others around you: Learn how to safely use, store and throw away your medicines at www.disposemymeds.org.             Medication List: This is a list of all your medications and when to take them. Check marks below indicate your daily home schedule. Keep this list as a reference.      Medications           Morning Afternoon Evening Bedtime As Needed    acetaminophen 650 MG CR tablet   Commonly known as:  TYLENOL   Take 650 mg by mouth 2 times daily Reported on 4/5/2017                                alendronate 70 MG tablet   Commonly known as:  FOSAMAX   Take 1 tablet (70 mg) by mouth every 7 days Take with over 8 ounces water and stay upright for at least 30 minutes after dose.  Take at least 60 minutes before breakfast                                apixaban ANTICOAGULANT 2.5 MG tablet   Commonly known as:  ELIQUIS   Take 1 tablet (2.5 mg) by mouth 2 times daily   Last time this was given:  2.5 mg on 7/1/2017 10:18 AM                                ascorbic acid 500 MG Cpcr CR capsule   Commonly known as:  vitamin C   Take 500 mg by mouth daily                                atorvastatin 40 MG tablet   Commonly known as:  LIPITOR   Take 1 tablet (40 mg) by mouth daily   Last time this was given:  40 mg on 7/1/2017 10:17 AM                                calcium carbonate 1250 MG tablet   Commonly known as:  OS-SHELIA 500 mg [place]. Ca   Take 600 mg by mouth daily                                 calcium carbonate 500 MG chewable tablet   Commonly known as:  TUMS   Take 1 chew tab by mouth every 4 hours as needed for heartburn Reported on 5/3/2017                                dofetilide 125 MCG capsule   Commonly known as:  TIKOSYN   Take 1 capsule (125 mcg) by mouth 2 times daily   Last time this was given:  125 mcg on 7/1/2017 10:18 AM                                FLOMAX 0.4 MG capsule   Take 1 capsule (0.4 mg) by mouth 2 times daily   Last time this was given:  0.4 mg on 7/1/2017 10:17 AM   Generic drug:  tamsulosin                                fluticasone-vilanterol 100-25 MCG/INH oral inhaler   Commonly known as:  BREO ELLIPTA   Inhale 1 puff into the lungs daily                                furosemide 20 MG tablet   Commonly known as:  LASIX   Take one tab in the afternoon   Last time this was given:  20 mg on 6/30/2017  2:29 PM                                HYDROcodone-acetaminophen 5-325 MG per tablet   Commonly known as:  NORCO   TAKE 1 TABLET BY MOUTH EVERY 6 HOURS AS NEEDED FOR MODERATE TO SEVERE PAIN   Last time this was given:  1 tablet on 6/30/2017  2:29 PM                                leflunomide 10 MG tablet   Commonly known as:  ARAVA   Take 1 tablet (10 mg) by mouth daily   Last time this was given:  10 mg on 7/1/2017 10:18 AM                                lisinopril 10 MG tablet   Commonly known as:  PRINIVIL/ZESTRIL   Take 1 tablet (10 mg) by mouth 2 times daily   Last time this was given:  10 mg on 7/1/2017 10:17 AM                                LUPRON DEPOT IM   Inject into the muscle every 4 months Reported on 5/3/2017                                metoprolol 50 MG tablet   Commonly known as:  LOPRESSOR   Take 1 tablet (50 mg) by mouth 2 times daily   Last time this was given:  50 mg on 7/1/2017 10:17 AM                                predniSONE 5 MG tablet   Commonly known as:  DELTASONE   Take 1 tablet (5 mg) by mouth daily   Last time this was given:  5 mg  on 7/1/2017 10:18 AM                                spironolactone 25 MG tablet   Commonly known as:  ALDACTONE   Take 1 tablet (25 mg) by mouth daily   Last time this was given:  25 mg on 7/1/2017 10:17 AM                                traMADol 50 MG tablet   Commonly known as:  ULTRAM   TAKE 1 TABLET 3 TIMES DAILY AS NEEDED FOR MODERATE PAIN   Last time this was given:  50 mg on 7/1/2017  3:49 AM                                VITAMIN D (CHOLECALCIFEROL) PO   Take 400 Units by mouth daily Reported on 5/3/2017

## 2017-06-30 NOTE — IP AVS SNAPSHOT
Unit 6D Observation [address] [address]    Phone:  [phone]    Fax:  [phone]                                       After Visit Summary   6/30/2017    Bud Merritt    MRN: [id number]           After Visit Summary Signature Page     I have received my discharge instructions, and my questions have been answered. I have discussed any challenges I see with this plan with the nurse or doctor.    ..........................................................................................................................................  Patient/Patient Representative Signature      ..........................................................................................................................................  Patient Representative Print Name and Relationship to Patient    ..................................................               ................................................  Date                                            Time    ..........................................................................................................................................  Reviewed by Signature/Title    ...................................................              ..............................................  Date                                                            Time

## 2017-06-30 NOTE — OP NOTE
EP PROCEDURE NOTE    Procedures:  1. AVN Ablation.  2. Pacemaker Reprogramming    Cardiologist: Dr. Li  EP Fellow: Dr. Merritt  Procedure Date: 6/30/2017    Pre-operative Diagnosis:  Atrial fibrillation with RVR and Atrial Tachycardia with RVR  Post-operative diagnosis:  AVN Ablation in the setting of   Complications: None.  Fluoroscopy time/dose: 3.3 minutes,  21 mGy.      Clinical Profile:  89 yo male s/p Bi-V ppm, paroxysmal arial fibrillation, syncope, and atrial tachycardias who presents for an AVN ablation.     PROCEDURE  The risks and benefits of the procedure were explained to the patient in full.  The risks include, but are not limited to: pain, bleeding, blood transfusion reactions, life threatening arrhythmia, dissection of vessels, cardiac perforation, pericardial effusion, need for permanent pacemaker implantation due to AV block, and death. Informed Consent was obtained. The patient was brought to the EP lab in a fasting and hemodynamically stable condition. The patient was prepped and draped in a sterile fashion.   Sedation: This procedure was performed under moderate sedation under the supervision of the the Staff Physician. The patient received 1 mg Versed and 50 mcg Fentanyl for a total procedural sedation time of 52 minutes. Heart rate, blood pressure, oxygen saturation, and patient responses were monitored throughout the procedure with the assistance of the RN.     Sheaths and Catheters:  After local anesthesia with 2% lidocaine, vascular access was obtained using the modified Seldinger technique for the following access points:     Right Femoral Vein:  - 8Fr sheath:  through which an EPT Blazer II 8mm Tip standard curve mapping and ablation catheter was positioned into the RA.      Mapping and Ablation:  The ablation catheter was carefully placed into the AVN position under fluoroscopy to avoid the pacemaker wires. At this point we found the His signal, and then identified a portion with  larger atrial signals, a small His, and V. 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 We waited 20 minutes until with ongoing BiV pacing and atrial pacing and no conduction.     The Device programming was evaluated, the capture thresholds were unchanged, but there was no ventricular escape for measuring R waves. Given the fact he was not always tachycardic, but only during episodes of a-fib we set the device to DDDR 70.    The procedure was then terminated.  The catheters were removed, and the sheaths pulled with manual pressure applied.  The patient was then transferred back to a monitored bed.         ASSESSMENT / PLAN:  Successful AVN ablation.    1. Monitor overnight.    Mateo Merritt M.D.  Cardiac Electrophysiology Fellow

## 2017-06-30 NOTE — PLAN OF CARE
Problem: Goal Outcome Summary  Goal: Goal Outcome Summary  Right groin site intact, no drainage, no bleeding , no hematoma noted, patient hungry, ordered clear liquid, offered some juice.

## 2017-06-30 NOTE — PROGRESS NOTES
1010  Prep is complete for procedure.  Labs, H&P, & consent are all complete.  Christopher did take his Eliqus this AM, 6/30/17, at 0430.  He has bilateral leg dressings in place that need to be changed today.  His wife has brought dressing supplies with them.  Pt has generalized arthritis pain that bothers him in all his joints and in his back. He states that his joint pain is moderate in nature now. Wife, Tierra, is here with , Vashti.  They will be waiting in the [facility] Waiting Room.  He also has pacemaker in place in Left upper chest.  CTRN

## 2017-06-30 NOTE — DISCHARGE INSTRUCTIONS
Care of groin site:         Remove the Band-Aid after 24 hours. If there is minor oozing, apply another Band-aid and remove it after 12 hours.          Do NOT take a bath, use a hot tub, pool, or submerse in water for at least 3 days You may shower.          It is normal to have a small bruise or lump at the site.         Do not scrub the site.         Do not use lotion or powder near the puncture site for 3 days.         For the first 2 days: Do not stoop or squat. When you cough, sneeze or move your bowels, hold your hand over the puncture site and press gently.         Do not lift more than 10 pounds or exertional activity for 10 days.      If you start bleeding from the site in your groin:  Lie down flat and press firmly on the site.  Call your physician immediately, or, come to the emergency room.    Call 911 right away if you have bleeding that is heavy or does not stop.     Call your doctor/provider if:         You have a large or growing hard lump around the site.         The site is red, swollen, hot or tender.         Blood or fluid is draining from the site.         You have chills or a fever greater than 101 F (38 C).         Your leg or arm turns bluish, feels numb or cool.         You have hives, a rash or unusual itching.     Cardiovascular Clinic:   [address] [place] [address]. [place] [address]  Your Care Team:  EP Cardiology   Telephone Number     Estelita Li [phone]   Dede Benitez RN  [phone]     For scheduling appts or procedures:    Na Albrecht   [phone]   For the Device Clinic (Pacemakers and ICD's)   RN's :   Carito Hudson  During business hours: [phone]     After business hours:   [phone]- select option 4 and ask for job code [id number].       As always, Thank you for trusting us with your health care needs

## 2017-07-01 VITALS
TEMPERATURE: 98.1 F | WEIGHT: 150 LBS | HEART RATE: 88 BPM | SYSTOLIC BLOOD PRESSURE: 100 MMHG | OXYGEN SATURATION: 95 % | DIASTOLIC BLOOD PRESSURE: 55 MMHG | RESPIRATION RATE: 16 BRPM | BODY MASS INDEX: 24.11 KG/M2 | HEIGHT: 66 IN

## 2017-07-01 PROCEDURE — 25000132 ZZH RX MED GY IP 250 OP 250 PS 637: Mod: GY | Performed by: INTERNAL MEDICINE

## 2017-07-01 PROCEDURE — 25000132 ZZH RX MED GY IP 250 OP 250 PS 637: Mod: GY | Performed by: NURSE PRACTITIONER

## 2017-07-01 PROCEDURE — 25000125 ZZHC RX 250: Performed by: NURSE PRACTITIONER

## 2017-07-01 PROCEDURE — 99213 OFFICE O/P EST LOW 20 MIN: CPT | Mod: GC | Performed by: INTERNAL MEDICINE

## 2017-07-01 PROCEDURE — 25000128 H RX IP 250 OP 636: Performed by: NURSE PRACTITIONER

## 2017-07-01 PROCEDURE — A9270 NON-COVERED ITEM OR SERVICE: HCPCS | Mod: GY | Performed by: NURSE PRACTITIONER

## 2017-07-01 RX ADMIN — PREDNISONE 5 MG: 5 TABLET ORAL at 10:18

## 2017-07-01 RX ADMIN — LEFLUNOMIDE 10 MG: 10 TABLET, FILM COATED ORAL at 10:18

## 2017-07-01 RX ADMIN — TRAMADOL HYDROCHLORIDE 50 MG: 50 TABLET, COATED ORAL at 03:49

## 2017-07-01 RX ADMIN — APIXABAN 2.5 MG: 2.5 TABLET, FILM COATED ORAL at 10:18

## 2017-07-01 RX ADMIN — LISINOPRIL 10 MG: 10 TABLET ORAL at 10:17

## 2017-07-01 RX ADMIN — SODIUM CHLORIDE: 9 INJECTION, SOLUTION INTRAVENOUS at 03:49

## 2017-07-01 RX ADMIN — METOPROLOL TARTRATE 50 MG: 50 TABLET, FILM COATED ORAL at 10:17

## 2017-07-01 RX ADMIN — OXYCODONE HYDROCHLORIDE AND ACETAMINOPHEN 500 MG: 500 TABLET ORAL at 10:18

## 2017-07-01 RX ADMIN — TAMSULOSIN HYDROCHLORIDE 0.4 MG: 0.4 CAPSULE ORAL at 10:17

## 2017-07-01 RX ADMIN — DOFETILIDE 125 MCG: 0.12 CAPSULE ORAL at 10:18

## 2017-07-01 RX ADMIN — ATORVASTATIN CALCIUM 40 MG: 40 TABLET, FILM COATED ORAL at 10:17

## 2017-07-01 RX ADMIN — SPIRONOLACTONE 25 MG: 25 TABLET ORAL at 10:17

## 2017-07-01 ASSESSMENT — PAIN DESCRIPTION - DESCRIPTORS: DESCRIPTORS: ACHING

## 2017-07-01 NOTE — PROGRESS NOTES
"Right groin CDI, band aid in place.No hematoma noted. Ultram administered for headache, with some relief. Using urinal adequately. S/P ablation and pacemaker reprogramming.Will be seen bt device nurse this AM. Blood pressure 99/73, pulse 88, temperature 98.1  F (36.7  C), temperature source Oral, resp. rate 18, height 1.676 m (5' 6\"), weight 68 kg (150 lb), SpO2 95 %.    Will continue to monitor overnight.  "

## 2017-07-01 NOTE — PROGRESS NOTES
S/p AVN ablation, pacemaker reprogramming. Groin site CDI, no hematoma noted. Vss. Pt denies pain. Pt ate, voided. Pt will be walked in the mo per pt's requested. Will continue to monitor.

## 2017-07-01 NOTE — DISCHARGE SUMMARY
"  Electrophysiology Discharge Summary  Date of Procedure: 6/30/17  DOS: 7/1/2017   Admission Diagnosis:   Atrial fibrillation with RVR and Atrial Tachycardia with RVR  Discharge Diagnosis:   S/P successful AVN ablation    Hospital Course:  89 yo male s/p Bi-V ppm, paroxysmal arial fibrillation, syncope, and atrial tachycardias.Patient underwent a successful AVN ablation yesterday. He had an uneventful overnight stay. Telemetry reviewed showing BiV pacing. Device interrogation shows normal device function and stable lead parameters. Right groin site has no bleeding, and no hematoma. Patient is tolerating oral intake, ambulating at baseline, and voiding without difficulties. Patient remains hemodynamically stable.     ROS:   10 point ROS neg other than the symptoms noted above.   Exam:  BP (!) 120/108 (BP Location: Right arm)  Pulse 88  Temp 98.1  F (36.7  C) (Oral)  Resp 16  Ht 1.676 m (5' 6\")  Wt 68 kg (150 lb)  SpO2 95%  BMI 24.21 kg/m2    Constitutional: healthy, alert and no distress  Head: Normocephalic. No masses, lesions, tenderness or abnormalities  Neck: Neck supple. No adenopathy. Thyroid symmetric, normal size,, Carotids without bruits.  ENT: ENT exam normal, no neck nodes or sinus tenderness  Cardiovascular: negative, PMI normal. No lifts, heaves, or thrills. RRR. No murmurs, clicks gallops or rub  Respiratory: negative, Percussion normal. Good diaphragmatic excursion. Lungs clear  Gastrointestinal: Abdomen soft, non-tender. BS normal. No masses, organomegaly  : Deferred  Musculoskeletal: extremities normal- no gross deformities noted, gait normal and normal muscle tone  Skin: no suspicious lesions or rashes  Neurologic: Gait normal. Reflexes normal and symmetric. Sensation grossly WNL.  Psychiatric: mentation appears normal and affect normal/bright  Hematologic/Lymphatic/Immunologic: Normal cervical lymph nodes    Discharge Medications:   Bud Merritt   Home Medication Instructions " RAUL:[id number]    Printed on:07/01/17 1150   Medication Information                      acetaminophen (TYLENOL) 650 MG CR tablet  Take 650 mg by mouth 2 times daily Reported on 4/5/2017             alendronate (FOSAMAX) 70 MG tablet  Take 1 tablet (70 mg) by mouth every 7 days Take with over 8 ounces water and stay upright for at least 30 minutes after dose.  Take at least 60 minutes before breakfast             apixaban ANTICOAGULANT (ELIQUIS) 2.5 MG tablet  Take 1 tablet (2.5 mg) by mouth 2 times daily             ascorbic acid (VITAMIN C) 500 MG CPCR  Take 500 mg by mouth daily             atorvastatin (LIPITOR) 40 MG tablet  Take 1 tablet (40 mg) by mouth daily             calcium carbonate (OS-SHELIA 500 MG [place]. [place]) 500 MG tablet  Take 600 mg by mouth daily             calcium carbonate (TUMS) 500 MG chewable tablet  Take 1 chew tab by mouth every 4 hours as needed for heartburn Reported on 5/3/2017             dofetilide (TIKOSYN) 125 MCG capsule  Take 1 capsule (125 mcg) by mouth 2 times daily             fluticasone-vilanterol (BREO ELLIPTA) 100-25 MCG/INH oral inhaler  Inhale 1 puff into the lungs daily             furosemide (LASIX) 20 MG tablet  Take one tab in the afternoon             HYDROcodone-acetaminophen (NORCO) 5-325 MG per tablet  TAKE 1 TABLET BY MOUTH EVERY 6 HOURS AS NEEDED FOR MODERATE TO SEVERE PAIN             leflunomide (ARAVA) 10 MG tablet  Take 1 tablet (10 mg) by mouth daily             Leuprolide Acetate (LUPRON DEPOT IM)  Inject into the muscle every 4 months Reported on 5/3/2017             lisinopril (PRINIVIL,ZESTRIL) 10 MG tablet  Take 1 tablet (10 mg) by mouth 2 times daily             metoprolol (LOPRESSOR) 50 MG tablet  Take 1 tablet (50 mg) by mouth 2 times daily             predniSONE (DELTASONE) 5 MG tablet  Take 1 tablet (5 mg) by mouth daily             spironolactone (ALDACTONE) 25 MG tablet  Take 1 tablet (25 mg) by mouth daily             tamsulosin (FLOMAX) 0.4 MG 24  hr capsule  Take 1 capsule (0.4 mg) by mouth 2 times daily             traMADol (ULTRAM) 50 MG tablet  TAKE 1 TABLET 3 TIMES DAILY AS NEEDED FOR MODERATE PAIN             VITAMIN D, CHOLECALCIFEROL, PO  Take 400 Units by mouth daily Reported on 5/3/2017                 Plan & Follow up:      Follow up with device clinic and Dr. Li    Device setting at DDD 70 bpm.    No lifting > 10lbs for 1 week    Notify device clinic/EP immediately for any redness, swelling, bleeding, discharge, fevers or chills.    Discharge to home.       Raymond Head  EP fellow    I very much appreciated the opportunity to see and assess Mr Bud Merritt in the hospital with CV EP Fellow Dr Head . He tolerated procedure and is ready for discharge. Follow-up arranged    I agree with the note above which summarizes my findings and current recommendations.  Please do not hesitate to contact my office if you have any questions or concerns.      Jerome Haider MD  Cardiac Arrhythmia Service  [facility]  [phone]

## 2017-07-01 NOTE — PROGRESS NOTES
VSS. Right groin site is clean, dry, intact, no bleeding, no hematoma. Patient denies pain, denies chest pain, denies SOB. Patient had device interrogation by [employer] rep this AM. Patient refused dressing changes on legs, stating the home care nurse would do it when he got home. PIV removed. Tele removed, tele tech notified. Patient tolerating regular diet. Patient is ambulating with stand by assist. All discharge instructions reviewed with patient and patient is agreeable to plan. Patient states no further questions. Patient will discharge home with his wife. All belongings sent with patient. Patient left unit in wheelchair pushed by wife.

## 2017-07-03 ENCOUNTER — TELEPHONE (OUTPATIENT)
Dept: CARDIOLOGY | Facility: CLINIC | Age: 82
End: 2017-07-03

## 2017-07-03 NOTE — TELEPHONE ENCOUNTER
Called and left a message on Rachel's personal voice mail. Advised her that there is no device nurse in [place] the week in question and she would have to schedule the follow up at the [place].   Left the scheduling phone number for [phone].

## 2017-07-03 NOTE — TELEPHONE ENCOUNTER
[facility] Call Center    Phone Message    Name of Caller: GALLO FLOYD and nurse KIRK who was at his home ([phone])  Phone Number: Home number on file [phone] (home)    Best time to return call: TODAY    May a detailed message be left on voicemail: yes    Relation to patient: Self    Reason for Call: Other: Pt needs packmaker device check the week of July 10th. Schedule only went out to July 5th.  Please call patient back to schedule.      Action Taken: Message routed to:  Adult Clinics: Cardiology p [phone]

## 2017-07-05 LAB
INTERPRETATION ECG - MUSE: NORMAL
INTERPRETATION ECG - MUSE: NORMAL

## 2017-07-11 DIAGNOSIS — M06.9 RHEUMATOID ARTHRITIS INVOLVING MULTIPLE SITES, UNSPECIFIED RHEUMATOID FACTOR PRESENCE: ICD-10-CM

## 2017-07-12 RX ORDER — TRAMADOL HYDROCHLORIDE 50 MG/1
TABLET ORAL
Qty: 90 TABLET | Refills: 0 | Status: SHIPPED | OUTPATIENT
Start: 2017-07-12 | End: 2017-08-15

## 2017-07-14 ENCOUNTER — CARE COORDINATION (OUTPATIENT)
Dept: CARE COORDINATION | Facility: CLINIC | Age: 82
End: 2017-07-14

## 2017-07-14 NOTE — PROGRESS NOTES
Clinic Care Coordination Contact    Situation: Patient chart reviewed by care coordinator.    Background: RN CC monitoring chart for discharge from home care.     Assessment: Pt was recently admitted to the hospital for atrial tachycardia, home care was resumed upon discharge for chronic disease management and wound care for an additional 8 weeks    Plan/Recommendations: RN CC will continue to monitor for discharge from home, will touch base with HC RN monthly on progress    Yumiko Hallman RN, BSN  Care Coordination    [address] [address]  Office: [phone]  Fax [phone]   [email or web address]   [email or web address]     Connect with [facility] on social media.

## 2017-07-17 ENCOUNTER — TELEPHONE (OUTPATIENT)
Dept: FAMILY MEDICINE | Facility: CLINIC | Age: 82
End: 2017-07-17

## 2017-07-17 DIAGNOSIS — D64.9 LOW HEMOGLOBIN: Primary | ICD-10-CM

## 2017-07-17 NOTE — TELEPHONE ENCOUNTER
Reason for Call: Request for an order or referral:    Order or referral being requested: Hemaglobin    Date needed: as soon as possible    Has the patient been seen by the PCP for this problem? YES    Additional comments: Pt was told to have his hemaglobin in a month, that would be 7/22/17. They want to do it on Thursday or Friday. Can you place that order for him? Please call him and advise.     Phone number Patient can be reached at:  Other phone number:      Best Time:  anytime    Can we leave a detailed message on this number?  YES    Call taken on 7/17/2017 at 11:49 AM by Marcella Walter

## 2017-07-20 DIAGNOSIS — I25.10 ATHEROSCLEROSIS OF NATIVE CORONARY ARTERY OF NATIVE HEART WITHOUT ANGINA PECTORIS: ICD-10-CM

## 2017-07-20 DIAGNOSIS — E78.5 HYPERLIPIDEMIA LDL GOAL <130: Primary | ICD-10-CM

## 2017-07-20 RX ORDER — ATORVASTATIN CALCIUM 40 MG/1
40 TABLET, FILM COATED ORAL DAILY
Qty: 90 TABLET | Refills: 2 | Status: SHIPPED | OUTPATIENT
Start: 2017-07-20 | End: 2018-02-23

## 2017-07-20 NOTE — TELEPHONE ENCOUNTER
atorvastatin     Last Written Prescription Date: 07/01/16  Last Fill Quantity: 90, # refills: 3  Last Office Visit with G, P or [facility] prescribing provider: 6/22/17  Next 5 appointments (look out 90 days)     Aug 02, 2017  3:30 PM CDT   Return Visit with Khoa Li MD   [facility])    [address] [place] [id number]   [phone]            Aug 02, 2017  4:00 PM CDT   [facility] Visit with DEVICE CHECK RN CARD   [facility]    [address] [place] [id number]   [phone]                   Lab Results   Component Value Date    CHOL 139 03/06/2017     Lab Results   Component Value Date    HDL 34 03/06/2017     Lab Results   Component Value Date    LDL 74 03/06/2017     Lab Results   Component Value Date    TRIG 156 03/06/2017     Lab Results   Component Value Date    CHOLHDLRATIO 3.0 08/02/2013

## 2017-07-20 NOTE — TELEPHONE ENCOUNTER
Prescription approved per [facility].........VIRGEN Johnson

## 2017-07-21 DIAGNOSIS — D64.9 LOW HEMOGLOBIN: ICD-10-CM

## 2017-07-21 LAB
BASOPHILS # BLD AUTO: 0 10E9/L (ref 0–0.2)
BASOPHILS NFR BLD AUTO: 0.7 %
DIFFERENTIAL METHOD BLD: ABNORMAL
EOSINOPHIL # BLD AUTO: 0.1 10E9/L (ref 0–0.7)
EOSINOPHIL NFR BLD AUTO: 1.2 %
ERYTHROCYTE [DISTWIDTH] IN BLOOD BY AUTOMATED COUNT: 15 % (ref 10–15)
HCT VFR BLD AUTO: 33.6 % (ref 40–53)
HGB BLD-MCNC: 10.4 G/DL (ref 13.3–17.7)
IMM GRANULOCYTES # BLD: 0 10E9/L (ref 0–0.4)
IMM GRANULOCYTES NFR BLD: 0.2 %
LYMPHOCYTES # BLD AUTO: 1.1 10E9/L (ref 0.8–5.3)
LYMPHOCYTES NFR BLD AUTO: 19.9 %
MCH RBC QN AUTO: 29.5 PG (ref 26.5–33)
MCHC RBC AUTO-ENTMCNC: 31 G/DL (ref 31.5–36.5)
MCV RBC AUTO: 95 FL (ref 78–100)
MONOCYTES # BLD AUTO: 0.9 10E9/L (ref 0–1.3)
MONOCYTES NFR BLD AUTO: 15.8 %
NEUTROPHILS # BLD AUTO: 3.5 10E9/L (ref 1.6–8.3)
NEUTROPHILS NFR BLD AUTO: 62.2 %
PLATELET # BLD AUTO: 267 10E9/L (ref 150–450)
RBC # BLD AUTO: 3.53 10E12/L (ref 4.4–5.9)
WBC # BLD AUTO: 5.7 10E9/L (ref 4–11)

## 2017-07-21 PROCEDURE — 36415 COLL VENOUS BLD VENIPUNCTURE: CPT | Performed by: FAMILY MEDICINE

## 2017-07-21 PROCEDURE — 85025 COMPLETE CBC W/AUTO DIFF WBC: CPT | Performed by: FAMILY MEDICINE

## 2017-07-24 ENCOUNTER — TELEPHONE (OUTPATIENT)
Dept: CARDIOLOGY | Facility: CLINIC | Age: 82
End: 2017-07-24

## 2017-07-24 NOTE — TELEPHONE ENCOUNTER
[facility] Call Center    Phone Message    Name of Caller: Rachel    Phone Number: Other phone number:  [phone]    Best time to return call: Any    May a detailed message be left on voicemail: yes    Relation to patient: Other Name: Rachel  Relationship: Home Care Nurse  Is there legal documentation in chart to discuss information with this person: NA      Reason for Call: This morning Alvaro reported dizziness,  light headedness, head too heavy for body, general weakness and body aches.  While doing wound care Rachel left the oximeter on Alvaro.  Alvaro had a moment where he reported feeling horrible, sat back in the chair funny, his O2 dropped to 88 and pulse was 48.  The episode lasted for about 30 seconds then O2 and pulse returned to normal.  Requesting a call to discuss.  Thank you.     Action Taken: Message routed to:  Adult Clinics: Cardiology p [phone]

## 2017-07-24 NOTE — TELEPHONE ENCOUNTER
"Spoke with Rachel, patient's home health nurse. She is calling to see if we can look up patients device and see if any thing significant happened today between 8:45-9:15 am . Patient told Rachel \"here it comes\" and became dizzy and lightheaded. Symptoms passed in about half a minute. Has a device check and follow up appointment with Dr Li next Wed.   Left message at Device clinic asking them to review patients pacemaker report.    "

## 2017-07-25 ENCOUNTER — TELEPHONE (OUTPATIENT)
Dept: NURSING | Facility: CLINIC | Age: 82
End: 2017-07-25

## 2017-07-25 ENCOUNTER — ALLIED HEALTH/NURSE VISIT (OUTPATIENT)
Dept: CARDIOLOGY | Facility: CLINIC | Age: 82
End: 2017-07-25
Attending: INTERNAL MEDICINE
Payer: MEDICARE

## 2017-07-25 DIAGNOSIS — I42.0 DILATED CARDIOMYOPATHY (H): Primary | ICD-10-CM

## 2017-07-25 PROCEDURE — 93296 REM INTERROG EVL PM/IDS: CPT | Mod: ZF

## 2017-07-25 PROCEDURE — 93294 REM INTERROG EVL PM/LDLS PM: CPT | Performed by: INTERNAL MEDICINE

## 2017-07-25 NOTE — TELEPHONE ENCOUNTER
"Called and spoke to wife. Remote device check showed episodes of afib hours before 9:00 am. Arlene in device clinic thinks his clock may be off sync. Will let Dr Li know he is still having episodes. Encouraged wife to speak with patient and remind him that if he does feel dizziness coming on, he should sit down. She states that he does, but he does not listen to her about him slowing down. 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  He has appt next Wed and Dr Li will review his symptoms and recommendations at that time with him.   "

## 2017-07-25 NOTE — PROGRESS NOTES
Pt sent in a remote pacemaker check for evaluation per MD order.  Pt sent the transmission because he felt terrible yesterday morning.  His pacemaker check shows that he had atrial fibrillation yesterday at the time that he did not feel well.  He is pacemaker dependent and so his heart rates were well controlled by the pacemaker and he does take Eliquis for anticoagulation. His AF burden is <0.5% of the time.  He was not in atrial fibrillation at the time of the transmission.  He RV paces 99.3% of the time, his heart rate histograms show minimal heart rate variation and his pacemaker battery estimates 6 years left.  We will plan to see him in clinic next week when he returns to see Dr Li in [place].    Remote pacemaker

## 2017-07-25 NOTE — MR AVS SNAPSHOT
"              After Visit Summary   7/25/2017    Bud Merritt    MRN: [id number]           Patient Information     Date Of Birth          5/9/1927        Visit Information        Provider Department      7/25/2017 6:00 AM UC ICD REMOTE [facility]        Today's Diagnoses     Dilated cardiomyopathy (H) dilated LV, EF 20-25% by Echo 3/4/17    -  1       Follow-ups after your visit        Your next 10 appointments already scheduled     Aug 02, 2017  3:30 PM CDT   [facility]    [address] [place] [id number] [phone]            Aug 02, 2017  4:00 PM CDT   Return Visit with DEVICE CHECK RN CARD   [facility])    [address] [place] [id number] [phone]              Who to contact     If you have questions or need follow up information about today's clinic visit or your schedule please contact [facility] directly at [phone].  Normal or non-critical lab and imaging results will be communicated to you by [employer], letter or phone within 4 business days after the clinic has received the results. If you do not hear from us within 7 days, please contact the clinic through [employer] or phone. If you have a critical or abnormal lab result, we will notify you by phone as soon as possible.  Submit refill requests through [employer] or call your pharmacy and they will forward the refill request to us. Please allow 3 business days for your refill to be completed.          Additional Information About Your Visit        TwoF Information     [employer]F lets you send messages to your doctor, view your test results, renew your prescriptions, schedule appointments and more. To sign up, go to [email or web address] . Click on \"Log in\" on the left side of the screen, which will take you to the Welcome page. Then click on \"Sign up Now\" on " the right side of the page.     You will be asked to enter the access code listed below, as well as some personal information. Please follow the directions to create your username and password.     Your access code is: [email or web address]  Expires: 2017 11:55 AM     Your access code will  in 90 days. If you need help or a new code, please call your [facility] or [phone].        Care EveryWhere ID     This is your Care EveryWhere ID. This could be used by other organizations to access your [facility] medical records  [id number]         Blood Pressure from Last 3 Encounters:   17 100/55   17 110/70   17 130/75    Weight from Last 3 Encounters:   17 68 kg (150 lb)   17 68.5 kg (151 lb)   17 70.3 kg (154 lb 14.4 oz)              We Performed the Following     INTERROGATION DEVICE EVAL REMOTE, PACER/ICD          Today's Medication Changes          These changes are accurate as of: 17  2:34 PM.  If you have any questions, ask your nurse or doctor.               These medicines have changed or have updated prescriptions.        Dose/Directions    fluticasone-vilanterol 100-25 MCG/INH oral inhaler   Commonly known as:  BREO ELLIPTA   This may have changed:  additional instructions   Used for:  COPD exacerbation (H)        Dose:  1 puff   Inhale 1 puff into the lungs daily   Quantity:  60 Inhaler   Refills:  0                Primary Care Provider Office Phone # Fax #    Camron Aragon [phone]       [facility] [address] [place] [address]        Equal Access to Services     [facility]: Hadii sammie clifton hadciarra S[place]w, waaxda luqadaha, qaybta kaalmaronel grissom. So [facility] [phone].    ATENCIÓN: Si habla español, tiene a mon disposición servicios gratuitos de asistencia lingüística. Jeanette goff [phone].    We comply with applicable federal civil rights laws and [place] laws.  We do not discriminate on the basis of race, color, national origin, age, disability sex, sexual orientation or gender identity.            Thank you!     Thank you for choosing [facility]  for your care. Our goal is always to provide you with excellent care. Hearing back from our patients is one way we can continue to improve our services. Please take a few minutes to complete the written survey that you may receive in the mail after your visit with us. Thank you!             Your Updated Medication List - Protect others around you: Learn how to safely use, store and throw away your medicines at www.disposemymeds.org.          This list is accurate as of: 7/25/17  2:34 PM.  Always use your most recent med list.                   Brand Name Dispense Instructions for use Diagnosis    acetaminophen 650 MG CR tablet    TYLENOL     Take 650 mg by mouth 2 times daily Reported on 4/5/2017        alendronate 70 MG tablet    FOSAMAX    12 tablet    Take 1 tablet (70 mg) by mouth every 7 days Take with over 8 ounces water and stay upright for at least 30 minutes after dose.  Take at least 60 minutes before breakfast    Osteopenia       apixaban ANTICOAGULANT 2.5 MG tablet    ELIQUIS    180 tablet    Take 1 tablet (2.5 mg) by mouth 2 times daily    Paroxysmal atrial fibrillation (H)       ascorbic acid 500 MG Cpcr CR capsule    vitamin C     Take 500 mg by mouth daily        atorvastatin 40 MG tablet    LIPITOR    90 tablet    Take 1 tablet (40 mg) by mouth daily    Hyperlipidemia LDL goal <130       calcium carbonate 1250 MG tablet    OS-SHELIA 500 mg [place]. [place]     Take 600 mg by mouth daily        calcium carbonate 500 MG chewable tablet    TUMS     Take 1 chew tab by mouth every 4 hours as needed for heartburn Reported on 5/3/2017        dofetilide 125 MCG capsule    TIKOSYN    60 capsule    Take 1 capsule (125 mcg) by mouth 2 times daily    Paroxysmal atrial fibrillation (H)       FLOMAX 0.4 MG capsule   Generic  drug:  tamsulosin     60 capsule    Take 1 capsule (0.4 mg) by mouth 2 times daily        fluticasone-vilanterol 100-25 MCG/INH oral inhaler    BREO ELLIPTA    60 Inhaler    Inhale 1 puff into the lungs daily    COPD exacerbation (H)       furosemide 20 MG tablet    LASIX    90 tablet    Take one tab in the afternoon    Acute on chronic systolic congestive heart failure (H)       HYDROcodone-acetaminophen 5-325 MG per tablet    NORCO    30 tablet    TAKE 1 TABLET BY MOUTH EVERY 6 HOURS AS NEEDED FOR MODERATE TO SEVERE PAIN    Open wound       leflunomide 10 MG tablet    ARAVA    30 tablet    Take 1 tablet (10 mg) by mouth daily    Rheumatoid arthritis involving multiple sites with positive rheumatoid factor (H)       lisinopril 10 MG tablet    PRINIVIL/ZESTRIL    62 tablet    Take 1 tablet (10 mg) by mouth 2 times daily    Acute on chronic systolic congestive heart failure (H)       LUPRON DEPOT IM      Inject into the muscle every 4 months Reported on 5/3/2017        metoprolol 50 MG tablet    LOPRESSOR    60 tablet    Take 1 tablet (50 mg) by mouth 2 times daily    Atrial fibrillation with rapid ventricular response (H)       predniSONE 5 MG tablet    DELTASONE    100 tablet    Take 1 tablet (5 mg) by mouth daily    Osteopenia       spironolactone 25 MG tablet    ALDACTONE    30 tablet    Take 1 tablet (25 mg) by mouth daily    Cardiomyopathy, nonischemic (H)       traMADol 50 MG tablet    ULTRAM    90 tablet    TAKE 1 TABLET 3 TIMES DAILY AS NEEDED FOR MODERATE PAIN    Rheumatoid arthritis involving multiple sites, unspecified rheumatoid factor presence (H)       VITAMIN D (CHOLECALCIFEROL) PO      Take 400 Units by mouth daily Reported on 5/3/2017

## 2017-07-25 NOTE — TELEPHONE ENCOUNTER
Spoke with Arlene at Device clinic and she will call patient and ask them to do a remote pacer check today. She will call with any recommendations.

## 2017-07-26 ENCOUNTER — DOCUMENTATION ONLY (OUTPATIENT)
Dept: CARE COORDINATION | Facility: CLINIC | Age: 82
End: 2017-07-26

## 2017-07-26 DIAGNOSIS — I08.0 MITRAL REGURGITATION AND AORTIC STENOSIS: Primary | ICD-10-CM

## 2017-07-26 DIAGNOSIS — R53.1 WEAKNESS GENERALIZED: ICD-10-CM

## 2017-07-26 NOTE — PROGRESS NOTES
"\"[facility] now requests orders and shares plan of care/discharge summaries for some patients through [employer].  Please REPLY TO THIS MESSAGE in order to give authorization for orders when needed.  This is considered a verbal order, you will still receive a faxed copy of orders for signature.  Thank you for your assistance in improving collaboration for our patients.      FYI message  Pt has one fall yesterday from dizziness. Was out in the woods with a friend and was walking with cane. Pt became dizzy and fell backwards landing on coccyx. Pt denies hitting head. Neuro's WNL. Pt continues to have dizziness, feels like motion sickness, fatigue, up set stomach at times. Pt will be seen by Cardiology next week. Pt has had an irregular pulse earlier this week. Pulse was at 70 bpm and would drop to 48. On today's visit HR was 70.    Judit Jarrett RN CM  [phone]     "

## 2017-07-31 DIAGNOSIS — I48.91 ATRIAL FIBRILLATION WITH RAPID VENTRICULAR RESPONSE (H): ICD-10-CM

## 2017-07-31 NOTE — TELEPHONE ENCOUNTER
Metoprolol       Last Written Prescription Date: 6/7/2017  Last Fill Quantity: 60, # refills: 1    Last Office Visit with FMNIYA, [facility] or [facility] prescribing provider:  6/22/2017   Future Office Visit:    Next 5 appointments (look out 90 days)     Aug 02, 2017  3:30 PM CDT   Return Visit with Khoa Li MD   [facility] ([facility])    [address] [place] [id number]   [phone]            Aug 02, 2017  4:00 PM CDT   Return Visit with DEVICE CHECK RN CARD   [facility] ([facility])    [address] [place] [phone]   [phone]                    BP Readings from Last 3 Encounters:   07/01/17 100/55   06/22/17 110/70   06/21/17 130/75

## 2017-08-02 ENCOUNTER — DOCUMENTATION ONLY (OUTPATIENT)
Dept: CARE COORDINATION | Facility: CLINIC | Age: 82
End: 2017-08-02

## 2017-08-02 ENCOUNTER — OFFICE VISIT (OUTPATIENT)
Dept: CARDIOLOGY | Facility: CLINIC | Age: 82
End: 2017-08-02
Payer: COMMERCIAL

## 2017-08-02 VITALS
SYSTOLIC BLOOD PRESSURE: 111 MMHG | WEIGHT: 150.1 LBS | HEART RATE: 74 BPM | BODY MASS INDEX: 24.23 KG/M2 | DIASTOLIC BLOOD PRESSURE: 70 MMHG | OXYGEN SATURATION: 97 %

## 2017-08-02 DIAGNOSIS — I83.019 VENOUS STASIS ULCERS OF BOTH LOWER EXTREMITIES (H): ICD-10-CM

## 2017-08-02 DIAGNOSIS — I97.89 SURGICAL COMPLETE HEART BLOCK (H): ICD-10-CM

## 2017-08-02 DIAGNOSIS — I44.2 SURGICAL COMPLETE HEART BLOCK (H): ICD-10-CM

## 2017-08-02 DIAGNOSIS — I35.0 SEVERE AORTIC STENOSIS: Primary | ICD-10-CM

## 2017-08-02 DIAGNOSIS — L97.919 VENOUS STASIS ULCERS OF BOTH LOWER EXTREMITIES (H): ICD-10-CM

## 2017-08-02 DIAGNOSIS — I42.0 DILATED CARDIOMYOPATHY (H): Primary | ICD-10-CM

## 2017-08-02 DIAGNOSIS — I48.91 ATRIAL FIBRILLATION WITH RAPID VENTRICULAR RESPONSE (H): ICD-10-CM

## 2017-08-02 DIAGNOSIS — Z95.0 CARDIAC PACEMAKER IN SITU: ICD-10-CM

## 2017-08-02 DIAGNOSIS — I83.029 VENOUS STASIS ULCERS OF BOTH LOWER EXTREMITIES (H): ICD-10-CM

## 2017-08-02 DIAGNOSIS — L97.929 VENOUS STASIS ULCERS OF BOTH LOWER EXTREMITIES (H): ICD-10-CM

## 2017-08-02 DIAGNOSIS — I51.9 SEVERE LEFT VENTRICULAR SYSTOLIC DYSFUNCTION: ICD-10-CM

## 2017-08-02 PROCEDURE — 99215 OFFICE O/P EST HI 40 MIN: CPT | Mod: 25 | Performed by: INTERNAL MEDICINE

## 2017-08-02 PROCEDURE — 93288 INTERROG EVL PM/LDLS PM IP: CPT | Mod: 26 | Performed by: INTERNAL MEDICINE

## 2017-08-02 RX ORDER — METOPROLOL TARTRATE 50 MG
TABLET ORAL
Qty: 120 TABLET | Refills: 3 | Status: ON HOLD | OUTPATIENT
Start: 2017-08-02 | End: 2017-09-29

## 2017-08-02 NOTE — PATIENT INSTRUCTIONS
It was a pleasure to see you in clinic today.  Please do not hesitate to call with any questions or concerns.  You are scheduled for a remote transmission on 11/2/17.  We look forward to seeing you in clinic at your next device check in 6 months.    Becca Hopkins, RN, MS, CCRN  Electrophysiology Nurse Clinician  [facility]    During Business Hours Please Call:  [phone]  After Hours Please Call:  [phone] - select option #4 and ask for job code [phone]

## 2017-08-02 NOTE — MR AVS SNAPSHOT
After Visit Summary   8/2/2017    Bud Merritt    MRN: [id number]           Patient Information     Date Of Birth          5/9/1927        Visit Information        Provider Department      8/2/2017 3:30 PM Khoa Li MD [facility]        Today's Diagnoses     Severe aortic stenosis    -  1    Atrial fibrillation with rapid ventricular response (H)        Surgical complete heart block (H)        Venous stasis ulcers of both lower extremities (H)        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onic, Bi-Ventricular- dependent        Severe left ventricular systolic dysfunction          Care Instructions      The following is a summary of your office visit:    Medications started today:none    Medications stopped today:none    Medication dose change: none    Nurse contact information: Poonam Ramírez RN  Cardiology Care Coordinator  [phone] Phone  [phone] Fax    Appointments made today: Plan to be seen in the Valve clinic at the [facility]. They will call you with an appt      Patient instructions:none      If you have had any blood work, imaging or other testing completed we will be in touch within 1-2 weeks regarding the results. If you have any questions, concerns or need to schedule a follow up, please contact us at [phone]. If you are needing refills please contact your pharmacy. For urgent after hour care please call the [facility] at [phone] or the [facility] at [phone] and ask to speak to the cardiologist on call.    It was a pleasure meeting with you today. Please let us know if there is anything else we can do for you so that we can be sure you are leaving completely satisfied with your care experience.     Your Cardiology Team at [facility]  RN Care Coordinator: Poonam Landis                    Follow-ups after your visit        Follow-up notes from your care team     Return in  about 3 months (around 2017).      Your next 10 appointments already scheduled     Aug 02, 2017  3:30 PM CDT   [facility]a Li MD   [facility]    [address] [place] [id number] [phone]            Aug 02, 2017  4:00 PM CDT   Return Visit with DEVICE CHECK RN CARD   [facility]    [address] [place] [id number] [phone]              Who to contact     If you have questions or need follow up information about today's clinic visit or your schedule please contact [facility] directly at [phone].  Normal or non-critical lab and imaging results will be communicated to you by MyChart, letter or phone within 4 business days after the clinic has received the results. If you do not hear from us within 7 days, please contact the clinic through MyChart or phone. If you have a critical or abnormal lab result, we will notify you by phone as soon as possible.  Submit refill requests through [employer] or call your pharmacy and they will forward the refill request to us. Please allow 3 business days for your refill to be completed.          Additional Information About Your Visit        [employer]mery Information     [employer]y is an electronic gateway that provides easy, online access to your medical records. With [employer]y, you can request a clinic appointment, read your test results, renew a prescription or communicate with your care team.     To sign up for [employer] visit the website at [email or web address]   You will be asked to enter the access code listed below, as well as some personal information. Please follow the directions to create your username and password.     Your access code is: [email or web address]  Expires: 2017 11:55 AM     Your access code will  in 90 days. If you need help or a new code, please contact your [facility]  [facility] [facility] or call [phone] for assistance.        Care EveryWhere ID     This is your Care EveryWhere ID. This could be used by other organizations to access your [place] medical records  [id number]        Your Vitals Were     Pulse Pulse Oximetry BMI (Body Mass Index)             74 97% 24.23 kg/m2          Blood Pressure from Last 3 Encounters:   08/02/17 111/70   07/01/17 100/55   06/22/17 110/70    Weight from Last 3 Encounters:   08/02/17 68.1 kg (150 lb 1.6 oz)   06/30/17 68 kg (150 lb)   06/22/17 68.5 kg (151 lb)              Today, you had the following     No orders found for display         Today's Medication Changes          These changes are accurate as of: 8/2/17  3:00 PM.  If you have any questions, ask your nurse or doctor.               These medicines have changed or have updated prescriptions.        Dose/Directions    fluticasone-vilanterol 100-25 MCG/INH oral inhaler   Commonly known as:  BREO ELLIPTA   This may have changed:  additional instructions   Used for:  COPD exacerbation (H)        Dose:  1 puff   Inhale 1 puff into the lungs daily   Quantity:  60 Inhaler   Refills:  0                Primary Care Provider Office Phone # Fax #    Camron Aragon [phone]       [facility] [address] [place] [address]        Equal Access to Services     JUAN CLINE AH: Hadbryant alvao Sogetachew, waaxda luqadaha, qaybta kaalronel king. So [facility] [phone].    ATENCIÓN: Si habla español, tiene a mon disposición servicios gratuitos de asistencia lingüística. Jeanette goff [phone].    We comply with applicable federal civil rights laws and [place] laws. We do not discriminate on the basis of race, color, national origin, age, disability sex, sexual orientation or gender identity.            Thank you!     Thank you for choosing [facility]  for your care. Our goal is  always to provide you with excellent care. Hearing back from our patients is one way we can continue to improve our services. Please take a few minutes to complete the written survey that you may receive in the mail after your visit with us. Thank you!             Your Updated Medication List - Protect others around you: Learn how to safely use, store and throw away your medicines at www.disposemymeds.org.          This list is accurate as of: 8/2/17  3:00 PM.  Always use your most recent med list.                   Brand Name Dispense Instructions for use Diagnosis    acetaminophen 650 MG CR tablet    TYLENOL     Take 650 mg by mouth 2 times daily Reported on 4/5/2017        alendronate 70 MG tablet    FOSAMAX    12 tablet    Take 1 tablet (70 mg) by mouth every 7 days Take with over 8 ounces water and stay upright for at least 30 minutes after dose.  Take at least 60 minutes before breakfast    Osteopenia       apixaban ANTICOAGULANT 2.5 MG tablet    ELIQUIS    180 tablet    Take 1 tablet (2.5 mg) by mouth 2 times daily    Paroxysmal atrial fibrillation (H)       ascorbic acid 500 MG Cpcr CR capsule    vitamin C     Take 500 mg by mouth daily        atorvastatin 40 MG tablet    LIPITOR    90 tablet    Take 1 tablet (40 mg) by mouth daily    Hyperlipidemia LDL goal <130       calcium carbonate 1250 MG tablet    OS-SHELIA 500 mg [place]. Ca     Take 600 mg by mouth daily        calcium carbonate 500 MG chewable tablet    TUMS     Take 1 chew tab by mouth every 4 hours as needed for heartburn Reported on 5/3/2017        dofetilide 125 MCG capsule    TIKOSYN    60 capsule    Take 1 capsule (125 mcg) by mouth 2 times daily    Paroxysmal atrial fibrillation (H)       FLOMAX 0.4 MG capsule   Generic drug:  tamsulosin     60 capsule    Take 1 capsule (0.4 mg) by mouth 2 times daily        fluticasone-vilanterol 100-25 MCG/INH oral inhaler    BREO ELLIPTA    60 Inhaler    Inhale 1 puff into the lungs daily    COPD exacerbation  (H)       furosemide 20 MG tablet    LASIX    90 tablet    Take one tab in the afternoon    Acute on chronic systolic congestive heart failure (H)       HYDROcodone-acetaminophen 5-325 MG per tablet    NORCO    30 tablet    TAKE 1 TABLET BY MOUTH EVERY 6 HOURS AS NEEDED FOR MODERATE TO SEVERE PAIN    Open wound       leflunomide 10 MG tablet    ARAVA    30 tablet    Take 1 tablet (10 mg) by mouth daily    Rheumatoid arthritis involving multiple sites with positive rheumatoid factor (H)       lisinopril 10 MG tablet    PRINIVIL/ZESTRIL    62 tablet    Take 1 tablet (10 mg) by mouth 2 times daily    Acute on chronic systolic congestive heart failure (H)       LUPRON DEPOT IM      Inject into the muscle every 4 months Reported on 5/3/2017        metoprolol 50 MG tablet    LOPRESSOR    120 tablet    TAKE 1 TABLET (50 MG) BY MOUTH 2 TIMES DAILY    Atrial fibrillation with rapid ventricular response (H)       predniSONE 5 MG tablet    DELTASONE    100 tablet    Take 1 tablet (5 mg) by mouth daily    Osteopenia       spironolactone 25 MG tablet    ALDACTONE    30 tablet    Take 1 tablet (25 mg) by mouth daily    Cardiomyopathy, nonischemic (H)       traMADol 50 MG tablet    ULTRAM    90 tablet    TAKE 1 TABLET 3 TIMES DAILY AS NEEDED FOR MODERATE PAIN    Rheumatoid arthritis involving multiple sites, unspecified rheumatoid factor presence (H)       VITAMIN D (CHOLECALCIFEROL) PO      Take 400 Units by mouth daily Reported on 5/3/2017

## 2017-08-02 NOTE — PROGRESS NOTES
"\"[facility] now requests orders and shares plan of care/discharge summaries for some patients through [employer].  Please REPLY TO THIS MESSAGE in order to give authorization for orders when needed.  This is considered a verbal order, you will still receive a faxed copy of orders for signature.  Thank you for your assistance in improving collaboration for our patients.    FYI message   Pt wt on Monday on home scale was 147. Today was 154. Pt continues to be very fatigued, SOB on minimal exertion. Has severe dizziness upon standing. Has plus 2 pedal edema which has not changed. Pt reports at times he can feel an irregular heart beat. Pt continues to be weak but declines Pt/OT services. Pulse on visit today 71 reg. Pt now having intermittent headaches. Please call RN with any questions or concerns. Pt will be seen in clinic today at 330 pm    uJdit Jarrett RN CM  [phone]       "

## 2017-08-02 NOTE — PATIENT INSTRUCTIONS
The following is a summary of your office visit:    Medications started today:none    Medications stopped today:none    Medication dose change: none    Nurse contact information: Poonam Ramírez RN  Cardiology Care Coordinator  [phone] Phone  [phone] Fax    Appointments made today: Plan to be [facility] in [facility]. They will call you with an appt      Patient instructions:none      If you have had any blood work, imaging or other testing completed we will be in touch within 1-2 weeks regarding the results. If you have any questions, concerns or need to schedule a follow up, please contact us at [phone]. If you are needing refills please contact your pharmacy. For urgent after hour care please call the [facility] at [phone] or the [facility] at [phone] and ask to speak to the cardiologist on call.    It was a pleasure meeting with you today. Please let us know if there is anything else we can do for you so that we can be sure you are leaving completely satisfied with your care experience.     Your Cardiology Team at [facility]  RN Care Coordinator: Poonam  CMA: Sabiha

## 2017-08-02 NOTE — NURSING NOTE
"Bud Merritt's goals for this visit include: pacemaker check   He requests these members of his care team be copied on today's visit information: yes    PCP: Camron Aragon    Referring Provider:  No referring provider defined for this encounter.    Chief Complaint   Patient presents with     Heart Problem       Initial /70 (BP Location: Right arm, Patient Position: Chair, Cuff Size: Adult Regular)  Pulse 74  Wt 68.1 kg (150 lb 1.6 oz)  SpO2 97%  BMI 24.23 kg/m2 Estimated body mass index is 24.23 kg/(m^2) as calculated from the following:    Height as of 6/30/17: 1.676 m (5' 6\").    Weight as of this encounter: 68.1 kg (150 lb 1.6 oz).  Medication Reconciliation: complete    Do you need any medication refills at today's visit? no    "

## 2017-08-02 NOTE — PROGRESS NOTES
"      Clinical Cardiac Electrophysiology Consultation    Chief Complaint: atrial fibrillation     HPI: I was happy to see Mr. Merritt in Electrophysiology consultation for the above at the request of Dr. Colon.    He reports he has been having \"spell\". It is very hard to pin him down on what he means by this. At times he reports the spells as \"dizziness\", later he described them as \"pain in his chest\". This makes it difficult to correlate with his paroxysms of atrial fibrillation. During the episodes his heart rates are fairly fast but during sinus rhythm his heart rate is relatively slow. In addition, he has first degree AV block and RBBB (bifascicular block).    In addition to the spells, he was noted to have LV systolic dysfunction. He was started on amiodarone and his EF improved. It should be noted that he was also having frequent PVCs and the reduction in PVC burden may have also contributed to partial recovery of his systolic function. He had a coronary angiogram that showed only non-obstructive disease.    The amiodarone had to be stopped due to a drop in diffusion capacity.    [id number]: He was admitted and started on dofetilide. His ECG today (personal review) shows sinus rhythm, first-degree AV block and RBBB. The QT is 400 msec by my measurement. Overall, he reports feeling better. He has less heart pounding and shortness of breath. At the moment his primary complaint is right hip pain. In the past cortisone shots have helped.     He had a fall while reaching for something. He thinks his leg gave out. He remembers falling and does not believe he was unconscious.     [id number]: He underwent biV pacer placement with anticipation of AV node ablation for rate control of his atrial fibrillation. However, he was recently in the hospital with cellulitis and evidence of sepsis. He remains on an antibiotics.     He developed recurrent atrial fibrillation with rapid ventricular rates while in the hospital. Due to " "hypotension his AV lisandro blocking drugs were stopped for a time.     Since discharge yesterday he reports several episodes of heart racing. I have reviewed his pacer interrogation. He is having runs of tachycardia, possibly atrial tachycardia versus VT.     [id number]: Device interrogation from yesterday reviewed personally. He has episodes of atrial fibrillation with heart rates in the  range but the number of episodes is low and the longest was under three minutes.     His legs are slowly getting better. He is has a visiting nurse from the wound clinic. He remains on antibiotics. He denies any palpitations, chest discomfort or unusual dyspnea on exertion.    Has chronic hip pain and legs \"burn\".     [id number]: His family reports he has no appetite and continues to lose weight.     He had an episode of chest pain a few days ago but declined to go to the ER.     His device check in May, 2017 showed only very rare atrial fibrillation and his volume status, as assessed by thoracic impedance, was normal.     He's feeling nauseated this morning after the car ride, \"motion sickness\".     He describes frequent episodes of vertigo - \"room spinning and sick to stomach\".     He's had no bleeding problems with apixaban.    [id number] Interval history: his pacer check shows he is BiV paced 99.4% of the time. He does not feel he has gotten much improvement with this. His dyspnea on exertion is a little better but he still feels very limited in his activity. In the past he had declined consideration for TAVR, now he's wondering if he should at least consider it.      Current Outpatient Prescriptions   Medication Sig Dispense Refill     metoprolol (LOPRESSOR) 50 MG tablet TAKE 1 TABLET (50 MG) BY MOUTH 2 TIMES DAILY 120 tablet 3     atorvastatin (LIPITOR) 40 MG tablet Take 1 tablet (40 mg) by mouth daily 90 tablet 2     traMADol (ULTRAM) 50 MG tablet TAKE 1 TABLET 3 TIMES DAILY AS NEEDED FOR MODERATE PAIN 90 tablet 0     " HYDROcodone-acetaminophen (NORCO) 5-325 MG per tablet TAKE 1 TABLET BY MOUTH EVERY 6 HOURS AS NEEDED FOR MODERATE TO SEVERE PAIN 30 tablet 0     furosemide (LASIX) 20 MG tablet Take one tab in the afternoon 90 tablet 1     fluticasone-vilanterol (BREO ELLIPTA) 100-25 MCG/INH oral inhaler Inhale 1 puff into the lungs daily (Patient taking differently: Inhale 1 puff into the lungs daily Pt is not and has never used this at home.) 60 Inhaler 0     predniSONE (DELTASONE) 5 MG tablet Take 1 tablet (5 mg) by mouth daily 100 tablet 4     dofetilide (TIKOSYN) 125 MCG capsule Take 1 capsule (125 mcg) by mouth 2 times daily 60 capsule 5     apixaban ANTICOAGULANT (ELIQUIS) 2.5 MG tablet Take 1 tablet (2.5 mg) by mouth 2 times daily 180 tablet 3     Leuprolide Acetate (LUPRON DEPOT IM) Inject into the muscle every 4 months Reported on 5/3/2017       leflunomide (ARAVA) 10 MG tablet Take 1 tablet (10 mg) by mouth daily 30 tablet 11     lisinopril (PRINIVIL,ZESTRIL) 10 MG tablet Take 1 tablet (10 mg) by mouth 2 times daily 62 tablet 11     spironolactone (ALDACTONE) 25 MG tablet Take 1 tablet (25 mg) by mouth daily 30 tablet 11     acetaminophen (TYLENOL) 650 MG CR tablet Take 650 mg by mouth 2 times daily Reported on 4/5/2017       alendronate (FOSAMAX) 70 MG tablet Take 1 tablet (70 mg) by mouth every 7 days Take with over 8 ounces water and stay upright for at least 30 minutes after dose.  Take at least 60 minutes before breakfast 12 tablet 3     calcium carbonate (TUMS) 500 MG chewable tablet Take 1 chew tab by mouth every 4 hours as needed for heartburn Reported on 5/3/2017       tamsulosin (FLOMAX) 0.4 MG 24 hr capsule Take 1 capsule (0.4 mg) by mouth 2 times daily 60 capsule      ascorbic acid (VITAMIN C) 500 MG CPCR Take 500 mg by mouth daily       VITAMIN D, CHOLECALCIFEROL, PO Take 400 Units by mouth daily Reported on 5/3/2017       calcium carbonate (OS-SHELIA 500 MG [place]. CA) 500 MG tablet Take 600 mg by mouth daily          Past Medical History:   Diagnosis Date     Atrial fibrillation (H) 07/01/2016     Cardiomyopathy (H)      COPD exacerbation (H) 3/2/2017     Depressive disorder      Paroxysmal atrial fibrillation (H) 7/6/2016     Pure hypercholesterolemia      Rheumatoid arthritis(714.0)      Unspecified essential hypertension      Weakness generalized 3/2/2017       Past Surgical History:   Procedure Laterality Date     ARTHROPLASTY KNEE Left 1/12/2016    Procedure: ARTHROPLASTY KNEE;  Surgeon: Cesar Walker DO;  Location: [facility] OR     COLONOSCOPY  08/24/09     HC COLONOSCOPY THRU STOMA W BIOPSY/CAUTERY TUMOR/POLYP/LESION  8/31/2004      REPAIR ING HERNIA,5+Y/O,REDUCIBL  1996    Marlex mesh repair of bilateral inguinal hernias and drainage of bilateral scrotal hydroceles.     IMPLANT PACEMAKER  03/10/2017     IRRIGATION AND DEBRIDEMENT SOFT TISSUE LOWER EXTREMITY, COMBINED Left 3/15/2016    Procedure: COMBINED IRRIGATION AND DEBRIDEMENT SOFT TISSUE LOWER EXTREMITY;  Surgeon: Cesar Walker DO;  Location: [facility] OR       Family History   Problem Relation Age of Onset     CANCER Mother      CANCER Son      Unknown/Adopted Father      Alcoholism Brother        Social History   Substance Use Topics     Smoking status: Former Smoker     Packs/day: 0.50     Years: 15.00     Types: Cigarettes     Quit date: 11/12/1998     Smokeless tobacco: Never Used      Comment: quit 15 yrs ago     Alcohol use No       Allergies   Allergen Reactions     Amiodarone Other (See Comments)     Drop in DLCO         ROS: his only other significant complaint on the ten system review is arthritis. [id number]/woa    Physical Examination:  Vitals: /70 (BP Location: Right arm, Patient Position: Chair, Cuff Size: Adult Regular)  Pulse 74  Wt 68.1 kg (150 lb 1.6 oz)  SpO2 97%  BMI 24.23 kg/m2  BMI= Body mass index is 24.23 kg/(m^2).    GENERAL APPEARANCE: elderly, alert and no distress  HEENT: no icterus,  mucosa moist, no  cyanosis.  NECK: no cervical bruits (radiated murmur), JVP approximately 6-7 cm in seated position  CHEST: lungs clear to auscultation, respirations are unlabored, normal respiratory rate  CARDIOVASCULAR: regular rhythm, normal S1, single S2, S3, S4, click or rub, 3/6 mid peaking murmur at aortic area with radiation to the suprasternal notch and lower neck, no diastolic murmur heard, precordium quiet  EXTREMITIES: wrapped  NEURO: alert and oriented to person/place/time, in a wheelchair today, hard of hearing  SKIN: legs wrapped, wife reports continued drainage on the right      Laboratory:    Pacer interrogation results noted in HPI. [id number]    Results for GALLO FLOYD (MRN [id number]) as of 2017 14:48   Ref. Range 2017 08:42   WBC Latest Ref Range: 4.0 - 11.0 10e9/L 5.7   Hemoglobin Latest Ref Range: 13.3 - 17.7 g/dL 10.4 (L)   Hematocrit Latest Ref Range: 40.0 - 53.0 % 33.6 (L)   Platelet Count Latest Ref Range: 150 - 450 10e9/L 267       Previous studies:  [facility],[place]  Echocardiography Laboratory  [address] [place] [address]     Name: GALLO FLOYD  MRN: [id number]  : 1927  Study Date: 2017 11:38 AM  Age: 89 yrs  Gender: Male  Patient Location: [facility] [place] [facility]  Reason For Study: Cardiomyopathy, AS  Ordering Physician: JAX BLACKWOOD  Referring Physician: AMANDA DUFFY  Performed By: Lai Queen RDCS     BSA: 1.8 m2  Height: 66 in  Weight: 159 lb  HR: 80  BP: 120/72 mmHg  _____________________________________________________________________________  __        Procedure  Limited Portable Echo Adult. Contrast Lumason. Lumason (NDC #7357-3355-90)  given intravenously. Patient was given 5ml mixture of Lumason. 0 ml wasted.  _____________________________________________________________________________  __        Interpretation Summary  Moderate left ventricular dilation is present.  The Ejection Fraction is estimated at  20-25%.  Normal contraction of basal segments with akinetic mid and distal segments.  This wallmotion abnormality is new since prior study on 12/13/2016.  Consistent with stress cardiomyopathy, if no LAD disease.  Severe aortic stenosis is present.  The aortic valve area is 0.9 cm^2, by the continuity equation.  The peak aortic velocity is 3.8 m/sec.  The mean gradient across the aortic valve is37 mmHg.  Estimated gradients are under estimation due to LV systolic dysfunction,.        I also reviewed the [employer] tracings available. They show atrial fibrillation with heart rates in the 120-130s. None are labelled as being associated with symptoms.      Assessment and recommendations:    1) Spells  2) Paroxsymal atrial fibrillation with rapid ventricular rates   3) Sinus node dysfunction  4) Bifascicular block  5) S/P BiV pacemaker  6) Sepsis/cellulitis  7) Atrial tachycardia versus VT - discussed that there are no antiarrhythmic drug felt to be safe to add to dofetilide.  8) Recent episode of chest pain associated with palpitations  9) Vertigo - non-cardiac  10) Severe LV systolic dysfunction - agree with decreasing metoprolol in face of low BP but will need to remain on some beta blocker, if at all possible. Cath in 7/2016 with diffuse moderate coronary artery disease.  11) Severe aortic stenosis - he was not interested in evaluation for TAVR in the past. He tells me today he would like to see someone to see if fixing his valve would help him feel like he has more energy. I would not expected it to help his dizziness.    - will arrange for referral to Aortic valve clinic, he understands he may not be a candidate and that valve replacement won't fix all of his problems.    I appreciate the chance to help with Mr. Merritt's care. Please let me know if you have any questions or concerns.    Khoa Li MD

## 2017-08-02 NOTE — PROGRESS NOTES
Patient seen in clinic for evaluation and iterative programming of his Medtronic multi lead pacemaker per MD orders.  Patient is scheduled to see Dr. Li today.  Normal pacemaker function.  No new since his last remote transmission on 7/25/17. Intrinsic rhythm = NSR with  @ 30 bpm.  AP = 83.9%.  BVP = 99.4%.  OptiVol fluid index is at baseline.  Estimated battery longevity to SUHA = 6.5 years.  Patient reports that he is always feeling dizzy.  Plan for patient to send a remote transmission in 3 months and return to clinic in 6 months.  Dr. Li notified of interrogation results.    Multi lead pacemaker

## 2017-08-02 NOTE — MR AVS SNAPSHOT
After Visit Summary   8/2/2017    Bud Merritt    MRN: [id number]           Patient Information     Date Of Birth          5/9/1927        Visit Information        Provider Department      8/2/2017 4:00 PM DEVICE CHECK RN CARD [facility]        Today's Diagnoses     Dilated cardiomyopathy (H) dilated LV, EF 20-25% by Echo 3/4/17    -  1      Care Instructions    It was a pleasure to see you in clinic today.  Please do not hesitate to call with any questions or concerns.  You are scheduled for a remote transmission on 11/2/17.  We look forward to seeing you in clinic at your next device check in 6 months.    Becca Hopkins, RN, MS, CCRN  Electrophysiology Nurse Clinician  [facility]    During Business Hours Please Call:  [phone]  After Hours Please Call:  [phone] - select option #4 and ask for job code [phone]                        Follow-ups after your visit        Follow-up notes from your care team     Return in about 6 months (around 2/2/2018) for Pacemaker Check.      Your next 10 appointments already scheduled     Aug 02, 2017  3:30 PM CDT   [facility] Visit with Khoa Li MD   [facility]    [address] [place] [id number] [phone]            Aug 02, 2017  4:00 PM CDT   Return Visit with DEVICE CHECK RN CARD   [facility]    [address] [place] [id number] [phone]              Who to contact     If you have questions or need follow up information about today's clinic visit or your schedule please contact [facility] directly at [phone].  Normal or non-critical lab and imaging results will be communicated to you by MyChart, letter or phone within 4 business days after the clinic has received the results. If you do not hear from us within 7 days, please contact the clinic through  Yelp or phone. If you have a critical or abnormal lab result, we will notify you by phone as soon as possible.  Submit refill requests through Yelp or call your pharmacy and they will forward the refill request to us. Please allow 3 business days for your refill to be completed.          Additional Information About Your Visit        Yelp Information     Yelp is an electronic gateway that provides easy, online access to your medical records. With Yelp, you can request a clinic appointment, read your test results, renew a prescription or communicate with your care team.     To sign up for Yelp visit the website at [email or web address]   You will be asked to enter the access code listed below, as well as some personal information. Please follow the directions to create your username and password.     Your access code is: [email or web address]  Expires: 2017 11:55 AM     Your access code will  in 90 days. If you need help or a new code, please contact your [facility] or call [phone] for assistance.        Care EveryWhere ID     This is your Care EveryWhere ID. This could be used by other organizations to access your [facility] medical records  [id number]         Blood Pressure from Last 3 Encounters:   17 111/70   17 100/55   17 110/70    Weight from Last 3 Encounters:   17 68.1 kg (150 lb 1.6 oz)   17 68 kg (150 lb)   17 68.5 kg (151 lb)              We Performed the Following     PM DEVICE PROGRAMMING EVAL, MULTI LEAD PACER          Today's Medication Changes          These changes are accurate as of: 17  1:57 PM.  If you have any questions, ask your nurse or doctor.               These medicines have changed or have updated prescriptions.        Dose/Directions    fluticasone-vilanterol 100-25 MCG/INH oral inhaler   Commonly known as:  BREO ELLIPTA   This may have changed:  additional instructions   Used for:  COPD  exacerbation (H)        Dose:  1 puff   Inhale 1 puff into the lungs daily   Quantity:  60 Inhaler   Refills:  0                Primary Care Provider Office Phone # Fax #    Camron Aragon [phone]       [facility] [address] [place] [phone]        Equal Access to Services     [facility]: Hadii aad ku hadasho Soomaali, waaxda luqadaha, qaybta kaalmada adeegyada, waxay idiin hayaan adeeg kharash la'aan ah. So [facility] [phone].    ATENCIÓN: Si habla español, tiene a mon disposición servicios gratuitos de asistencia lingüística. Llame al [phone].    We comply with applicable federal civil rights laws and [place] laws. We do not discriminate on the basis of race, color, national origin, age, disability sex, sexual orientation or gender identity.            Thank you!     Thank you for choosing [facility]  for your care. Our goal is always to provide you with excellent care. Hearing back from our patients is one way we can continue to improve our services. Please take a few minutes to complete the written survey that you may receive in the mail after your visit with us. Thank you!             Your Updated Medication List - Protect others around you: Learn how to safely use, store and throw away your medicines at www.disposemymeds.org.          This list is accurate as of: 8/2/17  1:57 PM.  Always use your most recent med list.                   Brand Name Dispense Instructions for use Diagnosis    acetaminophen 650 MG CR tablet    TYLENOL     Take 650 mg by mouth 2 times daily Reported on 4/5/2017        alendronate 70 MG tablet    FOSAMAX    12 tablet    Take 1 tablet (70 mg) by mouth every 7 days Take with over 8 ounces water and stay upright for at least 30 minutes after dose.  Take at least 60 minutes before breakfast    Osteopenia       apixaban ANTICOAGULANT 2.5 MG tablet    ELIQUIS    180 tablet    Take 1 tablet (2.5 mg) by mouth 2  times daily    Paroxysmal atrial fibrillation (H)       ascorbic acid 500 MG Cpcr CR capsule    vitamin C     Take 500 mg by mouth daily        atorvastatin 40 MG tablet    LIPITOR    90 tablet    Take 1 tablet (40 mg) by mouth daily    Hyperlipidemia LDL goal <130       calcium carbonate 1250 MG tablet    OS-SHELIA 500 mg [place]     Take 600 mg by mouth daily        calcium carbonate 500 MG chewable tablet    TUMS     Take 1 chew tab by mouth every 4 hours as needed for heartburn Reported on 5/3/2017        dofetilide 125 MCG capsule    TIKOSYN    60 capsule    Take 1 capsule (125 mcg) by mouth 2 times daily    Paroxysmal atrial fibrillation (H)       FLOMAX 0.4 MG capsule   Generic drug:  tamsulosin     60 capsule    Take 1 capsule (0.4 mg) by mouth 2 times daily        fluticasone-vilanterol 100-25 MCG/INH oral inhaler    BREO ELLIPTA    60 Inhaler    Inhale 1 puff into the lungs daily    COPD exacerbation (H)       furosemide 20 MG tablet    LASIX    90 tablet    Take one tab in the afternoon    Acute on chronic systolic congestive heart failure (H)       HYDROcodone-acetaminophen 5-325 MG per tablet    NORCO    30 tablet    TAKE 1 TABLET BY MOUTH EVERY 6 HOURS AS NEEDED FOR MODERATE TO SEVERE PAIN    Open wound       leflunomide 10 MG tablet    ARAVA    30 tablet    Take 1 tablet (10 mg) by mouth daily    Rheumatoid arthritis involving multiple sites with positive rheumatoid factor (H)       lisinopril 10 MG tablet    PRINIVIL/ZESTRIL    62 tablet    Take 1 tablet (10 mg) by mouth 2 times daily    Acute on chronic systolic congestive heart failure (H)       LUPRON DEPOT IM      Inject into the muscle every 4 months Reported on 5/3/2017        metoprolol 50 MG tablet    LOPRESSOR    120 tablet    TAKE 1 TABLET (50 MG) BY MOUTH 2 TIMES DAILY    Atrial fibrillation with rapid ventricular response (H)       predniSONE 5 MG tablet    DELTASONE    100 tablet    Take 1 tablet (5 mg) by mouth daily    Osteopenia        spironolactone 25 MG tablet    ALDACTONE    30 tablet    Take 1 tablet (25 mg) by mouth daily    Cardiomyopathy, nonischemic (H)       traMADol 50 MG tablet    ULTRAM    90 tablet    TAKE 1 TABLET 3 TIMES DAILY AS NEEDED FOR MODERATE PAIN    Rheumatoid arthritis involving multiple sites, unspecified rheumatoid factor presence (H)       VITAMIN D (CHOLECALCIFEROL) PO      Take 400 Units by mouth daily Reported on 5/3/2017

## 2017-08-07 ENCOUNTER — TELEPHONE (OUTPATIENT)
Dept: FAMILY MEDICINE | Facility: CLINIC | Age: 82
End: 2017-08-07

## 2017-08-07 NOTE — TELEPHONE ENCOUNTER
Note from pharmacy states pt has been taking furosemide, but has been getting mouth sores. States pt is refusing to take the medication now. Wondering if provider wants to try a different diuretic? Patient was last seen 6/22/17

## 2017-08-08 ENCOUNTER — TELEPHONE (OUTPATIENT)
Dept: CARDIOLOGY | Facility: CLINIC | Age: 82
End: 2017-08-08

## 2017-08-08 NOTE — TELEPHONE ENCOUNTER
Patient's wife calling about his appointment for the eval of TAVR on 9/7/17.  She states he needs some type of test the day before and wonders if this can be done in [place] or does he need to come to this location.  Please call her @ [phone].

## 2017-08-08 NOTE — TELEPHONE ENCOUNTER
Spoke with Pt wife, states pt has already been off of furosemide x 1 month. First available appointment is 8/23/17 @ 2pm. Can pt be worked in this week? She states pt is having some kind of valve test in Sept and states medications may change again after that. Please advise. Bruna Sadler, CMA

## 2017-08-10 DIAGNOSIS — T14.8XXA OPEN WOUND: ICD-10-CM

## 2017-08-10 RX ORDER — HYDROCODONE BITARTRATE AND ACETAMINOPHEN 5; 325 MG/1; MG/1
TABLET ORAL
Qty: 30 TABLET | Refills: 0 | Status: SHIPPED | OUTPATIENT
Start: 2017-08-10 | End: 2017-08-23

## 2017-08-15 DIAGNOSIS — M06.9 RHEUMATOID ARTHRITIS INVOLVING MULTIPLE SITES, UNSPECIFIED RHEUMATOID FACTOR PRESENCE: ICD-10-CM

## 2017-08-15 DIAGNOSIS — I48.0 PAROXYSMAL ATRIAL FIBRILLATION (H): ICD-10-CM

## 2017-08-15 NOTE — TELEPHONE ENCOUNTER
Writer received a refill request from: [facility] in [place].     Medication: Dofetilide  Si mcg cap - one cap twice daily  Date last written: 2017  Dispensed amount: 60  Refills:   Date last dispensed: 07/15/2017      Pt's last office visit: 2017  Next scheduled office visit: none    Refilled per protocol    Poonam Ramírez RN  Cardiology Care Coordinator  [facility]  Phone: [phone]

## 2017-08-15 NOTE — TELEPHONE ENCOUNTER
Tramadol       Last Written Prescription Date:  7/12/2017  Last Fill Quantity: 90,   # refills: 0  Last Office Visit with [facility], [facility] or [facility] prescribing provider: 6/22/2017  Future Office visit:    Next 5 appointments (look out 90 days)     Aug 23, 2017  2:20 PM CDT   Office Visit with Camron Aragon MD   [facility]    [address] [place] [id number] [phone]                   Routing refill request to provider for review/approval because:  Drug not on the [facility], [facility] or [facility] refill protocol or controlled substance

## 2017-08-16 RX ORDER — DOFETILIDE 0.12 MG/1
125 CAPSULE ORAL 2 TIMES DAILY
Qty: 60 CAPSULE | Refills: 5 | Status: SHIPPED | OUTPATIENT
Start: 2017-08-16 | End: 2018-05-07

## 2017-08-18 RX ORDER — TRAMADOL HYDROCHLORIDE 50 MG/1
TABLET ORAL
Qty: 90 TABLET | Refills: 0 | Status: SHIPPED | OUTPATIENT
Start: 2017-08-18 | End: 2017-09-13

## 2017-08-23 ENCOUNTER — OFFICE VISIT (OUTPATIENT)
Dept: FAMILY MEDICINE | Facility: CLINIC | Age: 82
End: 2017-08-23
Payer: COMMERCIAL

## 2017-08-23 VITALS
OXYGEN SATURATION: 99 % | RESPIRATION RATE: 18 BRPM | SYSTOLIC BLOOD PRESSURE: 110 MMHG | TEMPERATURE: 96.6 F | DIASTOLIC BLOOD PRESSURE: 68 MMHG | HEART RATE: 72 BPM

## 2017-08-23 DIAGNOSIS — M06.9 RHEUMATOID ARTHRITIS INVOLVING MULTIPLE SITES, UNSPECIFIED RHEUMATOID FACTOR PRESENCE: ICD-10-CM

## 2017-08-23 DIAGNOSIS — I42.0 DILATED CARDIOMYOPATHY (H): Primary | ICD-10-CM

## 2017-08-23 PROCEDURE — 99214 OFFICE O/P EST MOD 30 MIN: CPT | Performed by: FAMILY MEDICINE

## 2017-08-23 RX ORDER — CHLORTHALIDONE 25 MG/1
12.5 TABLET ORAL DAILY
Qty: 15 TABLET | Refills: 1 | Status: SHIPPED | OUTPATIENT
Start: 2017-08-23 | End: 2017-09-05 | Stop reason: ALTCHOICE

## 2017-08-23 RX ORDER — HYDROCODONE BITARTRATE AND ACETAMINOPHEN 5; 325 MG/1; MG/1
TABLET ORAL
Qty: 30 TABLET | Refills: 0 | Status: SHIPPED | OUTPATIENT
Start: 2017-08-23 | End: 2017-09-13

## 2017-08-23 NOTE — PATIENT INSTRUCTIONS
Continue to hold taking Lasix because of side effects. We will add chlorthalidone 12.5 mg which is half of a tablet daily as a diuretic.

## 2017-08-23 NOTE — NURSING NOTE
"Chief Complaint   Patient presents with     Recheck Medication     Discuss changing medications. [place] and Lasix. He reports that he had terrible sores in his mouth and he thinks this is due to his Lasix because when he stopped taking it it went away. His wife also reports that his legs have not been swollen since he stopped it.        Initial /68  Pulse 72  Temp 96.6  F (35.9  C) (Temporal)  Resp 18  SpO2 99% Estimated body mass index is 24.23 kg/(m^2) as calculated from the following:    Height as of 6/30/17: 5' 6\" (1.676 m).    Weight as of 8/2/17: 150 lb 1.6 oz (68.1 kg).  Medication Reconciliation: complete    "

## 2017-08-23 NOTE — PROGRESS NOTES
SUBJECTIVE:   Bud Merritt is a 89 year old male who presents to clinic today for the following health issues:      Chief Complaint   Patient presents with     Recheck Medication     Discuss changing medications. [place] and Lasix. He reports that he had terrible sores in his mouth and he thinks this is due to his Lasix because when he stopped taking it it went away. His wife also reports that his legs have not been swollen since he stopped it.              Problem list and histories reviewed & adjusted, as indicated.  Additional history: as documented        Reviewed and updated as needed this visit by clinical staff  Tobacco  Allergies  Meds       Reviewed and updated as needed this visit by Provider        SUBJECTIVE:  Bud  is a 89 year old male who presents for:  Follow-up on some of his medications. Very complex patient. Multiple medical issues. Paroxysmal atrial fibrillation. Severe aortic stenosis. Has a pacemaker. He's had a fairly slow decline in his general health status over the past year. Has had some fluid retention. He's having more pain from his arthritis she does take Norco for this and would like a refill. Had been taking some Lasix and sort of a when necessary basis for fluid retention. Otherwise he is on spironolactone and is followed by cardiology and rheumatology.    Past Medical History:   Diagnosis Date     Atrial fibrillation (H) 07/01/2016     Cardiomyopathy (H)      COPD exacerbation (H) 3/2/2017     Depressive disorder      Paroxysmal atrial fibrillation (H) 7/6/2016     Pure hypercholesterolemia      Rheumatoid arthritis(714.0)      Unspecified essential hypertension      Weakness generalized 3/2/2017     Past Surgical History:   Procedure Laterality Date     ARTHROPLASTY KNEE Left 1/12/2016    Procedure: ARTHROPLASTY KNEE;  Surgeon: Cesar Walker DO;  Location: [facility] OR     COLONOSCOPY  08/24/09      COLONOSCOPY THRU STOMA W BIOPSY/CAUTERY TUMOR/POLYP/LESION   8/31/2004     HC REPAIR ING HERNIA,5+Y/O,REDUCIBL  1996    Marlex mesh repair of bilateral inguinal hernias and drainage of bilateral scrotal hydroceles.     IMPLANT PACEMAKER  03/10/2017     IRRIGATION AND DEBRIDEMENT SOFT TISSUE LOWER EXTREMITY, COMBINED Left 3/15/2016    Procedure: COMBINED IRRIGATION AND DEBRIDEMENT SOFT TISSUE LOWER EXTREMITY;  Surgeon: Cesar Walker DO;  Location: [facility] OR     Social History   Substance Use Topics     Smoking status: Former Smoker     Packs/day: 0.50     Years: 15.00     Types: Cigarettes     Quit date: 11/12/1998     Smokeless tobacco: Never Used      Comment: quit 15 yrs ago     Alcohol use No     Current Outpatient Prescriptions   Medication Sig Dispense Refill     HYDROcodone-acetaminophen (NORCO) 5-325 MG per tablet TAKE 1 TABLET BY MOUTH EVERY 6 HOURS AS NEEDED FOR MODERATE TO SEVERE PAIN 30 tablet 0     traMADol (ULTRAM) 50 MG tablet TAKE 1 TABLET 3 TIMES DAILY AS NEEDED FOR MODERATE PAIN 90 tablet 0     dofetilide (TIKOSYN) 125 MCG capsule Take 1 capsule (125 mcg) by mouth 2 times daily 60 capsule 5     metoprolol (LOPRESSOR) 50 MG tablet TAKE 1 TABLET (50 MG) BY MOUTH 2 TIMES DAILY 120 tablet 3     atorvastatin (LIPITOR) 40 MG tablet Take 1 tablet (40 mg) by mouth daily 90 tablet 2     fluticasone-vilanterol (BREO ELLIPTA) 100-25 MCG/INH oral inhaler Inhale 1 puff into the lungs daily (Patient taking differently: Inhale 1 puff into the lungs daily Pt is not and has never used this at home.) 60 Inhaler 0     predniSONE (DELTASONE) 5 MG tablet Take 1 tablet (5 mg) by mouth daily 100 tablet 4     apixaban ANTICOAGULANT (ELIQUIS) 2.5 MG tablet Take 1 tablet (2.5 mg) by mouth 2 times daily 180 tablet 3     Leuprolide Acetate (LUPRON DEPOT IM) Inject into the muscle every 4 months Reported on 5/3/2017       lisinopril (PRINIVIL,ZESTRIL) 10 MG tablet Take 1 tablet (10 mg) by mouth 2 times daily 62 tablet 11     spironolactone (ALDACTONE) 25 MG tablet Take 1 tablet  (25 mg) by mouth daily 30 tablet 11     acetaminophen (TYLENOL) 650 MG CR tablet Take 650 mg by mouth 2 times daily Reported on 4/5/2017       alendronate (FOSAMAX) 70 MG tablet Take 1 tablet (70 mg) by mouth every 7 days Take with over 8 ounces water and stay upright for at least 30 minutes after dose.  Take at least 60 minutes before breakfast 12 tablet 3     calcium carbonate (TUMS) 500 MG chewable tablet Take 1 chew tab by mouth every 4 hours as needed for heartburn Reported on 5/3/2017       tamsulosin (FLOMAX) 0.4 MG 24 hr capsule Take 1 capsule (0.4 mg) by mouth 2 times daily 60 capsule      ascorbic acid (VITAMIN C) 500 MG CPCR Take 500 mg by mouth daily       VITAMIN D, CHOLECALCIFEROL, PO Take 400 Units by mouth daily Reported on 5/3/2017       calcium carbonate (OS-SHELIA 500 MG [place]) 500 MG tablet Take 600 mg by mouth daily       leflunomide (ARAVA) 10 MG tablet Take 1 tablet (10 mg) by mouth daily 30 tablet 11       REVIEW OF SYSTEMS:   5 point ROS negative except as noted above in HPI, including Gen., Resp, CV, GI &  system review.     OBJECTIVE:  Vitals: /68  Pulse 72  Temp 96.6  F (35.9  C) (Temporal)  Resp 18  SpO2 99%  BMI= There is no height or weight on file to calculate BMI.  He is alert and oriented. Appears in no distress. In a wheelchair. Mucous membranes are moist. Neck with no JVD. Lungs are clear. Heart regular rhythm. Abdomen soft. Extremities wound today with very little edema. Skin shows some healing ulcerations.    ASSESSMENT:  #1 dilated cardiomyopathy #2 rheumatoid arthritis #3 paroxysmal atrial fibrillation #4 generalized weakness    PLAN:  One it's something more mild for a diuretic and we were going to put him on chlorthalidone but then contraindications with his other medications came up. We dissected using Lasix as needed. He's renewed his Norco for more flareups of his arthritis. He'll be following with cardiology soon. Continues managing his superficial  ulcerations on his legs.        Camron Aragon MD  [facility]

## 2017-08-23 NOTE — MR AVS SNAPSHOT
After Visit Summary   8/23/2017    Bud Merritt    MRN: [id number]           Patient Information     Date Of Birth          5/9/1927        Visit Information        Provider Department      8/23/2017 2:20 PM Camron Aragon MD [facility]        Today's Diagnoses     Dilated cardiomyopathy (H) dilated LV, EF 20-25% by Echo 3/4/17    -  1    Rheumatoid arthritis involving multiple sites, unspecified rheumatoid factor presence (H)          Care Instructions    Continue to hold taking Lasix because of side effects. We will add chlorthalidone 12.5 mg which is half of a tablet daily as a diuretic.           Follow-ups after your visit        Your next 10 appointments already scheduled     Sep 07, 2017  3:15 PM CDT   (Arrive by 3:00 PM)   New Patient Visit with  CV VALVE CLINIC   [facility] ([facility] [place])    [address] [place] [address] [phone]              Who to contact     If you have questions or need follow up information about today's clinic visit or your schedule please contact [facility] directly at [phone].  Normal or non-critical lab and imaging results will be communicated to you by CoachLogixhart, letter or phone within 4 business days after the clinic has received the results. If you do not hear from us within 7 days, please contact the clinic through Notice Technologiest or phone. If you have a critical or abnormal lab result, we will notify you by phone as soon as possible.  Submit refill requests through ThumbAd or call your pharmacy and they will forward the refill request to us. Please allow 3 business days for your refill to be completed.          Additional Information About Your Visit        MyChart Information     ThumbAd lets you send messages to your doctor, view your test results, renew your prescriptions, schedule appointments and more. To sign up, go to [email or web address] . Click on  "\"Log in\" on the left side of the screen, which will take you to the Welcome page. Then click on \"Sign up Now\" on the right side of the page.     You will be asked to enter the access code listed below, as well as some personal information. Please follow the directions to create your username and password.     Your access code is: [email or web address]  Expires: 2017 11:55 AM     Your access code will  in 90 days. If you need help or a new code, please call your [place] clinic or [phone].        Care EveryWhere ID     This is your Care EveryWhere ID. This could be used by other organizations to access your [place] medical records  [id number]        Your Vitals Were     Pulse Temperature Respirations Pulse Oximetry          72 96.6  F (35.9  C) (Temporal) 18 99%         Blood Pressure from Last 3 Encounters:   17 110/68   17 111/70   17 100/55    Weight from Last 3 Encounters:   17 150 lb 1.6 oz (68.1 kg)   17 150 lb (68 kg)   17 151 lb (68.5 kg)              Today, you had the following     No orders found for display         Today's Medication Changes          These changes are accurate as of: 17  2:26 PM.  If you have any questions, ask your nurse or doctor.               Start taking these medicines.        Dose/Directions    chlorthalidone 25 MG tablet   Commonly known as:  HYGROTON   Used for:  Dilated cardiomyopathy (H)   Started by:  Camron Aragon MD        Dose:  12.5 mg   Take 0.5 tablets (12.5 mg) by mouth daily   Quantity:  15 tablet   Refills:  1         These medicines have changed or have updated prescriptions.        Dose/Directions    fluticasone-vilanterol 100-25 MCG/INH oral inhaler   Commonly known as:  BREO ELLIPTA   This may have changed:  additional instructions   Used for:  COPD exacerbation (H)        Dose:  1 puff   Inhale 1 puff into the lungs daily   Quantity:  60 Inhaler   Refills:  0            Where to get your medicines      These " medications were sent to [id number] - [place], [place] - [address]   [address] [place] [address]     Phone:  [phone]     chlorthalidone 25 MG tablet         Some of these will need a paper prescription and others can be bought over the counter.  Ask your nurse if you have questions.     Bring a paper prescription for each of these medications     HYDROcodone-acetaminophen 5-325 MG per tablet                Primary Care Provider Office Phone # Fax #    Camron Aragon [phone]       [facility] [address]   [facility] [address]        Equal Access to Services     [facility]: Hadii aad ku hadasho Soomaali, waaxda luqadaha, qaybta kaalmada adeegyada, waxlamont enrique hayrosan gomez real . So [facility] [phone].    ATENCIÓN: Si habla español, tiene a mon disposición servicios gratuitos de asistencia lingüística. [facility] [phone].    We comply with applicable federal civil rights laws and [place] laws. We do not discriminate on the basis of race, color, national origin, age, disability sex, sexual orientation or gender identity.            Thank you!     Thank you for choosing [facility]  for your care. Our goal is always to provide you with excellent care. Hearing back from our patients is one way we can continue to improve our services. Please take a few minutes to complete the written survey that you may receive in the mail after your visit with us. Thank you!             Your Updated Medication List - Protect others around you: Learn how to safely use, store and throw away your medicines at www.disposemymeds.org.          This list is accurate as of: 8/23/17  2:26 PM.  Always use your most recent med list.                   Brand Name Dispense Instructions for use Diagnosis    acetaminophen 650 MG CR tablet    TYLENOL     Take 650 mg by mouth 2 times daily Reported on 4/5/2017        alendronate 70 MG tablet    FOSAMAX     12 tablet    Take 1 tablet (70 mg) by mouth every 7 days Take with over 8 ounces water and stay upright for at least 30 minutes after dose.  Take at least 60 minutes before breakfast    Osteopenia       apixaban ANTICOAGULANT 2.5 MG tablet    ELIQUIS    180 tablet    Take 1 tablet (2.5 mg) by mouth 2 times daily    Paroxysmal atrial fibrillation (H)       ascorbic acid 500 MG Cpcr CR capsule    vitamin C     Take 500 mg by mouth daily        atorvastatin 40 MG tablet    LIPITOR    90 tablet    Take 1 tablet (40 mg) by mouth daily    Hyperlipidemia LDL goal <130       calcium carbonate 1250 MG tablet    OS-SHELIA 500 mg [place]. Ca     Take 600 mg by mouth daily        calcium carbonate 500 MG chewable tablet    TUMS     Take 1 chew tab by mouth every 4 hours as needed for heartburn Reported on 5/3/2017        chlorthalidone 25 MG tablet    HYGROTON    15 tablet    Take 0.5 tablets (12.5 mg) by mouth daily    Dilated cardiomyopathy (H)       dofetilide 125 MCG capsule    TIKOSYN    60 capsule    Take 1 capsule (125 mcg) by mouth 2 times daily    Paroxysmal atrial fibrillation (H)       FLOMAX 0.4 MG capsule   Generic drug:  tamsulosin     60 capsule    Take 1 capsule (0.4 mg) by mouth 2 times daily        fluticasone-vilanterol 100-25 MCG/INH oral inhaler    BREO ELLIPTA    60 Inhaler    Inhale 1 puff into the lungs daily    COPD exacerbation (H)       HYDROcodone-acetaminophen 5-325 MG per tablet    NORCO    30 tablet    TAKE 1 TABLET BY MOUTH EVERY 6 HOURS AS NEEDED FOR MODERATE TO SEVERE PAIN    Rheumatoid arthritis involving multiple sites, unspecified rheumatoid factor presence (H)       leflunomide 10 MG tablet    ARAVA    30 tablet    Take 1 tablet (10 mg) by mouth daily    Rheumatoid arthritis involving multiple sites with positive rheumatoid factor (H)       lisinopril 10 MG tablet    PRINIVIL/ZESTRIL    62 tablet    Take 1 tablet (10 mg) by mouth 2 times daily    Acute on chronic systolic congestive heart  failure (H)       LUPRON DEPOT IM      Inject into the muscle every 4 months Reported on 5/3/2017        metoprolol 50 MG tablet    LOPRESSOR    120 tablet    TAKE 1 TABLET (50 MG) BY MOUTH 2 TIMES DAILY    Atrial fibrillation with rapid ventricular response (H)       predniSONE 5 MG tablet    DELTASONE    100 tablet    Take 1 tablet (5 mg) by mouth daily    Osteopenia       spironolactone 25 MG tablet    ALDACTONE    30 tablet    Take 1 tablet (25 mg) by mouth daily    Cardiomyopathy, nonischemic (H)       traMADol 50 MG tablet    ULTRAM    90 tablet    TAKE 1 TABLET 3 TIMES DAILY AS NEEDED FOR MODERATE PAIN    Rheumatoid arthritis involving multiple sites, unspecified rheumatoid factor presence (H)       VITAMIN D (CHOLECALCIFEROL) PO      Take 400 Units by mouth daily Reported on 5/3/2017

## 2017-08-28 ENCOUNTER — DOCUMENTATION ONLY (OUTPATIENT)
Dept: CARE COORDINATION | Facility: CLINIC | Age: 82
End: 2017-08-28

## 2017-08-28 NOTE — PROGRESS NOTES
[facility] now requests orders and shares plan of care/discharge summaries for some patients through [employer].  Please REPLY TO THIS MESSAGE in order to give authorization for orders when needed.  This is considered a verbal order, you will still receive a faxed copy of orders for signature.  Thank you for your assistance in improving collaboration for our patients.    ORDER    MD SUMMARY/PLAN OF CARE    FYI - Home care medication reconciliation program flagged a severe contraindication warning between the following medications; Chlorthalidone and Dofetilide.  Please advise if changes needed

## 2017-08-29 ENCOUNTER — CARE COORDINATION (OUTPATIENT)
Dept: CARDIOLOGY | Facility: CLINIC | Age: 82
End: 2017-08-29

## 2017-08-29 DIAGNOSIS — R09.89 CAROTID BRUIT, UNSPECIFIED LATERALITY: ICD-10-CM

## 2017-08-29 DIAGNOSIS — I35.0 SEVERE AORTIC STENOSIS: Primary | ICD-10-CM

## 2017-08-29 NOTE — PROGRESS NOTES
Date: 8/29/2017    Time of Call: 3:26 PM     Diagnosis:  Severe aortic stenosis     [ TORB ] Ordering provider: Cosme Gunn MD  Order:   1.  Carotid US  2.  CBC and CMP  3.  TAVR CT     Order received by: Snehal Wilson RN     Follow-up/additional notes:   Referred to Valve clinic by Dr. Li.    Called and spoke with Mrs. Merritt, as her , the pt, is [facility]. Contact information was given, and the work up for TAVR was briefly explained.

## 2017-08-31 DIAGNOSIS — M05.79 RHEUMATOID ARTHRITIS INVOLVING MULTIPLE SITES WITH POSITIVE RHEUMATOID FACTOR (H): ICD-10-CM

## 2017-09-01 NOTE — TELEPHONE ENCOUNTER
Garry        Last Written Prescription Date:  10-10-16  Last Fill Quantity: 30,   # refills: 11  Last Office Visit with [facility], [facility] or [facility] prescribing provider: 02-15-17  Future Office visit:    Next 5 appointments (look out 90 days)     Sep 13, 2017 10:40 AM CDT   [facility]    [address] [address]   [phone]            Oct 04, 2017  9:45 AM CDT   Return Visit with Mak Shaver MD   [facility]    [address] [address]   [phone]                   Routing refill request to provider for review/approval because:  Drug not on the [facility], [facility] or [facility] refill protocol or controlled substance    Mary Ann Munson  [facility] RN

## 2017-09-04 RX ORDER — LEFLUNOMIDE 10 MG/1
10 TABLET ORAL DAILY
Qty: 30 TABLET | Refills: 11 | Status: SHIPPED | OUTPATIENT
Start: 2017-09-04 | End: 2017-11-15

## 2017-09-05 ENCOUNTER — PRE VISIT (OUTPATIENT)
Dept: CARDIOLOGY | Facility: CLINIC | Age: 82
End: 2017-09-05

## 2017-09-07 ENCOUNTER — HOSPITAL ENCOUNTER (OUTPATIENT)
Dept: CT IMAGING | Facility: CLINIC | Age: 82
Discharge: HOME | End: 2017-09-07
Attending: INTERNAL MEDICINE | Admitting: INTERNAL MEDICINE
Payer: MEDICARE

## 2017-09-07 ENCOUNTER — OFFICE VISIT (OUTPATIENT)
Dept: CARDIOLOGY | Facility: CLINIC | Age: 82
End: 2017-09-07
Attending: THORACIC SURGERY (CARDIOTHORACIC VASCULAR SURGERY)
Payer: MEDICARE

## 2017-09-07 ENCOUNTER — OFFICE VISIT (OUTPATIENT)
Dept: CARDIOLOGY | Facility: CLINIC | Age: 82
End: 2017-09-07
Attending: INTERNAL MEDICINE
Payer: MEDICARE

## 2017-09-07 VITALS
HEART RATE: 73 BPM | BODY MASS INDEX: 23.79 KG/M2 | HEIGHT: 67 IN | WEIGHT: 151.6 LBS | DIASTOLIC BLOOD PRESSURE: 80 MMHG | OXYGEN SATURATION: 99 % | SYSTOLIC BLOOD PRESSURE: 125 MMHG

## 2017-09-07 VITALS
HEIGHT: 67 IN | DIASTOLIC BLOOD PRESSURE: 80 MMHG | OXYGEN SATURATION: 96 % | HEART RATE: 89 BPM | SYSTOLIC BLOOD PRESSURE: 125 MMHG | WEIGHT: 151.1 LBS | BODY MASS INDEX: 23.72 KG/M2

## 2017-09-07 DIAGNOSIS — L97.929 VENOUS STASIS ULCERS OF BOTH LOWER EXTREMITIES (H): ICD-10-CM

## 2017-09-07 DIAGNOSIS — M05.79 RHEUMATOID ARTHRITIS INVOLVING MULTIPLE SITES WITH POSITIVE RHEUMATOID FACTOR (H): ICD-10-CM

## 2017-09-07 DIAGNOSIS — I35.0 NONRHEUMATIC AORTIC VALVE STENOSIS: Primary | ICD-10-CM

## 2017-09-07 DIAGNOSIS — C61 MALIGNANT NEOPLASM OF PROSTATE (H): ICD-10-CM

## 2017-09-07 DIAGNOSIS — L97.919 VENOUS STASIS ULCERS OF BOTH LOWER EXTREMITIES (H): ICD-10-CM

## 2017-09-07 DIAGNOSIS — M81.0 AGE-RELATED OSTEOPOROSIS WITHOUT CURRENT PATHOLOGICAL FRACTURE: ICD-10-CM

## 2017-09-07 DIAGNOSIS — D64.9 ANEMIA, UNSPECIFIED TYPE: ICD-10-CM

## 2017-09-07 DIAGNOSIS — I48.0 PAROXYSMAL ATRIAL FIBRILLATION (H): ICD-10-CM

## 2017-09-07 DIAGNOSIS — I83.029 VENOUS STASIS ULCERS OF BOTH LOWER EXTREMITIES (H): ICD-10-CM

## 2017-09-07 DIAGNOSIS — L03.115 CELLULITIS OF RIGHT LOWER EXTREMITY: ICD-10-CM

## 2017-09-07 DIAGNOSIS — I83.019 VENOUS STASIS ULCERS OF BOTH LOWER EXTREMITIES (H): ICD-10-CM

## 2017-09-07 DIAGNOSIS — I08.0 MITRAL REGURGITATION AND AORTIC STENOSIS: ICD-10-CM

## 2017-09-07 DIAGNOSIS — E78.5 HYPERLIPIDEMIA LDL GOAL <130: ICD-10-CM

## 2017-09-07 DIAGNOSIS — I42.0 DILATED CARDIOMYOPATHY (H): Primary | ICD-10-CM

## 2017-09-07 DIAGNOSIS — I35.0 SEVERE AORTIC STENOSIS: ICD-10-CM

## 2017-09-07 PROCEDURE — 25000128 H RX IP 250 OP 636: Performed by: INTERNAL MEDICINE

## 2017-09-07 PROCEDURE — 99205 OFFICE O/P NEW HI 60 MIN: CPT | Mod: GC | Performed by: INTERNAL MEDICINE

## 2017-09-07 PROCEDURE — 99214 OFFICE O/P EST MOD 30 MIN: CPT | Mod: ZF

## 2017-09-07 PROCEDURE — 71275 CT ANGIOGRAPHY CHEST: CPT | Mod: 26 | Performed by: INTERNAL MEDICINE

## 2017-09-07 PROCEDURE — 74174 CTA ABD&PLVS W/CONTRAST: CPT

## 2017-09-07 PROCEDURE — 74174 CTA ABD&PLVS W/CONTRAST: CPT | Mod: 26 | Performed by: INTERNAL MEDICINE

## 2017-09-07 RX ORDER — IOPAMIDOL 755 MG/ML
120 INJECTION, SOLUTION INTRAVASCULAR ONCE
Status: COMPLETED | OUTPATIENT
Start: 2017-09-07 | End: 2017-09-07

## 2017-09-07 RX ADMIN — IOPAMIDOL 120 ML: 755 INJECTION, SOLUTION INTRAVENOUS at 12:42

## 2017-09-07 ASSESSMENT — PAIN SCALES - GENERAL
PAINLEVEL: NO PAIN (0)
PAINLEVEL: NO PAIN (0)

## 2017-09-07 NOTE — LETTER
9/7/2017      RE: Bud Merritt  [address] [address] [place] [id number]       Dear Colleague,    Thank you for the opportunity to participate in the care of your patient, Bud Merritt, at the [facility]. Please see a copy of my visit note below.    TAVR INITIAL CONSULTATION  Seen with Dr. Cosme Gunn-Interventional Cardiology & Dr. Josh Marrero-CV Surgery    HPI:  Bud Merritt is a very pleasant 89 year old male with severe aortic valvular stenosis referred to our clinic for evaluation and consideration for potential transcatheter aortic valve replacement (TAVR).   His severe aortic stenosis is characterized with an area of 0.9 cm2, mean gradient 36 mmHg and peak velocity of 3.78 m/sec with LVEF 20-25% by echocardiogram on 3/2017.      Additional notable medical history of Atrial fibrillation s/p lisandro ablation with BiV pacemaker, prostate cancer, RA on prednisone.  He has severe arthritis and is limited in activity.  However, he feels tired and is not able to do all his activities. 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 However, for the past couple of yrs he is feeling tired. His lightheadedness got better since getting pacemaker.   He cannot lie flat due to his arthritis.  He has some shortness of breath with exertion. However, he is not able to move much due to his arthritis.  He has leg swelling and is seeing PCP for it.   He also has leg infection that is currently resolving.       PAST MEDICAL HISTORY:  Past Medical History:   Diagnosis Date     Atrial fibrillation (H) 07/01/2016     Cardiomyopathy (H)      COPD exacerbation (H) 3/2/2017     Depressive disorder      Paroxysmal atrial fibrillation (H) 7/6/2016     Pure hypercholesterolemia      Rheumatoid arthritis(714.0)      Unspecified essential hypertension      Weakness generalized 3/2/2017       CURRENT MEDICATIONS:  Current Outpatient Prescriptions   Medication Sig Dispense Refill      HYDROcodone-acetaminophen (NORCO) 5-325 MG per tablet TAKE 1 TABLET BY MOUTH EVERY 6 HOURS AS NEEDED FOR MODERATE TO SEVERE PAIN 30 tablet 0     traMADol (ULTRAM) 50 MG tablet TAKE 1 TABLET 3 TIMES DAILY AS NEEDED FOR MODERATE PAIN 90 tablet 0     leflunomide (ARAVA) 10 MG tablet Take 1 tablet (10 mg) by mouth daily 30 tablet 11     dofetilide (TIKOSYN) 125 MCG capsule Take 1 capsule (125 mcg) by mouth 2 times daily 60 capsule 5     metoprolol (LOPRESSOR) 50 MG tablet TAKE 1 TABLET (50 MG) BY MOUTH 2 TIMES DAILY 120 tablet 3     atorvastatin (LIPITOR) 40 MG tablet Take 1 tablet (40 mg) by mouth daily 90 tablet 2     fluticasone-vilanterol (BREO ELLIPTA) 100-25 MCG/INH oral inhaler Inhale 1 puff into the lungs daily (Patient not taking: Reported on 9/13/2017) 60 Inhaler 0     predniSONE (DELTASONE) 5 MG tablet Take 1 tablet (5 mg) by mouth daily 100 tablet 4     apixaban ANTICOAGULANT (ELIQUIS) 2.5 MG tablet Take 1 tablet (2.5 mg) by mouth 2 times daily 180 tablet 3     Leuprolide Acetate (LUPRON DEPOT IM) Inject into the muscle every 4 months Reported on 5/3/2017       lisinopril (PRINIVIL,ZESTRIL) 10 MG tablet Take 1 tablet (10 mg) by mouth 2 times daily 62 tablet 11     spironolactone (ALDACTONE) 25 MG tablet Take 1 tablet (25 mg) by mouth daily 30 tablet 11     acetaminophen (TYLENOL) 650 MG CR tablet Take 650 mg by mouth 2 times daily Reported on 4/5/2017       alendronate (FOSAMAX) 70 MG tablet Take 1 tablet (70 mg) by mouth every 7 days Take with over 8 ounces water and stay upright for at least 30 minutes after dose.  Take at least 60 minutes before breakfast 12 tablet 3     calcium carbonate (TUMS) 500 MG chewable tablet Take 1 chew tab by mouth every 4 hours as needed for heartburn Reported on 5/3/2017       tamsulosin (FLOMAX) 0.4 MG 24 hr capsule Take 1 capsule (0.4 mg) by mouth 2 times daily 60 capsule      ascorbic acid (VITAMIN C) 500 MG CPCR Take 500 mg by mouth daily       VITAMIN D,  CHOLECALCIFEROL, PO Take 400 Units by mouth daily Reported on 5/3/2017       calcium carbonate (OS-SHELIA 500 MG [place]. [place]) 500 MG tablet Take 600 mg by mouth daily         PAST SURGICAL HISTORY:  Past Surgical History:   Procedure Laterality Date     ARTHROPLASTY KNEE Left 1/12/2016    Procedure: ARTHROPLASTY KNEE;  Surgeon: Cesar Walker DO;  Location: [facility] OR     COLONOSCOPY  08/24/09     HC COLONOSCOPY THRU STOMA W BIOPSY/CAUTERY TUMOR/POLYP/LESION  8/31/2004     HC REPAIR ING HERNIA,5+Y/O,REDUCIBL  1996    Marlex mesh repair of bilateral inguinal hernias and drainage of bilateral scrotal hydroceles.     IMPLANT PACEMAKER  03/10/2017     IRRIGATION AND DEBRIDEMENT SOFT TISSUE LOWER EXTREMITY, COMBINED Left 3/15/2016    Procedure: COMBINED IRRIGATION AND DEBRIDEMENT SOFT TISSUE LOWER EXTREMITY;  Surgeon: Cesar Walker DO;  Location: [facility] OR       ALLERGIES     Allergies   Allergen Reactions     Amiodarone Other (See Comments)     Drop in DLCO     Lasix [Furosemide] Blisters     Blisters and sores in his mouth.        FAMILY HISTORY:  Family History   Problem Relation Age of Onset     CANCER Mother      CANCER Son      Unknown/Adopted Father      Alcoholism Brother        SOCIAL HISTORY:  Social History     Social History     Marital status:      Spouse name: N/A     Number of children: N/A     Years of education: N/A     Social History Main Topics     Smoking status: Former Smoker     Packs/day: 0.50     Years: 15.00     Types: Cigarettes     Quit date: 11/12/1998     Smokeless tobacco: Never Used      Comment: quit 15 yrs ago     Alcohol use No     Drug use: No     Sexual activity: Yes     Other Topics Concern     Caffeine Concern Yes     8 cups coffee day     Sleep Concern Yes     Weight Concern Yes     weight loss     Special Diet No     Exercise Yes     Snaptee daily     Social History Narrative       ROS:   Constitutional: No fever, chills, or sweats. No weight gain/loss   ENT:  "No visual disturbance, ear ache, epistaxis, sore throat  Allergies/Immunologic: Negative.   Respiratory: No cough, hemoptysia  Cardiovascular: As per HPI  GI: No nausea, vomiting, hematemesis, melena, or hematochezia  : No urinary frequency, dysuria, or hematuria  Integument: Negative  Psychiatric: Negative  Neuro: Negative  Endocrinology: Negative   Musculoskeletal: Negative    EXAM:  /80 (BP Location: Left arm, Patient Position: Chair, Cuff Size: Adult Regular)  Pulse 73  Ht 1.702 m (5' 7\")  Wt 68.8 kg (151 lb 9.6 oz)  SpO2 99%  BMI 23.74 kg/m2  In general, the patient is a pleasant male in no apparent distress.    HEENT: NC/AT.  PERRLA.  EOMI.  Sclerae white, not injected.  Nares clear.  Pharynx without erythema or exudate.  Dentition intact.    Neck: No adenopathy.  No thyromegaly. Carotids +4/4 bilaterally without bruits.  No jugular venous distension.   Heart: RRR. Normal S1, S2 splits physiologically. No murmur, rub, click, or gallop. The PMI is in the 5th ICS in the midclavicular line. There is no heave.    Lungs: CTA.  No ronchi, wheezes, rales.  No dullness to percussion.   Abdomen: Soft, nontender, nondistended. No organomegaly.  No bruits.   Extremities: No clubbing, cyanosis, or edema.  The pulses are +4/4 at the radial, brachial, femoral, popliteal, DP, and PT sites bilaterally.  No bruits are noted.  Neurologic: Alert and oriented to person/place/time, normal speech, gait and affect  Skin: No petechiae, purpura or rash.    Labs:  LIPID RESULTS:  Lab Results   Component Value Date    CHOL 139 03/06/2017    HDL 34 (L) 03/06/2017    LDL 74 03/06/2017    TRIG 156 (H) 03/06/2017    CHOLHDLRATIO 3.0 08/02/2013       LIVER ENZYME RESULTS:  Lab Results   Component Value Date    AST 29 09/07/2017    ALT 20 09/07/2017       CBC RESULTS:  Lab Results   Component Value Date    WBC 5.2 09/07/2017    RBC 3.43 (L) 09/07/2017    HGB 10.5 (L) 09/07/2017    HCT 32.1 (L) 09/07/2017    MCV 94 09/07/2017    " MCH 30.6 09/07/2017    MCHC 32.7 09/07/2017    RDW 15.4 (H) 09/07/2017     09/07/2017       BMP RESULTS:  Lab Results   Component Value Date     09/07/2017    POTASSIUM 5.5 (H) 09/07/2017    CHLORIDE 102 09/07/2017    CO2 27 09/07/2017    ANIONGAP 6 09/07/2017     (H) 09/07/2017    BUN 20 09/07/2017    CR 1.00 09/07/2017    GFRESTIMATED 70 09/07/2017    GFRESTBLACK 85 09/07/2017    SHELIA 8.9 09/07/2017        A1C RESULTS:  No results found for: A1C    INR RESULTS:  Lab Results   Component Value Date    INR 1.08 06/30/2017    INR 1.01 03/27/2017       Procedures:    ECG:paced     Echocardiogram:  Moderate left ventricular dilation is present.  The Ejection Fraction is estimated at 20-25%.  Normal contraction of basal segments with akinetic mid and distal segments.  This wallmotion abnormality is new since prior study on 12/13/2016.  Consistent with stress cardiomyopathy, if no LAD disease.  Severe aortic stenosis is present.  The aortic valve area is 0.9 cm^2, by the continuity equation.  The peak aortic velocity is 3.8 m/sec.  The mean gradient across the aortic valve is37 mmHg.  Estimated gradients are under estimation due to LV systolic dysfunction,.     CT TAVR:  pending      Coronary Angiogram and Right Heart Catheterization:  Non-obstructive 2016     PFTs: NA     Carotid Ultrasound: na       FURTHER ASSESSMENTS    STS RISK SCORE: 7.6%  NYHA CLASS: II-III      Assessment and Plan:     89 year old male with severe aortic valvular stenosis referred to our clinic for evaluation and consideration for potential surgical AVR vs transcatheter aortic valve replacement (TAVR). STS RISK SCORE: 7.6%.    He is not a good candidate for surgical AVR. However, he is a reasonable candidate for TAVR due to advanced age, commorbidities and high STS score of 7.6%.    Recommend completion of TAVR workup and TAVR.    Benefits and risks of TAVR procedures were explained to patient.     Approximately 50 minutes spent  with this case including review of the clinical history and data; discussion with the patient and his family and coordination of the care    Thank you for including me in the care of this kind patient. Do not hesitate to contact me with any questions.    Dr. Josh Marrero     Cardiothoracic Surgery  [facility]  Patient Care Team:  Camron Aragon MD as PCP - Elvira Chambers RN as Clinic Care Coordinator  Khoa Li MD as MD (Clinical Cardiac Electrophysiology)  Snehal Wilson as Nurse Coordinator (Cardiology)

## 2017-09-07 NOTE — PROGRESS NOTES
[facility] HEART CARE  CARDIOVASCULAR DIVISION    VALVE CLINIC INITIAL CONSULTATION    PRIMARY CARDIOLOGIST: Dr. Mitra GAGNON      PERTINENT CLINICAL HISTORY:     Bud Merritt is a very pleasant 89 year old male with severe aortic valvular stenosis referred to our clinic for evaluation and consideration for potential transcatheter aortic valve replacement (TAVR).  his severe aortic stenosis is characterized with an area of 0.9 cm2, mean gradient 36 mmHg and peak velocity of 3.78 m/sec with LVEF 20-25% by echocardiogram on 3/2017.    Additional notable medical history of Atrial fibrillation s/p lisandro ablation with BiV pacemaker, prostate cancer, RA on prednisone.  He has severe arthritis and is limited in activity.  However, he feels tired and is not able to do all his activities. He used to be a  before. However, for the past couple of yrs he is feeling tired. His lightheadedness got better since getting pacemaker.   He cannot lie flat due to his arthritis.  He has some shortness of breath with exertion. However, he is not able to move much due to his arthritis.  He has leg swelling and is seeing PCP for it.   He also has leg infection. He it getting better.       PAST MEDICAL HISTORY:     Past Medical History:   Diagnosis Date     Atrial fibrillation (H) 07/01/2016     Cardiomyopathy (H)      COPD exacerbation (H) 3/2/2017     Depressive disorder      Paroxysmal atrial fibrillation (H) 7/6/2016     Pure hypercholesterolemia      Rheumatoid arthritis(714.0)      Unspecified essential hypertension      Weakness generalized 3/2/2017        PAST SURGICAL HISTORY:     Past Surgical History:   Procedure Laterality Date     ARTHROPLASTY KNEE Left 1/12/2016    Procedure: ARTHROPLASTY KNEE;  Surgeon: Cesar Walker DO;  Location: [facility]     COLONOSCOPY  08/24/09     HC COLONOSCOPY THRU STOMA W BIOPSY/CAUTERY TUMOR/POLYP/LESION  8/31/2004     HC REPAIR ING HERNIA,5+Y/O,REDUCIBL  1996    Marlex mesh  repair of bilateral inguinal hernias and drainage of bilateral scrotal hydroceles.     IMPLANT PACEMAKER  03/10/2017     IRRIGATION AND DEBRIDEMENT SOFT TISSUE LOWER EXTREMITY, COMBINED Left 3/15/2016    Procedure: COMBINED IRRIGATION AND DEBRIDEMENT SOFT TISSUE LOWER EXTREMITY;  Surgeon: Cesar Walker DO;  Location: [facility]        CURRENT MEDICATIONS:     Current Outpatient Prescriptions   Medication Sig Dispense Refill     leflunomide (ARAVA) 10 MG tablet Take 1 tablet (10 mg) by mouth daily 30 tablet 11     HYDROcodone-acetaminophen (NORCO) 5-325 MG per tablet TAKE 1 TABLET BY MOUTH EVERY 6 HOURS AS NEEDED FOR MODERATE TO SEVERE PAIN 30 tablet 0     traMADol (ULTRAM) 50 MG tablet TAKE 1 TABLET 3 TIMES DAILY AS NEEDED FOR MODERATE PAIN 90 tablet 0     dofetilide (TIKOSYN) 125 MCG capsule Take 1 capsule (125 mcg) by mouth 2 times daily 60 capsule 5     metoprolol (LOPRESSOR) 50 MG tablet TAKE 1 TABLET (50 MG) BY MOUTH 2 TIMES DAILY 120 tablet 3     atorvastatin (LIPITOR) 40 MG tablet Take 1 tablet (40 mg) by mouth daily 90 tablet 2     fluticasone-vilanterol (BREO ELLIPTA) 100-25 MCG/INH oral inhaler Inhale 1 puff into the lungs daily (Patient taking differently: Inhale 1 puff into the lungs daily Pt is not and has never used this at home.) 60 Inhaler 0     predniSONE (DELTASONE) 5 MG tablet Take 1 tablet (5 mg) by mouth daily 100 tablet 4     apixaban ANTICOAGULANT (ELIQUIS) 2.5 MG tablet Take 1 tablet (2.5 mg) by mouth 2 times daily 180 tablet 3     Leuprolide Acetate (LUPRON DEPOT IM) Inject into the muscle every 4 months Reported on 5/3/2017       lisinopril (PRINIVIL,ZESTRIL) 10 MG tablet Take 1 tablet (10 mg) by mouth 2 times daily 62 tablet 11     spironolactone (ALDACTONE) 25 MG tablet Take 1 tablet (25 mg) by mouth daily 30 tablet 11     acetaminophen (TYLENOL) 650 MG CR tablet Take 650 mg by mouth 2 times daily Reported on 4/5/2017       alendronate (FOSAMAX) 70 MG tablet Take 1 tablet (70 mg)  by mouth every 7 days Take with over 8 ounces water and stay upright for at least 30 minutes after dose.  Take at least 60 minutes before breakfast 12 tablet 3     calcium carbonate (TUMS) 500 MG chewable tablet Take 1 chew tab by mouth every 4 hours as needed for heartburn Reported on 5/3/2017       tamsulosin (FLOMAX) 0.4 MG 24 hr capsule Take 1 capsule (0.4 mg) by mouth 2 times daily 60 capsule      ascorbic acid (VITAMIN C) 500 MG CPCR Take 500 mg by mouth daily       VITAMIN D, CHOLECALCIFEROL, PO Take 400 Units by mouth daily Reported on 5/3/2017       calcium carbonate (OS-SHELIA 500 MG [place]. [place]) 500 MG tablet Take 600 mg by mouth daily          ALLERGIES:     Allergies   Allergen Reactions     Amiodarone Other (See Comments)     Drop in DLCO     Lasix [Furosemide] Blisters     Blisters and sores in his mouth.         FAMILY HISTORY:     Family History   Problem Relation Age of Onset     CANCER Mother      CANCER Son      Unknown/Adopted Father      Alcoholism Brother         SOCIAL HISTORY:     Social History     Social History     Marital status:      Spouse name: N/A     Number of children: N/A     Years of education: N/A     Social History Main Topics     Smoking status: Former Smoker     Packs/day: 0.50     Years: 15.00     Types: Cigarettes     Quit date: 11/12/1998     Smokeless tobacco: Never Used      Comment: quit 15 yrs ago     Alcohol use No     Drug use: No     Sexual activity: Yes     Other Topics Concern     Caffeine Concern Yes     8 cups coffee day     Sleep Concern Yes     Weight Concern Yes     weight loss     Special Diet No     Exercise Yes     split firewood daily     Social History Narrative        REVIEW OF SYSTEMS:     Constitutional: No fevers or chills  Skin: as above  Eyes: No acute change in vision  Ears/Nose/Throat: No purulent rhinorrhea, new hearing loss, or new vertigo  Respiratory: No cough or hemoptysis  Cardiovascular: See HPI  Gastrointestinal: No change in appetite,  "vomiting, hematemesis or diarrhea  Genitourinary: No dysuria or hematuria  Musculoskeletal: as above  Neurologic: No new headaches, focal weakness or behavior changes  Psychiatric: No hallucinations, excessive alcohol consumption or illegal drug usage  Hematologic/Lymphatic/Immunologic: No bleeding, chills, fever, night sweats or weight loss  Endocrine: No new cold intolerance, heat intolerance, polyphagia, polydipsia or polyuria      PHYSICAL EXAMINATION:     /80 (BP Location: Left arm, Patient Position: Chair, Cuff Size: Adult Regular)  Pulse 89  Ht 1.702 m (5' 7\")  Wt 68.5 kg (151 lb 1.6 oz)  SpO2 96%  BMI 23.67 kg/m2    GENERAL: No acute distress.  HEENT: EOMI. Sclerae white, not injected. Nares clear. Pharynx without erythema or exudate.   Neck: No adenopathy. No thyromegaly. Symmetrical.   Heart: Regular rate and rhythm. Harsh crescendo decrescendo late peaking systolic murmur with radiation to carotids. Delayed carotid pulses.   Lungs: Clear to auscultation. No ronchi, wheezes, rales.   Abdomen: Soft, nontender, nondistended. Bowel sounds present.  Extremities: No clubbing, cyanosis, or edema.   Neurologic: Alert and oriented to person/place/time, normal speech and affect. No focal deficits.  Skin: No petechiae, purpura or rash.     LABORATORY DATA:     LIPID RESULTS:  Lab Results   Component Value Date    CHOL 139 03/06/2017    HDL 34 (L) 03/06/2017    LDL 74 03/06/2017    TRIG 156 (H) 03/06/2017    CHOLHDLRATIO 3.0 08/02/2013       LIVER ENZYME RESULTS:  Lab Results   Component Value Date    AST 29 09/07/2017    ALT 20 09/07/2017       CBC RESULTS:  Lab Results   Component Value Date    WBC 5.2 09/07/2017    RBC 3.43 (L) 09/07/2017    HGB 10.5 (L) 09/07/2017    HCT 32.1 (L) 09/07/2017    MCV 94 09/07/2017    MCH 30.6 09/07/2017    MCHC 32.7 09/07/2017    RDW 15.4 (H) 09/07/2017     09/07/2017       BMP RESULTS:  Lab Results   Component Value Date     09/07/2017    POTASSIUM 5.5 (H) " 09/07/2017    CHLORIDE 102 09/07/2017    CO2 27 09/07/2017    ANIONGAP 6 09/07/2017     (H) 09/07/2017    BUN 20 09/07/2017    CR 1.00 09/07/2017    GFRESTIMATED 70 09/07/2017    GFRESTBLACK 85 09/07/2017    SHELIA 8.9 09/07/2017        A1C RESULTS:  No results found for: A1C    INR RESULTS:  Lab Results   Component Value Date    INR 1.08 06/30/2017    INR 1.01 03/27/2017          PROCEDURES & FURTHER ASSESSMENTS:     ECG:paced    Echocardiogram:  Moderate left ventricular dilation is present.  The Ejection Fraction is estimated at 20-25%.  Normal contraction of basal segments with akinetic mid and distal segments.  This wallmotion abnormality is new since prior study on 12/13/2016.  Consistent with stress cardiomyopathy, if no LAD disease.  Severe aortic stenosis is present.  The aortic valve area is 0.9 cm^2, by the continuity equation.  The peak aortic velocity is 3.8 m/sec.  The mean gradient across the aortic valve is37 mmHg.  Estimated gradients are under estimation due to LV systolic dysfunction,.    CT TAVR:  pending    Coronary Angiogram and Right Heart Catheterization:  Non-obstructive 2016    PFTs: NA    Carotid Ultrasound: na      STS RISK SCORE: 7.6%  NYHA CLASS: II-III     CLINICAL IMPRESSION:     Bud Merritt is a 89 year old male with symptomatic severe aortic stenosis who is a being referred for transcatheter aortic valve replacement based on age, frailty, co-morbidities and overall surgical mortality risk estimation of 7.6% based on the STS score.      The pre-procedural TAVR evaluation currently requires additional assessment with CT TAVR, pulmonary function testing prior to presentation to the Heart team for discussion during our multi-disciplinary conference for consensus decision and procedural planning.  His major limitation has been his hip pain and is not able to perform or live upto his expectation. 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 He wants to  know fixing his valve will change his outcome.    Plan Summary:  1) Severe Aortic Stenosis:  -Presentation of data to the Heart team to assess the optimal treatment of his severe aortic stenosis  -with multiple co-morbidities unclear if fixing the valve will change his symptoms.  Patient was evaluated in clinic with Dr. Jcarlos De Guzman of interventional cardiology and Dr. Marrero of cardiothoracic surgery.      Demetris Marcelino MD  Interventional Cardiology Fellow    CC  Patient Care Team:  Camron Aragon MD as PCP - Elvira Chambers RN as Clinic Care Coordinator  Khoa Li MD as MD (Clinical Cardiac Electrophysiology)  Snehal Wilson as Nurse Coordinator (Cardiology)  KHOA LI      I have seen and examined the patient with the TAVR  team. I agree with the assessment and plan of the note above.I have reviewed pertinent labs.     Cosme Gunn MD  Interventional Cardiology  Pager: [phone]

## 2017-09-07 NOTE — LETTER
9/7/2017      RE: Bud Merritt  [id number] [address] [place] [id number]       Dear Colleague,    Thank you for the opportunity to participate in the care of your patient, Bud Merritt, at the [facility] HE[facility]. Please see a copy of my visit note below.    [facility] HEART CARE  CARDIOVASCULAR DIVISION    VALVE CLINIC INITIAL CONSULTATION    PRIMARY CARDIOLOGIST: Dr. Mitra GAGNON      PERTINENT CLINICAL HISTORY:     Bud Merritt is a very pleasant 89 year old male with severe aortic valvular stenosis referred to our clinic for evaluation and consideration for potential transcatheter aortic valve replacement (TAVR).  his severe aortic stenosis is characterized with an area of 0.9 cm2, mean gradient 36 mmHg and peak velocity of 3.78 m/sec with LVEF 20-25% by echocardiogram on 3/2017.    Additional notable medical history of Atrial fibrillation s/p lisandro ablation with BiV pacemaker, prostate cancer, RA on prednisone.  He has severe arthritis and is limited in activity.  However, he feels tired and is not able to do all his activities. He used to be a  before. However, for the past couple of yrs he is feeling tired. His lightheadedness got better since getting pacemaker.   He cannot lie flat due to his arthritis.  He has some shortness of breath with exertion. However, he is not able to move much due to his arthritis.  He has leg swelling and is seeing PCP for it.   He also has leg infection. He it getting better.       PAST MEDICAL HISTORY:     Past Medical History:   Diagnosis Date     Atrial fibrillation (H) 07/01/2016     Cardiomyopathy (H)      COPD exacerbation (H) 3/2/2017     Depressive disorder      Paroxysmal atrial fibrillation (H) 7/6/2016     Pure hypercholesterolemia      Rheumatoid arthritis(714.0)      Unspecified essential hypertension      Weakness generalized 3/2/2017        PAST SURGICAL HISTORY:     Past Surgical History:    Procedure Laterality Date     ARTHROPLASTY KNEE Left 1/12/2016    Procedure: ARTHROPLASTY KNEE;  Surgeon: Cesar Walker DO;  Location: [facility] OR     COLONOSCOPY  08/24/09      COLONOSCOPY THRU STOMA W BIOPSY/CAUTERY TUMOR/POLYP/LESION  8/31/2004      REPAIR ING HERNIA,5+Y/O,REDUCIBL  1996    Marlex mesh repair of bilateral inguinal hernias and drainage of bilateral scrotal hydroceles.     IMPLANT PACEMAKER  03/10/2017     IRRIGATION AND DEBRIDEMENT SOFT TISSUE LOWER EXTREMITY, COMBINED Left 3/15/2016    Procedure: COMBINED IRRIGATION AND DEBRIDEMENT SOFT TISSUE LOWER EXTREMITY;  Surgeon: Cesar Walker DO;  Location: [facility]        CURRENT MEDICATIONS:     Current Outpatient Prescriptions   Medication Sig Dispense Refill     leflunomide (ARAVA) 10 MG tablet Take 1 tablet (10 mg) by mouth daily 30 tablet 11     HYDROcodone-acetaminophen (NORCO) 5-325 MG per tablet TAKE 1 TABLET BY MOUTH EVERY 6 HOURS AS NEEDED FOR MODERATE TO SEVERE PAIN 30 tablet 0     traMADol (ULTRAM) 50 MG tablet TAKE 1 TABLET 3 TIMES DAILY AS NEEDED FOR MODERATE PAIN 90 tablet 0     dofetilide (TIKOSYN) 125 MCG capsule Take 1 capsule (125 mcg) by mouth 2 times daily 60 capsule 5     metoprolol (LOPRESSOR) 50 MG tablet TAKE 1 TABLET (50 MG) BY MOUTH 2 TIMES DAILY 120 tablet 3     atorvastatin (LIPITOR) 40 MG tablet Take 1 tablet (40 mg) by mouth daily 90 tablet 2     fluticasone-vilanterol (BREO ELLIPTA) 100-25 MCG/INH oral inhaler Inhale 1 puff into the lungs daily (Patient taking differently: Inhale 1 puff into the lungs daily Pt is not and has never used this at home.) 60 Inhaler 0     predniSONE (DELTASONE) 5 MG tablet Take 1 tablet (5 mg) by mouth daily 100 tablet 4     apixaban ANTICOAGULANT (ELIQUIS) 2.5 MG tablet Take 1 tablet (2.5 mg) by mouth 2 times daily 180 tablet 3     Leuprolide Acetate (LUPRON DEPOT IM) Inject into the muscle every 4 months Reported on 5/3/2017       lisinopril (PRINIVIL,ZESTRIL) 10 MG  tablet Take 1 tablet (10 mg) by mouth 2 times daily 62 tablet 11     spironolactone (ALDACTONE) 25 MG tablet Take 1 tablet (25 mg) by mouth daily 30 tablet 11     acetaminophen (TYLENOL) 650 MG CR tablet Take 650 mg by mouth 2 times daily Reported on 4/5/2017       alendronate (FOSAMAX) 70 MG tablet Take 1 tablet (70 mg) by mouth every 7 days Take with over 8 ounces water and stay upright for at least 30 minutes after dose.  Take at least 60 minutes before breakfast 12 tablet 3     calcium carbonate (TUMS) 500 MG chewable tablet Take 1 chew tab by mouth every 4 hours as needed for heartburn Reported on 5/3/2017       tamsulosin (FLOMAX) 0.4 MG 24 hr capsule Take 1 capsule (0.4 mg) by mouth 2 times daily 60 capsule      ascorbic acid (VITAMIN C) 500 MG CPCR Take 500 mg by mouth daily       VITAMIN D, CHOLECALCIFEROL, PO Take 400 Units by mouth daily Reported on 5/3/2017       calcium carbonate (OS-SHELIA 500 MG [place]. [place]) 500 MG tablet Take 600 mg by mouth daily          ALLERGIES:     Allergies   Allergen Reactions     Amiodarone Other (See Comments)     Drop in DLCO     Lasix [Furosemide] Blisters     Blisters and sores in his mouth.         FAMILY HISTORY:     Family History   Problem Relation Age of Onset     CANCER Mother      CANCER Son      Unknown/Adopted Father      Alcoholism Brother         SOCIAL HISTORY:     Social History     Social History     Marital status:      Spouse name: N/A     Number of children: N/A     Years of education: N/A     Social History Main Topics     Smoking status: Former Smoker     Packs/day: 0.50     Years: 15.00     Types: Cigarettes     Quit date: 11/12/1998     Smokeless tobacco: Never Used      Comment: quit 15 yrs ago     Alcohol use No     Drug use: No     Sexual activity: Yes     Other Topics Concern     Caffeine Concern Yes     8 cups coffee day     Sleep Concern Yes     Weight Concern Yes     weight loss     Special Diet No     Exercise Yes     split firewood daily  "    Social History Narrative        REVIEW OF SYSTEMS:     Constitutional: No fevers or chills  Skin: as above  Eyes: No acute change in vision  Ears/Nose/Throat: No purulent rhinorrhea, new hearing loss, or new vertigo  Respiratory: No cough or hemoptysis  Cardiovascular: See HPI  Gastrointestinal: No change in appetite, vomiting, hematemesis or diarrhea  Genitourinary: No dysuria or hematuria  Musculoskeletal: as above  Neurologic: No new headaches, focal weakness or behavior changes  Psychiatric: No hallucinations, excessive alcohol consumption or illegal drug usage  Hematologic/Lymphatic/Immunologic: No bleeding, chills, fever, night sweats or weight loss  Endocrine: No new cold intolerance, heat intolerance, polyphagia, polydipsia or polyuria      PHYSICAL EXAMINATION:     /80 (BP Location: Left arm, Patient Position: Chair, Cuff Size: Adult Regular)  Pulse 89  Ht 1.702 m (5' 7\")  Wt 68.5 kg (151 lb 1.6 oz)  SpO2 96%  BMI 23.67 kg/m2    GENERAL: No acute distress.  HEENT: EOMI. Sclerae white, not injected. Nares clear. Pharynx without erythema or exudate.   Neck: No adenopathy. No thyromegaly. Symmetrical.   Heart: Regular rate and rhythm. Harsh crescendo decrescendo late peaking systolic murmur with radiation to carotids. Delayed carotid pulses.   Lungs: Clear to auscultation. No ronchi, wheezes, rales.   Abdomen: Soft, nontender, nondistended. Bowel sounds present.  Extremities: No clubbing, cyanosis, or edema.   Neurologic: Alert and oriented to person/place/time, normal speech and affect. No focal deficits.  Skin: No petechiae, purpura or rash.     LABORATORY DATA:     LIPID RESULTS:  Lab Results   Component Value Date    CHOL 139 03/06/2017    HDL 34 (L) 03/06/2017    LDL 74 03/06/2017    TRIG 156 (H) 03/06/2017    CHOLHDLRATIO 3.0 08/02/2013       LIVER ENZYME RESULTS:  Lab Results   Component Value Date    AST 29 09/07/2017    ALT 20 09/07/2017       CBC RESULTS:  Lab Results   Component Value " Date    WBC 5.2 09/07/2017    RBC 3.43 (L) 09/07/2017    HGB 10.5 (L) 09/07/2017    HCT 32.1 (L) 09/07/2017    MCV 94 09/07/2017    MCH 30.6 09/07/2017    MCHC 32.7 09/07/2017    RDW 15.4 (H) 09/07/2017     09/07/2017       BMP RESULTS:  Lab Results   Component Value Date     09/07/2017    POTASSIUM 5.5 (H) 09/07/2017    CHLORIDE 102 09/07/2017    CO2 27 09/07/2017    ANIONGAP 6 09/07/2017     (H) 09/07/2017    BUN 20 09/07/2017    CR 1.00 09/07/2017    GFRESTIMATED 70 09/07/2017    GFRESTBLACK 85 09/07/2017    SHELIA 8.9 09/07/2017        A1C RESULTS:  No results found for: A1C    INR RESULTS:  Lab Results   Component Value Date    INR 1.08 06/30/2017    INR 1.01 03/27/2017          PROCEDURES & FURTHER ASSESSMENTS:     ECG:paced    Echocardiogram:  Moderate left ventricular dilation is present.  The Ejection Fraction is estimated at 20-25%.  Normal contraction of basal segments with akinetic mid and distal segments.  This wallmotion abnormality is new since prior study on 12/13/2016.  Consistent with stress cardiomyopathy, if no LAD disease.  Severe aortic stenosis is present.  The aortic valve area is 0.9 cm^2, by the continuity equation.  The peak aortic velocity is 3.8 m/sec.  The mean gradient across the aortic valve is37 mmHg.  Estimated gradients are under estimation due to LV systolic dysfunction,.    CT TAVR:  pending    Coronary Angiogram and Right Heart Catheterization:  Non-obstructive 2016    PFTs: NA    Carotid Ultrasound: na      STS RISK SCORE: 7.6%  NYHA CLASS: II-III     CLINICAL IMPRESSION:     Bud Merritt is a 89 year old male with symptomatic severe aortic stenosis who is a being referred for transcatheter aortic valve replacement based on age, frailty, co-morbidities and overall surgical mortality risk estimation of 7.6% based on the STS score.      The pre-procedural TAVR evaluation currently requires additional assessment with CT TAVR, pulmonary function testing prior to  presentation to the Heart team for discussion during our multi-disciplinary conference for consensus decision and procedural planning.  His major limitation has been his hip pain and is not able to perform or live upto his expectation. 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    Plan Summary:  1) Severe Aortic Stenosis:  -Presentation of data to the Heart team to assess the optimal treatment of his severe aortic stenosis  -with multiple co-morbidities unclear if fixing the valve will change his symptoms.  Patient was evaluated in clinic with Dr. Jcarlos De Guzman of interventional cardiology and Dr. Marrero of cardiothoracic surgery.      Demetris Marcelino MD  Interventional Cardiology Fellow    CC  Patient Care Team:  Camron Aragon MD as PCP - Elvira Chambers RN as Clinic Care Coordinator  Khoa Li MD as MD (Clinical Cardiac Electrophysiology)  Snehal Wilson as Nurse Coordinator (Cardiology)  KHOA LI      I have seen and examined the patient with the TAVR  team. I agree with the assessment and plan of the note above.I have reviewed pertinent labs.     Cosme Gunn MD  Interventional Cardiology  Pager: [phone]

## 2017-09-07 NOTE — NURSING NOTE
Chief Complaint   Patient presents with     New Patient     new TAVR eval     Vitals were taken and medications were reconciled.  Ld Cook, CARLOTA  2:39 PM

## 2017-09-07 NOTE — LETTER
9/7/2017      RE: Bud Merritt  [id number] [address] [place] [id number]       Dear Colleague,    Thank you for the opportunity to participate in the care of your patient, Bud Merritt, at the [facility]. Please see a copy of my visit note below.    TAVR INITIAL CONSULTATION  Seen with Dr. Cosme Gunn-Interventional Cardiology & Dr. Josh Marrero-CV Surgery    HPI:  Bud Merritt is a very pleasant 89 year old male with severe aortic valvular stenosis referred to our clinic for evaluation and consideration for potential transcatheter aortic valve replacement (TAVR).   His severe aortic stenosis is characterized with an area of 0.9 cm2, mean gradient 36 mmHg and peak velocity of 3.78 m/sec with LVEF 20-25% by echocardiogram on 3/2017.      Additional notable medical history of Atrial fibrillation s/p lisandro ablation with BiV pacemaker, prostate cancer, RA on prednisone.  He has severe arthritis and is limited in activity.  However, he feels tired and is not able to do all his activities. 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 However, for the past couple of yrs he is feeling tired. His lightheadedness got better since getting pacemaker.   He cannot lie flat due to his arthritis.  He has some shortness of breath with exertion. However, he is not able to move much due to his arthritis.  He has leg swelling and is seeing PCP for it.   He also has leg infection that is currently resolving.       PAST MEDICAL HISTORY:  Past Medical History:   Diagnosis Date     Atrial fibrillation (H) 07/01/2016     Cardiomyopathy (H)      COPD exacerbation (H) 3/2/2017     Depressive disorder      Paroxysmal atrial fibrillation (H) 7/6/2016     Pure hypercholesterolemia      Rheumatoid arthritis(714.0)      Unspecified essential hypertension      Weakness generalized 3/2/2017       CURRENT MEDICATIONS:  Current Outpatient Prescriptions   Medication Sig Dispense Refill      HYDROcodone-acetaminophen (NORCO) 5-325 MG per tablet TAKE 1 TABLET BY MOUTH EVERY 6 HOURS AS NEEDED FOR MODERATE TO SEVERE PAIN 30 tablet 0     traMADol (ULTRAM) 50 MG tablet TAKE 1 TABLET 3 TIMES DAILY AS NEEDED FOR MODERATE PAIN 90 tablet 0     leflunomide (ARAVA) 10 MG tablet Take 1 tablet (10 mg) by mouth daily 30 tablet 11     dofetilide (TIKOSYN) 125 MCG capsule Take 1 capsule (125 mcg) by mouth 2 times daily 60 capsule 5     metoprolol (LOPRESSOR) 50 MG tablet TAKE 1 TABLET (50 MG) BY MOUTH 2 TIMES DAILY 120 tablet 3     atorvastatin (LIPITOR) 40 MG tablet Take 1 tablet (40 mg) by mouth daily 90 tablet 2     fluticasone-vilanterol (BREO ELLIPTA) 100-25 MCG/INH oral inhaler Inhale 1 puff into the lungs daily (Patient not taking: Reported on 9/13/2017) 60 Inhaler 0     predniSONE (DELTASONE) 5 MG tablet Take 1 tablet (5 mg) by mouth daily 100 tablet 4     apixaban ANTICOAGULANT (ELIQUIS) 2.5 MG tablet Take 1 tablet (2.5 mg) by mouth 2 times daily 180 tablet 3     Leuprolide Acetate (LUPRON DEPOT IM) Inject into the muscle every 4 months Reported on 5/3/2017       lisinopril (PRINIVIL,ZESTRIL) 10 MG tablet Take 1 tablet (10 mg) by mouth 2 times daily 62 tablet 11     spironolactone (ALDACTONE) 25 MG tablet Take 1 tablet (25 mg) by mouth daily 30 tablet 11     acetaminophen (TYLENOL) 650 MG CR tablet Take 650 mg by mouth 2 times daily Reported on 4/5/2017       alendronate (FOSAMAX) 70 MG tablet Take 1 tablet (70 mg) by mouth every 7 days Take with over 8 ounces water and stay upright for at least 30 minutes after dose.  Take at least 60 minutes before breakfast 12 tablet 3     calcium carbonate (TUMS) 500 MG chewable tablet Take 1 chew tab by mouth every 4 hours as needed for heartburn Reported on 5/3/2017       tamsulosin (FLOMAX) 0.4 MG 24 hr capsule Take 1 capsule (0.4 mg) by mouth 2 times daily 60 capsule      ascorbic acid (VITAMIN C) 500 MG CPCR Take 500 mg by mouth daily       VITAMIN D,  CHOLECALCIFEROL, PO Take 400 Units by mouth daily Reported on 5/3/2017       calcium carbonate (OS-SHELIA 500 MG [place]) 500 MG tablet Take 600 mg by mouth daily         PAST SURGICAL HISTORY:  Past Surgical History:   Procedure Laterality Date     ARTHROPLASTY KNEE Left 1/12/2016    Procedure: ARTHROPLASTY KNEE;  Surgeon: Cesar Walker DO;  Location: [facility] OR     COLONOSCOPY  08/24/09     HC COLONOSCOPY THRU STOMA W BIOPSY/CAUTERY TUMOR/POLYP/LESION  8/31/2004     HC REPAIR ING HERNIA,5+Y/O,REDUCIBL  1996    Marlex mesh repair of bilateral inguinal hernias and drainage of bilateral scrotal hydroceles.     IMPLANT PACEMAKER  03/10/2017     IRRIGATION AND DEBRIDEMENT SOFT TISSUE LOWER EXTREMITY, COMBINED Left 3/15/2016    Procedure: COMBINED IRRIGATION AND DEBRIDEMENT SOFT TISSUE LOWER EXTREMITY;  Surgeon: Cesar Walker DO;  Location: [facility] OR       ALLERGIES     Allergies   Allergen Reactions     Amiodarone Other (See Comments)     Drop in DLCO     Lasix [Furosemide] Blisters     Blisters and sores in his mouth.        FAMILY HISTORY:  Family History   Problem Relation Age of Onset     CANCER Mother      CANCER Son      Unknown/Adopted Father      Alcoholism Brother        SOCIAL HISTORY:  Social History     Social History     Marital status:      Spouse name: N/A     Number of children: N/A     Years of education: N/A     Social History Main Topics     Smoking status: Former Smoker     Packs/day: 0.50     Years: 15.00     Types: Cigarettes     Quit date: 11/12/1998     Smokeless tobacco: Never Used      Comment: quit 15 yrs ago     Alcohol use No     Drug use: No     Sexual activity: Yes     Other Topics Concern     Caffeine Concern Yes     8 cups coffee day     Sleep Concern Yes     Weight Concern Yes     weight loss     Special Diet No     Exercise Yes     Homeschool Snowboarding daily     Social History Narrative       ROS:   Constitutional: No fever, chills, or sweats. No weight gain/loss   ENT:  "No visual disturbance, ear ache, epistaxis, sore throat  Allergies/Immunologic: Negative.   Respiratory: No cough, hemoptysia  Cardiovascular: As per HPI  GI: No nausea, vomiting, hematemesis, melena, or hematochezia  : No urinary frequency, dysuria, or hematuria  Integument: Negative  Psychiatric: Negative  Neuro: Negative  Endocrinology: Negative   Musculoskeletal: Negative    EXAM:  /80 (BP Location: Left arm, Patient Position: Chair, Cuff Size: Adult Regular)  Pulse 73  Ht 1.702 m (5' 7\")  Wt 68.8 kg (151 lb 9.6 oz)  SpO2 99%  BMI 23.74 kg/m2  In general, the patient is a pleasant male in no apparent distress.    HEENT: NC/AT.  PERRLA.  EOMI.  Sclerae white, not injected.  Nares clear.  Pharynx without erythema or exudate.  Dentition intact.    Neck: No adenopathy.  No thyromegaly. Carotids +4/4 bilaterally without bruits.  No jugular venous distension.   Heart: RRR. Normal S1, S2 splits physiologically. No murmur, rub, click, or gallop. The PMI is in the 5th ICS in the midclavicular line. There is no heave.    Lungs: CTA.  No ronchi, wheezes, rales.  No dullness to percussion.   Abdomen: Soft, nontender, nondistended. No organomegaly.  No bruits.   Extremities: No clubbing, cyanosis, or edema.  The pulses are +4/4 at the radial, brachial, femoral, popliteal, DP, and PT sites bilaterally.  No bruits are noted.  Neurologic: Alert and oriented to person/place/time, normal speech, gait and affect  Skin: No petechiae, purpura or rash.    Labs:  LIPID RESULTS:  Lab Results   Component Value Date    CHOL 139 03/06/2017    HDL 34 (L) 03/06/2017    LDL 74 03/06/2017    TRIG 156 (H) 03/06/2017    CHOLHDLRATIO 3.0 08/02/2013       LIVER ENZYME RESULTS:  Lab Results   Component Value Date    AST 29 09/07/2017    ALT 20 09/07/2017       CBC RESULTS:  Lab Results   Component Value Date    WBC 5.2 09/07/2017    RBC 3.43 (L) 09/07/2017    HGB 10.5 (L) 09/07/2017    HCT 32.1 (L) 09/07/2017    MCV 94 09/07/2017    " MCH 30.6 09/07/2017    MCHC 32.7 09/07/2017    RDW 15.4 (H) 09/07/2017     09/07/2017       BMP RESULTS:  Lab Results   Component Value Date     09/07/2017    POTASSIUM 5.5 (H) 09/07/2017    CHLORIDE 102 09/07/2017    CO2 27 09/07/2017    ANIONGAP 6 09/07/2017     (H) 09/07/2017    BUN 20 09/07/2017    CR 1.00 09/07/2017    GFRESTIMATED 70 09/07/2017    GFRESTBLACK 85 09/07/2017    SHELIA 8.9 09/07/2017        A1C RESULTS:  No results found for: A1C    INR RESULTS:  Lab Results   Component Value Date    INR 1.08 06/30/2017    INR 1.01 03/27/2017       Procedures:    ECG:paced     Echocardiogram:  Moderate left ventricular dilation is present.  The Ejection Fraction is estimated at 20-25%.  Normal contraction of basal segments with akinetic mid and distal segments.  This wallmotion abnormality is new since prior study on 12/13/2016.  Consistent with stress cardiomyopathy, if no LAD disease.  Severe aortic stenosis is present.  The aortic valve area is 0.9 cm^2, by the continuity equation.  The peak aortic velocity is 3.8 m/sec.  The mean gradient across the aortic valve is37 mmHg.  Estimated gradients are under estimation due to LV systolic dysfunction,.     CT TAVR:  pending      Coronary Angiogram and Right Heart Catheterization:  Non-obstructive 2016     PFTs: NA     Carotid Ultrasound: na       FURTHER ASSESSMENTS    STS RISK SCORE: 7.6%  NYHA CLASS: II-III      Assessment and Plan:     89 year old male with severe aortic valvular stenosis referred to our clinic for evaluation and consideration for potential surgical AVR vs transcatheter aortic valve replacement (TAVR). STS RISK SCORE: 7.6%.    He is not a good candidate for surgical AVR. However, he is a reasonable candidate for TAVR due to advanced age, commorbidities and high STS score of 7.6%.    Recommend completion of TAVR workup and TAVR.    Benefits and risks of TAVR procedures were explained to patient.     Approximately 50 minutes spent  with this case including review of the clinical history and data; discussion with the patient and his family and coordination of the care    Thank you for including me in the care of this kind patient. Do not hesitate to contact me with any questions.    Dr. Josh Marrero     Cardiothoracic Surgery  [facility]  Patient Care Team:  Camron Aragon MD as PCP - Elvira Chambers RN as Clinic Care Coordinator  Khoa Li MD as MD (Clinical Cardiac Electrophysiology)  Snehal Wilson as Nurse Coordinator (Cardiology)  KHOA LI      Please do not hesitate to contact me if you have any questions/concerns.     Sincerely,     Josh Marrero MD

## 2017-09-07 NOTE — MR AVS SNAPSHOT
After Visit Summary   9/7/2017    Bud Merritt    MRN: [id number]           Patient Information     Date Of Birth          5/9/1927        Visit Information        Provider Department      9/7/2017 3:30 PM Josh Marrero MD [facility]        Today's Diagnoses     Nonrheumatic aortic valve stenosis    -  1       Follow-ups after your visit        Your next 10 appointments already scheduled     Sep 21, 2017  1:40 PM CDT   SHORT with Camron Aragon MD   [facility]    [address] [place] [address]   [phone]            Sep 25, 2017  1:00 PM CDT   (Arrive by 12:45 PM)   PAC Pharmacist with  Pac Pharmacist   [facility] Preoperative Assessment [place] ([facility])    [address] [place] [id number] [phone]            Sep 25, 2017  1:30 PM CDT   (Arrive by 1:15 PM)   PAC EVALUATION with Uc Pac Se [address] [place] [address] Preoperative Assessment [place] ([facility])    [address] [place] [id number] [phone]            Sep 25, 2017  2:30 PM CDT   (Arrive by 2:15 PM)   PAC RN ASSESSMENT with Uc Pac Rn   [facility] Preoperative Assessment [place] ([facility])    [address] [place] [address] [phone]            Sep 25, 2017  2:50 PM CDT   (Arrive by 2:35 PM)   PAC Anesthesia Consult with  Pac Anesthesiologist   [facility] Preoperative Assessment [place] ([facility])    [address] [place] [id number] [phone]            Sep 25, 2017  3:30 PM CDT   (Arrive by 3:15 PM)   New Patient Visit with Janes Kingsley MD   [facility] ([facility])    [address] [place] [address] [phone]            Sep 27, 2017 11:30 AM CDT   Transcath Aortic-Valve Replace  "with [id number]   [facility] ([facility])    [address] [facility] [address] [phone]            Sep 27, 2017   Procedure with GENERIC ANESTHESIA PROVIDER   [facility] (--)    [address] [facility] [address]   [phone]            Oct 04, 2017  9:45 AM CDT   Return Visit with Mak Shaver MD   [facility] ([facility])    [address] [facility] [address] [phone]              Who to contact     If you have questions or need follow up information about today's clinic visit or your schedule please contact [facility] directly at [phone].  Normal or non-critical lab and imaging results will be communicated to you by Spreadsavehart, letter or phone within 4 business days after the clinic has received the results. If you do not hear from us within 7 days, please contact the clinic through Spreadsavehart or phone. If you have a critical or abnormal lab result, we will notify you by phone as soon as possible.  Submit refill requests through [email or web address] or call your pharmacy and they will forward the refill request to us. Please allow 3 business days for your refill to be completed.          Additional Information About Your Visit        [email or web address] Information     [email or web address] lets you send messages to your doctor, view your test results, renew your prescriptions, schedule appointments and more. To sign up, go to [email or web address] . Click on \"Log in\" on the left side of the screen, which will take you to the Welcome page. Then click on \"Sign up Now\" on the right side of the page.     You will be asked to enter the access code listed below, as well as some personal information. Please follow the directions to create your username and password.     Your access code is: [email or web address]  Expires: 2017 11:55 AM     Your access code will  in 90 days. If you need help or a " "new code, please call your [facility] clinic or [phone].        Care EveryWhere ID     This is your Care EveryWhere ID. This could be used by other organizations to access your [facility] medical records  [id number]        Your Vitals Were     Pulse Height Pulse Oximetry BMI (Body Mass Index)          73 1.702 m (5' 7\") 99% 23.74 kg/m2         Blood Pressure from Last 3 Encounters:   09/13/17 110/60   09/07/17 125/80   09/07/17 125/80    Weight from Last 3 Encounters:   09/13/17 67 kg (147 lb 12.8 oz)   09/07/17 68.8 kg (151 lb 9.6 oz)   09/07/17 68.5 kg (151 lb 1.6 oz)              Today, you had the following     No orders found for display         Today's Medication Changes          These changes are accurate as of: 9/7/17 11:59 PM.  If you have any questions, ask your nurse or doctor.               These medicines have changed or have updated prescriptions.        Dose/Directions    fluticasone-vilanterol 100-25 MCG/INH oral inhaler   Commonly known as:  BREO ELLIPTA   This may have changed:  additional instructions   Used for:  COPD exacerbation (H)        Dose:  1 puff   Inhale 1 puff into the lungs daily   Quantity:  60 Inhaler   Refills:  0                Primary Care Provider Office Phone # Fax #    Camron Aragon [phone]       [place] [id number] [place] [address]        Equal Access to Services     JUAN CLINE AH: Hadii sammie ku hadasho Soomaali, waaxda luqadaha, qaybta kaalmada adeegyada, waxlamont iva dexter. So [facility] [phone].    ATENCIÓN: Si habla español, tiene a mon disposición servicios gratuitos de asistencia lingüística. Llame al [phone].    We comply with applicable federal civil rights laws and [place] laws. We do not discriminate on the basis of race, color, national origin, age, disability sex, sexual orientation or gender identity.            Thank you!     Thank you for choosing [facility]  for your " care. Our goal is always to provide you with excellent care. Hearing back from our patients is one way we can continue to improve our services. Please take a few minutes to complete the written survey that you may receive in the mail after your visit with us. Thank you!             Your Updated Medication List - Protect others around you: Learn how to safely use, store and throw away your medicines at www.disposemymeds.org.          This list is accurate as of: 9/7/17 11:59 PM.  Always use your most recent med list.                   Brand Name Dispense Instructions for use Diagnosis    acetaminophen 650 MG CR tablet    TYLENOL     Take 650 mg by mouth 2 times daily Reported on 4/5/2017        alendronate 70 MG tablet    FOSAMAX    12 tablet    Take 1 tablet (70 mg) by mouth every 7 days Take with over 8 ounces water and stay upright for at least 30 minutes after dose.  Take at least 60 minutes before breakfast    Osteopenia       apixaban ANTICOAGULANT 2.5 MG tablet    ELIQUIS    180 tablet    Take 1 tablet (2.5 mg) by mouth 2 times daily    Paroxysmal atrial fibrillation (H)       ascorbic acid 500 MG Cpcr CR capsule    vitamin C     Take 500 mg by mouth daily        atorvastatin 40 MG tablet    LIPITOR    90 tablet    Take 1 tablet (40 mg) by mouth daily    Hyperlipidemia LDL goal <130       calcium carbonate 1250 MG tablet    OS-SHELIA 500 mg [place]. [place]     Take 600 mg by mouth daily        calcium carbonate 500 MG chewable tablet    TUMS     Take 1 chew tab by mouth every 4 hours as needed for heartburn Reported on 5/3/2017        dofetilide 125 MCG capsule    TIKOSYN    60 capsule    Take 1 capsule (125 mcg) by mouth 2 times daily    Paroxysmal atrial fibrillation (H)       FLOMAX 0.4 MG capsule   Generic drug:  tamsulosin     60 capsule    Take 1 capsule (0.4 mg) by mouth 2 times daily        fluticasone-vilanterol 100-25 MCG/INH oral inhaler    BREO ELLIPTA    60 Inhaler    Inhale 1 puff into the lungs daily     COPD exacerbation (H)       leflunomide 10 MG tablet    ARAVA    30 tablet    Take 1 tablet (10 mg) by mouth daily    Rheumatoid arthritis involving multiple sites with positive rheumatoid factor (H)       lisinopril 10 MG tablet    PRINIVIL/ZESTRIL    62 tablet    Take 1 tablet (10 mg) by mouth 2 times daily    Acute on chronic systolic congestive heart failure (H)       LUPRON DEPOT IM      Inject into the muscle every 4 months Reported on 5/3/2017        metoprolol 50 MG tablet    LOPRESSOR    120 tablet    TAKE 1 TABLET (50 MG) BY MOUTH 2 TIMES DAILY    Atrial fibrillation with rapid ventricular response (H)       predniSONE 5 MG tablet    DELTASONE    100 tablet    Take 1 tablet (5 mg) by mouth daily    Osteopenia       spironolactone 25 MG tablet    ALDACTONE    30 tablet    Take 1 tablet (25 mg) by mouth daily    Cardiomyopathy, nonischemic (H)       VITAMIN D (CHOLECALCIFEROL) PO      Take 400 Units by mouth daily Reported on 5/3/2017

## 2017-09-07 NOTE — MR AVS SNAPSHOT
After Visit Summary   9/7/2017    Bud Merritt    MRN: [id number]           Patient Information     Date Of Birth          5/9/1927        Visit Information        Provider Department      9/7/2017 3:15 PM  [facility]        Today's Diagnoses     Dilated cardiomyopathy (H) dilated LV, EF 20-25% by Echo 3/4/17    -  1    Mitral regurgitation and aortic stenosis - AS severe by echo on 3/2017        Paroxysmal atrial fibrillation (H)        Hyperlipidemia LDL goal <130        Anemia, unspecified type        Age-related osteoporosis without current pathological fracture        Rheumatoid arthritis involving multiple sites with positive rheumatoid factor (H)        Venous stasis ulcers of both lower extremities (H)        Cellulitis of right lower extremity        Malignant neoplasm of prostate (H)           Follow-ups after your visit        Your next 10 appointments already scheduled     Sep 13, 2017 10:40 AM CDT   [facility]    [address] [address] [phone]           Bring a current list of meds and any records pertaining to this visit. For Physicals, please bring immunization records and any forms needing to be filled out. Please arrive 10 minutes early to complete paperwork.            Oct 04, 2017  9:45 AM CDT   Return Visit with Mak Shaver MD   [facility]    [address] [place] [address] [phone]              Future tests that were ordered for you today     Open Future Orders        Priority Expected Expires Ordered    Radiologist Consult For Cardiology Routine 9/7/2017 9/7/2018 9/7/2017            Who to contact     If you have questions or need follow up information about today's clinic visit or your schedule please contact [facility] directly at [phone].  Normal  "or non-critical lab and imaging results will be communicated to you by musiXmatchhart, letter or phone within 4 business days after the clinic has received the results. If you do not hear from us within 7 days, please contact the clinic through App[employer] or phone. If you have a critical or abnormal lab result, we will notify you by phone as soon as possible.  Submit refill requests through [employer] or call your pharmacy and they will forward the refill request to us. Please allow 3 business days for your refill to be completed.          Additional Information About Your Visit        Appdra Information     Appdra lets you send messages to your doctor, view your test results, renew your prescriptions, schedule appointments and more. To sign up, go to [email or web address] . Click on \"Log in\" on the left side of the screen, which will take you to the Welcome page. Then click on \"Sign up Now\" on the right side of the page.     You will be asked to enter the access code listed below, as well as some personal information. Please follow the directions to create your username and password.     Your access code is: [email or web address]  Expires: 2017 11:55 AM     Your access code will  in 90 days. If you need help or a new code, please call your [facility] clinic or [phone].        Care EveryWhere ID     This is your Care EveryWhere ID. This could be used by other organizations to access your [facility] medical records  [id number]        Your Vitals Were     Pulse Height Pulse Oximetry BMI (Body Mass Index)          89 1.702 m (5' 7\") 96% 23.67 kg/m2         Blood Pressure from Last 3 Encounters:   17 125/80   17 125/80   17 110/68    Weight from Last 3 Encounters:   17 68.8 kg (151 lb 9.6 oz)   17 68.5 kg (151 lb 1.6 oz)   17 68.1 kg (150 lb 1.6 oz)              Today, you had the following     No orders found for display         Today's Medication Changes          These changes are " accurate as of: 9/7/17  4:50 PM.  If you have any questions, ask your nurse or doctor.               These medicines have changed or have updated prescriptions.        Dose/Directions    fluticasone-vilanterol 100-25 MCG/INH oral inhaler   Commonly known as:  BREO ELLIPTA   This may have changed:  additional instructions   Used for:  COPD exacerbation (H)        Dose:  1 puff   Inhale 1 puff into the lungs daily   Quantity:  60 Inhaler   Refills:  0                Primary Care Provider Office Phone # Fax #    Camron Aragon [phone]       [facility] [address] [place] [address]        Equal Access to Services     [facility]: Hadii sammie clifton hadasho Sogetachew, waaxda luqadaha, qaybta kaalmada gomezyacoral, ronel real . So [facility] [phone].    ATENCIÓN: Si habla español, tiene a mon disposición servicios gratuitos de asistencia lingüística. [facility] [phone].    We comply with applicable federal civil rights laws and [place] laws. We do not discriminate on the basis of race, color, national origin, age, disability sex, sexual orientation or gender identity.            Thank you!     Thank you for choosing [facility]  for your care. Our goal is always to provide you with excellent care. Hearing back from our patients is one way we can continue to improve our services. Please take a few minutes to complete the written survey that you may receive in the mail after your visit with us. Thank you!             Your Updated Medication List - Protect others around you: Learn how to safely use, store and throw away your medicines at www.disposemymeds.org.          This list is accurate as of: 9/7/17  4:50 PM.  Always use your most recent med list.                   Brand Name Dispense Instructions for use Diagnosis    acetaminophen 650 MG CR tablet    TYLENOL     Take 650 mg by mouth 2 times daily Reported on 4/5/2017        alendronate  70 MG tablet    FOSAMAX    12 tablet    Take 1 tablet (70 mg) by mouth every 7 days Take with over 8 ounces water and stay upright for at least 30 minutes after dose.  Take at least 60 minutes before breakfast    Osteopenia       apixaban ANTICOAGULANT 2.5 MG tablet    ELIQUIS    180 tablet    Take 1 tablet (2.5 mg) by mouth 2 times daily    Paroxysmal atrial fibrillation (H)       ascorbic acid 500 MG Cpcr CR capsule    vitamin C     Take 500 mg by mouth daily        atorvastatin 40 MG tablet    LIPITOR    90 tablet    Take 1 tablet (40 mg) by mouth daily    Hyperlipidemia LDL goal <130       calcium carbonate 1250 MG tablet    OS-SHELIA 500 mg [place]. Ca     Take 600 mg by mouth daily        calcium carbonate 500 MG chewable tablet    TUMS     Take 1 chew tab by mouth every 4 hours as needed for heartburn Reported on 5/3/2017        dofetilide 125 MCG capsule    TIKOSYN    60 capsule    Take 1 capsule (125 mcg) by mouth 2 times daily    Paroxysmal atrial fibrillation (H)       FLOMAX 0.4 MG capsule   Generic drug:  tamsulosin     60 capsule    Take 1 capsule (0.4 mg) by mouth 2 times daily        fluticasone-vilanterol 100-25 MCG/INH oral inhaler    BREO ELLIPTA    60 Inhaler    Inhale 1 puff into the lungs daily    COPD exacerbation (H)       HYDROcodone-acetaminophen 5-325 MG per tablet    NORCO    30 tablet    TAKE 1 TABLET BY MOUTH EVERY 6 HOURS AS NEEDED FOR MODERATE TO SEVERE PAIN    Rheumatoid arthritis involving multiple sites, unspecified rheumatoid factor presence (H)       leflunomide 10 MG tablet    ARAVA    30 tablet    Take 1 tablet (10 mg) by mouth daily    Rheumatoid arthritis involving multiple sites with positive rheumatoid factor (H)       lisinopril 10 MG tablet    PRINIVIL/ZESTRIL    62 tablet    Take 1 tablet (10 mg) by mouth 2 times daily    Acute on chronic systolic congestive heart failure (H)       LUPRON DEPOT IM      Inject into the muscle every 4 months Reported on 5/3/2017         metoprolol 50 MG tablet    LOPRESSOR    120 tablet    TAKE 1 TABLET (50 MG) BY MOUTH 2 TIMES DAILY    Atrial fibrillation with rapid ventricular response (H)       predniSONE 5 MG tablet    DELTASONE    100 tablet    Take 1 tablet (5 mg) by mouth daily    Osteopenia       spironolactone 25 MG tablet    ALDACTONE    30 tablet    Take 1 tablet (25 mg) by mouth daily    Cardiomyopathy, nonischemic (H)       traMADol 50 MG tablet    ULTRAM    90 tablet    TAKE 1 TABLET 3 TIMES DAILY AS NEEDED FOR MODERATE PAIN    Rheumatoid arthritis involving multiple sites, unspecified rheumatoid factor presence (H)       VITAMIN D (CHOLECALCIFEROL) PO      Take 400 Units by mouth daily Reported on 5/3/2017

## 2017-09-07 NOTE — NURSING NOTE
Chief Complaint   Patient presents with     New Patient     new TAVR     Vitals were taken and medications were reconciled.  CARLOTA Ibarra  2:39 PM

## 2017-09-13 ENCOUNTER — OFFICE VISIT (OUTPATIENT)
Dept: FAMILY MEDICINE | Facility: CLINIC | Age: 82
End: 2017-09-13
Payer: COMMERCIAL

## 2017-09-13 VITALS
BODY MASS INDEX: 23.2 KG/M2 | TEMPERATURE: 97.6 F | DIASTOLIC BLOOD PRESSURE: 60 MMHG | HEIGHT: 67 IN | WEIGHT: 147.8 LBS | SYSTOLIC BLOOD PRESSURE: 110 MMHG | OXYGEN SATURATION: 97 % | HEART RATE: 80 BPM

## 2017-09-13 DIAGNOSIS — L97.919 VENOUS STASIS ULCERS OF BOTH LOWER EXTREMITIES (H): ICD-10-CM

## 2017-09-13 DIAGNOSIS — L97.929 VENOUS STASIS ULCERS OF BOTH LOWER EXTREMITIES (H): ICD-10-CM

## 2017-09-13 DIAGNOSIS — I08.0 MITRAL REGURGITATION AND AORTIC STENOSIS: Primary | ICD-10-CM

## 2017-09-13 DIAGNOSIS — I83.029 VENOUS STASIS ULCERS OF BOTH LOWER EXTREMITIES (H): ICD-10-CM

## 2017-09-13 DIAGNOSIS — M06.9 RHEUMATOID ARTHRITIS INVOLVING MULTIPLE SITES, UNSPECIFIED RHEUMATOID FACTOR PRESENCE: ICD-10-CM

## 2017-09-13 DIAGNOSIS — I42.0 DILATED CARDIOMYOPATHY (H): ICD-10-CM

## 2017-09-13 DIAGNOSIS — I83.019 VENOUS STASIS ULCERS OF BOTH LOWER EXTREMITIES (H): ICD-10-CM

## 2017-09-13 PROCEDURE — 99214 OFFICE O/P EST MOD 30 MIN: CPT | Performed by: FAMILY MEDICINE

## 2017-09-13 RX ORDER — HYDROCODONE BITARTRATE AND ACETAMINOPHEN 5; 325 MG/1; MG/1
TABLET ORAL
Qty: 30 TABLET | Refills: 0 | Status: SHIPPED | OUTPATIENT
Start: 2017-09-13 | End: 2017-10-12

## 2017-09-13 RX ORDER — TRAMADOL HYDROCHLORIDE 50 MG/1
TABLET ORAL
Qty: 90 TABLET | Refills: 0 | Status: SHIPPED | OUTPATIENT
Start: 2017-09-13 | End: 2017-10-31

## 2017-09-13 ASSESSMENT — PAIN SCALES - GENERAL: PAINLEVEL: NO PAIN (0)

## 2017-09-13 NOTE — NURSING NOTE
"Chief Complaint   Patient presents with     Cardiology Problem     Follow up cardiology/Imaging        Initial /60 (BP Location: Right arm, Patient Position: Chair, Cuff Size: Adult Regular)  Pulse 80  Temp 97.6  F (36.4  C) (Tympanic)  Ht 5' 7\" (1.702 m)  Wt 147 lb 12.8 oz (67 kg)  SpO2 97%  BMI 23.15 kg/m2 Estimated body mass index is 23.15 kg/(m^2) as calculated from the following:    Height as of this encounter: 5' 7\" (1.702 m).    Weight as of this encounter: 147 lb 12.8 oz (67 kg).  Medication Reconciliation: complete    "

## 2017-09-13 NOTE — MR AVS SNAPSHOT
"              After Visit Summary   9/13/2017    Bud Merritt    MRN: [id number]           Patient Information     Date Of Birth          5/9/1927        Visit Information        Provider Department      9/13/2017 10:40 AM Camron Aragon MD [facility]        Today's Diagnoses     Mitral regurgitation and aortic stenosis - AS severe by echo on 3/2017    -  1    Rheumatoid arthritis involving multiple sites, unspecified rheumatoid factor presence (H)        Venous stasis ulcers of both lower extremities (H)        Dilated cardiomyopathy (H) dilated LV, EF 20-25% by Echo 3/4/17           Follow-ups after your visit        Your next 10 appointments already scheduled     Oct 04, 2017  9:45 AM CDT   [facility]   [facility]    [address] [address] [phone]              Who to contact     If you have questions or need follow up information about today's clinic visit or your schedule please contact [facility] directly at [phone].  Normal or non-critical lab and imaging results will be communicated to you by MyChart, letter or phone within 4 business days after the clinic has received the results. If you do not hear from us within 7 days, please contact the clinic through MyChart or phone. If you have a critical or abnormal lab result, we will notify you by phone as soon as possible.  Submit refill requests through [employer] or call your pharmacy and they will forward the refill request to us. Please allow 3 business days for your refill to be completed.          Additional Information About Your Visit        MyChart Information     [employer] lets you send messages to your doctor, view your test results, renew your prescriptions, schedule appointments and more. To sign up, go to [email or web address] . Click on \"Log in\" on the left side of the screen, which will take you to the " "Welcome page. Then click on \"Sign up Now\" on the right side of the page.     You will be asked to enter the access code listed below, as well as some personal information. Please follow the directions to create your username and password.     Your access code is: [email or web address]  Expires: 2017 11:55 AM     Your access code will  in 90 days. If you need help or a new code, please call your [facility] or [phone].        Care EveryWhere ID     This is your Care EveryWhere ID. This could be used by other organizations to access your [facility] medical records  [id number]        Your Vitals Were     Pulse Temperature Height Pulse Oximetry BMI (Body Mass Index)       80 97.6  F (36.4  C) (Tympanic) 5' 7\" (1.702 m) 97% 23.15 kg/m2        Blood Pressure from Last 3 Encounters:   17 110/60   17 125/80   17 125/80    Weight from Last 3 Encounters:   17 147 lb 12.8 oz (67 kg)   17 151 lb 9.6 oz (68.8 kg)   17 151 lb 1.6 oz (68.5 kg)              Today, you had the following     No orders found for display         Where to get your medicines      Some of these will need a paper prescription and others can be bought over the counter.  Ask your nurse if you have questions.     Bring a paper prescription for each of these medications     HYDROcodone-acetaminophen 5-325 MG per tablet    traMADol 50 MG tablet          Primary Care Provider Office Phone # Fax #    Camron Aragon [phone]       [facility] [address] [place] [address]        Equal Access to Services     [facility] MARLYN : Hadbryant Burgess, hugo lumichelle, qaoralia kaalmada bhavya, ronel dexter. So [facility] [phone].    ATENCIÓN: Si habla español, tiene a mon disposición servicios gratuitos de asistencia lingüística. Llame al [phone].    We comply with applicable federal civil rights laws and [place] laws. We do not " discriminate on the basis of race, color, national origin, age, disability sex, sexual orientation or gender identity.            Thank you!     Thank you for choosing [facility]  for your care. Our goal is always to provide you with excellent care. Hearing back from our patients is one way we can continue to improve our services. Please take a few minutes to complete the written survey that you may receive in the mail after your visit with us. Thank you!             Your Updated Medication List - Protect others around you: Learn how to safely use, store and throw away your medicines at www.disposemymeds.org.          This list is accurate as of: 9/13/17 12:41 PM.  Always use your most recent med list.                   Brand Name Dispense Instructions for use Diagnosis    acetaminophen 650 MG CR tablet    TYLENOL     Take 650 mg by mouth 2 times daily Reported on 4/5/2017        alendronate 70 MG tablet    FOSAMAX    12 tablet    Take 1 tablet (70 mg) by mouth every 7 days Take with over 8 ounces water and stay upright for at least 30 minutes after dose.  Take at least 60 minutes before breakfast    Osteopenia       apixaban ANTICOAGULANT 2.5 MG tablet    ELIQUIS    180 tablet    Take 1 tablet (2.5 mg) by mouth 2 times daily    Paroxysmal atrial fibrillation (H)       ascorbic acid 500 MG Cpcr CR capsule    vitamin C     Take 500 mg by mouth daily        atorvastatin 40 MG tablet    LIPITOR    90 tablet    Take 1 tablet (40 mg) by mouth daily    Hyperlipidemia LDL goal <130       calcium carbonate 1250 MG tablet    OS-SHELIA 500 mg [place]. Ca     Take 600 mg by mouth daily        calcium carbonate 500 MG chewable tablet    TUMS     Take 1 chew tab by mouth every 4 hours as needed for heartburn Reported on 5/3/2017        dofetilide 125 MCG capsule    TIKOSYN    60 capsule    Take 1 capsule (125 mcg) by mouth 2 times daily    Paroxysmal atrial fibrillation (H)       FLOMAX 0.4 MG capsule   Generic drug:   tamsulosin     60 capsule    Take 1 capsule (0.4 mg) by mouth 2 times daily        fluticasone-vilanterol 100-25 MCG/INH oral inhaler    BREO ELLIPTA    60 Inhaler    Inhale 1 puff into the lungs daily    COPD exacerbation (H)       HYDROcodone-acetaminophen 5-325 MG per tablet    NORCO    30 tablet    TAKE 1 TABLET BY MOUTH EVERY 6 HOURS AS NEEDED FOR MODERATE TO SEVERE PAIN    Rheumatoid arthritis involving multiple sites, unspecified rheumatoid factor presence (H)       leflunomide 10 MG tablet    ARAVA    30 tablet    Take 1 tablet (10 mg) by mouth daily    Rheumatoid arthritis involving multiple sites with positive rheumatoid factor (H)       lisinopril 10 MG tablet    PRINIVIL/ZESTRIL    62 tablet    Take 1 tablet (10 mg) by mouth 2 times daily    Acute on chronic systolic congestive heart failure (H)       LUPRON DEPOT IM      Inject into the muscle every 4 months Reported on 5/3/2017        metoprolol 50 MG tablet    LOPRESSOR    120 tablet    TAKE 1 TABLET (50 MG) BY MOUTH 2 TIMES DAILY    Atrial fibrillation with rapid ventricular response (H)       predniSONE 5 MG tablet    DELTASONE    100 tablet    Take 1 tablet (5 mg) by mouth daily    Osteopenia       spironolactone 25 MG tablet    ALDACTONE    30 tablet    Take 1 tablet (25 mg) by mouth daily    Cardiomyopathy, nonischemic (H)       traMADol 50 MG tablet    ULTRAM    90 tablet    TAKE 1 TABLET 3 TIMES DAILY AS NEEDED FOR MODERATE PAIN    Rheumatoid arthritis involving multiple sites, unspecified rheumatoid factor presence (H)       VITAMIN D (CHOLECALCIFEROL) PO      Take 400 Units by mouth daily Reported on 5/3/2017

## 2017-09-13 NOTE — PROGRESS NOTES
"  SUBJECTIVE:   Bud Merritt is a 89 year old male who presents to clinic today for the following health issues: Patient is in clinic today to follow up from a cardiology visit 09/07/17 w/Dr. Manriquez, U or M. Also following up on CT TAVR and carotid US 09/07/17.     Wife states that he needs his vitals taken as home care is no longer coming out to the home to do this.     Patient is dizzy and wants to quit taking all of his pills. Wife states that \"everyone says they cause dizziness.\" Also c/o nausea off/on.       Heart Failure Follow-up    Symptoms:    Shortness of breath: none    Lower extremity edema: stable, Feet and lower leg swelling.    Chest pain: No    Using more pillows than normal: No    Cough at night: Yes    Weight:    Checking weight daily: No Patient forgets     Weight change: none    Cardiology visits, ER/UC, or hospital admissions since last visit: None and Cardiology Visit - 09/07/2017    Medication side effects: dizziness        Amount of exercise or physical activity: None    Problems taking medications regularly: NO    remembering to take    Medication side effects: none  Diet: regular (no restrictions)          Problem list and histories reviewed & adjusted, as indicated.  Additional history: as documented        Reviewed and updated as needed this visit by clinical staff     Reviewed and updated as needed this visit by Provider        SUBJECTIVE:  Bud  is a 89 year old male who presents for:  Discussion of his declining health. They've met with the [facility] cardiology group. He is a candidate for aortic valve replacement with TAVR. He is concerned about the risks given his fragile health. Doesn't know he wants to go through with it. They told him they couldn't guarantee that this would help since he has so many other comorbidities. But he keeps getting worse. Very dizzy. Nauseous at times. He is concerned about the meds he is on. Edema in his legs has been better and " his ulceration on his right shin is healing.    Past Medical History:   Diagnosis Date     Atrial fibrillation (H) 07/01/2016     Cardiomyopathy (H)      COPD exacerbation (H) 3/2/2017     Depressive disorder      Paroxysmal atrial fibrillation (H) 7/6/2016     Pure hypercholesterolemia      Rheumatoid arthritis(714.0)      Unspecified essential hypertension      Weakness generalized 3/2/2017     Past Surgical History:   Procedure Laterality Date     ARTHROPLASTY KNEE Left 1/12/2016    Procedure: ARTHROPLASTY KNEE;  Surgeon: Cesar Walker DO;  Location: [facility] OR     COLONOSCOPY  08/24/09     HC COLONOSCOPY THRU STOMA W BIOPSY/CAUTERY TUMOR/POLYP/LESION  8/31/2004      REPAIR ING HERNIA,5+Y/O,REDUCIBL  1996    Marlex mesh repair of bilateral inguinal hernias and drainage of bilateral scrotal hydroceles.     IMPLANT PACEMAKER  03/10/2017     IRRIGATION AND DEBRIDEMENT SOFT TISSUE LOWER EXTREMITY, COMBINED Left 3/15/2016    Procedure: COMBINED IRRIGATION AND DEBRIDEMENT SOFT TISSUE LOWER EXTREMITY;  Surgeon: Cesar Walker DO;  Location: [facility] OR     Social History   Substance Use Topics     Smoking status: Former Smoker     Packs/day: 0.50     Years: 15.00     Types: Cigarettes     Quit date: 11/12/1998     Smokeless tobacco: Never Used      Comment: quit 15 yrs ago     Alcohol use No     Current Outpatient Prescriptions   Medication Sig Dispense Refill     HYDROcodone-acetaminophen (NORCO) 5-325 MG per tablet TAKE 1 TABLET BY MOUTH EVERY 6 HOURS AS NEEDED FOR MODERATE TO SEVERE PAIN 30 tablet 0     traMADol (ULTRAM) 50 MG tablet TAKE 1 TABLET 3 TIMES DAILY AS NEEDED FOR MODERATE PAIN 90 tablet 0     leflunomide (ARAVA) 10 MG tablet Take 1 tablet (10 mg) by mouth daily 30 tablet 11     dofetilide (TIKOSYN) 125 MCG capsule Take 1 capsule (125 mcg) by mouth 2 times daily 60 capsule 5     metoprolol (LOPRESSOR) 50 MG tablet TAKE 1 TABLET (50 MG) BY MOUTH 2 TIMES DAILY 120 tablet 3     atorvastatin  "(LIPITOR) 40 MG tablet Take 1 tablet (40 mg) by mouth daily 90 tablet 2     predniSONE (DELTASONE) 5 MG tablet Take 1 tablet (5 mg) by mouth daily 100 tablet 4     apixaban ANTICOAGULANT (ELIQUIS) 2.5 MG tablet Take 1 tablet (2.5 mg) by mouth 2 times daily 180 tablet 3     Leuprolide Acetate (LUPRON DEPOT IM) Inject into the muscle every 4 months Reported on 5/3/2017       lisinopril (PRINIVIL,ZESTRIL) 10 MG tablet Take 1 tablet (10 mg) by mouth 2 times daily 62 tablet 11     spironolactone (ALDACTONE) 25 MG tablet Take 1 tablet (25 mg) by mouth daily 30 tablet 11     acetaminophen (TYLENOL) 650 MG CR tablet Take 650 mg by mouth 2 times daily Reported on 4/5/2017       alendronate (FOSAMAX) 70 MG tablet Take 1 tablet (70 mg) by mouth every 7 days Take with over 8 ounces water and stay upright for at least 30 minutes after dose.  Take at least 60 minutes before breakfast 12 tablet 3     calcium carbonate (TUMS) 500 MG chewable tablet Take 1 chew tab by mouth every 4 hours as needed for heartburn Reported on 5/3/2017       tamsulosin (FLOMAX) 0.4 MG 24 hr capsule Take 1 capsule (0.4 mg) by mouth 2 times daily 60 capsule      ascorbic acid (VITAMIN C) 500 MG CPCR Take 500 mg by mouth daily       VITAMIN D, CHOLECALCIFEROL, PO Take 400 Units by mouth daily Reported on 5/3/2017       calcium carbonate (OS-SHELIA 500 MG [place]. [place]) 500 MG tablet Take 600 mg by mouth daily       fluticasone-vilanterol (BREO ELLIPTA) 100-25 MCG/INH oral inhaler Inhale 1 puff into the lungs daily (Patient not taking: Reported on 9/13/2017) 60 Inhaler 0       REVIEW OF SYSTEMS:   5 point ROS negative except as noted above in HPI, including Gen., Resp, CV, GI &  system review.     OBJECTIVE:  Vitals: /60 (BP Location: Right arm, Patient Position: Chair, Cuff Size: Adult Regular)  Pulse 80  Temp 97.6  F (36.4  C) (Tympanic)  Ht 5' 7\" (1.702 m)  Wt 147 lb 12.8 oz (67 kg)  SpO2 97%  BMI 23.15 kg/m2  BMI= Body mass index is 23.15 " kg/(m^2).  He is alert and oriented. Appears in no distress. Some audible wheezing heard. Mucous membranes are moist. Neck with no JVD. Lungs with a few wheezes. Heart with irregular rhythm 4/6 murmur heard. Extremities some edema in the feet he has a healing ulcer on his right lower medial shin    ASSESSMENT:  #1 critical aortic stenosis #2 congestive heart failure #3 dilated cardiomyopathy #4 atrial fibrillation #5 rheumatoid arthritis    PLAN:  Had a discussion with him and his wife. Even though he has a lot of other comorbidities I think the rapid decline in his energy status in this dizziness is because of the critical aortic stenosis that has developed over the last months. There are risks with the surgery but without it I think she'll just continue to decline rapidly. They will make a decision on whether to go ahead in the next several weeks. Try to get him to use less of the tramadol and Vicodin as this certainly can be contributing to nausea and dizziness. But his rheumatoid arthritis is very bad. He does see the rheumatologist. Early October as his next visit.        Camron Aragon MD  [facility]

## 2017-09-15 NOTE — PROGRESS NOTES
TAVR INITIAL CONSULTATION  Seen with Dr. Cosme Gunn-Interventional Cardiology & Dr. Josh Marrero-CV Surgery    HPI:  Bud Merritt is a very pleasant 89 year old male with severe aortic valvular stenosis referred to our clinic for evaluation and consideration for potential transcatheter aortic valve replacement (TAVR).  His severe aortic stenosis is characterized with an area of 0.9 cm2, mean gradient 36 mmHg and peak velocity of 3.78 m/sec with LVEF 20-25% by echocardiogram on 3/2017.      Additional notable medical history of Atrial fibrillation s/p lisandro ablation with BiV pacemaker, prostate cancer, RA on prednisone.  He has severe arthritis and is limited in activity.  However, he feels tired and is not able to do all his activities. 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 However, for the past couple of yrs he is feeling tired. His lightheadedness got better since getting pacemaker.   He cannot lie flat due to his arthritis.  He has some shortness of breath with exertion. However, he is not able to move much due to his arthritis.  He has leg swelling and is seeing PCP for it.   He also has leg infection that is currently resolving.       PAST MEDICAL HISTORY:  Past Medical History:   Diagnosis Date     Atrial fibrillation (H) 07/01/2016     Cardiomyopathy (H)      COPD exacerbation (H) 3/2/2017     Depressive disorder      Paroxysmal atrial fibrillation (H) 7/6/2016     Pure hypercholesterolemia      Rheumatoid arthritis(714.0)      Unspecified essential hypertension      Weakness generalized 3/2/2017       CURRENT MEDICATIONS:  Current Outpatient Prescriptions   Medication Sig Dispense Refill     HYDROcodone-acetaminophen (NORCO) 5-325 MG per tablet TAKE 1 TABLET BY MOUTH EVERY 6 HOURS AS NEEDED FOR MODERATE TO SEVERE PAIN 30 tablet 0     traMADol (ULTRAM) 50 MG tablet TAKE 1 TABLET 3 TIMES DAILY AS NEEDED FOR MODERATE PAIN 90 tablet 0     leflunomide (ARAVA) 10 MG tablet Take 1 tablet (10 mg) by mouth  daily 30 tablet 11     dofetilide (TIKOSYN) 125 MCG capsule Take 1 capsule (125 mcg) by mouth 2 times daily 60 capsule 5     metoprolol (LOPRESSOR) 50 MG tablet TAKE 1 TABLET (50 MG) BY MOUTH 2 TIMES DAILY 120 tablet 3     atorvastatin (LIPITOR) 40 MG tablet Take 1 tablet (40 mg) by mouth daily 90 tablet 2     fluticasone-vilanterol (BREO ELLIPTA) 100-25 MCG/INH oral inhaler Inhale 1 puff into the lungs daily (Patient not taking: Reported on 9/13/2017) 60 Inhaler 0     predniSONE (DELTASONE) 5 MG tablet Take 1 tablet (5 mg) by mouth daily 100 tablet 4     apixaban ANTICOAGULANT (ELIQUIS) 2.5 MG tablet Take 1 tablet (2.5 mg) by mouth 2 times daily 180 tablet 3     Leuprolide Acetate (LUPRON DEPOT IM) Inject into the muscle every 4 months Reported on 5/3/2017       lisinopril (PRINIVIL,ZESTRIL) 10 MG tablet Take 1 tablet (10 mg) by mouth 2 times daily 62 tablet 11     spironolactone (ALDACTONE) 25 MG tablet Take 1 tablet (25 mg) by mouth daily 30 tablet 11     acetaminophen (TYLENOL) 650 MG CR tablet Take 650 mg by mouth 2 times daily Reported on 4/5/2017       alendronate (FOSAMAX) 70 MG tablet Take 1 tablet (70 mg) by mouth every 7 days Take with over 8 ounces water and stay upright for at least 30 minutes after dose.  Take at least 60 minutes before breakfast 12 tablet 3     calcium carbonate (TUMS) 500 MG chewable tablet Take 1 chew tab by mouth every 4 hours as needed for heartburn Reported on 5/3/2017       tamsulosin (FLOMAX) 0.4 MG 24 hr capsule Take 1 capsule (0.4 mg) by mouth 2 times daily 60 capsule      ascorbic acid (VITAMIN C) 500 MG CPCR Take 500 mg by mouth daily       VITAMIN D, CHOLECALCIFEROL, PO Take 400 Units by mouth daily Reported on 5/3/2017       calcium carbonate (OS-SHELIA 500 MG [place]. [place]) 500 MG tablet Take 600 mg by mouth daily         PAST SURGICAL HISTORY:  Past Surgical History:   Procedure Laterality Date     ARTHROPLASTY KNEE Left 1/12/2016    Procedure: ARTHROPLASTY KNEE;  Surgeon:  Cesar Walker DO;  Location: [facility] OR     COLONOSCOPY  08/24/09     HC COLONOSCOPY THRU STOMA W BIOPSY/CAUTERY TUMOR/POLYP/LESION  8/31/2004     HC REPAIR ING HERNIA,5+Y/O,REDUCIBL  1996    Marlex mesh repair of bilateral inguinal hernias and drainage of bilateral scrotal hydroceles.     IMPLANT PACEMAKER  03/10/2017     IRRIGATION AND DEBRIDEMENT SOFT TISSUE LOWER EXTREMITY, COMBINED Left 3/15/2016    Procedure: COMBINED IRRIGATION AND DEBRIDEMENT SOFT TISSUE LOWER EXTREMITY;  Surgeon: Cesar Walker DO;  Location: [facility] OR       ALLERGIES     Allergies   Allergen Reactions     Amiodarone Other (See Comments)     Drop in DLCO     Lasix [Furosemide] Blisters     Blisters and sores in his mouth.        FAMILY HISTORY:  Family History   Problem Relation Age of Onset     CANCER Mother      CANCER Son      Unknown/Adopted Father      Alcoholism Brother        SOCIAL HISTORY:  Social History     Social History     Marital status:      Spouse name: N/A     Number of children: N/A     Years of education: N/A     Social History Main Topics     Smoking status: Former Smoker     Packs/day: 0.50     Years: 15.00     Types: Cigarettes     Quit date: 11/12/1998     Smokeless tobacco: Never Used      Comment: quit 15 yrs ago     Alcohol use No     Drug use: No     Sexual activity: Yes     Other Topics Concern     Caffeine Concern Yes     8 cups coffee day     Sleep Concern Yes     Weight Concern Yes     weight loss     Special Diet No     Exercise Yes     CFX BATTERY daily     Social History Narrative       ROS:   Constitutional: No fever, chills, or sweats. No weight gain/loss   ENT: No visual disturbance, ear ache, epistaxis, sore throat  Allergies/Immunologic: Negative.   Respiratory: No cough, hemoptysia  Cardiovascular: As per HPI  GI: No nausea, vomiting, hematemesis, melena, or hematochezia  : No urinary frequency, dysuria, or hematuria  Integument: Negative  Psychiatric: Negative  Neuro:  "Negative  Endocrinology: Negative   Musculoskeletal: Negative    EXAM:  /80 (BP Location: Left arm, Patient Position: Chair, Cuff Size: Adult Regular)  Pulse 73  Ht 1.702 m (5' 7\")  Wt 68.8 kg (151 lb 9.6 oz)  SpO2 99%  BMI 23.74 kg/m2  In general, the patient is a pleasant male in no apparent distress.    HEENT: NC/AT.  PERRLA.  EOMI.  Sclerae white, not injected.  Nares clear.  Pharynx without erythema or exudate.  Dentition intact.    Neck: No adenopathy.  No thyromegaly. Carotids +4/4 bilaterally without bruits.  No jugular venous distension.   Heart: RRR. Normal S1, S2 splits physiologically. No murmur, rub, click, or gallop. The PMI is in the 5th ICS in the midclavicular line. There is no heave.    Lungs: CTA.  No ronchi, wheezes, rales.  No dullness to percussion.   Abdomen: Soft, nontender, nondistended. No organomegaly.  No bruits.   Extremities: No clubbing, cyanosis, or edema.  The pulses are +4/4 at the radial, brachial, femoral, popliteal, DP, and PT sites bilaterally.  No bruits are noted.  Neurologic: Alert and oriented to person/place/time, normal speech, gait and affect  Skin: No petechiae, purpura or rash.    Labs:  LIPID RESULTS:  Lab Results   Component Value Date    CHOL 139 03/06/2017    HDL 34 (L) 03/06/2017    LDL 74 03/06/2017    TRIG 156 (H) 03/06/2017    CHOLHDLRATIO 3.0 08/02/2013       LIVER ENZYME RESULTS:  Lab Results   Component Value Date    AST 29 09/07/2017    ALT 20 09/07/2017       CBC RESULTS:  Lab Results   Component Value Date    WBC 5.2 09/07/2017    RBC 3.43 (L) 09/07/2017    HGB 10.5 (L) 09/07/2017    HCT 32.1 (L) 09/07/2017    MCV 94 09/07/2017    MCH 30.6 09/07/2017    MCHC 32.7 09/07/2017    RDW 15.4 (H) 09/07/2017     09/07/2017       BMP RESULTS:  Lab Results   Component Value Date     09/07/2017    POTASSIUM 5.5 (H) 09/07/2017    CHLORIDE 102 09/07/2017    CO2 27 09/07/2017    ANIONGAP 6 09/07/2017     (H) 09/07/2017    BUN 20 09/07/2017 "    CR 1.00 09/07/2017    GFRESTIMATED 70 09/07/2017    GFRESTBLACK 85 09/07/2017    SHELIA 8.9 09/07/2017        A1C RESULTS:  No results found for: A1C    INR RESULTS:  Lab Results   Component Value Date    INR 1.08 06/30/2017    INR 1.01 03/27/2017       Procedures:    ECG:paced     Echocardiogram:  Moderate left ventricular dilation is present.  The Ejection Fraction is estimated at 20-25%.  Normal contraction of basal segments with akinetic mid and distal segments.  This wallmotion abnormality is new since prior study on 12/13/2016.  Consistent with stress cardiomyopathy, if no LAD disease.  Severe aortic stenosis is present.  The aortic valve area is 0.9 cm^2, by the continuity equation.  The peak aortic velocity is 3.8 m/sec.  The mean gradient across the aortic valve is37 mmHg.  Estimated gradients are under estimation due to LV systolic dysfunction,.     CT TAVR:  pending      Coronary Angiogram and Right Heart Catheterization:  Non-obstructive 2016     PFTs: NA     Carotid Ultrasound: na       FURTHER ASSESSMENTS    STS RISK SCORE: 7.6%  NYHA CLASS: II-III      Assessment and Plan:     89 year old male with severe aortic valvular stenosis referred to our clinic for evaluation and consideration for potential surgical AVR vs transcatheter aortic valve replacement (TAVR). STS RISK SCORE: 7.6%.    He is not a good candidate for surgical AVR. However, he is a reasonable candidate for TAVR due to advanced age, commorbidities and high STS score of 7.6%.    Recommend completion of TAVR workup and TAVR.    Benefits and risks of TAVR procedures were explained to patient.     Approximately 50 minutes spent with this case including review of the clinical history and data; discussion with the patient and his family and coordination of the care    Thank you for including me in the care of this kind patient. Do not hesitate to contact me with any questions.    Dr. Josh Marrero     Cardiothoracic Surgery  [facility]  [facility]  Patient Care Team:  Camron Aragon MD as PCP - General  Elvira Hallman RN as Clinic Care Coordinator  Khoa Li MD as MD (Clinical Cardiac Electrophysiology)  Snehal Wilson as Nurse Coordinator (Cardiology)  KHOA LI

## 2017-09-18 ENCOUNTER — CARE COORDINATION (OUTPATIENT)
Dept: CARDIOLOGY | Facility: CLINIC | Age: 82
End: 2017-09-18

## 2017-09-18 ENCOUNTER — TELEPHONE (OUTPATIENT)
Dept: FAMILY MEDICINE | Facility: CLINIC | Age: 82
End: 2017-09-18

## 2017-09-18 DIAGNOSIS — I35.0 SEVERE AORTIC STENOSIS: Primary | ICD-10-CM

## 2017-09-18 NOTE — PROGRESS NOTES
TAVR Procedure:  Sept. 27, 2017  Check in to the hospital 3C at 9:00 a.m.    Nothing to eat after midnight; water, apple juice or 7up/Sprite is OK up to two hours prior to your scheduled procedure.  You may take your medications in the morning with a sip of water.    Take a shower, with antibacterial soap, the night before the procedure, and the morning of the procedure.      Diagnosis: Severe aortic stenosis  Plan:  1. TAVR procedure scheduled for September 27, Wednesday, 2nd case, 11:30.  The hospital will call patient to tell them time of surgery.  2. Medications changes:    Regarding your medication Eliquis/Apixaban:  **STOP Eliquis/Apixaban 48 hours prior to procedure.      LAST dose of Eliquis/Apixaban will be   Sept. 24, Sunday.      NO Eliquis/Apixaban starting September 25, Monday.      If any questions please contact:  Ysabel Wilson RN  Structural Heart Care Coordinator  [facility]  Office: [phone]  Pager: [phone]

## 2017-09-18 NOTE — PROGRESS NOTES
Bud's case was discussed at TAVR conference.      The plan will be for Bud to proceed with TAVR.  However, given the co-morbidities that Bud has the discussion around his case was that, he is a candidate for a new valve but it was unclear how much improvement would be experienced in his symptoms.  This information was explained to Bud's wife.  She stated that he was in to see his PCP last week, and he thought that he would benefit from TAVR.  Mrs. Merritt states that after this PCP visit, Bud was willing to go forward with TAVR.  Dr. Aragon will be contacted regarding his opinion on the healing wound of Bud's right leg and if it is safe to proceed with TAVR.    Harman Valve  Size 29  Approach: TF      Date: 9/18/2017    Time of Call: 12:11 PM     Diagnosis:  Severe aortic stenosis     [ TORB ] Ordering provider: Ramon Uribe MD  Order:   1.  TAVR scheduling order ([id number])  2.  PAC referral  Needs to see a surgeon (NOT Dr. Marrero)     Order received by: Snehal Wilson RN

## 2017-09-18 NOTE — PROGRESS NOTES
Received word from Dr. Aragon, that regarding the healing wound on Bud's leg, he states that it is safe to proceed with TAVR.  Mrs. Merritt was called and given this information.

## 2017-09-18 NOTE — TELEPHONE ENCOUNTER
Reason for Call:  Other call back    Detailed comments: Ysabel from TAVR at [facility] called and is requesting a call back from Dr. Aragon regarding Bud. They are requesting his opinion on a healing ulcer located on his lower right let. They are wondering given the state of the ulcer if they should proceed with the TAVR or wait for more healing. Ysabel states she is not always near her phone but provided her pager number and requested that Dr. Aragon page her and then she will give him a call right away. Her pager number is [phone]. Please advise.     Phone Number Patient can be reached at: Other phone number:  [phone]    Best Time: any     Can we leave a detailed message on this number? YES    Call taken on 9/18/2017 at 12:06 PM by Tomas Murphy

## 2017-09-20 ENCOUNTER — TELEPHONE (OUTPATIENT)
Dept: FAMILY MEDICINE | Facility: CLINIC | Age: 82
End: 2017-09-20

## 2017-09-20 NOTE — TELEPHONE ENCOUNTER
Spoke with the clinic who will be doing the procedure and they said they take care of the pre-op and that Bud does not need to be seen. BI/GABBY

## 2017-09-20 NOTE — TELEPHONE ENCOUNTER
Reason for Call:  Same Day Appointment, Requested Provider:  Camron Aragon M.D.    PCP: Camron Aragon    Reason for visit: appointment to check his legs to see if he is ok for procedure next week    Duration of symptoms:     Have you been treated for this in the past? Yes    Additional comments: Bud would like an appointment as soon as possible to check Bud's legs to clear him for his appointments/procedures next week. Can he be worked into your schedule?    Can we leave a detailed message on this number? YES    Phone number patient can be reached at: Home number on file [phone] (home)    Best Time: anytime - as soon as possible    Call taken on 9/20/2017 at 12:58 PM by Naa Singh

## 2017-09-21 ENCOUNTER — OFFICE VISIT (OUTPATIENT)
Dept: FAMILY MEDICINE | Facility: CLINIC | Age: 82
End: 2017-09-21
Payer: COMMERCIAL

## 2017-09-21 VITALS
WEIGHT: 148 LBS | OXYGEN SATURATION: 98 % | HEART RATE: 60 BPM | DIASTOLIC BLOOD PRESSURE: 80 MMHG | TEMPERATURE: 97.2 F | SYSTOLIC BLOOD PRESSURE: 126 MMHG | BODY MASS INDEX: 23.18 KG/M2

## 2017-09-21 DIAGNOSIS — L03.115 CELLULITIS OF RIGHT LOWER EXTREMITY: Primary | ICD-10-CM

## 2017-09-21 PROCEDURE — 99213 OFFICE O/P EST LOW 20 MIN: CPT | Performed by: FAMILY MEDICINE

## 2017-09-21 NOTE — NURSING NOTE
"Chief Complaint   Patient presents with     Musculoskeletal Problem     check wound on lower right leg       Initial There were no vitals taken for this visit. Estimated body mass index is 23.15 kg/(m^2) as calculated from the following:    Height as of 9/13/17: 5' 7\" (1.702 m).    Weight as of 9/13/17: 147 lb 12.8 oz (67 kg).  Medication Reconciliation: complete  "

## 2017-09-21 NOTE — MR AVS SNAPSHOT
After Visit Summary   9/21/2017    Bud Merritt    MRN: [id number]           Patient Information     Date Of Birth          5/9/1927        Visit Information        Provider Department      9/21/2017 1:40 PM Camron Aragon MD [facility]        Today's Diagnoses     Cellulitis of right lower extremity    -  1       Follow-ups after your visit        Your next 10 appointments already scheduled     Sep 25, 2017  1:00 PM CDT   (Arrive by 12:45 PM)   PAC Pharmacist with  Pac Pharmacist   [facility] Preoperative Assessment [place] ([facility])    [address] [place] [address]   [phone]            Sep 25, 2017  1:30 PM CDT   (Arrive by 1:15 PM)   PAC EVALUATION with  Pac Se [address] [place] [address] Assessment [place] ([facility])    [address] [place] [address]   [phone]            Sep 25, 2017  2:30 PM CDT   (Arrive by 2:15 PM)   PAC RN ASSESSMENT with  Pac Rn   [facility] Preoperative Assessment [place] ([facility])    [address] [place] [address]   [phone]            Sep 25, 2017  2:50 PM CDT   (Arrive by 2:35 PM)   PAC Anesthesia Consult with  Pac Anesthesiologist   [facility] Assessment [place] ([facility])    [address] [place] [address]   [phone]            Sep 25, 2017  3:30 PM CDT   (Arrive by 3:15 PM)   New Patient Visit with Janes Kingsley MD   [facility] Heart [facility])    [address] [place] [id number] [phone]            Sep 27, 2017 11:30 AM CDT   Transcath Aortic-Valve Replace with [id number]   [facility] [place],  Heart Cath Lab ([facility])    [address] [place] [id number] [phone]            Sep 27, 2017  "  Procedure with GENERIC ANESTHESIA PROVIDER   [facility]    [address]  [place] [address] [phone]            Oct 04, 2017  9:45 AM CDT   Return Visit with Mak Shaver MD   [facility]    [address]  [facility] [address] [phone]              Who to contact     If you have questions or need follow up information about today's clinic visit or your schedule please contact [facility] directly at [phone].  Normal or non-critical lab and imaging results will be communicated to you by oort Inchart, letter or phone within 4 business days after the clinic has received the results. If you do not hear from us within 7 days, please contact the clinic through oort Inchart or phone. If you have a critical or abnormal lab result, we will notify you by phone as soon as possible.  Submit refill requests through [email or web address] or call your pharmacy and they will forward the refill request to us. Please allow 3 business days for your refill to be completed.          Additional Information About Your Visit        oort IncharCozy Cloud Information     [email or web address] lets you send messages to your doctor, view your test results, renew your prescriptions, schedule appointments and more. To sign up, go to [email or web address] . Click on \"Log in\" on the left side of the screen, which will take you to the Welcome page. Then click on \"Sign up Now\" on the right side of the page.     You will be asked to enter the access code listed below, as well as some personal information. Please follow the directions to create your username and password.     Your access code is: [email or web address]  Expires: 2017 11:55 AM     Your access code will  in 90 days. If you need help or a new code, please call your [facility] or [phone].        Care EveryWhere ID     This is your Care EveryWhere ID. This could be used by other organizations to access " your [facility] medical records  [id number]        Your Vitals Were     Pulse Temperature Pulse Oximetry BMI (Body Mass Index)          60 97.2  F (36.2  C) (Tympanic) 98% 23.18 kg/m2         Blood Pressure from Last 3 Encounters:   09/21/17 126/80   09/13/17 110/60   09/07/17 125/80    Weight from Last 3 Encounters:   09/21/17 148 lb (67.1 kg)   09/13/17 147 lb 12.8 oz (67 kg)   09/07/17 151 lb 9.6 oz (68.8 kg)              Today, you had the following     No orders found for display       Primary Care Provider Office Phone # Fax #    Camron Aragon [phone]       [facility] [address] [place] [address]        Equal Access to Services     [facility] : Hadii aad etienne hadasho Soomaali, waaxda luqadaha, qaybta kaalmada adeegyada, ronel enrique hayryan real . So [facility] [phone].    ATENCIÓN: Si habla español, tiene a mon disposición servicios gratuitos de asistencia lingüística. Jeanette al [phone].    We comply with applicable federal civil rights laws and [place] laws. We do not discriminate on the basis of race, color, national origin, age, disability sex, sexual orientation or gender identity.            Thank you!     Thank you for choosing [facility]  for your care. Our goal is always to provide you with excellent care. Hearing back from our patients is one way we can continue to improve our services. Please take a few minutes to complete the written survey that you may receive in the mail after your visit with us. Thank you!             Your Updated Medication List - Protect others around you: Learn how to safely use, store and throw away your medicines at www.disposemymeds.org.          This list is accurate as of: 9/21/17  2:10 PM.  Always use your most recent med list.                   Brand Name Dispense Instructions for use Diagnosis    acetaminophen 650 MG CR tablet    TYLENOL     Take 650 mg by mouth 2 times daily  Reported on 4/5/2017        alendronate 70 MG tablet    FOSAMAX    12 tablet    Take 1 tablet (70 mg) by mouth every 7 days Take with over 8 ounces water and stay upright for at least 30 minutes after dose.  Take at least 60 minutes before breakfast    Osteopenia       apixaban ANTICOAGULANT 2.5 MG tablet    ELIQUIS    180 tablet    Take 1 tablet (2.5 mg) by mouth 2 times daily    Paroxysmal atrial fibrillation (H)       ascorbic acid 500 MG Cpcr CR capsule    vitamin C     Take 500 mg by mouth daily        atorvastatin 40 MG tablet    LIPITOR    90 tablet    Take 1 tablet (40 mg) by mouth daily    Hyperlipidemia LDL goal <130       calcium carbonate 1250 MG tablet    OS-SHELIA 500 mg [place]. [place]     Take 600 mg by mouth daily        calcium carbonate 500 MG chewable tablet    TUMS     Take 1 chew tab by mouth every 4 hours as needed for heartburn Reported on 5/3/2017        dofetilide 125 MCG capsule    TIKOSYN    60 capsule    Take 1 capsule (125 mcg) by mouth 2 times daily    Paroxysmal atrial fibrillation (H)       FLOMAX 0.4 MG capsule   Generic drug:  tamsulosin     60 capsule    Take 1 capsule (0.4 mg) by mouth 2 times daily        HYDROcodone-acetaminophen 5-325 MG per tablet    NORCO    30 tablet    TAKE 1 TABLET BY MOUTH EVERY 6 HOURS AS NEEDED FOR MODERATE TO SEVERE PAIN    Rheumatoid arthritis involving multiple sites, unspecified rheumatoid factor presence (H)       leflunomide 10 MG tablet    ARAVA    30 tablet    Take 1 tablet (10 mg) by mouth daily    Rheumatoid arthritis involving multiple sites with positive rheumatoid factor (H)       lisinopril 10 MG tablet    PRINIVIL/ZESTRIL    62 tablet    Take 1 tablet (10 mg) by mouth 2 times daily    Acute on chronic systolic congestive heart failure (H)       LUPRON DEPOT IM      Inject into the muscle every 4 months Reported on 5/3/2017        metoprolol 50 MG tablet    LOPRESSOR    120 tablet    TAKE 1 TABLET (50 MG) BY MOUTH 2 TIMES DAILY    Atrial  fibrillation with rapid ventricular response (H)       predniSONE 5 MG tablet    DELTASONE    100 tablet    Take 1 tablet (5 mg) by mouth daily    Osteopenia       spironolactone 25 MG tablet    ALDACTONE    30 tablet    Take 1 tablet (25 mg) by mouth daily    Cardiomyopathy, nonischemic (H)       traMADol 50 MG tablet    ULTRAM    90 tablet    TAKE 1 TABLET 3 TIMES DAILY AS NEEDED FOR MODERATE PAIN    Rheumatoid arthritis involving multiple sites, unspecified rheumatoid factor presence (H)       VITAMIN D (CHOLECALCIFEROL) PO      Take 400 Units by mouth daily Reported on 5/3/2017

## 2017-09-22 ENCOUNTER — CARE COORDINATION (OUTPATIENT)
Dept: CARDIOLOGY | Facility: CLINIC | Age: 82
End: 2017-09-22

## 2017-09-22 DIAGNOSIS — I35.0 SEVERE AORTIC STENOSIS: Primary | ICD-10-CM

## 2017-09-22 RX ORDER — LIDOCAINE 40 MG/G
CREAM TOPICAL
Status: CANCELLED | OUTPATIENT
Start: 2017-09-22

## 2017-09-22 RX ORDER — SODIUM CHLORIDE 9 MG/ML
INJECTION, SOLUTION INTRAVENOUS CONTINUOUS
Status: CANCELLED | OUTPATIENT
Start: 2017-09-22

## 2017-09-22 NOTE — PROGRESS NOTES
"Follow up telephone call made to Mrs. Merritt.  She stated that her  is \"taking it easy, by keeping his legs up,\" to minimize the weeping from the leg wounds.  She said that his leg is much better and dry.      I instructed her that her  should take  mg, the night before the procedure and the morning of the procedure.  She verbalized understanding to this information.  Upcoming appt's in the PAC and the surgeon visit were reviewed with Mrs. Merritt, and she stated that she had all of this information written down.    Date: 9/22/2017    Time of Call: 9:20 AM     Diagnosis:  Severe aortic stenosis     [ TORB ] Ordering provider: Cosme Gunn MD  Order:   1.  TAVR pre-procedure orders  2.  Blood components  3.  TTE      Order received by: Snehal Wilson RN             "

## 2017-09-25 ENCOUNTER — OFFICE VISIT (OUTPATIENT)
Dept: CARDIOLOGY | Facility: CLINIC | Age: 82
DRG: 267 | End: 2017-09-25
Attending: SURGERY
Payer: MEDICARE

## 2017-09-25 ENCOUNTER — ALLIED HEALTH/NURSE VISIT (OUTPATIENT)
Dept: SURGERY | Facility: CLINIC | Age: 82
End: 2017-09-25

## 2017-09-25 ENCOUNTER — ANESTHESIA EVENT (OUTPATIENT)
Dept: SURGERY | Facility: CLINIC | Age: 82
DRG: 267 | End: 2017-09-25
Payer: MEDICARE

## 2017-09-25 ENCOUNTER — PRE VISIT (OUTPATIENT)
Dept: CARDIOLOGY | Facility: CLINIC | Age: 82
End: 2017-09-25

## 2017-09-25 ENCOUNTER — OFFICE VISIT (OUTPATIENT)
Dept: SURGERY | Facility: CLINIC | Age: 82
End: 2017-09-25

## 2017-09-25 VITALS
HEIGHT: 66 IN | SYSTOLIC BLOOD PRESSURE: 138 MMHG | OXYGEN SATURATION: 95 % | BODY MASS INDEX: 24.11 KG/M2 | WEIGHT: 150 LBS | DIASTOLIC BLOOD PRESSURE: 88 MMHG | HEART RATE: 70 BPM

## 2017-09-25 VITALS
DIASTOLIC BLOOD PRESSURE: 88 MMHG | WEIGHT: 150 LBS | BODY MASS INDEX: 24.11 KG/M2 | SYSTOLIC BLOOD PRESSURE: 138 MMHG | HEART RATE: 70 BPM | OXYGEN SATURATION: 95 % | TEMPERATURE: 97.6 F | RESPIRATION RATE: 16 BRPM | HEIGHT: 66 IN

## 2017-09-25 DIAGNOSIS — Z01.818 PREOP EXAMINATION: Primary | ICD-10-CM

## 2017-09-25 DIAGNOSIS — I50.22 CHRONIC SYSTOLIC CONGESTIVE HEART FAILURE (H): ICD-10-CM

## 2017-09-25 DIAGNOSIS — M85.80 OSTEOPENIA: ICD-10-CM

## 2017-09-25 DIAGNOSIS — M06.9 RHEUMATOID ARTHRITIS (H): ICD-10-CM

## 2017-09-25 DIAGNOSIS — R97.20 ELEVATED PROSTATE SPECIFIC ANTIGEN (PSA): ICD-10-CM

## 2017-09-25 DIAGNOSIS — M06.9 RHEUMATOID ARTHRITIS INVOLVING MULTIPLE SITES, UNSPECIFIED RHEUMATOID FACTOR PRESENCE: ICD-10-CM

## 2017-09-25 DIAGNOSIS — I42.8 CARDIOMYOPATHY, NONISCHEMIC (H): ICD-10-CM

## 2017-09-25 DIAGNOSIS — I35.0 NONRHEUMATIC AORTIC VALVE STENOSIS: Primary | ICD-10-CM

## 2017-09-25 DIAGNOSIS — C61 PROSTATE CANCER (H): ICD-10-CM

## 2017-09-25 DIAGNOSIS — Z79.899 ENCOUNTER FOR LONG-TERM CURRENT USE OF MEDICATION: ICD-10-CM

## 2017-09-25 LAB
ALBUMIN SERPL-MCNC: 3.1 G/DL (ref 3.4–5)
ALP SERPL-CCNC: 83 U/L (ref 40–150)
ALT SERPL W P-5'-P-CCNC: 21 U/L (ref 0–70)
ANION GAP SERPL CALCULATED.3IONS-SCNC: 7 MMOL/L (ref 3–14)
AST SERPL W P-5'-P-CCNC: 27 U/L (ref 0–45)
BILIRUB SERPL-MCNC: 0.5 MG/DL (ref 0.2–1.3)
BUN SERPL-MCNC: 15 MG/DL (ref 7–30)
CALCIUM SERPL-MCNC: 8.8 MG/DL (ref 8.5–10.1)
CHLORIDE SERPL-SCNC: 103 MMOL/L (ref 94–109)
CO2 SERPL-SCNC: 26 MMOL/L (ref 20–32)
CREAT SERPL-MCNC: 1 MG/DL (ref 0.66–1.25)
ERYTHROCYTE [DISTWIDTH] IN BLOOD BY AUTOMATED COUNT: 15.6 % (ref 10–15)
GFR SERPL CREATININE-BSD FRML MDRD: 70 ML/MIN/1.7M2
GLUCOSE SERPL-MCNC: 111 MG/DL (ref 70–99)
HCT VFR BLD AUTO: 31.9 % (ref 40–53)
HGB BLD-MCNC: 10.2 G/DL (ref 13.3–17.7)
MCH RBC QN AUTO: 30.4 PG (ref 26.5–33)
MCHC RBC AUTO-ENTMCNC: 32 G/DL (ref 31.5–36.5)
MCV RBC AUTO: 95 FL (ref 78–100)
NT-PROBNP SERPL-MCNC: 870 PG/ML (ref 0–450)
PLATELET # BLD AUTO: 249 10E9/L (ref 150–450)
POTASSIUM SERPL-SCNC: 4.5 MMOL/L (ref 3.4–5.3)
PROT SERPL-MCNC: 6.8 G/DL (ref 6.8–8.8)
PSA SERPL-MCNC: 0.31 UG/L (ref 0–4)
RBC # BLD AUTO: 3.35 10E12/L (ref 4.4–5.9)
SODIUM SERPL-SCNC: 135 MMOL/L (ref 133–144)
WBC # BLD AUTO: 4.1 10E9/L (ref 4–11)

## 2017-09-25 ASSESSMENT — ENCOUNTER SYMPTOMS: DYSRHYTHMIAS: 1

## 2017-09-25 ASSESSMENT — NEW YORK HEART ASSOCIATION (NYHA) CLASSIFICATION: NYHA FUNCTIONAL CLASS: III

## 2017-09-25 ASSESSMENT — PAIN SCALES - GENERAL: PAINLEVEL: NO PAIN (0)

## 2017-09-25 ASSESSMENT — COPD QUESTIONNAIRES: COPD: 1

## 2017-09-25 NOTE — TELEPHONE ENCOUNTER
Spironolactone      Last Written Prescription Date: 9/8/16  Last Fill Quantity: 30, # refills: 11  Last Office Visit with [facility], P or [facility] prescribing provider: 9/21/17  Next 5 appointments (look out 90 days)     Oct 04, 2017  9:45 AM CDT   Return Visit with Mak Shaver MD   [facility] ([facility]    [address] [facility] [address]   [phone]                   Potassium   Date Value Ref Range Status   09/07/2017 5.5 (H) 3.4 - 5.3 mmol/L Final     Creatinine   Date Value Ref Range Status   09/07/2017 1.00 0.66 - 1.25 mg/dL Final     BP Readings from Last 3 Encounters:   09/21/17 126/80   09/13/17 110/60   09/07/17 125/80

## 2017-09-25 NOTE — PHARMACY - PREOPERATIVE ASSESSMENT CENTER
Anticoagulation Note - Preoperative Assessment Center (PAC) Pharmacist     Patient seen and interviewed during time of PAC Clinic appointment September 25, 2017.  The purpose of this note is to document the perioperative anticoagulation plan outlined by the providers caring for Bud Merritt.     Current Regimen  Anticoagulation Regimen as of September 25, 2017: apixaban 2.5 mg BID  Indication: atrial fibrillation  Prescriber: Dr. Colon  Expected Duration of therapy: indefinite    Perioperative plan  Bud Merritt is scheduled for surgery on 9/27/17 with Dr. Kingsley for TAVR and the perioperative anticoagulation plan outlined by Cardiology team (see Snehal padilla) is hold x2 days prior to surgery. Last dose 9/24.  Patient is currently holding this medication, reports he took his last dose of apixaban on 9/24 AM.     Resumption of anticoagulation will be based on surgery team assessment of bleeding risks and complications.  This plan may require re-assessment and modification by his primary team in the perioperative setting depending on patients clinical situation.        Miguel Ángel Hope RPH  September 25, 2017  2:27 PM

## 2017-09-25 NOTE — PROGRESS NOTES
Social Work: Outpatient Pre-Operative Assessment with Discharge Plan    Patient Name: Bud Merritt  : 1927  Age: 89 year old  MRN: [id number]  Completed assessment with: Bud and spouse Tierra    Presenting Information   Reason for Referral: Pre-Operative discharge planning, MRAPT  Date of intake: 2017  Referral Source: PAC  Reason for Admission: severe aortic stenosis- TAVR  Decision Maker: Alvaro  Alternate Decision Maker: Tierra  Health Care Directive: Copy in Chart  Living Situation: they live in a multi level home in [place] [facility]. They have a walk in basement and that is where Christopher stays. He has a walker, cane and wheelchair at home. He won't use the Wheelchair. He brought only his cane to clinic so used a WC within clinic. 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   Cultural/Language/Spiritual Considerations: He worked as a  for 67 years and really misses it and being outdoors  Coping with Illness: not coping well. He is sad he has lost his work that was very important.     Mental Health/Chemical Dependency:   Diagnosis: none but Pt's wife feels he's depressed and he was teary when we discussed his loss of his occupation and being outdoors this time of year. Discussed medications and encouraged him to consider even though he doesn't want another medication to take. He also has appetite issues.  Recommend that this is addressed after surgery.   Support/Services in Place: none  Services Needed/Recommended: further evaluation for depression after surgery    Support System  Significant Relationship at Present time:  Spouse  Tierra  Family of Origin is available for support: 4 children, none live closeby. Sergey from [place] visits the most. Tanisha lives in [place], Dante in [place] and another son in [place]  Other support available:  Neighbor for transportation   Current in home services: none  Gaps in Support System: both elderly and no close family nearby  Community services receiving at home: just finished with [facility] for cellulitis.    Provider Information   Primary Care Physician:Camron Aragon [phone]      Clinic: [facility] [address] [facility]  / CHRISSY RABAGO 5*    /Care Coordinator:     Financial   Financial Concerns:  None. stable  Insurance:   Payor/Plan Subscriber Name Rel Member # Group #   MEDICARE - MEDICARE F* GALLO FLOYD  [id number]       ATTN CLAIMS, [address]   BCBS - BCBS PLATINUM * GALLO FLOYD  [id number]      [address] [address]         Discharge Plan   Patient and family discharge goal: Pt would like to return home at [place]  Provided Education on discharge plan: YES    A list of Medicare Certified Facilities was provided to the patient and/or family to encourage patient choice. Patient's choices for facility are: [facility] [place] (Pt went there a few days after knee surg), [facility]  Patient's choices for Medicare Certified Skilled Home Care are:  [facility]  Will NH provide Skilled rehabilitation or complex medical:  YES    General information regarding anticipated insurance coverage and possible out of pocket cost was discussed. Patient and patient's family are aware patient may incur the cost of transportation to the facility, pending insurance payment: YES. He prefers double room if there are extra charges for private  Barriers to discharge: tbd    Discharge Recommendations   Disposition: Home, home with homecare, or SNF. To be determined by medical team after surgery.  Transportation Plan: neighbor or other family      Additional comments    Inpatient social work/care coordination team to follow upon admission.    Please involve Tierra in discharge planning.  *Tierra indicated they are paying $200-300/week for meds. I encouraged her to call me after his surgery/recovery and we can discuss eligibility for any programs for his meds. Eliquis and Dofetilide are the most costly.     KIRAN Luna, [facility]  Eye Phone  [phone]

## 2017-09-25 NOTE — NURSING NOTE
Chief Complaint   Patient presents with     New Patient     2nd TAVR consult for surgery     Vitals were taken and medications were reconciled.     Chantel Gonzalez MA    2:07 PM

## 2017-09-25 NOTE — LETTER
9/25/2017      RE: Bud Merritt  [address] [address] [place] [id number]       Dear Colleague,    Thank you for the opportunity to participate in the care of your patient, Bud Merritt, at the [facility]. Please see a copy of my visit note below.    HPI:  Bud Merritt is a very pleasant 89 year old male with severe aortic valvular stenosis referred to our clinic for evaluation and consideration for potential transcatheter aortic valve replacement (TAVR).  His severe aortic stenosis is characterized with an area of 0.9 cm2, mean gradient 36 mmHg and peak velocity of 3.78 m/sec with LVEF 20-25% by echocardiogram on 3/2017.      Additional notable medical history of Atrial fibrillation s/p lisandro ablation with BiV pacemaker, prostate cancer, RA on prednisone.  He has severe arthritis and is limited in activity. He complains of significant fatigue and orthopnea. Denies chest pain.     PAST MEDICAL HISTORY:   Past Medical History         Past Medical History:   Diagnosis Date     Atrial fibrillation (H) 07/01/2016     Cardiomyopathy (H)       COPD exacerbation (H) 3/2/2017     Depressive disorder       Paroxysmal atrial fibrillation (H) 7/6/2016     Pure hypercholesterolemia       Rheumatoid arthritis(714.0)       Unspecified essential hypertension       Weakness generalized 3/2/2017            CURRENT MEDICATIONS:   Current Outpatient Prescriptions           Current Outpatient Prescriptions   Medication Sig Dispense Refill     HYDROcodone-acetaminophen (NORCO) 5-325 MG per tablet TAKE 1 TABLET BY MOUTH EVERY 6 HOURS AS NEEDED FOR MODERATE TO SEVERE PAIN 30 tablet 0     traMADol (ULTRAM) 50 MG tablet TAKE 1 TABLET 3 TIMES DAILY AS NEEDED FOR MODERATE PAIN 90 tablet 0     leflunomide (ARAVA) 10 MG tablet Take 1 tablet (10 mg) by mouth daily 30 tablet 11     dofetilide (TIKOSYN) 125 MCG capsule Take 1 capsule (125 mcg) by mouth 2 times daily 60 capsule  5     metoprolol (LOPRESSOR) 50 MG tablet TAKE 1 TABLET (50 MG) BY MOUTH 2 TIMES DAILY 120 tablet 3     atorvastatin (LIPITOR) 40 MG tablet Take 1 tablet (40 mg) by mouth daily 90 tablet 2     fluticasone-vilanterol (BREO ELLIPTA) 100-25 MCG/INH oral inhaler Inhale 1 puff into the lungs daily (Patient not taking: Reported on 9/13/2017) 60 Inhaler 0     predniSONE (DELTASONE) 5 MG tablet Take 1 tablet (5 mg) by mouth daily 100 tablet 4     apixaban ANTICOAGULANT (ELIQUIS) 2.5 MG tablet Take 1 tablet (2.5 mg) by mouth 2 times daily 180 tablet 3     Leuprolide Acetate (LUPRON DEPOT IM) Inject into the muscle every 4 months Reported on 5/3/2017         lisinopril (PRINIVIL,ZESTRIL) 10 MG tablet Take 1 tablet (10 mg) by mouth 2 times daily 62 tablet 11     spironolactone (ALDACTONE) 25 MG tablet Take 1 tablet (25 mg) by mouth daily 30 tablet 11     acetaminophen (TYLENOL) 650 MG CR tablet Take 650 mg by mouth 2 times daily Reported on 4/5/2017         alendronate (FOSAMAX) 70 MG tablet Take 1 tablet (70 mg) by mouth every 7 days Take with over 8 ounces water and stay upright for at least 30 minutes after dose.  Take at least 60 minutes before breakfast 12 tablet 3     calcium carbonate (TUMS) 500 MG chewable tablet Take 1 chew tab by mouth every 4 hours as needed for heartburn Reported on 5/3/2017         tamsulosin (FLOMAX) 0.4 MG 24 hr capsule Take 1 capsule (0.4 mg) by mouth 2 times daily 60 capsule       ascorbic acid (VITAMIN C) 500 MG CPCR Take 500 mg by mouth daily         VITAMIN D, CHOLECALCIFEROL, PO Take 400 Units by mouth daily Reported on 5/3/2017         calcium carbonate (OS-SHELIA 500 MG Samish. CA) 500 MG tablet Take 600 mg by mouth daily                PAST SURGICAL HISTORY:   Past Surgical History          Past Surgical History:   Procedure Laterality Date     ARTHROPLASTY KNEE Left 1/12/2016     Procedure: ARTHROPLASTY KNEE;  Surgeon: Cesar Walker DO;  Location: [facility] OR     COLONOSCOPY    08/24/09     HC COLONOSCOPY THRU STOMA W BIOPSY/CAUTERY TUMOR/POLYP/LESION   8/31/2004     HC REPAIR ING HERNIA,5+Y/O,REDUCIBL   1996     Marlex mesh repair of bilateral inguinal hernias and drainage of bilateral scrotal hydroceles.     IMPLANT PACEMAKER   03/10/2017     IRRIGATION AND DEBRIDEMENT SOFT TISSUE LOWER EXTREMITY, COMBINED Left 3/15/2016     Procedure: COMBINED IRRIGATION AND DEBRIDEMENT SOFT TISSUE LOWER EXTREMITY;  Surgeon: Cesar Walker DO;  Location: [facility] OR            ALLERGIES            Allergies   Allergen Reactions     Amiodarone Other (See Comments)       Drop in DLCO     Lasix [Furosemide] Blisters       Blisters and sores in his mouth.          FAMILY HISTORY:   Family History          Family History   Problem Relation Age of Onset     CANCER Mother       CANCER Son       Unknown/Adopted Father       Alcoholism Brother              SOCIAL HISTORY:  Social History            Social History     Marital status:        Spouse name: N/A     Number of children: N/A     Years of education: N/A             Social History Main Topics     Smoking status: Former Smoker       Packs/day: 0.50       Years: 15.00       Types: Cigarettes       Quit date: 11/12/1998     Smokeless tobacco: Never Used         Comment: quit 15 yrs ago     Alcohol use No     Drug use: No     Sexual activity: Yes            Other Topics Concern     Caffeine Concern Yes       8 cups coffee day     Sleep Concern Yes     Weight Concern Yes       weight loss     Special Diet No     Exercise Yes       split [employer] daily      Social History Narrative         ROS:   Constitutional: No fever, chills, or sweats. No weight gain/loss   ENT: No visual disturbance, ear ache, epistaxis, sore throat  Allergies/Immunologic: Negative.   Respiratory: No cough, hemoptysia  Cardiovascular: As per HPI  GI: No nausea, vomiting, hematemesis, melena, or hematochezia  : No urinary frequency, dysuria, or hematuria  Integument:  "Negative  Psychiatric: Negative  Neuro: Negative  Endocrinology: Negative   Musculoskeletal: Negative     EXAM:  /80 (BP Location: Left arm, Patient Position: Chair, Cuff Size: Adult Regular)  Pulse 73  Ht 1.702 m (5' 7\")  Wt 68.8 kg (151 lb 9.6 oz)  SpO2 99%  BMI 23.74 kg/m2  In general, the patient is a pleasant male in no apparent distress.    HEENT: NC/AT.  PERRLA.  EOMI.  Sclerae white, not injected.  Nares clear.  Pharynx without erythema or exudate.  Dentition intact.    Neck: No adenopathy.  No thyromegaly. Carotids +4/4 bilaterally without bruits.  No jugular venous distension.   Heart: RRR. Normal S1, S2 splits physiologically. No murmur, rub, click, or gallop. The PMI is in the 5th ICS in the midclavicular line. There is no heave.    Lungs: CTA.  No ronchi, wheezes, rales.  No dullness to percussion.   Abdomen: Soft, nontender, nondistended. No organomegaly.  No bruits.   Extremities: No clubbing, cyanosis, or edema.  The pulses are +4/4 at the radial, brachial, femoral, popliteal, DP, and PT sites bilaterally.  No bruits are noted.  Neurologic: Alert and oriented to person/place/time, normal speech, gait and affect  Skin: No petechiae, purpura or rash.     Labs:  LIPID RESULTS:        Lab Results   Component Value Date     CHOL 139 03/06/2017     HDL 34 (L) 03/06/2017     LDL 74 03/06/2017     TRIG 156 (H) 03/06/2017     CHOLHDLRATIO 3.0 08/02/2013         LIVER ENZYME RESULTS:        Lab Results   Component Value Date     AST 29 09/07/2017     ALT 20 09/07/2017         CBC RESULTS:        Lab Results   Component Value Date     WBC 5.2 09/07/2017     RBC 3.43 (L) 09/07/2017     HGB 10.5 (L) 09/07/2017     HCT 32.1 (L) 09/07/2017     MCV 94 09/07/2017     MCH 30.6 09/07/2017     MCHC 32.7 09/07/2017     RDW 15.4 (H) 09/07/2017      09/07/2017         BMP RESULTS:        Lab Results   Component Value Date      09/07/2017     POTASSIUM 5.5 (H) 09/07/2017     CHLORIDE 102 09/07/2017 "     CO2 27 09/07/2017     ANIONGAP 6 09/07/2017      (H) 09/07/2017     BUN 20 09/07/2017     CR 1.00 09/07/2017     GFRESTIMATED 70 09/07/2017     GFRESTBLACK 85 09/07/2017     SHELIA 8.9 09/07/2017         A1C RESULTS:  No results found for: A1C     INR RESULTS:        Lab Results   Component Value Date     INR 1.08 06/30/2017     INR 1.01 03/27/2017         Procedures:     ECG:paced      Echocardiogram:  Moderate left ventricular dilation is present.  The Ejection Fraction is estimated at 20-25%.  Normal contraction of basal segments with akinetic mid and distal segments.  This wallmotion abnormality is new since prior study on 12/13/2016.  Consistent with stress cardiomyopathy, if no LAD disease.  Severe aortic stenosis is present.  The aortic valve area is 0.9 cm^2, by the continuity equation.  The peak aortic velocity is 3.8 m/sec.  The mean gradient across the aortic valve is37 mmHg.  Estimated gradients are under estimation due to LV systolic dysfunction,.      CT TAVR:  pending        Coronary Angiogram and Right Heart Catheterization:  Non-obstructive 2016      PFTs: NA      Carotid Ultrasound: na         FURTHER ASSESSMENTS     STS RISK SCORE: 7.6%  NYHA CLASS: II-III        Assessment and Plan:      89 year old male with severe aortic valvular stenosis referred to our clinic for evaluation and consideration for potential surgical AVR vs transcatheter aortic valve replacement (TAVR). STS RISK SCORE: 7.6%.   He is at high risk for adverse outcomes after surgical aortic valve replacement given his advanced age, commorbidities and high STS score of 7.6%. He should be considered for TAVR. I did discuss the risks and benefits of TAVR. He understands and is willing to proceed with TAVR.         Please do not hesitate to contact me if you have any questions/concerns.     Sincerely,     Janes Kingsley MD

## 2017-09-25 NOTE — MR AVS SNAPSHOT
After Visit Summary   9/25/2017    Bud Merritt    MRN: [id number]           Patient Information     Date Of Birth          5/9/1927        Visit Information        Provider Department      9/25/2017 1:00 PM Pharmacist, Helder Goncalves [facility]        Today's Diagnoses     Preop examination    -  1       Follow-ups after your visit        Your next 10 appointments already scheduled     Sep 25, 2017  2:50 PM CDT   (Arrive by 2:35 PM)   PAC Anesthesia Consult with  Pac Anesthesiologist   [facility] Preoperative Assessment [facility] ([facility])    [address] [place] [address]  [address] [facility] [address] [phone]            Sep 25, 2017  3:00 PM CDT   LAB with  LAB   [facility] Lab ([facility])    [address] [place] [address]  [facility] [facility] [id number] [phone]           Patient must bring picture ID. Patient should be prepared to give a urine specimen  Please do not eat 10-12 hours before your appointment if you are coming in fasting for labs on lipids, cholesterol, or glucose (sugar). Pregnant women should follow their Care Team instructions. Water with medications is okay. Do not drink coffee or other fluids. If you have concerns about taking  your medications, please ask at office or if scheduling via Motivappst, send a message by clicking on Secure Messaging, Message Your Care Team.            Sep 25, 2017  3:30 PM CDT   (Arrive by 3:15 PM)   New Patient Visit with Janes Kingsley MD   [facility] Heart [facility] ([facility])    [address] [place] [address]  [facility] [address] [phone]            Sep 27, 2017 11:30 AM CDT   Transcath Aortic-Valve Replace with [id number]   [facility] ([facility] [place])    [address] [facility] [address] [phone]            Sep 27, 2017 11:30 AM CDT   Ech Complete with [id number]   [facility]  [place] ([facility] [place])    [address] [facility] [place] [address] [phone]           1. Please bring or wear a comfortable two-piece outfit. 2. You may eat, drink and take your normal medicines. 3. For any questions that cannot be answered, please contact the ordering physician            Sep 27, 2017   Procedure with GENERIC ANESTHESIA PROVIDER   [facility]    [address] [facility] [place] [address] [phone]            Oct 04, 2017  9:45 AM CDT   Return Visit with Mak Shaver MD   [facility]    [address] [facility] [address] [phone]              Who to contact     Please call your clinic at [phone] to:    Ask questions about your health    Make or cancel appointments    Discuss your medicines    Learn about your test results    Speak to your doctor   If you have compliments or concerns about an experience at your clinic, or if you wish to file a complaint, please contact [facility] Patient Relations at [phone] or email us at [email or web address]         Additional Information About Your Visit        Mloghart Information     Pitchbritet is an electronic gateway that provides easy, online access to your medical records. With [employer], you can request a clinic appointment, read your test results, renew a prescription or communicate with your care team.     To sign up for Pitchbritet visit the website at [email or web address]   You will be asked to enter the access code listed below, as well as some personal information. Please follow the directions to create your username and password.     Your access code is: [email or web address]  Expires: 2017 11:55 AM     Your access code will  in 90 days. If you need help or a new code, please contact your [facility] or call [phone]  for assistance.        Care EveryWhere ID     This is your Care EveryWhere ID. This could be used by other organizations to access your [place] medical records  [id number]         Blood Pressure from Last 3 Encounters:   09/25/17 138/88   09/21/17 126/80   09/13/17 110/60    Weight from Last 3 Encounters:   09/25/17 68 kg (150 lb)   09/21/17 67.1 kg (148 lb)   09/13/17 67 kg (147 lb 12.8 oz)              Today, you had the following     No orders found for display         Today's Medication Changes          These changes are accurate as of: 9/25/17  2:32 PM.  If you have any questions, ask your nurse or doctor.               These medicines have changed or have updated prescriptions.        Dose/Directions    atorvastatin 40 MG tablet   Commonly known as:  LIPITOR   This may have changed:  when to take this   Used for:  Hyperlipidemia LDL goal <130        Dose:  40 mg   Take 1 tablet (40 mg) by mouth daily   Quantity:  90 tablet   Refills:  2         Stop taking these medicines if you haven't already. Please contact your care team if you have questions.     calcium carbonate 500 MG chewable tablet   Commonly known as:  TUMS   Stopped by:  Pharmacist, [facility]                    Primary Care Provider Office Phone # Fax #    Camron Aragon [phone]       [facility] [address] [place] [address]        Equal Access to Services     JUAN CLINE AH: Sampson clifton hadasho Soomaali, waaxda luqadaha, qaybta kaalmada adekeila, ronel dexter. So [facility] [phone].    ATENCIÓN: Si habla español, tiene a mon disposición servicios gratuitos de asistencia lingüística. Llame al [phone].    We comply with applicable federal civil rights laws and [place] laws. We do not discriminate on the basis of race, color, national origin, age, disability sex, sexual orientation or gender identity.            Thank you!     Thank you for choosing  [facility]  for your care. Our goal is always to provide you with excellent care. Hearing back from our patients is one way we can continue to improve our services. Please take a few minutes to complete the written survey that you may receive in the mail after your visit with us. Thank you!             Your Updated Medication List - Protect others around you: Learn how to safely use, store and throw away your medicines at www.disposemymeds.org.          This list is accurate as of: 9/25/17  2:32 PM.  Always use your most recent med list.                   Brand Name Dispense Instructions for use Diagnosis    acetaminophen 650 MG CR tablet    TYLENOL     Take 650 mg by mouth every 8 hours as needed Reported on 4/5/2017        alendronate 70 MG tablet    FOSAMAX    12 tablet    Take 1 tablet (70 mg) by mouth every 7 days Take with over 8 ounces water and stay upright for at least 30 minutes after dose.  Take at least 60 minutes before breakfast    Osteopenia       apixaban ANTICOAGULANT 2.5 MG tablet    ELIQUIS    180 tablet    Take 1 tablet (2.5 mg) by mouth 2 times daily    Paroxysmal atrial fibrillation (H)       ascorbic acid 500 MG Cpcr CR capsule    vitamin C     Take 500 mg by mouth daily        atorvastatin 40 MG tablet    LIPITOR    90 tablet    Take 1 tablet (40 mg) by mouth daily    Hyperlipidemia LDL goal <130       calcium carbonate 1250 MG tablet    OS-SHELIA 500 mg [place]. Ca     Take 1 tablet by mouth daily        dofetilide 125 MCG capsule    TIKOSYN    60 capsule    Take 1 capsule (125 mcg) by mouth 2 times daily    Paroxysmal atrial fibrillation (H)       FLOMAX 0.4 MG capsule   Generic drug:  tamsulosin     60 capsule    Take 1 capsule (0.4 mg) by mouth 2 times daily        HYDROcodone-acetaminophen 5-325 MG per tablet    NORCO    30 tablet    TAKE 1 TABLET BY MOUTH EVERY 6 HOURS AS NEEDED FOR MODERATE TO SEVERE PAIN    Rheumatoid arthritis involving multiple sites, unspecified  rheumatoid factor presence (H)       leflunomide 10 MG tablet    ARAVA    30 tablet    Take 1 tablet (10 mg) by mouth daily    Rheumatoid arthritis involving multiple sites with positive rheumatoid factor (H)       lisinopril 10 MG tablet    PRINIVIL/ZESTRIL    62 tablet    Take 1 tablet (10 mg) by mouth 2 times daily    Acute on chronic systolic congestive heart failure (H)       LUPRON DEPOT IM      Inject into the muscle every 6 months Reported on 5/3/2017        metoprolol 50 MG tablet    LOPRESSOR    120 tablet    TAKE 1 TABLET (50 MG) BY MOUTH 2 TIMES DAILY    Atrial fibrillation with rapid ventricular response (H)       predniSONE 5 MG tablet    DELTASONE    100 tablet    Take 1 tablet (5 mg) by mouth daily    Osteopenia       spironolactone 25 MG tablet    ALDACTONE    30 tablet    Take 1 tablet (25 mg) by mouth daily    Cardiomyopathy, nonischemic (H)       traMADol 50 MG tablet    ULTRAM    90 tablet    TAKE 1 TABLET 3 TIMES DAILY AS NEEDED FOR MODERATE PAIN    Rheumatoid arthritis involving multiple sites, unspecified rheumatoid factor presence (H)       VITAMIN D (CHOLECALCIFEROL) PO      Take 1 tablet by mouth daily Reported on 5/3/2017

## 2017-09-25 NOTE — PATIENT INSTRUCTIONS
Preparing for Your Surgery      Name:  Bud Merritt   MRN:  [id number]   :  1927   Today's Date:  2017     Arriving for surgery:  Surgery date:  17  Surgery time:  11:40 am  Arrival time:  9:40 am  Please come to:       [facility] [address]  [address] [place]  [address]    -   parking is available in front of the hospital from 5:15 am to 8:00 pm    -  Stop at the Information Desk in the lobby    -   Inform the information person that you are here for surgery. An escort to 3c will be provided. If you would not like an escort, please proceed to 3C on the 3rd floor. [phone]     What can I eat or drink?  -  You may have solid food or milk products until 8 hours prior to your surgery. No food or milk after 3:40 am.  -  You may have water or 7up/Sprite until 2 hours prior to your surgery-until 9:40 am    Which medicines can I take? No Aspirin, vitamins or supplements for 7 days before surgery. Stop Eliquis(Apixaban) today ().    -  Do take these medications the day of surgery: Metoprolol, Dofetilide, Atorivastatin, Fluticasone, Prednisone, Leuprolide, Spirinolactone, Tamsulosin, Tylenol if needed, leflunamide      -  Do not take these medications on the day of surgery: Lisinopril      How do I prepare myself?  -  Take two showers: one the night before surgery; and one the morning of surgery.         Use Scrubcare or Hibiclens to wash from neck down.  You may use your own shampoo and conditioner. No other hair products.   -  Do NOT use lotion, powder, deodorant, or antiperspirant the day of your surgery.  -  Do NOT wear any makeup, fingernail polish or jewelry.  -Do not bring your own medications to the hospital.  -  Bring your ID and insurance card.    Questions or Concerns:  If you have questions or concerns, please call the  Preoperative Assessment Center, Monday-Friday 7AM-7PM:  [phone]    AFTER YOUR SURGERY  Breathing exercises    Breathing exercises help you recover faster. Take deep breaths and let the air out slowly. This will:     Help you wake up after surgery.    Help prevent complications like pneumonia.  Preventing complications will help you go home sooner.   We may give you a breathing device (incentive spirometer) to encourage you to breathe deeply.   Nausea and vomiting   You may feel sick to your stomach after surgery; if so, let your nurse know.    Pain control:  After surgery, you may have pain. Our goal is to help you manage your pain. Pain medicine will help you feel comfortable enough to do activities that will help you heal.  These activities may include breathing exercises, walking and physical therapy.   To help your health care team treat your pain we will ask: 1) If you have pain  2) where it is located 3) describe your pain in your words  Methods of pain control include medications given by mouth, vein or by nerve block for some surgeries.  We may give you a pain control pump that will:  1) Deliver the medicine through a tube placed in your vein  2) Control the amount of medicine you receive  3) Allow you to push a button to deliver a dose of pain medicine  Sequential Compression Device (SCD) or Pneumo Boots:  You may need to wear SCD S on your legs or feet. These are wraps connected to a machine that pumps in air and releases it. The repeated pumping helps prevent blood clots from forming.

## 2017-09-25 NOTE — H&P
Pre-Operative H & P     CC:  Preoperative exam to assess for increased cardiopulmonary risk while undergoing surgery and anesthesia.    Date of Encounter: September 25, 2017   Primary Care Physician:  Camron Aragon  Bud Merritt is a 89 year old male who is scheduled for a Transfemoral Approach (Harman) Aortic Valve Implant, Cardiopulmonary Bypass Standby on 9/27/17 with Dr. Janes Kingsley for severe aortic stenosis at the [facility].     Mr. Merritt has severe aortic stenosis.  His other significant cardiac issues include AFIB, s/p cardiac pacemaker (dependent) 3/2017 for cardiomyopathy and CHF CLASS III.  He has been diagnosed with COPD, but does not use any inhalers.  According to [facility] records he was inpatient March 2017 including a COPD exacerbation.  He has RA for which he takes Prednisone 5 mg daily and Arava.  His other medical history includes prostate cancer and chronic pain.  He is severely limited in activity due to RA and has increased fatigue.  He ambulates with a cane and uses a wheel chair.  He cannot lie flat due to arthritis.  He also has a right lower extremity leg wound that has dehisced and is weeping.  This was evaluated by his PCP and determined to be healing and not a reason to cancel surgery.        History is obtained from the patient and medical record including Care Everywhere.    Past Medical History  Past Medical History:   Diagnosis Date     Anemia      Atrial fibrillation (H) 07/01/2016     Cardiac pacemaker 03/10/2017     Cardiomyopathy (H)      CHF (congestive heart failure) (H)      COPD exacerbation (H) 3/2/2017     Depressive disorder      History of prostate cancer      Paroxysmal atrial fibrillation (H) 7/6/2016     Pure hypercholesterolemia      Rheumatoid arthritis(714.0)      Unspecified essential hypertension      Weakness generalized 3/2/2017         Past Surgical History  Past Surgical History:   Procedure Laterality  Date     ARTHROPLASTY KNEE Left 1/12/2016    Procedure: ARTHROPLASTY KNEE;  Surgeon: Cesar Walker DO;  Location: [facility] OR     COLONOSCOPY  08/24/09      COLONOSCOPY THRU STOMA W BIOPSY/CAUTERY TUMOR/POLYP/LESION  8/31/2004      REPAIR ING HERNIA,5+Y/O,REDUCIBL  1996    Marlex mesh repair of bilateral inguinal hernias and drainage of bilateral scrotal hydroceles.     IMPLANT PACEMAKER  03/10/2017     IRRIGATION AND DEBRIDEMENT SOFT TISSUE LOWER EXTREMITY, COMBINED Left 3/15/2016    Procedure: COMBINED IRRIGATION AND DEBRIDEMENT SOFT TISSUE LOWER EXTREMITY;  Surgeon: Cesar Walker DO;  Location: [facility] OR       Hx of Blood transfusions/reactions: No reactions     Prior to Admission Medications  Current Outpatient Prescriptions   Medication Sig Dispense Refill     HYDROcodone-acetaminophen (NORCO) 5-325 MG per tablet TAKE 1 TABLET BY MOUTH EVERY 6 HOURS AS NEEDED FOR MODERATE TO SEVERE PAIN 30 tablet 0     traMADol (ULTRAM) 50 MG tablet TAKE 1 TABLET 3 TIMES DAILY AS NEEDED FOR MODERATE PAIN 90 tablet 0     leflunomide (ARAVA) 10 MG tablet Take 1 tablet (10 mg) by mouth daily 30 tablet 11     dofetilide (TIKOSYN) 125 MCG capsule Take 1 capsule (125 mcg) by mouth 2 times daily 60 capsule 5     metoprolol (LOPRESSOR) 50 MG tablet TAKE 1 TABLET (50 MG) BY MOUTH 2 TIMES DAILY 120 tablet 3     atorvastatin (LIPITOR) 40 MG tablet Take 1 tablet (40 mg) by mouth daily (Patient taking differently: Take 40 mg by mouth every evening ) 90 tablet 2     predniSONE (DELTASONE) 5 MG tablet Take 1 tablet (5 mg) by mouth daily 100 tablet 4     apixaban ANTICOAGULANT (ELIQUIS) 2.5 MG tablet Take 1 tablet (2.5 mg) by mouth 2 times daily (Patient not taking: Reported on 9/25/2017) 180 tablet 3     Leuprolide Acetate (LUPRON DEPOT IM) Inject into the muscle every 6 months Reported on 5/3/2017       lisinopril (PRINIVIL,ZESTRIL) 10 MG tablet Take 1 tablet (10 mg) by mouth 2 times daily 62 tablet 11     spironolactone  (ALDACTONE) 25 MG tablet Take 1 tablet (25 mg) by mouth daily 30 tablet 11     acetaminophen (TYLENOL) 650 MG CR tablet Take 650 mg by mouth every 8 hours as needed Reported on 4/5/2017       alendronate (FOSAMAX) 70 MG tablet Take 1 tablet (70 mg) by mouth every 7 days Take with over 8 ounces water and stay upright for at least 30 minutes after dose.  Take at least 60 minutes before breakfast 12 tablet 3     tamsulosin (FLOMAX) 0.4 MG 24 hr capsule Take 1 capsule (0.4 mg) by mouth 2 times daily 60 capsule      ascorbic acid (VITAMIN C) 500 MG CPCR Take 500 mg by mouth daily       VITAMIN D, CHOLECALCIFEROL, PO Take 1 tablet by mouth daily Reported on 5/3/2017       calcium carbonate (OS-SHELIA 500 MG [place]. CA) 500 MG tablet Take 1 tablet by mouth daily            Allergies  Amiodarone and Lasix [furosemide]     Social History  Social History     Social History     Marital status:      Spouse name: N/A     Number of children: N/A     Years of education: N/A     Occupational History     Not on file.     Social History Main Topics     Smoking status: Former Smoker     Packs/day: 0.50     Years: 15.00     Types: Cigarettes     Quit date: 11/12/1998     Smokeless tobacco: Never Used      Comment: quit 15 yrs ago     Alcohol use No     Drug use: No     Sexual activity: Yes     Other Topics Concern     Caffeine Concern Yes     8 cups coffee day     Sleep Concern Yes     Weight Concern Yes     weight loss     Special Diet No     Exercise Yes     split firewood daily     Social History Narrative          Family History  Family History   Problem Relation Age of Onset     CANCER Mother      CANCER Son      Unknown/Adopted Father      Alcoholism Brother         ROS  10 point review of systems performed.  All pertinent positives noted, otherwise Negative.      Cardiac Testing    >ECHO 3/6/17  Moderate left ventricular dilation is present.  The Ejection Fraction is estimated at 20-25%.  Normal contraction of basal segments  "with akinetic mid and distal segments. This wall motion abnormality is new since prior study on 12/13/2016.  Consistent with stress cardiomyopathy, if no LAD disease.  Severe aortic stenosis is present.  The aortic valve area is 0.9 cm^2, peak aortic velocity is 3.8 m/sec, mean gradient across the aortic valve is 37 mmHg.  >Stress Test    >CATH 7/1/16:  1. Scattered mild to moderate disease throughout all coronaries. Tightest stenosis appears to be 40-50% RCA stenosis. Right dominant circulation. L Main is 40% 2. Aortic valve was not crossed. 3. Patient with rapid atrial fibrillation with minimal symptoms.    Labs: (personally reviewed):  Lab Results   Component Value Date    WBC 4.1 09/25/2017    HGB 10.2 (L) 09/25/2017    HCT 31.9 (L) 09/25/2017     09/25/2017    INR 1.08 06/30/2017    PTT 32 03/27/2017     09/25/2017    POTASSIUM 4.5 09/25/2017    SHELIA 8.8 09/25/2017     (H) 09/25/2017    CR 1.00 09/25/2017    BUN 15 09/25/2017    CO2 26 09/25/2017    ALT 21 09/25/2017    AST 27 09/25/2017    BILITOTAL 0.5 09/25/2017    ALKPHOS 83 09/25/2017           Physical Exam:  No LMP for male patient.   Vital signs:  /88  Pulse 70  Temp 97.6  F (36.4  C) (Oral)  Resp 16  Ht 1.676 m (5' 6\")  Wt 68 kg (150 lb)  SpO2 95%  BMI 24.21 kg/m2    Constitutional: Awake, alert, cooperative, no apparent distress, and appears stated age.  Eyes: Pupils equal, round and reactive to light, sclera clear, conjunctiva normal.  HENT: Normocephalic, oral pharynx with moist mucus membranes. No goiter appreciated.   Respiratory: Clear to auscultation bilaterally, no crackles or wheezing.  Cardiovascular: Regular rate and rhythm, with overt murmur noted. Moderate LE edema. Palpable pulses to radial  DP and PT arteries.   GI: Normal bowel sounds, soft, non-distended, non-tender, no masses palpated  Skin: dehisced wound right LE without erythema or edema.  No rashes at anticipated surgical site.   Musculoskeletal: " Limited ROM of neck. There is no redness, warmth, or swelling of the exposed joints. Gross motor strength is mildly decreased.    Neurologic: Awake, oriented to name, place and time.  Ambulates with cane   Neuropsychiatric: Calm, cooperative. Normal affect.     Assessment/Plan  Bud Merritt is a 89 year old male who is scheduled for a Transfemoral Approach (Harman) Aortic Valve Implant, Cardiopulmonary Bypass Standby on 9/27/17 with Dr. Janes Kingsley for severe aortic stenosis at the [facility]. PAC referral for risk assessment and optimization for anesthesia with comorbid conditions of:    Pre-operative considerations:  1.  Cardiac:  Functional status METS  = 1, Risk of Major Adverse Cardiac event: >11%  -Cardiac pacemaker implanted 3/10/17 for cardiomyopathy,pacemaker dependent, last interrogation   -AFIB, s/p ablation 6/29/17  -Severe AS   - CHF NYHA CLASS: II-III    >ECHO 3/6/17  Moderate left ventricular dilation is present.  The Ejection Fraction is estimated at 20-25%.  Normal contraction of basal segments with akinetic mid and distal segments. This wall motion abnormality is new since prior study on 12/13/2016.  Consistent with stress cardiomyopathy, if no LAD disease.  Severe aortic stenosis is present.  The aortic valve area is 0.9 cm^2, peak aortic velocity is 3.8 m/sec, mean gradient across the aortic valve is 37 mmHg.      >CATH 7/1/16:  1. Scattered mild to moderate disease throughout all coronaries. Tightest stenosis appears to be 40-50% RCA stenosis. Right dominant circulation. L Main is 40% 2. Aortic valve was not crossed. 3. Patient with rapid atrial fibrillation with minimal symptoms.  -Patient on  Eliquist and will stop 9/25/17  2.  Pulm:   KATHERIN risk: Intermediate  -COPD with exacerbation noted [facility] 3/2017, patient does not use inhalers   >PFT date: 9/16/16:  FEV1-%Pred-Pr = 69, FEV1FVC-Pred %= 74, DLCO-%Pred-Pre = 51   3. MS  -RA on chronic prednisone 5  mg  4.  GI:  Risk of PONV score =1 .  If > 2, anti-emetic intervention recommended.    Patient is optimized and is acceptable candidate for the proposed procedure.  No further diagnostic evaluation is needed.     Nicole RENDON-C   Preoperative Assessment Center  [facility]  Clinic and Surgery Center  Phone: [phone]  Fax: [phone]

## 2017-09-25 NOTE — MR AVS SNAPSHOT
After Visit Summary   2017    Bud Merritt    MRN: [id number]           Patient Information     Date Of Birth          1927        Visit Information        Provider Department      2017 2:30 PM Rn, [facility]        Care Instructions    Preparing for Your Surgery      Name:  Bud Merritt   MRN:  [id number]   :  1927   Today's Date:  2017     Arriving for surgery:  Surgery date:  17  Surgery time:  11:40 am  Arrival time:  9:40 am  Please come to:       [facility] [address]  [address] [place]  [address]    -   parking is available in front of the hospital from 5:15 am to 8:00 pm    -  Stop at the Information Desk in the lobby    -   Inform the information person that you are here for surgery. An escort to 3c will be provided. If you would not like an escort, please proceed to 3C on the 3rd floor. [phone]     What can I eat or drink?  -  You may have solid food or milk products until 8 hours prior to your surgery. No food or milk after 3:40 am.  -  You may have water or 7up/Sprite until 2 hours prior to your surgery-until 9:40 am    Which medicines can I take? No Aspirin, vitamins or supplements for 7 days before surgery. Stop Eliquis(Apixaban) today ().    -  Do take these medications the day of surgery: Metoprolol, Dofetilide, Atorivastatin, Fluticasone, Prednisone, Leuprolide, Spirinolactone, Tamsulosin, Tylenol if needed, leflunamide      -  Do not take these medications on the day of surgery: Lisinopril      How do I prepare myself?  -  Take two showers: one the night before surgery; and one the morning of surgery.         Use Scrubcare or Hibiclens to wash from neck down.  You may use your own shampoo and conditioner. No other hair products.   -  Do NOT use lotion, powder, deodorant, or antiperspirant the day of your surgery.  -  Do NOT wear any makeup, fingernail  polish or jewelry.  -Do not bring your own medications to the hospital.  -  Bring your ID and insurance card.    Questions or Concerns:  If you have questions or concerns, please call the  Preoperative Assessment Center, Monday-Friday 7AM-7PM:  [phone]    AFTER YOUR SURGERY  Breathing exercises   Breathing exercises help you recover faster. Take deep breaths and let the air out slowly. This will:     Help you wake up after surgery.    Help prevent complications like pneumonia.  Preventing complications will help you go home sooner.   We may give you a breathing device (incentive spirometer) to encourage you to breathe deeply.   Nausea and vomiting   You may feel sick to your stomach after surgery; if so, let your nurse know.    Pain control:  After surgery, you may have pain. Our goal is to help you manage your pain. Pain medicine will help you feel comfortable enough to do activities that will help you heal.  These activities may include breathing exercises, walking and physical therapy.   To help your health care team treat your pain we will ask: 1) If you have pain  2) where it is located 3) describe your pain in your words  Methods of pain control include medications given by mouth, vein or by nerve block for some surgeries.  We may give you a pain control pump that will:  1) Deliver the medicine through a tube placed in your vein  2) Control the amount of medicine you receive  3) Allow you to push a button to deliver a dose of pain medicine  Sequential Compression Device (SCD) or Pneumo Boots:  You may need to wear SCD S on your legs or feet. These are wraps connected to a machine that pumps in air and releases it. The repeated pumping helps prevent blood clots from forming.                     Follow-ups after your visit        Your next 10 appointments already scheduled     Sep 27, 2017 11:30 AM KOFI Carias Aortic-Valve Replace with [id number]   [facility])    [address] [address]   [phone]            Sep 27, 2017 11:30 AM CDT   Ech Complete with [id number]   [facility] ([facility])    [address] [address]   [phone]           1. Please bring or wear a comfortable two-piece outfit. 2. You may eat, drink and take your normal medicines. 3. For any questions that cannot be answered, please contact the ordering physician            Sep 27, 2017   Procedure with GENERIC ANESTHESIA PROVIDER   [facility] (--)    [address] [address]   [phone]            Oct 04, 2017  9:45 AM CDT   Return Visit with Mak Shaver MD   [facility]    [address] [address] [phone]              Future tests that were ordered for you today     Open Future Orders        Priority Expected Expires Ordered    Potassium Routine 9/25/2017 10/25/2017 9/25/2017    ABO/Rh type and screen Routine 9/25/2017 10/25/2017 9/25/2017    N terminal pro BNP outpatient Routine  10/25/2017 9/25/2017            Who to contact     Please call your clinic at [phone] to:    Ask questions about your health    Make or cancel appointments    Discuss your medicines    Learn about your test results    Speak to your doctor   If you have compliments or concerns about an experience at your clinic, or if you wish to file a complaint, please contact [facility] Patient Relations at [phone] or email us at [email or web address]         Additional Information About Your Visit        [employer] Information     [employer] is an electronic gateway that provides easy, online access to your medical records. With [employer], you can request a clinic appointment, read your test results, renew a prescription or communicate with your care team.      To sign up for [employer] visit the website at [email or web address]   You will be asked to enter the access code listed below, as well as some personal information. Please follow the directions to create your username and password.     Your access code is: [email or web address]  Expires: 2017 11:55 AM     Your access code will  in 90 days. If you need help or a new code, please contact your [facility] or call [phone] for assistance.        Care EveryWhere ID     This is your Care EveryWhere ID. This could be used by other organizations to access your [place] medical records  [id number]         Blood Pressure from Last 3 Encounters:   17 138/88   17 138/88   17 126/80    Weight from Last 3 Encounters:   17 68 kg (150 lb)   17 68 kg (150 lb)   17 67.1 kg (148 lb)              Today, you had the following     No orders found for display         Today's Medication Changes          These changes are accurate as of: 17  4:08 PM.  If you have any questions, ask your nurse or doctor.               These medicines have changed or have updated prescriptions.        Dose/Directions    atorvastatin 40 MG tablet   Commonly known as:  LIPITOR   This may have changed:  when to take this   Used for:  Hyperlipidemia LDL goal <130        Dose:  40 mg   Take 1 tablet (40 mg) by mouth daily   Quantity:  90 tablet   Refills:  2         Stop taking these medicines if you haven't already. Please contact your care team if you have questions.     calcium carbonate 500 MG chewable tablet   Commonly known as:  TUMS   Stopped by:  Pharmacist, [facility]                    Primary Care Provider Office Phone # Fax #    Camron Aragon [phone]       [facility] [place] [address] [place] [address]        Equal Access to Services     JUAN CLINE : Sampson Burgess, hugo villanueva, felix latif,  ronel velasco gomez cherry'aan ah. So [facility] [phone].    ATENCIÓN: Si mirnala justen, tiene a mon disposición servicios gratuitos de asistencia lingüística. Jeanette goff [phone].    We comply with applicable federal civil rights laws and [place] laws. We do not discriminate on the basis of race, color, national origin, age, disability sex, sexual orientation or gender identity.            Thank you!     Thank you for choosing [facility]  for your care. Our goal is always to provide you with excellent care. Hearing back from our patients is one way we can continue to improve our services. Please take a few minutes to complete the written survey that you may receive in the mail after your visit with us. Thank you!             Your Updated Medication List - Protect others around you: Learn how to safely use, store and throw away your medicines at www.disposemymeds.org.          This list is accurate as of: 9/25/17  4:08 PM.  Always use your most recent med list.                   Brand Name Dispense Instructions for use Diagnosis    acetaminophen 650 MG CR tablet    TYLENOL     Take 650 mg by mouth every 8 hours as needed Reported on 4/5/2017        alendronate 70 MG tablet    FOSAMAX    12 tablet    Take 1 tablet (70 mg) by mouth every 7 days Take with over 8 ounces water and stay upright for at least 30 minutes after dose.  Take at least 60 minutes before breakfast    Osteopenia       apixaban ANTICOAGULANT 2.5 MG tablet    ELIQUIS    180 tablet    Take 1 tablet (2.5 mg) by mouth 2 times daily    Paroxysmal atrial fibrillation (H)       ascorbic acid 500 MG Cpcr CR capsule    vitamin C     Take 500 mg by mouth daily        atorvastatin 40 MG tablet    LIPITOR    90 tablet    Take 1 tablet (40 mg) by mouth daily    Hyperlipidemia LDL goal <130       calcium carbonate 1250 MG tablet    OS-SHELIA 500 mg [place]. Ca     Take 1 tablet by mouth daily        dofetilide 125 MCG capsule     TIKOSYN    60 capsule    Take 1 capsule (125 mcg) by mouth 2 times daily    Paroxysmal atrial fibrillation (H)       FLOMAX 0.4 MG capsule   Generic drug:  tamsulosin     60 capsule    Take 1 capsule (0.4 mg) by mouth 2 times daily        HYDROcodone-acetaminophen 5-325 MG per tablet    NORCO    30 tablet    TAKE 1 TABLET BY MOUTH EVERY 6 HOURS AS NEEDED FOR MODERATE TO SEVERE PAIN    Rheumatoid arthritis involving multiple sites, unspecified rheumatoid factor presence (H)       leflunomide 10 MG tablet    ARAVA    30 tablet    Take 1 tablet (10 mg) by mouth daily    Rheumatoid arthritis involving multiple sites with positive rheumatoid factor (H)       lisinopril 10 MG tablet    PRINIVIL/ZESTRIL    62 tablet    Take 1 tablet (10 mg) by mouth 2 times daily    Acute on chronic systolic congestive heart failure (H)       LUPRON DEPOT IM      Inject into the muscle every 6 months Reported on 5/3/2017        metoprolol 50 MG tablet    LOPRESSOR    120 tablet    TAKE 1 TABLET (50 MG) BY MOUTH 2 TIMES DAILY    Atrial fibrillation with rapid ventricular response (H)       predniSONE 5 MG tablet    DELTASONE    100 tablet    Take 1 tablet (5 mg) by mouth daily    Osteopenia       spironolactone 25 MG tablet    ALDACTONE    30 tablet    Take 1 tablet (25 mg) by mouth daily    Cardiomyopathy, nonischemic (H)       traMADol 50 MG tablet    ULTRAM    90 tablet    TAKE 1 TABLET 3 TIMES DAILY AS NEEDED FOR MODERATE PAIN    Rheumatoid arthritis involving multiple sites, unspecified rheumatoid factor presence (H)       VITAMIN D (CHOLECALCIFEROL) PO      Take 1 tablet by mouth daily Reported on 5/3/2017

## 2017-09-25 NOTE — PROGRESS NOTES
Preoperative Assessment Center medication history for September 25, 2017 is complete.    See Epic admission navigator for allergy information, pharmacy and prior to admission medications.    Operating room staff will still need to confirm medications and last dose information on day of surgery.     Medication history interview sources:  patient, patient's wife, patient's home medication list.     Changes made to PTA medication list (reason)  Added: none  Deleted: tums (not taking)  Changed: sig updated on: acetamionphen CR, lupron    Additional medication history information (including reliability of information, actions taken by pharmacist): patient still taking leflunomide - message sent to cardiology to notify them of this.     Prior to Admission medications    Medication Sig Last Dose Taking? Auth Provider   HYDROcodone-acetaminophen (NORCO) 5-325 MG per tablet TAKE 1 TABLET BY MOUTH EVERY 6 HOURS AS NEEDED FOR MODERATE TO SEVERE PAIN Taking Yes Camron Aragon MD   traMADol (ULTRAM) 50 MG tablet TAKE 1 TABLET 3 TIMES DAILY AS NEEDED FOR MODERATE PAIN Taking Yes Camron Aragon MD   leflunomide (ARAVA) 10 MG tablet Take 1 tablet (10 mg) by mouth daily Taking Yes Mak Shaver MD   dofetilide (TIKOSYN) 125 MCG capsule Take 1 capsule (125 mcg) by mouth 2 times daily Taking Yes Khoa Li MD   metoprolol (LOPRESSOR) 50 MG tablet TAKE 1 TABLET (50 MG) BY MOUTH 2 TIMES DAILY Taking Yes Camron Aragon MD   atorvastatin (LIPITOR) 40 MG tablet Take 1 tablet (40 mg) by mouth daily  Patient taking differently: Take 40 mg by mouth every evening  Taking Yes Camron Aragon MD   predniSONE (DELTASONE) 5 MG tablet Take 1 tablet (5 mg) by mouth daily Taking Yes Mak Shaver MD   Leuprolide Acetate (LUPRON DEPOT IM) Inject into the muscle every 6 months Reported on 5/3/2017 Taking Yes Reported, Patient   lisinopril (PRINIVIL,ZESTRIL) 10 MG tablet Take 1 tablet (10 mg) by mouth 2  times daily Taking Yes More, Teresa Farley PA-C   spironolactone (ALDACTONE) 25 MG tablet Take 1 tablet (25 mg) by mouth daily Taking Yes More, Teresa Farley PA-C   acetaminophen (TYLENOL) 650 MG CR tablet Take 650 mg by mouth every 8 hours as needed Reported on 4/5/2017 Taking Yes Reported, Patient   alendronate (FOSAMAX) 70 MG tablet Take 1 tablet (70 mg) by mouth every 7 days Take with over 8 ounces water and stay upright for at least 30 minutes after dose.  Take at least 60 minutes before breakfast Taking Yes Mak Shaver MD   tamsulosin (FLOMAX) 0.4 MG 24 hr capsule Take 1 capsule (0.4 mg) by mouth 2 times daily Taking Yes Camron Aragon MD   ascorbic acid (VITAMIN C) 500 MG CPCR Take 500 mg by mouth daily Taking Yes Reported, Patient   VITAMIN D, CHOLECALCIFEROL, PO Take 1 tablet by mouth daily Reported on 5/3/2017 Taking Yes Reported, Patient   calcium carbonate (OS-SHELIA 500 MG [place]. [place]) 500 MG tablet Take 1 tablet by mouth daily  Taking Yes Reported, Patient   apixaban ANTICOAGULANT (ELIQUIS) 2.5 MG tablet Take 1 tablet (2.5 mg) by mouth 2 times daily  Patient not taking: Reported on 9/25/2017 Not Taking  Berhane Colon MD       Medication history completed by: Miguel Ángel Hope, [facility]

## 2017-09-25 NOTE — OR NURSING
PAC pre-op teaching done with pt and his wife. Pt seen by PAC MEDINA Nicole Santiago first. She said pt will not use his inhaler and not to give him the incentive spirometer-he will not use it. Pt has a superficial wound on R lower leg that wife has been dressing at home. New gauze dssg applied over right lower leg at the request of Nicole Santiago, MEDINA. No drainage noted.

## 2017-09-25 NOTE — MR AVS SNAPSHOT
After Visit Summary   9/25/2017    Bud Merritt    MRN: [id number]           Patient Information     Date Of Birth          5/9/1927        Visit Information        Provider Department      9/25/2017 3:30 PM Janes Kingsley MD [facility]        Today's Diagnoses     Nonrheumatic aortic valve stenosis    -  1       Follow-ups after your visit        Your next 10 appointments already scheduled     Oct 04, 2017  9:45 AM CDT   [facility] Mak [facility]aver MD   [facility]    [address] [address]   [phone]            Oct 26, 2017  2:00 PM CDT   Ech Complete with ECHCR2    Health Echo ([facility])    [address] [place] [address] [phone]           1. Please bring or wear a comfortable two-piece outfit. 2. You may eat, drink and take your normal medicines. 3. For any questions that cannot be answered, please contact the ordering physician            Oct 26, 2017  3:00 PM CDT   Lab with  LAB    Health Lab ([facility])    [address] [place] [id number] [phone]            Oct 26, 2017  3:30 PM CDT   (Arrive by 3:15 PM)   Return Visit with POLLO Reynolds [facility] ([facility])    [address] [place] [id number] [phone]              Future tests that were ordered for you today     Open Future Orders        Priority Expected Expires Ordered    EKG 12-lead, tracing only (Future) Routine 10/18/2017 12/27/2017 9/27/2017    CBC with platelets Routine 10/18/2017 12/27/2017 9/27/2017    Basic metabolic panel Routine 10/18/2017 12/27/2017 9/27/2017    Echocardiogram Complete Routine 10/18/2017 12/27/2017 9/27/2017            Who to contact     If you have questions or need follow up information about today's clinic visit or  "your schedule please contact [facility] directly at [phone].  Normal or non-critical lab and imaging results will be communicated to you by MyChart, letter or phone within 4 business days after the clinic has received the results. If you do not hear from us within 7 days, please contact the clinic through [employer] or phone. If you have a critical or abnormal lab result, we will notify you by phone as soon as possible.  Submit refill requests through [employer] or call your pharmacy and they will forward the refill request to us. Please allow 3 business days for your refill to be completed.          Additional Information About Your Visit        [employer] Information     [employer] lets you send messages to your doctor, view your test results, renew your prescriptions, schedule appointments and more. To sign up, go to [email or web address] . Click on \"Log in\" on the left side of the screen, which will take you to the Welcome page. Then click on \"Sign up Now\" on the right side of the page.     You will be asked to enter the access code listed below, as well as some personal information. Please follow the directions to create your username and password.     Your access code is: [email or web address]  Expires: 2017 11:55 AM     Your access code will  in 90 days. If you need help or a new code, please call your [place] clinic or [phone].        Care EveryWhere ID     This is your Care EveryWhere ID. This could be used by other organizations to access your [place] medical records  [id number]        Your Vitals Were     Pulse Height Pulse Oximetry BMI (Body Mass Index)          70 1.676 m (5' 6\") 95% 24.21 kg/m2         Blood Pressure from Last 3 Encounters:   17 (!) 87/47   17 138/88   17 138/88    Weight from Last 3 Encounters:   17 67 kg (147 lb 11.3 oz)   17 68 kg (150 lb)   17 68 kg (150 lb)              Today, you had the following     No orders found for display "         Today's Medication Changes          These changes are accurate as of: 9/25/17 11:59 PM.  If you have any questions, ask your nurse or doctor.               These medicines have changed or have updated prescriptions.        Dose/Directions    atorvastatin 40 MG tablet   Commonly known as:  LIPITOR   This may have changed:  when to take this   Used for:  Hyperlipidemia LDL goal <130        Dose:  40 mg   Take 1 tablet (40 mg) by mouth daily   Quantity:  90 tablet   Refills:  2         Stop taking these medicines if you haven't already. Please contact your care team if you have questions.     calcium carbonate 500 MG chewable tablet   Commonly known as:  TUMS   Stopped by:  Pharmacist, [facility]                Where to get your medicines      These medications were sent to [id number] [place] [place] [facility] [address]   [address] [facility] [address]     Phone:  [phone]     spironolactone 25 MG tablet                Primary Care Provider Office Phone # Fax #    Camron Aragon [phone]       [facility] [address] [place] [address]        Equal Access to Services     [facility]: Hadii aad ku hadasho Soomaali, waaxda luqadaha, qaybta kaalmada adeegyada, waxay idiin hayryan real . So [facility] [phone].    ATENCIÓN: Si habla español, tiene a mon disposición servicios gratuitos de asistencia lingüística. Llame al [phone].    We comply with applicable federal civil rights laws and [place] laws. We do not discriminate on the basis of race, color, national origin, age, disability sex, sexual orientation or gender identity.            Thank you!     Thank you for choosing [facility]  for your care. Our goal is always to provide you with excellent care. Hearing back from our patients is one way we can continue to improve our services. Please take a few minutes to complete the written survey that you may receive in the  mail after your visit with us. Thank you!             Your Updated Medication List - Protect others around you: Learn how to safely use, store and throw away your medicines at www.disposemymeds.org.          This list is accurate as of: 9/25/17 11:59 PM.  Always use your most recent med list.                   Brand Name Dispense Instructions for use Diagnosis    acetaminophen 650 MG CR tablet    TYLENOL     Take 650 mg by mouth every 8 hours as needed Reported on 4/5/2017        alendronate 70 MG tablet    FOSAMAX    12 tablet    Take 1 tablet (70 mg) by mouth every 7 days Take with over 8 ounces water and stay upright for at least 30 minutes after dose.  Take at least 60 minutes before breakfast    Osteopenia       apixaban ANTICOAGULANT 2.5 MG tablet    ELIQUIS    180 tablet    Take 1 tablet (2.5 mg) by mouth 2 times daily    Paroxysmal atrial fibrillation (H)       ascorbic acid 500 MG Cpcr CR capsule    vitamin C     Take 500 mg by mouth daily        atorvastatin 40 MG tablet    LIPITOR    90 tablet    Take 1 tablet (40 mg) by mouth daily    Hyperlipidemia LDL goal <130       calcium carbonate 1250 MG tablet    OS-SHELIA 500 mg [place]. Ca     Take 1 tablet by mouth daily        dofetilide 125 MCG capsule    TIKOSYN    60 capsule    Take 1 capsule (125 mcg) by mouth 2 times daily    Paroxysmal atrial fibrillation (H)       FLOMAX 0.4 MG capsule   Generic drug:  tamsulosin     60 capsule    Take 1 capsule (0.4 mg) by mouth 2 times daily        HYDROcodone-acetaminophen 5-325 MG per tablet    NORCO    30 tablet    TAKE 1 TABLET BY MOUTH EVERY 6 HOURS AS NEEDED FOR MODERATE TO SEVERE PAIN    Rheumatoid arthritis involving multiple sites, unspecified rheumatoid factor presence (H)       leflunomide 10 MG tablet    ARAVA    30 tablet    Take 1 tablet (10 mg) by mouth daily    Rheumatoid arthritis involving multiple sites with positive rheumatoid factor (H)       lisinopril 10 MG tablet    PRINIVIL/ZESTRIL    62 tablet     Take 1 tablet (10 mg) by mouth 2 times daily    Acute on chronic systolic congestive heart failure (H)       LUPRON DEPOT IM      Inject into the muscle every 6 months Reported on 5/3/2017        metoprolol 50 MG tablet    LOPRESSOR    120 tablet    TAKE 1 TABLET (50 MG) BY MOUTH 2 TIMES DAILY    Atrial fibrillation with rapid ventricular response (H)       predniSONE 5 MG tablet    DELTASONE    100 tablet    Take 1 tablet (5 mg) by mouth daily    Osteopenia       spironolactone 25 MG tablet    ALDACTONE    30 tablet    Take 1 tablet (25 mg) by mouth daily    Cardiomyopathy, nonischemic (H)       traMADol 50 MG tablet    ULTRAM    90 tablet    TAKE 1 TABLET 3 TIMES DAILY AS NEEDED FOR MODERATE PAIN    Rheumatoid arthritis involving multiple sites, unspecified rheumatoid factor presence (H)       VITAMIN D (CHOLECALCIFEROL) PO      Take 1 tablet by mouth daily Reported on 5/3/2017

## 2017-09-25 NOTE — ANESTHESIA PREPROCEDURE EVALUATION
Anesthesia Evaluation     . Pt has had prior anesthetic. Type: General    No history of anesthetic complications          ROS/MED HX    ENT/Pulmonary: Comment: COPD exacerbation 3/2017, patient prescribed inhalers, but doesn't use    (+)KATHERIN risk factors snores loudly, hypertension, observed stopped breathing COPD, , . .   (-) sleep apnea   Neurologic:  - neg neurologic ROS     Cardiovascular:     (+) Dyslipidemia, hypertension----. : . CHF Last EF: 20-25% date: 3/6/17 NYHA classification: III. SRIVASTAVA, . pacemaker Reason placed: Cardiomyopathy:type: Medtronic  settings: DDDR  BPM - Patient is dependent on pacemaker . dysrhythmias a-fib, valvular problems/murmurs type: AS severe:. Previous cardiac testing Echodate:3/6/17results:Interpretation Summary  Moderate left ventricular dilation is present.  The Ejection Fraction is estimated at 20-25%.  Normal contraction of basal segments with akinetic mid and distal segments.  This wallmotion abnormality is new since prior study on 12/13/2016.  Consistent with stress cardiomyopathy, if no LAD disease.  Severe aortic stenosis is present.  The aortic valve area is 0.9 cm^2, by the continuity equation.  The peak aortic velocity is 3.8 m/sec.  The mean gradient across the aortic valve is37 mmHg.  Estimated gradients are under estimation due to LV systolic dysfunction,.    date: results:ECG reviewed date:01/2016 results:SR w/1AVB, PAC/PVC, RBBB date: results:          METS/Exercise Tolerance:  1 - Eating, dressing   Hematologic:  - neg hematologic  ROS   (+) History of Transfusion no previous transfusion reaction -      Musculoskeletal: Comment: RA  (+) arthritis, , , -       GI/Hepatic:  - neg GI/hepatic ROS   (+) GERD Asymptomatic on medication,       Renal/Genitourinary:  - ROS Renal section negative   (+) Pt has no history of transplant,       Endo:  - neg endo ROS       Psychiatric:  - neg psychiatric ROS       Infectious Disease:  - neg infectious disease ROS        Malignancy:      - no malignancy   Other:    (+) H/O Chronic Pain,H/O chronic opiod use ,   - neg other ROS                 Physical Exam  Normal systems: pulmonary    Airway   Mallampati: III  TM distance: >3 FB  Neck ROM: limited    Dental   (+) upper dentures and lower dentures    Cardiovascular   Rhythm and rate: regular and normal  (+) murmur       Pulmonary    breath sounds clear to auscultation    Other findings: Lab Results       Component                Value               Date                       WBC                      4.1                 09/25/2017                 HGB                      10.2 (L)            09/25/2017                 HCT                      31.9 (L)            09/25/2017                 PLT                      249                 09/25/2017                 INR                      1.08                06/30/2017                 PTT                      32                  03/27/2017                 NA                       135                 09/25/2017                 POTASSIUM                4.5                 09/25/2017                 SHELIA                      8.8                 09/25/2017                 GLC                      111 (H)             09/25/2017                 CR                       1.00                09/25/2017                 BUN                      15                  09/25/2017                 CO2                      26                  09/25/2017                 ALT                      21                  09/25/2017                 AST                      27                  09/25/2017                 BILITOTAL                0.5                 09/25/2017                 ALKPHOS                  83                  09/25/2017                     PAC Discussion and Assessment    ASA Classification: 4  Case is suitable for: [place]  Anesthetic techniques and relevant risks discussed: GA  Invasive monitoring and risk discussed:   Types:    Possibility and Risk of blood transfusion discussed:   NPO instructions given:   Additional anesthetic preparation and risks discussed:   Needs early admission to pre-op area:   Other:     PAC Resident/NP Anesthesia Assessment:  Bud Merritt is a 89 year old male who is scheduled for a Transfemoral Approach (Harman) Aortic Valve Implant, Cardiopulmonary Bypass Standby on 9/27/17 with Dr. Janes Kingsley for severe aortic stenosis at the [facility]. PAC referral for risk assessment and optimization for anesthesia with comorbid conditions of:    Pre-operative considerations:  1.  Cardiac:  Functional status METS  = 1, Risk of Major Adverse Cardiac event: >11%  -Cardiac pacemaker implanted 3/10/17 for cardiomyopathy,pacemaker dependent, last interrogation   -AFIB, s/p ablation 6/29/17  -Severe AS   - CHF NYHA CLASS: II-III    >ECHO 3/6/17  Moderate left ventricular dilation is present.  The Ejection Fraction is estimated at 20-25%.  Normal contraction of basal segments with akinetic mid and distal segments. This wall motion abnormality is new since prior study on 12/13/2016.  Consistent with stress cardiomyopathy, if no LAD disease.  Severe aortic stenosis is present.  The aortic valve area is 0.9 cm^2, peak aortic velocity is 3.8 m/sec, mean gradient across the aortic valve is 37 mmHg.      >CATH 7/1/16:  1. Scattered mild to moderate disease throughout all coronaries. Tightest stenosis appears to be 40-50% RCA stenosis. Right dominant circulation. L [place] is 40% 2. Aortic valve was not crossed. 3. Patient with rapid atrial fibrillation with minimal symptoms.  -Patient on  Eliquist and will stop 9/25/17  2.  Pulm:   KATHERIN risk: Intermediate  -COPD with exacerbation noted [facility] 3/2017, patient does not use inhalers   >PFT date: 9/16/16:  FEV1-%Pred-Pr = 69, FEV1FVC-Pred %= 74, DLCO-%Pred-Pre = 51   3. MS  -RA on chronic prednisone 5 mg  4.  GI:  Risk of PONV score =1 .  If > 2,  anti-emetic intervention recommended.    Patient is optimized and is acceptable candidate for the proposed procedure.  No further diagnostic evaluation is needed.       Mid-Level Provider/Resident:   Date:   Time:     Attending Anesthesiologist Anesthesia Assessment:  89 year old for TAVR in treatment of severe AS. Chart reviewed, patient seen and evaluated; agree with above assessment. His severe aortic stenosis is characterized with an area of 0.9 cm2, mean gradient 36 mmHg and peak velocity of 3.78 m/sec with LVEF 20-25% by echocardiogram on 3/2017. His degree of stenosis if likely under appreciated due to his low EF. Patient also has some COPD/asthma, has been prescribed inhalers but does not use them. Lungs are clear today, but I have asked him to use the inhalers prior to surgery.     Additional notable medical history of Atrial fibrillation s/p lisandro ablation with BiV pacemaker, prostate cancer, RA on prednisone.  He has severe arthritis and is limited in activity.    Patient is appropriate for the planned procedure without further workup or medical management change. The final anesthesia plan will be determined by the physician anesthesiologist caring for the patient on the day of surgery.      Reviewed and Signed by PAC Anesthesiologist  Anesthesiologist: albert  Date: 9/25/2017  Time:   Pass/Fail: Pass  Disposition:     PAC Pharmacist Assessment:        Pharmacist:   Date:   Time:      Anesthesia Plan      History & Physical Review  History and physical reviewed and following examination; no interval change.    ASA Status:  4 .    NPO Status:  > 8 hours    Plan for General and ETT with Intravenous induction. Maintenance will be Balanced.    PONV prophylaxis:  Ondansetron (or other 5HT-3)       Postoperative Care      Consents          Procedure:  Procedure(s):  Transfemoral Approach (Harman) Aortic Valve Implant,  Cardiopulmonary Bypass Standby  - Wound Class: I-Clean   - Wound Class: I-Clean    Patient  Active Problem List   Diagnosis     Hypertrophy of prostate without urinary obstruction     Rheumatoid arthritis involving multiple sites with positive rheumatoid factor (H)     Hyperlipidemia LDL goal <130     Osteoporosis     Encounter for long-term current use of medication     Elevated prostate specific antigen (PSA)     Rheumatoid arthritis involving multiple sites, unspecified rheumatoid factor presence (H)     Status post total left knee replacement     Mitral regurgitation and aortic stenosis - AS severe by echo on 3/2017     Postoperative delirium     Infected superficial injury of knee, left, subsequent encounter     Essential hypertension with goal blood pressure less than 140/90     Paroxysmal atrial fibrillation (H)     Visit for monitoring Tikosyn therapy     Diarrhea     Weakness generalized     Cough     Acute cystitis without hematuria     COPD exacerbation (H)     Influenza A     Acute respiratory failure with hypoxia (H)     Atrial fibrillation with rapid ventricular response (H)     Shock (H)     Atrial fibrillation (H)     Malignant neoplasm of prostate (H)     Cellulitis of right lower extremity     Venous stasis ulcers of both lower extremities (H)     Anemia     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onic, Bi-Ventricular- dependent     Dilated cardiomyopathy (H) dilated LV, EF 20-25% by Echo 3/4/17     Chronic systolic congestive heart failure (H)     Immunosuppression (H)     S/P AV lisandro ablation       Past Medical History:   Diagnosis Date     Anemia      Atrial fibrillation (H) 07/01/2016     Cardiac pacemaker 03/10/2017     Cardiomyopathy (H)      CHF (congestive heart failure) (H)      COPD exacerbation (H) 3/2/2017     Depressive disorder      History of prostate cancer      Paroxysmal atrial fibrillation (H) 7/6/2016     Pure hypercholesterolemia      Rheumatoid arthritis(714.0)      Unspecified essential hypertension      Weakness generalized 3/2/2017       Past Surgical History:    Procedure Laterality Date     ARTHROPLASTY KNEE Left 1/12/2016    Procedure: ARTHROPLASTY KNEE;  Surgeon: Cesar Walker DO;  Location: [facility]     COLONOSCOPY  08/24/09     HC COLONOSCOPY THRU STOMA W BIOPSY/CAUTERY TUMOR/POLYP/LESION  8/31/2004      REPAIR ING HERNIA,5+Y/O,REDUCIBL  1996    Marlex mesh repair of bilateral inguinal hernias and drainage of bilateral scrotal hydroceles.     IMPLANT PACEMAKER  03/10/2017     IRRIGATION AND DEBRIDEMENT SOFT TISSUE LOWER EXTREMITY, COMBINED Left 3/15/2016    Procedure: COMBINED IRRIGATION AND DEBRIDEMENT SOFT TISSUE LOWER EXTREMITY;  Surgeon: Cesar Walker DO;  Location: [facility]         No current facility-administered medications on file prior to encounter.   Current Outpatient Prescriptions on File Prior to Encounter:  HYDROcodone-acetaminophen (NORCO) 5-325 MG per tablet TAKE 1 TABLET BY MOUTH EVERY 6 HOURS AS NEEDED FOR MODERATE TO SEVERE PAIN   traMADol (ULTRAM) 50 MG tablet TAKE 1 TABLET 3 TIMES DAILY AS NEEDED FOR MODERATE PAIN   leflunomide (ARAVA) 10 MG tablet Take 1 tablet (10 mg) by mouth daily   dofetilide (TIKOSYN) 125 MCG capsule Take 1 capsule (125 mcg) by mouth 2 times daily   metoprolol (LOPRESSOR) 50 MG tablet TAKE 1 TABLET (50 MG) BY MOUTH 2 TIMES DAILY   atorvastatin (LIPITOR) 40 MG tablet Take 1 tablet (40 mg) by mouth daily (Patient taking differently: Take 40 mg by mouth every evening )   predniSONE (DELTASONE) 5 MG tablet Take 1 tablet (5 mg) by mouth daily   lisinopril (PRINIVIL,ZESTRIL) 10 MG tablet Take 1 tablet (10 mg) by mouth 2 times daily   tamsulosin (FLOMAX) 0.4 MG 24 hr capsule Take 1 capsule (0.4 mg) by mouth 2 times daily   ascorbic acid (VITAMIN C) 500 MG CPCR Take 500 mg by mouth daily   VITAMIN D, CHOLECALCIFEROL, PO Take 1 tablet by mouth daily Reported on 5/3/2017   calcium carbonate (OS-SHELIA 500 MG [place]. [place]) 500 MG tablet Take 1 tablet by mouth daily    apixaban ANTICOAGULANT (ELIQUIS) 2.5 MG tablet Take 1  tablet (2.5 mg) by mouth 2 times daily (Patient not taking: Reported on 9/25/2017)   Leuprolide Acetate (LUPRON DEPOT IM) Inject into the muscle every 6 months Reported on 5/3/2017   acetaminophen (TYLENOL) 650 MG CR tablet Take 650 mg by mouth every 8 hours as needed Reported on 4/5/2017   alendronate (FOSAMAX) 70 MG tablet Take 1 tablet (70 mg) by mouth every 7 days Take with over 8 ounces water and stay upright for at least 30 minutes after dose.  Take at least 60 minutes before breakfast       Allergies   Allergen Reactions     Amiodarone Other (See Comments)     Drop in DLCO     Lasix [Furosemide] Blisters     Blisters and sores in his mouth.        Recent Labs   Lab Test  09/27/17   0923   HGB  10.6*     Recent Labs   Lab Test  09/27/17   0923   POTASSIUM  4.7     Recent Labs   Lab Test  09/27/17 0923   PLT  256     Recent Labs   Lab Test  09/27/17 0923   INR  0.97       No results found for this or any previous visit (from the past 4320 hour(s)).      Attending Anesthesiologist    Mateo Hamlin MD    *[phone]    Procedure:  Procedure(s):  Right Transfemoral Approach (Harman Ramsey 3 29mm) Aortic Valve Implant,  Cardiopulmonary Bypass Standby, Transthoracic Echocardiogram by Derian Queen(Echo Tech) - Wound Class: I-Clean   - Wound Class: I-Clean    Patient Active Problem List   Diagnosis     Hypertrophy of prostate without urinary obstruction     Rheumatoid arthritis involving multiple sites with positive rheumatoid factor (H)     Hyperlipidemia LDL goal <130     Osteoporosis     Encounter for long-term current use of medication     Elevated prostate specific antigen (PSA)     Rheumatoid arthritis involving multiple sites, unspecified rheumatoid factor presence (H)     Status post total left knee replacement     Mitral regurgitation and aortic stenosis - AS severe by echo on 3/2017     Postoperative delirium     Infected superficial injury of knee, left, subsequent encounter     Essential hypertension  with goal blood pressure less than 140/90     Paroxysmal atrial fibrillation (H)     Visit for monitoring Tikosyn therapy     Diarrhea     Weakness generalized     Cough     Acute cystitis without hematuria     COPD exacerbation (H)     Influenza A     Acute respiratory failure with hypoxia (H)     Atrial fibrillation with rapid ventricular response (H)     Shock (H)     Atrial fibrillation (H)     Malignant neoplasm of prostate (H)     Cellulitis of right lower extremity     Venous stasis ulcers of both lower extremities (H)     Anemia     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onic, Bi-Ventricular- dependent     Dilated cardiomyopathy (H) dilated LV, EF 20-25% by Echo 3/4/17     Chronic systolic congestive heart failure (H)     Immunosuppression (H)     S/P AV lisandro ablation     Aortic stenosis, severe       Past Medical History:   Diagnosis Date     Anemia      Atrial fibrillation (H) 07/01/2016     Cardiac pacemaker 03/10/2017     Cardiomyopathy (H)      CHF (congestive heart failure) (H)      COPD exacerbation (H) 3/2/2017     Depressive disorder      History of prostate cancer      Paroxysmal atrial fibrillation (H) 7/6/2016     Pure hypercholesterolemia      Rheumatoid arthritis(714.0)      Unspecified essential hypertension      Weakness generalized 3/2/2017       Past Surgical History:   Procedure Laterality Date     ARTHROPLASTY KNEE Left 1/12/2016    Procedure: ARTHROPLASTY KNEE;  Surgeon: Cesar Walker DO;  Location: [facility]     COLONOSCOPY  08/24/09     HC COLONOSCOPY THRU STOMA W BIOPSY/CAUTERY TUMOR/POLYP/LESION  8/31/2004      REPAIR ING HERNIA,5+Y/O,REDUCIBL  1996    Marlex mesh repair of bilateral inguinal hernias and drainage of bilateral scrotal hydroceles.     IMPLANT PACEMAKER  03/10/2017     IRRIGATION AND DEBRIDEMENT SOFT TISSUE LOWER EXTREMITY, COMBINED Left 3/15/2016    Procedure: COMBINED IRRIGATION AND DEBRIDEMENT SOFT TISSUE LOWER EXTREMITY;  Surgeon: Cesar Walker DO;   Location: [facility]         No current facility-administered medications on file prior to encounter.   Current Outpatient Prescriptions on File Prior to Encounter:  HYDROcodone-acetaminophen (NORCO) 5-325 MG per tablet TAKE 1 TABLET BY MOUTH EVERY 6 HOURS AS NEEDED FOR MODERATE TO SEVERE PAIN   traMADol (ULTRAM) 50 MG tablet TAKE 1 TABLET 3 TIMES DAILY AS NEEDED FOR MODERATE PAIN   leflunomide (ARAVA) 10 MG tablet Take 1 tablet (10 mg) by mouth daily   dofetilide (TIKOSYN) 125 MCG capsule Take 1 capsule (125 mcg) by mouth 2 times daily   metoprolol (LOPRESSOR) 50 MG tablet TAKE 1 TABLET (50 MG) BY MOUTH 2 TIMES DAILY   atorvastatin (LIPITOR) 40 MG tablet Take 1 tablet (40 mg) by mouth daily (Patient taking differently: Take 40 mg by mouth every evening )   predniSONE (DELTASONE) 5 MG tablet Take 1 tablet (5 mg) by mouth daily   lisinopril (PRINIVIL,ZESTRIL) 10 MG tablet Take 1 tablet (10 mg) by mouth 2 times daily   tamsulosin (FLOMAX) 0.4 MG 24 hr capsule Take 1 capsule (0.4 mg) by mouth 2 times daily   ascorbic acid (VITAMIN C) 500 MG CPCR Take 500 mg by mouth daily   VITAMIN D, CHOLECALCIFEROL, PO Take 1 tablet by mouth daily Reported on 5/3/2017   calcium carbonate (OS-SHELIA 500 MG [place]. [place]) 500 MG tablet Take 1 tablet by mouth daily    apixaban ANTICOAGULANT (ELIQUIS) 2.5 MG tablet Take 1 tablet (2.5 mg) by mouth 2 times daily (Patient not taking: Reported on 9/25/2017)   Leuprolide Acetate (LUPRON DEPOT IM) Inject into the muscle every 6 months Reported on 5/3/2017   acetaminophen (TYLENOL) 650 MG CR tablet Take 650 mg by mouth every 8 hours as needed Reported on 4/5/2017   alendronate (FOSAMAX) 70 MG tablet Take 1 tablet (70 mg) by mouth every 7 days Take with over 8 ounces water and stay upright for at least 30 minutes after dose.  Take at least 60 minutes before breakfast       Allergies   Allergen Reactions     Amiodarone Other (See Comments)     Drop in DLCO     Lasix [Furosemide] Blisters     Blisters and  sores in his mouth.        Recent Labs   Lab Test  09/27/17   1500   HGB  9.0*     Recent Labs   Lab Test  09/27/17   1500   POTASSIUM  4.0     Recent Labs   Lab Test  09/27/17   1500   PLT  184     Recent Labs   Lab Test  09/27/17   0923   INR  0.97       No results found for this or any previous visit (from the past 4320 hour(s)).      Attending Anesthesiologist    Mateo Hamlin MD    *[phone]            .

## 2017-09-26 RX ORDER — SPIRONOLACTONE 25 MG/1
25 TABLET ORAL DAILY
Qty: 30 TABLET | Refills: 11 | Status: ON HOLD | OUTPATIENT
Start: 2017-09-26 | End: 2017-09-29

## 2017-09-26 NOTE — TELEPHONE ENCOUNTER
Routing refill request to provider for review/approval because:  Labs out of range:  K+ 5.5  Naomi Aragon RN

## 2017-09-27 ENCOUNTER — HOSPITAL ENCOUNTER (OUTPATIENT)
Dept: CARDIOLOGY | Facility: CLINIC | Age: 82
DRG: 267 | End: 2017-09-27
Attending: INTERNAL MEDICINE
Payer: MEDICARE

## 2017-09-27 ENCOUNTER — ANESTHESIA (OUTPATIENT)
Dept: SURGERY | Facility: CLINIC | Age: 82
DRG: 267 | End: 2017-09-27
Payer: MEDICARE

## 2017-09-27 ENCOUNTER — HOSPITAL ENCOUNTER (INPATIENT)
Facility: CLINIC | Age: 82
LOS: 3 days | Discharge: NURSING, REHAB OR LONG-TERM CARE | DRG: 267 | End: 2017-09-30
Attending: INTERNAL MEDICINE | Admitting: INTERNAL MEDICINE
Payer: MEDICARE

## 2017-09-27 DIAGNOSIS — I50.23 ACUTE ON CHRONIC SYSTOLIC CONGESTIVE HEART FAILURE (H): ICD-10-CM

## 2017-09-27 DIAGNOSIS — I35.0 SEVERE AORTIC STENOSIS: ICD-10-CM

## 2017-09-27 DIAGNOSIS — Z95.2 S/P TAVR (TRANSCATHETER AORTIC VALVE REPLACEMENT): Primary | ICD-10-CM

## 2017-09-27 LAB
ABO + RH BLD: NORMAL
ABO + RH BLD: NORMAL
ALBUMIN SERPL-MCNC: 2.5 G/DL (ref 3.4–5)
ALBUMIN SERPL-MCNC: 3.1 G/DL (ref 3.4–5)
ALP SERPL-CCNC: 68 U/L (ref 40–150)
ALP SERPL-CCNC: 86 U/L (ref 40–150)
ALT SERPL W P-5'-P-CCNC: 18 U/L (ref 0–70)
ALT SERPL W P-5'-P-CCNC: 21 U/L (ref 0–70)
ANION GAP SERPL CALCULATED.3IONS-SCNC: 7 MMOL/L (ref 3–14)
ANION GAP SERPL CALCULATED.3IONS-SCNC: 8 MMOL/L (ref 3–14)
AST SERPL W P-5'-P-CCNC: 30 U/L (ref 0–45)
AST SERPL W P-5'-P-CCNC: 32 U/L (ref 0–45)
BASE DEFICIT BLDA-SCNC: 1.2 MMOL/L
BILIRUB SERPL-MCNC: 0.2 MG/DL (ref 0.2–1.3)
BILIRUB SERPL-MCNC: 0.4 MG/DL (ref 0.2–1.3)
BLD GP AB SCN SERPL QL: NORMAL
BLD PROD TYP BPU: NORMAL
BLD UNIT ID BPU: 0
BLD UNIT ID BPU: 0
BLOOD BANK CMNT PATIENT-IMP: NORMAL
BLOOD BANK CMNT PATIENT-IMP: NORMAL
BLOOD PRODUCT CODE: NORMAL
BLOOD PRODUCT CODE: NORMAL
BPU ID: NORMAL
BPU ID: NORMAL
BUN SERPL-MCNC: 11 MG/DL (ref 7–30)
BUN SERPL-MCNC: 12 MG/DL (ref 7–30)
CA-I BLD-MCNC: 4.7 MG/DL (ref 4.4–5.2)
CALCIUM SERPL-MCNC: 7.7 MG/DL (ref 8.5–10.1)
CALCIUM SERPL-MCNC: 9 MG/DL (ref 8.5–10.1)
CHLORIDE SERPL-SCNC: 105 MMOL/L (ref 94–109)
CHLORIDE SERPL-SCNC: 108 MMOL/L (ref 94–109)
CO2 SERPL-SCNC: 22 MMOL/L (ref 20–32)
CO2 SERPL-SCNC: 26 MMOL/L (ref 20–32)
CREAT SERPL-MCNC: 0.82 MG/DL (ref 0.66–1.25)
CREAT SERPL-MCNC: 1.01 MG/DL (ref 0.66–1.25)
ERYTHROCYTE [DISTWIDTH] IN BLOOD BY AUTOMATED COUNT: 15.8 % (ref 10–15)
ERYTHROCYTE [DISTWIDTH] IN BLOOD BY AUTOMATED COUNT: 15.9 % (ref 10–15)
GFR SERPL CREATININE-BSD FRML MDRD: 69 ML/MIN/1.7M2
GFR SERPL CREATININE-BSD FRML MDRD: 88 ML/MIN/1.7M2
GLUCOSE BLD-MCNC: 107 MG/DL (ref 70–99)
GLUCOSE BLDC GLUCOMTR-MCNC: 85 MG/DL (ref 70–99)
GLUCOSE SERPL-MCNC: 105 MG/DL (ref 70–99)
GLUCOSE SERPL-MCNC: 109 MG/DL (ref 70–99)
HCO3 BLD-SCNC: 24 MMOL/L (ref 21–28)
HCT VFR BLD AUTO: 28.1 % (ref 40–53)
HCT VFR BLD AUTO: 33.7 % (ref 40–53)
HGB BLD-MCNC: 10.6 G/DL (ref 13.3–17.7)
HGB BLD-MCNC: 9 G/DL (ref 13.3–17.7)
HGB BLD-MCNC: 9.3 G/DL (ref 13.3–17.7)
INR PPP: 0.97 (ref 0.86–1.14)
KCT BLD-ACNC: 274 SEC (ref 105–167)
KCT BLD-ACNC: 322 SEC (ref 105–167)
LACTATE BLD-SCNC: 0.4 MMOL/L (ref 0.7–2)
MAGNESIUM SERPL-MCNC: 1.8 MG/DL (ref 1.6–2.3)
MCH RBC QN AUTO: 29.5 PG (ref 26.5–33)
MCH RBC QN AUTO: 30.2 PG (ref 26.5–33)
MCHC RBC AUTO-ENTMCNC: 31.5 G/DL (ref 31.5–36.5)
MCHC RBC AUTO-ENTMCNC: 32 G/DL (ref 31.5–36.5)
MCV RBC AUTO: 94 FL (ref 78–100)
MCV RBC AUTO: 94 FL (ref 78–100)
NUM BPU REQUESTED: 2
O2/TOTAL GAS SETTING VFR VENT: 100 %
PCO2 BLD: 40 MM HG (ref 35–45)
PH BLD: 7.38 PH (ref 7.35–7.45)
PHOSPHATE SERPL-MCNC: 3.2 MG/DL (ref 2.5–4.5)
PLATELET # BLD AUTO: 184 10E9/L (ref 150–450)
PLATELET # BLD AUTO: 256 10E9/L (ref 150–450)
PO2 BLD: 474 MM HG (ref 80–105)
POTASSIUM BLD-SCNC: 4 MMOL/L (ref 3.4–5.3)
POTASSIUM SERPL-SCNC: 4 MMOL/L (ref 3.4–5.3)
POTASSIUM SERPL-SCNC: 4.7 MMOL/L (ref 3.4–5.3)
PROT SERPL-MCNC: 5.4 G/DL (ref 6.8–8.8)
PROT SERPL-MCNC: 6.7 G/DL (ref 6.8–8.8)
RBC # BLD AUTO: 2.98 10E12/L (ref 4.4–5.9)
RBC # BLD AUTO: 3.59 10E12/L (ref 4.4–5.9)
SODIUM BLD-SCNC: 137 MMOL/L (ref 133–144)
SODIUM SERPL-SCNC: 137 MMOL/L (ref 133–144)
SODIUM SERPL-SCNC: 138 MMOL/L (ref 133–144)
SPECIMEN EXP DATE BLD: NORMAL
TRANSFUSION STATUS PATIENT QL: NORMAL
WBC # BLD AUTO: 3.4 10E9/L (ref 4–11)
WBC # BLD AUTO: 4.3 10E9/L (ref 4–11)

## 2017-09-27 PROCEDURE — 85027 COMPLETE CBC AUTOMATED: CPT | Performed by: INTERNAL MEDICINE

## 2017-09-27 PROCEDURE — 36415 COLL VENOUS BLD VENIPUNCTURE: CPT | Performed by: INTERNAL MEDICINE

## 2017-09-27 PROCEDURE — 37000008 ZZH ANESTHESIA TECHNICAL FEE, 1ST 30 MIN

## 2017-09-27 PROCEDURE — 80053 COMPREHEN METABOLIC PANEL: CPT | Performed by: INTERNAL MEDICINE

## 2017-09-27 PROCEDURE — 25000128 H RX IP 250 OP 636: Performed by: NURSE ANESTHETIST, CERTIFIED REGISTERED

## 2017-09-27 PROCEDURE — X2RF332 REPLACEMENT OF AORTIC VALVE USING ZOOPLASTIC TISSUE, RAPID DEPLOYMENT TECHNIQUE, PERCUTANEOUS APPROACH, NEW TECHNOLOGY GROUP 2: ICD-10-PCS | Performed by: INTERNAL MEDICINE

## 2017-09-27 PROCEDURE — 20000005 ZZH R&B ICU 2:1 UMMC

## 2017-09-27 PROCEDURE — 40000275 ZZH STATISTIC RCP TIME EA 10 MIN

## 2017-09-27 PROCEDURE — 40000170 ZZH STATISTIC PRE-PROCEDURE ASSESSMENT II

## 2017-09-27 PROCEDURE — C1760 CLOSURE DEV, VASC: HCPCS

## 2017-09-27 PROCEDURE — 93325 DOPPLER ECHO COLOR FLOW MAPG: CPT | Mod: 26 | Performed by: INTERNAL MEDICINE

## 2017-09-27 PROCEDURE — 33361 REPLACE AORTIC VALVE PERQ: CPT | Mod: 62 | Performed by: INTERNAL MEDICINE

## 2017-09-27 PROCEDURE — 93321 DOPPLER ECHO F-UP/LMTD STD: CPT | Mod: 26 | Performed by: INTERNAL MEDICINE

## 2017-09-27 PROCEDURE — 25000128 H RX IP 250 OP 636: Performed by: INTERNAL MEDICINE

## 2017-09-27 PROCEDURE — 41000018 ZZH PER-PERFUSION 1ST 30 MIN

## 2017-09-27 PROCEDURE — C9399 UNCLASSIFIED DRUGS OR BIOLOG: HCPCS | Performed by: NURSE ANESTHETIST, CERTIFIED REGISTERED

## 2017-09-27 PROCEDURE — 27210794 ZZH OR GENERAL SUPPLY STERILE

## 2017-09-27 PROCEDURE — 37000009 ZZH ANESTHESIA TECHNICAL FEE, EACH ADDTL 15 MIN

## 2017-09-27 PROCEDURE — 4A023N7 MEASUREMENT OF CARDIAC SAMPLING AND PRESSURE, LEFT HEART, PERCUTANEOUS APPROACH: ICD-10-PCS | Performed by: INTERNAL MEDICINE

## 2017-09-27 PROCEDURE — 84100 ASSAY OF PHOSPHORUS: CPT | Performed by: INTERNAL MEDICINE

## 2017-09-27 PROCEDURE — 25000566 ZZH SEVOFLURANE, EA 15 MIN

## 2017-09-27 PROCEDURE — 93005 ELECTROCARDIOGRAM TRACING: CPT

## 2017-09-27 PROCEDURE — 27810169 ZZH OR IMPLANT GENERAL

## 2017-09-27 PROCEDURE — 00000146 ZZHCL STATISTIC GLUCOSE BY METER IP

## 2017-09-27 PROCEDURE — 27210995 ZZH RX 272: Performed by: INTERNAL MEDICINE

## 2017-09-27 PROCEDURE — 71000017 ZZH RECOVERY PHASE 1 LEVEL 3 EA ADDTL HR

## 2017-09-27 PROCEDURE — 25000125 ZZHC RX 250: Performed by: NURSE ANESTHETIST, CERTIFIED REGISTERED

## 2017-09-27 PROCEDURE — 36000086 ZZH SURGERY LEVEL 8 1ST 30 MIN UMMC

## 2017-09-27 PROCEDURE — 71000016 ZZH RECOVERY PHASE 1 LEVEL 3 FIRST HR

## 2017-09-27 PROCEDURE — B3101ZZ FLUOROSCOPY OF THORACIC AORTA USING LOW OSMOLAR CONTRAST: ICD-10-PCS | Performed by: INTERNAL MEDICINE

## 2017-09-27 PROCEDURE — 27211024 ZZHC OR SUPPLY OTHER OPNP

## 2017-09-27 PROCEDURE — C1894 INTRO/SHEATH, NON-LASER: HCPCS

## 2017-09-27 PROCEDURE — 36000088 ZZH SURGERY LEVEL 8 EA 15 ADDTL MIN - UMMC

## 2017-09-27 PROCEDURE — 93308 TTE F-UP OR LMTD: CPT | Mod: 26 | Performed by: INTERNAL MEDICINE

## 2017-09-27 PROCEDURE — 93010 ELECTROCARDIOGRAM REPORT: CPT | Mod: 76 | Performed by: INTERNAL MEDICINE

## 2017-09-27 PROCEDURE — 027F3ZZ DILATION OF AORTIC VALVE, PERCUTANEOUS APPROACH: ICD-10-PCS | Performed by: INTERNAL MEDICINE

## 2017-09-27 PROCEDURE — 40000014 ZZH STATISTIC ARTERIAL MONITORING DAILY

## 2017-09-27 PROCEDURE — A9270 NON-COVERED ITEM OR SERVICE: HCPCS | Mod: GY | Performed by: INTERNAL MEDICINE

## 2017-09-27 PROCEDURE — 25000128 H RX IP 250 OP 636

## 2017-09-27 PROCEDURE — 25000132 ZZH RX MED GY IP 250 OP 250 PS 637: Mod: GY | Performed by: NURSE PRACTITIONER

## 2017-09-27 PROCEDURE — 40000196 ZZH STATISTIC RAPCV CVP MONITORING

## 2017-09-27 PROCEDURE — C1730 CATH, EP, 19 OR FEW ELECT: HCPCS

## 2017-09-27 PROCEDURE — C1769 GUIDE WIRE: HCPCS

## 2017-09-27 PROCEDURE — 85610 PROTHROMBIN TIME: CPT | Performed by: INTERNAL MEDICINE

## 2017-09-27 PROCEDURE — 99222 1ST HOSP IP/OBS MODERATE 55: CPT | Mod: 25 | Performed by: NURSE PRACTITIONER

## 2017-09-27 PROCEDURE — A9270 NON-COVERED ITEM OR SERVICE: HCPCS | Mod: GY | Performed by: NURSE PRACTITIONER

## 2017-09-27 PROCEDURE — 25000125 ZZHC RX 250: Performed by: INTERNAL MEDICINE

## 2017-09-27 PROCEDURE — 25000132 ZZH RX MED GY IP 250 OP 250 PS 637: Mod: GY | Performed by: INTERNAL MEDICINE

## 2017-09-27 PROCEDURE — 40000065 ZZH STATISTIC EKG NON-CHARGEABLE

## 2017-09-27 PROCEDURE — 83735 ASSAY OF MAGNESIUM: CPT | Performed by: INTERNAL MEDICINE

## 2017-09-27 DEVICE — IMPLANTABLE DEVICE: Type: IMPLANTABLE DEVICE | Site: HEART | Status: FUNCTIONAL

## 2017-09-27 RX ORDER — PROTAMINE SULFATE 10 MG/ML
INJECTION, SOLUTION INTRAVENOUS PRN
Status: DISCONTINUED | OUTPATIENT
Start: 2017-09-27 | End: 2017-09-27

## 2017-09-27 RX ORDER — IOPAMIDOL 755 MG/ML
50 INJECTION, SOLUTION INTRAVASCULAR ONCE
Status: COMPLETED | OUTPATIENT
Start: 2017-09-27 | End: 2017-09-27

## 2017-09-27 RX ORDER — HEPARIN SODIUM 1000 [USP'U]/ML
INJECTION, SOLUTION INTRAVENOUS; SUBCUTANEOUS PRN
Status: DISCONTINUED | OUTPATIENT
Start: 2017-09-27 | End: 2017-09-27

## 2017-09-27 RX ORDER — PROPOFOL 10 MG/ML
INJECTION, EMULSION INTRAVENOUS PRN
Status: DISCONTINUED | OUTPATIENT
Start: 2017-09-27 | End: 2017-09-27

## 2017-09-27 RX ORDER — EPHEDRINE SULFATE 50 MG/ML
INJECTION, SOLUTION INTRAMUSCULAR; INTRAVENOUS; SUBCUTANEOUS PRN
Status: DISCONTINUED | OUTPATIENT
Start: 2017-09-27 | End: 2017-09-27

## 2017-09-27 RX ORDER — NALOXONE HYDROCHLORIDE 0.4 MG/ML
.1-.4 INJECTION, SOLUTION INTRAMUSCULAR; INTRAVENOUS; SUBCUTANEOUS
Status: DISCONTINUED | OUTPATIENT
Start: 2017-09-27 | End: 2017-09-27 | Stop reason: HOSPADM

## 2017-09-27 RX ORDER — CEFAZOLIN SODIUM 1 G/3ML
INJECTION, POWDER, FOR SOLUTION INTRAMUSCULAR; INTRAVENOUS PRN
Status: DISCONTINUED | OUTPATIENT
Start: 2017-09-27 | End: 2017-09-27

## 2017-09-27 RX ORDER — FENTANYL CITRATE 50 UG/ML
25-50 INJECTION, SOLUTION INTRAMUSCULAR; INTRAVENOUS
Status: DISCONTINUED | OUTPATIENT
Start: 2017-09-27 | End: 2017-09-27 | Stop reason: HOSPADM

## 2017-09-27 RX ORDER — LIDOCAINE HYDROCHLORIDE 20 MG/ML
INJECTION, SOLUTION INFILTRATION; PERINEURAL PRN
Status: DISCONTINUED | OUTPATIENT
Start: 2017-09-27 | End: 2017-09-27

## 2017-09-27 RX ORDER — TAMSULOSIN HYDROCHLORIDE 0.4 MG/1
0.4 CAPSULE ORAL 2 TIMES DAILY
Status: DISCONTINUED | OUTPATIENT
Start: 2017-09-27 | End: 2017-09-29

## 2017-09-27 RX ORDER — CEFAZOLIN SODIUM 2 G/100ML
2 INJECTION, SOLUTION INTRAVENOUS
Status: DISCONTINUED | OUTPATIENT
Start: 2017-09-27 | End: 2017-09-27 | Stop reason: HOSPADM

## 2017-09-27 RX ORDER — HYDRALAZINE HYDROCHLORIDE 20 MG/ML
2.5-5 INJECTION INTRAMUSCULAR; INTRAVENOUS EVERY 10 MIN PRN
Status: DISCONTINUED | OUTPATIENT
Start: 2017-09-27 | End: 2017-09-27 | Stop reason: HOSPADM

## 2017-09-27 RX ORDER — SODIUM CHLORIDE 9 MG/ML
INJECTION, SOLUTION INTRAVENOUS CONTINUOUS PRN
Status: DISCONTINUED | OUTPATIENT
Start: 2017-09-27 | End: 2017-09-27

## 2017-09-27 RX ORDER — ONDANSETRON 2 MG/ML
INJECTION INTRAMUSCULAR; INTRAVENOUS PRN
Status: DISCONTINUED | OUTPATIENT
Start: 2017-09-27 | End: 2017-09-27

## 2017-09-27 RX ORDER — HYDRALAZINE HYDROCHLORIDE 20 MG/ML
10 INJECTION INTRAMUSCULAR; INTRAVENOUS EVERY 6 HOURS PRN
Status: DISCONTINUED | OUTPATIENT
Start: 2017-09-27 | End: 2017-09-30 | Stop reason: HOSPADM

## 2017-09-27 RX ORDER — NALOXONE HYDROCHLORIDE 0.4 MG/ML
.1-.4 INJECTION, SOLUTION INTRAMUSCULAR; INTRAVENOUS; SUBCUTANEOUS
Status: DISCONTINUED | OUTPATIENT
Start: 2017-09-27 | End: 2017-09-30 | Stop reason: HOSPADM

## 2017-09-27 RX ORDER — NITROGLYCERIN 0.4 MG/1
0.4 TABLET SUBLINGUAL EVERY 5 MIN PRN
Status: DISCONTINUED | OUTPATIENT
Start: 2017-09-27 | End: 2017-09-30 | Stop reason: HOSPADM

## 2017-09-27 RX ORDER — ACETAMINOPHEN 325 MG/1
325-650 TABLET ORAL EVERY 4 HOURS PRN
Status: DISCONTINUED | OUTPATIENT
Start: 2017-09-27 | End: 2017-09-30 | Stop reason: HOSPADM

## 2017-09-27 RX ORDER — SODIUM CHLORIDE, SODIUM LACTATE, POTASSIUM CHLORIDE, CALCIUM CHLORIDE 600; 310; 30; 20 MG/100ML; MG/100ML; MG/100ML; MG/100ML
INJECTION, SOLUTION INTRAVENOUS CONTINUOUS
Status: DISCONTINUED | OUTPATIENT
Start: 2017-09-27 | End: 2017-09-27 | Stop reason: HOSPADM

## 2017-09-27 RX ORDER — LIDOCAINE 40 MG/G
CREAM TOPICAL
Status: DISCONTINUED | OUTPATIENT
Start: 2017-09-27 | End: 2017-09-30 | Stop reason: HOSPADM

## 2017-09-27 RX ORDER — ATORVASTATIN CALCIUM 40 MG/1
40 TABLET, FILM COATED ORAL EVERY EVENING
Status: DISCONTINUED | OUTPATIENT
Start: 2017-09-27 | End: 2017-09-30 | Stop reason: HOSPADM

## 2017-09-27 RX ORDER — FENTANYL CITRATE 50 UG/ML
INJECTION, SOLUTION INTRAMUSCULAR; INTRAVENOUS PRN
Status: DISCONTINUED | OUTPATIENT
Start: 2017-09-27 | End: 2017-09-27

## 2017-09-27 RX ORDER — METOPROLOL TARTRATE 25 MG/1
25 TABLET, FILM COATED ORAL 2 TIMES DAILY
Status: DISCONTINUED | OUTPATIENT
Start: 2017-09-27 | End: 2017-09-28

## 2017-09-27 RX ORDER — HYDROCODONE BITARTRATE AND ACETAMINOPHEN 5; 325 MG/1; MG/1
1 TABLET ORAL EVERY 6 HOURS PRN
Status: DISCONTINUED | OUTPATIENT
Start: 2017-09-27 | End: 2017-09-30 | Stop reason: HOSPADM

## 2017-09-27 RX ORDER — ONDANSETRON 4 MG/1
4 TABLET, ORALLY DISINTEGRATING ORAL EVERY 30 MIN PRN
Status: DISCONTINUED | OUTPATIENT
Start: 2017-09-27 | End: 2017-09-27 | Stop reason: HOSPADM

## 2017-09-27 RX ORDER — ASPIRIN 81 MG/1
81 TABLET ORAL DAILY
Status: DISCONTINUED | OUTPATIENT
Start: 2017-09-28 | End: 2017-09-30 | Stop reason: HOSPADM

## 2017-09-27 RX ORDER — SPIRONOLACTONE 25 MG/1
25 TABLET ORAL DAILY
Status: DISCONTINUED | OUTPATIENT
Start: 2017-09-28 | End: 2017-09-28

## 2017-09-27 RX ORDER — HYDRALAZINE HYDROCHLORIDE 20 MG/ML
INJECTION INTRAMUSCULAR; INTRAVENOUS
Status: COMPLETED
Start: 2017-09-27 | End: 2017-09-27

## 2017-09-27 RX ORDER — LIDOCAINE 40 MG/G
CREAM TOPICAL
Status: DISCONTINUED | OUTPATIENT
Start: 2017-09-27 | End: 2017-09-27 | Stop reason: HOSPADM

## 2017-09-27 RX ORDER — ONDANSETRON 2 MG/ML
4 INJECTION INTRAMUSCULAR; INTRAVENOUS EVERY 30 MIN PRN
Status: DISCONTINUED | OUTPATIENT
Start: 2017-09-27 | End: 2017-09-27 | Stop reason: HOSPADM

## 2017-09-27 RX ORDER — SODIUM CHLORIDE 9 MG/ML
INJECTION, SOLUTION INTRAVENOUS CONTINUOUS
Status: DISCONTINUED | OUTPATIENT
Start: 2017-09-27 | End: 2017-09-27 | Stop reason: HOSPADM

## 2017-09-27 RX ORDER — LISINOPRIL 10 MG/1
10 TABLET ORAL 2 TIMES DAILY
Status: DISCONTINUED | OUTPATIENT
Start: 2017-09-27 | End: 2017-09-28

## 2017-09-27 RX ADMIN — PROTAMINE SULFATE 100 MG: 10 INJECTION, SOLUTION INTRAVENOUS at 13:59

## 2017-09-27 RX ADMIN — HYDROCODONE BITARTRATE AND ACETAMINOPHEN 1 TABLET: 5; 325 TABLET ORAL at 23:28

## 2017-09-27 RX ADMIN — PHENYLEPHRINE HYDROCHLORIDE 200 MCG: 10 INJECTION, SOLUTION INTRAMUSCULAR; INTRAVENOUS; SUBCUTANEOUS at 13:04

## 2017-09-27 RX ADMIN — FENTANYL CITRATE 25 MCG: 50 INJECTION, SOLUTION INTRAMUSCULAR; INTRAVENOUS at 11:22

## 2017-09-27 RX ADMIN — PHENYLEPHRINE HYDROCHLORIDE 150 MCG: 10 INJECTION, SOLUTION INTRAMUSCULAR; INTRAVENOUS; SUBCUTANEOUS at 12:31

## 2017-09-27 RX ADMIN — SUGAMMADEX 150 MG: 100 INJECTION, SOLUTION INTRAVENOUS at 14:25

## 2017-09-27 RX ADMIN — PHENYLEPHRINE HYDROCHLORIDE 200 MCG: 10 INJECTION, SOLUTION INTRAMUSCULAR; INTRAVENOUS; SUBCUTANEOUS at 13:45

## 2017-09-27 RX ADMIN — FENTANYL CITRATE 25 MCG: 50 INJECTION, SOLUTION INTRAMUSCULAR; INTRAVENOUS at 12:02

## 2017-09-27 RX ADMIN — NOREPINEPHRINE BITARTRATE 6.4 MCG: 1 INJECTION INTRAVENOUS at 14:14

## 2017-09-27 RX ADMIN — SODIUM CHLORIDE: 9 INJECTION, SOLUTION INTRAVENOUS at 11:18

## 2017-09-27 RX ADMIN — CEFAZOLIN 1 G: 1 INJECTION, POWDER, FOR SOLUTION INTRAMUSCULAR; INTRAVENOUS at 14:13

## 2017-09-27 RX ADMIN — NOREPINEPHRINE BITARTRATE 0.03 MCG/KG/MIN: 1 INJECTION INTRAVENOUS at 13:45

## 2017-09-27 RX ADMIN — PHENYLEPHRINE HYDROCHLORIDE 50 MCG: 10 INJECTION, SOLUTION INTRAMUSCULAR; INTRAVENOUS; SUBCUTANEOUS at 13:35

## 2017-09-27 RX ADMIN — HYDRALAZINE HYDROCHLORIDE 10 MG: 20 INJECTION INTRAMUSCULAR; INTRAVENOUS at 17:48

## 2017-09-27 RX ADMIN — HYDROCODONE BITARTRATE AND ACETAMINOPHEN 1 TABLET: 5; 325 TABLET ORAL at 17:14

## 2017-09-27 RX ADMIN — Medication 10 MG: at 12:58

## 2017-09-27 RX ADMIN — PHENYLEPHRINE HYDROCHLORIDE 200 MCG: 10 INJECTION, SOLUTION INTRAMUSCULAR; INTRAVENOUS; SUBCUTANEOUS at 12:36

## 2017-09-27 RX ADMIN — PHENYLEPHRINE HYDROCHLORIDE 50 MCG: 10 INJECTION, SOLUTION INTRAMUSCULAR; INTRAVENOUS; SUBCUTANEOUS at 14:06

## 2017-09-27 RX ADMIN — PHENYLEPHRINE HYDROCHLORIDE 150 MCG: 10 INJECTION, SOLUTION INTRAMUSCULAR; INTRAVENOUS; SUBCUTANEOUS at 12:24

## 2017-09-27 RX ADMIN — NOREPINEPHRINE BITARTRATE 6.4 MCG: 1 INJECTION INTRAVENOUS at 13:21

## 2017-09-27 RX ADMIN — METOPROLOL TARTRATE 25 MG: 25 TABLET ORAL at 19:35

## 2017-09-27 RX ADMIN — Medication 10 MG: at 12:46

## 2017-09-27 RX ADMIN — PHENYLEPHRINE HYDROCHLORIDE 300 MCG: 10 INJECTION, SOLUTION INTRAMUSCULAR; INTRAVENOUS; SUBCUTANEOUS at 12:42

## 2017-09-27 RX ADMIN — ROCURONIUM BROMIDE 50 MG: 10 INJECTION INTRAVENOUS at 11:34

## 2017-09-27 RX ADMIN — REMIFENTANIL HYDROCHLORIDE 0.05 MCG/KG/MIN: 1 INJECTION, POWDER, LYOPHILIZED, FOR SOLUTION INTRAVENOUS at 12:26

## 2017-09-27 RX ADMIN — PHENYLEPHRINE HYDROCHLORIDE 200 MCG: 10 INJECTION, SOLUTION INTRAMUSCULAR; INTRAVENOUS; SUBCUTANEOUS at 12:40

## 2017-09-27 RX ADMIN — IOPAMIDOL 160 ML: 755 INJECTION, SOLUTION INTRAVASCULAR at 14:30

## 2017-09-27 RX ADMIN — PHENYLEPHRINE HYDROCHLORIDE 200 MCG: 10 INJECTION, SOLUTION INTRAMUSCULAR; INTRAVENOUS; SUBCUTANEOUS at 12:38

## 2017-09-27 RX ADMIN — ROCURONIUM BROMIDE 20 MG: 10 INJECTION INTRAVENOUS at 13:11

## 2017-09-27 RX ADMIN — NOREPINEPHRINE BITARTRATE 6.4 MCG: 1 INJECTION INTRAVENOUS at 13:16

## 2017-09-27 RX ADMIN — NOREPINEPHRINE BITARTRATE 6.4 MCG: 1 INJECTION INTRAVENOUS at 14:21

## 2017-09-27 RX ADMIN — NOREPINEPHRINE BITARTRATE 6.4 MCG: 1 INJECTION INTRAVENOUS at 13:47

## 2017-09-27 RX ADMIN — PROPOFOL 60 MG: 10 INJECTION, EMULSION INTRAVENOUS at 11:34

## 2017-09-27 RX ADMIN — SODIUM CHLORIDE: 9 INJECTION, SOLUTION INTRAVENOUS at 11:07

## 2017-09-27 RX ADMIN — CEFAZOLIN 2 G: 1 INJECTION, POWDER, FOR SOLUTION INTRAMUSCULAR; INTRAVENOUS at 12:15

## 2017-09-27 RX ADMIN — PHENYLEPHRINE HYDROCHLORIDE 100 MCG: 10 INJECTION, SOLUTION INTRAMUSCULAR; INTRAVENOUS; SUBCUTANEOUS at 14:07

## 2017-09-27 RX ADMIN — NOREPINEPHRINE BITARTRATE 6.4 MCG: 1 INJECTION INTRAVENOUS at 13:08

## 2017-09-27 RX ADMIN — LIDOCAINE HYDROCHLORIDE 100 MG: 20 INJECTION, SOLUTION INFILTRATION; PERINEURAL at 11:34

## 2017-09-27 RX ADMIN — APIXABAN 2.5 MG: 2.5 TABLET, FILM COATED ORAL at 19:48

## 2017-09-27 RX ADMIN — PHENYLEPHRINE HYDROCHLORIDE 100 MCG: 10 INJECTION, SOLUTION INTRAMUSCULAR; INTRAVENOUS; SUBCUTANEOUS at 14:19

## 2017-09-27 RX ADMIN — ATORVASTATIN CALCIUM 40 MG: 40 TABLET, FILM COATED ORAL at 19:35

## 2017-09-27 RX ADMIN — TAMSULOSIN HYDROCHLORIDE 0.4 MG: 0.4 CAPSULE ORAL at 19:35

## 2017-09-27 RX ADMIN — ONDANSETRON 4 MG: 2 INJECTION INTRAMUSCULAR; INTRAVENOUS at 14:15

## 2017-09-27 RX ADMIN — FENTANYL CITRATE 50 MCG: 50 INJECTION, SOLUTION INTRAMUSCULAR; INTRAVENOUS at 11:54

## 2017-09-27 RX ADMIN — LISINOPRIL 10 MG: 10 TABLET ORAL at 18:54

## 2017-09-27 RX ADMIN — HEPARIN SODIUM 10000 UNITS: 1000 INJECTION, SOLUTION INTRAVENOUS; SUBCUTANEOUS at 12:54

## 2017-09-27 RX ADMIN — PHENYLEPHRINE HYDROCHLORIDE 100 MCG: 10 INJECTION, SOLUTION INTRAMUSCULAR; INTRAVENOUS; SUBCUTANEOUS at 12:21

## 2017-09-27 RX ADMIN — PHENYLEPHRINE HYDROCHLORIDE 100 MCG: 10 INJECTION, SOLUTION INTRAMUSCULAR; INTRAVENOUS; SUBCUTANEOUS at 12:17

## 2017-09-27 ASSESSMENT — PAIN DESCRIPTION - DESCRIPTORS
DESCRIPTORS: HEADACHE
DESCRIPTORS: DULL;ACHING

## 2017-09-27 NOTE — IP AVS SNAPSHOT
[address] [address]    Phone:  [phone]                                       After Visit Summary   9/27/2017    Bud Merritt    MRN: [id number]           After Visit Summary Signature Page     I have received my discharge instructions, and my questions have been answered. I have discussed any challenges I see with this plan with the nurse or doctor.    ..........................................................................................................................................  Patient/Patient Representative Signature      ..........................................................................................................................................  Patient Representative Print Name and Relationship to Patient    ..................................................               ................................................  Date                                            Time    ..........................................................................................................................................  Reviewed by Signature/Title    ...................................................              ..............................................  Date                                                            Time

## 2017-09-27 NOTE — PROGRESS NOTES
Date: 9/27/2017    Time of Call: 3:05 PM     Diagnosis:  S/p TAVR     [ TORB ] Ordering provider: Cosme Gunn MD  Order:   1. Echo  2.  CBC and BMP  3.  EKG     Order received by: Snehal Wilson RN     Follow-up/additional notes:   Orders entered for 30 day s/p TAVR follow up.

## 2017-09-27 NOTE — IP AVS SNAPSHOT
"          [address] [facility]: [phone]                                              INTERAGENCY TRANSFER FORM - LAB / IMAGING / EKG / EMG RESULTS   2017                    Hospital Admission Date: 2017  GALLO FLOYD   : 1927  Sex: Male        Attending Provider: Cosme Gunn MD     Allergies:  Amiodarone, Lasix [Furosemide]    Infection:  None   Service:  CSI-CORONARY    Ht:  1.676 m (5' 6\")   Wt:  68.5 kg (151 lb 0.2 oz)   Admission Wt:  67.6 kg (149 lb 0.5 oz)    BMI:  24.37 kg/m 2   BSA:  1.79 m 2            Patient PCP Information     Provider PCP Type    Camron Aragon MD General         Lab Results - 3 Days      Magnesium [[id number]]  Resulted: 17, Result status: Final result    Ordering provider: Cosme Gunn MD  17 Resulting lab: [facility]    Specimen Information    Type Source Collected On   Blood  17 0610          Components       Value Reference Range Flag Lab   Magnesium 2.0 1.6 - 2.3 mg/dL  51            Phosphorus [[id number]]  Resulted: 17, Result status: Final result    Ordering provider: Cosme Gunn MD  17 Resulting lab: [facility]    Specimen Information    Type Source Collected On   Blood  17 0610          Components       Value Reference Range Flag Lab   Phosphorus 2.6 2.5 - 4.5 mg/dL  51            Comprehensive metabolic panel [[id number]] (Abnormal)  Resulted: 17, Result status: Final result    Ordering provider: Cosme Gunn MD  17 Resulting lab: [facility]    Specimen Information    Type Source Collected On   Blood  17 0610          Components       Value Reference Range Flag Lab   Sodium 139 133 - 144 mmol/L  51   Potassium 4.0 3.4 - 5.3 mmol/L  51   Chloride 110 94 - 109 mmol/L H 51   Carbon Dioxide 19 20 - 32 mmol/L L 51   Anion Gap 10 3 " - 14 mmol/L  51   Glucose 66 70 - 99 mg/dL L 51   Urea Nitrogen 13 7 - 30 mg/dL  51   Creatinine 0.80 0.66 - 1.25 mg/dL  51   GFR Estimate >90 >60 mL/min/1.7m2  51   Comment:  Non  GFR Calc   GFR Estimate If Black >90 >60 mL/min/1.7m2  51   Comment:  African American GFR Calc   Calcium 7.5 8.5 - 10.1 mg/dL L 51   Bilirubin Total 0.5 0.2 - 1.3 mg/dL  51   Albumin 2.1 3.4 - 5.0 g/dL L 51   Protein Total 4.9 6.8 - 8.8 g/dL L 51   Alkaline Phosphatase 59 40 - 150 U/L  51   ALT 11 0 - 70 U/L  51   AST 29 0 - 45 U/L  51            CBC with platelets differential [[id number]] (Abnormal)  Resulted: 09/30/17 0710, Result status: Final result    Ordering provider: Cosme Gunn MD  09/29/17 0626 Resulting lab: [facility]    Specimen Information    Type Source Collected On   Blood  09/30/17 0610          Components       Value Reference Range Flag Lab   WBC 5.7 4.0 - 11.0 10e9/L  51   RBC Count 2.70 4.4 - 5.9 10e12/L L 51   Hemoglobin 8.2 13.3 - 17.7 g/dL L 51   Hematocrit 25.5 40.0 - 53.0 % L 51   MCV 94 78 - 100 fl  51   MCH 30.4 26.5 - 33.0 pg  51   MCHC 32.2 31.5 - 36.5 g/dL  51   RDW 16.2 10.0 - 15.0 % H 51   Platelet Count 142 150 - 450 10e9/L L 51   Diff Method Automated Method   51   % Neutrophils 62.4 %  51   % Lymphocytes 19.8 %  51   % Monocytes 16.3 %  51   % Eosinophils 0.9 %  51   % Basophils 0.4 %  51   % Immature Granulocytes 0.2 %  51   Nucleated RBCs 0 0 /100  51   Absolute Neutrophil 3.6 1.6 - 8.3 10e9/L  51   Absolute Lymphocytes 1.1 0.8 - 5.3 10e9/L  51   Absolute Monocytes 0.9 0.0 - 1.3 10e9/L  51   Absolute Eosinophils 0.1 0.0 - 0.7 10e9/L  51   Absolute Basophils 0.0 0.0 - 0.2 10e9/L  51   Abs Immature Granulocytes 0.0 0 - 0.4 10e9/L  51   Absolute Nucleated RBC 0.0   51            Urine Culture Aerobic Bacterial [[id number]]  Resulted: 09/29/17 0720, Result status: Final result    Ordering provider: Cosme Gunn MD  09/28/17 0020  Resulting lab: [facility]    Specimen Information    Type Source Collected On   Catheterized Urine  09/28/17 0020          Components       Value Reference Range Flag Lab   Specimen Description Catheterized Urine      Culture Micro No growth   225            CBC with platelets [[id number]] (Abnormal)  Resulted: 09/29/17 0714, Result status: Final result    Ordering provider: Lázaro Monaco MD  09/29/17 0646 Resulting lab: [facility]    Specimen Information    Type Source Collected On   Blood  09/29/17 0656          Components       Value Reference Range Flag Lab   WBC 6.3 4.0 - 11.0 10e9/L  51   RBC Count 2.82 4.4 - 5.9 10e12/L L 51   Hemoglobin 8.4 13.3 - 17.7 g/dL L 51   Hematocrit 26.2 40.0 - 53.0 % L 51   MCV 93 78 - 100 fl  51   MCH 29.8 26.5 - 33.0 pg  51   MCHC 32.1 31.5 - 36.5 g/dL  51   RDW 16.0 10.0 - 15.0 % H 51   Platelet Count 155 150 - 450 10e9/L  51            Comprehensive metabolic panel [[id number]] (Abnormal)  Resulted: 09/29/17 0547, Result status: Final result    Ordering provider: Filipe Allen MD  09/28/17 2205 Resulting lab: [facility]    Specimen Information    Type Source Collected On   Blood  09/29/17 0513          Components       Value Reference Range Flag Lab   Sodium 137 133 - 144 mmol/L  51   Potassium 3.9 3.4 - 5.3 mmol/L  51   Chloride 105 94 - 109 mmol/L  51   Carbon Dioxide 22 20 - 32 mmol/L  51   Anion Gap 10 3 - 14 mmol/L  51   Glucose 73 70 - 99 mg/dL  51   Urea Nitrogen 10 7 - 30 mg/dL  51   Creatinine 0.85 0.66 - 1.25 mg/dL  51   GFR Estimate 85 >60 mL/min/1.7m2  51   Comment:  Non  GFR Calc   GFR Estimate If Black >90 >60 mL/min/1.7m2  51   Comment:  African American GFR Calc   Calcium 8.2 8.5 - 10.1 mg/dL L 51   Bilirubin Total 0.5 0.2 - 1.3 mg/dL  51   Albumin 2.4 3.4 - 5.0 g/dL L 51   Protein Total 5.2 6.8 - 8.8 g/dL L 51   Alkaline Phosphatase 66 40 - 150 U/L   51   ALT 14 0 - 70 U/L  51   AST 37 0 - 45 U/L  51            Comprehensive metabolic panel [[id number]] (Abnormal)  Resulted: 09/28/17 0437, Result status: Final result    Ordering provider: Filipe Allen MD  09/27/17 2200 Resulting lab: [facility]    Specimen Information    Type Source Collected On   Blood  09/28/17 0335          Components       Value Reference Range Flag Lab   Sodium 140 133 - 144 mmol/L  51   Potassium 3.6 3.4 - 5.3 mmol/L  51   Chloride 109 94 - 109 mmol/L  51   Carbon Dioxide 21 20 - 32 mmol/L  51   Anion Gap 10 3 - 14 mmol/L  51   Glucose 77 70 - 99 mg/dL  51   Urea Nitrogen 11 7 - 30 mg/dL  51   Creatinine 0.82 0.66 - 1.25 mg/dL  51   GFR Estimate 88 >60 mL/min/1.7m2  51   Comment:  Non  GFR Calc   GFR Estimate If Black >90 >60 mL/min/1.7m2  51   Comment:  African American GFR Calc   Calcium 7.6 8.5 - 10.1 mg/dL L 51   Bilirubin Total 0.5 0.2 - 1.3 mg/dL  51   Albumin 2.3 3.4 - 5.0 g/dL L 51   Protein Total 4.8 6.8 - 8.8 g/dL L 51   Alkaline Phosphatase 58 40 - 150 U/L  51   ALT 15 0 - 70 U/L  51   AST 33 0 - 45 U/L  51            Magnesium [[id number]]  Resulted: 09/28/17 0437, Result status: Final result    Ordering provider: Filipe Allen MD  09/27/17 2200 Resulting lab: [facility]    Specimen Information    Type Source Collected On   Blood  09/28/17 0335          Components       Value Reference Range Flag Lab   Magnesium 1.6 1.6 - 2.3 mg/dL  51            Phosphorus [[id number]]  Resulted: 09/28/17 0437, Result status: Final result    Ordering provider: Filipe Allen MD  09/27/17 2200 Resulting lab: [facility]    Specimen Information    Type Source Collected On   Blood  09/28/17 0335          Components       Value Reference Range Flag Lab   Phosphorus 3.1 2.5 - 4.5 mg/dL  51            CBC with platelets differential [[id number]] (Abnormal)  Resulted: 09/28/17  0413, Result status: Final result    Ordering provider: Filipe Allen MD  09/27/17 2200 Resulting lab: [facility]    Specimen Information    Type Source Collected On   Blood  09/28/17 0335          Components       Value Reference Range Flag Lab   WBC 4.3 4.0 - 11.0 10e9/L  51   RBC Count 2.77 4.4 - 5.9 10e12/L L 51   Hemoglobin 8.2 13.3 - 17.7 g/dL L 51   Hematocrit 25.6 40.0 - 53.0 % L 51   MCV 92 78 - 100 fl  51   MCH 29.6 26.5 - 33.0 pg  51   MCHC 32.0 31.5 - 36.5 g/dL  51   RDW 15.9 10.0 - 15.0 % H 51   Platelet Count 184 150 - 450 10e9/L  51   Diff Method Automated Method   51   % Neutrophils 64.7 %  51   % Lymphocytes 17.1 %  51   % Monocytes 18.0 %  51   % Eosinophils 0.0 %  51   % Basophils 0.2 %  51   % Immature Granulocytes 0.0 %  51   Nucleated RBCs 0 0 /100  51   Absolute Neutrophil 2.8 1.6 - 8.3 10e9/L  51   Absolute Lymphocytes 0.7 0.8 - 5.3 10e9/L L 51   Absolute Monocytes 0.8 0.0 - 1.3 10e9/L  51   Absolute Eosinophils 0.0 0.0 - 0.7 10e9/L  51   Absolute Basophils 0.0 0.0 - 0.2 10e9/L  51   Abs Immature Granulocytes 0.0 0 - 0.4 10e9/L  51   Absolute Nucleated RBC 0.0   51            Procalcitonin [[id number]]  Resulted: 09/28/17 0123, Result status: Final result    Ordering provider: Kirill Ham MD  09/28/17 0008 Resulting lab: [facility]    Specimen Information    Type Source Collected On   Blood  09/28/17 0012          Components       Value Reference Range Flag Lab   Procalcitonin 0.05 ng/ml  51   Comment:         0.05-0.24 ng/ml Low risk of systemic bacterial infection. Local bacterial   infection possible.  Recommendation: Assess other clinical features of   infection. Discourage antibiotics unless strong clinical suspicion for serious   infection.              UA with Microscopic reflex to Culture [[id number]] (Abnormal)  Resulted: 09/28/17 0106, Result status: Final result    Ordering provider: Kirill Ham  MD Beau  09/28/17 0008 Resulting lab: [facility]    Specimen Information    Type Source Collected On   Catheterized Urine Urine catheter 09/28/17 0020          Components       Value Reference Range Flag Lab   Color Urine Light Yellow   51   Appearance Urine Clear   51   Glucose Urine Negative NEG^Negative mg/dL  51   Bilirubin Urine Negative NEG^Negative  51   Ketones Urine 10 NEG^Negative mg/dL A 51   Specific Gravity Urine 1.037 1.003 - 1.035 H 51   Blood Urine Trace NEG^Negative A 51   pH Urine 5.0 5.0 - 7.0 pH  51   Protein Albumin Urine Negative NEG^Negative mg/dL  51   Urobilinogen mg/dL Normal 0.0 - 2.0 mg/dL  51   Nitrite Urine Negative NEG^Negative  51   Leukocyte Esterase Urine Large NEG^Negative A 51   Source Catheterized Urine   51   WBC Urine 11 0 - 2 /HPF H 51   RBC Urine 4 0 - 2 /HPF H 51   Transitional Epi <1 0 - 1 /HPF  51   Mucous Urine Present NEG^Negative /LPF A 51            CBC with platelets differential [[id number]] (Abnormal)  Resulted: 09/28/17 0031, Result status: Final result    Ordering provider: Kirill Ham MD  09/28/17 0008 Resulting lab: [facility]    Specimen Information    Type Source Collected On   Blood  09/28/17 0012          Components       Value Reference Range Flag Lab   WBC 4.7 4.0 - 11.0 10e9/L  51   RBC Count 2.81 4.4 - 5.9 10e12/L L 51   Hemoglobin 8.4 13.3 - 17.7 g/dL L 51   Hematocrit 25.8 40.0 - 53.0 % L 51   MCV 92 78 - 100 fl  51   MCH 29.9 26.5 - 33.0 pg  51   MCHC 32.6 31.5 - 36.5 g/dL  51   RDW 16.0 10.0 - 15.0 % H 51   Platelet Count 182 150 - 450 10e9/L  51   Diff Method Automated Method   51   % Neutrophils 73.8 %  51   % Lymphocytes 10.7 %  51   % Monocytes 14.9 %  51   % Eosinophils 0.0 %  51   % Basophils 0.4 %  51   % Immature Granulocytes 0.2 %  51   Nucleated RBCs 0 0 /100  51   Absolute Neutrophil 3.5 1.6 - 8.3 10e9/L  51   Absolute Lymphocytes 0.5 0.8 - 5.3 10e9/L L 51    Absolute Monocytes 0.7 0.0 - 1.3 10e9/L  51   Absolute Eosinophils 0.0 0.0 - 0.7 10e9/L  51   Absolute Basophils 0.0 0.0 - 0.2 10e9/L  51   Abs Immature Granulocytes 0.0 0 - 0.4 10e9/L  51   Absolute Nucleated RBC 0.0   51            Comprehensive metabolic panel [[id number]] (Abnormal)  Resulted: 09/27/17 1541, Result status: Final result    Ordering provider: Filipe Allen MD  09/27/17 1502 Resulting lab: [facility]    Specimen Information    Type Source Collected On   Blood  09/27/17 1500          Components       Value Reference Range Flag Lab   Sodium 137 133 - 144 mmol/L  51   Potassium 4.0 3.4 - 5.3 mmol/L  51   Chloride 108 94 - 109 mmol/L  51   Carbon Dioxide 22 20 - 32 mmol/L  51   Anion Gap 8 3 - 14 mmol/L  51   Glucose 105 70 - 99 mg/dL H 51   Urea Nitrogen 11 7 - 30 mg/dL  51   Creatinine 0.82 0.66 - 1.25 mg/dL  51   GFR Estimate 88 >60 mL/min/1.7m2  51   Comment:  Non  GFR Calc   GFR Estimate If Black >90 >60 mL/min/1.7m2  51   Comment:  African American GFR Calc   Calcium 7.7 8.5 - 10.1 mg/dL L 51   Bilirubin Total 0.2 0.2 - 1.3 mg/dL  51   Albumin 2.5 3.4 - 5.0 g/dL L 51   Protein Total 5.4 6.8 - 8.8 g/dL L 51   Alkaline Phosphatase 68 40 - 150 U/L  51   ALT 18 0 - 70 U/L  51   AST 30 0 - 45 U/L  51            Magnesium [[id number]]  Resulted: 09/27/17 1541, Result status: Final result    Ordering provider: Filipe Allen MD  09/27/17 1503 Resulting lab: [facility]    Specimen Information    Type Source Collected On   Blood  09/27/17 1500          Components       Value Reference Range Flag Lab   Magnesium 1.8 1.6 - 2.3 mg/dL  51            Phosphorus [[id number]]  Resulted: 09/27/17 1541, Result status: Final result    Ordering provider: Filipe Allen MD  09/27/17 1502 Resulting lab: [facility]    Specimen Information    Type Source Collected On   Blood  09/27/17 7157           Components       Value Reference Range Flag Lab   Phosphorus 3.2 2.5 - 4.5 mg/dL  51            CBC with platelets [[id number]] (Abnormal)  Resulted: 09/27/17 1521, Result status: Final result    Ordering provider: Filipe Allen MD  09/27/17 1502 Resulting lab: [facility]    Specimen Information    Type Source Collected On   Blood  09/27/17 1500          Components       Value Reference Range Flag Lab   WBC 3.4 4.0 - 11.0 10e9/L L 51   RBC Count 2.98 4.4 - 5.9 10e12/L L 51   Hemoglobin 9.0 13.3 - 17.7 g/dL L 51   Hematocrit 28.1 40.0 - 53.0 % L 51   MCV 94 78 - 100 fl  51   MCH 30.2 26.5 - 33.0 pg  51   MCHC 32.0 31.5 - 36.5 g/dL  51   RDW 15.8 10.0 - 15.0 % H 51   Platelet Count 184 150 - 450 10e9/L  51            Comprehensive metabolic panel [[id number]] (Abnormal)  Resulted: 09/27/17 0955, Result status: Final result    Ordering provider: Cosme Gunn MD  09/27/17 0911 Resulting lab: [facility]    Specimen Information    Type Source Collected On   Blood  09/27/17 0923          Components       Value Reference Range Flag Lab   Sodium 138 133 - 144 mmol/L  51   Potassium 4.7 3.4 - 5.3 mmol/L  51   Chloride 105 94 - 109 mmol/L  51   Carbon Dioxide 26 20 - 32 mmol/L  51   Anion Gap 7 3 - 14 mmol/L  51   Glucose 109 70 - 99 mg/dL H 51   Urea Nitrogen 12 7 - 30 mg/dL  51   Creatinine 1.01 0.66 - 1.25 mg/dL  51   GFR Estimate 69 >60 mL/min/1.7m2  51   Comment:  Non  GFR Calc   GFR Estimate If Black 84 >60 mL/min/1.7m2  51   Comment:  African American GFR Calc   Calcium 9.0 8.5 - 10.1 mg/dL  51   Bilirubin Total 0.4 0.2 - 1.3 mg/dL  51   Albumin 3.1 3.4 - 5.0 g/dL L 51   Protein Total 6.7 6.8 - 8.8 g/dL L 51   Alkaline Phosphatase 86 40 - 150 U/L  51   ALT 21 0 - 70 U/L  51   AST 32 0 - 45 U/L  51            INR [[id number]]  Resulted: 09/27/17 0947, Result status: Final result    Ordering provider: Cosme Gunn MD   09/27/17 0911 Resulting lab: [facility]    Specimen Information    Type Source Collected On   Blood  09/27/17 0923          Components       Value Reference Range Flag Lab   INR 0.97 0.86 - 1.14  51            CBC with platelets [[id number]] (Abnormal)  Resulted: 09/27/17 0933, Result status: Final result    Ordering provider: Cosme Gunn MD  09/27/17 0911 Resulting lab: [facility]    Specimen Information    Type Source Collected On   Blood  09/27/17 0923          Components       Value Reference Range Flag Lab   WBC 4.3 4.0 - 11.0 10e9/L  51   RBC Count 3.59 4.4 - 5.9 10e12/L L 51   Hemoglobin 10.6 13.3 - 17.7 g/dL L 51   Hematocrit 33.7 40.0 - 53.0 % L 51   MCV 94 78 - 100 fl  51   MCH 29.5 26.5 - 33.0 pg  51   MCHC 31.5 31.5 - 36.5 g/dL  51   RDW 15.9 10.0 - 15.0 % H 51   Platelet Count 256 150 - 450 10e9/L  51            Glucose by meter [[id number]]  Resulted: 09/27/17 0922, Result status: Final result    Ordering provider: Cosme Gunn MD  09/27/17 0918 Resulting lab: POINT OF CARE TEST, GLUCOSE    Specimen Information    Type Source Collected On     09/27/17 0918          Components       Value Reference Range Flag Lab   Glucose 85 70 - 99 mg/dL  170            Testing Performed By     Lab - Abbreviation Name Director Address Valid Date Range    51 - Unknown [facility] Unknown [address] [facility] [id number] 12/31/14 1010 - Present    170 - Unknown [facility]ARE [facility], GLUCOSE Unknown Unknown 10/31/11 1114 - Present    225 - Unknown [facility] Unknown [address] [facility] [id number] 12/19/14 0954 - Present            Unresulted Labs (24h ago through future)    Start       Ordered    09/30/17 0400  CBC with platelets differential  DAILY,   Routine     Comments:  Last Lab Result: Hemoglobin (g/dL)       Date                     Value                  "09/28/2017               8.2 (L)          ----------    09/29/17 0525    09/30/17 0400  Comprehensive metabolic panel  DAILY,   Routine      09/29/17 0525    09/30/17 0400  Magnesium  DAILY,   Routine      09/29/17 0525    09/30/17 0400  Phosphorus  DAILY,   Routine      09/29/17 0525    Unscheduled  Potassium  (Potassium Replacement - \"High\" - Replacement for all levels less than 4.1 mmol/L - UU,UR,UA,RH,SH,PH,W[place] )  CONDITIONAL (SPECIFY),   Routine     Comments:  Obtain Potassium Level for these conditions:  *IF no potassium result within 24 hrs before initiation of order set, draw potassium level with next lab collect.    *2 HOURS AFTER last IV potassium replacement dose and 4 hours after an oral replacement dose when potassium replacement given for level less than 3.4.  *Next morning after potassium dose.     Repeat Potassium Replacement if necessary.    09/28/17 0447    Unscheduled  Magnesium  (Magnesium Replacement - Adult - \"High\" - Replacement for all levels less than or equal to 2 mg/dL)  CONDITIONAL (SPECIFY),   Routine     Comments:  Obtain Magnesium Level for these conditions:  *IF no magnesium result within 24 hrs before initiation of order set, draw magnesium level with next lab collect.    *2 HOURS AFTER last magnesium replacement dose when magnesium replacement given for level less than 1.6  *Next morning after magnesium dose.     Repeat Magnesium Replacement if necessary.    09/28/17 0447         Imaging Results - 3 Days      CT Chest/Abd/Pelvis Angio w Processing [[id number]]  Resulted: 09/29/17 1451, Result status: Final result    Ordering provider: Ludy Emerson APRN CNP  09/28/17 1004 Resulted by: Luis Alfredo Rodriguez MD Dianat, Seyed Saeid, MD    Performed: 09/28/17 1142 - 09/28/17 1214 Resulting lab: RADIOLOGY RESULTS    Narrative:       Exam: Computed tomographic angiography of the chest, abdomen and  pelvis without and with contrast including 3D reformations dated  9/28/2017 12:14 " PM    Clinical information: Evaluate retroperitoneal bleed/aortic pathology,  exclude aortic dissection, aortic valve replacement 9/27/2017    Technique: Axial images through the chest and abdomen obtained before  the administration of intravenous contrast media and following the  injection of contrast media  in the arterial phase. Source images  reviewed as well as 3D and multi-planar reconstructions at a 3D  workstation.    Contrast: 100cc Isovue 370    DLP: 1214 mGy*cm    Comparison: 3/8/2017.    Findings:      Normal caliber of the thoracic aorta. There is normal branching  pattern of the great vessels. Origins of the brachiocephalic artery,  left common carotid artery and left subclavian artery show no focal  abnormality. There is marked tortuosity of the distal descending  thoracic aorta just above the level of the diaphragm.    Dilated main pulmonary artery measuring 3.5 cm in transverse  dimension, likely sequela of chronic pulmonary arterial hypertension.    The celiac axis, SMA and FUNMI are patent. Replaced left hepatic artery  from the left gastric artery. The single renal arteries are patent  bilaterally.    Chest and abdomen:     Left dual lead pectoral pacemaker. Post surgical changes of aortic  valve replacement. Atherosclerotic calcifications of the coronary  arteries. Cardiomegaly. Trace pericardial effusion.    Trachea and central airways are patent. Centrilobular and paraseptal  emphysema. No parenchymal consolidation. Some degree of bronchiectasis  and bronchial wall thickening. Reticulation, scarring and interlobular  septal thickening predominantly in the lung bases, with no definite  honeycombing pattern. Hiatal hernia.    There are multiple sub-6 mm pulmonary nodules throughout both lungs,  unchanged compared to 3/8/2017, for example 5 mm right upper lobe  centrilobular pulmonary nodule on series 14, image 131, and 4 mm left  upper lobe solid pulmonary nodule on series 14, image 89 and 82.  No  new pulmonary nodule.    No pleural effusion. No pneumothorax. No hilar, mediastinal or  axillary lymphadenopathy is seen. Moderate-sized hiatal hernia with  herniation of gastric fundus.    The spleen, liver, adrenal glands, pancreas, kidneys and bones show no  focal abnormalities. Degenerative changes of the shoulder joints.    Extensive colonic diverticulosis without diverticulitis. Tiny focus of  gas in the nondependent aspect of the bladder, likely secondary to  prior catheterization.    No lymphadenopathy in the abdomen and pelvis by size criteria. There  is slight enlargement of a 10 mm enhancing nodule in the  retroperitoneum along the superior margin of the left adrenal gland  (series 6 image 519), previously approximately 6 mm on 3/8/2017. No  retroperitoneal hematoma.    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 There are multiple healing bilateral rib fractures. There are  scattered sclerotic lesion of the right ribs, unchanged compared to  prior exam and consistent with osseous metastasis. Scattered sclerotic  lesions consistent with metastasis in the C6, T1, and T12, right  ilium. No acute pathologic fracture.       Impression:       Impression:  1. No evidence of aortic aneurysm or dissection. There is marked  tortuosity of the distal descending thoracic aorta.  2. Multiple pulmonary nodules measuring up to 5 mm, unchanged compared  to 3/8/2017. Recommend continued attention on follow-up.  3. Unchanged scattered sclerotic bone lesions, consistent with osseous  metastasis in this patient with prostate cancer. There is slight  growth of an enhancing 10 mm soft tissue nodule in the retroperitoneum  along the superior margin of the left adrenal gland, 6 mm on 3/8/2017.  4. No retroperitoneal hematoma or aortic pathology.    [Consider Follow Up: Slightly enlarged enhancing retroperitoneal lymph  node, attention on follow-up]    This report will be copied to the [facility] to ensure a  provider acknowledges the finding.               I have personally reviewed the examination and initial interpretation  and I agree with the findings.    ASMITA URBINA MD    Components       Value Reference Range Flag Lab   Radiologist flags Slightly enlarged enhancing retroperitoneal lymph               XR Chest Port 1 View [[id number]]  Resulted: 09/28/17 0820, Result status: Final result    Ordering provider: Filipe Allen MD  09/27/17 2669 Resulted by: Jody Lombardi MD Butler, Gretchen, MD    Performed: 09/28/17 0030 - 09/28/17 0308 Resulting lab: RADIOLOGY RESULTS    Narrative:       EXAM: XR CHEST PORT 1 VW  9/28/2017 3:08 AM      HISTORY: POD 1 S/P Transcatheter aortic valve replacement    COMPARISON: 2/20/2017    FINDINGS: Aortic valve replacement. Left chest wall cardiac device and  lead tips in place. Interval removal of the previously described right  upper extremity PICC line.    The cardiomediastinal silhouette is stable. Pulmonary vasculature is  indistinct. Interstitial prominence without new focal airspace  opacity. Mild biapical pleural thickening. No pleural effusion or  pneumothorax.       Impression:       IMPRESSION:   1. Aortic valve replacement.  2. Prominent interstitial opacities, not significantly changed when  compared with prior exam, mild pulmonary vascular congestion. No new  focal airspace opacities.    I have personally reviewed the examination and initial interpretation  and I agree with the findings.    JODY LOMBARDI MD      Testing Performed By     Lab - Abbreviation Name Director Address Valid Date Range    104 - Rad Rslts RADIOLOGY RESULTS Unknown Unknown 02/16/05 1553 - Present               ECG/EMG Results      ECHO LIMITED WITH OPTISON [[id number]]  Resulted: 09/28/17 0935, Result status: Edited Result - FINAL    Ordering provider: Filipe Allen MD  09/27/17 163 Resulted by: Anil Forte MD    Performed: 09/28/17  1021 - 17 1352 Resulting lab: RADIOLOGY RESULTS    Narrative:       [id number]  [id number]  [id number]  [id number]           [facility],[place]  Echocardiography Laboratory  [address] [place] [id number]     Name: GALLO FLOYD  MRN: [id number]  : 1927  Study Date: 2017 09:35 AM  Age: 89 yrs  Gender: Male  Patient Location: [facility]  Reason For Study: S/P tavr  Ordering Physician: Jaymie Allen Dr  Referring Physician: ANA MARIA CERDA  Performed By: Yahir Cruz RDCS     BSA: 1.8 m2  Height: 66 in  Weight: 149 lb  HR: 103  BP: 125/44 mmHg  _____________________________________________________________________________  __        Procedure  Limited Portable Echo Adult. Contrast Optison. Technically difficult study.  Optison (NDC #8295-0374-09) given intravenously. Patient was given 4 ml  mixture of 3 ml Optison and 6 ml saline. 5 ml wasted.  _____________________________________________________________________________  __        Interpretation Summary  Technically difficult study.  Global and regional left ventricular function is normal with an EF of 60-65%.  A bioprosthetic aortic valve is present.# Sapiens S3 29 mm. Doppler  interrogation of the aortic valve is normal.  The mean gradient is 8 mmHg.No aortic regurgitation is present.  The inferior vena cava was normal in size with preserved respiratory  variability.  No pericardial effusion is present.     No change from prior.  _____________________________________________________________________________  __        Left Ventricle  Global and regional left ventricular function is normal with an EF of 60-65%.  Grade II or moderate diastolic dysfunction.     Right Ventricle  Global right ventricular function is normal.     Mitral Valve  Trace mitral insufficiency is present.     Aortic Valve  No aortic regurgitation is present. The calculated aortic valve are is 3.9  cm^2. Doppler interrogation of  the aortic valve is normal. The mean gradient  is 8 mmHg. A bioprosthetic aortic valve is present.        Tricuspid Valve  Trace tricuspid insufficiency is present.     Vessels  The inferior vena cava was normal in size with preserved respiratory  variability.     Pericardium  No pericardial effusion is present.  _____________________________________________________________________________  __     MMode/2D Measurements & Calculations  IVSd: 1.2 cm  LVIDd: 4.6 cm  LVIDs: 3.0 cm  LVPWd: 0.99 cm  FS: 35.1 %  EDV(Teich): 98.8 ml  ESV(Teich): 35.1 ml  LV mass(C)d: 182.3 grams  LV mass(C)dI: 103.3 grams/m2  asc Aorta Diam: 3.1 cm  LVOT diam: 2.5 cm  LVOT area: 4.7 cm2  LA Volume (BP): 78.8 ml     LA Volume Index (BP): 35.2 ml/m2        Doppler Measurements & Calculations  MV E max phyllis: 94.4 cm/sec  MV A max phyllis: 103.5 cm/sec  MV E/A: 0.91  MV dec time: 0.23 sec  Ao V2 max: 173.8 cm/sec  Ao max P.0 mmHg  Ao V2 mean: 133.3 cm/sec  Ao mean P.3 mmHg  Ao V2 VTI: 29.0 cm  TYREL(I,D): 3.9 cm2  TYREL(V,D): 3.5 cm2  LV V1 max P.0 mmHg  LV V1 max: 130.0 cm/sec  LV V1 VTI: 23.8 cm  SV(LVOT): 112.2 ml  SI(LVOT): 63.6 ml/m2  TYREL Index (cm2/m2): 2.2  Lateral E/e': 12.4  Medial E/e': 14.8        _____________________________________________________________________________  __        Report approved by: Kennedi Camilo 2017 02:57 PM       1    Type Source Collected On     17 0935          View Image (below)        Echocardiogram [[id number]]  Resulted: 17 1022, Result status: In process    Ordering provider: Filipe Allen MD  17 1639 Performed: 17 1021 - 17 1021    Resulting lab: RADIANT                   Encounter-Level Documents:     There are no encounter-level documents.      Order-Level Documents:     There are no order-level documents.

## 2017-09-27 NOTE — PROCEDURES
TAVR PROCEDURE REPORT:     PROCEDURES PERFORMED:   1. Temporary pacemaker insertion.  2. Iliofemoral artery angiography.  3. Ascending aorta angiography.  4. Left heart catheterization.  5. Aortic valve balloon valvuloplasty with a 25mm balloon.  6. Successful transfemoral transcatheter aortic valve replacement with a 29mm Harman Ramsey 3 valve.  7. Arteriotomy closure.    PHYSICIANS:  1. Attending Interventional Cardiology Staff: Hermelindo Gunn MD and Fan Lozano MD  2. Attending Cardiovascular Surgery Staff: Sathish Alejandra MD  3. Structural Interventional Cardiology Fellow: Filipe Allen    INDICATION:  Bud Merritt is a 89 year old male with severe symptomatic aortic stenosis who presents on an elective outpatient basis for transfemoral transcatheter aortic valve replacement with plan for an Harman Ramsey 3 valve.    DESCRIPTION:  1. Consent obtained with discussion of risks.  All questions were answered.  2. Sterile prep and procedure per OR protocol.  3. Location: right and left common femoral arteries and left common femoral vein.  4. Access: Local anesthetic with lidocaine.  A standard (18 g) needle with ultrasound guidance was used to establish vascular access using a modified Seldinger technique.  5. Sheath:  16Fr Harman eSheath in the RCFA, 6Fr long sheath in the LCFA,  6Fr long sheath in the LCFV  6. Catheters: 6Fr angled pigtail, 6Fr AL-1 and 6Fr AL-  7. Estimated blood loss of 30 mL.  8. See below for procedure details.    MEDICATIONS:  1. Sedation per anesthesia.  2. Heparin administered to achieve a goal ACT > 300 sec per anesthesia.   3. Protamine IV.    SUMMARY:  1. Access was obtained in the right and left common femoral arteries and the left common femoral vein by the interventional cardiology team.  The arterial site used for valve delivery was pre-closed with two Perclose Proglide devices.   2. A 5Fr PACEL temporary pacemaker was positioned in the right ventricle and tested for  adequate capture.    3. Iliofemoral angiography was not done since the right CFA was big and without evidence of disease on ultrasound.   A Lunderquist wire was used to support the Hraman eSheath insertion.    4. The 6Fr pigtail catheter was positioned in the right-coronary cusp and angiography was used to confirm the optimal implant angle.  The pigtail was then moved to the non-coronary cusp.    5. Access into the LV was obtained using an AL-2 catheter and a straight wire.  The AL-2 catheter was used to exchange the straight wire for a J-wire and a pigtail catheter was then positioned in the left ventricle.  The LVEDP was measured with a pressure of 15 mmHg.  The pigtail catheter was used to advance a Lunderquist wire into in the LV and the pigtail was removed.    6. Aortic valve balloon valvuloplasty was successfully completed using an Harman 25mm balloon during rapid pacing and resulted in no hemodynamic compromise.  The 29mm Harman Ramsey 3 prosthetic valve was then positioned across the native aortic valve and was attempted to be deployed under rapid pacing.  However the balloon inside the valve stent never inflated due to perforation of the balloon while the balloon was loaded up within the valve stent in the descending aorta.  The whole system including the delivery system and the 16Fr Harman sheath were removed along with the sheath to ensure that the whole system came out without harming the sheath due to bulky valve.  The Lunderquist wire was left in place in the LV and another 16Fr Harman sheath was inserted in the R CFA.  A 2nd #29 S3 valve was inserted and loaded onto the balloon more distally where the descending aorta was more vertical.  The valve was crossed with the system and the valve was deployed under rapid pacing of 160bpm with a depth of 80/20 with nominal pressure (33cc).   7. Subsequent transesophageal and transthoracic echocardiography and an ascending aortogram showed trace  paravalvular insufficiency.    8. The valve sheath access arteriotomy was successfully closed with the double suture closure devices with successful hemostasis.    9. A final iliofemoral angiogram showed no evidence of extravasation or stenosis.   10. The LCFA 6Fr arteriotomy was successfully closed with a 6Fr AngioSeal closure device.  11. The temporary pacemaker was then removed.        NOTABLE EVENTS:  1. Valve deployment failure due to suspected perforation of balloon likely during loading of balloon unto valve stent and requiring removal of whole delivery system along with valve and sheath.    COMPLICATIONS:  1. None    SUMMARY:    1. Lifestyle-limiting severe aortic stenosis.  2. Successful transfemoral transcatheter aortic valve replacement with a 29mm Harman Ramsey 3 valve.  3. Temporary pacemaker insertion.  4. Aortic balloon valvuloplasty with a 25mm balloon.  5. Left heart catheterization with LVEDP of 15 mmHg.  6. Right and left common femoral arteriotomies successfully closed with closure devices.    PLAN:    1. Change Aspirin to 81 mg po daily lifelong.  2. Restart home apixaban dose 2.5mg bid  3. Bedrest per protocol (6 hours).  4. Admit to the primary inpatient team for further evaluation and management.  5. Echocardiogram tomorrow.   6. Lifelong antibiotic prophylaxis prior to all dental procedures.      The attending interventional cardiologists,Noel Gunn and Blake, were present and participated in the entire procedure.      Filipe Allen  Structural Heart Disease &   Advanced Interventional Cardiology Fellow  [phone]    I have performed all the critical parts of the procedure and was present throughout the procedure in direct observation with the fellow. I agree with  the note above.    Cosme Gunn MD  Interventional Cardiology  Pager: [phone]\

## 2017-09-27 NOTE — H&P
History and Physical     Bud Merritt  MRN# [id number]        Date of Admission:  9/27/2017    HPI: Bud Merritt is a 89 year old male with PMHx pertinent for Afib on apixiban, s/p AV lisandro ablation with BiV PPM, (3/2017) COPD, prostate cancer, RA and chronic pain and severe aortic stenosis severe characterized by an area of 0.9 cm2, mean gradient 36 mmHg and peak velocity of 3.78 m/sec with LVEF 20-25% by echocardiogram on 3/2017. Patient presents to [facility] today for transcatheter aortic valve replacement.       Past Medical History:  Past Medical History:   Diagnosis Date     Anemia      Atrial fibrillation (H) 07/01/2016     Cardiac pacemaker 03/10/2017     Cardiomyopathy (H)      CHF (congestive heart failure) (H)      COPD exacerbation (H) 3/2/2017     Depressive disorder      History of prostate cancer      Paroxysmal atrial fibrillation (H) 7/6/2016     Pure hypercholesterolemia      Rheumatoid arthritis(714.0)      Unspecified essential hypertension      Weakness generalized 3/2/2017       Past Surgical History:  Past Surgical History:   Procedure Laterality Date     ARTHROPLASTY KNEE Left 1/12/2016    Procedure: ARTHROPLASTY KNEE;  Surgeon: Cesar Walker DO;  Location: [facility] OR     COLONOSCOPY  08/24/09     HC COLONOSCOPY THRU STOMA W BIOPSY/CAUTERY TUMOR/POLYP/LESION  8/31/2004     HC REPAIR ING HERNIA,5+Y/O,REDUCIBL  1996    Marlex mesh repair of bilateral inguinal hernias and drainage of bilateral scrotal hydroceles.     IMPLANT PACEMAKER  03/10/2017     IRRIGATION AND DEBRIDEMENT SOFT TISSUE LOWER EXTREMITY, COMBINED Left 3/15/2016    Procedure: COMBINED IRRIGATION AND DEBRIDEMENT SOFT TISSUE LOWER EXTREMITY;  Surgeon: Cesar Walker DO;  Location: [facility] OR       Allergies:     Allergies   Allergen Reactions     Amiodarone Other (See Comments)     Drop in DLCO     Lasix [Furosemide] Blisters     Blisters and sores in his mouth.        Medications:    No current  facility-administered medications on file prior to encounter.   Current Outpatient Prescriptions on File Prior to Encounter:  HYDROcodone-acetaminophen (NORCO) 5-325 MG per tablet TAKE 1 TABLET BY MOUTH EVERY 6 HOURS AS NEEDED FOR MODERATE TO SEVERE PAIN   traMADol (ULTRAM) 50 MG tablet TAKE 1 TABLET 3 TIMES DAILY AS NEEDED FOR MODERATE PAIN   leflunomide (ARAVA) 10 MG tablet Take 1 tablet (10 mg) by mouth daily   dofetilide (TIKOSYN) 125 MCG capsule Take 1 capsule (125 mcg) by mouth 2 times daily   metoprolol (LOPRESSOR) 50 MG tablet TAKE 1 TABLET (50 MG) BY MOUTH 2 TIMES DAILY   atorvastatin (LIPITOR) 40 MG tablet Take 1 tablet (40 mg) by mouth daily (Patient taking differently: Take 40 mg by mouth every evening )   predniSONE (DELTASONE) 5 MG tablet Take 1 tablet (5 mg) by mouth daily   lisinopril (PRINIVIL,ZESTRIL) 10 MG tablet Take 1 tablet (10 mg) by mouth 2 times daily   tamsulosin (FLOMAX) 0.4 MG 24 hr capsule Take 1 capsule (0.4 mg) by mouth 2 times daily   ascorbic acid (VITAMIN C) 500 MG CPCR Take 500 mg by mouth daily   VITAMIN D, CHOLECALCIFEROL, PO Take 1 tablet by mouth daily Reported on 5/3/2017   calcium carbonate (OS-SHELIA 500 MG [place]. [place]) 500 MG tablet Take 1 tablet by mouth daily    apixaban ANTICOAGULANT (ELIQUIS) 2.5 MG tablet Take 1 tablet (2.5 mg) by mouth 2 times daily (Patient not taking: Reported on 9/25/2017)   Leuprolide Acetate (LUPRON DEPOT IM) Inject into the muscle every 6 months Reported on 5/3/2017   acetaminophen (TYLENOL) 650 MG CR tablet Take 650 mg by mouth every 8 hours as needed Reported on 4/5/2017   alendronate (FOSAMAX) 70 MG tablet Take 1 tablet (70 mg) by mouth every 7 days Take with over 8 ounces water and stay upright for at least 30 minutes after dose.  Take at least 60 minutes before breakfast       Social History:  Social History     Social History     Marital status:      Spouse name: N/A     Number of children: N/A     Years of education: N/A     Occupational  History     Not on file.     Social History Main Topics     Smoking status: Former Smoker     Packs/day: 0.50     Years: 15.00     Types: Cigarettes     Quit date: 11/12/1998     Smokeless tobacco: Never Used      Comment: quit 15 yrs ago     Alcohol use No     Drug use: No     Sexual activity: Yes     Other Topics Concern     Caffeine Concern Yes     8 cups coffee day     Sleep Concern Yes     Weight Concern Yes     weight loss     Special Diet No     Exercise Yes     split firewood daily     Social History Narrative       Family History:  Family History   Problem Relation Age of Onset     CANCER Mother      CANCER Son      Unknown/Adopted Father      Alcoholism Brother        ROS:  Negative     Exam:  Temp:  [97.6  F (36.4  C)-97.9  F (36.6  C)] 97.6  F (36.4  C)  Heart Rate:  [68-71] 70  Resp:  [12-19] 12  BP: (128-163)/(72-92) 128/72  MAP:  [95 mmHg-97 mmHg] 97 mmHg  Arterial Line BP: (136-139)/(68) 139/68  SpO2:  [96 %-100 %] 96 %  General: Alert, interactive, NAD  Eyes: sclera anicteric, EOMI  Resp: clear to auscultation bilaterally, no crackles or wheezes  Cardiovascular: regular rate and rhythm, no murmur  GI: Soft, nontender, nondistended. +BS.  No HSM or masses, no rebound or guarding.  : Parrish   MSK: Generalized weakness  Skin: Warm and dry, no jaundice or rash  Neuro: Alert & oriented x 3, Cns 2-12 intact, moves all extremities equally, Extremely [place]  Psych: Approrpiate    Data:  CMP  Recent Labs  Lab 09/27/17  1500 09/27/17  1231 09/27/17  0923 09/25/17  1622    137 138 135   POTASSIUM 4.0 4.0 4.7 4.5   CHLORIDE 108  --  105 103   CO2 22  --  26 26   ANIONGAP 8  --  7 7   * 107* 109* 111*   BUN 11  --  12 15   CR 0.82  --  1.01 1.00   GFRESTIMATED 88  --  69 70   GFRESTBLACK >90  --  84 85   SHELIA 7.7*  --  9.0 8.8   MAG 1.8  --   --   --    PHOS 3.2  --   --   --    PROTTOTAL 5.4*  --  6.7* 6.8   ALBUMIN 2.5*  --  3.1* 3.1*   BILITOTAL 0.2  --  0.4 0.5   ALKPHOS 68  --  86 83   AST 30  --   32 27   ALT 18  --  21 21     CBC  Recent Labs  Lab 09/27/17  1500 09/27/17  1231 09/27/17  0923 09/25/17  1622   WBC 3.4*  --  4.3 4.1   RBC 2.98*  --  3.59* 3.35*   HGB 9.0* 9.3* 10.6* 10.2*   HCT 28.1*  --  33.7* 31.9*   MCV 94  --  94 95   MCH 30.2  --  29.5 30.4   MCHC 32.0  --  31.5 32.0   RDW 15.8*  --  15.9* 15.6*     --  256 249       No results found for: TROPI, TROPONIN, TROPR, TROPN      EKG: Paced with rates 70's-80's, narrow QRS       SUMMARY:  1. Access was obtained in the right and left common femoral arteries and the left common femoral vein by the interventional cardiology team.  The arterial site used for valve delivery was pre-closed with two Perclose Proglide devices.   2. A 5Fr PACEL temporary pacemaker was positioned in the right ventricle and tested for adequate capture.    3. Iliofemoral angiography was not done since the right CFA was big and without evidence of disease on ultrasound.   A Lunderquist wire was used to support the Harman eSheath insertion.    4. The 6Fr pigtail catheter was positioned in the right-coronary cusp and angiography was used to confirm the optimal implant angle.  The pigtail was then moved to the non-coronary cusp.    5. Access into the LV was obtained using an AL-2 catheter and a straight wire.  The AL-2 catheter was used to exchange the straight wire for a J-wire and a pigtail catheter was then positioned in the left ventricle.  The LVEDP was measured with a pressure of 15 mmHg.  The pigtail catheter was used to advance a Lunderquist wire into in the LV and the pigtail was removed.    6. Aortic valve balloon valvuloplasty was successfully completed using an Harman 25mm balloon during rapid pacing and resulted in no hemodynamic compromise.  The 29mm Harman Ramsey 3 prosthetic valve was then positioned across the native aortic valve and was attempted to be deployed under rapid pacing.  However the balloon inside the valve stent never inflated due to  perforation of the balloon while the balloon was loaded up within the valve stent in the descending aorta.  The whole system including the delivery system and the 16Fr Harman sheath were removed along with the sheath to ensure that the whole system came out without harming the sheath due to bulky valve.  The Lunderquist wire was left in place in the LV and another 16Fr Harman sheath was inserted in the R CFA.  A 2nd #29 S3 valve was inserted and loaded onto the balloon more distally where the descending aorta was more vertical.  The valve was crossed with the system and the valve was deployed under rapid pacing of 160bpm with a depth of 80/20 with nominal pressure (33cc).   7. Subsequent transesophageal and transthoracic echocardiography and an ascending aortogram showed trace paravalvular insufficiency.    8. The valve sheath access arteriotomy was successfully closed with the double suture closure devices with successful hemostasis.    9. A final iliofemoral angiogram showed no evidence of extravasation or stenosis.   10. The LCFA 6Fr arteriotomy was successfully closed with a 6Fr AngioSeal closure device.  11. The temporary pacemaker was then removed.           NOTABLE EVENTS:  1. Valve deployment failure due to suspected perforation of balloon likely during loading of balloon unto valve stent and requiring removal of whole delivery system along with valve and sheath.     COMPLICATIONS:  1. None     SUMMARY:    1. Lifestyle-limiting severe aortic stenosis.  2. Successful transfemoral transcatheter aortic valve replacement with a 29mm Harman Ramsey 3 valve.  3. Temporary pacemaker insertion.  4. Aortic balloon valvuloplasty with a 25mm balloon.  5. Left heart catheterization with LVEDP of 15 mmHg.  6. Right and left common femoral arteriotomies successfully closed with closure devices.     PLAN:    1. Change Aspirin to 81 mg po daily lifelong.  2. Restart home apixaban dose 2.5mg bid  3. Bedrest per protocol (6  hours).  4. Admit to the primary inpatient team for further evaluation and management.  5. Echocardiogram tomorrow.   6. Lifelong antibiotic prophylaxis prior to all dental procedures.     Assessment/ Plan: Bud Merritt is a 89 year old male with PMHx pertinent for Afib on apixiban, s/p AV lisandro ablation with BiV PPM, (3/2017) COPD, prostate cancer, RA and chronic pain and severe aortic stenosis severe characterized by an area of 0.9 cm2, mean gradient 36 mmHg and peak velocity of 3.78 m/sec with LVEF 20-25% by echocardiogram on 3/2017. Patient presents to [facility] today for transcatheter aortic valve replacement.     # Severe aortic stenosis, now s/p 29 mm Harman Ramsey 3 Transcatheter Aortic Valve Replacement via RCFA.   # HF EF 20-25%  # NICM     Prior to procedure, pt noted to have a mean gradient 36 mmHG, TYREL 0.9 cm^2- Sheath sites soft without hematoma. No arrhythmias.   - Bedrest per protocol.    - Neuro checks, per protocol.  - Echocardiogram tomorrow.   - Monitor groin sites.   - EKG in morning.   - Parrish can be removed once patient is out of bed.   - Apixiban and ASA for anti-platelet therapy.   - Cardiac rehab/PT/OT.  - Diurese as needed/able.   - Daily weights.   - Strict intake/output.     # Atrial Fibrillation, s/p ablation with BiV PPM  - Apixa and asa as above    #  RA and chronic pain  - Norco PRN    #Hypertension  - Restart home anti-htn once blood pressure and heart rate are stable, likely tomorrow.     FEN: Advance as tolerated, cardiac, diabetic diet  PPx: DVT: ambulation   Lines: PIV  Code status: Full     POLLO Sims  Interventional Cardiology-[facility]  Pager [phone]

## 2017-09-27 NOTE — IP AVS SNAPSHOT
` `     [address] [facility]: [phone]                 INTERAGENCY TRANSFER FORM - NOTES (H&P, Discharge Summary, Consults, Procedures, Therapies)   2017                    Hospital Admission Date: 2017  GALLO FLOYD   : 1927  Sex: Male        Patient PCP Information     Provider PCP Type    Camron Aragon MD General         History & Physicals      H&P by Ludy Emerson APRN CNP at 2017  3:53 PM     Author:  Ludy Emerson APRN CNP Service:  Cardiology Author Type:  Nurse Practitioner    Filed:  2017 12:51 PM Date of Service:  2017  3:53 PM Creation Time:  2017  3:53 PM    Status:  Signed :  Ludy Emerson APRN CNP (Nurse Practitioner)               History and Physical     Gallo Floyd  MRN# [id number]        Date of Admission:  2017    HPI: Gallo Floyd is a[AS1.1] 89 year old male with PMHx pertinent for Afib on apixiban,[AS1.2] s/p AV lisandro ablation with BiV[AS1.3] PPM[AS1.2],[AS1.3] (3/2017) COPD, prostate cancer, RA and chronic pain[AS1.2] and severe aortic stenosis severe characterized by an area of 0.9 cm2, mean gradient 36 mmHg and peak velocity of 3.78 m/sec with LVEF 20-25% by echocardiogram on 3/2017. Patient presents to [facility] today for transcatheter aortic valve replacement.[AS1.3]       Past Medical History:  Past Medical History:   Diagnosis Date     Anemia      Atrial fibrillation (H) 2016     Cardiac pacemaker 03/10/2017     Cardiomyopathy (H)      CHF (congestive heart failure) (H)      COPD exacerbation (H) 3/2/2017     Depressive disorder      History of prostate cancer      Paroxysmal atrial fibrillation (H) 2016     Pure hypercholesterolemia      Rheumatoid arthritis(714.0)      Unspecified essential hypertension      Weakness generalized 3/2/2017       Past Surgical History:  Past Surgical History:   Procedure Laterality Date     ARTHROPLASTY KNEE Left 2016    Procedure: ARTHROPLASTY KNEE;  Surgeon: Denise  Cesar Thompson DO;  Location: [facility] OR     COLONOSCOPY  08/24/09     HC COLONOSCOPY THRU STOMA W BIOPSY/CAUTERY TUMOR/POLYP/LESION  8/31/2004     HC REPAIR ING HERNIA,5+Y/O,REDUCIBL  1996    Marlex mesh repair of bilateral inguinal hernias and drainage of bilateral scrotal hydroceles.     IMPLANT PACEMAKER  03/10/2017     IRRIGATION AND DEBRIDEMENT SOFT TISSUE LOWER EXTREMITY, COMBINED Left 3/15/2016    Procedure: COMBINED IRRIGATION AND DEBRIDEMENT SOFT TISSUE LOWER EXTREMITY;  Surgeon: Cesar Walker DO;  Location: [facility] OR       Allergies:     Allergies   Allergen Reactions     Amiodarone Other (See Comments)     Drop in DLCO     Lasix [Furosemide] Blisters     Blisters and sores in his mouth.        Medications:    No current facility-administered medications on file prior to encounter.   Current Outpatient Prescriptions on File Prior to Encounter:  HYDROcodone-acetaminophen (NORCO) 5-325 MG per tablet TAKE 1 TABLET BY MOUTH EVERY 6 HOURS AS NEEDED FOR MODERATE TO SEVERE PAIN   traMADol (ULTRAM) 50 MG tablet TAKE 1 TABLET 3 TIMES DAILY AS NEEDED FOR MODERATE PAIN   leflunomide (ARAVA) 10 MG tablet Take 1 tablet (10 mg) by mouth daily   dofetilide (TIKOSYN) 125 MCG capsule Take 1 capsule (125 mcg) by mouth 2 times daily   metoprolol (LOPRESSOR) 50 MG tablet TAKE 1 TABLET (50 MG) BY MOUTH 2 TIMES DAILY   atorvastatin (LIPITOR) 40 MG tablet Take 1 tablet (40 mg) by mouth daily (Patient taking differently: Take 40 mg by mouth every evening )   predniSONE (DELTASONE) 5 MG tablet Take 1 tablet (5 mg) by mouth daily   lisinopril (PRINIVIL,ZESTRIL) 10 MG tablet Take 1 tablet (10 mg) by mouth 2 times daily   tamsulosin (FLOMAX) 0.4 MG 24 hr capsule Take 1 capsule (0.4 mg) by mouth 2 times daily   ascorbic acid (VITAMIN C) 500 MG CPCR Take 500 mg by mouth daily   VITAMIN D, CHOLECALCIFEROL, PO Take 1 tablet by mouth daily Reported on 5/3/2017   calcium carbonate (OS-SHELIA 500 MG [place]. [place]) 500 MG tablet Take 1  tablet by mouth daily    apixaban ANTICOAGULANT (ELIQUIS) 2.5 MG tablet Take 1 tablet (2.5 mg) by mouth 2 times daily (Patient not taking: Reported on 9/25/2017)   Leuprolide Acetate (LUPRON DEPOT IM) Inject into the muscle every 6 months Reported on 5/3/2017   acetaminophen (TYLENOL) 650 MG CR tablet Take 650 mg by mouth every 8 hours as needed Reported on 4/5/2017   alendronate (FOSAMAX) 70 MG tablet Take 1 tablet (70 mg) by mouth every 7 days Take with over 8 ounces water and stay upright for at least 30 minutes after dose.  Take at least 60 minutes before breakfast       Social History:  Social History     Social History     Marital status:      Spouse name: N/A     Number of children: N/A     Years of education: N/A     Occupational History     Not on file.     Social History Main Topics     Smoking status: Former Smoker     Packs/day: 0.50     Years: 15.00     Types: Cigarettes     Quit date: 11/12/1998     Smokeless tobacco: Never Used      Comment: quit 15 yrs ago     Alcohol use No     Drug use: No     Sexual activity: Yes     Other Topics Concern     Caffeine Concern Yes     8 cups coffee day     Sleep Concern Yes     Weight Concern Yes     weight loss     Special Diet No     Exercise Yes     split firewood daily     Social History Narrative       Family History:  Family History   Problem Relation Age of Onset     CANCER Mother      CANCER Son      Unknown/Adopted Father      Alcoholism Brother        ROS:[AS1.1]  Negative[AS1.3]     Exam:  Temp:  [97.6  F (36.4  C)-97.9  F (36.6  C)] 97.6  F (36.4  C)  Heart Rate:  [68-71] 70  Resp:  [12-19] 12  BP: (128-163)/(72-92) 128/72  MAP:  [95 mmHg-97 mmHg] 97 mmHg  Arterial Line BP: (136-139)/(68) 139/68  SpO2:  [96 %-100 %] 96 %  General: Alert, interactive, NAD  Eyes: sclera anicteric, EOMI  Resp: clear to auscultation bilaterally, no crackles or wheezes  Cardiovascular: regular rate and rhythm, no murmur  GI: Soft, nontender, nondistended. +BS.  No HSM  or masses, no rebound or guarding.  :[AS1.1] Parrish[AS1.4]   MSK:[AS1.1] Generalized weakness[AS1.4]  Skin: Warm and dry, no jaundice or rash  Neuro: Alert & oriented x 3, Cns 2-12 intact, moves all extremities equally[AS1.1], Extremely Hughes[AS1.4]  Psych:[AS1.1] Approrpiate[AS1.4]    Data:  CMP  Recent Labs  Lab 09/27/17  1500 09/27/17  1231 09/27/17  0923 09/25/17  1622    137 138 135   POTASSIUM 4.0 4.0 4.7 4.5   CHLORIDE 108  --  105 103   CO2 22  --  26 26   ANIONGAP 8  --  7 7   * 107* 109* 111*   BUN 11  --  12 15   CR 0.82  --  1.01 1.00   GFRESTIMATED 88  --  69 70   GFRESTBLACK >90  --  84 85   SHELIA 7.7*  --  9.0 8.8   MAG 1.8  --   --   --    PHOS 3.2  --   --   --    PROTTOTAL 5.4*  --  6.7* 6.8   ALBUMIN 2.5*  --  3.1* 3.1*   BILITOTAL 0.2  --  0.4 0.5   ALKPHOS 68  --  86 83   AST 30  --  32 27   ALT 18  --  21 21     CBC  Recent Labs  Lab 09/27/17  1500 09/27/17  1231 09/27/17  0923 09/25/17  1622   WBC 3.4*  --  4.3 4.1   RBC 2.98*  --  3.59* 3.35*   HGB 9.0* 9.3* 10.6* 10.2*   HCT 28.1*  --  33.7* 31.9*   MCV 94  --  94 95   MCH 30.2  --  29.5 30.4   MCHC 32.0  --  31.5 32.0   RDW 15.8*  --  15.9* 15.6*     --  256 249       No results found for: TROPI, TROPONIN, TROPR, TROPN      EKG:[AS1.1] Paced with rates 70's-80's, narrow QRS[AS1.4]       SUMMARY:  1. Access was obtained in the right and left common femoral arteries and the left common femoral vein by the interventional cardiology team.  The arterial site used for valve delivery was pre-closed with two Perclose Proglide devices.   2. A 5Fr PACEL temporary pacemaker was positioned in the right ventricle and tested for adequate capture.    3. Iliofemoral angiography was not done since the right CFA was big and without evidence of disease on ultrasound.   A Lunderquist wire was used to support the Harman eSheath insertion.    4. The 6Fr pigtail catheter was positioned in the right-coronary cusp and angiography was used to  confirm the optimal implant angle.  The pigtail was then moved to the non-coronary cusp.    5. Access into the LV was obtained using an AL-2 catheter and a straight wire.  The AL-2 catheter was used to exchange the straight wire for a J-wire and a pigtail catheter was then positioned in the left ventricle.  The LVEDP was measured with a pressure of 15 mmHg.  The pigtail catheter was used to advance a Lunderquist wire into in the LV and the pigtail was removed.    6. Aortic valve balloon valvuloplasty was successfully completed using an Harman 25mm balloon during rapid pacing and resulted in no hemodynamic compromise.  The 29mm Harman Ramsey 3 prosthetic valve was then positioned across the native aortic valve and was attempted to be deployed under rapid pacing.  However the balloon inside the valve stent never inflated due to perforation of the balloon while the balloon was loaded up within the valve stent in the descending aorta.  The whole system including the delivery system and the 16Fr Harman sheath were removed along with the sheath to ensure that the whole system came out without harming the sheath due to bulky valve.  The Lunderquist wire was left in place in the LV and another 16Fr Harman sheath was inserted in the R CFA.  A 2nd #29 S3 valve was inserted and loaded onto the balloon more distally where the descending aorta was more vertical.  The valve was crossed with the system and the valve was deployed under rapid pacing of 160bpm with a depth of 80/20 with nominal pressure (33cc).   7. Subsequent transesophageal and transthoracic echocardiography and an ascending aortogram showed trace paravalvular insufficiency.    8. The valve sheath access arteriotomy was successfully closed with the double suture closure devices with successful hemostasis.    9. A final iliofemoral angiogram showed no evidence of extravasation or stenosis.   10. The LCFA 6Fr arteriotomy was successfully closed with a 6Fr  AngioSeal closure device.  11. The temporary pacemaker was then removed.           NOTABLE EVENTS:  1. Valve deployment failure due to suspected perforation of balloon likely during loading of balloon unto valve stent and requiring removal of whole delivery system along with valve and sheath.     COMPLICATIONS:  1. None     SUMMARY:    1. Lifestyle-limiting severe aortic stenosis.  2. Successful transfemoral transcatheter aortic valve replacement with a 29mm Harman Ramsey 3 valve.  3. Temporary pacemaker insertion.  4. Aortic balloon valvuloplasty with a 25mm balloon.  5. Left heart catheterization with LVEDP of 15 mmHg.  6. Right and left common femoral arteriotomies successfully closed with closure devices.     PLAN:    1. Change Aspirin to 81 mg po daily lifelong.  2. Restart home apixaban dose 2.5mg bid  3. Bedrest per protocol (6 hours).  4. Admit to the primary inpatient team for further evaluation and management.  5. Echocardiogram tomorrow.   6. Lifelong antibiotic prophylaxis prior to all dental procedures.     Assessment/ Plan: Bud Merritt is a[AS1.1] 89 year old male with PMHx pertinent for Afib on apixiban,[AS1.2] s/p AV lisandro ablation with BiV[AS1.3] PPM[AS1.2],[AS1.3] (3/2017) COPD, prostate cancer, RA and chronic pain[AS1.2] and severe aortic stenosis severe characterized by an area of 0.9 cm2, mean gradient 36 mmHg and peak velocity of 3.78 m/sec with LVEF 20-25% by echocardiogram on 3/2017. Patient presents to [facility] today for transcatheter aortic valve replacement.[AS1.3]     # Severe aortic stenosis, now s/p 29 mm Harman Ramsey 3 Transcatheter Aortic Valve Replacement via RCFA.[AS1.1]   # HF EF 20-25%  # NICM[AS1.4]     Prior to procedure, pt noted to have a mean gradient[AS1.1] 36[AS1.4] mmHG, TYREL[AS1.1] 0.9[AS1.4] cm^2- Sheath sites soft without hematoma. No arrhythmias.   - Bedrest per protocol.    - Neuro checks, per protocol.  - Echocardiogram tomorrow.   - Monitor groin sites.   - EKG in  morning.   - Parrish can be removed once patient is out of bed.   -[AS1.1] Apixiban a[AS1.4]nd ASA for anti-platelet therapy.   - Cardiac rehab/PT/OT.  - Diurese as needed/able.   - Daily weights.   - Strict intake/output.     # Atrial Fibrillation[AS1.1], s/p ablation with BiV PPM  - Apixa and asa as above[AS1.4]    #[AS1.1]  RA and chronic pain[AS1.2]  - Norco PRN    #[AS1.4]Hypertension  - Restart home anti-htn once blood pressure and heart rate are stable, likely tomorrow.     FEN: Advance as tolerated, cardiac, diabetic diet  PPx: DVT:[AS1.1] ambulation[AS1.4]   Lines:[AS1.1] PIV[AS1.4]  Code status: Full     POLLO Sims  Interventional Cardiology-CSI  Pager[AS1.1] [phone][AS1.4]       Revision History        User Key Date/Time User Provider Type Action    > AS1.4 9/28/2017 12:51 PM Ludy Emerson, POLLO CNP Nurse Practitioner Sign     AS1.3 9/27/2017  4:05 PM Ludy Emerson, POLLO CNP Nurse Practitioner      AS1.2 9/27/2017  3:57 PM Ludy Emerson, POLLO CNP Nurse Practitioner      AS1.1 9/27/2017  3:53 PM Ludy Emerson, POLLO CNP Nurse Practitioner             H&P signed by Pam Provider at 9/28/2017 10:45 AM      Author:  Jeffy Orozco Service:  (none) Author Type:  Physician    Filed:  9/28/2017 10:45 AM Date of Service:  9/28/2017  8:02 AM Creation Time:  9/28/2017 10:45 AM    Status:  Signed :  Pam Provider (Physician)     Scan on 9/28/2017 10:45 AM by Pam, Provider : [facility], H/P, 09/25/17 1          Revision History        User Key Date/Time User Provider Type Action    > [N/A] 9/28/2017 10:45 AM Scan, Provider Physician Sign            H&P by Nicole Santiago PA-C at 9/25/2017  1:30 PM     Author:  Nicole Santiago PA-C Service:  (none) Author Type:  Physician Assistant    Filed:  9/25/2017  6:45 PM Encounter Date:  9/25/2017 Status:  Signed    :  Nicole Santiago PA-C (Physician Assistant)             Pre-Operative H & P      CC:  Preoperative exam to assess for increased cardiopulmonary risk while undergoing surgery and anesthesia.    Date of Encounter: September 25, 2017   Primary Care Physician:  Camron Aragon  Bud Merritt is a 89 year old male who is scheduled for a Transfemoral Approach (Harman) Aortic Valve Implant, Cardiopulmonary Bypass Standby on 9/27/17 with Dr. Janes Kingsley for severe aortic stenosis at the [facility].     Mr. Merritt has severe aortic stenosis.  His other significant cardiac issues include AFIB, s/p cardiac pacemaker (dependent) 3/2017 for cardiomyopathy and CHF CLASS III.  He has been diagnosed with COPD, but does not use any inhalers.  According to [facility] records he was inpatient March 2017 including a COPD exacerbation.  He has RA for which he takes Prednisone 5 mg daily and Arava.  His other medical history includes prostate cancer and chronic pain.  He is severely limited in activity due to RA and has increased fatigue.  He ambulates with a cane and uses a wheel chair.  He cannot lie flat due to arthritis.  He also has a right lower extremity leg wound that has dehisced and is weeping.  This was evaluated by his PCP and determined to be healing and not a reason to cancel surgery.        History is obtained from the patient and medical record including Care Everywhere.    Past Medical History  Past Medical History:   Diagnosis Date     Anemia      Atrial fibrillation (H) 07/01/2016     Cardiac pacemaker 03/10/2017     Cardiomyopathy (H)      CHF (congestive heart failure) (H)      COPD exacerbation (H) 3/2/2017     Depressive disorder      History of prostate cancer      Paroxysmal atrial fibrillation (H) 7/6/2016     Pure hypercholesterolemia      Rheumatoid arthritis(714.0)      Unspecified essential hypertension      Weakness generalized 3/2/2017         Past Surgical History  Past Surgical History:   Procedure Laterality Date     ARTHROPLASTY  KNEE Left 1/12/2016    Procedure: ARTHROPLASTY KNEE;  Surgeon: Cesar Walker DO;  Location: [facility] OR     COLONOSCOPY  08/24/09      COLONOSCOPY THRU STOMA W BIOPSY/CAUTERY TUMOR/POLYP/LESION  8/31/2004      REPAIR ING HERNIA,5+Y/O,REDUCIBL  1996    Marlex mesh repair of bilateral inguinal hernias and drainage of bilateral scrotal hydroceles.     IMPLANT PACEMAKER  03/10/2017     IRRIGATION AND DEBRIDEMENT SOFT TISSUE LOWER EXTREMITY, COMBINED Left 3/15/2016    Procedure: COMBINED IRRIGATION AND DEBRIDEMENT SOFT TISSUE LOWER EXTREMITY;  Surgeon: Cesar Walker DO;  Location: [facility] OR       Hx of Blood transfusions/reactions: No reactions     Prior to Admission Medications  Current Outpatient Prescriptions   Medication Sig Dispense Refill     HYDROcodone-acetaminophen (NORCO) 5-325 MG per tablet TAKE 1 TABLET BY MOUTH EVERY 6 HOURS AS NEEDED FOR MODERATE TO SEVERE PAIN 30 tablet 0     traMADol (ULTRAM) 50 MG tablet TAKE 1 TABLET 3 TIMES DAILY AS NEEDED FOR MODERATE PAIN 90 tablet 0     leflunomide (ARAVA) 10 MG tablet Take 1 tablet (10 mg) by mouth daily 30 tablet 11     dofetilide (TIKOSYN) 125 MCG capsule Take 1 capsule (125 mcg) by mouth 2 times daily 60 capsule 5     metoprolol (LOPRESSOR) 50 MG tablet TAKE 1 TABLET (50 MG) BY MOUTH 2 TIMES DAILY 120 tablet 3     atorvastatin (LIPITOR) 40 MG tablet Take 1 tablet (40 mg) by mouth daily (Patient taking differently: Take 40 mg by mouth every evening ) 90 tablet 2     predniSONE (DELTASONE) 5 MG tablet Take 1 tablet (5 mg) by mouth daily 100 tablet 4     apixaban ANTICOAGULANT (ELIQUIS) 2.5 MG tablet Take 1 tablet (2.5 mg) by mouth 2 times daily (Patient not taking: Reported on 9/25/2017) 180 tablet 3     Leuprolide Acetate (LUPRON DEPOT IM) Inject into the muscle every 6 months Reported on 5/3/2017       lisinopril (PRINIVIL,ZESTRIL) 10 MG tablet Take 1 tablet (10 mg) by mouth 2 times daily 62 tablet 11     spironolactone (ALDACTONE) 25 MG tablet  Take 1 tablet (25 mg) by mouth daily 30 tablet 11     acetaminophen (TYLENOL) 650 MG CR tablet Take 650 mg by mouth every 8 hours as needed Reported on 4/5/2017       alendronate (FOSAMAX) 70 MG tablet Take 1 tablet (70 mg) by mouth every 7 days Take with over 8 ounces water and stay upright for at least 30 minutes after dose.  Take at least 60 minutes before breakfast 12 tablet 3     tamsulosin (FLOMAX) 0.4 MG 24 hr capsule Take 1 capsule (0.4 mg) by mouth 2 times daily 60 capsule      ascorbic acid (VITAMIN C) 500 MG CPCR Take 500 mg by mouth daily       VITAMIN D, CHOLECALCIFEROL, PO Take 1 tablet by mouth daily Reported on 5/3/2017       calcium carbonate (OS-SHELIA 500 MG [place]. [place]) 500 MG tablet Take 1 tablet by mouth daily            Allergies  Amiodarone and Lasix [furosemide]     Social History  Social History     Social History     Marital status:      Spouse name: N/A     Number of children: N/A     Years of education: N/A     Occupational History     Not on file.     Social History Main Topics     Smoking status: Former Smoker     Packs/day: 0.50     Years: 15.00     Types: Cigarettes     Quit date: 11/12/1998     Smokeless tobacco: Never Used      Comment: quit 15 yrs ago     Alcohol use No     Drug use: No     Sexual activity: Yes     Other Topics Concern     Caffeine Concern Yes     8 cups coffee day     Sleep Concern Yes     Weight Concern Yes     weight loss     Special Diet No     Exercise Yes     split firewood daily     Social History Narrative          Family History  Family History   Problem Relation Age of Onset     CANCER Mother      CANCER Son      Unknown/Adopted Father      Alcoholism Brother         ROS  10 point review of systems performed.  All pertinent positives noted, otherwise Negative.      Cardiac Testing    >ECHO 3/6/17  Moderate left ventricular dilation is present.  The Ejection Fraction is estimated at 20-25%.  Normal contraction of basal segments with akinetic mid and  "distal segments. This wall motion abnormality is new since prior study on 12/13/2016.  Consistent with stress cardiomyopathy, if no LAD disease.  Severe aortic stenosis is present.  The aortic valve area is 0.9 cm^2, peak aortic velocity is 3.8 m/sec, mean gradient across the aortic valve is 37 mmHg.  >Stress Test    >CATH 7/1/16:  1. Scattered mild to moderate disease throughout all coronaries. Tightest stenosis appears to be 40-50% RCA stenosis. Right dominant circulation. L Main is 40% 2. Aortic valve was not crossed. 3. Patient with rapid atrial fibrillation with minimal symptoms.    Labs: (personally reviewed):[CS1.1]  Lab Results   Component Value Date    WBC 4.1 09/25/2017    HGB 10.2 (L) 09/25/2017    HCT 31.9 (L) 09/25/2017     09/25/2017    INR 1.08 06/30/2017    PTT 32 03/27/2017     09/25/2017    POTASSIUM 4.5 09/25/2017    SHELIA 8.8 09/25/2017     (H) 09/25/2017    CR 1.00 09/25/2017    BUN 15 09/25/2017    CO2 26 09/25/2017    ALT 21 09/25/2017    AST 27 09/25/2017    BILITOTAL 0.5 09/25/2017    ALKPHOS 83 09/25/2017[CS1.2]           Physical Exam:  No LMP for male patient.   Vital signs:  /88  Pulse 70  Temp 97.6  F (36.4  C) (Oral)  Resp 16  Ht 1.676 m (5' 6\")  Wt 68 kg (150 lb)  SpO2 95%  BMI 24.21 kg/m2    Constitutional: Awake, alert, cooperative, no apparent distress, and appears stated age.  Eyes: Pupils equal, round and reactive to light, sclera clear, conjunctiva normal.  HENT: Normocephalic, oral pharynx with moist mucus membranes. No goiter appreciated.   Respiratory: Clear to auscultation bilaterally, no crackles or wheezing.  Cardiovascular: Regular rate and rhythm, with overt murmur noted. Moderate LE edema. Palpable pulses to radial  DP and PT arteries.   GI: Normal bowel sounds, soft, non-distended, non-tender, no masses palpated  Skin: dehisced wound right LE without erythema or edema.  No rashes at anticipated surgical site.   Musculoskeletal: Limited " ROM of neck. There is no redness, warmth, or swelling of the exposed joints. Gross motor strength is mildly decreased.    Neurologic: Awake, oriented to name, place and time.  Ambulates with cane   Neuropsychiatric: Calm, cooperative. Normal affect.     Assessment/Plan  Bud Merritt is a 89 year old male who is scheduled for a Transfemoral Approach (Harman) Aortic Valve Implant, Cardiopulmonary Bypass Standby on 9/27/17 with Dr. Janes Kingsley for severe aortic stenosis at the [facility]. PAC referral for risk assessment and optimization for anesthesia with comorbid conditions of:    Pre-operative considerations:  1.  Cardiac:  Functional status METS  = 1, Risk of Major Adverse Cardiac event: >11%  -Cardiac pacemaker implanted 3/10/17 for cardiomyopathy,pacemaker dependent, last interrogation   -AFIB, s/p ablation 6/29/17  -Severe AS   - CHF NYHA CLASS: II-III    >ECHO 3/6/17  Moderate left ventricular dilation is present.  The Ejection Fraction is estimated at 20-25%.  Normal contraction of basal segments with akinetic mid and distal segments. This wall motion abnormality is new since prior study on 12/13/2016.  Consistent with stress cardiomyopathy, if no LAD disease.  Severe aortic stenosis is present.  The aortic valve area is 0.9 cm^2, peak aortic velocity is 3.8 m/sec, mean gradient across the aortic valve is 37 mmHg.      >CATH 7/1/16:  1. Scattered mild to moderate disease throughout all coronaries. Tightest stenosis appears to be 40-50% RCA stenosis. Right dominant circulation. L Main is 40% 2. Aortic valve was not crossed. 3. Patient with rapid atrial fibrillation with minimal symptoms.  -Patient on  Eliquist and will stop 9/25/17  2.  Pulm:   KATHERIN risk: Intermediate  -COPD with exacerbation noted [facility] 3/2017, patient does not use inhalers   >PFT date: 9/16/16:  FEV1-%Pred-Pr = 69, FEV1FVC-Pred %= 74, DLCO-%Pred-Pre = 51   3. MS  -RA on chronic prednisone 5 mg  4.   GI:  Risk of PONV score =1 .  If > 2, anti-emetic intervention recommended.    Patient is optimized and is acceptable candidate for the proposed procedure.  No further diagnostic evaluation is needed.     Nicole Santiago MS, PA-C   Preoperative Assessment Center  [facility]  [facility]  Phone: [phone]  Fax: [phone][CS1.1]       Revision History        User Key Date/Time User Provider Type Action    > CS1.2 9/25/2017  6:45 PM Nicole Santiago PA-C Physician Assistant Sign     CS1.1 9/25/2017  6:36 PM Nicole Santiago PA-C Physician Assistant                   Discharge Summaries     No notes of this type exist for this encounter.         Consult Notes      Consults by Masha Beasley at 9/28/2017  4:06 PM     Author:  Masha Beasley Service:  Social Work Author Type:      Filed:  9/28/2017  4:06 PM Date of Service:  9/28/2017  4:06 PM Creation Time:  9/28/2017  4:01 PM    Status:  Signed :  Masha Beasley ()     Consult Orders:    1. Social Work IP Consult [[id number]] ordered by Filipe Allen MD at 09/27/17 1421                Social Work Services Progress Note    Hospital Day: 2  Date of Initial Social Work Evaluation:  9/25/17 in clinic pre-surgery  Collaborated with:  Team rounds, chart review    Data:    Pt was admitted s/p TAVR procedure.   consult received for d/c planning.    Intervention:    Chart reviewed.  Noted in SW assessment and today's OT note that pt prefers to d/c to home.  OT recommends TCU at this time, but if pt goes home they recommend home PT/OT/RN.  Attempted to meet with pt to assess willingness to consider TCU and provide education, but he was sound asleep and no family was present.  Unable to return or call family d/t scheduling conflicts.    Assessment:   Unable to assess today.    Plan:    Anticipated Disposition:  TCU vs. Home pending discussion with pt.    Barriers to d/c plan:  Pt  is not medically stable.    Follow Up:  SW continues to follow for support to pt and family, resource referral, and d/c planning.    KIRAN Francis, [facility]  Adult ICU Clinical   Pager [phone][HS1.1]           Revision History        User Key Date/Time User Provider Type Action    > HS1.1 9/28/2017  4:06 PM Masha Beasley  Sign                     Progress Notes - Physician (Notes from 09/27/17 through 09/30/17)      Progress Notes by Livia Nielsen RN at 9/29/2017  5:33 PM     Author:  Livia Nielsen RN Service:  (none) Author Type:  Registered Nurse    Filed:  9/29/2017  5:35 PM Date of Service:  9/29/2017  5:33 PM Creation Time:  9/29/2017  5:33 PM    Status:  Signed :  Livia Nielsen RN (Registered Nurse)                   Admission          9/27/2017  8:56 AM  -----------------------------------------------------------  Reason for admission:  Transferred from ICU  Admitted from:  Via: wheelchair  Accompanied by:Kingsley RN  Belongings: Placed in closet;  Admission Profile: complete  Teaching: orientation to unit and call light- call light within reach, call don't fall, use of console, meal times, when to call for the RN, and enforced importance of safety   Access: PIV  Telemetry:Placed on pt  Ht./Wt.: complete  2 RN Skin Assessment Completed By: Livia PITTMAN RN and Stanford RN  Pt status:[EW1.1]    Temp:  [97.7  F (36.5  C)-98.4  F (36.9  C)] 98.4  F (36.9  C)  Heart Rate:  [] 69  Resp:  [14-16] 16  BP: ()/() 110/62  SpO2:  [48 %-99 %] 48 %[EW1.2]     Revision History        User Key Date/Time User Provider Type Action    > EW1.2 9/29/2017  5:35 PM Livia Nielsen, RN Registered Nurse Sign     EW1.1 9/29/2017  5:33 PM Livia Nielsen, [facility] Registered Nurse             Progress Notes by Masha Beasley at 9/29/2017  3:37 PM     Author:  Masha Beasley Service:  Social Work Author Type:      Filed:   9/29/2017  4:52 PM Date of Service:  9/29/2017  3:37 PM Creation Time:  9/29/2017  3:37 PM    Status:  Addendum :  Masha Beasley ()         ADDENDUM:  Received call from Davey in Admissions at [facility] stating they can accept pt ONLY IF pt can leave here by 10:30 AM Saturday.  They CANNOT accept if pt arrives at their facility past 12 noon.[HS1.1]  Spoke with pt's wife Tierra ([phone]).  She states she will have transportation here by 10:30.    Weekend SW:  Please call Davey at [facility] ([phone] is his personal admissions phone) first thing Saturday AM to confirm d/c plans.  Please notify bedside nurse and NST for a packet.  Please fax d/c orders and Davey of PAS # as below.  Discharge orders are already completed.[HS1.2]  Lastly, please cancel referral at [facility] if this plan works out.[HS1.3]    Social Work Services Progress Note    Hospital Day: 3  Date of Initial Social Work Evaluation:  9/25/17 in clinic pre-surgery  Collaborated with:  RNCC, bedside nurse, team rounds, chart review    Data:    PT was admitted s/p TAVR.  OT is recommending TCU.  Pt will be medically stable for d/c on Saturday.    Intervention:    Met with pt, his wife Tierra, son Eusebio, and dtr-in-law along with RNCC and beside nurse.  Discussed recommendation of TCU.  Pt's wife expressed significant concern regarding being unable to properly care for pt's needs post-surgery and is worried about pt falling d/t new weakness and instability.  Pt is agreeable to TCU stay close to home.      Contacted the following TCUs for admission Saturday:    1.  [facility] ([phone], f: [phone])--Both pt and his wife initially state that this is the ONLY facility they will consider.  Contacted Admissions who stated they are full for the weekend, but after more discussion Admissions liaison Davey states they are willing to review referral, but cannot make any promises for a Saturday admission.   Awaiting callback.    After further discussion with pt and his wife, they ended up being agreeable to the following two other facilities:    2.  [facility]--Contacted facility and they are unable to review referrals until Monday.    3.  [facility] (weekend admissions: [phone], f: [phone])--Faxed referral.  Spoke with admissions liaison Betito who states they will need PT notes prior to final review.  PT consult is ordered and await their recommendations.  Sent text page to PT asking that they prioritize pt for early AM.[HS1.4]    PAS completed:  [id number][HS1.5]    Assessment:   Pt and his wife are agreeable to pt going to TCU.  Pt's neighbors will provide transportation as they are planning to visit tomorrow.    Plan:    Anticipated Disposition:  Facility:  TCU, two facilities are reviewing as above.    Barriers to d/c plan:  Pt is not medically stable, but will be on Saturday.    Follow Up:  Weekend SW will f/u to coordinate d/c.[HS1.4]  Please notify pt's wife Tierra of d/c location.[HS1.6]    KIRAN Francis, [facility]  Adult ICU Clinical   Pager [phone][HS1.4]           Revision History        User Key Date/Time User Provider Type Action    > HS1.3 9/29/2017  4:52 PM Masha Beasley  Addend     [N/A] 9/29/2017  4:51 PM Masha Beasley  Addend     HS1.2 9/29/2017  4:35 PM aMsha Beasley  Incomplete Revision     HS1.1 9/29/2017  4:31 PM Masha Beasley       HS1.5 9/29/2017  4:00 PM Masha Beasley  Sign     HS1.6 9/29/2017  3:52 PM Masha Beasley       HS1.4 9/29/2017  3:37 PM Masha Beasley              Progress Notes by Jimmy Waldron RN at 9/29/2017  3:16 PM     Author:  Jimmy Waldron RN Service:  (none) Author Type:  Care Coordinator    Filed:  9/29/2017  3:35 PM Date of Service:  9/29/2017  3:16 PM Creation Time:  9/29/2017  3:16 PM    Status:  Signed :   Chivo, Azmera, RN (Care Coordinator)           Care Coordinator Progress Note     Admission Date/Time:  9/27/2017  Attending MD:  Cosme Gunn MD     Data  Chart reviewed, discussed with interdisciplinary team.   Patient was admitted for:    Severe aortic stenosis  S/P TAVR (transcatheter aortic valve replacement)  Acute on chronic systolic congestive heart failure (H).    Concerns with insurance coverage for discharge needs: None.  Current Living Situation: Patient lives with spouse.  Support System: Supportive and Involved  Services Involved: None currently.  Pt stated he had home RN from [facility] in the past but was d/c'd 2 weeks ago.  Transportation: Family or Friend will provide  Barriers to Discharge: pt is not medically ready for d/c.      Assessment  Pt is s/p TAVR.  Met with pt, spouse, son and dtr in-law along SW to introduce self and assess d/c needs.  Pt was assessed by CR and TCU is recommended.  Pt expressed to the team that his wish is to go home and has refused TCU.  This morning during our meeting pt stated if TCU is recommended he is willing to go to TCU.  Pt spouse also expressed that she is not comfortable to take pt to home if he is not back to his base line.  Pt and spouse stated pt had home care service from [facility] but was d/c'd 2 weeks ago.  Pt and spouse are open for home care service ( [facility]) if no rehab place is found that can take pt or if no coverage.  Pt and family agreed referral to be send to TCU facilities.   SW will assist with TCU placement.  The above info have been shared with CSI team.       Plan  Anticipated Discharge Date:  TBD.  Anticipated Discharge Plan:   TCU.  SW will assist with TCU placement.  CC will cont to follow plan of care.      Jimmy Waldron RN, PHN, BSN  4A and 4E/ ICU  Care Coordinator  Phone: [phone]  Pager: [phone][AT1.1]             Revision History        User Key Date/Time User Provider Type Action    > AT1.1  9/29/2017  3:35 PM Jimmy Waldron, RN Care Coordinator Sign            Progress Notes by Ludy Emerson APRN CNP at 9/29/2017  2:25 PM     Author:  Ludy Emerson APRN CNP Service:  Cardiology Author Type:  Nurse Practitioner    Filed:  9/29/2017  2:37 PM Date of Service:  9/29/2017  2:25 PM Creation Time:  9/29/2017  2:25 PM    Status:  Addendum :  Ludy Emerson APRN CNP (Nurse Practitioner)           Interventional Cardiology Progress Note  Subjective and Interval:  Patient with labile BP. Has not had much PO intake due to lack of appetitie. QT prolongation has resolved from yesterday, though patient was given Zofran today for nausea, so we will monitor one more night. Patient denies pain, SOB, syncope or palpitations. Stable.    Past Medical History:  Past Medical History:   Diagnosis Date     Anemia      Atrial fibrillation (H) 07/01/2016     Cardiac pacemaker 03/10/2017     Cardiomyopathy (H)      CHF (congestive heart failure) (H)      COPD exacerbation (H) 3/2/2017     Depressive disorder      History of prostate cancer      Paroxysmal atrial fibrillation (H) 7/6/2016     Pure hypercholesterolemia      Rheumatoid arthritis(714.0)      Unspecified essential hypertension      Weakness generalized 3/2/2017       Past Surgical History:  Past Surgical History:   Procedure Laterality Date     ARTHROPLASTY KNEE Left 1/12/2016    Procedure: ARTHROPLASTY KNEE;  Surgeon: Cesar Walker DO;  Location: [facility] OR     COLONOSCOPY  08/24/09     HC COLONOSCOPY THRU STOMA W BIOPSY/CAUTERY TUMOR/POLYP/LESION  8/31/2004     HC REPAIR ING HERNIA,5+Y/O,REDUCIBL  1996    Marlex mesh repair of bilateral inguinal hernias and drainage of bilateral scrotal hydroceles.     HEART CATH FEMORAL CANNULIZATION WITH OPEN STANDBY REPAIR AORTIC VALVE N/A 9/27/2017    Procedure: HEART CATH FEMORAL CANNULIZATION WITH OPEN STANDBY REPAIR AORTIC VALVE;;  Surgeon: Jaron Alejandra MD;  Location: [facility] OR     IMPLANT  PACEMAKER  03/10/2017     IRRIGATION AND DEBRIDEMENT SOFT TISSUE LOWER EXTREMITY, COMBINED Left 3/15/2016    Procedure: COMBINED IRRIGATION AND DEBRIDEMENT SOFT TISSUE LOWER EXTREMITY;  Surgeon: Cesar Walker DO;  Location:  OR     TRANSCATHETER AORTIC VALVE IMPLANT ANESTHESIA N/A 9/27/2017    Procedure: TRANSCATHETER AORTIC VALVE IMPLANT ANESTHESIA;  Right Transfemoral Approach (Harman Ramsey 3 29mm) Aortic Valve Implant,  Cardiopulmonary Bypass Standby, Transthoracic Echocardiogram by Derian Queen(Echo Tech);  Surgeon: GENERIC ANESTHESIA PROVIDER;  Location: [facility] OR       Allergies:     Allergies   Allergen Reactions     Amiodarone Other (See Comments)     Drop in DLCO     Lasix [Furosemide] Blisters     Blisters and sores in his mouth.        Medications:    No current facility-administered medications on file prior to encounter.   Current Outpatient Prescriptions on File Prior to Encounter:  HYDROcodone-acetaminophen (NORCO) 5-325 MG per tablet TAKE 1 TABLET BY MOUTH EVERY 6 HOURS AS NEEDED FOR MODERATE TO SEVERE PAIN   traMADol (ULTRAM) 50 MG tablet TAKE 1 TABLET 3 TIMES DAILY AS NEEDED FOR MODERATE PAIN   leflunomide (ARAVA) 10 MG tablet Take 1 tablet (10 mg) by mouth daily   dofetilide (TIKOSYN) 125 MCG capsule Take 1 capsule (125 mcg) by mouth 2 times daily   atorvastatin (LIPITOR) 40 MG tablet Take 1 tablet (40 mg) by mouth daily (Patient taking differently: Take 40 mg by mouth every evening )   predniSONE (DELTASONE) 5 MG tablet Take 1 tablet (5 mg) by mouth daily   ascorbic acid (VITAMIN C) 500 MG CPCR Take 500 mg by mouth daily   VITAMIN D, CHOLECALCIFEROL, PO Take 1 tablet by mouth daily Reported on 5/3/2017   calcium carbonate (OS-SHELIA 500 MG [place]. CA) 500 MG tablet Take 1 tablet by mouth daily    [DISCONTINUED] metoprolol (LOPRESSOR) 50 MG tablet TAKE 1 TABLET (50 MG) BY MOUTH 2 TIMES DAILY   apixaban ANTICOAGULANT (ELIQUIS) 2.5 MG tablet Take 1 tablet (2.5 mg) by mouth 2 times daily  (Patient not taking: Reported on 9/25/2017)   Leuprolide Acetate (LUPRON DEPOT IM) Inject into the muscle every 6 months Reported on 5/3/2017   [DISCONTINUED] lisinopril (PRINIVIL,ZESTRIL) 10 MG tablet Take 1 tablet (10 mg) by mouth 2 times daily   acetaminophen (TYLENOL) 650 MG CR tablet Take 650 mg by mouth every 8 hours as needed Reported on 4/5/2017   alendronate (FOSAMAX) 70 MG tablet Take 1 tablet (70 mg) by mouth every 7 days Take with over 8 ounces water and stay upright for at least 30 minutes after dose.  Take at least 60 minutes before breakfast     Exam:  Temp:  [97.7  F (36.5  C)-98.4  F (36.9  C)] 98.2  F (36.8  C)  Heart Rate:  [] 68  Resp:  [14-16] 16  BP: ()/(53-76) 80/53  SpO2:  [93 %-99 %] 96 %  General: Alert, interactive, NAD  Eyes: sclera anicteric, EOMI  Resp: clear to auscultation bilaterally, no crackles or wheezes  Cardiovascular: regular rate and rhythm, no murmur  GI: Soft, nontender, nondistended. +BS.  No HSM or masses, no rebound or guarding.  : [place]   MSK: Generalized weakness  Skin: Warm and dry, no jaundice or rash  Neuro: Alert & oriented x 3, Cns 2-12 intact, moves all extremities equally, Extremely [place]  Psych: Approrpiate    Data:  CMP    Recent Labs  Lab 09/29/17  0513 09/28/17  0335 09/27/17  1500 09/27/17  1231 09/27/17  0923    140 137 137 138   POTASSIUM 3.9 3.6 4.0 4.0 4.7   CHLORIDE 105 109 108  --  105   CO2 22 21 22  --  26   ANIONGAP 10 10 8  --  7   GLC 73 77 105* 107* 109*   BUN 10 11 11  --  12   CR 0.85 0.82 0.82  --  1.01   GFRESTIMATED 85 88 88  --  69   GFRESTBLACK >90 >90 >90  --  84   SHELIA 8.2* 7.6* 7.7*  --  9.0   MAG  --  1.6 1.8  --   --    PHOS  --  3.1 3.2  --   --    PROTTOTAL 5.2* 4.8* 5.4*  --  6.7*   ALBUMIN 2.4* 2.3* 2.5*  --  3.1*   BILITOTAL 0.5 0.5 0.2  --  0.4   ALKPHOS 66 58 68  --  86   AST 37 33 30  --  32   ALT 14 15 18  --  21     CBC    Recent Labs  Lab 09/29/17  0656 09/28/17  0335 09/28/17  0012 09/27/17  1500   WBC 6.3  4.3 4.7 3.4*   RBC 2.82* 2.77* 2.81* 2.98*   HGB 8.4* 8.2* 8.4* 9.0*   HCT 26.2* 25.6* 25.8* 28.1*   MCV 93 92 92 94   MCH 29.8 29.6 29.9 30.2   MCHC 32.1 32.0 32.6 32.0   RDW 16.0* 15.9* 16.0* 15.8*    184 182 184       No results found for: TROPI, TROPONIN, TROPR, TROPN      EKG: Paced with rates 70's-80's, narrow QRS       SUMMARY:  1. Access was obtained in the right and left common femoral arteries and the left common femoral vein by the interventional cardiology team.  The arterial site used for valve delivery was pre-closed with two Perclose Proglide devices.   2. A 5Fr PACEL temporary pacemaker was positioned in the right ventricle and tested for adequate capture.    3. Iliofemoral angiography was not done since the right CFA was big and without evidence of disease on ultrasound.   A Lunderquist wire was used to support the Harman eSheath insertion.    4. The 6Fr pigtail catheter was positioned in the right-coronary cusp and angiography was used to confirm the optimal implant angle.  The pigtail was then moved to the non-coronary cusp.    5. Access into the LV was obtained using an AL-2 catheter and a straight wire.  The AL-2 catheter was used to exchange the straight wire for a J-wire and a pigtail catheter was then positioned in the left ventricle.  The LVEDP was measured with a pressure of 15 mmHg.  The pigtail catheter was used to advance a Lunderquist wire into in the LV and the pigtail was removed.    6. Aortic valve balloon valvuloplasty was successfully completed using an Harman 25mm balloon during rapid pacing and resulted in no hemodynamic compromise.  The 29mm Harman Ramsey 3 prosthetic valve was then positioned across the native aortic valve and was attempted to be deployed under rapid pacing.  However the balloon inside the valve stent never inflated due to perforation of the balloon while the balloon was loaded up within the valve stent in the descending aorta.  The whole system  including the delivery system and the 16Fr Harman sheath were removed along with the sheath to ensure that the whole system came out without harming the sheath due to bulky valve.  The Lunderquist wire was left in place in the LV and another 16Fr Harman sheath was inserted in the R CFA.  A 2nd #29 S3 valve was inserted and loaded onto the balloon more distally where the descending aorta was more vertical.  The valve was crossed with the system and the valve was deployed under rapid pacing of 160bpm with a depth of 80/20 with nominal pressure (33cc).   7. Subsequent transesophageal and transthoracic echocardiography and an ascending aortogram showed trace paravalvular insufficiency.    8. The valve sheath access arteriotomy was successfully closed with the double suture closure devices with successful hemostasis.    9. A final iliofemoral angiogram showed no evidence of extravasation or stenosis.   10. The LCFA 6Fr arteriotomy was successfully closed with a 6Fr AngioSeal closure device.  11. The temporary pacemaker was then removed.           NOTABLE EVENTS:  1. Valve deployment failure due to suspected perforation of balloon likely during loading of balloon unto valve stent and requiring removal of whole delivery system along with valve and sheath.     COMPLICATIONS:  1. None     SUMMARY:    1. Lifestyle-limiting severe aortic stenosis.  2. Successful transfemoral transcatheter aortic valve replacement with a 29mm Harman Ramsey 3 valve.  3. Temporary pacemaker insertion.  4. Aortic balloon valvuloplasty with a 25mm balloon.  5. Left heart catheterization with LVEDP of 15 mmHg.  6. Right and left common femoral arteriotomies successfully closed with closure devices.     PLAN:    1. Change Aspirin to 81 mg po daily lifelong.  2. Restart home apixaban dose 2.5mg bid  3. Bedrest per protocol (6 hours).  4. Admit to the primary inpatient team for further evaluation and management.  5. Echocardiogram tomorrow.   6.  Lifelong antibiotic prophylaxis prior to all dental procedures.     Assessment/ Plan: Bud Merritt is a 89 year old male with PMHx pertinent for Afib on apixiban, s/p AV lisandro ablation with BiV PPM, (3/2017) COPD, prostate cancer, RA and chronic pain and severe aortic stenosis severe characterized by an area of 0.9 cm2, mean gradient 36 mmHg and peak velocity of 3.78 m/sec with LVEF 20-25% by echocardiogram on 3/2017. Patient presents to [facility] today for transcatheter aortic valve replacement.     # Severe aortic stenosis, now s/p 29 mm Harman Ramsey 3 Transcatheter Aortic Valve Replacement via RCFA.   # HF EF 20-25%   # NICM- Echo with improvement of EF to 65, no PVL, mean gradient of 8,  Denies any pain or SOB at this time. Groin sites CDI without hematoma, ecchymosis, bruit or decreased limb warmth, sensation.  Patient has had labile BP, prompting reduction of PTA anti HTN regimen.   - Abd/Pelvis CT was done to r/o dissection and RP bleed POD 1 given hypotension and slight hgb drop, which was negative[AS1.1], though there was an incidental finding of 10 mm soft tissue nodule in the RP[AS1.2].   -  Follow up echo as above.  - Tele until DC, EKG daily  - Apixiban and ASA for anti-platelet therapy.   - Cardiac rehab/PT/OT.  - Daily weights.   - Strict intake/output.     # Atrial Fibrillation, s/p ablation with BiV PPM  - Apixa and asa as above    #  RA and chronic pain  - Norco PRN    #Hypertension- Though hypotensive this afternoon with re-initiating home BP meds.   - Decreased Lisinopril to daily,  - DC Aldactone today given K levels, recovery of EF and hypotension. Can resume on admission if necessary  - Reduce Flomax to once daily instead of BID.   - Monitor overnight and DC tomorrow    FEN: Cardiac diet  PPx: DVT: ambulation   Lines: PIV  Code status: Full   Dispo: TCU tomorrow    POLLO Sims  Interventional Cardiology-CSI  Pager [phone][AS1.1]       Revision History        User Key Date/Time User  "Provider Type Action    > AS1.2 9/29/2017  2:37 PM Ludy Emerson APRN CNP Nurse Practitioner Addend     AS1.1 9/29/2017  2:35 PM Ludy Emerson APRN CNP Nurse Practitioner Sign            Progress Notes by Dejon Whitten RN at 9/28/2017  6:57 PM     Author:  Dejon Whitten RN Service:  (none) Author Type:  Registered Nurse    Filed:  9/28/2017  6:59 PM Date of Service:  9/28/2017  6:57 PM Creation Time:  9/28/2017  6:57 PM    Status:  Signed :  Dejon Whitten RN (Registered Nurse)         D/I: Pt AAO, c/o HA and LLE pain, tylenol and norco sufficing for pain control. V-paced, rates . -140. Sats high 90s on RA. Groin sites unchanged, s/f/d. Pedal pulses dopplerable. Up to bathroom with Ax1. Up in chair x2 hours. Tolerating regular diet but minimal appetite. Loose BMs x3 today.  A/P: Pain control. Monitor rhythm, BPs.[AM1.1]      Revision History        User Key Date/Time User Provider Type Action    > AM1.1 9/28/2017  6:59 PM Dejon Whitten, RN Registered Nurse Sign            Progress Notes by Brianna Marte RD at 9/28/2017  2:50 PM     Author:  Brianna Marte RD Service:  Nutrition Author Type:  Registered Dietitian    Filed:  9/28/2017  2:59 PM Date of Service:  9/28/2017  2:50 PM Creation Time:  9/28/2017  2:50 PM    Status:  Signed :  Brianna Marte RD (Registered Dietitian)         CLINICAL NUTRITION SERVICES - BRIEF NOTE    Received admission nutrition risk screen for \"stageable pressure injuries or large/non-healing wound or burn.\" Pt has a venous ulcer on RLL that is healing per WOC note today - not indicated for above nutrition screen.     RD will follow per LOS protocol or if re-consulted.     Brianna Marte RD, LD, [facility]  CVICU Dietitian  Pager: [phone][EB1.1]         Revision History        User Key Date/Time User Provider Type Action    > EB1.1 9/28/2017  2:59 PM Brianna Marte RD Registered Dietitian Sign            Progress Notes by Jian Anguiano, OT at " 9/28/2017  1:58 PM     Author:  Jian Anguiano OT Service:  Acute IP Rehab Author Type:  Occupational Therapist    Filed:  9/28/2017  1:59 PM Date of Service:  9/28/2017  1:58 PM Creation Time:  9/28/2017  1:58 PM    Status:  Signed :  Jian Anguiano OT (Occupational Therapist)          09/28/17 1321   Quick Adds   Type of Visit Initial Occupational Therapy Evaluation   Living Environment   Lives With spouse   Living Arrangements house   Home Accessibility no concerns   Number of Stairs to Enter Home 0   Number of Stairs Within Home 0   Transportation Available family or friend will provide;car   Living Environment Comment spouse is home to A prn   Self-Care   Dominant Hand right   Usual Activity Tolerance moderate   Current Activity Tolerance fair   Regular Exercise no   Equipment Currently Used at Home cane, quad;wheelchair, manual;walker, rolling;shower chair;raised toilet;commode   Functional Level Prior   Ambulation 1-->assistive equipment  (walker or cane)   Transferring 1-->assistive equipment   Toileting 1-->assistive equipment   Bathing 3-->assistive equipment and person   Dressing 2-->assistive person   Eating 0-->independent   Communication 0-->understands/communicates without difficulty   Swallowing 0-->swallows foods/liquids without difficulty   Cognition 0 - no cognition issues reported   Fall history within last six months yes   Number of times patient has fallen within last six months 1   Which of the above functional risks had a recent onset or change? fall history   General Information   Onset of Illness/Injury or Date of Surgery - Date 09/27/17   Referring Physician Filipe Allen MD   Patient/Family Goals Statement To get back home and be as I as possible   Additional Occupational Profile Info/Pertinent History of Current Problem Bud Merritt is a 89 year old male with PMHx pertinent for Afib on apixiban, s/p AV lisandro ablation with BiV PPM, (3/2017) COPD, prostate cancer, RA  and chronic pain and severe aortic stenosis severe characterized by an area of 0.9 cm2, mean gradient 36 mmHg and peak velocity of 3.78 m/sec with LVEF 20-25% by echocardiogram on 3/2017. Patient presents to [facility] today for transcatheter aortic valve replacement.    Precautions/Limitations fall precautions   Cognitive Status Examination   Orientation orientation to person, place and time   Level of Consciousness alert   Visual Perception   Visual Perception Wears glasses;No deficits were identified   Sensory Examination   Sensory Comments Pt denies deficits   Pain Assessment   Patient Currently in Pain No   Integumentary/Edema   Integumentary/Edema no deficits were identifed   Range of Motion (ROM)   ROM Comment L shoulder ~60 deg, R ~45, UEs limited by RA- baseline   Strength   Strength Comments WFL, generalized weakness   Muscle Tone Assessment   Muscle Tone Comments WNL   Coordination   Upper Extremity Coordination No deficits were identified   Transfer Skill: Sit to Stand   Level of [place]: Sit/Stand minimum assist (75% patients effort)   Upper Body Dressing   Level of [place]: Dress Upper Body minimum assist (75% patients effort)   Lower Body Dressing   Level of [place]: Dress Lower Body maximum assist (25% patients effort)   Toileting   Level of [place]: Toilet maximum assist (25% patients effort)   Grooming   Level of [place]: Grooming stand-by assist   Instrumental Activities of Daily Living (IADL)   Previous Responsibilities (family does IADLs)   Activities of Daily Living Analysis   Impairments Contributing to Impaired Activities of Daily Living ROM decreased;flexibility decreased;strength decreased   General Therapy Interventions   Planned Therapy Interventions ADL retraining;home program guidelines;progressive activity/exercise   Clinical Impression   Criteria for Skilled Therapeutic Interventions Met yes, treatment indicated   OT Diagnosis decreased ADL I and tolerance  "  Influenced by the following impairments RA, weakness, fatigue   Assessment of Occupational Performance 3-5 Performance Deficits   Identified Performance Deficits home mgmt, leisure, bathing, dressing   Clinical Decision Making (Complexity) Low complexity   Therapy Frequency daily   Predicted Duration of Therapy Intervention (days/wks) 10/5/17   Anticipated Equipment Needs at Discharge (none)   Anticipated Discharge Disposition (TCU vs home with home therapy)   Risks and Benefits of Treatment have been explained. Yes   Patient, Family & other staff in agreement with plan of care Yes   Clinical Impression Comments Pt would benefit from skilled OT to help increase ADL I and tolerance   [facility] AM-PAC TM \"6 Clicks\"   2016, Trustees of [facility], under license to [employer].  All rights reserved.   6 Clicks Short Forms Daily Activity Inpatient Short Form   [facility] AM-PAC  \"6 Clicks\" Daily Activity Inpatient Short Form   1. Putting on and taking off regular lower body clothing? 2 - A Lot   2. Bathing (including washing, rinsing, drying)? 2 - A Lot   3. Toileting, which includes using toilet, bedpan or urinal? 2 - A Lot   4. Putting on and taking off regular upper body clothing? 3 - A Little   5. Taking care of personal grooming such as brushing teeth? 4 - None   6. Eating meals? 4 - None   Daily Activity Raw Score (Score out of 24.Lower scores equate to lower levels of function) 17   Total Evaluation Time   Total Evaluation Time (Minutes) 10[CK1.1]        Revision History        User Key Date/Time User Provider Type Action    > CK1.1 9/28/2017  1:59 PM Jian Anguiano OT Occupational Therapist Sign            Progress Notes by Ludy Emerson APRN CNP at 9/28/2017 12:51 PM     Author:  Ludy Emerson APRN CNP Service:  Cardiology Author Type:  Nurse Practitioner    Filed:  9/28/2017 12:57 PM Date of Service:  9/28/2017 12:51 PM Creation Time:  9/28/2017 12:51 PM    Status:  Signed " :  Ludy Emerson APRN CNP (Nurse Practitioner)           Interventional Cardiology Progress Note  Subjective and Interval:  Patient hypotensive overnight, requiring fluid bolus. 1 gram hgb drop along with hypotension and tachycardia, prompting CT. Patient denies pain, SOB, syncope or palpitations. Stable.    Past Medical History:  Past Medical History:   Diagnosis Date     Anemia      Atrial fibrillation (H) 07/01/2016     Cardiac pacemaker 03/10/2017     Cardiomyopathy (H)      CHF (congestive heart failure) (H)      COPD exacerbation (H) 3/2/2017     Depressive disorder      History of prostate cancer      Paroxysmal atrial fibrillation (H) 7/6/2016     Pure hypercholesterolemia      Rheumatoid arthritis(714.0)      Unspecified essential hypertension      Weakness generalized 3/2/2017       Past Surgical History:  Past Surgical History:   Procedure Laterality Date     ARTHROPLASTY KNEE Left 1/12/2016    Procedure: ARTHROPLASTY KNEE;  Surgeon: Cesar Walker DO;  Location: [facility] OR     COLONOSCOPY  08/24/09     HC COLONOSCOPY THRU STOMA W BIOPSY/CAUTERY TUMOR/POLYP/LESION  8/31/2004     HC REPAIR ING HERNIA,5+Y/O,REDUCIBL  1996    Marlex mesh repair of bilateral inguinal hernias and drainage of bilateral scrotal hydroceles.     HEART CATH FEMORAL CANNULIZATION WITH OPEN STANDBY REPAIR AORTIC VALVE N/A 9/27/2017    Procedure: HEART CATH FEMORAL CANNULIZATION WITH OPEN STANDBY REPAIR AORTIC VALVE;;  Surgeon: Jaron Alejandra MD;  Location: [facility] OR     IMPLANT PACEMAKER  03/10/2017     IRRIGATION AND DEBRIDEMENT SOFT TISSUE LOWER EXTREMITY, COMBINED Left 3/15/2016    Procedure: COMBINED IRRIGATION AND DEBRIDEMENT SOFT TISSUE LOWER EXTREMITY;  Surgeon: Cesar Walker DO;  Location:  OR     TRANSCATHETER AORTIC VALVE IMPLANT ANESTHESIA N/A 9/27/2017    Procedure: TRANSCATHETER AORTIC VALVE IMPLANT ANESTHESIA;  Right Transfemoral Approach (Harman Ramsey 3 29mm) Aortic Valve Implant,   Cardiopulmonary Bypass Standby, Transthoracic Echocardiogram by Derian Queen(Echo Tech);  Surgeon: GENERIC ANESTHESIA PROVIDER;  Location: [facility] OR       Allergies:     Allergies   Allergen Reactions     Amiodarone Other (See Comments)     Drop in DLCO     Lasix [Furosemide] Blisters     Blisters and sores in his mouth.        Medications:    No current facility-administered medications on file prior to encounter.   Current Outpatient Prescriptions on File Prior to Encounter:  HYDROcodone-acetaminophen (NORCO) 5-325 MG per tablet TAKE 1 TABLET BY MOUTH EVERY 6 HOURS AS NEEDED FOR MODERATE TO SEVERE PAIN   traMADol (ULTRAM) 50 MG tablet TAKE 1 TABLET 3 TIMES DAILY AS NEEDED FOR MODERATE PAIN   leflunomide (ARAVA) 10 MG tablet Take 1 tablet (10 mg) by mouth daily   dofetilide (TIKOSYN) 125 MCG capsule Take 1 capsule (125 mcg) by mouth 2 times daily   metoprolol (LOPRESSOR) 50 MG tablet TAKE 1 TABLET (50 MG) BY MOUTH 2 TIMES DAILY   atorvastatin (LIPITOR) 40 MG tablet Take 1 tablet (40 mg) by mouth daily (Patient taking differently: Take 40 mg by mouth every evening )   predniSONE (DELTASONE) 5 MG tablet Take 1 tablet (5 mg) by mouth daily   lisinopril (PRINIVIL,ZESTRIL) 10 MG tablet Take 1 tablet (10 mg) by mouth 2 times daily   tamsulosin (FLOMAX) 0.4 MG 24 hr capsule Take 1 capsule (0.4 mg) by mouth 2 times daily   ascorbic acid (VITAMIN C) 500 MG CPCR Take 500 mg by mouth daily   VITAMIN D, CHOLECALCIFEROL, PO Take 1 tablet by mouth daily Reported on 5/3/2017   calcium carbonate (OS-SHELIA 500 MG [employer] [place] [place]) 500 MG tablet Take 1 tablet by mouth daily    apixaban ANTICOAGULANT (ELIQUIS) 2.5 MG tablet Take 1 tablet (2.5 mg) by mouth 2 times daily (Patient not taking: Reported on 9/25/2017)   Leuprolide Acetate (LUPRON DEPOT IM) Inject into the muscle every 6 months Reported on 5/3/2017   acetaminophen (TYLENOL) 650 MG CR tablet Take 650 mg by mouth every 8 hours as needed Reported on 4/5/2017   alendronate (FOSAMAX) 70 MG  tablet Take 1 tablet (70 mg) by mouth every 7 days Take with over 8 ounces water and stay upright for at least 30 minutes after dose.  Take at least 60 minutes before breakfast     Exam:  Temp:  [97.5  F (36.4  C)-99.4  F (37.4  C)] 98.8  F (37.1  C)  Heart Rate:  [] 95  Resp:  [10-19] 16  BP: ()/(47-90) 122/66  MAP:  [52 mmHg-99 mmHg] 73 mmHg  Arterial Line BP: ()/(32-68) 127/48  SpO2:  [93 %-100 %] 98 %  General: Alert, interactive, NAD  Eyes: sclera anicteric, EOMI  Resp: clear to auscultation bilaterally, no crackles or wheezes  Cardiovascular: regular rate and rhythm, no murmur  GI: Soft, nontender, nondistended. +BS.  No HSM or masses, no rebound or guarding.  : Parrish   MSK: Generalized weakness  Skin: Warm and dry, no jaundice or rash  Neuro: Alert & oriented x 3, Cns 2-12 intact, moves all extremities equally, Extremely [place]  Psych: Approrpiate    Data:  CMP    Recent Labs  Lab 09/28/17  0335 09/27/17  1500 09/27/17  1231 09/27/17  0923 09/25/17  1622    137 137 138 135   POTASSIUM 3.6 4.0 4.0 4.7 4.5   CHLORIDE 109 108  --  105 103   CO2 21 22  --  26 26   ANIONGAP 10 8  --  7 7   GLC 77 105* 107* 109* 111*   BUN 11 11  --  12 15   CR 0.82 0.82  --  1.01 1.00   GFRESTIMATED 88 88  --  69 70   GFRESTBLACK >90 >90  --  84 85   SHELIA 7.6* 7.7*  --  9.0 8.8   MAG 1.6 1.8  --   --   --    PHOS 3.1 3.2  --   --   --    PROTTOTAL 4.8* 5.4*  --  6.7* 6.8   ALBUMIN 2.3* 2.5*  --  3.1* 3.1*   BILITOTAL 0.5 0.2  --  0.4 0.5   ALKPHOS 58 68  --  86 83   AST 33 30  --  32 27   ALT 15 18  --  21 21     CBC    Recent Labs  Lab 09/28/17  0335 09/28/17  0012 09/27/17  1500 09/27/17  1231 09/27/17  0923   WBC 4.3 4.7 3.4*  --  4.3   RBC 2.77* 2.81* 2.98*  --  3.59*   HGB 8.2* 8.4* 9.0* 9.3* 10.6*   HCT 25.6* 25.8* 28.1*  --  33.7*   MCV 92 92 94  --  94   MCH 29.6 29.9 30.2  --  29.5   MCHC 32.0 32.6 32.0  --  31.5   RDW 15.9* 16.0* 15.8*  --  15.9*    182 184  --  256       No results found  for: TROPI, TROPONIN, TROPR, TROPN      EKG: Paced with rates 70's-80's, narrow QRS       SUMMARY:  1. Access was obtained in the right and left common femoral arteries and the left common femoral vein by the interventional cardiology team.  The arterial site used for valve delivery was pre-closed with two Perclose Proglide devices.   2. A 5Fr PACEL temporary pacemaker was positioned in the right ventricle and tested for adequate capture.    3. Iliofemoral angiography was not done since the right CFA was big and without evidence of disease on ultrasound.   A Lunderquist wire was used to support the Harman eSheath insertion.    4. The 6Fr pigtail catheter was positioned in the right-coronary cusp and angiography was used to confirm the optimal implant angle.  The pigtail was then moved to the non-coronary cusp.    5. Access into the LV was obtained using an AL-2 catheter and a straight wire.  The AL-2 catheter was used to exchange the straight wire for a J-wire and a pigtail catheter was then positioned in the left ventricle.  The LVEDP was measured with a pressure of 15 mmHg.  The pigtail catheter was used to advance a Lunderquist wire into in the LV and the pigtail was removed.    6. Aortic valve balloon valvuloplasty was successfully completed using an Harman 25mm balloon during rapid pacing and resulted in no hemodynamic compromise.  The 29mm Harman Ramsey 3 prosthetic valve was then positioned across the native aortic valve and was attempted to be deployed under rapid pacing.  However the balloon inside the valve stent never inflated due to perforation of the balloon while the balloon was loaded up within the valve stent in the descending aorta.  The whole system including the delivery system and the 16Fr Harman sheath were removed along with the sheath to ensure that the whole system came out without harming the sheath due to bulky valve.  The Lunderquist wire was left in place in the LV and another 16Fr  Harman sheath was inserted in the R CFA.  A 2nd #29 S3 valve was inserted and loaded onto the balloon more distally where the descending aorta was more vertical.  The valve was crossed with the system and the valve was deployed under rapid pacing of 160bpm with a depth of 80/20 with nominal pressure (33cc).   7. Subsequent transesophageal and transthoracic echocardiography and an ascending aortogram showed trace paravalvular insufficiency.    8. The valve sheath access arteriotomy was successfully closed with the double suture closure devices with successful hemostasis.    9. A final iliofemoral angiogram showed no evidence of extravasation or stenosis.   10. The LCFA 6Fr arteriotomy was successfully closed with a 6Fr AngioSeal closure device.  11. The temporary pacemaker was then removed.           NOTABLE EVENTS:  1. Valve deployment failure due to suspected perforation of balloon likely during loading of balloon unto valve stent and requiring removal of whole delivery system along with valve and sheath.     COMPLICATIONS:  1. None     SUMMARY:    1. Lifestyle-limiting severe aortic stenosis.  2. Successful transfemoral transcatheter aortic valve replacement with a 29mm Harman Ramsey 3 valve.  3. Temporary pacemaker insertion.  4. Aortic balloon valvuloplasty with a 25mm balloon.  5. Left heart catheterization with LVEDP of 15 mmHg.  6. Right and left common femoral arteriotomies successfully closed with closure devices.     PLAN:    1. Change Aspirin to 81 mg po daily lifelong.  2. Restart home apixaban dose 2.5mg bid  3. Bedrest per protocol (6 hours).  4. Admit to the primary inpatient team for further evaluation and management.  5. Echocardiogram tomorrow.   6. Lifelong antibiotic prophylaxis prior to all dental procedures.     Assessment/ Plan: Bud Merritt is a 89 year old male with PMHx pertinent for Afib on apixiban, s/p AV lisandro ablation with BiV PPM, (3/2017) COPD, prostate cancer, RA and chronic  pain and severe aortic stenosis severe characterized by an area of 0.9 cm2, mean gradient 36 mmHg and peak velocity of 3.78 m/sec with LVEF 20-25% by echocardiogram on 3/2017. Patient presents to [facility] today for transcatheter aortic valve replacement.     # Severe aortic stenosis, now s/p 29 mm Harman Ramsey 3 Transcatheter Aortic Valve Replacement via RCFA.   # HF EF 20-25%  # NICM- Patient hypotensive, tachycardic with hgb drop of 1 gram post procedure. Asymptomatic but difficult historian. Denies any pain or SOB at this time. Groin sites CDI without hematoma, ecchymosis, bruit or decreased limb warmth, sensation.  Echo today pending final read. Patient was given home anti-htn yesterday for hypertension, and then fluid boluses overnight for hypotension.    - Abd/Pelvis CT to r/o dissection and RP bleed  -  Follow up echo today.  - Tele until DC, EKG daily  - Parrish can be removed once patient is out of bed.   - Apixiban and ASA for anti-platelet therapy.   - Cardiac rehab/PT/OT.  - Diurese as needed/able.   - Daily weights.   - Strict intake/output.     # Atrial Fibrillation, s/p ablation with BiV PPM  - Apixa and asa as above    #  RA and chronic pain  - Norco PRN    #Hypertension  - Hold today doses until patient is normotensive and we rule out that he isn't bleeding and HD stable.     FEN: Cardiac diet  PPx: DVT: ambulation   Lines: PIV  Code status: Full     POLLO Sims  Interventional Cardiology-I  Pager [phone][AS1.1]       Revision History        User Key Date/Time User Provider Type Action    > AS1.1 9/28/2017 12:57 PM Ludy Emerson APRN CNP Nurse Practitioner Sign            Progress Notes by Annie Garcia, RN at 9/28/2017  8:52 AM     Author:  Annie Garcia RN Service:  [facility] Nurse Author Type:  Registered Nurse    Filed:  9/28/2017  9:03 AM Date of Service:  9/28/2017  8:52 AM Creation Time:  9/28/2017  8:52 AM    Status:  Signed :  Annie Garcia RN (Registered Nurse)         [facility] Nurse  Inpatient Wound Assessment    Initial Assessment  Reason for consultation: Evaluate and treat RLL wound        ASSESSMENT:   RLL wound due to Venous Ulcer  Status: initial assessment    TREATMENT PLAN:     RLL wound:     Orders Written  WO Nurse follow-up plan: Weekly  Nursing to notify the Provider(s) and re-consult the WO Nurse if wound(s) deteriorates or new skin concern.    Patient History  According to provider note(s): Bud Merritt is a 89 year old male who is scheduled for a Transfemoral Approach (Harman) Aortic Valve Implant, Cardiopulmonary Bypass Standby on 17 with Dr. Janes Kingsley for severe aortic stenosis at the [facility].    Objective Data     Active Diet Order    Active Diet Order      Regular Diet Adult    Output:  I/O last 3 completed shifts:  In: 2760 [P.O.:660; I.V.:1100; IV Piggyback:1000]  Out:  [Urine:; Blood:30]    Risk Assessment:   Artur Artur Score  Av  Min: 13  Max: 13                                 Labs:     Recent Labs  Lab 17  0335  17  0923   HGB 8.2*  < > 10.6*   INR  --   --  0.97   WBC 4.3  < > 4.3   < > = values in this interval not displayed.  No lab results found in last 7 days.        PHYSICAL EXAM:   Skin assessment: RLL    Wound Location: RLL  Date of last photo:   Wound History: Per MD notes: Dr. Aragon on  Bud  is a 89 year old male who presents for:  Review of his open wound on his lower shin that has been healing up nicely. Cardiac surgery wanted this evaluated before proceeding next week. They state that has been weeping a little bit. When I saw him a week ago it looked fine. And we contacted our wound care nurse who stated it was doing very well they're here today to have this looked at.  Measurements (length x width x depth, in cm)[TH1.1] 1.9 x 0.8 x 0.1 cm[TH1.2]  Wound Base:[TH1.1]  100% red[TH1.2]   Palpation of the wound bed:[TH1.1] normal[TH1.2]   Periwound skin:[TH1.1]  intact, dry/scaly and hemosiderin staining[TH1.2]  Color:[TH1.1] normal and consistent with surrounding tissue[TH1.2]  Temperature:[TH1.1] normal[TH1.2]   Drainage:[TH1.1] small[TH1.2]  Description of drainage:[TH1.1] serous[TH1.2]  Odor:[TH1.1] none[TH1.2]  Pain:[TH1.1] denies[TH1.2]      INTERVENTIONS:   Current support surface: Standard[TH1.1]  Atmos Air[TH1.2]   Current off-loading measures: Pillows under calves  Visual inspection of wounds(s) completed.  Wound Care: was done per plan of care.  Supplies:[TH1.1] mepilex[TH1.2]  Education provided today:[TH1.1] RN, Pt[TH1.2]    Discussed plan of care with Patient and Nurse  Face to face time: 20 minutes[TH1.1]       Revision History        User Key Date/Time User Provider Type Action    > TH1.2 9/28/2017  9:03 AM Annie Garcia RN Registered Nurse Sign     TH1.1 9/28/2017  8:52 AM Annie Garcia RN Registered Nurse             Progress Notes by Tammy Santos RN at 9/27/2017  4:55 PM     Author:  Tammy Santos RN Service:  [facility] RN Author Type:  Registered Nurse    Filed:  9/27/2017  5:42 PM Date of Service:  9/27/2017  4:55 PM Creation Time:  9/27/2017  5:41 PM    Status:  Signed :  Tammy Santos, RN (Registered Nurse)         Notified [facility] [place] phone [place] [phone] that patient's sbp is 150-155. No prn bp orders ordered. MD to put orders in.[KW1.1]      Revision History        User Key Date/Time User Provider Type Action    > KW1.1 9/27/2017  5:42 PM Tammy Santos, RN Registered Nurse Sign            Progress Notes by Sonia Wynn RN at 9/27/2017  3:45 PM     Author:  Sonia Wynn, RN Service:  (none) Author Type:  Registered Nurse    Filed:  9/27/2017  4:12 PM Date of Service:  9/27/2017  3:45 PM Creation Time:  9/27/2017  4:12 PM    Status:  Signed :  Sonia Wynn RN (Registered Nurse)         Dr. Hamlin called for sign-out.[JK1.1]      Revision History        User Key Date/Time User Provider Type Action    > JK1.1 9/27/2017   4:12 PM Sonia Wynn RN Registered Nurse Sign            Progress Notes by Sonia Wynn RN at 9/27/2017  3:30 PM     Author:  Sonia Wynn RN Service:  (none) Author Type:  Registered Nurse    Filed:  9/27/2017  3:46 PM Date of Service:  9/27/2017  3:30 PM Creation Time:  9/27/2017  3:45 PM    Status:  Signed :  Sonia Wynn RN (Registered Nurse)         Dr. Allen at bedside, reviewed EKG and labs.[JK1.1]      Revision History        User Key Date/Time User Provider Type Action    > JK1.1 9/27/2017  3:46 PM Sonia Wynn RN Registered Nurse Sign                     Procedure Notes      Procedures by Filipe Allen MD at 9/27/2017  2:22 PM     Author:  Filipe Allen MD Service:  Cardiology Author Type:  Fellow    Filed:  9/27/2017  2:46 PM Date of Service:  9/27/2017  2:22 PM Creation Time:  9/27/2017  2:22 PM    Status:  Cosign Needed :  Filipe Allen MD (Fellow)    Cosign Required:  Yes         Pre-procedure Diagnoses:    1. Severe aortic stenosis [I35.0]           Procedures:    1. HC TAV[facility] - [facility]PRO[facility]               TAVR PROCEDURE REPORT:     PROCEDURES PERFORMED:   1. Temporary pacemaker insertion.  2. Iliofemoral artery angiography.  3. Ascending aorta angiography.  4. Left heart catheterization.  5. Aortic valve balloon valvuloplasty with a 25mm balloon.  6. Successful transfemoral transcatheter aortic valve replacement with a 29mm Harman Ramsey 3 valve.  7. Arteriotomy closure.    PHYSICIANS:  1. Attending Interventional Cardiology Staff: Hermelindo Gunn MD and Fan Lozano MD  2. Attending Cardiovascular Surgery Staff: Sathish Alejandra MD  3. Structural Interventional Cardiology Fellow: Filipe Allen    INDICATION:  Bud Merritt is a 89 year old male with severe symptomatic aortic stenosis who presents on an elective outpatient basis for transfemoral transcatheter aortic valve replacement with plan for an Harman  Ramsey 3 valve.    DESCRIPTION:  1. Consent obtained with discussion of risks.  All questions were answered.  2. Sterile prep and procedure per OR protocol.  3. Location: right and left common femoral arteries and left common femoral vein.  4. Access: Local anesthetic with lidocaine.  A standard (18 g) needle with ultrasound guidance was used to establish vascular access using a modified Seldinger technique.  5. Sheath:  16Fr Harman eSheath in the RCFA, 6Fr long sheath in the LCFA,  6Fr long sheath in the LCFV  6. Catheters: 6Fr angled pigtail, 6Fr AL-1 and 6Fr AL-  7. Estimated blood loss of 30 mL.  8. See below for procedure details.    MEDICATIONS:  1. Sedation per anesthesia.  2. Heparin administered to achieve a goal ACT > 300 sec per anesthesia.   3. Protamine IV.    SUMMARY:  1. Access was obtained in the right and left common femoral arteries and the left common femoral vein by the interventional cardiology team.  The arterial site used for valve delivery was pre-closed with two Perclose Proglide devices.   2. A 5Fr PACEL temporary pacemaker was positioned in the right ventricle and tested for adequate capture.    3. Iliofemoral angiography was not done since the right CFA was big and without evidence of disease on ultrasound.   A Lunderquist wire was used to support the Harman eSheath insertion.    4. The 6Fr pigtail catheter was positioned in the right-coronary cusp and angiography was used to confirm the optimal implant angle.  The pigtail was then moved to the non-coronary cusp.    5. Access into the LV was obtained using an AL-2 catheter and a straight wire.  The AL-2 catheter was used to exchange the straight wire for a J-wire and a pigtail catheter was then positioned in the left ventricle.  The LVEDP was measured with a pressure of 15 mmHg.  The pigtail catheter was used to advance a Lunderquist wire into in the LV and the pigtail was removed.    6. Aortic valve balloon valvuloplasty was  successfully completed using an Harman 25mm balloon during rapid pacing and resulted in no hemodynamic compromise.  The 29mm Harman Ramsey 3 prosthetic valve was then positioned across the native aortic valve and was attempted to be deployed under rapid pacing.  However the balloon inside the valve stent never inflated due to perforation of the balloon while the balloon was loaded up within the valve stent in the descending aorta.  The whole system[GF1.1] including the delivery system and the 16Fr Harman sheath were[GF1.2] removed along with the sheath to ensure that[GF1.1] the whole system came out without harming the sheath due to bulky valve.  The Lunderquist wire was left in place in the LV and another 16Fr Harman sheath was inserted in the R CFA.  A 2nd #29 S3 valve was inserted and loaded onto the balloon more distally where the descending aorta was more vertical.  The valve was crossed with the system and the valve was deployed under rapid pacing of 160bpm with a depth of 80/20 with nominal pressure (33cc).   7. Subsequent transesophageal and transthoracic echocardiography and an ascending aortogram showed trace paravalvular insufficiency.    8. The valve sheath access arteriotomy was successfully closed with the double suture closure devices with successful hemostasis.    9. A final iliofemoral angiogram showed no evidence of extravasation or stenosis.   10. The LCFA 6Fr arteriotomy was successfully closed with a 6Fr AngioSeal closure device.  11. The temporary pacemaker was then removed.        NOTABLE EVENTS:  1. Valve deployment failure due to suspected perforation of balloon likely during loading of balloon unto valve stent and requiring removal of whole delivery system along with valve and sheath.    COMPLICATIONS:  1. None    SUMMARY:    1. Lifestyle-limiting severe aortic stenosis.  2. Successful transfemoral transcatheter aortic valve replacement with a 29mm Harman Ramsey 3 valve.  3. Temporary  pacemaker insertion.  4. Aortic balloon valvuloplasty with a 25mm balloon.  5. Left heart catheterization with LVEDP of 15[GF1.2] mmHg.  6. Right and left common femoral arteriotomies successfully closed with closure devices.    PLAN:    1. Change Aspirin to 81 mg po daily lifelong.  2. Restart home apixaban dose 2.5mg bid  3. Bedrest per protocol[GF1.1] (6 hours)[GF1.2].  4. Admit to the primary inpatient team for further evaluation and management.  5. Echocardiogram tomorrow.   6. Lifelong antibiotic prophylaxis prior to all dental procedures.      The attending interventional cardiologists,Noel Gunn and Blake, were present and participated in the entire procedure.      Filipe Allen  Structural Heart Disease &   Advanced Interventional Cardiology Fellow  [phone][GF1.1]     Revision History        User Key Date/Time User Provider Type Action    > GF1.2 9/27/2017  2:46 PM Filipe Allen MD Fellow Sign     GF1.1 9/27/2017  2:22 PM Filipe Allen MD Fellow                      Progress Notes - Therapies (Notes from 09/27/17 through 09/30/17)      Progress Notes by Jian Anguiano OT at 9/28/2017  1:58 PM     Author:  Jian Anguiano OT Service:  Acute IP Rehab Author Type:  Occupational Therapist    Filed:  9/28/2017  1:59 PM Date of Service:  9/28/2017  1:58 PM Creation Time:  9/28/2017  1:58 PM    Status:  Signed :  Jian Anguiano OT (Occupational Therapist)          09/28/17 1321   Quick Adds   Type of Visit Initial Occupational Therapy Evaluation   Living Environment   Lives With spouse   Living Arrangements house   Home Accessibility no concerns   Number of Stairs to Enter Home 0   Number of Stairs Within Home 0   Transportation Available family or friend will provide;car   Living Environment Comment spouse is home to A prn   Self-Care   Dominant Hand right   Usual Activity Tolerance moderate   Current Activity Tolerance fair   Regular Exercise no   Equipment Currently Used at  Home cane, quad;wheelchair, manual;walker, rolling;shower chair;raised toilet;commode   Functional Level Prior   Ambulation 1-->assistive equipment  (walker or cane)   Transferring 1-->assistive equipment   Toileting 1-->assistive equipment   Bathing 3-->assistive equipment and person   Dressing 2-->assistive person   Eating 0-->independent   Communication 0-->understands/communicates without difficulty   Swallowing 0-->swallows foods/liquids without difficulty   Cognition 0 - no cognition issues reported   Fall history within last six months yes   Number of times patient has fallen within last six months 1   Which of the above functional risks had a recent onset or change? fall history   General Information   Onset of Illness/Injury or Date of Surgery - Date 09/27/17   Referring Physician Filipe Allen MD   Patient/Family Goals Statement To get back home and be as I as possible   Additional Occupational Profile Info/Pertinent History of Current Problem Bud Merritt is a 89 year old male with PMHx pertinent for Afib on apixiban, s/p AV lisandro ablation with BiV PPM, (3/2017) COPD, prostate cancer, RA and chronic pain and severe aortic stenosis severe characterized by an area of 0.9 cm2, mean gradient 36 mmHg and peak velocity of 3.78 m/sec with LVEF 20-25% by echocardiogram on 3/2017. Patient [facility] to [facility] today for transcatheter aortic valve replacement.    Precautions/Limitations fall precautions   Cognitive Status Examination   Orientation orientation to person, place and time   Level of Consciousness alert   Visual Perception   Visual Perception Wears glasses;No deficits were identified   Sensory Examination   Sensory Comments Pt denies deficits   Pain Assessment   Patient Currently in Pain No   Integumentary/Edema   Integumentary/Edema no deficits were identifed   Range of Motion (ROM)   ROM Comment L shoulder ~60 deg, R ~45, UEs limited by RA- baseline   Strength   Strength Comments WFL, generalized  "weakness   Muscle Tone Assessment   Muscle Tone Comments WNL   Coordination   Upper Extremity Coordination No deficits were identified   Transfer Skill: Sit to Stand   Level of [place]: Sit/Stand minimum assist (75% patients effort)   Upper Body Dressing   Level of [place]: Dress Upper Body minimum assist (75% patients effort)   Lower Body Dressing   Level of [place]: Dress Lower Body maximum assist (25% patients effort)   Toileting   Level of [place]: Toilet maximum assist (25% patients effort)   Grooming   Level of [place]: Grooming stand-by assist   Instrumental Activities of Daily Living (IADL)   Previous Responsibilities (family does IADLs)   Activities of Daily Living Analysis   Impairments Contributing to Impaired Activities of Daily Living ROM decreased;flexibility decreased;strength decreased   General Therapy Interventions   Planned Therapy Interventions ADL retraining;home program guidelines;progressive activity/exercise   Clinical Impression   Criteria for Skilled Therapeutic Interventions Met yes, treatment indicated   OT Diagnosis decreased ADL I and tolerance   Influenced by the following impairments RA, weakness, fatigue   Assessment of Occupational Performance 3-5 Performance Deficits   Identified Performance Deficits home mgmt, leisure, bathing, dressing   Clinical Decision Making (Complexity) Low complexity   Therapy Frequency daily   Predicted Duration of Therapy Intervention (days/wks) 10/5/17   Anticipated Equipment Needs at Discharge (none)   Anticipated Discharge Disposition (TCU vs home with home therapy)   Risks and Benefits of Treatment have been explained. Yes   Patient, Family & other staff in agreement with plan of care Yes   Clinical Impression Comments Pt would benefit from skilled OT to help increase ADL I and tolerance   [facility] AM-PAC TM \"6 Clicks\"   2016, Trustees of [facility], under license to [employer].  All rights reserved.   6 " "Clicks Short Forms Daily Activity Inpatient Short Form   [facility] AM-PAC  \"6 Clicks\" Daily Activity Inpatient Short Form   1. Putting on and taking off regular lower body clothing? 2 - A Lot   2. Bathing (including washing, rinsing, drying)? 2 - A Lot   3. Toileting, which includes using toilet, bedpan or urinal? 2 - A Lot   4. Putting on and taking off regular upper body clothing? 3 - A Little   5. Taking care of personal grooming such as brushing teeth? 4 - None   6. Eating meals? 4 - None   Daily Activity Raw Score (Score out of 24.Lower scores equate to lower levels of function) 17   Total Evaluation Time   Total Evaluation Time (Minutes) 10[CK1.1]        Revision History        User Key Date/Time User Provider Type Action    > CK1.1 9/28/2017  1:59 PM Jian Anguiano, OT Occupational Therapist Sign            "

## 2017-09-27 NOTE — IP AVS SNAPSHOT
MRN:[id number]                      After Visit Summary   9/27/2017    Bud Merritt    MRN: [id number]           Thank you!     Thank you for choosing [facility] for your care. Our goal is always to provide you with excellent care. Hearing back from our patients is one way we can continue to improve our services. Please take a few minutes to complete the written survey that you may receive in the mail after you visit with us. Thank you!        Patient Information     Date Of Birth          5/9/1927        Designated Caregiver       Most Recent Value    Caregiver    Will someone help with your care after discharge? no      About your hospital stay     You were admitted on:  September 27, 2017 You last received care in the:  [address] [facility]    You were discharged on:  September 30, 2017        Reason for your hospital stay       You underwent a transcatheter aortic valve replacement. You have done quite well post procedure.                  Who to Call     For medical emergencies, please call 911.  For non-urgent questions about your medical care, please call your primary care provider or clinic, [phone]  For questions related to your surgery, please call your surgery clinic        Attending Provider     Provider Specialty    Cosme Gunn MD Cardiology       Primary Care Provider Office Phone # Fax #    Camron Aragon [phone]      After Care Instructions     Activity       Your activity upon discharge: activity as tolerated. Specific restrictions attached            Diet       Follow this diet upon discharge: Orders Placed This Encounter      Regular Diet Adult            General info for SNF       Length of Stay Estimate: Short Term Care  Condition at Discharge: Stable  Level of care:skilled   Rehabilitation Potential: Excellent  Admission H&P remains valid and up-to-date: Yes  Recent Chemotherapy: N/A  Use Nursing Home Standing Orders: Yes, per  facility policy            Mantoux instructions       Give two-step Mantoux (PPD) Per Facility Policy Yes                  Follow-up Appointments     Adult [facility] Follow-up and recommended labs and tests       Follow up with primary care provider, Camron Aragon, within 7 days for hospital follow- up.  The following labs/tests are recommended: CBC, BMP    Please follow up with cardiology/valve clinic and cardiac rehab, as arranged.      Appointments on [place] and/or [facility] (with [facility] or [facility] provider or service). Call [phone] if you haven't heard regarding these appointments within 7 days of discharge.                  Your next 10 appointments already scheduled     Oct 04, 2017  9:45 AM CDT   [facility]    [address] [id number] [phone]            Oct 26, 2017  2:00 PM CDT   Ech Complete with [address] [facility] ([facility])    [address] [place] [id number] [phone]           1. Please bring or wear a comfortable two-piece outfit. 2. You may eat, drink and take your normal medicines. 3. For any questions that cannot be answered, please contact the ordering physician            Oct 26, 2017  3:00 PM CDT   Lab with  LAB    [facility] ([facility])    [address] [place] [address] [phone]            Oct 26, 2017  3:30 PM CDT   (Arrive by 3:15 PM)   Return Visit with POLLO Reynolds [facility] ([facility])    [address] [place] [id number] [phone]              Additional Services     Cardiac Rehab Referral       Your provider has referred you to:            Occupational Therapy Adult Consult       Evaluate and treat as clinically indicated.    Reason:  TAVR, aortic stenosis             Physical Therapy Adult Consult       Evaluate and treat as clinically indicated.    Reason:  TAVR, aortic stenosis                  Further instructions from your care team         Going home after Transcatheter Aortic Valve Replacement (TAVR)  Femoral Access    You have small procedure sites at both groins, the one on the right is slightly bigger. You may have small amounts of bruising or discomfort, this is expected. If you develop worse swelling, redness and warmth that does not go away, fever and chills, Increasing numbness in your legs, worsening pain at the site then you need to contact your nurse coordinator.    You may shower, but do not soak in a bath tub or pool or apply lotions, ointments, powder, etc for 3-5 days or until sites look healed.     Activity: No lifting, pushing, pulling more than 10 pounds (examples to avoid: groceries, vacuuming, gardening, golfing): For one week with a procedure through the groin.    After the initial healing process of the access site, we recommend cardiac rehabilitation for all TAVR patients. Cardiac rehabilitation will help you:  - Rebuild stamina, strength and balance.  - Learn how to participate in activities safely, as well as help you regain confidence to do so.  - Return to activities of daily living and leisure.  Please contact their central scheduling to arrange at [phone]    We prefer you to follow up with your primary care provider within 2 weeks. You will be arranged to see the TAVR clinic in month. At that time you will have a repeat ultrasound of the heart to look at the valve.     Should you have any questions or concerns please contact your assigned TAVR nurse coordinator:  Ysabel [phone] (at the first prompt enter #1, second prompt enter #3, then ask the triage nurse if you can speak with Ysabel).  Na [phone]  Irene [phone]      Pending Results     Date and Time Order Name Status Description    9/29/2017 0600 EKG 12-lead,  "tracing only Preliminary             Statement of Approval     Ordered          17 1635  I have reviewed and agree with all the recommendations and orders detailed in this document.  EFFECTIVE NOW     Approved and electronically signed by:  Lázaro Monaco MD           17 1107  I have reviewed and agree with all the recommendations and orders detailed in this document.  EFFECTIVE NOW     Approved and electronically signed by:  Ludy Emerson APRN CNP             Admission Information     Date & Time Provider Department Dept. Phone    2017 Cosme Gunn MD [address] [facility] [phone]      Your Vitals Were     Blood Pressure Temperature Respirations Height Weight Pulse Oximetry    112/62 (BP Location: Right arm) 97.9  F (36.6  C) (Oral) 16 1.676 m (5' 6\") 68.5 kg (151 lb 0.2 oz) 97%    BMI (Body Mass Index)                   24.37 kg/m2           MySiteApp Information     My[employer]App lets you send messages to your doctor, view your test results, renew your prescriptions, schedule appointments and more. To sign up, go to [email or web address] . Click on \"Log in\" on the left side of the screen, which will take you to the Welcome page. Then click on \"Sign up Now\" on the right side of the page.     You will be asked to enter the access code listed below, as well as some personal information. Please follow the directions to create your username and password.     Your access code is: [email or web address]  Expires: 2017 11:55 AM     Your access code will  in 90 days. If you need help or a new code, please call your [facility] clinic or [phone].        Care EveryWhere ID     This is your Care EveryWhere ID. This could be used by other organizations to access your [facility] medical records  [id number]        Equal Access to Services     JUAN CLINE : Sampson Burgess, waaxda luqadaha, qaybta kaalmada bhavya, ronel dexter. So wa " [phone].    ATENCIÓN: Si sary campuzano, tiene a mon disposición servicios gratuitos de asistencia lingüística. Jeanette goff [phone].    We comply with applicable federal civil rights laws and [place] laws. We do not discriminate on the basis of race, color, national origin, age, disability, sex, sexual orientation, or gender identity.               Review of your medicines      START taking        Dose / Directions    aspirin 81 MG EC tablet        Dose:  81 mg   Take 1 tablet (81 mg) by mouth daily   Quantity:  30 tablet   Refills:  11         CONTINUE these medicines which may have CHANGED, or have new prescriptions. If we are uncertain of the size of tablets/capsules you have at home, strength may be listed as something that might have changed.        Dose / Directions    atorvastatin 40 MG tablet   Commonly known as:  LIPITOR   This may have changed:  when to take this   Used for:  Hyperlipidemia LDL goal <130        Dose:  40 mg   Take 1 tablet (40 mg) by mouth daily   Quantity:  90 tablet   Refills:  2       lisinopril 10 MG tablet   Commonly known as:  PRINIVIL/ZESTRIL   This may have changed:    - how much to take  - when to take this   Used for:  Acute on chronic systolic congestive heart failure (H)        Dose:  5 mg   Take 0.5 tablets (5 mg) by mouth daily   Quantity:  62 tablet   Refills:  11       metoprolol 25 MG tablet   Commonly known as:  LOPRESSOR   This may have changed:  See the new instructions.        Dose:  25 mg   Take 1 tablet (25 mg) by mouth 2 times daily   Quantity:  60 tablet   Refills:  11       tamsulosin 0.4 MG capsule   Commonly known as:  FLOMAX   This may have changed:  when to take this        Dose:  0.4 mg   Take 1 capsule (0.4 mg) by mouth daily   Quantity:  60 capsule   Refills:  0         CONTINUE these medicines which have NOT CHANGED        Dose / Directions    acetaminophen 650 MG CR tablet   Commonly known as:  TYLENOL        Dose:  650 mg   Take 650 mg by mouth  every 8 hours as needed Reported on 4/5/2017   Refills:  0       alendronate 70 MG tablet   Commonly known as:  FOSAMAX   Used for:  Osteopenia        Dose:  70 mg   Take 1 tablet (70 mg) by mouth every 7 days Take with over 8 ounces water and stay upright for at least 30 minutes after dose.  Take at least 60 minutes before breakfast   Quantity:  12 tablet   Refills:  3       apixaban ANTICOAGULANT 2.5 MG tablet   Commonly known as:  ELIQUIS   Used for:  Paroxysmal atrial fibrillation (H)        Dose:  2.5 mg   Take 1 tablet (2.5 mg) by mouth 2 times daily   Quantity:  180 tablet   Refills:  3       ascorbic acid 500 MG Cpcr CR capsule   Commonly known as:  vitamin C        Dose:  500 mg   Take 500 mg by mouth daily   Refills:  0       calcium carbonate 1250 MG tablet   Commonly known as:  OS-SHELIA 500 mg [place]. Ca        Dose:  1 tablet   Take 1 tablet by mouth daily   Refills:  0       dofetilide 125 MCG capsule   Commonly known as:  TIKOSYN   Used for:  Paroxysmal atrial fibrillation (H)        Dose:  125 mcg   Take 1 capsule (125 mcg) by mouth 2 times daily   Quantity:  60 capsule   Refills:  5       HYDROcodone-acetaminophen 5-325 MG per tablet   Commonly known as:  NORCO   Used for:  Rheumatoid arthritis involving multiple sites, unspecified rheumatoid factor presence (H)        TAKE 1 TABLET BY MOUTH EVERY 6 HOURS AS NEEDED FOR MODERATE TO SEVERE PAIN   Quantity:  30 tablet   Refills:  0       leflunomide 10 MG tablet   Commonly known as:  ARAVA   Used for:  Rheumatoid arthritis involving multiple sites with positive rheumatoid factor (H)        Dose:  10 mg   Take 1 tablet (10 mg) by mouth daily   Quantity:  30 tablet   Refills:  11       LUPRON DEPOT IM        Inject into the muscle every 6 months Reported on 5/3/2017   Refills:  0       predniSONE 5 MG tablet   Commonly known as:  DELTASONE   Used for:  Osteopenia        Dose:  5 mg   Take 1 tablet (5 mg) by mouth daily   Quantity:  100 tablet   Refills:  4        traMADol 50 MG tablet   Commonly known as:  ULTRAM   Used for:  Rheumatoid arthritis involving multiple sites, unspecified rheumatoid factor presence (H)        TAKE 1 TABLET 3 TIMES DAILY AS NEEDED FOR MODERATE PAIN   Quantity:  90 tablet   Refills:  0       VITAMIN D (CHOLECALCIFEROL) PO        Dose:  1 tablet   Take 1 tablet by mouth daily Reported on 5/3/2017   Refills:  0         STOP taking     spironolactone 25 MG tablet   Commonly known as:  ALDACTONE                Where to get your medicines      These medications were sent to [facility] [place], [place] - [address]  [address] [address]     Phone:  [phone]     aspirin 81 MG EC tablet    lisinopril 10 MG tablet    metoprolol 25 MG tablet    tamsulosin 0.4 MG capsule                Protect others around you: Learn how to safely use, store and throw away your medicines at www.disposemymeds.org.             Medication List: This is a list of all your medications and when to take them. Check marks below indicate your daily home schedule. Keep this list as a reference.      Medications           Morning Afternoon Evening Bedtime As Needed    acetaminophen 650 MG CR tablet   Commonly known as:  TYLENOL   Take 650 mg by mouth every 8 hours as needed Reported on 4/5/2017                                alendronate 70 MG tablet   Commonly known as:  FOSAMAX   Take 1 tablet (70 mg) by mouth every 7 days Take with over 8 ounces water and stay upright for at least 30 minutes after dose.  Take at least 60 minutes before breakfast                                apixaban ANTICOAGULANT 2.5 MG tablet   Commonly known as:  ELIQUIS   Take 1 tablet (2.5 mg) by mouth 2 times daily   Last time this was given:  2.5 mg on 9/30/2017  8:25 AM                                ascorbic acid 500 MG Cpcr CR capsule   Commonly known as:  vitamin C   Take 500 mg by mouth daily                                aspirin 81 MG EC tablet    Take 1 tablet (81 mg) by mouth daily   Last time this was given:  81 mg on 9/30/2017  8:25 AM                                atorvastatin 40 MG tablet   Commonly known as:  LIPITOR   Take 1 tablet (40 mg) by mouth daily   Last time this was given:  40 mg on 9/29/2017  8:39 PM                                calcium carbonate 1250 MG tablet   Commonly known as:  OS-SHELIA 500 mg [place]. [place]   Take 1 tablet by mouth daily                                dofetilide 125 MCG capsule   Commonly known as:  TIKOSYN   Take 1 capsule (125 mcg) by mouth 2 times daily                                HYDROcodone-acetaminophen 5-325 MG per tablet   Commonly known as:  NORCO   TAKE 1 TABLET BY MOUTH EVERY 6 HOURS AS NEEDED FOR MODERATE TO SEVERE PAIN   Last time this was given:  1 tablet on 9/30/2017  6:38 AM                                leflunomide 10 MG tablet   Commonly known as:  ARAVA   Take 1 tablet (10 mg) by mouth daily                                lisinopril 10 MG tablet   Commonly known as:  PRINIVIL/ZESTRIL   Take 0.5 tablets (5 mg) by mouth daily   Last time this was given:  10 mg on 9/30/2017  8:25 AM                                LUPRON DEPOT IM   Inject into the muscle every 6 months Reported on 5/3/2017                                metoprolol 25 MG tablet   Commonly known as:  LOPRESSOR   Take 1 tablet (25 mg) by mouth 2 times daily   Last time this was given:  25 mg on 9/30/2017  8:25 AM                                predniSONE 5 MG tablet   Commonly known as:  DELTASONE   Take 1 tablet (5 mg) by mouth daily                                tamsulosin 0.4 MG capsule   Commonly known as:  FLOMAX   Take 1 capsule (0.4 mg) by mouth daily   Last time this was given:  0.4 mg on 9/29/2017 11:38 AM                                traMADol 50 MG tablet   Commonly known as:  ULTRAM   TAKE 1 TABLET 3 TIMES DAILY AS NEEDED FOR MODERATE PAIN                                VITAMIN D (CHOLECALCIFEROL) PO   Take 1 tablet by mouth  daily Reported on 5/3/2017

## 2017-09-27 NOTE — IP AVS SNAPSHOT
"` `           [address] [facility]: [phone]                                              INTERAGENCY TRANSFER FORM - NURSING   2017                    Hospital Admission Date: 2017  GALLO FLOYD   : 1927  Sex: Male        Attending Provider: Cosme Gunn MD     Allergies:  Amiodarone, Lasix [Furosemide]    Infection:  None   Service:  CSI-CORONARY    Ht:  1.676 m (5' 6\")   Wt:  68.5 kg (151 lb 0.2 oz)   Admission Wt:  67.6 kg (149 lb 0.5 oz)    BMI:  24.37 kg/m 2   BSA:  1.79 m 2            Patient PCP Information     Provider PCP Type    Camron Aragon MD General      Current Code Status     Date Active Code Status Order ID Comments User Context       2017  4:39 PM Full Code [id number]  Filipe Allen MD Inpatient       Code Status History     Date Active Date Inactive Code Status Order ID Comments User Context    2017 10:29 AM 2017  4:39 PM DNR/DNI [id number]  Sejal Preez MD Outpatient    3/28/2017 12:06 AM 2017 10:29 AM DNR/DNI [id number]  Cullen Teran MD Inpatient    3/14/2017  1:29 PM 3/28/2017 12:06 AM DNR/DNI [id number]  Blossom Vines MD Outpatient    3/9/2017 12:59 PM 3/14/2017  1:29 PM DNR/DNI [id number]  Michael Lopez MD Inpatient    3/4/2017 10:26 PM 3/9/2017 12:59 PM Full Code [id number]  Ailin Lee MD Inpatient    3/4/2017 12:54 PM 3/4/2017 10:26 PM Full Code [id number]  Sejal Perez MD Outpatient    3/2/2017  6:20 PM 3/4/2017 12:54 PM Full Code [id number]  Cullen Teran MD Inpatient    2017  6:50 AM 3/2/2017  6:20 PM Full Code [id number]  Luis Eduardo Stack MD Outpatient    2017  1:22 PM 2017  6:50 AM Full Code [id number]  Beth Cohn MD Inpatient    2016 12:59 PM 2017  1:22 PM Full Code [id number]  Taina Lopez PA-C Outpatient    2016 11:01 AM 2016 12:59 PM Full Code [id number]  Cesar Walker, DO " Inpatient      Advance Directives        Does patient have a scanned Advance Directive/ACP document in EPIC?           No        Hospital Problems as of 9/30/2017              Priority Class Noted POA    Aortic stenosis, severe Medium  9/27/2017 Yes    S/P TAVR (transcatheter aortic valve replacement) Medium  9/29/2017 Yes      Non-Hospital Problems as of 9/30/2017              Priority Class Noted    Hypertrophy of prostate without urinary obstruction Medium  11/13/2003    Rheumatoid arthritis involving multiple sites with positive rheumatoid factor (H) Medium  9/16/2010    Hyperlipidemia LDL goal <130 Medium  10/31/2010    Osteoporosis High  5/8/2013    Encounter for long-term current use of medication Medium  6/2/2014    Elevated prostate specific antigen (PSA) Medium  10/12/2015    Rheumatoid arthritis involving multiple sites, unspecified rheumatoid factor presence (H) Medium  12/2/2015    Status post total left knee replacement Medium  1/12/2016    Mitral regurgitation and aortic stenosis - AS severe by echo on 3/2017 Medium  1/13/2016    Postoperative delirium Medium  1/15/2016    Infected superficial injury of knee, left, subsequent encounter Medium  3/24/2016    Essential hypertension with goal blood pressure less than 140/90 Medium  6/22/2016    Paroxysmal atrial fibrillation (H) Medium  7/6/2016    Visit for monitoring Tikosyn therapy Medium  1/23/2017    Diarrhea Medium  3/2/2017    Weakness generalized Medium  3/2/2017    Cough Medium  3/2/2017    Acute cystitis without hematuria Medium  3/2/2017    COPD exacerbation (H) Medium  3/2/2017    Influenza A Medium  3/2/2017    Acute respiratory failure with hypoxia (H) Medium  3/2/2017    Atrial fibrillation with rapid ventricular response (H) Medium  3/2/2017    Shock (H) Medium  3/2/2017    Atrial fibrillation (H) Medium  3/4/2017    Malignant neoplasm of prostate (H) Medium  3/22/2017    Cellulitis of right lower extremity Medium  3/27/2017    Venous  stasis ulcers of both lower extremities (H) Medium  3/28/2017    Anemia Medium  3/28/2017    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, Bi-Ventricular- dependent Medium  3/28/2017    Dilated cardiomyopathy (H) dilated LV, EF 20-25% by Echo 3/4/17 Medium  3/28/2017    Chronic systolic congestive heart failure (H) Medium  3/28/2017    Immunosuppression (H) Medium  3/28/2017    S/P AV lisandro ablation Medium  6/30/2017      Immunizations     Name Date      Influenza (High Dose) 3 valent vaccine 09/30/17     Influenza (High Dose) 3 valent vaccine 03/14/17     Influenza Vaccine IM 3yrs+ 4 Valent IIV4 11/03/14     Pneumococcal 23 valent 03/14/17     TD (ADULT, 7+) 05/16/12     TD (ADULT, 7+) 02/13/10     TD (ADULT, 7+) 08/01/04     Tetanus 04/17/89          END      ASSESSMENT     Discharge Profile Flowsheet     DISCHARGE NEEDS ASSESSMENT     Inspection under devices  Full 09/30/17 0429    Equipment Currently Used at Home  cane, quad;wheelchair, manual;walker, rolling;shower chair;raised toilet;commode 09/28/17 1332   Not Inspected under devices.  IV/art line hub 09/29/17 2117    Transportation Available  family or friend will provide;car 09/28/17 1332   Skin WDL  ex 09/30/17 0429    # of Referrals Placed by CTS  Post Acute Facilities 01/13/16 1354   Skin Color/Characteristics  pale;redness blanchable 09/29/17 2117    Equipment Used at Home  cane, straight 01/12/16 1151   Skin Temperature  warm 09/30/17 0429    GASTROINTESTINAL (ADULT,PEDIATRIC,OB)     Skin Moisture  dry 09/30/17 0429    GI WDL  WDL 09/30/17 0429   Skin Integrity  bruise(s);drain/device(s);incision(s);scab(s);wound(s) 09/30/17 0429    Last Bowel Movement  09/26/17 09/27/17 1631   Full except areas not inspected   Hip, left;Hip, right;Buttock, left;Buttock, right;Sacrum;Coccyx 09/30/17 0429    COMMUNICATION ASSESSMENT     SAFETY      Patient's communication style  spoken language (English or Bilingual) 09/25/17 1529   Safety WDL  WDL 09/30/17 0429    FINAL  "RESOURCES     Safety Factors  upper side rails raised x 2, lower side rail raised x 1;bed in low position;wheels locked;call light in reach;upper side rails raised x 2;ID band on 09/30/17 0429    Resources List  Home Care 07/14/17 1436   Safety Equipment  oxygen flowmeter 09/30/17 0429    SKIN     All Alarms  alarm(s) activated and audible 09/30/17 0429    Inspection of bony prominences  Full except (identify areas not inspected) 09/30/17 0429                      Assessment WDL (Within Defined Limits) Definitions           Safety WDL     Effective: 09/28/15    Row Information: <b>WDL Definition:</b> Bed in low position, wheels locked; call light in reach; upper side rails up x 2; ID band on<br> <font color=\"gray\"><i>Item=AS safety wdl>>List=AS safety wdl>>Version=F14</i></font>      Skin WDL     Effective: 09/28/15    Row Information: <b>WDL Definition:</b> Warm; dry; intact; elastic; without discoloration; pressure points without redness<br> <font color=\"gray\"><i>Item=AS skin wdl>>List=AS skin wdl>>Version=F14</i></font>      Vitals     Vital Signs Flowsheet     VITAL SIGNS     MIKY COMA SCALE      Temp  97.9  F (36.6  C) 09/30/17 0740   Best Eye Response  4-->(E4) spontaneous 09/30/17 0421    Temp src  Oral 09/30/17 0740   Best Motor Response  6-->(M6) obeys commands 09/30/17 0421    Resp  16 09/30/17 0740   Best Verbal Response  5-->(V5) oriented 09/30/17 0421    Pulse  70 09/25/17 1350   [place] Coma Scale Score  15 09/30/17 0421    Heart Rate  69 09/30/17 0739   HEIGHT AND WEIGHT      BP  112/62 09/30/17 0740   Height  1.676 m (5' 6\") 09/27/17 0920    BP Location  Right arm 09/30/17 0740   Weight  68.5 kg (151 lb 0.2 oz) 09/30/17 0653    Patient Position  Lying 09/27/17 1505   Weight Method  Bed scale 09/30/17 0653    OXYGEN THERAPY     BSA (Calculated - sq m)  1.77 09/27/17 0920    SpO2  97 % 09/30/17 0739   BMI (Calculated)  24.1 09/27/17 0920    O2 Device  None (Room air) 09/30/17 0740   [REMOVED] RIGHT " GROIN INTERVENTIONAL PROCEDURE ACCESS      Oxygen Delivery  2 LPM 09/29/17 1703   Site Assessment  WDL 09/30/17 0421    RESPIRATORY MONITORING     CMS Right Extremity  WDL 09/29/17 2105    Respiratory Monitoring (EtCO2)  29 mmHg 09/28/17 0329   Dorsalis Pulse - Right Leg  Present with doppler 09/29/17 2105    Integrated Pulmonary Index (IPI)  8-9 09/28/17 0329   Popliteal Pulse - Right Leg  Present with doppler 09/29/17 2105    PACEMAKER     Posterior Tibial Pulse - Right Leg  Present with doppler 09/29/17 2105    Pacemaker  Permanent 09/30/17 0421   [REMOVED] LEFT GROIN INTERVENTIONAL PROCEDURE ACCESS      Pacemaker Type  Transcutaneous 09/30/17 0421   Site Assessment  WD 09/30/17 0421    PAIN/COMFORT     CMS Left Extremity  WDL 09/29/17 2105    Patient Currently in Pain  sleeping: patient not able to self report 09/30/17 0421   Dorsalis Pulse - Left Leg  Present with doppler 09/29/17 2105    Preferred Pain Scale  CAPA (Clinically Aligned Pain Assessment) ([facility] Adults Only) 09/30/17 0421   Popliteal Pulse - Left Leg  Present with doppler 09/29/17 2105    Pain Location  Hip 09/29/17 2350   Posterior Tibial Pulse - Left Leg  Present with doppler 09/29/17 2105    Pain Orientation  Left 09/29/17 2350   POSITIONING      Pain Descriptors  Constant 09/29/17 2350   Body Position  side-lying, right 09/30/17 0421    Pain Intervention(s)  Medication (See eMAR);Distraction;Emotional support 09/29/17 2351   Head of Bed (HOB)  HOB at 30-45 degrees 09/30/17 0421    Response to Interventions  Decrease in pain 09/29/17 2351   Positioning/Transfer Devices  pillows 09/30/17 0421    CLINICALLY ALIGNED PAIN ASSESSMENT (CAPA) ([facility] ADULTS ONLY)     Chair  Upright in chair 09/29/17 2117    Comfort  comfortably manageable 09/30/17 0421   DAILY CARE      Change in Pain  getting better 09/30/17 0421   Activity Management  activity adjusted per tolerance 09/30/17 0429    Pain Control  fully  effective 09/30/17 0421   Activity Assistance Provided  assistance, 1 person 09/30/17 0429    Functioning  can do most things, but pain gets in the way of some 09/30/17 0421   ECG      Sleep  normal sleep 09/30/17 0421   ECG Rhythm  Paced rhythm 09/30/17 0421    ANALGESIA SIDE EFFECTS MONITORING     Ectopy  PVC 09/29/17 2350    Side Effects Monitoring: Respiratory Quality  R 09/30/17 0421   Ectopy Frequency  Occasional 09/29/17 0431    Side Effects Monitoring: Respiratory Depth  N 09/30/17 0421   Lead Monitored  Lead II;V 1 09/29/17 1528    Side Effects Monitoring: Sedation Level  S 09/30/17 0421                 Patient Lines/Drains/Airways Status    Active LINES/DRAINS/AIRWAYS     Name: Placement date: Placement time: Site: Days: Last dressing change:    Pressure Injury 09/27/17 Posterior;Right Leg Stage 2 09/27/17   2000    2     Wound 03/28/17 Left;Lower Leg Other (comment) blister 03/28/17   0027   Leg   186     Wound 03/28/17 Lower;Right Leg Other (comment) redness 03/28/17   0224   Leg   186     Wound 03/28/17 Left;Anterior Foot Other (comment) blister 03/28/17   0027   Foot   186     Wound 03/28/17 Right;Lower;Anterior Leg Other (comment) blister 03/28/17   0027   Leg   186     Wound 03/28/17 Right;Anterior Foot Other (comment) blister 03/28/17   0027   Foot   186     Wound 09/27/17 Anterior;Right Leg Abrasion(s) 09/27/17   1630   Leg   2     Incision/Surgical Site 03/15/16 Left Knee 03/15/16   1218    563     Incision/Surgical Site 09/27/17 Right Groin 09/27/17   1443    2     Incision/Surgical Site 09/27/17 Left Groin 09/27/17   1443    2             Patient Lines/Drains/Airways Status    Active PICC/CVC     None            Intake/Output Detail Report     Date Intake     Output   Net    Shift P.O. I.V. IV Piggyback Total Urine Blood Total       Day 09/29/17 0000 - 09/29/17 0659 160 -- -- 160 575 -- 575 -415    Ruthie 09/29/17 0700 - 09/29/17 1459 290 250 -- 540 200 -- 200 340    Noc 09/29/17 1500 - 09/29/17  2359 -- 250 -- 250 200 -- 200 50    Day 09/30/17 0000 - 09/30/17 0659 -- 1837.5 -- 1837.5 300 -- 300 1537.5    Ruthie 09/30/17 0700 - 09/30/17 1459 -- -- -- -- -- -- -- 0      Last Void/BM       Most Recent Value    Urine Occurrence 1 at 09/29/2017 0700    Stool Occurrence 1 at 09/29/2017 0700      Case Management/Discharge Planning     Case Management/Discharge Planning Flowsheet     REFERRAL INFORMATION     Equipment Used at Home  cane, straight 01/12/16 1151    Arrived From  home or self-care 03/29/17 1432   FINAL RESOURCES      # of Referrals Placed by CTS  Post Acute Facilities 01/13/16 1354   Equipment Currently Used at Home  cane, quad;wheelchair, manual;walker, rolling;shower chair;raised toilet;commode 09/28/17 1332    Primary Care Clinic Name  [facility] 03/29/17 1434   Resources List  Home Care 07/14/17 1436    Primary Care MD Name  Helio Aragon 03/29/17 1434   / CAREGIVER      LIVING ENVIRONMENT     Filed Complexity Screen Score  9 09/28/17 1012    Lives With  spouse 09/28/17 1332   ABUSE RISK SCREEN      Living Arrangements  house 09/28/17 1332   QUESTION TO PATIENT:  Has a member of your family or a partner(now or in the past) intimidated, hurt, manipulated, or controlled you in any way?  no 09/27/17 0939    COPING/STRESS     QUESTION TO PATIENT: Do you feel safe going back to the place where you are living?  yes 09/27/17 0939    Major Change/Loss/Stressor  none 09/28/17 0555   OBSERVATION: Is there reason to believe there has been maltreatment of a vulnerable adult (ie. Physical/Sexual/Emotional abuse, self neglect, lack of adequate food, shelter, medical care, or financial exploitation)?  no 09/27/17 0939    Patient Personal Strengths  self-reliant 03/29/17 1434   (R) MENTAL HEALTH SUICIDE RISK      DISCHARGE PLANNING     Are you depressed or being treated for depression?  No 09/28/17 0600    Transportation Available  family or friend will provide;car 09/28/17 1332

## 2017-09-27 NOTE — ANESTHESIA POSTPROCEDURE EVALUATION
Patient: Bud Merritt    Procedure(s):  Right Transfemoral Approach (Harman Ramsey 3 29mm) Aortic Valve Implant,  Cardiopulmonary Bypass Standby, Transthoracic Echocardiogram by Derian Queen(Echo Tech) - Wound Class: I-Clean   - Wound Class: I-Clean    Diagnosis:Severe Aortic Stenosis   Diagnosis Additional Information: No value filed.    Anesthesia Type:  General    Note:  Anesthesia Post Evaluation    Patient location during evaluation: PACU  Patient participation: Able to fully participate in evaluation  Level of consciousness: awake and alert  Pain management: adequate  Airway patency: patent  Cardiovascular status: acceptable  Respiratory status: acceptable  Hydration status: acceptable  PONV: none     Anesthetic complications: None          Last vitals:  Vitals:    09/27/17 1530 09/27/17 1545 09/27/17 1600   BP: 136/75 128/72 121/73   Resp: 17 12 14   Temp: 36.4  C (97.6  F)  36.6  C (97.9  F)   SpO2: 97% 96% 96%         Electronically Signed By: Mateo Hamlin MD  September 27, 2017  4:13 PM

## 2017-09-27 NOTE — IP AVS SNAPSHOT
` `     [address] [facility] BANK: [phone]            Medication Administration Report for Bud Merritt as of 09/30/17 1033   Legend:    Given Hold Not Given Due Canceled Entry Other Actions    Time Time (Time) Time  Time-Action       Inactive    Active    Linked        Medications 09/24/17 09/25/17 09/26/17 09/27/17 09/28/17 09/29/17 09/30/17    acetaminophen (TYLENOL) tablet 325-650 mg  Dose: 325-650 mg Freq: EVERY 4 HOURS PRN Route: PO  PRN Reasons: mild pain,headaches  Start: 09/27/17 1639   Admin Instructions: Maximum acetaminophen dose from all sources = 75 mg/kg/day not to exceed 4 grams/day.         1102 (650 mg)-Given       1730 (650 mg)-Given             apixaban ANTICOAGULANT (ELIQUIS) tablet 2.5 mg  Dose: 2.5 mg Freq: 2 TIMES DAILY Route: PO  Start: 09/27/17 2000       1948 (2.5 mg)-Given [C]        0807 (2.5 mg)-Given       1954 (2.5 mg)-Given        1147 (2.5 mg)-Given       2218 (2.5 mg)-Given        0825 (2.5 mg)-Given       [ ] 2000           aspirin EC EC tablet 81 mg  Dose: 81 mg Freq: DAILY Route: PO  Start: 09/28/17 0800   Admin Instructions: Starting tomorrow.         0807 (81 mg)-Given        1145 (81 mg)-Given        0825 (81 mg)-Given           atorvastatin (LIPITOR) tablet 40 mg  Dose: 40 mg Freq: EVERY EVENING Route: PO  Start: 09/27/17 2000       1935 (40 mg)-Given        1953 (40 mg)-Given        2039 (40 mg)-Given        [ ] 2000           HOLD:  Metformin and metformin containing medications if patient received IV contrast  Freq: HOLD Route: XX  Start: 09/27/17 1639   Admin Instructions: Hold metformin (GLUCOPHAGE, GLUMETZA, FORTAMET, RIOMET) and metformin containing medications: alogliptin/metformin (KAZANO), glipizide/metformin (METAGLIP), glyburide/metformin (GLUCOVANCE), rosiglitazone/metformin (AVANDAMET), dapagliflozin/metformin (XIGDUO XR), sitagliptin/metformin (JANUMET, JANUMET XR), linagliptin/metformin (JENTADUETO), repaglinide/metformin (PRANDIMET),  "saxagliptin/metformin (KOMBIGLYZE XR), canagliflozin/metformin (INVOKAMET), and pioglitazone/metformin (ACTOPLUS MET, ACTOPLUS MET XR) on day of the procedure and for 48 hours after IV contrast given. If patient was on one of these medications pre-procedure,  continue to HOLD for 48 hours post-procedure.               hydrALAZINE (APRESOLINE) injection 10 mg  Dose: 10 mg Freq: EVERY 6 HOURS PRN Route: IV  PRN Reason: high blood pressure  Start: 09/27/17 1741       1748 (10 mg)-Given              HYDROcodone-acetaminophen (NORCO) 5-325 MG per tablet 1 tablet  Dose: 1 tablet Freq: EVERY 6 HOURS PRN Route: PO  PRN Reason: moderate to severe pain  Start: 09/27/17 1430   Admin Instructions: Maximum acetaminophen dose from all sources= 75 mg/kg/day not to exceed 4 grams        1714 (1 tablet)-Given       2328 (1 tablet)-Given        0534 (1 tablet)-Given       1831 (1 tablet)-Given        0039 (1 tablet)-Given       1201 (1 tablet)-Given       1817 (1 tablet)-Given        0004 (1 tablet)-Given       0638 (1 tablet)-Given           lidocaine (LMX4) kit  Freq: EVERY 1 HOUR PRN Route: Top  PRN Reason: pain  PRN Comment: with VAD insertion or accessing implanted port.  Start: 09/27/17 1419   Admin Instructions: Do NOT give if patient has a history of allergy to any local anesthetic or any \"sunita\" product.  Apply 30 minutes prior to VAD insertion or port access. MAX Dose: 2.5 g (  of 5 g tube).               lidocaine 1 % 1 mL  Dose: 1 mL Freq: EVERY 1 HOUR PRN Route: OTHER  PRN Comment: mild pain with VAD insertion or accessing implanted port  Start: 09/27/17 1419   Admin Instructions: Do NOT give if patient has a history of allergy to any local anesthetic or any \"sunita\" product. MAX dose 1 mL subcutaneous OR intradermal in divided doses.               lisinopril (PRINIVIL/ZESTRIL) tablet 10 mg  Dose: 10 mg Freq: DAILY Route: PO  Start: 09/30/17 0800          0825 (10 mg)-Given           magnesium sulfate 2 g in NS " intermittent infusion ([facility] or [facility])  Dose: 2 g Freq: DAILY PRN Route: IV  PRN Reason: magnesium supplementation  Start: 09/28/17 0447   Admin Instructions: For Serum Mg++ 1.6 - 2 mg/dL  Give 2 g and recheck magnesium level next AM.         0510 (2 g)-New Bag             magnesium sulfate 4 g in 100 mL sterile water (premade)  Dose: 4 g Freq: EVERY 4 HOURS PRN Route: IV  PRN Reason: magnesium supplementation  Start: 09/28/17 0447   Admin Instructions: For serum Mg++ less than 1.6 mg/dL  Give 4 g and recheck magnesium level 2 hours after dose, and next AM.               metoprolol (LOPRESSOR) tablet 25 mg  Dose: 25 mg Freq: 2 TIMES DAILY Route: PO  Start: 09/28/17 1319 1953 (25 mg)-Given        1145 (25 mg)-Given       (2118)-Not Given [C]        0825 (25 mg)-Given       [ ] 2000           naloxone (NARCAN) injection 0.1-0.4 mg  Dose: 0.1-0.4 mg Freq: EVERY 2 MIN PRN Route: IV  PRN Reason: opioid reversal  Start: 09/27/17 1639   Admin Instructions: For respiratory rate LESS than or EQUAL to 8.  Partial reversal dose:  0.1 mg titrated q 2 minutes for Analgesia Side Effects Monitoring Sedation Level of 3 (frequently drowsy, arousable, drifts to sleep during conversation).Full reversal dose:  0.4 mg bolus for Analgesia Side Effects Monitoring Sedation Level of 4 (somnolent, minimal or no response to stimulation).               nitroGLYcerin (NITROSTAT) sublingual tablet 0.4 mg  Dose: 0.4 mg Freq: EVERY 5 MIN PRN Route: SL  PRN Reason: chest pain  Start: 09/27/17 1639   Admin Instructions: Administer every 5 minutes as needed. Maximum 3 doses in 15 minutes. Notify provider if no relief after 3 doses.  3 doses per episode               ondansetron (ZOFRAN) injection 4 mg  Dose: 4 mg Freq: EVERY 6 HOURS PRN Route: IV  PRN Reasons: nausea,vomiting  Start: 09/29/17 1047   Admin Instructions: Irritant.          1148 (4 mg)-Given            potassium chloride (KLOR-CON) Packet 20-40 mEq  Dose: 20-40 mEq Freq:  EVERY 2 HOURS PRN Route: ORAL OR FEED  PRN Reason: potassium supplementation  Start: 09/28/17 0446   Admin Instructions: Use if unable to tolerate tablets.    If Serum K+ 3.4-4.0, dose = 20 mEq x1. Recheck K+ level the next AM.  If Serum K+ 3.0-3.3, dose = 60 mEq po total dose (40 mEq x 1 followed in 2 hours by 20 mEq X1). Recheck K+ level 4 hours after dose and the next AM.  If Serum K+ 2.5-2.9, dose = 80 mEq po total dose (40 mEq Q2H x2). Recheck K+ level 4 hours after dose and the next AM.  If Serum K+ less than 2.5, See IV order.  Dissolve packet contents in 4-8 ounces of cold water or juice.               potassium chloride 10 mEq in 100 mL intermittent infusion  Dose: 10 mEq Freq: EVERY 1 HOUR PRN Route: IV  PRN Reason: potassium supplementation  Start: 09/28/17 0446   Admin Instructions: Infuse via PERIPHERAL LINE or CENTRAL LINE. Use for central line replacement if patient weight less than 65 kg, if patient is on TPN with high potassium content or if unit does not stock 20 mEq bags.  If Serum K+ 3.4-4.0, dose = 10 mEq/hr x2 doses. Recheck K+ level the next AM.  If Serum K+ 3.0-3.3, dose = 10 mEq/hr x4 doses (40 mEq IV total dose). Recheck K+ level 2 hours after dose and the next AM.  If Serum K+ less than 3.0, dose = 10 mEq/hr x6 doses (60 mEq IV total dose). Recheck K+ level 2 hours after dose and the next AM.               potassium chloride 10 mEq in 100 mL intermittent infusion with 10 mg lidocaine  Dose: 10 mEq Freq: EVERY 1 HOUR PRN Route: IV  PRN Reason: potassium supplementation  Start: 09/28/17 0446   Admin Instructions: Infuse via PERIPHERAL LINE. Use potassium with lidocaine for pain with peripheral administration.  If Serum K+ 3.4-4.0, dose = 10 mEq/hr x2 doses. Recheck K+ level the next AM.  If Serum K+ 3.0-3.3, dose = 10 mEq/hr x4 doses (40 mEq IV total dose). Recheck K+ level 2 hours after dose and the next AM.  If Serum K+ less than 3.0, dose = 10 mEq/hr x6 doses (60 mEq IV total dose).  Recheck K+ level 2 hours after dose and the next AM.               potassium chloride 20 mEq in 100 mL NON-STANDARD CONC intermittent infusion  Dose: 20 mEq Freq: EVERY 1 HOUR PRN Route: IV  PRN Reason: potassium supplementation  Start: 09/28/17 0446   Admin Instructions: Infuse via CENTRAL LINE Only.  May need EKG if less than 65 kg or on TPN - Max rate is 0.3 mEq/kg/hr for patients not on EKG monitoring.    If Serum K+ 3.4-4.0, dose = 20 mEq/hr x1 doses. Recheck K+ level the next AM.  If Serum K+ 3.0-3.3, dose = 20 mEq/hr x2 doses (40 mEq IV total dose).  Recheck K+ level 2 hours after dose and the next AM.  If Serum K+ less than 3.0, dose = 20 mEq/hr x3 doses (60 mEq IV total dose). Recheck K+ level 2 hours after dose and the next AM.               potassium chloride SA (K-DUR/KLOR-CON M) CR tablet 20-40 mEq  Dose: 20-40 mEq Freq: EVERY 2 HOURS PRN Route: PO  PRN Reason: potassium supplementation  Start: 09/28/17 0446   Admin Instructions: Use if able to take PO.   If Serum K+ 3.4-4.0, dose = 20 mEq x1. Recheck K+ level the next AM.  If Serum K+ 3.0-3.3, dose = 60 mEq po total dose (40 mEq x1 followed in 2 hours by 20 mEq x1). Recheck K+ level 4 hours after dose and the next AM.  If Serum K+ 2.5-2.9, dose = 80 mEq po total dose (40 mEq Q2H x2). Recheck K+ level 4 hours after dose and the next AM.  If Serum K+ less than 2.5, See IV order.  DO NOT CRUSH.         0534 (20 mEq)-Given        0558 (20 mEq)-Given            sodium chloride (PF) 0.9% PF flush 3 mL  Dose: 3 mL Freq: EVERY 8 HOURS Route: IK  Start: 09/27/17 1645   Admin Instructions: And Q1H PRN, to lock peripheral IV dormant line.        1748 (3 mL)-Given       (2339)-Not Given        0807 (3 mL)-Given       (1646)-Not Given       2338 (3 mL)-Given        (1147)-Not Given       (1704)-Not Given        (0012)-Not Given       (0834)-Not Given       [ ] 1600           sodium chloride (PF) 0.9% PF flush 3 mL  Dose: 3 mL Freq: EVERY 1 HOUR PRN Route: IK  PRN  Reason: line flush  Start: 09/27/17 1639   Admin Instructions: for peripheral IV flush post IV meds          2341 (3 mL)-Given           Completed Medications  Medications 09/24/17 09/25/17 09/26/17 09/27/17 09/28/17 09/29/17 09/30/17         Dose: 1 tablet Freq: ONCE Route: PO  Start: 09/29/17 0530   End: 09/29/17 0533   Admin Instructions: Maximum acetaminophen dose from all sources= 75 mg/kg/day not to exceed 4 grams          0533 (1 tablet)-Given              Dose: 0.5 mL Freq: PRIOR TO DISCHARGE Route: IM  Start: 09/27/17 1645   End: 09/30/17 1022   Admin Instructions: Administer when afebrile (less than 100.4F) x 24 hours, or Prior to Discharge.  If not administering when scheduled , change the due time by following the instructions in the reference link below.  If patient refuses vaccine, chart as Vaccine Refused.           1022 (0.5 mL)-Given             Dose: 100 mL Freq: ONCE Route: IV  Start: 09/28/17 1200   End: 09/28/17 1147        1147 (100 mL)-Given               Dose: 50 mL Freq: ONCE Route: IA  Start: 09/27/17 1430   End: 09/27/17 1430       1430 (160 mL)-Given                Dose: 1,000 mL Freq: ONCE Route: IV  Last Dose: 1,000 mL (09/28/17 0020)  Start: 09/28/17 0015   End: 09/28/17 0220        0020 (1,000 mL)-New Bag               Dose: 4 mL Freq: ONCE Route: IV  Start: 09/28/17 1030   End: 09/28/17 1030        1030 (4 mL)-Given               Dose: 90 mL Freq: ONCE Route: IV  Start: 09/28/17 1200   End: 09/28/17 1147        1147 (90 mL)-Given            Discontinued Medications  Medications 09/24/17 09/25/17 09/26/17 09/27/17 09/28/17 09/29/17 09/30/17         Rate: 250 mL/hr Freq: CONTINUOUS Route: IV  Last Dose: Stopped (09/30/17 0809)  Start: 09/29/17 1500   End: 09/30/17 0804         1400 ( )-New Bag [C]       1500 ( )-Rate/Dose Verify [C]       2344 ( )-New Bag        0804-Med Discontinued  0809-Stopped             Rate: 250 mL/hr Freq: CONTINUOUS Route: IV  Start: 09/29/17 1500   End:  09/30/17 0804         2039 ( )-Rate/Dose Verify       2130 ( )-Rate/Dose Verify        0804-Med Discontinued         Rate: 500 mL/hr Freq: CONTINUOUS Route: IV  Start: 09/28/17 0700   End: 09/28/17 1320        0656 ( )-New Bag       1320-Med Discontinued           Rate: 75 mL/hr Freq: CONTINUOUS Route: IV  Start: 09/27/17 0915   End: 09/27/17 1447   Admin Instructions: Start 2 hours prior to procedure.  IF PATIENT IS RECEIVING RENAL DIALYSIS, RN to reduce rate of 0.9% sodium chloride IV solution to 10 ml/hour (TKO) IV infusion to prevent fluid overload.               1447-Med Discontinued            Dose: 325 mg Freq: DAILY Route: PO  Start: 09/27/17 0911   End: 09/27/17 1447   Admin Instructions: Give on admission to pre-procedure area;  HOLD if patient has taken home dose morning of the procedure        (1004)-Not Given       1447-Med Discontinued            Dose: 2 g Freq: PRE-OP/PRE-PROCEDURE Route: IV  Indications of Use: SURGICAL PROPHYLAXIS  Start: 09/27/17 0911   End: 09/27/17 1447   Admin Instructions: Give within 1 hour PRIOR to procedure in pre-procedure area or in Cath Lab for inpatients. If patient weight is greater than or equal to 120 kg change dose to 3 g.        1447-Med Discontinued            Rate: 4 mL/hr Freq: CONTINUOUS Route: IV  Start: 09/27/17 0945   End: 09/27/17 1637              1637-Med Discontinued            Dose: 125 mcg Freq: EVERY 12 HOURS SCHEDULED Route: PO  Start: 09/29/17 0800   End: 09/29/17 1101   Admin Instructions: Only providers certified by [employer] can initiate new orders for dofetilide.          1101-Med Discontinued                Dose: 125 mcg Freq: EVERY 12 HOURS SCHEDULED Route: PO  Start: 09/28/17 2000   End: 09/28/17 1320   Admin Instructions: Only providers certified by [employer] can initiate new orders for dofetilide.         1320-Med Discontinued           Rate: 6.1-60.8 mL/hr Freq: CONTINUOUS Route: IV  Start: 09/27/17 0945   End: 09/27/17 1637   Admin Instructions:  "MD to titrate  Protect from light. Vesicant.               1637-Med Discontinued            Dose: 25-50 mcg Freq: EVERY 2 MIN PRN Route: IV  PRN Reason: other  PRN Comment: acute pain  Start: 09/27/17 1500   End: 09/27/17 1637   Admin Instructions: MAX cumulative dose = 250 mcg.    Use Fentanyl initially, as a short acting agent for acute pain control.  If insufficient, or a longer acting agent is needed, begin Morphine or Hydromorphone if ordered.        1637-Med Discontinued            Freq: PRN  Start: 09/27/17 1423   End: 09/27/17 1637       1423 (2 mL)-Given [C]       1637-Med Discontinued            Freq: HOLD Route: XX  Start: 09/27/17 0911   End: 09/27/17 1447       1447-Med Discontinued            Dose: 2.5-5 mg Freq: EVERY 10 MIN PRN Route: IV  PRN Reason: high blood pressure  PRN Comment: for Systolic Blood Pressure greater than 160 and Heart Rate greater than 60 bpm.  Start: 09/27/17 1500   End: 09/27/17 1637   Admin Instructions: Max cumulative dose = 20 mg.  FOR USE IN PACU ONLY, DC WHEN TRANSFERRED TO FLOOR.        1637-Med Discontinued            Rate: 100 mL/hr Freq: CONTINUOUS Route: IV  Start: 09/27/17 1515   End: 09/27/17 1637   Admin Instructions: Continue until IV catheter is weaned               1637-Med Discontinued            Freq: EVERY 1 HOUR PRN Route: Top  PRN Reason: pain  PRN Comment: with VAD insertion or accessing implanted port.  Start: 09/27/17 0911   End: 09/27/17 1447   Admin Instructions: Do NOT give if patient has a history of allergy to any local anesthetic or any \"sunita\" product.   Apply 30 minutes prior to VAD insertion or port access. MAX Dose: 2.5 g (  of 5 g tube)        1447-Med Discontinued            Dose: 1 mL Freq: EVERY 1 HOUR PRN Route: OTHER  PRN Comment: mild pain with VAD insertion or accessing implanted port  Start: 09/27/17 0911   End: 09/27/17 1447   Admin Instructions: Do NOT give if patient has a history of allergy to any local anesthetic or any \"sunita\" " product. MAX dose 1 mL subcutaneous OR intradermal in divided doses.        1447-Med Discontinued            Dose: 10 mg Freq: 2 TIMES DAILY Route: PO  Start: 09/29/17 0800   End: 09/29/17 1400         1145 (10 mg)-Given       1400-Med Discontinued          Dose: 10 mg Freq: 2 TIMES DAILY Route: PO  Start: 09/27/17 1800   End: 09/28/17 0717       1854 (10 mg)-Given        0717-Med Discontinued           Dose: 25 mg Freq: 2 TIMES DAILY Route: PO  Start: 09/29/17 0800   End: 09/28/17 1319        1319-Med Discontinued           Dose: 25 mg Freq: 2 TIMES DAILY Route: PO  Start: 09/27/17 2000   End: 09/28/17 0717       1935 (25 mg)-Given        0717-Med Discontinued           Dose: 0.1-0.4 mg Freq: EVERY 2 MIN PRN Route: IV  PRN Reason: opioid reversal  Start: 09/27/17 1200   End: 09/27/17 1637   Admin Instructions: For respiratory rate LESS than or EQUAL to 8.  Partial reversal dose:  0.1 mg titrated q 2 minutes for Analgesia Side Effects Monitoring Sedation Level of 3 (frequently drowsy, arousable, drifts to sleep during conversation).Full reversal dose:  0.4 mg bolus for Analgesia Side Effects Monitoring Sedation Level of 4 (somnolent, minimal or no response to stimulation).        1637-Med Discontinued            Rate: 1.9-25.4 mL/hr Freq: CONTINUOUS Route: IV  Last Dose: Stopped (09/27/17 1400)  Start: 09/27/17 0945   End: 09/27/17 1637   Admin Instructions: MD to titrate  Protect from light. Vesicant.        1345 (0.03 mcg/kg/min)-New Bag       1400-Stopped       1637-Med Discontinued            Dose: 4 mg Freq: EVERY 30 MIN PRN Route: PO  PRN Reason: nausea  Start: 09/27/17 1500   End: 09/27/17 1637   Admin Instructions: MAX total dose = 8 mg, including OR dosing. If not resolved in 15 minutes, then go to step 2 (Prochlorperazine if ordered).        1637-Med Discontinued         Or    Dose: 4 mg Freq: EVERY 30 MIN PRN Route: IV  PRN Reason: nausea  Start: 09/27/17 1500   End: 09/27/17 1637   Admin Instructions:  MAX total dose = 8 mg, including OR dosing. If not resolved in 15 minutes, then go to step 2 (Prochlorperazine if ordered).  Irritant.        1637-Med Discontinued            Rate: 4.1-8.1 mL/hr Freq: CONTINUOUS Route: IV  Last Dose: Stopped (09/27/17 1410)  Start: 09/27/17 1200   End: 09/27/17 1637   Admin Instructions: Provider to titrate        1226 (0.05 mcg/kg/min)-New Bag       1239 (0.03 mcg/kg/min)-Rate/Dose Change       1410-Stopped       1637-Med Discontinued            Dose: 3 mL Freq: EVERY 8 HOURS Route: IK  Start: 09/27/17 0911   End: 09/27/17 1447   Admin Instructions: And Q1H PRN, to lock peripheral IV dormant line.               1447-Med Discontinued            Dose: 3 mL Freq: EVERY 1 HOUR PRN Route: IK  PRN Reason: line flush  Start: 09/27/17 0911   End: 09/27/17 1447   Admin Instructions: for peripheral IV flush post IV meds        1447-Med Discontinued            Dose: 25 mg Freq: DAILY Route: PO  Start: 09/29/17 0800   End: 09/29/17 1400         1145 (25 mg)-Given       1400-Med Discontinued          Dose: 25 mg Freq: DAILY Route: PO  Start: 09/28/17 0800   End: 09/28/17 0717        0717-Med Discontinued           Dose: 0.4 mg Freq: 2 TIMES DAILY Route: PO  Start: 09/27/17 2000   End: 09/29/17 1400   Admin Instructions: Administer 30 minutes after the same meal each day.  Capsules should be swallowed whole; do not crush chew or open.        1935 (0.4 mg)-Given        0807 (0.4 mg)-Given       1953 (0.4 mg)-Given        1138 (0.4 mg)-Given       1400-Med Discontinued     Medications 09/24/17 09/25/17 09/26/17 09/27/17 09/28/17 09/29/17 09/30/17

## 2017-09-27 NOTE — IP AVS SNAPSHOT
` ` Patient Information     Patient Name Sex     Bud Merritt ([id number]) Male 1927       Room Bed    29 6229-02      Patient Demographics     Address Phone    [id number]  [facility] [address] [phone] (Home) *Preferred*  NONE (Work)  NONE (Mobile)      Patient Ethnicity & Race     Ethnic Group Patient Race    American White      Emergency Contact(s)     Name Relation Home Work Mobile    Tierra Merritt Spouse [phone] none none    Jazmine Aragon Daughter [phone] none [phone]    Dante Merritt Son [phone] none [phone]      Documents on File        Status Date Received Description       Documents for the Patient    Privacy Notice - [place] Received 01/27/10     Face Sheet Received () 09     Insurance Card  () 09     Insurance Card  () 06     Face Sheet Received () 08     Insurance Card  () 08 MEDICARE    External Medication Information Consent Accepted () 01/27/10     Face Sheet Received () 01/27/10     Insurance Card  () 01/27/10     Patient ID Received () 14 exp 2017 [place]     Consent for Services - Hospital/Clinic Received () 11     External Medication Information Consent Accepted () 11     Consent for Services - Hospital/Clinic Received () 11     Insurance Card Received () 11 [facility] IM for Patient Signature       Insurance Card Received () 12 MEDICA, MEDICARE    External Medication Information Consent Accepted () 12     Consent for Services - Hospital/Clinic Received () 12     Consent to Communicate Received 12     Consent for EHR Access  13 Copied from existing Consent for services - C/HOD collected on 2012    [facility] Specified Other       Consent for Services - Hospital/Clinic Received () 13     External Medication Information Consent  Accepted 13     Insurance Card Received () 14     Insurance Card Received () 14     Consent for Services - Hospital/Clinic Received () 14     Insurance Card Received 03/15/15 medicare    Insurance Card Received () 03/15/15     Insurance Card Received 03/15/15 medica    Insurance Card Received 03/15/15 RX    Consent for Services - Hospital/Clinic Received () 10/12/15     Consent for Services/Privacy Notice - Hospital/Clinic Received () 16     Physical Therapy Certification Received 16 sent 16    Insurance Card Received 16 [employer]ica-front    Insurance Card Received 16 medica-back    Insurance Card Received 17 BCBS/Rx    Consent for Services/Privacy Notice - Hospital/Clinic Received 17     Advance Directives and Living Will Received 17 Health Care Directive 17    Advance Directives and Living Will Not Received  Validation of AD 17       Documents for the Encounter    CMS IM for Patient Signature Received 17     H/P - History and Physical  17 [facility], H/P, 17    Cardiac Cath - HIM Scan   Mac Lab Report      Admission Information     Attending Provider Admitting Provider Admission Type Admission Date/Time    Cosme Gunn MD Yannopoulos, Demetris, MD Elective 17  0856    Discharge Date Hospital Service Auth/Cert Status Service Area     CSI-Coronary Structure Intervention Incomplete [facility] SERVICES    Unit Room/Bed Admission Status       UU [id number] Admission (Confirmed)       Admission     Complaint    Severe Aortic Stenosis , Aortic stenosis, severe, S/P TAVR (transcatheter aortic valve replacement)      Hospital Account     Name Acct ID Class Status Primary Coverage    Christopher Merrittlibertad JOHNSON [id number] Inpatient Open MEDICARE - MEDICARE FOR HB SUPPLEMENT            Guarantor Account (for Hospital Account #[id number])     Name  Relation to Pt Service Area Active? Acct Type    Bud Merritt  FCS Yes Personal/Family    Address Phone          [address] [address] [place] [address] [phone](H)  NONE(O)              Coverage Information (for Hospital Account #[id number])     1. MEDICARE/MEDICARE FOR HB SUPPLEMENT     F/O Payor/Plan Precert #    MEDICARE/MEDICARE FOR HB SUPPLEMENT     Subscriber Subscriber #    Bud Merritt [id number]    Address Phone    [employer] CLAIM[employer]  [address]  [place] [address] [phone]          2. BCBS/BCBS PLATINUM BLUE     F/O Payor/Plan Precert #    BCBS/BCBS PLATINUM BLUE     Subscriber Subscriber #    Bud Merritt [id number]    Address Phone    [address] [address]  [place], [place] [id number] [phone]

## 2017-09-27 NOTE — ANESTHESIA CARE TRANSFER NOTE
Patient: Bud Merritt    Procedure(s):  Right Transfemoral Approach (Harman Ramsey 3 29mm) Aortic Valve Implant,  Cardiopulmonary Bypass Standby, Transthoracic Echocardiogram by Derian Queen(Echo Tech) - Wound Class: I-Clean   - Wound Class: I-Clean    Diagnosis: Severe Aortic Stenosis   Diagnosis Additional Information: No value filed.    Anesthesia Type:   General     Note:  Airway :Face Mask  Patient transferred to:PACU  Comments: Pt transferred to PACU.  Maintaining airway and oxygenation via FM.  VSS.  Pt denies pain and nausea.  Report to RN.  All questions answered.       Vitals: (Last set prior to Anesthesia Care Transfer)    CRNA VITALS  9/27/2017 1413 - 9/27/2017 1507      9/27/2017             Resp Rate (set): 10                Electronically Signed By: POLLO Godfrey CRNA  September 27, 2017  3:07 PM

## 2017-09-27 NOTE — ANESTHESIA PROCEDURE NOTES
Arterial Line Procedure Note  Staff:     Anesthesiologist:  VISHNU LIRIANO  Location: In OR Before Induction  Procedure Start/Stop Times:     patient identified, IV checked, site marked, risks and benefits discussed, informed consent, monitors and equipment checked, pre-op evaluation and at physician/surgeon's request      Correct Patient: Yes      Correct Position: Yes      Correct Site: Yes      Correct Procedure: Yes      Correct Laterality:  Yes    Site Marked:  Yes  Line Placement:     Procedure:  Arterial Line    Insertion Site:  Radial    Insertion laterality:  Left    Skin Prep: Chloraprep      Patient Prep: patient draped, mask, sterile gloves, hat and hand hygiene      Local skin infiltration:  1% lidocaine    Ultrasound Guided?: Yes      Artery evaluated via ultrasound confirming patency.   Using realtime imaging, the artery was punctured and the needle was observed entering the artery.      A permanent image is entered into patient's chart.      Catheter size:  20 gauge, Quick cath    Cath secured with: suture      Dressing:  Tegaderm    Complications:  None obvious    Arterial waveform: Yes      IBP within 10% of NIBP: Yes

## 2017-09-27 NOTE — IP AVS SNAPSHOT
"    [facility] [address] [facility]: [phone]                                              INTERAGENCY TRANSFER FORM - PHYSICIAN ORDERS   2017                    Hospital Admission Date: 2017  GALLO FLOYD   : 1927  Sex: Male        Attending Provider: Cosme Gunn MD     Allergies:  Amiodarone, Lasix [Furosemide]    Infection:  None   Service:  CSI-CORONARY    Ht:  1.676 m (5' 6\")   Wt:  68.5 kg (151 lb 0.2 oz)   Admission Wt:  67.6 kg (149 lb 0.5 oz)    BMI:  24.37 kg/m 2   BSA:  1.79 m 2            Patient PCP Information     Provider PCP Type    Camron Aragon MD General      ED Clinical Impression     Diagnosis Description Comment Added By Time Added    Severe aortic stenosis [I35.0] Severe aortic stenosis [I35.0]  Caryn Fernandez RN 2017  9:11 AM    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) [Z95.2]  Ludy Emerson, APRN CNP 2017  8:46 AM    Acute on chronic systolic congestive heart failure (H) [I50.23] Acute on chronic systolic congestive heart failure (H) [I50.23]  Ludy Emerson, APRSHEBA CNP 2017  2:01 PM      Hospital Problems as of 2017              Priority Class Noted POA    Aortic stenosis, severe Medium  2017 Yes    S/P TAVR (transcatheter aortic valve replacement) Medium  2017 Yes      Non-Hospital Problems as of 2017              Priority Class Noted    Hypertrophy of prostate without urinary obstruction Medium  2003    Rheumatoid arthritis involving multiple sites with positive rheumatoid factor (H) Medium  2010    Hyperlipidemia LDL goal <130 Medium  10/31/2010    Osteoporosis High  2013    Encounter for long-term current use of medication Medium  2014    Elevated prostate specific antigen (PSA) Medium  10/12/2015    Rheumatoid arthritis involving multiple sites, unspecified rheumatoid factor presence (H) Medium  2015    Status post total " left knee replacement Medium  1/12/2016    Mitral regurgitation and aortic stenosis - AS severe by echo on 3/2017 Medium  1/13/2016    Postoperative delirium Medium  1/15/2016    Infected superficial injury of knee, left, subsequent encounter Medium  3/24/2016    Essential hypertension with goal blood pressure less than 140/90 Medium  6/22/2016    Paroxysmal atrial fibrillation (H) Medium  7/6/2016    Visit for monitoring Tikosyn therapy Medium  1/23/2017    Diarrhea Medium  3/2/2017    Weakness generalized Medium  3/2/2017    Cough Medium  3/2/2017    Acute cystitis without hematuria Medium  3/2/2017    COPD exacerbation (H) Medium  3/2/2017    Influenza A Medium  3/2/2017    Acute respiratory failure with hypoxia (H) Medium  3/2/2017    Atrial fibrillation with rapid ventricular response (H) Medium  3/2/2017    Shock (H) Medium  3/2/2017    Atrial fibrillation (H) Medium  3/4/2017    Malignant neoplasm of prostate (H) Medium  3/22/2017    Cellulitis of right lower extremity Medium  3/27/2017    Venous stasis ulcers of both lower extremities (H) Medium  3/28/2017    Anemia Medium  3/28/2017    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, Bi-Ventricular- dependent Medium  3/28/2017    Dilated cardiomyopathy (H) dilated LV, EF 20-25% by Echo 3/4/17 Medium  3/28/2017    Chronic systolic congestive heart failure (H) Medium  3/28/2017    Immunosuppression (H) Medium  3/28/2017    S/P AV lisandro ablation Medium  6/30/2017      Code Status History     Date Active Date Inactive Code Status Order ID Comments User Context    4/4/2017 10:29 AM 9/27/2017  4:39 PM DNR/DNI [id number]  Sejal Perez MD Outpatient    3/28/2017 12:06 AM 4/4/2017 10:29 AM DNR/DNI [id number]  Cullen Teran MD Inpatient    3/14/2017  1:29 PM 3/28/2017 12:06 AM DNR/DNI [id number]  Blossom Vines MD Outpatient    3/9/2017 12:59 PM 3/14/2017  1:29 PM DNR/DNI [id number]  Michael Lopez MD Inpatient    3/4/2017 10:26 PM 3/9/2017  12:59 PM Full Code [id number]  Ailin Lee MD Inpatient    3/4/2017 12:54 PM 3/4/2017 10:26 PM Full Code [id number]  Sejal Perez MD Outpatient    3/2/2017  6:20 PM 3/4/2017 12:54 PM Full Code [id number]  Cullen Teran MD Inpatient    1/26/2017  6:50 AM 3/2/2017  6:20 PM Full Code [id number]  Luis Eduardo Stack MD Outpatient    1/23/2017  1:22 PM 1/26/2017  6:50 AM Full Code [id number]  Beth Cohn MD Inpatient    1/14/2016 12:59 PM 1/23/2017  1:22 PM Full Code [id number]  Taina Lopez PA-C Outpatient    1/12/2016 11:01 AM 1/14/2016 12:59 PM Full Code [id number]  Cesar Walker,  Inpatient         Medication Review      START taking        Dose / Directions Comments    aspirin 81 MG EC tablet        Dose:  81 mg   Take 1 tablet (81 mg) by mouth daily   Quantity:  30 tablet   Refills:  11          CONTINUE these medications which may have CHANGED, or have new prescriptions. If we are uncertain of the size of tablets/capsules you have at home, strength may be listed as something that might have changed.        Dose / Directions Comments    atorvastatin 40 MG tablet   Commonly known as:  LIPITOR   This may have changed:  when to take this   Used for:  Hyperlipidemia LDL goal <130        Dose:  40 mg   Take 1 tablet (40 mg) by mouth daily   Quantity:  90 tablet   Refills:  2        lisinopril 10 MG tablet   Commonly known as:  PRINIVIL/ZESTRIL   This may have changed:    - how much to take  - when to take this   Used for:  Acute on chronic systolic congestive heart failure (H)        Dose:  5 mg   Take 0.5 tablets (5 mg) by mouth daily   Quantity:  62 tablet   Refills:  11        metoprolol 25 MG tablet   Commonly known as:  LOPRESSOR   This may have changed:  See the new instructions.        Dose:  25 mg   Take 1 tablet (25 mg) by mouth 2 times daily   Quantity:  60 tablet   Refills:  11        tamsulosin 0.4 MG capsule   Commonly known as:  FLOMAX    This may have changed:  when to take this        Dose:  0.4 mg   Take 1 capsule (0.4 mg) by mouth daily   Quantity:  60 capsule   Refills:  0          CONTINUE these medications which have NOT CHANGED        Dose / Directions Comments    acetaminophen 650 MG CR tablet   Commonly known as:  TYLENOL        Dose:  650 mg   Take 650 mg by mouth every 8 hours as needed Reported on 4/5/2017   Refills:  0        alendronate 70 MG tablet   Commonly known as:  FOSAMAX   Used for:  Osteopenia        Dose:  70 mg   Take 1 tablet (70 mg) by mouth every 7 days Take with over 8 ounces water and stay upright for at least 30 minutes after dose.  Take at least 60 minutes before breakfast   Quantity:  12 tablet   Refills:  3        apixaban ANTICOAGULANT 2.5 MG tablet   Commonly known as:  ELIQUIS   Used for:  Paroxysmal atrial fibrillation (H)        Dose:  2.5 mg   Take 1 tablet (2.5 mg) by mouth 2 times daily   Quantity:  180 tablet   Refills:  3        ascorbic acid 500 MG Cpcr CR capsule   Commonly known as:  vitamin C        Dose:  500 mg   Take 500 mg by mouth daily   Refills:  0        calcium carbonate 1250 MG tablet   Commonly known as:  OS-SHELIA 500 mg [place]. Ca        Dose:  1 tablet   Take 1 tablet by mouth daily   Refills:  0        dofetilide 125 MCG capsule   Commonly known as:  TIKOSYN   Used for:  Paroxysmal atrial fibrillation (H)        Dose:  125 mcg   Take 1 capsule (125 mcg) by mouth 2 times daily   Quantity:  60 capsule   Refills:  5        HYDROcodone-acetaminophen 5-325 MG per tablet   Commonly known as:  NORCO   Used for:  Rheumatoid arthritis involving multiple sites, unspecified rheumatoid factor presence (H)        TAKE 1 TABLET BY MOUTH EVERY 6 HOURS AS NEEDED FOR MODERATE TO SEVERE PAIN   Quantity:  30 tablet   Refills:  0        leflunomide 10 MG tablet   Commonly known as:  ARAVA   Used for:  Rheumatoid arthritis involving multiple sites with positive rheumatoid factor (H)        Dose:  10 mg   Take 1  tablet (10 mg) by mouth daily   Quantity:  30 tablet   Refills:  11        LUPRON DEPOT IM        Inject into the muscle every 6 months Reported on 5/3/2017   Refills:  0        predniSONE 5 MG tablet   Commonly known as:  DELTASONE   Used for:  Osteopenia        Dose:  5 mg   Take 1 tablet (5 mg) by mouth daily   Quantity:  100 tablet   Refills:  4        traMADol 50 MG tablet   Commonly known as:  ULTRAM   Used for:  Rheumatoid arthritis involving multiple sites, unspecified rheumatoid factor presence (H)        TAKE 1 TABLET 3 TIMES DAILY AS NEEDED FOR MODERATE PAIN   Quantity:  90 tablet   Refills:  0        VITAMIN D (CHOLECALCIFEROL) PO        Dose:  1 tablet   Take 1 tablet by mouth daily Reported on 5/3/2017   Refills:  0          STOP taking     spironolactone 25 MG tablet   Commonly known as:  ALDACTONE                     Further instructions from your care team         Going home after Transcatheter Aortic Valve Replacement (TAVR)  Femoral Access    You have small procedure sites at both groins, the one on the right is slightly bigger. You may have small amounts of bruising or discomfort, this is expected. If you develop worse swelling, redness and warmth that does not go away, fever and chills, Increasing numbness in your legs, worsening pain at the site then you need to contact your nurse coordinator.    You may shower, but do not soak in a bath tub or pool or apply lotions, ointments, powder, etc for 3-5 days or until sites look healed.     Activity: No lifting, pushing, pulling more than 10 pounds (examples to avoid: groceries, vacuuming, gardening, golfing): For one week with a procedure through the groin.    After the initial healing process of the access site, we recommend cardiac rehabilitation for all TAVR patients. Cardiac rehabilitation will help you:  - Rebuild stamina, strength and balance.  - Learn how to participate in activities safely, as well as help you regain confidence to do so.  -  Return to activities of daily living and leisure.  Please contact their central scheduling to arrange at [phone]    We prefer you to follow up with your primary care provider within 2 weeks. You will be arranged to see the TAVR clinic in month. At that time you will have a repeat ultrasound of the heart to look at the valve.     Should you have any questions or concerns please contact your assigned TAVR nurse coordinator:  Ysabel [phone] (at the first prompt enter #1, second prompt enter #3, then ask the triage nurse if you can speak with Ysabel).  Na [phone]  Irene [phone]      Summary of Visit     Reason for your hospital stay       You underwent a transcatheter aortic valve replacement. You have done quite well post procedure.             After Care     Activity       Your activity upon discharge: activity as tolerated. Specific restrictions attached       Diet       Follow this diet upon discharge: Orders Placed This Encounter      Regular Diet Adult             Referrals     Cardiac Rehab Referral       Your provider has referred you to:             Your next 10 appointments already scheduled     Oct 04, 2017  9:45 AM CDT   [facility] Visit with Mak Shaver MD   [facility]    [address] [address]   [phone]            Oct 26, 2017  2:00 PM CDT   Ech Complete with UCECH2    [facility] ([facility])    [address] [place] [id number] [phone]           1. Please bring or wear a comfortable two-piece outfit. 2. You may eat, drink and take your normal medicines. 3. For any questions that cannot be answered, please contact the ordering physician            Oct 26, 2017  3:00 PM CDT   Lab with  LAB    [facility] ([facility])    [address] [place] [id number] [phone]            Oct 26, 2017  3:30 PM  CDT   (Arrive by 3:15 PM)   Return Visit with POLLO Reynolds CNP   [facility] ([facility])    [address] [facility] [address] [phone]              Follow-Up Appointment Instructions     Future Labs/Procedures    Adult [facility] Follow-up and recommended labs and tests     Comments:    Follow up with primary care provider, Camron Aragon, within 7 days for hospital follow- up.  The following labs/tests are recommended: CBC, BMP    Please follow up with cardiology/valve clinic and cardiac rehab, as arranged.      Appointments on [place] and/or [facility] (with [facility] or [facility] provider or service). Call [phone] if you haven't heard regarding these appointments within 7 days of discharge.      Follow-Up Appointment Instructions     Adult [facility] Follow-up and recommended labs and tests       Follow up with primary care provider, Camron Aragon, within 7 days for hospital follow- up.  The following labs/tests are recommended: CBC, BMP    Please follow up with cardiology/valve clinic and cardiac rehab, as arranged.      Appointments on [place] and/or [facility] (with [facility] or [facility] provider or service). Call [phone] if you haven't heard regarding these appointments within 7 days of discharge.             Statement of Approval     Ordered          09/29/17 5107  I have reviewed and agree with all the recommendations and orders detailed in this document.  EFFECTIVE NOW     Approved and electronically signed by:  Lázaro Monaco MD           09/29/17 1102  I have reviewed and agree with all the recommendations and orders detailed in this document.  EFFECTIVE NOW     Approved and electronically signed by:  Ludy Emerson APRN CNP

## 2017-09-27 NOTE — ANESTHESIA PROCEDURE NOTES
DRAGAN Probe Insertion Note  Probe Inserted by: VISHNU LIRIANO   Insertion method: DRAGAN probe easily inserted   Bite block used:   Yes      Probe type:  Adult 3D   Insertion complications: No complications.      DRAGAN report NOT being generated

## 2017-09-27 NOTE — IP AVS SNAPSHOT
MRN:[id number]                      After Visit Summary   9/27/2017    Bud Merritt    MRN: [id number]           Thank you!     Thank you for choosing [facility] for your care. Our goal is always to provide you with excellent care. Hearing back from our patients is one way we can continue to improve our services. Please take a few minutes to complete the written survey that you may receive in the mail after you visit with us. Thank you!        Patient Information     Date Of Birth          5/9/1927        Designated Caregiver       Most Recent Value    Caregiver    Will someone help with your care after discharge? 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 You have done quite well post procedure.                  Who to Call     For medical emergencies, please call 911.  For non-urgent questions about your medical care, please call your primary care provider or clinic, [phone]  For questions related to your surgery, please call your surgery clinic        Attending Provider     Provider Specialty    Cosme Gunn MD Cardiology       Primary Care Provider Office Phone # Fax #    Camron Aragon [phone]      After Care Instructions     Activity       Your activity upon discharge: activity as tolerated. Specific restrictions attached            Diet       Follow this diet upon discharge: Orders Placed This Encounter      Regular Diet Adult                  Follow-up Appointments     [facility] Follow-up and recommended labs and tests       Follow up with primary care provider, Camron Aragon, within 7 days for hospital follow- up.  The following labs/tests are recommended: CBC, BMP    Please follow up with  cardiology/valve clinic and cardiac rehab, as arranged.      Appointments on [facility] and/or [facility] (with [facility] or [facility] provider or service). Call [phone] if you haven't heard regarding these appointments within 7 days of discharge.                  Your next 10 appointments already scheduled     Oct 04, 2017  9:45 AM CDT   Return Visit with Mak Shaver MD   [facility]    [address] [address] [phone]            Oct 26, 2017  2:00 PM CDT   Ech Complete with ECHCR2   [facility] ([facility])    [address] [place] [address] [phone]           1. Please bring or wear a comfortable two-piece outfit. 2. You may eat, drink and take your normal medicines. 3. For any questions that cannot be answered, please contact the ordering physician            Oct 26, 2017  3:00 PM CDT   Lab with  LAB   [facility] ([facility])    [address] [place] [address] [phone]            Oct 26, 2017  3:30 PM CDT   (Arrive by 3:15 PM)   Return Visit with [facility] ([facility])    [address] [place] [address] [phone]              Additional Services     Cardiac Rehab Referral       Your provider has referred you to:                  Further instructions from your care team         Going home after Transcatheter Aortic Valve Replacement (TAVR)  Femoral Access    You have small procedure sites at both groins, the one on the right is slightly bigger. You may have small amounts of bruising or discomfort, this is expected. If you develop worse swelling, redness and warmth that does not go away, fever and chills, Increasing numbness in your legs, worsening pain at the site then you need to contact your nurse coordinator.    You  may shower, but do not soak in a bath tub or pool or apply lotions, ointments, powder, etc for 3-5 days or until sites look healed.     Activity: No lifting, pushing, pulling more than 10 pounds (examples to avoid: groceries, vacuuming, gardening, golfing): For one week with a procedure through the groin.    After the initial healing process of the access site, we recommend cardiac rehabilitation for all TAVR patients. Cardiac rehabilitation will help you:  - Rebuild stamina, strength and balance.  - Learn how to participate in activities safely, as well as help you regain confidence to do so.  - Return to activities of daily living and leisure.  Please contact their central scheduling to arrange at [phone]    We prefer you to follow up with your primary care provider within 2 weeks. You will be arranged to see the TAVR clinic in month. At that time you will have a repeat ultrasound of the heart to look at the valve.     Should you have any questions or concerns please contact your assigned TAVR nurse coordinator:  Ysabel [phone] (at the first prompt enter #1, second prompt enter #3, then ask the triage nurse if you can speak with Ysabel).  Na [phone]  Irene [phone]      Pending Results     Date and Time Order Name Status Description    9/29/2017 0600 EKG 12-lead, tracing only Preliminary             Statement of Approval     Ordered          09/29/17 1635  I have reviewed and agree with all the recommendations and orders detailed in this document.  EFFECTIVE NOW     Approved and electronically signed by:  Lázaro Monaco MD           09/29/17 1103  I have reviewed and agree with all the recommendations and orders detailed in this document.  EFFECTIVE NOW     Approved and electronically signed by:  Ludy Emerson APRN CNP             Admission Information     Date & Time Provider Department Dept. Phone    9/27/2017 Cosme Gunn MD [address] [facility] [phone]      Your Vitals Were  "    Blood Pressure Temperature Respirations Height Weight Pulse Oximetry    112/62 (BP Location: Right arm) 97.9  F (36.6  C) (Oral) 16 1.676 m (5' 6\") 68.5 kg (151 lb 0.2 oz) 97%    BMI (Body Mass Index)                   24.37 kg/m2           [employer] Information     [employer] lets you send messages to your doctor, view your test results, renew your prescriptions, schedule appointments and more. To sign up, go to [email or web address] . Click on \"Log in\" on the left side of the screen, which will take you to the Welcome page. Then click on \"Sign up Now\" on the right side of the page.     You will be asked to enter the access code listed below, as well as some personal information. Please follow the directions to create your username and password.     Your access code is: [email or web address]  Expires: 2017 11:55 AM     Your access code will  in 90 days. If you need help or a new code, please call your [facility] clinic or [phone].        Care EveryWhere ID     This is your Care EveryWhere ID. This could be used by other organizations to access your [facility] medical records  [id number]        Equal Access to Services     JUAN CLINE AH: Sampson alvao Soskyali, waaxda luqadaha, qaybta kaalmada adeegyada, ronel dexter. So [facility] [phone].    ATENCIÓN: Si habla español, tiene a mon disposición servicios gratuitos de asistencia lingüística. Llame al [phone].    We comply with applicable federal civil rights and [place] laws. We do not discriminate on the basis of race, color, national origin, age, disability, sex, sexual orientation or gender identity.                             Review of your medicines      START taking        Dose / Directions    aspirin 81 MG EC tablet        Dose:  81 mg   Take 1 tablet (81 mg) by mouth daily   Quantity:  30 tablet   Refills:  11         CONTINUE these medicines which may have CHANGED, or have new prescriptions. If we are " uncertain of the size of tablets/capsules you have at home, strength may be listed as something that might have changed.        Dose / Directions    atorvastatin 40 MG tablet   Commonly known as:  LIPITOR   This may have changed:  when to take this   Used for:  Hyperlipidemia LDL goal <130        Dose:  40 mg   Take 1 tablet (40 mg) by mouth daily   Quantity:  90 tablet   Refills:  2       lisinopril 10 MG tablet   Commonly known as:  PRINIVIL/ZESTRIL   This may have changed:    - how much to take  - when to take this   Used for:  Acute on chronic systolic congestive heart failure (H)        Dose:  5 mg   Take 0.5 tablets (5 mg) by mouth daily   Quantity:  62 tablet   Refills:  11       metoprolol 25 MG tablet   Commonly known as:  LOPRESSOR   This may have changed:  See the new instructions.        Dose:  25 mg   Take 1 tablet (25 mg) by mouth 2 times daily   Quantity:  60 tablet   Refills:  11       tamsulosin 0.4 MG capsule   Commonly known as:  FLOMAX   This may have changed:  when to take this        Dose:  0.4 mg   Take 1 capsule (0.4 mg) by mouth daily   Quantity:  60 capsule   Refills:  0         CONTINUE these medicines which have NOT CHANGED        Dose / Directions    acetaminophen 650 MG CR tablet   Commonly known as:  TYLENOL        Dose:  650 mg   Take 650 mg by mouth every 8 hours as needed Reported on 4/5/2017   Refills:  0       alendronate 70 MG tablet   Commonly known as:  FOSAMAX   Used for:  Osteopenia        Dose:  70 mg   Take 1 tablet (70 mg) by mouth every 7 days Take with over 8 ounces water and stay upright for at least 30 minutes after dose.  Take at least 60 minutes before breakfast   Quantity:  12 tablet   Refills:  3       apixaban ANTICOAGULANT 2.5 MG tablet   Commonly known as:  ELIQUIS   Used for:  Paroxysmal atrial fibrillation (H)        Dose:  2.5 mg   Take 1 tablet (2.5 mg) by mouth 2 times daily   Quantity:  180 tablet   Refills:  3       ascorbic acid 500 MG Cpcr CR capsule    Commonly known as:  vitamin C        Dose:  500 mg   Take 500 mg by mouth daily   Refills:  0       calcium carbonate 1250 MG tablet   Commonly known as:  OS-SHELIA 500 mg [place]. Ca        Dose:  1 tablet   Take 1 tablet by mouth daily   Refills:  0       dofetilide 125 MCG capsule   Commonly known as:  TIKOSYN   Used for:  Paroxysmal atrial fibrillation (H)        Dose:  125 mcg   Take 1 capsule (125 mcg) by mouth 2 times daily   Quantity:  60 capsule   Refills:  5       HYDROcodone-acetaminophen 5-325 MG per tablet   Commonly known as:  NORCO   Used for:  Rheumatoid arthritis involving multiple sites, unspecified rheumatoid factor presence (H)        TAKE 1 TABLET BY MOUTH EVERY 6 HOURS AS NEEDED FOR MODERATE TO SEVERE PAIN   Quantity:  30 tablet   Refills:  0       leflunomide 10 MG tablet   Commonly known as:  ARAVA   Used for:  Rheumatoid arthritis involving multiple sites with positive rheumatoid factor (H)        Dose:  10 mg   Take 1 tablet (10 mg) by mouth daily   Quantity:  30 tablet   Refills:  11       LUPRON DEPOT IM        Inject into the muscle every 6 months Reported on 5/3/2017   Refills:  0       predniSONE 5 MG tablet   Commonly known as:  DELTASONE   Used for:  Osteopenia        Dose:  5 mg   Take 1 tablet (5 mg) by mouth daily   Quantity:  100 tablet   Refills:  4       traMADol 50 MG tablet   Commonly known as:  ULTRAM   Used for:  Rheumatoid arthritis involving multiple sites, unspecified rheumatoid factor presence (H)        TAKE 1 TABLET 3 TIMES DAILY AS NEEDED FOR MODERATE PAIN   Quantity:  90 tablet   Refills:  0       VITAMIN D (CHOLECALCIFEROL) PO        Dose:  1 tablet   Take 1 tablet by mouth daily Reported on 5/3/2017   Refills:  0         STOP taking     spironolactone 25 MG tablet   Commonly known as:  ALDACTONE                Where to get your medicines      These medications were sent to [facility] - [place], [place] - [address]  [address],  [facility] [address]     Phone:  [phone]     aspirin 81 MG EC tablet    lisinopril 10 MG tablet    metoprolol 25 MG tablet    tamsulosin 0.4 MG capsule                Protect others around you: Learn how to safely use, store and throw away your medicines at www.disposemymeds.org.             Medication List: This is a list of all your medications and when to take them. Check marks below indicate your daily home schedule. Keep this list as a reference.      Medications           Morning Afternoon Evening Bedtime As Needed    acetaminophen 650 MG CR tablet   Commonly known as:  TYLENOL   Take 650 mg by mouth every 8 hours as needed Reported on 4/5/2017                                alendronate 70 MG tablet   Commonly known as:  FOSAMAX   Take 1 tablet (70 mg) by mouth every 7 days Take with over 8 ounces water and stay upright for at least 30 minutes after dose.  Take at least 60 minutes before breakfast                                apixaban ANTICOAGULANT 2.5 MG tablet   Commonly known as:  ELIQUIS   Take 1 tablet (2.5 mg) by mouth 2 times daily   Last time this was given:  2.5 mg on 9/30/2017  8:25 AM                                ascorbic acid 500 MG Cpcr CR capsule   Commonly known as:  vitamin C   Take 500 mg by mouth daily                                aspirin 81 MG EC tablet   Take 1 tablet (81 mg) by mouth daily   Last time this was given:  81 mg on 9/30/2017  8:25 AM                                atorvastatin 40 MG tablet   Commonly known as:  LIPITOR   Take 1 tablet (40 mg) by mouth daily   Last time this was given:  40 mg on 9/29/2017  8:39 PM                                calcium carbonate 1250 MG tablet   Commonly known as:  OS-SHELIA 500 mg [place]. Ca   Take 1 tablet by mouth daily                                dofetilide 125 MCG capsule   Commonly known as:  TIKOSYN   Take 1 capsule (125 mcg) by mouth 2 times daily                                HYDROcodone-acetaminophen 5-325 MG per tablet    Commonly known as:  NORCO   TAKE 1 TABLET BY MOUTH EVERY 6 HOURS AS NEEDED FOR MODERATE TO SEVERE PAIN   Last time this was given:  1 tablet on 9/30/2017  6:38 AM                                leflunomide 10 MG tablet   Commonly known as:  ARAVA   Take 1 tablet (10 mg) by mouth daily                                lisinopril 10 MG tablet   Commonly known as:  PRINIVIL/ZESTRIL   Take 0.5 tablets (5 mg) by mouth daily   Last time this was given:  10 mg on 9/30/2017  8:25 AM                                LUPRON DEPOT IM   Inject into the muscle every 6 months Reported on 5/3/2017                                metoprolol 25 MG tablet   Commonly known as:  LOPRESSOR   Take 1 tablet (25 mg) by mouth 2 times daily   Last time this was given:  25 mg on 9/30/2017  8:25 AM                                predniSONE 5 MG tablet   Commonly known as:  DELTASONE   Take 1 tablet (5 mg) by mouth daily                                tamsulosin 0.4 MG capsule   Commonly known as:  FLOMAX   Take 1 capsule (0.4 mg) by mouth daily   Last time this was given:  0.4 mg on 9/29/2017 11:38 AM                                traMADol 50 MG tablet   Commonly known as:  ULTRAM   TAKE 1 TABLET 3 TIMES DAILY AS NEEDED FOR MODERATE PAIN                                VITAMIN D (CHOLECALCIFEROL) PO   Take 1 tablet by mouth daily Reported on 5/3/2017

## 2017-09-27 NOTE — PROGRESS NOTES
Notified [facility] via phone at [phone] that patient's sbp is 150-155. No prn bp orders ordered. MD to put orders in.

## 2017-09-27 NOTE — OR NURSING
Spoke with device RN Becca Hopkins about pt's medtronic pacer.  No interventions required prior to surgery today.

## 2017-09-28 ENCOUNTER — APPOINTMENT (OUTPATIENT)
Dept: OCCUPATIONAL THERAPY | Facility: CLINIC | Age: 82
DRG: 267 | End: 2017-09-28
Attending: INTERNAL MEDICINE
Payer: MEDICARE

## 2017-09-28 ENCOUNTER — APPOINTMENT (OUTPATIENT)
Dept: CT IMAGING | Facility: CLINIC | Age: 82
DRG: 267 | End: 2017-09-28
Attending: NURSE PRACTITIONER
Payer: MEDICARE

## 2017-09-28 ENCOUNTER — APPOINTMENT (OUTPATIENT)
Dept: GENERAL RADIOLOGY | Facility: CLINIC | Age: 82
DRG: 267 | End: 2017-09-28
Attending: INTERNAL MEDICINE
Payer: MEDICARE

## 2017-09-28 ENCOUNTER — APPOINTMENT (OUTPATIENT)
Dept: CARDIOLOGY | Facility: CLINIC | Age: 82
DRG: 267 | End: 2017-09-28
Attending: INTERNAL MEDICINE
Payer: MEDICARE

## 2017-09-28 LAB
ALBUMIN SERPL-MCNC: 2.3 G/DL (ref 3.4–5)
ALBUMIN UR-MCNC: NEGATIVE MG/DL
ALP SERPL-CCNC: 58 U/L (ref 40–150)
ALT SERPL W P-5'-P-CCNC: 15 U/L (ref 0–70)
ANION GAP SERPL CALCULATED.3IONS-SCNC: 10 MMOL/L (ref 3–14)
APPEARANCE UR: CLEAR
AST SERPL W P-5'-P-CCNC: 33 U/L (ref 0–45)
BASOPHILS # BLD AUTO: 0 10E9/L (ref 0–0.2)
BASOPHILS # BLD AUTO: 0 10E9/L (ref 0–0.2)
BASOPHILS NFR BLD AUTO: 0.2 %
BASOPHILS NFR BLD AUTO: 0.4 %
BILIRUB SERPL-MCNC: 0.5 MG/DL (ref 0.2–1.3)
BILIRUB UR QL STRIP: NEGATIVE
BUN SERPL-MCNC: 11 MG/DL (ref 7–30)
CALCIUM SERPL-MCNC: 7.6 MG/DL (ref 8.5–10.1)
CHLORIDE SERPL-SCNC: 109 MMOL/L (ref 94–109)
CO2 SERPL-SCNC: 21 MMOL/L (ref 20–32)
COLOR UR AUTO: ABNORMAL
CREAT SERPL-MCNC: 0.82 MG/DL (ref 0.66–1.25)
DIFFERENTIAL METHOD BLD: ABNORMAL
DIFFERENTIAL METHOD BLD: ABNORMAL
EOSINOPHIL # BLD AUTO: 0 10E9/L (ref 0–0.7)
EOSINOPHIL # BLD AUTO: 0 10E9/L (ref 0–0.7)
EOSINOPHIL NFR BLD AUTO: 0 %
EOSINOPHIL NFR BLD AUTO: 0 %
ERYTHROCYTE [DISTWIDTH] IN BLOOD BY AUTOMATED COUNT: 15.9 % (ref 10–15)
ERYTHROCYTE [DISTWIDTH] IN BLOOD BY AUTOMATED COUNT: 16 % (ref 10–15)
GFR SERPL CREATININE-BSD FRML MDRD: 88 ML/MIN/1.7M2
GLUCOSE SERPL-MCNC: 77 MG/DL (ref 70–99)
GLUCOSE UR STRIP-MCNC: NEGATIVE MG/DL
HCT VFR BLD AUTO: 25.6 % (ref 40–53)
HCT VFR BLD AUTO: 25.8 % (ref 40–53)
HGB BLD-MCNC: 8.2 G/DL (ref 13.3–17.7)
HGB BLD-MCNC: 8.4 G/DL (ref 13.3–17.7)
HGB UR QL STRIP: ABNORMAL
IMM GRANULOCYTES # BLD: 0 10E9/L (ref 0–0.4)
IMM GRANULOCYTES # BLD: 0 10E9/L (ref 0–0.4)
IMM GRANULOCYTES NFR BLD: 0 %
IMM GRANULOCYTES NFR BLD: 0.2 %
INTERPRETATION ECG - MUSE: NORMAL
KETONES UR STRIP-MCNC: 10 MG/DL
LEUKOCYTE ESTERASE UR QL STRIP: ABNORMAL
LYMPHOCYTES # BLD AUTO: 0.5 10E9/L (ref 0.8–5.3)
LYMPHOCYTES # BLD AUTO: 0.7 10E9/L (ref 0.8–5.3)
LYMPHOCYTES NFR BLD AUTO: 10.7 %
LYMPHOCYTES NFR BLD AUTO: 17.1 %
MAGNESIUM SERPL-MCNC: 1.6 MG/DL (ref 1.6–2.3)
MCH RBC QN AUTO: 29.6 PG (ref 26.5–33)
MCH RBC QN AUTO: 29.9 PG (ref 26.5–33)
MCHC RBC AUTO-ENTMCNC: 32 G/DL (ref 31.5–36.5)
MCHC RBC AUTO-ENTMCNC: 32.6 G/DL (ref 31.5–36.5)
MCV RBC AUTO: 92 FL (ref 78–100)
MCV RBC AUTO: 92 FL (ref 78–100)
MONOCYTES # BLD AUTO: 0.7 10E9/L (ref 0–1.3)
MONOCYTES # BLD AUTO: 0.8 10E9/L (ref 0–1.3)
MONOCYTES NFR BLD AUTO: 14.9 %
MONOCYTES NFR BLD AUTO: 18 %
MUCOUS THREADS #/AREA URNS LPF: PRESENT /LPF
NEUTROPHILS # BLD AUTO: 2.8 10E9/L (ref 1.6–8.3)
NEUTROPHILS # BLD AUTO: 3.5 10E9/L (ref 1.6–8.3)
NEUTROPHILS NFR BLD AUTO: 64.7 %
NEUTROPHILS NFR BLD AUTO: 73.8 %
NITRATE UR QL: NEGATIVE
NRBC # BLD AUTO: 0 10*3/UL
NRBC # BLD AUTO: 0 10*3/UL
NRBC BLD AUTO-RTO: 0 /100
NRBC BLD AUTO-RTO: 0 /100
PH UR STRIP: 5 PH (ref 5–7)
PHOSPHATE SERPL-MCNC: 3.1 MG/DL (ref 2.5–4.5)
PLATELET # BLD AUTO: 182 10E9/L (ref 150–450)
PLATELET # BLD AUTO: 184 10E9/L (ref 150–450)
POTASSIUM SERPL-SCNC: 3.6 MMOL/L (ref 3.4–5.3)
PROCALCITONIN SERPL-MCNC: 0.05 NG/ML
PROT SERPL-MCNC: 4.8 G/DL (ref 6.8–8.8)
RBC # BLD AUTO: 2.77 10E12/L (ref 4.4–5.9)
RBC # BLD AUTO: 2.81 10E12/L (ref 4.4–5.9)
RBC #/AREA URNS AUTO: 4 /HPF (ref 0–2)
SODIUM SERPL-SCNC: 140 MMOL/L (ref 133–144)
SOURCE: ABNORMAL
SP GR UR STRIP: 1.04 (ref 1–1.03)
TRANS CELLS #/AREA URNS HPF: <1 /HPF (ref 0–1)
UROBILINOGEN UR STRIP-MCNC: NORMAL MG/DL (ref 0–2)
WBC # BLD AUTO: 4.3 10E9/L (ref 4–11)
WBC # BLD AUTO: 4.7 10E9/L (ref 4–11)
WBC #/AREA URNS AUTO: 11 /HPF (ref 0–2)

## 2017-09-28 PROCEDURE — A9270 NON-COVERED ITEM OR SERVICE: HCPCS | Mod: GY | Performed by: INTERNAL MEDICINE

## 2017-09-28 PROCEDURE — 20000005 ZZH R&B ICU 2:1 UMMC

## 2017-09-28 PROCEDURE — 25000125 ZZHC RX 250: Performed by: STUDENT IN AN ORGANIZED HEALTH CARE EDUCATION/TRAINING PROGRAM

## 2017-09-28 PROCEDURE — 40000264 ECHO LIMITED WITH OPTISON

## 2017-09-28 PROCEDURE — 74174 CTA ABD&PLVS W/CONTRAST: CPT

## 2017-09-28 PROCEDURE — 25000132 ZZH RX MED GY IP 250 OP 250 PS 637: Mod: GY | Performed by: INTERNAL MEDICINE

## 2017-09-28 PROCEDURE — 93325 DOPPLER ECHO COLOR FLOW MAPG: CPT | Mod: 26 | Performed by: INTERNAL MEDICINE

## 2017-09-28 PROCEDURE — 83735 ASSAY OF MAGNESIUM: CPT | Performed by: INTERNAL MEDICINE

## 2017-09-28 PROCEDURE — 97165 OT EVAL LOW COMPLEX 30 MIN: CPT | Mod: GO | Performed by: OCCUPATIONAL THERAPIST

## 2017-09-28 PROCEDURE — 93308 TTE F-UP OR LMTD: CPT | Mod: 26 | Performed by: INTERNAL MEDICINE

## 2017-09-28 PROCEDURE — 80053 COMPREHEN METABOLIC PANEL: CPT | Performed by: INTERNAL MEDICINE

## 2017-09-28 PROCEDURE — 25000128 H RX IP 250 OP 636

## 2017-09-28 PROCEDURE — 99232 SBSQ HOSP IP/OBS MODERATE 35: CPT | Mod: 25 | Performed by: NURSE PRACTITIONER

## 2017-09-28 PROCEDURE — 99212 OFFICE O/P EST SF 10 MIN: CPT

## 2017-09-28 PROCEDURE — 93321 DOPPLER ECHO F-UP/LMTD STD: CPT | Mod: 26 | Performed by: INTERNAL MEDICINE

## 2017-09-28 PROCEDURE — 84100 ASSAY OF PHOSPHORUS: CPT | Performed by: INTERNAL MEDICINE

## 2017-09-28 PROCEDURE — 84145 PROCALCITONIN (PCT): CPT | Performed by: STUDENT IN AN ORGANIZED HEALTH CARE EDUCATION/TRAINING PROGRAM

## 2017-09-28 PROCEDURE — 93005 ELECTROCARDIOGRAM TRACING: CPT

## 2017-09-28 PROCEDURE — 25500064 ZZH RX 255 OP 636: Performed by: INTERNAL MEDICINE

## 2017-09-28 PROCEDURE — 25000132 ZZH RX MED GY IP 250 OP 250 PS 637: Mod: GY | Performed by: STUDENT IN AN ORGANIZED HEALTH CARE EDUCATION/TRAINING PROGRAM

## 2017-09-28 PROCEDURE — 97535 SELF CARE MNGMENT TRAINING: CPT | Mod: GO | Performed by: OCCUPATIONAL THERAPIST

## 2017-09-28 PROCEDURE — 87086 URINE CULTURE/COLONY COUNT: CPT | Performed by: INTERNAL MEDICINE

## 2017-09-28 PROCEDURE — 85025 COMPLETE CBC W/AUTO DIFF WBC: CPT | Performed by: INTERNAL MEDICINE

## 2017-09-28 PROCEDURE — 25000128 H RX IP 250 OP 636: Performed by: STUDENT IN AN ORGANIZED HEALTH CARE EDUCATION/TRAINING PROGRAM

## 2017-09-28 PROCEDURE — 25000132 ZZH RX MED GY IP 250 OP 250 PS 637: Mod: GY | Performed by: NURSE PRACTITIONER

## 2017-09-28 PROCEDURE — 85025 COMPLETE CBC W/AUTO DIFF WBC: CPT | Performed by: STUDENT IN AN ORGANIZED HEALTH CARE EDUCATION/TRAINING PROGRAM

## 2017-09-28 PROCEDURE — 81001 URINALYSIS AUTO W/SCOPE: CPT | Performed by: STUDENT IN AN ORGANIZED HEALTH CARE EDUCATION/TRAINING PROGRAM

## 2017-09-28 PROCEDURE — A9270 NON-COVERED ITEM OR SERVICE: HCPCS | Mod: GY | Performed by: STUDENT IN AN ORGANIZED HEALTH CARE EDUCATION/TRAINING PROGRAM

## 2017-09-28 PROCEDURE — 93010 ELECTROCARDIOGRAM REPORT: CPT | Performed by: INTERNAL MEDICINE

## 2017-09-28 PROCEDURE — 40000133 ZZH STATISTIC OT WARD VISIT: Performed by: OCCUPATIONAL THERAPIST

## 2017-09-28 PROCEDURE — A9270 NON-COVERED ITEM OR SERVICE: HCPCS | Mod: GY | Performed by: NURSE PRACTITIONER

## 2017-09-28 PROCEDURE — 71010 XR CHEST PORT 1 VW: CPT

## 2017-09-28 RX ORDER — POTASSIUM CL/LIDO/0.9 % NACL 10MEQ/0.1L
10 INTRAVENOUS SOLUTION, PIGGYBACK (ML) INTRAVENOUS
Status: DISCONTINUED | OUTPATIENT
Start: 2017-09-28 | End: 2017-09-30 | Stop reason: HOSPADM

## 2017-09-28 RX ORDER — IOPAMIDOL 755 MG/ML
100 INJECTION, SOLUTION INTRAVASCULAR ONCE
Status: COMPLETED | OUTPATIENT
Start: 2017-09-28 | End: 2017-09-28

## 2017-09-28 RX ORDER — SODIUM CHLORIDE 9 MG/ML
INJECTION, SOLUTION INTRAVENOUS
Status: COMPLETED
Start: 2017-09-28 | End: 2017-09-28

## 2017-09-28 RX ORDER — SODIUM CHLORIDE, SODIUM LACTATE, POTASSIUM CHLORIDE, CALCIUM CHLORIDE 600; 310; 30; 20 MG/100ML; MG/100ML; MG/100ML; MG/100ML
INJECTION, SOLUTION INTRAVENOUS
Status: COMPLETED
Start: 2017-09-28 | End: 2017-09-28

## 2017-09-28 RX ORDER — POTASSIUM CHLORIDE 14.9 MG/ML
20 INJECTION INTRAVENOUS
Status: DISCONTINUED | OUTPATIENT
Start: 2017-09-28 | End: 2017-09-30 | Stop reason: HOSPADM

## 2017-09-28 RX ORDER — MAGNESIUM SULFATE HEPTAHYDRATE 40 MG/ML
4 INJECTION, SOLUTION INTRAVENOUS EVERY 4 HOURS PRN
Status: DISCONTINUED | OUTPATIENT
Start: 2017-09-28 | End: 2017-09-30 | Stop reason: HOSPADM

## 2017-09-28 RX ORDER — POTASSIUM CHLORIDE 7.45 MG/ML
10 INJECTION INTRAVENOUS
Status: DISCONTINUED | OUTPATIENT
Start: 2017-09-28 | End: 2017-09-30 | Stop reason: HOSPADM

## 2017-09-28 RX ORDER — LISINOPRIL 10 MG/1
10 TABLET ORAL 2 TIMES DAILY
Status: DISCONTINUED | OUTPATIENT
Start: 2017-09-29 | End: 2017-09-29

## 2017-09-28 RX ORDER — POTASSIUM CHLORIDE 1.5 G/1.58G
20-40 POWDER, FOR SOLUTION ORAL
Status: DISCONTINUED | OUTPATIENT
Start: 2017-09-28 | End: 2017-09-30 | Stop reason: HOSPADM

## 2017-09-28 RX ORDER — METOPROLOL TARTRATE 25 MG/1
25 TABLET, FILM COATED ORAL 2 TIMES DAILY
Status: DISCONTINUED | OUTPATIENT
Start: 2017-09-29 | End: 2017-09-28

## 2017-09-28 RX ORDER — DOFETILIDE 0.12 MG/1
125 CAPSULE ORAL EVERY 12 HOURS SCHEDULED
Status: DISCONTINUED | OUTPATIENT
Start: 2017-09-28 | End: 2017-09-28

## 2017-09-28 RX ORDER — METOPROLOL TARTRATE 25 MG/1
25 TABLET, FILM COATED ORAL 2 TIMES DAILY
Status: DISCONTINUED | OUTPATIENT
Start: 2017-09-28 | End: 2017-09-30 | Stop reason: HOSPADM

## 2017-09-28 RX ORDER — POTASSIUM CHLORIDE 750 MG/1
20-40 TABLET, EXTENDED RELEASE ORAL
Status: DISCONTINUED | OUTPATIENT
Start: 2017-09-28 | End: 2017-09-30 | Stop reason: HOSPADM

## 2017-09-28 RX ORDER — SPIRONOLACTONE 25 MG/1
25 TABLET ORAL DAILY
Status: DISCONTINUED | OUTPATIENT
Start: 2017-09-29 | End: 2017-09-29

## 2017-09-28 RX ORDER — SODIUM CHLORIDE 9 MG/ML
INJECTION, SOLUTION INTRAVENOUS CONTINUOUS
Status: DISCONTINUED | OUTPATIENT
Start: 2017-09-28 | End: 2017-09-28

## 2017-09-28 RX ORDER — DOFETILIDE 0.12 MG/1
125 CAPSULE ORAL EVERY 12 HOURS SCHEDULED
Status: DISCONTINUED | OUTPATIENT
Start: 2017-09-29 | End: 2017-09-29

## 2017-09-28 RX ADMIN — ASPIRIN 81 MG: 81 TABLET ORAL at 08:07

## 2017-09-28 RX ADMIN — SODIUM CHLORIDE, PRESERVATIVE FREE 90 ML: 5 INJECTION INTRAVENOUS at 11:47

## 2017-09-28 RX ADMIN — APIXABAN 2.5 MG: 2.5 TABLET, FILM COATED ORAL at 19:54

## 2017-09-28 RX ADMIN — HUMAN ALBUMIN MICROSPHERES AND PERFLUTREN 4 ML: 10; .22 INJECTION, SOLUTION INTRAVENOUS at 10:30

## 2017-09-28 RX ADMIN — HYDROCODONE BITARTRATE AND ACETAMINOPHEN 1 TABLET: 5; 325 TABLET ORAL at 05:34

## 2017-09-28 RX ADMIN — SODIUM CHLORIDE: 9 INJECTION, SOLUTION INTRAVENOUS at 06:56

## 2017-09-28 RX ADMIN — ATORVASTATIN CALCIUM 40 MG: 40 TABLET, FILM COATED ORAL at 19:53

## 2017-09-28 RX ADMIN — ACETAMINOPHEN 650 MG: 325 TABLET, FILM COATED ORAL at 11:02

## 2017-09-28 RX ADMIN — TAMSULOSIN HYDROCHLORIDE 0.4 MG: 0.4 CAPSULE ORAL at 19:53

## 2017-09-28 RX ADMIN — Medication 2 G: at 05:10

## 2017-09-28 RX ADMIN — ACETAMINOPHEN 650 MG: 325 TABLET, FILM COATED ORAL at 17:30

## 2017-09-28 RX ADMIN — HYDROCODONE BITARTRATE AND ACETAMINOPHEN 1 TABLET: 5; 325 TABLET ORAL at 18:31

## 2017-09-28 RX ADMIN — POTASSIUM CHLORIDE 20 MEQ: 750 TABLET, EXTENDED RELEASE ORAL at 05:34

## 2017-09-28 RX ADMIN — APIXABAN 2.5 MG: 2.5 TABLET, FILM COATED ORAL at 08:07

## 2017-09-28 RX ADMIN — TAMSULOSIN HYDROCHLORIDE 0.4 MG: 0.4 CAPSULE ORAL at 08:07

## 2017-09-28 RX ADMIN — METOPROLOL TARTRATE 25 MG: 25 TABLET ORAL at 19:53

## 2017-09-28 RX ADMIN — SODIUM CHLORIDE, POTASSIUM CHLORIDE, SODIUM LACTATE AND CALCIUM CHLORIDE 1000 ML: 600; 310; 30; 20 INJECTION, SOLUTION INTRAVENOUS at 00:20

## 2017-09-28 RX ADMIN — IOPAMIDOL 100 ML: 755 INJECTION, SOLUTION INTRAVENOUS at 11:47

## 2017-09-28 ASSESSMENT — ACTIVITIES OF DAILY LIVING (ADL)
TRANSFERRING: 1-->ASSISTIVE EQUIPMENT
RETIRED_COMMUNICATION: 0-->UNDERSTANDS/COMMUNICATES WITHOUT DIFFICULTY
FALL_HISTORY_WITHIN_LAST_SIX_MONTHS: YES
NUMBER_OF_TIMES_PATIENT_HAS_FALLEN_WITHIN_LAST_SIX_MONTHS: 1
RETIRED_EATING: 0-->INDEPENDENT
TOILETING: 1-->ASSISTIVE EQUIPMENT
AMBULATION: 1-->ASSISTIVE EQUIPMENT
SWALLOWING: 0-->SWALLOWS FOODS/LIQUIDS WITHOUT DIFFICULTY
DRESS: 2-->ASSISTIVE PERSON
BATHING: 1-->ASSISTIVE EQUIPMENT
COGNITION: 0 - NO COGNITION ISSUES REPORTED

## 2017-09-28 ASSESSMENT — PAIN DESCRIPTION - DESCRIPTORS
DESCRIPTORS: ACHING
DESCRIPTORS: DULL;ACHING

## 2017-09-28 NOTE — PROGRESS NOTES
HPI:  Bud Merritt is a very pleasant 89 year old male with severe aortic valvular stenosis referred to our clinic for evaluation and consideration for potential transcatheter aortic valve replacement (TAVR).  His severe aortic stenosis is characterized with an area of 0.9 cm2, mean gradient 36 mmHg and peak velocity of 3.78 m/sec with LVEF 20-25% by echocardiogram on 3/2017.      Additional notable medical history of Atrial fibrillation s/p lisandro ablation with BiV pacemaker, prostate cancer, RA on prednisone.  He has severe arthritis and is limited in activity. He complains of significant fatigue and orthopnea. Denies chest pain.     PAST MEDICAL HISTORY:   Past Medical History         Past Medical History:   Diagnosis Date     Atrial fibrillation (H) 07/01/2016     Cardiomyopathy (H)       COPD exacerbation (H) 3/2/2017     Depressive disorder       Paroxysmal atrial fibrillation (H) 7/6/2016     Pure hypercholesterolemia       Rheumatoid arthritis(714.0)       Unspecified essential hypertension       Weakness generalized 3/2/2017            CURRENT MEDICATIONS:   Current Outpatient Prescriptions           Current Outpatient Prescriptions   Medication Sig Dispense Refill     HYDROcodone-acetaminophen (NORCO) 5-325 MG per tablet TAKE 1 TABLET BY MOUTH EVERY 6 HOURS AS NEEDED FOR MODERATE TO SEVERE PAIN 30 tablet 0     traMADol (ULTRAM) 50 MG tablet TAKE 1 TABLET 3 TIMES DAILY AS NEEDED FOR MODERATE PAIN 90 tablet 0     leflunomide (ARAVA) 10 MG tablet Take 1 tablet (10 mg) by mouth daily 30 tablet 11     dofetilide (TIKOSYN) 125 MCG capsule Take 1 capsule (125 mcg) by mouth 2 times daily 60 capsule 5     metoprolol (LOPRESSOR) 50 MG tablet TAKE 1 TABLET (50 MG) BY MOUTH 2 TIMES DAILY 120 tablet 3     atorvastatin (LIPITOR) 40 MG tablet Take 1 tablet (40 mg) by mouth daily 90 tablet 2     fluticasone-vilanterol (BREO ELLIPTA) 100-25 MCG/INH oral inhaler Inhale 1 puff into the lungs daily (Patient not taking:  Reported on 9/13/2017) 60 Inhaler 0     predniSONE (DELTASONE) 5 MG tablet Take 1 tablet (5 mg) by mouth daily 100 tablet 4     apixaban ANTICOAGULANT (ELIQUIS) 2.5 MG tablet Take 1 tablet (2.5 mg) by mouth 2 times daily 180 tablet 3     Leuprolide Acetate (LUPRON DEPOT IM) Inject into the muscle every 4 months Reported on 5/3/2017         lisinopril (PRINIVIL,ZESTRIL) 10 MG tablet Take 1 tablet (10 mg) by mouth 2 times daily 62 tablet 11     spironolactone (ALDACTONE) 25 MG tablet Take 1 tablet (25 mg) by mouth daily 30 tablet 11     acetaminophen (TYLENOL) 650 MG CR tablet Take 650 mg by mouth 2 times daily Reported on 4/5/2017         alendronate (FOSAMAX) 70 MG tablet Take 1 tablet (70 mg) by mouth every 7 days Take with over 8 ounces water and stay upright for at least 30 minutes after dose.  Take at least 60 minutes before breakfast 12 tablet 3     calcium carbonate (TUMS) 500 MG chewable tablet Take 1 chew tab by mouth every 4 hours as needed for heartburn Reported on 5/3/2017         tamsulosin (FLOMAX) 0.4 MG 24 hr capsule Take 1 capsule (0.4 mg) by mouth 2 times daily 60 capsule       ascorbic acid (VITAMIN C) 500 MG CPCR Take 500 mg by mouth daily         VITAMIN D, CHOLECALCIFEROL, PO Take 400 Units by mouth daily Reported on 5/3/2017         calcium carbonate (OS-SHELIA 500 MG [place]. [place]) 500 MG tablet Take 600 mg by mouth daily                PAST SURGICAL HISTORY:   Past Surgical History          Past Surgical History:   Procedure Laterality Date     ARTHROPLASTY KNEE Left 1/12/2016     Procedure: ARTHROPLASTY KNEE;  Surgeon: Cesar Walker DO;  Location: [facility]     COLONOSCOPY   08/24/09     HC COLONOSCOPY THRU STOMA W BIOPSY/CAUTERY TUMOR/POLYP/LESION   8/31/2004     HC REPAIR ING HERNIA,5+Y/O,REDUCIBL   1996     Marlex mesh repair of bilateral inguinal hernias and drainage of bilateral scrotal hydroceles.     IMPLANT PACEMAKER   03/10/2017     IRRIGATION AND DEBRIDEMENT SOFT TISSUE LOWER  "EXTREMITY, COMBINED Left 3/15/2016     Procedure: COMBINED IRRIGATION AND DEBRIDEMENT SOFT TISSUE LOWER EXTREMITY;  Surgeon: Cesar Walker DO;  Location: [facility] OR            ALLERGIES            Allergies   Allergen Reactions     Amiodarone Other (See Comments)       Drop in DLCO     Lasix [Furosemide] Blisters       Blisters and sores in his mouth.          FAMILY HISTORY:   Family History          Family History   Problem Relation Age of Onset     CANCER Mother       CANCER Son       Unknown/Adopted Father       Alcoholism Brother              SOCIAL HISTORY:  Social History            Social History     Marital status:        Spouse name: N/A     Number of children: N/A     Years of education: N/A             Social History Main Topics     Smoking status: Former Smoker       Packs/day: 0.50       Years: 15.00       Types: Cigarettes       Quit date: 11/12/1998     Smokeless tobacco: Never Used         Comment: quit 15 yrs ago     Alcohol use No     Drug use: No     Sexual activity: Yes            Other Topics Concern     Caffeine Concern Yes       8 cups coffee day     Sleep Concern Yes     Weight Concern Yes       weight loss     Special Diet No     Exercise Yes       split firewood daily      Social History Narrative         ROS:   Constitutional: No fever, chills, or sweats. No weight gain/loss   ENT: No visual disturbance, ear ache, epistaxis, sore throat  Allergies/Immunologic: Negative.   Respiratory: No cough, hemoptysia  Cardiovascular: As per HPI  GI: No nausea, vomiting, hematemesis, melena, or hematochezia  : No urinary frequency, dysuria, or hematuria  Integument: Negative  Psychiatric: Negative  Neuro: Negative  Endocrinology: Negative   Musculoskeletal: Negative     EXAM:  /80 (BP Location: Left arm, Patient Position: Chair, Cuff Size: Adult Regular)  Pulse 73  Ht 1.702 m (5' 7\")  Wt 68.8 kg (151 lb 9.6 oz)  SpO2 99%  BMI 23.74 kg/m2  In general, the patient is a " pleasant male in no apparent distress.    HEENT: NC/AT.  PERRLA.  EOMI.  Sclerae white, not injected.  Nares clear.  Pharynx without erythema or exudate.  Dentition intact.    Neck: No adenopathy.  No thyromegaly. Carotids +4/4 bilaterally without bruits.  No jugular venous distension.   Heart: RRR. Normal S1, S2 splits physiologically. No murmur, rub, click, or gallop. The PMI is in the 5th ICS in the midclavicular line. There is no heave.    Lungs: CTA.  No ronchi, wheezes, rales.  No dullness to percussion.   Abdomen: Soft, nontender, nondistended. No organomegaly.  No bruits.   Extremities: No clubbing, cyanosis, or edema.  The pulses are +4/4 at the radial, brachial, femoral, popliteal, DP, and PT sites bilaterally.  No bruits are noted.  Neurologic: Alert and oriented to person/place/time, normal speech, gait and affect  Skin: No petechiae, purpura or rash.     Labs:  LIPID RESULTS:        Lab Results   Component Value Date     CHOL 139 03/06/2017     HDL 34 (L) 03/06/2017     LDL 74 03/06/2017     TRIG 156 (H) 03/06/2017     CHOLHDLRATIO 3.0 08/02/2013         LIVER ENZYME RESULTS:        Lab Results   Component Value Date     AST 29 09/07/2017     ALT 20 09/07/2017         CBC RESULTS:        Lab Results   Component Value Date     WBC 5.2 09/07/2017     RBC 3.43 (L) 09/07/2017     HGB 10.5 (L) 09/07/2017     HCT 32.1 (L) 09/07/2017     MCV 94 09/07/2017     MCH 30.6 09/07/2017     MCHC 32.7 09/07/2017     RDW 15.4 (H) 09/07/2017      09/07/2017         BMP RESULTS:        Lab Results   Component Value Date      09/07/2017     POTASSIUM 5.5 (H) 09/07/2017     CHLORIDE 102 09/07/2017     CO2 27 09/07/2017     ANIONGAP 6 09/07/2017      (H) 09/07/2017     BUN 20 09/07/2017     CR 1.00 09/07/2017     GFRESTIMATED 70 09/07/2017     GFRESTBLACK 85 09/07/2017     SHELIA 8.9 09/07/2017         A1C RESULTS:  No results found for: A1C     INR RESULTS:        Lab Results   Component Value Date     INR  1.08 06/30/2017     INR 1.01 03/27/2017         Procedures:     ECG:paced      Echocardiogram:  Moderate left ventricular dilation is present.  The Ejection Fraction is estimated at 20-25%.  Normal contraction of basal segments with akinetic mid and distal segments.  This wallmotion abnormality is new since prior study on 12/13/2016.  Consistent with stress cardiomyopathy, if no LAD disease.  Severe aortic stenosis is present.  The aortic valve area is 0.9 cm^2, by the continuity equation.  The peak aortic velocity is 3.8 m/sec.  The mean gradient across the aortic valve is37 mmHg.  Estimated gradients are under estimation due to LV systolic dysfunction,.      CT TAVR:  pending        Coronary Angiogram and Right Heart Catheterization:  Non-obstructive 2016      PFTs: NA      Carotid Ultrasound: na         FURTHER ASSESSMENTS     STS RISK SCORE: 7.6%  NYHA CLASS: II-III        Assessment and Plan:      89 year old male with severe aortic valvular stenosis referred to our clinic for evaluation and consideration for potential surgical AVR vs transcatheter aortic valve replacement (TAVR). STS RISK SCORE: 7.6%.   He is at high risk for adverse outcomes after surgical aortic valve replacement given his advanced age, commorbidities and high STS score of 7.6%. He should be considered for TAVR. I did discuss the risks and benefits of TAVR. He understands and is willing to proceed with TAVR.

## 2017-09-28 NOTE — PLAN OF CARE
Problem: Patient Care Overview  Goal: Plan of Care/Patient Progress Review  OT4A: Recommend pt discharge to TCU, pt reported he wants to go home, if home recommend C- OT/PT/RN. Pt limited by RA, weakness and fatigue. Pt required Mod A for tx out of bed and Mod A to stand. Pt took steps to tx to a chair with Min A and cues for safety. Also recommend pt have IP PT to address deficits in mobility.

## 2017-09-28 NOTE — PROGRESS NOTES
09/28/17 1321   Quick Adds   Type of Visit Initial Occupational Therapy Evaluation   Living Environment   Lives With spouse   Living Arrangements house   Home Accessibility no concerns   Number of Stairs to Enter Home 0   Number of Stairs Within Home 0   Transportation Available family or friend will provide;car   Living Environment Comment spouse is home to A prn   Self-Care   Dominant Hand right   Usual Activity Tolerance moderate   Current Activity Tolerance fair   Regular Exercise no   Equipment Currently Used at Home cane, quad;wheelchair, manual;walker, rolling;shower chair;raised toilet;commode   Functional Level Prior   Ambulation 1-->assistive equipment  (walker or cane)   Transferring 1-->assistive equipment   Toileting 1-->assistive equipment   Bathing 3-->assistive equipment and person   Dressing 2-->assistive person   Eating 0-->independent   Communication 0-->understands/communicates without difficulty   Swallowing 0-->swallows foods/liquids without difficulty   Cognition 0 - no cognition issues reported   Fall history within last six months yes   Number of times patient has fallen within last six months 1   Which of the above functional risks had a recent onset or change? fall history   General Information   Onset of Illness/Injury or Date of Surgery - Date 09/27/17   Referring Physician Filipe Allen MD   Patient/Family Goals Statement To get back home and be as I as possible   Additional Occupational Profile Info/Pertinent History of Current Problem Bud Merritt is a 89 year old male with PMHx pertinent for Afib on apixiban, s/p AV lisandro ablation with BiV PPM, (3/2017) COPD, prostate cancer, RA and chronic pain and severe aortic stenosis severe characterized by an area of 0.9 cm2, mean gradient 36 mmHg and peak velocity of 3.78 m/sec with LVEF 20-25% by echocardiogram on 3/2017. Patient presents to [facility] today for transcatheter aortic valve replacement.    Precautions/Limitations fall  precautions   Cognitive Status Examination   Orientation orientation to person, place and time   Level of Consciousness alert   Visual Perception   Visual Perception Wears glasses;No deficits were identified   Sensory Examination   Sensory Comments Pt denies deficits   Pain Assessment   Patient Currently in Pain No   Integumentary/Edema   Integumentary/Edema no deficits were identifed   Range of Motion (ROM)   ROM Comment L shoulder ~60 deg, R ~45, UEs limited by RA- baseline   Strength   Strength Comments WFL, generalized weakness   Muscle Tone Assessment   Muscle Tone Comments WNL   Coordination   Upper Extremity Coordination No deficits were identified   Transfer Skill: Sit to Stand   Level of Oriental: Sit/Stand minimum assist (75% patients effort)   Upper Body Dressing   Level of Oriental: Dress Upper Body minimum assist (75% patients effort)   Lower Body Dressing   Level of Oriental: Dress Lower Body maximum assist (25% patients effort)   Toileting   Level of Oriental: Toilet maximum assist (25% patients effort)   Grooming   Level of Oriental: Grooming stand-by assist   Instrumental Activities of Daily Living (IADL)   Previous Responsibilities (family does IADLs)   Activities of Daily Living Analysis   Impairments Contributing to Impaired Activities of Daily Living ROM decreased;flexibility decreased;strength decreased   General Therapy Interventions   Planned Therapy Interventions ADL retraining;home program guidelines;progressive activity/exercise   Clinical Impression   Criteria for Skilled Therapeutic Interventions Met yes, treatment indicated   OT Diagnosis decreased ADL I and tolerance   Influenced by the following impairments RA, weakness, fatigue   Assessment of Occupational Performance 3-5 Performance Deficits   Identified Performance Deficits home mgmt, leisure, bathing, dressing   Clinical Decision Making (Complexity) Low complexity   Therapy Frequency daily   Predicted Duration  "of Therapy Intervention (days/wks) 10/5/17   Anticipated Equipment Needs at Discharge (none)   Anticipated Discharge Disposition (TCU vs home with home therapy)   Risks and Benefits of Treatment have been explained. Yes   Patient, Family & other staff in agreement with plan of care Yes   Clinical Impression Comments Pt would benefit from skilled OT to help increase ADL I and tolerance   [facility] AM-PAC TM \"6 Clicks\"   2016, Trustees of [facility], under license to [employer].  All rights reserved.   6 Clicks Short Forms Daily Activity Inpatient Short Form   [facility] AM-PAC  \"6 Clicks\" Daily Activity Inpatient Short Form   1. Putting on and taking off regular lower body clothing? 2 - A Lot   2. Bathing (including washing, rinsing, drying)? 2 - A Lot   3. Toileting, which includes using toilet, bedpan or urinal? 2 - A Lot   4. Putting on and taking off regular upper body clothing? 3 - A Little   5. Taking care of personal grooming such as brushing teeth? 4 - None   6. Eating meals? 4 - None   Daily Activity Raw Score (Score out of 24.Lower scores equate to lower levels of function) 17   Total Evaluation Time   Total Evaluation Time (Minutes) 10     "

## 2017-09-28 NOTE — PROGRESS NOTES
Interventional Cardiology Progress Note  Subjective and Interval:  Patient hypotensive overnight, requiring fluid bolus. 1 gram hgb drop along with hypotension and tachycardia, prompting CT. Patient denies pain, SOB, syncope or palpitations. Stable.    Past Medical History:  Past Medical History:   Diagnosis Date     Anemia      Atrial fibrillation (H) 07/01/2016     Cardiac pacemaker 03/10/2017     Cardiomyopathy (H)      CHF (congestive heart failure) (H)      COPD exacerbation (H) 3/2/2017     Depressive disorder      History of prostate cancer      Paroxysmal atrial fibrillation (H) 7/6/2016     Pure hypercholesterolemia      Rheumatoid arthritis(714.0)      Unspecified essential hypertension      Weakness generalized 3/2/2017       Past Surgical History:  Past Surgical History:   Procedure Laterality Date     ARTHROPLASTY KNEE Left 1/12/2016    Procedure: ARTHROPLASTY KNEE;  Surgeon: Cesar Walker DO;  Location: [facility] OR     COLONOSCOPY  08/24/09     HC COLONOSCOPY THRU STOMA W BIOPSY/CAUTERY TUMOR/POLYP/LESION  8/31/2004     HC REPAIR ING HERNIA,5+Y/O,REDUCIBL  1996    Marlex mesh repair of bilateral inguinal hernias and drainage of bilateral scrotal hydroceles.     HEART CATH FEMORAL CANNULIZATION WITH OPEN STANDBY REPAIR AORTIC VALVE N/A 9/27/2017    Procedure: HEART CATH FEMORAL CANNULIZATION WITH OPEN STANDBY REPAIR AORTIC VALVE;;  Surgeon: Jaron Alejandra MD;  Location: [facility] OR     IMPLANT PACEMAKER  03/10/2017     IRRIGATION AND DEBRIDEMENT SOFT TISSUE LOWER EXTREMITY, COMBINED Left 3/15/2016    Procedure: COMBINED IRRIGATION AND DEBRIDEMENT SOFT TISSUE LOWER EXTREMITY;  Surgeon: Cesar Walker DO;  Location:  OR     TRANSCATHETER AORTIC VALVE IMPLANT ANESTHESIA N/A 9/27/2017    Procedure: TRANSCATHETER AORTIC VALVE IMPLANT ANESTHESIA;  Right Transfemoral Approach (Harman Ramsey 3 29mm) Aortic Valve Implant,  Cardiopulmonary Bypass Standby, Transthoracic Echocardiogram by  Derian Queen(Echo Tech);  Surgeon: GENERIC ANESTHESIA PROVIDER;  Location: [facility] OR       Allergies:     Allergies   Allergen Reactions     Amiodarone Other (See Comments)     Drop in DLCO     Lasix [Furosemide] Blisters     Blisters and sores in his mouth.        Medications:    No current facility-administered medications on file prior to encounter.   Current Outpatient Prescriptions on File Prior to Encounter:  HYDROcodone-acetaminophen (NORCO) 5-325 MG per tablet TAKE 1 TABLET BY MOUTH EVERY 6 HOURS AS NEEDED FOR MODERATE TO SEVERE PAIN   traMADol (ULTRAM) 50 MG tablet TAKE 1 TABLET 3 TIMES DAILY AS NEEDED FOR MODERATE PAIN   leflunomide (ARAVA) 10 MG tablet Take 1 tablet (10 mg) by mouth daily   dofetilide (TIKOSYN) 125 MCG capsule Take 1 capsule (125 mcg) by mouth 2 times daily   metoprolol (LOPRESSOR) 50 MG tablet TAKE 1 TABLET (50 MG) BY MOUTH 2 TIMES DAILY   atorvastatin (LIPITOR) 40 MG tablet Take 1 tablet (40 mg) by mouth daily (Patient taking differently: Take 40 mg by mouth every evening )   predniSONE (DELTASONE) 5 MG tablet Take 1 tablet (5 mg) by mouth daily   lisinopril (PRINIVIL,ZESTRIL) 10 MG tablet Take 1 tablet (10 mg) by mouth 2 times daily   tamsulosin (FLOMAX) 0.4 MG 24 hr capsule Take 1 capsule (0.4 mg) by mouth 2 times daily   ascorbic acid (VITAMIN C) 500 MG CPCR Take 500 mg by mouth daily   VITAMIN D, CHOLECALCIFEROL, PO Take 1 tablet by mouth daily Reported on 5/3/2017   calcium carbonate (OS-SHELIA 500 MG [place]. [place]) 500 MG tablet Take 1 tablet by mouth daily    apixaban ANTICOAGULANT (ELIQUIS) 2.5 MG tablet Take 1 tablet (2.5 mg) by mouth 2 times daily (Patient not taking: Reported on 9/25/2017)   Leuprolide Acetate (LUPRON DEPOT IM) Inject into the muscle every 6 months Reported on 5/3/2017   acetaminophen (TYLENOL) 650 MG CR tablet Take 650 mg by mouth every 8 hours as needed Reported on 4/5/2017   alendronate (FOSAMAX) 70 MG tablet Take 1 tablet (70 mg) by mouth every 7 days Take with over 8  ounces water and stay upright for at least 30 minutes after dose.  Take at least 60 minutes before breakfast     Exam:  Temp:  [97.5  F (36.4  C)-99.4  F (37.4  C)] 98.8  F (37.1  C)  Heart Rate:  [] 95  Resp:  [10-19] 16  BP: ()/(47-90) 122/66  MAP:  [52 mmHg-99 mmHg] 73 mmHg  Arterial Line BP: ()/(32-68) 127/48  SpO2:  [93 %-100 %] 98 %  General: Alert, interactive, NAD  Eyes: sclera anicteric, EOMI  Resp: clear to auscultation bilaterally, no crackles or wheezes  Cardiovascular: regular rate and rhythm, no murmur  GI: Soft, nontender, nondistended. +BS.  No HSM or masses, no rebound or guarding.  : Parrish   MSK: Generalized weakness  Skin: Warm and dry, no jaundice or rash  Neuro: Alert & oriented x 3, [place] 2-12 intact, moves all extremities equally, Extremely [place]  Psych: Approrpiate    Data:  CMP    Recent Labs  Lab 09/28/17  0335 09/27/17  1500 09/27/17  1231 09/27/17  0923 09/25/17  1622    137 137 138 135   POTASSIUM 3.6 4.0 4.0 4.7 4.5   CHLORIDE 109 108  --  105 103   CO2 21 22  --  26 26   ANIONGAP 10 8  --  7 7   GLC 77 105* 107* 109* 111*   BUN 11 11  --  12 15   CR 0.82 0.82  --  1.01 1.00   GFRESTIMATED 88 88  --  69 70   GFRESTBLACK >90 >90  --  84 85   SHELIA 7.6* 7.7*  --  9.0 8.8   MAG 1.6 1.8  --   --   --    PHOS 3.1 3.2  --   --   --    PROTTOTAL 4.8* 5.4*  --  6.7* 6.8   ALBUMIN 2.3* 2.5*  --  3.1* 3.1*   BILITOTAL 0.5 0.2  --  0.4 0.5   ALKPHOS 58 68  --  86 83   AST 33 30  --  32 27   ALT 15 18  --  21 21     CBC    Recent Labs  Lab 09/28/17  0335 09/28/17  0012 09/27/17  1500 09/27/17  1231 09/27/17  0923   WBC 4.3 4.7 3.4*  --  4.3   RBC 2.77* 2.81* 2.98*  --  3.59*   HGB 8.2* 8.4* 9.0* 9.3* 10.6*   HCT 25.6* 25.8* 28.1*  --  33.7*   MCV 92 92 94  --  94   MCH 29.6 29.9 30.2  --  29.5   MCHC 32.0 32.6 32.0  --  31.5   RDW 15.9* 16.0* 15.8*  --  15.9*    182 184  --  256       No results found for: TROPI, TROPONIN, TROPR, TROPN      EKG: Paced with rates  70's-80's, narrow QRS       SUMMARY:  1. Access was obtained in the right and left common femoral arteries and the left common femoral vein by the interventional cardiology team.  The arterial site used for valve delivery was pre-closed with two Perclose Proglide devices.   2. A 5Fr PACEL temporary pacemaker was positioned in the right ventricle and tested for adequate capture.    3. Iliofemoral angiography was not done since the right CFA was big and without evidence of disease on ultrasound.   A Lunderquist wire was used to support the Harman eSheath insertion.    4. The 6Fr pigtail catheter was positioned in the right-coronary cusp and angiography was used to confirm the optimal implant angle.  The pigtail was then moved to the non-coronary cusp.    5. Access into the LV was obtained using an AL-2 catheter and a straight wire.  The AL-2 catheter was used to exchange the straight wire for a J-wire and a pigtail catheter was then positioned in the left ventricle.  The LVEDP was measured with a pressure of 15 mmHg.  The pigtail catheter was used to advance a Lunderquist wire into in the LV and the pigtail was removed.    6. Aortic valve balloon valvuloplasty was successfully completed using an Harman 25mm balloon during rapid pacing and resulted in no hemodynamic compromise.  The 29mm Harman Ramsey 3 prosthetic valve was then positioned across the native aortic valve and was attempted to be deployed under rapid pacing.  However the balloon inside the valve stent never inflated due to perforation of the balloon while the balloon was loaded up within the valve stent in the descending aorta.  The whole system including the delivery system and the 16Fr Harman sheath were removed along with the sheath to ensure that the whole system came out without harming the sheath due to bulky valve.  The Lunderquist wire was left in place in the LV and another 16Fr Harman sheath was inserted in the R CFA.  A 2nd #29 S3 valve  was inserted and loaded onto the balloon more distally where the descending aorta was more vertical.  The valve was crossed with the system and the valve was deployed under rapid pacing of 160bpm with a depth of 80/20 with nominal pressure (33cc).   7. Subsequent transesophageal and transthoracic echocardiography and an ascending aortogram showed trace paravalvular insufficiency.    8. The valve sheath access arteriotomy was successfully closed with the double suture closure devices with successful hemostasis.    9. A final iliofemoral angiogram showed no evidence of extravasation or stenosis.   10. The LCFA 6Fr arteriotomy was successfully closed with a 6Fr AngioSeal closure device.  11. The temporary pacemaker was then removed.           NOTABLE EVENTS:  1. Valve deployment failure due to suspected perforation of balloon likely during loading of balloon unto valve stent and requiring removal of whole delivery system along with valve and sheath.     COMPLICATIONS:  1. None     SUMMARY:    1. Lifestyle-limiting severe aortic stenosis.  2. Successful transfemoral transcatheter aortic valve replacement with a 29mm Harman Ramsey 3 valve.  3. Temporary pacemaker insertion.  4. Aortic balloon valvuloplasty with a 25mm balloon.  5. Left heart catheterization with LVEDP of 15 mmHg.  6. Right and left common femoral arteriotomies successfully closed with closure devices.     PLAN:    1. Change Aspirin to 81 mg po daily lifelong.  2. Restart home apixaban dose 2.5mg bid  3. Bedrest per protocol (6 hours).  4. Admit to the primary inpatient team for further evaluation and management.  5. Echocardiogram tomorrow.   6. Lifelong antibiotic prophylaxis prior to all dental procedures.     Assessment/ Plan: Bud Merritt is a 89 year old male with PMHx pertinent for Afib on apixiban, s/p AV lisandro ablation with BiV PPM, (3/2017) COPD, prostate cancer, RA and chronic pain and severe aortic stenosis severe characterized by an area  of 0.9 cm2, mean gradient 36 mmHg and peak velocity of 3.78 m/sec with LVEF 20-25% by echocardiogram on 3/2017. Patient presents to [facility] today for transcatheter aortic valve replacement.     # Severe aortic stenosis, now s/p 29 mm Harman Ramsey 3 Transcatheter Aortic Valve Replacement via RCFA.   # HF EF 20-25%  # NICM- Patient hypotensive, tachycardic with hgb drop of 1 gram post procedure. Asymptomatic but difficult historian. Denies any pain or SOB at this time. Groin sites CDI without hematoma, ecchymosis, bruit or decreased limb warmth, sensation.  Echo today pending final read. Patient was given home anti-htn yesterday for hypertension, and then fluid boluses overnight for hypotension.    - Abd/Pelvis CT to r/o dissection and RP bleed  -  Follow up echo today.  - Tele until DC, EKG daily  - Parrish can be removed once patient is out of bed.   - Apixiban and ASA for anti-platelet therapy.   - Cardiac rehab/PT/OT.  - Diurese as needed/able.   - Daily weights.   - Strict intake/output.     # Atrial Fibrillation, s/p ablation with BiV PPM  - Apixa and asa as above    #  RA and chronic pain  - Norco PRN    #Hypertension  - Hold today doses until patient is normotensive and we rule out that he isn't bleeding and HD stable.     FEN: Cardiac diet  PPx: DVT: ambulation   Lines: PIV  Code status: Full     POLLO Sims  Interventional Cardiology-[facility]  Pager [phone]

## 2017-09-28 NOTE — PROGRESS NOTES
D/I: Pt AAO, c/o HA and LLE pain, tylenol and norco sufficing for pain control. V-paced, rates . -140. Sats high 90s on RA. Groin sites unchanged, s/f/d. Pedal pulses dopplerable. Up to bathroom with Ax1. Up in chair x2 hours. Tolerating regular diet but minimal appetite. Loose BMs x3 today.  A/P: Pain control. Monitor rhythm, BPs.

## 2017-09-28 NOTE — PROGRESS NOTES
"CLINICAL NUTRITION SERVICES - BRIEF NOTE    Received admission nutrition risk screen for \"stageable pressure injuries or large/non-healing wound or burn.\" Pt has a venous ulcer on RLL that is healing per WOC note today - not indicated for above nutrition screen.     RD will follow per LOS protocol or if re-consulted.     Brianna Marte RD, LD, [facility]  CVICU Dietitian  Pager: [phone]      "

## 2017-09-28 NOTE — PROGRESS NOTES
[facility] Nurse Inpatient Wound Assessment    Initial Assessment  Reason for consultation: Evaluate and treat RLL wound        ASSESSMENT:   RLL wound due to Venous Ulcer  Status: initial assessment    TREATMENT PLAN:     RLL wound:     Orders Written  WO Nurse follow-up plan: Weekly  Nursing to notify the Provider(s) and re-consult the WO Nurse if wound(s) deteriorates or new skin concern.    Patient History  According to provider note(s): Bdu Merritt is a 89 year old male who is scheduled for a Transfemoral Approach (Harman) Aortic Valve Implant, Cardiopulmonary Bypass Standby on 17 with Dr. Janes Kingsley for severe aortic stenosis at the [facility].    Objective Data     Active Diet Order    Active Diet Order      Regular Diet Adult    Output:  I/O last 3 completed shifts:  In: 2760 [P.O.:660; I.V.:1100; IV Piggyback:1000]  Out:  [Urine:2010; Blood:30]    Risk Assessment:   Artur Artur Score  Av  Min: 13  Max: 13                                 Labs:     Recent Labs  Lab 17  0335  17  0923   HGB 8.2*  < > 10.6*   INR  --   --  0.97   WBC 4.3  < > 4.3   < > = values in this interval not displayed.  No lab results found in last 7 days.        PHYSICAL EXAM:   Skin assessment: RLL    Wound Location: RLL  Date of last photo:   Wound History: Per MD notes: Dr. Aragon on  Bud  is a 89 year old male who presents for:  Review of his open wound on his lower shin that has been healing up nicely. Cardiac surgery wanted this evaluated before proceeding next week. They state that has been weeping a little bit. When I saw him a week ago it looked fine. And we contacted our wound care nurse who stated it was doing very well they're here today to have this looked at.  Measurements (length x width x depth, in cm) 1.9 x 0.8 x 0.1 cm  Wound Base:  100% red   Palpation of the wound bed: normal   Periwound skin: intact, dry/scaly and hemosiderin  staining  Color: normal and consistent with surrounding tissue  Temperature: normal   Drainage: small  Description of drainage: serous  Odor: none  Pain: denies      INTERVENTIONS:   Current support surface: Standard  Atmos Air   Current off-loading measures: Pillows under calves  Visual inspection of wounds(s) completed.  Wound Care: was done per plan of care.  Supplies: mepilex  Education provided today: RN, Pt    Discussed plan of care with Patient and Nurse  Face to face time: 20 minutes

## 2017-09-28 NOTE — CONSULTS
Social Work Services Progress Note    Hospital Day: 2  Date of Initial Social Work Evaluation:  9/25/17 in clinic pre-surgery  Collaborated with:  Team rounds, chart review    Data:    Pt was admitted s/p TAVR procedure.  SW consult received for d/c planning.    Intervention:    Chart reviewed.  Noted in SW assessment and today's OT note that pt prefers to d/c to home.  OT recommends TCU at this time, but if pt goes home they recommend home PT/OT/RN.  Attempted to meet with pt to assess willingness to consider TCU and provide education, but he was sound asleep and no family was present.  Unable to return or call family d/t scheduling conflicts.    Assessment:   Unable to assess today.    Plan:    Anticipated Disposition:  TCU vs. Home pending discussion with pt.    Barriers to d/c plan:  Pt is not medically stable.    Follow Up:  SW continues to follow for support to pt and family, resource referral, and d/c planning.    KIRAN Francis, [facility]  Adult ICU Clinical   Pager [phone]

## 2017-09-29 PROBLEM — Z95.2 S/P TAVR (TRANSCATHETER AORTIC VALVE REPLACEMENT): Status: ACTIVE | Noted: 2017-09-29

## 2017-09-29 LAB
ALBUMIN SERPL-MCNC: 2.4 G/DL (ref 3.4–5)
ALP SERPL-CCNC: 66 U/L (ref 40–150)
ALT SERPL W P-5'-P-CCNC: 14 U/L (ref 0–70)
ANION GAP SERPL CALCULATED.3IONS-SCNC: 10 MMOL/L (ref 3–14)
AST SERPL W P-5'-P-CCNC: 37 U/L (ref 0–45)
BACTERIA SPEC CULT: NO GROWTH
BILIRUB SERPL-MCNC: 0.5 MG/DL (ref 0.2–1.3)
BUN SERPL-MCNC: 10 MG/DL (ref 7–30)
CALCIUM SERPL-MCNC: 8.2 MG/DL (ref 8.5–10.1)
CHLORIDE SERPL-SCNC: 105 MMOL/L (ref 94–109)
CO2 SERPL-SCNC: 22 MMOL/L (ref 20–32)
CREAT SERPL-MCNC: 0.85 MG/DL (ref 0.66–1.25)
ERYTHROCYTE [DISTWIDTH] IN BLOOD BY AUTOMATED COUNT: 16 % (ref 10–15)
GFR SERPL CREATININE-BSD FRML MDRD: 85 ML/MIN/1.7M2
GLUCOSE SERPL-MCNC: 73 MG/DL (ref 70–99)
HCT VFR BLD AUTO: 26.2 % (ref 40–53)
HGB BLD-MCNC: 8.4 G/DL (ref 13.3–17.7)
MCH RBC QN AUTO: 29.8 PG (ref 26.5–33)
MCHC RBC AUTO-ENTMCNC: 32.1 G/DL (ref 31.5–36.5)
MCV RBC AUTO: 93 FL (ref 78–100)
PLATELET # BLD AUTO: 155 10E9/L (ref 150–450)
POTASSIUM SERPL-SCNC: 3.9 MMOL/L (ref 3.4–5.3)
PROT SERPL-MCNC: 5.2 G/DL (ref 6.8–8.8)
RADIOLOGIST FLAGS: NORMAL
RBC # BLD AUTO: 2.82 10E12/L (ref 4.4–5.9)
SODIUM SERPL-SCNC: 137 MMOL/L (ref 133–144)
SPECIMEN SOURCE: NORMAL
WBC # BLD AUTO: 6.3 10E9/L (ref 4–11)

## 2017-09-29 PROCEDURE — 99232 SBSQ HOSP IP/OBS MODERATE 35: CPT | Performed by: NURSE PRACTITIONER

## 2017-09-29 PROCEDURE — 36415 COLL VENOUS BLD VENIPUNCTURE: CPT | Performed by: INTERNAL MEDICINE

## 2017-09-29 PROCEDURE — 80053 COMPREHEN METABOLIC PANEL: CPT | Performed by: INTERNAL MEDICINE

## 2017-09-29 PROCEDURE — 25000128 H RX IP 250 OP 636: Performed by: NURSE PRACTITIONER

## 2017-09-29 PROCEDURE — A9270 NON-COVERED ITEM OR SERVICE: HCPCS | Mod: GY | Performed by: STUDENT IN AN ORGANIZED HEALTH CARE EDUCATION/TRAINING PROGRAM

## 2017-09-29 PROCEDURE — 12000006 ZZH R&B IMCU INTERMEDIATE UMMC

## 2017-09-29 PROCEDURE — 25000132 ZZH RX MED GY IP 250 OP 250 PS 637: Mod: GY | Performed by: INTERNAL MEDICINE

## 2017-09-29 PROCEDURE — 25000128 H RX IP 250 OP 636: Performed by: INTERNAL MEDICINE

## 2017-09-29 PROCEDURE — 93010 ELECTROCARDIOGRAM REPORT: CPT | Performed by: INTERNAL MEDICINE

## 2017-09-29 PROCEDURE — 25000132 ZZH RX MED GY IP 250 OP 250 PS 637: Mod: GY | Performed by: NURSE PRACTITIONER

## 2017-09-29 PROCEDURE — A9270 NON-COVERED ITEM OR SERVICE: HCPCS | Mod: GY | Performed by: NURSE PRACTITIONER

## 2017-09-29 PROCEDURE — A9270 NON-COVERED ITEM OR SERVICE: HCPCS | Mod: GY | Performed by: INTERNAL MEDICINE

## 2017-09-29 PROCEDURE — 93005 ELECTROCARDIOGRAM TRACING: CPT

## 2017-09-29 PROCEDURE — 85027 COMPLETE CBC AUTOMATED: CPT | Performed by: INTERNAL MEDICINE

## 2017-09-29 PROCEDURE — 25000132 ZZH RX MED GY IP 250 OP 250 PS 637: Mod: GY | Performed by: STUDENT IN AN ORGANIZED HEALTH CARE EDUCATION/TRAINING PROGRAM

## 2017-09-29 RX ORDER — SODIUM CHLORIDE 9 MG/ML
INJECTION, SOLUTION INTRAVENOUS
Status: DISCONTINUED
Start: 2017-09-29 | End: 2017-09-29 | Stop reason: HOSPADM

## 2017-09-29 RX ORDER — HYDROCODONE BITARTRATE AND ACETAMINOPHEN 5; 325 MG/1; MG/1
1 TABLET ORAL ONCE
Status: COMPLETED | OUTPATIENT
Start: 2017-09-29 | End: 2017-09-29

## 2017-09-29 RX ORDER — LISINOPRIL 10 MG/1
10 TABLET ORAL DAILY
Status: DISCONTINUED | OUTPATIENT
Start: 2017-09-30 | End: 2017-09-30 | Stop reason: HOSPADM

## 2017-09-29 RX ORDER — TAMSULOSIN HYDROCHLORIDE 0.4 MG/1
0.4 CAPSULE ORAL DAILY
Qty: 60 CAPSULE | Refills: 0 | Status: SHIPPED | OUTPATIENT
Start: 2017-09-29 | End: 2019-01-01

## 2017-09-29 RX ORDER — LISINOPRIL 10 MG/1
5 TABLET ORAL DAILY
Qty: 62 TABLET | Refills: 11 | Status: SHIPPED | OUTPATIENT
Start: 2017-09-29 | End: 2017-12-14

## 2017-09-29 RX ORDER — ONDANSETRON 2 MG/ML
4 INJECTION INTRAMUSCULAR; INTRAVENOUS EVERY 6 HOURS PRN
Status: DISCONTINUED | OUTPATIENT
Start: 2017-09-29 | End: 2017-09-30 | Stop reason: HOSPADM

## 2017-09-29 RX ORDER — METOPROLOL TARTRATE 25 MG/1
25 TABLET, FILM COATED ORAL 2 TIMES DAILY
Qty: 60 TABLET | Refills: 11 | Status: SHIPPED | OUTPATIENT
Start: 2017-09-29 | End: 2018-01-01

## 2017-09-29 RX ORDER — SODIUM CHLORIDE 9 MG/ML
INJECTION, SOLUTION INTRAVENOUS CONTINUOUS
Status: DISCONTINUED | OUTPATIENT
Start: 2017-09-29 | End: 2017-09-30

## 2017-09-29 RX ADMIN — APIXABAN 2.5 MG: 2.5 TABLET, FILM COATED ORAL at 11:47

## 2017-09-29 RX ADMIN — METOPROLOL TARTRATE 25 MG: 25 TABLET ORAL at 11:45

## 2017-09-29 RX ADMIN — HYDROCODONE BITARTRATE AND ACETAMINOPHEN 1 TABLET: 5; 325 TABLET ORAL at 05:33

## 2017-09-29 RX ADMIN — LISINOPRIL 10 MG: 10 TABLET ORAL at 11:45

## 2017-09-29 RX ADMIN — HYDROCODONE BITARTRATE AND ACETAMINOPHEN 1 TABLET: 5; 325 TABLET ORAL at 18:17

## 2017-09-29 RX ADMIN — APIXABAN 2.5 MG: 2.5 TABLET, FILM COATED ORAL at 22:18

## 2017-09-29 RX ADMIN — POTASSIUM CHLORIDE 20 MEQ: 750 TABLET, EXTENDED RELEASE ORAL at 05:58

## 2017-09-29 RX ADMIN — HYDROCODONE BITARTRATE AND ACETAMINOPHEN 1 TABLET: 5; 325 TABLET ORAL at 00:39

## 2017-09-29 RX ADMIN — SPIRONOLACTONE 25 MG: 25 TABLET ORAL at 11:45

## 2017-09-29 RX ADMIN — TAMSULOSIN HYDROCHLORIDE 0.4 MG: 0.4 CAPSULE ORAL at 11:38

## 2017-09-29 RX ADMIN — SODIUM CHLORIDE: 9 INJECTION, SOLUTION INTRAVENOUS at 23:44

## 2017-09-29 RX ADMIN — ASPIRIN 81 MG: 81 TABLET ORAL at 11:45

## 2017-09-29 RX ADMIN — HYDROCODONE BITARTRATE AND ACETAMINOPHEN 1 TABLET: 5; 325 TABLET ORAL at 12:01

## 2017-09-29 RX ADMIN — ATORVASTATIN CALCIUM 40 MG: 40 TABLET, FILM COATED ORAL at 20:39

## 2017-09-29 RX ADMIN — SODIUM CHLORIDE: 9 INJECTION, SOLUTION INTRAVENOUS at 14:00

## 2017-09-29 RX ADMIN — ONDANSETRON 4 MG: 2 INJECTION INTRAMUSCULAR; INTRAVENOUS at 11:48

## 2017-09-29 ASSESSMENT — PAIN DESCRIPTION - DESCRIPTORS
DESCRIPTORS: ACHING
DESCRIPTORS: CONSTANT
DESCRIPTORS: ACHING

## 2017-09-29 NOTE — DISCHARGE INSTRUCTIONS
Going home after Transcatheter Aortic Valve Replacement (TAVR)  Femoral Access    You have small procedure sites at both groins, the one on the right is slightly bigger. You may have small amounts of bruising or discomfort, this is expected. If you develop worse swelling, redness and warmth that does not go away, fever and chills, Increasing numbness in your legs, worsening pain at the site then you need to contact your nurse coordinator.    You may shower, but do not soak in a bath tub or pool or apply lotions, ointments, powder, etc for 3-5 days or until sites look healed.     Activity: No lifting, pushing, pulling more than 10 pounds (examples to avoid: groceries, vacuuming, gardening, golfing): For one week with a procedure through the groin.    After the initial healing process of the access site, we recommend cardiac rehabilitation for all TAVR patients. Cardiac rehabilitation will help you:  - Rebuild stamina, strength and balance.  - Learn how to participate in activities safely, as well as help you regain confidence to do so.  - Return to activities of daily living and leisure.  Please contact their central scheduling to arrange at [phone]    We prefer you to follow up with your primary care provider within 2 weeks. You will be arranged to see the TAVR clinic in month. At that time you will have a repeat ultrasound of the heart to look at the valve.     Should you have any questions or concerns please contact your assigned TAVR nurse coordinator:  Ysabel [phone] (at the first prompt enter #1, second prompt enter #3, then ask the triage nurse if you can speak with Ysabel).  Na [phone]  Irene [phone]

## 2017-09-29 NOTE — PROGRESS NOTES
Care Coordinator Progress Note     Admission Date/Time:  9/27/2017  Attending MD:  Cosme Gunn MD     Data  Chart reviewed, discussed with interdisciplinary team.   Patient was admitted for:    Severe aortic stenosis  S/P TAVR (transcatheter aortic valve replacement)  Acute on chronic systolic congestive heart failure (H).    Concerns with insurance coverage for discharge needs: None.  Current Living Situation: Patient lives with spouse.  Support System: Supportive and Involved  Services Involved: None currently.  Pt stated he had home RN from [facility] in the past but was d/c'd 2 weeks ago.  Transportation: Family or Friend will provide  Barriers to Discharge: pt is not medically ready for d/c.      Assessment  Pt is s/p TAVR.  Met with pt, spouse, son and dtr in-law along SW to introduce self and assess d/c needs.  Pt was assessed by CR and TCU is recommended.  Pt expressed to the team that his wish is to go home and has refused TCU.  This morning during our meeting pt stated if TCU is recommended he is willing to go to TCU.  Pt spouse also expressed that she is not comfortable to take pt to home if he is not back to his base line.  Pt and spouse stated pt had home care service from [facility] but was d/c'd 2 weeks ago.  Pt and spouse are open for home care service ( [facility]) if no rehab place is found that can take pt or if no coverage.  Pt and family agreed referral to be send to TCU facilities.   SW will assist with TCU placement.  The above info have been shared with CSI team.       Plan  Anticipated Discharge Date:  TBD.  Anticipated Discharge Plan:   TCU.  SW will assist with TCU placement.  CC will cont to follow plan of care.      Jimmy Waldron, RN, PHN, BSN  4A and 4E/ ICU  Care Coordinator  Phone: [phone]  Pager: [phone]

## 2017-09-29 NOTE — PLAN OF CARE
Problem: Patient Care Overview  Goal: Interdisciplinary Rounds/Family Conf  Outcome: No Change     Activity: Q2h turns. Up with 2 assist.  Neuro: Alert. Oriented. [place]-left hearing aid. Weak.  CV: Afebrile. AV paced with occasional PVCs.   Resp: RA. Clear, dim.  GI: [place] diet. - BM.  : Voids via urinal.  Skin: RLE skin tear and PU. B/l groin sites.  Pain: Heels with contact on bed-boots ordered. Groin site discomfort. C/o headache.  Access: PIV x1.  Labs: K: 3.9-20 mEq given.     Plan: Advance activity as tolerated. Encourage PO intake. Transfer to floor when bed is available.

## 2017-09-29 NOTE — PROGRESS NOTES
Admission          9/27/2017  8:56 AM  -----------------------------------------------------------  Reason for admission:  Transferred from ICU  Admitted from:  Via: wheelchair  Accompanied by:Kingsley RN  Belongings: Placed in closet;  Admission Profile: complete  Teaching: orientation to unit and call light- call light within reach, call don't fall, use of console, meal times, when to call for the RN, and enforced importance of safety   Access: PIV  Telemetry:Placed on pt  Ht./Wt.: complete  2 RN Skin Assessment Completed By: Livia PITTMAN RN and Stanford RN  Pt status:    Temp:  [97.7  F (36.5  C)-98.4  F (36.9  C)] 98.4  F (36.9  C)  Heart Rate:  [] 69  Resp:  [14-16] 16  BP: ()/() 110/62  SpO2:  [48 %-99 %] 48 %

## 2017-09-29 NOTE — PROGRESS NOTES
ADDENDUM:  Received call from Davey in Admissions at [facility] stating they can accept pt ONLY IF pt can leave here by 10:30 AM Saturday.  They CANNOT accept if pt arrives at their facility past 12 noon.  Spoke with pt's wife Tierra ([phone]).  She states she will have transportation here by 10:30.    Weekend SW:  Please call Davey at [facility] ([phone] is his personal admissions phone) first thing Saturday AM to confirm d/c plans.  Please notify bedside nurse and NST for a packet.  Please fax d/c orders and Davey of PAS # as below.  Discharge orders are already completed.  Lastly, please cancel referral at [facility] if this plan works out.    Social Work Services Progress Note    Hospital Day: 3  Date of Initial Social Work Evaluation:  9/25/17 in clinic pre-surgery  Collaborated with:  RNCC, bedside nurse, team rounds, chart review    Data:    PT was admitted s/p TAVR.  OT is recommending TCU.  Pt will be medically stable for d/c on Saturday.    Intervention:    Met with pt, his wife Tierra, son Eusebio, and dtr-in-law along with RNCC and beside nurse.  Discussed recommendation of TCU.  Pt's wife expressed significant concern regarding being unable to properly care for pt's needs post-surgery and is worried about pt falling d/t new weakness and instability.  Pt is agreeable to TCU stay close to home.      Contacted the following TCUs for admission Saturday:    1.  [facility] ([phone], f: [phone])--Both pt and his wife initially state that this is the ONLY facility they will consider.  Contacted Admissions who stated they are full for the weekend, but after more discussion Admissions liaison Davey states they are willing to review referral, but cannot make any promises for a Saturday admission.  Awaiting callback.    After further discussion with pt and his wife, they ended up being agreeable to the following two other facilities:    2.  [facility]--Contacted facility  and they are unable to review referrals until Monday.    3.  [facility] (weekend admissions: [phone], f: [phone])--Faxed referral.  Spoke with admissions liaison Betito who states they will need PT notes prior to final review.  PT consult is ordered and await their recommendations.  Sent text page to PT asking that they prioritize pt for early AM.    PAS completed:  [id number]    Assessment:   Pt and his wife are agreeable to pt going to TCU.  Pt's neighbors will provide transportation as they are planning to visit tomorrow.    Plan:    Anticipated Disposition:  Facility:  TCU, two facilities are reviewing as above.    Barriers to d/c plan:  Pt is not medically stable, but will be on Saturday.    Follow Up:  Weekend SW will f/u to coordinate d/c.  Please notify pt's wife Tierra of d/c location.    KIRAN Francis, [facility]  Adult ICU Clinical   Pager [phone]

## 2017-09-29 NOTE — PROVIDER NOTIFICATION
Dr Carlos Enrique armijo at [facility] number [id number] about pt's hypotension 70-80/40's maps < 60.  Pt c/o feeling week, and not wanting to get out of the chair.  I:  RN provided pt with a bolus of 250 cc of normal saline x2.  As well as asked the team to evaluate pt's blood pressures as pt on Lisinopril, Metoprolol, Flomax etc.  A:  Awaiting pt's blood pressure to stabilized and then he will be transferred upstairs to .  P:  Continue with current plan of care.  Pt was transferred upstairs to  at 1730 via wheelchair.  Blood pressures have improved since the normal saline bolouses.    Continue with current plan of care on 6B.

## 2017-09-30 ENCOUNTER — MEDICAL CORRESPONDENCE (OUTPATIENT)
Dept: HEALTH INFORMATION MANAGEMENT | Facility: CLINIC | Age: 82
End: 2017-09-30

## 2017-09-30 VITALS
BODY MASS INDEX: 24.27 KG/M2 | DIASTOLIC BLOOD PRESSURE: 62 MMHG | TEMPERATURE: 97.9 F | WEIGHT: 151.01 LBS | OXYGEN SATURATION: 97 % | RESPIRATION RATE: 16 BRPM | HEIGHT: 66 IN | SYSTOLIC BLOOD PRESSURE: 112 MMHG

## 2017-09-30 LAB
ALBUMIN SERPL-MCNC: 2.1 G/DL (ref 3.4–5)
ALP SERPL-CCNC: 59 U/L (ref 40–150)
ALT SERPL W P-5'-P-CCNC: 11 U/L (ref 0–70)
ANION GAP SERPL CALCULATED.3IONS-SCNC: 10 MMOL/L (ref 3–14)
AST SERPL W P-5'-P-CCNC: 29 U/L (ref 0–45)
BASOPHILS # BLD AUTO: 0 10E9/L (ref 0–0.2)
BASOPHILS NFR BLD AUTO: 0.4 %
BILIRUB SERPL-MCNC: 0.5 MG/DL (ref 0.2–1.3)
BUN SERPL-MCNC: 13 MG/DL (ref 7–30)
CALCIUM SERPL-MCNC: 7.5 MG/DL (ref 8.5–10.1)
CHLORIDE SERPL-SCNC: 110 MMOL/L (ref 94–109)
CO2 SERPL-SCNC: 19 MMOL/L (ref 20–32)
CREAT SERPL-MCNC: 0.8 MG/DL (ref 0.66–1.25)
DIFFERENTIAL METHOD BLD: ABNORMAL
EOSINOPHIL # BLD AUTO: 0.1 10E9/L (ref 0–0.7)
EOSINOPHIL NFR BLD AUTO: 0.9 %
ERYTHROCYTE [DISTWIDTH] IN BLOOD BY AUTOMATED COUNT: 16.2 % (ref 10–15)
GFR SERPL CREATININE-BSD FRML MDRD: >90 ML/MIN/1.7M2
GLUCOSE SERPL-MCNC: 66 MG/DL (ref 70–99)
HCT VFR BLD AUTO: 25.5 % (ref 40–53)
HGB BLD-MCNC: 8.2 G/DL (ref 13.3–17.7)
IMM GRANULOCYTES # BLD: 0 10E9/L (ref 0–0.4)
IMM GRANULOCYTES NFR BLD: 0.2 %
LYMPHOCYTES # BLD AUTO: 1.1 10E9/L (ref 0.8–5.3)
LYMPHOCYTES NFR BLD AUTO: 19.8 %
MAGNESIUM SERPL-MCNC: 2 MG/DL (ref 1.6–2.3)
MCH RBC QN AUTO: 30.4 PG (ref 26.5–33)
MCHC RBC AUTO-ENTMCNC: 32.2 G/DL (ref 31.5–36.5)
MCV RBC AUTO: 94 FL (ref 78–100)
MONOCYTES # BLD AUTO: 0.9 10E9/L (ref 0–1.3)
MONOCYTES NFR BLD AUTO: 16.3 %
NEUTROPHILS # BLD AUTO: 3.6 10E9/L (ref 1.6–8.3)
NEUTROPHILS NFR BLD AUTO: 62.4 %
NRBC # BLD AUTO: 0 10*3/UL
NRBC BLD AUTO-RTO: 0 /100
PHOSPHATE SERPL-MCNC: 2.6 MG/DL (ref 2.5–4.5)
PLATELET # BLD AUTO: 142 10E9/L (ref 150–450)
POTASSIUM SERPL-SCNC: 4 MMOL/L (ref 3.4–5.3)
PROT SERPL-MCNC: 4.9 G/DL (ref 6.8–8.8)
RBC # BLD AUTO: 2.7 10E12/L (ref 4.4–5.9)
SODIUM SERPL-SCNC: 139 MMOL/L (ref 133–144)
WBC # BLD AUTO: 5.7 10E9/L (ref 4–11)

## 2017-09-30 PROCEDURE — 36415 COLL VENOUS BLD VENIPUNCTURE: CPT | Performed by: INTERNAL MEDICINE

## 2017-09-30 PROCEDURE — A9270 NON-COVERED ITEM OR SERVICE: HCPCS | Mod: GY | Performed by: INTERNAL MEDICINE

## 2017-09-30 PROCEDURE — 85025 COMPLETE CBC W/AUTO DIFF WBC: CPT | Performed by: INTERNAL MEDICINE

## 2017-09-30 PROCEDURE — 80053 COMPREHEN METABOLIC PANEL: CPT | Performed by: INTERNAL MEDICINE

## 2017-09-30 PROCEDURE — 84100 ASSAY OF PHOSPHORUS: CPT | Performed by: INTERNAL MEDICINE

## 2017-09-30 PROCEDURE — 90662 IIV NO PRSV INCREASED AG IM: CPT | Performed by: INTERNAL MEDICINE

## 2017-09-30 PROCEDURE — 25000132 ZZH RX MED GY IP 250 OP 250 PS 637: Mod: GY | Performed by: NURSE PRACTITIONER

## 2017-09-30 PROCEDURE — 25000128 H RX IP 250 OP 636: Performed by: INTERNAL MEDICINE

## 2017-09-30 PROCEDURE — 25000132 ZZH RX MED GY IP 250 OP 250 PS 637: Mod: GY | Performed by: INTERNAL MEDICINE

## 2017-09-30 PROCEDURE — 99238 HOSP IP/OBS DSCHRG MGMT 30/<: CPT | Mod: GC | Performed by: INTERNAL MEDICINE

## 2017-09-30 PROCEDURE — A9270 NON-COVERED ITEM OR SERVICE: HCPCS | Mod: GY | Performed by: NURSE PRACTITIONER

## 2017-09-30 PROCEDURE — 83735 ASSAY OF MAGNESIUM: CPT | Performed by: INTERNAL MEDICINE

## 2017-09-30 RX ADMIN — APIXABAN 2.5 MG: 2.5 TABLET, FILM COATED ORAL at 08:25

## 2017-09-30 RX ADMIN — HYDROCODONE BITARTRATE AND ACETAMINOPHEN 1 TABLET: 5; 325 TABLET ORAL at 00:04

## 2017-09-30 RX ADMIN — INFLUENZA A VIRUSA/MICHIGAN/45/2015 X-275 (H1N1) ANTIGEN (FORMALDEHYDE INACTIVATED), INFLUENZA A VIRUS A/HONG KONG/4801/2014 X-263B (H3N2) ANTIGEN (FORMALDEHYDE INACTIVATED), AND INFLUENZA B VIRUS B/BRISBANE/60/2008 ANTIGEN (FORMALDEHYDE INACTIVATED) 0.5 ML: 60; 60; 60 INJECTION, SUSPENSION INTRAMUSCULAR at 10:22

## 2017-09-30 RX ADMIN — METOPROLOL TARTRATE 25 MG: 25 TABLET ORAL at 08:25

## 2017-09-30 RX ADMIN — ASPIRIN 81 MG: 81 TABLET ORAL at 08:25

## 2017-09-30 RX ADMIN — LISINOPRIL 10 MG: 10 TABLET ORAL at 08:25

## 2017-09-30 RX ADMIN — HYDROCODONE BITARTRATE AND ACETAMINOPHEN 1 TABLET: 5; 325 TABLET ORAL at 06:38

## 2017-09-30 NOTE — PLAN OF CARE
Problem: Patient Care Overview  Goal: Plan of Care/Patient Progress Review  Outcome: Improving  Neuro: A&Ox4.   Cardiac: SR. VSS.       Respiratory: Sating 98% on RA.  GI/: Adequate urine output using urinal . No Bm at this shift.   Diet/appetite: declined to eat supper .   Activity:  Assist of 2 and his cane.  Pain: At acceptable level on current regimen.   Skin: Intact, no new deficits noted.  LDA's:     Plan: Continue with POC. Notify primary team with changes.

## 2017-09-30 NOTE — PLAN OF CARE
Problem: Patient Care Overview  Goal: Plan of Care/Patient Progress Review  Speech Language Therapy Discharge Summary     Reason for therapy discharge:    Discharged to transitional care facility.     Progress towards therapy goal(s). See goals on Care Plan in [facility] electronic health record for goal details.  Goals not met.  Barriers to achieving goals:   discharge from facility.     Therapy recommendation(s):    Continued therapy is recommended.  Rationale/Recommendations:  to increase I with ADLs, functional mobility and transfers..

## 2017-09-30 NOTE — PLAN OF CARE
Problem: Patient Care Overview  Goal: Plan of Care/Patient Progress Review  Outcome: Adequate for Discharge Date Met:  09/30/17  DISCHARGE                         No discharge date for patient encounter.  ----------------------------------------------------------------------------  Discharged to:   Via: private transportation  Accompanied by: Wife  Discharge Instructions: Regular diet, activity as tolerated, medications, follow up appointments, when to call the MD, aftercare instructions.  Prescriptions: Filled by discharged pharmacy; medication list reviewed & sent with pt  Follow Up Appointments: arranged; information given  Belongings: All sent with pt  IV: d/c'd  Telemetry: d/c'd  Pt exhibits understanding of above discharge instructions; all questions answered.     Discharge Paperwork: Faxed and sent home with patient.   Report called to: Lauren at [facility] [phone].  RN advised that pt having some bloody discharge from meatus r/t stein insertion.  Also advised that groin sites intact with bruising and no hematoma's.

## 2017-09-30 NOTE — PROGRESS NOTES
CARDIOLOGY PROGRESS NOTE    SUBJECTIVE:  Unremarkable interval course. Seen by cardiac rehab on . ROS otherwise negative.    OBJECTIVE:  Vital signs:  126/67 (Range /)  HR 68-84  RR 16  98 (Tm 98.5)  Vitals:    17 0600 17 0200 17 0600   Weight: 67 kg (147 lb 11.3 oz) 67.7 kg (149 lb 4 oz) 68.5 kg (151 lb 0.2 oz)     I/O: -25  Intake: 950 intake (450 PO, 500 IV)  Output: 975 urine     PHYSICAL EXAM:  Gen: Looks well  HEENT: MMM  Resp: No signs of resp distress, chest ausc with fine insp creps at bases but quiet BS   CVS: No JVD, S1+S2 with 3/6 ESM loudest at LLSB and radiating all over the precordium (?carotid radiation)  Abdo: ND, S, NT, +BS  Extremities: Warm, well-perfused, mild edema  Neuro: GCS 15/15     Recent Labs   Lab Test  17   0610   HGB  8.2* (8.4)   HCT  25.5* (26.2)   WBC  5.7 (6.3)   MCV  94   MCH  30.4   MCHC  32.2   RDW  16.2*   PLT  142* (155)   NA  139 (137)   POTASSIUM  4.0 (3.9)   CHLORIDE  110* (105)   CO2  19* (22)   BUN  13 (10)    CR  0.80(0.85)   GLC  66*    SHELIA  7.5*   ALBUMIN  2.1*   BILITOTAL  0.5   ALKPHOS  59   AST  29   ALT  11   Glc 66-73    Micro:   UCx catheter: NG    Imagin/29 TTE:   Interpretation Summary  Technically difficult study.  Global and regional left ventricular function is normal with an EF of 60-65%.   A bioprosthetic aortic valve is present.# Sapiens S3 29 mm. Doppler interrogation of the aortic valve is normal.  The mean gradient is 8 mmHg.No aortic regurgitation is present.  The inferior vena cava was normal in size with preserved respiratory variability.  No pericardial effusion is present.     Cardiac meds: Apixaban 2.5 mg BID, ASA 81, atorvastatin 40 mg q24h, lisinopril 10 mg q24h, metoprolol 25 mg BID  Non-cardiac meds: None  Drips: none     ASSESSMENT/PLAN:  Mr. Bud Merritt is a pleasant 89-year old male with a PMHx of AFib s/p AVN abalation and PPM severe aortic stenosis who     - Severe aortic stenosis s/p  ES3 29 mm; history of valvular cardiomyopathy s/p CRT-D; Hx of HTN    - Continue ASA 81, atorvastatin 40 mg q24h, lisinopril 10 mg q24h, metoprolol 25 mg BID    - To TCU on 09/30    - Metabolic acidosis   - Likely due to large chloride burden   - D/c fluids    - Hypoglycemia   - Recheck before discharge    Chronic/inactive issues:  - Rheumatoid arthritis: analgesia per home regimen     Seen and staffed with Dr. Gnun.    Morris Tang  Cardiology Fellow  Pager [phone]              I have seen and examined the patient with the [facility] team. I agree with the assessment and plan of the note above.I have reviewed pertinent labs.     Cosme Gunn MD  Interventional Cardiology  Pager: [phone]

## 2017-10-01 ENCOUNTER — TELEPHONE (OUTPATIENT)
Dept: GERIATRICS | Facility: CLINIC | Age: 82
End: 2017-10-01

## 2017-10-01 LAB — INTERPRETATION ECG - MUSE: NORMAL

## 2017-10-01 NOTE — TELEPHONE ENCOUNTER
Patient did not come with scrip for ordered Norco. Sent script for #15 tabs to pharmacy.    Electronically signed by POLLO Gomez GNP

## 2017-10-02 ENCOUNTER — NURSING HOME VISIT (OUTPATIENT)
Dept: GERIATRICS | Facility: CLINIC | Age: 82
End: 2017-10-02
Payer: COMMERCIAL

## 2017-10-02 ENCOUNTER — HOSPITAL LABORATORY (OUTPATIENT)
Dept: NURSING HOME | Facility: OTHER | Age: 82
End: 2017-10-02

## 2017-10-02 ENCOUNTER — CARE COORDINATION (OUTPATIENT)
Dept: CARE COORDINATION | Facility: CLINIC | Age: 82
End: 2017-10-02

## 2017-10-02 ENCOUNTER — TELEPHONE (OUTPATIENT)
Dept: CARDIOLOGY | Facility: CLINIC | Age: 82
End: 2017-10-02

## 2017-10-02 VITALS
BODY MASS INDEX: 24.82 KG/M2 | TEMPERATURE: 98.7 F | SYSTOLIC BLOOD PRESSURE: 110 MMHG | OXYGEN SATURATION: 96 % | WEIGHT: 153.8 LBS | RESPIRATION RATE: 18 BRPM | DIASTOLIC BLOOD PRESSURE: 64 MMHG | HEART RATE: 69 BPM

## 2017-10-02 DIAGNOSIS — I35.0 AORTIC STENOSIS, SEVERE: Primary | ICD-10-CM

## 2017-10-02 DIAGNOSIS — I50.22 CHRONIC SYSTOLIC CONGESTIVE HEART FAILURE (H): ICD-10-CM

## 2017-10-02 DIAGNOSIS — I48.91 ATRIAL FIBRILLATION WITH RAPID VENTRICULAR RESPONSE (H): ICD-10-CM

## 2017-10-02 DIAGNOSIS — M05.79 RHEUMATOID ARTHRITIS INVOLVING MULTIPLE SITES WITH POSITIVE RHEUMATOID FACTOR (H): ICD-10-CM

## 2017-10-02 DIAGNOSIS — Z95.2 S/P TAVR (TRANSCATHETER AORTIC VALVE REPLACEMENT): ICD-10-CM

## 2017-10-02 DIAGNOSIS — N40.0 HYPERTROPHY OF PROSTATE WITHOUT URINARY OBSTRUCTION: ICD-10-CM

## 2017-10-02 DIAGNOSIS — I10 ESSENTIAL HYPERTENSION WITH GOAL BLOOD PRESSURE LESS THAN 140/90: ICD-10-CM

## 2017-10-02 DIAGNOSIS — Z95.0 CARDIAC PACEMAKER IN SITU: ICD-10-CM

## 2017-10-02 LAB
ANION GAP SERPL CALCULATED.3IONS-SCNC: 6 MMOL/L (ref 3–14)
BUN SERPL-MCNC: 14 MG/DL (ref 7–30)
CALCIUM SERPL-MCNC: 8.1 MG/DL (ref 8.5–10.1)
CHLORIDE SERPL-SCNC: 108 MMOL/L (ref 94–109)
CO2 SERPL-SCNC: 22 MMOL/L (ref 20–32)
CREAT SERPL-MCNC: 0.77 MG/DL (ref 0.66–1.25)
ERYTHROCYTE [DISTWIDTH] IN BLOOD BY AUTOMATED COUNT: 15.8 % (ref 10–15)
GFR SERPL CREATININE-BSD FRML MDRD: >90 ML/MIN/1.7M2
GLUCOSE SERPL-MCNC: 82 MG/DL (ref 70–99)
HCT VFR BLD AUTO: 27.6 % (ref 40–53)
HGB BLD-MCNC: 8.6 G/DL (ref 13.3–17.7)
MCH RBC QN AUTO: 30 PG (ref 26.5–33)
MCHC RBC AUTO-ENTMCNC: 31.2 G/DL (ref 31.5–36.5)
MCV RBC AUTO: 96 FL (ref 78–100)
PLATELET # BLD AUTO: 171 10E9/L (ref 150–450)
POTASSIUM SERPL-SCNC: 3.8 MMOL/L (ref 3.4–5.3)
RBC # BLD AUTO: 2.87 10E12/L (ref 4.4–5.9)
SODIUM SERPL-SCNC: 136 MMOL/L (ref 133–144)
WBC # BLD AUTO: 3.8 10E9/L (ref 4–11)

## 2017-10-02 PROCEDURE — 99310 SBSQ NF CARE HIGH MDM 45: CPT | Performed by: NURSE PRACTITIONER

## 2017-10-02 NOTE — PROGRESS NOTES
Clinic Care Coordination Contact    Situation: Patient chart reviewed by care coordinator.    Background: received CTS on pt back in January of 2017 after admission to hospital for heart related issues.  Since then pt has had four hospitalization and 2 ED visits.   3  Assessment: Pt has continued to return home after hospitalizations with home care services. Most recent hospitalization to have TAVR and was discharged to a TCU for recovery.     Plan/Recommendations: RN will continue to follow progress every 4 weeks PRN and look for disposition/discharge and follow up at that time.       Yumiko Hallman RN, BSN  Care Coordination    [address] [address]  Office: [phone]  Fax [phone]   [email or web address]   [email or web address]     Connect with [facility] on social media.

## 2017-10-02 NOTE — PROGRESS NOTES
[place] [facility]  PRIMARY CARE PROVIDER AND CLINIC:  Camron Aragon Redwood  [facility]  / [facility] 5*  Chief Complaint   Patient presents with     Hospital F/U     Clinic Care Coordination - Initial       HPI:    Bud Merritt is a 89 year old  (5/9/1927),admitted to the [facility]  from [facility].  Hospital stay 9/27/17 through 9/30/17.  Admitted to this facility for  rehab, medical management and nursing care.  HPI information obtained from: facility chart records, facility staff, patient report and [facility] chart review.  Current issues are:        1. Aortic stenosis, severe    2. S/P TAVR (transcatheter aortic valve replacement)    3. Atrial fibrillation with rapid ventricular response (H)    4. 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onic, Bi-Ventricular- dependent    5. Chronic systolic congestive heart failure (H)    6. Essential hypertension with goal blood pressure less than 140/90    7. Rheumatoid arthritis involving multiple sites with positive rheumatoid factor (H)    8. Hypertrophy of prostate without urinary obstruction      Bud is being seen in his room today after he has had therapies.  He is a petite gentleman whom ambulates with a walker with staff or uses a w/c while he is here for rehabilitation for severe Aortic stenosis, s/p TAVR 9/27/17 that went through his groin area.  He admits he is tired but goal is to return home with his spouse.  Reviewing his medications with him.  Multiple cardiac medications as well as having a pacemaker in place since March 2017 due to Atrial Fibrillation.  Does have lower leg edema.    Blood pressures so far have ranged from 110-140's/50-70's.    Talks about his pain from his RA.  Tylenol does nothing for him.  Has a PRN Norco order and Tramadol order.  Took Tramadol at home routinely and would like scheduled here as well.    Having pain with his hips due  to arthritis and was to have a cortisone injection but had to cancel that appointment.  Will work with co-worker about having injections done here for his Bursitis.      CODE STATUS/ADVANCE DIRECTIVES DISCUSSION:   CPR/Full code   Patient's living condition: lives with spouse    ALLERGIES:Amiodarone and Lasix [furosemide]  PAST MEDICAL HISTORY:  has a past medical history of Anemia; Atrial fibrillation (H) (07/01/2016); Cardiac pacemaker (03/10/2017); Cardiomyopathy (H); CHF (congestive heart failure) (H); COPD exacerbation (H) (3/2/2017); Depressive disorder; History of prostate cancer; Paroxysmal atrial fibrillation (H) (7/6/2016); Pure hypercholesterolemia; Rheumatoid arthritis(714.0); Unspecified essential hypertension; and Weakness generalized (3/2/2017). He also has no past medical history of Cerebral infarction (H); Diabetes (H); Difficult intubation; History of blood transfusion; Malignant hyperthermia; PONV (postoperative nausea and vomiting); Spinal headache; Thyroid disease; or Uncomplicated asthma.  PAST SURGICAL HISTORY:  has a past surgical history that includes REPAIR ING HERNIA,5+Y/O,REDUCIBL (1996); COLONOSCOPY THRU STOMA W BIOPSY/CAUTERY TUMOR/POLYP/LESION (8/31/2004); colonoscopy (08/24/09); Arthroplasty knee (Left, 1/12/2016); Irrigation and debridement soft tissue lower extremity, combined (Left, 3/15/2016); Implant pacemaker (03/10/2017); TRANSCATHETER AORTIC VALVE IMPLANT ANEST (N/A, 9/27/2017); and Heart Cath Femoral Cannulization With Open Standby Repair Aortic Valve (N/A, 9/27/2017).  FAMILY HISTORY: family history includes Alcoholism in his brother; CANCER in his mother and son; Unknown/Adopted in his father.  SOCIAL HISTORY:  reports that he quit smoking about 18 years ago. His smoking use included Cigarettes. He has a 7.50 pack-year smoking history. He has never used smokeless tobacco. He reports that he does not drink alcohol or use illicit drugs.    Post Discharge Medication  Reconciliation Status: discharge medications reconciled and changed, per note/orders (see AVS).  Current Outpatient Prescriptions   Medication Sig Dispense Refill     aspirin EC 81 MG EC tablet Take 1 tablet (81 mg) by mouth daily 30 tablet 11     metoprolol (LOPRESSOR) 25 MG tablet Take 1 tablet (25 mg) by mouth 2 times daily 60 tablet 11     lisinopril (PRINIVIL/ZESTRIL) 10 MG tablet Take 0.5 tablets (5 mg) by mouth daily 62 tablet 11     tamsulosin (FLOMAX) 0.4 MG capsule Take 1 capsule (0.4 mg) by mouth daily 60 capsule 0     HYDROcodone-acetaminophen (NORCO) 5-325 MG per tablet TAKE 1 TABLET BY MOUTH EVERY 6 HOURS AS NEEDED FOR MODERATE TO SEVERE PAIN 30 tablet 0     traMADol (ULTRAM) 50 MG tablet TAKE 1 TABLET 3 TIMES DAILY AS NEEDED FOR MODERATE PAIN 90 tablet 0     leflunomide (ARAVA) 10 MG tablet Take 1 tablet (10 mg) by mouth daily 30 tablet 11     dofetilide (TIKOSYN) 125 MCG capsule Take 1 capsule (125 mcg) by mouth 2 times daily 60 capsule 5     atorvastatin (LIPITOR) 40 MG tablet Take 1 tablet (40 mg) by mouth daily (Patient taking differently: Take 40 mg by mouth every evening ) 90 tablet 2     predniSONE (DELTASONE) 5 MG tablet Take 1 tablet (5 mg) by mouth daily 100 tablet 4     apixaban ANTICOAGULANT (ELIQUIS) 2.5 MG tablet Take 1 tablet (2.5 mg) by mouth 2 times daily 180 tablet 3     Leuprolide Acetate (LUPRON DEPOT IM) Inject into the muscle every 6 months Reported on 5/3/2017       acetaminophen (TYLENOL) 650 MG CR tablet Take 650 mg by mouth every 8 hours as needed Reported on 4/5/2017       alendronate (FOSAMAX) 70 MG tablet Take 1 tablet (70 mg) by mouth every 7 days Take with over 8 ounces water and stay upright for at least 30 minutes after dose.  Take at least 60 minutes before breakfast 12 tablet 3     ascorbic acid (VITAMIN C) 500 MG CPCR Take 500 mg by mouth daily       VITAMIN D, CHOLECALCIFEROL, PO Take 1 tablet by mouth daily Reported on 5/3/2017       calcium carbonate (OS-SHELIA 500  MG [place]. [place]) 500 MG tablet Take 1,250 mg by mouth daily          ROS:  4 point ROS including Respiratory, CV, GI and , other than that noted in the HPI,  is negative    Exam:  /64  Pulse 69  Temp 98.7  F (37.1  C)  Resp 18  Wt 153 lb 12.8 oz (69.8 kg)  SpO2 96%  BMI 24.82 kg/m2  GENERAL APPEARANCE:  Alert, oriented, thin, cooperative, hearing aid in left ear  EYES:  EOM, conjunctivae, lids, pupils and irises normal, PERRL, wears corrective lenses  NECK:  No adenopathy,masses or thyromegaly  RESP:  respiratory effort and palpation of chest normal, lungs clear to auscultation , no respiratory distress  CV:  Palpation and auscultation of heart done , +3 edema in lower legs.  Heart rate REgular and strong with S1 and S2 heard. 3/6 systolic murmur  ABDOMEN:  normal bowel sounds, soft, nontender, no hepatosplenomegaly or other masses, no guarding or rebound  M/S:   Gait and station abnormal due to weakness, using w/c now for transportation.  has a walker, but before surgery had a cane for stability  SKIN:  pink, dry and warm. small incision in left groin due to surgery.  PSYCH:  oriented X 3, normal insight, judgement and memory, affect and mood normal    Lab/Diagnostic data:     CBC RESULTS:   Recent Labs   Lab Test  10/02/17   0717 09/30/17   0610   WBC  3.8*  5.7   RBC  2.87*  2.70*   HGB  8.6*  8.2*   HCT  27.6*  25.5*   MCV  96  94   MCH  30.0  30.4   MCHC  31.2*  32.2   RDW  15.8*  16.2*   PLT  171  142*       Last Basic Metabolic Panel:  Recent Labs   Lab Test  10/02/17   0717  09/30/17   0610   NA  136  139   POTASSIUM  3.8  4.0   CHLORIDE  108  110*   SHELIA  8.1*  7.5*   CO2  22  19*   BUN  14  13   CR  0.77  0.80   GLC  82  66*       Liver Function Studies -   Recent Labs   Lab Test  09/30/17   0610  09/29/17   0513   PROTTOTAL  4.9*  5.2*   ALBUMIN  2.1*  2.4*   BILITOTAL  0.5  0.5   ALKPHOS  59  66   AST  29  37   ALT  11  14       TSH   Date Value Ref Range Status   01/23/2017 1.07 0.40 - 4.00  mU/L Final   08/11/2016 2.23 0.40 - 4.00 mU/L Final   ]    No results found for: A1C    ASSESSMENT/PLAN:  Aortic stenosis, severe  S/P TAVR (transcatheter aortic valve replacement)  - is here for rehabilitation.  eager to get stronger.  Has some left hip/groin discomfort.    Remains on ASA 81mg daily atorvastatin 40mg at HS  Cardiology appointment on 10/26/17    Atrial fibrillation with rapid ventricular response (H)  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onic, Bi-Ventricular- dependent  - does receive dofetilide 125mcg twice a day and Eliquis 2.5mg BID.  Does have metoprolol 25mg twice a day.  No changes.  Monitor for acute episodes.    Chronic systolic congestive heart failure (H)  Legs need to be elevated due to swelling.  No diuretics at this time.  Appears that Spironolactone was not continued at discharge.  Vitals daily and will monitor weights.    Essential hypertension with goal blood pressure less than 140/90  Remains on metoprolol 25mg BID and Lisinopril 5mg daily.    Will order CBC and BMP on 10/6/17 for monitoring hydration and HgB level    Rheumatoid arthritis involving multiple sites with positive rheumatoid factor (H)  Receives alendronate 70mg weekly, Arava 10mg daily, Calcium Carb 500mg daily, vitamin D3 which needs clarification to dose, prednisone 5mg daily as well.  Talked about pain medications.  Only wants the Tramadol and so agreed on how he took it at home.  See orders below.    Hypertrophy of prostate without urinary obstruction  Will remain on tamsulosin 0.4mg daily.       Orders:  1.  Vitamin D3 2000 units daily  2.  Discontinue Tylenol and Norco orders  3.  Change Tramadol to be 50mg TID for RA pain.    4.  CBC and BMP on 10/6/17    Total time spent with patient visit at the skilled nursing facility was 35 min including patient visit and review of past records. Greater than 50% of total time spent with counseling and coordinating care due to new environment, medication clarfication, and to  answer any questions    Electronically signed by:  POLLO Kline CNP

## 2017-10-02 NOTE — TELEPHONE ENCOUNTER
[facility] calling reporting an abnormal finding on patient's CT scan.  Enlarged retroperitoneal lymph node.  Finding noted in impression # 3.

## 2017-10-02 NOTE — MR AVS SNAPSHOT
After Visit Summary   10/2/2017    Bud Merritt    MRN: [id number]           Patient Information     Date Of Birth          5/9/1927        Visit Information        Provider Department      10/2/2017 11:00 AM Snehal Clement APRN CNP Geriatrics Transitional Care        Today's Diagnoses     Aortic stenosis, severe    -  1    S/P TAVR (transcatheter aortic valve replacement)        Atrial fibrillation with rapid ventricular response (H)        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onic, Bi-Ventricular- dependent        Chronic systolic congestive heart failure (H)        Essential hypertension with goal blood pressure less than 140/90        Rheumatoid arthritis involving multiple sites with positive rheumatoid factor (H)        Hypertrophy of prostate without urinary obstruction           Follow-ups after your visit        Your next 10 appointments already scheduled     Oct 26, 2017  2:00 PM CDT   Ech Complete with ECH2    Health Echo ([facility])    [address] [place] [address] [id number] [phone]           1. Please bring or wear a comfortable two-piece outfit. 2. You may eat, drink and take your normal medicines. 3. For any questions that cannot be answered, please contact the ordering physician            Oct 26, 2017  3:00 PM CDT   Lab with  LAB    Health Lab ([facility])    [address] [place] [address] [id number] [phone]            Oct 26, 2017  3:30 PM CDT   (Arrive by 3:15 PM)   Return Visit with POLLO Reynolds CNP   [facility] ([facility])    [address] [place] [id number] [phone]            Nov 15, 2017 11:15 AM CST   Return Visit with Mak Shaver MD   [facility] ([facility]    [address] [address] [phone]              Who to contact   "   If you have questions or need follow up information about today's clinic visit or your schedule please contact GERIATRICS TRANSITIONAL CARE directly at [phone].  Normal or non-critical lab and imaging results will be communicated to you by TraceLinkhart, letter or phone within 4 business days after the clinic has received the results. If you do not hear from us within 7 days, please contact the clinic through TraceLinkhart or phone. If you have a critical or abnormal lab result, we will notify you by phone as soon as possible.  Submit refill requests through RateItAll or call your pharmacy and they will forward the refill request to us. Please allow 3 business days for your refill to be completed.          Additional Information About Your Visit        [employer] Information     RateItAll lets you send messages to your doctor, view your test results, renew your prescriptions, schedule appointments and more. To sign up, go to [email or web address] . Click on \"Log in\" on the left side of the screen, which will take you to the Welcome page. Then click on \"Sign up Now\" on the right side of the page.     You will be asked to enter the access code listed below, as well as some personal information. Please follow the directions to create your username and password.     Your access code is: [email or web address]  Expires: 2017 11:55 AM     Your access code will  in 90 days. If you need help or a new code, please call your [facility] clinic or [phone].        Care EveryWhere ID     This is your Care EveryWhere ID. This could be used by other organizations to access your [facility] medical records  [id number]        Your Vitals Were     Pulse Temperature Respirations Pulse Oximetry BMI (Body Mass Index)       69 98.7  F (37.1  C) 18 96% 24.82 kg/m2        Blood Pressure from Last 3 Encounters:   10/04/17 140/74   10/02/17 110/64   17 112/62    Weight from Last 3 Encounters:   10/04/17 154 lb (69.9 kg)   10/02/17 153 lb " 12.8 oz (69.8 kg)   09/30/17 151 lb 0.2 oz (68.5 kg)              Today, you had the following     No orders found for display         Today's Medication Changes          These changes are accurate as of: 10/2/17 11:59 PM.  If you have any questions, ask your nurse or doctor.               These medicines have changed or have updated prescriptions.        Dose/Directions    atorvastatin 40 MG tablet   Commonly known as:  LIPITOR   This may have changed:  when to take this   Used for:  Hyperlipidemia LDL goal <130        Dose:  40 mg   Take 1 tablet (40 mg) by mouth daily   Quantity:  90 tablet   Refills:  2                Primary Care Provider Office Phone # Fax #    Camron Aragon [phone]       [facility] [address] [place] [address]        Equal Access to Services     JUAN CLINE : Sampson padilla [place], waaxda luqadaha, qaybta kaalmada adeegyacoral, ronel real . So [facility] [phone].    ATENCIÓN: Si habla español, tiene a mon disposición servicios gratuitos de asistencia lingüística. Llame al [phone].    We comply with applicable federal civil rights laws and [place] laws. We do not discriminate on the basis of race, color, national origin, age, disability, sex, sexual orientation, or gender identity.            Thank you!     Thank you for choosing GERIATRICS TRANSITIONAL CARE  for your care. Our goal is always to provide you with excellent care. Hearing back from our patients is one way we can continue to improve our services. Please take a few minutes to complete the written survey that you may receive in the mail after your visit with us. Thank you!             Your Updated Medication List - Protect others around you: Learn how to safely use, store and throw away your medicines at www.disposemymeds.org.          This list is accurate as of: 10/2/17 11:59 PM.  Always use your most recent med list.                    Brand Name Dispense Instructions for use Diagnosis    acetaminophen 650 MG CR tablet    TYLENOL     Take 650 mg by mouth every 8 hours as needed Reported on 4/5/2017        alendronate 70 MG tablet    FOSAMAX    12 tablet    Take 1 tablet (70 mg) by mouth every 7 days Take with over 8 ounces water and stay upright for at least 30 minutes after dose.  Take at least 60 minutes before breakfast    Osteopenia       apixaban ANTICOAGULANT 2.5 MG tablet    ELIQUIS    180 tablet    Take 1 tablet (2.5 mg) by mouth 2 times daily    Paroxysmal atrial fibrillation (H)       ascorbic acid 500 MG Cpcr CR capsule    vitamin C     Take 500 mg by mouth daily        aspirin 81 MG EC tablet     30 tablet    Take 1 tablet (81 mg) by mouth daily    S/P TAVR (transcatheter aortic valve replacement)       atorvastatin 40 MG tablet    LIPITOR    90 tablet    Take 1 tablet (40 mg) by mouth daily    Hyperlipidemia LDL goal <130       calcium carbonate 1250 MG tablet    OS-SHELIA 500 mg [place]. Ca     Take 1,250 mg by mouth daily        dofetilide 125 MCG capsule    TIKOSYN    60 capsule    Take 1 capsule (125 mcg) by mouth 2 times daily    Paroxysmal atrial fibrillation (H)       HYDROcodone-acetaminophen 5-325 MG per tablet    NORCO    30 tablet    TAKE 1 TABLET BY MOUTH EVERY 6 HOURS AS NEEDED FOR MODERATE TO SEVERE PAIN    Rheumatoid arthritis involving multiple sites, unspecified rheumatoid factor presence (H)       leflunomide 10 MG tablet    ARAVA    30 tablet    Take 1 tablet (10 mg) by mouth daily    Rheumatoid arthritis involving multiple sites with positive rheumatoid factor (H)       lisinopril 10 MG tablet    PRINIVIL/ZESTRIL    62 tablet    Take 0.5 tablets (5 mg) by mouth daily    Acute on chronic systolic congestive heart failure (H)       LUPRON DEPOT IM      Inject into the muscle every 6 months Reported on 5/3/2017        metoprolol 25 MG tablet    LOPRESSOR    60 tablet    Take 1 tablet (25 mg) by mouth 2 times daily    S/P  TAVR (transcatheter aortic valve replacement)       predniSONE 5 MG tablet    DELTASONE    100 tablet    Take 1 tablet (5 mg) by mouth daily    Osteopenia       tamsulosin 0.4 MG capsule    FLOMAX    60 capsule    Take 1 capsule (0.4 mg) by mouth daily    S/P TAVR (transcatheter aortic valve replacement)       traMADol 50 MG tablet    ULTRAM    90 tablet    TAKE 1 TABLET 3 TIMES DAILY AS NEEDED FOR MODERATE PAIN    Rheumatoid arthritis involving multiple sites, unspecified rheumatoid factor presence (H)       VITAMIN D (CHOLECALCIFEROL) PO      Take 1 tablet by mouth daily Reported on 5/3/2017

## 2017-10-04 ENCOUNTER — NURSING HOME VISIT (OUTPATIENT)
Dept: GERIATRICS | Facility: CLINIC | Age: 82
End: 2017-10-04
Payer: COMMERCIAL

## 2017-10-04 VITALS
RESPIRATION RATE: 20 BRPM | HEART RATE: 72 BPM | BODY MASS INDEX: 24.86 KG/M2 | WEIGHT: 154 LBS | DIASTOLIC BLOOD PRESSURE: 74 MMHG | OXYGEN SATURATION: 94 % | SYSTOLIC BLOOD PRESSURE: 140 MMHG | TEMPERATURE: 96.9 F

## 2017-10-04 DIAGNOSIS — R60.0 BILATERAL LOWER EXTREMITY EDEMA: Primary | ICD-10-CM

## 2017-10-04 DIAGNOSIS — I50.22 CHRONIC SYSTOLIC CONGESTIVE HEART FAILURE (H): ICD-10-CM

## 2017-10-04 PROCEDURE — 99309 SBSQ NF CARE MODERATE MDM 30: CPT | Performed by: NURSE PRACTITIONER

## 2017-10-04 NOTE — MR AVS SNAPSHOT
After Visit Summary   10/4/2017    Bud Merritt    MRN: [id number]           Patient Information     Date Of Birth          5/9/1927        Visit Information        Provider Department      10/4/2017 11:30 AM Snehal Clement APRN CNP Geriatrics Transitional Care        Today's Diagnoses     Bilateral lower extremity edema    -  1    Chronic systolic congestive heart failure (H)           Follow-ups after your visit        Your next 10 appointments already scheduled     Oct 26, 2017  2:00 PM CDT   Ech Complete with ECHCR2    Health Echo ([facility])    [address] [place] [phone]           1. Please bring or wear a comfortable two-piece outfit. 2. You may eat, drink and take your normal medicines. 3. For any questions that cannot be answered, please contact the ordering physician            Oct 26, 2017  3:00 PM CDT   Lab with  LAB   [facility] ([facility])    [address] [place] [address] [id number] [phone]            Oct 26, 2017  3:30 PM CDT   (Arrive by 3:15 PM)   Return Visit with POLLO Reynolds CNP   [facility] ([facility])    [address] [place] [address] [phone]            Nov 15, 2017 11:15 AM CST   Return Visit with Mak Shaver MD   [facility]    [address] [address] [phone]              Who to contact     If you have questions or need follow up information about today's clinic visit or your schedule please contact GERIATRICS TRANSITIONAL CARE directly at [phone].  Normal or non-critical lab and imaging results will be communicated to you by MyChart, letter or phone within 4 business days after the clinic has received the results. If you do not hear from us within 7 days, please contact the  "clinic through 42Floorshart or phone. If you have a critical or abnormal lab result, we will notify you by phone as soon as possible.  Submit refill requests through Online Prasad or call your pharmacy and they will forward the refill request to us. Please allow 3 business days for your refill to be completed.          Additional Information About Your Visit        42FloorsharPecabu Information     Online Prasad lets you send messages to your doctor, view your test results, renew your prescriptions, schedule appointments and more. To sign up, go to [email or web address] . Click on \"Log in\" on the left side of the screen, which will take you to the Welcome page. Then click on \"Sign up Now\" on the right side of the page.     You will be asked to enter the access code listed below, as well as some personal information. Please follow the directions to create your username and password.     Your access code is: [email or web address]  Expires: 2017 11:55 AM     Your access code will  in 90 days. If you need help or a new code, please call your [place] clinic or [phone].        Care EveryWhere ID     This is your Care EveryWhere ID. This could be used by other organizations to access your [place] medical records  [id number]        Your Vitals Were     Pulse Temperature Respirations Pulse Oximetry BMI (Body Mass Index)       72 96.9  F (36.1  C) 20 94% 24.86 kg/m2        Blood Pressure from Last 3 Encounters:   10/04/17 140/74   10/02/17 110/64   17 112/62    Weight from Last 3 Encounters:   10/04/17 154 lb (69.9 kg)   10/02/17 153 lb 12.8 oz (69.8 kg)   17 151 lb 0.2 oz (68.5 kg)              Today, you had the following     No orders found for display         Today's Medication Changes          These changes are accurate as of: 10/4/17 11:59 PM.  If you have any questions, ask your nurse or doctor.               These medicines have changed or have updated prescriptions.        Dose/Directions    atorvastatin 40 MG tablet "   Commonly known as:  LIPITOR   This may have changed:  when to take this   Used for:  Hyperlipidemia LDL goal <130        Dose:  40 mg   Take 1 tablet (40 mg) by mouth daily   Quantity:  90 tablet   Refills:  2                Primary Care Provider Office Phone # Fax #    Camron Aragon [phone]       [facility] [address] [place] [address]        Equal Access to Services     [facility] : Hadii aad ku hadasho Soomaali, waaxda luqadaha, qaybta kaalmada adeegyada, waxay idiin hayaan adeeg khwong lawarrenn ah. So [facility] [phone].    ATENCIÓN: Si habla espclair, tiene a mon disposición servicios gratuitos de asistencia lingüística. [facility] [phone].    We comply with applicable federal civil rights laws and [place] laws. We do not discriminate on the basis of race, color, national origin, age, disability, sex, sexual orientation, or gender identity.            Thank you!     Thank you for choosing GERIATRICS TRANSITIONAL CARE  for your care. Our goal is always to provide you with excellent care. Hearing back from our patients is one way we can continue to improve our services. Please take a few minutes to complete the written survey that you may receive in the mail after your visit with us. Thank you!             Your Updated Medication List - Protect others around you: Learn how to safely use, store and throw away your medicines at www.disposemymeds.org.          This list is accurate as of: 10/4/17 11:59 PM.  Always use your most recent med list.                   Brand Name Dispense Instructions for use Diagnosis    acetaminophen 650 MG CR tablet    TYLENOL     Take 650 mg by mouth every 8 hours as needed Reported on 4/5/2017        alendronate 70 MG tablet    FOSAMAX    12 tablet    Take 1 tablet (70 mg) by mouth every 7 days Take with over 8 ounces water and stay upright for at least 30 minutes after dose.  Take at least 60 minutes before breakfast     Osteopenia       apixaban ANTICOAGULANT 2.5 MG tablet    ELIQUIS    180 tablet    Take 1 tablet (2.5 mg) by mouth 2 times daily    Paroxysmal atrial fibrillation (H)       ascorbic acid 500 MG Cpcr CR capsule    vitamin C     Take 500 mg by mouth daily        aspirin 81 MG EC tablet     30 tablet    Take 1 tablet (81 mg) by mouth daily    S/P TAVR (transcatheter aortic valve replacement)       atorvastatin 40 MG tablet    LIPITOR    90 tablet    Take 1 tablet (40 mg) by mouth daily    Hyperlipidemia LDL goal <130       calcium carbonate 1250 MG tablet    OS-SHELIA 500 mg [place]. Ca     Take 1,250 mg by mouth daily        dofetilide 125 MCG capsule    TIKOSYN    60 capsule    Take 1 capsule (125 mcg) by mouth 2 times daily    Paroxysmal atrial fibrillation (H)       HYDROcodone-acetaminophen 5-325 MG per tablet    NORCO    30 tablet    TAKE 1 TABLET BY MOUTH EVERY 6 HOURS AS NEEDED FOR MODERATE TO SEVERE PAIN    Rheumatoid arthritis involving multiple sites, unspecified rheumatoid factor presence (H)       leflunomide 10 MG tablet    ARAVA    30 tablet    Take 1 tablet (10 mg) by mouth daily    Rheumatoid arthritis involving multiple sites with positive rheumatoid factor (H)       lisinopril 10 MG tablet    PRINIVIL/ZESTRIL    62 tablet    Take 0.5 tablets (5 mg) by mouth daily    Acute on chronic systolic congestive heart failure (H)       LUPRON DEPOT IM      Inject into the muscle every 6 months Reported on 5/3/2017        metoprolol 25 MG tablet    LOPRESSOR    60 tablet    Take 1 tablet (25 mg) by mouth 2 times daily    S/P TAVR (transcatheter aortic valve replacement)       predniSONE 5 MG tablet    DELTASONE    100 tablet    Take 1 tablet (5 mg) by mouth daily    Osteopenia       tamsulosin 0.4 MG capsule    FLOMAX    60 capsule    Take 1 capsule (0.4 mg) by mouth daily    S/P TAVR (transcatheter aortic valve replacement)       traMADol 50 MG tablet    ULTRAM    90 tablet    TAKE 1 TABLET 3 TIMES DAILY AS NEEDED  FOR MODERATE PAIN    Rheumatoid arthritis involving multiple sites, unspecified rheumatoid factor presence (H)       VITAMIN D (CHOLECALCIFEROL) PO      Take 1 tablet by mouth daily Reported on 5/3/2017

## 2017-10-04 NOTE — PROGRESS NOTES
[place] [facility]    Chief Complaint   Patient presents with     Nursing Home Acute       HPI:    Bud Merritt is a 89 year old  (5/9/1927), who is being seen today for an episodic care visit at [facility].    HPI information obtained from: facility chart records, facility staff and patient report. Today's concern is:    1. Bilateral lower extremity edema    2. Chronic systolic congestive heart failure (H)    - staff asked this NP to look at Bud's legs due to swelling and discomfort to him.  He is on no diuretics.  Does have have ACE wraps or TEDS.  Hx of statis ulcers on legs.    REVIEW OF SYSTEMS:  4 point ROS including Respiratory, CV, GI and , other than that noted in the HPI,  is negative    /74  Pulse 72  Temp 96.9  F (36.1  C)  Resp 20  Wt 154 lb (69.9 kg)  SpO2 94%  BMI 24.86 kg/m2  GENERAL APPEARANCE:  Alert, in no distress  Limited assist of one with mobility.  Has a walker and uses a w/c.  Not very compliant with getting in bed to elevate feet.  Will go between recliner and bed.  Lower legs have +3 edema, pink, with an approximate 3-4 inch in length scar from an ulcer and appears the middle portion is open but dry.  No dressing currently on them.  Stated that he would use ACE wraps at home or TEDS.  Either way he will need assist.    Lungs are clear.  Heart rate regular with S1 and S2 heard with 3/6 systolic murmur.    ASSESSMENT/PLAN:  1. Bilateral lower extremity edema - appears that spironolactone was discontinued upon discharge.  Wanted to order Lasix but later scratched due to another staff noting allergy to Lasix.    Will use ACE wraps from feet to below knees.  On for 4 hours in AM and 4 hours in late afternoon. So they are wrapped well twice a day.   2. Chronic systolic congestive heart failure (H) - will continue to monitor for other symptoms like coughing, crackles in throat or SOB.          1.  Lasix 40 mg po QD x 4 days.  Dx: edema, cough  - Scratch this  due to allergy to Lasix (blisters)  2.  Friday BMP.  Dx: edema - scratch this due to allergy to lasix  3.  Ace wraps feet to below knees - on for 4 hours in AM and then late afternoon.  Dx: edema      POLLO Kline CNP

## 2017-10-06 ENCOUNTER — CARE COORDINATION (OUTPATIENT)
Dept: CARDIOLOGY | Facility: CLINIC | Age: 82
End: 2017-10-06

## 2017-10-06 ENCOUNTER — HOSPITAL LABORATORY (OUTPATIENT)
Dept: NURSING HOME | Facility: OTHER | Age: 82
End: 2017-10-06

## 2017-10-06 LAB
ANION GAP SERPL CALCULATED.3IONS-SCNC: 5 MMOL/L (ref 3–14)
BUN SERPL-MCNC: 10 MG/DL (ref 7–30)
CALCIUM SERPL-MCNC: 8.8 MG/DL (ref 8.5–10.1)
CHLORIDE SERPL-SCNC: 102 MMOL/L (ref 94–109)
CO2 SERPL-SCNC: 29 MMOL/L (ref 20–32)
CREAT SERPL-MCNC: 0.82 MG/DL (ref 0.66–1.25)
ERYTHROCYTE [DISTWIDTH] IN BLOOD BY AUTOMATED COUNT: 15.9 % (ref 10–15)
GFR SERPL CREATININE-BSD FRML MDRD: 88 ML/MIN/1.7M2
GLUCOSE SERPL-MCNC: 85 MG/DL (ref 70–99)
HCT VFR BLD AUTO: 27 % (ref 40–53)
HGB BLD-MCNC: 8.3 G/DL (ref 13.3–17.7)
MCH RBC QN AUTO: 30 PG (ref 26.5–33)
MCHC RBC AUTO-ENTMCNC: 30.7 G/DL (ref 31.5–36.5)
MCV RBC AUTO: 98 FL (ref 78–100)
PLATELET # BLD AUTO: 270 10E9/L (ref 150–450)
POTASSIUM SERPL-SCNC: 3.8 MMOL/L (ref 3.4–5.3)
RBC # BLD AUTO: 2.77 10E12/L (ref 4.4–5.9)
SODIUM SERPL-SCNC: 136 MMOL/L (ref 133–144)
WBC # BLD AUTO: 5.1 10E9/L (ref 4–11)

## 2017-10-06 NOTE — PROGRESS NOTES
Follow up telephone call made to pt's wife, Tierra.  An incidental finding of Unchanged scattered sclerotic bone lesions, consistent with osseous  metastasis in this patient with prostate cancer was noted on a CT of chest abd pelvis, while completed in the hospital.  This report was routed to Dr. Velazquez and the report was also mailed to Tierra as well.

## 2017-10-06 NOTE — PROGRESS NOTES
Follow up post-TAVR telephone call made to Bud's wife, Tierra:    How is he feeling:  He is feeling much better, and his color is better than it has been.  He is at a Rehab center, but is very anxious to get home.    How is his incision site:  No concerns.    Concerns/questions regarding medications:  None    Follow up appointments:  Tierra is aware of the follow up appointments scheduled on October 26.

## 2017-10-06 NOTE — LETTER
October 6, 2017       TO: Bud Merritt  [id number] [address] [place] [id number]       Dear  And Mrs. Merritt,    Please review the following results below, and follow up with the physician that follows Bud for his prostate.     Best Regards,  Ysabel Wilson RN  Structural Heart Care Coordinator    TAVR and MitraClip Programs  [facility]  Office: [phone]; option 1 then chose option 3 for the triage nurse; then ask to speak with Ysabel          Study Result   Exam: Computed tomographic angiography of the chest, abdomen and  pelvis without and with contrast including 3D reformations dated  9/28/2017 12:14 PM     Clinical information: Evaluate retroperitoneal bleed/aortic pathology,  exclude aortic dissection, aortic valve replacement 9/27/2017     Technique: Axial images through the chest and abdomen obtained before  the administration of intravenous contrast media and following the  injection of contrast media  in the arterial phase. Source images  reviewed as well as 3D and multi-planar reconstructions at a 3D  workstation.     Contrast: 100cc Isovue 370     DLP: 1214 mGy*cm     Comparison: 3/8/2017.     Findings:       Normal caliber of the thoracic aorta. There is normal branching  pattern of the great vessels. Origins of the brachiocephalic artery,  left common carotid artery and left subclavian artery show no focal  abnormality. There is marked tortuosity of the distal descending  thoracic aorta just above the level of the diaphragm.     Dilated main pulmonary artery measuring 3.5 cm in transverse  dimension, likely sequela of chronic pulmonary arterial hypertension.     The celiac axis, SMA and FUNMI are patent. Replaced left hepatic artery  from the left gastric artery. The single renal arteries are patent  bilaterally.     Chest and abdomen:      Left dual lead pectoral pacemaker. Post surgical changes of aortic  valve replacement. Atherosclerotic calcifications  of the coronary  arteries. Cardiomegaly. Trace pericardial effusion.     Trachea and central airways are patent. Centrilobular and paraseptal  emphysema. No parenchymal consolidation. Some degree of bronchiectasis  and bronchial wall thickening. Reticulation, scarring and interlobular  septal thickening predominantly in the lung bases, with no definite  honeycombing pattern. Hiatal hernia.     There are multiple sub-6 mm pulmonary nodules throughout both lungs,  unchanged compared to 3/8/2017, for example 5 mm right upper lobe  centrilobular pulmonary nodule on series 14, image 131, and 4 mm left  upper lobe solid pulmonary nodule on series 14, image 89 and 82. No  new pulmonary nodule.     No pleural effusion. No pneumothorax. No hilar, mediastinal or  axillary lymphadenopathy is seen. Moderate-sized hiatal hernia with  herniation of gastric fundus.     The spleen, liver, adrenal glands, pancreas, kidneys and bones show no  focal abnormalities. Degenerative changes of the shoulder joints.     Extensive colonic diverticulosis without diverticulitis. Tiny focus of  gas in the nondependent aspect of the bladder, likely secondary to  prior catheterization.     No lymphadenopathy in the abdomen and pelvis by size criteria. There  is slight enlargement of a 10 mm enhancing nodule in the  retroperitoneum along the superior margin of the left adrenal gland  (series 6 image 519), previously approximately 6 mm on 3/8/2017. No  retroperitoneal hematoma.     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 There are multiple healing bilateral rib fractures. There are  scattered sclerotic lesion of the right ribs, unchanged compared to  prior exam and consistent with osseous metastasis. Scattered sclerotic  lesions consistent with metastasis in the C6, T1, and T12, right  ilium. No acute pathologic fracture.          Impression:  1. No evidence of aortic aneurysm or dissection. There is  marked  tortuosity of the distal descending thoracic aorta.  2. Multiple pulmonary nodules measuring up to 5 mm, unchanged compared  to 3/8/2017. Recommend continued attention on follow-up.  3. Unchanged scattered sclerotic bone lesions, consistent with osseous  metastasis in this patient with prostate cancer. There is slight  growth of an enhancing 10 mm soft tissue nodule in the retroperitoneum  along the superior margin of the left adrenal gland, 6 mm on 3/8/2017.  4. No retroperitoneal hematoma or aortic pathology.     [Consider Follow Up: Slightly enlarged enhancing retroperitoneal lymph  node, attention on follow-up]     This report will be copied to the [facility] to ensure a  provider acknowledges the finding.                     I have personally reviewed the examination and initial interpretation  and I agree with the findings.     ASMITA URBINA MD

## 2017-10-09 ENCOUNTER — NURSING HOME VISIT (OUTPATIENT)
Dept: GERIATRICS | Facility: CLINIC | Age: 82
End: 2017-10-09
Payer: COMMERCIAL

## 2017-10-09 ENCOUNTER — TRANSFERRED RECORDS (OUTPATIENT)
Dept: HEALTH INFORMATION MANAGEMENT | Facility: CLINIC | Age: 82
End: 2017-10-09

## 2017-10-09 VITALS
SYSTOLIC BLOOD PRESSURE: 141 MMHG | BODY MASS INDEX: 25.31 KG/M2 | TEMPERATURE: 95.1 F | DIASTOLIC BLOOD PRESSURE: 81 MMHG | OXYGEN SATURATION: 92 % | HEART RATE: 72 BPM | WEIGHT: 156.8 LBS | RESPIRATION RATE: 22 BRPM

## 2017-10-09 DIAGNOSIS — M25.561 CHRONIC PAIN OF RIGHT KNEE: ICD-10-CM

## 2017-10-09 DIAGNOSIS — G89.29 CHRONIC PAIN OF RIGHT KNEE: ICD-10-CM

## 2017-10-09 DIAGNOSIS — C61 MALIGNANT NEOPLASM OF PROSTATE (H): Primary | ICD-10-CM

## 2017-10-09 DIAGNOSIS — M17.11 PRIMARY OSTEOARTHRITIS OF RIGHT KNEE: ICD-10-CM

## 2017-10-09 DIAGNOSIS — M70.62 TROCHANTERIC BURSITIS OF LEFT HIP: Primary | ICD-10-CM

## 2017-10-09 PROCEDURE — 20610 DRAIN/INJ JOINT/BURSA W/O US: CPT | Mod: LT | Performed by: NURSE PRACTITIONER

## 2017-10-09 NOTE — PROGRESS NOTES
[place] [facility]    Chief Complaint   Patient presents with     Nursing Home Acute       HPI:    Bud Merritt is a 89 year old  (5/9/1927), who is being seen today for an episodic care visit at [facility].    HPI information obtained from: facility chart records, facility staff, patient report and [facility] chart review. Today's concern is:  Bursitis of left hip  Patient with history of multiple hip injections in this left hip.  Today reporting pain  Had appt with Ortho but is in TCU so was unable to make his appt.  Last injection noted was 2/15/17    Right knee pain  Primary osteoarthritis of right knee  12/17/15 xray bilat knees noting advanced bilat degenerative arthrosis R>L.    Left knee replaced 1/12/16.  Patient noting right knee needs replacement as well but unlikely Ortho will do it as hips also needig replacement. Conservative measures being utilized.   Patient requesting right knee injection today  No history in Epic of previous right knee injections.       REVIEW OF SYSTEMS:  4 point ROS including Respiratory, CV, GI and , other than that noted in the HPI,  is negative    /81  Pulse 72  Temp 95.1  F (35.1  C)  Resp 22  Wt 156 lb 12.8 oz (71.1 kg)  SpO2 92%  BMI 25.31 kg/m2  GENERAL APPEARANCE:  Alert, in no distress  RESP:  respiratory effort normal, no respiratory distress  CV:  No LE peripheral edema  ABDOMEN:  nondistended  M/S:   Gait and station ambulatory, Digits and nails with arthritic changes, significantly reduced muscle mass, tenderness over left trochanter, tenderness on right medial knee, no effusion noted.    SKIN:  Inspection and Palpation of skin and subcutaneous tissue pale, thin, fragile, intact  PSYCH:  insight and judgement, memory seemingly intact , affect and mood normal, follows commands readily           ASSESSMENT/PLAN:     Bursitis of left hip  Right knee pain  Primary osteoarthritis of right knee     Risks vs benefits of injection discussed  including but not limited to infection, contents of injection, expected results of injection.  Patient elected to proceed.  Using sterile technique, the left hip was prepped with alcohol.  A 25 gauge needle was used to inject 40 mg DepoMedrol and 4 cc of 1% lidocaine into left bursa/left trochanter.  Slight aspiration before injection of steroids/lidocaine assured no blood vessel involvement. Patient tolerated the procedure well and without complications. No bleeding noted.    Using sterile technique, the right knee was also prepped with alcohol.  A 25 gauge needle was used to inject 40 mg DepoMedrol and 4 cc of 1% lidocaine into right knee, using medial approach.  Slight aspiration before injection of steroids/lidocaine assured no blood vessel involvement. Patient tolerated the procedure well and without complications. No bleeding noted     Depo-Medrol 40 mg/ml (left hip)  Lot: [id number]   Exp: 08/2018  1ml/40 mg     Depo-Medrol 40 mg/ml (right knee)  Lot: [id number]  Exp: 8/2018  1ml/40 mg    Lidocaine HCl 1% 10mg/ml (both hip and knee)  Lot: -[id number]  Exp: 7/1/18  4cc used       POLLO George CNP

## 2017-10-11 NOTE — PROGRESS NOTES
[place] [facility]  PRIMARY CARE PROVIDER AND CLINIC:  [facility] [address] [facility]  / [facility]*  Chief Complaint   Patient presents with     Hospital F/U     Clinic Care Coordination - Initial       HPI:    Bud Merritt is a 89 year old  (5/9/1927),admitted to the [facility]  from [facility].  Hospital stay 9/27/17 through 9/30/17.  Admitted to this facility for  rehab, medical management and nursing care.  HPI information obtained from: patient report and GNP at the floor.      Interval History:  - Pt with significant PMH of AS admitted to the above hospital for scheduled AVR.   - Hospitalization was c/w LE edema- allergic to lasix, wrapped, started on Aldactone 25 mg, developed hyperkalemia and hypotension, aldactone stopped and Lisinopril reduced from bid to once daily.      At TCU:  - c/o hip and knee OA pain, given steroid injection by NP Mary Ann, helped with the knee but not hip pain.   - started on therapy, uses Walker, denies SOB, chest pain or palpitation. Reports edema ongoing in the legs. Reports appetite and sleep fine.       CODE STATUS/ADVANCE DIRECTIVES DISCUSSION:   CPR/Full code     ALLERGIES:Amiodarone and Lasix [furosemide]  PAST MEDICAL HISTORY:  has a past medical history of Anemia; Atrial fibrillation (H) (07/01/2016); Cardiac pacemaker (03/10/2017); Cardiomyopathy (H); CHF (congestive heart failure) (H); COPD exacerbation (H) (3/2/2017); Depressive disorder; History of prostate cancer; Paroxysmal atrial fibrillation (H) (7/6/2016); Pure hypercholesterolemia; Rheumatoid arthritis(714.0); Unspecified essential hypertension; and Weakness generalized (3/2/2017). He also has no past medical history of Cerebral infarction (H); Diabetes (H); Difficult intubation; History of blood transfusion; Malignant hyperthermia; PONV (postoperative nausea and vomiting); Spinal headache; Thyroid  disease; or Uncomplicated asthma.  PAST SURGICAL HISTORY:  has a past surgical history that includes REPAIR ING HERNIA,5+Y/O,REDUCIBL (1996); COLONOSCOPY THRU STOMA W BIOPSY/CAUTERY TUMOR/POLYP/LESION (8/31/2004); colonoscopy (08/24/09); Arthroplasty knee (Left, 1/12/2016); Irrigation and debridement soft tissue lower extremity, combined (Left, 3/15/2016); Implant pacemaker (03/10/2017); TRANSCATHETER AORTIC VALVE IMPLANT ANEST (N/A, 9/27/2017); and Heart Cath Femoral Cannulization With Open Standby Repair Aortic Valve (N/A, 9/27/2017).  FAMILY HISTORY: family history includes Alcoholism in his brother; CANCER in his mother and son; Unknown/Adopted in his father.  SOCIAL HISTORY:  reports that he quit smoking about 18 years ago. His smoking use included Cigarettes. He has a 7.50 pack-year smoking history. He has never used smokeless tobacco. He reports that he does not drink alcohol or use illicit drugs.    Post Discharge Medication Reconciliation Status: discharge medications reconciled and changed, per note/orders (see AVS).  Current Outpatient Prescriptions   Medication Sig Dispense Refill     magnesium hydroxide (MILK OF MAGNESIA) 400 MG/5ML suspension Take 30 mLs by mouth daily as needed for constipation or heartburn       TRAMADOL HCL PO Take 50 mg by mouth 3 times daily       aspirin EC 81 MG EC tablet Take 1 tablet (81 mg) by mouth daily 30 tablet 11     metoprolol (LOPRESSOR) 25 MG tablet Take 1 tablet (25 mg) by mouth 2 times daily 60 tablet 11     lisinopril (PRINIVIL/ZESTRIL) 10 MG tablet Take 0.5 tablets (5 mg) by mouth daily 62 tablet 11     tamsulosin (FLOMAX) 0.4 MG capsule Take 1 capsule (0.4 mg) by mouth daily 60 capsule 0     traMADol (ULTRAM) 50 MG tablet TAKE 1 TABLET 3 TIMES DAILY AS NEEDED FOR MODERATE PAIN 90 tablet 0     leflunomide (ARAVA) 10 MG tablet Take 1 tablet (10 mg) by mouth daily 30 tablet 11     dofetilide (TIKOSYN) 125 MCG capsule Take 1 capsule (125 mcg) by mouth 2 times daily  60 capsule 5     atorvastatin (LIPITOR) 40 MG tablet Take 1 tablet (40 mg) by mouth daily (Patient taking differently: Take 40 mg by mouth every evening ) 90 tablet 2     predniSONE (DELTASONE) 5 MG tablet Take 1 tablet (5 mg) by mouth daily 100 tablet 4     apixaban ANTICOAGULANT (ELIQUIS) 2.5 MG tablet Take 1 tablet (2.5 mg) by mouth 2 times daily 180 tablet 3     alendronate (FOSAMAX) 70 MG tablet Take 1 tablet (70 mg) by mouth every 7 days Take with over 8 ounces water and stay upright for at least 30 minutes after dose.  Take at least 60 minutes before breakfast 12 tablet 3     ascorbic acid (VITAMIN C) 500 MG CPCR Take 500 mg by mouth daily       VITAMIN D, CHOLECALCIFEROL, PO Take 2,000 Units by mouth daily Reported on 5/3/2017       calcium carbonate (OS-SHELIA 500 MG [place]) 500 MG tablet Take 1,250 mg by mouth daily        Leuprolide Acetate (LUPRON DEPOT IM) Inject into the muscle every 6 months Reported on 5/3/2017         ROS:  10 point ROS of systems including Constitutional, Eyes, Respiratory, Cardiovascular, Gastroenterology, Genitourinary, Integumentary, Muscularskeletal, Psychiatric were all negative except for pertinent positives noted in my HPI.    Exam:  /75  Pulse 70  Temp 97.9  F (36.6  C)  Resp 20  Wt 156 lb 12.8 oz (71.1 kg)  SpO2 96%  BMI 25.31 kg/m2  GENERAL APPEARANCE:  in no distress, cooperative  ENT:  Mouth and posterior oropharynx normal, moist mucous membranes  EYES:  EOM, conjunctivae, lids, pupils and irises normal  NECK:  No adenopathy,masses or thyromegaly  RESP:  respiratory effort and palpation of chest normal, lungs clear to auscultation , no respiratory distress  CV:  Palpation and auscultation of heart done , regular rate and rhythm, no murmur, rub, or gallop, peripheral edema 3+ in pedal (ptting) b/l  ABDOMEN:  normal bowel sounds, soft, nontender, no hepatosplenomegaly or other masses  M/S:   Gait and station abnormal uses walker. DIP/PIP/MPJ nodules  SKIN:   surgical site over  right groing velia, over left groin tender with deep palpation.   NEURO:   Cranial nerves 2-12 are normal tested and grossly at patient's baseline, no purposeful movement in upper and lower extremities  PSYCH:  affect and mood normal, memory affected    Lab/Diagnostic data:   CBC RESULTS:   Recent Labs   Lab Test  10/06/17   0710  10/02/17   0717   WBC  5.1  3.8*   RBC  2.77*  2.87*   HGB  8.3*  8.6*   HCT  27.0*  27.6*   MCV  98  96   MCH  30.0  30.0   MCHC  30.7*  31.2*   RDW  15.9*  15.8*   PLT  270  171       Last Basic Metabolic Panel:  Recent Labs   Lab Test  10/06/17   0710  10/02/17   0717   NA  136  136   POTASSIUM  3.8  3.8   CHLORIDE  102  108   CINTHIA  8.8  8.1*   CO2  29  22   BUN  10  14   CR  0.82  0.77   GLC  85  82       Liver Function Studies -   Recent Labs   Lab Test  09/30/17 0610 09/29/17   0513   PROTTOTAL  4.9*  5.2*   ALBUMIN  2.1*  2.4*   BILITOTAL  0.5  0.5   ALKPHOS  59  66   AST  29  37   ALT  11  14       ASSESSMENT/PLAN:  Aortic stenosis, severe  S/P TAVR (transcatheter aortic valve replacement)  - clinically improving  - on ASA and lipitor  - continue to follow on Cardio's recommendations.      Atrial fibrillation with rapid ventricular response (H)  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, Bi-Ventricular- dependent  - rate controlled.   - on metoprolol, dofetilide and Eliquis  - clinically stable.     Chronic systolic congestive heart failure (H)  HTN  BP Readings from Last 3 Encounters:   10/12/17 124/75   10/09/17 141/81   10/04/17 140/74   - legs edema, Aldactone 25 mg started and stopped in the hospital for hyperkalemia and hypotension, and lisinopril reduced from bid to once daily. Allergic to lasix  - Will start Aldactone 12.5 mg daily and continue to monitor for K level.  - continue lisinopril and metoprolol.       Rheumatoid arthritis involving multiple sites with positive rheumatoid factor (H)  - severe, on prednisone chronically, on alendronate, Arva, cinthia  and Vit D supplement.   - steroid injection helped with knee pain but not the hip one.       Physical Deconditioning:  - improving with physical therapy.     Orders:  - See above, otherwise, continue the rest of the current POC.       Electronically signed by:  Cass Alvarez MD

## 2017-10-12 ENCOUNTER — NURSING HOME VISIT (OUTPATIENT)
Dept: GERIATRICS | Facility: CLINIC | Age: 82
End: 2017-10-12
Payer: COMMERCIAL

## 2017-10-12 VITALS
TEMPERATURE: 97.9 F | BODY MASS INDEX: 25.31 KG/M2 | OXYGEN SATURATION: 96 % | DIASTOLIC BLOOD PRESSURE: 75 MMHG | RESPIRATION RATE: 20 BRPM | HEART RATE: 70 BPM | WEIGHT: 156.8 LBS | SYSTOLIC BLOOD PRESSURE: 124 MMHG

## 2017-10-12 DIAGNOSIS — I50.22 CHRONIC SYSTOLIC CONGESTIVE HEART FAILURE (H): ICD-10-CM

## 2017-10-12 DIAGNOSIS — Z95.2 S/P TAVR (TRANSCATHETER AORTIC VALVE REPLACEMENT): ICD-10-CM

## 2017-10-12 DIAGNOSIS — I35.0 AORTIC STENOSIS, SEVERE: Primary | ICD-10-CM

## 2017-10-12 DIAGNOSIS — R53.81 PHYSICAL DECONDITIONING: ICD-10-CM

## 2017-10-12 DIAGNOSIS — I10 ESSENTIAL HYPERTENSION WITH GOAL BLOOD PRESSURE LESS THAN 140/90: ICD-10-CM

## 2017-10-12 DIAGNOSIS — Z95.0 CARDIAC PACEMAKER IN SITU: ICD-10-CM

## 2017-10-12 DIAGNOSIS — I48.0 PAROXYSMAL ATRIAL FIBRILLATION (H): ICD-10-CM

## 2017-10-12 PROCEDURE — 99207 ZZC CDG-CORRECTLY CODED, REVIEWED AND AGREE: CPT | Performed by: FAMILY MEDICINE

## 2017-10-12 PROCEDURE — 99306 1ST NF CARE HIGH MDM 50: CPT | Performed by: FAMILY MEDICINE

## 2017-10-16 ENCOUNTER — HOSPITAL LABORATORY (OUTPATIENT)
Dept: NURSING HOME | Facility: OTHER | Age: 82
End: 2017-10-16

## 2017-10-16 LAB
ANION GAP SERPL CALCULATED.3IONS-SCNC: 6 MMOL/L (ref 3–14)
BUN SERPL-MCNC: 16 MG/DL (ref 7–30)
CALCIUM SERPL-MCNC: 8.8 MG/DL (ref 8.5–10.1)
CHLORIDE SERPL-SCNC: 102 MMOL/L (ref 94–109)
CO2 SERPL-SCNC: 28 MMOL/L (ref 20–32)
CREAT SERPL-MCNC: 0.87 MG/DL (ref 0.66–1.25)
GFR SERPL CREATININE-BSD FRML MDRD: 83 ML/MIN/1.7M2
GLUCOSE SERPL-MCNC: 89 MG/DL (ref 70–99)
POTASSIUM SERPL-SCNC: 4 MMOL/L (ref 3.4–5.3)
SODIUM SERPL-SCNC: 136 MMOL/L (ref 133–144)

## 2017-10-17 ENCOUNTER — DISCHARGE SUMMARY NURSING HOME (OUTPATIENT)
Dept: GERIATRICS | Facility: CLINIC | Age: 82
End: 2017-10-17
Payer: COMMERCIAL

## 2017-10-17 ENCOUNTER — MEDICAL CORRESPONDENCE (OUTPATIENT)
Dept: HEALTH INFORMATION MANAGEMENT | Facility: CLINIC | Age: 82
End: 2017-10-17

## 2017-10-17 DIAGNOSIS — Z95.2 S/P TAVR (TRANSCATHETER AORTIC VALVE REPLACEMENT): ICD-10-CM

## 2017-10-17 DIAGNOSIS — I10 ESSENTIAL HYPERTENSION WITH GOAL BLOOD PRESSURE LESS THAN 140/90: ICD-10-CM

## 2017-10-17 DIAGNOSIS — I50.22 CHRONIC SYSTOLIC CONGESTIVE HEART FAILURE (H): ICD-10-CM

## 2017-10-17 DIAGNOSIS — M05.79 RHEUMATOID ARTHRITIS INVOLVING MULTIPLE SITES WITH POSITIVE RHEUMATOID FACTOR (H): ICD-10-CM

## 2017-10-17 DIAGNOSIS — I48.91 ATRIAL FIBRILLATION WITH RAPID VENTRICULAR RESPONSE (H): ICD-10-CM

## 2017-10-17 DIAGNOSIS — N40.0 HYPERTROPHY OF PROSTATE WITHOUT URINARY OBSTRUCTION: ICD-10-CM

## 2017-10-17 DIAGNOSIS — I35.0 AORTIC STENOSIS, SEVERE: Primary | ICD-10-CM

## 2017-10-17 DIAGNOSIS — Z95.0 CARDIAC PACEMAKER IN SITU: ICD-10-CM

## 2017-10-17 PROCEDURE — 99316 NF DSCHRG MGMT 30 MIN+: CPT | Performed by: NURSE PRACTITIONER

## 2017-10-19 ENCOUNTER — CARE COORDINATION (OUTPATIENT)
Dept: CARDIOLOGY | Facility: CLINIC | Age: 82
End: 2017-10-19

## 2017-10-20 ENCOUNTER — MEDICAL CORRESPONDENCE (OUTPATIENT)
Dept: HEALTH INFORMATION MANAGEMENT | Facility: CLINIC | Age: 82
End: 2017-10-20

## 2017-10-24 ENCOUNTER — HOSPITAL ENCOUNTER (OUTPATIENT)
Dept: CARDIAC REHAB | Facility: CLINIC | Age: 82
End: 2017-10-24
Attending: NURSE PRACTITIONER
Payer: MEDICARE

## 2017-10-24 VITALS — HEIGHT: 66 IN | BODY MASS INDEX: 23.69 KG/M2 | WEIGHT: 147.4 LBS

## 2017-10-24 PROCEDURE — 93798 PHYS/QHP OP CAR RHAB W/ECG: CPT | Performed by: REHABILITATION PRACTITIONER

## 2017-10-24 PROCEDURE — 40000116 ZZH STATISTIC OP CR VISIT: Performed by: REHABILITATION PRACTITIONER

## 2017-10-24 ASSESSMENT — 6 MINUTE WALK TEST (6MWT)
GENDER SELECTION: MALE
FEMALE CALC: 1138.46
PREDICTED: 1288.04
TOTAL DISTANCE WALKED (FT): 200
MALE CALC: 1280.23

## 2017-10-24 NOTE — PROGRESS NOTES
10/24/17 0800   Session   Session Initial Evaluation and Exercise Prescription   Certified through this date 11/22/17   Cardiac Rehab Assessment   Cardiac Rehab Assessment Bud is a pleasant 89 yo male who underwent TAVR last month. After discharge from the hospital he stayed at the [facility] for a couple weeks for further rehabilitations. He was discharged from [facility] 1 week ago and is ready to initiate cardiac rehab today. He is here today with his wife who answered most of my questions, mostly due to hard of hearing but also some confusion. Patient/wife report significant dyspnea prior to valve replacement which has improved since procedure but is not gone completely. Biggest limiting factor for home activity seems to be his orthopedic pain with arthritis in his shoulders, hips and knees. Patient reports today he could have walked longer in the 6 minute walk test but was not wearing his knee brace. He was encouraged to do so in subsequent appointments. PHQ-9, [place] and Rate your Plate paper assessments were not performed today due to slight confusion of patient.   The patient's history and clinical status including hemodynamics and ECG were evaluated. The patient was assessed to be stable and appropriate to begin exercise.   The patient's functional capacity and exercise prescription were determined by the completion of the 6 minute walk test, CV response was WNL. See results below. The patient was oriented to the program. Risk factor profile was completed. Goals and objectives were discussed. Knee pain was reported during walk rated 3/10, resolving completely with seated rest. Fair prognosis for reaching goals below. Skilled therapy is necessary in order to monitor CV response to exercise, to provide education on risk factors and behavior change counseling needed to achieve patient's goals.  Plan to progress to 30-40 minutes of exercise prior to discharge from cardiac rehab.  Initial THR of 20-30 beats  "above RHR; Effort rating of 4-6. Initiate muscle conditioning as appropriate. Provide risk factor education and behavior change counseling.      General Information   Treatment Diagnosis EPIFANIO   Date of Treatment Diagnosis 09/27/17   Significant Past CV History Pacemaker   Comorbidities Pulmonary Disease   Lead up symptoms dyspnea   H[facility]   Signs and Symptoms Post Hospital Discharge Fatigue   Outpatient Cardiac Rehab Start Date 10/24/17   Primary Physician Dr. Aragon   Primary Physician Follow Up Advised to schedule appointment   Cardiologist Dr. Lozano   Cardiologist Follow Up Scheduled   Ejection Fraction 50-55%   Summary of Cath Report   Summary of Cath Report Not Applicable   Living and Work Status    Living Arrangements and Social Status house;spouse   Support System Live with an adult   Return to Employment Retired   Preventative Medications   CMS recommended medications Ace inhibitors;Anticoagulants;Beta Blocker;Lipid Lowering   Falls Screen   Have you fallen two or more times in the past year? No   Have you fallen and had an injury in the past year? No   Referral Initiated to Physical Therapy No   Comment uses cane for assist   Pain   Patient Currently in Pain No   Physical Assessments   Incisions WNL   Edema +3 Moderate  (left greater than right)   Right Lung Sounds normal   Left Lung Sounds normal   Individualized Treatment Plan   Monitored Sessions Scheduled 18   Monitored Sessions Attended 1   Oxygen   Supplemental Oxygen needed No   Nutrition Management - Weight Management   Assessment Initial Assessment   Age 89   Weight 66.9 kg (147 lb 6.4 oz)   Height 1.676 m (5' 6\")   BMI (Calculated) 23.84   Initial Rate Your Plate Score. Dietary tool to assess eating patterns. Scores range from 24 to 72. The higher the score the healthier the eating pattern. (did not assess, see assessment section)   Nutrition Management - Lipids   Lipids Labs Available   Date 03/06/17   Total Cholesterol 139 "   Triglycerides 156   HDL 34   LDL 74   Prescribed Lipid Medication Yes   Statin Intensity High Intensity   Nutrition Management - Diabetes   Diabetes No   Nutrition Management Summary   Dietary Recommendations Low Sodium   Stages of Change for Diet Compliance Action   Nutrition Target Outcome BMI < 25   Psychosocial Management   Psychosocial Assessment Initial   Is there history of clinical depression or increased risk of depression? No previous history   Current Level of Stress per Patient Report Mild   Current Coping Skills Patient Unable to Identify Personal Coping Skills   Initial Patient Health Questionnaire -9 Score (PHQ-9) for depression. 5-9 Minimal symptoms, 10-14 Minor depression, 15-19 Major depression, moderately severe, > 20 Major depression, severe  (did not assess, see assessment section)   Initial [facility] Survey score.  Quality of Life:   If total score > 25 review individual areas where patient rated a 4 or 5.  Consider patients current medical condition and what role that plays on the score.   Adjust treatment protocol to improve areas of concern.  Consider the following:  PHQ9 score, DASI, and re-assessment within the next 30 days to assist with developing treatments.  (did not assess, see assessment section)   Other Core Components - Hypertension   History of or Diagnosis of Hypertension Yes   Currently taking Anti-Hypertensives Yes;Beta blocker;Ace Inhibitor   Other Core Components - Tobacco   History of Tobacco Use Yes   Quit Date or Planned Quit Date 10/24/97   Tobacco Use Status Former (Quit > 6 mo ago)   Tobacco Habit Cigarettes   Tobacco Use per Day (average) 1 ppd   Years of Tobacco Use 50   Stages of Change Maintenance   Other Core Components Summary   Interventions Planned Instruct patient on the DASH diet;Provide information on home blood pressure monitoring;Attend education class on Blood Pressure   Activity/Exercise History   Activity/Exercise Assessment Initial    Activity/Exercise Status prior to event? Sedentary   Number of Days Currently participating in Moderate Physical Activity? 0   Number of Days Currently performing  Aerobic Exercise (including rehab)? 0   Number of Minutes per Session Currently of Aerobic Exercise (average)? 0   Current Stage of Change (Physical Activity) Preparation   Current Stage of Change (Aerobic Exercise) Pre-Contemplation   Patient Goals Goal #1;Goal #2   Goal #1 Description Patient will be able to resume his hobby of woodworking in his garage, estimated MET level needed 3.5-4 METs   Goal #1 Target Date 12/05/17   Goal #1 Progress Towards Goal 10/24 Will start attending cardiac rehab 2x/week to increase aerobic exercise tolerance and endurance. Will also initiate resistance training to increase muscle strength.    Goal #2 Description Patient will be able to walk to and from his garage without dyspnea; approximately 200 feet   Goal #2 Target Date 11/14/17   Goal #2 Progress Towards Goal 10/24 Will start attending cardiac rehab 2x/week to increase aerobic exercise tolerance and endurance.    Activity/Exercise Target Outcome An Accumulation of 150  Minutes of Aerobic Activity per Week   Exercise Assessment   6 Minute Walk Predicted - Gender Selection Male   6 Minute Walk Predicted (Male) 1280.23   6 Minute Walk Predicted (Female) 1138.46   Initial 6 Minute Walk Distance (Feet) 200 ft   Resting HR 70 bpm   Exercise HR 83 bpm   Post Exercise HR 69 bpm   Resting /62   Exercise /64   Post Exercise /62   Pre SpO2 96   While Exercising SpO2 100   Effort Rating 7   Current MET Level 1.3   MET Level Goal 3-4   ECG Rhythm Paced rhythm   Ectopy None   Current Symptoms Fatigue;Joint pain   Limitations/Restrictions Orthopedic (see comments)   Exercise Prescription   Mode Arm Ergometer;Weights;Nustep   Duration/Time 15-30 min;30-45 min;Intermittent bouts   Frequency 2 days/week   THR (85% of age predicted max HR) 110.5   OMNI Effort Rating  (0-10 Scale) 4-6/10   Progression Intermittent bouts;Total exercise time of 20-30 minutes;Total exercise time of 30-45 minutes;Aerobic exercise to OMNI rating of 5-7, and heart rate at or below target;Progress peak intensity by 1/4 MET per week   Recommended Home Exercise   Type of Exercise None   30 Day Exercise Plan identify possible home exercise options with orthopedic limitations   Current Home Exercise   Type of Exercise None   Follow-up/On-going Support   Provider follow-up needed on the following Other (see comments)  (Edema)   Comments Seeing TAVR clinic on 10/26/17   Learning Assessment   Learner Patient   Primary Language English   Preferred Learning Style Listening   Barriers to Learning Cognitive;Hearing   Patient Education   Education recommended (none at this time)

## 2017-10-25 ENCOUNTER — PRE VISIT (OUTPATIENT)
Dept: CARDIOLOGY | Facility: CLINIC | Age: 82
End: 2017-10-25

## 2017-10-26 ENCOUNTER — RADIANT APPOINTMENT (OUTPATIENT)
Dept: CARDIOLOGY | Facility: CLINIC | Age: 82
End: 2017-10-26

## 2017-10-26 ENCOUNTER — OFFICE VISIT (OUTPATIENT)
Dept: CARDIOLOGY | Facility: CLINIC | Age: 82
End: 2017-10-26
Attending: NURSE PRACTITIONER
Payer: MEDICARE

## 2017-10-26 VITALS
WEIGHT: 146.7 LBS | HEART RATE: 69 BPM | SYSTOLIC BLOOD PRESSURE: 139 MMHG | BODY MASS INDEX: 23.02 KG/M2 | HEIGHT: 67 IN | DIASTOLIC BLOOD PRESSURE: 82 MMHG | OXYGEN SATURATION: 98 %

## 2017-10-26 DIAGNOSIS — Z95.2 S/P TAVR (TRANSCATHETER AORTIC VALVE REPLACEMENT): ICD-10-CM

## 2017-10-26 DIAGNOSIS — I48.0 PAROXYSMAL ATRIAL FIBRILLATION (H): Primary | ICD-10-CM

## 2017-10-26 LAB
ANION GAP SERPL CALCULATED.3IONS-SCNC: 4 MMOL/L (ref 3–14)
BUN SERPL-MCNC: 13 MG/DL (ref 7–30)
CALCIUM SERPL-MCNC: 8.6 MG/DL (ref 8.5–10.1)
CHLORIDE SERPL-SCNC: 103 MMOL/L (ref 94–109)
CO2 SERPL-SCNC: 27 MMOL/L (ref 20–32)
CREAT SERPL-MCNC: 0.88 MG/DL (ref 0.66–1.25)
ERYTHROCYTE [DISTWIDTH] IN BLOOD BY AUTOMATED COUNT: 16.6 % (ref 10–15)
GFR SERPL CREATININE-BSD FRML MDRD: 81 ML/MIN/1.7M2
GLUCOSE SERPL-MCNC: 101 MG/DL (ref 70–99)
HCT VFR BLD AUTO: 32.1 % (ref 40–53)
HGB BLD-MCNC: 9.8 G/DL (ref 13.3–17.7)
MCH RBC QN AUTO: 30.4 PG (ref 26.5–33)
MCHC RBC AUTO-ENTMCNC: 30.5 G/DL (ref 31.5–36.5)
MCV RBC AUTO: 100 FL (ref 78–100)
PLATELET # BLD AUTO: 200 10E9/L (ref 150–450)
POTASSIUM SERPL-SCNC: 4.6 MMOL/L (ref 3.4–5.3)
RBC # BLD AUTO: 3.22 10E12/L (ref 4.4–5.9)
SODIUM SERPL-SCNC: 134 MMOL/L (ref 133–144)
WBC # BLD AUTO: 4.7 10E9/L (ref 4–11)

## 2017-10-26 PROCEDURE — 36415 COLL VENOUS BLD VENIPUNCTURE: CPT | Performed by: INTERNAL MEDICINE

## 2017-10-26 PROCEDURE — 93010 ELECTROCARDIOGRAM REPORT: CPT | Mod: ZP | Performed by: INTERNAL MEDICINE

## 2017-10-26 PROCEDURE — 85027 COMPLETE CBC AUTOMATED: CPT | Performed by: INTERNAL MEDICINE

## 2017-10-26 PROCEDURE — 99214 OFFICE O/P EST MOD 30 MIN: CPT | Mod: 25 | Performed by: NURSE PRACTITIONER

## 2017-10-26 PROCEDURE — 99213 OFFICE O/P EST LOW 20 MIN: CPT | Mod: 25,ZF

## 2017-10-26 PROCEDURE — 80048 BASIC METABOLIC PNL TOTAL CA: CPT | Performed by: INTERNAL MEDICINE

## 2017-10-26 PROCEDURE — 93005 ELECTROCARDIOGRAM TRACING: CPT | Mod: ZF

## 2017-10-26 ASSESSMENT — PAIN SCALES - GENERAL: PAINLEVEL: NO PAIN (0)

## 2017-10-26 NOTE — MR AVS SNAPSHOT
After Visit Summary   10/26/2017    Bud Merritt    MRN: [id number]           Patient Information     Date Of Birth          5/9/1927        Visit Information        Provider Department      10/26/2017 3:30 PM Valarie Kumari APRN [facility]        Today's Diagnoses     Paroxysmal atrial fibrillation (H)    -  1    S/P TAVR (transcatheter aortic valve replacement)          Care Instructions    Patient Instructions:    It was a pleasure to see you in the cardiology clinic today.    If you have any questions you can reach our nurse triage line at [phone].  Press Option #1 for the [facility], then press Option #3 for nursing or Option #1 for scheduling. We also encourage the use of [email or web address] to communicate with your HealthCare Provider    Note new medications: None.  Stop the following medications: None.    The results from today include: Your EKG and Echocardiogram were within normal limits. Your labs were also good today.    Please follow up with the Valve Clinic in 1 year - we will call you to schedule. If you have any other cardiac concerns please see your primary cardiologist.      Sincerely,    Valarie ABAD [facility]                  Follow-ups after your visit        Follow-up notes from your care team     Return in about 1 year (around 10/26/2018) for Routine Visit, Lab Work, Echocardiogram.      Your next 10 appointments already scheduled     Oct 31, 2017 10:30 AM CDT   Cardiac Treatment with TATE Krueger   [facility] ([facility])    [address] [place] [address]   [phone]            Nov 02, 2017  9:30 AM CDT   Cardiac Treatment with JARETH Pond   [facility] ([facility])    [address] [place] [address]   [phone]            Nov 07, 2017 10:30 AM CST   Cardiac Treatment with TATE Krueger   [facility] ([facility])    [address] [place] [address]   [phone]            Nov 09, 2017  9:30 AM CST   Cardiac Treatment with Adeline Johnston [facility] ([facility])    [address] [place] [address]   [phone]            Nov 14, 2017 10:30 AM CST   Cardiac Treatment with Adeline Johnston [facility] ([facility])    [address] [place] [address]   [phone]            Nov 15, 2017 11:15 AM CST   [facility] Visit with Mak Shaver MD   [facility]    [address] [address]   [phone]            Nov 16, 2017  9:30 AM CST   Cardiac Treatment with JARETH Pond   [facility] ([facility])    [address] [place] [address]   [phone]            Nov 21, 2017 10:30 AM CST   Cardiac Treatment with Adeline Johnston [facility] ([facility])    [address] [place] [address]   [phone]            Nov 28, 2017 10:30 AM CST   Cardiac Treatment with TATE Krueger   [facility] ([facility])    [address] [place] [address]   [phone]            Nov 30, 2017  9:30 AM CST   Cardiac Treatment with JARETH Pond   [facility] ([facility])    [address] [place] [address]   [phone]              Who to contact     If you have questions or need follow up information about today's clinic visit or your schedule please contact [facility] directly at [phone].  Normal or non-critical lab and imaging results will be communicated to you by MyChart, letter or phone within 4 business days after the clinic has received the results. If you do not hear from us within 7  "days, please contact the clinic through [employer] or phone. If you have a critical or abnormal lab result, we will notify you by phone as soon as possible.  Submit refill requests through [employer] or call your pharmacy and they will forward the refill request to us. Please allow 3 business days for your refill to be completed.          Additional Information About Your Visit        [employer] Information     [employer] lets you send messages to your doctor, view your test results, renew your prescriptions, schedule appointments and more. To sign up, go to [email or web address] . Click on \"Log in\" on the left side of the screen, which will take you to the Welcome page. Then click on \"Sign up Now\" on the right side of the page.     You will be asked to enter the access code listed below, as well as some personal information. Please follow the directions to create your username and password.     Your access code is: [email or web address]  Expires: 2017 11:55 AM     Your access code will  in 90 days. If you need help or a new code, please call your [facility] clinic or [phone].        Care EveryWhere ID     This is your Care EveryWhere ID. This could be used by other organizations to access your [facility] medical records  [id number]        Your Vitals Were     Pulse Height Pulse Oximetry BMI (Body Mass Index)          69 1.702 m (5' 7\") 98% 22.98 kg/m2         Blood Pressure from Last 3 Encounters:   10/26/17 139/82   10/12/17 124/75   10/09/17 141/81    Weight from Last 3 Encounters:   10/26/17 66.5 kg (146 lb 11.2 oz)   10/24/17 66.9 kg (147 lb 6.4 oz)   10/12/17 71.1 kg (156 lb 12.8 oz)              We Performed the Following     EKG 12-lead, tracing only (Future)          Today's Medication Changes          These changes are accurate as of: 10/26/17  4:00 PM.  If you have any questions, ask your nurse or doctor.               These medicines have changed or have updated prescriptions.        Dose/Directions    " atorvastatin 40 MG tablet   Commonly known as:  LIPITOR   This may have changed:  when to take this   Used for:  Hyperlipidemia LDL goal <130        Dose:  40 mg   Take 1 tablet (40 mg) by mouth daily   Quantity:  90 tablet   Refills:  2                Primary Care Provider Office Phone # Fax #    Camron Aragon [phone]       [facility] [address] [place] [address]        Equal Access to Services     JUAN CLINE : Hadii aad ku hadasho Soomaali, waaxda luqadaha, qaybta kaalmada adeegyada, waxay idiin hayaan adeeg kharash laisaiah . So [facility] [phone].    ATENCIÓN: Si habla español, tiene a mon disposición servicios gratuitos de asistencia lingüística. Davioname al [phone].    We comply with applicable federal civil rights laws and [place] laws. We do not discriminate on the basis of race, color, national origin, age, disability, sex, sexual orientation, or gender identity.            Thank you!     Thank you for choosing [facility]  for your care. Our goal is always to provide you with excellent care. Hearing back from our patients is one way we can continue to improve our services. Please take a few minutes to complete the written survey that you may receive in the mail after your visit with us. Thank you!             Your Updated Medication List - Protect others around you: Learn how to safely use, store and throw away your medicines at www.disposemymeds.org.          This list is accurate as of: 10/26/17  4:00 PM.  Always use your most recent med list.                   Brand Name Dispense Instructions for use Diagnosis    alendronate 70 MG tablet    FOSAMAX    12 tablet    Take 1 tablet (70 mg) by mouth every 7 days Take with over 8 ounces water and stay upright for at least 30 minutes after dose.  Take at least 60 minutes before breakfast    Osteopenia       apixaban ANTICOAGULANT 2.5 MG tablet    ELIQUIS    180 tablet    Take 1 tablet (2.5 mg) by  mouth 2 times daily    Paroxysmal atrial fibrillation (H)       ascorbic acid 500 MG Cpcr CR capsule    vitamin C     Take 500 mg by mouth daily        aspirin 81 MG EC tablet     30 tablet    Take 1 tablet (81 mg) by mouth daily    S/P TAVR (transcatheter aortic valve replacement)       atorvastatin 40 MG tablet    LIPITOR    90 tablet    Take 1 tablet (40 mg) by mouth daily    Hyperlipidemia LDL goal <130       calcium carbonate 1250 MG tablet    OS-SHELIA 500 mg [place]. [place]     Take 1,250 mg by mouth daily        dofetilide 125 MCG capsule    TIKOSYN    60 capsule    Take 1 capsule (125 mcg) by mouth 2 times daily    Paroxysmal atrial fibrillation (H)       leflunomide 10 MG tablet    ARAVA    30 tablet    Take 1 tablet (10 mg) by mouth daily    Rheumatoid arthritis involving multiple sites with positive rheumatoid factor (H)       lisinopril 10 MG tablet    PRINIVIL/ZESTRIL    62 tablet    Take 0.5 tablets (5 mg) by mouth daily    Acute on chronic systolic congestive heart failure (H)       LUPRON DEPOT IM      Inject into the muscle every 6 months Reported on 5/3/2017        metoprolol 25 MG tablet    LOPRESSOR    60 tablet    Take 1 tablet (25 mg) by mouth 2 times daily    S/P TAVR (transcatheter aortic valve replacement)       predniSONE 5 MG tablet    DELTASONE    100 tablet    Take 1 tablet (5 mg) by mouth daily    Osteopenia       SPIRONOLACTONE PO      Take 25 mg by mouth daily        tamsulosin 0.4 MG capsule    FLOMAX    60 capsule    Take 1 capsule (0.4 mg) by mouth daily    S/P TAVR (transcatheter aortic valve replacement)       * TRAMADOL HCL PO      Take 50 mg by mouth 3 times daily        * traMADol 50 MG tablet    ULTRAM    90 tablet    TAKE 1 TABLET 3 TIMES DAILY AS NEEDED FOR MODERATE PAIN    Rheumatoid arthritis involving multiple sites, unspecified rheumatoid factor presence (H)       VITAMIN D (CHOLECALCIFEROL) PO      Take 2,000 Units by mouth daily Reported on 5/3/2017        * Notice:  This list  has 2 medication(s) that are the same as other medications prescribed for you. Read the directions carefully, and ask your doctor or other care provider to review them with you.

## 2017-10-26 NOTE — PATIENT INSTRUCTIONS
Patient Instructions:    It was a pleasure to see you in the cardiology clinic today.    If you have any questions you can reach our nurse triage line at [phone].  Press Option #1 for the [facility], then press Option #3 for nursing or Option #1 for scheduling. We also encourage the use of [employer] to communicate with your HealthCare Provider    Note new medications: None.  Stop the following medications: None.    The results from today include: Your EKG and Echocardiogram were within normal limits. Your labs were also good today.    Please follow up with the Valve Clinic in 1 year - we will call you to schedule. If you have any other cardiac concerns please see your primary cardiologist.      Sincerely,    REYMUNDO Jones-BC

## 2017-10-26 NOTE — NURSING NOTE
Chief Complaint   Patient presents with     Follow Up For     EKG:  dx s/p TAVR: 89 yo M, here for evaluation of s/p TAVR procedure     Vitals were taken and medications were reconciled. EKG was performed.    Chantel Gonzalez MA    3:54 PM

## 2017-10-26 NOTE — PROGRESS NOTES
[facility] HEART [facility]  CARDIOVASCULAR DIVISION    VALVE CLINIC RETURN VISIT    PRIMARY CARDIOLOGIST: Dr. Li.      PERTINENT CLINICAL HISTORY & REASON FOR CONSULTATION:   Bud Merritt is a very pleasant 89 year old male with severe aortic valvular stenosis that was treated with transfemoral transcatheter aortic valve replacement (TAVR) with a Harman Ramsey 3 #29 mm valve on 9/27/2017 by Darlene Lozano and Luis A.  The TAVR and post-procedural course were notable for no complications. The patient did have some lower blood pressures following the procedure which lead to a reduction in his prior to arrival antihypertensive regimen. The patient was discharged to [facility] in [place]. Since being at the facility the patient had some reports of LE edema and Aldactone was restarted.     He presents today with no concerns regarding his heart valve. He and his wife both feel that his shortness of breath has improved since having his valve replaced. He feels it is now easier to breath.He denies any orthopnea or PND.   He has not used O2 since discharge. He denies any chest pain, tightness or pressure. He denies any lightheadedness, dizziness or syncope. He has not had any palpitations. He has had LE edema for quite some time but feels that this is finally better than it has been for some time, they note the L > R. He was discharged from the rehabilitation facility 2 week ago and has been doing well at home. The patient reports that he was working with PT in rehab doing the stationary bicycle and that he has been becoming stronger by the week. He is now scheduled to follow-up with cardiac rehabilitation which he will be participating with twice weekly. The patient is accompanied to the visit today by his wife who is is primary care provider. She denies any specific concerns today regarding his heart.          PAST MEDICAL HISTORY:     Past Medical History:   Diagnosis Date     Anemia       Atrial fibrillation (H) 07/01/2016     Cardiac pacemaker 03/10/2017     Cardiomyopathy (H)      CHF (congestive heart failure) (H)      COPD exacerbation (H) 3/2/2017     Depressive disorder      History of prostate cancer      Paroxysmal atrial fibrillation (H) 7/6/2016     Pure hypercholesterolemia      Rheumatoid arthritis(714.0)      Unspecified essential hypertension      Weakness generalized 3/2/2017        PAST SURGICAL HISTORY:     Past Surgical History:   Procedure Laterality Date     ARTHROPLASTY KNEE Left 1/12/2016    Procedure: ARTHROPLASTY KNEE;  Surgeon: Cesar Walker DO;  [facility]     COLONOSCOPY  08/24/09     HC COLONOSCOPY THRU STOMA W BIOPSY/CAUTERY TUMOR/POLYP/LESION  8/31/2004     HC REPAIR ING HERNIA,5+Y/O,REDUCIBL  1996    Marlex mesh repair of bilateral inguinal hernias and drainage of bilateral scrotal hydroceles.     HEART CATH FEMORAL CANNULIZATION WITH OPEN STANDBY REPAIR AORTIC VALVE N/A 9/27/2017    Procedure: HEART CATH FEMORAL CANNULIZATION WITH OPEN STANDBY REPAIR AORTIC VALVE;;  Surgeon: Jaron Alejandra MD;  [facility]     IMPLANT PACEMAKER  03/10/2017     IRRIGATION AND DEBRIDEMENT SOFT TISSUE LOWER EXTREMITY, COMBINED Left 3/15/2016    Procedure: COMBINED IRRIGATION AND DEBRIDEMENT SOFT TISSUE LOWER EXTREMITY;  Surgeon: Cesar Walker DO;  [facility]     TRANSCATHETER AORTIC VALVE IMPLANT ANESTHESIA N/A 9/27/2017    Procedure: TRANSCATHETER AORTIC VALVE IMPLANT ANESTHESIA;  Right Transfemoral Approach (Harman Ramsey 3 29mm) Aortic Valve Implant,  Cardiopulmonary Bypass Standby, Transthoracic Echocardiogram by Derian Queen(Echo Tech);  Surgeon: GENERIC ANESTHESIA PROVIDER;  Location[facility] OR        CURRENT MEDICATIONS:     Current Outpatient Prescriptions   Medication Sig Dispense Refill     SPIRONOLACTONE PO Take 25 mg by mouth daily       TRAMADOL HCL PO Take 50 mg by mouth 3 times daily       aspirin EC 81 MG EC tablet Take 1  tablet (81 mg) by mouth daily 30 tablet 11     metoprolol (LOPRESSOR) 25 MG tablet Take 1 tablet (25 mg) by mouth 2 times daily 60 tablet 11     lisinopril (PRINIVIL/ZESTRIL) 10 MG tablet Take 0.5 tablets (5 mg) by mouth daily 62 tablet 11     tamsulosin (FLOMAX) 0.4 MG capsule Take 1 capsule (0.4 mg) by mouth daily 60 capsule 0     traMADol (ULTRAM) 50 MG tablet TAKE 1 TABLET 3 TIMES DAILY AS NEEDED FOR MODERATE PAIN 90 tablet 0     leflunomide (ARAVA) 10 MG tablet Take 1 tablet (10 mg) by mouth daily 30 tablet 11     dofetilide (TIKOSYN) 125 MCG capsule Take 1 capsule (125 mcg) by mouth 2 times daily 60 capsule 5     atorvastatin (LIPITOR) 40 MG tablet Take 1 tablet (40 mg) by mouth daily (Patient taking differently: Take 40 mg by mouth every evening ) 90 tablet 2     predniSONE (DELTASONE) 5 MG tablet Take 1 tablet (5 mg) by mouth daily 100 tablet 4     apixaban ANTICOAGULANT (ELIQUIS) 2.5 MG tablet Take 1 tablet (2.5 mg) by mouth 2 times daily 180 tablet 3     Leuprolide Acetate (LUPRON DEPOT IM) Inject into the muscle every 6 months Reported on 5/3/2017       alendronate (FOSAMAX) 70 MG tablet Take 1 tablet (70 mg) by mouth every 7 days Take with over 8 ounces water and stay upright for at least 30 minutes after dose.  Take at least 60 minutes before breakfast 12 tablet 3     ascorbic acid (VITAMIN C) 500 MG CPCR Take 500 mg by mouth daily       VITAMIN D, CHOLECALCIFEROL, PO Take 2,000 Units by mouth daily Reported on 5/3/2017       calcium carbonate (OS-SHELIA 500 MG [place]. [place]) 500 MG tablet Take 1,250 mg by mouth daily           ALLERGIES:     Allergies   Allergen Reactions     Amiodarone Other (See Comments)     Drop in DLCO     Lasix [Furosemide] Blisters     Blisters and sores in his mouth.         FAMILY HISTORY:     Family History   Problem Relation Age of Onset     CANCER Mother      CANCER Son      Unknown/Adopted Father      Alcoholism Brother         SOCIAL HISTORY:     Social History     Social History  "    Marital status:      Spouse name: N/A     Number of children: N/A     Years of education: N/A     Social History Main Topics     Smoking status: Former Smoker     Packs/day: 0.50     Years: 15.00     Types: Cigarettes     Quit date: 11/12/1998     Smokeless tobacco: Never Used      Comment: quit 15 yrs ago     Alcohol use No     Drug use: No     Sexual activity: Yes     Other Topics Concern     Caffeine Concern Yes     8 cups coffee day     Sleep Concern Yes     Weight Concern Yes     weight loss     Special Diet No     Exercise Yes     split firewood daily     Social History Narrative        REVIEW OF SYSTEMS:     Constitutional: No fevers or chills.  Skin: No new rash or itching - bruises and bleeds very easily.   Eyes: No acute change in vision.  Ears/Nose/Throat: No purulent rhinorrhea, new hearing loss, or new vertigo.  Respiratory: No cough, wheezing or hemoptysis.  Cardiovascular: See HPI  Gastrointestinal: Persistently poor appetite. No nausea, vomiting, hematemesis or diarrhea.  Genitourinary: No dysuria or hematuria.  Musculoskeletal: No new back pain, neck pain or muscle pain. He does have bad arthritis.   Neurologic: No new headaches, focal weakness or behavior changes.  Psychiatric: No hallucinations, excessive alcohol consumption or illegal drug usage.  Hematologic/Lymphatic/Immunologic: No bleeding, chills, fever, night sweats or weight loss.  Endocrine: No new cold intolerance, heat intolerance, polyphagia, polydipsia or polyuria.      PHYSICAL EXAMINATION:     /82 (BP Location: Right arm, Patient Position: Chair, Cuff Size: Adult Regular)  Pulse 69  Ht 1.702 m (5' 7\")  Wt 66.5 kg (146 lb 11.2 oz)  SpO2 98%  BMI 22.98 kg/m2    GENERAL: No acute distress.  HEENT: EOMI. Sclerae white, not injected. Nares clear. Pharynx without erythema or exudate. [place] w/ hearing aides.   Neck: No adenopathy. No thyromegaly. No jugular venous distension.   Heart: Regular rate and rhythm. Soft 2/6 " systolic murmur noted.   Lungs: Clear to auscultation. No ronchi, wheezes, rales.   Abdomen: Soft, nontender, nondistended. Bowel sounds present.  Extremities: No clubbing or cyanosis. There is pitting edema to feet b/l, L > R.   Neurologic: Alert and oriented to person/place/time, normal speech and affect. No focal deficits.  Skin: No petechiae, purpura or rash. Bilateral groins sites were soft, no hematoma, no bleeding, no s/s infection.      LABORATORY DATA:     LIPID RESULTS:  Lab Results   Component Value Date    CHOL 139 03/06/2017    HDL 34 (L) 03/06/2017    LDL 74 03/06/2017    TRIG 156 (H) 03/06/2017    CHOLHDLRATIO 3.0 08/02/2013       LIVER ENZYME RESULTS:  Lab Results   Component Value Date    AST 29 09/30/2017    ALT 11 09/30/2017       CBC RESULTS:  Lab Results   Component Value Date    WBC 4.7 10/26/2017    RBC 3.22 (L) 10/26/2017    HGB 9.8 (L) 10/26/2017    HCT 32.1 (L) 10/26/2017     10/26/2017    MCH 30.4 10/26/2017    MCHC 30.5 (L) 10/26/2017    RDW 16.6 (H) 10/26/2017     10/26/2017       BMP RESULTS:  Lab Results   Component Value Date     10/26/2017    POTASSIUM 4.6 10/26/2017    CHLORIDE 103 10/26/2017    CO2 27 10/26/2017    ANIONGAP 4 10/26/2017     (H) 10/26/2017    BUN 13 10/26/2017    CR 0.88 10/26/2017    GFRESTIMATED 81 10/26/2017    GFRESTBLACK >90 10/26/2017    SHELIA 8.6 10/26/2017        A1C RESULTS:  No results found for: A1C    INR RESULTS:  Lab Results   Component Value Date    INR 0.97 09/27/2017    INR 1.08 06/30/2017          PROCEDURES & FURTHER ASSESSMENTS:     ECG dated 10/26/17:  rate 74 bpm.    Echocardiogram dated 10/26/17: Final read pending. MG 9 mmHg.     NYHA Class: II - III     CLINICAL IMPRESSION:   Bud Merritt is a 89 year old male with severe aortic stenosis treated with transfemoral TAVR with a Harman Ramsey 3 #29 mm valve on 9/27/2017 by Darlene Lozano and Luis A. The valve MG is stable at 9 mmHg and the patient has had  improvement in his SOB, energy, and LE edema since being discharged home.     Plan is to continue w/ current medical regimen including ASA and Apixaban as previously on for chronic atrial fibrillation. Agree w/ participation in cardiac rehabilitation. No other activity restrictions.     The patient will need a 1 year follow-up with valve clinic but can follow with his primary cardiologist for management of his chronic cardiovascular issues.    Plan Summary:  1) Aspirin 81 mg daily lifelong  2) Continue Apixaban for history of atrial fibrillation.  3) Lifelong antibiotic prophylaxis prior to all dental procedures.      25 minutes spent in direct care, >50% in counseling.      POLLO Gan, CNP  [facility]  Interventional/Structural Cardiology-CSI Service                                                                                                                                                                     CC  Patient Care Team:  Camron Aragon MD as PCP - General  Elvira Hallman RN as Clinic Care Coordinator  Khoa Li MD as MD (Clinical Cardiac Electrophysiology)  Snehal Wilson as Nurse Coordinator (Cardiology)  Henry Velazquez MD as MD (Urology)  GASPER LI

## 2017-10-26 NOTE — LETTER
10/26/2017      RE: Bud Merritt  [address] [address] [place] [id number]       Dear Colleague,    Thank you for the opportunity to participate in the care of your patient, Bud Merritt, at the [facility]. Please see a copy of my visit note below.    [facility] HEART CARE  CARDIOVASCULAR DIVISION    VALVE CLINIC RETURN VISIT    PRIMARY CARDIOLOGIST: Dr. Li.      PERTINENT CLINICAL HISTORY & REASON FOR CONSULTATION:   Bud Merritt is a very pleasant 89 year old male with severe aortic valvular stenosis that was treated with transfemoral transcatheter aortic valve replacement (TAVR) with a Harman Ramsey 3 #29 mm valve on 9/27/2017 by Darlene Lozano and Luis A.  The TAVR and post-procedural course were notable for no complications. The patient did have some lower blood pressures following the procedure which lead to a reduction in his prior to arrival antihypertensive regimen. The patient was discharged to [facility] in [place]. Since being at the facility the patient had some reports of LE edema and Aldactone was restarted.     He presents today with no concerns regarding his heart valve. He and his wife both feel that his shortness of breath has improved since having his valve replaced. He feels it is now easier to breath.He denies any orthopnea or PND.   He has not used O2 since discharge. He denies any chest pain, tightness or pressure. He denies any lightheadedness, dizziness or syncope. He has not had any palpitations. He has had LE edema for quite some time but feels that this is finally better than it has been for some time, they note the L > R. He was discharged from the rehabilitation facility 2 week ago and has been doing well at home. The patient reports that he was working with PT in rehab doing the stationary bicycle and that he has been becoming stronger by the week. He is now scheduled to follow-up  with cardiac rehabilitation which he will be participating with twice weekly. The patient is accompanied to the visit today by his wife who is is primary care provider. She denies any specific concerns today regarding his heart.          PAST MEDICAL HISTORY:     Past Medical History:   Diagnosis Date     Anemia      Atrial fibrillation (H) 07/01/2016     Cardiac pacemaker 03/10/2017     Cardiomyopathy (H)      CHF (congestive heart failure) (H)      COPD exacerbation (H) 3/2/2017     Depressive disorder      History of prostate cancer      Paroxysmal atrial fibrillation (H) 7/6/2016     Pure hypercholesterolemia      Rheumatoid arthritis(714.0)      Unspecified essential hypertension      Weakness generalized 3/2/2017        PAST SURGICAL HISTORY:     Past Surgical History:   Procedure Laterality Date     ARTHROPLASTY KNEE Left 1/12/2016    Procedure: ARTHROPLASTY KNEE;  Surgeon: Cesar Walker DO;  [facility] OR     COLONOSCOPY  08/24/09     HC COLONOSCOPY THRU STOMA W BIOPSY/CAUTERY TUMOR/POLYP/LESION  8/31/2004     HC REPAIR ING HERNIA,5+Y/O,REDUCIBL  1996    Marlex mesh repair of bilateral inguinal hernias and drainage of bilateral scrotal hydroceles.     HEART CATH FEMORAL CANNULIZATION WITH OPEN STANDBY REPAIR AORTIC VALVE N/A 9/27/2017    Procedure: HEART CATH FEMORAL CANNULIZATION WITH OPEN STANDBY REPAIR AORTIC VALVE;;  Surgeon: Jaron Alejandra MD;  [facility] OR     IMPLANT PACEMAKER  03/10/2017     IRRIGATION AND DEBRIDEMENT SOFT TISSUE LOWER EXTREMITY, COMBINED Left 3/15/2016    Procedure: COMBINED IRRIGATION AND DEBRIDEMENT SOFT TISSUE LOWER EXTREMITY;  Surgeon: Cesar Walker DO;  Location[facility]     TRANSCATHETER AORTIC VALVE IMPLANT ANESTHESIA N/A 9/27/2017    Procedure: TRANSCATHETER AORTIC VALVE IMPLANT ANESTHESIA;  Right Transfemoral Approach (Harman Ramsey 3 29mm) Aortic Valve Implant,  Cardiopulmonary Bypass Standby, Transthoracic Echocardiogram by Derian  Bret(Echo Tech);  Surgeon: GENERIC ANESTHESIA PROVIDER;  [facility]        CURRENT MEDICATIONS:     Current Outpatient Prescriptions   Medication Sig Dispense Refill     SPIRONOLACTONE PO Take 25 mg by mouth daily       TRAMADOL HCL PO Take 50 mg by mouth 3 times daily       aspirin EC 81 MG EC tablet Take 1 tablet (81 mg) by mouth daily 30 tablet 11     metoprolol (LOPRESSOR) 25 MG tablet Take 1 tablet (25 mg) by mouth 2 times daily 60 tablet 11     lisinopril (PRINIVIL/ZESTRIL) 10 MG tablet Take 0.5 tablets (5 mg) by mouth daily 62 tablet 11     tamsulosin (FLOMAX) 0.4 MG capsule Take 1 capsule (0.4 mg) by mouth daily 60 capsule 0     traMADol (ULTRAM) 50 MG tablet TAKE 1 TABLET 3 TIMES DAILY AS NEEDED FOR MODERATE PAIN 90 tablet 0     leflunomide (ARAVA) 10 MG tablet Take 1 tablet (10 mg) by mouth daily 30 tablet 11     dofetilide (TIKOSYN) 125 MCG capsule Take 1 capsule (125 mcg) by mouth 2 times daily 60 capsule 5     atorvastatin (LIPITOR) 40 MG tablet Take 1 tablet (40 mg) by mouth daily (Patient taking differently: Take 40 mg by mouth every evening ) 90 tablet 2     predniSONE (DELTASONE) 5 MG tablet Take 1 tablet (5 mg) by mouth daily 100 tablet 4     apixaban ANTICOAGULANT (ELIQUIS) 2.5 MG tablet Take 1 tablet (2.5 mg) by mouth 2 times daily 180 tablet 3     Leuprolide Acetate (LUPRON DEPOT IM) Inject into the muscle every 6 months Reported on 5/3/2017       alendronate (FOSAMAX) 70 MG tablet Take 1 tablet (70 mg) by mouth every 7 days Take with over 8 ounces water and stay upright for at least 30 minutes after dose.  Take at least 60 minutes before breakfast 12 tablet 3     ascorbic acid (VITAMIN C) 500 MG CPCR Take 500 mg by mouth daily       VITAMIN D, CHOLECALCIFEROL, PO Take 2,000 Units by mouth daily Reported on 5/3/2017       calcium carbonate (OS-SHELIA 500 MG [place]. [place]) 500 MG tablet Take 1,250 mg by mouth daily           ALLERGIES:     Allergies   Allergen Reactions     Amiodarone Other (See  "Comments)     Drop in DLCO     Lasix [Furosemide] Blisters     Blisters and sores in his mouth.         FAMILY HISTORY:     Family History   Problem Relation Age of Onset     CANCER Mother      CANCER Son      Unknown/Adopted Father      Alcoholism Brother         SOCIAL HISTORY:     Social History     Social History     Marital status:      Spouse name: N/A     Number of children: N/A     Years of education: N/A     Social History Main Topics     Smoking status: Former Smoker     Packs/day: 0.50     Years: 15.00     Types: Cigarettes     Quit date: 11/12/1998     Smokeless tobacco: Never Used      Comment: quit 15 yrs ago     Alcohol use No     Drug use: No     Sexual activity: Yes     Other Topics Concern     Caffeine Concern Yes     8 cups coffee day     Sleep Concern Yes     Weight Concern Yes     weight loss     Special Diet No     Exercise Yes     split firewood daily     Social History Narrative        REVIEW OF SYSTEMS:     Constitutional: No fevers or chills.  Skin: No new rash or itching - bruises and bleeds very easily.   Eyes: No acute change in vision.  Ears/Nose/Throat: No purulent rhinorrhea, new hearing loss, or new vertigo.  Respiratory: No cough, wheezing or hemoptysis.  Cardiovascular: See HPI  Gastrointestinal: Persistently poor appetite. No nausea, vomiting, hematemesis or diarrhea.  Genitourinary: No dysuria or hematuria.  Musculoskeletal: No new back pain, neck pain or muscle pain. He does have bad arthritis.   Neurologic: No new headaches, focal weakness or behavior changes.  Psychiatric: No hallucinations, excessive alcohol consumption or illegal drug usage.  Hematologic/Lymphatic/Immunologic: No bleeding, chills, fever, night sweats or weight loss.  Endocrine: No new cold intolerance, heat intolerance, polyphagia, polydipsia or polyuria.      PHYSICAL EXAMINATION:     /82 (BP Location: Right arm, Patient Position: Chair, Cuff Size: Adult Regular)  Pulse 69  Ht 1.702 m (5' 7\") "  Wt 66.5 kg (146 lb 11.2 oz)  SpO2 98%  BMI 22.98 kg/m2    GENERAL: No acute distress.  HEENT: EOMI. Sclerae white, not injected. Nares clear. Pharynx without erythema or exudate. [place] w/ hearing aides.   Neck: No adenopathy. No thyromegaly. No jugular venous distension.   Heart: Regular rate and rhythm. Soft 2/6 systolic murmur noted.   Lungs: Clear to auscultation. No ronchi, wheezes, rales.   Abdomen: Soft, nontender, nondistended. Bowel sounds present.  Extremities: No clubbing or cyanosis. There is pitting edema to feet b/l, L > R.   Neurologic: Alert and oriented to person/place/time, normal speech and affect. No focal deficits.  Skin: No petechiae, purpura or rash. Bilateral groins sites were soft, no hematoma, no bleeding, no s/s infection.      LABORATORY DATA:     LIPID RESULTS:  Lab Results   Component Value Date    CHOL 139 03/06/2017    HDL 34 (L) 03/06/2017    LDL 74 03/06/2017    TRIG 156 (H) 03/06/2017    CHOLHDLRATIO 3.0 08/02/2013       LIVER ENZYME RESULTS:  Lab Results   Component Value Date    AST 29 09/30/2017    ALT 11 09/30/2017       CBC RESULTS:  Lab Results   Component Value Date    WBC 4.7 10/26/2017    RBC 3.22 (L) 10/26/2017    HGB 9.8 (L) 10/26/2017    HCT 32.1 (L) 10/26/2017     10/26/2017    MCH 30.4 10/26/2017    MCHC 30.5 (L) 10/26/2017    RDW 16.6 (H) 10/26/2017     10/26/2017       BMP RESULTS:  Lab Results   Component Value Date     10/26/2017    POTASSIUM 4.6 10/26/2017    CHLORIDE 103 10/26/2017    CO2 27 10/26/2017    ANIONGAP 4 10/26/2017     (H) 10/26/2017    BUN 13 10/26/2017    CR 0.88 10/26/2017    GFRESTIMATED 81 10/26/2017    GFRESTBLACK >90 10/26/2017    SHELIA 8.6 10/26/2017        A1C RESULTS:  No results found for: A1C    INR RESULTS:  Lab Results   Component Value Date    INR 0.97 09/27/2017    INR 1.08 06/30/2017          PROCEDURES & FURTHER ASSESSMENTS:     ECG dated 10/26/17:  rate 74 bpm.    Echocardiogram dated 10/26/17: Final  read pending. MG 9 mmHg.     NYHA Class: II - III     CLINICAL IMPRESSION:   Bud Merritt is a 89 year old male with severe aortic stenosis treated with transfemoral TAVR with a Harman Ramsey 3 #29 mm valve on 9/27/2017 by Darlene Lozano and Luis A. The valve MG is stable at 9 mmHg and the patient has had improvement in his SOB, energy, and LE edema since being discharged home.     Plan is to continue w/ current medical regimen including ASA and Apixaban as previously on for chronic atrial fibrillation. Agree w/ participation in cardiac rehabilitation. No other activity restrictions.     The patient will need a 1 year follow-up with valve clinic but can follow with his primary cardiologist for management of his chronic cardiovascular issues.    Plan Summary:  1) Aspirin 81 mg daily lifelong  2) Continue Apixaban for history of atrial fibrillation.  3) Lifelong antibiotic prophylaxis prior to all dental procedures.      25 minutes spent in direct care, >50% in counseling.      POLLO Gan, CNP  [facility]  Interventional/Structural Cardiology-CSI Service                                                                                                                                                                 CC  Patient Care Team:  Camron Aragon MD as PCP - General  Elvira Hallman RN as Clinic Care Coordinator  Khoa Li MD as MD (Clinical Cardiac Electrophysiology)  Snehal Wilson as Nurse Coordinator (Cardiology)  Henry Velazquez MD as MD (Urology)  GASPER LI

## 2017-10-27 VITALS
SYSTOLIC BLOOD PRESSURE: 129 MMHG | OXYGEN SATURATION: 96 % | TEMPERATURE: 97 F | HEART RATE: 70 BPM | BODY MASS INDEX: 23.98 KG/M2 | DIASTOLIC BLOOD PRESSURE: 78 MMHG | WEIGHT: 148.6 LBS | RESPIRATION RATE: 20 BRPM

## 2017-10-27 NOTE — PROGRESS NOTES
[place] [facility] DISCHARGE SUMMARY    PATIENT'S NAME: Bud Merritt  YOB: 1927  MEDICAL RECORD NUMBER:  [id number]    PRIMARY CARE PROVIDER AND CLINIC RESPONSIBLE AFTER TRANSFER: [facility] [address] [facility]  / CHRISSY RABAGO 5*     CODE STATUS/ADVANCE DIRECTIVES DISCUSSION:   CPR/Full code        Allergies   Allergen Reactions     Amiodarone Other (See Comments)     Drop in DLCO     Lasix [Furosemide] Blisters     Blisters and sores in his mouth.        TRANSFERRING PROVIDERS: POLLO Kline CNP, Cass Alvarez MD  DATE OF SNF ADMISSION:  September / 30 / 2017  DATE OF SNF (anticipated) DISCHARGE: October / 18 / 2017  DISCHARGE DISPOSITION: [facility] Provider   Nursing Facility: [facility] stay 9/27/17 to 9/30/17.     Condition on Discharge:  Stable.  Function:  Walking with walker  Cognitive Scores: BIMS 10/17/17 = 15/15    Equipment: walker    DISCHARGE DIAGNOSIS:   1. Aortic stenosis, severe    2. S/P TAVR (transcatheter aortic valve replacement)    3. Atrial fibrillation with rapid ventricular response (H)    4. 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onic, Bi-Ventricular- dependent    5. Chronic systolic congestive heart failure (H)    6. Essential hypertension with goal blood pressure less than 140/90    7. Rheumatoid arthritis involving multiple sites with positive rheumatoid factor (H)    8. Hypertrophy of prostate without urinary obstruction        HPI Nursing Facility Course:  HPI information obtained from: facility chart records, facility staff, patient report and [facility] chart review.    Aortic stenosis, severe  S/P TAVR (transcatheter aortic valve replacement)  Bud here for rehabilitation after having a AVR on 9/27/17 at the [facility] with Dr. Janes Kingsley due to severe stenosis.  Has done fairly well here in his rehab process.  Occasional chest  "pain which once he said was indigestion.  His arthritis gave him more trouble in which limited mobility.  Had a 10 lbs weight restriction for first week since procedure through the groin.  Today saw Bud in his room as he was sitting in w/c.  Has been walking with the walker independently as well as transfers self.  Lives with his spouse.  Will return home and have cardiac outpatient rehab.  Follow up at the [facility] on 10/26/17    Atrial fibrillation with rapid ventricular response (H)  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onic, Bi-Ventricular- dependent  - receives Eliquis 2.5mg twice a day, dofetilide 125mcg twice a day, ASA 81mg daily.  No changes while here.      Chronic systolic congestive heart failure (H)  Has edema in legs.  Seen by NH doctor and restarted on Aldactone at 12.5mg daily.  Weight has gone down.  Was around 153 to 156 lbs and now down to 148 lbs.  ACE wraps to legs for 4 hours in AM and 4 hours in PM while here to help with compression.  Bud would also elevate his legs in the recliner which he slept in at night.  Otherwise weight and legs monitored.    Essential hypertension with goal blood pressure less than 140/90  Blood pressures [id number]/70-80's.  Vitals taken daily  Remains on Metoprolol 25mg twice a day and Lisinopril 2.5mg daily.    Rheumatoid arthritis involving multiple sites with positive rheumatoid factor (H)  Remains on Arava 10mg daily, Vitamin D3 2000 units daily, Tramadol 50mg TID and TID prn (which was scheduled here similar to what he did at home), Prednisone 5mg daily, Calcium 500mg daily and alendronate 70mg weekly.  Missed appointment for his steroid injections while here but another NP able to give him injections in right knee and left hip for Bursitis.  Tolerated well on 10/9/17.  Hip continues to hurt but \"shoulders good\".    Hypertrophy of prostate without urinary obstruction  Remains on tamsulosin 0.4mg daily.  No complaints.      PAST MEDICAL HISTORY:  has a " past medical history of Anemia; Atrial fibrillation (H) (07/01/2016); Cardiac pacemaker (03/10/2017); Cardiomyopathy (H); CHF (congestive heart failure) (H); COPD exacerbation (H) (3/2/2017); Depressive disorder; History of prostate cancer; Paroxysmal atrial fibrillation (H) (7/6/2016); Pure hypercholesterolemia; Rheumatoid arthritis(714.0); Unspecified essential hypertension; and Weakness generalized (3/2/2017). He also has no past medical history of Cerebral infarction (H); Diabetes (H); Difficult intubation; History of blood transfusion; Malignant hyperthermia; PONV (postoperative nausea and vomiting); Spinal headache; Thyroid disease; or Uncomplicated asthma.    DISCHARGE MEDICATIONS:  Current Outpatient Prescriptions   Medication Sig Dispense Refill     SPIRONOLACTONE PO Take 25 mg by mouth daily       TRAMADOL HCL PO Take 50 mg by mouth 3 times daily       aspirin EC 81 MG EC tablet Take 1 tablet (81 mg) by mouth daily 30 tablet 11     metoprolol (LOPRESSOR) 25 MG tablet Take 1 tablet (25 mg) by mouth 2 times daily 60 tablet 11     lisinopril (PRINIVIL/ZESTRIL) 10 MG tablet Take 0.5 tablets (5 mg) by mouth daily 62 tablet 11     tamsulosin (FLOMAX) 0.4 MG capsule Take 1 capsule (0.4 mg) by mouth daily 60 capsule 0     traMADol (ULTRAM) 50 MG tablet TAKE 1 TABLET 3 TIMES DAILY AS NEEDED FOR MODERATE PAIN 90 tablet 0     leflunomide (ARAVA) 10 MG tablet Take 1 tablet (10 mg) by mouth daily 30 tablet 11     dofetilide (TIKOSYN) 125 MCG capsule Take 1 capsule (125 mcg) by mouth 2 times daily 60 capsule 5     atorvastatin (LIPITOR) 40 MG tablet Take 1 tablet (40 mg) by mouth daily (Patient taking differently: Take 40 mg by mouth every evening ) 90 tablet 2     predniSONE (DELTASONE) 5 MG tablet Take 1 tablet (5 mg) by mouth daily 100 tablet 4     apixaban ANTICOAGULANT (ELIQUIS) 2.5 MG tablet Take 1 tablet (2.5 mg) by mouth 2 times daily 180 tablet 3     Leuprolide Acetate (LUPRON DEPOT IM) Inject into the muscle  every 6 months Reported on 5/3/2017       alendronate (FOSAMAX) 70 MG tablet Take 1 tablet (70 mg) by mouth every 7 days Take with over 8 ounces water and stay upright for at least 30 minutes after dose.  Take at least 60 minutes before breakfast 12 tablet 3     ascorbic acid (VITAMIN C) 500 MG CPCR Take 500 mg by mouth daily       VITAMIN D, CHOLECALCIFEROL, PO Take 2,000 Units by mouth daily Reported on 5/3/2017       calcium carbonate (OS-SHELIA 500 MG [place]. CA) 500 MG tablet Take 1,250 mg by mouth daily          MEDICATION CHANGES/RATIONALE:   9/30/17  OK for standing orders  9/30/17  PT/OT eval and treat  10/2/17  OT eval and treat for ADLs, therapeutic exercise and functional mobility  10/2/17  Discontinue Norco and PRN Tramadol.  Scheduled Tramadol 50mg TID.  Friday CBC and BMP due to HTN and edema  10/3/17  Clarify Vitamin D3 1000 units 2 caps daily for R. Arthritis  10/4/17  Lasix 40mg daily for 4 days with edema and cough.  Friday BMP.  ACE wraps feet to below knees 4 hours in AM and 4 hours in PM  10/4/17  Discontinue Lasix due to allergy  10/5/17  Change Tramadol order to be 50mg TID and TID prn for R. Arthritis  10/9/17  Dr. Montalvo - f/u prostate CA due for Lupron last month but had heart surgery.  PSA low at 0.26 (good).  4 month Lupron given for CA of prostate.  10/11/17  Discontinue Tylenol  10/12/17  Aldactone 12.5mg daily.  Check BMP in 3 days  10/17/17  OK to discharge home with medications.  Outpatient cardiac rehab due to s/p AVR    Controlled medications sent with patient: remaining Tramadol given to resident and spouse.     ROS:    4 point ROS including Respiratory, CV, GI and , other than that noted in the HPI,  is negative    Physical Exam:   Vitals: /78  Pulse 70  Temp 97  F (36.1  C)  Resp 20  Wt 148 lb 9.6 oz (67.4 kg)  SpO2 96%  BMI 23.98 kg/m2  BMI= Body mass index is 23.98 kg/(m^2).    GENERAL APPEARANCE:  Alert, in no distress, thin, cooperative  EYES:  PERRL, Conjunctiva  and lids normal  RESP:  respiratory effort and palpation of chest normal, lungs clear to auscultation , no respiratory distress  CV:  Palpation and auscultation of heart done , regular rate and rhythm, no murmur, rub, or gallop, +2 edema bilaterally  ABDOMEN:  normal bowel sounds, soft, nontender, no hepatosplenomegaly or other masses, no guarding or rebound  M/S:   Gait and station abnormal has used w/c but does ambulate with walker.  able to transfer self  SKIN:  skin is pink, frail, warm and dry  PSYCH:  oriented X 3, normal insight, judgement and memory, affect and mood normal    DISCHARGE PLAN:  outpatient cardiac rehab with order faxed over to hospital  Patient instructed to follow-up with:  PCP in 7-10 days for continuity of care       Current [place] scheduled appointments:  Future Appointments  Date Time Provider Department Center   10/31/2017 10:30 AM [facility] [place]   11/2/2017 9:30 AM Caryn Garcia EP [facility] [place]   11/7/2017 10:30 AM Adeline Johnston [facility] [place]   11/9/2017 9:30 AM Adeline Johnston, [facility] [place]   11/14/2017 10:30 AM Adeline Johnston [facility] [place]   11/15/2017 11:15 AM Mak Shaver MD [facility]   11/16/2017 9:30 AM Caryn Garcia EP [facility] [place]   11/21/2017 10:30 AM Adeline Johnston [facility] [place]   11/28/2017 10:30 AM Adeline Johnston [facility] [place]   11/30/2017 9:30 AM Caryn Garcia, JARETH [facility] [place]       MT referral needed and placed by this provider: No    Pending labs: none  SNF labs   CBC RESULTS: 10/6/17  Component Value Flag Ref Range Units Status Collected Lab   WBC 5.1  4.0 - 11.0 10e9/L Final 10/06/2017  7:10 AM [facility] Lab   RBC Count 2.77 (L) 4.4 - 5.9 10e12/L Final 10/06/2017  7:10 AM [facility] Lab   Hemoglobin 8.3 (L) 13.3 - 17.7 g/dL Final 10/06/2017  7:10 AM [facility] Lab   Hematocrit 27.0 (L) 40.0 - 53.0 % Final 10/06/2017  7:10 AM [facility] Lab   MCV 98  78 - 100 fl  Final 10/06/2017  7:10 AM [facility] Lab   MCH 30.0  26.5 - 33.0 pg Final 10/06/2017  7:10 AM [facility] Lab   MCHC 30.7 (L) 31.5 - 36.5 g/dL Final 10/06/2017  7:10 AM [facility] Lab   RDW 15.9 (H) 10.0 - 15.0 % Final 10/06/2017  7:10 AM [facility] Lab   Platelet Count 270  150 - 450 10e9/L Final 10/06/2017  7:10 AM [facility] Lab       Last Basic Metabolic Panel: 10/16/17  Component Value Flag Ref Range Units Status Collected Lab   Sodium 136  133 - 144 mmol/L Final 10/16/2017  7:02 AM [facility] Lab   Potassium 4.0  3.4 - 5.3 mmol/L Final 10/16/2017  7:02 AM [facility] Lab   Chloride 102  94 - 109 mmol/L Final 10/16/2017  7:02 AM [facility] Lab   Carbon Dioxide 28  20 - 32 mmol/L Final 10/16/2017  7:02 AM [facility] Lab   Anion Gap 6  3 - 14 mmol/L Final 10/16/2017  7:02 AM [facility] Lab   Glucose 89  70 - 99 mg/dL Final 10/16/2017  7:02 AM [facility] Lab   Urea Nitrogen 16  7 - 30 mg/dL Final 10/16/2017  7:02 AM [facility] Lab   Creatinine 0.87  0.66 - 1.25 mg/dL Final 10/16/2017  7:02 AM [facility] Lab   GFR Estimate 83  >60 mL/min/1.7m2 Final 10/16/2017  7:02 AM [facility] Lab   Comment:   Non  GFR Calc   GFR Estimate If Black >90  >60 mL/min/1.7m2 Final 10/16/2017  7:02 AM [facility] Lab   Comment:   African American GFR Calc   Calcium 8.8  8.5 - 10.1 mg/dL Final 10/16/2017  7:02 AM [facility]        Liver Function Studies -   Recent Labs   Lab Test  09/30/17   0610  09/29/17   0513   PROTTOTAL  4.9*  5.2*   ALBUMIN  2.1*  2.4*   BILITOTAL  0.5  0.5   ALKPHOS  59  66   AST  29  37   ALT  11  14       TSH   Date Value Ref Range Status   01/23/2017 1.07 0.40 - 4.00 mU/L Final   08/11/2016 2.23 0.40 - 4.00 mU/L Final   ]  Discharge Treatments:  Elevate legs above the heart as able.  Ambulate with walker for safety.    TOTAL DISCHARGE TIME:   Greater than 30 minutes  Electronically signed by:  POLLO Kline CNP

## 2017-10-30 LAB — INTERPRETATION ECG - MUSE: NORMAL

## 2017-10-30 NOTE — OP NOTE
OPERATIVE DATE: 9/27/2017    PRE-OPERATIVE DIAGNOSIS:  1) Severe aortic stenosis  2) Anemia  3) Atrial fibrillation  4) History of cardiac pacemaker  5) Cardiomyopathy  6) Congestive heart failure  7) COPD  8) Depression  9) Prostate cancer  10) Hypercholesterolemia  11) Rheumatoid arthritis  12) Essential hypertension  13) Generalized weakness    POST-OPERATIVE DIAGNOSIS:  1) Severe aortic stenosis  2) Anemia  3) Atrial fibrillation  4) History of cardiac pacemaker  5) Cardiomyopathy  6) Congestive heart failure  7) COPD  8) Depression  9) Prostate cancer  10) Hypercholesterolemia  11) Rheumatoid arthritis  12) Essential hypertension  13) Generalized weakness    PROCEDURE:  1) Temporary pacemaker insertion  2) Iliofemoral artery angiography  3) Ascending aorta angiography  4) Left heart catheterization  5) Aortic valve balloon valvuloplasty with 25mm balloon  6) Successful transfemoral transcatheter aortic valve replacement with 29mm Harman Ramsey 3 valve  7) Arteriotomy closure    SURGEON: Jaron Alejandra MD    CARDIOLOGIST: Hermelindo Gunn MD; Fan Lozano MD    ANESTHESIA: GETA    ESTIMATED BLOOD LOSS: 50 cc    INDICATIONS:  Mr. GALLO FLOYD is a 89 year old male admitted with severe aortic stenosis.  We were asked to evaluate for transcatheter aortic valve replacement.  Risks and benefits of the operation were explained to the patient and their family including, but not limited to, bleeding, infection, stroke and even death.  They understood these risks and agreed to proceed electively.    OPERATIVE REPORT:  The patient was transferred to the operating room and positioned supine on the OR table.  General anesthesia was initiated by the anesthesia team.  Endotracheal intubation and central venous access was performed by anesthesia.  The patients abdomen and bilateral lower extremities were clipped, prepped and draped in sterile fashion.  A pre-procedure time-out was performed confirming the correct  patient, correct site and correct procedure.    Trans-esophageal echocardiography showed good function of the LV.  Intravenous heparin was administered.      Arterial access of the right and left femoral arteries and left common femoral vein was performed by interventional cardiology.  Two Perclose Proglide devices were deployed on the side used for valve deployment.    A temporary transvenous pacemaker was placed by the cardiology team.    A Lunderquist wire was advanced to support the Harman sheath insertion.  A pigtail catheter was advanced to the aortic root and angiogram performed to confirm optimal implant angle.  The pigtail was placed in the non-coronary cusp.    The LV was accessed using an AL-2 catheter over a straight wire.  The pigtail catheter was advanced into the LV.  The pigtail catheter was used to advance a Lunderquist Safari wire into the LV and the pigtail catheter was removed.    Aortic valve balloon valvuloplasty was performed with Harman 25mm balloon using rapid pacing and ventilatory pause.    The 29mm Harman Ramsey 3 bioprosthetic valve was positioned across the native aortic valve and attempted to be deployed using rapid pacing and ventilatory pause.  However, the balloon catheter had ruptured and the valve was not able to be deployed.  The entire system was removed over the Lunderquist wire and the valve retrieved.  A second Harman sheath was placed.  A second 29mm Harman Ramsey 3 valve was positioned across the aortic valve and deployed using rapid pacing and ventilatory pause.    Transesophageal and transthoracic echocardiography showed trace paravalvular insufficiency.    The deployment system and wires were removed.  The femoral access was made hemostatic.    A final iliofemoral angiogram showed no extravasation or stenosis.    The patient was then transferred from the operating bed to an ICU bed and transferred to the ICU in critical, but stable, condition.    All needle, sponge  and instrument counts were correct at the end of the case.    Jaron Alejandra  Cardiothoracic Surgery  Pager: [phone]

## 2017-10-31 ENCOUNTER — HOSPITAL ENCOUNTER (OUTPATIENT)
Dept: CARDIAC REHAB | Facility: CLINIC | Age: 82
End: 2017-10-31
Attending: FAMILY MEDICINE
Payer: MEDICARE

## 2017-10-31 DIAGNOSIS — M06.9 RHEUMATOID ARTHRITIS INVOLVING MULTIPLE SITES, UNSPECIFIED RHEUMATOID FACTOR PRESENCE: ICD-10-CM

## 2017-10-31 PROCEDURE — 93798 PHYS/QHP OP CAR RHAB W/ECG: CPT | Performed by: REHABILITATION PRACTITIONER

## 2017-10-31 PROCEDURE — 40000116 ZZH STATISTIC OP CR VISIT: Performed by: REHABILITATION PRACTITIONER

## 2017-11-01 RX ORDER — TRAMADOL HYDROCHLORIDE 50 MG/1
TABLET ORAL
Qty: 90 TABLET | Refills: 0 | Status: SHIPPED | OUTPATIENT
Start: 2017-11-01 | End: 2017-12-05

## 2017-11-02 ENCOUNTER — HOSPITAL ENCOUNTER (OUTPATIENT)
Dept: CARDIAC REHAB | Facility: CLINIC | Age: 82
End: 2017-11-02
Attending: FAMILY MEDICINE
Payer: MEDICARE

## 2017-11-02 PROCEDURE — 93798 PHYS/QHP OP CAR RHAB W/ECG: CPT | Performed by: REHABILITATION PRACTITIONER

## 2017-11-02 PROCEDURE — 40000116 ZZH STATISTIC OP CR VISIT: Performed by: REHABILITATION PRACTITIONER

## 2017-11-07 ENCOUNTER — HOSPITAL ENCOUNTER (OUTPATIENT)
Dept: CARDIAC REHAB | Facility: CLINIC | Age: 82
End: 2017-11-07
Attending: FAMILY MEDICINE
Payer: MEDICARE

## 2017-11-07 PROCEDURE — 93798 PHYS/QHP OP CAR RHAB W/ECG: CPT

## 2017-11-07 PROCEDURE — 40000116 ZZH STATISTIC OP CR VISIT

## 2017-11-09 ENCOUNTER — HOSPITAL ENCOUNTER (OUTPATIENT)
Dept: CARDIAC REHAB | Facility: CLINIC | Age: 82
End: 2017-11-09
Attending: FAMILY MEDICINE
Payer: MEDICARE

## 2017-11-09 ENCOUNTER — ALLIED HEALTH/NURSE VISIT (OUTPATIENT)
Dept: CARDIOLOGY | Facility: CLINIC | Age: 82
End: 2017-11-09
Attending: INTERNAL MEDICINE
Payer: MEDICARE

## 2017-11-09 DIAGNOSIS — I42.0 DILATED CARDIOMYOPATHY (H): Primary | ICD-10-CM

## 2017-11-09 PROCEDURE — 93798 PHYS/QHP OP CAR RHAB W/ECG: CPT

## 2017-11-09 PROCEDURE — 40000116 ZZH STATISTIC OP CR VISIT

## 2017-11-09 PROCEDURE — 93296 REM INTERROG EVL PM/IDS: CPT | Mod: ZF

## 2017-11-09 PROCEDURE — 93294 REM INTERROG EVL PM/LDLS PM: CPT | Performed by: INTERNAL MEDICINE

## 2017-11-09 NOTE — MR AVS SNAPSHOT
After Visit Summary   11/9/2017    Bud Merritt    MRN: [id number]           Patient Information     Date Of Birth          5/9/1927        Visit Information        Provider Department      11/9/2017 6:00 AM [facility] REMOTE [facility]        Today's Diagnoses     Dilated cardiomyopathy (H) dilated LV, EF 20-25% by Echo 3/4/17    -  1       Follow-ups after your visit        Your next 10 appointments already scheduled     Nov 14, 2017 10:30 AM CST   Cardiac Treatment with Adeline Johnston [facility] ([facility])    [address] [place] [address]   [phone]            Nov 15, 2017 11:15 AM CST   Return Visit with Mak Shaver MD   [facility]    [address] [place] [address]   [phone]            Nov 16, 2017  9:30 AM CST   Cardiac Treatment with JARETH Pond   [facility] ([facility])    [address] [place] [address]   [phone]            Nov 21, 2017 10:30 AM CST   Cardiac Treatment with Adeline Johnston [facility] ([facility])    [address] [place] [address]   [phone]            Nov 28, 2017 10:30 AM CST   Cardiac Treatment with Adeline Johnston [facility] ([facility])    [address] [place] [address]   [phone]            Nov 30, 2017  9:30 AM CST   Cardiac Treatment with JARETH Pond   [facility] ([facility])    [address] [place] [address]   [phone]              Who to contact     If you have questions or need follow up information about today's clinic visit or your schedule please contact [facility] directly at [phone].  Normal or non-critical lab and imaging results will be communicated to you by  "MyChart, letter or phone within 4 business days after the clinic has received the results. If you do not hear from us within 7 days, please contact the clinic through [employer] or phone. If you have a critical or abnormal lab result, we will notify you by phone as soon as possible.  Submit refill requests through [facility] or call your pharmacy and they will forward the refill request to us. Please allow 3 business days for your refill to be completed.          Additional Information About Your Visit        [employer] Information     [facility] lets you send messages to your doctor, view your test results, renew your prescriptions, schedule appointments and more. To sign up, go to [email or web address] . Click on \"Log in\" on the left side of the screen, which will take you to the Welcome page. Then click on \"Sign up Now\" on the right side of the page.     You will be asked to enter the access code listed below, as well as some personal information. Please follow the directions to create your username and password.     Your access code is: [email or web address]  Expires: 2017 10:55 AM     Your access code will  in 90 days. If you need help or a new code, please call your [place] clinic or [phone].        Care EveryWhere ID     This is your Care EveryWhere ID. This could be used by other organizations to access your [place] medical records  [id number]         Blood Pressure from Last 3 Encounters:   10/26/17 139/82   10/17/17 129/78   10/12/17 124/75    Weight from Last 3 Encounters:   10/26/17 66.5 kg (146 lb 11.2 oz)   10/24/17 66.9 kg (147 lb 6.4 oz)   10/17/17 67.4 kg (148 lb 9.6 oz)              We Performed the Following     INTERROGATION DEVICE EVAL REMOTE, PACER/ICD          Today's Medication Changes          These changes are accurate as of: 17 11:59 PM.  If you have any questions, ask your nurse or doctor.               These medicines have changed or have updated prescriptions.        " Dose/Directions    atorvastatin 40 MG tablet   Commonly known as:  LIPITOR   This may have changed:  when to take this   Used for:  Hyperlipidemia LDL goal <130        Dose:  40 mg   Take 1 tablet (40 mg) by mouth daily   Quantity:  90 tablet   Refills:  2                Primary Care Provider Office Phone # Fax #    Camron Aragon [phone]       [facility] [address] [place] [address]        Equal Access to Services     [facility] : Hadii aad ku hadasho Soomaali, waaxda luqadaha, qaybta kaalmada adeegyada, waxay idiin hayaan adeeg kharakimberley laisaiah . So [facility] [phone].    ATENCIÓN: Si habla español, tiene a mon disposición servicios gratuitos de asistencia lingüística. [facility] [phone].    We comply with applicable federal civil rights laws and [place] laws. We do not discriminate on the basis of race, color, national origin, age, disability, sex, sexual orientation, or gender identity.            Thank you!     Thank you for choosing [facility]  for your care. Our goal is always to provide you with excellent care. Hearing back from our patients is one way we can continue to improve our services. Please take a few minutes to complete the written survey that you may receive in the mail after your visit with us. Thank you!             Your Updated Medication List - Protect others around you: Learn how to safely use, store and throw away your medicines at www.disposemymeds.org.          This list is accurate as of: 11/9/17 11:59 PM.  Always use your most recent med list.                   Brand Name Dispense Instructions for use Diagnosis    alendronate 70 MG tablet    FOSAMAX    12 tablet    Take 1 tablet (70 mg) by mouth every 7 days Take with over 8 ounces water and stay upright for at least 30 minutes after dose.  Take at least 60 minutes before breakfast    Osteopenia       apixaban ANTICOAGULANT 2.5 MG tablet    ELIQUIS    180 tablet    Take 1  tablet (2.5 mg) by mouth 2 times daily    Paroxysmal atrial fibrillation (H)       ascorbic acid 500 MG Cpcr CR capsule    vitamin C     Take 500 mg by mouth daily        aspirin 81 MG EC tablet     30 tablet    Take 1 tablet (81 mg) by mouth daily    S/P TAVR (transcatheter aortic valve replacement)       atorvastatin 40 MG tablet    LIPITOR    90 tablet    Take 1 tablet (40 mg) by mouth daily    Hyperlipidemia LDL goal <130       calcium carbonate 1250 MG tablet    OS-SHELIA 500 mg "[employer]. Ca     Take 1,250 mg by mouth daily        dofetilide 125 MCG capsule    TIKOSYN    60 capsule    Take 1 capsule (125 mcg) by mouth 2 times daily    Paroxysmal atrial fibrillation (H)       leflunomide 10 MG tablet    ARAVA    30 tablet    Take 1 tablet (10 mg) by mouth daily    Rheumatoid arthritis involving multiple sites with positive rheumatoid factor (H)       lisinopril 10 MG tablet    PRINIVIL/ZESTRIL    62 tablet    Take 0.5 tablets (5 mg) by mouth daily    Acute on chronic systolic congestive heart failure (H)       LUPRON DEPOT IM      Inject into the muscle every 6 months Reported on 5/3/2017        metoprolol 25 MG tablet    LOPRESSOR    60 tablet    Take 1 tablet (25 mg) by mouth 2 times daily    S/P TAVR (transcatheter aortic valve replacement)       predniSONE 5 MG tablet    DELTASONE    100 tablet    Take 1 tablet (5 mg) by mouth daily    Osteopenia       SPIRONOLACTONE PO      Take 25 mg by mouth daily        tamsulosin 0.4 MG capsule    FLOMAX    60 capsule    Take 1 capsule (0.4 mg) by mouth daily    S/P TAVR (transcatheter aortic valve replacement)       * TRAMADOL HCL PO      Take 50 mg by mouth 3 times daily        * traMADol 50 MG tablet    ULTRAM    90 tablet    TAKE 1 TABLET 3 TIMES DAILY AS NEEDED FOR MODERATE PAIN    Rheumatoid arthritis involving multiple sites, unspecified rheumatoid factor presence (H)       VITAMIN D (CHOLECALCIFEROL) PO      Take 2,000 Units by mouth daily Reported on 5/3/2017        *  Notice:  This list has 2 medication(s) that are the same as other medications prescribed for you. Read the directions carefully, and ask your doctor or other care provider to review them with you.

## 2017-11-13 NOTE — PROGRESS NOTES
Pt sent in a routine remote pacemaker check for evaluation per MD order.  His pacemaker check shows 188 AT/AF episodes, AF burden 4.2% and the longest episode was 33 min.  He is BiV pacing 99.4% of the time, his optivol is at baseline, his heart rate histograms show minimal heart rate variation and his pacemaker battery estimates 6 years left.  Pt was called with the results and his wife reports that he has been well.  We will plan for a remote check on 2/12/18, normal pacemaker function.    Remote pacemaker

## 2017-11-14 ENCOUNTER — HOSPITAL ENCOUNTER (OUTPATIENT)
Dept: CARDIAC REHAB | Facility: CLINIC | Age: 82
End: 2017-11-14
Attending: FAMILY MEDICINE
Payer: MEDICARE

## 2017-11-14 PROCEDURE — 93798 PHYS/QHP OP CAR RHAB W/ECG: CPT | Performed by: REHABILITATION PRACTITIONER

## 2017-11-14 PROCEDURE — 40000116 ZZH STATISTIC OP CR VISIT: Performed by: REHABILITATION PRACTITIONER

## 2017-11-15 ENCOUNTER — OFFICE VISIT (OUTPATIENT)
Dept: RHEUMATOLOGY | Facility: CLINIC | Age: 82
End: 2017-11-15
Payer: COMMERCIAL

## 2017-11-15 ENCOUNTER — TELEPHONE (OUTPATIENT)
Dept: RHEUMATOLOGY | Facility: CLINIC | Age: 82
End: 2017-11-15

## 2017-11-15 VITALS
OXYGEN SATURATION: 97 % | DIASTOLIC BLOOD PRESSURE: 68 MMHG | HEIGHT: 67 IN | HEART RATE: 70 BPM | BODY MASS INDEX: 23.07 KG/M2 | WEIGHT: 147 LBS | RESPIRATION RATE: 12 BRPM | SYSTOLIC BLOOD PRESSURE: 110 MMHG

## 2017-11-15 DIAGNOSIS — M05.79 RHEUMATOID ARTHRITIS INVOLVING MULTIPLE SITES WITH POSITIVE RHEUMATOID FACTOR (H): ICD-10-CM

## 2017-11-15 DIAGNOSIS — M05.79 RHEUMATOID ARTHRITIS INVOLVING MULTIPLE SITES WITH POSITIVE RHEUMATOID FACTOR (H): Primary | ICD-10-CM

## 2017-11-15 PROCEDURE — 99214 OFFICE O/P EST MOD 30 MIN: CPT | Performed by: INTERNAL MEDICINE

## 2017-11-15 RX ORDER — LEFLUNOMIDE 10 MG/1
10 TABLET ORAL DAILY
Qty: 30 TABLET | Refills: 11 | Status: SHIPPED | OUTPATIENT
Start: 2017-11-15 | End: 2017-12-12

## 2017-11-15 RX ORDER — PREDNISONE 5 MG/1
5 TABLET ORAL DAILY
Qty: 100 TABLET | Refills: 4 | Status: SHIPPED | OUTPATIENT
Start: 2017-11-15 | End: 2018-01-01

## 2017-11-15 RX ORDER — LEFLUNOMIDE 20 MG/1
20 TABLET ORAL DAILY
Qty: 30 TABLET | Refills: 3 | Status: SHIPPED | OUTPATIENT
Start: 2017-11-15 | End: 2017-12-12

## 2017-11-15 NOTE — NURSING NOTE
"Chief Complaint   Patient presents with     RECHECK     RA       Initial /68 (BP Location: Right arm, Patient Position: Chair, Cuff Size: Adult Regular)  Pulse 70  Resp 12  Ht 5' 7\" (1.702 m)  Wt 147 lb (66.7 kg)  SpO2 97%  BMI 23.02 kg/m2 Estimated body mass index is 23.02 kg/(m^2) as calculated from the following:    Height as of this encounter: 5' 7\" (1.702 m).    Weight as of this encounter: 147 lb (66.7 kg).  Medication Reconciliation: complete     Yvonne Medrano MA      "

## 2017-11-15 NOTE — TELEPHONE ENCOUNTER
Reason for Call:  Other prescription    Detailed comments: pt wife calling stating they do not have any 20 mg arava at home. Only have the 10 mg tabs. He was to call and let dr know if they had any 20 mg left. New rx that was sent in is only for 10 mg once daily. Should be two tabs daily to make 20mg. This is an FYI only    Phone Number Patient can be reached at: Home number on file [phone] (home)    Best Time: any     Can we leave a detailed message on this number? YES    Call taken on 11/15/2017 at 1:33 PM by Ana Lazaro

## 2017-11-15 NOTE — TELEPHONE ENCOUNTER
I spoke to Tierra today, Bud's wife. Bud was in the clinic today with Dr Shaver for his arthritis. He was to check to see if he had any of the 20 mg Arava at home and they do not. They would like Dr Shaver to [facility] [place]. Alcira RAE RN

## 2017-11-15 NOTE — MR AVS SNAPSHOT
After Visit Summary   11/15/2017    Bud Merritt    MRN: [id number]           Patient Information     Date Of Birth          5/9/1927        Visit Information        Provider Department      11/15/2017 11:15 AM Mak Shaver MD [facility]        Today's Diagnoses     Rheumatoid arthritis involving multiple sites with positive rheumatoid factor (H)           Follow-ups after your visit        Follow-up notes from your care team     Return in about 6 months (around 5/15/2018).      Your next 10 appointments already scheduled     Nov 21, 2017 10:30 AM CST   Cardiac Treatment with Adeline Johnston [facility] ([facility])    [address] [place] [address]   [phone]            Nov 28, 2017 10:30 AM CST   Cardiac Treatment with TATE Krueger   [facility] ([facility])    [address] [place] [address]   [phone]            Nov 30, 2017  9:30 AM CST   Cardiac Treatment with JARETH Pond   [facility] ([facility])    [address] [place] [address] [place] [address]   [phone]              Who to contact     If you have questions or need follow up information about today's clinic visit or your schedule please contact [facility] directly at [phone].  Normal or non-critical lab and imaging results will be communicated to you by MyChart, letter or phone within 4 business days after the clinic has received the results. If you do not hear from us within 7 days, please contact the clinic through MyChart or phone. If you have a critical or abnormal lab result, we will notify you by phone as soon as possible.  Submit refill requests through [employer] or call your pharmacy and they will forward the refill request to us. Please allow 3 business days for your refill to be completed.          Additional Information  "About Your Visit        [employer] Information     [employer] lets you send messages to your doctor, view your test results, renew your prescriptions, schedule appointments and more. To sign up, go to [email or web address] . Click on \"Log in\" on the left side of the screen, which will take you to the Welcome page. Then click on \"Sign up Now\" on the right side of the page.     You will be asked to enter the access code listed below, as well as some personal information. Please follow the directions to create your username and password.     Your access code is: [email or web address]  Expires: 2017 10:55 AM     Your access code will  in 90 days. If you need help or a new code, please call your [place] clinic or [phone].        Care EveryWhere ID     This is your Care EveryWhere ID. This could be used by other organizations to access your [place] medical records  [id number]        Your Vitals Were     Pulse Respirations Height Pulse Oximetry BMI (Body Mass Index)       70 12 5' 7\" (1.702 m) 97% 23.02 kg/m2        Blood Pressure from Last 3 Encounters:   11/15/17 110/68   10/26/17 139/82   10/17/17 129/78    Weight from Last 3 Encounters:   17 148 lb (67.1 kg)   11/15/17 147 lb (66.7 kg)   10/26/17 146 lb 11.2 oz (66.5 kg)              Today, you had the following     No orders found for display         Today's Medication Changes          These changes are accurate as of: 11/15/17 11:59 PM.  If you have any questions, ask your nurse or doctor.               These medicines have changed or have updated prescriptions.        Dose/Directions    atorvastatin 40 MG tablet   Commonly known as:  LIPITOR   This may have changed:  when to take this   Used for:  Hyperlipidemia LDL goal <130        Dose:  40 mg   Take 1 tablet (40 mg) by mouth daily   Quantity:  90 tablet   Refills:  2       * leflunomide 10 MG tablet   Commonly known as:  ARAVA   This may have changed:  Another medication with the same name was " added. Make sure you understand how and when to take each.   Used for:  Rheumatoid arthritis involving multiple sites with positive rheumatoid factor (H)   Changed by:  Mak Shaver MD        Dose:  10 mg   Take 1 tablet (10 mg) by mouth daily   Quantity:  30 tablet   Refills:  11       * leflunomide 20 MG tablet   Commonly known as:  ARAVA   This may have changed:  You were already taking a medication with the same name, and this prescription was added. Make sure you understand how and when to take each.   Used for:  Rheumatoid arthritis involving multiple sites with positive rheumatoid factor (H)   Changed by:  Mak Shaver MD        Dose:  20 mg   Take 1 tablet (20 mg) by mouth daily   Quantity:  30 tablet   Refills:  3       * Notice:  This list has 2 medication(s) that are the same as other medications prescribed for you. Read the directions carefully, and ask your doctor or other care provider to review them with you.         Where to get your medicines      These medications were sent to [id number] - [place], [place] - [address]   [address] [place] [facility] [address]     Phone:  [phone]     leflunomide 10 MG tablet    leflunomide 20 MG tablet    predniSONE 5 MG tablet                Primary Care Provider Office Phone # Fax #    Camron Aragon [phone]       [facility] [address] [place] [address]        Equal Access to Services     [facility] AH: Hadii sammie clifton hadasho Soskyali, waaxda luqadaha, qaybta kaalmada adewalkerda, ronel dexter. So [facility] [phone].    ATENCIÓN: Si habla español, tiene a mon disposición servicios gratuitos de asistencia lingüística. Llame al [phone].    We comply with applicable federal civil rights laws and [place] laws. We do not discriminate on the basis of race, color, national origin, age, disability, sex, sexual orientation, or gender identity.             Thank you!     Thank you for choosing [facility]  for your care. Our goal is always to provide you with excellent care. Hearing back from our patients is one way we can continue to improve our services. Please take a few minutes to complete the written survey that you may receive in the mail after your visit with us. Thank you!             Your Updated Medication List - Protect others around you: Learn how to safely use, store and throw away your medicines at www.disposemymeds.org.          This list is accurate as of: 11/15/17 11:59 PM.  Always use your most recent med list.                   Brand Name Dispense Instructions for use Diagnosis    alendronate 70 MG tablet    FOSAMAX    12 tablet    Take 1 tablet (70 mg) by mouth every 7 days Take with over 8 ounces water and stay upright for at least 30 minutes after dose.  Take at least 60 minutes before breakfast    Osteopenia       apixaban ANTICOAGULANT 2.5 MG tablet    ELIQUIS    180 tablet    Take 1 tablet (2.5 mg) by mouth 2 times daily    Paroxysmal atrial fibrillation (H)       ascorbic acid 500 MG Cpcr CR capsule    vitamin C     Take 500 mg by mouth daily        aspirin 81 MG EC tablet     30 tablet    Take 1 tablet (81 mg) by mouth daily    S/P TAVR (transcatheter aortic valve replacement)       atorvastatin 40 MG tablet    LIPITOR    90 tablet    Take 1 tablet (40 mg) by mouth daily    Hyperlipidemia LDL goal <130       calcium carbonate 1250 MG tablet    OS-SHELIA 500 mg [employer]. Ca     Take 1,250 mg by mouth daily        dofetilide 125 MCG capsule    TIKOSYN    60 capsule    Take 1 capsule (125 mcg) by mouth 2 times daily    Paroxysmal atrial fibrillation (H)       * leflunomide 10 MG tablet    ARAVA    30 tablet    Take 1 tablet (10 mg) by mouth daily    Rheumatoid arthritis involving multiple sites with positive rheumatoid factor (H)       * leflunomide 20 MG tablet    ARAVA    30 tablet    Take 1 tablet (20 mg) by mouth daily    Rheumatoid  arthritis involving multiple sites with positive rheumatoid factor (H)       lisinopril 10 MG tablet    PRINIVIL/ZESTRIL    62 tablet    Take 0.5 tablets (5 mg) by mouth daily    Acute on chronic systolic congestive heart failure (H)       LUPRON DEPOT IM      Inject into the muscle every 6 months Reported on 5/3/2017        metoprolol 25 MG tablet    LOPRESSOR    60 tablet    Take 1 tablet (25 mg) by mouth 2 times daily    S/P TAVR (transcatheter aortic valve replacement)       predniSONE 5 MG tablet    DELTASONE    100 tablet    Take 1 tablet (5 mg) by mouth daily    Rheumatoid arthritis involving multiple sites with positive rheumatoid factor (H)       SPIRONOLACTONE PO      Take 25 mg by mouth daily        tamsulosin 0.4 MG capsule    FLOMAX    60 capsule    Take 1 capsule (0.4 mg) by mouth daily    S/P TAVR (transcatheter aortic valve replacement)       * TRAMADOL HCL PO      Take 50 mg by mouth 3 times daily        * traMADol 50 MG tablet    ULTRAM    90 tablet    TAKE 1 TABLET 3 TIMES DAILY AS NEEDED FOR MODERATE PAIN    Rheumatoid arthritis involving multiple sites, unspecified rheumatoid factor presence (H)       VITAMIN D (CHOLECALCIFEROL) PO      Take 2,000 Units by mouth daily Reported on 5/3/2017        * Notice:  This list has 4 medication(s) that are the same as other medications prescribed for you. Read the directions carefully, and ask your doctor or other care provider to review them with you.

## 2017-11-16 ENCOUNTER — HOSPITAL ENCOUNTER (OUTPATIENT)
Dept: CARDIAC REHAB | Facility: CLINIC | Age: 82
End: 2017-11-16
Attending: FAMILY MEDICINE
Payer: MEDICARE

## 2017-11-16 VITALS — BODY MASS INDEX: 23.23 KG/M2 | WEIGHT: 148 LBS | HEIGHT: 67 IN

## 2017-11-16 PROCEDURE — 93798 PHYS/QHP OP CAR RHAB W/ECG: CPT | Performed by: REHABILITATION PRACTITIONER

## 2017-11-16 PROCEDURE — 40000116 ZZH STATISTIC OP CR VISIT: Performed by: REHABILITATION PRACTITIONER

## 2017-11-16 ASSESSMENT — 6 MINUTE WALK TEST (6MWT)
FEMALE CALC: 1154.13
PREDICTED: 1350.41
MALE CALC: 1342.23
TOTAL DISTANCE WALKED (FT): 200
GENDER SELECTION: MALE

## 2017-11-16 NOTE — PROGRESS NOTES
11/16/17 1000   Session   Session 30 Day Individualized Treatment Plan   Certified through this date 12/15/17   Cardiac Rehab Assessment   Cardiac Rehab Assessment Bud has made slow progress in cardiac rehab over the past month. He has been able to increase his exercise time from 3 bouts of 10 minutes  by therapeutic rest to 2 bouts of 20 minutes with 2 minutes of therapeutic rest. He has also initiated resistance training but shoulder ROM limits the upper extremity exercises he can do and knee pain prohibits squats. Bud at time exhibits confusion while he is in rehab but is able to follow instructions appropriately and wife today reports that he is excited about coming to his rehab session, often rushing her out of the house. Bud reports today that he feels improvement in his overall energy but expresses frustration in not being able to get out to his garage to work on projects. He has started walking 1/4 mile on non-rehab days to increase his strength and endurance. He will continue to benefit from skilled services to work towards his goal as listed below. HIIT protocol is not appropriate for this patient.    General Information   Treatment Diagnosis EPIFANIO   Date of Treatment Diagnosis 09/27/17   Significant Past CV History Pacemaker   Comorbidities Pulmonary Disease   Lead up symptoms dyspnea   Hospital Location [facility]   Signs and Symptoms Post Hospital Discharge Fatigue   Outpatient Cardiac Rehab Start Date 10/24/17   Primary Physician Dr. Aragon   Primary Physician Follow Up Advised to schedule appointment   Cardiologist Dr. Lozano   Cardiologist Follow Up Completed   Ejection Fraction 50-55%   Summary of Cath Report   Summary of Cath Report Not Applicable   Living and Work Status    Living Arrangements and Social Status house;spouse   Support System Live with an adult   Return to Employment Retired   Preventative Medications   CMS recommended medications Ace  "inhibitors;Anticoagulants;Beta Blocker;Lipid Lowering   Fall Risk Screen   Fall screen completed by Cardiac Rehab   Have you fallen 2 or more times in the past year? No   Have you fallen and had an injury in the past year? No   Is patient a fall risk? No   Fall screen comments Uses cane for assist at clinic and walker at home   Pain   Patient Currently in Pain No   Physical Assessments   Incisions WNL   Edema +3 Moderate  (left greater than right)   Right Lung Sounds normal   Left Lung Sounds normal   Individualized Treatment Plan   Monitored Sessions Scheduled 18   Monitored Sessions Attended 7   Oxygen   Supplemental Oxygen needed No   Nutrition Management - Weight Management   Assessment Re-assessment   Age 89   Weight 67.1 kg (148 lb)   Height 1.702 m (5' 7.01\")   BMI (Calculated) 23.22   Initial Rate Your Plate Score. Dietary tool to assess eating patterns. Scores range from 24 to 72. The higher the score the healthier the eating pattern. (not assessed, see initial ITP)   Nutrition Management - Lipids   Lipids Labs Available   Date 03/06/17   Total Cholesterol 139   Triglycerides 156   HDL 34   LDL 74   Prescribed Lipid Medication Yes   Statin Intensity High Intensity   Nutrition Management - Diabetes   Diabetes No   Nutrition Management Summary   Dietary Recommendations Low Sodium   Stages of Change for Diet Compliance Action   Nutrition Target Outcome BMI < 25   Psychosocial Management   Psychosocial Assessment Re-assessment   Is there history of clinical depression or increased risk of depression? No previous history   Current Level of Stress per Patient Report Mild   Current Coping Skills Patient Unable to Identify Personal Coping Skills   Initial Patient Health Questionnaire -9 Score (PHQ-9) for depression. 5-9 Minimal symptoms, 10-14 Minor depression, 15-19 Major depression, moderately severe, > 20 Major depression, severe  (not assessed, see initial ITP)   Initial [facility] [place] Survey score.  Quality of " Life:   If total score > 25 review individual areas where patient rated a 4 or 5.  Consider patients current medical condition and what role that plays on the score.   Adjust treatment protocol to improve areas of concern.  Consider the following:  PHQ9 score, DASI, and re-assessment within the next 30 days to assist with developing treatments.  (not assessed, see initial ITP)   Other Core Components - Hypertension   History of or Diagnosis of Hypertension Yes   Currently taking Anti-Hypertensives Yes;Beta blocker;Ace Inhibitor   Other Core Components - Tobacco   History of Tobacco Use Yes   Quit Date or Planned Quit Date 10/24/97   Tobacco Use Status Former (Quit > 6 mo ago)   Tobacco Habit Cigarettes   Tobacco Use per Day (average) 1 ppd   Years of Tobacco Use 50   Stages of Change Maintenance   Other Core Components Summary   Interventions Planned Instruct patient on the DASH diet;Provide information on home blood pressure monitoring;Attend education class on Blood Pressure   Activity/Exercise History   Activity/Exercise Assessment Re-assessment   Activity/Exercise Status prior to event? Sedentary   Number of Days Currently participating in Moderate Physical Activity? 0   Number of Days Currently performing  Aerobic Exercise (including rehab)? 2   Number of Minutes per Session Currently of Aerobic Exercise (average)? 45   Current Stage of Change (Physical Activity) Preparation   Current Stage of Change (Aerobic Exercise) Pre-Contemplation   Patient Goals Goal #1;Goal #2   Goal #1 Description Patient will be able to resume his hobby of woodworking in his garage, estimated MET level needed 3.5-4 METs   Goal #1 Target Date 12/05/17   Goal #1 Progress Towards Goal 10/24 Will start attending cardiac rehab 2x/week to increase aerobic exercise tolerance and endurance. Will also initiate resistance training to increase muscle strength. 11/16 Has been out to garage once since starting rehab, still feels limited by lack of  energy   Goal #2 Description Patient will be able to walk to and from his garage without dyspnea; approximately 200 feet   Goal #2 Target Date 11/14/17   Goal #2 Date Met 11/16/17   Goal #2 Progress Towards Goal 11/16 Patient is walking 1/4 mile daily with walker at home   Activity/Exercise Target Outcome An Accumulation of 150  Minutes of Aerobic Activity per Week   Exercise Assessment   6 Minute Walk Predicted - Gender Selection Male   6 Minute Walk Predicted (Male) 1342.23   6 Minute Walk Predicted (Female) 1154.13   Initial 6 Minute Walk Distance (Feet) 200 ft   Resting HR 71 bpm   Exercise HR 81 bpm   Post Exercise HR 71 bpm   Resting /62   Exercise /62   Post Exercise /64   Current MET Level 2   MET Level Goal 3-4   ECG Rhythm Paced rhythm   Ectopy None   Current Symptoms Fatigue;Joint pain   Limitations/Restrictions Orthopedic (see comments)   Exercise Prescription   Mode Weights;Nustep   Duration/Time 30-45 min;Intermittent bouts   Frequency 2 days/week   THR (85% of age predicted max HR) 110.5   OMNI Effort Rating (0-10 Scale) 4-6/10   Progression Intermittent bouts;Total exercise time of 30-45 minutes;Aerobic exercise to OMNI rating of 5-7, and heart rate at or below target;Progress peak intensity by 1/4 MET per week;Continuous bouts  (progress exercise to continuous bouts first then increase wk)   Recommended Home Exercise   Type of Exercise Walking   Frequency (days per week) 5   Duration (minutes per session) Intermittent   Effort Rating Recommended 4-6/10   30 Day Exercise Plan continue daily 1/4 miles walks   Current Home Exercise   Type of Exercise Walking   Frequency (days per week) 5   Duration (minutes per session) 10-15   Follow-up/On-going Support   Provider follow-up needed on the following Other (see comments)  (Edema)   Comments Seeing TAVR clinic on 10/26/17   Learning Assessment   Learner Patient   Primary Language English   Preferred Learning Style Listening   Barriers to  Learning Cognitive;Hearing   Patient Education   Education recommended (none at this time)     Physician cosignature/electronic signature indicates approval of this ITP document. I have established, reviewed and made necessary changes to the individualized treatment plan and exercise prescription for this patient.

## 2017-11-16 NOTE — PROGRESS NOTES
Mr. Gallo Floyd is seen back in followup of his rheumatoid arthritis.  His wife who comes in today says that their daughter who is a nurse questioned about him having fibromyalgia because he hurts all over.  They are also wondering about therapy, but given his age of 89, I would be reluctant to put him on anything like Neurontin, Cymbalta or Lyrica.  Apparently, the tramadol does help, whereas hydrocodone can make him a little bit loopy.      He does hurt though in his joints as well.  He remains on just the 10 mg a leflunomide.  I think in the past we have tried to increase his dose, but it has made him nauseous.  He also remains on low-dose prednisone.  He is stiff in the morning for over an hour.  He does have pain that is widespread as well as muscular and joint related.      PHYSICAL EXAMINATION:     VITAL SIGNS:  Blood pressure 110/68, pulse 70.     LUNGS:  Clear.   HEART:  Regular.     JOINT EXAM:  He does have a synovial cyst of the left wrist.  I do think there is some synovitis of both wrists, also of the bilateral 2nd and 3rd MCP joints.   SKIN:  Without lesions.  He does have multiple tender points that would be consistent with fibromyalgia.        IMPRESSION:     1.  He probably does have a component of fibromyalgia.   2.  Seropositive rheumatoid arthritis.      RECOMMENDATIONS:   1.  Try to increase the leflunomide once more to 20 mg daily, see if he tolerates that and if that is more effective.   2.  Continue low-dose prednisone.   3.  Continue tramadol for breakthrough pain.  I would be reluctant to place him on anything that would have any CNS effects.   4.  He will need labs today and every 3 months.   5.  Follow up in 6 months.         UZMA TAYLOR MD             D: 11/15/2017 12:47   T: 2017 06:09   MT: RM      Name:     GALLO FLOYD   MRN:      [id number]        Account:      [id number]   :      1927           Visit Date:   11/15/2017      Document: [id number]

## 2017-11-21 ENCOUNTER — HOSPITAL ENCOUNTER (OUTPATIENT)
Dept: CARDIAC REHAB | Facility: CLINIC | Age: 82
End: 2017-11-21
Attending: FAMILY MEDICINE
Payer: MEDICARE

## 2017-11-21 PROCEDURE — 93798 PHYS/QHP OP CAR RHAB W/ECG: CPT

## 2017-11-21 PROCEDURE — 40000116 ZZH STATISTIC OP CR VISIT

## 2017-11-24 ENCOUNTER — HOSPITAL ENCOUNTER (OUTPATIENT)
Dept: CARDIAC REHAB | Facility: CLINIC | Age: 82
End: 2017-11-24
Attending: FAMILY MEDICINE
Payer: MEDICARE

## 2017-11-24 PROCEDURE — 40000116 ZZH STATISTIC OP CR VISIT

## 2017-11-24 PROCEDURE — 93798 PHYS/QHP OP CAR RHAB W/ECG: CPT

## 2017-11-27 DIAGNOSIS — M06.9 RHEUMATOID ARTHRITIS INVOLVING MULTIPLE SITES, UNSPECIFIED RHEUMATOID FACTOR PRESENCE: Primary | ICD-10-CM

## 2017-11-27 NOTE — TELEPHONE ENCOUNTER
Hydrocodone-Acetaminophen 5-325 Mg      Last Office Visit: 9-21-17  Last Written Prescription Date:  9-29-17  Last Fill Quantity: 1,   # refills: 0  Future Office visit:       Routing refill request to provider for review/approval because:  Drug not on the [facility], P or [facility] refill protocol or controlled substance. Medication isn't currently on pt medication list.       Routed to Dr. Chisholm in Dr. Aragon's absence.

## 2017-11-28 ENCOUNTER — HOSPITAL ENCOUNTER (OUTPATIENT)
Dept: CARDIAC REHAB | Facility: CLINIC | Age: 82
End: 2017-11-28
Attending: FAMILY MEDICINE
Payer: MEDICARE

## 2017-11-28 PROCEDURE — 40000116 ZZH STATISTIC OP CR VISIT

## 2017-11-28 PROCEDURE — 93798 PHYS/QHP OP CAR RHAB W/ECG: CPT

## 2017-11-28 RX ORDER — HYDROCODONE BITARTRATE AND ACETAMINOPHEN 5; 325 MG/1; MG/1
1 TABLET ORAL EVERY 6 HOURS PRN
Qty: 18 TABLET | Refills: 0 | Status: SHIPPED | OUTPATIENT
Start: 2017-11-28 | End: 2018-01-04

## 2017-11-28 NOTE — TELEPHONE ENCOUNTER
Signed original Rx faxed to [facility] and put in bin at  to be picked up by Pharmacy. Jagruti,

## 2017-11-30 ENCOUNTER — HOSPITAL ENCOUNTER (OUTPATIENT)
Dept: CARDIAC REHAB | Facility: CLINIC | Age: 82
End: 2017-11-30
Attending: FAMILY MEDICINE
Payer: MEDICARE

## 2017-11-30 PROCEDURE — 40000116 ZZH STATISTIC OP CR VISIT: Performed by: REHABILITATION PRACTITIONER

## 2017-11-30 PROCEDURE — 93798 PHYS/QHP OP CAR RHAB W/ECG: CPT | Performed by: REHABILITATION PRACTITIONER

## 2017-12-05 ENCOUNTER — HOSPITAL ENCOUNTER (OUTPATIENT)
Dept: CARDIAC REHAB | Facility: CLINIC | Age: 82
End: 2017-12-05
Attending: FAMILY MEDICINE
Payer: MEDICARE

## 2017-12-05 ENCOUNTER — DOCUMENTATION ONLY (OUTPATIENT)
Dept: SPIRITUAL SERVICES | Facility: CLINIC | Age: 82
End: 2017-12-05

## 2017-12-05 DIAGNOSIS — M06.9 RHEUMATOID ARTHRITIS INVOLVING MULTIPLE SITES, UNSPECIFIED RHEUMATOID FACTOR PRESENCE: ICD-10-CM

## 2017-12-05 DIAGNOSIS — Z71.81 SPIRITUAL OR RELIGIOUS COUNSELING: Primary | ICD-10-CM

## 2017-12-05 PROCEDURE — 93798 PHYS/QHP OP CAR RHAB W/ECG: CPT

## 2017-12-05 PROCEDURE — 93797 PHYS/QHP OP CAR RHAB WO ECG: CPT

## 2017-12-05 PROCEDURE — 40000116 ZZH STATISTIC OP CR VISIT

## 2017-12-05 PROCEDURE — 40000575 ZZH STATISTIC OP CARDIAC VISIT #2

## 2017-12-05 NOTE — TELEPHONE ENCOUNTER
Tramadol 50 MG      Last Written Prescription Date:  11/1/17  Last Fill Quantity: 90,   # refills: 0  Last Office Visit: 9/21/17  Future Office visit:       Routing refill request to provider for review/approval because:  Drug not on the [facility], P or [facility] refill protocol or controlled substance

## 2017-12-05 NOTE — PROGRESS NOTES
"SPIRITUAL HEALTH SERVICES  SPIRITUAL ASSESSMENT Progress Note  [facility]      Bud & Tierra Merritt participated in the \"Emotions & Heart Disease Class\".    Greyson Vallejo M.Div., [facility]  Staff   Office tel: [phone]    "

## 2017-12-06 RX ORDER — TRAMADOL HYDROCHLORIDE 50 MG/1
TABLET ORAL
Qty: 90 TABLET | Refills: 0 | Status: SHIPPED | OUTPATIENT
Start: 2017-12-06 | End: 2018-01-04

## 2017-12-06 NOTE — ADDENDUM NOTE
Encounter addended by: Carito Munroe EP on: 12/6/2017  3:46 PM<BR>     Actions taken: Charge Capture section accepted

## 2017-12-07 ENCOUNTER — HOSPITAL ENCOUNTER (OUTPATIENT)
Dept: CARDIAC REHAB | Facility: CLINIC | Age: 82
End: 2017-12-07
Attending: FAMILY MEDICINE
Payer: MEDICARE

## 2017-12-07 DIAGNOSIS — I48.0 PAROXYSMAL ATRIAL FIBRILLATION (H): ICD-10-CM

## 2017-12-07 PROCEDURE — 40000116 ZZH STATISTIC OP CR VISIT: Performed by: REHABILITATION PRACTITIONER

## 2017-12-07 PROCEDURE — 93798 PHYS/QHP OP CAR RHAB W/ECG: CPT | Performed by: REHABILITATION PRACTITIONER

## 2017-12-08 ENCOUNTER — CARE COORDINATION (OUTPATIENT)
Dept: CARE COORDINATION | Facility: CLINIC | Age: 82
End: 2017-12-08

## 2017-12-08 NOTE — PROGRESS NOTES
Clinic Care Coordination Contact  OUTREACH    Referral Information:  Referral Source: Tele-Monitoring  Reason for Contact: ED follow up       Universal Utilization:   ED Visits in last year: 2  Hospital visits in last year: 4  Last PCP appointment: 04/10/17  Missed Appointments: 1     Multiple Providers or Specialists: Cardiology    Clinical Concerns:  Current Medical Concerns: Called and spoke with wife Tierra.  States pt is doing ok, he is getting up and going to cardiac rehab twice a week. States it seems to be getting harder for him to do.  Discussed home care and pt states she doesn't do that because this is the only time he gets out of the house.  I did recommend again to follow up in clinic with his PCP to address any issues, changes in health.  Expressed caregiver burnout/stress and sounds like she is getting pretty worn out caring for him.  Wife states she will make an appt.   Current Behavioral Concerns: no current concerns    Education Provided to patient: strongly encouraged wife to make an appt to see Dr. Aragon       Clinical Pathway: None    Medication Management:  wife     Functional Status:  Mobility Status: Independent w/Device     Transportation: wife     Psychosocial:  Current living arrangement:: I live in a private home with spouse  Financial/Insurance: not discussed     Resources and Interventions:  Current Resources:      Advanced Care Plans/Directives on file:: No        Goals: TBD  Barriers: na  Strengths: na  Patient/Caregiver understanding: wife states she is not getting much sleep and running out of energy.  Advised to make appt and possibly imitate home care.   Frequency of Care Coordination: prn/monthly  Upcoming appointment:      Plan:   Wife will continue to assist pt at home   Wife will call and make appt with PCP  RN CC will outreach in 7-10 business days.     Yumiko MORALESN, RN, PHN  Care Coordination    [address] [place] [address]  Office: [phone]  Fax [phone]   [place] [place] [address]  Office: [phone] Fax [phone]  [email or web address]   Connect with [facility] on social media.

## 2017-12-12 ENCOUNTER — APPOINTMENT (OUTPATIENT)
Dept: GENERAL RADIOLOGY | Facility: CLINIC | Age: 82
End: 2017-12-12
Attending: PHYSICIAN ASSISTANT
Payer: MEDICARE

## 2017-12-12 ENCOUNTER — APPOINTMENT (OUTPATIENT)
Dept: CT IMAGING | Facility: CLINIC | Age: 82
End: 2017-12-12
Attending: PHYSICIAN ASSISTANT
Payer: MEDICARE

## 2017-12-12 ENCOUNTER — HOSPITAL ENCOUNTER (OUTPATIENT)
Facility: CLINIC | Age: 82
Setting detail: OBSERVATION
Discharge: HOME | End: 2017-12-13
Attending: PHYSICIAN ASSISTANT | Admitting: INTERNAL MEDICINE
Payer: MEDICARE

## 2017-12-12 ENCOUNTER — TELEPHONE (OUTPATIENT)
Dept: FAMILY MEDICINE | Facility: CLINIC | Age: 82
End: 2017-12-12

## 2017-12-12 VITALS — BODY MASS INDEX: 23.39 KG/M2 | WEIGHT: 149 LBS | HEIGHT: 67 IN

## 2017-12-12 DIAGNOSIS — D72.819 LEUKOPENIA, UNSPECIFIED TYPE: ICD-10-CM

## 2017-12-12 DIAGNOSIS — J18.9 PNEUMONIA SYMPTOMS: ICD-10-CM

## 2017-12-12 DIAGNOSIS — K59.00 CONSTIPATION, UNSPECIFIED CONSTIPATION TYPE: Primary | ICD-10-CM

## 2017-12-12 DIAGNOSIS — R53.1 GENERALIZED WEAKNESS: ICD-10-CM

## 2017-12-12 DIAGNOSIS — R42 DIZZINESS: ICD-10-CM

## 2017-12-12 DIAGNOSIS — R11.0 NAUSEA: ICD-10-CM

## 2017-12-12 DIAGNOSIS — E87.1 HYPONATREMIA: ICD-10-CM

## 2017-12-12 PROBLEM — J06.9 VIRAL URI: Status: ACTIVE | Noted: 2017-12-12

## 2017-12-12 LAB
ALBUMIN SERPL-MCNC: 2.9 G/DL (ref 3.4–5)
ALBUMIN UR-MCNC: NEGATIVE MG/DL
ALP SERPL-CCNC: 91 U/L (ref 40–150)
ALT SERPL W P-5'-P-CCNC: 17 U/L (ref 0–70)
ANION GAP SERPL CALCULATED.3IONS-SCNC: 10 MMOL/L (ref 3–14)
APPEARANCE UR: CLEAR
AST SERPL W P-5'-P-CCNC: 30 U/L (ref 0–45)
BASOPHILS # BLD AUTO: 0 10E9/L (ref 0–0.2)
BASOPHILS NFR BLD AUTO: 0.3 %
BILIRUB SERPL-MCNC: 0.4 MG/DL (ref 0.2–1.3)
BILIRUB UR QL STRIP: NEGATIVE
BUN SERPL-MCNC: 17 MG/DL (ref 7–30)
CALCIUM SERPL-MCNC: 7.9 MG/DL (ref 8.5–10.1)
CHLORIDE SERPL-SCNC: 96 MMOL/L (ref 94–109)
CO2 SERPL-SCNC: 23 MMOL/L (ref 20–32)
COLOR UR AUTO: YELLOW
CREAT SERPL-MCNC: 0.99 MG/DL (ref 0.66–1.25)
DIFFERENTIAL METHOD BLD: ABNORMAL
EOSINOPHIL # BLD AUTO: 0 10E9/L (ref 0–0.7)
EOSINOPHIL NFR BLD AUTO: 0 %
ERYTHROCYTE [DISTWIDTH] IN BLOOD BY AUTOMATED COUNT: 15.1 % (ref 10–15)
FLUAV+FLUBV AG SPEC QL: NEGATIVE
FLUAV+FLUBV AG SPEC QL: NEGATIVE
GFR SERPL CREATININE-BSD FRML MDRD: 71 ML/MIN/1.7M2
GLUCOSE SERPL-MCNC: 106 MG/DL (ref 70–99)
GLUCOSE UR STRIP-MCNC: NEGATIVE MG/DL
HCT VFR BLD AUTO: 31.6 % (ref 40–53)
HGB BLD-MCNC: 10 G/DL (ref 13.3–17.7)
HGB UR QL STRIP: NEGATIVE
HYALINE CASTS #/AREA URNS LPF: 3 /LPF (ref 0–2)
IMM GRANULOCYTES # BLD: 0 10E9/L (ref 0–0.4)
IMM GRANULOCYTES NFR BLD: 0 %
KETONES UR STRIP-MCNC: 5 MG/DL
LACTATE BLD-SCNC: 1.9 MMOL/L (ref 0.7–2)
LEUKOCYTE ESTERASE UR QL STRIP: NEGATIVE
LIPASE SERPL-CCNC: 109 U/L (ref 73–393)
LYMPHOCYTES # BLD AUTO: 1.2 10E9/L (ref 0.8–5.3)
LYMPHOCYTES NFR BLD AUTO: 38.3 %
MCH RBC QN AUTO: 29.9 PG (ref 26.5–33)
MCHC RBC AUTO-ENTMCNC: 31.6 G/DL (ref 31.5–36.5)
MCV RBC AUTO: 94 FL (ref 78–100)
MONOCYTES # BLD AUTO: 0.5 10E9/L (ref 0–1.3)
MONOCYTES NFR BLD AUTO: 16.9 %
MUCOUS THREADS #/AREA URNS LPF: PRESENT /LPF
NEUTROPHILS # BLD AUTO: 1.4 10E9/L (ref 1.6–8.3)
NEUTROPHILS NFR BLD AUTO: 44.5 %
NITRATE UR QL: NEGATIVE
NT-PROBNP SERPL-MCNC: 1596 PG/ML (ref 0–1800)
PH UR STRIP: 5 PH (ref 5–7)
PLATELET # BLD AUTO: 170 10E9/L (ref 150–450)
POTASSIUM SERPL-SCNC: 4.1 MMOL/L (ref 3.4–5.3)
PROT SERPL-MCNC: 6.2 G/DL (ref 6.8–8.8)
PSA SERPL-MCNC: 0.32 UG/L (ref 0–4)
RBC # BLD AUTO: 3.35 10E12/L (ref 4.4–5.9)
RBC #/AREA URNS AUTO: 1 /HPF (ref 0–2)
SODIUM SERPL-SCNC: 129 MMOL/L (ref 133–144)
SOURCE: ABNORMAL
SP GR UR STRIP: 1.01 (ref 1–1.03)
SPECIMEN SOURCE: NORMAL
SQUAMOUS #/AREA URNS AUTO: <1 /HPF (ref 0–1)
TROPONIN I SERPL-MCNC: 0.02 UG/L (ref 0–0.04)
UROBILINOGEN UR STRIP-MCNC: 0 MG/DL (ref 0–2)
WBC # BLD AUTO: 3.1 10E9/L (ref 4–11)
WBC #/AREA URNS AUTO: 1 /HPF (ref 0–2)

## 2017-12-12 PROCEDURE — 83690 ASSAY OF LIPASE: CPT | Performed by: PHYSICIAN ASSISTANT

## 2017-12-12 PROCEDURE — 25000128 H RX IP 250 OP 636: Performed by: PHYSICIAN ASSISTANT

## 2017-12-12 PROCEDURE — 96361 HYDRATE IV INFUSION ADD-ON: CPT

## 2017-12-12 PROCEDURE — 80053 COMPREHEN METABOLIC PANEL: CPT | Performed by: PHYSICIAN ASSISTANT

## 2017-12-12 PROCEDURE — 70450 CT HEAD/BRAIN W/O DYE: CPT

## 2017-12-12 PROCEDURE — 83880 ASSAY OF NATRIURETIC PEPTIDE: CPT | Performed by: PHYSICIAN ASSISTANT

## 2017-12-12 PROCEDURE — 27210995 ZZH RX 272: Performed by: PHYSICIAN ASSISTANT

## 2017-12-12 PROCEDURE — 36415 COLL VENOUS BLD VENIPUNCTURE: CPT | Performed by: PHYSICIAN ASSISTANT

## 2017-12-12 PROCEDURE — 87081 CULTURE SCREEN ONLY: CPT | Performed by: FAMILY MEDICINE

## 2017-12-12 PROCEDURE — 96375 TX/PRO/DX INJ NEW DRUG ADDON: CPT | Performed by: FAMILY MEDICINE

## 2017-12-12 PROCEDURE — 81001 URINALYSIS AUTO W/SCOPE: CPT | Performed by: PHYSICIAN ASSISTANT

## 2017-12-12 PROCEDURE — 96361 HYDRATE IV INFUSION ADD-ON: CPT | Performed by: FAMILY MEDICINE

## 2017-12-12 PROCEDURE — 83605 ASSAY OF LACTIC ACID: CPT | Performed by: PHYSICIAN ASSISTANT

## 2017-12-12 PROCEDURE — 96365 THER/PROPH/DIAG IV INF INIT: CPT | Performed by: FAMILY MEDICINE

## 2017-12-12 PROCEDURE — 99285 EMERGENCY DEPT VISIT HI MDM: CPT | Mod: 25 | Performed by: FAMILY MEDICINE

## 2017-12-12 PROCEDURE — 87040 BLOOD CULTURE FOR BACTERIA: CPT | Performed by: PHYSICIAN ASSISTANT

## 2017-12-12 PROCEDURE — 84484 ASSAY OF TROPONIN QUANT: CPT | Performed by: PHYSICIAN ASSISTANT

## 2017-12-12 PROCEDURE — 99219 ZZC INITIAL OBSERVATION CARE,LEVL II: CPT | Performed by: INTERNAL MEDICINE

## 2017-12-12 PROCEDURE — 71020 XR CHEST 2 VW: CPT | Mod: TC

## 2017-12-12 PROCEDURE — 25000132 ZZH RX MED GY IP 250 OP 250 PS 637: Mod: GY | Performed by: INTERNAL MEDICINE

## 2017-12-12 PROCEDURE — G0378 HOSPITAL OBSERVATION PER HR: HCPCS

## 2017-12-12 PROCEDURE — 84145 PROCALCITONIN (PCT): CPT | Performed by: PHYSICIAN ASSISTANT

## 2017-12-12 PROCEDURE — A9270 NON-COVERED ITEM OR SERVICE: HCPCS | Mod: GY | Performed by: INTERNAL MEDICINE

## 2017-12-12 PROCEDURE — 84153 ASSAY OF PSA TOTAL: CPT | Performed by: PHYSICIAN ASSISTANT

## 2017-12-12 PROCEDURE — 87804 INFLUENZA ASSAY W/OPTIC: CPT | Performed by: PHYSICIAN ASSISTANT

## 2017-12-12 PROCEDURE — 93005 ELECTROCARDIOGRAM TRACING: CPT | Performed by: FAMILY MEDICINE

## 2017-12-12 PROCEDURE — 93010 ELECTROCARDIOGRAM REPORT: CPT | Mod: Z6 | Performed by: FAMILY MEDICINE

## 2017-12-12 PROCEDURE — 96376 TX/PRO/DX INJ SAME DRUG ADON: CPT

## 2017-12-12 PROCEDURE — 85025 COMPLETE CBC W/AUTO DIFF WBC: CPT | Performed by: PHYSICIAN ASSISTANT

## 2017-12-12 RX ORDER — CEFTRIAXONE SODIUM 2 G/50ML
2 INJECTION, SOLUTION INTRAVENOUS EVERY 24 HOURS
Status: DISCONTINUED | OUTPATIENT
Start: 2017-12-12 | End: 2017-12-12

## 2017-12-12 RX ORDER — CEFUROXIME AXETIL 250 MG/1
250 TABLET ORAL EVERY 12 HOURS SCHEDULED
Status: DISCONTINUED | OUTPATIENT
Start: 2017-12-12 | End: 2017-12-13 | Stop reason: HOSPADM

## 2017-12-12 RX ORDER — AZITHROMYCIN 250 MG/1
250 TABLET, FILM COATED ORAL DAILY
Status: DISCONTINUED | OUTPATIENT
Start: 2017-12-13 | End: 2017-12-12

## 2017-12-12 RX ORDER — POLYETHYLENE GLYCOL 3350 17 G/17G
17 POWDER, FOR SOLUTION ORAL DAILY
Status: DISCONTINUED | OUTPATIENT
Start: 2017-12-12 | End: 2017-12-13 | Stop reason: HOSPADM

## 2017-12-12 RX ORDER — DOFETILIDE 0.12 MG/1
125 CAPSULE ORAL 2 TIMES DAILY
Status: DISCONTINUED | OUTPATIENT
Start: 2017-12-12 | End: 2017-12-13 | Stop reason: HOSPADM

## 2017-12-12 RX ORDER — BISACODYL 10 MG
10 SUPPOSITORY, RECTAL RECTAL DAILY PRN
Status: DISCONTINUED | OUTPATIENT
Start: 2017-12-12 | End: 2017-12-13 | Stop reason: HOSPADM

## 2017-12-12 RX ORDER — NALOXONE HYDROCHLORIDE 0.4 MG/ML
.1-.4 INJECTION, SOLUTION INTRAMUSCULAR; INTRAVENOUS; SUBCUTANEOUS
Status: DISCONTINUED | OUTPATIENT
Start: 2017-12-12 | End: 2017-12-13 | Stop reason: HOSPADM

## 2017-12-12 RX ORDER — SODIUM CHLORIDE 9 MG/ML
INJECTION, SOLUTION INTRAVENOUS CONTINUOUS
Status: DISCONTINUED | OUTPATIENT
Start: 2017-12-12 | End: 2017-12-13

## 2017-12-12 RX ORDER — SODIUM CHLORIDE 9 MG/ML
1000 INJECTION, SOLUTION INTRAVENOUS CONTINUOUS
Status: DISCONTINUED | OUTPATIENT
Start: 2017-12-12 | End: 2017-12-12

## 2017-12-12 RX ORDER — PREDNISONE 5 MG/1
5 TABLET ORAL DAILY
Status: DISCONTINUED | OUTPATIENT
Start: 2017-12-13 | End: 2017-12-13 | Stop reason: HOSPADM

## 2017-12-12 RX ORDER — ACETAMINOPHEN 325 MG/1
650 TABLET ORAL EVERY 4 HOURS PRN
Status: DISCONTINUED | OUTPATIENT
Start: 2017-12-12 | End: 2017-12-13 | Stop reason: HOSPADM

## 2017-12-12 RX ORDER — CEFTRIAXONE SODIUM 2 G
2 VIAL (EA) INJECTION EVERY 24 HOURS
Status: DISCONTINUED | OUTPATIENT
Start: 2017-12-12 | End: 2017-12-12

## 2017-12-12 RX ORDER — ONDANSETRON 2 MG/ML
4 INJECTION INTRAMUSCULAR; INTRAVENOUS EVERY 6 HOURS PRN
Status: DISCONTINUED | OUTPATIENT
Start: 2017-12-12 | End: 2017-12-13 | Stop reason: HOSPADM

## 2017-12-12 RX ORDER — METOPROLOL TARTRATE 25 MG/1
25 TABLET, FILM COATED ORAL 2 TIMES DAILY
Status: DISCONTINUED | OUTPATIENT
Start: 2017-12-12 | End: 2017-12-13 | Stop reason: HOSPADM

## 2017-12-12 RX ORDER — LISINOPRIL 2.5 MG/1
5 TABLET ORAL DAILY
Status: DISCONTINUED | OUTPATIENT
Start: 2017-12-13 | End: 2017-12-13 | Stop reason: HOSPADM

## 2017-12-12 RX ORDER — ONDANSETRON 4 MG/1
4 TABLET, ORALLY DISINTEGRATING ORAL EVERY 6 HOURS PRN
Status: DISCONTINUED | OUTPATIENT
Start: 2017-12-12 | End: 2017-12-13 | Stop reason: HOSPADM

## 2017-12-12 RX ORDER — SPIRONOLACTONE 25 MG/1
25 TABLET ORAL DAILY
Status: DISCONTINUED | OUTPATIENT
Start: 2017-12-13 | End: 2017-12-13 | Stop reason: HOSPADM

## 2017-12-12 RX ORDER — ONDANSETRON 2 MG/ML
4 INJECTION INTRAMUSCULAR; INTRAVENOUS EVERY 30 MIN PRN
Status: DISCONTINUED | OUTPATIENT
Start: 2017-12-12 | End: 2017-12-12

## 2017-12-12 RX ADMIN — POLYETHYLENE GLYCOL 3350 17 G: 17 POWDER, FOR SOLUTION ORAL at 21:56

## 2017-12-12 RX ADMIN — ONDANSETRON 4 MG: 2 INJECTION INTRAMUSCULAR; INTRAVENOUS at 14:10

## 2017-12-12 RX ADMIN — AZITHROMYCIN MONOHYDRATE 500 MG: 500 INJECTION, POWDER, LYOPHILIZED, FOR SOLUTION INTRAVENOUS at 15:12

## 2017-12-12 RX ADMIN — APIXABAN 2.5 MG: 2.5 TABLET, FILM COATED ORAL at 21:57

## 2017-12-12 RX ADMIN — ONDANSETRON 4 MG: 2 INJECTION, SOLUTION INTRAMUSCULAR; INTRAVENOUS at 20:20

## 2017-12-12 RX ADMIN — CEFUROXIME AXETIL 250 MG: 250 TABLET ORAL at 23:04

## 2017-12-12 RX ADMIN — DOFETILIDE 125 MCG: 0.12 CAPSULE ORAL at 22:13

## 2017-12-12 RX ADMIN — SODIUM CHLORIDE 1000 ML: 9 INJECTION, SOLUTION INTRAVENOUS at 14:12

## 2017-12-12 RX ADMIN — SODIUM CHLORIDE 1000 ML: 9 INJECTION, SOLUTION INTRAVENOUS at 17:41

## 2017-12-12 RX ADMIN — WATER 2 G: 1 INJECTION INTRAMUSCULAR; INTRAVENOUS; SUBCUTANEOUS at 15:02

## 2017-12-12 RX ADMIN — METOPROLOL TARTRATE 25 MG: 25 TABLET ORAL at 21:57

## 2017-12-12 ASSESSMENT — ENCOUNTER SYMPTOMS
NUMBNESS: 0
SHORTNESS OF BREATH: 1
LIGHT-HEADEDNESS: 1
COUGH: 1
DIZZINESS: 1
WEAKNESS: 1
DIARRHEA: 0
FEVER: 0
FATIGUE: 1
APPETITE CHANGE: 1
ABDOMINAL PAIN: 0
ACTIVITY CHANGE: 1
VOMITING: 1
NAUSEA: 1
HEADACHES: 1

## 2017-12-12 ASSESSMENT — 6 MINUTE WALK TEST (6MWT)
PREDICTED: 1347.77
GENDER SELECTION: MALE
FEMALE CALC: 1150.71
MALE CALC: 1339.61
TOTAL DISTANCE WALKED (FT): 200

## 2017-12-12 NOTE — ED PROVIDER NOTES
History     Chief Complaint   Patient presents with     Nausea & Vomiting     HPI  Bud Merritt is a 89 year old male with a history of atrial fibrillation on Eliquis, COPD, CHF s/p TAVR, pacemaker placement, arthritis, who presents to the emergency department complaining of nausea, vomiting, and dizziness.  The patient and his wife provide history.  Last Thursday, 12/7, he went to cardiac rehab.  Afterwards he told his wife he was not feeling well.  He complained of nausea with lightheadedness and dizziness, and generalized weakness. He also developed a headache on the top of his head he describes as a dull ache. On Friday, 12/8, he developed a productive cough with green phlegm.  Symptoms have persisted until today.  He has had decreased appetite due to nausea, but does keep some food and fluids down.  He has tried to walk around the house at home per cardiac rehab recommendations, but that makes him feel more weak, lightheaded, and dizzy.  His wife notes that he is getting weaker all over.  The patient denies chest pain but feels more short of breath than usual.  He has not had a fever at home but has intermittent hot/cold flashes.  He has not had diarrhea.  He denies vision changes or numbness.        Problem List:    Patient Active Problem List    Diagnosis Date Noted     Osteoporosis 05/08/2013     Priority: High     S/P TAVR (transcatheter aortic valve replacement) 09/29/2017     Priority: Medium     Aortic stenosis, severe 09/27/2017     Priority: Medium     S/P AV lisandro ablation 06/30/2017     Priority: Medium     Venous stasis ulcers of both lower extremities (H) 03/28/2017     Priority: Medium     Anemia 03/28/2017     Priority: Medium     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, Bi-Ventricular- dependent 03/28/2017     Priority: Medium     Dilated cardiomyopathy (H) dilated LV, EF 20-25% by Echo 3/4/17 03/28/2017     Priority: Medium     Chronic systolic congestive heart failure (H) 03/28/2017      Priority: Medium     Immunosuppression (H) 03/28/2017     Priority: Medium     Cellulitis of right lower extremity 03/27/2017     Priority: Medium     Malignant neoplasm of prostate (H) 03/22/2017     Priority: Medium     Atrial fibrillation (H) 03/04/2017     Priority: Medium     Diarrhea 03/02/2017     Priority: Medium     Weakness generalized 03/02/2017     Priority: Medium     Cough 03/02/2017     Priority: Medium     Acute cystitis without hematuria 03/02/2017     Priority: Medium     COPD exacerbation (H) 03/02/2017     Priority: Medium     Influenza A 03/02/2017     Priority: Medium     Acute respiratory failure with hypoxia (H) 03/02/2017     Priority: Medium     Atrial fibrillation with rapid ventricular response (H) 03/02/2017     Priority: Medium     Shock (H) 03/02/2017     Priority: Medium     Visit for monitoring Tikosyn therapy 01/23/2017     Priority: Medium     Paroxysmal atrial fibrillation (H) 07/06/2016     Priority: Medium     Essential hypertension with goal blood pressure less than 140/90 06/22/2016     Priority: Medium     Infected superficial injury of knee, left, subsequent encounter 03/24/2016     Priority: Medium     Postoperative delirium 01/15/2016     Priority: Medium     Mitral regurgitation and aortic stenosis - AS severe by echo on 3/2017 01/13/2016     Priority: Medium     Status post total left knee replacement 01/12/2016     Priority: Medium     Rheumatoid arthritis involving multiple sites, unspecified rheumatoid factor presence (H) 12/02/2015     Priority: Medium     Elevated prostate specific antigen (PSA) 10/12/2015     Priority: Medium     Encounter for long-term current use of medication 06/02/2014     Priority: Medium     Problem list name updated by automated process. Provider to review       Hyperlipidemia LDL goal <130 10/31/2010     Priority: Medium     Rheumatoid arthritis involving multiple sites with positive rheumatoid factor (H) 09/16/2010     Priority: Medium      Hypertrophy of prostate without urinary obstruction 11/13/2003     Priority: Medium     Problem list name updated by automated process. Provider to review          Past Medical History:    Past Medical History:   Diagnosis Date     Anemia      Atrial fibrillation (H) 07/01/2016     Cardiac pacemaker 03/10/2017     Cardiomyopathy (H)      CHF (congestive heart failure) (H)      COPD exacerbation (H) 3/2/2017     Depressive disorder      History of prostate cancer      Paroxysmal atrial fibrillation (H) 7/6/2016     Pure hypercholesterolemia      Rheumatoid arthritis(714.0)      Unspecified essential hypertension      Weakness generalized 3/2/2017       Past Surgical History:    Past Surgical History:   Procedure Laterality Date     ARTHROPLASTY KNEE Left 1/12/2016    Procedure: ARTHROPLASTY KNEE;  Surgeon: Cesar Walker DO;  Location: [facility] OR     COLONOSCOPY  08/24/09     HC COLONOSCOPY THRU STOMA W BIOPSY/CAUTERY TUMOR/POLYP/LESION  8/31/2004     HC REPAIR ING HERNIA,5+Y/O,REDUCIBL  1996    Marlex mesh repair of bilateral inguinal hernias and drainage of bilateral scrotal hydroceles.     HEART CATH FEMORAL CANNULIZATION WITH OPEN STANDBY REPAIR AORTIC VALVE N/A 9/27/2017    Procedure: HEART CATH FEMORAL CANNULIZATION WITH OPEN STANDBY REPAIR AORTIC VALVE;;  Surgeon: Jaron Alejandra MD;  Location: [facility] OR     IMPLANT PACEMAKER  03/10/2017     IRRIGATION AND DEBRIDEMENT SOFT TISSUE LOWER EXTREMITY, COMBINED Left 3/15/2016    Procedure: COMBINED IRRIGATION AND DEBRIDEMENT SOFT TISSUE LOWER EXTREMITY;  Surgeon: Cesar Walker DO;  Location: [facility] OR     TRANSCATHETER AORTIC VALVE IMPLANT ANESTHESIA N/A 9/27/2017    Procedure: TRANSCATHETER AORTIC VALVE IMPLANT ANESTHESIA;  Right Transfemoral Approach (Harman Ramsey 3 29mm) Aortic Valve Implant,  Cardiopulmonary Bypass Standby, Transthoracic Echocardiogram by Derian Queen(Echo Tech);  Surgeon: GENERIC ANESTHESIA PROVIDER;  Location: [facility] OR        Family History:    Family History   Problem Relation Age of Onset     CANCER Mother      CANCER Son      Unknown/Adopted Father      Alcoholism Brother        Social History:  Marital Status:   [2]  Social History   Substance Use Topics     Smoking status: Former Smoker     Packs/day: 0.50     Years: 15.00     Types: Cigarettes     Quit date: 11/12/1998     Smokeless tobacco: Never Used      Comment: quit 15 yrs ago     Alcohol use No        Medications:      apixaban ANTICOAGULANT (ELIQUIS) 2.5 MG tablet   traMADol (ULTRAM) 50 MG tablet   HYDROcodone-acetaminophen (NORCO) 5-325 MG per tablet   leflunomide (ARAVA) 10 MG tablet   predniSONE (DELTASONE) 5 MG tablet   leflunomide (ARAVA) 20 MG tablet   SPIRONOLACTONE PO   TRAMADOL HCL PO   aspirin EC 81 MG EC tablet   metoprolol (LOPRESSOR) 25 MG tablet   lisinopril (PRINIVIL/ZESTRIL) 10 MG tablet   tamsulosin (FLOMAX) 0.4 MG capsule   dofetilide (TIKOSYN) 125 MCG capsule   atorvastatin (LIPITOR) 40 MG tablet   Leuprolide Acetate (LUPRON DEPOT IM)   alendronate (FOSAMAX) 70 MG tablet   ascorbic acid (VITAMIN C) 500 MG CPCR   VITAMIN D, CHOLECALCIFEROL, PO   calcium carbonate (OS-SHELIA 500 MG [place]. CA) 500 MG tablet         Review of Systems   Constitutional: Positive for activity change (decreased), appetite change (decreased) and fatigue. Negative for fever.   HENT: Positive for hearing loss (chronic, hearing aid in left ear).    Eyes: Negative for visual disturbance.   Respiratory: Positive for cough and shortness of breath.    Cardiovascular: Negative for chest pain.   Gastrointestinal: Positive for nausea and vomiting. Negative for abdominal pain and diarrhea.   Skin: Negative for rash.   Neurological: Positive for dizziness, weakness (generalized), light-headedness and headaches. Negative for numbness.   All other systems reviewed and are negative.      Physical Exam   BP: 134/80  Heart Rate: 72  Temp: 97.1  F (36.2  C)  Resp: 20  Weight: 67.6 kg (149  lb)  SpO2: 94 %      Physical Exam   Constitutional: He appears well-developed and well-nourished. No distress.   Appears frail, weak and fatigued   HENT:   Head: Normocephalic and atraumatic.   Mouth/Throat: Mucous membranes are dry.   Eyes: Conjunctivae and EOM are normal. Pupils are equal, round, and reactive to light.   Neck: Normal range of motion.   Cardiovascular: Normal rate, regular rhythm, normal heart sounds and intact distal pulses.    Pulmonary/Chest: Effort normal. He has rhonchi (bilateral bases, R>L).   Abdominal: Soft. He exhibits no distension. There is tenderness (mild generalized). There is no rebound and no guarding.   Musculoskeletal: He exhibits edema (trace bilateral extremities, L>R).   Neurological: He is alert. No cranial nerve deficit or sensory deficit.   Equal strength in all extremities, but shaky.   Skin: Skin is warm and dry. He is not diaphoretic.   4 cm vertical ulcerative wound on right anterior lower leg, no drainage. Minimal surrounding erythema without warmth.   Psychiatric: He has a normal mood and affect.   Nursing note and vitals reviewed.      ED Course     ED Course     Procedures           EKG Interpretation:      Interpreted by Oralia Foster  Time reviewed: 1350  Symptoms at time of EKG: weakness, nausea, dizziness   Rhythm: paced  Rate: Normal  Axis: Other (ventricularly paced rhythm)  Ectopy: none  Conduction: paced  ST Segments/ T Waves: No acute ischemic changes  Q Waves: none  Comparison to prior: no obvious changes, paced rhythm    Clinical Impression: paced rhythm        Critical Care time:  none    Results for orders placed or performed during the hospital encounter of 12/12/17 (from the past 24 hour(s))   CBC with platelets differential   Result Value Ref Range    WBC 3.1 (L) 4.0 - 11.0 10e9/L    RBC Count 3.35 (L) 4.4 - 5.9 10e12/L    Hemoglobin 10.0 (L) 13.3 - 17.7 g/dL    Hematocrit 31.6 (L) 40.0 - 53.0 %    MCV 94 78 - 100 fl    MCH 29.9 26.5 - 33.0 pg     MCHC 31.6 31.5 - 36.5 g/dL    RDW 15.1 (H) 10.0 - 15.0 %    Platelet Count 170 150 - 450 10e9/L    Diff Method Automated Method     % Neutrophils 44.5 %    % Lymphocytes 38.3 %    % Monocytes 16.9 %    % Eosinophils 0.0 %    % Basophils 0.3 %    % Immature Granulocytes 0.0 %    Absolute Neutrophil 1.4 (L) 1.6 - 8.3 10e9/L    Absolute Lymphocytes 1.2 0.8 - 5.3 10e9/L    Absolute Monocytes 0.5 0.0 - 1.3 10e9/L    Absolute Eosinophils 0.0 0.0 - 0.7 10e9/L    Absolute Basophils 0.0 0.0 - 0.2 10e9/L    Abs Immature Granulocytes 0.0 0 - 0.4 10e9/L   Comprehensive metabolic panel   Result Value Ref Range    Sodium 129 (L) 133 - 144 mmol/L    Potassium 4.1 3.4 - 5.3 mmol/L    Chloride 96 94 - 109 mmol/L    Carbon Dioxide 23 20 - 32 mmol/L    Anion Gap 10 3 - 14 mmol/L    Glucose 106 (H) 70 - 99 mg/dL    Urea Nitrogen 17 7 - 30 mg/dL    Creatinine 0.99 0.66 - 1.25 mg/dL    GFR Estimate 71 >60 mL/min/1.7m2    GFR Estimate If Black 86 >60 mL/min/1.7m2    Calcium 7.9 (L) 8.5 - 10.1 mg/dL    Bilirubin Total 0.4 0.2 - 1.3 mg/dL    Albumin 2.9 (L) 3.4 - 5.0 g/dL    Protein Total 6.2 (L) 6.8 - 8.8 g/dL    Alkaline Phosphatase 91 40 - 150 U/L    ALT 17 0 - 70 U/L    AST 30 0 - 45 U/L   Lactic acid whole blood   Result Value Ref Range    Lactic Acid 1.9 0.7 - 2.0 mmol/L   Troponin I   Result Value Ref Range    Troponin I ES 0.016 0.000 - 0.045 ug/L   Nt probnp inpatient (BNP)   Result Value Ref Range    N-Terminal Pro BNP Inpatient 1596 0 - 1800 pg/mL   Lipase   Result Value Ref Range    Lipase 109 73 - 393 U/L   CT Head w/o Contrast    Narrative    CT HEAD W/O CONTRAST   12/12/2017 2:26 PM     HISTORY: headache, weakness;     TECHNIQUE: Axial images of the head without IV contrast material.  Radiation dose for this scan was reduced using automated exposure  control, adjustment of the mA and/or kV according to patient size, or  iterative reconstruction technique.    COMPARISON: CT dated 3/8/2017    FINDINGS:  There is generalized  atrophy of the brain.  Areas of low  attenuation are present in the white matter of the cerebral  hemispheres that are consistent with small vessel ischemic disease in  this age patient. There is a small chronic infarct in the inferior  cortex of the left cerebellum. There is no evidence of intracranial  hemorrhage, mass, acute infarct or anomaly. The visualized portions of  the sinuses and mastoids appear normal. There is no evidence of  trauma.      Impression    IMPRESSION:   1. No acute pathology, no bleed, mass, or acute infarcts. No change.  2. Age related changes including diffuse brain atrophy.  White matter  changes consistent with small vessel ischemic disease.   3. Small chronic cerebellar infarct. No significant change.   XR Chest 2 Views    Narrative    CHEST TWO VIEWS   12/12/2017 2:36 PM     HISTORY: Cough.     COMPARISON: Chest CT dated 9/28/2017, chest x-rays dated 9/28/2016 and  3/11/2017.    FINDINGS:  Aortic valve replacement/aortic stent cage, three-channel  pacemaker and leads and mild cardiomegaly are stable. Small nodular  density projected over the posteroinferior lungs on the lateral view  is essentially stable as compared to the prior study dated 3/11/2017  and grossly correlates with a benign bone island in the posterior  aspect of the vertebra in this location on prior CT. Lungs are  otherwise grossly clear. No pneumothorax or significant pleural fluid  collection is identified. Posterior costophrenic angles are not  completely included. Chronic healed left posterior rib fractures are  noted. There are degenerative changes of bilateral shoulders with  remodeling of the humeral heads.      Impression    IMPRESSION:  1. Postoperative changes of the chest as described.  2. Mild cardiomegaly is again noted. No definite congestive heart  failure seen on today's study. This is an improvement as compared to  the prior study dated 9/28/2017.  3. No pneumonia is seen.       Medications   0.9%  sodium chloride BOLUS (1,000 mLs Intravenous New Bag 12/12/17 1412)     Followed by   0.9% sodium chloride infusion (not administered)   ondansetron (ZOFRAN) injection 4 mg (4 mg Intravenous Given 12/12/17 1410)   azithromycin (ZITHROMAX) 500 mg in NaCl 0.9 % 250 mL intermittent infusion (not administered)   azithromycin (ZITHROMAX) 250 mg in NaCl 0.9 % 250 mL intermittent infusion (not administered)   cefTRIAXone (ROCEPHIN) 2 g in 20 mL SWFI for IVP (2 g Intravenous Given 12/12/17 1502)          Assessments & Plan (with Medical Decision Making)  Bud Merritt is a 89 year old male with a history of atrial fibrillation on Eliquis, COPD, CHF, arthritis, who presented to the ED complaining of generalized weakness, nausea/vomiting, headache, dizziness, and cough. Symptoms have been developing over last 5 days. On arrival to the ED, he was afebrile with normal vital signs. He appeared weak and fatigued. Noted to have rhonchi in bibasilar fields. Mucus membranes were dry. No obvious neuro deficits. Differential broad, to include CVA, infection, cardiac etiology, electrolyte abnormalities.  IV access obtained and he was administered fluids and Zofran initially.  His EKG showed a paced rhythm.  Initial troponin was normal at 0.016, and BNP was 1596. Creatinine slightly increased from baseline at 0.99, BUN 17, and his GFR was low at 71. He was leukopenic with a white count of 3.1 and low absolute neutrophils of 1.4.  His sodium was also moderately low at 129.  Head CT revealed no acute infarct or intracranial hemorrhage. Since he is on Eliquis, I think occlusive stroke not as likely at this point. Chest x-ray showed no obvious pneumonia.   Based on his symptoms of shortness of breath and productive cough with leukopenia, after speaking with Dr. Raman we decided to treat him empirically for pneumonia with azithromycin and rocephin. It is possible symptoms are viral but he is at risk for bacterial component with his COPD.  He is also hyponatremic which is likely contributing to his headache, nausea, dizziness, and this will need to be corrected. I spoke with Dr. Perez, hospitalist, regarding this patient's case and she accepted him onto her service as an observation stay. Patient and wife were agreeable to this plan of care. Patient was staffed in the ED with Dr. Raman.     I have reviewed the nursing notes.    I have reviewed the findings, diagnosis, plan and need for follow up with the patient.       ED to Inpatient Handoff:    Discussed with Dr. Perez at 1510  Patient accepted for Observation Stay  Pending studies include blood culture, UA  Code Status: Not Addressed           New Prescriptions    No medications on file       Final diagnoses:   Hyponatremia   Generalized weakness   Pneumonia symptoms   Leukopenia, unspecified type   Nausea   Dizziness     Note: Chart documentation done in part with Dragon Voice Recognition software. Although reviewed after completion, some word and grammatical errors may remain.     12/12/2017   [facility] EMERGENCY DEPARTMENT     Oralia Foster PA-C  12/12/17 1808

## 2017-12-12 NOTE — TELEPHONE ENCOUNTER
"Bud Merritt is a 89 year old male who calls with dizziness and nausea.    NURSING ASSESSMENT:  Description:  Wife is calling to report that for the past 3-4 days patient has been having dizziness and nausea.  She states he is not drinking enough and may be dehydrated, but she has been pushing fluids as much as possible.  She states he feels like he will vomit if he eats or drinks anything but his stomach is empty, so he will not be able to vomit.  He is reporting he has headaches and increasing weakness in his extremities.  Wife is informed he should be seen in the ED as he is probably dehydrated, but she states he is refusing to go \"lay in a bed all day\".  She is aware PCP is not in office today and that patient is scheduled on 12/14/17 with Dr. Vargas.  She states they will see if he \"makes it that long\", but is again refusing to go to the ED.  She is informed that if at any time he is having worsening symptoms, she can call an ambulance.  She states she is aware, but probably will not be calling.  RN is routing to PCP to see if patient can be worked in tomorrow, 12/13/17.  Onset/duration:  3-4 days  Precip. factors:  none  Associated symptoms:  See above  Improves/worsens symptoms:  worsening  Pain scale (0-10)   0/10  Last exam/Treatment:  9/21/17  Allergies:   Allergies   Allergen Reactions     Amiodarone Other (See Comments)     Drop in DLCO     Lasix [Furosemide] Blisters     Blisters and sores in his mouth.          NURSING PLAN: Routed to provider Yes    RECOMMENDED DISPOSITION:  Call 911 - patient is refusing multiple times  Will comply with recommendation: No- Barriers to comply with plan of care see above.  If further questions/concerns or if symptoms do not improve, worsen or new symptoms develop, call your PCP or [facility] as soon as possible.      Guideline used:  Dizziness  Telephone Triage Protocols for Nurses, Fifth Edition, Carito Peoples RN      "

## 2017-12-12 NOTE — PROGRESS NOTES
12/12/17 0800   Session   Session 60 Day Individualized Treatment Plan   Certified through this date 01/10/18   Cardiac Rehab Assessment   Cardiac Rehab Assessment Bud has made fair progress again over this past month. He has been able to increase his bout times and eventually transition to one continuous bout of exercise. He has not been able to increase workloads at all since starting rehab. We will work on that now that he is completing exercise in one bout. Will plan to try to increase exercise workloads by about 1/4 MET per week. Patient has agreed to come through the end of the month (two more weeks). I anticipate additional increases in his functional capacity will be minimal and he will eventually plateau in his progression. Education has been limited due to some mild dementia type symptoms. His wife has attended education on Medication with the pharmacist and stress management with . Plan to discharge patient after the Jose holiday. Will work on increasing his exercise workloads until that point to maximize his functional capacity in this 89 yo gentleman.    General Information   Treatment Diagnosis EPIFANIO   Date of Treatment Diagnosis 09/27/17   Significant Past CV History Pacemaker   Comorbidities Pulmonary Disease   Lead up symptoms dyspnea   Hospital Location [facility]   Signs and Symptoms Post Hospital Discharge Fatigue   Outpatient Cardiac Rehab Start Date 10/24/17   Primary Physician Dr. Aragon   Primary Physician Follow Up Advised to schedule appointment   Cardiologist Dr. Lozano   Cardiologist Follow Up Completed   Ejection Fraction 50-55%   Summary of Cath Report   Summary of Cath Report Not Applicable   Living and Work Status    Living Arrangements and Social Status house;spouse   Support System Live with an adult   Return to Employment Retired   Preventative Medications   CMS recommended medications Ace inhibitors;Anticoagulants;Beta Blocker;Lipid Lowering   Fall Risk Screen  "  Fall screen completed by Cardiac Rehab   Have you fallen 2 or more times in the past year? No   Have you fallen and had an injury in the past year? No   Is patient a fall risk? No   Fall screen comments Uses cane for assist at clinic and walker at home   Pain   Patient Currently in Pain No   Physical Assessments   Incisions Not assessed   Edema +2 Mild  (left greater than right; slowly improving)   Right Lung Sounds not assessed   Left Lung Sounds not assessed   Individualized Treatment Plan   Monitored Sessions Scheduled 18   Monitored Sessions Attended 13   Oxygen   Supplemental Oxygen needed No   Nutrition Management - Weight Management   Assessment Re-assessment   Age 89   Weight 67.6 kg (149 lb)   Height 1.702 m (5' 7.01\")   BMI (Calculated) 23.38   Initial Rate Your Plate Score. Dietary tool to assess eating patterns. Scores range from 24 to 72. The higher the score the healthier the eating pattern. (not assessed, see initial ITP)   Nutrition Management - Lipids   Lipids Labs Available   Date 03/06/17   Total Cholesterol 139   Triglycerides 156   HDL 34   LDL 74   Prescribed Lipid Medication Yes   Statin Intensity High Intensity   Nutrition Management - Diabetes   Diabetes No   Nutrition Management Summary   Dietary Recommendations Low Sodium   Stages of Change for Diet Compliance Action   Nutrition Target Outcome BMI < 25   Psychosocial Management   Psychosocial Assessment Re-assessment   Is there history of clinical depression or increased risk of depression? No previous history   Current Level of Stress per Patient Report Mild   Current Coping Skills Patient Unable to Identify Personal Coping Skills   Initial Patient Health Questionnaire -9 Score (PHQ-9) for depression. 5-9 Minimal symptoms, 10-14 Minor depression, 15-19 Major depression, moderately severe, > 20 Major depression, severe  (not assessed, see initial ITP)   Initial [facility] Survey score.  Quality of Life:   If total score > 25 review " individual areas where patient rated a 4 or 5.  Consider patients current medical condition and what role that plays on the score.   Adjust treatment protocol to improve areas of concern.  Consider the following:  PHQ9 score, DASI, and re-assessment within the next 30 days to assist with developing treatments.  (not assessed, see initial ITP)   Other Core Components - Hypertension   History of or Diagnosis of Hypertension Yes   Currently taking Anti-Hypertensives Yes;Beta blocker;Ace Inhibitor   Other Core Components - Tobacco   History of Tobacco Use Yes   Quit Date or Planned Quit Date 10/24/97   Tobacco Use Status Former (Quit > 6 mo ago)   Tobacco Habit Cigarettes   Tobacco Use per Day (average) 1 ppd   Years of Tobacco Use 50   Stages of Change Maintenance   Other Core Components Summary   Interventions Planned Instruct patient on the DASH diet;Provide information on home blood pressure monitoring;Attend education class on Blood Pressure   Activity/Exercise History   Activity/Exercise Assessment Re-assessment   Activity/Exercise Status prior to event? Sedentary   Number of Days Currently participating in Moderate Physical Activity? 0   Number of Days Currently performing  Aerobic Exercise (including rehab)? 2   Number of Minutes per Session Currently of Aerobic Exercise (average)? 45   Current Stage of Change (Physical Activity) Preparation   Current Stage of Change (Aerobic Exercise) Pre-Contemplation   Patient Goals Goal #1;Goal #2   Goal #1 Description Patient will be able to resume his hobby of woodworking in his garage, estimated MET level needed 3.5-4 METs   Goal #1 Target Date 12/05/17   Goal #1 Progress Towards Goal 10/24 Will start attending cardiac rehab 2x/week to increase aerobic exercise tolerance and endurance. Will also initiate resistance training to increase muscle strength. 11/16 Has been out to garage once since starting rehab, still feels limited by lack of energy 12/12 has taken a few trips  to garage but have not gotten much done there   Goal #2 Description Patient will be able to walk to and from his garage without dyspnea; approximately 200 feet   Goal #2 Target Date 11/14/17   Goal #2 Date Met 11/16/17   Goal #2 Progress Towards Goal 11/16 Patient is walking 1/4 mile daily with walker at home   Activity/Exercise Target Outcome An Accumulation of 150  Minutes of Aerobic Activity per Week   Exercise Assessment   6 Minute Walk Predicted - Gender Selection Male   6 Minute Walk Predicted (Male) 1339.61   6 Minute Walk Predicted (Female) 1150.71   Initial 6 Minute Walk Distance (Feet) 200 ft   Resting HR 65 bpm   Exercise HR 82 bpm   Post Exercise HR 71 bpm   Resting /84   Exercise /64   Post Exercise /62   Effort Rating 7   Current MET Level 2   MET Level Goal 3-4   ECG Rhythm Paced rhythm   Ectopy None   Current Symptoms Fatigue;Joint pain   Limitations/Restrictions Orthopedic (see comments)   Exercise Prescription   Mode Weights;Nustep   Duration/Time 30-45 min;Intermittent bouts   Frequency 2 days/week   THR (85% of age predicted max HR) 110.5   OMNI Effort Rating (0-10 Scale) 4-6/10   Progression Total exercise time of 30-45 minutes;Aerobic exercise to OMNI rating of 5-7, and heart rate at or below target;Progress peak intensity by 1/4 MET per week;Continuous bouts  (progress exercise to continuous bouts first then increase wk)   Recommended Home Exercise   Type of Exercise Walking   Frequency (days per week) 5   Duration (minutes per session) Intermittent   Effort Rating Recommended 4-6/10   30 Day Exercise Plan continue daily 1/4 miles walks   Current Home Exercise   Type of Exercise Walking   Frequency (days per week) 5   Duration (minutes per session) 10-15   Follow-up/On-going Support   Provider follow-up needed on the following Other (see comments)  (Edema)   Comments Seeing TAVR clinic on 10/26/17   Learning Assessment   Learner Patient   Primary Language English   Preferred  Learning Style Listening   Barriers to Learning Cognitive;Hearing   Patient Education   Education recommended (none at this time)     Physician cosignature/electronic signature indicates approval of this ITP document. I have established, reviewed and made necessary changes to the individualized treatment plan and exercise prescription for this patient.

## 2017-12-12 NOTE — IP AVS SNAPSHOT
MRN:[id number]                      After Visit Summary   12/12/2017    Bud Merritt    MRN: [id number]           Thank you!     Thank you for choosing [facility] for your care. Our goal is always to provide you with excellent care. Hearing back from our patients is one way we can continue to improve our services. Please take a few minutes to complete the written survey that you may receive in the mail after you visit with us. Thank you!        Patient Information     Date Of Birth          5/9/1927        About your hospital stay     You were admitted on:  December 12, 2017 You last received care in the:  [address] [facility]    You were discharged on:  December 13, 2017        Reason for your hospital stay       Hospitalized due to symptoms from constipation and improved                  Who to Call     For medical emergencies, please call 911.  For non-urgent questions about your medical care, please call your primary care provider or clinic, [phone]          Attending Provider     Provider Specialty    Oralia Foster PA-C Physician Assistant    Sejal Perez MD [facility]    Cesar Do MD Internal Medicine       Primary Care Provider Office Phone # Fax #    Camron Aragon [phone]      After Care Instructions     Activity       Your activity upon discharge: activity as tolerated            Diet       Follow this diet upon discharge: Orders Placed This Encounter      Regular Diet Adult                  Follow-up Appointments     Follow-up and recommended labs and tests        Follow up with primary care provider, Camron Aragon, within 1 month, for hospital follow- up.                  Your next 10 appointments already scheduled     Dec 14, 2017  9:30 AM CST   Cardiac Treatment with JARETH Pond   [facility] ([facility])    [address] [place] [address]    [phone]            Dec 15, 2017 11:40 AM CST   [facility]    [address] [place] [address]   [phone]           Bring a current list of meds and any records pertaining to this visit. For Physicals, please bring immunization records and any forms needing to be filled out. Please arrive 10 minutes early to complete paperwork.            Dec 19, 2017  9:30 AM CST   Cardiac Treatment with TATE Krueger   [facility] ([facility])    [address] [place] [address] [address] [place] [address]   [phone]            Dec 21, 2017  9:30 AM CST   Cardiac Treatment with JARETH Pond   [facility] ([facility])    [address] [place] [address] [address] [place] [address]   [phone]            Dec 21, 2017 11:00 AM CST   Office Visit with Camron Aragon MD   [facility] ([address] [address]   [phone]           Bring a current list of meds and any records pertaining to this visit. For Physicals, please bring immunization records and any forms needing to be filled out. Please arrive 10 minutes early to complete paperwork.            Dec 26, 2017  9:30 AM CST   Cardiac Treatment with TATE Krueger   [facility] ([facility])    [address] [place] [address] [address] [place] [address]   [phone]            Dec 28, 2017  9:30 AM CST   Cardiac Treatment with JARETH Pond   [facility] ([facility])    [address] [place] [address] [address] [place] [address]   [phone]            Jan 02, 2018  9:30 AM CST   Cardiac Treatment with TATE Krueger   [facility] ([facility])    [address] [place] [address] [address] [place] [address]   [phone]            Jan 04, 2018  9:30 AM CST   Cardiac  "Treatment with JARETH Pretty   [facility] ([facility])    [address] [place] [id number] [phone]            Yemi 15, 2018 11:00 AM CST   Office Visit with Camron Aragon MD   [facility]    [address] [place] [id number] [phone]           Bring a current list of meds and any records pertaining to this visit. For Physicals, please bring immunization records and any forms needing to be filled out. Please arrive 10 minutes early to complete paperwork.              Pending Results     Date and Time Order Name Status Description    12/12/2017 1714 Methicillin resistant staph aureus cult In process     12/12/2017 1343 Blood culture Preliminary             Statement of Approval     Ordered          12/13/17 1148  I have reviewed and agree with all the recommendations and orders detailed in this document.  EFFECTIVE NOW     Approved and electronically signed by:  Cesar Do MD             Admission Information     Date & Time Provider Department Dept. Phone    12/12/2017 Cesar Do MD [address] [facility] [phone]      Your Vitals Were     Blood Pressure Pulse Temperature Respirations Height Weight    133/80 (BP Location: Left arm) 87 97.5  F (36.4  C) (Oral) 20 1.702 m (5' 7\") 66.6 kg (146 lb 13.2 oz)    Pulse Oximetry BMI (Body Mass Index)                99% 23 kg/m2          MyChart Information     [facility] lets you send messages to your doctor, view your test results, renew your prescriptions, schedule appointments and more. To sign up, go to [email or web address] . Click on \"Log in\" on the left side of the screen, which will take you to the Welcome page. Then click on \"Sign up Now\" on the right side of the page.     You will be asked to enter the access code listed below, as well as some personal information. Please follow the directions to create your " username and password.     Your access code is: [email or web address]  Expires: 2017 10:55 AM     Your access code will  in 90 days. If you need help or a new code, please call your [facility] clinic or [phone].        Care EveryWhere ID     This is your Care EveryWhere ID. This could be used by other organizations to access your [facility] medical records  [id number]        Equal Access to Services     JUAN CLINE : Hadii sammie clifton hadasho Soomaali, waaxda luqadaha, qaybta kaalmada adeegyada, ronel grovein hayrosan gomez wilcoxrafkimberley eral . So [facility] [phone].    ATENCIÓN: Si sary coolclair, tiene a mon disposición servicios gratuitos de asistencia lingüística. Llame al [phone].    We comply with applicable federal civil rights laws and [place] laws. We do not discriminate on the basis of race, color, national origin, age, disability, sex, sexual orientation, or gender identity.               Review of your medicines      START taking        Dose / Directions    acetaminophen 325 MG tablet   Commonly known as:  TYLENOL        Dose:  650 mg   Take 2 tablets (650 mg) by mouth every 4 hours as needed for mild pain, fever or headaches   Quantity:  100 tablet   Refills:  0       ondansetron 4 MG ODT tab   Commonly known as:  ZOFRAN-ODT        Dose:  4 mg   Take 1 tablet (4 mg) by mouth every 6 hours as needed for nausea or vomiting   Quantity:  10 tablet   Refills:  0       polyethylene glycol Packet   Commonly known as:  MIRALAX/GLYCOLAX        Dose:  17 g   Start taking on:  2017   Take 17 g by mouth daily   Quantity:  30 packet   Refills:  0         CONTINUE these medicines which may have CHANGED, or have new prescriptions. If we are uncertain of the size of tablets/capsules you have at home, strength may be listed as something that might have changed.        Dose / Directions    atorvastatin 40 MG tablet   Commonly known as:  LIPITOR   This may have changed:  when to take this   Used for:  Hyperlipidemia  LDL goal <130        Dose:  40 mg   Take 1 tablet (40 mg) by mouth daily   Quantity:  90 tablet   Refills:  2         CONTINUE these medicines which have NOT CHANGED        Dose / Directions    alendronate 70 MG tablet   Commonly known as:  FOSAMAX   Used for:  Osteopenia        Dose:  70 mg   Take 1 tablet (70 mg) by mouth every 7 days Take with over 8 ounces water and stay upright for at least 30 minutes after dose.  Take at least 60 minutes before breakfast   Quantity:  12 tablet   Refills:  3       apixaban ANTICOAGULANT 2.5 MG tablet   Commonly known as:  ELIQUIS   Used for:  Paroxysmal atrial fibrillation (H)        Dose:  2.5 mg   Take 1 tablet (2.5 mg) by mouth 2 times daily   Quantity:  180 tablet   Refills:  3       ascorbic acid 500 MG Cpcr CR capsule   Commonly known as:  vitamin C        Dose:  500 mg   Take 500 mg by mouth daily   Refills:  0       calcium carbonate 1250 MG tablet   Commonly known as:  OS-SHELIA 500 mg [place]. Ca        Dose:  1250 mg   Take 1,250 mg by mouth daily   Refills:  0       dofetilide 125 MCG capsule   Commonly known as:  TIKOSYN   Used for:  Paroxysmal atrial fibrillation (H)        Dose:  125 mcg   Take 1 capsule (125 mcg) by mouth 2 times daily   Quantity:  60 capsule   Refills:  5       HYDROcodone-acetaminophen 5-325 MG per tablet   Commonly known as:  NORCO   Used for:  Rheumatoid arthritis involving multiple sites, unspecified rheumatoid factor presence (H)        Dose:  1 tablet   Take 1 tablet by mouth every 6 hours as needed for pain For moderate to severe pain   Quantity:  18 tablet   Refills:  0       lisinopril 10 MG tablet   Commonly known as:  PRINIVIL/ZESTRIL   Used for:  Acute on chronic systolic congestive heart failure (H)        Dose:  5 mg   Take 0.5 tablets (5 mg) by mouth daily   Quantity:  62 tablet   Refills:  11       LUPRON DEPOT IM        Inject into the muscle every 6 months Reported on 5/3/2017   Refills:  0       metoprolol 25 MG tablet   Commonly  known as:  LOPRESSOR   Used for:  S/P TAVR (transcatheter aortic valve replacement)        Dose:  25 mg   Take 1 tablet (25 mg) by mouth 2 times daily   Quantity:  60 tablet   Refills:  11       predniSONE 5 MG tablet   Commonly known as:  DELTASONE   Used for:  Rheumatoid arthritis involving multiple sites with positive rheumatoid factor (H)        Dose:  5 mg   Take 1 tablet (5 mg) by mouth daily   Quantity:  100 tablet   Refills:  4       SPIRONOLACTONE PO        Dose:  25 mg   Take 25 mg by mouth daily   Refills:  0       tamsulosin 0.4 MG capsule   Commonly known as:  FLOMAX   Used for:  S/P TAVR (transcatheter aortic valve replacement)        Dose:  0.4 mg   Take 1 capsule (0.4 mg) by mouth daily   Quantity:  60 capsule   Refills:  0       traMADol 50 MG tablet   Commonly known as:  ULTRAM   Used for:  Rheumatoid arthritis involving multiple sites, unspecified rheumatoid factor presence (H)        TAKE 1 TABLET 3 TIMES DAILY AS NEEDED FOR MODERATE PAIN   Quantity:  90 tablet   Refills:  0       VITAMIN D (CHOLECALCIFEROL) PO        Dose:  2000 Units   Take 2,000 Units by mouth daily Reported on 5/3/2017   Refills:  0         STOP taking     aspirin 81 MG EC tablet                Where to get your medicines      These medications were sent to [id number] - [place], [place]  [address]   [address], [facility] [address]     Phone:  [phone]     ondansetron 4 MG ODT tab    polyethylene glycol Packet                Protect others around you: Learn how to safely use, store and throw away your medicines at www.disposemymeds.org.             Medication List: This is a list of all your medications and when to take them. Check marks below indicate your daily home schedule. Keep this list as a reference.      Medications           Morning Afternoon Evening Bedtime As Needed    acetaminophen 325 MG tablet   Commonly known as:  TYLENOL   Take 2 tablets (650 mg) by mouth every 4 hours as needed for  mild pain, fever or headaches                                   alendronate 70 MG tablet   Commonly known as:  FOSAMAX   Take 1 tablet (70 mg) by mouth every 7 days Take with over 8 ounces water and stay upright for at least 30 minutes after dose.  Take at least 60 minutes before breakfast                                   apixaban ANTICOAGULANT 2.5 MG tablet   Commonly known as:  ELIQUIS   Take 1 tablet (2.5 mg) by mouth 2 times daily   Last time this was given:  2.5 mg on 12/13/2017  8:25 AM                                      ascorbic acid 500 MG Cpcr CR capsule   Commonly known as:  vitamin C   Take 500 mg by mouth daily                                   atorvastatin 40 MG tablet   Commonly known as:  LIPITOR   Take 1 tablet (40 mg) by mouth daily                                   calcium carbonate 1250 MG tablet   Commonly known as:  OS-SHELIA 500 mg [place]. [place]   Take 1,250 mg by mouth daily                                   dofetilide 125 MCG capsule   Commonly known as:  TIKOSYN   Take 1 capsule (125 mcg) by mouth 2 times daily   Last time this was given:  125 mcg on 12/13/2017  8:28 AM                                      HYDROcodone-acetaminophen 5-325 MG per tablet   Commonly known as:  NORCO   Take 1 tablet by mouth every 6 hours as needed for pain For moderate to severe pain                                   lisinopril 10 MG tablet   Commonly known as:  PRINIVIL/ZESTRIL   Take 0.5 tablets (5 mg) by mouth daily   Last time this was given:  5 mg on 12/13/2017  8:25 AM                                   LUPRON DEPOT IM   Inject into the muscle every 6 months Reported on 5/3/2017                                metoprolol 25 MG tablet   Commonly known as:  LOPRESSOR   Take 1 tablet (25 mg) by mouth 2 times daily   Last time this was given:  25 mg on 12/13/2017  8:26 AM                                      ondansetron 4 MG ODT tab   Commonly known as:  ZOFRAN-ODT   Take 1 tablet (4 mg) by mouth every 6 hours as  needed for nausea or vomiting   Last time this was given:  4 mg on 12/13/2017  6:02 AM                                   polyethylene glycol Packet   Commonly known as:  MIRALAX/GLYCOLAX   Take 17 g by mouth daily   Start taking on:  12/14/2017   Last time this was given:  17 g on 12/13/2017  8:28 AM                                   predniSONE 5 MG tablet   Commonly known as:  DELTASONE   Take 1 tablet (5 mg) by mouth daily   Last time this was given:  5 mg on 12/13/2017  8:25 AM                                   SPIRONOLACTONE PO   Take 25 mg by mouth daily   Last time this was given:  25 mg on 12/13/2017  8:26 AM                                   tamsulosin 0.4 MG capsule   Commonly known as:  FLOMAX   Take 1 capsule (0.4 mg) by mouth daily                                   traMADol 50 MG tablet   Commonly known as:  ULTRAM   TAKE 1 TABLET 3 TIMES DAILY AS NEEDED FOR MODERATE PAIN                                   VITAMIN D (CHOLECALCIFEROL) PO   Take 2,000 Units by mouth daily Reported on 5/3/2017

## 2017-12-12 NOTE — ADDENDUM NOTE
Encounter addended by: Caryn [place] EP on: 12/12/2017  9:06 AM<BR>     Actions taken: Flowsheet data copied forward, Sign clinical note, Flowsheet accepted

## 2017-12-12 NOTE — ED NOTES
Pt comes in with complaints of nausea, chills, and feeling like he has motion sickness. Pt has not eaten for about 3 days.

## 2017-12-12 NOTE — ED NOTES
ED Nursing criteria listed below was addressed during verbal handoff:     Abnormal vitals: No  Abnormal results: Yes; see labs- treating for pneumonia  Med Reconciliation completed: Yes  Meds given in ED: Yes; rocephin given and zithromax given; IV bolus still infusing  Any Overdue Meds: No  Core Measures: N/A  Isolation: N/A  Special needs: Yes; fall risk; assist of 1 with cane; pivot transfer  Skin assessment: Yes; open blister on right shin    Observation Patient  Education provided: Yes

## 2017-12-12 NOTE — PROGRESS NOTES
"S-(situation): Patient registered to Observation. Patient arrived to room 266 via cart from ED @ 1700.    B-(background): nausea    A-(assessment): pt A/Ox3, PAVEL. See VS f/s. Verbalized \"have been nauseated lately\", but able to take sips of clears at this time.    R-(recommendations): Orders and observation goals reviewed with pt.    Nursing Observation criteria listed below was met:    Skin issues/needs documented:Yes, see skin f/s  Isolation needs addressed, if appropriate: Yes  Fall Prevention: Education given and documented: Yes  Education Assessment documented:Yes  Education Documented (Pre-existing chronic infection such as, MRSA/VRE need education on admission): Yes  New medication patient education completed and documented (Possible Side Effects of Common Medications handout): Yes  Home medications if not able to send immediately home with family stored here: NA  Reminder note placed in discharge instructions: NA  Patient has discharge needs (If yes, please explain): No            "

## 2017-12-13 VITALS
TEMPERATURE: 97.5 F | WEIGHT: 146.83 LBS | HEART RATE: 87 BPM | SYSTOLIC BLOOD PRESSURE: 133 MMHG | BODY MASS INDEX: 23.04 KG/M2 | DIASTOLIC BLOOD PRESSURE: 80 MMHG | HEIGHT: 67 IN | OXYGEN SATURATION: 99 % | RESPIRATION RATE: 20 BRPM

## 2017-12-13 LAB
ANION GAP SERPL CALCULATED.3IONS-SCNC: 8 MMOL/L (ref 3–14)
BACTERIA SPEC CULT: NORMAL
BUN SERPL-MCNC: 12 MG/DL (ref 7–30)
CALCIUM SERPL-MCNC: 7.6 MG/DL (ref 8.5–10.1)
CHLORIDE SERPL-SCNC: 106 MMOL/L (ref 94–109)
CO2 SERPL-SCNC: 24 MMOL/L (ref 20–32)
CREAT SERPL-MCNC: 0.84 MG/DL (ref 0.66–1.25)
ERYTHROCYTE [DISTWIDTH] IN BLOOD BY AUTOMATED COUNT: 14.8 % (ref 10–15)
GFR SERPL CREATININE-BSD FRML MDRD: 86 ML/MIN/1.7M2
GLUCOSE SERPL-MCNC: 84 MG/DL (ref 70–99)
HCT VFR BLD AUTO: 29.2 % (ref 40–53)
HGB BLD-MCNC: 9.1 G/DL (ref 13.3–17.7)
MCH RBC QN AUTO: 29.4 PG (ref 26.5–33)
MCHC RBC AUTO-ENTMCNC: 31.2 G/DL (ref 31.5–36.5)
MCV RBC AUTO: 95 FL (ref 78–100)
PLATELET # BLD AUTO: 149 10E9/L (ref 150–450)
POTASSIUM SERPL-SCNC: 3.7 MMOL/L (ref 3.4–5.3)
PROCALCITONIN SERPL-MCNC: <0.05 NG/ML
RBC # BLD AUTO: 3.09 10E12/L (ref 4.4–5.9)
SODIUM SERPL-SCNC: 138 MMOL/L (ref 133–144)
SPECIMEN SOURCE: NORMAL
WBC # BLD AUTO: 2.9 10E9/L (ref 4–11)

## 2017-12-13 PROCEDURE — 96361 HYDRATE IV INFUSION ADD-ON: CPT

## 2017-12-13 PROCEDURE — 25000128 H RX IP 250 OP 636: Performed by: INTERNAL MEDICINE

## 2017-12-13 PROCEDURE — 99217 ZZC OBSERVATION CARE DISCHARGE: CPT | Performed by: PEDIATRICS

## 2017-12-13 PROCEDURE — 25000132 ZZH RX MED GY IP 250 OP 250 PS 637: Mod: GY | Performed by: INTERNAL MEDICINE

## 2017-12-13 PROCEDURE — A9270 NON-COVERED ITEM OR SERVICE: HCPCS | Mod: GY | Performed by: INTERNAL MEDICINE

## 2017-12-13 PROCEDURE — G0378 HOSPITAL OBSERVATION PER HR: HCPCS

## 2017-12-13 PROCEDURE — 36415 COLL VENOUS BLD VENIPUNCTURE: CPT | Performed by: INTERNAL MEDICINE

## 2017-12-13 PROCEDURE — 25000125 ZZHC RX 250: Performed by: PHYSICIAN ASSISTANT

## 2017-12-13 PROCEDURE — 85027 COMPLETE CBC AUTOMATED: CPT | Performed by: INTERNAL MEDICINE

## 2017-12-13 PROCEDURE — 80048 BASIC METABOLIC PNL TOTAL CA: CPT | Performed by: INTERNAL MEDICINE

## 2017-12-13 PROCEDURE — 25000125 ZZHC RX 250: Performed by: INTERNAL MEDICINE

## 2017-12-13 RX ORDER — ACETAMINOPHEN 325 MG/1
650 TABLET ORAL EVERY 4 HOURS PRN
Qty: 100 TABLET | COMMUNITY
Start: 2017-12-13 | End: 2019-01-01

## 2017-12-13 RX ORDER — POLYETHYLENE GLYCOL 3350 17 G/17G
34 POWDER, FOR SOLUTION ORAL ONCE
Status: COMPLETED | OUTPATIENT
Start: 2017-12-13 | End: 2017-12-13

## 2017-12-13 RX ORDER — ONDANSETRON 4 MG/1
4 TABLET, ORALLY DISINTEGRATING ORAL EVERY 6 HOURS PRN
Qty: 10 TABLET | Refills: 0 | Status: SHIPPED | OUTPATIENT
Start: 2017-12-13 | End: 2019-01-01

## 2017-12-13 RX ORDER — POLYETHYLENE GLYCOL 3350 17 G/17G
17 POWDER, FOR SOLUTION ORAL DAILY
Qty: 30 PACKET | Refills: 0 | Status: SHIPPED | OUTPATIENT
Start: 2017-12-14 | End: 2019-01-01

## 2017-12-13 RX ADMIN — DOFETILIDE 125 MCG: 0.12 CAPSULE ORAL at 08:28

## 2017-12-13 RX ADMIN — POLYETHYLENE GLYCOL 3350 17 G: 17 POWDER, FOR SOLUTION ORAL at 08:28

## 2017-12-13 RX ADMIN — METOPROLOL TARTRATE 25 MG: 25 TABLET ORAL at 08:26

## 2017-12-13 RX ADMIN — LISINOPRIL 5 MG: 2.5 TABLET ORAL at 08:25

## 2017-12-13 RX ADMIN — CEFUROXIME AXETIL 250 MG: 250 TABLET ORAL at 08:28

## 2017-12-13 RX ADMIN — POLYETHYLENE GLYCOL 3350 34 G: 17 POWDER, FOR SOLUTION ORAL at 06:30

## 2017-12-13 RX ADMIN — SPIRONOLACTONE 25 MG: 25 TABLET ORAL at 08:26

## 2017-12-13 RX ADMIN — ONDANSETRON 4 MG: 4 TABLET, ORALLY DISINTEGRATING ORAL at 06:02

## 2017-12-13 RX ADMIN — SODIUM CHLORIDE: 9 INJECTION, SOLUTION INTRAVENOUS at 03:04

## 2017-12-13 RX ADMIN — PREDNISONE 5 MG: 5 TABLET ORAL at 08:25

## 2017-12-13 RX ADMIN — APIXABAN 2.5 MG: 2.5 TABLET, FILM COATED ORAL at 08:25

## 2017-12-13 NOTE — H&P
[facility]    History and Physical  Hospitalist       Date of Admission:  12/12/2017  Date of Service (when I saw the patient): 12/12/17    Assessment & Plan       Active Problems:    Nausea    Assessment: he denies having any vomiting.  He admits to having hard stools and having a hard time passing his stools.  Suspect his nausea is likely related to constipation.  ED was concerned about possible pneumonia but has no SOB, fever, leucocytosis or infiltrate on his CXR.      Plan: IV fluid, antiemetics and treat constipation.  [place] to observation.      Constipation    Assessment: has worsened in the last week or so.  Has been using some narcotics which is likely contributing.     Plan: add miralax and will try a dulcolax suppository      Anemia    Assessment: chronic and stable    Plan: no intervention      Leukopenia, unspecified type    Assessment: WBC hovers from 3 - 6.  He has had a sore throat with mild cough and he states he feels like he has a cold.  No convincing evidence of pneumonia    Plan: no intervention but will repeat tomorrow am      Hyponatremia    Assessment: mild and likely from poor oral intake    Plan: IV NS and repeat tomorrow am      Viral URI with cough    Assessment: as above.  Could have an early bronchitis but alveolar infection not suspected    Plan: oral zithromax      Rheumatoid arthritis involving multiple sites, unspecified rheumatoid factor presence (H)    Assessment: chronic    Plan: no acute intervention      Mitral regurgitation and aortic stenosis - AS severe by echo on 3/2017.  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    Assessment: doing well    Plan: no intervention      Essential hypertension with goal blood pressure less than 140/90    Assessment: controlled    Plan: continue home meds      Atrial fibrillation (H) - s/p AVN ablation and pacemaker    Assessment: paced    Plan: continue NOAC      Malignant neoplasm of prostate (H)    Assessment: noted history    Plan:  check PSA due to fatigue      Dilated cardiomyopathy (H) dilated LV, EF 55% after TAVR     Assessment: improved since TAVR    Plan: no intervention      Chronic systolic congestive heart failure (H)    Assessment: controlled    Plan: no intervention      Weakness    Assessment: chronic and has not recently worsened.  Multifactorial with his age likely a big contributing factor.  No acute decline in his strength    Plan: no intervention      Skin abrasion    Assessment: linear skin abrasion on his right anterior shin about 7 cm in length.  Minimal surrounding erythema.  No purulent drainage.    Plan: local skin care and cover with non adherent dressing    DVT Prophylaxis: anticipate less than 24 hour stay  Code Status: DNR / DNI  Disposition: Expected discharge in 1 days once nausea improving.    Rao Carroll MD    Primary Care Physician   Camron Aragon    Chief Complaint   Nausea    History is obtained from the patient    History of Present Illness   Bud Merritt is a 89 year old male who presents with nausea.  He has had symptoms for about one week of nausea.  He denies vomiting but states he gets nauseated when he tries to eat.  He has been having problems with his bowels being very hard and he is having a hard time passing his stool.  He has also had a sore throat and cough but denies SOB.  He has felt weak but states it has been this way over the past year or so and worsened since his valve was replaced and he feels he never completely recovered after his heart surgery but he denies any recent worsening in his weakness.     Past Medical History    I have reviewed this patient's medical history and updated it with pertinent information if needed.   Past Medical History:   Diagnosis Date     Anemia      Atrial fibrillation (H) 07/01/2016     Cardiac pacemaker 03/10/2017     Cardiomyopathy (H)      CHF (congestive heart failure) (H)      COPD exacerbation (H) 3/2/2017     Depressive disorder       History of prostate cancer      Paroxysmal atrial fibrillation (H) 7/6/2016     Pure hypercholesterolemia      Rheumatoid arthritis(714.0)      Unspecified essential hypertension      Weakness generalized 3/2/2017       Past Surgical History   I have reviewed this patient's surgical history and updated it with pertinent information if needed.  Past Surgical History:   Procedure Laterality Date     ARTHROPLASTY KNEE Left 1/12/2016    Procedure: ARTHROPLASTY KNEE;  Surgeon: Cesar Walker DO;  Location: [facility] OR     COLONOSCOPY  08/24/09     HC COLONOSCOPY THRU STOMA W BIOPSY/CAUTERY TUMOR/POLYP/LESION  8/31/2004     HC REPAIR ING HERNIA,5+Y/O,REDUCIBL  1996    Marlex mesh repair of bilateral inguinal hernias and drainage of bilateral scrotal hydroceles.     HEART CATH FEMORAL CANNULIZATION WITH OPEN STANDBY REPAIR AORTIC VALVE N/A 9/27/2017    Procedure: HEART CATH FEMORAL CANNULIZATION WITH OPEN STANDBY REPAIR AORTIC VALVE;;  Surgeon: Jaron Alejandra MD;  Location: [facility] OR     IMPLANT PACEMAKER  03/10/2017     IRRIGATION AND DEBRIDEMENT SOFT TISSUE LOWER EXTREMITY, COMBINED Left 3/15/2016    Procedure: COMBINED IRRIGATION AND DEBRIDEMENT SOFT TISSUE LOWER EXTREMITY;  Surgeon: Cesar Walker DO;  Location: [facility] OR     TRANSCATHETER AORTIC VALVE IMPLANT ANESTHESIA N/A 9/27/2017    Procedure: TRANSCATHETER AORTIC VALVE IMPLANT ANESTHESIA;  Right Transfemoral Approach (Harman Ramsey 3 29mm) Aortic Valve Implant,  Cardiopulmonary Bypass Standby, Transthoracic Echocardiogram by Derian Queen(Echo Tech);  Surgeon: GENERIC ANESTHESIA PROVIDER;  Location: [facility] OR       Prior to Admission Medications   Prior to Admission Medications   Prescriptions Last Dose Informant Patient Reported? Taking?   HYDROcodone-acetaminophen (NORCO) 5-325 MG per tablet 12/12/2017 at 0100  No Yes   Sig: Take 1 tablet by mouth every 6 hours as needed for pain For moderate to severe pain   Leuprolide Acetate (LUPRON DEPOT IM)  More than a month at Unknown time  Yes No   Sig: Inject into the muscle every 6 months Reported on 5/3/2017   SPIRONOLACTONE PO 12/12/2017 at 0500  Yes Yes   Sig: Take 25 mg by mouth daily   VITAMIN D, CHOLECALCIFEROL, PO 12/12/2017 at 0500  Yes Yes   Sig: Take 2,000 Units by mouth daily Reported on 5/3/2017   alendronate (FOSAMAX) 70 MG tablet 12/11/2017 at 0330  No Yes   Sig: Take 1 tablet (70 mg) by mouth every 7 days Take with over 8 ounces water and stay upright for at least 30 minutes after dose.  Take at least 60 minutes before breakfast   apixaban ANTICOAGULANT (ELIQUIS) 2.5 MG tablet 12/12/2017 at 0500  No Yes   Sig: Take 1 tablet (2.5 mg) by mouth 2 times daily   ascorbic acid (VITAMIN C) 500 MG CPCR 12/12/2017 at 0500  Yes Yes   Sig: Take 500 mg by mouth daily   aspirin EC 81 MG EC tablet   No No   Sig: Take 1 tablet (81 mg) by mouth daily   atorvastatin (LIPITOR) 40 MG tablet 12/11/2017 at 1700  No Yes   Sig: Take 1 tablet (40 mg) by mouth daily   Patient taking differently: Take 40 mg by mouth every evening    calcium carbonate (OS-SHELIA 500 MG [place]. [place]) 500 MG tablet 12/12/2017 at 0500  Yes Yes   Sig: Take 1,250 mg by mouth daily    dofetilide (TIKOSYN) 125 MCG capsule 12/12/2017 at 0500  No Yes   Sig: Take 1 capsule (125 mcg) by mouth 2 times daily   lisinopril (PRINIVIL/ZESTRIL) 10 MG tablet 12/12/2017 at 0500  No Yes   Sig: Take 0.5 tablets (5 mg) by mouth daily   metoprolol (LOPRESSOR) 25 MG tablet 12/12/2017 at 0500  No Yes   Sig: Take 1 tablet (25 mg) by mouth 2 times daily   predniSONE (DELTASONE) 5 MG tablet 12/12/2017 at 0500  No Yes   Sig: Take 1 tablet (5 mg) by mouth daily   tamsulosin (FLOMAX) 0.4 MG capsule 12/12/2017 at 0500  No Yes   Sig: Take 1 capsule (0.4 mg) by mouth daily   traMADol (ULTRAM) 50 MG tablet 12/12/2017 at 1230  No Yes   Sig: TAKE 1 TABLET 3 TIMES DAILY AS NEEDED FOR MODERATE PAIN      Facility-Administered Medications: None     Allergies   Allergies   Allergen Reactions      Amiodarone Other (See Comments)     Drop in DLCO     Lasix [Furosemide] Blisters     Blisters and sores in his mouth.        Social History   I have reviewed this patient's social history and updated it with pertinent information if needed. Bud Merritt  reports that he quit smoking about 19 years ago. His smoking use included Cigarettes. He has a 7.50 pack-year smoking history. He has never used smokeless tobacco. He reports that he does not drink alcohol or use illicit drugs.    Family History   I have reviewed this patient's family history and updated it with pertinent information if needed.   Family History   Problem Relation Age of Onset     CANCER Mother      CANCER Son      Unknown/Adopted Father      Alcoholism Brother        Review of Systems   The 10 point Review of Systems is negative other than noted in the HPI or here.     Physical Exam   Temp: 95.6  F (35.3  C) Temp src: Oral BP: 153/73 Pulse: 71 Heart Rate: 71 Resp: 18 SpO2: 99 % O2 Device: None (Room air)    Vital Signs with Ranges  Temp:  [95.6  F (35.3  C)-97.1  F (36.2  C)] 95.6  F (35.3  C)  Pulse:  [71] 71  Heart Rate:  [68-72] 71  Resp:  [12-20] 18  BP: (134-153)/(73-84) 153/73  SpO2:  [94 %-99 %] 99 %  146 lbs 13.22 oz    Constitutional:   awake, alert, cooperative, no apparent distress, and appears stated age     Eyes:   Lids and lashes normal, pupils equal, round and reactive to light, extra ocular muscles intact, sclera clear, conjunctiva normal     ENT:   Normocephalic, without obvious abnormality, atraumatic, sinuses nontender on palpation, external ears without lesions, oral pharynx with moist mucous membranes, tonsils without erythema or exudates, gums normal and good dentition.     Neck:   Supple, symmetrical, trachea midline, no adenopathy, thyroid symmetric, not enlarged and no tenderness, skin normal     Hematologic / Lymphatic:   no cervical lymphadenopathy and no supraclavicular lymphadenopathy     Back:   Symmetric, no  curvature, spinous processes are non-tender on palpation, paraspinous muscles are non-tender on palpation, no costal vertebral tenderness     Lungs:   No increased work of breathing, good air exchange, clear to auscultation bilaterally, no crackles or wheezing     Cardiovascular:   Normal apical impulse, regular rate and rhythm, normal S1 and S2, no S3 or S4, and no murmur noted     Abdomen:   normal bowel sounds, soft, non-distended, non-tender, no masses palpated, no hepatosplenomegally     Neurologic:   Awake, alert, oriented to name, place and time.  Cranial nerves II-XII are grossly intact.  Motor is 5 out of 5 bilaterally.    Sensory is intact.         Data   Data reviewed today:  I personally reviewed no images or EKG's today.    Recent Labs  Lab 12/12/17  1357   WBC 3.1*   HGB 10.0*   MCV 94      *   POTASSIUM 4.1   CHLORIDE 96   CO2 23   BUN 17   CR 0.99   ANIONGAP 10   SHELIA 7.9*   *   ALBUMIN 2.9*   PROTTOTAL 6.2*   BILITOTAL 0.4   ALKPHOS 91   ALT 17   AST 30   LIPASE 109   TROPI 0.016       Recent Results (from the past 24 hour(s))   CT Head w/o Contrast    Narrative    CT HEAD W/O CONTRAST   12/12/2017 2:26 PM     HISTORY: headache, weakness;     TECHNIQUE: Axial images of the head without IV contrast material.  Radiation dose for this scan was reduced using automated exposure  control, adjustment of the mA and/or kV according to patient size, or  iterative reconstruction technique.    COMPARISON: CT dated 3/8/2017    FINDINGS:  There is generalized atrophy of the brain.  Areas of low  attenuation are present in the white matter of the cerebral  hemispheres that are consistent with small vessel ischemic disease in  this age patient. There is a small chronic infarct in the inferior  cortex of the left cerebellum. There is no evidence of intracranial  hemorrhage, mass, acute infarct or anomaly. The visualized portions of  the sinuses and mastoids appear normal. There is no evidence  of  trauma.      Impression    IMPRESSION:   1. No acute pathology, no bleed, mass, or acute infarcts. No change.  2. Age related changes including diffuse brain atrophy.  White matter  changes consistent with small vessel ischemic disease.   3. Small chronic cerebellar infarct. No significant change.    ARCENIO MICHELLE MD   XR Chest 2 Views    Narrative    CHEST TWO VIEWS   12/12/2017 2:36 PM     HISTORY: Cough.     COMPARISON: Chest CT dated 9/28/2017, chest x-rays dated 9/28/2016 and  3/11/2017.    FINDINGS:  Aortic valve replacement/aortic stent cage, three-channel  pacemaker and leads and mild cardiomegaly are stable. Small nodular  density projected over the posteroinferior lungs on the lateral view  is essentially stable as compared to the prior study dated 3/11/2017  and grossly correlates with a benign bone island in the posterior  aspect of the vertebra in this location on prior CT. Lungs are  otherwise grossly clear. No pneumothorax or significant pleural fluid  collection is identified. Posterior costophrenic angles are not  completely included. Chronic healed left posterior rib fractures are  noted. There are degenerative changes of bilateral shoulders with  remodeling of the humeral heads.      Impression    IMPRESSION:  1. Postoperative changes of the chest as described.  2. Mild cardiomegaly is again noted. No definite congestive heart  failure seen on today's study. This is an improvement as compared to  the prior study dated 9/28/2017.  3. No pneumonia is seen.    GRAY ARNDT MD

## 2017-12-13 NOTE — PROGRESS NOTES
Pt denies abdominal pain but did report nausea this morning, zofran given. Bowel sounds are active x4. Miralax 34grams given and pt is drinking now with tomato juice. Low grade temp 99.0 (o) and 99.8 (o). Will continue to monitor bowel status, nausea.

## 2017-12-13 NOTE — PROVIDER NOTIFICATION
Notified MD at 2200 PM regarding medication interaction per pharmacy.      Spoke with: Dr Carroll    Orders were obtained.    Comments: Pharmacy noted an adverse medication interaction regarding Tikosyn and his ordered antibiotics. MD made aware and antibiotic changed .

## 2017-12-13 NOTE — DISCHARGE SUMMARY
[facility] Observation Discharge Summary    Bud Merritt MRN# [id number]   Age: 89 year old YOB: 1927     Date of Observation:  12/12/2017  Date of Discharge:  12/13/2017   Initial Physician:  Rao Carroll MD  Discharge Physician:  Cesar Do MD     Home clinic: [facility]  Primary care provider: Camron Aragon          Admission Diagnoses:   Dizziness [R42]  Hyponatremia [E87.1]  Nausea [R11.0]  Generalized weakness [R53.1]  Pneumonia symptoms [R06.89]  Leukopenia, unspecified type [D72.819]          Discharge Diagnoses:   Principal diagnosis: Constipation    Secondary diagnoses:    Constipation    Mitral regurgitation and aortic stenosis - AS severe by echo on 3/2017.  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 *            Procedures:   No procedures performed during this hospital stay           Discharge Medications:     Outpatient Prescriptions Marked as Taking for the 12/12/17 encounter (Hospital Encounter)   Medication Sig     acetaminophen (TYLENOL) 325 MG tablet Take 2 tablets (650 mg) by mouth every 4 hours as needed for mild pain, fever or headaches     [START ON 12/14/2017] polyethylene glycol (MIRALAX/GLYCOLAX) Packet Take 17 g by mouth daily     ondansetron (ZOFRAN-ODT) 4 MG ODT tab Take 1 tablet (4 mg) by mouth every 6 hours as needed for nausea or vomiting     apixaban ANTICOAGULANT (ELIQUIS) 2.5 MG tablet Take 1 tablet (2.5 mg) by mouth 2 times daily     traMADol (ULTRAM)  50 MG tablet TAKE 1 TABLET 3 TIMES DAILY AS NEEDED FOR MODERATE PAIN     HYDROcodone-acetaminophen (NORCO) 5-325 MG per tablet Take 1 tablet by mouth every 6 hours as needed for pain For moderate to severe pain     predniSONE (DELTASONE) 5 MG tablet Take 1 tablet (5 mg) by mouth daily     SPIRONOLACTONE PO Take 25 mg by mouth daily     metoprolol (LOPRESSOR) 25 MG tablet Take 1 tablet (25 mg) by mouth 2 times daily     lisinopril (PRINIVIL/ZESTRIL) 10 MG tablet Take 0.5 tablets (5 mg) by mouth daily     tamsulosin (FLOMAX) 0.4 MG capsule Take 1 capsule (0.4 mg) by mouth daily     dofetilide (TIKOSYN) 125 MCG capsule Take 1 capsule (125 mcg) by mouth 2 times daily     atorvastatin (LIPITOR) 40 MG tablet Take 1 tablet (40 mg) by mouth daily (Patient taking differently: Take 40 mg by mouth every evening )     alendronate (FOSAMAX) 70 MG tablet Take 1 tablet (70 mg) by mouth every 7 days Take with over 8 ounces water and stay upright for at least 30 minutes after dose.  Take at least 60 minutes before breakfast     ascorbic acid (VITAMIN C) 500 MG CPCR Take 500 mg by mouth daily     VITAMIN D, CHOLECALCIFEROL, PO Take 2,000 Units by mouth daily Reported on 5/3/2017     calcium carbonate (OS-SHELIA 500 MG [place]. [place]) 500 MG tablet Take 1,250 mg by mouth daily        Current Discharge Medication List      START taking these medications    Details   acetaminophen (TYLENOL) 325 MG tablet Take 2 tablets (650 mg) by mouth every 4 hours as needed for mild pain, fever or headaches  Qty: 100 tablet      polyethylene glycol (MIRALAX/GLYCOLAX) Packet Take 17 g by mouth daily  Qty: 30 packet, Refills: 0    Associated Diagnoses: Constipation, unspecified constipation type      ondansetron (ZOFRAN-ODT) 4 MG ODT tab Take 1 tablet (4 mg) by mouth every 6 hours as needed for nausea or vomiting  Qty: 10 tablet, Refills: 0    Associated Diagnoses: Nausea         CONTINUE these medications which have NOT CHANGED    Details   apixaban  ANTICOAGULANT (ELIQUIS) 2.5 MG tablet Take 1 tablet (2.5 mg) by mouth 2 times daily  Qty: 180 tablet, Refills: 3    Associated Diagnoses: Paroxysmal atrial fibrillation (H)      traMADol (ULTRAM) 50 MG tablet TAKE 1 TABLET 3 TIMES DAILY AS NEEDED FOR MODERATE PAIN  Qty: 90 tablet, Refills: 0    Associated Diagnoses: Rheumatoid arthritis involving multiple sites, unspecified rheumatoid factor presence (H)      HYDROcodone-acetaminophen (NORCO) 5-325 MG per tablet Take 1 tablet by mouth every 6 hours as needed for pain For moderate to severe pain  Qty: 18 tablet, Refills: 0    Associated Diagnoses: Rheumatoid arthritis involving multiple sites, unspecified rheumatoid factor presence (H)      predniSONE (DELTASONE) 5 MG tablet Take 1 tablet (5 mg) by mouth daily  Qty: 100 tablet, Refills: 4    Associated Diagnoses: Rheumatoid arthritis involving multiple sites with positive rheumatoid factor (H)      SPIRONOLACTONE PO Take 25 mg by mouth daily      metoprolol (LOPRESSOR) 25 MG tablet Take 1 tablet (25 mg) by mouth 2 times daily  Qty: 60 tablet, Refills: 11    Associated Diagnoses: S/P TAVR (transcatheter aortic valve replacement)      lisinopril (PRINIVIL/ZESTRIL) 10 MG tablet Take 0.5 tablets (5 mg) by mouth daily  Qty: 62 tablet, Refills: 11    Associated Diagnoses: Acute on chronic systolic congestive heart failure (H)      tamsulosin (FLOMAX) 0.4 MG capsule Take 1 capsule (0.4 mg) by mouth daily  Qty: 60 capsule, Refills: 0    Associated Diagnoses: S/P TAVR (transcatheter aortic valve replacement)      dofetilide (TIKOSYN) 125 MCG capsule Take 1 capsule (125 mcg) by mouth 2 times daily  Qty: 60 capsule, Refills: 5    Associated Diagnoses: Paroxysmal atrial fibrillation (H)      atorvastatin (LIPITOR) 40 MG tablet Take 1 tablet (40 mg) by mouth daily  Qty: 90 tablet, Refills: 2    Associated Diagnoses: Hyperlipidemia LDL goal <130      alendronate (FOSAMAX) 70 MG tablet Take 1 tablet (70 mg) by mouth every 7 days  Take with over 8 ounces water and stay upright for at least 30 minutes after dose.  Take at least 60 minutes before breakfast  Qty: 12 tablet, Refills: 3    Associated Diagnoses: Osteopenia      ascorbic acid (VITAMIN C) 500 MG CPCR Take 500 mg by mouth daily      VITAMIN D, CHOLECALCIFEROL, PO Take 2,000 Units by mouth daily Reported on 5/3/2017      calcium carbonate (OS-SHELIA 500 MG [place]. [place]) 500 MG tablet Take 1,250 mg by mouth daily       Leuprolide Acetate (LUPRON DEPOT IM) Inject into the muscle every 6 months Reported on 5/3/2017         STOP taking these medications       aspirin EC 81 MG EC tablet Comments:   Reason for Stopping:                     Consultations:   No consultations were requested during this hospital stay          Brief History of Illness:   89 year old male patient with complex health history presented with several days history of worsening lack of appetite, nausea and weakness.  Due to concerns for nausea, weakness, and acute functional decline, he was hospitalized for observation. See ER note and history and physical for details.          Hospital Course:   Patient was observed in the hospital.  He provided clinical history concerning for constipation and was therefore treated with laxatives with good effect.  Once constipation had resolved, his nausea subsided and he easily tolerated advancing diet.  Hyponatremia resolved with isotonic IV fluid administration.  Strength improved and he tolerated advancing activity by discharge.  He had persistent cough thought to be most likely due to viral respiratory illness.  Chronic leukopenia was stable and he did not have fever.  There were no infiltrates on chest x-ray and his pro-calcitonin level was undetectable, so antibiotics were discontinued at discharge.  No other acute medical problems were identified.  He had an otherwise unremarkable clinical course.    I evaluated this patient on the day of discharge.  He appeared to be resting  comfortably without any signs of acute distress.  Vital signs were stable and oxygenation was normal.  Heart rate was regular with regular rhythm.  Lungs were clear.  Abdomen was soft without tenderness.            Discharge Instructions and Follow-Up:   Discharge diet: Advance to a regular diet as tolerated   Discharge activity: Activity as tolerated   Discharge follow-up: Follow up with primary care provider in 2-4 weeks      Pending test results:   Unresulted Labs Ordered in the Past 30 Days of this Admission     Date and Time Order Name Status Description    12/12/2017 1714 Methicillin resistant staph aureus cult In process     12/12/2017 1343 Blood culture Preliminary                Discharge Disposition:   Discharged to home      Attestation:  I have reviewed today's vital signs, notes, medications, and test results.    Cesar Do MD

## 2017-12-13 NOTE — PLAN OF CARE
Problem: Patient Care Overview  Goal: Plan of Care/Patient Progress Review  Outcome: Adequate for Discharge Date Met: 12/13/17  S-(situation): Patient discharged to HOME via W/C with wife    B-(background): Observation goals met YES    A-(assessment): VSS, afebrile.  Up with SBA, steady gait.  Fecal urgency and incontinent x1, had 3 large loose BMs after receiving miralax.  Home Medications reviewed in depth with wife and patient.  Discharging home with zofran ODT and miralax, instructed to start miralax after diarrhea subsides.     R-(recommendations): Discharge instructions reviewed with patient. Listed belongings gathered and returned to patient.   Patient Education resolved: Yes  New medications-Pt. Has been educated about reason of use and side effects Yes  Home and hospital acquired medications returned to patient NA  Medication Bin checked and emptied on discharge Yes

## 2017-12-14 ENCOUNTER — HOSPITAL ENCOUNTER (OUTPATIENT)
Dept: CARDIAC REHAB | Facility: CLINIC | Age: 82
End: 2017-12-14
Attending: FAMILY MEDICINE
Payer: MEDICARE

## 2017-12-14 ENCOUNTER — CARE COORDINATION (OUTPATIENT)
Dept: CARE COORDINATION | Facility: CLINIC | Age: 82
End: 2017-12-14

## 2017-12-14 DIAGNOSIS — I50.23 ACUTE ON CHRONIC SYSTOLIC CONGESTIVE HEART FAILURE (H): ICD-10-CM

## 2017-12-14 PROCEDURE — 40000116 ZZH STATISTIC OP CR VISIT: Performed by: REHABILITATION PRACTITIONER

## 2017-12-14 PROCEDURE — 93798 PHYS/QHP OP CAR RHAB W/ECG: CPT | Performed by: REHABILITATION PRACTITIONER

## 2017-12-14 NOTE — PROGRESS NOTES
"Clinic Care Coordination Contact  OUTREACH    Referral Information:  Referral Source: Tele-Monitoring  Reason for Contact: Hospital follow up      Universal Utilization:   ED Visits in last year: 2  Hospital visits in last year: 7  Last PCP appointment: 09/21/17  Missed Appointments: 1  Concerns: caregiver stress  Multiple Providers or Specialists: Cardiology    Clinical Concerns:  Current Medical Concerns: Called and spoke with wife, she states pt is upset because he was given a laxative and now he is having diarrhea.  Wife states he refuses to take any more of the Mirilax \"ever\".  Wife states all he does is sit around the house because he is having so much pain and then he takes his pain medication and is sleepy.  She does take him to cardiac rehab twice a week. States he comes home and takes a nap.    Nadyain offered home care to come out and do an assessment to see about any skilled need such as medication education.  Wife again declines and states \"there is nothing they can do\".  Wife again states she is getting worn out and running out of steam.  Advised pt needs to come in and discuss pain management and I will mail out other community support resources.  Wife states \"I don't need anyone here, I do the laundry and cook his meals, he wont eat if he doesn't like it\".  Suggested Meals on wheels and wife states she would get them but hey only offer certain days and no weekends, and feels pt would not eat if he didn't like anything they served.  Encouraged to as least call and inquire, it may not provide all the meals, but could supplement some so she does not have to cook all the time.  There was a mix up in the scheduled appt and the ED person cancelled the appt for today and next week.  I did reschedule the one for next week as pt needs to get in and talk about alicia pain management and possible home care or other community resources.    Current Behavioral Concerns: declining offered services, concern for " caregiver overload.     Education Provided to patient: wife has number,encouraged her to keep offering fluids and foods to keep pt's stool soft.       Clinical Pathway: None    Medication Management:  Wife manages all medications     Functional Status:  Mobility Status: Independent w/Device     Transportation: wife in town only     Psychosocial:  Current living arrangement:: I live in a private home with spouse  Financial/Insurance: not discussed     Resources and Interventions:  Current Resources:   Cardiac Rehab    Advanced Care Plans/Directives on file:: No     Goals:  Come up with a care plan that works for pt and wife  Barriers: advancing age of both pt and wife, declining home support/services, distance from clinic  Strengths: attending cardiac rehab, attending appts as scheduled.   Patient/Caregiver understanding: unclear if pt or caregiver have a good understanding of available services to help support there ability to stay home safely.   Frequency of Care Coordination: weekly  Upcoming appointment: 12/21/17 (Post Hosp F/U PCP)     Plan:   Wife will continue to encourage fluids through the day  Wife will continue to manage medications  Wife will bring pt to appt on 12/21/17  Wife will call if she needs to get pt in sooner.   RN will follow up in one week.    Yumiko MORALESN, RN, PHN  Care Coordination    [facility]  [address] [place] [address]  Office: [phone]  Fax [phone]   [facility]  [address] [place] [address]  Office: [phone] Fax [phone]  [email or web address]   [email or web address]   Connect with [facility] on social media.

## 2017-12-14 NOTE — TELEPHONE ENCOUNTER
Lisinopril 5 MG    Last Written Prescription Date: 9-29-17  Last Fill Quantity: 62, # refills: 11    Last Office Visit with G, P or [facility] prescribing provider:  9-21-17   Future Office Visit:    Next 5 appointments (look out 90 days)     Dec 21, 2017 10:40 AM CST   Office Visit with Camron Aragon MD   [facility] ([address] [address] [phone]            Yemi 15, 2018 11:00 AM CST   Office Visit with Camron Aragon MD   [facility] ([facility]    [address] [place] [id number] [phone]                    BP Readings from Last 3 Encounters:   12/13/17 133/80   11/15/17 110/68   10/26/17 139/82

## 2017-12-14 NOTE — LETTER
December 19, 2017      Bud Merritt  [address] [address] [place] [id number]    Dear Tierra,   It was nice talking to you last week regarding Bud and what resources are available to both of you.  I have included a brochure on [employer] which is a community resource available to help you both in the home.  They offer a great list of support and resources.  Please review and call the number listed for further questions or to have them come out.  Please let me know if at any point you would like [facility] out to the home instead of coming in daily to Cardiac Rehab.  We are here to support both of you and keep you as healthy as possible.   Sincerely       Yumiko LEES, RN, PHN  Care Coordination    [facility]  [address] [place] [address]  Office: [phone]  Fax [phone]   [facility]  [address] [place] [address]  Office: [phone] Fax [phone]  [email or web address]   [email or web address]   Connect with [facility] on social media.

## 2017-12-18 LAB
BACTERIA SPEC CULT: NORMAL
Lab: NORMAL
SPECIMEN SOURCE: NORMAL

## 2017-12-19 ENCOUNTER — HOSPITAL ENCOUNTER (OUTPATIENT)
Dept: CARDIAC REHAB | Facility: CLINIC | Age: 82
End: 2017-12-19
Attending: FAMILY MEDICINE
Payer: MEDICARE

## 2017-12-19 PROCEDURE — 40000116 ZZH STATISTIC OP CR VISIT

## 2017-12-19 PROCEDURE — 93798 PHYS/QHP OP CAR RHAB W/ECG: CPT

## 2017-12-20 RX ORDER — LISINOPRIL 10 MG/1
5 TABLET ORAL DAILY
Qty: 62 TABLET | Refills: 4 | Status: SHIPPED | OUTPATIENT
Start: 2017-12-20 | End: 2017-12-21

## 2017-12-21 ENCOUNTER — HOSPITAL ENCOUNTER (OUTPATIENT)
Dept: CARDIAC REHAB | Facility: CLINIC | Age: 82
End: 2017-12-21
Attending: FAMILY MEDICINE
Payer: MEDICARE

## 2017-12-21 ENCOUNTER — OFFICE VISIT (OUTPATIENT)
Dept: FAMILY MEDICINE | Facility: CLINIC | Age: 82
End: 2017-12-21
Payer: COMMERCIAL

## 2017-12-21 VITALS
WEIGHT: 146.5 LBS | HEIGHT: 67 IN | BODY MASS INDEX: 22.99 KG/M2 | HEART RATE: 78 BPM | SYSTOLIC BLOOD PRESSURE: 128 MMHG | DIASTOLIC BLOOD PRESSURE: 70 MMHG | TEMPERATURE: 97.3 F | OXYGEN SATURATION: 95 %

## 2017-12-21 DIAGNOSIS — J44.1 COPD EXACERBATION (H): ICD-10-CM

## 2017-12-21 DIAGNOSIS — R53.1 WEAKNESS: ICD-10-CM

## 2017-12-21 DIAGNOSIS — I50.23 ACUTE ON CHRONIC SYSTOLIC CONGESTIVE HEART FAILURE (H): Primary | ICD-10-CM

## 2017-12-21 PROCEDURE — 93798 PHYS/QHP OP CAR RHAB W/ECG: CPT | Performed by: REHABILITATION PRACTITIONER

## 2017-12-21 PROCEDURE — 99214 OFFICE O/P EST MOD 30 MIN: CPT | Performed by: FAMILY MEDICINE

## 2017-12-21 PROCEDURE — 40000116 ZZH STATISTIC OP CR VISIT: Performed by: REHABILITATION PRACTITIONER

## 2017-12-21 RX ORDER — LISINOPRIL 5 MG/1
5 TABLET ORAL DAILY
Qty: 90 TABLET | Refills: 0 | COMMUNITY
Start: 2017-12-21 | End: 2017-12-21

## 2017-12-21 NOTE — MR AVS SNAPSHOT
After Visit Summary   12/21/2017    Bud Merritt    MRN: [id number]           Patient Information     Date Of Birth          5/9/1927        Visit Information        Provider Department      12/21/2017 10:40 AM Camron Aragon MD [facility]        Today's Diagnoses     Acute on chronic systolic congestive heart failure (H)    -  1    Weakness        COPD exacerbation (H)           Follow-ups after your visit        Your next 10 appointments already scheduled     Jan 02, 2018  9:30 AM CST   Cardiac Treatment with Adeline Johnston [facility] ([facility])    [address] [place] [address] [address] [place] [address]   [phone]            Jan 04, 2018  9:30 AM CST   Cardiac Treatment with JARETH Pretty   [facility] ([facility])    [address] [place] [address] [address] [place] [address]   [phone]            Jan 09, 2018  9:30 AM CST   Cardiac Treatment with TATE Krueger   [facility] ([facility])    [address] [place] [address] [address] [place] [address]   [phone]            Jan 11, 2018  9:30 AM CST   Cardiac Treatment with JARETH Pond   [facility] ([facility])    [facility] [address] [place] [address]   [phone]            Yemi 15, 2018 11:00 AM CST   Office Visit with Camron Aragon MD   [facility] ([facility])    [address] [facility] [address]   [phone]           Bring a current list of meds and any records pertaining to this visit. For Physicals, please bring immunization records and any forms needing to be filled out. Please arrive 10 minutes early to complete paperwork.            Jan 16, 2018  9:30 AM CST   Cardiac Treatment with TATE Krueger   [facility] ([facility])    [address] [place] [address] [address] [place] [address]  "  [phone]            Jan 18, 2018  9:30 AM CST   Cardiac Treatment with Caryn [place], EP   [facility] ([facility])    [address] [facility] [address] [place] [address]   [phone]            Jan 23, 2018  9:30 AM CST   Cardiac Treatment with Adeline Johnston, TH   [facility] ([facility])    [address] [place] [address]   [phone]            Jan 25, 2018  9:30 AM CST   Cardiac Treatment with Caryn [place], EP   [facility] ([facility])    [address] [place] [address]   [phone]            Jan 30, 2018  9:30 AM CST   Cardiac Treatment with Adeline Johnston, [facility] ([facility])    [address] [place] [address]   [phone]              Who to contact     If you have questions or need follow up information about today's clinic visit or your schedule please contact [facility] directly at [phone].  Normal or non-critical lab and imaging results will be communicated to you by MyChart, letter or phone within 4 business days after the clinic has received the results. If you do not hear from us within 7 days, please contact the clinic through MyChart or phone. If you have a critical or abnormal lab result, we will notify you by phone as soon as possible.  Submit refill requests through [employer] or call your pharmacy and they will forward the refill request to us. Please allow 3 business days for your refill to be completed.          Additional Information About Your Visit        [employer] Information     [employer] lets you send messages to your doctor, view your test results, renew your prescriptions, schedule appointments and more. To sign up, go to [email or web address] . Click on \"Log in\" on the left side of the screen, which will take you to the Welcome page. Then click on \"Sign up Now\" on " "the right side of the page.     You will be asked to enter the access code listed below, as well as some personal information. Please follow the directions to create your username and password.     Your access code is: [email or web address]  Expires: 3/28/2018  2:03 PM     Your access code will  in 90 days. If you need help or a new code, please call your [facility] or [phone].        Care EveryWhere ID     This is your Care EveryWhere ID. This could be used by other organizations to access your [facility] medical records  [id number]        Your Vitals Were     Pulse Temperature Height Pulse Oximetry BMI (Body Mass Index)       78 97.3  F (36.3  C) (Temporal) 5' 7\" (1.702 m) 95% 22.95 kg/m2        Blood Pressure from Last 3 Encounters:   17 128/70   17 133/80   11/15/17 110/68    Weight from Last 3 Encounters:   17 146 lb 8 oz (66.5 kg)   17 146 lb 13.2 oz (66.6 kg)   17 149 lb (67.6 kg)              Today, you had the following     No orders found for display         Today's Medication Changes          These changes are accurate as of: 17 11:59 PM.  If you have any questions, ask your nurse or doctor.               These medicines have changed or have updated prescriptions.        Dose/Directions    atorvastatin 40 MG tablet   Commonly known as:  LIPITOR   This may have changed:  when to take this   Used for:  Hyperlipidemia LDL goal <130        Dose:  40 mg   Take 1 tablet (40 mg) by mouth daily   Quantity:  90 tablet   Refills:  2            Where to get your medicines      These medications were sent to [id number] - [place] - [address] [place] [address]     Phone:  [phone]     lisinopril 5 MG tablet                Primary Care Provider Office Phone # Fax #    Camron Aragon [phone]       [facility] [address] [place] [address]        Equal Access to Services     [facility] " GAAR : Hadii aad ku hadciarra Burgess, waaxda luqadaha, qaybta kaalmada adekeila, waxlamont iva alejandroluz dyer emelykimberley real . So [facility] [phone].    ATENCIÓN: Si habla espclair, tiene a mon disposición servicios gratuitos de asistencia lingüística. Llame al [phone].    We comply with applicable federal civil rights laws and [place] laws. We do not discriminate on the basis of race, color, national origin, age, disability, sex, sexual orientation, or gender identity.            Thank you!     Thank you for choosing [facility]  for your care. Our goal is always to provide you with excellent care. Hearing back from our patients is one way we can continue to improve our services. Please take a few minutes to complete the written survey that you may receive in the mail after your visit with us. Thank you!             Your Updated Medication List - Protect others around you: Learn how to safely use, store and throw away your medicines at www.disposemymeds.org.          This list is accurate as of: 12/21/17 11:59 PM.  Always use your most recent med list.                   Brand Name Dispense Instructions for use Diagnosis    acetaminophen 325 MG tablet    TYLENOL    100 tablet    Take 2 tablets (650 mg) by mouth every 4 hours as needed for mild pain, fever or headaches        alendronate 70 MG tablet    FOSAMAX    12 tablet    Take 1 tablet (70 mg) by mouth every 7 days Take with over 8 ounces water and stay upright for at least 30 minutes after dose.  Take at least 60 minutes before breakfast    Osteopenia       apixaban ANTICOAGULANT 2.5 MG tablet    ELIQUIS    180 tablet    Take 1 tablet (2.5 mg) by mouth 2 times daily    Paroxysmal atrial fibrillation (H)       ascorbic acid 500 MG Cpcr CR capsule    vitamin C     Take 500 mg by mouth daily        atorvastatin 40 MG tablet    LIPITOR    90 tablet    Take 1 tablet (40 mg) by mouth daily    Hyperlipidemia LDL goal <130       calcium carbonate  1250 MG tablet    OS-SHELIA 500 mg [place]. [place]     Take 1,250 mg by mouth daily        dofetilide 125 MCG capsule    TIKOSYN    60 capsule    Take 1 capsule (125 mcg) by mouth 2 times daily    Paroxysmal atrial fibrillation (H)       HYDROcodone-acetaminophen 5-325 MG per tablet    NORCO    18 tablet    Take 1 tablet by mouth every 6 hours as needed for pain For moderate to severe pain    Rheumatoid arthritis involving multiple sites, unspecified rheumatoid factor presence (H)       lisinopril 5 MG tablet    PRINIVIL/ZESTRIL    90 tablet    Take 1 tablet (5 mg) by mouth daily    Acute on chronic systolic congestive heart failure (H)       LUPRON DEPOT IM      Inject into the muscle every 6 months Reported on 5/3/2017        metoprolol 25 MG tablet    LOPRESSOR    60 tablet    Take 1 tablet (25 mg) by mouth 2 times daily    S/P TAVR (transcatheter aortic valve replacement)       ondansetron 4 MG ODT tab    ZOFRAN-ODT    10 tablet    Take 1 tablet (4 mg) by mouth every 6 hours as needed for nausea or vomiting    Nausea       polyethylene glycol Packet    MIRALAX/GLYCOLAX    30 packet    Take 17 g by mouth daily    Constipation, unspecified constipation type       predniSONE 5 MG tablet    DELTASONE    100 tablet    Take 1 tablet (5 mg) by mouth daily    Rheumatoid arthritis involving multiple sites with positive rheumatoid factor (H)       SPIRONOLACTONE PO      Take 25 mg by mouth daily        tamsulosin 0.4 MG capsule    FLOMAX    60 capsule    Take 1 capsule (0.4 mg) by mouth daily    S/P TAVR (transcatheter aortic valve replacement)       traMADol 50 MG tablet    ULTRAM    90 tablet    TAKE 1 TABLET 3 TIMES DAILY AS NEEDED FOR MODERATE PAIN    Rheumatoid arthritis involving multiple sites, unspecified rheumatoid factor presence (H)       VITAMIN D (CHOLECALCIFEROL) PO      Take 2,000 Units by mouth daily Reported on 5/3/2017

## 2017-12-21 NOTE — NURSING NOTE
"Chief Complaint   Patient presents with     Hospital F/U     constiptation        Initial /70 (BP Location: Right arm, Patient Position: Chair, Cuff Size: Adult Regular)  Pulse 78  Temp 97.3  F (36.3  C) (Temporal)  Ht 5' 7\" (1.702 m)  Wt 146 lb 8 oz (66.5 kg)  SpO2 95%  BMI 22.95 kg/m2 Estimated body mass index is 22.95 kg/(m^2) as calculated from the following:    Height as of this encounter: 5' 7\" (1.702 m).    Weight as of this encounter: 146 lb 8 oz (66.5 kg).  Medication Reconciliation: complete    "

## 2017-12-21 NOTE — PROGRESS NOTES
SUBJECTIVE:   Bud Merritt is a 89 year old male who presents to clinic today for the following health issues:    Patient has been having loose stools x 9 days. Patient c/o dizziness and feeling like his head is heavy. His head wants to fall forward.       Hospital Follow-up Visit:    Hospital/Nursing Home/IP Rehab Facility: [facility]  Date of Admission: 12/12/2017  Date of Discharge: 12/13/2017  Reason(s) for Admission: constipation            Problems taking medications regularly:  None       Medication changes since discharge: Aspirin was stopped       Problems adhering to non-medication therapy:  None    Summary of hospitalization:  [facility] discharge summary reviewed  Diagnostic Tests/Treatments reviewed.  Follow up needed: Cardiac rehabilitation  Other Healthcare Providers Involved in Patient s Care:         Cardiac rehab  Update since discharge: stable.     Post Discharge Medication Reconciliation: discharge medications reconciled and changed, per note/orders (see AVS).  Plan of care communicated with patient and family     Coding guidelines for this visit:  Type of Medical   Decision Making Face-to-Face Visit       within 7 Days of discharge Face-to-Face Visit        within 14 days of discharge   Moderate Complexity [id number]   High Complexity 76396 64581                  Problem list and histories reviewed & adjusted, as indicated.  Additional history: as documented        Reviewed and updated as needed this visit by clinical staff       Reviewed and updated as needed this visit by Provider        SUBJECTIVE:  Bud  is a 89 year old male who presents for: Follow-up of hospitalization for dizziness and generalized weakness hyponatremia some nausea and constipation.  Overall better but states he is now having some loose stools.  Complains of dizziness still to some extent but feels like his head is heavy.  Set his head wants to fall forward.  No headache.  He has multiple  medical problems.  He has been declining in his health.    Past Medical History:   Diagnosis Date     Anemia      Atrial fibrillation (H) 07/01/2016     Cardiac pacemaker 03/10/2017     Cardiomyopathy (H)      CHF (congestive heart failure) (H)      COPD exacerbation (H) 3/2/2017     Depressive disorder      History of prostate cancer      Paroxysmal atrial fibrillation (H) 7/6/2016     Pure hypercholesterolemia      Rheumatoid arthritis(714.0)      Unspecified essential hypertension      Weakness generalized 3/2/2017     Past Surgical History:   Procedure Laterality Date     ARTHROPLASTY KNEE Left 1/12/2016    Procedure: ARTHROPLASTY KNEE;  Surgeon: Cesar Walker DO;  Location: [facility] OR     COLONOSCOPY  08/24/09     HC COLONOSCOPY THRU STOMA W BIOPSY/CAUTERY TUMOR/POLYP/LESION  8/31/2004     HC REPAIR ING HERNIA,5+Y/O,REDUCIBL  1996    Marlex mesh repair of bilateral inguinal hernias and drainage of bilateral scrotal hydroceles.     HEART CATH FEMORAL CANNULIZATION WITH OPEN STANDBY REPAIR AORTIC VALVE N/A 9/27/2017    Procedure: HEART CATH FEMORAL CANNULIZATION WITH OPEN STANDBY REPAIR AORTIC VALVE;;  Surgeon: Jaron Alejandra MD;  Location: [facility] OR     IMPLANT PACEMAKER  03/10/2017     IRRIGATION AND DEBRIDEMENT SOFT TISSUE LOWER EXTREMITY, COMBINED Left 3/15/2016    Procedure: COMBINED IRRIGATION AND DEBRIDEMENT SOFT TISSUE LOWER EXTREMITY;  Surgeon: Cesar Walker DO;  Location:  OR     TRANSCATHETER AORTIC VALVE IMPLANT ANESTHESIA N/A 9/27/2017    Procedure: TRANSCATHETER AORTIC VALVE IMPLANT ANESTHESIA;  Right Transfemoral Approach (Harman Ramsey 3 29mm) Aortic Valve Implant,  Cardiopulmonary Bypass Standby, Transthoracic Echocardiogram by Derian Queen(Echo Tech);  Surgeon: GENERIC ANESTHESIA PROVIDER;  Location: [facility] OR     Social History   Substance Use Topics     Smoking status: Former Smoker     Packs/day: 0.50     Years: 15.00     Types: Cigarettes     Quit date: 11/12/1998      Smokeless tobacco: Never Used      Comment: quit 15 yrs ago     Alcohol use No     Current Outpatient Prescriptions   Medication Sig Dispense Refill     lisinopril (PRINIVIL/ZESTRIL) 5 MG tablet Take 1 tablet (5 mg) by mouth daily 90 tablet 3     acetaminophen (TYLENOL) 325 MG tablet Take 2 tablets (650 mg) by mouth every 4 hours as needed for mild pain, fever or headaches 100 tablet      polyethylene glycol (MIRALAX/GLYCOLAX) Packet Take 17 g by mouth daily 30 packet 0     ondansetron (ZOFRAN-ODT) 4 MG ODT tab Take 1 tablet (4 mg) by mouth every 6 hours as needed for nausea or vomiting 10 tablet 0     apixaban ANTICOAGULANT (ELIQUIS) 2.5 MG tablet Take 1 tablet (2.5 mg) by mouth 2 times daily 180 tablet 3     traMADol (ULTRAM) 50 MG tablet TAKE 1 TABLET 3 TIMES DAILY AS NEEDED FOR MODERATE PAIN 90 tablet 0     HYDROcodone-acetaminophen (NORCO) 5-325 MG per tablet Take 1 tablet by mouth every 6 hours as needed for pain For moderate to severe pain 18 tablet 0     predniSONE (DELTASONE) 5 MG tablet Take 1 tablet (5 mg) by mouth daily 100 tablet 4     SPIRONOLACTONE PO Take 25 mg by mouth daily       metoprolol (LOPRESSOR) 25 MG tablet Take 1 tablet (25 mg) by mouth 2 times daily 60 tablet 11     tamsulosin (FLOMAX) 0.4 MG capsule Take 1 capsule (0.4 mg) by mouth daily 60 capsule 0     dofetilide (TIKOSYN) 125 MCG capsule Take 1 capsule (125 mcg) by mouth 2 times daily 60 capsule 5     atorvastatin (LIPITOR) 40 MG tablet Take 1 tablet (40 mg) by mouth daily (Patient taking differently: Take 40 mg by mouth every evening ) 90 tablet 2     Leuprolide Acetate (LUPRON DEPOT IM) Inject into the muscle every 6 months Reported on 5/3/2017       alendronate (FOSAMAX) 70 MG tablet Take 1 tablet (70 mg) by mouth every 7 days Take with over 8 ounces water and stay upright for at least 30 minutes after dose.  Take at least 60 minutes before breakfast 12 tablet 3     ascorbic acid (VITAMIN C) 500 MG CPCR Take 500 mg by mouth daily    "    VITAMIN D, CHOLECALCIFEROL, PO Take 2,000 Units by mouth daily Reported on 5/3/2017       calcium carbonate (OS-SHELIA 500 MG [place]. [place]) 500 MG tablet Take 1,250 mg by mouth daily          REVIEW OF SYSTEMS:   5 point ROS negative except as noted above in HPI, including Gen., Resp, CV, GI &  system review.     OBJECTIVE:  Vitals: /70 (BP Location: Right arm, Patient Position: Chair, Cuff Size: Adult Regular)  Pulse 78  Temp 97.3  F (36.3  C) (Temporal)  Ht 5' 7\" (1.702 m)  Wt 146 lb 8 oz (66.5 kg)  SpO2 95%  BMI 22.95 kg/m2  BMI= Body mass index is 22.95 kg/(m^2).  He is alert he is oriented.  Appears tired and weak.  Eyes PERRLA mucous membranes are moist.  Neck is supple no JVD.  Lungs are generally clear.  Heart with a rate of 72.  Extremities with 2+ edema.    ASSESSMENT:  #1 acute on chronic congestive heart failure #2 generalized weakness #3 COPD #4 status post TAVR    PLAN:  He will be in cardiac rehab.  He has been cutting his lisinopril pills in half and we will make it easier for him and go with just 5 mg pills instead of the tablets.  He will watch his blood pressures at home.  Make sure he stays well hydrated.        Camron Aragon MD  [facility]              "

## 2017-12-26 ENCOUNTER — HOSPITAL ENCOUNTER (OUTPATIENT)
Dept: CARDIAC REHAB | Facility: CLINIC | Age: 82
End: 2017-12-26
Attending: FAMILY MEDICINE
Payer: MEDICARE

## 2017-12-26 PROCEDURE — 40000116 ZZH STATISTIC OP CR VISIT: Performed by: REHABILITATION PRACTITIONER

## 2017-12-26 PROCEDURE — 93798 PHYS/QHP OP CAR RHAB W/ECG: CPT | Performed by: REHABILITATION PRACTITIONER

## 2017-12-28 ENCOUNTER — HOSPITAL ENCOUNTER (OUTPATIENT)
Dept: CARDIAC REHAB | Facility: CLINIC | Age: 82
End: 2017-12-28
Attending: FAMILY MEDICINE
Payer: MEDICARE

## 2017-12-28 PROCEDURE — 40000116 ZZH STATISTIC OP CR VISIT: Performed by: REHABILITATION PRACTITIONER

## 2017-12-28 PROCEDURE — 93798 PHYS/QHP OP CAR RHAB W/ECG: CPT | Performed by: REHABILITATION PRACTITIONER

## 2017-12-28 RX ORDER — LISINOPRIL 5 MG/1
5 TABLET ORAL DAILY
Qty: 90 TABLET | Refills: 3 | Status: SHIPPED | OUTPATIENT
Start: 2017-12-28 | End: 2018-03-29

## 2018-01-01 ENCOUNTER — OFFICE VISIT (OUTPATIENT)
Dept: SURGERY | Facility: CLINIC | Age: 83
End: 2018-01-01
Payer: COMMERCIAL

## 2018-01-01 ENCOUNTER — ANCILLARY PROCEDURE (OUTPATIENT)
Dept: CARDIOLOGY | Facility: CLINIC | Age: 83
End: 2018-01-01
Attending: INTERNAL MEDICINE
Payer: COMMERCIAL

## 2018-01-01 ENCOUNTER — ALLIED HEALTH/NURSE VISIT (OUTPATIENT)
Dept: FAMILY MEDICINE | Facility: OTHER | Age: 83
End: 2018-01-01
Payer: COMMERCIAL

## 2018-01-01 ENCOUNTER — ALLIED HEALTH/NURSE VISIT (OUTPATIENT)
Dept: PHARMACY | Facility: CLINIC | Age: 83
End: 2018-01-01
Payer: COMMERCIAL

## 2018-01-01 ENCOUNTER — TRANSFERRED RECORDS (OUTPATIENT)
Dept: HEALTH INFORMATION MANAGEMENT | Facility: CLINIC | Age: 83
End: 2018-01-01

## 2018-01-01 ENCOUNTER — OFFICE VISIT (OUTPATIENT)
Dept: FAMILY MEDICINE | Facility: CLINIC | Age: 83
End: 2018-01-01
Payer: COMMERCIAL

## 2018-01-01 ENCOUNTER — OFFICE VISIT (OUTPATIENT)
Dept: ORTHOPEDICS | Facility: OTHER | Age: 83
End: 2018-01-01
Payer: COMMERCIAL

## 2018-01-01 VITALS
HEART RATE: 70 BPM | RESPIRATION RATE: 14 BRPM | OXYGEN SATURATION: 100 % | TEMPERATURE: 97.9 F | DIASTOLIC BLOOD PRESSURE: 64 MMHG | SYSTOLIC BLOOD PRESSURE: 102 MMHG

## 2018-01-01 VITALS
BODY MASS INDEX: 23.48 KG/M2 | DIASTOLIC BLOOD PRESSURE: 68 MMHG | WEIGHT: 149.6 LBS | HEIGHT: 67 IN | SYSTOLIC BLOOD PRESSURE: 90 MMHG

## 2018-01-01 VITALS — BODY MASS INDEX: 23.43 KG/M2 | TEMPERATURE: 97.9 F | HEIGHT: 67 IN

## 2018-01-01 DIAGNOSIS — I50.20 SYSTOLIC HEART FAILURE, UNSPECIFIED HEART FAILURE CHRONICITY: Primary | ICD-10-CM

## 2018-01-01 DIAGNOSIS — G89.29 CHRONIC PAIN OF BOTH SHOULDERS: ICD-10-CM

## 2018-01-01 DIAGNOSIS — Z95.2 S/P TAVR (TRANSCATHETER AORTIC VALVE REPLACEMENT): ICD-10-CM

## 2018-01-01 DIAGNOSIS — C61 MALIGNANT NEOPLASM OF PROSTATE (H): ICD-10-CM

## 2018-01-01 DIAGNOSIS — I42.9 CARDIOMYOPATHY (H): ICD-10-CM

## 2018-01-01 DIAGNOSIS — L98.9 BENIGN SKIN LESION OF FACE: Primary | ICD-10-CM

## 2018-01-01 DIAGNOSIS — I48.0 PAROXYSMAL ATRIAL FIBRILLATION (H): ICD-10-CM

## 2018-01-01 DIAGNOSIS — M25.511 CHRONIC PAIN OF BOTH SHOULDERS: ICD-10-CM

## 2018-01-01 DIAGNOSIS — M05.79 RHEUMATOID ARTHRITIS INVOLVING MULTIPLE SITES WITH POSITIVE RHEUMATOID FACTOR (H): ICD-10-CM

## 2018-01-01 DIAGNOSIS — M19.012 PRIMARY OSTEOARTHRITIS OF BOTH SHOULDERS: Primary | ICD-10-CM

## 2018-01-01 DIAGNOSIS — M19.011 PRIMARY OSTEOARTHRITIS OF BOTH SHOULDERS: Primary | ICD-10-CM

## 2018-01-01 DIAGNOSIS — I10 ESSENTIAL HYPERTENSION WITH GOAL BLOOD PRESSURE LESS THAN 140/90: ICD-10-CM

## 2018-01-01 DIAGNOSIS — I48.91 ATRIAL FIBRILLATION WITH RAPID VENTRICULAR RESPONSE (H): ICD-10-CM

## 2018-01-01 DIAGNOSIS — M81.0 AGE-RELATED OSTEOPOROSIS WITHOUT CURRENT PATHOLOGICAL FRACTURE: ICD-10-CM

## 2018-01-01 DIAGNOSIS — M06.9 RHEUMATOID ARTHRITIS INVOLVING MULTIPLE SITES, UNSPECIFIED RHEUMATOID FACTOR PRESENCE: Primary | ICD-10-CM

## 2018-01-01 DIAGNOSIS — M25.512 CHRONIC PAIN OF BOTH SHOULDERS: ICD-10-CM

## 2018-01-01 DIAGNOSIS — Z23 NEED FOR PROPHYLACTIC VACCINATION AND INOCULATION AGAINST INFLUENZA: Primary | ICD-10-CM

## 2018-01-01 DIAGNOSIS — C61 MALIGNANT NEOPLASM OF PROSTATE (H): Primary | ICD-10-CM

## 2018-01-01 DIAGNOSIS — M06.9 RHEUMATOID ARTHRITIS INVOLVING MULTIPLE SITES, UNSPECIFIED RHEUMATOID FACTOR PRESENCE: ICD-10-CM

## 2018-01-01 DIAGNOSIS — I42.0 DILATED CARDIOMYOPATHY (H): Primary | ICD-10-CM

## 2018-01-01 LAB
ALBUMIN SERPL-MCNC: 3.3 G/DL (ref 3.4–5)
ALP SERPL-CCNC: 90 U/L (ref 40–150)
ALT SERPL W P-5'-P-CCNC: 22 U/L (ref 0–70)
ANION GAP SERPL CALCULATED.3IONS-SCNC: 10 MMOL/L (ref 3–14)
AST SERPL W P-5'-P-CCNC: 28 U/L (ref 0–45)
BASOPHILS # BLD AUTO: 0 10E9/L (ref 0–0.2)
BASOPHILS NFR BLD AUTO: 0.5 %
BILIRUB SERPL-MCNC: 0.3 MG/DL (ref 0.2–1.3)
BUN SERPL-MCNC: 18 MG/DL (ref 7–30)
CALCIUM SERPL-MCNC: 8.7 MG/DL (ref 8.5–10.1)
CHLORIDE SERPL-SCNC: 99 MMOL/L (ref 94–109)
CO2 SERPL-SCNC: 26 MMOL/L (ref 20–32)
CREAT SERPL-MCNC: 0.92 MG/DL (ref 0.66–1.25)
DIFFERENTIAL METHOD BLD: ABNORMAL
EOSINOPHIL NFR BLD AUTO: 0.2 %
ERYTHROCYTE [DISTWIDTH] IN BLOOD BY AUTOMATED COUNT: 15.4 % (ref 10–15)
GFR SERPL CREATININE-BSD FRML MDRD: 77 ML/MIN/1.7M2
GLUCOSE SERPL-MCNC: 101 MG/DL (ref 70–99)
HCT VFR BLD AUTO: 36.5 % (ref 40–53)
HGB BLD-MCNC: 11.5 G/DL (ref 13.3–17.7)
ICDO DEVICE COMMENTS: NORMAL
IMM GRANULOCYTES # BLD: 0 10E9/L (ref 0–0.4)
IMM GRANULOCYTES NFR BLD: 0.2 %
LYMPHOCYTES # BLD AUTO: 2.1 10E9/L (ref 0.8–5.3)
LYMPHOCYTES NFR BLD AUTO: 38.7 %
MCH RBC QN AUTO: 31.3 PG (ref 26.5–33)
MCHC RBC AUTO-ENTMCNC: 31.5 G/DL (ref 31.5–36.5)
MCV RBC AUTO: 100 FL (ref 78–100)
MDC_IDC_EPISODE_DTM: NORMAL
MDC_IDC_EPISODE_DURATION: 1019 S
MDC_IDC_EPISODE_DURATION: 1093 S
MDC_IDC_EPISODE_DURATION: 12 S
MDC_IDC_EPISODE_DURATION: 1300 S
MDC_IDC_EPISODE_DURATION: 17 S
MDC_IDC_EPISODE_DURATION: 19 S
MDC_IDC_EPISODE_DURATION: 20 S
MDC_IDC_EPISODE_DURATION: 2427 S
MDC_IDC_EPISODE_DURATION: 261 S
MDC_IDC_EPISODE_DURATION: 261 S
MDC_IDC_EPISODE_DURATION: 266 S
MDC_IDC_EPISODE_DURATION: 3404 S
MDC_IDC_EPISODE_DURATION: 350 S
MDC_IDC_EPISODE_DURATION: 396 S
MDC_IDC_EPISODE_DURATION: 40 S
MDC_IDC_EPISODE_DURATION: 41 S
MDC_IDC_EPISODE_DURATION: 485 S
MDC_IDC_EPISODE_DURATION: 49 S
MDC_IDC_EPISODE_DURATION: 648 S
MDC_IDC_EPISODE_DURATION: 783 S
MDC_IDC_EPISODE_DURATION: 8321 S
MDC_IDC_EPISODE_DURATION: 833 S
MDC_IDC_EPISODE_DURATION: NORMAL S
MDC_IDC_EPISODE_ID: 1712
MDC_IDC_EPISODE_ID: 1713
MDC_IDC_EPISODE_ID: 1714
MDC_IDC_EPISODE_ID: 1715
MDC_IDC_EPISODE_ID: 1716
MDC_IDC_EPISODE_ID: 1717
MDC_IDC_EPISODE_ID: 1718
MDC_IDC_EPISODE_ID: 1719
MDC_IDC_EPISODE_ID: 1720
MDC_IDC_EPISODE_ID: 1721
MDC_IDC_EPISODE_ID: 1722
MDC_IDC_EPISODE_ID: 1723
MDC_IDC_EPISODE_ID: 1724
MDC_IDC_EPISODE_ID: 1725
MDC_IDC_EPISODE_ID: 1726
MDC_IDC_EPISODE_ID: 1727
MDC_IDC_EPISODE_ID: 1728
MDC_IDC_EPISODE_ID: 1729
MDC_IDC_EPISODE_ID: 1730
MDC_IDC_EPISODE_ID: 1731
MDC_IDC_EPISODE_ID: 1732
MDC_IDC_EPISODE_ID: 1733
MDC_IDC_EPISODE_ID: 1734
MDC_IDC_EPISODE_ID: 1735
MDC_IDC_EPISODE_ID: 1736
MDC_IDC_EPISODE_ID: 1737
MDC_IDC_EPISODE_ID: 1738
MDC_IDC_EPISODE_ID: 1739
MDC_IDC_EPISODE_ID: 1740
MDC_IDC_EPISODE_ID: 1741
MDC_IDC_EPISODE_ID: 1742
MDC_IDC_EPISODE_ID: 1743
MDC_IDC_EPISODE_ID: 4155
MDC_IDC_EPISODE_ID: 4156
MDC_IDC_EPISODE_ID: 4157
MDC_IDC_EPISODE_ID: 4158
MDC_IDC_EPISODE_ID: 4159
MDC_IDC_EPISODE_ID: 4160
MDC_IDC_EPISODE_ID: 4161
MDC_IDC_EPISODE_TYPE: NORMAL
MDC_IDC_LEAD_IMPLANT_DT: NORMAL
MDC_IDC_LEAD_LOCATION: NORMAL
MDC_IDC_LEAD_LOCATION_DETAIL_1: NORMAL
MDC_IDC_LEAD_MFG: NORMAL
MDC_IDC_LEAD_MODEL: NORMAL
MDC_IDC_LEAD_POLARITY_TYPE: NORMAL
MDC_IDC_LEAD_SERIAL: NORMAL
MDC_IDC_LEAD_SPECIAL_FUNCTION: NORMAL
MDC_IDC_MSMT_BATTERY_DTM: NORMAL
MDC_IDC_MSMT_BATTERY_REMAINING_LONGEVITY: 53 MO
MDC_IDC_MSMT_BATTERY_RRT_TRIGGER: 2.77
MDC_IDC_MSMT_BATTERY_STATUS: NORMAL
MDC_IDC_MSMT_BATTERY_VOLTAGE: 3 V
MDC_IDC_MSMT_LEADCHNL_LV_IMPEDANCE_VALUE: 532 OHM
MDC_IDC_MSMT_LEADCHNL_LV_IMPEDANCE_VALUE: 532 OHM
MDC_IDC_MSMT_LEADCHNL_LV_IMPEDANCE_VALUE: 627 OHM
MDC_IDC_MSMT_LEADCHNL_LV_IMPEDANCE_VALUE: 646 OHM
MDC_IDC_MSMT_LEADCHNL_LV_IMPEDANCE_VALUE: 893 OHM
MDC_IDC_MSMT_LEADCHNL_LV_PACING_THRESHOLD_AMPLITUDE: 1.38 V
MDC_IDC_MSMT_LEADCHNL_LV_PACING_THRESHOLD_PULSEWIDTH: 0.5 MS
MDC_IDC_MSMT_LEADCHNL_RA_IMPEDANCE_VALUE: 342 OHM
MDC_IDC_MSMT_LEADCHNL_RA_IMPEDANCE_VALUE: 456 OHM
MDC_IDC_MSMT_LEADCHNL_RA_PACING_THRESHOLD_AMPLITUDE: 0.75 V
MDC_IDC_MSMT_LEADCHNL_RA_PACING_THRESHOLD_PULSEWIDTH: 0.4 MS
MDC_IDC_MSMT_LEADCHNL_RA_SENSING_INTR_AMPL: 0.5 MV
MDC_IDC_MSMT_LEADCHNL_RA_SENSING_INTR_AMPL: 0.5 MV
MDC_IDC_MSMT_LEADCHNL_RV_IMPEDANCE_VALUE: 380 OHM
MDC_IDC_MSMT_LEADCHNL_RV_IMPEDANCE_VALUE: 437 OHM
MDC_IDC_MSMT_LEADCHNL_RV_PACING_THRESHOLD_AMPLITUDE: 0.62 V
MDC_IDC_MSMT_LEADCHNL_RV_PACING_THRESHOLD_PULSEWIDTH: 0.4 MS
MDC_IDC_MSMT_LEADCHNL_RV_SENSING_INTR_AMPL: 10.25 MV
MDC_IDC_MSMT_LEADCHNL_RV_SENSING_INTR_AMPL: 10.25 MV
MDC_IDC_PG_IMPLANT_DTM: NORMAL
MDC_IDC_PG_MFG: NORMAL
MDC_IDC_PG_MODEL: NORMAL
MDC_IDC_PG_SERIAL: NORMAL
MDC_IDC_PG_TYPE: NORMAL
MDC_IDC_SESS_CLINIC_NAME: NORMAL
MDC_IDC_SESS_DTM: NORMAL
MDC_IDC_SESS_TYPE: NORMAL
MDC_IDC_SET_BRADY_AT_MODE_SWITCH_RATE: 150 {BEATS}/MIN
MDC_IDC_SET_BRADY_LOWRATE: 70 {BEATS}/MIN
MDC_IDC_SET_BRADY_MAX_SENSOR_RATE: 120 {BEATS}/MIN
MDC_IDC_SET_BRADY_MAX_TRACKING_RATE: 130 {BEATS}/MIN
MDC_IDC_SET_BRADY_MODE: NORMAL
MDC_IDC_SET_BRADY_PAV_DELAY_LOW: 170 MS
MDC_IDC_SET_BRADY_SAV_DELAY_LOW: 110 MS
MDC_IDC_SET_CRT_LVRV_DELAY: 0 MS
MDC_IDC_SET_CRT_PACED_CHAMBERS: NORMAL
MDC_IDC_SET_LEADCHNL_LV_PACING_AMPLITUDE: 2.5 V
MDC_IDC_SET_LEADCHNL_LV_PACING_ANODE_ELECTRODE_1: NORMAL
MDC_IDC_SET_LEADCHNL_LV_PACING_ANODE_LOCATION_1: NORMAL
MDC_IDC_SET_LEADCHNL_LV_PACING_CAPTURE_MODE: NORMAL
MDC_IDC_SET_LEADCHNL_LV_PACING_CATHODE_ELECTRODE_1: NORMAL
MDC_IDC_SET_LEADCHNL_LV_PACING_CATHODE_LOCATION_1: NORMAL
MDC_IDC_SET_LEADCHNL_LV_PACING_POLARITY: NORMAL
MDC_IDC_SET_LEADCHNL_LV_PACING_PULSEWIDTH: 0.5 MS
MDC_IDC_SET_LEADCHNL_RA_PACING_AMPLITUDE: 1.5 V
MDC_IDC_SET_LEADCHNL_RA_PACING_ANODE_ELECTRODE_1: NORMAL
MDC_IDC_SET_LEADCHNL_RA_PACING_ANODE_LOCATION_1: NORMAL
MDC_IDC_SET_LEADCHNL_RA_PACING_CAPTURE_MODE: NORMAL
MDC_IDC_SET_LEADCHNL_RA_PACING_CATHODE_ELECTRODE_1: NORMAL
MDC_IDC_SET_LEADCHNL_RA_PACING_CATHODE_LOCATION_1: NORMAL
MDC_IDC_SET_LEADCHNL_RA_PACING_POLARITY: NORMAL
MDC_IDC_SET_LEADCHNL_RA_PACING_PULSEWIDTH: 0.4 MS
MDC_IDC_SET_LEADCHNL_RA_SENSING_ANODE_ELECTRODE_1: NORMAL
MDC_IDC_SET_LEADCHNL_RA_SENSING_ANODE_LOCATION_1: NORMAL
MDC_IDC_SET_LEADCHNL_RA_SENSING_CATHODE_ELECTRODE_1: NORMAL
MDC_IDC_SET_LEADCHNL_RA_SENSING_CATHODE_LOCATION_1: NORMAL
MDC_IDC_SET_LEADCHNL_RA_SENSING_POLARITY: NORMAL
MDC_IDC_SET_LEADCHNL_RA_SENSING_SENSITIVITY: 0.3 MV
MDC_IDC_SET_LEADCHNL_RV_PACING_AMPLITUDE: 2 V
MDC_IDC_SET_LEADCHNL_RV_PACING_ANODE_ELECTRODE_1: NORMAL
MDC_IDC_SET_LEADCHNL_RV_PACING_ANODE_LOCATION_1: NORMAL
MDC_IDC_SET_LEADCHNL_RV_PACING_CAPTURE_MODE: NORMAL
MDC_IDC_SET_LEADCHNL_RV_PACING_CATHODE_ELECTRODE_1: NORMAL
MDC_IDC_SET_LEADCHNL_RV_PACING_CATHODE_LOCATION_1: NORMAL
MDC_IDC_SET_LEADCHNL_RV_PACING_POLARITY: NORMAL
MDC_IDC_SET_LEADCHNL_RV_PACING_PULSEWIDTH: 0.4 MS
MDC_IDC_SET_LEADCHNL_RV_SENSING_ANODE_ELECTRODE_1: NORMAL
MDC_IDC_SET_LEADCHNL_RV_SENSING_ANODE_LOCATION_1: NORMAL
MDC_IDC_SET_LEADCHNL_RV_SENSING_CATHODE_ELECTRODE_1: NORMAL
MDC_IDC_SET_LEADCHNL_RV_SENSING_CATHODE_LOCATION_1: NORMAL
MDC_IDC_SET_LEADCHNL_RV_SENSING_POLARITY: NORMAL
MDC_IDC_SET_LEADCHNL_RV_SENSING_SENSITIVITY: 0.9 MV
MDC_IDC_SET_ZONE_DETECTION_INTERVAL: 400 MS
MDC_IDC_SET_ZONE_DETECTION_INTERVAL: 410 MS
MDC_IDC_SET_ZONE_TYPE: NORMAL
MDC_IDC_STAT_AT_BURDEN_PERCENT: 71.7 %
MDC_IDC_STAT_AT_DTM_END: NORMAL
MDC_IDC_STAT_AT_DTM_START: NORMAL
MDC_IDC_STAT_BRADY_AP_VP_PERCENT: 19.64 %
MDC_IDC_STAT_BRADY_AP_VS_PERCENT: 0.01 %
MDC_IDC_STAT_BRADY_AS_VP_PERCENT: 79.11 %
MDC_IDC_STAT_BRADY_AS_VS_PERCENT: 1.25 %
MDC_IDC_STAT_BRADY_DTM_END: NORMAL
MDC_IDC_STAT_BRADY_DTM_START: NORMAL
MDC_IDC_STAT_BRADY_RA_PERCENT_PACED: 13.04 %
MDC_IDC_STAT_BRADY_RV_PERCENT_PACED: 98.89 %
MDC_IDC_STAT_CRT_DTM_END: NORMAL
MDC_IDC_STAT_CRT_DTM_START: NORMAL
MDC_IDC_STAT_CRT_LV_PERCENT_PACED: 98.84 %
MDC_IDC_STAT_CRT_PERCENT_PACED: 98.84 %
MDC_IDC_STAT_EPISODE_RECENT_COUNT: 0
MDC_IDC_STAT_EPISODE_RECENT_COUNT: 32
MDC_IDC_STAT_EPISODE_RECENT_COUNT_DTM_END: NORMAL
MDC_IDC_STAT_EPISODE_RECENT_COUNT_DTM_START: NORMAL
MDC_IDC_STAT_EPISODE_TOTAL_COUNT: 1023
MDC_IDC_STAT_EPISODE_TOTAL_COUNT: 125
MDC_IDC_STAT_EPISODE_TOTAL_COUNT: 18
MDC_IDC_STAT_EPISODE_TOTAL_COUNT: 424
MDC_IDC_STAT_EPISODE_TOTAL_COUNT_DTM_END: NORMAL
MDC_IDC_STAT_EPISODE_TOTAL_COUNT_DTM_START: NORMAL
MDC_IDC_STAT_EPISODE_TYPE: NORMAL
MONOCYTES # BLD AUTO: 0.9 10E9/L (ref 0–1.3)
MONOCYTES NFR BLD AUTO: 16.3 %
NEUTROPHILS # BLD AUTO: 2.4 10E9/L (ref 1.6–8.3)
NEUTROPHILS NFR BLD AUTO: 44.1 %
NRBC # BLD AUTO: 0 10*3/UL
NRBC BLD AUTO-RTO: 0 /100
PLATELET # BLD AUTO: 229 10E9/L (ref 150–450)
POTASSIUM SERPL-SCNC: 4.8 MMOL/L (ref 3.4–5.3)
PROT SERPL-MCNC: 7.2 G/DL (ref 6.8–8.8)
PSA SERPL-MCNC: 4.91 UG/L (ref 0–4)
RBC # BLD AUTO: 3.67 10E12/L (ref 4.4–5.9)
SODIUM SERPL-SCNC: 135 MMOL/L (ref 133–144)
WBC # BLD AUTO: 5.5 10E9/L (ref 4–11)

## 2018-01-01 PROCEDURE — 93296 REM INTERROG EVL PM/IDS: CPT | Performed by: INTERNAL MEDICINE

## 2018-01-01 PROCEDURE — 93294 REM INTERROG EVL PM/LDLS PM: CPT | Performed by: INTERNAL MEDICINE

## 2018-01-01 PROCEDURE — 99213 OFFICE O/P EST LOW 20 MIN: CPT | Performed by: SURGERY

## 2018-01-01 PROCEDURE — G0008 ADMIN INFLUENZA VIRUS VAC: HCPCS

## 2018-01-01 PROCEDURE — 99607 MTMS BY PHARM ADDL 15 MIN: CPT | Performed by: PHARMACIST

## 2018-01-01 PROCEDURE — 36415 COLL VENOUS BLD VENIPUNCTURE: CPT | Performed by: UROLOGY

## 2018-01-01 PROCEDURE — 36415 COLL VENOUS BLD VENIPUNCTURE: CPT | Performed by: FAMILY MEDICINE

## 2018-01-01 PROCEDURE — 84153 ASSAY OF PSA TOTAL: CPT | Performed by: UROLOGY

## 2018-01-01 PROCEDURE — 20610 DRAIN/INJ JOINT/BURSA W/O US: CPT | Mod: 50 | Performed by: PHYSICAL MEDICINE & REHABILITATION

## 2018-01-01 PROCEDURE — 99214 OFFICE O/P EST MOD 30 MIN: CPT | Performed by: FAMILY MEDICINE

## 2018-01-01 PROCEDURE — 85025 COMPLETE CBC W/AUTO DIFF WBC: CPT | Performed by: FAMILY MEDICINE

## 2018-01-01 PROCEDURE — 99606 MTMS BY PHARM EST 15 MIN: CPT | Performed by: PHARMACIST

## 2018-01-01 PROCEDURE — 90662 IIV NO PRSV INCREASED AG IM: CPT

## 2018-01-01 PROCEDURE — 80053 COMPREHEN METABOLIC PANEL: CPT | Performed by: FAMILY MEDICINE

## 2018-01-01 PROCEDURE — 99207 ZZC NO CHARGE NURSE ONLY: CPT

## 2018-01-01 RX ORDER — HYDROCODONE BITARTRATE AND ACETAMINOPHEN 5; 325 MG/1; MG/1
1 TABLET ORAL EVERY 6 HOURS PRN
Qty: 30 TABLET | Refills: 0 | Status: SHIPPED | OUTPATIENT
Start: 2018-01-01 | End: 2018-01-01

## 2018-01-01 RX ORDER — HYDROCODONE BITARTRATE AND ACETAMINOPHEN 5; 325 MG/1; MG/1
1 TABLET ORAL EVERY 6 HOURS PRN
Qty: 45 TABLET | Refills: 0 | Status: SHIPPED | OUTPATIENT
Start: 2018-01-01 | End: 2019-01-01

## 2018-01-01 RX ORDER — TRAMADOL HYDROCHLORIDE 50 MG/1
TABLET ORAL
Qty: 90 TABLET | Refills: 0 | Status: SHIPPED | OUTPATIENT
Start: 2018-01-01 | End: 2018-01-01

## 2018-01-01 RX ORDER — METOPROLOL TARTRATE 25 MG/1
25 TABLET, FILM COATED ORAL 2 TIMES DAILY
Qty: 180 TABLET | Refills: 3 | Status: SHIPPED | OUTPATIENT
Start: 2018-01-01 | End: 2019-01-01

## 2018-01-01 RX ORDER — TRIAMCINOLONE ACETONIDE 40 MG/ML
40 INJECTION, SUSPENSION INTRA-ARTICULAR; INTRAMUSCULAR
Status: DISCONTINUED | OUTPATIENT
Start: 2018-01-01 | End: 2019-01-01

## 2018-01-01 RX ORDER — TRAMADOL HYDROCHLORIDE 50 MG/1
TABLET ORAL
Qty: 90 TABLET | Refills: 0 | Status: SHIPPED | OUTPATIENT
Start: 2018-01-01 | End: 2019-01-01

## 2018-01-01 RX ORDER — PREDNISONE 5 MG/1
5 TABLET ORAL DAILY
Qty: 100 TABLET | Refills: 4 | Status: SHIPPED | OUTPATIENT
Start: 2018-01-01 | End: 2019-01-01

## 2018-01-01 RX ORDER — SPIRONOLACTONE 25 MG/1
TABLET ORAL
Qty: 30 TABLET | Refills: 6 | Status: SHIPPED | OUTPATIENT
Start: 2018-01-01 | End: 2019-01-01

## 2018-01-01 RX ADMIN — TRIAMCINOLONE ACETONIDE 40 MG: 40 INJECTION, SUSPENSION INTRA-ARTICULAR; INTRAMUSCULAR at 12:57

## 2018-01-01 ASSESSMENT — PAIN SCALES - GENERAL: PAINLEVEL: SEVERE PAIN (7)

## 2018-01-02 ENCOUNTER — HOSPITAL ENCOUNTER (OUTPATIENT)
Dept: CARDIAC REHAB | Facility: CLINIC | Age: 83
End: 2018-01-02
Attending: FAMILY MEDICINE
Payer: MEDICARE

## 2018-01-02 PROCEDURE — 93798 PHYS/QHP OP CAR RHAB W/ECG: CPT

## 2018-01-02 PROCEDURE — 40000116 ZZH STATISTIC OP CR VISIT

## 2018-01-04 ENCOUNTER — HOSPITAL ENCOUNTER (OUTPATIENT)
Dept: CARDIAC REHAB | Facility: CLINIC | Age: 83
End: 2018-01-04
Attending: FAMILY MEDICINE
Payer: MEDICARE

## 2018-01-04 VITALS — HEIGHT: 67 IN | WEIGHT: 151 LBS | BODY MASS INDEX: 23.7 KG/M2

## 2018-01-04 DIAGNOSIS — M06.9 RHEUMATOID ARTHRITIS INVOLVING MULTIPLE SITES, UNSPECIFIED RHEUMATOID FACTOR PRESENCE: ICD-10-CM

## 2018-01-04 PROCEDURE — 40000116 ZZH STATISTIC OP CR VISIT

## 2018-01-04 PROCEDURE — 93798 PHYS/QHP OP CAR RHAB W/ECG: CPT

## 2018-01-04 RX ORDER — HYDROCODONE BITARTRATE AND ACETAMINOPHEN 5; 325 MG/1; MG/1
1 TABLET ORAL EVERY 6 HOURS PRN
Qty: 18 TABLET | Refills: 0 | Status: SHIPPED | OUTPATIENT
Start: 2018-01-04 | End: 2018-01-09

## 2018-01-04 RX ORDER — TRAMADOL HYDROCHLORIDE 50 MG/1
TABLET ORAL
Qty: 90 TABLET | Refills: 0 | Status: SHIPPED | OUTPATIENT
Start: 2018-01-04 | End: 2018-01-30

## 2018-01-04 ASSESSMENT — 6 MINUTE WALK TEST (6MWT)
MALE CALC: 1334.36
FEMALE CALC: 1143.88
PREDICTED: 1342.49
GENDER SELECTION: MALE
TOTAL DISTANCE WALKED (FT): 200

## 2018-01-04 NOTE — PROGRESS NOTES
01/04/18 0900   Session   Session 90 Day Individualized Treatment Plan   Certified through this date 02/02/18   Cardiac Rehab Assessment   Cardiac Rehab Assessment Bud has continued to make fair progress this month with 45 min of continuous ex. on Nustep and using 2# weights for U/E 10 reps.  He was admitted to hospital 12/12-12/13/17 for dizziness and constipation.  He has edema of hands/feet/face/abdomin, MD aware, and reports having dizziness daily, he and his wife believe it is from the Tramadol (which he is taking for joint pain). Bud is not able to walk outside due to the cold temps and ice and now has a daily routine of walking with a walker 18 circles throughout the afternoon.  He states CR has made his legs stronger and he can stand for longer periods of time.  His wife states he worries often about the weather and is frustrated he can not be outside more, so going to CR regularily has helped him by getting out and being with others. Dyspnea and fatigue limit his walking.  Bud is planning to continue cardiac rehab through the month of January, to help increase strength and endurance so he will be ready to work in the garage when the weather is better.  Skilled therapy is recommended to monitor CV response to exercise and provide support to achieve goals.    General Information   Treatment Diagnosis EPIFANIO   Date of Treatment Diagnosis 09/27/17   Significant Past CV History Pacemaker   Comorbidities Pulmonary Disease   Lead up symptoms dyspnea   Hospital Location [facility]   Signs and Symptoms Post Hospital Discharge Fatigue   Outpatient Cardiac Rehab Start Date 10/24/17   Primary Physician Dr. Aragon   Primary Physician Follow Up Advised to schedule appointment   Cardiologist Dr. Lozano   Cardiologist Follow Up Completed   Ejection Fraction 50-55%   Summary of Cath Report   Summary of Cath Report Not Applicable   Living and Work Status    Living Arrangements and Social Status house;spouse  "  Support System Live with an adult   Return to Employment Retired   Preventative Medications   CMS recommended medications Ace inhibitors;Anticoagulants;Beta Blocker;Lipid Lowering   Fall Risk Screen   Fall screen completed by Cardiac Rehab   Have you fallen 2 or more times in the past year? No   Have you fallen and had an injury in the past year? No   Is patient a fall risk? No   Fall screen comments Uses cane for assist at clinic and walker at home   Pain   Patient Currently in Pain No   Physical Assessments   Incisions Not assessed   Edema +2 Mild  (left greater than right; slowly improving)   Right Lung Sounds not assessed   Left Lung Sounds not assessed   Individualized Treatment Plan   Monitored Sessions Scheduled 18   Monitored Sessions Attended 20   Oxygen   Supplemental Oxygen needed No   Nutrition Management - Weight Management   Assessment Re-assessment   Age 89   Weight 68.5 kg (151 lb)   Height 1.702 m (5' 7.01\")   BMI (Calculated) 23.69   Initial Rate Your Plate Score. Dietary tool to assess eating patterns. Scores range from 24 to 72. The higher the score the healthier the eating pattern. (not assessed, see initial ITP)   Nutrition Management - Lipids   Lipids Labs Available   Date 03/06/17   Total Cholesterol 139   Triglycerides 156   HDL 34   LDL 74   Prescribed Lipid Medication Yes   Statin Intensity High Intensity   Nutrition Management - Diabetes   Diabetes No   Nutrition Management Summary   Dietary Recommendations Low Sodium   Stages of Change for Diet Compliance Action   Nutrition Summary Comments Follows a low sodium diet.   Nutrition Target Outcome BMI < 25   Psychosocial Management   Psychosocial Assessment Re-assessment   Is there history of clinical depression or increased risk of depression? No previous history   Current Level of Stress per Patient Report Moderate    Current Coping Skills Patient Unable to Identify Personal Coping Skills   Initial Patient Health Questionnaire -9 Score " (PHQ-9) for depression. 5-9 Minimal symptoms, 10-14 Minor depression, 15-19 Major depression, moderately severe, > 20 Major depression, severe  (not assessed, see initial ITP)   Initial [facility] Survey score.  Quality of Life:   If total score > 25 review individual areas where patient rated a 4 or 5.  Consider patients current medical condition and what role that plays on the score.   Adjust treatment protocol to improve areas of concern.  Consider the following:  PHQ9 score, DASI, and re-assessment within the next 30 days to assist with developing treatments.  (not assessed, see initial ITP)   Psychosocial Comments Worries all the time re: weather, not being able to go outside.   Other Core Components - Hypertension   History of or Diagnosis of Hypertension Yes   Currently taking Anti-Hypertensives Yes;Beta blocker;Ace Inhibitor   Other Core Components - Tobacco   History of Tobacco Use Yes   Quit Date or Planned Quit Date 10/24/97   Tobacco Use Status Former (Quit > 6 mo ago)   Tobacco Habit Cigarettes   Tobacco Use per Day (average) 1 ppd   Years of Tobacco Use 50   Stages of Change Maintenance   Other Core Components Summary   Interventions Planned Instruct patient on the DASH diet;Provide information on home blood pressure monitoring;Attend education class on Blood Pressure   Activity/Exercise History   Activity/Exercise Assessment Re-assessment   Activity/Exercise Status prior to event? Sedentary   Number of Days Currently participating in Moderate Physical Activity? 7   Number of Days Currently performing  Aerobic Exercise (including rehab)? 7   Number of Minutes per Session Currently of Aerobic Exercise (average)? 45   Current Stage of Change (Physical Activity) Preparation   Current Stage of Change (Aerobic Exercise) Preparation   Patient Goals Goal #1;Goal #2   Goal #1 Description Patient will be able to resume his hobby of woodworking in his garage, estimated MET level needed 3.5-4 METs   Goal #1  Target Date 12/05/17   Goal #1 Progress Towards Goal 10/24 Will start attending cardiac rehab 2x/week to increase aerobic exercise tolerance and endurance. Will also initiate resistance training to increase muscle strength. 11/16 Has been out to garage once since starting rehab, still feels limited by lack of energy 12/12 has taken a few trips to garage but have not gotten much done there 1/4/18. Patient not able to go to garage due to the cold temps and ice, but walks with walker daily in basement (18 circles) throughout the afternoon, usually 7 days/week.  Bud and wife state he is able to walk the distance to garage. He feels CR has made his legs stronger, he can stand for longer periods.   Goal #2 Description Patient will be able to walk to and from his garage without dyspnea; approximately 200 feet   Goal #2 Target Date 11/14/17   Goal #2 Date Met 11/16/17   Goal #2 Progress Towards Goal 11/16 Patient is walking 1/4 mile daily with walker at home   Activity/Exercise Target Outcome An Accumulation of 150  Minutes of Aerobic Activity per Week   Exercise Assessment   6 Minute Walk Predicted - Gender Selection Male   6 Minute Walk Predicted (Male) 1334.36   6 Minute Walk Predicted (Female) 1143.88   Initial 6 Minute Walk Distance (Feet) 200 ft   Resting HR 67 bpm   Exercise HR 78 bpm   Post Exercise HR 71 bpm   Resting /72   Exercise /76   Post Exercise /62   Effort Rating 5   Current MET Level 2.1   MET Level Goal 3-4   ECG Rhythm Paced rhythm   Ectopy Other (Comments)  (intrinsic beat)   Current Symptoms Fatigue;Joint pain   Limitations/Restrictions Orthopedic (see comments)   Exercise Prescription   Mode Weights;Nustep   Duration/Time 30-45 min;Intermittent bouts   Frequency 2 days/week   THR (85% of age predicted max HR) 110.5   OMNI Effort Rating (0-10 Scale) 4-6/10   Progression Total exercise time of 30-45 minutes;Aerobic exercise to OMNI rating of 5-7, and heart rate at or below  target;Progress peak intensity by 1/4 MET per week;Continuous bouts  (progress exercise to continuous bouts first then increase wk)   Recommended Home Exercise   Type of Exercise Walking   Frequency (days per week) 7   Duration (minutes per session) Intermittent   Effort Rating Recommended 4-6/10   30 Day Exercise Plan continue daily 1/4 miles walks or 18 circles in basement with walker.   Current Home Exercise   Type of Exercise Walking   Frequency (days per week) 5   Duration (minutes per session) 10-15   Follow-up/On-going Support   Provider follow-up needed on the following Other (see comments)  (Edema)   Comments Seeing TAVR clinic on 10/26/17   Learning Assessment   Learner Patient   Primary Language English   Preferred Learning Style Listening   Barriers to Learning Cognitive;Hearing   Patient Education   Education recommended (none at this time)   Physician cosignature/electronic signature indicates approval of this ITP document. I have established, reviewed and made necessary changes to the individualized treatment plan and exercise prescription for this patient.

## 2018-01-04 NOTE — TELEPHONE ENCOUNTER
Tramadol       Last Written Prescription Date:  12/6/17  Last Fill Quantity: 90,   # refills: 0  Last Office Visit: 12/21/17  Future Office visit:    Next 5 appointments (look out 90 days)     Yemi 15, 2018 11:00 AM CST   Office Visit with Camron Aragon MD   [facility] ([address] [address]   [phone]                   Routing refill request to provider for review/approval because:  Drug not on the [facility], [facility] or [facility] refill protocol or controlled substance  Hydrocodone 5-325 MG       Last Written Prescription Date:  11/28/17  Last Fill Quantity: 18,   # refills: 0  Last Office Visit: 12/21/17  Future Office visit:    Next 5 appointments (look out 90 days)     Yemi 15, 2018 11:00 AM CST   Office Visit with Camron Aragon MD   [facility] ([address] [address]   [phone]                   Routing refill request to provider for review/approval because:  Drug not on the [facility], [facility] or [facility] refill protocol or controlled substance

## 2018-01-09 ENCOUNTER — HOSPITAL ENCOUNTER (OUTPATIENT)
Dept: CARDIAC REHAB | Facility: CLINIC | Age: 83
End: 2018-01-09
Attending: FAMILY MEDICINE
Payer: MEDICARE

## 2018-01-09 DIAGNOSIS — M06.9 RHEUMATOID ARTHRITIS INVOLVING MULTIPLE SITES, UNSPECIFIED RHEUMATOID FACTOR PRESENCE: ICD-10-CM

## 2018-01-09 PROCEDURE — 93798 PHYS/QHP OP CAR RHAB W/ECG: CPT

## 2018-01-09 PROCEDURE — 40000116 ZZH STATISTIC OP CR VISIT

## 2018-01-10 RX ORDER — HYDROCODONE BITARTRATE AND ACETAMINOPHEN 5; 325 MG/1; MG/1
1 TABLET ORAL EVERY 6 HOURS PRN
Qty: 18 TABLET | Refills: 0 | Status: SHIPPED | OUTPATIENT
Start: 2018-01-10 | End: 2018-01-30

## 2018-01-11 NOTE — DISCHARGE SUMMARY
[facility]  Discharge Summary     Bud Merritt MRN# [id number]   YOB: 1927 Age: 89 year old       Admission Date: 9/27/2017  Discharge Date: 9/30/2017    Discharge Diagnoses:  1. Severe aortic stenosis     Imaging:  Transthoracic Echocardiogram 09/27/2017:  Interpretation Summary  Technically difficult study.  Global and regional left ventricular function is normal with an EF of 60-65%.  A bioprosthetic aortic valve is present.# Sapiens S3 29 mm. Doppler  interrogation of the aortic valve is normal.  The mean gradient is 8 mmHg.No aortic regurgitation is present.  The inferior vena cava was normal in size with preserved respiratory  variability.  No pericardial effusion is present.     No change from prior.      Procedures:  1. Transcatheter aortic valve replacement (Harman Ramsey S3 29 mm valve)    Consults:  none    HPI (adapted from admission H&P)  Mr. Bud Merritt is a 89-year old male with a past medical history of atrial fibrillation, COPD, and severe aortic stenosis who was electively admitted for TAVR procedure on 09/27/2017.     Brief Hospital Course  Mr. Merritt underwent the TAVR procedure on 09/27/2017.  The procedure was only notable for perforation of the balloon while the balloon was loaded up within the valve stent in the descending aorta.  Though there was no complication, the whole system was removed and second system with a second S3 valve was inserted.  The procedure was otherwise uncomplicated.      Mr. Merritt's post-op course was notable for an episode of hypotension on POD 1.  This required resuscitation with saline.  There was suspicion for retroperitoneal hematoma but CT abdomen/pelvis was unremarkable.      Discharge Information  Discharge diet: Low fat, low salt (less than 2000 mg of salt daily)  Discharge activity:  Activity as tolerated  Disposition:  Discharged to nursing home/TCU  Discharge creatinine: 0.80    Medication  Changes  Addition of aspirin 81 mg q24h (clopidogrel not added due to chronic anticoagulation with warfarin)  Metoprolol tartrate was decreased to 25 mg q12h (from 50 mg q12h due to hypotension)  Tamsulosin was decreased to 0.4 mg q24h (from 0.4 mg q12h due to hypotension)  Lisinopril was decreased to 5 mg q24h (from 10 mg BID due to hypotension)    Discharge Medications  Discharge Medication List as of 9/30/2017 11:09 AM      START taking these medications    Details   aspirin EC 81 MG EC tablet Take 1 tablet (81 mg) by mouth daily, Disp-30 tablet, R-11, E-Prescribe         CONTINUE these medications which have CHANGED    Details   metoprolol (LOPRESSOR) 25 MG tablet Take 1 tablet (25 mg) by mouth 2 times daily, Disp-60 tablet, R-11, E-Prescribe      tamsulosin (FLOMAX) 0.4 MG capsule Take 1 capsule (0.4 mg) by mouth daily, Disp-60 capsule, R-0, E-Prescribe      lisinopril (PRINIVIL/ZESTRIL) 10 MG tablet Take 0.5 tablets (5 mg) by mouth daily, Disp-62 tablet, R-11, E-Prescribe         CONTINUE these medications which have NOT CHANGED    Details   HYDROcodone-acetaminophen (NORCO) 5-325 MG per tablet TAKE 1 TABLET BY MOUTH EVERY 6 HOURS AS NEEDED FOR MODERATE TO SEVERE PAIN, Disp-30 tablet, R-0, Local Print      traMADol (ULTRAM) 50 MG tablet TAKE 1 TABLET 3 TIMES DAILY AS NEEDED FOR MODERATE PAIN, Disp-90 tablet, R-0, Local Print      leflunomide (ARAVA) 10 MG tablet Take 1 tablet (10 mg) by mouth daily, Disp-30 tablet, R-11, E-Prescribe      dofetilide (TIKOSYN) 125 MCG capsule Take 1 capsule (125 mcg) by mouth 2 times daily, Disp-60 capsule, R-5, E-Prescribe      atorvastatin (LIPITOR) 40 MG tablet Take 1 tablet (40 mg) by mouth daily, Disp-90 tablet, R-2, E-Prescribe      predniSONE (DELTASONE) 5 MG tablet Take 1 tablet (5 mg) by mouth daily, Disp-100 tablet, R-4, E-Prescribe      apixaban ANTICOAGULANT (ELIQUIS) 2.5 MG tablet Take 1 tablet (2.5 mg) by mouth 2 times daily, Disp-180 tablet, R-3, E-Prescribe       Leuprolide Acetate (LUPRON DEPOT IM) Inject into the muscle every 6 months Reported on 5/3/2017, Historical      acetaminophen (TYLENOL) 650 MG CR tablet Take 650 mg by mouth every 8 hours as needed Reported on 4/5/2017, Historical      alendronate (FOSAMAX) 70 MG tablet Take 1 tablet (70 mg) by mouth every 7 days Take with over 8 ounces water and stay upright for at least 30 minutes after dose.  Take at least 60 minutes before breakfast, Disp-12 tablet, R-3, Fax      ascorbic acid (VITAMIN C) 500 MG CPCR Take 500 mg by mouth daily, Historical      VITAMIN D, CHOLECALCIFEROL, PO Take 1 tablet by mouth daily Reported on 5/3/2017, Historical      calcium carbonate (OS-SHELIA 500 MG Lummi. CA) 500 MG tablet Take 1 tablet by mouth daily , Historical         STOP taking these medications       spironolactone (ALDACTONE) 25 MG tablet Comments:   Reason for Stopping:               Discharge Follow-up  [facility] 10/26/17      CC  Patient Care Team:  Camron Aragon MD as PCP - General  Elvira Hallman RN as Clinic Care Coordinator  Khoa Li MD as MD (Clinical Cardiac Electrophysiology)  Snehal Wilson as Nurse Coordinator (Cardiology)  Henry Velazquez MD as MD (Urology)        I have seen and examined the patient with the [facility] team. I agree with the assessment and plan of the discharge summary note above.I have reviewed pertinent labs. More than 30 minutes were spent organizing the patient's discharge.    Cosme Gunn MD  Interventional Cardiology  Pager: [phone]

## 2018-01-16 ENCOUNTER — HOSPITAL ENCOUNTER (OUTPATIENT)
Dept: CARDIAC REHAB | Facility: CLINIC | Age: 83
End: 2018-01-16
Attending: FAMILY MEDICINE
Payer: MEDICARE

## 2018-01-16 ENCOUNTER — TELEPHONE (OUTPATIENT)
Dept: FAMILY MEDICINE | Facility: CLINIC | Age: 83
End: 2018-01-16

## 2018-01-16 PROCEDURE — 40000116 ZZH STATISTIC OP CR VISIT

## 2018-01-16 PROCEDURE — 93798 PHYS/QHP OP CAR RHAB W/ECG: CPT

## 2018-01-16 NOTE — TELEPHONE ENCOUNTER
Cardiology calls today stating when they saw the pt today his left fingers were purple/red color. Nurse states the inside of the of pt hand is red. Pt told her he fell and has been wearing gloves. Cardiology wanted Dr. Aragon aware of this. Routed to Dr. Aragon to advise.

## 2018-01-18 ENCOUNTER — HOSPITAL ENCOUNTER (OUTPATIENT)
Dept: CARDIAC REHAB | Facility: CLINIC | Age: 83
End: 2018-01-18
Attending: FAMILY MEDICINE
Payer: MEDICARE

## 2018-01-18 PROCEDURE — 40000116 ZZH STATISTIC OP CR VISIT: Performed by: REHABILITATION PRACTITIONER

## 2018-01-18 PROCEDURE — 93798 PHYS/QHP OP CAR RHAB W/ECG: CPT | Performed by: REHABILITATION PRACTITIONER

## 2018-01-23 ENCOUNTER — HOSPITAL ENCOUNTER (OUTPATIENT)
Dept: CARDIAC REHAB | Facility: CLINIC | Age: 83
End: 2018-01-23
Attending: FAMILY MEDICINE
Payer: MEDICARE

## 2018-01-23 PROCEDURE — 40000116 ZZH STATISTIC OP CR VISIT

## 2018-01-23 PROCEDURE — 93798 PHYS/QHP OP CAR RHAB W/ECG: CPT

## 2018-01-25 ENCOUNTER — HOSPITAL ENCOUNTER (OUTPATIENT)
Dept: CARDIAC REHAB | Facility: CLINIC | Age: 83
End: 2018-01-25
Attending: FAMILY MEDICINE
Payer: MEDICARE

## 2018-01-25 PROCEDURE — 93798 PHYS/QHP OP CAR RHAB W/ECG: CPT | Performed by: REHABILITATION PRACTITIONER

## 2018-01-25 PROCEDURE — 40000116 ZZH STATISTIC OP CR VISIT: Performed by: REHABILITATION PRACTITIONER

## 2018-01-30 ENCOUNTER — HOSPITAL ENCOUNTER (OUTPATIENT)
Dept: CARDIAC REHAB | Facility: CLINIC | Age: 83
End: 2018-01-30
Attending: FAMILY MEDICINE
Payer: MEDICARE

## 2018-01-30 VITALS — WEIGHT: 150 LBS | HEIGHT: 67 IN | BODY MASS INDEX: 23.54 KG/M2

## 2018-01-30 DIAGNOSIS — M06.9 RHEUMATOID ARTHRITIS INVOLVING MULTIPLE SITES, UNSPECIFIED RHEUMATOID FACTOR PRESENCE: ICD-10-CM

## 2018-01-30 PROCEDURE — 40000116 ZZH STATISTIC OP CR VISIT: Performed by: REHABILITATION PRACTITIONER

## 2018-01-30 PROCEDURE — 93798 PHYS/QHP OP CAR RHAB W/ECG: CPT | Performed by: REHABILITATION PRACTITIONER

## 2018-01-30 ASSESSMENT — 6 MINUTE WALK TEST (6MWT)
FEMALE CALC: 1147.3
TOTAL DISTANCE WALKED (FT): 200
MALE CALC: 1336.98
PREDICTED: 1345.13
GENDER SELECTION: MALE

## 2018-01-30 NOTE — PROGRESS NOTES
01/30/18 0900   Session   Session 90 Day Individualized Treatment Plan   Certified through this date 02/28/18   Cardiac Rehab Assessment   Cardiac Rehab Assessment Bud has continued to make fair progress this month he has increased his resistance on the Nustep weekly and is doing level 7 today. He has started resistance training for lower extremity at home with 2# weights. He is proud of his increases over the past month and feels his leg strength is improving. Bud is not able to walk outside due to the cold temps and ice and now has a daily routine of walking with a walker in his home throughout the afternoon. His wife states he worries often about the weather and is frustrated he can not be outside more, so going to CR regularily has helped him by getting out and being with others. Dyspnea and fatigue continues limit his walking.  Bud is planning to continue cardiac rehab through the month of February, to help increase strength and endurance so he will be ready to work in the garage when the weather is better.  Skilled therapy is recommended to monitor CV response to exercise and provide support to achieve goals.    General Information   Treatment Diagnosis EPIFANIO   Date of Treatment Diagnosis 09/27/17   Significant Past CV History Pacemaker   Comorbidities Pulmonary Disease   Lead up symptoms dyspnea   Hospital Location [facility]   Signs and Symptoms Post Hospital Discharge Fatigue   Outpatient Cardiac Rehab Start Date 10/24/17   Primary Physician Dr. Aragon   Primary Physician Follow Up Advised to schedule appointment   Cardiologist Dr. Lozano   Cardiologist Follow Up Completed   Ejection Fraction 50-55%   Summary of Cath Report   Summary of Cath Report Not Applicable   Living and Work Status    Living Arrangements and Social Status house;spouse   Support System Live with an adult   Return to Employment Retired   Preventative Medications   CMS recommended medications Ace  "inhibitors;Anticoagulants;Beta Blocker;Lipid Lowering   Fall Risk Screen   Fall screen completed by Cardiac Rehab   Have you fallen 2 or more times in the past year? No   Have you fallen and had an injury in the past year? No   Is patient a fall risk? No   Fall screen comments Uses cane for assist at clinic and walker at home   Pain   Patient Currently in Pain No   Physical Assessments   Incisions Not assessed   Edema +2 Mild   Right Lung Sounds not assessed   Left Lung Sounds not assessed   Individualized Treatment Plan   Monitored Sessions Scheduled 36   Monitored Sessions Attended 26   Oxygen   Supplemental Oxygen needed No   Nutrition Management - Weight Management   Assessment Re-assessment   Age 89   Weight 68 kg (150 lb)   Height 1.702 m (5' 7.01\")   BMI (Calculated) 23.54   Initial Rate Your Plate Score. Dietary tool to assess eating patterns. Scores range from 24 to 72. The higher the score the healthier the eating pattern. (not assessed, see initial ITP)   Nutrition Management - Lipids   Lipids Labs Available   Date 03/06/17   Total Cholesterol 139   Triglycerides 156   HDL 34   LDL 74   Prescribed Lipid Medication Yes   Statin Intensity High Intensity   Nutrition Management - Diabetes   Diabetes No   Nutrition Management Summary   Dietary Recommendations Low Sodium   Stages of Change for Diet Compliance Action   Nutrition Summary Comments Follows a low sodium diet.   Nutrition Target Outcome BMI < 25   Psychosocial Management   Psychosocial Assessment Re-assessment   Is there history of clinical depression or increased risk of depression? No previous history   Current Level of Stress per Patient Report Moderate    Current Coping Skills Patient Unable to Identify Personal Coping Skills   Initial Patient Health Questionnaire -9 Score (PHQ-9) for depression. 5-9 Minimal symptoms, 10-14 Minor depression, 15-19 Major depression, moderately severe, > 20 Major depression, severe  (not assessed, see initial ITP) "   Initial [facility] COOP Survey score.  Quality of Life:   If total score > 25 review individual areas where patient rated a 4 or 5.  Consider patients current medical condition and what role that plays on the score.   Adjust treatment protocol to improve areas of concern.  Consider the following:  PHQ9 score, DASI, and re-assessment within the next 30 days to assist with developing treatments.  (not assessed, see initial ITP)   Psychosocial Comments Worries all the time re: weather, not being able to go outside.   Other Core Components - Hypertension   History of or Diagnosis of Hypertension Yes   Currently taking Anti-Hypertensives Yes;Beta blocker;Ace Inhibitor   Other Core Components - Tobacco   History of Tobacco Use Yes   Quit Date or Planned Quit Date 10/24/97   Tobacco Use Status Former (Quit > 6 mo ago)   Tobacco Habit Cigarettes   Tobacco Use per Day (average) 1 ppd   Years of Tobacco Use 50   Stages of Change Maintenance   Other Core Components Summary   Interventions Planned Instruct patient on the DASH diet;Provide information on home blood pressure monitoring;Attend education class on Blood Pressure   Activity/Exercise History   Activity/Exercise Assessment Re-assessment   Activity/Exercise Status prior to event? Sedentary   Number of Days Currently participating in Moderate Physical Activity? 7   Number of Days Currently performing  Aerobic Exercise (including rehab)? 7   Number of Minutes per Session Currently of Aerobic Exercise (average)? 45   Current Stage of Change (Physical Activity) Preparation   Current Stage of Change (Aerobic Exercise) Preparation   Patient Goals Goal #1;Goal #2   Goal #1 Description Patient will be able to resume his hobby of woodworking in his garage, estimated MET level needed 3.5-4 METs   Goal #1 Target Date 12/05/17   Goal #1 Progress Towards Goal 10/24 Will start attending cardiac rehab 2x/week to increase aerobic exercise tolerance and endurance. Will also initiate  resistance training to increase muscle strength. 11/16 Has been out to garage once since starting rehab, still feels limited by lack of energy 12/12 has taken a few trips to garage but have not gotten much done there 1/4/18. Patient not able to go to garage due to the cold temps and ice, but walks with walker daily in basement (18 circles) throughout the afternoon, usually 7 days/week.  Bud and wife state he is able to walk the distance to garage. He feels CR has made his legs stronger, he can stand for longer periods. 1/30 No change from last update with weather   Goal #2 Description Patient will be able to walk to and from his garage without dyspnea; approximately 200 feet   Goal #2 Target Date 11/14/17   Goal #2 Date Met 11/16/17   Goal #2 Progress Towards Goal 11/16 Patient is walking 1/4 mile daily with walker at home   Activity/Exercise Target Outcome An Accumulation of 150  Minutes of Aerobic Activity per Week   Exercise Assessment   6 Minute Walk Predicted - Gender Selection Male   6 Minute Walk Predicted (Male) 1336.98   6 Minute Walk Predicted (Female) 1147.3   Initial 6 Minute Walk Distance (Feet) 200 ft   Resting HR 74 bpm   Exercise HR 79 bpm   Resting /72   Exercise /64   Effort Rating 5   Current MET Level 2.1   MET Level Goal 3-4   ECG Rhythm Paced rhythm   Ectopy Other (Comments)  (intrinsic beat)   Current Symptoms Fatigue;Joint pain   Limitations/Restrictions Orthopedic (see comments)   Exercise Prescription   Mode Weights;Nustep   Duration/Time 30-45 min;Intermittent bouts   Frequency 2 days/week   THR (85% of age predicted max HR) 110.5   OMNI Effort Rating (0-10 Scale) 4-6/10   Progression Total exercise time of 30-45 minutes;Aerobic exercise to OMNI rating of 5-7, and heart rate at or below target;Progress peak intensity by 1/4 MET per week;Continuous bouts   Recommended Home Exercise   Type of Exercise Walking   Frequency (days per week) 7   Duration (minutes per session)  Intermittent   Effort Rating Recommended 4-6/10   30 Day Exercise Plan continue daily 1/4 miles walks or 18 circles in basement with walker.   Current Home Exercise   Type of Exercise Walking   Frequency (days per week) 5   Duration (minutes per session) 10-15   Follow-up/On-going Support   Provider follow-up needed on the following No follow-up needed   Learning Assessment   Learner Patient   Primary Language English   Preferred Learning Style Listening   Barriers to Learning Cognitive;Hearing   Patient Education   Education recommended (none at this time)     Physician cosignature/electronic signature indicates approval of this ITP document. I have established, reviewed and made necessary changes to the individualized treatment plan and exercise prescription for this patient.

## 2018-01-30 NOTE — TELEPHONE ENCOUNTER
Tramadol       Last Written Prescription Date:  1/4/18  Last Fill Quantity: 90,   # refills: 0  Last Office Visit: 12/21/17  Future Office visit:       Routing refill request to provider for review/approval because:  Drug not on the [facility], [facility] or [facility] refill protocol or controlled substance  Hydroco/APAP 5-325 MG      Last Written Prescription Date:  1/10/18  Last Fill Quantity: 18,   # refills: 0  Last Office Visit: 12/21/17  Future Office visit:       Routing refill request to provider for review/approval because:  Drug not on the [facility], [facility] or [facility] refill protocol or controlled substance

## 2018-01-31 RX ORDER — TRAMADOL HYDROCHLORIDE 50 MG/1
TABLET ORAL
Qty: 90 TABLET | Refills: 0 | Status: SHIPPED | OUTPATIENT
Start: 2018-01-31 | End: 2018-02-22

## 2018-01-31 RX ORDER — HYDROCODONE BITARTRATE AND ACETAMINOPHEN 5; 325 MG/1; MG/1
1 TABLET ORAL EVERY 6 HOURS PRN
Qty: 18 TABLET | Refills: 0 | Status: SHIPPED | OUTPATIENT
Start: 2018-01-31 | End: 2018-02-22

## 2018-02-01 ENCOUNTER — HOSPITAL ENCOUNTER (OUTPATIENT)
Dept: CARDIAC REHAB | Facility: CLINIC | Age: 83
End: 2018-02-01
Attending: FAMILY MEDICINE
Payer: MEDICARE

## 2018-02-01 PROCEDURE — 40000116 ZZH STATISTIC OP CR VISIT: Performed by: REHABILITATION PRACTITIONER

## 2018-02-01 PROCEDURE — 93798 PHYS/QHP OP CAR RHAB W/ECG: CPT | Performed by: REHABILITATION PRACTITIONER

## 2018-02-08 ENCOUNTER — HOSPITAL ENCOUNTER (OUTPATIENT)
Dept: CARDIAC REHAB | Facility: CLINIC | Age: 83
End: 2018-02-08
Attending: FAMILY MEDICINE
Payer: MEDICARE

## 2018-02-08 DIAGNOSIS — C61 MALIGNANT NEOPLASM OF PROSTATE (H): ICD-10-CM

## 2018-02-08 LAB — PSA SERPL-MCNC: 0.66 UG/L (ref 0–4)

## 2018-02-08 PROCEDURE — 36415 COLL VENOUS BLD VENIPUNCTURE: CPT | Performed by: UROLOGY

## 2018-02-08 PROCEDURE — 84153 ASSAY OF PSA TOTAL: CPT | Performed by: UROLOGY

## 2018-02-08 PROCEDURE — 93798 PHYS/QHP OP CAR RHAB W/ECG: CPT | Performed by: REHABILITATION PRACTITIONER

## 2018-02-08 PROCEDURE — 40000116 ZZH STATISTIC OP CR VISIT: Performed by: REHABILITATION PRACTITIONER

## 2018-02-13 ENCOUNTER — HOSPITAL ENCOUNTER (OUTPATIENT)
Dept: CARDIAC REHAB | Facility: CLINIC | Age: 83
End: 2018-02-13
Attending: FAMILY MEDICINE
Payer: MEDICARE

## 2018-02-13 ENCOUNTER — ALLIED HEALTH/NURSE VISIT (OUTPATIENT)
Dept: CARDIOLOGY | Facility: CLINIC | Age: 83
End: 2018-02-13
Attending: INTERNAL MEDICINE
Payer: MEDICARE

## 2018-02-13 DIAGNOSIS — I48.91 ATRIAL FIBRILLATION WITH RAPID VENTRICULAR RESPONSE (H): Primary | ICD-10-CM

## 2018-02-13 PROCEDURE — 93296 REM INTERROG EVL PM/IDS: CPT | Mod: ZF

## 2018-02-13 PROCEDURE — 93798 PHYS/QHP OP CAR RHAB W/ECG: CPT

## 2018-02-13 PROCEDURE — 40000116 ZZH STATISTIC OP CR VISIT

## 2018-02-13 PROCEDURE — 93294 REM INTERROG EVL PM/LDLS PM: CPT | Performed by: INTERNAL MEDICINE

## 2018-02-13 NOTE — MR AVS SNAPSHOT
After Visit Summary   2/13/2018    Bud Merritt    MRN: [id number]           Patient Information     Date Of Birth          5/9/1927        Visit Information        Provider Department      2/13/2018 6:00 AM  ICD REMOTE [facility]        Today's Diagnoses     Atrial fibrillation with rapid ventricular response (H)    -  1       Follow-ups after your visit        Your next 10 appointments already scheduled     Feb 20, 2018  9:30 AM CST   Cardiac Treatment with TATE Krueger   [facility] ([facility])    [address] [place] [address]   [phone]            Feb 22, 2018  9:30 AM CST   Cardiac Treatment with JARETH Pond   [facility] ([facility])    [address] [place] [address]   [phone]            Feb 27, 2018  9:30 AM CST   Cardiac Treatment with Adeline Johnston [facility] ([facility])    [address] [place] [address]   [phone]              Who to contact     If you have questions or need follow up information about today's clinic visit or your schedule please contact [facility] directly at [phone].  Normal or non-critical lab and imaging results will be communicated to you by [employer], letter or phone within 4 business days after the clinic has received the results. If you do not hear from us within 7 days, please contact the clinic through [employer] or phone. If you have a critical or abnormal lab result, we will notify you by phone as soon as possible.  Submit refill requests through [employer] or call your pharmacy and they will forward the refill request to us. Please allow 3 business days for your refill to be completed.          Additional Information About Your Visit        [employer] Information     [employer] lets you send messages to your doctor, view your test results, renew your  "prescriptions, schedule appointments and more. To sign up, go to [email or web address] . Click on \"Log in\" on the left side of the screen, which will take you to the Welcome page. Then click on \"Sign up Now\" on the right side of the page.     You will be asked to enter the access code listed below, as well as some personal information. Please follow the directions to create your username and password.     Your access code is: [email or web address]  Expires: 3/28/2018  2:03 PM     Your access code will  in 90 days. If you need help or a new code, please call your [place] clinic or [phone].        Care EveryWhere ID     This is your Care EveryWhere ID. This could be used by other organizations to access your [place] medical records  [id number]         Blood Pressure from Last 3 Encounters:   17 128/70   17 133/80   11/15/17 110/68    Weight from Last 3 Encounters:   02/15/18 68 kg (150 lb)   18 68 kg (150 lb)   18 68.5 kg (151 lb)              We Performed the Following     INTERROGATION DEVICE EVAL REMOTE, PACER/ICD          Today's Medication Changes          These changes are accurate as of 18 11:59 PM.  If you have any questions, ask your nurse or doctor.               These medicines have changed or have updated prescriptions.        Dose/Directions    atorvastatin 40 MG tablet   Commonly known as:  LIPITOR   This may have changed:  when to take this   Used for:  Hyperlipidemia LDL goal <130        Dose:  40 mg   Take 1 tablet (40 mg) by mouth daily   Quantity:  90 tablet   Refills:  2                Primary Care Provider Office Phone # Fax #    Camron Aragon [phone]       [facility] [address] [place] [address]        Equal Access to Services     JUAN CLINE : Sampson Burgess, wasue villanueva, qaybta kaalmacoral latif, ronel dexter. So [facility] [phone].    ATENCIÓN: Si habla " español, tiene a mon disposición servicios gratuitos de asistencia lingüística. Jeanette goff [phone].    We comply with applicable federal civil rights laws and [place] laws. We do not discriminate on the basis of race, color, national origin, age, disability, sex, sexual orientation, or gender identity.            Thank you!     Thank you for choosing [facility]  for your care. Our goal is always to provide you with excellent care. Hearing back from our patients is one way we can continue to improve our services. Please take a few minutes to complete the written survey that you may receive in the mail after your visit with us. Thank you!             Your Updated Medication List - Protect others around you: Learn how to safely use, store and throw away your medicines at www.disposemymeds.org.          This list is accurate as of 2/13/18 11:59 PM.  Always use your most recent med list.                   Brand Name Dispense Instructions for use Diagnosis    acetaminophen 325 MG tablet    TYLENOL    100 tablet    Take 2 tablets (650 mg) by mouth every 4 hours as needed for mild pain, fever or headaches        alendronate 70 MG tablet    FOSAMAX    12 tablet    Take 1 tablet (70 mg) by mouth every 7 days Take with over 8 ounces water and stay upright for at least 30 minutes after dose.  Take at least 60 minutes before breakfast    Osteopenia       apixaban ANTICOAGULANT 2.5 MG tablet    ELIQUIS    180 tablet    Take 1 tablet (2.5 mg) by mouth 2 times daily    Paroxysmal atrial fibrillation (H)       ascorbic acid 500 MG Cpcr CR capsule    vitamin C     Take 500 mg by mouth daily        atorvastatin 40 MG tablet    LIPITOR    90 tablet    Take 1 tablet (40 mg) by mouth daily    Hyperlipidemia LDL goal <130       calcium carbonate 1250 MG tablet    OS-SHELIA 500 mg [place]     Take 1,250 mg by mouth daily        dofetilide 125 MCG capsule    TIKOSYN    60 capsule    Take 1 capsule (125 mcg) by mouth 2 times daily     Paroxysmal atrial fibrillation (H)       HYDROcodone-acetaminophen 5-325 MG per tablet    NORCO    18 tablet    Take 1 tablet by mouth every 6 hours as needed for pain For moderate to severe pain    Rheumatoid arthritis involving multiple sites, unspecified rheumatoid factor presence (H)       lisinopril 5 MG tablet    PRINIVIL/ZESTRIL    90 tablet    Take 1 tablet (5 mg) by mouth daily    Acute on chronic systolic congestive heart failure (H)       LUPRON DEPOT IM      Inject into the muscle every 6 months Reported on 5/3/2017        metoprolol tartrate 25 MG tablet    LOPRESSOR    60 tablet    Take 1 tablet (25 mg) by mouth 2 times daily    S/P TAVR (transcatheter aortic valve replacement)       ondansetron 4 MG ODT tab    ZOFRAN-ODT    10 tablet    Take 1 tablet (4 mg) by mouth every 6 hours as needed for nausea or vomiting    Nausea       polyethylene glycol Packet    MIRALAX/GLYCOLAX    30 packet    Take 17 g by mouth daily    Constipation, unspecified constipation type       predniSONE 5 MG tablet    DELTASONE    100 tablet    Take 1 tablet (5 mg) by mouth daily    Rheumatoid arthritis involving multiple sites with positive rheumatoid factor (H)       SPIRONOLACTONE PO      Take 25 mg by mouth daily        tamsulosin 0.4 MG capsule    FLOMAX    60 capsule    Take 1 capsule (0.4 mg) by mouth daily    S/P TAVR (transcatheter aortic valve replacement)       traMADol 50 MG tablet    ULTRAM    90 tablet    TAKE 1 TABLET 3 TIMES DAILY AS NEEDED FOR MODERATE PAIN    Rheumatoid arthritis involving multiple sites, unspecified rheumatoid factor presence (H)       VITAMIN D (CHOLECALCIFEROL) PO      Take 2,000 Units by mouth daily Reported on 5/3/2017

## 2018-02-15 ENCOUNTER — HOSPITAL ENCOUNTER (OUTPATIENT)
Dept: CARDIAC REHAB | Facility: CLINIC | Age: 83
End: 2018-02-15
Attending: FAMILY MEDICINE
Payer: MEDICARE

## 2018-02-15 VITALS — BODY MASS INDEX: 23.54 KG/M2 | WEIGHT: 150 LBS | HEIGHT: 67 IN

## 2018-02-15 PROCEDURE — 93798 PHYS/QHP OP CAR RHAB W/ECG: CPT | Performed by: REHABILITATION PRACTITIONER

## 2018-02-15 PROCEDURE — 40000116 ZZH STATISTIC OP CR VISIT: Performed by: REHABILITATION PRACTITIONER

## 2018-02-15 ASSESSMENT — 6 MINUTE WALK TEST (6MWT)
GENDER SELECTION: MALE
PREDICTED: 1345.13
TOTAL DISTANCE WALKED (FT): 200
MALE CALC: 1336.98
FEMALE CALC: 1147.3
TOTAL DISTANCE WALKED (FT): 240

## 2018-02-15 NOTE — PROGRESS NOTES
02/15/18 0900   Session   Session Discharge Note   Cardiac Rehab Assessment   Cardiac Rehab Assessment Bud requests discharge today as he has plateaued in his exercise progression and arthritis prevents any further increases. Pt made fair gains in exercise tolerance. Initially patient tolerated 30 minutes (3 bouts of 10 minutes each) at 2 METs, now tolerating 45 minutes continuous exercise at 2.2 METs. He has also established a resistance training program. Patient also increased 6-minute walk test by 20% (an increase of 40 feet.) The PT was given instructions on frequency, intensity, and duration for continued exercise as well as muscle conditioning and stretching exercises.  Your PT plans to continue walking and resistance training at his home for aerobic exercise.    General Information   Treatment Diagnosis EPIFANIO   Date of Treatment Diagnosis 09/27/17   Significant Past CV History Pacemaker   Comorbidities Pulmonary Disease   Lead up symptoms dyspnea   Hospital Location [facility]   Signs and Symptoms Post Hospital Discharge Fatigue   Outpatient Cardiac Rehab Start Date 10/24/17   Primary Physician Dr. Aragon   Primary Physician Follow Up Advised to schedule appointment   Cardiologist Dr. Lozano   Cardiologist Follow Up Completed   Ejection Fraction 50-55%   Summary of Cath Report   Summary of Cath Report Not Applicable   Living and Work Status    Living Arrangements and Social Status house;spouse   Support System Live with an adult   Return to Employment Retired   Preventative Medications   CMS recommended medications Ace inhibitors;Anticoagulants;Beta Blocker;Lipid Lowering   Fall Risk Screen   Fall screen completed by Cardiac Rehab   Have you fallen 2 or more times in the past year? No   Have you fallen and had an injury in the past year? No   Is patient a fall risk? No   Fall screen comments Uses cane for assist at clinic and walker at home   Pain   Patient Currently in Pain No   Physical Assessments  "  Incisions Not assessed   Edema +1 Trace   Right Lung Sounds not assessed   Left Lung Sounds not assessed   Individualized Treatment Plan   Monitored Sessions Scheduled 30   Monitored Sessions Attended 30   Oxygen   Supplemental Oxygen needed No   Nutrition Management - Weight Management   Assessment Re-assessment   Age 89   Weight 68 kg (150 lb)   Height 1.702 m (5' 7.01\")   BMI (Calculated) 23.54   Initial Rate Your Plate Score. Dietary tool to assess eating patterns. Scores range from 24 to 72. The higher the score the healthier the eating pattern. (not assessed, see initial ITP)   Nutrition Management - Lipids   Lipids Labs Available   Date 03/06/17   Total Cholesterol 139   Triglycerides 156   HDL 34   LDL 74   Prescribed Lipid Medication Yes   Statin Intensity High Intensity   Nutrition Management - Diabetes   Diabetes No   Nutrition Management Summary   Dietary Recommendations Low Sodium   Stages of Change for Diet Compliance Action   Nutrition Summary Comments Follows a low sodium diet.   Nutrition Target Outcome BMI < 25   Psychosocial Management   Psychosocial Assessment Re-assessment   Is there history of clinical depression or increased risk of depression? No previous history   Current Level of Stress per Patient Report Moderate    Current Coping Skills Patient Unable to Identify Personal Coping Skills   Initial Patient Health Questionnaire -9 Score (PHQ-9) for depression. 5-9 Minimal symptoms, 10-14 Minor depression, 15-19 Major depression, moderately severe, > 20 Major depression, severe  (not assessed, see initial ITP)   Initial [facility] [place] Survey score.  Quality of Life:   If total score > 25 review individual areas where patient rated a 4 or 5.  Consider patients current medical condition and what role that plays on the score.   Adjust treatment protocol to improve areas of concern.  Consider the following:  PHQ9 score, DASI, and re-assessment within the next 30 days to assist with developing " treatments.  (not assessed, see initial ITP)   Psychosocial Comments Worries all the time re: weather, not being able to go outside.   Other Core Components - Hypertension   History of or Diagnosis of Hypertension Yes   Currently taking Anti-Hypertensives Yes;Beta blocker;Ace Inhibitor   Other Core Components - Tobacco   History of Tobacco Use Yes   Quit Date or Planned Quit Date 10/24/97   Tobacco Use Status Former (Quit > 6 mo ago)   Tobacco Habit Cigarettes   Tobacco Use per Day (average) 1 ppd   Years of Tobacco Use 50   Stages of Change Maintenance   Other Core Components Summary   Interventions Planned Instruct patient on the DASH diet;Provide information on home blood pressure monitoring;Attend education class on Blood Pressure   Activity/Exercise History   Activity/Exercise Assessment Re-assessment   Activity/Exercise Status prior to event? Sedentary   Number of Days Currently participating in Moderate Physical Activity? 7   Number of Days Currently performing  Aerobic Exercise (including rehab)? 7   Number of Minutes per Session Currently of Aerobic Exercise (average)? 45   Current Stage of Change (Physical Activity) Preparation   Current Stage of Change (Aerobic Exercise) Preparation   Patient Goals Goal #1;Goal #2   Goal #1 Description Patient will be able to resume his hobby of woodworking in his garage, estimated MET level needed 3.5-4 METs   Goal #1 Target Date 12/05/17   Goal #1 Progress Towards Goal 10/24 Will start attending cardiac rehab 2x/week to increase aerobic exercise tolerance and endurance. Will also initiate resistance training to increase muscle strength. 11/16 Has been out to garage once since starting rehab, still feels limited by lack of energy 12/12 has taken a few trips to garage but have not gotten much done there 1/4/18. Patient not able to go to garage due to the cold temps and ice, but walks with walker daily in basement (18 circles) throughout the afternoon, usually 7 days/week.   Bud and wife state he is able to walk the distance to garage. He feels CR has made his legs stronger, he can stand for longer periods. 1/30 No change from last update with weather   Goal #2 Description Patient will be able to walk to and from his garage without dyspnea; approximately 200 feet   Goal #2 Target Date 11/14/17   Goal #2 Date Met 11/16/17   Goal #2 Progress Towards Goal 11/16 Patient is walking 1/4 mile daily with walker at home   Activity/Exercise Target Outcome An Accumulation of 150  Minutes of Aerobic Activity per Week   Exercise Assessment   6 Minute Walk Predicted - Gender Selection Male   6 Minute Walk Predicted (Male) 1336.98   6 Minute Walk Predicted (Female) 1147.3   Initial 6 Minute Walk Distance (Feet) 200 ft   Discharge 6 Minute Walk Distance (Feet) 240   Resting HR 69 bpm   Exercise HR 80 bpm   Resting /62   Exercise /60   Post Exercise /64   Effort Rating 5   Current MET Level 2.2   MET Level Goal 3-4   ECG Rhythm Paced rhythm   Ectopy Other (Comments)  (intrinsic beat)   Current Symptoms Fatigue;Joint pain   Limitations/Restrictions Orthopedic (see comments)   Exercise Prescription   Mode Weights;Nustep   Duration/Time 30-45 min;Intermittent bouts   Frequency 2 days/week   THR (85% of age predicted max HR) 110.5   OMNI Effort Rating (0-10 Scale) 4-6/10   Progression Total exercise time of 30-45 minutes;Aerobic exercise to OMNI rating of 5-7, and heart rate at or below target;Progress peak intensity by 1/4 MET per week;Continuous bouts   Recommended Home Exercise   Type of Exercise Walking   Frequency (days per week) 7   Duration (minutes per session) Intermittent   Effort Rating Recommended 4-6/10   30 Day Exercise Plan continue daily 1/4 miles walks or 18 circles in basement with walker.   Current Home Exercise   Type of Exercise Walking   Frequency (days per week) 5   Duration (minutes per session) 10-15   Follow-up/On-going Support   Provider follow-up needed on  the following No follow-up needed   Comments Seeing [facility] clinic on 10/26/17   Learning Assessment   Learner Patient   Primary Language English   Preferred Learning Style Listening   Barriers to Learning Cognitive;Hearing   Patient Education   Education recommended (none at this time)     Physician cosignature/electronic signature indicates agreements with the ITP document and approval of discharge.

## 2018-02-19 NOTE — PROGRESS NOTES
Remote pacemaker transmission received and reviewed.  Device transmission sent per MD orders.  Patient has a Medtronic dual lead pacemaker.  Normal pacemaker function.  55 AT/AF episodes recorded, 22sec-77 min. Apixaban for anticoagulation. Presenting EGM = AS/BV @ 76 bpm.  AP = 66.7%.   = 99.2%.  BVP = 99.2%.  Estimated battery longevity to SUHA = 5.5 years. Patient notified of interrogation results.  Patient reports that he is feeling bad with headaches,. His  Primary also said his heart is doing good.   Plan to send a remote transmission in 3 months as scheduled.    Remote pacemaker transmission

## 2018-02-22 ENCOUNTER — TRANSFERRED RECORDS (OUTPATIENT)
Dept: HEALTH INFORMATION MANAGEMENT | Facility: CLINIC | Age: 83
End: 2018-02-22

## 2018-02-22 DIAGNOSIS — C61 MALIGNANT NEOPLASM OF PROSTATE (H): Primary | ICD-10-CM

## 2018-02-22 DIAGNOSIS — M06.9 RHEUMATOID ARTHRITIS INVOLVING MULTIPLE SITES, UNSPECIFIED RHEUMATOID FACTOR PRESENCE: ICD-10-CM

## 2018-02-22 RX ORDER — HYDROCODONE BITARTRATE AND ACETAMINOPHEN 5; 325 MG/1; MG/1
1 TABLET ORAL EVERY 6 HOURS PRN
Qty: 18 TABLET | Refills: 0 | Status: SHIPPED | OUTPATIENT
Start: 2018-02-22 | End: 2018-03-21

## 2018-02-22 RX ORDER — TRAMADOL HYDROCHLORIDE 50 MG/1
TABLET ORAL
Qty: 90 TABLET | Refills: 0 | Status: SHIPPED | OUTPATIENT
Start: 2018-02-22 | End: 2018-03-21

## 2018-02-22 NOTE — TELEPHONE ENCOUNTER
Tramadol       Last Written Prescription Date: 1/31/17   Last Fill Quantity: 90,   # refills: 0  Last Office Visit: 12/21/17  Future Office visit:       Routing refill request to provider for review/approval because:  Drug not on the [facility], [facility] or [facility] refill protocol or controlled substance  Norco 5-325 MG       Last Written Prescription Date:  1/31/18  Last Fill Quantity: 18,   # refills: 0  Last Office Visit: 12/21/17  Future Office visit:       Routing refill request to provider for review/approval because:  Drug not on the [facility], [facility] or [facility] refill protocol or controlled substance

## 2018-03-19 ENCOUNTER — TELEPHONE (OUTPATIENT)
Dept: FAMILY MEDICINE | Facility: CLINIC | Age: 83
End: 2018-03-19

## 2018-03-19 NOTE — TELEPHONE ENCOUNTER
Reason for call:  Patient reporting a symptom    Symptom or request: Patient's wife called and he was talking in the background.  He scheduled an appointment with Dr. Vargas on Thursday since Dr. Aragon is out all week.  I tried to get them to speak to triage but he declined.  However, I am uncomfortable with him waiting and wanted to send the nurses a note to see what they think.  He describes head pain like his head is going to come off, he is very dizzy all the time and also very weak.  Wife made the comment that there is just so much wrong with him.      Duration (how long have symptoms been present): ongoing    Have you been treated for this before? No    Additional comments: If you feel he needs to be seen sooner, please call.    Phone Number patient can be reached at:  Home number on file [phone] (home)    Best Time:  any    Can we leave a detailed message on this number:      Call taken on 3/19/2018 at 8:56 AM by Jordan Rudd

## 2018-03-19 NOTE — TELEPHONE ENCOUNTER
Per RM patient should be evaluated in ER, message sent to RN with this information  Kiley Valencia CMA

## 2018-03-19 NOTE — TELEPHONE ENCOUNTER
"Wife is called and she is reporting patient had a severe headache this morning.  He took a Norco and a Tramadol, but still has a slight headache.  He has been having headaches off and on for the past couple of months and has not been seen for this.  He used to go to PT, but has not in the past 2 weeks due to being too tired and weak when he is done with therapy.  But, wife is reporting he is still weak and tired by the end of the day even when he does not have therapy.  She would like to get him in today to be seen as his headaches seem to be getting worse.    Today his BP is 122/79 with HR of 70 (he has a pacemaker that keeps his pulse at 70).  He is denying the following: \"worst headache ever\", sudden onset of weakness, numbness, tingling, fever, vomiting, inability to walk.  Patient should be seen in 24 hours.    Routing to [facility] as he has a DrMarciano Only slot today.  Valarie Peoples, ANDREWN, RN    Telephone Triage Protocols for Nurses, 5th edition - Carito Munoz: Headache     "

## 2018-03-19 NOTE — TELEPHONE ENCOUNTER
Patient is called and informed of this message.  Patient refuses to go to the ED.  RN found an appointment tomorrow, 3/20/18.  Patient understands and agrees to this plan.    Closing this encounter.    Valarie Peoples RN

## 2018-03-20 ENCOUNTER — OFFICE VISIT (OUTPATIENT)
Dept: FAMILY MEDICINE | Facility: CLINIC | Age: 83
End: 2018-03-20
Payer: COMMERCIAL

## 2018-03-20 VITALS
DIASTOLIC BLOOD PRESSURE: 64 MMHG | RESPIRATION RATE: 18 BRPM | HEIGHT: 67 IN | WEIGHT: 150 LBS | HEART RATE: 70 BPM | OXYGEN SATURATION: 99 % | TEMPERATURE: 97.3 F | BODY MASS INDEX: 23.54 KG/M2 | SYSTOLIC BLOOD PRESSURE: 114 MMHG

## 2018-03-20 DIAGNOSIS — G44.219 EPISODIC TENSION-TYPE HEADACHE, NOT INTRACTABLE: Primary | ICD-10-CM

## 2018-03-20 PROCEDURE — 99213 OFFICE O/P EST LOW 20 MIN: CPT | Performed by: OBSTETRICS & GYNECOLOGY

## 2018-03-20 ASSESSMENT — PAIN SCALES - GENERAL: PAINLEVEL: EXTREME PAIN (8)

## 2018-03-20 NOTE — NURSING NOTE
"Chief Complaint   Patient presents with     Headache     Dizziness       Initial /64  Pulse 70  Temp 97.3  F (36.3  C) (Temporal)  Resp 18  Ht 5' 7\" (1.702 m)  Wt 150 lb (68 kg)  SpO2 99%  BMI 23.49 kg/m2 Estimated body mass index is 23.49 kg/(m^2) as calculated from the following:    Height as of this encounter: 5' 7\" (1.702 m).    Weight as of this encounter: 150 lb (68 kg)..   BP completed using cuff size: regular  Medication Rec Completed    Kiley Valencia CMA    "

## 2018-03-20 NOTE — PROGRESS NOTES
Subjective: he has had headaches for the past 3 months. He is not sure why. He hasnt changed medication. hasnt had his vision checked in a long time.  He sees Dr Aragon for usual cares. He was advised to go to ER yesterday but he refused. He uses tramadol for the headaches- they come and go and they are short- lived.       The past medical history, social history, past surgical history and family history as shown below have been reviewed by me today.  Past Medical History:   Diagnosis Date     Anemia      Atrial fibrillation (H) 07/01/2016     Cardiac pacemaker 03/10/2017     Cardiomyopathy (H)      CHF (congestive heart failure) (H)      COPD exacerbation (H) 3/2/2017     Depressive disorder      History of prostate cancer      Paroxysmal atrial fibrillation (H) 7/6/2016     Pure hypercholesterolemia      Rheumatoid arthritis(714.0)      Unspecified essential hypertension      Weakness generalized 3/2/2017        Allergies   Allergen Reactions     Amiodarone Other (See Comments)     Drop in DLCO     Lasix [Furosemide] Blisters     Blisters and sores in his mouth.      Current Outpatient Prescriptions   Medication Sig Dispense Refill     alendronate (FOSAMAX) 70 MG tablet Take 1 tablet (70 mg) by mouth every 7 days Take with over 8 ounces water and stay upright for at least 30 minutes after dose.  Take at least 60 minutes before breakfast 12 tablet 3     traMADol (ULTRAM) 50 MG tablet TAKE 1 TABLET 3 TIMES DAILY AS NEEDED FOR MODERATE PAIN 90 tablet 0     HYDROcodone-acetaminophen (NORCO) 5-325 MG per tablet Take 1 tablet by mouth every 6 hours as needed for pain For moderate to severe pain 18 tablet 0     lisinopril (PRINIVIL/ZESTRIL) 5 MG tablet Take 1 tablet (5 mg) by mouth daily 90 tablet 3     acetaminophen (TYLENOL) 325 MG tablet Take 2 tablets (650 mg) by mouth every 4 hours as needed for mild pain, fever or headaches 100 tablet      polyethylene glycol (MIRALAX/GLYCOLAX) Packet Take 17 g by mouth daily 30  packet 0     ondansetron (ZOFRAN-ODT) 4 MG ODT tab Take 1 tablet (4 mg) by mouth every 6 hours as needed for nausea or vomiting 10 tablet 0     apixaban ANTICOAGULANT (ELIQUIS) 2.5 MG tablet Take 1 tablet (2.5 mg) by mouth 2 times daily 180 tablet 3     predniSONE (DELTASONE) 5 MG tablet Take 1 tablet (5 mg) by mouth daily 100 tablet 4     SPIRONOLACTONE PO Take 25 mg by mouth daily       metoprolol (LOPRESSOR) 25 MG tablet Take 1 tablet (25 mg) by mouth 2 times daily 60 tablet 11     tamsulosin (FLOMAX) 0.4 MG capsule Take 1 capsule (0.4 mg) by mouth daily 60 capsule 0     dofetilide (TIKOSYN) 125 MCG capsule Take 1 capsule (125 mcg) by mouth 2 times daily 60 capsule 5     Leuprolide Acetate (LUPRON DEPOT IM) Inject into the muscle every 6 months Reported on 5/3/2017       ascorbic acid (VITAMIN C) 500 MG CPCR Take 500 mg by mouth daily       VITAMIN D, CHOLECALCIFEROL, PO Take 2,000 Units by mouth daily Reported on 5/3/2017       calcium carbonate (OS-SHELIA 500 MG [place]. [place]) 500 MG tablet Take 1,250 mg by mouth daily        Past Surgical History:   Procedure Laterality Date     ARTHROPLASTY KNEE Left 1/12/2016    Procedure: ARTHROPLASTY KNEE;  Surgeon: Cesar Walker DO;  Location: [facility] OR     COLONOSCOPY  08/24/09      COLONOSCOPY THRU STOMA W BIOPSY/CAUTERY TUMOR/POLYP/LESION  8/31/2004      REPAIR ING HERNIA,5+Y/O,REDUCIBL  1996    Marlex mesh repair of bilateral inguinal hernias and drainage of bilateral scrotal hydroceles.     HEART CATH FEMORAL CANNULIZATION WITH OPEN STANDBY REPAIR AORTIC VALVE N/A 9/27/2017    Procedure: HEART CATH FEMORAL CANNULIZATION WITH OPEN STANDBY REPAIR AORTIC VALVE;;  Surgeon: Jaron Alejandra MD;  Location: [facility] OR     IMPLANT PACEMAKER  03/10/2017     IRRIGATION AND DEBRIDEMENT SOFT TISSUE LOWER EXTREMITY, COMBINED Left 3/15/2016    Procedure: COMBINED IRRIGATION AND DEBRIDEMENT SOFT TISSUE LOWER EXTREMITY;  Surgeon: Cesar Walker DO;  Location: [facility] OR  "    TRANSCATHETER AORTIC VALVE IMPLANT ANESTHESIA N/A 9/27/2017    Procedure: TRANSCATHETER AORTIC VALVE IMPLANT ANESTHESIA;  Right Transfemoral Approach (Harman Ramsey 3 29mm) Aortic Valve Implant,  Cardiopulmonary Bypass Standby, Transthoracic Echocardiogram by Derian Queen(Echo [employer]);  Surgeon: GENERIC ANESTHESIA PROVIDER;  Location:  OR     Social History     Social History     Marital status:      Spouse name: N/A     Number of children: N/A     Years of education: N/A     Social History Main Topics     Smoking status: Former Smoker     Packs/day: 0.50     Years: 15.00     Types: Cigarettes     Quit date: 11/12/1998     Smokeless tobacco: Never Used      Comment: quit 15 yrs ago     Alcohol use No     Drug use: No     Sexual activity: Yes     Other Topics Concern     Caffeine Concern Yes     8 cups coffee day     Sleep Concern Yes     Weight Concern Yes     weight loss     Special Diet No     Exercise Yes     split firewood daily     Social History Narrative     Family History   Problem Relation Age of Onset     CANCER Mother      CANCER Son      Unknown/Adopted Father      Alcoholism Brother        ROS: A 12 point review of systems was done. Except for what is listed above in the HPI, the systems review is negative .      Objective: Vital signs: Blood pressure 114/64, pulse 70, temperature 97.3  F (36.3  C), temperature source Temporal, resp. rate 18, height 5' 7\" (1.702 m), weight 150 lb (68 kg), SpO2 99 %.    Fundoscopic exam difficult on the right ? Cataracts- I cant tell   The left side looks normal- no papilledema or hemorrhages or exudates    HEENT otherwise unremarkable-no sinus tenderness to palpation  He has a hearing aid in left ear.    Neck is supple, mobile, no adenopathy or masses palpable. The thyroid feels normal.   Normal range of motion noted.    I don't hear any carotid bruits.      Chest is clear to auscultation.  No wheezes, rales or rhonchi heard.  Cardiac exam is normal with s1, " s2, no murmurs or adventitious sounds.Normal rate and rhythm is heard.     There is minimal leg edema now- CHF seems well-controlled.        Assessment/Plan:    1. 89 year old male with multiple comorbidities including CHF and hypertension- BP well-controlled at this time- having headaches for 3 months.    I advised him to see his eye doctor to get a check up- hasnt done this for several years.    2. I offered a MRI scan but he declined- wants to discuss it with Dr Aragon first- appointment is set up for next week.        ALEX Vargas MD

## 2018-03-20 NOTE — MR AVS SNAPSHOT
After Visit Summary   3/20/2018    Bud Merritt    MRN: [id number]           Patient Information     Date Of Birth          5/9/1927        Visit Information        Provider Department      3/20/2018 11:10 AM Rex Vargas MD [facility]         Follow-ups after your visit        Your next 10 appointments already scheduled     Mar 21, 2018  9:00 AM CDT   Office Visit with Baljit Da Silva [facility] ([facility])    [address] [place] [address] [phone]           Bring a current list of meds and any records pertaining to this visit. For Physicals, please bring immunization records and any forms needing to be filled out. Please arrive 10 minutes early to complete paperwork.            Mar 29, 2018 11:20 AM CDT   Office Visit with Camron Aragon MD   [facility] ([address] [address] [phone]           Bring a current list of meds and any records pertaining to this visit. For Physicals, please bring immunization records and any forms needing to be filled out. Please arrive 10 minutes early to complete paperwork.              Who to contact     If you have questions or need follow up information about today's clinic visit or your schedule please contact [facility] directly at [phone].  Normal or non-critical lab and imaging results will be communicated to you by MyChart, letter or phone within 4 business days after the clinic has received the results. If you do not hear from us within 7 days, please contact the clinic through MyChart or phone. If you have a critical or abnormal lab result, we will notify you by phone as soon as possible.  Submit refill requests through [employer] or call your pharmacy and they will forward the refill request to us. Please allow 3 business days for your refill to be completed.        "   Additional Information About Your Visit        MyChart Information     [employer] lets you send messages to your doctor, view your test results, renew your prescriptions, schedule appointments and more. To sign up, go to [email or web address] . Click on \"Log in\" on the left side of the screen, which will take you to the Welcome page. Then click on \"Sign up Now\" on the right side of the page.     You will be asked to enter the access code listed below, as well as some personal information. Please follow the directions to create your username and password.     Your access code is: [email or web address]  Expires: 3/28/2018  3:03 PM     Your access code will  in 90 days. If you need help or a new code, please call your [facility] clinic or [phone].        Care EveryWhere ID     This is your Care EveryWhere ID. This could be used by other organizations to access your [facility] medical records  [id number]        Your Vitals Were     Pulse Temperature Respirations Height Pulse Oximetry BMI (Body Mass Index)    70 97.3  F (36.3  C) (Temporal) 18 5' 7\" (1.702 m) 99% 23.49 kg/m2       Blood Pressure from Last 3 Encounters:   18 114/64   17 128/70   17 133/80    Weight from Last 3 Encounters:   18 150 lb (68 kg)   02/15/18 150 lb (68 kg)   18 150 lb (68 kg)              Today, you had the following     No orders found for display       Primary Care Provider Office Phone # Fax #    Camron Aragon [phone]       [facility] [address] [place] [address]        Equal Access to Services     FATOUMATA CLINE : Sampson Burgess, hugo villanueva, felix latif, ronel dexter. So [facility] [phone].    ATENCIÓN: Si habla español, tiene a mon disposición servicios gratuitos de asistencia lingüística. Llame al [phone].    We comply with applicable federal civil rights laws and [place] laws. We do not " discriminate on the basis of race, color, national origin, age, disability, sex, sexual orientation, or gender identity.            Thank you!     Thank you for choosing [facility]  for your care. Our goal is always to provide you with excellent care. Hearing back from our patients is one way we can continue to improve our services. Please take a few minutes to complete the written survey that you may receive in the mail after your visit with us. Thank you!             Your Updated Medication List - Protect others around you: Learn how to safely use, store and throw away your medicines at www.disposemymeds.org.          This list is accurate as of 3/20/18 12:59 PM.  Always use your most recent med list.                   Brand Name Dispense Instructions for use Diagnosis    acetaminophen 325 MG tablet    TYLENOL    100 tablet    Take 2 tablets (650 mg) by mouth every 4 hours as needed for mild pain, fever or headaches        alendronate 70 MG tablet    FOSAMAX    12 tablet    Take 1 tablet (70 mg) by mouth every 7 days Take with over 8 ounces water and stay upright for at least 30 minutes after dose.  Take at least 60 minutes before breakfast    Hyperlipidemia LDL goal <130       apixaban ANTICOAGULANT 2.5 MG tablet    ELIQUIS    180 tablet    Take 1 tablet (2.5 mg) by mouth 2 times daily    Paroxysmal atrial fibrillation (H)       ascorbic acid 500 MG Cpcr CR capsule    vitamin C     Take 500 mg by mouth daily        calcium carbonate 1250 MG tablet    OS-SHELIA 500 mg [place]. [place]     Take 1,250 mg by mouth daily        dofetilide 125 MCG capsule    TIKOSYN    60 capsule    Take 1 capsule (125 mcg) by mouth 2 times daily    Paroxysmal atrial fibrillation (H)       HYDROcodone-acetaminophen 5-325 MG per tablet    NORCO    18 tablet    Take 1 tablet by mouth every 6 hours as needed for pain For moderate to severe pain    Rheumatoid arthritis involving multiple sites, unspecified rheumatoid factor presence  (H)       lisinopril 5 MG tablet    PRINIVIL/ZESTRIL    90 tablet    Take 1 tablet (5 mg) by mouth daily    Acute on chronic systolic congestive heart failure (H)       LUPRON DEPOT IM      Inject into the muscle every 6 months Reported on 5/3/2017        metoprolol tartrate 25 MG tablet    LOPRESSOR    60 tablet    Take 1 tablet (25 mg) by mouth 2 times daily    S/P TAVR (transcatheter aortic valve replacement)       ondansetron 4 MG ODT tab    ZOFRAN-ODT    10 tablet    Take 1 tablet (4 mg) by mouth every 6 hours as needed for nausea or vomiting    Nausea       polyethylene glycol Packet    MIRALAX/GLYCOLAX    30 packet    Take 17 g by mouth daily    Constipation, unspecified constipation type       predniSONE 5 MG tablet    DELTASONE    100 tablet    Take 1 tablet (5 mg) by mouth daily    Rheumatoid arthritis involving multiple sites with positive rheumatoid factor (H)       SPIRONOLACTONE PO      Take 25 mg by mouth daily        tamsulosin 0.4 MG capsule    FLOMAX    60 capsule    Take 1 capsule (0.4 mg) by mouth daily    S/P TAVR (transcatheter aortic valve replacement)       traMADol 50 MG tablet    ULTRAM    90 tablet    TAKE 1 TABLET 3 TIMES DAILY AS NEEDED FOR MODERATE PAIN    Rheumatoid arthritis involving multiple sites, unspecified rheumatoid factor presence (H)       VITAMIN D (CHOLECALCIFEROL) PO      Take 2,000 Units by mouth daily Reported on 5/3/2017

## 2018-03-21 ENCOUNTER — OFFICE VISIT (OUTPATIENT)
Dept: PHARMACY | Facility: CLINIC | Age: 83
End: 2018-03-21
Payer: COMMERCIAL

## 2018-03-21 VITALS — SYSTOLIC BLOOD PRESSURE: 124 MMHG | DIASTOLIC BLOOD PRESSURE: 72 MMHG

## 2018-03-21 DIAGNOSIS — M81.0 AGE-RELATED OSTEOPOROSIS WITHOUT CURRENT PATHOLOGICAL FRACTURE: ICD-10-CM

## 2018-03-21 DIAGNOSIS — M06.9 RHEUMATOID ARTHRITIS INVOLVING MULTIPLE SITES, UNSPECIFIED RHEUMATOID FACTOR PRESENCE: ICD-10-CM

## 2018-03-21 DIAGNOSIS — E63.9 NUTRITIONAL DEFICIENCY: ICD-10-CM

## 2018-03-21 DIAGNOSIS — G44.219 EPISODIC TENSION-TYPE HEADACHE, NOT INTRACTABLE: ICD-10-CM

## 2018-03-21 DIAGNOSIS — R42 DIZZINESS: ICD-10-CM

## 2018-03-21 DIAGNOSIS — I50.20 SYSTOLIC HEART FAILURE, UNSPECIFIED HEART FAILURE CHRONICITY: ICD-10-CM

## 2018-03-21 DIAGNOSIS — I48.91 ATRIAL FIBRILLATION WITH RAPID VENTRICULAR RESPONSE (H): ICD-10-CM

## 2018-03-21 DIAGNOSIS — I10 ESSENTIAL HYPERTENSION WITH GOAL BLOOD PRESSURE LESS THAN 140/90: ICD-10-CM

## 2018-03-21 DIAGNOSIS — R11.0 NAUSEA: Primary | ICD-10-CM

## 2018-03-21 PROCEDURE — 99607 MTMS BY PHARM ADDL 15 MIN: CPT | Performed by: PHARMACIST

## 2018-03-21 PROCEDURE — 99605 MTMS BY PHARM NP 15 MIN: CPT | Performed by: PHARMACIST

## 2018-03-21 NOTE — MR AVS SNAPSHOT
After Visit Summary   3/21/2018    Bud Merritt    MRN: [id number]           Patient Information     Date Of Birth          5/9/1927        Visit Information        Provider Department      3/21/2018 9:00 AM Baljit Da Silva, [facility] MTM        Care Instructions    Recommendations from today's MTM visit:                                                    MTM (medication therapy management) is a service provided by a clinical pharmacist designed to help you get the most of out of your medicines.   Today we reviewed what your medicines are for, how to know if they are working, that your medicines are safe and how to make your medicine regimen as easy as possible.     1. Start taking acetaminophen (generic for Tylenol) 1000 mg (two extra strength tablets) THREE TIMES DAILY scheduled.  If you need Norco you should skip one of the extra strength tablets on the next dose. If this does not help talk to Dr. Aragon about a new rheumatology referral.    2. We will try to figure out what is causing your current symptoms.  Based upon your description, if it is related to your medications, your metoprolol, Eliquis or dofetilide seem to be the most likely causes.  Our plan will be to have you take each one of these medications 2 hours after your other medications for ONE WEEK at a time to see if this delays when you experience those symptoms.     Week 1 - Dofetilide  Week 2 - Metoprolol  Week 3 - Eliquis    3. If your symptoms get better continue to separate the medications.  If they do not improve move the medication back to taking with the other medications.     Next MTM visit: I will follow-up in the next few weeks.      To schedule another MTM appointment, please call the clinic directly or you may call the MTM scheduling line at [phone] or toll-free at [phone].     My Clinical Pharmacist's contact information:                                                      It was a  "pleasure seeing you today!  Please feel free to contact me with any questions or concerns you have.      Betito Da Silva, PharmD, [facility]  Medication Therapy Management Pharmacist  Pager: [phone]      You may receive a survey about the [facility] services you received.  I would appreciate your feedback to help me serve you better in the future. Please fill it out and return it when you can. Your comments will be anonymous.                Follow-ups after your visit        Your next 10 appointments already scheduled     Mar 29, 2018 11:20 AM CDT   [facility]    [address] [place] [address] [phone]           Bring a current list of meds and any records pertaining to this visit. For Physicals, please bring immunization records and any forms needing to be filled out. Please arrive 10 minutes early to complete paperwork.              Who to contact     If you have questions or need follow up information about today's clinic visit or your schedule please contact [facility] directly at [phone].  Normal or non-critical lab and imaging results will be communicated to you by [employer], letter or phone within 4 business days after the clinic has received the results. If you do not hear from us within 7 days, please contact the clinic through [email or web address] or phone. If you have a critical or abnormal lab result, we will notify you by phone as soon as possible.  Submit refill requests through [employer] or call your pharmacy and they will forward the refill request to us. Please allow 3 business days for your refill to be completed.          Additional Information About Your Visit        MyChart Information     [employer] lets you send messages to your doctor, view your test results, renew your prescriptions, schedule appointments and more. To sign up, go to [email or web address] . Click on \"Log in\" on the left side of " "the screen, which will take you to the Welcome page. Then click on \"Sign up Now\" on the right side of the page.     You will be asked to enter the access code listed below, as well as some personal information. Please follow the directions to create your username and password.     Your access code is: [email or web address]  Expires: 3/28/2018  3:03 PM     Your access code will  in 90 days. If you need help or a new code, please call your [facility] clinic or [phone].        Care EveryWhere ID     This is your Care EveryWhere ID. This could be used by other organizations to access your [facility] medical records  [id number]         Blood Pressure from Last 3 Encounters:   18 124/72   18 114/64   17 128/70    Weight from Last 3 Encounters:   18 150 lb (68 kg)   02/15/18 150 lb (68 kg)   18 150 lb (68 kg)              Today, you had the following     No orders found for display         Today's Medication Changes          These changes are accurate as of 3/21/18  9:40 AM.  If you have any questions, ask your nurse or doctor.               These medicines have changed or have updated prescriptions.        Dose/Directions    metoprolol tartrate 25 MG tablet   Commonly known as:  LOPRESSOR   This may have changed:  how much to take   Used for:  S/P TAVR (transcatheter aortic valve replacement)        Dose:  25 mg   Take 1 tablet (25 mg) by mouth 2 times daily   Quantity:  60 tablet   Refills:  11                Primary Care Provider Office Phone # Fax #    Camron Aragon [phone]       [facility] [address] [place] [address]        Equal Access to Services     [facility]: Hadii sammie Burgess, wasrinida lumichelle, qaybta kaalmaronel grissom. So [facility] [phone].    ATENCIÓN: Si habla español, tiene a mon disposición servicios gratuitos de asistencia lingüística. Llame al [phone].    We " comply with applicable federal civil rights laws and [place] laws. We do not discriminate on the basis of race, color, national origin, age, disability, sex, sexual orientation, or gender identity.            Thank you!     Thank you for choosing [facility]  for your care. Our goal is always to provide you with excellent care. Hearing back from our patients is one way we can continue to improve our services. Please take a few minutes to complete the written survey that you may receive in the mail after your visit with us. Thank you!             Your Updated Medication List - Protect others around you: Learn how to safely use, store and throw away your medicines at www.disposemymeds.org.          This list is accurate as of 3/21/18  9:40 AM.  Always use your most recent med list.                   Brand Name Dispense Instructions for use Diagnosis    acetaminophen 325 MG tablet    TYLENOL    100 tablet    Take 2 tablets (650 mg) by mouth every 4 hours as needed for mild pain, fever or headaches        alendronate 70 MG tablet    FOSAMAX    12 tablet    Take 1 tablet (70 mg) by mouth every 7 days Take with over 8 ounces water and stay upright for at least 30 minutes after dose.  Take at least 60 minutes before breakfast    Hyperlipidemia LDL goal <130       apixaban ANTICOAGULANT 2.5 MG tablet    ELIQUIS    180 tablet    Take 1 tablet (2.5 mg) by mouth 2 times daily    Paroxysmal atrial fibrillation (H)       ascorbic acid 500 MG Cpcr CR capsule    vitamin C     Take 500 mg by mouth daily        calcium carbonate 1250 MG tablet    OS-SHELIA 500 mg [place]. Ca     Take 1,250 mg by mouth daily        dofetilide 125 MCG capsule    TIKOSYN    60 capsule    Take 1 capsule (125 mcg) by mouth 2 times daily    Paroxysmal atrial fibrillation (H)       HYDROcodone-acetaminophen 5-325 MG per tablet    NORCO    18 tablet    Take 1 tablet by mouth every 6 hours as needed for pain For moderate to severe pain     Rheumatoid arthritis involving multiple sites, unspecified rheumatoid factor presence (H)       lisinopril 5 MG tablet    PRINIVIL/ZESTRIL    90 tablet    Take 1 tablet (5 mg) by mouth daily    Acute on chronic systolic congestive heart failure (H)       LUPRON DEPOT IM      Inject into the muscle every 6 months Reported on 5/3/2017        metoprolol tartrate 25 MG tablet    LOPRESSOR    60 tablet    Take 1 tablet (25 mg) by mouth 2 times daily    S/P TAVR (transcatheter aortic valve replacement)       ondansetron 4 MG ODT tab    ZOFRAN-ODT    10 tablet    Take 1 tablet (4 mg) by mouth every 6 hours as needed for nausea or vomiting    Nausea       polyethylene glycol Packet    MIRALAX/GLYCOLAX    30 packet    Take 17 g by mouth daily    Constipation, unspecified constipation type       predniSONE 5 MG tablet    DELTASONE    100 tablet    Take 1 tablet (5 mg) by mouth daily    Rheumatoid arthritis involving multiple sites with positive rheumatoid factor (H)       SPIRONOLACTONE PO      Take 25 mg by mouth daily        tamsulosin 0.4 MG capsule    FLOMAX    60 capsule    Take 1 capsule (0.4 mg) by mouth daily    S/P TAVR (transcatheter aortic valve replacement)       traMADol 50 MG tablet    ULTRAM    90 tablet    TAKE 1 TABLET 3 TIMES DAILY AS NEEDED FOR MODERATE PAIN    Rheumatoid arthritis involving multiple sites, unspecified rheumatoid factor presence (H)       VITAMIN D (CHOLECALCIFEROL) PO      Take 2,000 Units by mouth daily Reported on 5/3/2017

## 2018-03-21 NOTE — PROGRESS NOTES
"SUBJECTIVE/OBJECTIVE:                Bud Merritt is a 89 year old male coming in for a follow-up visit for Medication Therapy Management.  He was referred to me from transitions of care. His current PCP is Dr. Aragon.     Chief Complaint: Follow up from our visit on 4/20/17. Medication side effect concerns - nausea, dizziness, headaches  Tobacco: History of tobacco dependence - quit 26 years ago   Alcohol: not currently using    Medication Adherence/Access:  Patient uses pill box(es).  Patient takes medications 2 time(s) per day.   Per patient, misses medication 0 times per week.   Medication barriers: none.   The patient fills medications at [facility]: YES.    Nausea/Headache/Dizziness: About an hour after breakfast/supper pt gets nauseous, dizzy, and develops headaches.  Head gets heavy, just wants to lay down, things \"start swimming around\".   Happens a few times a week (maybe everyday, patient is unsure of the frequency).  Previously had issues with this with his pain pills with arthritis - he went off and no improvement in his symptoms.  Nausea lasts for some time - pain pills help with the nausea/dizziness/headaches. Occurring for past 3 months. Cannot remember BP readings, none below 100 mmHg. Symptoms are typically worse in the evening, but do occur in the morning. Did go off atorvastatin and many symptoms including a previous issue of weakness got better.    HFrEF/HTN/AFib: Current medications include spironolactone 25 mg daily, lisinopril 5 mg once daily, metoprolol tartrate 12.5 mg twice daily, dofetilide 125 mcg twice daily, and Eliquis 2.5 mg twice daily.  Had pacemaker placed at recent visit - denies any issues with pain has not needed to use tramadol often. Patient reports no current medication side effects.     ECHO:  Date 3/4/17, EF 20-25%  Pt is not complaining of sx of HF.    Pt is measuring daily weightsand reports stable.   Patient does not self-monitor BP.   Pt is following a low sodium " diet, is avoiding EtOH.    Arthirtis: Pt is taking prednisone 5 mg daily, tramadol 50 mg three times daily, hydrocodone-APAP. Rarely uses APAP on it's own. Pt previously saw Dr. Shaver. Up and down with bowel movements - on pretty much a regular schedule. Has yet to establish with new rheumatologist.    Osteoporosis: Pt taking fosamax 70 mg daily, vitamin D3, and calcium daily. Pt denies any known issues.    Supplements: Pt is taking ascorbic acid 500 mg daily. Denies any known issues.     Current labs include:  Today's Vitals: /72   BP Readings from Last 3 Encounters:   03/20/18 114/64   12/21/17 128/70   12/13/17 133/80     No results found for: A1C.  Lab Results   Component Value Date    CHOL 139 03/06/2017     Lab Results   Component Value Date    TRIG 156 03/06/2017     Lab Results   Component Value Date    HDL 34 03/06/2017     Lab Results   Component Value Date    LDL 74 03/06/2017       Liver Function Studies -   Recent Labs   Lab Test  12/12/17   1357   PROTTOTAL  6.2*   ALBUMIN  2.9*   BILITOTAL  0.4   ALKPHOS  91   AST  30   ALT  17     Last Basic Metabolic Panel:  Lab Results   Component Value Date     12/13/2017      Lab Results   Component Value Date    POTASSIUM 3.7 12/13/2017     Lab Results   Component Value Date    CHLORIDE 106 12/13/2017     Lab Results   Component Value Date    BUN 12 12/13/2017     Lab Results   Component Value Date    CR 0.84 12/13/2017     GFR Estimate   Date Value Ref Range Status   12/13/2017 86 >60 mL/min/1.7m2 Final     Comment:     Non  GFR Calc   12/12/2017 71 >60 mL/min/1.7m2 Final     Comment:     Non  GFR Calc   10/26/2017 81 >60 mL/min/1.7m2 Final     Comment:     Non  GFR Calc     TSH   Date Value Ref Range Status   01/23/2017 1.07 0.40 - 4.00 mU/L Final     Most Recent Immunizations   Administered Date(s) Administered     Influenza (High Dose) 3 valent vaccine 09/30/2017     Influenza Vaccine IM 3yrs+ 4  Valent IIV4 11/03/2014     Pneumococcal 23 valent 03/14/2017     TD (ADULT, 7+) 05/16/2012     Tetanus 04/17/1989       ASSESSMENT:              Current medications were reviewed today as discussed above.      Medication Adherence: excellent, no issues identified    Nausea/Headache/Dizziness: Needs Improvement. Symptoms could potentially be related to medication side effects. Side effect frequencies include: dofetilide (dizziness 8%, headache 11%), Eliquis (nausea - rare), metoprolol (nausea/vomiting - reports, dizziness 1.8 to 10%, headaches - reports), tamsulosin (nausea 2.6 to 3.9%, dizziness 14.9 to 17.1%, headache 19.3 to 21.1%), tramadol (nausea 13 to 40%, dizziness 7 to 33%, headache 3 to 32%), spironolactone (nausea - reports), lisinopril (dizziness 14 to 19%, headaches 3.8%), Norco (nausea/vomiting, dizziness).  Given that symptoms occur twice per day and are worse in the evening they are more likely to be related to BID medications if they are medication related. Pt may benefit from systematically  out suspected medications to determine if it is an individual medication.    HFrEF/HTN/AFib: Stable.     Arthirtis: Needs Improvement. Mainly using pain pills for other symptoms.  Likely would benefit from establishing with new rheumatologist and patient already planned on discussing with Dr. Aragon next week. May benefit from increasing APAP use when not using Norco.    Osteoporosis: Stable.    Supplements: Stable.     PLAN:                  1. Start taking acetaminophen (generic for Tylenol) 1000 mg (two extra strength tablets) THREE TIMES DAILY scheduled.  If you need Norco you should skip one of the extra strength tablets on the next dose. If this does not help talk to Dr. Aragon about a new rheumatology referral.    2. We will try to figure out what is causing your current symptoms.  Based upon your description, if it is related to your medications, your metoprolol, Eliquis or dofetilide seem to  be the most likely causes.  Our plan will be to have you take each one of these medications 2 hours after your other medications for ONE WEEK at a time to see if this delays when you experience those symptoms.     Week 1 - Dofetilide  Week 2 - Metoprolol  Week 3 - Eliquis    I spent 60 minutes with this patient today. All changes were made via collaborative practice agreement with Camron Aragon. A copy of the visit note was provided to the patient's primary care provider.     Will follow up in 2-4 weeks    The patient was given a summary of these recommendations as an after visit summary.    Betito Da Silva, PharmD, BCACP  Medication Therapy Management Pharmacist  Pager: [phone]

## 2018-03-21 NOTE — PATIENT INSTRUCTIONS
Recommendations from today's MTM visit:                                                    MTM (medication therapy management) is a service provided by a clinical pharmacist designed to help you get the most of out of your medicines.   Today we reviewed what your medicines are for, how to know if they are working, that your medicines are safe and how to make your medicine regimen as easy as possible.     1. Start taking acetaminophen (generic for Tylenol) 1000 mg (two extra strength tablets) THREE TIMES DAILY scheduled.  If you need Norco you should skip one of the extra strength tablets on the next dose. If this does not help talk to Dr. Aragon about a new rheumatology referral.    2. We will try to figure out what is causing your current symptoms.  Based upon your description, if it is related to your medications, your metoprolol, Eliquis or dofetilide seem to be the most likely causes.  Our plan will be to have you take each one of these medications 2 hours after your other medications for ONE WEEK at a time to see if this delays when you experience those symptoms.     Week 1 - Dofetilide  Week 2 - Metoprolol  Week 3 - Eliquis    3. If your symptoms get better continue to separate the medications.  If they do not improve move the medication back to taking with the other medications.     Next MTM visit: I will follow-up in the next few weeks.      To schedule another MTM appointment, please call the clinic directly or you may call the MTM scheduling line at [phone] or toll-free at [phone].     My Clinical Pharmacist's contact information:                                                      It was a pleasure seeing you today!  Please feel free to contact me with any questions or concerns you have.      Betito Da Silva, Xavi, [facility]  Medication Therapy Management Pharmacist  Pager: [phone]      You may receive a survey about the MTM services you received.  I would appreciate your feedback to help me  serve you better in the future. Please fill it out and return it when you can. Your comments will be anonymous.

## 2018-03-22 NOTE — TELEPHONE ENCOUNTER
Norco 5-325 MG       Last Written Prescription Date:  2/22/18  Last Fill Quantity: 18,   # refills: 0  Last Office Visit: 3/20/18  Future Office visit:    Next 5 appointments (look out 90 days)     Mar 29, 2018 11:20 AM CDT   Office Visit with Camron Aragon MD   [facility] ([address] [address]   [phone]                   Routing refill request to provider for review/approval because:  Drug not on the [facility], [facility] or [facility] refill protocol or controlled substance  Tramadol       Last Written Prescription Date:  2/22/18  Last Fill Quantity: 90,   # refills: 0  Last Office Visit: 3/20/18  Future Office visit:    Next 5 appointments (look out 90 days)     Mar 29, 2018 11:20 AM CDT   Office Visit with Camron Aragon MD   [facility] ([address] [address]   [phone]                   Routing refill request to provider for review/approval because:  Drug not on the [facility], [facility] or [facility] refill protocol or controlled substance

## 2018-03-23 RX ORDER — TRAMADOL HYDROCHLORIDE 50 MG/1
TABLET ORAL
Qty: 90 TABLET | Refills: 0 | Status: SHIPPED | OUTPATIENT
Start: 2018-03-23 | End: 2018-04-23

## 2018-03-23 RX ORDER — HYDROCODONE BITARTRATE AND ACETAMINOPHEN 5; 325 MG/1; MG/1
1 TABLET ORAL EVERY 6 HOURS PRN
Qty: 18 TABLET | Refills: 0 | Status: SHIPPED | OUTPATIENT
Start: 2018-03-23 | End: 2018-04-23

## 2018-03-29 ENCOUNTER — OFFICE VISIT (OUTPATIENT)
Dept: FAMILY MEDICINE | Facility: CLINIC | Age: 83
End: 2018-03-29
Payer: COMMERCIAL

## 2018-03-29 VITALS
SYSTOLIC BLOOD PRESSURE: 104 MMHG | DIASTOLIC BLOOD PRESSURE: 70 MMHG | WEIGHT: 149.2 LBS | BODY MASS INDEX: 23.42 KG/M2 | HEART RATE: 84 BPM | OXYGEN SATURATION: 96 % | HEIGHT: 67 IN | TEMPERATURE: 97.1 F

## 2018-03-29 DIAGNOSIS — I48.0 PAROXYSMAL ATRIAL FIBRILLATION (H): ICD-10-CM

## 2018-03-29 DIAGNOSIS — I50.23 ACUTE ON CHRONIC SYSTOLIC CONGESTIVE HEART FAILURE (H): ICD-10-CM

## 2018-03-29 DIAGNOSIS — G44.201 INTRACTABLE TENSION-TYPE HEADACHE, UNSPECIFIED CHRONICITY PATTERN: Primary | ICD-10-CM

## 2018-03-29 DIAGNOSIS — L98.9 SCALP LESION: ICD-10-CM

## 2018-03-29 PROCEDURE — 99214 OFFICE O/P EST MOD 30 MIN: CPT | Performed by: FAMILY MEDICINE

## 2018-03-29 RX ORDER — LISINOPRIL 5 MG/1
5 TABLET ORAL DAILY
Qty: 90 TABLET | Refills: 3 | Status: SHIPPED | OUTPATIENT
Start: 2018-03-29 | End: 2019-01-01

## 2018-03-29 NOTE — MR AVS SNAPSHOT
After Visit Summary   3/29/2018    Bud Merritt    MRN: [id number]           Patient Information     Date Of Birth          5/9/1927        Visit Information        Provider Department      3/29/2018 11:20 AM Camron Aragon MD [facility]        Today's Diagnoses     Intractable tension-type headache, unspecified chronicity pattern    -  1    Scalp lesion        Acute on chronic systolic congestive heart failure (H)        Paroxysmal atrial fibrillation (H)           Follow-ups after your visit        Additional Services     GENERAL SURG ADULT REFERRAL       Your provider has referred you to: [facility]: [facility] [phone]   [email or web address]    Please be aware that coverage of these services is subject to the terms and limitations of your health insurance plan.  Call member services at your health plan with any benefit or coverage questions.      Please bring the following with you to your appointment:    (1) Any X-Rays, CTs or MRIs which have been performed.  Contact the facility where they were done to arrange for  prior to your scheduled appointment.   (2) List of current medications   (3) This referral request   (4) Any documents/labs given to you for this referral            PHYSICAL THERAPY REFERRAL       *This therapy referral will be filtered to a centralized scheduling office at [facility] and the patient will receive a call to schedule an appointment at a [place] location most convenient for them. *     [facility] provides Physical Therapy evaluation and treatment and many specialty services across the [place] system.  If requesting a specialty program, please choose from the list below.    If you have not heard from the scheduling office within 2 business days, please call [phone] for all locations, with the exception of [place], please call [phone] and [place], please call [phone]  Treatment: Evaluation & Treatment  Special Instructions/Modalities:   Special Programs:     Please be aware that coverage of these services is subject to the terms and limitations of your health insurance plan.  Call member services at your health plan with any benefit or coverage questions.      **Note to Provider:  If you are referring outside of [place] for the therapy appointment, please list the name of the location in the \"special instructions\" above, print the referral and give to the patient to schedule the appointment.                  Your next 10 appointments already scheduled     Mar 30, 2018  9:45 AM CDT   [facility]    [address] [facility] [place] [facility] [address] [phone]              Who to contact     If you have questions or need follow up information about today's clinic visit or your schedule please contact [facility] directly at [phone].  Normal or non-critical lab and imaging results will be communicated to you by OrderGroovehart, letter or phone within 4 business days after the clinic has received the results. If you do not hear from us within 7 days, please contact the clinic through OrderGroovehart or phone. If you have a critical or abnormal lab result, we will notify you by phone as soon as possible.  Submit refill requests through Project 2020 or call your pharmacy and they will forward the refill request to us. Please allow 3 business days for your refill to be completed.          Additional Information About Your Visit        Project 2020 Information     Project 2020 lets you send messages to your doctor, view your test results, renew your prescriptions, schedule appointments and more. To sign up, go to [email or web address] . Click on \"Log in\" on the left side of the screen, which will take you to the Welcome page. Then click on \"Sign up Now\" on the right side of " "the page.     You will be asked to enter the access code listed below, as well as some personal information. Please follow the directions to create your username and password.     Your access code is: [email or web address]  Expires: 2018  1:23 PM     Your access code will  in 90 days. If you need help or a new code, please call your [place] clinic or [phone].        Care EveryWhere ID     This is your Care EveryWhere ID. This could be used by other organizations to access your [place] medical records  [id number]        Your Vitals Were     Pulse Temperature Height Pulse Oximetry BMI (Body Mass Index)       84 97.1  F (36.2  C) (Temporal) 5' 7\" (1.702 m) 96% 23.37 kg/m2        Blood Pressure from Last 3 Encounters:   18 104/70   18 124/72   18 114/64    Weight from Last 3 Encounters:   18 149 lb 3.2 oz (67.7 kg)   18 150 lb (68 kg)   02/15/18 150 lb (68 kg)              We Performed the Following     GENERAL SURG ADULT REFERRAL     PHYSICAL THERAPY REFERRAL          Today's Medication Changes          These changes are accurate as of 3/29/18  1:23 PM.  If you have any questions, ask your nurse or doctor.               These medicines have changed or have updated prescriptions.        Dose/Directions    metoprolol tartrate 25 MG tablet   Commonly known as:  LOPRESSOR   This may have changed:  how much to take   Used for:  S/P TAVR (transcatheter aortic valve replacement)        Dose:  25 mg   Take 1 tablet (25 mg) by mouth 2 times daily   Quantity:  60 tablet   Refills:  11            Where to get your medicines      These medications were sent to [facility] [place] [address] [facility]   [address] , [facility] [address]     Phone:  [phone]     lisinopril 5 MG tablet                Primary Care Provider Office Phone # Fax #    Camron Aragon [phone]       [address] [address]        Equal Access to " Services     [facility]: Hadii aad ku hadngoctripp Bisigetachew, waaxda luqadaha, qaybta kaalmada bhavya, ronel real . So [facility] [phone].    ATENCIÓN: Si sary campuzano, tiene a mon disposición servicios gratuitos de asistencia lingüística. Llame al [phone].    We comply with applicable federal civil rights laws and [place] laws. We do not discriminate on the basis of race, color, national origin, age, disability, sex, sexual orientation, or gender identity.            Thank you!     Thank you for choosing [facility]  for your care. Our goal is always to provide you with excellent care. Hearing back from our patients is one way we can continue to improve our services. Please take a few minutes to complete the written survey that you may receive in the mail after your visit with us. Thank you!             Your Updated Medication List - Protect others around you: Learn how to safely use, store and throw away your medicines at www.disposemymeds.org.          This list is accurate as of 3/29/18  1:23 PM.  Always use your most recent med list.                   Brand Name Dispense Instructions for use Diagnosis    acetaminophen 325 MG tablet    TYLENOL    100 tablet    Take 2 tablets (650 mg) by mouth every 4 hours as needed for mild pain, fever or headaches        alendronate 70 MG tablet    FOSAMAX    12 tablet    Take 1 tablet (70 mg) by mouth every 7 days Take with over 8 ounces water and stay upright for at least 30 minutes after dose.  Take at least 60 minutes before breakfast    Hyperlipidemia LDL goal <130       apixaban ANTICOAGULANT 2.5 MG tablet    ELIQUIS    180 tablet    Take 1 tablet (2.5 mg) by mouth 2 times daily    Paroxysmal atrial fibrillation (H)       ascorbic acid 500 MG Cpcr CR capsule    vitamin C     Take 500 mg by mouth daily        calcium carbonate 1250 MG tablet    OS-SHELIA 500 mg [place]. [place]     Take 1,250 mg by mouth daily        dofetilide 125  [facility] capsule    TIKOSYN    60 capsule    Take 1 capsule (125 mcg) by mouth 2 times daily    Paroxysmal atrial fibrillation (H)       HYDROcodone-acetaminophen 5-325 MG per tablet    NORCO    18 tablet    Take 1 tablet by mouth every 6 hours as needed for pain For moderate to severe pain    Rheumatoid arthritis involving multiple sites, unspecified rheumatoid factor presence (H)       lisinopril 5 MG tablet    PRINIVIL/ZESTRIL    90 tablet    Take 1 tablet (5 mg) by mouth daily    Acute on chronic systolic congestive heart failure (H)       LUPRON DEPOT IM      Inject into the muscle every 6 months Reported on 5/3/2017        metoprolol tartrate 25 MG tablet    LOPRESSOR    60 tablet    Take 1 tablet (25 mg) by mouth 2 times daily    S/P TAVR (transcatheter aortic valve replacement)       ondansetron 4 MG ODT tab    ZOFRAN-ODT    10 tablet    Take 1 tablet (4 mg) by mouth every 6 hours as needed for nausea or vomiting    Nausea       polyethylene glycol Packet    MIRALAX/GLYCOLAX    30 packet    Take 17 g by mouth daily    Constipation, unspecified constipation type       predniSONE 5 MG tablet    DELTASONE    100 tablet    Take 1 tablet (5 mg) by mouth daily    Rheumatoid arthritis involving multiple sites with positive rheumatoid factor (H)       SPIRONOLACTONE PO      Take 25 mg by mouth daily        tamsulosin 0.4 MG capsule    FLOMAX    60 capsule    Take 1 capsule (0.4 mg) by mouth daily    S/P TAVR (transcatheter aortic valve replacement)       traMADol 50 MG tablet    ULTRAM    90 tablet    TAKE 1 TABLET 3 TIMES DAILY AS NEEDED FOR MODERATE PAIN    Rheumatoid arthritis involving multiple sites, unspecified rheumatoid factor presence (H)       VITAMIN D (CHOLECALCIFEROL) PO      Take 2,000 Units by mouth daily Reported on 5/3/2017

## 2018-03-29 NOTE — NURSING NOTE
"Chief Complaint   Patient presents with     RECHECK     headaches        Initial /70 (BP Location: Right arm, Patient Position: Chair, Cuff Size: Adult Regular)  Pulse 84  Temp 97.1  F (36.2  C) (Temporal)  Ht 5' 7\" (1.702 m)  Wt 149 lb 3.2 oz (67.7 kg)  SpO2 96%  BMI 23.37 kg/m2 Estimated body mass index is 23.37 kg/(m^2) as calculated from the following:    Height as of this encounter: 5' 7\" (1.702 m).    Weight as of this encounter: 149 lb 3.2 oz (67.7 kg).  Medication Reconciliation: complete     "

## 2018-03-29 NOTE — PROGRESS NOTES
SUBJECTIVE:   Bud Merritt is a 89 year old male who presents to clinic today for the following health issues:    Patient was seen by Dr. Campbell 03/20 and it was recommended at that time that he have and eye exam as he hadn't done so in several years. An MRI was also offered but patient declined. He wanted to Dr. Aragon's opinion on the matter.     Headache follow up   Onset: 3 months     Description:   Location: Up his neck and into the back of his head.    Character: throbbing pain, shooting   Frequency: every other day   Duration:  1 hour to 30 min     Intensity: severe    Progression of Symptoms:  same    Accompanying Signs & Symptoms:  Stiff neck: YES  Neck or upper back pain: YES- bilateral shoulders and sides of neck   Fever: no  Sinus pressure: no  Nausea or vomiting: YES- nausea  Dizziness: YES  Numbness: no   Weakness: YES  Visual changes: YES- occasional     History:   Head trauma: no  Family history of migraines: no  Previous tests for headaches: no  Neurologist evaluations: no  Able to do daily activities: no  Wake with a headaches: YES- occasional   Do headaches wake you up: no  Daily pain medication use: YES- Tramadol and norco    Work/school stressors/changes: no    Precipitating factors:   Does light make it worse: no  Does sound make it worse: no    Alleviating factors:  Does sleep help: YES    Therapies Tried and outcome: tramadol and norco help         Problem list and histories reviewed & adjusted, as indicated.  Additional history: as documented        Reviewed and updated as needed this visit by clinical staff       Reviewed and updated as needed this visit by Provider        SUBJECTIVE:  Bud  is a 89 year old male who presents for: Follow-up of multiple medical issues.  Main situation now seems to be his posterior occipital and base of neck headaches.  Seem to be persistent.  Radiates to shoulder blades.  Complains of continued overall weakness.  Recently saw MTM.  Straighten out  some medications with them.  He has been on a slow decline.  He is frustrated that he cannot do things he wants to.  Activity is limited by his cardiac disease and rheumatoid arthritis.  Now he has got a growing lesion on his scalp just superior to his glasses frame on his left side above the ear.  It bothers him gets caught on the glasses at times and he is concerned about going to the [facility].  They are concerned about it getting caught in bleeding as he is on an anticoagulant.  For atrial fibrillation    Past Medical History:   Diagnosis Date     Anemia      Atrial fibrillation (H) 07/01/2016     Cardiac pacemaker 03/10/2017     Cardiomyopathy (H)      CHF (congestive heart failure) (H)      COPD exacerbation (H) 3/2/2017     Depressive disorder      History of prostate cancer      Paroxysmal atrial fibrillation (H) 7/6/2016     Pure hypercholesterolemia      Rheumatoid arthritis(714.0)      Unspecified essential hypertension      Weakness generalized 3/2/2017     Past Surgical History:   Procedure Laterality Date     ARTHROPLASTY KNEE Left 1/12/2016    Procedure: ARTHROPLASTY KNEE;  Surgeon: Cesar Walker DO;  Location: [facility] OR     COLONOSCOPY  08/24/09     HC COLONOSCOPY THRU STOMA W BIOPSY/CAUTERY TUMOR/POLYP/LESION  8/31/2004      REPAIR ING HERNIA,5+Y/O,REDUCIBL  1996    Marlex mesh repair of bilateral inguinal hernias and drainage of bilateral scrotal hydroceles.     HEART CATH FEMORAL CANNULIZATION WITH OPEN STANDBY REPAIR AORTIC VALVE N/A 9/27/2017    Procedure: HEART CATH FEMORAL CANNULIZATION WITH OPEN STANDBY REPAIR AORTIC VALVE;;  Surgeon: Jaron Alejandra MD;  Location: [facility] OR     IMPLANT PACEMAKER  03/10/2017     IRRIGATION AND DEBRIDEMENT SOFT TISSUE LOWER EXTREMITY, COMBINED Left 3/15/2016    Procedure: COMBINED IRRIGATION AND DEBRIDEMENT SOFT TISSUE LOWER EXTREMITY;  Surgeon: Cesar Walker DO;  Location: [facility] OR     TRANSCATHETER AORTIC VALVE IMPLANT ANESTHESIA N/A  9/27/2017    Procedure: TRANSCATHETER AORTIC VALVE IMPLANT ANESTHESIA;  Right Transfemoral Approach (Harman Ramsey 3 29mm) Aortic Valve Implant,  Cardiopulmonary Bypass Standby, Transthoracic Echocardiogram by Derian Queen(Echo Tech);  Surgeon: GENERIC ANESTHESIA PROVIDER;  Location:  OR     Social History   Substance Use Topics     Smoking status: Former Smoker     Packs/day: 0.50     Years: 15.00     Types: Cigarettes     Quit date: 11/12/1998     Smokeless tobacco: Never Used      Comment: quit 15 yrs ago     Alcohol use No     Current Outpatient Prescriptions   Medication Sig Dispense Refill     HYDROcodone-acetaminophen (NORCO) 5-325 MG per tablet Take 1 tablet by mouth every 6 hours as needed for pain For moderate to severe pain 18 tablet 0     traMADol (ULTRAM) 50 MG tablet TAKE 1 TABLET 3 TIMES DAILY AS NEEDED FOR MODERATE PAIN 90 tablet 0     alendronate (FOSAMAX) 70 MG tablet Take 1 tablet (70 mg) by mouth every 7 days Take with over 8 ounces water and stay upright for at least 30 minutes after dose.  Take at least 60 minutes before breakfast 12 tablet 3     lisinopril (PRINIVIL/ZESTRIL) 5 MG tablet Take 1 tablet (5 mg) by mouth daily 90 tablet 3     acetaminophen (TYLENOL) 325 MG tablet Take 2 tablets (650 mg) by mouth every 4 hours as needed for mild pain, fever or headaches 100 tablet      polyethylene glycol (MIRALAX/GLYCOLAX) Packet Take 17 g by mouth daily 30 packet 0     ondansetron (ZOFRAN-ODT) 4 MG ODT tab Take 1 tablet (4 mg) by mouth every 6 hours as needed for nausea or vomiting 10 tablet 0     apixaban ANTICOAGULANT (ELIQUIS) 2.5 MG tablet Take 1 tablet (2.5 mg) by mouth 2 times daily 180 tablet 3     predniSONE (DELTASONE) 5 MG tablet Take 1 tablet (5 mg) by mouth daily 100 tablet 4     SPIRONOLACTONE PO Take 25 mg by mouth daily       metoprolol (LOPRESSOR) 25 MG tablet Take 1 tablet (25 mg) by mouth 2 times daily (Patient taking differently: Take 12.5 mg by mouth 2 times daily ) 60 tablet  "11     tamsulosin (FLOMAX) 0.4 MG capsule Take 1 capsule (0.4 mg) by mouth daily 60 capsule 0     dofetilide (TIKOSYN) 125 MCG capsule Take 1 capsule (125 mcg) by mouth 2 times daily 60 capsule 5     Leuprolide Acetate (LUPRON DEPOT IM) Inject into the muscle every 6 months Reported on 5/3/2017       ascorbic acid (VITAMIN C) 500 MG CPCR Take 500 mg by mouth daily       VITAMIN D, CHOLECALCIFEROL, PO Take 2,000 Units by mouth daily Reported on 5/3/2017       calcium carbonate (OS-SHELIA 500 MG [place]. [place]) 500 MG tablet Take 1,250 mg by mouth daily          REVIEW OF SYSTEMS:   5 point ROS negative except as noted above in HPI, including Gen., Resp, CV, GI &  system review.     OBJECTIVE:  Vitals: /70 (BP Location: Right arm, Patient Position: Chair, Cuff Size: Adult Regular)  Pulse 84  Temp 97.1  F (36.2  C) (Temporal)  Ht 5' 7\" (1.702 m)  Wt 149 lb 3.2 oz (67.7 kg)  SpO2 96%  BMI 23.37 kg/m2  BMI= Body mass index is 23.37 kg/(m^2).  He is alert oriented.  Appears in no distress.  Head is normocephalic.  Eyes PERRLA.  Tender only upper trapezius muscles and base of neck and into the occipital area.  More so with movement of the head.  He has an irregular raised verrucous looking lesion on the left scalp just above the left ear.  Lungs with distant breath sounds but no wheezing.  Heart with a regular rhythm right now 2/6 murmur.  Extremities with no significant edema.  Very weak uses a cane and wheelchair to get around.    ASSESSMENT:  #1 acute systolic congestive heart failure #2 paroxysmal atrial fibrillation #3 headaches #4 scalp lesion    PLAN:  He has been taking Vicodin and tramadol for his headaches.  Do not want to add anything more.  Suggested perhaps massage therapy and they are going to pursue this and we have ordered this.  We will have general surgery take a look at this scalp lesion concern is that he is on anticoagulation to see what options there are for dealing with it.  It seems to be " getting larger.  Continue with his present medications lisinopril was renewed and he will continue to follow with MTM and cardiology.        Camron Aragon MD  [facility]

## 2018-03-30 ENCOUNTER — OFFICE VISIT (OUTPATIENT)
Dept: SURGERY | Facility: CLINIC | Age: 83
End: 2018-03-30
Payer: COMMERCIAL

## 2018-03-30 VITALS
BODY MASS INDEX: 23.39 KG/M2 | WEIGHT: 149 LBS | TEMPERATURE: 97.5 F | HEIGHT: 67 IN | SYSTOLIC BLOOD PRESSURE: 108 MMHG | DIASTOLIC BLOOD PRESSURE: 58 MMHG

## 2018-03-30 DIAGNOSIS — L98.9 LESION OF SKIN OF SCALP: Primary | ICD-10-CM

## 2018-03-30 PROCEDURE — 99203 OFFICE O/P NEW LOW 30 MIN: CPT | Performed by: SURGERY

## 2018-03-30 NOTE — LETTER
3/30/2018         RE: Bud Merritt  [id number] [address] [place] [id number]        Dear Colleague,    Thank you for referring your patient, Bud Merritt, to the [facility]. Please see a copy of my visit note below.    Patient seen in consultation for left scalp lesion by Camron Aragon    HPI:  Patient is a 89 year old male with enlarging and irritation left sided scalp lesion. He and his wife state that its been there for several months but it has been growing and is very irritating to his eyeglasses. Is has not been draining or bleeding. He is on Eliquis for afib. He is rate and rhythm controlled on Tikosyn and metoprolol. He also has a pacemaker. He has severe RA on prednisone chronically.    Review Of Systems    Skin: as above  Ears/Nose/Throat: negative  Respiratory: No shortness of breath, dyspnea on exertion, cough, or hemoptysis  Cardiovascular: CHF  Gastrointestinal: negative  Genitourinary: negative  Musculoskeletal: RA  Neurologic: dementia  Hematologic/Lymphatic/Immunologic: negative  Endocrine: negative      Past Medical History:   Diagnosis Date     Anemia      Atrial fibrillation (H) 07/01/2016     Cardiac pacemaker 03/10/2017     Cardiomyopathy (H)      CHF (congestive heart failure) (H)      COPD exacerbation (H) 3/2/2017     Depressive disorder      History of prostate cancer      Paroxysmal atrial fibrillation (H) 7/6/2016     Pure hypercholesterolemia      Rheumatoid arthritis(714.0)      Unspecified essential hypertension      Weakness generalized 3/2/2017       Past Surgical History:   Procedure Laterality Date     ARTHROPLASTY KNEE Left 1/12/2016    Procedure: ARTHROPLASTY KNEE;  Surgeon: Cesar Walker DO;  Location: [facility] OR     COLONOSCOPY  08/24/09     HC COLONOSCOPY THRU STOMA W BIOPSY/CAUTERY TUMOR/POLYP/LESION  8/31/2004     HC REPAIR ING HERNIA,5+Y/O,REDUCIBL  1996    Marlex mesh repair of bilateral inguinal hernias and drainage of bilateral  scrotal hydroceles.     HEART CATH FEMORAL CANNULIZATION WITH OPEN STANDBY REPAIR AORTIC VALVE N/A 9/27/2017    Procedure: HEART CATH FEMORAL CANNULIZATION WITH OPEN STANDBY REPAIR AORTIC VALVE;;  Surgeon: Jaron Alejandra MD;  Location: [facility] OR     IMPLANT PACEMAKER  03/10/2017     IRRIGATION AND DEBRIDEMENT SOFT TISSUE LOWER EXTREMITY, COMBINED Left 3/15/2016    Procedure: COMBINED IRRIGATION AND DEBRIDEMENT SOFT TISSUE LOWER EXTREMITY;  Surgeon: Cesar Walker DO;  Location: [facility]     TRANSCATHETER AORTIC VALVE IMPLANT ANESTHESIA N/A 9/27/2017    Procedure: TRANSCATHETER AORTIC VALVE IMPLANT ANESTHESIA;  Right Transfemoral Approach (Harman Ramsey 3 29mm) Aortic Valve Implant,  Cardiopulmonary Bypass Standby, Transthoracic Echocardiogram by Derian Queen(Echo Tech);  Surgeon: GENERIC ANESTHESIA PROVIDER;  Location: [facility] OR       Family History   Problem Relation Age of Onset     CANCER Mother      CANCER Son      Unknown/Adopted Father      Alcoholism Brother        Social History     Social History     Marital status:      Spouse name: N/A     Number of children: N/A     Years of education: N/A     Occupational History     Not on file.     Social History Main Topics     Smoking status: Former Smoker     Packs/day: 0.50     Years: 15.00     Types: Cigarettes     Quit date: 11/12/1998     Smokeless tobacco: Never Used      Comment: quit 15 yrs ago     Alcohol use No     Drug use: No     Sexual activity: Yes     Other Topics Concern     Caffeine Concern Yes     8 cups coffee day     Sleep Concern Yes     Weight Concern Yes     weight loss     Special Diet No     Exercise Yes     split firewood daily     Social History Narrative       Current Outpatient Prescriptions   Medication Sig Dispense Refill     lisinopril (PRINIVIL/ZESTRIL) 5 MG tablet Take 1 tablet (5 mg) by mouth daily 90 tablet 3     HYDROcodone-acetaminophen (NORCO) 5-325 MG per tablet Take 1 tablet by mouth every 6 hours as needed  "for pain For moderate to severe pain 18 tablet 0     traMADol (ULTRAM) 50 MG tablet TAKE 1 TABLET 3 TIMES DAILY AS NEEDED FOR MODERATE PAIN 90 tablet 0     alendronate (FOSAMAX) 70 MG tablet Take 1 tablet (70 mg) by mouth every 7 days Take with over 8 ounces water and stay upright for at least 30 minutes after dose.  Take at least 60 minutes before breakfast 12 tablet 3     acetaminophen (TYLENOL) 325 MG tablet Take 2 tablets (650 mg) by mouth every 4 hours as needed for mild pain, fever or headaches 100 tablet      polyethylene glycol (MIRALAX/GLYCOLAX) Packet Take 17 g by mouth daily 30 packet 0     ondansetron (ZOFRAN-ODT) 4 MG ODT tab Take 1 tablet (4 mg) by mouth every 6 hours as needed for nausea or vomiting 10 tablet 0     apixaban ANTICOAGULANT (ELIQUIS) 2.5 MG tablet Take 1 tablet (2.5 mg) by mouth 2 times daily 180 tablet 3     predniSONE (DELTASONE) 5 MG tablet Take 1 tablet (5 mg) by mouth daily 100 tablet 4     SPIRONOLACTONE PO Take 25 mg by mouth daily       metoprolol (LOPRESSOR) 25 MG tablet Take 1 tablet (25 mg) by mouth 2 times daily (Patient taking differently: Take 12.5 mg by mouth 2 times daily ) 60 tablet 11     tamsulosin (FLOMAX) 0.4 MG capsule Take 1 capsule (0.4 mg) by mouth daily 60 capsule 0     dofetilide (TIKOSYN) 125 MCG capsule Take 1 capsule (125 mcg) by mouth 2 times daily 60 capsule 5     Leuprolide Acetate (LUPRON DEPOT IM) Inject into the muscle every 6 months Reported on 5/3/2017       ascorbic acid (VITAMIN C) 500 MG CPCR Take 500 mg by mouth daily       VITAMIN D, CHOLECALCIFEROL, PO Take 2,000 Units by mouth daily Reported on 5/3/2017       calcium carbonate (OS-SHELIA 500 MG [place]. [place]) 500 MG tablet Take 1,250 mg by mouth daily          Medications and history reviewed    Physical exam:  Vitals: /58  Temp 97.5  F (36.4  C) (Temporal)  Ht 1.702 m (5' 7\")  Wt 67.6 kg (149 lb)  BMI 23.34 kg/m2  BMI= Body mass index is 23.34 kg/(m^2).    Constitutional: alert, " non-distressed  Head: Normo-cephalic, atraumatic, no tenderness   Cardiovascular: RRR, no new murmurs  Respiratory: CTAB, no rales, rhonchi or wheezing, equal chest rise, good respiratory effort  Gastrointestinal: Soft, non-tender, non distended, no rebound rigidity or guarding, no masses or hernias palpated  : Deferred  Musculoskeletal: arthritic deformities. Unsteady gait, slow movements  Skin: Left scalp above ear within the hairline there is a raised but flattened wide based lesion. There is an area adjacent to this that is textured as well but not raised  Psychiatric: Mentation appears normal, affect appropriate   Hematologic/Lymphatic/Immunologic: Normal cervical and supraclavicular lymph nodes  Patient able to get up on table with moderate difficulty.    Labs show:  No results found for this or any previous visit (from the past 24 hour(s)).      Assessment:     ICD-10-CM    1. Lesion of skin of scalp L98.9      Plan: His lesion is enlarging and symptomatic and the would like it removed. I would preferably do this while his Eliquis was held. I will discuss with his PCP if this is ok to hold for 3 days prior to minor procedure. I would do the procedure in the OR to have more assistance and instruments but we could still do it with local anesthesia only. We will call him early next week to set up a time to have the procedure.    Connor Nichole, DO      Again, thank you for allowing me to participate in the care of your patient.        Sincerely,        Connor Nichole, DO

## 2018-03-30 NOTE — MR AVS SNAPSHOT
"              After Visit Summary   3/30/2018    Bud Merritt    MRN: [id number]           Patient Information     Date Of Birth          5/9/1927        Visit Information        Provider Department      3/30/2018 9:45 AM Connor Nichole, DO [facility]        Today's Diagnoses     Lesion of skin of scalp    -  1       Follow-ups after your visit        Your next 10 appointments already scheduled     Apr 13, 2018  9:00 AM CDT   Evaluation with Sheila Petersen PT   [facility] ([facility])    [address] [place] [id number] [phone]              Who to contact     If you have questions or need follow up information about today's clinic visit or your schedule please contact [facility] directly at [phone].  Normal or non-critical lab and imaging results will be communicated to you by MyChart, letter or phone within 4 business days after the clinic has received the results. If you do not hear from us within 7 days, please contact the clinic through MyChart or phone. If you have a critical or abnormal lab result, we will notify you by phone as soon as possible.  Submit refill requests through [employer] or call your pharmacy and they will forward the refill request to us. Please allow 3 business days for your refill to be completed.          Additional Information About Your Visit        MyChart Information     [employer] lets you send messages to your doctor, view your test results, renew your prescriptions, schedule appointments and more. To sign up, go to [email or web address] . Click on \"Log in\" on the left side of the screen, which will take you to the Welcome page. Then click on \"Sign up Now\" on the right side of the page.     You will be asked to enter the access code listed below, as well as some personal information. Please follow the directions to create your username and password.     Your access code is: " [email or web address]  Expires: 2018  1:23 PM     Your access code will  in 90 days. If you need help or a new code, please call your [facility] clinic or [phone].        Care EveryWhere ID     This is your Care EveryWhere ID. This could be used by other organizations to access your [facility] medical records  [id number]        Your Vitals Were     Temperature Height BMI (Body Mass Index)             97.5  F (36.4  C) (Temporal) 1.702 m (5' 7\") 23.34 kg/m2          Blood Pressure from Last 3 Encounters:   18 108/58   18 104/70   18 124/72    Weight from Last 3 Encounters:   18 67.6 kg (149 lb)   18 67.7 kg (149 lb 3.2 oz)   18 68 kg (150 lb)              Today, you had the following     No orders found for display         Today's Medication Changes          These changes are accurate as of 3/30/18  9:53 AM.  If you have any questions, ask your nurse or doctor.               These medicines have changed or have updated prescriptions.        Dose/Directions    metoprolol tartrate 25 MG tablet   Commonly known as:  LOPRESSOR   This may have changed:  how much to take   Used for:  S/P TAVR (transcatheter aortic valve replacement)        Dose:  25 mg   Take 1 tablet (25 mg) by mouth 2 times daily   Quantity:  60 tablet   Refills:  11                Primary Care Provider Office Phone # Fax #    Camron Aragon [phone]       [address] [facility] [address]        Equal Access to Services     [facility] [place]: Hadii sammie padilla [place], waaxda luqadaha, qaybta kaalronel king . So [facility] [phone].    ATENCIÓN: Si habla español, tiene a mon disposición servicios gratuitos de asistencia lingüística. Llame al [phone].    We comply with applicable federal civil rights laws and [place] laws. We do not discriminate on the basis of race, color, national origin, age, disability, sex, sexual orientation, " or gender identity.            Thank you!     Thank you for choosing [facility]  for your care. Our goal is always to provide you with excellent care. Hearing back from our patients is one way we can continue to improve our services. Please take a few minutes to complete the written survey that you may receive in the mail after your visit with us. Thank you!             Your Updated Medication List - Protect others around you: Learn how to safely use, store and throw away your medicines at www.disposemymeds.org.          This list is accurate as of 3/30/18  9:53 AM.  Always use your most recent med list.                   Brand Name Dispense Instructions for use Diagnosis    acetaminophen 325 MG tablet    TYLENOL    100 tablet    Take 2 tablets (650 mg) by mouth every 4 hours as needed for mild pain, fever or headaches        alendronate 70 MG tablet    FOSAMAX    12 tablet    Take 1 tablet (70 mg) by mouth every 7 days Take with over 8 ounces water and stay upright for at least 30 minutes after dose.  Take at least 60 minutes before breakfast    Hyperlipidemia LDL goal <130       apixaban ANTICOAGULANT 2.5 MG tablet    ELIQUIS    180 tablet    Take 1 tablet (2.5 mg) by mouth 2 times daily    Paroxysmal atrial fibrillation (H)       ascorbic acid 500 MG Cpcr CR capsule    vitamin C     Take 500 mg by mouth daily        calcium carbonate 1250 MG tablet    OS-SHELIA 500 mg [place]. [place]     Take 1,250 mg by mouth daily        dofetilide 125 MCG capsule    TIKOSYN    60 capsule    Take 1 capsule (125 mcg) by mouth 2 times daily    Paroxysmal atrial fibrillation (H)       HYDROcodone-acetaminophen 5-325 MG per tablet    NORCO    18 tablet    Take 1 tablet by mouth every 6 hours as needed for pain For moderate to severe pain    Rheumatoid arthritis involving multiple sites, unspecified rheumatoid factor presence (H)       lisinopril 5 MG tablet    PRINIVIL/ZESTRIL    90 tablet    Take 1 tablet (5 mg) by mouth  daily    Acute on chronic systolic congestive heart failure (H)       LUPRON DEPOT IM      Inject into the muscle every 6 months Reported on 5/3/2017        metoprolol tartrate 25 MG tablet    LOPRESSOR    60 tablet    Take 1 tablet (25 mg) by mouth 2 times daily    S/P TAVR (transcatheter aortic valve replacement)       ondansetron 4 MG ODT tab    ZOFRAN-ODT    10 tablet    Take 1 tablet (4 mg) by mouth every 6 hours as needed for nausea or vomiting    Nausea       polyethylene glycol Packet    MIRALAX/GLYCOLAX    30 packet    Take 17 g by mouth daily    Constipation, unspecified constipation type       predniSONE 5 MG tablet    DELTASONE    100 tablet    Take 1 tablet (5 mg) by mouth daily    Rheumatoid arthritis involving multiple sites with positive rheumatoid factor (H)       SPIRONOLACTONE PO      Take 25 mg by mouth daily        tamsulosin 0.4 MG capsule    FLOMAX    60 capsule    Take 1 capsule (0.4 mg) by mouth daily    S/P TAVR (transcatheter aortic valve replacement)       traMADol 50 MG tablet    ULTRAM    90 tablet    TAKE 1 TABLET 3 TIMES DAILY AS NEEDED FOR MODERATE PAIN    Rheumatoid arthritis involving multiple sites, unspecified rheumatoid factor presence (H)       VITAMIN D (CHOLECALCIFEROL) PO      Take 2,000 Units by mouth daily Reported on 5/3/2017

## 2018-03-30 NOTE — NURSING NOTE
"Chief Complaint   Patient presents with     Consult     mass, left head above ear       Initial /58  Temp 97.5  F (36.4  C) (Temporal)  Ht 1.702 m (5' 7\")  Wt 67.6 kg (149 lb)  BMI 23.34 kg/m2 Estimated body mass index is 23.34 kg/(m^2) as calculated from the following:    Height as of this encounter: 1.702 m (5' 7\").    Weight as of this encounter: 67.6 kg (149 lb).  Medication Reconciliation: complete   Tomas DONOVAN CMA      "

## 2018-04-03 DIAGNOSIS — L98.9 SCALP LESION: Primary | ICD-10-CM

## 2018-04-04 ENCOUNTER — TELEPHONE (OUTPATIENT)
Dept: SURGERY | Facility: CLINIC | Age: 83
End: 2018-04-04

## 2018-04-04 NOTE — TELEPHONE ENCOUNTER
Type of surgery: Lesion excision of the scalp  Location of surgery: [facility]   Date of surgery: 4/9/18  Surgeon: Dr. Nichole  Pre-Op Appt Date: NA  Post-Op Appt Date: 4/18/18   Packet sent out: Surgery packet mailed to patient's home address.   Pre-cert/Authorization completed: NA  Date: 4/4/2018    Adeline Santos  Surgery Scheduler

## 2018-04-09 ENCOUNTER — HOSPITAL ENCOUNTER (OUTPATIENT)
Facility: CLINIC | Age: 83
Discharge: HOME | End: 2018-04-09
Attending: SURGERY | Admitting: SURGERY
Payer: MEDICARE

## 2018-04-09 ENCOUNTER — SURGERY (OUTPATIENT)
Age: 83
End: 2018-04-09

## 2018-04-09 VITALS
RESPIRATION RATE: 18 BRPM | OXYGEN SATURATION: 100 % | HEART RATE: 92 BPM | DIASTOLIC BLOOD PRESSURE: 90 MMHG | SYSTOLIC BLOOD PRESSURE: 166 MMHG | TEMPERATURE: 98.2 F

## 2018-04-09 DIAGNOSIS — L98.9 SCALP LESION: Primary | ICD-10-CM

## 2018-04-09 PROCEDURE — 12031 INTMD RPR S/A/T/EXT 2.5 CM/<: CPT | Performed by: SURGERY

## 2018-04-09 PROCEDURE — 27210794 ZZH OR GENERAL SUPPLY STERILE: Performed by: SURGERY

## 2018-04-09 PROCEDURE — 40000306 ZZH STATISTIC PRE PROC ASSESS II: Performed by: SURGERY

## 2018-04-09 PROCEDURE — 36000050 ZZH SURGERY LEVEL 2 1ST 30 MIN: Performed by: SURGERY

## 2018-04-09 PROCEDURE — 25000125 ZZHC RX 250: Performed by: SURGERY

## 2018-04-09 PROCEDURE — 88305 TISSUE EXAM BY PATHOLOGIST: CPT | Performed by: SURGERY

## 2018-04-09 PROCEDURE — 88305 TISSUE EXAM BY PATHOLOGIST: CPT | Mod: 26 | Performed by: SURGERY

## 2018-04-09 PROCEDURE — 36000052 ZZH SURGERY LEVEL 2 EA 15 ADDTL MIN: Performed by: SURGERY

## 2018-04-09 PROCEDURE — 11422 EXC H-F-NK-SP B9+MARG 1.1-2: CPT | Mod: 51 | Performed by: SURGERY

## 2018-04-09 PROCEDURE — 71000027 ZZH RECOVERY PHASE 2 EACH 15 MINS: Performed by: SURGERY

## 2018-04-09 RX ORDER — HYDROCODONE BITARTRATE AND ACETAMINOPHEN 5; 325 MG/1; MG/1
1 TABLET ORAL EVERY 6 HOURS PRN
Qty: 10 TABLET | Refills: 0 | Status: SHIPPED | OUTPATIENT
Start: 2018-04-09 | End: 2018-08-30

## 2018-04-09 RX ORDER — LIDOCAINE HYDROCHLORIDE AND EPINEPHRINE 10; 10 MG/ML; UG/ML
INJECTION, SOLUTION INFILTRATION; PERINEURAL PRN
Status: DISCONTINUED | OUTPATIENT
Start: 2018-04-09 | End: 2018-04-09 | Stop reason: HOSPADM

## 2018-04-09 RX ADMIN — LIDOCAINE HYDROCHLORIDE AND EPINEPHRINE 6 ML: 10; 10 INJECTION, SOLUTION INFILTRATION; PERINEURAL at 13:18

## 2018-04-09 NOTE — IP AVS SNAPSHOT
MRN:[id number]                      After Visit Summary   4/9/2018    Bud Merritt    MRN: [id number]           Thank you!     Thank you for choosing [facility] for your care. Our goal is always to provide you with excellent care. Hearing back from our patients is one way we can continue to improve our services. Please take a few minutes to complete the written survey that you may receive in the mail after you visit with us. Thank you!        Patient Information     Date Of Birth          5/9/1927        About your hospital stay     You were admitted on:  April 9, 2018 You last received care in the:  [facility]    You were discharged on:  April 9, 2018       Who to Call     For medical emergencies, please call 911.  For non-urgent questions about your medical care, please call your primary care provider or clinic, [phone]  For questions related to your surgery, please call your surgery clinic        Attending Provider     Provider Specialty    Connor Nichole, DO Surgery       Primary Care Provider Office Phone # Fax #    Camron Aragon [phone]      After Care Instructions     Diet Instructions       Resume pre-procedure diet            Do not - immerse incision in water until sutures removed       Do not immerse incision in water until sutures removed            Dressing       Keep dressing clean and dry.  Dressing / incisional care as instructed by RN and or Surgeon. Non-adherent dressing as needed. Apply antibiotic ointment sparingly to prevent gauze from sticking to wound.            Ice to affected area       Ice to operative site PRN            Shower       No shower for 24 hours post procedure. May shower Postoperative Day (POD)  1. No scrubbing the incision but ok for soap and water to pass over.                  Your next 10 appointments already scheduled     Apr 13, 2018  9:00 AM CDT   Evaluation with Sheila Petersen PT   [facility]  [facility] ([facility])    [address] [place] [id number] [phone]            2018 10:30 AM CDT   [facility]    [address] [place] [id number] [phone]              Pending Results     No orders found from 2018 to 4/10/2018.            Admission Information     Date & Time Provider Department Dept. Phone    2018 Connor Nichole,  [facility] Phase [phone]      Your Vitals Were     Blood Pressure Temperature Respirations Pulse Oximetry          159/92 98.2  F (36.8  C) (Oral) 18 98%        MyChart Information     [employer] lets you send messages to your doctor, view your test results, renew your prescriptions, schedule appointments and more. To sign up, go to [email or web address] . Click on \"Log in\" on the left side of the screen, which will take you to the Welcome page. Then click on \"Sign up Now\" on the right side of the page.     You will be asked to enter the access code listed below, as well as some personal information. Please follow the directions to create your username and password.     Your access code is: [email or web address]  Expires: 2018  1:23 PM     Your access code will  in 90 days. If you need help or a new code, please call your [facility] clinic or [phone].        Care EveryWhere ID     This is your Care EveryWhere ID. This could be used by other organizations to access your [facility] medical records  [id number]        Equal Access to Services     FATOUMATA CLINE : Hadii sammie Burgess, hugo villanueva, qaronel moseley . So [facility] [phone].    ATENCIÓN: Si habla español, tiene a mon disposición servicios gratuitos de asistencia lingüística. Llame al [phone].    We comply with applicable federal civil rights laws and [place] " laws. We do not discriminate on the basis of race, color, national origin, age, disability, sex, sexual orientation, or gender identity.               Review of your medicines      CONTINUE these medicines which may have CHANGED, or have new prescriptions. If we are uncertain of the size of tablets/capsules you have at home, strength may be listed as something that might have changed.        Dose / Directions    * HYDROcodone-acetaminophen 5-325 MG per tablet   Commonly known as:  NORCO   This may have changed:  Another medication with the same name was added. Make sure you understand how and when to take each.   Used for:  Rheumatoid arthritis involving multiple sites, unspecified rheumatoid factor presence (H)        Dose:  1 tablet   Take 1 tablet by mouth every 6 hours as needed for pain For moderate to severe pain   Quantity:  18 tablet   Refills:  0       * HYDROcodone-acetaminophen 5-325 MG per tablet   Commonly known as:  NORCO   This may have changed:  You were already taking a medication with the same name, and this prescription was added. Make sure you understand how and when to take each.   Used for:  Scalp lesion        Dose:  1 tablet   Take 1 tablet by mouth every 6 hours as needed for other (Moderate to Severe Pain)   Quantity:  10 tablet   Refills:  0       metoprolol tartrate 25 MG tablet   Commonly known as:  LOPRESSOR   This may have changed:  how much to take   Used for:  S/P TAVR (transcatheter aortic valve replacement)        Dose:  25 mg   Take 1 tablet (25 mg) by mouth 2 times daily   Quantity:  60 tablet   Refills:  11       * Notice:  This list has 2 medication(s) that are the same as other medications prescribed for you. Read the directions carefully, and ask your doctor or other care provider to review them with you.      CONTINUE these medicines which have NOT CHANGED        Dose / Directions    acetaminophen 325 MG tablet   Commonly known as:  TYLENOL        Dose:  650 mg   Take 2  tablets (650 mg) by mouth every 4 hours as needed for mild pain, fever or headaches   Quantity:  100 tablet   Refills:  0       alendronate 70 MG tablet   Commonly known as:  FOSAMAX   Used for:  Hyperlipidemia LDL goal <130        Dose:  70 mg   Take 1 tablet (70 mg) by mouth every 7 days Take with over 8 ounces water and stay upright for at least 30 minutes after dose.  Take at least 60 minutes before breakfast   Quantity:  12 tablet   Refills:  3       apixaban ANTICOAGULANT 2.5 MG tablet   Commonly known as:  ELIQUIS   Used for:  Paroxysmal atrial fibrillation (H)        Dose:  2.5 mg   Take 1 tablet (2.5 mg) by mouth 2 times daily   Quantity:  180 tablet   Refills:  3       ascorbic acid 500 MG Cpcr CR capsule   Commonly known as:  vitamin C        Dose:  500 mg   Take 500 mg by mouth daily   Refills:  0       calcium carbonate 1250 MG tablet   Commonly known as:  OS-SHELIA 500 mg [place]. Ca        Dose:  1250 mg   Take 1,250 mg by mouth daily   Refills:  0       dofetilide 125 MCG capsule   Commonly known as:  TIKOSYN   Used for:  Paroxysmal atrial fibrillation (H)        Dose:  125 mcg   Take 1 capsule (125 mcg) by mouth 2 times daily   Quantity:  60 capsule   Refills:  5       lisinopril 5 MG tablet   Commonly known as:  PRINIVIL/ZESTRIL   Used for:  Acute on chronic systolic congestive heart failure (H)        Dose:  5 mg   Take 1 tablet (5 mg) by mouth daily   Quantity:  90 tablet   Refills:  3       LUPRON DEPOT IM        Inject into the muscle every 6 months Reported on 5/3/2017   Refills:  0       ondansetron 4 MG ODT tab   Commonly known as:  ZOFRAN-ODT   Used for:  Nausea        Dose:  4 mg   Take 1 tablet (4 mg) by mouth every 6 hours as needed for nausea or vomiting   Quantity:  10 tablet   Refills:  0       polyethylene glycol Packet   Commonly known as:  MIRALAX/GLYCOLAX   Used for:  Constipation, unspecified constipation type        Dose:  17 g   Take 17 g by mouth daily   Quantity:  30 packet    Refills:  0       predniSONE 5 MG tablet   Commonly known as:  DELTASONE   Used for:  Rheumatoid arthritis involving multiple sites with positive rheumatoid factor (H)        Dose:  5 mg   Take 1 tablet (5 mg) by mouth daily   Quantity:  100 tablet   Refills:  4       SPIRONOLACTONE PO        Dose:  25 mg   Take 25 mg by mouth daily   Refills:  0       tamsulosin 0.4 MG capsule   Commonly known as:  FLOMAX   Used for:  S/P TAVR (transcatheter aortic valve replacement)        Dose:  0.4 mg   Take 1 capsule (0.4 mg) by mouth daily   Quantity:  60 capsule   Refills:  0       traMADol 50 MG tablet   Commonly known as:  ULTRAM   Used for:  Rheumatoid arthritis involving multiple sites, unspecified rheumatoid factor presence (H)        TAKE 1 TABLET 3 TIMES DAILY AS NEEDED FOR MODERATE PAIN   Quantity:  90 tablet   Refills:  0       VITAMIN D (CHOLECALCIFEROL) PO        Dose:  2000 Units   Take 2,000 Units by mouth daily Reported on 5/3/2017   Refills:  0            Where to get your medicines      Some of these will need a paper prescription and others can be bought over the counter. Ask your nurse if you have questions.     Bring a paper prescription for each of these medications     HYDROcodone-acetaminophen 5-325 MG per tablet                Protect others around you: Learn how to safely use, store and throw away your medicines at www.disposemymeds.org.        Information about OPIOIDS     PRESCRIPTION OPIOIDS: WHAT YOU NEED TO KNOW    Prescription opioids can be used to help relieve moderate to severe pain and are often prescribed following a surgery or injury, or for certain health conditions. These medications can be an important part of treatment but also come with serious risks. It is important to work with your health care provider to make sure you are getting the safest, most effective care.    WHAT ARE THE RISKS AND SIDE EFFECTS OF OPIOID USE?  Prescription opioids carry serious risks of addiction and  overdose, especially with prolonged use. An opioid overdose, often marked by slowed breathing can cause sudden death. The use of prescription opioids can have a number of side effects as well, even when taken as directed:      Tolerance - meaning you might need to take more of a medication for the same pain relief    Physical dependence - meaning you have symptoms of withdrawal when a medication is stopped    Increased sensitivity to pain    Constipation    Nausea, vomiting, and dry mouth    Sleepiness and dizziness    Confusion    Depression    Low levels of testosterone that can result in lower sex drive, energy, and strength    Itching and sweating    RISKS ARE GREATER WITH:    History of drug misuse, substance use disorder, or overdose    Mental health conditions (such as depression or anxiety)    Sleep apnea    Older age (65 years or older)    Pregnancy    Avoid alcohol while taking prescription opioids.   Also, unless specifically advised by your health care provider, medications to avoid include:    Benzodiazepines (such as Xanax or Valium)    Muscle relaxants (such as Soma or Flexeril)    Hypnotics (such as Ambien or Lunesta)    Other prescription opioids    KNOW YOUR OPTIONS:  Talk to your health care provider about ways to manage your pain that do not involve prescription opioids. Some of these options may actually work better and have fewer risks and side effects:    Pain relievers such as acetaminophen, ibuprofen, and naproxen    Some medications that are also used for depression or seizures    Physical therapy and exercise    Cognitive behavioral therapy, a psychological, goal-directed approach, in which patients learn how to modify physical, behavioral, and emotional triggers of pain and stress    IF YOU ARE PRESCRIBED OPIOIDS FOR PAIN:    Never take opioids in greater amounts or more often than prescribed    Follow up with your primary health care provider and work together to create a plan on how to  manage your pain.    Talk about ways to help manage your pain that do not involve prescription opioids    Talk about all concerns and side effects    Help prevent misuse and abuse    Never sell or share prescription opioids    Never use another person's prescription opioids    Store prescription opioids in a secure place and out of reach of others (this may include visitors, children, friends, and family)    Visit www.cdc.gov/drugoverdose to learn about risks of opioid abuse and overdose    If you believe you may be struggling with addiction, tell your health care provider and ask for guidance or call [facility]'s National Helpline at 1-826-762-HELP    LEARN MORE / www.cdc.gov/drugoverdose/prescribing/guideline.html    Safely dispose of unused prescription opioids: Find your local drug take-back programs and more information about the importance of safe disposal at www.doseofreality.[place].gov             Medication List: This is a list of all your medications and when to take them. Check marks below indicate your daily home schedule. Keep this list as a reference.      Medications           Morning Afternoon Evening Bedtime As Needed    acetaminophen 325 MG tablet   Commonly known as:  TYLENOL   Take 2 tablets (650 mg) by mouth every 4 hours as needed for mild pain, fever or headaches                                alendronate 70 MG tablet   Commonly known as:  FOSAMAX   Take 1 tablet (70 mg) by mouth every 7 days Take with over 8 ounces water and stay upright for at least 30 minutes after dose.  Take at least 60 minutes before breakfast                                apixaban ANTICOAGULANT 2.5 MG tablet   Commonly known as:  ELIQUIS   Take 1 tablet (2.5 mg) by mouth 2 times daily                                ascorbic acid 500 MG Cpcr CR capsule   Commonly known as:  vitamin C   Take 500 mg by mouth daily                                calcium carbonate 1250 MG tablet   Commonly known as:  OS-SHELIA 500 mg [place]. Ca   Take  1,250 mg by mouth daily                                dofetilide 125 MCG capsule   Commonly known as:  TIKOSYN   Take 1 capsule (125 mcg) by mouth 2 times daily                                * HYDROcodone-acetaminophen 5-325 MG per tablet   Commonly known as:  NORCO   Take 1 tablet by mouth every 6 hours as needed for pain For moderate to severe pain                                * HYDROcodone-acetaminophen 5-325 MG per tablet   Commonly known as:  NORCO   Take 1 tablet by mouth every 6 hours as needed for other (Moderate to Severe Pain)                                lisinopril 5 MG tablet   Commonly known as:  PRINIVIL/ZESTRIL   Take 1 tablet (5 mg) by mouth daily                                LUPRON DEPOT IM   Inject into the muscle every 6 months Reported on 5/3/2017                                metoprolol tartrate 25 MG tablet   Commonly known as:  LOPRESSOR   Take 1 tablet (25 mg) by mouth 2 times daily                                ondansetron 4 MG ODT tab   Commonly known as:  ZOFRAN-ODT   Take 1 tablet (4 mg) by mouth every 6 hours as needed for nausea or vomiting                                polyethylene glycol Packet   Commonly known as:  MIRALAX/GLYCOLAX   Take 17 g by mouth daily                                predniSONE 5 MG tablet   Commonly known as:  DELTASONE   Take 1 tablet (5 mg) by mouth daily                                SPIRONOLACTONE PO   Take 25 mg by mouth daily                                tamsulosin 0.4 MG capsule   Commonly known as:  FLOMAX   Take 1 capsule (0.4 mg) by mouth daily                                traMADol 50 MG tablet   Commonly known as:  ULTRAM   TAKE 1 TABLET 3 TIMES DAILY AS NEEDED FOR MODERATE PAIN                                VITAMIN D (CHOLECALCIFEROL) PO   Take 2,000 Units by mouth daily Reported on 5/3/2017                                * Notice:  This list has 2 medication(s) that are the same as other medications prescribed for you. Read the  directions carefully, and ask your doctor or other care provider to review them with you.

## 2018-04-09 NOTE — OP NOTE
Procedure Date: 04/09/2018      TIME:  1:55 p.m.      PROCEDURE PERFORMED:  Excision of left-sided scalp lesion.      PREOPERATIVE DIAGNOSIS:  Left scalp skin lesion.      POSTOPERATIVE DIAGNOSIS:  Left scalp skin lesion.      SURGEON:  Connor Nichole DO      ASSISTANT:  None.      ANESTHESIA:  Local anesthetic only.      SPECIMEN:  Left scalp lesion.      COMPLICATIONS:  None immediately apparent.      BLOOD LOSS:  10 mL.      INDICATION FOR PROCEDURE:  Bud is a 89-year-old male with complaints of a growing, irritating and bleeding left scalp lesion right near where he wears his glasses, rather large in size on the surgical consultation, over 2 cm in length and 1.5 cm width, and he was also on Eliquis.  With the size of the lesion and the fact that he takes anticoagulation, I elected to do this in the operating room as opposed to an in-office procedure.  He was instructed to hold his Eliquis 3 days prior to the procedure and was scheduled for lesion excision.      DESCRIPTION OF PROCEDURE:  After the informed consent was obtained, the patient was brought to the preoperative holding area into the operating room, placed in the supine position, head turned to the right.  He was prepped and draped in the normal sterile fashion.  Timeout was performed.  After the correct patient and the correct procedure were verified, we began by instilling 1% lidocaine with epinephrine into the skin and subcutaneous tissues underlying the left scalp lesion.  Once the area was appropriately anesthetized, I made a crescent-shaped excision of this scalp lesion because that was the shape of the lesion itself.  Once the lesion was removed, the subcutaneous tissue was cauterized slightly to achieve hemostasis.  I did undermine the superior and inferior flaps in order to accommodate closure by about 0.5 cm on each side.  Once hemostasis was verified, I did close the deep tissue with inverted interrupted 3-0 Vicryl suture.  I then  closed the dermal layer again with inverted interrupted 3-0 Vicryl suture.  There was some amount of tension to the sutures; therefore, I elected to use 4-0 nylon sutures for skin closure in a vertical mattress fashion.  Several sutures were used to approximate the wound.  The skin was then cleaned, irrigated, and bacitracin and a nonadherent dressing were applied.  This was held down with some tape and Kerlix wrapped around the head for the dressing.  At the completion of the case, all instruments, needles and sponges were accounted for with a correct count.  The patient did well and was brought to the recovery room in stable condition.         ELIJAH WAKEFIELD DO             D: 2018   T: 2018   MT: JAMILA      Name:     GALLO FLOYD   MRN:      [id number]        Account:        [id number]   :      1927           Procedure Date: 2018      Document: [id number]

## 2018-04-09 NOTE — IP AVS SNAPSHOT
[facility]    [address]     [place] [address]    Phone:  [phone]                                       After Visit Summary   4/9/2018    Bud Merritt    MRN: [id number]           After Visit Summary Signature Page     I have received my discharge instructions, and my questions have been answered. I have discussed any challenges I see with this plan with the nurse or doctor.    ..........................................................................................................................................  Patient/Patient Representative Signature      ..........................................................................................................................................  Patient Representative Print Name and Relationship to Patient    ..................................................               ................................................  Date                                            Time    ..........................................................................................................................................  Reviewed by Signature/Title    ...................................................              ..............................................  Date                                                            Time

## 2018-04-09 NOTE — BRIEF OP NOTE
[facility] Brief Operative Note    Pre-operative diagnosis: scalp lesion on left side   Post-operative diagnosis left scalp lesion   Procedure: Procedure(s):  Excision Left Scalp Lesion - Wound Class: I-Clean   Surgeon: Connor Nichole DO   Assistants(s): none   Estimated blood loss: Less than 10 ml    Specimens: Scalp lesion   Findings: See dictation

## 2018-04-11 LAB — COPATH REPORT: NORMAL

## 2018-04-13 ENCOUNTER — HOSPITAL ENCOUNTER (OUTPATIENT)
Dept: PHYSICAL THERAPY | Facility: CLINIC | Age: 83
Setting detail: THERAPIES SERIES
End: 2018-04-13
Attending: FAMILY MEDICINE
Payer: MEDICARE

## 2018-04-13 PROCEDURE — 97112 NEUROMUSCULAR REEDUCATION: CPT | Mod: GP | Performed by: PHYSICAL THERAPIST

## 2018-04-13 PROCEDURE — 97110 THERAPEUTIC EXERCISES: CPT | Mod: GP | Performed by: PHYSICAL THERAPIST

## 2018-04-13 PROCEDURE — 97162 PT EVAL MOD COMPLEX 30 MIN: CPT | Mod: GP | Performed by: PHYSICAL THERAPIST

## 2018-04-13 PROCEDURE — G8982 BODY POS GOAL STATUS: HCPCS | Mod: GP,CI | Performed by: PHYSICAL THERAPIST

## 2018-04-13 PROCEDURE — 40000718 ZZHC STATISTIC PT DEPARTMENT ORTHO VISIT: Performed by: PHYSICAL THERAPIST

## 2018-04-13 PROCEDURE — G8981 BODY POS CURRENT STATUS: HCPCS | Mod: GP,CJ | Performed by: PHYSICAL THERAPIST

## 2018-04-13 NOTE — PROGRESS NOTES
[facility]          OUTPATIENT PHYSICAL THERAPY ORTHOPEDIC EVALUATION  PLAN OF TREATMENT FOR OUTPATIENT REHABILITATION  (COMPLETE FOR INITIAL CLAIMS ONLY)  Patient's Last Name, First Name, M.I.  YOB: 1927  Bud Merritt    Provider s Name:  [facility]   Medical Record No.  [id number]   Start of Care Date:  04/13/18   Onset Date:  09/04/17 (approximately)   Type:     _X__PT   ___OT   ___SLP Medical Diagnosis:  Intractible tension - type Headaches unspecified chronicity pattern     PT Diagnosis:  cervical tightness and shoulder tightness, deconditioned   Visits from SOC:  1      _________________________________________________________________________________  Plan of Treatment/Functional Goals:     manual therapy and ROM exercises, strengthening (gentle)     as needed  Goals  Goal Identifier: HEadaches  Goal Description: Bud will be able to eliminate his headaches with positioning and ROM exercises  Target Date: 05/04/18      Therapy Frequency:     Predicted Duration of Therapy Intervention:  1- 2 times per week x 6 weeks    Sheila Petersen, PT                 I CERTIFY THE NEED FOR THESE SERVICES FURNISHED UNDER        THIS PLAN OF TREATMENT AND WHILE UNDER MY CARE     (Physician co-signature of this document indicates review and certification of the therapy plan).                       Certification Date From:  04/13/18   Certification Date To:  05/25/18    Referring Provider:  Dr. Camron Aragon    Initial Assessment        See Epic Evaluation Start of Care Date: 04/13/18

## 2018-04-18 ENCOUNTER — OFFICE VISIT (OUTPATIENT)
Dept: SURGERY | Facility: CLINIC | Age: 83
End: 2018-04-18
Payer: COMMERCIAL

## 2018-04-18 VITALS — BODY MASS INDEX: 23.39 KG/M2 | WEIGHT: 149 LBS | TEMPERATURE: 97 F | HEIGHT: 67 IN

## 2018-04-18 DIAGNOSIS — Z09 S/P EXCISION OF SKIN LESION, FOLLOW-UP EXAM: Primary | ICD-10-CM

## 2018-04-18 PROCEDURE — 99024 POSTOP FOLLOW-UP VISIT: CPT | Performed by: SURGERY

## 2018-04-18 NOTE — LETTER
4/18/2018         RE: Bud Merritt  [address] [facility] [address]        Dear Colleague,    Thank you for referring your patient, Bud Merritt, to the [facility]. Please see a copy of my visit note below.    General Surgery Follow Up    Pt returns for follow up visit s/p skin lesion excision of scalp    HPI:  Bud has been healing well. No significant bleeding or draining. Some headaches. No signs of infection.      Past Medical History:   Diagnosis Date     Anemia      Atrial fibrillation (H) 07/01/2016     Cardiac pacemaker 03/10/2017     Cardiomyopathy (H)      CHF (congestive heart failure) (H)      COPD exacerbation (H) 3/2/2017     Depressive disorder      History of prostate cancer      Paroxysmal atrial fibrillation (H) 7/6/2016     Pure hypercholesterolemia      Rheumatoid arthritis(714.0)      Unspecified essential hypertension      Weakness generalized 3/2/2017       Past Surgical History:   Procedure Laterality Date     ARTHROPLASTY KNEE Left 1/12/2016    Procedure: ARTHROPLASTY KNEE;  Surgeon: Cesar Walker DO;  [facility]     COLONOSCOPY  08/24/09     EXCISE LESION HEAD N/A 4/9/2018    Procedure: EXCISE LESION HEAD;  Excision Left Scalp Lesion;  Surgeon: Connor Nichole DO;  Location: [facility] OR     HC COLONOSCOPY THRU STOMA W BIOPSY/CAUTERY TUMOR/POLYP/LESION  8/31/2004     HC REPAIR ING HERNIA,5+Y/O,REDUCIBL  1996    Marlex mesh repair of bilateral inguinal hernias and drainage of bilateral scrotal hydroceles.     HEART CATH FEMORAL CANNULIZATION WITH OPEN STANDBY REPAIR AORTIC VALVE N/A 9/27/2017    Procedure: HEART CATH FEMORAL CANNULIZATION WITH OPEN STANDBY REPAIR AORTIC VALVE;;  Surgeon: Jaron Alejandra MD;  [facility]     IMPLANT PACEMAKER  03/10/2017     IRRIGATION AND DEBRIDEMENT SOFT TISSUE LOWER EXTREMITY, COMBINED Left 3/15/2016    Procedure: COMBINED IRRIGATION AND DEBRIDEMENT SOFT TISSUE LOWER EXTREMITY;   Surgeon: Cesar Walker DO;  Location:  OR     TRANSCATHETER AORTIC VALVE IMPLANT ANESTHESIA N/A 9/27/2017    Procedure: TRANSCATHETER AORTIC VALVE IMPLANT ANESTHESIA;  Right Transfemoral Approach (Harman Ramsey 3 29mm) Aortic Valve Implant,  Cardiopulmonary Bypass Standby, Transthoracic Echocardiogram by Derian Queen(Echo Tech);  Surgeon: GENERIC ANESTHESIA PROVIDER;  [facility]       Social History     Social History     Marital status:      Spouse name: N/A     Number of children: N/A     Years of education: N/A     Occupational History     Not on file.     Social History Main Topics     Smoking status: Former Smoker     Packs/day: 0.50     Years: 15.00     Types: Cigarettes     Quit date: 11/12/1998     Smokeless tobacco: Never Used      Comment: quit 15 yrs ago     Alcohol use No     Drug use: No     Sexual activity: Yes     Other Topics Concern     Caffeine Concern Yes     8 cups coffee day     Sleep Concern Yes     Weight Concern Yes     weight loss     Special Diet No     Exercise Yes     split firewood daily     Social History Narrative       Current Outpatient Prescriptions   Medication Sig Dispense Refill     acetaminophen (TYLENOL) 325 MG tablet Take 2 tablets (650 mg) by mouth every 4 hours as needed for mild pain, fever or headaches 100 tablet      alendronate (FOSAMAX) 70 MG tablet Take 1 tablet (70 mg) by mouth every 7 days Take with over 8 ounces water and stay upright for at least 30 minutes after dose.  Take at least 60 minutes before breakfast 12 tablet 3     apixaban ANTICOAGULANT (ELIQUIS) 2.5 MG tablet Take 1 tablet (2.5 mg) by mouth 2 times daily 180 tablet 3     ascorbic acid (VITAMIN C) 500 MG CPCR Take 500 mg by mouth daily       calcium carbonate (OS-SHELIA 500 MG [place]. [place]) 500 MG tablet Take 1,250 mg by mouth daily        dofetilide (TIKOSYN) 125 MCG capsule Take 1 capsule (125 mcg) by mouth 2 times daily 60 capsule 5     HYDROcodone-acetaminophen (NORCO) 5-325 MG  "per tablet Take 1 tablet by mouth every 6 hours as needed for pain For moderate to severe pain 18 tablet 0     HYDROcodone-acetaminophen (NORCO) 5-325 MG per tablet Take 1 tablet by mouth every 6 hours as needed for other (Moderate to Severe Pain) 10 tablet 0     Leuprolide Acetate (LUPRON DEPOT IM) Inject into the muscle every 6 months Reported on 5/3/2017       lisinopril (PRINIVIL/ZESTRIL) 5 MG tablet Take 1 tablet (5 mg) by mouth daily 90 tablet 3     metoprolol (LOPRESSOR) 25 MG tablet Take 1 tablet (25 mg) by mouth 2 times daily (Patient taking differently: Take 12.5 mg by mouth 2 times daily ) 60 tablet 11     ondansetron (ZOFRAN-ODT) 4 MG ODT tab Take 1 tablet (4 mg) by mouth every 6 hours as needed for nausea or vomiting 10 tablet 0     polyethylene glycol (MIRALAX/GLYCOLAX) Packet Take 17 g by mouth daily 30 packet 0     predniSONE (DELTASONE) 5 MG tablet Take 1 tablet (5 mg) by mouth daily 100 tablet 4     SPIRONOLACTONE PO Take 25 mg by mouth daily       tamsulosin (FLOMAX) 0.4 MG capsule Take 1 capsule (0.4 mg) by mouth daily 60 capsule 0     traMADol (ULTRAM) 50 MG tablet TAKE 1 TABLET 3 TIMES DAILY AS NEEDED FOR MODERATE PAIN 90 tablet 0     VITAMIN D, CHOLECALCIFEROL, PO Take 2,000 Units by mouth daily Reported on 5/3/2017         Medications and history reviewed    Physical exam:  Vitals: Temp 97  F (36.1  C) (Temporal)  Ht 1.702 m (5' 7\")  Wt 67.6 kg (149 lb)  BMI 23.34 kg/m2  BMI= Body mass index is 23.34 kg/(m^2).    HEART: RRR, no new murmurs  LUNGS: CTAB, equal chest rise, good effort  ABD: soft, non tender, non distended  INCISIONS: c/d/i, small amount crusted blood. Sutures intact, minimal tension  EXT: RACHEL, no deformities    PATHOLOGY:  Seborrheic keratosis    Assessment:     ICD-10-CM    1. S/P excision of skin lesion, follow-up exam Z09      Plan: We removed the sutures and when over pathology report. They are to follow up only prn if the is any concern with wound healing, otherwise no " other active issues.    Connor Nichole, DO      Again, thank you for allowing me to participate in the care of your patient.        Sincerely,        Connor Nichole, DO

## 2018-04-18 NOTE — NURSING NOTE
"Chief Complaint   Patient presents with     Surgical Followup     Excision Left Scalp Lesion DOS 04/09/18       Initial Temp 97  F (36.1  C) (Temporal)  Ht 1.702 m (5' 7\")  Wt 67.6 kg (149 lb)  BMI 23.34 kg/m2 Estimated body mass index is 23.34 kg/(m^2) as calculated from the following:    Height as of this encounter: 1.702 m (5' 7\").    Weight as of this encounter: 67.6 kg (149 lb).  Medication Reconciliation: queta DONOVAN CMA      "

## 2018-04-18 NOTE — MR AVS SNAPSHOT
After Visit Summary   4/18/2018    Bud Merritt    MRN: [id number]           Patient Information     Date Of Birth          5/9/1927        Visit Information        Provider Department      4/18/2018 10:30 AM Connor Nichole,  [facility]         Follow-ups after your visit        Your next 10 appointments already scheduled     Apr 18, 2018 10:30 AM CDT   [facility] Visit with Connor Nichole DO   [facility]    [address] [place], [place] [address] [address]   [phone]            Apr 19, 2018  1:15 PM CDT   Ortho Treatment with Sheila Petersen, PT   [facility] ([facility])    [address] [place] [address] Dr Jose RABAGO [address]   [phone]            Apr 26, 2018 10:45 AM CDT   Ortho Treatment with Sheila Petersen, PT   [facility] ([facility])    [address] [place] [address] Dr Jose RABAGO [address]   [phone]            May 03, 2018 10:00 AM CDT   Ortho Treatment with Sheila Petersen, PT   [facility] ([facility])    [address] [place] [address] Dr Jose RABAGO [address]   [phone]            May 10, 2018 11:30 AM CDT   Ortho Treatment with Sheila Petersen, PT   [facility] ([facility])    [address] [place] [address] Dr Jose RABAGO [address]   [phone]              Who to contact     If you have questions or need follow up information about today's clinic visit or your schedule please contact [facility] directly at [phone].  Normal or non-critical lab and imaging results will be communicated to you by MyChart, letter or phone within 4 business days after the clinic has received the results. If you do not hear from us within 7 days, please contact the clinic through MyChart or phone. If you have a critical or abnormal lab result, we will notify you by phone as soon as  "possible.  Submit refill requests through [facility] or call your pharmacy and they will forward the refill request to us. Please allow 3 business days for your refill to be completed.          Additional Information About Your Visit        [facility] [employer] Information     [facility] lets you send messages to your doctor, view your test results, renew your prescriptions, schedule appointments and more. To sign up, go to [email or web address] . Click on \"Log in\" on the left side of the screen, which will take you to the Welcome page. Then click on \"Sign up Now\" on the right side of the page.     You will be asked to enter the access code listed below, as well as some personal information. Please follow the directions to create your username and password.     Your access code is: [email or web address]  Expires: 2018  1:23 PM     Your access code will  in 90 days. If you need help or a new code, please call your [facility] clinic or [phone].        Care EveryWhere ID     This is your Care EveryWhere ID. This could be used by other organizations to access your [facility] medical records  [id number]        Your Vitals Were     Temperature Height BMI (Body Mass Index)             97  F (36.1  C) (Temporal) 1.702 m (5' 7\") 23.34 kg/m2          Blood Pressure from Last 3 Encounters:   18 166/90   18 108/58   18 104/70    Weight from Last 3 Encounters:   18 67.6 kg (149 lb)   18 67.6 kg (149 lb)   18 67.7 kg (149 lb 3.2 oz)              Today, you had the following     No orders found for display         Today's Medication Changes          These changes are accurate as of 18 10:10 AM.  If you have any questions, ask your nurse or doctor.               These medicines have changed or have updated prescriptions.        Dose/Directions    metoprolol tartrate 25 MG tablet   Commonly known as:  LOPRESSOR   This may have changed:  how much to take   Used for:  S/P TAVR (transcatheter aortic valve " replacement)        Dose:  25 mg   Take 1 tablet (25 mg) by mouth 2 times daily   Quantity:  60 tablet   Refills:  11                Primary Care Provider Office Phone # Fax #    Camron Aragon [phone] [address] [facility] [address]        Equal Access to Services     JUAN CLINE : Hadii aad ku hadasho Soomaali, waaxda luqadaha, qaybta kaalmada adeegyada, waxlamont enrique alejandron gomez emelykimberley dexter. So [facility] [phone].    ATENCIÓN: Si habla español, tiene a mon disposición servicios gratuitos de asistencia lingüística. [facility] [phone].    We comply with applicable federal civil rights laws and [place] laws. We do not discriminate on the basis of race, color, national origin, age, disability, sex, sexual orientation, or gender identity.            Thank you!     Thank you for choosing [facility]  for your care. Our goal is always to provide you with excellent care. Hearing back from our patients is one way we can continue to improve our services. Please take a few minutes to complete the written survey that you may receive in the mail after your visit with us. Thank you!             Your Updated Medication List - Protect others around you: Learn how to safely use, store and throw away your medicines at www.disposemymeds.org.          This list is accurate as of 4/18/18 10:10 AM.  Always use your most recent med list.                   Brand Name Dispense Instructions for use Diagnosis    acetaminophen 325 MG tablet    TYLENOL    100 tablet    Take 2 tablets (650 mg) by mouth every 4 hours as needed for mild pain, fever or headaches        alendronate 70 MG tablet    FOSAMAX    12 tablet    Take 1 tablet (70 mg) by mouth every 7 days Take with over 8 ounces water and stay upright for at least 30 minutes after dose.  Take at least 60 minutes before breakfast    Hyperlipidemia LDL goal <130       apixaban ANTICOAGULANT 2.5 MG tablet    ELIQUIS    180  tablet    Take 1 tablet (2.5 mg) by mouth 2 times daily    Paroxysmal atrial fibrillation (H)       ascorbic acid 500 MG Cpcr CR capsule    vitamin C     Take 500 mg by mouth daily        calcium carbonate 1250 MG tablet    OS-SHELIA 500 mg [place]. Ca     Take 1,250 mg by mouth daily        dofetilide 125 MCG capsule    TIKOSYN    60 capsule    Take 1 capsule (125 mcg) by mouth 2 times daily    Paroxysmal atrial fibrillation (H)       * HYDROcodone-acetaminophen 5-325 MG per tablet    NORCO    18 tablet    Take 1 tablet by mouth every 6 hours as needed for pain For moderate to severe pain    Rheumatoid arthritis involving multiple sites, unspecified rheumatoid factor presence (H)       * HYDROcodone-acetaminophen 5-325 MG per tablet    NORCO    10 tablet    Take 1 tablet by mouth every 6 hours as needed for other (Moderate to Severe Pain)    Scalp lesion       lisinopril 5 MG tablet    PRINIVIL/ZESTRIL    90 tablet    Take 1 tablet (5 mg) by mouth daily    Acute on chronic systolic congestive heart failure (H)       LUPRON DEPOT IM      Inject into the muscle every 6 months Reported on 5/3/2017        metoprolol tartrate 25 MG tablet    LOPRESSOR    60 tablet    Take 1 tablet (25 mg) by mouth 2 times daily    S/P TAVR (transcatheter aortic valve replacement)       ondansetron 4 MG ODT tab    ZOFRAN-ODT    10 tablet    Take 1 tablet (4 mg) by mouth every 6 hours as needed for nausea or vomiting    Nausea       polyethylene glycol Packet    MIRALAX/GLYCOLAX    30 packet    Take 17 g by mouth daily    Constipation, unspecified constipation type       predniSONE 5 MG tablet    DELTASONE    100 tablet    Take 1 tablet (5 mg) by mouth daily    Rheumatoid arthritis involving multiple sites with positive rheumatoid factor (H)       SPIRONOLACTONE PO      Take 25 mg by mouth daily        tamsulosin 0.4 MG capsule    FLOMAX    60 capsule    Take 1 capsule (0.4 mg) by mouth daily    S/P TAVR (transcatheter aortic valve replacement)        traMADol 50 MG tablet    ULTRAM    90 tablet    TAKE 1 TABLET 3 TIMES DAILY AS NEEDED FOR MODERATE PAIN    Rheumatoid arthritis involving multiple sites, unspecified rheumatoid factor presence (H)       VITAMIN D (CHOLECALCIFEROL) PO      Take 2,000 Units by mouth daily Reported on 5/3/2017        * Notice:  This list has 2 medication(s) that are the same as other medications prescribed for you. Read the directions carefully, and ask your doctor or other care provider to review them with you.

## 2018-04-18 NOTE — PROGRESS NOTES
General Surgery Follow Up    Pt returns for follow up visit s/p skin lesion excision of scalp    HPI:  Bud has been healing well. No significant bleeding or draining. Some headaches. No signs of infection.      Past Medical History:   Diagnosis Date     Anemia      Atrial fibrillation (H) 07/01/2016     Cardiac pacemaker 03/10/2017     Cardiomyopathy (H)      CHF (congestive heart failure) (H)      COPD exacerbation (H) 3/2/2017     Depressive disorder      History of prostate cancer      Paroxysmal atrial fibrillation (H) 7/6/2016     Pure hypercholesterolemia      Rheumatoid arthritis(714.0)      Unspecified essential hypertension      Weakness generalized 3/2/2017       Past Surgical History:   Procedure Laterality Date     ARTHROPLASTY KNEE Left 1/12/2016    Procedure: ARTHROPLASTY KNEE;  Surgeon: Cesar Walker DO;  [facility]     COLONOSCOPY  08/24/09     EXCISE LESION HEAD N/A 4/9/2018    Procedure: EXCISE LESION HEAD;  Excision Left Scalp Lesion;  Surgeon: Connor Nichole DO;  Location[facility]     HC COLONOSCOPY THRU STOMA W BIOPSY/CAUTERY TUMOR/POLYP/LESION  8/31/2004     HC REPAIR ING HERNIA,5+Y/O,REDUCIBL  1996    Marlex mesh repair of bilateral inguinal hernias and drainage of bilateral scrotal hydroceles.     HEART CATH FEMORAL CANNULIZATION WITH OPEN STANDBY REPAIR AORTIC VALVE N/A 9/27/2017    Procedure: HEART CATH FEMORAL CANNULIZATION WITH OPEN STANDBY REPAIR AORTIC VALVE;;  Surgeon: Jaron Alejandra MD;  Location[facility]     IMPLANT PACEMAKER  03/10/2017     IRRIGATION AND DEBRIDEMENT SOFT TISSUE LOWER EXTREMITY, COMBINED Left 3/15/2016    Procedure: COMBINED IRRIGATION AND DEBRIDEMENT SOFT TISSUE LOWER EXTREMITY;  Surgeon: Cesar Walker DO;  Location[facility]     TRANSCATHETER AORTIC VALVE IMPLANT ANESTHESIA N/A 9/27/2017    Procedure: TRANSCATHETER AORTIC VALVE IMPLANT ANESTHESIA;  Right Transfemoral Approach (Harman Ramsey 3 29mm) Aortic Valve  Implant,  Cardiopulmonary Bypass Standby, Transthoracic Echocardiogram by Derian Queen(Echo Tech);  Surgeon: GENERIC ANESTHESIA PROVIDER;  Location:  OR       Social History     Social History     Marital status:      Spouse name: N/A     Number of children: N/A     Years of education: N/A     Occupational History     Not on file.     Social History Main Topics     Smoking status: Former Smoker     Packs/day: 0.50     Years: 15.00     Types: Cigarettes     Quit date: 11/12/1998     Smokeless tobacco: Never Used      Comment: quit 15 yrs ago     Alcohol use No     Drug use: No     Sexual activity: Yes     Other Topics Concern     Caffeine Concern Yes     8 cups coffee day     Sleep Concern Yes     Weight Concern Yes     weight loss     Special Diet No     Exercise Yes     split firewood daily     Social History Narrative       Current Outpatient Prescriptions   Medication Sig Dispense Refill     acetaminophen (TYLENOL) 325 MG tablet Take 2 tablets (650 mg) by mouth every 4 hours as needed for mild pain, fever or headaches 100 tablet      alendronate (FOSAMAX) 70 MG tablet Take 1 tablet (70 mg) by mouth every 7 days Take with over 8 ounces water and stay upright for at least 30 minutes after dose.  Take at least 60 minutes before breakfast 12 tablet 3     apixaban ANTICOAGULANT (ELIQUIS) 2.5 MG tablet Take 1 tablet (2.5 mg) by mouth 2 times daily 180 tablet 3     ascorbic acid (VITAMIN C) 500 MG CPCR Take 500 mg by mouth daily       calcium carbonate (OS-SHELIA 500 MG [place]. [place]) 500 MG tablet Take 1,250 mg by mouth daily        dofetilide (TIKOSYN) 125 MCG capsule Take 1 capsule (125 mcg) by mouth 2 times daily 60 capsule 5     HYDROcodone-acetaminophen (NORCO) 5-325 MG per tablet Take 1 tablet by mouth every 6 hours as needed for pain For moderate to severe pain 18 tablet 0     HYDROcodone-acetaminophen (NORCO) 5-325 MG per tablet Take 1 tablet by mouth every 6 hours as needed for other (Moderate to Severe Pain)  "10 tablet 0     Leuprolide Acetate (LUPRON DEPOT IM) Inject into the muscle every 6 months Reported on 5/3/2017       lisinopril (PRINIVIL/ZESTRIL) 5 MG tablet Take 1 tablet (5 mg) by mouth daily 90 tablet 3     metoprolol (LOPRESSOR) 25 MG tablet Take 1 tablet (25 mg) by mouth 2 times daily (Patient taking differently: Take 12.5 mg by mouth 2 times daily ) 60 tablet 11     ondansetron (ZOFRAN-ODT) 4 MG ODT tab Take 1 tablet (4 mg) by mouth every 6 hours as needed for nausea or vomiting 10 tablet 0     polyethylene glycol (MIRALAX/GLYCOLAX) Packet Take 17 g by mouth daily 30 packet 0     predniSONE (DELTASONE) 5 MG tablet Take 1 tablet (5 mg) by mouth daily 100 tablet 4     SPIRONOLACTONE PO Take 25 mg by mouth daily       tamsulosin (FLOMAX) 0.4 MG capsule Take 1 capsule (0.4 mg) by mouth daily 60 capsule 0     traMADol (ULTRAM) 50 MG tablet TAKE 1 TABLET 3 TIMES DAILY AS NEEDED FOR MODERATE PAIN 90 tablet 0     VITAMIN D, CHOLECALCIFEROL, PO Take 2,000 Units by mouth daily Reported on 5/3/2017         Medications and history reviewed    Physical exam:  Vitals: Temp 97  F (36.1  C) (Temporal)  Ht 1.702 m (5' 7\")  Wt 67.6 kg (149 lb)  BMI 23.34 kg/m2  BMI= Body mass index is 23.34 kg/(m^2).    HEART: RRR, no new murmurs  LUNGS: CTAB, equal chest rise, good effort  ABD: soft, non tender, non distended  INCISIONS: c/d/i, small amount crusted blood. Sutures intact, minimal tension  EXT: RACHEL, no deformities    PATHOLOGY:  Seborrheic keratosis    Assessment:     ICD-10-CM    1. S/P excision of skin lesion, follow-up exam Z09      Plan: We removed the sutures and when over pathology report. They are to follow up only prn if the is any concern with wound healing, otherwise no other active issues.    Connor Nichole, DO    "

## 2018-04-20 ENCOUNTER — TELEPHONE (OUTPATIENT)
Dept: PHYSICAL THERAPY | Facility: CLINIC | Age: 83
End: 2018-04-20

## 2018-04-23 DIAGNOSIS — L98.9 SCALP LESION: ICD-10-CM

## 2018-04-23 DIAGNOSIS — M06.9 RHEUMATOID ARTHRITIS INVOLVING MULTIPLE SITES, UNSPECIFIED RHEUMATOID FACTOR PRESENCE: ICD-10-CM

## 2018-04-23 RX ORDER — TRAMADOL HYDROCHLORIDE 50 MG/1
TABLET ORAL
Qty: 90 TABLET | Refills: 0 | Status: SHIPPED | OUTPATIENT
Start: 2018-04-23 | End: 2018-05-23

## 2018-04-23 RX ORDER — HYDROCODONE BITARTRATE AND ACETAMINOPHEN 5; 325 MG/1; MG/1
1 TABLET ORAL EVERY 6 HOURS PRN
Qty: 18 TABLET | Refills: 0 | Status: SHIPPED | OUTPATIENT
Start: 2018-04-23 | End: 2018-05-23

## 2018-04-26 ENCOUNTER — HOSPITAL ENCOUNTER (OUTPATIENT)
Dept: PHYSICAL THERAPY | Facility: CLINIC | Age: 83
Setting detail: THERAPIES SERIES
End: 2018-04-26
Attending: PHYSICAL MEDICINE & REHABILITATION
Payer: MEDICARE

## 2018-04-26 PROCEDURE — 40000718 ZZHC STATISTIC PT DEPARTMENT ORTHO VISIT: Performed by: PHYSICAL THERAPIST

## 2018-04-26 PROCEDURE — 97140 MANUAL THERAPY 1/> REGIONS: CPT | Mod: GP | Performed by: PHYSICAL THERAPIST

## 2018-04-26 PROCEDURE — 97110 THERAPEUTIC EXERCISES: CPT | Mod: GP | Performed by: PHYSICAL THERAPIST

## 2018-04-27 ENCOUNTER — TELEPHONE (OUTPATIENT)
Dept: FAMILY MEDICINE | Facility: CLINIC | Age: 83
End: 2018-04-27

## 2018-04-27 DIAGNOSIS — M79.89 LEG SWELLING: Primary | ICD-10-CM

## 2018-04-27 NOTE — TELEPHONE ENCOUNTER
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 He does not tolerate the wrap he has at home. If there are no acute heart issues Valarie Meng DPT, lymphedema cert said there is a wrap with maren that would allow better/more comfortable fit    Can you generate a lymphedema order for him?     Thank you.    Sheila Petersen, PT  [phone]

## 2018-05-03 ENCOUNTER — HOSPITAL ENCOUNTER (OUTPATIENT)
Dept: PHYSICAL THERAPY | Facility: CLINIC | Age: 83
Setting detail: THERAPIES SERIES
End: 2018-05-03
Attending: PHYSICAL MEDICINE & REHABILITATION
Payer: MEDICARE

## 2018-05-03 PROCEDURE — 97110 THERAPEUTIC EXERCISES: CPT | Mod: GP | Performed by: PHYSICAL THERAPIST

## 2018-05-03 PROCEDURE — 97140 MANUAL THERAPY 1/> REGIONS: CPT | Mod: GP | Performed by: PHYSICAL THERAPIST

## 2018-05-03 PROCEDURE — 40000718 ZZHC STATISTIC PT DEPARTMENT ORTHO VISIT: Performed by: PHYSICAL THERAPIST

## 2018-05-07 DIAGNOSIS — I48.0 PAROXYSMAL ATRIAL FIBRILLATION (H): ICD-10-CM

## 2018-05-07 RX ORDER — DOFETILIDE 0.12 MG/1
CAPSULE ORAL
Qty: 60 CAPSULE | Refills: 1 | Status: SHIPPED | OUTPATIENT
Start: 2018-05-07 | End: 2018-07-03

## 2018-05-07 NOTE — ADDENDUM NOTE
Encounter addended by: Sheila Petersen PT on: 5/7/2018  2:02 PM<BR>     Actions taken: Charge Capture section accepted

## 2018-05-07 NOTE — TELEPHONE ENCOUNTER
dofetilide      Last Written Prescription Date:  8/6/17  Last Fill Quantity: 60,   # refills: 5  Last Office Visit: 3/29/18  Future Office visit:       Routing refill request to provider for review/approval because:  Drug not on the [facility], P or [facility] refill protocol or controlled substance  Arlene Ortega MA

## 2018-05-10 ENCOUNTER — HOSPITAL ENCOUNTER (OUTPATIENT)
Dept: PHYSICAL THERAPY | Facility: CLINIC | Age: 83
Setting detail: THERAPIES SERIES
End: 2018-05-10
Attending: PHYSICAL MEDICINE & REHABILITATION
Payer: MEDICARE

## 2018-05-10 PROCEDURE — 40000718 ZZHC STATISTIC PT DEPARTMENT ORTHO VISIT: Performed by: PHYSICAL THERAPIST

## 2018-05-10 PROCEDURE — 97140 MANUAL THERAPY 1/> REGIONS: CPT | Mod: GP | Performed by: PHYSICAL THERAPIST

## 2018-05-16 ENCOUNTER — ALLIED HEALTH/NURSE VISIT (OUTPATIENT)
Dept: CARDIOLOGY | Facility: CLINIC | Age: 83
End: 2018-05-16
Attending: INTERNAL MEDICINE
Payer: MEDICARE

## 2018-05-16 DIAGNOSIS — I42.0 DILATED CARDIOMYOPATHY (H): Primary | ICD-10-CM

## 2018-05-16 PROCEDURE — 93294 REM INTERROG EVL PM/LDLS PM: CPT | Performed by: INTERNAL MEDICINE

## 2018-05-16 PROCEDURE — 93296 REM INTERROG EVL PM/IDS: CPT | Mod: ZF

## 2018-05-16 NOTE — MR AVS SNAPSHOT
After Visit Summary   5/16/2018    Bud Merritt    MRN: [id number]           Patient Information     Date Of Birth          5/9/1927        Visit Information        Provider Department      5/16/2018 6:00 AM [facility]        Today's Diagnoses     Dilated cardiomyopathy (H) dilated LV, EF 55% after TAVR     -  1       Follow-ups after your visit        Your next 10 appointments already scheduled     May 30, 2018 12:00 PM CDT   Ortho Treatment with Sheila Petersen, PT   [facility] ([facility])    [address] [place] [address]   [phone]            May 30, 2018 12:30 PM CDT   Lymphedema Evaluation with Valarie Meng, PT, NL LYMPHEDEMA REHAB ROOM   [facility])    [address] [place] [address]   [phone]            Jun 04, 2018 12:00 PM CDT   Ortho Treatment with Sheila Petersen, PT   [facility] ([facility])    [address] [place] [address] [address] [place] [address]   [phone]            Jun 12, 2018 10:30 AM CDT   Ortho Treatment with Sheila Petersen, PT   [facility] ([facility])    [address] [place] [address]   [phone]            Jun 21, 2018 11:30 AM CDT   Ortho Treatment with Sheila Petersen, PT   [facility] ([facility])    [address] [place] [address]   [phone]            Jun 27, 2018 11:30 AM CDT   Ortho Treatment with Sheila Petersen, PT   [facility] ([facility])    [address] [place] [address] [address] [place] [address]   [phone]              Who to contact     If you have questions or need follow up information about today's clinic visit or your schedule please contact [facility] directly at [phone].  Normal or non-critical lab and imaging results will be  "communicated to you by [employer], letter or phone within 4 business days after the clinic has received the results. If you do not hear from us within 7 days, please contact the clinic through VISENZE or phone. If you have a critical or abnormal lab result, we will notify you by phone as soon as possible.  Submit refill requests through VISENZE or call your pharmacy and they will forward the refill request to us. Please allow 3 business days for your refill to be completed.          Additional Information About Your Visit        VISENZE Information     VISENZE lets you send messages to your doctor, view your test results, renew your prescriptions, schedule appointments and more. To sign up, go to [email or web address] . Click on \"Log in\" on the left side of the screen, which will take you to the Welcome page. Then click on \"Sign up Now\" on the right side of the page.     You will be asked to enter the access code listed below, as well as some personal information. Please follow the directions to create your username and password.     Your access code is: [email or web address]  Expires: 2018  1:23 PM     Your access code will  in 90 days. If you need help or a new code, please call your [facility] clinic or [phone].        Care EveryWhere ID     This is your Care EveryWhere ID. This could be used by other organizations to access your [facility] medical records  [id number]         Blood Pressure from Last 3 Encounters:   18 166/90   18 108/58   18 104/70    Weight from Last 3 Encounters:   18 67.6 kg (149 lb)   18 67.6 kg (149 lb)   18 67.7 kg (149 lb 3.2 oz)              We Performed the Following     INTERROGATION DEVICE EVAL REMOTE, PACER/ICD          Today's Medication Changes          These changes are accurate as of 18 11:59 PM.  If you have any questions, ask your nurse or doctor.               These medicines have changed or have updated prescriptions.        " Dose/Directions    metoprolol tartrate 25 MG tablet   Commonly known as:  LOPRESSOR   This may have changed:  how much to take   Used for:  S/P TAVR (transcatheter aortic valve replacement)        Dose:  25 mg   Take 1 tablet (25 mg) by mouth 2 times daily   Quantity:  60 tablet   Refills:  11                Primary Care Provider Office Phone # Fax #    Camron Aragon [phone]       [address] [facility] [address]        Equal Access to Services     JUAN CLINE AH: Hadii aad ku hadasho Soomaali, waaxda luqadaha, qaybta kaalmada adeegyada, waxay idiin hayaan adeeg kharash la'rosan ah. So [facility] [phone].    ATENCIÓN: Si habla español, tiene a mon disposición servicios gratuitos de asistencia lingüística. [facility] [phone].    We comply with applicable federal civil rights laws and [place] laws. We do not discriminate on the basis of race, color, national origin, age, disability, sex, sexual orientation, or gender identity.            Thank you!     Thank you for choosing [facility]  for your care. Our goal is always to provide you with excellent care. Hearing back from our patients is one way we can continue to improve our services. Please take a few minutes to complete the written survey that you may receive in the mail after your visit with us. Thank you!             Your Updated Medication List - Protect others around you: Learn how to safely use, store and throw away your medicines at www.disposemymeds.org.          This list is accurate as of 5/16/18 11:59 PM.  Always use your most recent med list.                   Brand Name Dispense Instructions for use Diagnosis    acetaminophen 325 MG tablet    TYLENOL    100 tablet    Take 2 tablets (650 mg) by mouth every 4 hours as needed for mild pain, fever or headaches        alendronate 70 MG tablet    FOSAMAX    12 tablet    Take 1 tablet (70 mg) by mouth every 7 days Take with over 8 ounces water and stay upright for  at least 30 minutes after dose.  Take at least 60 minutes before breakfast    Hyperlipidemia LDL goal <130       apixaban ANTICOAGULANT 2.5 MG tablet    ELIQUIS    180 tablet    Take 1 tablet (2.5 mg) by mouth 2 times daily    Paroxysmal atrial fibrillation (H)       ascorbic acid 500 MG Cpcr CR capsule    vitamin C     Take 500 mg by mouth daily        calcium carbonate 500 MG tablet    OS-SHELIA 500 mg [place]. Ca     Take 1,250 mg by mouth daily        dofetilide 125 MCG capsule    TIKOSYN    60 capsule    TAKE ONE CAPSULE BY MOUTH TWICE A DAY    Paroxysmal atrial fibrillation (H)       * HYDROcodone-acetaminophen 5-325 MG per tablet    NORCO    10 tablet    Take 1 tablet by mouth every 6 hours as needed for other (Moderate to Severe Pain)    Scalp lesion       * HYDROcodone-acetaminophen 5-325 MG per tablet    NORCO    18 tablet    Take 1 tablet by mouth every 6 hours as needed for pain For moderate to severe pain    Rheumatoid arthritis involving multiple sites, unspecified rheumatoid factor presence (H)       lisinopril 5 MG tablet    PRINIVIL/ZESTRIL    90 tablet    Take 1 tablet (5 mg) by mouth daily    Acute on chronic systolic congestive heart failure (H)       LUPRON DEPOT IM      Inject into the muscle every 6 months Reported on 5/3/2017        metoprolol tartrate 25 MG tablet    LOPRESSOR    60 tablet    Take 1 tablet (25 mg) by mouth 2 times daily    S/P TAVR (transcatheter aortic valve replacement)       ondansetron 4 MG ODT tab    ZOFRAN-ODT    10 tablet    Take 1 tablet (4 mg) by mouth every 6 hours as needed for nausea or vomiting    Nausea       polyethylene glycol Packet    MIRALAX/GLYCOLAX    30 packet    Take 17 g by mouth daily    Constipation, unspecified constipation type       predniSONE 5 MG tablet    DELTASONE    100 tablet    Take 1 tablet (5 mg) by mouth daily    Rheumatoid arthritis involving multiple sites with positive rheumatoid factor (H)       SPIRONOLACTONE PO      Take 25 mg by mouth  daily        tamsulosin 0.4 MG capsule    FLOMAX    60 capsule    Take 1 capsule (0.4 mg) by mouth daily    S/P TAVR (transcatheter aortic valve replacement)       traMADol 50 MG tablet    ULTRAM    90 tablet    TAKE 1 TABLET 3 TIMES DAILY AS NEEDED FOR MODERATE PAIN    Rheumatoid arthritis involving multiple sites, unspecified rheumatoid factor presence (H)       VITAMIN D (CHOLECALCIFEROL) PO      Take 2,000 Units by mouth daily Reported on 5/3/2017        * Notice:  This list has 2 medication(s) that are the same as other medications prescribed for you. Read the directions carefully, and ask your doctor or other care provider to review them with you.

## 2018-05-21 ENCOUNTER — HOSPITAL ENCOUNTER (OUTPATIENT)
Dept: PHYSICAL THERAPY | Facility: CLINIC | Age: 83
Setting detail: THERAPIES SERIES
End: 2018-05-21
Attending: FAMILY MEDICINE
Payer: MEDICARE

## 2018-05-21 PROCEDURE — G8991 OTHER PT/OT GOAL STATUS: HCPCS | Mod: GP,CI | Performed by: PHYSICAL THERAPIST

## 2018-05-21 PROCEDURE — G8992 OTHER PT/OT  D/C STATUS: HCPCS | Mod: GP,CI | Performed by: PHYSICAL THERAPIST

## 2018-05-21 PROCEDURE — 97161 PT EVAL LOW COMPLEX 20 MIN: CPT | Mod: GP | Performed by: PHYSICAL THERAPIST

## 2018-05-21 PROCEDURE — 40000449 ZZHC STATISTIC PT VISIT, LYMPHEDEMA: Performed by: PHYSICAL THERAPIST

## 2018-05-21 PROCEDURE — G8990 OTHER PT/OT CURRENT STATUS: HCPCS | Mod: GP,CI | Performed by: PHYSICAL THERAPIST

## 2018-05-21 PROCEDURE — 97140 MANUAL THERAPY 1/> REGIONS: CPT | Mod: GP | Performed by: PHYSICAL THERAPIST

## 2018-05-21 NOTE — PROGRESS NOTES
[facility]        OUTPATIENT PHYSICAL THERAPY EDEMA EVALUATION  PLAN OF TREATMENT FOR OUTPATIENT REHABILITATION  (COMPLETE FOR INITIAL CLAIMS ONLY)  Patient's Last Name, First Name, CAMERONMarcianoKELINMarciano  Christopher Merrittlibertad JOHNSON                           Provider s Name:   [facility] Medical Record No.  [id number]     Start of Care Date:  05/21/18   Onset Date:  05/21/14 (3-4 years ago)   Type:  PT   Medical Diagnosis:   Edema, leg swelling   Therapy Diagnosis:  Edema, stage 2 L lower leg and foot; hx of cardiac pacemaker implant 1 yr ago,  Visits from SOC:  1                                     __________________________________________________________________________________   Plan of Treatment/Functional Goals:          GOALS  1. Goal description: Bud will have proper fitting edema garment in place in 6 weeks for edema control       Target date: 07/02/18            Treatment frequency: 1 time / week   Treatment duration: 6 weeks    Valarie Meng, PT                                    I CERTIFY THE NEED FOR THESE SERVICES FURNISHED UNDER        THIS PLAN OF TREATMENT AND WHILE UNDER MY CARE     (Physician co-signature of this document indicates review and certification of the therapy plan).                                  Referring physician: Camron Roque   Initial Assessment  See Epic Evaluation- Start of care: 05/21/18

## 2018-05-21 NOTE — PROGRESS NOTES
" 05/21/18 1500   Rehab Discipline   Discipline PT   Type of Visit   Type of visit Initial Edema Evaluation   General Information   Start of care 05/21/18   Referring physician Camron Roque   Orders Per therapist evaluation   Order date 04/27/18   Medical diagnosis Leg swelling   Onset of illness / date of surgery 04/27/18  (Date of referral)   Edema onset 05/21/14  (3-4 years ago)   Affected body parts LLE;RLE   Edema etiology Unknown;Trauma  (Many years of wood work and shin injuries)   Pertinent history of current problem (PT: include personal factors and/or comorbidities that impact the POC; OT: include additional occupational profile info) Reported symptoms: B LE swelling L lower leg > R. S/p L TKA a few years back. States B LE edema has swollen in past , then got better after Pacemaker and valve replacement a year ago. Swelling up/down since then. Unknown origin but does state many years working with wood and it bouncing off his legs. His pacemaker is set to 70 and states doing well. States arthritis is what keeps him in the wheelchair most of the time. He does walk a little in the home and 1 walk per day with nice weather outside. Main factor in his mobility deficits is reportedly Arthritis \"all over\". Symptom onset: reports swelling x several years but tends to wax/wane. Sleeps in a lift chair as he cannot get up/down out of bed to urinate many times a night. Seems to respond partially to elevation. Has tried compression wear but cannot roland. Prior level of function includes limited mobility related to his advancing age as well as his pain. Being seen in PT for neck pain/headaches.   Surgical / medical history reviewed Yes   Prior level of functional mobility WC and walker dependent   Prior treatment Compression garments;Exercise;BRENNON hose;Other   Patient role / employment history Retired   [facility]   Living environment comments Hard of hearing. Lives with wife. Hard time " dressing d/t UE pain and ROM limitations. Does use shower chair and showers himself. Walks when he can. Has fallen in the last 6 months , once a few weeks ago   Current assistive devices Standard cane;Front wheeled walker;Manual wheel chair   General observations Bud Merritt is a 89 year old male with a history of atrial fibrillation on Eliquis, COPD, CHF s/p TAVR, pacemaker placement.  Has no acute CHF concerns, no cancer history, no kidney disease   Fall Risk Screen   Fall screen completed by PT   Have you fallen 2 or more times in the past year? Yes   Have you fallen and had an injury in the past year? No   Is patient a fall risk? 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 This is more apparent on the R where there is a large long area of scar tissue which is immobile and adhered to the tibia. The L has a small area of this but is the more edematous leg/foot   Pitting 2+   Pitting location R foot dorsum, L lower leg dorsum   Scar Yes   Location R shin 10 cm long and right on tibial crest   Mobility stiff/firm and adhered   Capillary refill Symmetrical   Radial pulse Symmetrical   Stemmer sign Negative   Ulceration comments Scab on shin B 1x1 cm superficial.    Girth Measurements   Girth Measurements Refer to separate girth measurement flowsheet   Volume LE   Left LE (mL) 2539   LE volume comparison LLE volume greater than RLE volume   Range of Motion    ROM comments Limited B UEs AROM, ankle DF to neutral, B knee and hips WFL from wheelchair   Strength   Strength comments Generalized weakness noted. Has done cardiac rehab in the past   Palpation   Palpation TTP lower legs diffuse   Bed Mobility   Bed mobility Does not sleep in a bit   Transfers   Transfers Sit to stand with cane with SBA   Gait / Locomotion   Gait / Locomotion Not tested today   Sensory   Sensory perception Light touch   Light touch Intact   Vascular Assessment   Vascular Assessment Comments states feet are always cold. have a bit of a purple appearance   Muscle Tone   Muscle tone No deficits were identified   Clinical Impression   Criteria for skilled therapeutic intervention met Yes   Therapy diagnosis Edema, stage 2 L lower leg and foot; hx of cardiac pacemaker implant 1 yr ago,    Influenced by the following impairments / conditions Stage 2;Edema   Functional limitations due to impairments / conditions Shoewear fit   Clinical Presentation Stable/Uncomplicated   Clinical Presentation Rationale Edema mild, otherwise mobiliy limited,    Clinical Decision Making (Complexity) Low complexity   Treatment frequency 1 time / week   Treatment duration 6 weeks   Patient / family and/or staff in agreement with plan of care Yes   Risks and benefits of therapy have been explained Yes   Clinical impression comments Patient to trial L velcro garment to support venous drainage in the L lower leg.    Education Assessment   Preferred learning style Reading;Pictures / video;Demonstration;Listening   Barriers to learning Other;Hearing   Goals   Edema Eval Goals 1   Goal 1   Goal identifier 1   Goal description Bud will have proper fitting edema garment in place in 6 weeks for edema control   Target date 07/02/18   Total Evaluation Time   Total evaluation time 30

## 2018-05-23 ENCOUNTER — HOSPITAL ENCOUNTER (OUTPATIENT)
Dept: PHYSICAL THERAPY | Facility: CLINIC | Age: 83
Setting detail: THERAPIES SERIES
End: 2018-05-23
Attending: FAMILY MEDICINE
Payer: MEDICARE

## 2018-05-23 DIAGNOSIS — M06.9 RHEUMATOID ARTHRITIS INVOLVING MULTIPLE SITES, UNSPECIFIED RHEUMATOID FACTOR PRESENCE: ICD-10-CM

## 2018-05-23 PROCEDURE — 40000718 ZZHC STATISTIC PT DEPARTMENT ORTHO VISIT: Performed by: PHYSICAL THERAPIST

## 2018-05-23 PROCEDURE — 97140 MANUAL THERAPY 1/> REGIONS: CPT | Mod: GP | Performed by: PHYSICAL THERAPIST

## 2018-05-23 NOTE — PROGRESS NOTES
Outpatient Physical Therapy Progress Note     Patient: Bud Merritt  : 1927    Beginning/End Dates of Reporting Period:  5 visits between 18 and 18    Referring Provider: Dr. Camron Aragon    Therapy Diagnosis: cervical tightness and shoulder tightness, deconditioned     Client Self Report: Bud is here with his wife. He is noting an increase in shoulder symptoms with the weather change. He feels he is 50% improved. He is completing his current exercises as much as he can.     Objective Measurements:  Objective Measure: Cervical AROM: Rotation R: 70%, L: 50% without symptoms. He did have dizziness similar to room spinning and head feeling heavy. SLight discomfort of pressure with side bending bilaterally - 30% R and 40% L     Objective Measure: Symptoms  Details: Headache: now 2-3/10 range: 0/10 to 6-7/10      Goals:  Goal Identifier HEadaches   Goal Description Bud will be able to eliminate his headaches with positioning and ROM exercises (Headaches are 50% improved)   Target Date 18   Date Met      Progress:       Progress Toward Goals:   Progress this reporting period: Bud is having more discomfort in his shoulders and reports it is due to weather. He has been using moist heat. He has been completing his exercises as able. His headaches are at least 50% better. He also has an increase in his cervical AROM for rotation and side bending.     Plan:  Continue therapy per current plan of care.    Discharge:  No    Thank you for referring Bud  to [facility] - Joes Petersen, PT  [phone]

## 2018-05-23 NOTE — PROGRESS NOTES
[facility]      OUTPATIENT PHYSICAL THERAPY  PLAN OF TREATMENT FOR OUTPATIENT REHABILITATION    Patient's Last Name, First Name, M.I.                YOB: 1927  Bud Merritt                        Provider's Name  [facility] Medical Record No.  [id number]                               Onset Date: 09/04/17 (approximately   Start of Care Date: 04/13/18   Type:     _X_PT   ___OT   ___SLP Medical Diagnosis: Intractible tension - type Headaches unspecified chronicity pattern                       PT Diagnosis: cervical tightness and shoulder tightness, deconditioned    _________________________________________________________________________________  Plan of Treatment:    Frequency/Duration: 2 times per week x 4 weeks/8 visits     Goals:  Goal Identifier HEadaches   Goal Description Bud will be able to eliminate his headaches with positioning and ROM exercises (Headaches are 50% improved)   Target Date 05/20/18   Date Met      Progress:     Progress Toward Goals:   Progress this reporting period: Bud is having more discomfort in his shoulders and reports it is due to weather. He has been using moist heat. He has been completing his exercises as able. His headaches are at least 50% better. He also has an increase in his cervical AROM for rotation and side bending.         Certification date from 5-23-18  to 6-21-18    Sheila Petersen, PT          I CERTIFY THE NEED FOR THESE SERVICES FURNISHED UNDER        THIS PLAN OF TREATMENT AND WHILE UNDER MY CARE     (Physician co-signature of this document indicates review and certification of the therapy plan).                Referring Provider: Dr. Camron Aragon

## 2018-05-24 RX ORDER — TRAMADOL HYDROCHLORIDE 50 MG/1
TABLET ORAL
Qty: 90 TABLET | Refills: 0 | Status: SHIPPED | OUTPATIENT
Start: 2018-05-24 | End: 2018-06-25

## 2018-05-24 RX ORDER — HYDROCODONE BITARTRATE AND ACETAMINOPHEN 5; 325 MG/1; MG/1
1 TABLET ORAL EVERY 6 HOURS PRN
Qty: 18 TABLET | Refills: 0 | Status: SHIPPED | OUTPATIENT
Start: 2018-05-24 | End: 2018-06-25

## 2018-05-24 NOTE — PROGRESS NOTES
Preliminary Device Interrogation Results.  Final physician signed paceart report to be scanned and attached.    Scheduled [employer] remote pacemaker transmission received and reviewed. Device transmission sent per MD orders. His presenting rhythm is AP/ BVP @ 75 bpm. AF burden= 1.2%. He is taking eliquis. 1 V-Sensing episode recorded on 3/13/18 lasting 10 seconds. Normal device function. AP= 63% and BVP= 99.6%. No short V-V intervals recorded. Lead trends appear stable. OptiVol thoracic impedance is near his baseline. Battery estimates 5.5 years to SUHA. Pt's wife notified of transmission results. Plan for pt to transfer his care to the [facility] to be seen in [place], per his wishes.  Remote pacemaker transmission

## 2018-05-30 ENCOUNTER — HOSPITAL ENCOUNTER (OUTPATIENT)
Dept: PHYSICAL THERAPY | Facility: CLINIC | Age: 83
Setting detail: THERAPIES SERIES
End: 2018-05-30
Attending: FAMILY MEDICINE
Payer: MEDICARE

## 2018-05-30 PROCEDURE — 40000718 ZZHC STATISTIC PT DEPARTMENT ORTHO VISIT: Performed by: PHYSICAL THERAPIST

## 2018-05-30 PROCEDURE — 97140 MANUAL THERAPY 1/> REGIONS: CPT | Mod: GP | Performed by: PHYSICAL THERAPIST

## 2018-06-04 ENCOUNTER — HOSPITAL ENCOUNTER (OUTPATIENT)
Dept: PHYSICAL THERAPY | Facility: CLINIC | Age: 83
Setting detail: THERAPIES SERIES
End: 2018-06-04
Attending: FAMILY MEDICINE
Payer: MEDICARE

## 2018-06-04 PROCEDURE — 97140 MANUAL THERAPY 1/> REGIONS: CPT | Mod: GP | Performed by: PHYSICAL THERAPIST

## 2018-06-04 PROCEDURE — 97110 THERAPEUTIC EXERCISES: CPT | Mod: GP | Performed by: PHYSICAL THERAPIST

## 2018-06-04 PROCEDURE — 40000718 ZZHC STATISTIC PT DEPARTMENT ORTHO VISIT: Performed by: PHYSICAL THERAPIST

## 2018-06-04 NOTE — NURSING NOTE
"Bud Merritt's goals for this visit include:   Chief Complaint   Patient presents with     RECHECK     PAF and ventricular tachycardia       He requests these members of his care team be copied on today's visit information: PCP    PCP: Camron Aragon    Referring Provider:  No referring provider defined for this encounter.    Chief Complaint   Patient presents with     RECHECK     PAF and ventricular tachycardia       Initial /72 (BP Location: Right arm, Patient Position: Chair, Cuff Size: Adult Regular)  Pulse 60  SpO2 96% Estimated body mass index is 24.58 kg/(m^2) as calculated from the following:    Height as of 3/28/17: 1.651 m (5' 5\").    Weight as of 4/5/17: 67 kg (147 lb 11.2 oz).  Medication Reconciliation: complete       Medication Refills: none        Sabiha Goel CMA      " No

## 2018-06-05 DIAGNOSIS — Z95.2 S/P TAVR (TRANSCATHETER AORTIC VALVE REPLACEMENT): Primary | ICD-10-CM

## 2018-06-05 NOTE — PROGRESS NOTES
Date: 6/5/2018    Time of Call: 11:50 AM     Diagnosis:  S/p TAVR     [ TORB ] Ordering provider: Ramon Uribe MD  Order:   1) ECHO  2) EKG  3) Lab-BMP, CBC     Order received by: Caryn Leon RN     Follow-up/additional notes: 1 yr s/p TAVR f/u.

## 2018-06-12 ENCOUNTER — HOSPITAL ENCOUNTER (OUTPATIENT)
Dept: PHYSICAL THERAPY | Facility: CLINIC | Age: 83
Setting detail: THERAPIES SERIES
End: 2018-06-12
Attending: FAMILY MEDICINE
Payer: MEDICARE

## 2018-06-12 PROCEDURE — 40000718 ZZHC STATISTIC PT DEPARTMENT ORTHO VISIT: Performed by: PHYSICAL THERAPIST

## 2018-06-12 PROCEDURE — 97140 MANUAL THERAPY 1/> REGIONS: CPT | Mod: GP | Performed by: PHYSICAL THERAPIST

## 2018-06-13 ENCOUNTER — OFFICE VISIT (OUTPATIENT)
Dept: SURGERY | Facility: CLINIC | Age: 83
End: 2018-06-13
Payer: COMMERCIAL

## 2018-06-13 VITALS
HEIGHT: 67 IN | TEMPERATURE: 97.1 F | WEIGHT: 149 LBS | SYSTOLIC BLOOD PRESSURE: 90 MMHG | BODY MASS INDEX: 23.39 KG/M2 | DIASTOLIC BLOOD PRESSURE: 58 MMHG

## 2018-06-13 DIAGNOSIS — L08.9 WOUND INFECTION: Primary | ICD-10-CM

## 2018-06-13 DIAGNOSIS — T14.8XXA WOUND INFECTION: Primary | ICD-10-CM

## 2018-06-13 PROCEDURE — 99213 OFFICE O/P EST LOW 20 MIN: CPT | Performed by: SURGERY

## 2018-06-13 RX ORDER — CEPHALEXIN 500 MG/1
500 CAPSULE ORAL 2 TIMES DAILY
Qty: 14 CAPSULE | Refills: 0 | Status: SHIPPED | OUTPATIENT
Start: 2018-06-13 | End: 2019-01-01

## 2018-06-13 NOTE — MR AVS SNAPSHOT
After Visit Summary   6/13/2018    Bud Merritt    MRN: [id number]           Patient Information     Date Of Birth          5/9/1927        Visit Information        Provider Department      6/13/2018 9:00 AM Connor Nichole, DO [facility]        Today's Diagnoses     Wound infection    -  1       Follow-ups after your visit        Your next 10 appointments already scheduled     Jun 21, 2018 11:30 AM CDT   Ortho Treatment with Sheila Petersen, PT   [facility] ([facility])    [address] [place] [address] Dr Jose RABAGO [address]   [phone]            Jun 25, 2018 11:30 AM CDT   Ortho Treatment with Sheila Petersen PT   [facility] ([facility])    [address] [place] [address] Dr Jose RABAGO [address]   [phone]            Aug 30, 2018 12:45 PM CDT   LAB with NL LAB PMC   [facility] ([facility]    [address] [facility] [address]   [phone]           Please do not eat 10-12 hours before your appointment if you are coming in fasting for labs on lipids, cholesterol, or glucose (sugar). This does not apply to pregnant women. Water, hot tea and black coffee (with nothing added) are okay. Do not drink other fluids, diet soda or chew gum.            Aug 30, 2018  1:00 PM CDT   Return Visit with CARDIO DEVICE NURSE   [facility] ([facility])    [address] [facility] [address]   [phone]            Aug 30, 2018  2:00 PM CDT   Ech Complete with BERTHA   [facility] E[facility]ocardi[facility] ([facility])    [address] [place] [address] Dr Garcia [place] [address]   [phone]           1. Please bring or wear a comfortable two-piece outfit. 2. You may eat, drink and take your normal medicines. 3. For any questions that cannot be answered, please contact the ordering physician            Sep 05, 2018  "11:30 AM CDT   Return Visit with Berhane Colon MD   [facility] ([facility])    [address] [facility] [address] [phone]              Who to contact     If you have questions or need follow up information about today's clinic visit or your schedule please contact [facility] directly at [phone].  Normal or non-critical lab and imaging results will be communicated to you by MyChart, letter or phone within 4 business days after the clinic has received the results. If you do not hear from us within 7 days, please contact the clinic through MyChart or phone. If you have a critical or abnormal lab result, we will notify you by phone as soon as possible.  Submit refill requests through [employer] or call your pharmacy and they will forward the refill request to us. Please allow 3 business days for your refill to be completed.          Additional Information About Your Visit        Care EveryWhere ID     This is your Care EveryWhere ID. This could be used by other organizations to access your [place] medical records  [id number]        Your Vitals Were     Temperature Height BMI (Body Mass Index)             97.1  F (36.2  C) (Temporal) 1.702 m (5' 7\") 23.34 kg/m2          Blood Pressure from Last 3 Encounters:   06/13/18 90/58   04/09/18 166/90   03/30/18 108/58    Weight from Last 3 Encounters:   06/13/18 67.6 kg (149 lb)   04/18/18 67.6 kg (149 lb)   03/30/18 67.6 kg (149 lb)              Today, you had the following     No orders found for display         Today's Medication Changes          These changes are accurate as of 6/13/18  9:37 AM.  If you have any questions, ask your nurse or doctor.               Start taking these medicines.        Dose/Directions    cephALEXin 500 MG capsule   Commonly known as:  KEFLEX   Used for:  Wound infection   Started by:  Connor Nichole, DO        Dose:  500 mg   Take 1 capsule " (500 mg) by mouth 2 times daily for 7 days   Quantity:  14 capsule   Refills:  0         These medicines have changed or have updated prescriptions.        Dose/Directions    metoprolol tartrate 25 MG tablet   Commonly known as:  LOPRESSOR   This may have changed:  how much to take   Used for:  S/P TAVR (transcatheter aortic valve replacement)        Dose:  25 mg   Take 1 tablet (25 mg) by mouth 2 times daily   Quantity:  60 tablet   Refills:  11            Where to get your medicines      These medications were sent to [facility] [place] [id number] [facility]   [address] [facility] , [facility] [address]     Phone:  [phone]     cephALEXin 500 MG capsule                Primary Care Provider Office Phone # Fax #    Camron Aragon [phone]       [address] [facility] [address]        Equal Access to Services     JUAN CLINE : Hadii sammie clifton hadasho Soomaali, waaxda luqadaha, qaybta kaalmada adeegyada, ronel real . So [facility] [phone].    ATENCIÓN: Si habla español, tiene a mon disposición servicios gratuitos de asistencia lingüística. Jeanette al [phone].    We comply with applicable federal civil rights laws and [place] laws. We do not discriminate on the basis of race, color, national origin, age, disability, sex, sexual orientation, or gender identity.            Thank you!     Thank you for choosing [facility]  for your care. Our goal is always to provide you with excellent care. Hearing back from our patients is one way we can continue to improve our services. Please take a few minutes to complete the written survey that you may receive in the mail after your visit with us. Thank you!             Your Updated Medication List - Protect others around you: Learn how to safely use, store and throw away your medicines at www.disposemymeds.org.          This list is accurate as of 6/13/18  9:37 AM.  Always use your  most recent med list.                   Brand Name Dispense Instructions for use Diagnosis    acetaminophen 325 MG tablet    TYLENOL    100 tablet    Take 2 tablets (650 mg) by mouth every 4 hours as needed for mild pain, fever or headaches        alendronate 70 MG tablet    FOSAMAX    12 tablet    Take 1 tablet (70 mg) by mouth every 7 days Take with over 8 ounces water and stay upright for at least 30 minutes after dose.  Take at least 60 minutes before breakfast    Hyperlipidemia LDL goal <130       apixaban ANTICOAGULANT 2.5 MG tablet    ELIQUIS    180 tablet    Take 1 tablet (2.5 mg) by mouth 2 times daily    Paroxysmal atrial fibrillation (H)       ascorbic acid 500 MG Cpcr CR capsule    vitamin C     Take 500 mg by mouth daily        calcium carbonate 500 MG tablet    OS-SHELIA 500 mg [place]. [place]     Take 1,250 mg by mouth daily        cephALEXin 500 MG capsule    KEFLEX    14 capsule    Take 1 capsule (500 mg) by mouth 2 times daily for 7 days    Wound infection       dofetilide 125 MCG capsule    TIKOSYN    60 capsule    TAKE ONE CAPSULE BY MOUTH TWICE A DAY    Paroxysmal atrial fibrillation (H)       * HYDROcodone-acetaminophen 5-325 MG per tablet    NORCO    10 tablet    Take 1 tablet by mouth every 6 hours as needed for other (Moderate to Severe Pain)    Scalp lesion       * HYDROcodone-acetaminophen 5-325 MG per tablet    NORCO    18 tablet    Take 1 tablet by mouth every 6 hours as needed for pain For moderate to severe pain    Rheumatoid arthritis involving multiple sites, unspecified rheumatoid factor presence (H)       lisinopril 5 MG tablet    PRINIVIL/ZESTRIL    90 tablet    Take 1 tablet (5 mg) by mouth daily    Acute on chronic systolic congestive heart failure (H)       LUPRON DEPOT IM      Inject into the muscle every 6 months Reported on 5/3/2017        metoprolol tartrate 25 MG tablet    LOPRESSOR    60 tablet    Take 1 tablet (25 mg) by mouth 2 times daily    S/P TAVR (transcatheter aortic valve  replacement)       ondansetron 4 MG ODT tab    ZOFRAN-ODT    10 tablet    Take 1 tablet (4 mg) by mouth every 6 hours as needed for nausea or vomiting    Nausea       polyethylene glycol Packet    MIRALAX/GLYCOLAX    30 packet    Take 17 g by mouth daily    Constipation, unspecified constipation type       predniSONE 5 MG tablet    DELTASONE    100 tablet    Take 1 tablet (5 mg) by mouth daily    Rheumatoid arthritis involving multiple sites with positive rheumatoid factor (H)       SPIRONOLACTONE PO      Take 25 mg by mouth daily        tamsulosin 0.4 MG capsule    FLOMAX    60 capsule    Take 1 capsule (0.4 mg) by mouth daily    S/P TAVR (transcatheter aortic valve replacement)       traMADol 50 MG tablet    ULTRAM    90 tablet    TAKE 1 TABLET 3 TIMES DAILY AS NEEDED FOR MODERATE PAIN    Rheumatoid arthritis involving multiple sites, unspecified rheumatoid factor presence (H)       VITAMIN D (CHOLECALCIFEROL) PO      Take 2,000 Units by mouth daily Reported on 5/3/2017        * Notice:  This list has 2 medication(s) that are the same as other medications prescribed for you. Read the directions carefully, and ask your doctor or other care provider to review them with you.

## 2018-06-13 NOTE — PROGRESS NOTES
General Surgery Follow Up    Pt returns for follow up visit for scalp excision with infection    HPI:  Bud returns today with concerns for wound infection from his scalp excision in April. He says up until about 1 week ago it had been healing fine. He noticed some swelling in that area and it was itching. He has been itching it for last week. This morning when he was scratching it he noticed some murky fluid. Denies fevers. His wife attempted to clean it with ?Lysol.     Review Of Systems    Skin: as above  Ears/Nose/Throat: hearing loss  Respiratory: No shortness of breath, dyspnea on exertion, cough, or hemoptysis  Cardiovascular: negative  Gastrointestinal: negative  Genitourinary: negative  Musculoskeletal: arthritis  Neurologic: negative  Hematologic/Lymphatic/Immunologic: negative  Endocrine: negative      Past Medical History:   Diagnosis Date     Anemia      Atrial fibrillation (H) 07/01/2016     Cardiac pacemaker 03/10/2017     Cardiomyopathy (H)      CHF (congestive heart failure) (H)      COPD exacerbation (H) 3/2/2017     Depressive disorder      History of prostate cancer      Paroxysmal atrial fibrillation (H) 7/6/2016     Pure hypercholesterolemia      Rheumatoid arthritis(714.0)      Unspecified essential hypertension      Weakness generalized 3/2/2017       Past Surgical History:   Procedure Laterality Date     ARTHROPLASTY KNEE Left 1/12/2016    Procedure: ARTHROPLASTY KNEE;  Surgeon: Cesar Walker DO;  [facility]     COLONOSCOPY  08/24/09     EXCISE LESION HEAD N/A 4/9/2018    Procedure: EXCISE LESION HEAD;  Excision Left Scalp Lesion;  Surgeon: Connor Nichole DO;  Location: [facility]     HC COLONOSCOPY THRU STOMA W BIOPSY/CAUTERY TUMOR/POLYP/LESION  8/31/2004     HC REPAIR ING HERNIA,5+Y/O,REDUCIBL  1996    Marlex mesh repair of bilateral inguinal hernias and drainage of bilateral scrotal hydroceles.     HEART CATH FEMORAL CANNULIZATION WITH OPEN STANDBY REPAIR AORTIC  VALVE N/A 9/27/2017    Procedure: HEART CATH FEMORAL CANNULIZATION WITH OPEN STANDBY REPAIR AORTIC VALVE;;  Surgeon: Jaron Alejandra MD;  Location: [facility] OR     IMPLANT PACEMAKER  03/10/2017     IRRIGATION AND DEBRIDEMENT SOFT TISSUE LOWER EXTREMITY, COMBINED Left 3/15/2016    Procedure: COMBINED IRRIGATION AND DEBRIDEMENT SOFT TISSUE LOWER EXTREMITY;  Surgeon: Cesar Walker DO;  [facility]     TRANSCATHETER AORTIC VALVE IMPLANT ANESTHESIA N/A 9/27/2017    Procedure: TRANSCATHETER AORTIC VALVE IMPLANT ANESTHESIA;  Right Transfemoral Approach (Harman Ramsey 3 29mm) Aortic Valve Implant,  Cardiopulmonary Bypass Standby, Transthoracic Echocardiogram by Derian Queen(Echo Tech);  Surgeon: GENERIC ANESTHESIA PROVIDER;  Location: [facility] OR       Social History     Social History     Marital status:      Spouse name: N/A     Number of children: N/A     Years of education: N/A     Occupational History     Not on file.     Social History Main Topics     Smoking status: Former Smoker     Packs/day: 0.50     Years: 15.00     Types: Cigarettes     Quit date: 11/12/1998     Smokeless tobacco: Never Used      Comment: quit 15 yrs ago     Alcohol use No     Drug use: No     Sexual activity: Yes     Other Topics Concern     Caffeine Concern Yes     8 cups coffee day     Sleep Concern Yes     Weight Concern Yes     weight loss     Special Diet No     Exercise Yes     split [employer] daily     Social History Narrative       Current Outpatient Prescriptions   Medication Sig Dispense Refill     cephALEXin (KEFLEX) 500 MG capsule Take 1 capsule (500 mg) by mouth 2 times daily for 7 days 14 capsule 0     acetaminophen (TYLENOL) 325 MG tablet Take 2 tablets (650 mg) by mouth every 4 hours as needed for mild pain, fever or headaches 100 tablet      alendronate (FOSAMAX) 70 MG tablet Take 1 tablet (70 mg) by mouth every 7 days Take with over 8 ounces water and stay upright for at least 30 minutes after dose.  Take at  "least 60 minutes before breakfast 12 tablet 3     apixaban ANTICOAGULANT (ELIQUIS) 2.5 MG tablet Take 1 tablet (2.5 mg) by mouth 2 times daily 180 tablet 3     ascorbic acid (VITAMIN C) 500 MG CPCR Take 500 mg by mouth daily       calcium carbonate (OS-SHELIA 500 MG [place]. [place]) 500 MG tablet Take 1,250 mg by mouth daily        dofetilide (TIKOSYN) 125 MCG capsule TAKE ONE CAPSULE BY MOUTH TWICE A DAY 60 capsule 1     HYDROcodone-acetaminophen (NORCO) 5-325 MG per tablet Take 1 tablet by mouth every 6 hours as needed for pain For moderate to severe pain 18 tablet 0     HYDROcodone-acetaminophen (NORCO) 5-325 MG per tablet Take 1 tablet by mouth every 6 hours as needed for other (Moderate to Severe Pain) 10 tablet 0     Leuprolide Acetate (LUPRON DEPOT IM) Inject into the muscle every 6 months Reported on 5/3/2017       lisinopril (PRINIVIL/ZESTRIL) 5 MG tablet Take 1 tablet (5 mg) by mouth daily 90 tablet 3     metoprolol (LOPRESSOR) 25 MG tablet Take 1 tablet (25 mg) by mouth 2 times daily (Patient taking differently: Take 12.5 mg by mouth 2 times daily ) 60 tablet 11     ondansetron (ZOFRAN-ODT) 4 MG ODT tab Take 1 tablet (4 mg) by mouth every 6 hours as needed for nausea or vomiting 10 tablet 0     polyethylene glycol (MIRALAX/GLYCOLAX) Packet Take 17 g by mouth daily 30 packet 0     predniSONE (DELTASONE) 5 MG tablet Take 1 tablet (5 mg) by mouth daily 100 tablet 4     SPIRONOLACTONE PO Take 25 mg by mouth daily       tamsulosin (FLOMAX) 0.4 MG capsule Take 1 capsule (0.4 mg) by mouth daily 60 capsule 0     traMADol (ULTRAM) 50 MG tablet TAKE 1 TABLET 3 TIMES DAILY AS NEEDED FOR MODERATE PAIN 90 tablet 0     VITAMIN D, CHOLECALCIFEROL, PO Take 2,000 Units by mouth daily Reported on 5/3/2017         Medications and history reviewed    Physical exam:  Vitals: BP 90/58  Temp 97.1  F (36.2  C) (Temporal)  Ht 1.702 m (5' 7\")  Wt 67.6 kg (149 lb)  BMI 23.34 kg/m2  BMI= Body mass index is 23.34 " kg/(m^2).    Constitutional: Frail, alert, non-distressed   Head: Normo-cephalic, left scalp with small amount of purulent drainage. The center of the previous incision is gapped slightly. There is mild erythema surrounding the incision. Is is flat, non-fluctuant  Cardiovascular: RRR, no new murmurs, +S1, +S2 heart sounds, no clicks, rubs or gallops   Respiratory: CTAB, no rales, rhonchi or wheezing, equal chest rise, good respiratory effort   Gastrointestinal: Soft, non-tender, non distended  : Deferred  Musculoskeletal: Moves all extremities, wheel chair bound  Psychiatric: Mentation appears normal, affect appropriate   Hematologic/Lymphatic/Immunologic: Normal cervical and supraclavicular lymph nodes       Assessment:     ICD-10-CM    1. Wound infection T14.8XXA cephALEXin (KEFLEX) 500 MG capsule    L08.9      Plan: He may have developed a skin infection from the scratching over the last week. It has spontaneously drained and has mild erythema. I will prescribe 1 week of oral antibiotics to assist with resolution of minor wound infection. I advised them to use only soap and water and gauze prn to catch any additional drainage.    Connor Nichole, DO

## 2018-06-13 NOTE — LETTER
6/13/2018         RE: Bud Merritt  [address] [facility] [address]        Dear Colleague,    Thank you for referring your patient, Bud Merritt, to the [facility]. Please see a copy of my visit note below.    General Surgery Follow Up    Pt returns for follow up visit for scalp excision with infection    HPI:  Bud returns today with concerns for wound infection from his scalp excision in April. He says up until about 1 week ago it had been healing fine. He noticed some swelling in that area and it was itching. He has been itching it for last week. This morning when he was scratching it he noticed some murky fluid. Denies fevers. His wife attempted to clean it with ?Lysol.     Review Of Systems    Skin: as above  Ears/Nose/Throat: hearing loss  Respiratory: No shortness of breath, dyspnea on exertion, cough, or hemoptysis  Cardiovascular: negative  Gastrointestinal: negative  Genitourinary: negative  Musculoskeletal: arthritis  Neurologic: negative  Hematologic/Lymphatic/Immunologic: negative  Endocrine: negative      Past Medical History:   Diagnosis Date     Anemia      Atrial fibrillation (H) 07/01/2016     Cardiac pacemaker 03/10/2017     Cardiomyopathy (H)      CHF (congestive heart failure) (H)      COPD exacerbation (H) 3/2/2017     Depressive disorder      History of prostate cancer      Paroxysmal atrial fibrillation (H) 7/6/2016     Pure hypercholesterolemia      Rheumatoid arthritis(714.0)      Unspecified essential hypertension      Weakness generalized 3/2/2017       Past Surgical History:   Procedure Laterality Date     ARTHROPLASTY KNEE Left 1/12/2016    Procedure: ARTHROPLASTY KNEE;  Surgeon: Cesar Walker DO;  [facility]     COLONOSCOPY  08/24/09     EXCISE LESION HEAD N/A 4/9/2018    Procedure: EXCISE LESION HEAD;  Excision Left Scalp Lesion;  Surgeon: Connor Nichole DO;  [facility]     HC COLONOSCOPY THRU STOMA W  BIOPSY/CAUTERY TUMOR/POLYP/LESION  8/31/2004     HC REPAIR ING HERNIA,5+Y/O,REDUCIBL  1996    Marlex mesh repair of bilateral inguinal hernias and drainage of bilateral scrotal hydroceles.     HEART CATH FEMORAL CANNULIZATION WITH OPEN STANDBY REPAIR AORTIC VALVE N/A 9/27/2017    Procedure: HEART CATH FEMORAL CANNULIZATION WITH OPEN STANDBY REPAIR AORTIC VALVE;;  Surgeon: Jaron Alejandra MD;  Location: [facility] OR     IMPLANT PACEMAKER  03/10/2017     IRRIGATION AND DEBRIDEMENT SOFT TISSUE LOWER EXTREMITY, COMBINED Left 3/15/2016    Procedure: COMBINED IRRIGATION AND DEBRIDEMENT SOFT TISSUE LOWER EXTREMITY;  Surgeon: Cesar Walker DO;  [facility]     TRANSCATHETER AORTIC VALVE IMPLANT ANESTHESIA N/A 9/27/2017    Procedure: TRANSCATHETER AORTIC VALVE IMPLANT ANESTHESIA;  Right Transfemoral Approach (Harman Ramsey 3 29mm) Aortic Valve Implant,  Cardiopulmonary Bypass Standby, Transthoracic Echocardiogram by Derian Queen(Echo Tech);  Surgeon: GENERIC ANESTHESIA PROVIDER;  Location: [facility] OR       Social History     Social History     Marital status:      Spouse name: N/A     Number of children: N/A     Years of education: N/A     Occupational History     Not on file.     Social History Main Topics     Smoking status: Former Smoker     Packs/day: 0.50     Years: 15.00     Types: Cigarettes     Quit date: 11/12/1998     Smokeless tobacco: Never Used      Comment: quit 15 yrs ago     Alcohol use No     Drug use: No     Sexual activity: Yes     Other Topics Concern     Caffeine Concern Yes     8 cups coffee day     Sleep Concern Yes     Weight Concern Yes     weight loss     Special Diet No     Exercise Yes     split firewood daily     Social History Narrative       Current Outpatient Prescriptions   Medication Sig Dispense Refill     cephALEXin (KEFLEX) 500 MG capsule Take 1 capsule (500 mg) by mouth 2 times daily for 7 days 14 capsule 0     acetaminophen (TYLENOL) 325 MG tablet Take 2 tablets (650  mg) by mouth every 4 hours as needed for mild pain, fever or headaches 100 tablet      alendronate (FOSAMAX) 70 MG tablet Take 1 tablet (70 mg) by mouth every 7 days Take with over 8 ounces water and stay upright for at least 30 minutes after dose.  Take at least 60 minutes before breakfast 12 tablet 3     apixaban ANTICOAGULANT (ELIQUIS) 2.5 MG tablet Take 1 tablet (2.5 mg) by mouth 2 times daily 180 tablet 3     ascorbic acid (VITAMIN C) 500 MG CPCR Take 500 mg by mouth daily       calcium carbonate (OS-SHELIA 500 MG [place]. [place]) 500 MG tablet Take 1,250 mg by mouth daily        dofetilide (TIKOSYN) 125 MCG capsule TAKE ONE CAPSULE BY MOUTH TWICE A DAY 60 capsule 1     HYDROcodone-acetaminophen (NORCO) 5-325 MG per tablet Take 1 tablet by mouth every 6 hours as needed for pain For moderate to severe pain 18 tablet 0     HYDROcodone-acetaminophen (NORCO) 5-325 MG per tablet Take 1 tablet by mouth every 6 hours as needed for other (Moderate to Severe Pain) 10 tablet 0     Leuprolide Acetate (LUPRON DEPOT IM) Inject into the muscle every 6 months Reported on 5/3/2017       lisinopril (PRINIVIL/ZESTRIL) 5 MG tablet Take 1 tablet (5 mg) by mouth daily 90 tablet 3     metoprolol (LOPRESSOR) 25 MG tablet Take 1 tablet (25 mg) by mouth 2 times daily (Patient taking differently: Take 12.5 mg by mouth 2 times daily ) 60 tablet 11     ondansetron (ZOFRAN-ODT) 4 MG ODT tab Take 1 tablet (4 mg) by mouth every 6 hours as needed for nausea or vomiting 10 tablet 0     polyethylene glycol (MIRALAX/GLYCOLAX) Packet Take 17 g by mouth daily 30 packet 0     predniSONE (DELTASONE) 5 MG tablet Take 1 tablet (5 mg) by mouth daily 100 tablet 4     SPIRONOLACTONE PO Take 25 mg by mouth daily       tamsulosin (FLOMAX) 0.4 MG capsule Take 1 capsule (0.4 mg) by mouth daily 60 capsule 0     traMADol (ULTRAM) 50 MG tablet TAKE 1 TABLET 3 TIMES DAILY AS NEEDED FOR MODERATE PAIN 90 tablet 0     VITAMIN D, CHOLECALCIFEROL, PO Take 2,000 Units by mouth  "daily Reported on 5/3/2017         Medications and history reviewed    Physical exam:  Vitals: BP 90/58  Temp 97.1  F (36.2  C) (Temporal)  Ht 1.702 m (5' 7\")  Wt 67.6 kg (149 lb)  BMI 23.34 kg/m2  BMI= Body mass index is 23.34 kg/(m^2).    Constitutional: Frail, alert, non-distressed   Head: Normo-cephalic, left scalp with small amount of purulent drainage. The center of the previous incision is gapped slightly. There is mild erythema surrounding the incision. Is is flat, non-fluctuant  Cardiovascular: RRR, no new murmurs, +S1, +S2 heart sounds, no clicks, rubs or gallops   Respiratory: CTAB, no rales, rhonchi or wheezing, equal chest rise, good respiratory effort   Gastrointestinal: Soft, non-tender, non distended  : Deferred  Musculoskeletal: Moves all extremities, wheel chair bound  Psychiatric: Mentation appears normal, affect appropriate   Hematologic/Lymphatic/Immunologic: Normal cervical and supraclavicular lymph nodes       Assessment:     ICD-10-CM    1. Wound infection T14.8XXA cephALEXin (KEFLEX) 500 MG capsule    L08.9      Plan: He may have developed a skin infection from the scratching over the last week. It has spontaneously drained and has mild erythema. I will prescribe 1 week of oral antibiotics to assist with resolution of minor wound infection. I advised them to use only soap and water and gauze prn to catch any additional drainage.    Connor Nichole DO      Again, thank you for allowing me to participate in the care of your patient.        Sincerely,        Connor Nichole, DO    "

## 2018-06-21 ENCOUNTER — HOSPITAL ENCOUNTER (OUTPATIENT)
Dept: PHYSICAL THERAPY | Facility: CLINIC | Age: 83
Setting detail: THERAPIES SERIES
End: 2018-06-21
Attending: FAMILY MEDICINE
Payer: MEDICARE

## 2018-06-21 PROCEDURE — 40000718 ZZHC STATISTIC PT DEPARTMENT ORTHO VISIT: Performed by: PHYSICAL THERAPIST

## 2018-06-21 PROCEDURE — 97140 MANUAL THERAPY 1/> REGIONS: CPT | Mod: GP | Performed by: PHYSICAL THERAPIST

## 2018-06-21 PROCEDURE — G8981 BODY POS CURRENT STATUS: HCPCS | Mod: GP,CJ | Performed by: PHYSICAL THERAPIST

## 2018-06-21 PROCEDURE — G8982 BODY POS GOAL STATUS: HCPCS | Mod: GP,CI | Performed by: PHYSICAL THERAPIST

## 2018-06-21 PROCEDURE — 97110 THERAPEUTIC EXERCISES: CPT | Mod: GP | Performed by: PHYSICAL THERAPIST

## 2018-06-21 NOTE — PROGRESS NOTES
"Outpatient Physical Therapy Progress Note     Patient: Bud Merritt  : 1927    Beginning/End Dates of Reporting Period:  4 visits between 18 and 18 for a total of 6 visits    Referring Provider: Dr. Camron Aragon    Therapy Diagnosis: cervical tightness and shoulder tightness, deconditioned     Client Self Report: Bud is here with his wife. He had the L head area checked and there is a swelling but nothing to do for it. He has severe shoulder pain that goes into the neck. He has not had a headache. He fell again. He was riding a tractor where he stands on a platform. It gets tippy on uneven ground. Tierra reports she did not know he had fallen as she heard the machine running. It is unclear how long he laid on ground. He had no injury to report from that. He is thinking one more PT visit. He feels he would feel much better if he had PT every day.     Objective Measurements:  Objective Measure: Cervical AROM: not specifically measured, but is rotating neck to R to approx 50 % of movement, no change in shoulder mobility     Objective Measure: Symptoms  Details: difficult for him to measure. no headache, shoulders very severe especially wtih weather changes.      Goals:  Goal Identifier HEadaches   Goal Description Bud will be able to eliminate his headaches with positioning and ROM exercises (no headaches, cervical and shoulder pain)   Target Date 18   Date Met      Progress:       Progress Toward Goals:   Progress this reporting period: Bud reported he had increased headache with shoulder exercises. We discussed how the shoulder and neck tightness can pull on the head and create a headache. He feels the \"lump\" area on the L side of head above ear is returning. I asked him to schedule appt with doctor to recheck this and to exam following fall. Tierra reports she uses a golf cart to check on him during his walks and had just checked him 5 minutes prior to finding him on the " ground. He did not know how long he had been down.           Plan:  Changes to therapy plan of care: He will complete current home program over the weekend and then decide if he would like to be seen next week or in 2 weeks for follow up.     Discharge:  No    Thank you for referring Bud  to [facility]    Sheila Petersen, PT  [phone]

## 2018-06-21 NOTE — PROGRESS NOTES
"                                                                                [facility]      OUTPATIENT PHYSICAL THERAPY  PLAN OF TREATMENT FOR OUTPATIENT REHABILITATION    Patient's Last Name, First Name, M.I.                YOB: 1927  Bud Merritt                        Provider's Name  [facility] Medical Record No.  [id number]                               Onset Date: 9-4-17 approximately Start of Care Date: 4-13-18   Type:     _X_PT   ___OT   ___SLP Medical Diagnosis: Intractible tension - type Headaches unspecified chronicity pattern                       PT Diagnosis: cervical tightness and shoulder tightness, deconditioned      _________________________________________________________________________________  Plan of Treatment:    Frequency/Duration: 1-2 vists over the next 2 weeks     Goals:  Goal Identifier HEadaches   Goal Description Bud will be able to eliminate his headaches with positioning and ROM exercises (no headaches, cervical and shoulder pain)   Target Date 06/20/18   Date Met      Progress:       Progress Toward Goals:   Progress this reporting period: Bud reported he had increased headache with shoulder exercises. We discussed how the shoulder and neck tightness can pull on the head and create a headache. He feels the \"lump\" area on the L side of head above ear is returning. I asked him to schedule appt with doctor to recheck this and to exam following fall. Tierra reports she uses a golf cart to check on him during his walks and had just checked him 5 minutes prior to finding him on the ground. He did not know how long he had been down.        Certification date from 6-21-18 to 7-4-18  Sheila Petersen, PT          I CERTIFY THE NEED FOR THESE SERVICES FURNISHED UNDER        THIS PLAN OF TREATMENT AND WHILE UNDER MY CARE     (Physician co-signature of this document indicates review and certification of the therapy " plan).                Referring Provider: Dr. Camron Aragon

## 2018-06-25 DIAGNOSIS — M06.9 RHEUMATOID ARTHRITIS INVOLVING MULTIPLE SITES, UNSPECIFIED RHEUMATOID FACTOR PRESENCE: ICD-10-CM

## 2018-06-25 RX ORDER — HYDROCODONE BITARTRATE AND ACETAMINOPHEN 5; 325 MG/1; MG/1
1 TABLET ORAL EVERY 6 HOURS PRN
Qty: 18 TABLET | Refills: 0 | Status: SHIPPED | OUTPATIENT
Start: 2018-06-25 | End: 2018-07-23

## 2018-06-25 RX ORDER — TRAMADOL HYDROCHLORIDE 50 MG/1
TABLET ORAL
Qty: 90 TABLET | Refills: 0 | Status: SHIPPED | OUTPATIENT
Start: 2018-06-25 | End: 2018-07-23

## 2018-06-25 NOTE — TELEPHONE ENCOUNTER
Requested Prescriptions   Pending Prescriptions Disp Refills     HYDROcodone-acetaminophen (NORCO) 5-325 MG per tablet 18 tablet 0     Sig: Take 1 tablet by mouth every 6 hours as needed for pain For moderate to severe pain    There is no refill protocol information for this order   Last Written Prescription Date:  05/24/2018  Last Fill Quantity: 18,  # refills: 0   Last office visit: 3/29/2018 with prescribing provider:  03/29/2018   Future Office Visit:   Next 5 appointments (look out 90 days)     Aug 30, 2018  1:00 PM CDT   Return Visit with CARDIO DEVICE NURSE   [facility] ([facility])    [address] [place] [address]   [phone]            Sep 05, 2018 11:30 AM CDT   Return Visit with Berhane Colon MD   [facility] ([address] [address]   [phone]                      traMADol (ULTRAM) 50 MG tablet 90 tablet 0     Sig: TAKE 1 TABLET 3 TIMES DAILY AS NEEDED FOR MODERATE PAIN    There is no refill protocol information for this order   Last Written Prescription Date:  05/24/2018  Last Fill Quantity: 90,  # refills: 0   Last office visit: 3/29/2018 with prescribing provider:  03/29/2018   Future Office Visit:   Next 5 appointments (look out 90 days)     Aug 30, 2018  1:00 PM CDT   Return Visit with CARDIO DEVICE NURSE   [facility] ([address] [address]   [phone]            Sep 05, 2018 11:30 AM CDT   Return Visit with Berhane Colon MD   [facility] ([address] [address]   [phone]

## 2018-06-26 NOTE — TELEPHONE ENCOUNTER
Signed original RX delivered to [facility]. Jagruti,

## 2018-07-02 NOTE — ADDENDUM NOTE
Encounter addended by: Sheila Petersen PT on: 7/2/2018  1:57 PM<BR>     Actions taken: Sign clinical note, Episode resolved

## 2018-07-02 NOTE — DISCHARGE SUMMARY
Outpatient Physical Therapy Discharge Note     Patient: Bud Merritt  : 1927    Beginning/End Dates of Reporting Period:  6 total visits    Referring Provider: Dr. Camron Aragon    Therapy Diagnosis: cervical tightness and shoulder tightness, deconditioned     Client Self Report: Please see 18 note. He has opted to discontinue PT as it works for the day but he feels he would need PT every day.     Objective Measurements:  PLease see 18 note    Goals:  Goal Identifier HEadaches   Goal Description Bud will be able to eliminate his headaches with positioning and ROM exercises (no headaches, cervical and shoulder pain)   Target Date 18   Date Met      Progress:       Progress Toward Goals:   Progress not assessed this period. See 18 note          Plan:  Discharge from therapy.    Discharge:    Reason for Discharge: Patient chooses to discontinue therapy.    Equipment Issued: none    Discharge Plan: Patient to continue home program.    Thank you for referring Bud  to [facility]    Sheila Petersen, PT  [phone]

## 2018-07-03 DIAGNOSIS — I48.0 PAROXYSMAL ATRIAL FIBRILLATION (H): ICD-10-CM

## 2018-07-05 RX ORDER — DOFETILIDE 0.12 MG/1
CAPSULE ORAL
Qty: 60 CAPSULE | Refills: 1 | Status: SHIPPED | OUTPATIENT
Start: 2018-07-05 | End: 2018-09-04

## 2018-07-05 NOTE — TELEPHONE ENCOUNTER
Dofetilide 125 MCG       Last Written Prescription Date:  5/7/18  Last Fill Quantity: 60,   # refills: 1  Last Office Visit: 3/29/18  Future Office visit:    Next 5 appointments (look out 90 days)     Aug 30, 2018  1:00 PM CDT   Return Visit with CARDIO DEVICE NURSE   [facility] ([facility])    [address] [place] [address]   [phone]            Sep 05, 2018 11:30 AM CDT   Return Visit with Berhane Colon MD   [facility] ([facility])    [address] [place] [address]   [phone]                   Routing refill request to provider for review/approval because:  Drug not on the [facility], [facility] or [facility] refill protocol or controlled substance

## 2018-07-18 ENCOUNTER — OFFICE VISIT (OUTPATIENT)
Dept: FAMILY MEDICINE | Facility: CLINIC | Age: 83
End: 2018-07-18
Payer: COMMERCIAL

## 2018-07-18 VITALS
HEART RATE: 75 BPM | SYSTOLIC BLOOD PRESSURE: 120 MMHG | TEMPERATURE: 97.4 F | WEIGHT: 146 LBS | OXYGEN SATURATION: 98 % | BODY MASS INDEX: 22.87 KG/M2 | DIASTOLIC BLOOD PRESSURE: 72 MMHG

## 2018-07-18 DIAGNOSIS — M05.79 RHEUMATOID ARTHRITIS INVOLVING MULTIPLE SITES WITH POSITIVE RHEUMATOID FACTOR (H): ICD-10-CM

## 2018-07-18 DIAGNOSIS — M25.511 RIGHT SHOULDER PAIN, UNSPECIFIED CHRONICITY: Primary | ICD-10-CM

## 2018-07-18 DIAGNOSIS — I48.0 PAROXYSMAL ATRIAL FIBRILLATION (H): ICD-10-CM

## 2018-07-18 PROCEDURE — 99214 OFFICE O/P EST MOD 30 MIN: CPT | Performed by: FAMILY MEDICINE

## 2018-07-18 ASSESSMENT — PAIN SCALES - GENERAL: PAINLEVEL: NO PAIN (0)

## 2018-07-18 NOTE — PROGRESS NOTES
SUBJECTIVE:   Bud Merritt is a 89 year old male who presents to clinic today for the following health issues:      Possible referral to rheumatology for arthritis  Would like a cortisone shot in right shoulder        Problem list and histories reviewed & adjusted, as indicated.  Additional history: as documented        Reviewed and updated as needed this visit by clinical staff  Tobacco  Allergies  Meds  Med Hx  Surg Hx  Fam Hx  Soc Hx      Reviewed and updated as needed this visit by Provider        SUBJECTIVE:  Bud  is a 89 year old male who presents for: An issue of right shoulder stiffness and discomfort and decreased range of motion.  Been getting worse and worse.  He feels is from years of chainsaw use.  He has rheumatoid arthritis takes prednisone 5 mg a day for this.  Also is on tramadol and occasionally takes a Vicodin.  The Vicodin makes him a little bit loopy.  The tramadol really helps.  But is to the point where shoulder is almost frozen now.  He has been in physical therapy for his neck.  It helps while he is there before the time he gets home he states it is back to baseline.  Multiple medical problems including paroxysmal atrial fibrillation aortic stenosis status post TAVR    Past Medical History:   Diagnosis Date     Anemia      Atrial fibrillation (H) 07/01/2016     Cardiac pacemaker 03/10/2017     Cardiomyopathy (H)      CHF (congestive heart failure) (H)      COPD exacerbation (H) 3/2/2017     Depressive disorder      History of prostate cancer      Paroxysmal atrial fibrillation (H) 7/6/2016     Pure hypercholesterolemia      Rheumatoid arthritis(714.0)      Unspecified essential hypertension      Weakness generalized 3/2/2017     Past Surgical History:   Procedure Laterality Date     ARTHROPLASTY KNEE Left 1/12/2016    Procedure: ARTHROPLASTY KNEE;  Surgeon: Ceasr Walker DO;  Location: [facility] OR     COLONOSCOPY  08/24/09     EXCISE LESION HEAD N/A 4/9/2018     Procedure: EXCISE LESION HEAD;  Excision Left Scalp Lesion;  Surgeon: Connor Nichole DO;  Location: [facility] OR     HC COLONOSCOPY THRU STOMA W BIOPSY/CAUTERY TUMOR/POLYP/LESION  8/31/2004     HC REPAIR ING HERNIA,5+Y/O,REDUCIBL  1996    Marlex mesh repair of bilateral inguinal hernias and drainage of bilateral scrotal hydroceles.     HEART CATH FEMORAL CANNULIZATION WITH OPEN STANDBY REPAIR AORTIC VALVE N/A 9/27/2017    Procedure: HEART CATH FEMORAL CANNULIZATION WITH OPEN STANDBY REPAIR AORTIC VALVE;;  Surgeon: Jaron Alejandra MD;  Location: [facility] OR     IMPLANT PACEMAKER  03/10/2017     IRRIGATION AND DEBRIDEMENT SOFT TISSUE LOWER EXTREMITY, COMBINED Left 3/15/2016    Procedure: COMBINED IRRIGATION AND DEBRIDEMENT SOFT TISSUE LOWER EXTREMITY;  Surgeon: Cesar Walker DO;  Location: [facility] OR     TRANSCATHETER AORTIC VALVE IMPLANT ANESTHESIA N/A 9/27/2017    Procedure: TRANSCATHETER AORTIC VALVE IMPLANT ANESTHESIA;  Right Transfemoral Approach (Harman Ramsey 3 29mm) Aortic Valve Implant,  Cardiopulmonary Bypass Standby, Transthoracic Echocardiogram by Derian Queen(Echo Tech);  Surgeon: GENERIC ANESTHESIA PROVIDER;  Location: [facility] OR     Social History   Substance Use Topics     Smoking status: Former Smoker     Packs/day: 0.50     Years: 15.00     Types: Cigarettes     Quit date: 11/12/1998     Smokeless tobacco: Never Used      Comment: quit 15 yrs ago     Alcohol use No     Current Outpatient Prescriptions   Medication Sig Dispense Refill     acetaminophen (TYLENOL) 325 MG tablet Take 2 tablets (650 mg) by mouth every 4 hours as needed for mild pain, fever or headaches 100 tablet      apixaban ANTICOAGULANT (ELIQUIS) 2.5 MG tablet Take 1 tablet (2.5 mg) by mouth 2 times daily 180 tablet 3     ascorbic acid (VITAMIN C) 500 MG CPCR Take 500 mg by mouth daily       calcium carbonate (OS-SHELIA 500 MG [place]. [place]) 500 MG tablet Take 1,250 mg by mouth daily        dofetilide (TIKOSYN) 125 MCG capsule TAKE ONE  CAPSULE BY MOUTH TWICE A DAY 60 capsule 1     HYDROcodone-acetaminophen (NORCO) 5-325 MG per tablet Take 1 tablet by mouth every 6 hours as needed for other (Moderate to Severe Pain) 10 tablet 0     Leuprolide Acetate (LUPRON DEPOT IM) Inject into the muscle every 6 months Reported on 5/3/2017       lisinopril (PRINIVIL/ZESTRIL) 5 MG tablet Take 1 tablet (5 mg) by mouth daily 90 tablet 3     metoprolol (LOPRESSOR) 25 MG tablet Take 1 tablet (25 mg) by mouth 2 times daily (Patient taking differently: Take 12.5 mg by mouth 2 times daily ) 60 tablet 11     predniSONE (DELTASONE) 5 MG tablet Take 1 tablet (5 mg) by mouth daily 100 tablet 4     SPIRONOLACTONE PO Take 25 mg by mouth daily       tamsulosin (FLOMAX) 0.4 MG capsule Take 1 capsule (0.4 mg) by mouth daily 60 capsule 0     traMADol (ULTRAM) 50 MG tablet TAKE 1 TABLET 3 TIMES DAILY AS NEEDED FOR MODERATE PAIN 90 tablet 0     VITAMIN D, CHOLECALCIFEROL, PO Take 2,000 Units by mouth daily Reported on 5/3/2017       alendronate (FOSAMAX) 70 MG tablet Take 1 tablet (70 mg) by mouth every 7 days Take with over 8 ounces water and stay upright for at least 30 minutes after dose.  Take at least 60 minutes before breakfast (Patient not taking: Reported on 7/18/2018) 12 tablet 3     HYDROcodone-acetaminophen (NORCO) 5-325 MG per tablet Take 1 tablet by mouth every 6 hours as needed for pain For moderate to severe pain 18 tablet 0     ondansetron (ZOFRAN-ODT) 4 MG ODT tab Take 1 tablet (4 mg) by mouth every 6 hours as needed for nausea or vomiting (Patient not taking: Reported on 7/18/2018) 10 tablet 0     polyethylene glycol (MIRALAX/GLYCOLAX) Packet Take 17 g by mouth daily (Patient not taking: Reported on 7/18/2018) 30 packet 0       REVIEW OF SYSTEMS:   5 point ROS negative except as noted above in HPI, including Gen., Resp, CV, GI &  system review.     OBJECTIVE:  Vitals: /72 (Cuff Size: Adult Regular)  Pulse 75  Temp 97.4  F (36.3  C) (Temporal)  Wt 146 lb  (66.2 kg)  SpO2 98%  BMI 22.87 kg/m2  BMI= Body mass index is 22.87 kg/(m^2).  He is alert and oriented.  Appears comfortable.  Both shoulders with very limited range of motion especially the right.  Very limited abduction.  No real tenderness.  Neck movement slightly limited.  Arthritic deformities of the hands.  Minimal dependent edema at this time.    ASSESSMENT:  Right shoulder pain #2 rheumatoid arthritis involving multiple sites #3 paroxysmal atrial fibrillation    PLAN:  Very complex medical  problems with him.  He is concerned about his shoulder limiting what he can do and even his ability to walk with a cane.  He was talking about seeing rheumatology but I think I like to have him see our sports medicine provider here.  He is on Eliquis so nonsteroidals are mostly contraindicated in him.  Okay to continue on the tramadol and Vicodin for now.  He uses very little of the Vicodin.      Camron Aragon MD  [facility]

## 2018-07-18 NOTE — MR AVS SNAPSHOT
After Visit Summary   7/18/2018    Bud Merritt    MRN: [id number]           Patient Information     Date Of Birth          5/9/1927        Visit Information        Provider Department      7/18/2018 11:00 AM Camron Aragon MD [facility]        Today's Diagnoses     Right shoulder pain    -  1       Follow-ups after your visit        Additional Services     SPORTS MEDICINE REFERRAL       Your provider has referred you to:  [facility]: [facility] [phone]   [email or web address]    Please be aware that coverage of these services is subject to the terms and limitations of your health insurance plan.  Call member services at your health plan with any benefit or coverage questions.      Please bring the following to your appointment:    >>   Any x-rays, CTs or MRIs which have been performed.  Contact the facility where they were done to arrange for  prior to your scheduled appointment.    >>   List of current medications   >>   This referral request   >>   Any documents/labs given to you for this referral                  Your next 10 appointments already scheduled     Jul 24, 2018 11:20 AM CDT   New Visit with Sejal Garcia MD   [facility] ([facility]    [address] [place] [address]   [phone]            Aug 30, 2018 12:45 PM CDT   LAB with [facility] [address] [address]   [phone]           Please do not eat 10-12 hours before your appointment if you are coming in fasting for labs on lipids, cholesterol, or glucose (sugar). This does not apply to pregnant women. Water, hot tea and black coffee (with nothing added) are okay. Do not drink other fluids, diet soda or chew gum.            Aug 30, 2018  1:00 PM CDT   Return Visit with CARDIO DEVICE NURSE   [facility]  [facility] ([facility])    [address] [facility] [address]   [phone]            Aug 30, 2018  2:00 PM CDT   Ech Complete with PHECHR1   [facility] ([facility])    [address] [place] [address] Dr Garcia [place] [address] [phone]           1.  Please bring or wear a comfortable two-piece outfit. 2.  You may eat, drink and take your normal medicines. 3.  For any questions that cannot be answered, please contact the ordering physician 4.  Please do not wear perfumes or scented lotions on the day of your exam.            Sep 05, 2018 11:30 AM CDT   Return Visit with Berhane Colon MD   [facility] ([facility])    [address] [place] [address] [phone]              Who to contact     If you have questions or need follow up information about today's clinic visit or your schedule please contact [facility] directly at [phone].  Normal or non-critical lab and imaging results will be communicated to you by MyChart, letter or phone within 4 business days after the clinic has received the results. If you do not hear from us within 7 days, please contact the clinic through MyChart or phone. If you have a critical or abnormal lab result, we will notify you by phone as soon as possible.  Submit refill requests through [employer] or call your pharmacy and they will forward the refill request to us. Please allow 3 business days for your refill to be completed.          Additional Information About Your Visit        Care EveryWhere ID     This is your Care EveryWhere ID. This could be used by other organizations to access your [facility] medical records  [id number]        Your Vitals Were     Pulse Temperature Pulse Oximetry BMI (Body Mass Index)          75 97.4  F (36.3  C) (Temporal) 98% 22.87 kg/m2         Blood Pressure from Last 3  Encounters:   07/18/18 120/72   06/13/18 90/58   04/09/18 166/90    Weight from Last 3 Encounters:   07/18/18 146 lb (66.2 kg)   06/13/18 149 lb (67.6 kg)   04/18/18 149 lb (67.6 kg)              We Performed the Following     SPORTS MEDICINE REFERRAL          Today's Medication Changes          These changes are accurate as of 7/18/18 11:37 AM.  If you have any questions, ask your nurse or doctor.               These medicines have changed or have updated prescriptions.        Dose/Directions    metoprolol tartrate 25 MG tablet   Commonly known as:  LOPRESSOR   This may have changed:  how much to take   Used for:  S/P TAVR (transcatheter aortic valve replacement)        Dose:  25 mg   Take 1 tablet (25 mg) by mouth 2 times daily   Quantity:  60 tablet   Refills:  11                Primary Care Provider Office Phone # Fax #    Camron Aragon [phone]       [address] [facility] [address]        Equal Access to Services     JUAN CLINE AH: Hadii aad ku hadasho Soomaali, waaxda luqadaha, qaybta kaalmada adeegyada, waxay perfectoin hayrosan gomez real . So [facility] [phone].    ATENCIÓN: Si habla español, tiene a mon disposición servicios gratuitos de asistencia lingüística. [facility] [phone].    We comply with applicable federal civil rights laws and [place] laws. We do not discriminate on the basis of race, color, national origin, age, disability, sex, sexual orientation, or gender identity.            Thank you!     Thank you for choosing [facility]  for your care. Our goal is always to provide you with excellent care. Hearing back from our patients is one way we can continue to improve our services. Please take a few minutes to complete the written survey that you may receive in the mail after your visit with us. Thank you!             Your Updated Medication List - Protect others around you: Learn how to safely use, store and throw away your medicines at  www.disposemymeds.org.          This list is accurate as of 7/18/18 11:37 AM.  Always use your most recent med list.                   Brand Name Dispense Instructions for use Diagnosis    acetaminophen 325 MG tablet    TYLENOL    100 tablet    Take 2 tablets (650 mg) by mouth every 4 hours as needed for mild pain, fever or headaches        alendronate 70 MG tablet    FOSAMAX    12 tablet    Take 1 tablet (70 mg) by mouth every 7 days Take with over 8 ounces water and stay upright for at least 30 minutes after dose.  Take at least 60 minutes before breakfast    Hyperlipidemia LDL goal <130       apixaban ANTICOAGULANT 2.5 MG tablet    ELIQUIS    180 tablet    Take 1 tablet (2.5 mg) by mouth 2 times daily    Paroxysmal atrial fibrillation (H)       ascorbic acid 500 MG Cpcr CR capsule    vitamin C     Take 500 mg by mouth daily        calcium carbonate 500 MG tablet    OS-SHELIA 500 mg [place]. [place]     Take 1,250 mg by mouth daily        dofetilide 125 MCG capsule    TIKOSYN    60 capsule    TAKE ONE CAPSULE BY MOUTH TWICE A DAY    Paroxysmal atrial fibrillation (H)       * HYDROcodone-acetaminophen 5-325 MG per tablet    NORCO    10 tablet    Take 1 tablet by mouth every 6 hours as needed for other (Moderate to Severe Pain)    Scalp lesion       * HYDROcodone-acetaminophen 5-325 MG per tablet    NORCO    18 tablet    Take 1 tablet by mouth every 6 hours as needed for pain For moderate to severe pain    Rheumatoid arthritis involving multiple sites, unspecified rheumatoid factor presence (H)       lisinopril 5 MG tablet    PRINIVIL/ZESTRIL    90 tablet    Take 1 tablet (5 mg) by mouth daily    Acute on chronic systolic congestive heart failure (H)       LUPRON DEPOT IM      Inject into the muscle every 6 months Reported on 5/3/2017        metoprolol tartrate 25 MG tablet    LOPRESSOR    60 tablet    Take 1 tablet (25 mg) by mouth 2 times daily    S/P TAVR (transcatheter aortic valve replacement)       ondansetron 4 MG ODT  tab    ZOFRAN-ODT    10 tablet    Take 1 tablet (4 mg) by mouth every 6 hours as needed for nausea or vomiting    Nausea       polyethylene glycol Packet    MIRALAX/GLYCOLAX    30 packet    Take 17 g by mouth daily    Constipation, unspecified constipation type       predniSONE 5 MG tablet    DELTASONE    100 tablet    Take 1 tablet (5 mg) by mouth daily    Rheumatoid arthritis involving multiple sites with positive rheumatoid factor (H)       SPIRONOLACTONE PO      Take 25 mg by mouth daily        tamsulosin 0.4 MG capsule    FLOMAX    60 capsule    Take 1 capsule (0.4 mg) by mouth daily    S/P TAVR (transcatheter aortic valve replacement)       traMADol 50 MG tablet    ULTRAM    90 tablet    TAKE 1 TABLET 3 TIMES DAILY AS NEEDED FOR MODERATE PAIN    Rheumatoid arthritis involving multiple sites, unspecified rheumatoid factor presence (H)       VITAMIN D (CHOLECALCIFEROL) PO      Take 2,000 Units by mouth daily Reported on 5/3/2017        * Notice:  This list has 2 medication(s) that are the same as other medications prescribed for you. Read the directions carefully, and ask your doctor or other care provider to review them with you.

## 2018-07-23 DIAGNOSIS — M06.9 RHEUMATOID ARTHRITIS INVOLVING MULTIPLE SITES, UNSPECIFIED RHEUMATOID FACTOR PRESENCE: ICD-10-CM

## 2018-07-23 RX ORDER — HYDROCODONE BITARTRATE AND ACETAMINOPHEN 5; 325 MG/1; MG/1
1 TABLET ORAL EVERY 6 HOURS PRN
Qty: 18 TABLET | Refills: 0 | Status: SHIPPED | OUTPATIENT
Start: 2018-07-23 | End: 2018-08-15

## 2018-07-23 RX ORDER — TRAMADOL HYDROCHLORIDE 50 MG/1
TABLET ORAL
Qty: 90 TABLET | Refills: 0 | Status: SHIPPED | OUTPATIENT
Start: 2018-07-23 | End: 2018-08-15

## 2018-07-23 NOTE — TELEPHONE ENCOUNTER
Requested Prescriptions   Pending Prescriptions Disp Refills     traMADol (ULTRAM) 50 MG tablet 90 tablet 0     Sig: TAKE 1 TABLET 3 TIMES DAILY AS NEEDED FOR MODERATE PAIN    There is no refill protocol information for this order   Last Written Prescription Date:  06/25/2018  Last Fill Quantity: 90,  # refills: 0   Last office visit: 7/18/2018 with prescribing provider:  07/18/2018   Future Office Visit:   Next 5 appointments (look out 90 days)     Aug 30, 2018  1:00 PM CDT   Return Visit with CARDIO DEVICE NURSE   [facility] ([facility])    [address] [place] [id number]   [phone]            Sep 05, 2018 11:30 AM CDT   Return Visit with Berhane Colon MD   [facility] ([facility])    [address] [place] [phone]   [phone]                      HYDROcodone-acetaminophen (NORCO) 5-325 MG per tablet 18 tablet 0     Sig: Take 1 tablet by mouth every 6 hours as needed for pain For moderate to severe pain    There is no refill protocol information for this order      Last Written Prescription Date:  06/25/2018  Last Fill Quantity: 18,  # refills: 0   Last office visit: 7/18/2018 with prescribing provider:  07/18/2018   Future Office Visit:   Next 5 appointments (look out 90 days)     Aug 30, 2018  1:00 PM CDT   Return Visit with CARDIO DEVICE NURSE   [facility] ([facility])    [address] [place] [id number]   [phone]            Sep 05, 2018 11:30 AM CDT   Return Visit with Berhane Colon MD   [facility] ([facility])    [address] [place] [id number]   [phone]

## 2018-07-24 ENCOUNTER — RADIANT APPOINTMENT (OUTPATIENT)
Dept: GENERAL RADIOLOGY | Facility: CLINIC | Age: 83
End: 2018-07-24
Attending: PHYSICAL MEDICINE & REHABILITATION
Payer: COMMERCIAL

## 2018-07-24 ENCOUNTER — OFFICE VISIT (OUTPATIENT)
Dept: ORTHOPEDICS | Facility: CLINIC | Age: 83
End: 2018-07-24
Payer: MEDICARE

## 2018-07-24 VITALS
HEIGHT: 67 IN | HEART RATE: 77 BPM | SYSTOLIC BLOOD PRESSURE: 101 MMHG | WEIGHT: 146 LBS | BODY MASS INDEX: 22.91 KG/M2 | DIASTOLIC BLOOD PRESSURE: 51 MMHG

## 2018-07-24 DIAGNOSIS — M25.511 CHRONIC PAIN OF BOTH SHOULDERS: Primary | ICD-10-CM

## 2018-07-24 DIAGNOSIS — M25.512 CHRONIC PAIN OF BOTH SHOULDERS: Primary | ICD-10-CM

## 2018-07-24 DIAGNOSIS — G89.29 CHRONIC PAIN OF BOTH SHOULDERS: ICD-10-CM

## 2018-07-24 DIAGNOSIS — G89.29 CHRONIC PAIN OF BOTH SHOULDERS: Primary | ICD-10-CM

## 2018-07-24 DIAGNOSIS — M25.511 CHRONIC PAIN OF BOTH SHOULDERS: ICD-10-CM

## 2018-07-24 DIAGNOSIS — M19.011 PRIMARY OSTEOARTHRITIS OF BOTH SHOULDERS: ICD-10-CM

## 2018-07-24 DIAGNOSIS — M25.512 CHRONIC PAIN OF BOTH SHOULDERS: ICD-10-CM

## 2018-07-24 DIAGNOSIS — M19.012 PRIMARY OSTEOARTHRITIS OF BOTH SHOULDERS: ICD-10-CM

## 2018-07-24 PROCEDURE — 99203 OFFICE O/P NEW LOW 30 MIN: CPT | Mod: 25 | Performed by: PHYSICAL MEDICINE & REHABILITATION

## 2018-07-24 PROCEDURE — 20610 DRAIN/INJ JOINT/BURSA W/O US: CPT | Mod: RT | Performed by: PHYSICAL MEDICINE & REHABILITATION

## 2018-07-24 PROCEDURE — 73030 X-RAY EXAM OF SHOULDER: CPT | Mod: TC

## 2018-07-24 RX ORDER — TRIAMCINOLONE ACETONIDE 40 MG/ML
40 INJECTION, SUSPENSION INTRA-ARTICULAR; INTRAMUSCULAR ONCE
Qty: 1 ML | Refills: 0 | OUTPATIENT
Start: 2018-07-24 | End: 2018-07-24

## 2018-07-24 NOTE — MR AVS SNAPSHOT
After Visit Summary   7/24/2018    Bud Merritt    MRN: [id number]           Patient Information     Date Of Birth          5/9/1927        Visit Information        Provider Department      7/24/2018 11:20 AM Sejal Garcia MD [facility]        Today's Diagnoses     Chronic pain of both shoulders    -  1    Primary osteoarthritis of both shoulders          Care Instructions    -Steroid injection performed today.  Take it easy over the next few days. Keep in mind that the steroid may take up to 3 days to start working and up to 2 weeks to reach maximal effect.  Ice 15-20 minutes as needed for soreness.  Patient's preferred over the counter medication as needed for pain as directed on packaging.    -Follow up as needed if symptoms fail to improve or worsen.  Please call with questions or concerns.                Follow-ups after your visit        Your next 10 appointments already scheduled     Aug 30, 2018 12:45 PM CDT   LAB with NL LAB PMC   [facility]    [address] [place] [id number] [phone]           Please do not eat 10-12 hours before your appointment if you are coming in fasting for labs on lipids, cholesterol, or glucose (sugar). This does not apply to pregnant women. Water, hot tea and black coffee (with nothing added) are okay. Do not drink other fluids, diet soda or chew gum.            Aug 30, 2018  1:00 PM CDT   Return Visit with CARDIO DEVICE NURSE   [facility] ([facility])    [address] [place] [id number] [phone]            Aug 30, 2018  2:00 PM CDT   Ech Complete with BERTHA   [facility] ([facility])    [address] [place] [address]  [place] [address] [phone]           1.  Please bring or wear a comfortable two-piece outfit. 2.  You may eat, drink and take your normal  "medicines. 3.  For any questions that cannot be answered, please contact the ordering physician 4.  Please do not wear perfumes or scented lotions on the day of your exam.            Sep 05, 2018 11:30 AM CDT   Return Visit with Berhane Colon MD   [facility] ([facility])    [address] [place] [id number] [phone]              Who to contact     If you have questions or need follow up information about today's clinic visit or your schedule please contact [facility] directly at [phone].  Normal or non-critical lab and imaging results will be communicated to you by MyChart, letter or phone within 4 business days after the clinic has received the results. If you do not hear from us within 7 days, please contact the clinic through MyChart or phone. If you have a critical or abnormal lab result, we will notify you by phone as soon as possible.  Submit refill requests through [employer] or call your pharmacy and they will forward the refill request to us. Please allow 3 business days for your refill to be completed.          Additional Information About Your Visit        Care EveryWhere ID     This is your Care EveryWhere ID. This could be used by other organizations to access your [place] medical records  [id number]        Your Vitals Were     Pulse Height BMI (Body Mass Index)             77 5' 7\" (1.702 m) 22.87 kg/m2          Blood Pressure from Last 3 Encounters:   07/24/18 101/51   07/18/18 120/72   06/13/18 90/58    Weight from Last 3 Encounters:   07/24/18 146 lb (66.2 kg)   07/18/18 146 lb (66.2 kg)   06/13/18 149 lb (67.6 kg)                 Today's Medication Changes          These changes are accurate as of 7/24/18 11:41 AM.  If you have any questions, ask your nurse or doctor.               These medicines have changed or have updated prescriptions.        Dose/Directions    metoprolol tartrate 25 MG tablet "   Commonly known as:  LOPRESSOR   This may have changed:  how much to take   Used for:  S/P TAVR (transcatheter aortic valve replacement)        Dose:  25 mg   Take 1 tablet (25 mg) by mouth 2 times daily   Quantity:  60 tablet   Refills:  11                Primary Care Provider Office Phone # Fax #    Camron Aragon [phone]       [address] [facility] [phone]        Equal Access to Services     JUAN CLINE : Hadii aad ku hadasho Soomaali, waaxda luqadaha, qaybta kaalmada adeegyada, waxay idiin hayaan adeeg khrafsh laisaiah . So [facility] [phone].    ATENCIÓN: Si habla español, tiene a mon disposición servicios gratuitos de asistencia lingüística. Davioname al [phone].    We comply with applicable federal civil rights laws and [place] laws. We do not discriminate on the basis of race, color, national origin, age, disability, sex, sexual orientation, or gender identity.            Thank you!     Thank you for choosing [facility]  for your care. Our goal is always to provide you with excellent care. Hearing back from our patients is one way we can continue to improve our services. Please take a few minutes to complete the written survey that you may receive in the mail after your visit with us. Thank you!             Your Updated Medication List - Protect others around you: Learn how to safely use, store and throw away your medicines at www.disposemymeds.org.          This list is accurate as of 7/24/18 11:41 AM.  Always use your most recent med list.                   Brand Name Dispense Instructions for use Diagnosis    acetaminophen 325 MG tablet    TYLENOL    100 tablet    Take 2 tablets (650 mg) by mouth every 4 hours as needed for mild pain, fever or headaches        alendronate 70 MG tablet    FOSAMAX    12 tablet    Take 1 tablet (70 mg) by mouth every 7 days Take with over 8 ounces water and stay upright for at least 30 minutes after dose.  Take at  least 60 minutes before breakfast    Hyperlipidemia LDL goal <130       apixaban ANTICOAGULANT 2.5 MG tablet    ELIQUIS    180 tablet    Take 1 tablet (2.5 mg) by mouth 2 times daily    Paroxysmal atrial fibrillation (H)       ascorbic acid 500 MG Cpcr CR capsule    vitamin C     Take 500 mg by mouth daily        calcium carbonate 500 MG tablet    OS-SHELIA 500 mg [place]. Ca     Take 1,250 mg by mouth daily        dofetilide 125 MCG capsule    TIKOSYN    60 capsule    TAKE ONE CAPSULE BY MOUTH TWICE A DAY    Paroxysmal atrial fibrillation (H)       * HYDROcodone-acetaminophen 5-325 MG per tablet    NORCO    10 tablet    Take 1 tablet by mouth every 6 hours as needed for other (Moderate to Severe Pain)    Scalp lesion       * HYDROcodone-acetaminophen 5-325 MG per tablet    NORCO    18 tablet    Take 1 tablet by mouth every 6 hours as needed for pain For moderate to severe pain    Rheumatoid arthritis involving multiple sites, unspecified rheumatoid factor presence (H)       lisinopril 5 MG tablet    PRINIVIL/ZESTRIL    90 tablet    Take 1 tablet (5 mg) by mouth daily    Acute on chronic systolic congestive heart failure (H)       LUPRON DEPOT IM      Inject into the muscle every 6 months Reported on 5/3/2017        metoprolol tartrate 25 MG tablet    LOPRESSOR    60 tablet    Take 1 tablet (25 mg) by mouth 2 times daily    S/P TAVR (transcatheter aortic valve replacement)       ondansetron 4 MG ODT tab    ZOFRAN-ODT    10 tablet    Take 1 tablet (4 mg) by mouth every 6 hours as needed for nausea or vomiting    Nausea       polyethylene glycol Packet    MIRALAX/GLYCOLAX    30 packet    Take 17 g by mouth daily    Constipation, unspecified constipation type       predniSONE 5 MG tablet    DELTASONE    100 tablet    Take 1 tablet (5 mg) by mouth daily    Rheumatoid arthritis involving multiple sites with positive rheumatoid factor (H)       SPIRONOLACTONE PO      Take 25 mg by mouth daily        tamsulosin 0.4 MG capsule     FLOMAX    60 capsule    Take 1 capsule (0.4 mg) by mouth daily    S/P TAVR (transcatheter aortic valve replacement)       traMADol 50 MG tablet    ULTRAM    90 tablet    TAKE 1 TABLET 3 TIMES DAILY AS NEEDED FOR MODERATE PAIN    Rheumatoid arthritis involving multiple sites, unspecified rheumatoid factor presence (H)       VITAMIN D (CHOLECALCIFEROL) PO      Take 2,000 Units by mouth daily Reported on 5/3/2017        * Notice:  This list has 2 medication(s) that are the same as other medications prescribed for you. Read the directions carefully, and ask your doctor or other care provider to review them with you.

## 2018-07-24 NOTE — PATIENT INSTRUCTIONS
-Steroid injection performed today.  Take it easy over the next few days. Keep in mind that the steroid may take up to 3 days to start working and up to 2 weeks to reach maximal effect.  Ice 15-20 minutes as needed for soreness.  Patient's preferred over the counter medication as needed for pain as directed on packaging.    -Follow up as needed if symptoms fail to improve or worsen.  Please call with questions or concerns.

## 2018-07-24 NOTE — LETTER
7/24/2018         RE: Bud Merritt  [address] [facility] [phone]        Dear Colleague,    Thank you for referring your patient, Bud Merritt, to the [facility]. Please see a copy of my visit note below.    Sports Medicine Clinic Visit    PCP: Camron Aragon    CC: Patient presents with:  Right Shoulder - Pain      HPI:  Bud Merritt is a 89 year old male who is seen in consultation at the request of Dr. Aragon.   He notes bilateral shoulder pain, right is worse than the left, that began 1 year ago with insidious onset. He notes pain under the scapula that radiates to the elbow and up to the neck. He rates the pain at a  9/10 at its worst and a 4/10 currently.  Symptoms are relieved with hydrocodone and heat and vibrating massager helps temporarily. Symptoms are worsened by lifting his arms, weather, and certain movements. He endorses popping, grinding and weakness.   He denies swelling, bruising, catching, locking, instability, numbness and tingling.  Other treatment has included nothing.  He notes difficulty with lifting his arms and lifting anything with weight. He notes he gets headaches from the neck tightness.       Review of Systems:  Musculoskeletal: as above  Remainder of review of systems is negative including constitutional, eyes, ENT, CV, pulmonary, GI, , endocrine, skin, hematologic, and neurologic except as noted in HPI or medical history.    History reviewed. No pertinent past surgical/medical/family/social history other than as mentioned in HPI.    Patient Active Problem List   Diagnosis     Hypertrophy of prostate without urinary obstruction     Rheumatoid arthritis involving multiple sites with positive rheumatoid factor (H)     Hyperlipidemia LDL goal <130     Osteoporosis     Encounter for long-term current use of medication     Elevated prostate specific antigen (PSA)     Rheumatoid arthritis involving multiple sites, unspecified rheumatoid factor  presence (H)     Status post total left knee replacement     Mitral regurgitation and aortic stenosis - AS severe by echo on 3/2017.  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     Postoperative delirium     Infected superficial injury of knee, left, subsequent encounter     Essential hypertension with goal blood pressure less than 140/90     Paroxysmal atrial fibrillation (H)     Visit for monitoring Tikosyn therapy     Diarrhea     Weakness generalized     Acute cystitis without hematuria     COPD exacerbation (H)     Influenza A     Acute respiratory failure with hypoxia (H)     Atrial fibrillation with rapid ventricular response (H)     Shock (H)     Atrial fibrillation (H) - s/p AVN ablation and pacemaker     Malignant neoplasm of prostate (H)     Cellulitis of right lower extremity     Venous stasis ulcers of both lower extremities (H)     Anemia     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[employer], Bi-Ventricular- dependent     Dilated cardiomyopathy (H) dilated LV, EF 55% after TAVR      Chronic systolic congestive heart failure (H)     Immunosuppression (H)     S/P AV lisandro ablation     Aortic stenosis, severe     S/P TAVR (transcatheter aortic valve replacement)     Weakness     Nausea     Leukopenia, unspecified type     Hyponatremia     Viral URI with cough     Constipation     Past Medical History:   Diagnosis Date     Anemia      Atrial fibrillation (H) 07/01/2016     Cardiac pacemaker 03/10/2017     Cardiomyopathy (H)      CHF (congestive heart failure) (H)      COPD exacerbation (H) 3/2/2017     Depressive disorder      History of prostate cancer      Paroxysmal atrial fibrillation (H) 7/6/2016     Pure hypercholesterolemia      Rheumatoid arthritis(714.0)      Unspecified essential hypertension      Weakness generalized 3/2/2017     Past Surgical History:   Procedure Laterality Date     ARTHROPLASTY KNEE Left 1/12/2016    Procedure: ARTHROPLASTY KNEE;  Surgeon: Cesar Walker DO;  Location: [facility] OR     COLONOSCOPY  08/24/09      EXCISE LESION HEAD N/A 4/9/2018    Procedure: EXCISE LESION HEAD;  Excision Left Scalp Lesion;  Surgeon: Connor Nichole DO;  Location: [facility] OR     HC COLONOSCOPY THRU STOMA W BIOPSY/CAUTERY TUMOR/POLYP/LESION  8/31/2004     HC REPAIR ING HERNIA,5+Y/O,REDUCIBL  1996    Marlex mesh repair of bilateral inguinal hernias and drainage of bilateral scrotal hydroceles.     HEART CATH FEMORAL CANNULIZATION WITH OPEN STANDBY REPAIR AORTIC VALVE N/A 9/27/2017    Procedure: HEART CATH FEMORAL CANNULIZATION WITH OPEN STANDBY REPAIR AORTIC VALVE;;  Surgeon: Jaron Alejandra MD;  Location: [facility] OR     IMPLANT PACEMAKER  03/10/2017     IRRIGATION AND DEBRIDEMENT SOFT TISSUE LOWER EXTREMITY, COMBINED Left 3/15/2016    Procedure: COMBINED IRRIGATION AND DEBRIDEMENT SOFT TISSUE LOWER EXTREMITY;  Surgeon: Cesar Walker DO;  Location: [facility] OR     TRANSCATHETER AORTIC VALVE IMPLANT ANESTHESIA N/A 9/27/2017    Procedure: TRANSCATHETER AORTIC VALVE IMPLANT ANESTHESIA;  Right Transfemoral Approach (Harman Ramsey 3 29mm) Aortic Valve Implant,  Cardiopulmonary Bypass Standby, Transthoracic Echocardiogram by Derian Queen(Echo Tech);  Surgeon: GENERIC ANESTHESIA PROVIDER;  Location: [facility] OR     Family History   Problem Relation Age of Onset     Cancer Mother      Cancer Son      Unknown/Adopted Father      Alcoholism Brother      Social History     Social History     Marital status:      Spouse name: N/A     Number of children: N/A     Years of education: N/A     Occupational History     Not on file.     Social History Main Topics     Smoking status: Former Smoker     Packs/day: 0.50     Years: 15.00     Types: Cigarettes     Quit date: 11/12/1998     Smokeless tobacco: Never Used      Comment: quit 15 yrs ago     Alcohol use No     Drug use: No     Sexual activity: Yes     Other Topics Concern     Caffeine Concern Yes     8 cups coffee day     Sleep Concern Yes     Weight Concern Yes     weight loss     Special Diet  "No     Exercise Yes     Vaybee daily     Social History Narrative       Current Outpatient Prescriptions   Medication     HYDROcodone-acetaminophen (NORCO) 5-325 MG per tablet     predniSONE (DELTASONE) 5 MG tablet     acetaminophen (TYLENOL) 325 MG tablet     alendronate (FOSAMAX) 70 MG tablet     apixaban ANTICOAGULANT (ELIQUIS) 2.5 MG tablet     ascorbic acid (VITAMIN C) 500 MG CPCR     calcium carbonate (OS-SHELIA 500 MG [place]. [place]) 500 MG tablet     dofetilide (TIKOSYN) 125 MCG capsule     HYDROcodone-acetaminophen (NORCO) 5-325 MG per tablet     Leuprolide Acetate (LUPRON DEPOT IM)     lisinopril (PRINIVIL/ZESTRIL) 5 MG tablet     metoprolol (LOPRESSOR) 25 MG tablet     ondansetron (ZOFRAN-ODT) 4 MG ODT tab     polyethylene glycol (MIRALAX/GLYCOLAX) Packet     SPIRONOLACTONE PO     tamsulosin (FLOMAX) 0.4 MG capsule     traMADol (ULTRAM) 50 MG tablet     VITAMIN D, CHOLECALCIFEROL, PO     No current facility-administered medications for this visit.      Allergies   Allergen Reactions     Amiodarone Other (See Comments)     Drop in DLCO     Lasix [Furosemide] Blisters     Blisters and sores in his mouth.          Objective:  /51  Pulse 77  Ht 5' 7\" (1.702 m)  Wt 146 lb (66.2 kg)  BMI 22.87 kg/m2    General: Alert and in no distress, frail appearing  Head: Normocephalic, atraumatic  Eyes: no scleral icterus or conjunctival erythema   Oropharynx:  Mucous membranes moist  CV: regular rhythm by palpation, 2+ distal pulses  Resp: normal respiratory effort without conversational dyspnea   Psych: normal mood and affect    Gait: Antalgic, uses a cane, in a wheelchair today in office  Neuro: Motor strength and sensation as noted below    Musculoskeletal:    Bilateral Shoulder exam    Inspection and Posture:       rounded shoulders and upper back    Skin:        no visible deformities    Palpation:  -Diffusely tender over the bilateral shoulders and periscapular muscles.      ROM:        Active shoulder " abduction and flexion 30 degrees bilaterally, shoulder hiking with this.  External rotation is decreased bilaterally.      Painful motions:  -Shoulder abduction, flexion, and external rotation are painful bilaterally    Strength:        shoulder abduction <3/5 bilaterally       shoulder flexion <3/5 bilaterally       elbow flexion 4/5 bilaterally       elbow extension 4/5 bilaterally       forearm pronation 4+/5 bilaterally       forearm supination 4+/5 bilaterally       wrist flexion 5-/5 bilaterally       wrist extension 5-/5 bilaterally        strength 5-/5 bilaterally       finger abduction 5-/5 bilaterally    Sensation:        normal sensation over shoulder and upper extremity       Radiology:  X-rays ordered and independent visualization of images performed.  Shows severe degenerative changes of the bilateral shoulders.  Full radiology report to follow.      Procedure:  Discussed steroid injection, including risks, potential benefits, and alternatives.  The patient expressed understanding.  Obtained verbal and written consent and the patient elected to proceed.  Procedure: A steroid injection was performed under aseptic technique at the right subacromial space using 2 mL of 1% plain Lidocaine, 2 mL of 0.5% Marcaine, and 1 mL of 40 mg/mL Kenalog.  Bandage applied. This was well tolerated.      Assessment:  1. Chronic pain of both shoulders    2. Primary osteoarthritis of both shoulders        Plan:  Discussed the assessment with the patient and developed a plan together:  -Steroid injection performed today.  Take it easy over the next few days. Keep in mind that the steroid may take up to 3 days to start working and up to 2 weeks to reach maximal effect.  Ice 15-20 minutes as needed for soreness.  Patient's preferred over the counter medication as needed for pain as directed on packaging.    -Follow up as needed if symptoms fail to improve or worsen.  Please call with questions or concerns.          Suzy  MD [place], [facility]  [facility]        Again, thank you for allowing me to participate in the care of your patient.        Sincerely,        Sejal Garcia MD

## 2018-07-24 NOTE — PROGRESS NOTES
Appropriate assistive devices provided during their visit. yes (Yes, No, N/A) wheelchair and cane (list device)    Exam table and/or cart  placed in the lowest position. yes (Yes, No, N/A)    Brakes on tables/carts/wheelchairs used at all times. yes (Yes, No, N/A)    Non slip footwear applied. na (Yes, No, NA)    Patient was accompanied by staff throughout visit. yes (Yes, No, N/A)    Equipment safety straps used. na (Yes, No, N/A)    Assist with toileting. na (Yes, No, N/A)  Genie Ramsay  7/24/2018  11:37 AM

## 2018-07-24 NOTE — PROGRESS NOTES
Sports Medicine Clinic Visit    PCP: Camron Aragon    CC: Patient presents with:  Right Shoulder - Pain      HPI:  Bud Merritt is a 89 year old male who is seen in consultation at the request of Dr. Aragon.   He notes bilateral shoulder pain, right is worse than the left, that began 1 year ago with insidious onset. He notes pain under the scapula that radiates to the elbow and up to the neck. He rates the pain at a  9/10 at its worst and a 4/10 currently.  Symptoms are relieved with hydrocodone and heat and vibrating massager helps temporarily. Symptoms are worsened by lifting his arms, weather, and certain movements. He endorses popping, grinding and weakness.   He denies swelling, bruising, catching, locking, instability, numbness and tingling.  Other treatment has included nothing.  He notes difficulty with lifting his arms and lifting anything with weight. He notes he gets headaches from the neck tightness.       Review of Systems:  Musculoskeletal: as above  Remainder of review of systems is negative including constitutional, eyes, ENT, CV, pulmonary, GI, , endocrine, skin, hematologic, and neurologic except as noted in HPI or medical history.    History reviewed. No pertinent past surgical/medical/family/social history other than as mentioned in HPI.    Patient Active Problem List   Diagnosis     Hypertrophy of prostate without urinary obstruction     Rheumatoid arthritis involving multiple sites with positive rheumatoid factor (H)     Hyperlipidemia LDL goal <130     Osteoporosis     Encounter for long-term current use of medication     Elevated prostate specific antigen (PSA)     Rheumatoid arthritis involving multiple sites, unspecified rheumatoid factor presence (H)     Status post total left knee replacement     Mitral regurgitation and aortic stenosis - AS severe by echo on 3/2017.  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     Postoperative delirium     Infected superficial injury of knee, left, subsequent encounter      Essential hypertension with goal blood pressure less than 140/90     Paroxysmal atrial fibrillation (H)     Visit for monitoring Tikosyn therapy     Diarrhea     Weakness generalized     Acute cystitis without hematuria     COPD exacerbation (H)     Influenza A     Acute respiratory failure with hypoxia (H)     Atrial fibrillation with rapid ventricular response (H)     Shock (H)     Atrial fibrillation (H) - s/p AVN ablation and pacemaker     Malignant neoplasm of prostate (H)     Cellulitis of right lower extremity     Venous stasis ulcers of both lower extremities (H)     Anemia     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[employer], Bi-Ventricular- dependent     Dilated cardiomyopathy (H) dilated LV, EF 55% after TAVR      Chronic systolic congestive heart failure (H)     Immunosuppression (H)     S/P AV lisandro ablation     Aortic stenosis, severe     S/P TAVR (transcatheter aortic valve replacement)     Weakness     Nausea     Leukopenia, unspecified type     Hyponatremia     Viral URI with cough     Constipation     Past Medical History:   Diagnosis Date     Anemia      Atrial fibrillation (H) 07/01/2016     Cardiac pacemaker 03/10/2017     Cardiomyopathy (H)      CHF (congestive heart failure) (H)      COPD exacerbation (H) 3/2/2017     Depressive disorder      History of prostate cancer      Paroxysmal atrial fibrillation (H) 7/6/2016     Pure hypercholesterolemia      Rheumatoid arthritis(714.0)      Unspecified essential hypertension      Weakness generalized 3/2/2017     Past Surgical History:   Procedure Laterality Date     ARTHROPLASTY KNEE Left 1/12/2016    Procedure: ARTHROPLASTY KNEE;  Surgeon: Cesar Walker DO;  Location: [facility]     COLONOSCOPY  08/24/09     EXCISE LESION HEAD N/A 4/9/2018    Procedure: EXCISE LESION HEAD;  Excision Left Scalp Lesion;  Surgeon: Connor Nichole DO;  Location: [facility] OR     HC COLONOSCOPY THRU STOMA W BIOPSY/CAUTERY TUMOR/POLYP/LESION  8/31/2004     HC REPAIR ING  HERNIA,5+Y/O,REDUCIBL  1996    Marlex mesh repair of bilateral inguinal hernias and drainage of bilateral scrotal hydroceles.     HEART CATH FEMORAL CANNULIZATION WITH OPEN STANDBY REPAIR AORTIC VALVE N/A 9/27/2017    Procedure: HEART CATH FEMORAL CANNULIZATION WITH OPEN STANDBY REPAIR AORTIC VALVE;;  Surgeon: Jaron Alejandra MD;  Location: [facility] OR     IMPLANT PACEMAKER  03/10/2017     IRRIGATION AND DEBRIDEMENT SOFT TISSUE LOWER EXTREMITY, COMBINED Left 3/15/2016    Procedure: COMBINED IRRIGATION AND DEBRIDEMENT SOFT TISSUE LOWER EXTREMITY;  Surgeon: Cesar Walker DO;  Location: [facility]     TRANSCATHETER AORTIC VALVE IMPLANT ANESTHESIA N/A 9/27/2017    Procedure: TRANSCATHETER AORTIC VALVE IMPLANT ANESTHESIA;  Right Transfemoral Approach (Harman Ramsey 3 29mm) Aortic Valve Implant,  Cardiopulmonary Bypass Standby, Transthoracic Echocardiogram by Derian Queen(Echo Tech);  Surgeon: GENERIC ANESTHESIA PROVIDER;  Location: [facility] OR     Family History   Problem Relation Age of Onset     Cancer Mother      Cancer Son      Unknown/Adopted Father      Alcoholism Brother      Social History     Social History     Marital status:      Spouse name: N/A     Number of children: N/A     Years of education: N/A     Occupational History     Not on file.     Social History Main Topics     Smoking status: Former Smoker     Packs/day: 0.50     Years: 15.00     Types: Cigarettes     Quit date: 11/12/1998     Smokeless tobacco: Never Used      Comment: quit 15 yrs ago     Alcohol use No     Drug use: No     Sexual activity: Yes     Other Topics Concern     Caffeine Concern Yes     8 cups coffee day     Sleep Concern Yes     Weight Concern Yes     weight loss     Special Diet No     Exercise Yes     split firewood daily     Social History Narrative       Current Outpatient Prescriptions   Medication     HYDROcodone-acetaminophen (NORCO) 5-325 MG per tablet     predniSONE (DELTASONE) 5 MG tablet     acetaminophen  "(TYLENOL) 325 MG tablet     alendronate (FOSAMAX) 70 MG tablet     apixaban ANTICOAGULANT (ELIQUIS) 2.5 MG tablet     ascorbic acid (VITAMIN C) 500 MG CPCR     calcium carbonate (OS-SHELIA 500 MG [place]. [place]) 500 MG tablet     dofetilide (TIKOSYN) 125 MCG capsule     HYDROcodone-acetaminophen (NORCO) 5-325 MG per tablet     Leuprolide Acetate (LUPRON DEPOT IM)     lisinopril (PRINIVIL/ZESTRIL) 5 MG tablet     metoprolol (LOPRESSOR) 25 MG tablet     ondansetron (ZOFRAN-ODT) 4 MG ODT tab     polyethylene glycol (MIRALAX/GLYCOLAX) Packet     SPIRONOLACTONE PO     tamsulosin (FLOMAX) 0.4 MG capsule     traMADol (ULTRAM) 50 MG tablet     VITAMIN D, CHOLECALCIFEROL, PO     No current facility-administered medications for this visit.      Allergies   Allergen Reactions     Amiodarone Other (See Comments)     Drop in DLCO     Lasix [Furosemide] Blisters     Blisters and sores in his mouth.          Objective:  /51  Pulse 77  Ht 5' 7\" (1.702 m)  Wt 146 lb (66.2 kg)  BMI 22.87 kg/m2    General: Alert and in no distress, frail appearing  Head: Normocephalic, atraumatic  Eyes: no scleral icterus or conjunctival erythema   Oropharynx:  Mucous membranes moist  CV: regular rhythm by palpation, 2+ distal pulses  Resp: normal respiratory effort without conversational dyspnea   Psych: normal mood and affect    Gait: Antalgic, uses a cane, in a wheelchair today in office  Neuro: Motor strength and sensation as noted below    Musculoskeletal:    Bilateral Shoulder exam    Inspection and Posture:       rounded shoulders and upper back    Skin:        no visible deformities    Palpation:  -Diffusely tender over the bilateral shoulders and periscapular muscles.      ROM:        Active shoulder abduction and flexion 30 degrees bilaterally, shoulder hiking with this.  External rotation is decreased bilaterally.      Painful motions:  -Shoulder abduction, flexion, and external rotation are painful bilaterally    Strength:        shoulder " abduction <3/5 bilaterally       shoulder flexion <3/5 bilaterally       elbow flexion 4/5 bilaterally       elbow extension 4/5 bilaterally       forearm pronation 4+/5 bilaterally       forearm supination 4+/5 bilaterally       wrist flexion 5-/5 bilaterally       wrist extension 5-/5 bilaterally        strength 5-/5 bilaterally       finger abduction 5-/5 bilaterally    Sensation:        normal sensation over shoulder and upper extremity       Radiology:  X-rays ordered and independent visualization of images performed.  Shows severe degenerative changes of the bilateral shoulders.  Full radiology report to follow.      Procedure:  Discussed steroid injection, including risks, potential benefits, and alternatives.  The patient expressed understanding.  Obtained verbal and written consent and the patient elected to proceed.  Procedure: A steroid injection was performed under aseptic technique at the right subacromial space using 2 mL of 1% plain Lidocaine, 2 mL of 0.5% Marcaine, and 1 mL of 40 mg/mL Kenalog.  Bandage applied. This was well tolerated.      Assessment:  1. Chronic pain of both shoulders    2. Primary osteoarthritis of both shoulders        Plan:  Discussed the assessment with the patient and developed a plan together:  -Steroid injection performed today.  Take it easy over the next few days. Keep in mind that the steroid may take up to 3 days to start working and up to 2 weeks to reach maximal effect.  Ice 15-20 minutes as needed for soreness.  Patient's preferred over the counter medication as needed for pain as directed on packaging.    -Follow up as needed if symptoms fail to improve or worsen.  Please call with questions or concerns.          Suzy Garcia MD, [facility]  [facility]

## 2018-08-03 ENCOUNTER — OFFICE VISIT (OUTPATIENT)
Dept: ORTHOPEDICS | Facility: CLINIC | Age: 83
End: 2018-08-03
Payer: COMMERCIAL

## 2018-08-03 VITALS
BODY MASS INDEX: 22.91 KG/M2 | WEIGHT: 146 LBS | HEIGHT: 67 IN | DIASTOLIC BLOOD PRESSURE: 72 MMHG | HEART RATE: 96 BPM | SYSTOLIC BLOOD PRESSURE: 124 MMHG

## 2018-08-03 DIAGNOSIS — M19.012 PRIMARY OSTEOARTHRITIS OF BOTH SHOULDERS: Primary | ICD-10-CM

## 2018-08-03 DIAGNOSIS — M19.011 PRIMARY OSTEOARTHRITIS OF BOTH SHOULDERS: Primary | ICD-10-CM

## 2018-08-03 DIAGNOSIS — M25.511 CHRONIC PAIN OF BOTH SHOULDERS: ICD-10-CM

## 2018-08-03 DIAGNOSIS — G89.29 CHRONIC PAIN OF BOTH SHOULDERS: ICD-10-CM

## 2018-08-03 DIAGNOSIS — M25.512 CHRONIC PAIN OF BOTH SHOULDERS: ICD-10-CM

## 2018-08-03 PROCEDURE — 20610 DRAIN/INJ JOINT/BURSA W/O US: CPT | Mod: LT | Performed by: PHYSICAL MEDICINE & REHABILITATION

## 2018-08-03 RX ORDER — TRIAMCINOLONE ACETONIDE 40 MG/ML
40 INJECTION, SUSPENSION INTRA-ARTICULAR; INTRAMUSCULAR ONCE
Qty: 1 ML | Refills: 0 | OUTPATIENT
Start: 2018-08-03 | End: 2018-08-03

## 2018-08-03 NOTE — PROGRESS NOTES
Outpatient Physical Therapy Discharge Note     Patient: Bud Merritt    : 1927    Beginning/End Dates of Reporting Period:  2018    Referring Provider: Dr Camron Aragon    Therapy Diagnosis: LE edema   S-(situation):Patient seen for evaluation and recommended light compression velcro wraps.     B-(background): Got wraps and was not comfortable with and refused to wear them. Fitter and I discussed and encouraged to continue to try, he was unwilling.     A-(assessment): Continue to manage via elevation, mm pump and walking as able.     R-(recommendations): As above         Client Self Report: No concerns    Objective Measurements: Only seen in clinic at [facility] and follow up in clinic while here for another appt for info above.         Goals:  Goal Identifier 1   Goal Description Bud will have proper fitting edema garment in place in 6 weeks for edema control   Target Date 18   Date Met      Progress: Fits ok but doesn't want to wear. Will return it.    =    Progress Toward Goals:   Not assessed this period.      Plan:  Discharge from therapy.    Discharge:    Reason for Discharge: Medicare G-code: Patient did not attend a final scheduled session prior to discharge. Unable to determine discharge functional status.  Per our plan, no reason to return - wants to manage on his own.     Equipment Issued: HEP    Discharge Plan: Patient to continue home program.    Thank you for the referral.  Valarie Bermeo................... PT, DPT, CLT   8/3/2018, 3:41 PM  [phone]

## 2018-08-03 NOTE — MR AVS SNAPSHOT
After Visit Summary   8/3/2018    Bud Merritt    MRN: [id number]           Patient Information     Date Of Birth          5/9/1927        Visit Information        Provider Department      8/3/2018 11:20 AM Sejal Garcia MD [facility]        Today's Diagnoses     Primary osteoarthritis of both shoulders    -  1    Chronic pain of both shoulders          Care Instructions    -Steroid injection performed today.  Take it easy over the next few days. Keep in mind that the steroid may take up to 3 days to start working and up to 2 weeks to reach maximal effect.  Ice 15-20 minutes as needed for soreness.  Patient's preferred over the counter medication as needed for pain as directed on packaging.    -Follow up as needed if symptoms fail to improve or worsen.  Please call with questions or concerns.                Follow-ups after your visit        Your next 10 appointments already scheduled     Aug 30, 2018 12:45 PM CDT   LAB with NL LAB PMC   [facility]    [address] [place] [id number] [phone]           Please do not eat 10-12 hours before your appointment if you are coming in fasting for labs on lipids, cholesterol, or glucose (sugar). This does not apply to pregnant women. Water, hot tea and black coffee (with nothing added) are okay. Do not drink other fluids, diet soda or chew gum.            Aug 30, 2018  1:00 PM CDT   Return Visit with CARDIO DEVICE NURSE   [facility] Heart [facility] ([facility])    [address] [place] [id number] [phone]            Aug 30, 2018  2:00 PM CDT   Ech Complete with BERTHA   [facility] Echocardiography ([facility])    [address] [place] [address]  [place] [id number] [phone]           1.  Please bring or wear a comfortable two-piece outfit. 2.  You may eat, drink and take your normal  "medicines. 3.  For any questions that cannot be answered, please contact the ordering physician 4.  Please do not wear perfumes or scented lotions on the day of your exam.            Sep 05, 2018 11:30 AM CDT   Return Visit with Berhane Colon MD   [facility] ([facility])    [address] [place] [id number] [phone]              Who to contact     If you have questions or need follow up information about today's clinic visit or your schedule please contact [facility] directly at [phone].  Normal or non-critical lab and imaging results will be communicated to you by MyChart, letter or phone within 4 business days after the clinic has received the results. If you do not hear from us within 7 days, please contact the clinic through MyChart or phone. If you have a critical or abnormal lab result, we will notify you by phone as soon as possible.  Submit refill requests through [facility] or call your pharmacy and they will forward the refill request to us. Please allow 3 business days for your refill to be completed.          Additional Information About Your Visit        Care EveryWhere ID     This is your Care EveryWhere ID. This could be used by other organizations to access your [place] medical records  [id number]        Your Vitals Were     Pulse Height BMI (Body Mass Index)             96 5' 7\" (1.702 m) 22.87 kg/m2          Blood Pressure from Last 3 Encounters:   08/03/18 124/72   07/24/18 101/51   07/18/18 120/72    Weight from Last 3 Encounters:   08/03/18 146 lb (66.2 kg)   07/24/18 146 lb (66.2 kg)   07/18/18 146 lb (66.2 kg)              Today, you had the following     No orders found for display         Today's Medication Changes          These changes are accurate as of 8/3/18 11:27 AM.  If you have any questions, ask your nurse or doctor.               These medicines have changed or have updated " prescriptions.        Dose/Directions    metoprolol tartrate 25 MG tablet   Commonly known as:  LOPRESSOR   This may have changed:  how much to take   Used for:  S/P TAVR (transcatheter aortic valve replacement)        Dose:  25 mg   Take 1 tablet (25 mg) by mouth 2 times daily   Quantity:  60 tablet   Refills:  11                Primary Care Provider Office Phone # Fax #    Camron Aragon [phone]       [address] [facility] [phone]        Equal Access to Services     JUAN CLINE AH: Hadii aad ku hadasho Soomaali, waaxda luqadaha, qaybta kaalmada adeegyada, waxay idiin hayaan adeeg kharash la'ryan . So [facility] [phone].    ATENCIÓN: Si habla español, tiene a mon disposición servicios gratuitos de asistencia lingüística. [facility] [phone].    We comply with applicable federal civil rights laws and [place] laws. We do not discriminate on the basis of race, color, national origin, age, disability, sex, sexual orientation, or gender identity.            Thank you!     Thank you for choosing [facility]  for your care. Our goal is always to provide you with excellent care. Hearing back from our patients is one way we can continue to improve our services. Please take a few minutes to complete the written survey that you may receive in the mail after your visit with us. Thank you!             Your Updated Medication List - Protect others around you: Learn how to safely use, store and throw away your medicines at www.disposemymeds.org.          This list is accurate as of 8/3/18 11:27 AM.  Always use your most recent med list.                   Brand Name Dispense Instructions for use Diagnosis    acetaminophen 325 MG tablet    TYLENOL    100 tablet    Take 2 tablets (650 mg) by mouth every 4 hours as needed for mild pain, fever or headaches        alendronate 70 MG tablet    FOSAMAX    12 tablet    Take 1 tablet (70 mg) by mouth every 7 days Take with over 8 ounces  water and stay upright for at least 30 minutes after dose.  Take at least 60 minutes before breakfast    Hyperlipidemia LDL goal <130       apixaban ANTICOAGULANT 2.5 MG tablet    ELIQUIS    180 tablet    Take 1 tablet (2.5 mg) by mouth 2 times daily    Paroxysmal atrial fibrillation (H)       ascorbic acid 500 MG Cpcr CR capsule    vitamin C     Take 500 mg by mouth daily        calcium carbonate 500 MG tablet    OS-SHELIA 500 mg [place]. [place]     Take 1,250 mg by mouth daily        dofetilide 125 MCG capsule    TIKOSYN    60 capsule    TAKE ONE CAPSULE BY MOUTH TWICE A DAY    Paroxysmal atrial fibrillation (H)       * HYDROcodone-acetaminophen 5-325 MG per tablet    NORCO    10 tablet    Take 1 tablet by mouth every 6 hours as needed for other (Moderate to Severe Pain)    Scalp lesion       * HYDROcodone-acetaminophen 5-325 MG per tablet    NORCO    18 tablet    Take 1 tablet by mouth every 6 hours as needed for pain For moderate to severe pain    Rheumatoid arthritis involving multiple sites, unspecified rheumatoid factor presence (H)       lisinopril 5 MG tablet    PRINIVIL/ZESTRIL    90 tablet    Take 1 tablet (5 mg) by mouth daily    Acute on chronic systolic congestive heart failure (H)       LUPRON DEPOT IM      Inject into the muscle every 6 months Reported on 5/3/2017        metoprolol tartrate 25 MG tablet    LOPRESSOR    60 tablet    Take 1 tablet (25 mg) by mouth 2 times daily    S/P TAVR (transcatheter aortic valve replacement)       ondansetron 4 MG ODT tab    ZOFRAN-ODT    10 tablet    Take 1 tablet (4 mg) by mouth every 6 hours as needed for nausea or vomiting    Nausea       polyethylene glycol Packet    MIRALAX/GLYCOLAX    30 packet    Take 17 g by mouth daily    Constipation, unspecified constipation type       predniSONE 5 MG tablet    DELTASONE    100 tablet    Take 1 tablet (5 mg) by mouth daily    Rheumatoid arthritis involving multiple sites with positive rheumatoid factor (H)       SPIRONOLACTONE  PO      Take 25 mg by mouth daily        tamsulosin 0.4 MG capsule    FLOMAX    60 capsule    Take 1 capsule (0.4 mg) by mouth daily    S/P TAVR (transcatheter aortic valve replacement)       traMADol 50 MG tablet    ULTRAM    90 tablet    TAKE 1 TABLET 3 TIMES DAILY AS NEEDED FOR MODERATE PAIN    Rheumatoid arthritis involving multiple sites, unspecified rheumatoid factor presence (H)       VITAMIN D (CHOLECALCIFEROL) PO      Take 2,000 Units by mouth daily Reported on 5/3/2017        * Notice:  This list has 2 medication(s) that are the same as other medications prescribed for you. Read the directions carefully, and ask your doctor or other care provider to review them with you.

## 2018-08-03 NOTE — PROGRESS NOTES
Sports Medicine Clinic Visit - Interim History August 3, 2018    Initial Visit Date 7/24/2018'      PCP: Camron Aragon    Bud ALEX Merritt is a 89 year old male who is seen in follow up. Since last visit on 7/24/2018 patient has had some improvement in the right shoulder pain. He notes the CSI completed on 7/24/18 for the right shoulder stopped the sharp pain in the joint however it still aches.  He rates the pain at a 5/10 currently.  Symptoms are relieved with hydrocodone.  Symptoms are worsened by overhead motions, weather, and certain movements.     Review of Systems  Musculoskeletal: as above  Remainder of review of systems is negative including constitutional, eyes, ENT, CV, pulmonary, GI, , endocrine, skin, hematologic, and neurologic except as noted in HPI or medical history.    History reviewed. No pertinent past surgical/medical/family/social history other than as mentioned in HPI.    Patient Active Problem List   Diagnosis     Hypertrophy of prostate without urinary obstruction     Rheumatoid arthritis involving multiple sites with positive rheumatoid factor (H)     Hyperlipidemia LDL goal <130     Osteoporosis     Encounter for long-term current use of medication     Elevated prostate specific antigen (PSA)     Rheumatoid arthritis involving multiple sites, unspecified rheumatoid factor presence (H)     Status post total left knee replacement     Mitral regurgitation and aortic stenosis - AS severe by echo on 3/2017.  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     Postoperative delirium     Infected superficial injury of knee, left, subsequent encounter     Essential hypertension with goal blood pressure less than 140/90     Paroxysmal atrial fibrillation (H)     Visit for monitoring Tikosyn therapy     Diarrhea     Weakness generalized     Acute cystitis without hematuria     COPD exacerbation (H)     Influenza A     Acute respiratory failure with hypoxia (H)     Atrial fibrillation with rapid ventricular response (H)      Shock (H)     Atrial fibrillation (H) - s/p AVN ablation and pacemaker     Malignant neoplasm of prostate (H)     Cellulitis of right lower extremity     Venous stasis ulcers of both lower extremities (H)     Anemia     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onic, Bi-Ventricular- dependent     Dilated cardiomyopathy (H) dilated LV, EF 55% after TAVR      Chronic systolic congestive heart failure (H)     Immunosuppression (H)     S/P AV lisandro ablation     Aortic stenosis, severe     S/P TAVR (transcatheter aortic valve replacement)     Weakness     Nausea     Leukopenia, unspecified type     Hyponatremia     Viral URI with cough     Constipation     Past Medical History:   Diagnosis Date     Anemia      Atrial fibrillation (H) 07/01/2016     Cardiac pacemaker 03/10/2017     Cardiomyopathy (H)      CHF (congestive heart failure) (H)      COPD exacerbation (H) 3/2/2017     Depressive disorder      History of prostate cancer      Paroxysmal atrial fibrillation (H) 7/6/2016     Pure hypercholesterolemia      Rheumatoid arthritis(714.0)      Unspecified essential hypertension      Weakness generalized 3/2/2017     Past Surgical History:   Procedure Laterality Date     ARTHROPLASTY KNEE Left 1/12/2016    Procedure: ARTHROPLASTY KNEE;  Surgeon: Cesar Walker DO;  [facility]     COLONOSCOPY  08/24/09     EXCISE LESION HEAD N/A 4/9/2018    Procedure: EXCISE LESION HEAD;  Excision Left Scalp Lesion;  Surgeon: Connor Nichole DO;  Location: [facility]     HC COLONOSCOPY THRU STOMA W BIOPSY/CAUTERY TUMOR/POLYP/LESION  8/31/2004     HC REPAIR ING HERNIA,5+Y/O,REDUCIBL  1996    Marlex mesh repair of bilateral inguinal hernias and drainage of bilateral scrotal hydroceles.     HEART CATH FEMORAL CANNULIZATION WITH OPEN STANDBY REPAIR AORTIC VALVE N/A 9/27/2017    Procedure: HEART CATH FEMORAL CANNULIZATION WITH OPEN STANDBY REPAIR AORTIC VALVE;;  Surgeon: Jaron Alejandra MD;  Location: [facility] OR     IMPLANT  PACEMAKER  03/10/2017     IRRIGATION AND DEBRIDEMENT SOFT TISSUE LOWER EXTREMITY, COMBINED Left 3/15/2016    Procedure: COMBINED IRRIGATION AND DEBRIDEMENT SOFT TISSUE LOWER EXTREMITY;  Surgeon: Cesar Walker DO;  Location:  OR     TRANSCATHETER AORTIC VALVE IMPLANT ANESTHESIA N/A 9/27/2017    Procedure: TRANSCATHETER AORTIC VALVE IMPLANT ANESTHESIA;  Right Transfemoral Approach (Harman Ramsey 3 29mm) Aortic Valve Implant,  Cardiopulmonary Bypass Standby, Transthoracic Echocardiogram by Derian Queen(Echo Tech);  Surgeon: GENERIC ANESTHESIA PROVIDER;  Location: [facility] OR     Family History   Problem Relation Age of Onset     Cancer Mother      Cancer Son      Unknown/Adopted Father      Alcoholism Brother      Social History     Social History     Marital status:      Spouse name: N/A     Number of children: N/A     Years of education: N/A     Occupational History     Not on file.     Social History Main Topics     Smoking status: Former Smoker     Packs/day: 0.50     Years: 15.00     Types: Cigarettes     Quit date: 11/12/1998     Smokeless tobacco: Never Used      Comment: quit 15 yrs ago     Alcohol use No     Drug use: No     Sexual activity: Yes     Other Topics Concern     Caffeine Concern Yes     8 cups coffee day     Sleep Concern Yes     Weight Concern Yes     weight loss     Special Diet No     Exercise Yes     split [employer] daily     Social History Narrative       Current Outpatient Prescriptions   Medication     acetaminophen (TYLENOL) 325 MG tablet     alendronate (FOSAMAX) 70 MG tablet     apixaban ANTICOAGULANT (ELIQUIS) 2.5 MG tablet     ascorbic acid (VITAMIN C) 500 MG CPCR     calcium carbonate (OS-SHELIA 500 MG [place]. [place]) 500 MG tablet     dofetilide (TIKOSYN) 125 MCG capsule     HYDROcodone-acetaminophen (NORCO) 5-325 MG per tablet     HYDROcodone-acetaminophen (NORCO) 5-325 MG per tablet     Leuprolide Acetate (LUPRON DEPOT IM)     lisinopril (PRINIVIL/ZESTRIL) 5 MG tablet      "metoprolol (LOPRESSOR) 25 MG tablet     ondansetron (ZOFRAN-ODT) 4 MG ODT tab     polyethylene glycol (MIRALAX/GLYCOLAX) Packet     predniSONE (DELTASONE) 5 MG tablet     SPIRONOLACTONE PO     tamsulosin (FLOMAX) 0.4 MG capsule     traMADol (ULTRAM) 50 MG tablet     VITAMIN D, CHOLECALCIFEROL, PO     No current facility-administered medications for this visit.      Allergies   Allergen Reactions     Amiodarone Other (See Comments)     Drop in DLCO     Lasix [Furosemide] Blisters     Blisters and sores in his mouth.          Objective:  /72  Pulse 96  Ht 5' 7\" (1.702 m)  Wt 146 lb (66.2 kg)  BMI 22.87 kg/m2    General: Alert and in no distress    Head: Normocephalic, atraumatic  Eyes: no scleral icterus or conjunctival erythema   Oropharynx:  Mucous membranes moist  Skin: no erythema, petechiae, or jaundice  Resp: normal respiratory effort without conversational dyspnea   Psych: normal mood and affect    Gait: In a wheelchair  Musculoskeletal:  Tender over the left subacromial space and upper trapezius     Radiology:  Recent Results (from the past 744 hour(s))   XR Shoulder Bilateral G/E 2 Views    Narrative    XR SHOULDER BILATERAL G/E 2 VW 7/24/2018 12:09 PM    HISTORY: ; Chronic pain of both shoulders; Chronic pain of both  shoulders; Chronic pain of both shoulders      Impression    IMPRESSION: Advanced degenerative change both shoulder joints. Loss of  the normal acromiohumeral distance suggesting chronic rotator cuff  injury bilaterally. Possible bilateral joint space loose bodies.    SOMMER APODACA MD       Procedure:  Discussed steroid injection, including risks, potential benefits, and alternatives.  The patient expressed understanding.  Obtained verbal and written consent and the patient elected to proceed.  Procedure: A steroid injection was performed under aseptic technique at the left subacromial bursa using 2 mL of 1% plain Lidocaine, 2 mL of 0.5% Marcaine, and 1 mL of 40 mg/mL Kenalog.  " Bandage applied. This was well tolerated.    Assessment:  1. Primary osteoarthritis of both shoulders    2. Chronic pain of both shoulders        Plan:  Discussed the assessment with the patient and developed a plan together:  -Steroid injection performed today.  Take it easy over the next few days. Keep in mind that the steroid may take up to 3 days to start working and up to 2 weeks to reach maximal effect.  Ice 15-20 minutes as needed for soreness.  Patient's preferred over the counter medication as needed for pain as directed on packaging.    -Follow up as needed if symptoms fail to improve or worsen.  Please call with questions or concerns.      Suzy Garcia MD, CAQ Sports Medicine  [facility]

## 2018-08-03 NOTE — LETTER
8/3/2018         RE: Bud Merritt  [address] [place] [address]        Dear Colleague,    Thank you for referring your patient, Bud Merritt, to the [facility]. Please see a copy of my visit note below.    Sports Medicine Clinic Visit - Interim History August 3, 2018    Initial Visit Date 7/24/2018'      PCP: Camron Aragon    Bud Merritt is a 89 year old male who is seen in follow up. Since last visit on 7/24/2018 patient has had some improvement in the right shoulder pain. He notes the CSI completed on 7/24/18 for the right shoulder stopped the sharp pain in the joint however it still aches.  He rates the pain at a 5/10 currently.  Symptoms are relieved with hydrocodone.  Symptoms are worsened by overhead motions, weather, and certain movements.     Review of Systems  Musculoskeletal: as above  Remainder of review of systems is negative including constitutional, eyes, ENT, CV, pulmonary, GI, , endocrine, skin, hematologic, and neurologic except as noted in HPI or medical history.    History reviewed. No pertinent past surgical/medical/family/social history other than as mentioned in HPI.    Patient Active Problem List   Diagnosis     Hypertrophy of prostate without urinary obstruction     Rheumatoid arthritis involving multiple sites with positive rheumatoid factor (H)     Hyperlipidemia LDL goal <130     Osteoporosis     Encounter for long-term current use of medication     Elevated prostate specific antigen (PSA)     Rheumatoid arthritis involving multiple sites, unspecified rheumatoid factor presence (H)     Status post total left knee replacement     Mitral regurgitation and aortic stenosis - AS severe by echo on 3/2017.  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     Postoperative delirium     Infected superficial injury of knee, left, subsequent encounter     Essential hypertension with goal blood pressure less than 140/90     Paroxysmal atrial fibrillation (H)     Visit for monitoring  Tikosyn therapy     Diarrhea     Weakness generalized     Acute cystitis without hematuria     COPD exacerbation (H)     Influenza A     Acute respiratory failure with hypoxia (H)     Atrial fibrillation with rapid ventricular response (H)     Shock (H)     Atrial fibrillation (H) - s/p AVN ablation and pacemaker     Malignant neoplasm of prostate (H)     Cellulitis of right lower extremity     Venous stasis ulcers of both lower extremities (H)     Anemia     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onic, Bi-Ventricular- dependent     Dilated cardiomyopathy (H) dilated LV, EF 55% after TAVR      Chronic systolic congestive heart failure (H)     Immunosuppression (H)     S/P AV lisandro ablation     Aortic stenosis, severe     S/P TAVR (transcatheter aortic valve replacement)     Weakness     Nausea     Leukopenia, unspecified type     Hyponatremia     Viral URI with cough     Constipation     Past Medical History:   Diagnosis Date     Anemia      Atrial fibrillation (H) 07/01/2016     Cardiac pacemaker 03/10/2017     Cardiomyopathy (H)      CHF (congestive heart failure) (H)      COPD exacerbation (H) 3/2/2017     Depressive disorder      History of prostate cancer      Paroxysmal atrial fibrillation (H) 7/6/2016     Pure hypercholesterolemia      Rheumatoid arthritis(714.0)      Unspecified essential hypertension      Weakness generalized 3/2/2017     Past Surgical History:   Procedure Laterality Date     ARTHROPLASTY KNEE Left 1/12/2016    Procedure: ARTHROPLASTY KNEE;  Surgeon: Cesar Walker DO;  [facility]     COLONOSCOPY  08/24/09     EXCISE LESION HEAD N/A 4/9/2018    Procedure: EXCISE LESION HEAD;  Excision Left Scalp Lesion;  Surgeon: Connor Nichole DO;  [facility]      COLONOSCOPY THRU STOMA W BIOPSY/CAUTERY TUMOR/POLYP/LESION  8/31/2004     HC REPAIR ING HERNIA,5+Y/O,REDUCIBL  1996    Marlex mesh repair of bilateral inguinal hernias and drainage of bilateral scrotal hydroceles.      HEART CATH FEMORAL CANNULIZATION WITH OPEN STANDBY REPAIR AORTIC VALVE N/A 9/27/2017    Procedure: HEART CATH FEMORAL CANNULIZATION WITH OPEN STANDBY REPAIR AORTIC VALVE;;  Surgeon: Jaron Alejandra MD;  Location: [facility] OR     IMPLANT PACEMAKER  03/10/2017     IRRIGATION AND DEBRIDEMENT SOFT TISSUE LOWER EXTREMITY, COMBINED Left 3/15/2016    Procedure: COMBINED IRRIGATION AND DEBRIDEMENT SOFT TISSUE LOWER EXTREMITY;  Surgeon: Cesar Walker DO;  [facility]     TRANSCATHETER AORTIC VALVE IMPLANT ANESTHESIA N/A 9/27/2017    Procedure: TRANSCATHETER AORTIC VALVE IMPLANT ANESTHESIA;  Right Transfemoral Approach (Harman Ramsey 3 29mm) Aortic Valve Implant,  Cardiopulmonary Bypass Standby, Transthoracic Echocardiogram by Derian Queen(Echo Tech);  Surgeon: GENERIC ANESTHESIA PROVIDER;  Location: [facility] OR     Family History   Problem Relation Age of Onset     Cancer Mother      Cancer Son      Unknown/Adopted Father      Alcoholism Brother      Social History     Social History     Marital status:      Spouse name: N/A     Number of children: N/A     Years of education: N/A     Occupational History     Not on file.     Social History Main Topics     Smoking status: Former Smoker     Packs/day: 0.50     Years: 15.00     Types: Cigarettes     Quit date: 11/12/1998     Smokeless tobacco: Never Used      Comment: quit 15 yrs ago     Alcohol use No     Drug use: No     Sexual activity: Yes     Other Topics Concern     Caffeine Concern Yes     8 cups coffee day     Sleep Concern Yes     Weight Concern Yes     weight loss     Special Diet No     Exercise Yes     split [employer] daily     Social History Narrative       Current Outpatient Prescriptions   Medication     acetaminophen (TYLENOL) 325 MG tablet     alendronate (FOSAMAX) 70 MG tablet     apixaban ANTICOAGULANT (ELIQUIS) 2.5 MG tablet     ascorbic acid (VITAMIN C) 500 MG CPCR     calcium carbonate (OS-SHELIA 500 MG [place]. [place]) 500 MG tablet     dofetilide  "(TIKOSYN) 125 MCG capsule     HYDROcodone-acetaminophen (NORCO) 5-325 MG per tablet     HYDROcodone-acetaminophen (NORCO) 5-325 MG per tablet     Leuprolide Acetate (LUPRON DEPOT IM)     lisinopril (PRINIVIL/ZESTRIL) 5 MG tablet     metoprolol (LOPRESSOR) 25 MG tablet     ondansetron (ZOFRAN-ODT) 4 MG ODT tab     polyethylene glycol (MIRALAX/GLYCOLAX) Packet     predniSONE (DELTASONE) 5 MG tablet     SPIRONOLACTONE PO     tamsulosin (FLOMAX) 0.4 MG capsule     traMADol (ULTRAM) 50 MG tablet     VITAMIN D, CHOLECALCIFEROL, PO     No current facility-administered medications for this visit.      Allergies   Allergen Reactions     Amiodarone Other (See Comments)     Drop in DLCO     Lasix [Furosemide] Blisters     Blisters and sores in his mouth.          Objective:  /72  Pulse 96  Ht 5' 7\" (1.702 m)  Wt 146 lb (66.2 kg)  BMI 22.87 kg/m2    General: Alert and in no distress    Head: Normocephalic, atraumatic  Eyes: no scleral icterus or conjunctival erythema   Oropharynx:  Mucous membranes moist  Skin: no erythema, petechiae, or jaundice  Resp: normal respiratory effort without conversational dyspnea   Psych: normal mood and affect    Gait: In a wheelchair  Musculoskeletal:  Tender over the left subacromial space and upper trapezius     Radiology:  Recent Results (from the past 744 hour(s))   XR Shoulder Bilateral G/E 2 Views    Narrative    XR SHOULDER BILATERAL G/E 2 VW 7/24/2018 12:09 PM    HISTORY: ; Chronic pain of both shoulders; Chronic pain of both  shoulders; Chronic pain of both shoulders      Impression    IMPRESSION: Advanced degenerative change both shoulder joints. Loss of  the normal acromiohumeral distance suggesting chronic rotator cuff  injury bilaterally. Possible bilateral joint space loose bodies.    SOMMER APODACA MD       Procedure:  Discussed steroid injection, including risks, potential benefits, and alternatives.  The patient expressed understanding.  Obtained verbal and written " consent and the patient elected to proceed.  Procedure: A steroid injection was performed under aseptic technique at the left subacromial bursa using 2 mL of 1% plain Lidocaine, 2 mL of 0.5% Marcaine, and 1 mL of 40 mg/mL Kenalog.  Bandage applied. This was well tolerated.    Assessment:  1. Primary osteoarthritis of both shoulders    2. Chronic pain of both shoulders        Plan:  Discussed the assessment with the patient and developed a plan together:  -Steroid injection performed today.  Take it easy over the next few days. Keep in mind that the steroid may take up to 3 days to start working and up to 2 weeks to reach maximal effect.  Ice 15-20 minutes as needed for soreness.  Patient's preferred over the counter medication as needed for pain as directed on packaging.    -Follow up as needed if symptoms fail to improve or worsen.  Please call with questions or concerns.      Suzy Garcia MD, [facility] Sports Medicine  [facility]        Again, thank you for allowing me to participate in the care of your patient.        Sincerely,        Sejal Garcia MD

## 2018-08-03 NOTE — ADDENDUM NOTE
Encounter addended by: Valarie Meng, PT on: 8/3/2018  3:42 PM<BR>     Actions taken: Sign clinical note, Episode resolved

## 2018-08-09 ENCOUNTER — TELEPHONE (OUTPATIENT)
Dept: ORTHOPEDICS | Facility: OTHER | Age: 83
End: 2018-08-09

## 2018-08-09 NOTE — TELEPHONE ENCOUNTER
Spoke with patient's wife regarding appointment on 8/10. Patient was just seen on 8/3 for a right shoulder injection. His wife states that he has pain all over and she doesn't know what to do. She states that Dr. Aragon has only given him pain medication and that makes him dizzy. She is wondering about a pain pump.     I explained that Dr. Garcia does not prescribe those. I gave her the following options per Dr. Garcia:    Pain management referral  Physical therapy  And possibly intra articular injections    She was going to talk with her  and I will call her back tomorrow.     Beatriz Rojas M.Ed., ATR, ATC

## 2018-08-10 ENCOUNTER — OFFICE VISIT (OUTPATIENT)
Dept: ORTHOPEDICS | Facility: OTHER | Age: 83
End: 2018-08-10
Payer: COMMERCIAL

## 2018-08-10 VITALS
BODY MASS INDEX: 23.23 KG/M2 | TEMPERATURE: 98.5 F | SYSTOLIC BLOOD PRESSURE: 98 MMHG | WEIGHT: 148 LBS | HEIGHT: 67 IN | DIASTOLIC BLOOD PRESSURE: 62 MMHG

## 2018-08-10 DIAGNOSIS — M75.101 ROTATOR CUFF TEAR ARTHROPATHY OF BOTH SHOULDERS: Primary | ICD-10-CM

## 2018-08-10 DIAGNOSIS — M75.102 ROTATOR CUFF TEAR ARTHROPATHY OF BOTH SHOULDERS: Primary | ICD-10-CM

## 2018-08-10 DIAGNOSIS — M12.812 ROTATOR CUFF TEAR ARTHROPATHY OF BOTH SHOULDERS: Primary | ICD-10-CM

## 2018-08-10 DIAGNOSIS — M12.811 ROTATOR CUFF TEAR ARTHROPATHY OF BOTH SHOULDERS: Primary | ICD-10-CM

## 2018-08-10 PROCEDURE — 99203 OFFICE O/P NEW LOW 30 MIN: CPT | Performed by: ORTHOPAEDIC SURGERY

## 2018-08-10 ASSESSMENT — PAIN SCALES - GENERAL: PAINLEVEL: WORST PAIN (10)

## 2018-08-10 NOTE — LETTER
8/10/2018         RE: Bud Merritt  [address] [facility] [address]        Dear Colleague,    Thank you for referring your patient, Bud Merritt, to the [facility]. Please see a copy of my visit note below.    ORTHOPEDIC CONSULT      Chief Complaint: Bud Merritt is a 89 year old right hand dominant male      He is being seen for   Chief Complaints and History of Present Illnesses   Patient presents with     Shoulder Pain     Bilateral shoulder pain     Consult     ref: Dr. Garcia         History of Present Illness:   Bud Merritt is a 89 year old male who is seen in consultation at the request of Jose.  History of Present illness:  Bud presents for evaluation of:   1.) Bilateral shoulder pain  2.) Hurting all over  Onset:  1. Shoulders is lifetime problem. 2. Unable to walk, pain all over x 1 month.    Symptoms brought on by Unknown (arthritis).   Character:  Multiple symptoms.    Progression of symptoms:  worse.    Previous similar pain: YES.   Pain Level:  10/10.   Previous treatments:  rest/inactivity and medications.  Relying on pain meds, on norco.  PT helps while he is there.  Injections helped a little bit.  Currently on Blood thinners? Yes, Eliquis  Diagnosis of Diabetes? No  Aches all of the time, cant sleep.      Patient's past medical, surgical, social and family histories reviewed.       Past Medical History:   Diagnosis Date     Anemia      Atrial fibrillation (H) 07/01/2016     Cardiac pacemaker 03/10/2017     Cardiomyopathy (H)      CHF (congestive heart failure) (H)      COPD exacerbation (H) 3/2/2017     Depressive disorder      History of prostate cancer      Paroxysmal atrial fibrillation (H) 7/6/2016     Pure hypercholesterolemia      Rheumatoid arthritis(714.0)      Unspecified essential hypertension      Weakness generalized 3/2/2017         Past Surgical History:   Procedure Laterality Date     ARTHROPLASTY KNEE Left 1/12/2016    Procedure:  ARTHROPLASTY KNEE;  Surgeon: Cesar Walker DO;  Location: [facility] OR     COLONOSCOPY  08/24/09     EXCISE LESION HEAD N/A 4/9/2018    Procedure: EXCISE LESION HEAD;  Excision Left Scalp Lesion;  Surgeon: Connor Nichole DO;  Location: [facility] OR     HC COLONOSCOPY THRU STOMA W BIOPSY/CAUTERY TUMOR/POLYP/LESION  8/31/2004     HC REPAIR ING HERNIA,5+Y/O,REDUCIBL  1996    Marlex mesh repair of bilateral inguinal hernias and drainage of bilateral scrotal hydroceles.     HEART CATH FEMORAL CANNULIZATION WITH OPEN STANDBY REPAIR AORTIC VALVE N/A 9/27/2017    Procedure: HEART CATH FEMORAL CANNULIZATION WITH OPEN STANDBY REPAIR AORTIC VALVE;;  Surgeon: Jaron Alejandra MD;  Location: [facility] OR     IMPLANT PACEMAKER  03/10/2017     IRRIGATION AND DEBRIDEMENT SOFT TISSUE LOWER EXTREMITY, COMBINED Left 3/15/2016    Procedure: COMBINED IRRIGATION AND DEBRIDEMENT SOFT TISSUE LOWER EXTREMITY;  Surgeon: Cesar Walker DO;  Location: [facility] OR     TRANSCATHETER AORTIC VALVE IMPLANT ANESTHESIA N/A 9/27/2017    Procedure: TRANSCATHETER AORTIC VALVE IMPLANT ANESTHESIA;  Right Transfemoral Approach (Harman Ramsey 3 29mm) Aortic Valve Implant,  Cardiopulmonary Bypass Standby, Transthoracic Echocardiogram by Derian Queen(Echo Tech);  Surgeon: GENERIC ANESTHESIA PROVIDER;  Location: [facility] OR         Medications:    Current Outpatient Prescriptions on File Prior to Visit:  acetaminophen (TYLENOL) 325 MG tablet Take 2 tablets (650 mg) by mouth every 4 hours as needed for mild pain, fever or headaches   apixaban ANTICOAGULANT (ELIQUIS) 2.5 MG tablet Take 1 tablet (2.5 mg) by mouth 2 times daily   ascorbic acid (VITAMIN C) 500 MG CPCR Take 500 mg by mouth daily   calcium carbonate (OS-SHELIA 500 MG [place]) 500 MG tablet Take 1,250 mg by mouth daily    dofetilide (TIKOSYN) 125 MCG capsule TAKE ONE CAPSULE BY MOUTH TWICE A DAY   HYDROcodone-acetaminophen (NORCO) 5-325 MG per tablet Take 1 tablet by mouth every 6 hours as needed  for pain For moderate to severe pain   HYDROcodone-acetaminophen (NORCO) 5-325 MG per tablet Take 1 tablet by mouth every 6 hours as needed for other (Moderate to Severe Pain)   Leuprolide Acetate (LUPRON DEPOT IM) Inject into the muscle every 6 months Reported on 5/3/2017   lisinopril (PRINIVIL/ZESTRIL) 5 MG tablet Take 1 tablet (5 mg) by mouth daily   metoprolol (LOPRESSOR) 25 MG tablet Take 1 tablet (25 mg) by mouth 2 times daily (Patient taking differently: Take 12.5 mg by mouth 2 times daily )   ondansetron (ZOFRAN-ODT) 4 MG ODT tab Take 1 tablet (4 mg) by mouth every 6 hours as needed for nausea or vomiting   polyethylene glycol (MIRALAX/GLYCOLAX) Packet Take 17 g by mouth daily   predniSONE (DELTASONE) 5 MG tablet Take 1 tablet (5 mg) by mouth daily   SPIRONOLACTONE PO Take 25 mg by mouth daily   tamsulosin (FLOMAX) 0.4 MG capsule Take 1 capsule (0.4 mg) by mouth daily   traMADol (ULTRAM) 50 MG tablet TAKE 1 TABLET 3 TIMES DAILY AS NEEDED FOR MODERATE PAIN   VITAMIN D, CHOLECALCIFEROL, PO Take 2,000 Units by mouth daily Reported on 5/3/2017   alendronate (FOSAMAX) 70 MG tablet Take 1 tablet (70 mg) by mouth every 7 days Take with over 8 ounces water and stay upright for at least 30 minutes after dose.  Take at least 60 minutes before breakfast (Patient not taking: Reported on 8/10/2018)     No current facility-administered medications on file prior to visit.       Allergies   Allergen Reactions     Amiodarone Other (See Comments)     Drop in DLCO     Lasix [Furosemide] Blisters     Blisters and sores in his mouth.          Social History     Occupational History     Not on file.     Social History Main Topics     Smoking status: Former Smoker     Packs/day: 0.50     Years: 15.00     Types: Cigarettes     Quit date: 11/12/1998     Smokeless tobacco: Never Used      Comment: quit 15 yrs ago     Alcohol use No     Drug use: No     Sexual activity: Yes       Patient does not use Tobacco products.      Family  "History   Problem Relation Age of Onset     Cancer Mother      Cancer Son      Unknown/Adopted Father      Alcoholism Brother          REVIEW OF SYSTEMS  10 point review systems performed otherwise negative as noted as per history of present illness.      Physical Exam:  Vitals: BP 98/62 (BP Location: Right arm, Patient Position: Sitting, Cuff Size: Adult Regular)  Temp 98.5  F (36.9  C) (Temporal)  Ht 1.702 m (5' 7\")  Wt 67.1 kg (148 lb)  BMI 23.18 kg/m2  BMI= Body mass index is 23.18 kg/(m^2).    Constitutional: healthy, alert and no acute distress.. Very hard of hearing so wife does most of the talking.  Psychiatric: mentation appears normal and affect normal/bright  NEURO: no focal deficits  RESP: Normal with easy respirations and no use of accessory muscles to breathe, no audible wheezing or retractions  CV: regular pulse  SKIN: No erythema, rashes, excoriation, or breakdown. No evidence of infection.   JOINT/EXTREMITIES:bilateral shoulders - crepitus, limited ROM, and pain  GAIT: not tested   Lymph: no palpable lymph nodes    Diagnostic Modalities:  bilateral shoulder X-ray: osteophytes, rotator cuff arthropathy with high riding humeral head, severe  Independent visualization of the images was performed.      Impression: bilateral severe Rotator cuff arthropathy    Plan:  All of the above pertinent physical exam and imaging modalities findings was reviewed with Bud and his wife.  He is at high risk for surgery so I would recommend seeking pain management.  If he wanted to consider surgery I would refer him to dr mcknight at the .                                          CONSERVATIVE CARE:  I recommend conservative care for the patient to include NSAIDs, Tylenol, activity modifications, pain management. Today I provided or dispensed info.    BP Readings from Last 1 Encounters:   08/10/18 98/62       BP noted to be well controlled today in office.     Return to clinic PRN, or sooner as needed for " changes.  Re-x-ray on return: No    Evonne Mckeon M.D.                Again, thank you for allowing me to participate in the care of your patient.        Sincerely,        Evonne Mckeon MD

## 2018-08-10 NOTE — PATIENT INSTRUCTIONS
Reverse Total Shoulder Replacement  Reverse total shoulder replacement is a type of surgery. It s usually done to repair an injured rotator cuff.  Understanding the shoulder joint  The shoulder joint is where the ball-shaped part of the upper arm bone (humerus) meets the cup-shaped socket of the shoulder blade (scapula). A group of muscles and tendons hold the joint together. These muscles and tendons are called the rotator cuff. The muscles let you move your arm and shoulder.  Why reverse total shoulder replacement is done  The surgery may be needed if you have a complete tear of your rotator cuff. The tear may cause long-term problems with your shoulder joint. This is called cuff tear arthropathy. You may need reverse total shoulder replacement surgery if you have any of these:    A complete tear in your rotator cuff    Joint problems from the cuff tear (cuff tear arthropathy)    Previous total shoulder replacement surgery that didn t relieve symptoms    Severe pain and trouble moving your shoulder    No success relieving your symptoms with treatments such as rest, medicines, cortisone injections, or physical therapy  How reverse total shoulder replacement is done  The surgery is the opposite (reverse) of standard total shoulder replacement surgery:    In the standard surgery, the ball of the humerus is replaced with an artificial ball. The socket of the scapula is replaced with an artificial socket. The new joint still uses the rotator cuff muscles to move the arm and shoulder.    With the reverse surgery, the ball of the humerus is replaced with an artificial socket. The socket of the scapula is replaced with an artificial ball. Since the rotator cuff muscles are damaged, another muscle (deltoid) moves the arm and shoulder.  Risks of reverse total shoulder replacement  Every surgery has risks. Risks for this surgery include:    Infection    Blood loss    Damage to nerves that may make it hard to move your  arm    Damage to the humerus or scapula    Artificial joint moving out of position (dislocation)    Problems with anesthesia  Some risks may be higher if you have had shoulder surgery in the past. Your risks may vary depending on your shoulder problem and your overall health. Talk with your doctor about which risks apply most to you.  Date Last Reviewed: 6/1/2017 2000-2017 [employer]. [address] [place], [place] [id number]. All rights reserved. This information is not intended as a substitute for professional medical care. Always follow your healthcare professional's instructions.

## 2018-08-10 NOTE — MR AVS SNAPSHOT
After Visit Summary   8/10/2018    Bud Merritt    MRN: [id number]           Patient Information     Date Of Birth          5/9/1927        Visit Information        Provider Department      8/10/2018 2:40 PM Evonne Mckoen MD [facility] Instructions      Reverse Total Shoulder Replacement  Reverse total shoulder replacement is a type of surgery. It s usually done to repair an injured rotator cuff.  Understanding the shoulder joint  The shoulder joint is where the ball-shaped part of the upper arm bone (humerus) meets the cup-shaped socket of the shoulder blade (scapula). A group of muscles and tendons hold the joint together. These muscles and tendons are called the rotator cuff. The muscles let you move your arm and shoulder.  Why reverse total shoulder replacement is done  The surgery may be needed if you have a complete tear of your rotator cuff. The tear may cause long-term problems with your shoulder joint. This is called cuff tear arthropathy. You may need reverse total shoulder replacement surgery if you have any of these:    A complete tear in your rotator cuff    Joint problems from the cuff tear (cuff tear arthropathy)    Previous total shoulder replacement surgery that didn t relieve symptoms    Severe pain and trouble moving your shoulder    No success relieving your symptoms with treatments such as rest, medicines, cortisone injections, or physical therapy  How reverse total shoulder replacement is done  The surgery is the opposite (reverse) of standard total shoulder replacement surgery:    In the standard surgery, the ball of the humerus is replaced with an artificial ball. The socket of the scapula is replaced with an artificial socket. The new joint still uses the rotator cuff muscles to move the arm and shoulder.    With the reverse surgery, the ball of the humerus is replaced with an artificial socket. The socket of the scapula is replaced with an  artificial ball. Since the rotator cuff muscles are damaged, another muscle (deltoid) moves the arm and shoulder.  Risks of reverse total shoulder replacement  Every surgery has risks. Risks for this surgery include:    Infection    Blood loss    Damage to nerves that may make it hard to move your arm    Damage to the humerus or scapula    Artificial joint moving out of position (dislocation)    Problems with anesthesia  Some risks may be higher if you have had shoulder surgery in the past. Your risks may vary depending on your shoulder problem and your overall health. Talk with your doctor about which risks apply most to you.  Date Last Reviewed: 6/1/2017 2000-2017 [employer]. [address] [place] [address]. All rights reserved. This information is not intended as a substitute for professional medical care. Always follow your healthcare professional's instructions.                Follow-ups after your visit        Follow-up notes from your care team     Return if symptoms worsen or fail to improve.      Your next 10 appointments already scheduled     Aug 30, 2018 11:30 AM CDT   Return Visit with Cesar Walker,    [facility] ([address] [address]   [phone]            Aug 30, 2018 12:45 PM CDT   LAB with [facility] [address] [address]   [phone]           Please do not eat 10-12 hours before your appointment if you are coming in fasting for labs on lipids, cholesterol, or glucose (sugar). This does not apply to pregnant women. Water, hot tea and black coffee (with nothing added) are okay. Do not drink other fluids, diet soda or chew gum.            Aug 30, 2018  1:00 PM CDT   Return Visit with CARDIO DEVICE NURSE   [facility] [address]  "[place] [address]   [phone]            Aug 30, 2018  2:00 PM CDT   Ech Complete with PHECHR1   [facility] ([facility])    [address] [place] [id number] [phone]           1.  Please bring or wear a comfortable two-piece outfit. 2.  You may eat, drink and take your normal medicines. 3.  For any questions that cannot be answered, please contact the ordering physician 4.  Please do not wear perfumes or scented lotions on the day of your exam.            Sep 05, 2018 11:30 AM CDT   Return Visit with Berhane Colon MD   [facility] ([facility])    [address] [facility] [id number] [phone]              Who to contact     If you have questions or need follow up information about today's clinic visit or your schedule please contact [facility] directly at [phone].  Normal or non-critical lab and imaging results will be communicated to you by MyChart, letter or phone within 4 business days after the clinic has received the results. If you do not hear from us within 7 days, please contact the clinic through MyChart or phone. If you have a critical or abnormal lab result, we will notify you by phone as soon as possible.  Submit refill requests through [employer] or call your pharmacy and they will forward the refill request to us. Please allow 3 business days for your refill to be completed.          Additional Information About Your Visit        Care EveryWhere ID     This is your Care EveryWhere ID. This could be used by other organizations to access your [facility] medical records  [id number]        Your Vitals Were     Temperature Height BMI (Body Mass Index)             98.5  F (36.9  C) (Temporal) 1.702 m (5' 7\") 23.18 kg/m2          Blood Pressure from Last 3 Encounters:   08/10/18 98/62   08/03/18 124/72   07/24/18 101/51    Weight from Last 3 " Encounters:   08/10/18 67.1 kg (148 lb)   08/03/18 66.2 kg (146 lb)   07/24/18 66.2 kg (146 lb)              Today, you had the following     No orders found for display         Today's Medication Changes          These changes are accurate as of 8/10/18  3:13 PM.  If you have any questions, ask your nurse or doctor.               These medicines have changed or have updated prescriptions.        Dose/Directions    metoprolol tartrate 25 MG tablet   Commonly known as:  LOPRESSOR   This may have changed:  how much to take   Used for:  S/P TAVR (transcatheter aortic valve replacement)        Dose:  25 mg   Take 1 tablet (25 mg) by mouth 2 times daily   Quantity:  60 tablet   Refills:  11                Primary Care Provider Office Phone # Fax #    Camron Aragon [phone] [address] [facility] [address]        Equal Access to Services     [facility]: Hadii sammie alvao Soget[place]w, waaxda luqadaha, qaybta kaalmada adeasheryada, ronel real . So [facility] [phone].    ATENCIÓN: Si habla español, tiene a mon disposición servicios gratuitos de asistencia lingüística. Jeanette al [phone].    We comply with applicable federal civil rights laws and [place] laws. We do not discriminate on the basis of race, color, national origin, age, disability, sex, sexual orientation, or gender identity.            Thank you!     Thank you for choosing [facility]  for your care. Our goal is always to provide you with excellent care. Hearing back from our patients is one way we can continue to improve our services. Please take a few minutes to complete the written survey that you may receive in the mail after your visit with us. Thank you!             Your Updated Medication List - Protect others around you: Learn how to safely use, store and throw away your medicines at www.disposemymeds.org.          This list is accurate as of 8/10/18  3:13 PM.   Always use your most recent med list.                   Brand Name Dispense Instructions for use Diagnosis    acetaminophen 325 MG tablet    TYLENOL    100 tablet    Take 2 tablets (650 mg) by mouth every 4 hours as needed for mild pain, fever or headaches        alendronate 70 MG tablet    FOSAMAX    12 tablet    Take 1 tablet (70 mg) by mouth every 7 days Take with over 8 ounces water and stay upright for at least 30 minutes after dose.  Take at least 60 minutes before breakfast    Hyperlipidemia LDL goal <130       apixaban ANTICOAGULANT 2.5 MG tablet    ELIQUIS    180 tablet    Take 1 tablet (2.5 mg) by mouth 2 times daily    Paroxysmal atrial fibrillation (H)       ascorbic acid 500 MG Cpcr CR capsule    vitamin C     Take 500 mg by mouth daily        calcium carbonate 500 MG tablet    OS-SHELIA 500 mg [place]. [place]     Take 1,250 mg by mouth daily        dofetilide 125 MCG capsule    TIKOSYN    60 capsule    TAKE ONE CAPSULE BY MOUTH TWICE A DAY    Paroxysmal atrial fibrillation (H)       * HYDROcodone-acetaminophen 5-325 MG per tablet    NORCO    10 tablet    Take 1 tablet by mouth every 6 hours as needed for other (Moderate to Severe Pain)    Scalp lesion       * HYDROcodone-acetaminophen 5-325 MG per tablet    NORCO    18 tablet    Take 1 tablet by mouth every 6 hours as needed for pain For moderate to severe pain    Rheumatoid arthritis involving multiple sites, unspecified rheumatoid factor presence (H)       lisinopril 5 MG tablet    PRINIVIL/ZESTRIL    90 tablet    Take 1 tablet (5 mg) by mouth daily    Acute on chronic systolic congestive heart failure (H)       LUPRON DEPOT IM      Inject into the muscle every 6 months Reported on 5/3/2017        metoprolol tartrate 25 MG tablet    LOPRESSOR    60 tablet    Take 1 tablet (25 mg) by mouth 2 times daily    S/P TAVR (transcatheter aortic valve replacement)       ondansetron 4 MG ODT tab    ZOFRAN-ODT    10 tablet    Take 1 tablet (4 mg) by mouth every 6 hours as  needed for nausea or vomiting    Nausea       polyethylene glycol Packet    MIRALAX/GLYCOLAX    30 packet    Take 17 g by mouth daily    Constipation, unspecified constipation type       predniSONE 5 MG tablet    DELTASONE    100 tablet    Take 1 tablet (5 mg) by mouth daily    Rheumatoid arthritis involving multiple sites with positive rheumatoid factor (H)       SPIRONOLACTONE PO      Take 25 mg by mouth daily        tamsulosin 0.4 MG capsule    FLOMAX    60 capsule    Take 1 capsule (0.4 mg) by mouth daily    S/P TAVR (transcatheter aortic valve replacement)       traMADol 50 MG tablet    ULTRAM    90 tablet    TAKE 1 TABLET 3 TIMES DAILY AS NEEDED FOR MODERATE PAIN    Rheumatoid arthritis involving multiple sites, unspecified rheumatoid factor presence (H)       VITAMIN D (CHOLECALCIFEROL) PO      Take 2,000 Units by mouth daily Reported on 5/3/2017        * Notice:  This list has 2 medication(s) that are the same as other medications prescribed for you. Read the directions carefully, and ask your doctor or other care provider to review them with you.

## 2018-08-10 NOTE — PROGRESS NOTES
ORTHOPEDIC CONSULT      Chief Complaint: Bud Merritt is a 89 year old right hand dominant male      He is being seen for   Chief Complaints and History of Present Illnesses   Patient presents with     Shoulder Pain     Bilateral shoulder pain     Consult     ref: Dr. Garcia         History of Present Illness:   Bud Merritt is a 89 year old male who is seen in consultation at the request of Jose.  History of Present illness:  Bud presents for evaluation of:   1.) Bilateral shoulder pain  2.) Hurting all over  Onset:  1. Shoulders is lifetime problem. 2. Unable to walk, pain all over x 1 month.    Symptoms brought on by Unknown (arthritis).   Character:  Multiple symptoms.    Progression of symptoms:  worse.    Previous similar pain: YES.   Pain Level:  10/10.   Previous treatments:  rest/inactivity and medications.  Relying on pain meds, on norco.  PT helps while he is there.  Injections helped a little bit.  Currently on Blood thinners? Yes, Eliquis  Diagnosis of Diabetes? No  Aches all of the time, cant sleep.      Patient's past medical, surgical, social and family histories reviewed.       Past Medical History:   Diagnosis Date     Anemia      Atrial fibrillation (H) 07/01/2016     Cardiac pacemaker 03/10/2017     Cardiomyopathy (H)      CHF (congestive heart failure) (H)      COPD exacerbation (H) 3/2/2017     Depressive disorder      History of prostate cancer      Paroxysmal atrial fibrillation (H) 7/6/2016     Pure hypercholesterolemia      Rheumatoid arthritis(714.0)      Unspecified essential hypertension      Weakness generalized 3/2/2017         Past Surgical History:   Procedure Laterality Date     ARTHROPLASTY KNEE Left 1/12/2016    Procedure: ARTHROPLASTY KNEE;  Surgeon: Cesar Walker DO;  Location: [facility]     COLONOSCOPY  08/24/09     EXCISE LESION HEAD N/A 4/9/2018    Procedure: EXCISE LESION HEAD;  Excision Left Scalp Lesion;  Surgeon: Connor Nichole DO;  Location:  [facility]     HC COLONOSCOPY THRU STOMA W BIOPSY/CAUTERY TUMOR/POLYP/LESION  8/31/2004     HC REPAIR ING HERNIA,5+Y/O,REDUCIBL  1996    Marlex mesh repair of bilateral inguinal hernias and drainage of bilateral scrotal hydroceles.     HEART CATH FEMORAL CANNULIZATION WITH OPEN STANDBY REPAIR AORTIC VALVE N/A 9/27/2017    Procedure: HEART CATH FEMORAL CANNULIZATION WITH OPEN STANDBY REPAIR AORTIC VALVE;;  Surgeon: Jaron Alejandra MD;  Location: [facility] OR     IMPLANT PACEMAKER  03/10/2017     IRRIGATION AND DEBRIDEMENT SOFT TISSUE LOWER EXTREMITY, COMBINED Left 3/15/2016    Procedure: COMBINED IRRIGATION AND DEBRIDEMENT SOFT TISSUE LOWER EXTREMITY;  Surgeon: Cesar Walker DO;  Location: [facility]     TRANSCATHETER AORTIC VALVE IMPLANT ANESTHESIA N/A 9/27/2017    Procedure: TRANSCATHETER AORTIC VALVE IMPLANT ANESTHESIA;  Right Transfemoral Approach (Harman Ramsey 3 29mm) Aortic Valve Implant,  Cardiopulmonary Bypass Standby, Transthoracic Echocardiogram by Derian Queen(Echo Tech);  Surgeon: GENERIC ANESTHESIA PROVIDER;  Location: [facility] OR         Medications:    Current Outpatient Prescriptions on File Prior to Visit:  acetaminophen (TYLENOL) 325 MG tablet Take 2 tablets (650 mg) by mouth every 4 hours as needed for mild pain, fever or headaches   apixaban ANTICOAGULANT (ELIQUIS) 2.5 MG tablet Take 1 tablet (2.5 mg) by mouth 2 times daily   ascorbic acid (VITAMIN C) 500 MG CPCR Take 500 mg by mouth daily   calcium carbonate (OS-SHELIA 500 MG [place]. [place]) 500 MG tablet Take 1,250 mg by mouth daily    dofetilide (TIKOSYN) 125 MCG capsule TAKE ONE CAPSULE BY MOUTH TWICE A DAY   HYDROcodone-acetaminophen (NORCO) 5-325 MG per tablet Take 1 tablet by mouth every 6 hours as needed for pain For moderate to severe pain   HYDROcodone-acetaminophen (NORCO) 5-325 MG per tablet Take 1 tablet by mouth every 6 hours as needed for other (Moderate to Severe Pain)   Leuprolide Acetate (LUPRON DEPOT IM) Inject into the muscle every 6  months Reported on 5/3/2017   lisinopril (PRINIVIL/ZESTRIL) 5 MG tablet Take 1 tablet (5 mg) by mouth daily   metoprolol (LOPRESSOR) 25 MG tablet Take 1 tablet (25 mg) by mouth 2 times daily (Patient taking differently: Take 12.5 mg by mouth 2 times daily )   ondansetron (ZOFRAN-ODT) 4 MG ODT tab Take 1 tablet (4 mg) by mouth every 6 hours as needed for nausea or vomiting   polyethylene glycol (MIRALAX/GLYCOLAX) Packet Take 17 g by mouth daily   predniSONE (DELTASONE) 5 MG tablet Take 1 tablet (5 mg) by mouth daily   SPIRONOLACTONE PO Take 25 mg by mouth daily   tamsulosin (FLOMAX) 0.4 MG capsule Take 1 capsule (0.4 mg) by mouth daily   traMADol (ULTRAM) 50 MG tablet TAKE 1 TABLET 3 TIMES DAILY AS NEEDED FOR MODERATE PAIN   VITAMIN D, CHOLECALCIFEROL, PO Take 2,000 Units by mouth daily Reported on 5/3/2017   alendronate (FOSAMAX) 70 MG tablet Take 1 tablet (70 mg) by mouth every 7 days Take with over 8 ounces water and stay upright for at least 30 minutes after dose.  Take at least 60 minutes before breakfast (Patient not taking: Reported on 8/10/2018)     No current facility-administered medications on file prior to visit.       Allergies   Allergen Reactions     Amiodarone Other (See Comments)     Drop in DLCO     Lasix [Furosemide] Blisters     Blisters and sores in his mouth.          Social History     Occupational History     Not on file.     Social History Main Topics     Smoking status: Former Smoker     Packs/day: 0.50     Years: 15.00     Types: Cigarettes     Quit date: 11/12/1998     Smokeless tobacco: Never Used      Comment: quit 15 yrs ago     Alcohol use No     Drug use: No     Sexual activity: Yes       Patient does not use Tobacco products.      Family History   Problem Relation Age of Onset     Cancer Mother      Cancer Son      Unknown/Adopted Father      Alcoholism Brother          REVIEW OF SYSTEMS  10 point review systems performed otherwise negative as noted as per history of present  "illness.      Physical Exam:  Vitals: BP 98/62 (BP Location: Right arm, Patient Position: Sitting, Cuff Size: Adult Regular)  Temp 98.5  F (36.9  C) (Temporal)  Ht 1.702 m (5' 7\")  Wt 67.1 kg (148 lb)  BMI 23.18 kg/m2  BMI= Body mass index is 23.18 kg/(m^2).    Constitutional: healthy, alert and no acute distress.. Very hard of hearing so wife does most of the talking.  Psychiatric: mentation appears normal and affect normal/bright  NEURO: no focal deficits  RESP: Normal with easy respirations and no use of accessory muscles to breathe, no audible wheezing or retractions  CV: regular pulse  SKIN: No erythema, rashes, excoriation, or breakdown. No evidence of infection.   JOINT/EXTREMITIES:bilateral shoulders - crepitus, limited ROM, and pain  GAIT: not tested   Lymph: no palpable lymph nodes    Diagnostic Modalities:  bilateral shoulder X-ray: osteophytes, rotator cuff arthropathy with high riding humeral head, severe  Independent visualization of the images was performed.      Impression: bilateral severe Rotator cuff arthropathy    Plan:  All of the above pertinent physical exam and imaging modalities findings was reviewed with Bud and his wife.  He is at high risk for surgery so I would recommend seeking pain management.  If he wanted to consider surgery I would refer him to dr mcknight at the .                                          CONSERVATIVE CARE:  I recommend conservative care for the patient to include NSAIDs, Tylenol, activity modifications, pain management. Today I provided or dispensed info.    BP Readings from Last 1 Encounters:   08/10/18 98/62       BP noted to be well controlled today in office.     Return to clinic PRN, or sooner as needed for changes.  Re-x-ray on return: No    Evonne Mckeon M.D.              "

## 2018-08-13 ENCOUNTER — TELEPHONE (OUTPATIENT)
Dept: FAMILY MEDICINE | Facility: CLINIC | Age: 83
End: 2018-08-13

## 2018-08-13 NOTE — TELEPHONE ENCOUNTER
Reason for Call:  Same Day Appointment, Requested Provider:  Camron Aragon M.D.    PCP: Camron Aragon    Reason for visit: severe arthritis pain. Using more pain meds than prescribed due to the pain    Duration of symptoms: ongoing    Have you been treated for this in the past? Yes    Additional comments:     Can we leave a detailed message on this number? YES    Phone number patient can be reached at:     Best Time:     Call taken on 8/13/2018 at 8:41 AM by Kelsey Medel

## 2018-08-15 ENCOUNTER — OFFICE VISIT (OUTPATIENT)
Dept: FAMILY MEDICINE | Facility: CLINIC | Age: 83
End: 2018-08-15
Payer: COMMERCIAL

## 2018-08-15 VITALS
HEART RATE: 70 BPM | OXYGEN SATURATION: 97 % | TEMPERATURE: 97 F | SYSTOLIC BLOOD PRESSURE: 102 MMHG | RESPIRATION RATE: 16 BRPM | DIASTOLIC BLOOD PRESSURE: 58 MMHG

## 2018-08-15 DIAGNOSIS — M06.9 RHEUMATOID ARTHRITIS INVOLVING MULTIPLE SITES, UNSPECIFIED RHEUMATOID FACTOR PRESENCE: Primary | ICD-10-CM

## 2018-08-15 DIAGNOSIS — Z95.2 S/P TAVR (TRANSCATHETER AORTIC VALVE REPLACEMENT): ICD-10-CM

## 2018-08-15 DIAGNOSIS — R53.1 WEAKNESS GENERALIZED: ICD-10-CM

## 2018-08-15 PROCEDURE — 99214 OFFICE O/P EST MOD 30 MIN: CPT | Performed by: FAMILY MEDICINE

## 2018-08-15 RX ORDER — HYDROCODONE BITARTRATE AND ACETAMINOPHEN 5; 325 MG/1; MG/1
1 TABLET ORAL EVERY 6 HOURS PRN
Qty: 30 TABLET | Refills: 0 | Status: SHIPPED | OUTPATIENT
Start: 2018-08-15 | End: 2018-09-14

## 2018-08-15 RX ORDER — TRAMADOL HYDROCHLORIDE 50 MG/1
TABLET ORAL
Qty: 90 TABLET | Refills: 0 | Status: SHIPPED | OUTPATIENT
Start: 2018-08-15 | End: 2018-09-14

## 2018-08-15 ASSESSMENT — PAIN SCALES - GENERAL: PAINLEVEL: MODERATE PAIN (4)

## 2018-08-15 NOTE — PROGRESS NOTES
SUBJECTIVE:   Bud Merritt is a 89 year old male who presents to clinic today for the following health issues:  Chief Complaint   Patient presents with     Recheck Medication     Patient would like to talk about getting either a pain pump or pain patch so doesn't have to continue taking as many pills.             Problem list and histories reviewed & adjusted, as indicated.  Additional history: as documented        Reviewed and updated as needed this visit by clinical staff  Tobacco  Allergies  Meds  Med Hx  Surg Hx  Fam Hx  Soc Hx      Reviewed and updated as needed this visit by Provider        SUBJECTIVE:  Bud  is a 89 year old male who presents for: Concern about pain.  Pain in his shoulders.  Pain in his knees.  Pain everywhere.  Saw orthopedics and they said there are surgical options for his shoulder but they would not do it here.  Is seeing orthopedics again for his knee.  He has been taking tramadol 3 a day.  And then takes occasional Vicodin.  Does not seem to affect his alertness.  There is worried that he is taking too many pain medicines.  They wonder about implantable pain medication generator or a pain patch.    Past Medical History:   Diagnosis Date     Anemia      Atrial fibrillation (H) 07/01/2016     Cardiac pacemaker 03/10/2017     Cardiomyopathy (H)      CHF (congestive heart failure) (H)      COPD exacerbation (H) 3/2/2017     Depressive disorder      History of prostate cancer      Paroxysmal atrial fibrillation (H) 7/6/2016     Pure hypercholesterolemia      Rheumatoid arthritis(714.0)      Unspecified essential hypertension      Weakness generalized 3/2/2017     Past Surgical History:   Procedure Laterality Date     ARTHROPLASTY KNEE Left 1/12/2016    Procedure: ARTHROPLASTY KNEE;  Surgeon: Cesar Walker DO;  [facility]     COLONOSCOPY  08/24/09     EXCISE LESION HEAD N/A 4/9/2018    Procedure: EXCISE LESION HEAD;  Excision Left Scalp Lesion;  Surgeon:  Connor Nichole, DO;  Location: [facility]     HC COLONOSCOPY THRU STOMA W BIOPSY/CAUTERY TUMOR/POLYP/LESION  8/31/2004     HC REPAIR ING HERNIA,5+Y/O,REDUCIBL  1996    Marlex mesh repair of bilateral inguinal hernias and drainage of bilateral scrotal hydroceles.     HEART CATH FEMORAL CANNULIZATION WITH OPEN STANDBY REPAIR AORTIC VALVE N/A 9/27/2017    Procedure: HEART CATH FEMORAL CANNULIZATION WITH OPEN STANDBY REPAIR AORTIC VALVE;;  Surgeon: Jaron Alejandra MD;  Location: [facility]     IMPLANT PACEMAKER  03/10/2017     IRRIGATION AND DEBRIDEMENT SOFT TISSUE LOWER EXTREMITY, COMBINED Left 3/15/2016    Procedure: COMBINED IRRIGATION AND DEBRIDEMENT SOFT TISSUE LOWER EXTREMITY;  Surgeon: Cesar Walker DO;  [facility]     TRANSCATHETER AORTIC VALVE IMPLANT ANESTHESIA N/A 9/27/2017    Procedure: TRANSCATHETER AORTIC VALVE IMPLANT ANESTHESIA;  Right Transfemoral Approach (Harman Ramsey 3 29mm) Aortic Valve Implant,  Cardiopulmonary Bypass Standby, Transthoracic Echocardiogram by Derian Queen(Echo Tech);  Surgeon: GENERIC ANESTHESIA PROVIDER;  Location: [facility] OR     Social History   Substance Use Topics     Smoking status: Former Smoker     Packs/day: 0.50     Years: 15.00     Types: Cigarettes     Quit date: 11/12/1998     Smokeless tobacco: Never Used      Comment: quit 15 yrs ago     Alcohol use No     Current Outpatient Prescriptions   Medication Sig Dispense Refill     acetaminophen (TYLENOL) 325 MG tablet Take 2 tablets (650 mg) by mouth every 4 hours as needed for mild pain, fever or headaches 100 tablet      apixaban ANTICOAGULANT (ELIQUIS) 2.5 MG tablet Take 1 tablet (2.5 mg) by mouth 2 times daily 180 tablet 3     ascorbic acid (VITAMIN C) 500 MG CPCR Take 500 mg by mouth daily       calcium carbonate (OS-SHELIA 500 MG [place]. [place]) 500 MG tablet Take 1,250 mg by mouth daily        dofetilide (TIKOSYN) 125 MCG capsule TAKE ONE CAPSULE BY MOUTH TWICE A DAY 60 capsule 1      HYDROcodone-acetaminophen (NORCO) 5-325 MG per tablet Take 1 tablet by mouth every 6 hours as needed for pain For moderate to severe pain 30 tablet 0     HYDROcodone-acetaminophen (NORCO) 5-325 MG per tablet Take 1 tablet by mouth every 6 hours as needed for other (Moderate to Severe Pain) 10 tablet 0     Leuprolide Acetate (LUPRON DEPOT IM) Inject into the muscle every 6 months Reported on 5/3/2017       lisinopril (PRINIVIL/ZESTRIL) 5 MG tablet Take 1 tablet (5 mg) by mouth daily 90 tablet 3     metoprolol (LOPRESSOR) 25 MG tablet Take 1 tablet (25 mg) by mouth 2 times daily (Patient taking differently: Take 12.5 mg by mouth 2 times daily ) 60 tablet 11     ondansetron (ZOFRAN-ODT) 4 MG ODT tab Take 1 tablet (4 mg) by mouth every 6 hours as needed for nausea or vomiting 10 tablet 0     polyethylene glycol (MIRALAX/GLYCOLAX) Packet Take 17 g by mouth daily 30 packet 0     predniSONE (DELTASONE) 5 MG tablet Take 1 tablet (5 mg) by mouth daily 100 tablet 4     SPIRONOLACTONE PO Take 25 mg by mouth daily       tamsulosin (FLOMAX) 0.4 MG capsule Take 1 capsule (0.4 mg) by mouth daily 60 capsule 0     traMADol (ULTRAM) 50 MG tablet TAKE 1 TABLET 3 TIMES DAILY AS NEEDED FOR MODERATE PAIN 90 tablet 0     VITAMIN D, CHOLECALCIFEROL, PO Take 2,000 Units by mouth daily Reported on 5/3/2017       alendronate (FOSAMAX) 70 MG tablet Take 1 tablet (70 mg) by mouth every 7 days Take with over 8 ounces water and stay upright for at least 30 minutes after dose.  Take at least 60 minutes before breakfast (Patient not taking: Reported on 8/15/2018) 12 tablet 3       REVIEW OF SYSTEMS:   5 point ROS negative except as noted above in HPI, including Gen., Resp, CV, GI &  system review.     OBJECTIVE:  Vitals: /58  Pulse 70  Temp 97  F (36.1  C) (Temporal)  Resp 16  SpO2 97%  BMI= There is no height or weight on file to calculate BMI.  He is alert and oriented.  In a wheelchair.  Tenderness of range of motion of the shoulders  and knees.  Hands with obvious arthritic changes.    ASSESSMENT:  #1 rheumatoid arthritis involving multiple joints #2 status post TAVR with anticoagulation #3 generalized weakness    PLAN:  I reassured them that I do not think he is over taking pain medications.  He takes 3 tramadol a day and needs to supplement with a Vicodin once or twice a day I do not think he is overdoing it.  I do not think he be a good candidate for a fentanyl patch it may affect his thinking.  They can supplement with Tylenol.  Because of his anticoagulation status he should not be taking nonsteroidals.  Refill his Vicodin and tramadol today.        Camron Aragon MD  [facility]

## 2018-08-15 NOTE — MR AVS SNAPSHOT
After Visit Summary   8/15/2018    Bud Merritt    MRN: [id number]           Patient Information     Date Of Birth          5/9/1927        Visit Information        Provider Department      8/15/2018 11:00 AM Camron Aragon MD [facility]        Today's Diagnoses     Rheumatoid arthritis involving multiple sites, unspecified rheumatoid factor presence (H)    -  1    Weakness generalized        S/P TAVR (transcatheter aortic valve replacement)           Follow-ups after your visit        Your next 10 appointments already scheduled     Aug 30, 2018 11:30 AM CDT   Return Visit with Cesar Walker,    [facility]    [address] [place] [address]   [phone]            Aug 30, 2018 12:45 PM CDT   LAB with NL LAB [facility])    [address] [place] [address]   [phone]           Please do not eat 10-12 hours before your appointment if you are coming in fasting for labs on lipids, cholesterol, or glucose (sugar). This does not apply to pregnant women. Water, hot tea and black coffee (with nothing added) are okay. Do not drink other fluids, diet soda or chew gum.            Aug 30, 2018  1:00 PM CDT   Return Visit with CARDIO DEVICE NURSE   [facility] ([facility])    [address] [place] [address]   [phone]            Aug 30, 2018  2:00 PM CDT   Ech Complete with PHEPORSCHE   [facility] ([facility])    [address] [place] [address] [phone]           1.  Please bring or wear a comfortable two-piece outfit. 2.  You may eat, drink and take your normal medicines. 3.  For any questions that cannot be answered, please contact the ordering physician 4.  Please do not wear perfumes or scented lotions on the day of your exam.             Sep 05, 2018 11:30 AM CDT   Return Visit with Berhane Colon MD   [facility] ([facility])    [address] [facility] [address] [phone]              Who to contact     If you have questions or need follow up information about today's clinic visit or your schedule please contact [facility] directly at [phone].  Normal or non-critical lab and imaging results will be communicated to you by MyChart, letter or phone within 4 business days after the clinic has received the results. If you do not hear from us within 7 days, please contact the clinic through [employer] or phone. If you have a critical or abnormal lab result, we will notify you by phone as soon as possible.  Submit refill requests through [employer] or call your pharmacy and they will forward the refill request to us. Please allow 3 business days for your refill to be completed.          Additional Information About Your Visit        Care EveryWhere ID     This is your Care EveryWhere ID. This could be used by other organizations to access your [facility] medical records  [id number]        Your Vitals Were     Pulse Temperature Respirations Pulse Oximetry          70 97  F (36.1  C) (Temporal) 16 97%         Blood Pressure from Last 3 Encounters:   08/15/18 102/58   08/10/18 98/62   08/03/18 124/72    Weight from Last 3 Encounters:   08/10/18 148 lb (67.1 kg)   08/03/18 146 lb (66.2 kg)   07/24/18 146 lb (66.2 kg)              Today, you had the following     No orders found for display         Today's Medication Changes          These changes are accurate as of 8/15/18  1:33 PM.  If you have any questions, ask your nurse or doctor.               These medicines have changed or have updated prescriptions.        Dose/Directions    metoprolol tartrate 25 MG tablet   Commonly known as:  LOPRESSOR   This may have changed:  how much to take   Used for:  S/P TAVR  (transcatheter aortic valve replacement)        Dose:  25 mg   Take 1 tablet (25 mg) by mouth 2 times daily   Quantity:  60 tablet   Refills:  11            Where to get your medicines      Some of these will need a paper prescription and others can be bought over the counter.  Ask your nurse if you have questions.     Bring a paper prescription for each of these medications     HYDROcodone-acetaminophen 5-325 MG per tablet    traMADol 50 MG tablet               Information about OPIOIDS     PRESCRIPTION OPIOIDS: WHAT YOU NEED TO KNOW   We gave you an opioid (narcotic) pain medicine. It is important to manage your pain, but opioids are not always the best choice. You should first try all the other options your care team gave you. Take this medicine for as short a time (and as few doses) as possible.    Some activities can increase your pain, such as bandage changes or therapy sessions. It may help to take your pain medicine 30 to 60 minutes before these activities. Reduce your stress by getting enough sleep, working on hobbies you enjoy and practicing relaxation or meditation. Talk to your care team about ways to manage your pain beyond prescription opioids.    These medicines have risks:    DO NOT drive when on new or higher doses of pain medicine. These medicines can affect your alertness and reaction times, and you could be arrested for driving under the influence (DUI). If you need to use opioids long-term, talk to your care team about driving.    DO NOT operate heavy machinery    DO NOT do any other dangerous activities while taking these medicines.    DO NOT drink any alcohol while taking these medicines.     If the opioid prescribed includes acetaminophen, DO NOT take with any other medicines that contain acetaminophen. Read all labels carefully. Look for the word  acetaminophen  or  Tylenol.  Ask your pharmacist if you have questions or are unsure.    You can get addicted to pain medicines, especially if you  have a history of addiction (chemical, alcohol or substance dependence). Talk to your care team about ways to reduce this risk.    All opioids tend to cause constipation. Drink plenty of water and eat foods that have a lot of fiber, such as fruits, vegetables, prune juice, apple juice and high-fiber cereal. Take a laxative (Miralax, milk of magnesia, Colace, Senna) if you don t move your bowels at least every other day. Other side effects include upset stomach, sleepiness, dizziness, throwing up, tolerance (needing more of the medicine to have the same effect), physical dependence and slowed breathing.    Store your pills in a secure place, locked if possible. We will not replace any lost or stolen medicine. If you don t finish your medicine, please throw away (dispose) as directed by your pharmacist. The [place] [employer]ution [employer] has more information about safe disposal: https://[email or web address]/living-green/managing-unwanted-medications         Primary Care Provider Office Phone # Fax #    Camron Aragon [phone]       [address] [facility] [address]        Equal Access to Services     [facility] : Hadii sammie clifton hadasho Sogetachew, waaxda luqadaha, qaybta kaalmada bhavya, ronel real . So [facility] [phone].    ATENCIÓN: Si habla español, tiene a mon disposición servicios gratuitos de asistencia lingüística. Jeanette al [phone].    We comply with applicable federal civil rights laws and [place] laws. We do not discriminate on the basis of race, color, national origin, age, disability, sex, sexual orientation, or gender identity.            Thank you!     Thank you for choosing [facility]  for your care. Our goal is always to provide you with excellent care. Hearing back from our patients is one way we can continue to improve our services. Please take a few minutes to complete the written survey that you may receive in  the mail after your visit with us. Thank you!             Your Updated Medication List - Protect others around you: Learn how to safely use, store and throw away your medicines at www.disposemymeds.org.          This list is accurate as of 8/15/18  1:33 PM.  Always use your most recent med list.                   Brand Name Dispense Instructions for use Diagnosis    acetaminophen 325 MG tablet    TYLENOL    100 tablet    Take 2 tablets (650 mg) by mouth every 4 hours as needed for mild pain, fever or headaches        alendronate 70 MG tablet    FOSAMAX    12 tablet    Take 1 tablet (70 mg) by mouth every 7 days Take with over 8 ounces water and stay upright for at least 30 minutes after dose.  Take at least 60 minutes before breakfast    Hyperlipidemia LDL goal <130       apixaban ANTICOAGULANT 2.5 MG tablet    ELIQUIS    180 tablet    Take 1 tablet (2.5 mg) by mouth 2 times daily    Paroxysmal atrial fibrillation (H)       ascorbic acid 500 MG Cpcr CR capsule    vitamin C     Take 500 mg by mouth daily        calcium carbonate 500 MG tablet    OS-SHELIA 500 mg [place]. Ca     Take 1,250 mg by mouth daily        dofetilide 125 MCG capsule    TIKOSYN    60 capsule    TAKE ONE CAPSULE BY MOUTH TWICE A DAY    Paroxysmal atrial fibrillation (H)       * HYDROcodone-acetaminophen 5-325 MG per tablet    NORCO    10 tablet    Take 1 tablet by mouth every 6 hours as needed for other (Moderate to Severe Pain)    Scalp lesion       * HYDROcodone-acetaminophen 5-325 MG per tablet    NORCO    30 tablet    Take 1 tablet by mouth every 6 hours as needed for pain For moderate to severe pain    Rheumatoid arthritis involving multiple sites, unspecified rheumatoid factor presence (H)       lisinopril 5 MG tablet    PRINIVIL/ZESTRIL    90 tablet    Take 1 tablet (5 mg) by mouth daily    Acute on chronic systolic congestive heart failure (H)       LUPRON DEPOT IM      Inject into the muscle every 6 months Reported on 5/3/2017         metoprolol tartrate 25 MG tablet    LOPRESSOR    60 tablet    Take 1 tablet (25 mg) by mouth 2 times daily    S/P TAVR (transcatheter aortic valve replacement)       ondansetron 4 MG ODT tab    ZOFRAN-ODT    10 tablet    Take 1 tablet (4 mg) by mouth every 6 hours as needed for nausea or vomiting    Nausea       polyethylene glycol Packet    MIRALAX/GLYCOLAX    30 packet    Take 17 g by mouth daily    Constipation, unspecified constipation type       predniSONE 5 MG tablet    DELTASONE    100 tablet    Take 1 tablet (5 mg) by mouth daily    Rheumatoid arthritis involving multiple sites with positive rheumatoid factor (H)       SPIRONOLACTONE PO      Take 25 mg by mouth daily        tamsulosin 0.4 MG capsule    FLOMAX    60 capsule    Take 1 capsule (0.4 mg) by mouth daily    S/P TAVR (transcatheter aortic valve replacement)       traMADol 50 MG tablet    ULTRAM    90 tablet    TAKE 1 TABLET 3 TIMES DAILY AS NEEDED FOR MODERATE PAIN    Rheumatoid arthritis involving multiple sites, unspecified rheumatoid factor presence (H)       VITAMIN D (CHOLECALCIFEROL) PO      Take 2,000 Units by mouth daily Reported on 5/3/2017        * Notice:  This list has 2 medication(s) that are the same as other medications prescribed for you. Read the directions carefully, and ask your doctor or other care provider to review them with you.

## 2018-08-30 ENCOUNTER — HOSPITAL ENCOUNTER (OUTPATIENT)
Dept: CARDIOLOGY | Facility: CLINIC | Age: 83
Discharge: HOME | End: 2018-08-30
Attending: INTERNAL MEDICINE | Admitting: INTERNAL MEDICINE
Payer: MEDICARE

## 2018-08-30 ENCOUNTER — OFFICE VISIT (OUTPATIENT)
Dept: ORTHOPEDICS | Facility: CLINIC | Age: 83
End: 2018-08-30
Payer: MEDICARE

## 2018-08-30 ENCOUNTER — OFFICE VISIT (OUTPATIENT)
Dept: CARDIOLOGY | Facility: CLINIC | Age: 83
End: 2018-08-30
Payer: COMMERCIAL

## 2018-08-30 ENCOUNTER — RADIANT APPOINTMENT (OUTPATIENT)
Dept: GENERAL RADIOLOGY | Facility: CLINIC | Age: 83
End: 2018-08-30
Attending: ORTHOPAEDIC SURGERY
Payer: COMMERCIAL

## 2018-08-30 VITALS
DIASTOLIC BLOOD PRESSURE: 73 MMHG | TEMPERATURE: 97.6 F | SYSTOLIC BLOOD PRESSURE: 109 MMHG | HEIGHT: 67 IN | WEIGHT: 148 LBS | BODY MASS INDEX: 23.23 KG/M2

## 2018-08-30 DIAGNOSIS — M25.561 RIGHT KNEE PAIN: ICD-10-CM

## 2018-08-30 DIAGNOSIS — Z96.652 STATUS POST TOTAL LEFT KNEE REPLACEMENT: Primary | ICD-10-CM

## 2018-08-30 DIAGNOSIS — Z95.2 S/P TAVR (TRANSCATHETER AORTIC VALVE REPLACEMENT): ICD-10-CM

## 2018-08-30 DIAGNOSIS — M17.11 PRIMARY OSTEOARTHRITIS OF RIGHT KNEE: ICD-10-CM

## 2018-08-30 DIAGNOSIS — Z95.0 CARDIAC PACEMAKER IN SITU: Primary | ICD-10-CM

## 2018-08-30 LAB
ANION GAP SERPL CALCULATED.3IONS-SCNC: 7 MMOL/L (ref 3–14)
BUN SERPL-MCNC: 18 MG/DL (ref 7–30)
CALCIUM SERPL-MCNC: 9 MG/DL (ref 8.5–10.1)
CHLORIDE SERPL-SCNC: 99 MMOL/L (ref 94–109)
CO2 SERPL-SCNC: 29 MMOL/L (ref 20–32)
CREAT SERPL-MCNC: 1.14 MG/DL (ref 0.66–1.25)
ERYTHROCYTE [DISTWIDTH] IN BLOOD BY AUTOMATED COUNT: 17 % (ref 10–15)
GFR SERPL CREATININE-BSD FRML MDRD: 60 ML/MIN/1.7M2
GLUCOSE SERPL-MCNC: 102 MG/DL (ref 70–99)
HCT VFR BLD AUTO: 35.4 % (ref 40–53)
HGB BLD-MCNC: 11.5 G/DL (ref 13.3–17.7)
MCH RBC QN AUTO: 30.9 PG (ref 26.5–33)
MCHC RBC AUTO-ENTMCNC: 32.5 G/DL (ref 31.5–36.5)
MCV RBC AUTO: 95 FL (ref 78–100)
PLATELET # BLD AUTO: 201 10E9/L (ref 150–450)
POTASSIUM SERPL-SCNC: 4.9 MMOL/L (ref 3.4–5.3)
RBC # BLD AUTO: 3.72 10E12/L (ref 4.4–5.9)
SODIUM SERPL-SCNC: 135 MMOL/L (ref 133–144)
WBC # BLD AUTO: 4.2 10E9/L (ref 4–11)

## 2018-08-30 PROCEDURE — 25500064 ZZH RX 255 OP 636: Performed by: INTERNAL MEDICINE

## 2018-08-30 PROCEDURE — 93306 TTE W/DOPPLER COMPLETE: CPT | Mod: 26 | Performed by: INTERNAL MEDICINE

## 2018-08-30 PROCEDURE — 73562 X-RAY EXAM OF KNEE 3: CPT | Mod: TC

## 2018-08-30 PROCEDURE — 36415 COLL VENOUS BLD VENIPUNCTURE: CPT | Performed by: INTERNAL MEDICINE

## 2018-08-30 PROCEDURE — 80048 BASIC METABOLIC PNL TOTAL CA: CPT | Performed by: INTERNAL MEDICINE

## 2018-08-30 PROCEDURE — 20610 DRAIN/INJ JOINT/BURSA W/O US: CPT | Mod: RT | Performed by: ORTHOPAEDIC SURGERY

## 2018-08-30 PROCEDURE — 93281 PM DEVICE PROGR EVAL MULTI: CPT

## 2018-08-30 PROCEDURE — 85027 COMPLETE CBC AUTOMATED: CPT | Performed by: INTERNAL MEDICINE

## 2018-08-30 PROCEDURE — 99213 OFFICE O/P EST LOW 20 MIN: CPT | Mod: 25 | Performed by: ORTHOPAEDIC SURGERY

## 2018-08-30 RX ORDER — TRIAMCINOLONE ACETONIDE 40 MG/ML
40 INJECTION, SUSPENSION INTRA-ARTICULAR; INTRAMUSCULAR ONCE
Qty: 1 ML | Refills: 0
Start: 2018-08-30 | End: 2018-08-30

## 2018-08-30 RX ADMIN — HUMAN ALBUMIN MICROSPHERES AND PERFLUTREN 4 ML: 10; .22 INJECTION, SOLUTION INTRAVENOUS at 14:42

## 2018-08-30 ASSESSMENT — PAIN SCALES - GENERAL: PAINLEVEL: SEVERE PAIN (6)

## 2018-08-30 NOTE — MR AVS SNAPSHOT
After Visit Summary   8/30/2018    Bud Merritt    MRN: [id number]           Patient Information     Date Of Birth          5/9/1927        Visit Information        Provider Department      8/30/2018 1:00 PM CARDIO DEVICE NURSE [facility]        Today's Diagnoses     Cardiac pacemaker in situ    -  1       Follow-ups after your visit        Your next 10 appointments already scheduled     Aug 30, 2018  2:00 PM CDT   Ech Complete with PHECHR1   [facility] ([facility])    [address] [place] [address]  [place] [address] [phone]           1.  Please bring or wear a comfortable two-piece outfit. 2.  You may eat, drink and take your normal medicines. 3.  For any questions that cannot be answered, please contact the ordering physician 4.  Please do not wear perfumes or scented lotions on the day of your exam.            Sep 05, 2018 11:30 AM CDT   Return Visit with Berhane Colon MD   [facility] ([facility])    [address] [address] [phone]            Dec 26, 2018  4:30 PM CST   Remote ICD Check with MORENO DCR2   [facility] ([email or web address])    [address] [place] [address] [address]  [facility] [id number] [phone] OPT 2           This appointment is for a remote check of your debrillator.  This is not an appointment at the office.              Future tests that were ordered for you today     Open Future Orders        Priority Expected Expires Ordered    XR Knee Left 3 Views Routine 8/30/2018 8/23/2019 8/23/2018            Who to contact     If you have questions or need follow up information about today's clinic visit or your schedule please contact [facility] directly at [phone].  Normal or non-critical lab and imaging results will be communicated to  you by MyChart, letter or phone within 4 business days after the clinic has received the results. If you do not hear from us within 7 days, please contact the clinic through MyChart or phone. If you have a critical or abnormal lab result, we will notify you by phone as soon as possible.  Submit refill requests through [employer] or call your pharmacy and they will forward the refill request to us. Please allow 3 business days for your refill to be completed.          Additional Information About Your Visit        Care EveryWhere ID     This is your Care EveryWhere ID. This could be used by other organizations to access your [facility] medical records  [id number]         Blood Pressure from Last 3 Encounters:   08/30/18 109/73   08/15/18 102/58   08/10/18 98/62    Weight from Last 3 Encounters:   08/30/18 67.1 kg (148 lb)   08/10/18 67.1 kg (148 lb)   08/03/18 66.2 kg (146 lb)              We Performed the Following     PM DEVICE PROGRAMMING EVAL, MULTI LEAD PACER (18905)          Today's Medication Changes          These changes are accurate as of 8/30/18  1:29 PM.  If you have any questions, ask your nurse or doctor.               Start taking these medicines.        Dose/Directions    triamcinolone acetonide 40 MG/ML injection   Commonly known as:  KENALOG   Used for:  Primary osteoarthritis of right knee   Started by:  Cesar Walker DO        Dose:  40 mg   1 mL (40 mg) by INTRA-ARTICULAR route once for 1 dose   Quantity:  1 mL   Refills:  0         These medicines have changed or have updated prescriptions.        Dose/Directions    metoprolol tartrate 25 MG tablet   Commonly known as:  LOPRESSOR   This may have changed:  how much to take   Used for:  S/P TAVR (transcatheter aortic valve replacement)        Dose:  25 mg   Take 1 tablet (25 mg) by mouth 2 times daily   Quantity:  60 tablet   Refills:  11            Where to get your medicines      Some of these will need a paper prescription and others  can be bought over the counter.  Ask your nurse if you have questions.     You don't need a prescription for these medications     triamcinolone acetonide 40 MG/ML injection                Primary Care Provider Office Phone # Fax #    Camron Aragon [phone] [address] [facility] [address]        Equal Access to Services     [facility]: Hadii aad ku hadasho Soomaali, waaxda luqadaha, qaybta kaalmada adeegyada, waxay idiin hayaan adeeg kharash laisaiah . So [facility] [phone].    ATENCIÓN: Si habla español, tiene a mon disposición servicios gratuitos de asistencia lingüística. Jeanette al [phone].    We comply with applicable federal civil rights laws and [place] laws. We do not discriminate on the basis of race, color, national origin, age, disability, sex, sexual orientation, or gender identity.            Thank you!     Thank you for choosing [facility]  for your care. Our goal is always to provide you with excellent care. Hearing back from our patients is one way we can continue to improve our services. Please take a few minutes to complete the written survey that you may receive in the mail after your visit with us. Thank you!             Your Updated Medication List - Protect others around you: Learn how to safely use, store and throw away your medicines at www.disposemymeds.org.          This list is accurate as of 8/30/18  1:29 PM.  Always use your most recent med list.                   Brand Name Dispense Instructions for use Diagnosis    acetaminophen 325 MG tablet    TYLENOL    100 tablet    Take 2 tablets (650 mg) by mouth every 4 hours as needed for mild pain, fever or headaches        apixaban ANTICOAGULANT 2.5 MG tablet    ELIQUIS    180 tablet    Take 1 tablet (2.5 mg) by mouth 2 times daily    Paroxysmal atrial fibrillation (H)       ascorbic acid 500 MG Cpcr CR capsule    vitamin C     Take 500 mg by mouth  daily        calcium carbonate 500 mg {elemental} 500 MG tablet    OS-SHELIA     Take 1,250 mg by mouth daily        dofetilide 125 MCG capsule    TIKOSYN    60 capsule    TAKE ONE CAPSULE BY MOUTH TWICE A DAY    Paroxysmal atrial fibrillation (H)       HYDROcodone-acetaminophen 5-325 MG per tablet    NORCO    30 tablet    Take 1 tablet by mouth every 6 hours as needed for pain For moderate to severe pain    Rheumatoid arthritis involving multiple sites, unspecified rheumatoid factor presence (H)       lisinopril 5 MG tablet    PRINIVIL/ZESTRIL    90 tablet    Take 1 tablet (5 mg) by mouth daily    Acute on chronic systolic congestive heart failure (H)       LUPRON DEPOT IM      Inject into the muscle every 6 months Reported on 5/3/2017        metoprolol tartrate 25 MG tablet    LOPRESSOR    60 tablet    Take 1 tablet (25 mg) by mouth 2 times daily    S/P TAVR (transcatheter aortic valve replacement)       ondansetron 4 MG ODT tab    ZOFRAN-ODT    10 tablet    Take 1 tablet (4 mg) by mouth every 6 hours as needed for nausea or vomiting    Nausea       polyethylene glycol Packet    MIRALAX/GLYCOLAX    30 packet    Take 17 g by mouth daily    Constipation, unspecified constipation type       predniSONE 5 MG tablet    DELTASONE    100 tablet    Take 1 tablet (5 mg) by mouth daily    Rheumatoid arthritis involving multiple sites with positive rheumatoid factor (H)       SPIRONOLACTONE PO      Take 25 mg by mouth daily        tamsulosin 0.4 MG capsule    FLOMAX    60 capsule    Take 1 capsule (0.4 mg) by mouth daily    S/P TAVR (transcatheter aortic valve replacement)       traMADol 50 MG tablet    ULTRAM    90 tablet    TAKE 1 TABLET 3 TIMES DAILY AS NEEDED FOR MODERATE PAIN    Rheumatoid arthritis involving multiple sites, unspecified rheumatoid factor presence (H)       triamcinolone acetonide 40 MG/ML injection    KENALOG    1 mL    1 mL (40 mg) by INTRA-ARTICULAR route once for 1 dose    Primary osteoarthritis of right  knee       VITAMIN D (CHOLECALCIFEROL) PO      Take 2,000 Units by mouth daily Reported on 5/3/2017

## 2018-08-30 NOTE — LETTER
"    8/30/2018         RE: Bud Merritt  [address] [facility] [address]        Dear Colleague,    Thank you for referring your patient, Bud Merritt, to the [facility]. Please see a copy of my visit note below.    Office Visit-Follow up    Chief Complaint: Bud Merritt is a 89 year old male who is being seen for   Chief Complaint   Patient presents with     RECHECK     right knee primary osteoarthritis      Surgical Followup     dos: 3/*15/16~Left knee incision excisional debridement, irrigation and closure of left superficial knee wound~2 years and 5 months postop       Surgical Followup     dos: 1/12/16~Left total knee arthroplasty~2 years and 7 months postop       History of Present Illness:   Presents with his wife for right knee pain.  Pain is been going on for a long time.  He reports some injections in the past although he does not remember exactly what it was.  This was many years ago.  Pain with rest and ambulation.  Pain is causing him to limit his activity.  He would like to proceed with right knee replacement.  His left knee is doing quite well.  He is very happy with results.    REVIEW OF SYSTEMS  General: negative for, night sweats, dizziness, fatigue  Resp: No shortness of breath and no cough  CV: negative for chest pain, syncope or near-syncope  GI: negative for nausea, vomiting and diarrhea  : negative for dysuria and hematuria  Musculoskeletal: as above  Neurologic: negative for syncope   Hematologic: negative for bleeding disorder    Physical Exam:  Vitals: /73  Temp 97.6  F (36.4  C) (Temporal)  Ht 5' 7\" (1.702 m)  Wt 148 lb (67.1 kg)  BMI 23.18 kg/m2  BMI= Body mass index is 23.18 kg/(m^2).  Constitutional: healthy, alert and no acute distress   Psychiatric: mentation appears normal and affect normal/bright  NEURO: no focal deficits  RESP: Some audible wheezing with accessory muscle use  CV: RLE: no edema           SKIN: No erythema, rashes, " excoriation, or breakdown. No evidence of infection.   JOINT/EXTREMITIES:right knee active motion 3-120 .  Varus deformity.  Pseudolaxity medial with valgus stressing.  Collateral ligaments are intact.  Small effusion  GAIT: not tested             Diagnostic Modalities:  right knee X-ray: Severe degenerative changes particularly at the medial compartment and lateral patellar facet.  Bone-on-bone wear.  The tibia is subluxed lateral in relation to the femur.  Calcified vessels posteriorly noted  Independent visualization of the images was performed.      Impression: right knee primary osteoarthritis in a 89-year-old with multiple medical mobilities    Plan:  All of the above pertinent physical exam and imaging modalities findings was reviewed with Bud his wife.                                          INJECTION PROCEDURE:  The patient was counseled about an  injection, including discussion of risks (including infection), contents of the injection, rationale for performing the injection, and expected benefits of the injection. The skin was prepped with alcohol and betadine and then utilizing sterile technique an injection of the right knee joint from the anterolateral approach in the seated position was performed. The injection consisted 1ml of Kenalog (40mg per 1 ml) mixed with 3ml of 0.5% Marcaine. The patient tolerated the injection well, and there were no complications. The injection site was covered with a Band-Aid. The injection was performed by Paul Walker D.O.    Options discussed.  He would like to proceed with right knee replacement.  I discussed his age and medical problems.  He would be at high risk for a significant complication.  I recommended he try injections again.  Also discussed a brace at which we provided for him.    Return to clinic PRN, or sooner as needed for changes.  Re-x-ray on return: No    Paul Walker D.O.          Again, thank you for allowing me to participate in the care of your  patient.        Sincerely,        Cesar Walker, DO

## 2018-08-30 NOTE — PROGRESS NOTES
"Office Visit-Follow up    Chief Complaint: Bud Merritt is a 89 year old male who is being seen for   Chief Complaint   Patient presents with     RECHECK     right knee primary osteoarthritis      Surgical Followup     dos: 3/*15/16~Left knee incision excisional debridement, irrigation and closure of left superficial knee wound~2 years and 5 months postop       Surgical Followup     dos: 1/12/16~Left total knee arthroplasty~2 years and 7 months postop       History of Present Illness:   Presents with his wife for right knee pain.  Pain is been going on for a long time.  He reports some injections in the past although he does not remember exactly what it was.  This was many years ago.  Pain with rest and ambulation.  Pain is causing him to limit his activity.  He would like to proceed with right knee replacement.  His left knee is doing quite well.  He is very happy with results.    REVIEW OF SYSTEMS  General: negative for, night sweats, dizziness, fatigue  Resp: No shortness of breath and no cough  CV: negative for chest pain, syncope or near-syncope  GI: negative for nausea, vomiting and diarrhea  : negative for dysuria and hematuria  Musculoskeletal: as above  Neurologic: negative for syncope   Hematologic: negative for bleeding disorder    Physical Exam:  Vitals: /73  Temp 97.6  F (36.4  C) (Temporal)  Ht 5' 7\" (1.702 m)  Wt 148 lb (67.1 kg)  BMI 23.18 kg/m2  BMI= Body mass index is 23.18 kg/(m^2).  Constitutional: healthy, alert and no acute distress   Psychiatric: mentation appears normal and affect normal/bright  NEURO: no focal deficits  RESP: Some audible wheezing with accessory muscle use  CV: RLE: no edema           SKIN: No erythema, rashes, excoriation, or breakdown. No evidence of infection.   JOINT/EXTREMITIES:right knee active motion 3-120 .  Varus deformity.  Pseudolaxity medial with valgus stressing.  Collateral ligaments are intact.  Small effusion  GAIT: not tested "             Diagnostic Modalities:  right knee X-ray: Severe degenerative changes particularly at the medial compartment and lateral patellar facet.  Bone-on-bone wear.  The tibia is subluxed lateral in relation to the femur.  Calcified vessels posteriorly noted  Independent visualization of the images was performed.      Impression: right knee primary osteoarthritis in a 89-year-old with multiple medical mobilities    Plan:  All of the above pertinent physical exam and imaging modalities findings was reviewed with Bud his wife.                                          INJECTION PROCEDURE:  The patient was counseled about an  injection, including discussion of risks (including infection), contents of the injection, rationale for performing the injection, and expected benefits of the injection. The skin was prepped with alcohol and betadine and then utilizing sterile technique an injection of the right knee joint from the anterolateral approach in the seated position was performed. The injection consisted 1ml of Kenalog (40mg per 1 ml) mixed with 3ml of 0.5% Marcaine. The patient tolerated the injection well, and there were no complications. The injection site was covered with a Band-Aid. The injection was performed by Paul Walker D.O.    Options discussed.  He would like to proceed with right knee replacement.  I discussed his age and medical problems.  He would be at high risk for a significant complication.  I recommended he try injections again.  Also discussed a brace at which we provided for him.    Return to clinic PRN, or sooner as needed for changes.  Re-x-ray on return: No    Paul Walker D.O.

## 2018-08-30 NOTE — MR AVS SNAPSHOT
After Visit Summary   8/30/2018    Bud Merritt    MRN: [id number]           Patient Information     Date Of Birth          5/9/1927        Visit Information        Provider Department      8/30/2018 11:30 AM Cesar Walker DO [facility]        Today's Diagnoses     Right knee pain    -  1    Status post total left knee replacement           Follow-ups after your visit        Your next 10 appointments already scheduled     Aug 30, 2018 11:15 AM CDT   XR KNEE RIGHT 3 VIEWS with [id number]   [facility])    [address] [place] [id number]              Please bring a list of your current medicines to your exam. (Include vitamins, minerals and over-thecounter medicines.) Leave your valuables at home.  Tell your doctor if there is a chance you may be pregnant.  You do not need to do anything special for this exam.            Aug 30, 2018 11:30 AM CDT   Return Visit with Cesar Walker DO   [facility] ([address] [id number] [phone]            Aug 30, 2018 12:45 PM CDT   LAB with [facility]   [address] [id number] [phone]           Please do not eat 10-12 hours before your appointment if you are coming in fasting for labs on lipids, cholesterol, or glucose (sugar). This does not apply to pregnant women. Water, hot tea and black coffee (with nothing added) are okay. Do not drink other fluids, diet soda or chew gum.            Aug 30, 2018  1:00 PM CDT   Return Visit with CARDIO DEVICE NURSE   [facility] ([address] [id number] [phone]            Aug 30, 2018  2:00 PM CDT   Ech Complete with PHEPORSCHE   [facility] ([place]  "[facility])    [address] [place] [id number] [phone]           1.  Please bring or wear a comfortable two-piece outfit. 2.  You may eat, drink and take your normal medicines. 3.  For any questions that cannot be answered, please contact the ordering physician 4.  Please do not wear perfumes or scented lotions on the day of your exam.            Sep 05, 2018 11:30 AM CDT   Return Visit with Berhane Colon MD   [facility] ([facility])    [address] [place] [id number] [phone]              Future tests that were ordered for you today     Open Future Orders        Priority Expected Expires Ordered    XR Knee Left 3 Views Routine 8/30/2018 8/23/2019 8/23/2018            Who to contact     If you have questions or need follow up information about today's clinic visit or your schedule please contact [facility] directly at [phone].  Normal or non-critical lab and imaging results will be communicated to you by MyChart, letter or phone within 4 business days after the clinic has received the results. If you do not hear from us within 7 days, please contact the clinic through [employer] or phone. If you have a critical or abnormal lab result, we will notify you by phone as soon as possible.  Submit refill requests through [employer] or call your pharmacy and they will forward the refill request to us. Please allow 3 business days for your refill to be completed.          Additional Information About Your Visit        Care EveryWhere ID     This is your Care EveryWhere ID. This could be used by other organizations to access your [facility] medical records  [id number]        Your Vitals Were     Temperature Height BMI (Body Mass Index)             97.6  F (36.4  C) (Temporal) 1.702 m (5' 7\") 23.18 kg/m2          Blood Pressure from Last 3 Encounters:   08/30/18 109/73   08/15/18 102/58   08/10/18 98/62    " Weight from Last 3 Encounters:   08/30/18 67.1 kg (148 lb)   08/10/18 67.1 kg (148 lb)   08/03/18 66.2 kg (146 lb)                 Today's Medication Changes          These changes are accurate as of 8/30/18 11:03 AM.  If you have any questions, ask your nurse or doctor.               These medicines have changed or have updated prescriptions.        Dose/Directions    metoprolol tartrate 25 MG tablet   Commonly known as:  LOPRESSOR   This may have changed:  how much to take   Used for:  S/P TAVR (transcatheter aortic valve replacement)        Dose:  25 mg   Take 1 tablet (25 mg) by mouth 2 times daily   Quantity:  60 tablet   Refills:  11                Primary Care Provider Office Phone # Fax #    Camron Aragon [phone] [address] [facility] [address]        Equal Access to Services     J[facility] : Hadii sammie clifton hadasho Soomaali, waaxda luqadaha, qaybta kaalmada adeasheryada, ronel real . So [facility] [phone].    ATENCIÓN: Si habla español, tiene a mon disposición servicios gratuitos de asistencia lingüística. Llame al [phone].    We comply with applicable federal civil rights laws and [place] laws. We do not discriminate on the basis of race, color, national origin, age, disability, sex, sexual orientation, or gender identity.            Thank you!     Thank you for choosing [facility]  for your care. Our goal is always to provide you with excellent care. Hearing back from our patients is one way we can continue to improve our services. Please take a few minutes to complete the written survey that you may receive in the mail after your visit with us. Thank you!             Your Updated Medication List - Protect others around you: Learn how to safely use, store and throw away your medicines at www.disposemymeds.org.          This list is accurate as of 8/30/18 11:03 AM.  Always use your most recent med list.                    Brand Name Dispense Instructions for use Diagnosis    acetaminophen 325 MG tablet    TYLENOL    100 tablet    Take 2 tablets (650 mg) by mouth every 4 hours as needed for mild pain, fever or headaches        apixaban ANTICOAGULANT 2.5 MG tablet    ELIQUIS    180 tablet    Take 1 tablet (2.5 mg) by mouth 2 times daily    Paroxysmal atrial fibrillation (H)       ascorbic acid 500 MG Cpcr CR capsule    vitamin C     Take 500 mg by mouth daily        calcium carbonate 500 mg {elemental} 500 MG tablet    OS-SHELIA     Take 1,250 mg by mouth daily        dofetilide 125 MCG capsule    TIKOSYN    60 capsule    TAKE ONE CAPSULE BY MOUTH TWICE A DAY    Paroxysmal atrial fibrillation (H)       HYDROcodone-acetaminophen 5-325 MG per tablet    NORCO    30 tablet    Take 1 tablet by mouth every 6 hours as needed for pain For moderate to severe pain    Rheumatoid arthritis involving multiple sites, unspecified rheumatoid factor presence (H)       lisinopril 5 MG tablet    PRINIVIL/ZESTRIL    90 tablet    Take 1 tablet (5 mg) by mouth daily    Acute on chronic systolic congestive heart failure (H)       LUPRON DEPOT IM      Inject into the muscle every 6 months Reported on 5/3/2017        metoprolol tartrate 25 MG tablet    LOPRESSOR    60 tablet    Take 1 tablet (25 mg) by mouth 2 times daily    S/P TAVR (transcatheter aortic valve replacement)       ondansetron 4 MG ODT tab    ZOFRAN-ODT    10 tablet    Take 1 tablet (4 mg) by mouth every 6 hours as needed for nausea or vomiting    Nausea       polyethylene glycol Packet    MIRALAX/GLYCOLAX    30 packet    Take 17 g by mouth daily    Constipation, unspecified constipation type       predniSONE 5 MG tablet    DELTASONE    100 tablet    Take 1 tablet (5 mg) by mouth daily    Rheumatoid arthritis involving multiple sites with positive rheumatoid factor (H)       SPIRONOLACTONE PO      Take 25 mg by mouth daily        tamsulosin 0.4 MG capsule    FLOMAX    60 capsule    Take 1  capsule (0.4 mg) by mouth daily    S/P TAVR (transcatheter aortic valve replacement)       traMADol 50 MG tablet    ULTRAM    90 tablet    TAKE 1 TABLET 3 TIMES DAILY AS NEEDED FOR MODERATE PAIN    Rheumatoid arthritis involving multiple sites, unspecified rheumatoid factor presence (H)       VITAMIN D (CHOLECALCIFEROL) PO      Take 2,000 Units by mouth daily Reported on 5/3/2017

## 2018-08-30 NOTE — PROGRESS NOTES
Medtronic Viva Quad CRT-P (D) Pacemaker Device Check/ [place]/ Echo  AP: 68 % BVP: 99 %  Mode: DDDR        Underlying Rhythm: SR with CHB  Heart Rate: Histogram is blunted at LR of 60 bpm, with little seen beyond 70 bpm. 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 Longest episode was 37 hours. Pt is not aware when he is in AF/ He takes Eliquis  Ventricular Arrhythmia: NONE  Setting Change: NONE    Care Plan: Remote in 3 months. sml

## 2018-09-04 DIAGNOSIS — I48.0 PAROXYSMAL ATRIAL FIBRILLATION (H): ICD-10-CM

## 2018-09-04 NOTE — TELEPHONE ENCOUNTER
Dofetilide 125 MCG       Last Written Prescription Date:  7/5/18  Last Fill Quantity: 60,   # refills: 1  Last Office Visit: 8/15/18  Future Office visit:    Next 5 appointments (look out 90 days)     Sep 05, 2018 11:30 AM CDT   Return Visit with Berhane Colon MD   [facility] ([facility])    [address] [place] [id number] [phone]                   Routing refill request to provider for review/approval because:  Drug not on the [facility], [facility] or [facility] refill protocol or controlled substance

## 2018-09-05 ENCOUNTER — HOSPITAL ENCOUNTER (OUTPATIENT)
Dept: CARDIOLOGY | Facility: CLINIC | Age: 83
Discharge: HOME | End: 2018-09-05
Attending: INTERNAL MEDICINE | Admitting: INTERNAL MEDICINE
Payer: MEDICARE

## 2018-09-05 ENCOUNTER — OFFICE VISIT (OUTPATIENT)
Dept: CARDIOLOGY | Facility: CLINIC | Age: 83
End: 2018-09-05
Payer: COMMERCIAL

## 2018-09-05 VITALS
HEIGHT: 67 IN | WEIGHT: 149.6 LBS | DIASTOLIC BLOOD PRESSURE: 62 MMHG | SYSTOLIC BLOOD PRESSURE: 94 MMHG | HEART RATE: 70 BPM | BODY MASS INDEX: 23.48 KG/M2 | OXYGEN SATURATION: 97 %

## 2018-09-05 DIAGNOSIS — Z95.2 S/P TAVR (TRANSCATHETER AORTIC VALVE REPLACEMENT): ICD-10-CM

## 2018-09-05 DIAGNOSIS — I48.0 PAROXYSMAL ATRIAL FIBRILLATION (H): ICD-10-CM

## 2018-09-05 DIAGNOSIS — I48.91 ATRIAL FIBRILLATION, UNSPECIFIED TYPE (H): Primary | ICD-10-CM

## 2018-09-05 PROCEDURE — 93005 ELECTROCARDIOGRAM TRACING: CPT | Performed by: REHABILITATION PRACTITIONER

## 2018-09-05 PROCEDURE — 93005 ELECTROCARDIOGRAM TRACING: CPT | Performed by: INTERNAL MEDICINE

## 2018-09-05 PROCEDURE — 99214 OFFICE O/P EST MOD 30 MIN: CPT | Mod: 25 | Performed by: INTERNAL MEDICINE

## 2018-09-05 PROCEDURE — 93010 ELECTROCARDIOGRAM REPORT: CPT | Performed by: INTERNAL MEDICINE

## 2018-09-05 RX ORDER — DOFETILIDE 0.12 MG/1
CAPSULE ORAL
Qty: 60 CAPSULE | Refills: 1 | Status: SHIPPED | OUTPATIENT
Start: 2018-09-05 | End: 2018-09-05

## 2018-09-05 NOTE — LETTER
9/5/2018    Camron Aragon MD  [address] [facility] [address]    RE: Bud Merritt       Dear Colleague,    I had the pleasure of seeing Bud Merritt in the [facility].    HPI and Plan:   See dictation(#[id number])    Orders Placed This Encounter   Procedures     Follow-Up with Cardiologist     Follow-Up with Cardiac Advanced Practice Provider     EKG 12-LEAD CLINIC READ       No orders of the defined types were placed in this encounter.      Medications Discontinued During This Encounter   Medication Reason     dofetilide (TIKOSYN) 125 MCG capsule          Encounter Diagnoses   Name Primary?     S/P TAVR (transcatheter aortic valve replacement)      Atrial fibrillation, unspecified type (H) Yes     Paroxysmal atrial fibrillation (H)        CURRENT MEDICATIONS:  Current Outpatient Prescriptions   Medication Sig Dispense Refill     acetaminophen (TYLENOL) 325 MG tablet Take 2 tablets (650 mg) by mouth every 4 hours as needed for mild pain, fever or headaches 100 tablet      apixaban ANTICOAGULANT (ELIQUIS) 2.5 MG tablet Take 1 tablet (2.5 mg) by mouth 2 times daily 180 tablet 3     ascorbic acid (VITAMIN C) 500 MG CPCR Take 500 mg by mouth daily       calcium carbonate (OS-SHELIA 500 MG [place]. [place]) 500 MG tablet Take 1,250 mg by mouth daily        HYDROcodone-acetaminophen (NORCO) 5-325 MG per tablet Take 1 tablet by mouth every 6 hours as needed for pain For moderate to severe pain 30 tablet 0     Leuprolide Acetate (LUPRON DEPOT IM) Inject into the muscle every 6 months Reported on 5/3/2017       lisinopril (PRINIVIL/ZESTRIL) 5 MG tablet Take 1 tablet (5 mg) by mouth daily 90 tablet 3     metoprolol (LOPRESSOR) 25 MG tablet Take 1 tablet (25 mg) by mouth 2 times daily (Patient taking differently: Take 12.5 mg by mouth 2 times daily ) 60 tablet 11     polyethylene glycol (MIRALAX/GLYCOLAX) Packet Take 17 g by mouth daily 30 packet 0     predniSONE (DELTASONE) 5 MG tablet  Take 1 tablet (5 mg) by mouth daily 100 tablet 4     SPIRONOLACTONE PO Take 25 mg by mouth daily       tamsulosin (FLOMAX) 0.4 MG capsule Take 1 capsule (0.4 mg) by mouth daily 60 capsule 0     traMADol (ULTRAM) 50 MG tablet TAKE 1 TABLET 3 TIMES DAILY AS NEEDED FOR MODERATE PAIN 90 tablet 0     VITAMIN D, CHOLECALCIFEROL, PO Take 2,000 Units by mouth daily Reported on 5/3/2017       ondansetron (ZOFRAN-ODT) 4 MG ODT tab Take 1 tablet (4 mg) by mouth every 6 hours as needed for nausea or vomiting (Patient not taking: Reported on 9/5/2018) 10 tablet 0       ALLERGIES     Allergies   Allergen Reactions     Amiodarone Other (See Comments)     Drop in DLCO     Lasix [Furosemide] Blisters     Blisters and sores in his mouth.        PAST MEDICAL HISTORY:  Past Medical History:   Diagnosis Date     Anemia      Atrial fibrillation (H) 07/01/2016     Cardiac pacemaker 03/10/2017     Cardiomyopathy (H)      CHF (congestive heart failure) (H)      COPD exacerbation (H) 3/2/2017     Depressive disorder      History of prostate cancer      Paroxysmal atrial fibrillation (H) 7/6/2016     Pure hypercholesterolemia      Rheumatoid arthritis(714.0)      Unspecified essential hypertension      Weakness generalized 3/2/2017       PAST SURGICAL HISTORY:  Past Surgical History:   Procedure Laterality Date     ARTHROPLASTY KNEE Left 1/12/2016    Procedure: ARTHROPLASTY KNEE;  Surgeon: Cesar Walker DO;  Location: [facility] OR     COLONOSCOPY  08/24/09     EXCISE LESION HEAD N/A 4/9/2018    Procedure: EXCISE LESION HEAD;  Excision Left Scalp Lesion;  Surgeon: Connor Nichole DO;  Location: [facility] OR      COLONOSCOPY THRU STOMA W BIOPSY/CAUTERY TUMOR/POLYP/LESION  8/31/2004     HC REPAIR ING HERNIA,5+Y/O,REDUCIBL  1996    Marlex mesh repair of bilateral inguinal hernias and drainage of bilateral scrotal hydroceles.     HEART CATH FEMORAL CANNULIZATION WITH OPEN STANDBY REPAIR AORTIC VALVE N/A 9/27/2017    Procedure: HEART CATH  FEMORAL CANNULIZATION WITH OPEN STANDBY REPAIR AORTIC VALVE;;  Surgeon: Jaron Alejandra MD;  Location: [facility] OR     IMPLANT PACEMAKER  03/10/2017     IRRIGATION AND DEBRIDEMENT SOFT TISSUE LOWER EXTREMITY, COMBINED Left 3/15/2016    Procedure: COMBINED IRRIGATION AND DEBRIDEMENT SOFT TISSUE LOWER EXTREMITY;  Surgeon: Cesar Walker DO;  Location: [facility] OR     TRANSCATHETER AORTIC VALVE IMPLANT ANESTHESIA N/A 9/27/2017    Procedure: TRANSCATHETER AORTIC VALVE IMPLANT ANESTHESIA;  Right Transfemoral Approach (Harman Ramsey 3 29mm) Aortic Valve Implant,  Cardiopulmonary Bypass Standby, Transthoracic Echocardiogram by Derian Queen(Echo Tech);  Surgeon: GENERIC ANESTHESIA PROVIDER;  Location: [facility] OR       FAMILY HISTORY:  Family History   Problem Relation Age of Onset     Cancer Mother      Cancer Son      Unknown/Adopted Father      Alcoholism Brother        SOCIAL HISTORY:  Social History     Social History     Marital status:      Spouse name: N/A     Number of children: N/A     Years of education: N/A     Social History Main Topics     Smoking status: Former Smoker     Packs/day: 0.50     Years: 15.00     Types: Cigarettes     Quit date: 11/12/1998     Smokeless tobacco: Never Used      Comment: quit 15 yrs ago     Alcohol use No     Drug use: No     Sexual activity: Yes     Other Topics Concern     Caffeine Concern Yes     8 cups coffee day     Sleep Concern Yes     Weight Concern Yes     weight loss     Special Diet No     Exercise Yes     split firewood daily     Social History Narrative       Review of Systems:  Skin:  Positive for bruising     Eyes:  Positive for glasses    ENT:  Positive for hearing loss    Respiratory:  Positive for shortness of breath;dyspnea on exertion     Cardiovascular:  Negative for;palpitations Positive for;edema;lightheadedness;dizziness;chest pain    Gastroenterology: Positive for poor appetite;nausea    Genitourinary:  Positive for nocturia;urinary  "frequency;decreased urinary stream;prostate problem on flomax  Musculoskeletal:  Positive for arthritis;joint pain;joint swelling;muscular weakness rheumatoid arthritis  Neurologic:  Positive for headaches    Psychiatric:  Positive for anxiety;sleep disturbances;depression    Heme/Lymph/Imm:  Positive for easy bruising    Endocrine:  Negative        Physical Exam:  Vitals: BP 94/62 (BP Location: Left arm, Patient Position: Fowlers, Cuff Size: Adult Regular)  Pulse 70  Ht 1.702 m (5' 7\")  Wt 67.9 kg (149 lb 9.6 oz)  SpO2 97%  BMI 23.43 kg/m2    Constitutional:           Skin:             Head:           Eyes:           Lymph:      ENT:           Neck:           Respiratory:            Cardiac:                                                           GI:           Extremities and Muscular Skeletal:                 Neurological:           Psych:             CC  No referring provider defined for this encounter.                    Thank you for allowing me to participate in the care of your patient.      Sincerely,     Berhane Colon MD     [facility]    cc:   No referring provider defined for this encounter.        "

## 2018-09-05 NOTE — LETTER
9/5/2018      Camron Aragon MD  [address] [facility] [place] [address]      RE: Bud Carcamoon       Dear Colleague,    I had the pleasure of seeing Bud Merritt in the [facility].    Service Date: 09/05/2018      REASON FOR CARDIOLOGY VISIT:  Followup status post TAVR.      HISTORY OF PRESENT ILLNESS:  Mr. Merritt is a very pleasant 89-year-old gentleman who underwent a TAVR with Harman Ramesy 3 29-mm valve in 09/2017 for severe aortic stenosis.  He also has history of paroxysmal atrial fibrillation with tachycardia-mediated cardiomyopathy with severely reduced LV systolic function in the past which subsequently improved.  To be noted, he was also noted to have frequent PVCs in the past.  He was on amiodarone in the past, but that was discontinued because of a decline in diffusion capacity.  He was subsequently seen by Dr. Li.  He was started on dofetilide and eventually underwent AV node ablation with BiV pacemaker implantation.  Today the patient is coming in to clinic accompanied by his wife.  He had a repeat echocardiogram done about a week ago that showed normal LV function, normal gradient across the prosthetic aortic valve with trace perivalvular aortic regurgitation.  Device interrogation has shown underlying rhythm to be mostly sinus with complete heart block.  There were several mode switches, 296, with overall 1.5% burden of atrial fibrillation.  The longest episode was 37 hours.  To be noted, patient is asymptomatic from atrial fibrillation.  He is in a wheelchair.  He tells me that due to arthritis issue, he is very minimally active.  He spends most of his time sitting, but he is able to walk with a walker.  He denies any shortness of breath, chest discomfort, dizziness, presyncope or syncope.  He is presently on dofetilide.  He is also on apixaban.  To be noted, he is on low dose of apixaban given his age and borderline weight.  EKG done today was  personally reviewed by me and shows AV sequential pacing with QTc of 454 milliseconds.      PHYSICAL EXAMINATION:   VITAL SIGNS:  Blood pressure 94/62, heart rate 70, weight 149 pounds, BMI 23.48.   GENERAL:  The patient appears pleasant, comfortable.   NECK:  Normal JVP, no bruit.   CARDIOVASCULAR SYSTEM:  S1, S2 normal, no murmur, rub or gallop.   RESPIRATORY SYSTEM:  Clear to auscultation bilaterally.   GASTROINTESTINAL SYSTEM:  Abdomen soft, nontender.   EXTREMITIES:  No pitting pedal.   NEUROLOGICAL:  Alert, oriented x3.   PSYCHIATRIC:  Normal affect.   SKIN:  No obvious rash.   HEENT:  No pallor or icterus.      IMPRESSION AND PLAN:  A pleasant 89-year-old gentleman, overall frail status, status post TAVR about a year ago, nonobstructive coronary artery disease on coronary angiogram in 2016, paroxysmal atrial fibrillation on apixaban for stroke prophylaxis with BiV pacemaker implantation and AV node ablation.  Overall, cardiac status-wise he appears to be doing well.  LV function is normal on recent echocardiogram with normal gradients across the prosthetic aortic valve with trace perivalvular aortic regurgitation.  Despite being on Tikosyn, the patient had several mode switches, with the longest 37 hours duration of atrial fibrillation.  Fortunately, he is asymptomatic when he is in atrial fibrillation.  I had a long discussion with the patient and his wife regarding option of continuing Tikosyn versus discontinuing.  At this time, given the fact that even with Tikosyn he had episode of atrial fibrillation for which he was asymptomatic and his AV node ablation with ventricular rates as a result quite well controlled, we mutually decided that at this time we will discontinue Tikosyn to avoid potential adverse effect from Tikosyn, although so far he has tolerated it reasonably well.  QTc was 454 milliseconds today, but with correction for the paced rhythm it is within normal limits.  If he continues to feel  stable cardiac status-wise, we can see him back in 6 months, sooner if he notices any change in clinical status.         JANNIE VAUGHAN MD             D: 2018   T: 2018   MT: JEREMY      Name:     GALLO FLOYD   MRN:      [id number]        Account:      [id number]   :      1927           Service Date: 2018      Document: [id number]         Outpatient Encounter Prescriptions as of 2018   Medication Sig Dispense Refill     acetaminophen (TYLENOL) 325 MG tablet Take 2 tablets (650 mg) by mouth every 4 hours as needed for mild pain, fever or headaches 100 tablet      apixaban ANTICOAGULANT (ELIQUIS) 2.5 MG tablet Take 1 tablet (2.5 mg) by mouth 2 times daily 180 tablet 3     ascorbic acid (VITAMIN C) 500 MG CPCR Take 500 mg by mouth daily       calcium carbonate (OS-SHELIA 500 MG [place]. [place]) 500 MG tablet Take 1,250 mg by mouth daily        HYDROcodone-acetaminophen (NORCO) 5-325 MG per tablet Take 1 tablet by mouth every 6 hours as needed for pain For moderate to severe pain 30 tablet 0     Leuprolide Acetate (LUPRON DEPOT IM) Inject into the muscle every 6 months Reported on 5/3/2017       lisinopril (PRINIVIL/ZESTRIL) 5 MG tablet Take 1 tablet (5 mg) by mouth daily 90 tablet 3     metoprolol (LOPRESSOR) 25 MG tablet Take 1 tablet (25 mg) by mouth 2 times daily (Patient taking differently: Take 12.5 mg by mouth 2 times daily ) 60 tablet 11     polyethylene glycol (MIRALAX/GLYCOLAX) Packet Take 17 g by mouth daily 30 packet 0     predniSONE (DELTASONE) 5 MG tablet Take 1 tablet (5 mg) by mouth daily 100 tablet 4     SPIRONOLACTONE PO Take 25 mg by mouth daily       tamsulosin (FLOMAX) 0.4 MG capsule Take 1 capsule (0.4 mg) by mouth daily 60 capsule 0     traMADol (ULTRAM) 50 MG tablet TAKE 1 TABLET 3 TIMES DAILY AS NEEDED FOR MODERATE PAIN 90 tablet 0     VITAMIN D, CHOLECALCIFEROL, PO Take 2,000 Units by mouth daily Reported on 5/3/2017       ondansetron (ZOFRAN-ODT) 4 MG ODT tab Take 1  tablet (4 mg) by mouth every 6 hours as needed for nausea or vomiting (Patient not taking: Reported on 9/5/2018) 10 tablet 0     [DISCONTINUED] dofetilide (TIKOSYN) 125 MCG capsule TAKE ONE CAPSULE BY MOUTH TWICE A DAY 60 capsule 1     No facility-administered encounter medications on file as of 9/5/2018.        Again, thank you for allowing me to participate in the care of your patient.      Sincerely,    Berhane Colon MD     [facility]

## 2018-09-05 NOTE — PROGRESS NOTES
Service Date: 09/05/2018      REASON FOR CARDIOLOGY VISIT:  Followup status post TAVR.      HISTORY OF PRESENT ILLNESS:  Mr. Merritt is a very pleasant 89-year-old gentleman who underwent a TAVR with Harman Ramsey 3 29-mm valve in 09/2017 for severe aortic stenosis.  He also has history of paroxysmal atrial fibrillation with tachycardia-mediated cardiomyopathy with severely reduced LV systolic function in the past which subsequently improved.  To be noted, he was also noted to have frequent PVCs in the past.  He was on amiodarone in the past, but that was discontinued because of a decline in diffusion capacity.  He was subsequently seen by Dr. Li.  He was started on dofetilide and eventually underwent AV node ablation with BiV pacemaker implantation.  Today the patient is coming in to clinic accompanied by his wife.  He had a repeat echocardiogram done about a week ago that showed normal LV function, normal gradient across the prosthetic aortic valve with trace perivalvular aortic regurgitation.  Device interrogation has shown underlying rhythm to be mostly sinus with complete heart block.  There were several mode switches, 296, with overall 1.5% burden of atrial fibrillation.  The longest episode was 37 hours.  To be noted, patient is asymptomatic from atrial fibrillation.  He is in a wheelchair.  He tells me that due to arthritis issue, he is very minimally active.  He spends most of his time sitting, but he is able to walk with a walker.  He denies any shortness of breath, chest discomfort, dizziness, presyncope or syncope.  He is presently on dofetilide.  He is also on apixaban.  To be noted, he is on low dose of apixaban given his age and borderline weight.  EKG done today was personally reviewed by me and shows AV sequential pacing with QTc of 454 milliseconds.      PHYSICAL EXAMINATION:   VITAL SIGNS:  Blood pressure 94/62, heart rate 70, weight 149 pounds, BMI 23.48.   GENERAL:  The patient appears  pleasant, comfortable.   NECK:  Normal JVP, no bruit.   CARDIOVASCULAR SYSTEM:  S1, S2 normal, no murmur, rub or gallop.   RESPIRATORY SYSTEM:  Clear to auscultation bilaterally.   GASTROINTESTINAL SYSTEM:  Abdomen soft, nontender.   EXTREMITIES:  No pitting pedal.   NEUROLOGICAL:  Alert, oriented x3.   PSYCHIATRIC:  Normal affect.   SKIN:  No obvious rash.   HEENT:  No pallor or icterus.      IMPRESSION AND PLAN:  A pleasant 89-year-old gentleman, overall frail status, status post TAVR about a year ago, nonobstructive coronary artery disease on coronary angiogram in 2016, paroxysmal atrial fibrillation on apixaban for stroke prophylaxis with BiV pacemaker implantation and AV node ablation.  Overall, cardiac status-wise he appears to be doing well.  LV function is normal on recent echocardiogram with normal gradients across the prosthetic aortic valve with trace perivalvular aortic regurgitation.  Despite being on Tikosyn, the patient had several mode switches, with the longest 37 hours duration of atrial fibrillation.  Fortunately, he is asymptomatic when he is in atrial fibrillation.  I had a long discussion with the patient and his wife regarding option of continuing Tikosyn versus discontinuing.  At this time, given the fact that even with Tikosyn he had episode of atrial fibrillation for which he was asymptomatic and with AV node ablation ventricular rates are quite well controlled as a result , we mutually decided that at this time we will discontinue Tikosyn to avoid potential adverse effect from Tikosyn, although so far he has tolerated it reasonably well.  QTc was 454 milliseconds today, but with correction for the paced rhythm it is within normal limits.  If he continues to feel stable cardiac status-wise, we can see him back in 6 months, sooner if he notices any change in clinical status.         JANNIE VAUGHAN MD             D: 09/05/2018   T: 09/05/2018   MT: JEREMY      Name:     MARIEL GALLO   MRN:       [id number]        Account:      [id number]   :      1927           Service Date: 2018      Document: [id number]

## 2018-09-05 NOTE — PROGRESS NOTES
HPI and Plan:   See dictation(#[id number])    Orders Placed This Encounter   Procedures     Follow-Up with Cardiologist     Follow-Up with Cardiac Advanced Practice Provider     EKG 12-LEAD CLINIC READ       No orders of the defined types were placed in this encounter.      Medications Discontinued During This Encounter   Medication Reason     dofetilide (TIKOSYN) 125 MCG capsule          Encounter Diagnoses   Name Primary?     S/P TAVR (transcatheter aortic valve replacement)      Atrial fibrillation, unspecified type (H) Yes     Paroxysmal atrial fibrillation (H)        CURRENT MEDICATIONS:  Current Outpatient Prescriptions   Medication Sig Dispense Refill     acetaminophen (TYLENOL) 325 MG tablet Take 2 tablets (650 mg) by mouth every 4 hours as needed for mild pain, fever or headaches 100 tablet      apixaban ANTICOAGULANT (ELIQUIS) 2.5 MG tablet Take 1 tablet (2.5 mg) by mouth 2 times daily 180 tablet 3     ascorbic acid (VITAMIN C) 500 MG CPCR Take 500 mg by mouth daily       calcium carbonate (OS-SHELIA 500 MG [place]. [place]) 500 MG tablet Take 1,250 mg by mouth daily        HYDROcodone-acetaminophen (NORCO) 5-325 MG per tablet Take 1 tablet by mouth every 6 hours as needed for pain For moderate to severe pain 30 tablet 0     Leuprolide Acetate (LUPRON DEPOT IM) Inject into the muscle every 6 months Reported on 5/3/2017       lisinopril (PRINIVIL/ZESTRIL) 5 MG tablet Take 1 tablet (5 mg) by mouth daily 90 tablet 3     metoprolol (LOPRESSOR) 25 MG tablet Take 1 tablet (25 mg) by mouth 2 times daily (Patient taking differently: Take 12.5 mg by mouth 2 times daily ) 60 tablet 11     polyethylene glycol (MIRALAX/GLYCOLAX) Packet Take 17 g by mouth daily 30 packet 0     predniSONE (DELTASONE) 5 MG tablet Take 1 tablet (5 mg) by mouth daily 100 tablet 4     SPIRONOLACTONE PO Take 25 mg by mouth daily       tamsulosin (FLOMAX) 0.4 MG capsule Take 1 capsule (0.4 mg) by mouth daily 60 capsule 0     traMADol (ULTRAM) 50 MG tablet  TAKE 1 TABLET 3 TIMES DAILY AS NEEDED FOR MODERATE PAIN 90 tablet 0     VITAMIN D, CHOLECALCIFEROL, PO Take 2,000 Units by mouth daily Reported on 5/3/2017       ondansetron (ZOFRAN-ODT) 4 MG ODT tab Take 1 tablet (4 mg) by mouth every 6 hours as needed for nausea or vomiting (Patient not taking: Reported on 9/5/2018) 10 tablet 0       ALLERGIES     Allergies   Allergen Reactions     Amiodarone Other (See Comments)     Drop in DLCO     Lasix [Furosemide] Blisters     Blisters and sores in his mouth.        PAST MEDICAL HISTORY:  Past Medical History:   Diagnosis Date     Anemia      Atrial fibrillation (H) 07/01/2016     Cardiac pacemaker 03/10/2017     Cardiomyopathy (H)      CHF (congestive heart failure) (H)      COPD exacerbation (H) 3/2/2017     Depressive disorder      History of prostate cancer      Paroxysmal atrial fibrillation (H) 7/6/2016     Pure hypercholesterolemia      Rheumatoid arthritis(714.0)      Unspecified essential hypertension      Weakness generalized 3/2/2017       PAST SURGICAL HISTORY:  Past Surgical History:   Procedure Laterality Date     ARTHROPLASTY KNEE Left 1/12/2016    Procedure: ARTHROPLASTY KNEE;  Surgeon: Cesar Walker DO;  Location: [facility] OR     COLONOSCOPY  08/24/09     EXCISE LESION HEAD N/A 4/9/2018    Procedure: EXCISE LESION HEAD;  Excision Left Scalp Lesion;  Surgeon: Connor Nichole DO;  Location: [facility] OR     HC COLONOSCOPY THRU STOMA W BIOPSY/CAUTERY TUMOR/POLYP/LESION  8/31/2004     HC REPAIR ING HERNIA,5+Y/O,REDUCIBL  1996    Marlex mesh repair of bilateral inguinal hernias and drainage of bilateral scrotal hydroceles.     HEART CATH FEMORAL CANNULIZATION WITH OPEN STANDBY REPAIR AORTIC VALVE N/A 9/27/2017    Procedure: HEART CATH FEMORAL CANNULIZATION WITH OPEN STANDBY REPAIR AORTIC VALVE;;  Surgeon: Jaron Alejandra MD;  Location: [facility] OR     IMPLANT PACEMAKER  03/10/2017     IRRIGATION AND DEBRIDEMENT SOFT TISSUE LOWER EXTREMITY, COMBINED Left  3/15/2016    Procedure: COMBINED IRRIGATION AND DEBRIDEMENT SOFT TISSUE LOWER EXTREMITY;  Surgeon: Cesar Walker DO;  Location: [facility] OR     TRANSCATHETER AORTIC VALVE IMPLANT ANESTHESIA N/A 9/27/2017    Procedure: TRANSCATHETER AORTIC VALVE IMPLANT ANESTHESIA;  Right Transfemoral Approach (Harman Ramsey 3 29mm) Aortic Valve Implant,  Cardiopulmonary Bypass Standby, Transthoracic Echocardiogram by Derian Queen(Echo Tech);  Surgeon: GENERIC ANESTHESIA PROVIDER;  Location: [facility] OR       FAMILY HISTORY:  Family History   Problem Relation Age of Onset     Cancer Mother      Cancer Son      Unknown/Adopted Father      Alcoholism Brother        SOCIAL HISTORY:  Social History     Social History     Marital status:      Spouse name: N/A     Number of children: N/A     Years of education: N/A     Social History Main Topics     Smoking status: Former Smoker     Packs/day: 0.50     Years: 15.00     Types: Cigarettes     Quit date: 11/12/1998     Smokeless tobacco: Never Used      Comment: quit 15 yrs ago     Alcohol use No     Drug use: No     Sexual activity: Yes     Other Topics Concern     Caffeine Concern Yes     8 cups coffee day     Sleep Concern Yes     Weight Concern Yes     weight loss     Special Diet No     Exercise Yes     split firewood daily     Social History Narrative       Review of Systems:  Skin:  Positive for bruising     Eyes:  Positive for glasses    ENT:  Positive for hearing loss    Respiratory:  Positive for shortness of breath;dyspnea on exertion     Cardiovascular:  Negative for;palpitations Positive for;edema;lightheadedness;dizziness;chest pain    Gastroenterology: Positive for poor appetite;nausea    Genitourinary:  Positive for nocturia;urinary frequency;decreased urinary stream;prostate problem on flomax  Musculoskeletal:  Positive for arthritis;joint pain;joint swelling;muscular weakness rheumatoid arthritis  Neurologic:  Positive for headaches    Psychiatric:  Positive for  "anxiety;sleep disturbances;depression    Heme/Lymph/Imm:  Positive for easy bruising    Endocrine:  Negative        Physical Exam:  Vitals: BP 94/62 (BP Location: Left arm, Patient Position: Fowlers, Cuff Size: Adult Regular)  Pulse 70  Ht 1.702 m (5' 7\")  Wt 67.9 kg (149 lb 9.6 oz)  SpO2 97%  BMI 23.43 kg/m2    Constitutional:           Skin:             Head:           Eyes:           Lymph:      ENT:           Neck:           Respiratory:            Cardiac:                                                           GI:           Extremities and Muscular Skeletal:                 Neurological:           Psych:             CC  No referring provider defined for this encounter.                  "

## 2018-09-05 NOTE — MR AVS SNAPSHOT
After Visit Summary   9/5/2018    Bud Merritt    MRN: [id number]           Patient Information     Date Of Birth          5/9/1927        Visit Information        Provider Department      9/5/2018 11:30 AM Berhane Colon MD [facility]        Today's Diagnoses     Atrial fibrillation, unspecified type (H)    -  1    S/P TAVR (transcatheter aortic valve replacement)        Paroxysmal atrial fibrillation (H)           Follow-ups after your visit        Additional Services     Follow-Up with Cardiac Advanced Practice Provider           Follow-Up with Cardiologist                 Your next 10 appointments already scheduled     Dec 26, 2018  8:00 AM CST   Remote PPM Check with MORENO TECH1   [facility] ([facility])    [address]  [facility] [address] [phone] OPT 2           This appointment is for a remote check of your pacemaker.  This is not an appointment at the office.              Future tests that were ordered for you today     Open Future Orders        Priority Expected Expires Ordered    Follow-Up with Cardiac Advanced Practice Provider Routine 3/4/2019 9/5/2019 9/5/2018    Follow-Up with Cardiologist Routine 9/5/2019 9/6/2019 9/5/2018            Who to contact     If you have questions or need follow up information about today's clinic visit or your schedule please contact [facility] directly at [phone].  Normal or non-critical lab and imaging results will be communicated to you by MyChart, letter or phone within 4 business days after the clinic has received the results. If you do not hear from us within 7 days, please contact the clinic through MyChart or phone. If you have a critical or abnormal lab result, we will notify you by phone as soon as possible.  Submit refill requests through [facility] or call your pharmacy and they will  "forward the refill request to us. Please allow 3 business days for your refill to be completed.          Additional Information About Your Visit        Care EveryWhere ID     This is your Care EveryWhere ID. This could be used by other organizations to access your [place] medical records  [id number]        Your Vitals Were     Pulse Height Pulse Oximetry BMI (Body Mass Index)          70 1.702 m (5' 7\") 97% 23.43 kg/m2         Blood Pressure from Last 3 Encounters:   09/05/18 94/62   08/30/18 109/73   08/15/18 102/58    Weight from Last 3 Encounters:   09/05/18 67.9 kg (149 lb 9.6 oz)   08/30/18 67.1 kg (148 lb)   08/10/18 67.1 kg (148 lb)              We Performed the Following     EKG 12-LEAD CLINIC READ     EKG 12-lead, tracing only          Today's Medication Changes          These changes are accurate as of 9/5/18 11:57 AM.  If you have any questions, ask your nurse or doctor.               These medicines have changed or have updated prescriptions.        Dose/Directions    metoprolol tartrate 25 MG tablet   Commonly known as:  LOPRESSOR   This may have changed:  how much to take   Used for:  S/P TAVR (transcatheter aortic valve replacement)        Dose:  25 mg   Take 1 tablet (25 mg) by mouth 2 times daily   Quantity:  60 tablet   Refills:  11         Stop taking these medicines if you haven't already. Please contact your care team if you have questions.     dofetilide 125 MCG capsule   Commonly known as:  TIKOSYN                    Primary Care Provider Office Phone # Fax #    Camron Aragon [phone]       [address] [address]        Equal Access to Services     [facility]: Hadii sammie Burgess, waaxda luqadaha, qaybta kaalronel king. So [facility] [phone].    ATENCIÓN: Si habla español, tiene a mon disposición servicios gratuitos de asistencia lingüística. Llame al [phone].    We comply with " applicable federal civil rights laws and [place] laws. We do not discriminate on the basis of race, color, national origin, age, disability, sex, sexual orientation, or gender identity.            Thank you!     Thank you for choosing [facility]  for your care. Our goal is always to provide you with excellent care. Hearing back from our patients is one way we can continue to improve our services. Please take a few minutes to complete the written survey that you may receive in the mail after your visit with us. Thank you!             Your Updated Medication List - Protect others around you: Learn how to safely use, store and throw away your medicines at www.disposemymeds.org.          This list is accurate as of 9/5/18 11:57 AM.  Always use your most recent med list.                   Brand Name Dispense Instructions for use Diagnosis    acetaminophen 325 MG tablet    TYLENOL    100 tablet    Take 2 tablets (650 mg) by mouth every 4 hours as needed for mild pain, fever or headaches        apixaban ANTICOAGULANT 2.5 MG tablet    ELIQUIS    180 tablet    Take 1 tablet (2.5 mg) by mouth 2 times daily    Paroxysmal atrial fibrillation (H)       ascorbic acid 500 MG Cpcr CR capsule    vitamin C     Take 500 mg by mouth daily        calcium carbonate 500 mg {elemental} 500 MG tablet    OS-SHELIA     Take 1,250 mg by mouth daily        HYDROcodone-acetaminophen 5-325 MG per tablet    NORCO    30 tablet    Take 1 tablet by mouth every 6 hours as needed for pain For moderate to severe pain    Rheumatoid arthritis involving multiple sites, unspecified rheumatoid factor presence (H)       lisinopril 5 MG tablet    PRINIVIL/ZESTRIL    90 tablet    Take 1 tablet (5 mg) by mouth daily    Acute on chronic systolic congestive heart failure (H)       LUPRON DEPOT IM      Inject into the muscle every 6 months Reported on 5/3/2017        metoprolol tartrate 25 MG tablet    LOPRESSOR    60  tablet    Take 1 tablet (25 mg) by mouth 2 times daily    S/P TAVR (transcatheter aortic valve replacement)       ondansetron 4 MG ODT tab    ZOFRAN-ODT    10 tablet    Take 1 tablet (4 mg) by mouth every 6 hours as needed for nausea or vomiting    Nausea       polyethylene glycol Packet    MIRALAX/GLYCOLAX    30 packet    Take 17 g by mouth daily    Constipation, unspecified constipation type       predniSONE 5 MG tablet    DELTASONE    100 tablet    Take 1 tablet (5 mg) by mouth daily    Rheumatoid arthritis involving multiple sites with positive rheumatoid factor (H)       SPIRONOLACTONE PO      Take 25 mg by mouth daily        tamsulosin 0.4 MG capsule    FLOMAX    60 capsule    Take 1 capsule (0.4 mg) by mouth daily    S/P TAVR (transcatheter aortic valve replacement)       traMADol 50 MG tablet    ULTRAM    90 tablet    TAKE 1 TABLET 3 TIMES DAILY AS NEEDED FOR MODERATE PAIN    Rheumatoid arthritis involving multiple sites, unspecified rheumatoid factor presence (H)       VITAMIN D (CHOLECALCIFEROL) PO      Take 2,000 Units by mouth daily Reported on 5/3/2017

## 2018-09-14 DIAGNOSIS — M06.9 RHEUMATOID ARTHRITIS INVOLVING MULTIPLE SITES, UNSPECIFIED RHEUMATOID FACTOR PRESENCE: ICD-10-CM

## 2018-09-14 RX ORDER — HYDROCODONE BITARTRATE AND ACETAMINOPHEN 5; 325 MG/1; MG/1
1 TABLET ORAL EVERY 6 HOURS PRN
Qty: 30 TABLET | Refills: 0 | Status: SHIPPED | OUTPATIENT
Start: 2018-09-14 | End: 2018-01-01

## 2018-09-14 RX ORDER — TRAMADOL HYDROCHLORIDE 50 MG/1
TABLET ORAL
Qty: 90 TABLET | Refills: 0 | Status: SHIPPED | OUTPATIENT
Start: 2018-09-14 | End: 2018-01-01

## 2018-09-14 NOTE — TELEPHONE ENCOUNTER
Tramadol      Last Written Prescription Date:  8/15/2018  Last Fill Quantity: 90,   # refills: 0  Last Office Visit: 8/15/2018  Future Office visit:       Routing refill request to provider for review/approval because:  Drug not on the [facility], [facility] or [facility] refill protocol or controlled substance    Norco      Last Written Prescription Date:  8/15/2018  Last Fill Quantity: 30,   # refills: 0  Last Office Visit: 8/15/2018  Future Office visit:       Routing refill request to provider for review/approval because:  Drug not on the [facility], [facility] or [facility] refill protocol or controlled substance

## 2018-09-14 NOTE — TELEPHONE ENCOUNTER
Prescription has been brought to the pharmacy.  No further action is needed as of right now.     Yvonne Gunderson, CMA

## 2018-09-26 NOTE — PROGRESS NOTES
SUBJECTIVE/OBJECTIVE:                Bud Merritt is a 89 year old male called for a follow-up visit for Medication Therapy Management.  He was referred to me from transitions of care. Conversation today with wife, Tierra..     Chief Complaint: Follow up from our visit on 3/21/18.  Medication changes/questions.  Tobacco: History of tobacco dependence - quit 26 years ago   Alcohol: not currently using    Medication Adherence/Access:  Patient uses pill box(es).  Patient takes medications 2 time(s) per day. 4 AM and 4 PM. Also takes a third time around bedtime for a third dose of tramadol.   Per patient, misses medication 0 times per week.   Medication barriers: none.   The patient fills medications at [place]: YES.    HFrEF/HTN/AFib: Current medications include spironolactone 25 mg daily, lisinopril 5 mg once daily, metoprolol tartrate 12.5 mg twice daily, and Eliquis 2.5 mg twice daily.  Since going off of Tikosyn patient is feeling better, but dizziness is ongoing especially with changing levels. Pt is concerned that his RA is main contributor. Had pacemaker placed at recent visit - denies any issues with pain has not needed to use tramadol often. Patient reports no current medication side effects.     ECHO:  Date 8/30/18, EF 55-60%  Pt is not complaining of sx of HF.    Pt is measuring daily weights and reports stable.   Patient does not self-monitor BP.   Pt is following a low sodium diet, is avoiding EtOH.    Arthirtis: Pt is taking prednisone 5 mg daily, tramadol 50 mg three times daily, hydrocodone-APAP PRN (. Rarely uses APAP three times daily. Feels is poorly controlled. Pt previously saw Dr. Shaver. Up and down with bowel movements - on pretty much a regular schedule. Has yet to establish with new rheumatologist.    Today's Vitals: There were no vitals taken for this visit. - telephone encounter, no vitals      ASSESSMENT:              Current medications were reviewed today as discussed above.       Medication Adherence: good, no issues identified    HFrEF/HTN/AFib: Needs Improvement. Dizziness could be related to taking his blood pressure medications concurrently.  Would likely benefit from taking lisinopril in the evening to avoid overlapping onset.    Arthirtis: Needs Improvement. Pt would benefit from establishing with new rheumatologist.      PLAN:                  1. Pt to consider taking lisinopril with bedtime dose of tramadol to limit onset of antihypertensive effects of medication.     I spent 30 minutes with this patient today. A copy of the visit note was provided to the patient's primary care provider.     Will follow up in 3 months or sooner if needed.    The patient was mailed a summary of these recommendations as an after visit summary.        Betito Da Silva, PharmD, BCACP  Medication Therapy Management Pharmacist  Pager: [phone]

## 2018-09-26 NOTE — MR AVS SNAPSHOT
"              After Visit Summary   9/26/2018    Bud Merritt    MRN: [id number]           Patient Information     Date Of Birth          5/9/1927        Visit Information        Provider Department      9/26/2018 1:00 PM Baljit Da Silva, [facility] MTM        Care Instructions    Recommendations from today's MTM visit:                                                    MTM (medication therapy management) is a service provided by a clinical pharmacist designed to help you get the most of out of your medicines.   Today we reviewed what your medicines are for, how to know if they are working, that your medicines are safe and how to make your medicine regimen as easy as possible.     1. Some of your dizziness could be related to your blood pressure during the day.  When you sit up or stand up the muscle around your blood vessels do not \"squeeze\" as well as they used to and the blood takes a little bit more time to get back up to your brain.  You may benefit from moving your lisinopril to later in the day (possibly with your pre-bedtime tramadol) to spread out the medications that can help lower your blood pressure.  Sometimes when they \"kick-in\" at the same time we can see a lower blood pressure than normal.     Next MTM visit: 6 months or sooner if needed    To schedule another MTM appointment, please call the clinic directly or you may call the MTM scheduling line at [phone] or toll-free at [phone].     My Clinical Pharmacist's contact information:                                                      It was a pleasure seeing you today!  Please feel free to contact me with any questions or concerns you have.      Betito Da Silva, PharmD, [facility]  Medication Therapy Management Pharmacist  Pager: [phone]    You may receive a survey about the MTM services you received.  I would appreciate your feedback to help me serve you better in the future. Please fill it out and return it when you " "can. Your comments will be anonymous.                Follow-ups after your visit        Your next 10 appointments already scheduled     Dec 26, 2018  8:00 AM CST   Remote PPM Check with MORENO TECH1   [facility]    [address] [place], [place] [address]  [place] [address] [phone] OPT 2           This appointment is for a remote check of your pacemaker.  This is not an appointment at the office.              Who to contact     If you have questions or need follow up information about today's clinic visit or your schedule please contact [facility] directly at [phone].  Normal or non-critical lab and imaging results will be communicated to you by [employer], letter or phone within 4 business days after the clinic has received the results. If you do not hear from us within 7 days, please contact the clinic through [employer] or phone. If you have a critical or abnormal lab result, we will notify you by phone as soon as possible.  Submit refill requests through [employer] or call your pharmacy and they will forward the refill request to us. Please allow 3 business days for your refill to be completed.          Additional Information About Your Visit        [employer] Information     [employer] lets you send messages to your doctor, view your test results, renew your prescriptions, schedule appointments and more. To sign up, go to [email or web address] . Click on \"Log in\" on the left side of the screen, which will take you to the Welcome page. Then click on \"Sign up Now\" on the right side of the page.     You will be asked to enter the access code listed below, as well as some personal information. Please follow the directions to create your username and password.     Your access code is: [email or web address]  Expires: 2018  4:47 PM     Your access code will  in 90 days. If you need help or a new code, please call your [facility] or " [phone].        Care EveryWhere ID     This is your Care EveryWhere ID. This could be used by other organizations to access your [facility] medical records  [id number]         Blood Pressure from Last 3 Encounters:   09/05/18 94/62   08/30/18 109/73   08/15/18 102/58    Weight from Last 3 Encounters:   09/05/18 149 lb 9.6 oz (67.9 kg)   08/30/18 148 lb (67.1 kg)   08/10/18 148 lb (67.1 kg)              Today, you had the following     No orders found for display         Today's Medication Changes          These changes are accurate as of 9/26/18 11:59 PM.  If you have any questions, ask your nurse or doctor.               These medicines have changed or have updated prescriptions.        Dose/Directions    metoprolol tartrate 25 MG tablet   Commonly known as:  LOPRESSOR   This may have changed:  how much to take   Used for:  S/P TAVR (transcatheter aortic valve replacement)        Dose:  25 mg   Take 1 tablet (25 mg) by mouth 2 times daily   Quantity:  60 tablet   Refills:  11                Primary Care Provider Office Phone # Fax #    Camron Aragon [phone]       [address] [facility] [address]        Equal Access to Services     JUAN CLINE AH: Hadbryant clifton hadasho Soomaali, waaxda luqadaha, qaybta kaalmada adeegyada, ronel dexter. So [facility] [phone].    ATENCIÓN: Si habla español, tiene a mon disposición servicios gratuitos de asistencia lingüística. Llame al [phone].    We comply with applicable federal civil rights laws and [place] laws. We do not discriminate on the basis of race, color, national origin, age, disability, sex, sexual orientation, or gender identity.            Thank you!     Thank you for choosing [facility]  for your care. Our goal is always to provide you with excellent care. Hearing back from our patients is one way we can continue to improve our services. Please take a few minutes to complete  the written survey that you may receive in the mail after your visit with us. Thank you!             Your Updated Medication List - Protect others around you: Learn how to safely use, store and throw away your medicines at www.disposemymeds.org.          This list is accurate as of 9/26/18 11:59 PM.  Always use your most recent med list.                   Brand Name Dispense Instructions for use Diagnosis    acetaminophen 325 MG tablet    TYLENOL    100 tablet    Take 2 tablets (650 mg) by mouth every 4 hours as needed for mild pain, fever or headaches        apixaban ANTICOAGULANT 2.5 MG tablet    ELIQUIS    180 tablet    Take 1 tablet (2.5 mg) by mouth 2 times daily    Paroxysmal atrial fibrillation (H)       ascorbic acid 500 MG Cpcr CR capsule    vitamin C     Take 500 mg by mouth daily        calcium carbonate 500 mg {elemental} 500 MG tablet    OS-SHELIA     Take 1,250 mg by mouth daily        HYDROcodone-acetaminophen 5-325 MG per tablet    NORCO    30 tablet    Take 1 tablet by mouth every 6 hours as needed for pain For moderate to severe pain    Rheumatoid arthritis involving multiple sites, unspecified rheumatoid factor presence (H)       lisinopril 5 MG tablet    PRINIVIL/ZESTRIL    90 tablet    Take 1 tablet (5 mg) by mouth daily    Acute on chronic systolic congestive heart failure (H)       LUPRON DEPOT IM      Inject into the muscle every 6 months Reported on 5/3/2017        metoprolol tartrate 25 MG tablet    LOPRESSOR    60 tablet    Take 1 tablet (25 mg) by mouth 2 times daily    S/P TAVR (transcatheter aortic valve replacement)       ondansetron 4 MG ODT tab    ZOFRAN-ODT    10 tablet    Take 1 tablet (4 mg) by mouth every 6 hours as needed for nausea or vomiting    Nausea       polyethylene glycol Packet    MIRALAX/GLYCOLAX    30 packet    Take 17 g by mouth daily    Constipation, unspecified constipation type       predniSONE 5 MG tablet    DELTASONE    100 tablet    Take 1 tablet (5 mg) by mouth  daily    Rheumatoid arthritis involving multiple sites with positive rheumatoid factor (H)       SPIRONOLACTONE PO      Take 25 mg by mouth daily        tamsulosin 0.4 MG capsule    FLOMAX    60 capsule    Take 1 capsule (0.4 mg) by mouth daily    S/P TAVR (transcatheter aortic valve replacement)       traMADol 50 MG tablet    ULTRAM    90 tablet    TAKE 1 TABLET 3 TIMES DAILY AS NEEDED FOR MODERATE PAIN    Rheumatoid arthritis involving multiple sites, unspecified rheumatoid factor presence (H)       VITAMIN D (CHOLECALCIFEROL) PO      Take 2,000 Units by mouth daily Reported on 5/3/2017

## 2018-09-27 NOTE — PATIENT INSTRUCTIONS
"Recommendations from today's MTM visit:                                                    MTM (medication therapy management) is a service provided by a clinical pharmacist designed to help you get the most of out of your medicines.   Today we reviewed what your medicines are for, how to know if they are working, that your medicines are safe and how to make your medicine regimen as easy as possible.     1. Some of your dizziness could be related to your blood pressure during the day.  When you sit up or stand up the muscle around your blood vessels do not \"squeeze\" as well as they used to and the blood takes a little bit more time to get back up to your brain.  You may benefit from moving your lisinopril to later in the day (possibly with your pre-bedtime tramadol) to spread out the medications that can help lower your blood pressure.  Sometimes when they \"kick-in\" at the same time we can see a lower blood pressure than normal.     Next MTM visit: 6 months or sooner if needed    To schedule another MTM appointment, please call the clinic directly or you may call the MTM scheduling line at [phone] or toll-free at [phone].     My Clinical Pharmacist's contact information:                                                      It was a pleasure seeing you today!  Please feel free to contact me with any questions or concerns you have.      Betito Da Silva, PharmD, [facility]  Medication Therapy Management Pharmacist  Pager: [phone]    You may receive a survey about the MTM services you received.  I would appreciate your feedback to help me serve you better in the future. Please fill it out and return it when you can. Your comments will be anonymous.        "

## 2018-10-08 NOTE — PROGRESS NOTES
Sports Medicine Clinic Visit - Interim History October 8, 2018    Initial Visit Date 7/24/2018    PCP: Camron Aragon ALEX Merritt is a 89 year old male who is seen in follow up for bilateral shoulder pain. Since last visit on 8/9/2018 patient has had a return of bilateral shoulder pain. He states that the right shoulder is worse than the left. He states the steroid injections were helpful. He rates the pain at a 3/10 currently, increasing with movements.  Symptoms are relieved with CSI and pain medication.  Symptoms are worsened by movement of the arm. He is hoping to get repeat injections today.     Last CSI of right shoulder: 7/24/2018  Last CSI of left shoulder: 8/3/2018    Of note, he saw Dr. Mckeon on 8/10/18 who did nt recommend surgery given his age and multiple medical comorbidities.  She suggested Dr. Kearney at the  if he wanted to consider surgery.  He does not want to go all the way down to the [facility] so he would like to continue with conservative care.        Review of Systems  Musculoskeletal: as above  Remainder of review of systems is negative including constitutional, eyes, ENT, CV, pulmonary, GI, , endocrine, skin, hematologic, and neurologic except as noted in HPI or medical history.    History reviewed. No pertinent past surgical/medical/family/social history other than as mentioned in HPI.    Patient Active Problem List   Diagnosis     Hypertrophy of prostate without urinary obstruction     Rheumatoid arthritis involving multiple sites with positive rheumatoid factor (H)     Hyperlipidemia LDL goal <130     Osteoporosis     Encounter for long-term current use of medication     Elevated prostate specific antigen (PSA)     Rheumatoid arthritis involving multiple sites, unspecified rheumatoid factor presence (H)     Status post total left knee replacement     Mitral regurgitation and aortic stenosis - AS severe by echo on 3/2017.  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     Postoperative delirium      Infected superficial injury of knee, left, subsequent encounter     Essential hypertension with goal blood pressure less than 140/90     Paroxysmal atrial fibrillation (H)     Visit for monitoring Tikosyn therapy     Diarrhea     Weakness generalized     Acute cystitis without hematuria     COPD exacerbation (H)     Influenza A     Acute respiratory failure with hypoxia (H)     Atrial fibrillation with rapid ventricular response (H)     Shock (H)     Atrial fibrillation (H) - s/p AVN ablation and pacemaker     Malignant neoplasm of prostate (H)     Cellulitis of right lower extremity     Venous stasis ulcers of both lower extremities (H)     Anemia     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, Bi-Ventricular- dependent     Dilated cardiomyopathy (H) dilated LV, EF 55% after TAVR      Chronic systolic congestive heart failure (H)     Immunosuppression (H)     S/P AV lisandro ablation     Aortic stenosis, severe     S/P TAVR (transcatheter aortic valve replacement)     Weakness     Nausea     Leukopenia, unspecified type     Hyponatremia     Viral URI with cough     Constipation     Rotator cuff tear arthropathy of both shoulders     Past Medical History:   Diagnosis Date     Anemia      Atrial fibrillation (H) 07/01/2016     Cardiac pacemaker 03/10/2017     Cardiomyopathy (H)      CHF (congestive heart failure) (H)      COPD exacerbation (H) 3/2/2017     Depressive disorder      History of prostate cancer      Paroxysmal atrial fibrillation (H) 7/6/2016     Pure hypercholesterolemia      Rheumatoid arthritis(714.0)      Unspecified essential hypertension      Weakness generalized 3/2/2017     Past Surgical History:   Procedure Laterality Date     ARTHROPLASTY KNEE Left 1/12/2016    Procedure: ARTHROPLASTY KNEE;  Surgeon: Cesar Walker DO;  Location: [facility] OR     COLONOSCOPY  08/24/09     EXCISE LESION HEAD N/A 4/9/2018    Procedure: EXCISE LESION HEAD;  Excision Left Scalp Lesion;  Surgeon: Connor Nichole,  DO;  Location: [facility] OR     HC COLONOSCOPY THRU STOMA W BIOPSY/CAUTERY TUMOR/POLYP/LESION  8/31/2004     HC REPAIR ING HERNIA,5+Y/O,REDUCIBL  1996    Marlex mesh repair of bilateral inguinal hernias and drainage of bilateral scrotal hydroceles.     HEART CATH FEMORAL CANNULIZATION WITH OPEN STANDBY REPAIR AORTIC VALVE N/A 9/27/2017    Procedure: HEART CATH FEMORAL CANNULIZATION WITH OPEN STANDBY REPAIR AORTIC VALVE;;  Surgeon: Jaron Alejandra MD;  Location: [facility] OR     IMPLANT PACEMAKER  03/10/2017     IRRIGATION AND DEBRIDEMENT SOFT TISSUE LOWER EXTREMITY, COMBINED Left 3/15/2016    Procedure: COMBINED IRRIGATION AND DEBRIDEMENT SOFT TISSUE LOWER EXTREMITY;  Surgeon: Cesar Walker DO;  Location: [facility] OR     TRANSCATHETER AORTIC VALVE IMPLANT ANESTHESIA N/A 9/27/2017    Procedure: TRANSCATHETER AORTIC VALVE IMPLANT ANESTHESIA;  Right Transfemoral Approach (Harman Ramsey 3 29mm) Aortic Valve Implant,  Cardiopulmonary Bypass Standby, Transthoracic Echocardiogram by Derian Queen(Echo Tech);  Surgeon: GENERIC ANESTHESIA PROVIDER;  Location: [facility] OR     Family History   Problem Relation Age of Onset     Cancer Mother      Cancer Son      Unknown/Adopted Father      Alcoholism Brother      Social History     Social History     Marital status:      Spouse name: N/A     Number of children: N/A     Years of education: N/A     Occupational History     Not on file.     Social History Main Topics     Smoking status: Former Smoker     Packs/day: 0.50     Years: 15.00     Types: Cigarettes     Quit date: 11/12/1998     Smokeless tobacco: Never Used      Comment: quit 15 yrs ago     Alcohol use No     Drug use: No     Sexual activity: Yes     Other Topics Concern     Caffeine Concern Yes     8 cups coffee day     Sleep Concern Yes     Weight Concern Yes     weight loss     Special Diet No     Exercise Yes     split [employer] daily     Social History Narrative       Current Outpatient Prescriptions   Medication      "HYDROcodone-acetaminophen (NORCO) 5-325 MG per tablet     metoprolol (LOPRESSOR) 25 MG tablet     predniSONE (DELTASONE) 5 MG tablet     traMADol (ULTRAM) 50 MG tablet     acetaminophen (TYLENOL) 325 MG tablet     apixaban ANTICOAGULANT (ELIQUIS) 2.5 MG tablet     ascorbic acid (VITAMIN C) 500 MG CPCR     calcium carbonate (OS-SHELIA 500 MG [place]) 500 MG tablet     Leuprolide Acetate (LUPRON DEPOT IM)     lisinopril (PRINIVIL/ZESTRIL) 5 MG tablet     ondansetron (ZOFRAN-ODT) 4 MG ODT tab     polyethylene glycol (MIRALAX/GLYCOLAX) Packet     SPIRONOLACTONE PO     tamsulosin (FLOMAX) 0.4 MG capsule     VITAMIN D, CHOLECALCIFEROL, PO     No current facility-administered medications for this visit.      Allergies   Allergen Reactions     Amiodarone Other (See Comments)     Drop in DLCO     Lasix [Furosemide] Blisters     Blisters and sores in his mouth.          Objective:  BP 90/68  Ht 5' 7\" (1.702 m)  Wt 149 lb 9.6 oz (67.9 kg)  BMI 23.43 kg/m2    General: Alert and in no distress, accompanied by his wife  Head: Normocephalic, atraumatic  Eyes: no scleral icterus or conjunctival erythema   Oropharynx:  Mucous membranes moist  Skin: no erythema, petechiae, or jaundice  Resp: normal respiratory effort without conversational dyspnea   Psych: normal mood and affect    Gait: Sitting in wheelchair    Radiology:  No new imaging today    Large Joint Injection/Arthocentesis  Date/Time: 10/8/2018 12:57 PM  Performed by: AMANDA CASTILLO  Authorized by: AMANDA CASTILLO     Indications:  Pain  Needle Size:  25 G  Guidance: landmark guided    Approach:  Posterior  Location:  Shoulder  Laterality:  Bilateral  Site:  Bilateral subacromial bursa  Medications (Right):  40 mg triamcinolone acetonide 40 MG/ML  Medications (Left):  40 mg triamcinolone acetonide 40 MG/ML  Procedure discussed: discussed risks, benefits, and alternatives    Consent Given by:  Patient   MEDICATION: Triamcinolone Acetonide 40 ROUTE: sub " acromial injection  SITE: bilateral shoulder  DOSE: 40mg each site LOT #: [id number] : Amneal EXPIRATION DATE:  04/2020 NDC: [address]-6            Assessment:  1. Primary osteoarthritis of both shoulders    2. Chronic pain of both shoulders        Plan:  Discussed the assessment with the patient and developed a plan together:  -Steroid injections performed today.  Take it easy over the next few days. Keep in mind that the steroid may take up to 3 days to start working and up to 2 weeks to reach maximal effect.  Ice 15-20 minutes as needed for soreness.  Patient's preferred over the counter medication as needed for pain as directed on packaging.    Follow Up: As needed if symptoms fail to improve or worsen. Please call with any questions or concerns.       Suzy Garcia MD, CAQ Sports Medicine  [facility]

## 2018-10-08 NOTE — MR AVS SNAPSHOT
After Visit Summary   10/8/2018    Bud Merritt    MRN: [id number]           Patient Information     Date Of Birth          5/9/1927        Visit Information        Provider Department      10/8/2018 3:30 PM NL FLU SHOT ERC [facility]        Today's Diagnoses     Need for prophylactic vaccination and inoculation against influenza    -  1       Follow-ups after your visit        Your next 10 appointments already scheduled     Oct 08, 2018  3:30 PM CDT   [facility] with [facility]    [address]  [place] [id number] [phone]            Dec 26, 2018  8:00 AM CST   Remote PPM Check with MORENO TECH1   [facility] ([facility])    [address]  [place] [address] [phone] OPT 2           This appointment is for a remote check of your pacemaker.  This is not an appointment at the office.              Who to contact     If you have questions or need follow up information about today's clinic visit or your schedule please contact [facility] directly at [phone].  Normal or non-critical lab and imaging results will be communicated to you by [employer], letter or phone within 4 business days after the clinic has received the results. If you do not hear from us within 7 days, please contact the clinic through [employer] or phone. If you have a critical or abnormal lab result, we will notify you by phone as soon as possible.  Submit refill requests through [email or web address] or call your pharmacy and they will forward the refill request to us. Please allow 3 business days for your refill to be completed.          Additional Information About Your Visit        MyChart Information     [email or web address] lets you send messages to your doctor, view your test results, renew your prescriptions, schedule appointments and more. To sign up, go to  [email or web address] . Click on \"Log in\" on the left side of the screen, which will take you to the Welcome page. Then click on \"Sign up Now\" on the right side of the page.     You will be asked to enter the access code listed below, as well as some personal information. Please follow the directions to create your username and password.     Your access code is: [email or web address]  Expires: 2018  4:47 PM     Your access code will  in 90 days. If you need help or a new code, please call your [place] clinic or [phone].        Care EveryWhere ID     This is your Care EveryWhere ID. This could be used by other organizations to access your [place] medical records  [id number]         Blood Pressure from Last 3 Encounters:   10/08/18 90/68   18 94/62   18 109/73    Weight from Last 3 Encounters:   10/08/18 149 lb 9.6 oz (67.9 kg)   18 149 lb 9.6 oz (67.9 kg)   18 148 lb (67.1 kg)              We Performed the Following     FLU VACCINE, INCREASED ANTIGEN, PRESV FREE, AGE 65+ [45304]     Vaccine Administration, Initial [77785]          Today's Medication Changes          These changes are accurate as of 10/8/18 12:51 PM.  If you have any questions, ask your nurse or doctor.               These medicines have changed or have updated prescriptions.        Dose/Directions    metoprolol tartrate 25 MG tablet   Commonly known as:  LOPRESSOR   This may have changed:  how much to take   Used for:  S/P TAVR (transcatheter aortic valve replacement)        Dose:  25 mg   Take 1 tablet (25 mg) by mouth 2 times daily   Quantity:  60 tablet   Refills:  11                Primary Care Provider Office Phone # Fax #    Camron Aragon [phone]       [address] [facility] [address]        Equal Access to Services     JUAN CLINE AH: Sampson Burgess, hugo villanueva, ronel mckeon. So [facility] " [phone].    ATENCIÓN: Si sary campuzano, tiene a mon disposición servicios gratuitos de asistencia lingüística. Jeanette goff [phone].    We comply with applicable federal civil rights laws and [place] laws. We do not discriminate on the basis of race, color, national origin, age, disability, sex, sexual orientation, or gender identity.            Thank you!     Thank you for choosing [facility]  for your care. Our goal is always to provide you with excellent care. Hearing back from our patients is one way we can continue to improve our services. Please take a few minutes to complete the written survey that you may receive in the mail after your visit with us. Thank you!             Your Updated Medication List - Protect others around you: Learn how to safely use, store and throw away your medicines at www.disposemymeds.org.          This list is accurate as of 10/8/18 12:51 PM.  Always use your most recent med list.                   Brand Name Dispense Instructions for use Diagnosis    acetaminophen 325 MG tablet    TYLENOL    100 tablet    Take 2 tablets (650 mg) by mouth every 4 hours as needed for mild pain, fever or headaches        apixaban ANTICOAGULANT 2.5 MG tablet    ELIQUIS    180 tablet    Take 1 tablet (2.5 mg) by mouth 2 times daily    Paroxysmal atrial fibrillation (H)       ascorbic acid 500 MG Cpcr CR capsule    vitamin C     Take 500 mg by mouth daily        calcium carbonate 500 mg (elemental) 500 MG tablet    OS-SHELIA     Take 1,250 mg by mouth daily        HYDROcodone-acetaminophen 5-325 MG per tablet    NORCO    30 tablet    Take 1 tablet by mouth every 6 hours as needed for pain For moderate to severe pain    Rheumatoid arthritis involving multiple sites, unspecified rheumatoid factor presence (H)       lisinopril 5 MG tablet    PRINIVIL/ZESTRIL    90 tablet    Take 1 tablet (5 mg) by mouth daily    Acute on chronic systolic congestive heart failure (H)       LUPRON DEPOT  IM      Inject into the muscle every 6 months Reported on 5/3/2017        metoprolol tartrate 25 MG tablet    LOPRESSOR    60 tablet    Take 1 tablet (25 mg) by mouth 2 times daily    S/P TAVR (transcatheter aortic valve replacement)       ondansetron 4 MG ODT tab    ZOFRAN-ODT    10 tablet    Take 1 tablet (4 mg) by mouth every 6 hours as needed for nausea or vomiting    Nausea       polyethylene glycol Packet    MIRALAX/GLYCOLAX    30 packet    Take 17 g by mouth daily    Constipation, unspecified constipation type       predniSONE 5 MG tablet    DELTASONE    100 tablet    Take 1 tablet (5 mg) by mouth daily    Rheumatoid arthritis involving multiple sites with positive rheumatoid factor (H)       SPIRONOLACTONE PO      Take 25 mg by mouth daily        tamsulosin 0.4 MG capsule    FLOMAX    60 capsule    Take 1 capsule (0.4 mg) by mouth daily    S/P TAVR (transcatheter aortic valve replacement)       traMADol 50 MG tablet    ULTRAM    90 tablet    TAKE 1 TABLET 3 TIMES DAILY AS NEEDED FOR MODERATE PAIN    Rheumatoid arthritis involving multiple sites, unspecified rheumatoid factor presence (H)       VITAMIN D (CHOLECALCIFEROL) PO      Take 2,000 Units by mouth daily Reported on 5/3/2017

## 2018-10-08 NOTE — MR AVS SNAPSHOT
After Visit Summary   10/8/2018    Bud Merritt    MRN: [id number]           Patient Information     Date Of Birth          5/9/1927        Visit Information        Provider Department      10/8/2018 12:00 PM Sejal Garcia MD [facility]        Today's Diagnoses     Primary osteoarthritis of both shoulders    -  1    Chronic pain of both shoulders          Care Instructions    Today's Plan of Care:  -Steroid injections performed today.  Take it easy over the next few days. Keep in mind that the steroid may take up to 3 days to start working and up to 2 weeks to reach maximal effect.  Ice 15-20 minutes as needed for soreness.  Patient's preferred over the counter medication as needed for pain as directed on packaging.    Follow Up: As needed if symptoms fail to improve or worsen. Please call with any questions or concerns.               Follow-ups after your visit        Follow-up notes from your care team     Return if symptoms worsen or fail to improve.      Your next 10 appointments already scheduled     Oct 08, 2018  3:30 PM CDT   Nurse Only with NL FLU SHOT ERC   [facility]    [address]  [place] [id number] [phone]            Dec 26, 2018  8:00 AM CST   Remote PPM Check with MORENO TECH1   [facility] ([facility])    [address] [place] [address] [address]  [facility] [address] [phone] OPT 2           This appointment is for a remote check of your pacemaker.  This is not an appointment at the office.              Who to contact     If you have questions or need follow up information about today's clinic visit or your schedule please contact [facility] directly at [phone].  Normal or non-critical lab and imaging results will be communicated to you by MyChart, letter or phone within 4 business days after the clinic has received the results.  "If you do not hear from us within 7 days, please contact the clinic through [employer] or phone. If you have a critical or abnormal lab result, we will notify you by phone as soon as possible.  Submit refill requests through [employer] or call your pharmacy and they will forward the refill request to us. Please allow 3 business days for your refill to be completed.          Additional Information About Your Visit        [employer] Information     [employer] lets you send messages to your doctor, view your test results, renew your prescriptions, schedule appointments and more. To sign up, go to [email or web address] . Click on \"Log in\" on the left side of the screen, which will take you to the Welcome page. Then click on \"Sign up Now\" on the right side of the page.     You will be asked to enter the access code listed below, as well as some personal information. Please follow the directions to create your username and password.     Your access code is: [email or web address]  Expires: 2018  4:47 PM     Your access code will  in 90 days. If you need help or a new code, please call your [place] clinic or [phone].        Care EveryWhere ID     This is your Care EveryWhere ID. This could be used by other organizations to access your [place] medical records  [id number]        Your Vitals Were     Height BMI (Body Mass Index)                5' 7\" (1.702 m) 23.43 kg/m2           Blood Pressure from Last 3 Encounters:   10/08/18 90/68   18 94/62   18 109/73    Weight from Last 3 Encounters:   10/08/18 149 lb 9.6 oz (67.9 kg)   18 149 lb 9.6 oz (67.9 kg)   18 148 lb (67.1 kg)              We Performed the Following     Large Joint Injection/Arthocentesis          Today's Medication Changes          These changes are accurate as of 10/8/18  1:05 PM.  If you have any questions, ask your nurse or doctor.               These medicines have changed or have updated prescriptions.        Dose/Directions    " metoprolol tartrate 25 MG tablet   Commonly known as:  LOPRESSOR   This may have changed:  how much to take   Used for:  S/P TAVR (transcatheter aortic valve replacement)        Dose:  25 mg   Take 1 tablet (25 mg) by mouth 2 times daily   Quantity:  60 tablet   Refills:  11                Primary Care Provider Office Phone # Fax #    Camron Aragon [phone]       [address] [facility] [address]        Equal Access to Services     JUAN CLINE AH: Hadii aad ku hadasho Soomaali, waaxda luqadaha, qaybta kaalmada adeegyada, waxay idiin hayaan adeeg kharash la'aan . So [facility] [phone].    ATENCIÓN: Si habla español, tiene a mon disposición servicios gratuitos de asistencia lingüística. [facility] [phone].    We comply with applicable federal civil rights laws and [place] laws. We do not discriminate on the basis of race, color, national origin, age, disability, sex, sexual orientation, or gender identity.            Thank you!     Thank you for choosing [facility]  for your care. Our goal is always to provide you with excellent care. Hearing back from our patients is one way we can continue to improve our services. Please take a few minutes to complete the written survey that you may receive in the mail after your visit with us. Thank you!             Your Updated Medication List - Protect others around you: Learn how to safely use, store and throw away your medicines at www.disposemymeds.org.          This list is accurate as of 10/8/18  1:05 PM.  Always use your most recent med list.                   Brand Name Dispense Instructions for use Diagnosis    acetaminophen 325 MG tablet    TYLENOL    100 tablet    Take 2 tablets (650 mg) by mouth every 4 hours as needed for mild pain, fever or headaches        apixaban ANTICOAGULANT 2.5 MG tablet    ELIQUIS    180 tablet    Take 1 tablet (2.5 mg) by mouth 2 times daily    Paroxysmal atrial fibrillation (H)        ascorbic acid 500 MG Cpcr CR capsule    vitamin C     Take 500 mg by mouth daily        calcium carbonate 500 mg (elemental) 500 MG tablet    OS-SHELIA     Take 1,250 mg by mouth daily        HYDROcodone-acetaminophen 5-325 MG per tablet    NORCO    30 tablet    Take 1 tablet by mouth every 6 hours as needed for pain For moderate to severe pain    Rheumatoid arthritis involving multiple sites, unspecified rheumatoid factor presence (H)       lisinopril 5 MG tablet    PRINIVIL/ZESTRIL    90 tablet    Take 1 tablet (5 mg) by mouth daily    Acute on chronic systolic congestive heart failure (H)       LUPRON DEPOT IM      Inject into the muscle every 6 months Reported on 5/3/2017        metoprolol tartrate 25 MG tablet    LOPRESSOR    60 tablet    Take 1 tablet (25 mg) by mouth 2 times daily    S/P TAVR (transcatheter aortic valve replacement)       ondansetron 4 MG ODT tab    ZOFRAN-ODT    10 tablet    Take 1 tablet (4 mg) by mouth every 6 hours as needed for nausea or vomiting    Nausea       polyethylene glycol Packet    MIRALAX/GLYCOLAX    30 packet    Take 17 g by mouth daily    Constipation, unspecified constipation type       predniSONE 5 MG tablet    DELTASONE    100 tablet    Take 1 tablet (5 mg) by mouth daily    Rheumatoid arthritis involving multiple sites with positive rheumatoid factor (H)       SPIRONOLACTONE PO      Take 25 mg by mouth daily        tamsulosin 0.4 MG capsule    FLOMAX    60 capsule    Take 1 capsule (0.4 mg) by mouth daily    S/P TAVR (transcatheter aortic valve replacement)       traMADol 50 MG tablet    ULTRAM    90 tablet    TAKE 1 TABLET 3 TIMES DAILY AS NEEDED FOR MODERATE PAIN    Rheumatoid arthritis involving multiple sites, unspecified rheumatoid factor presence (H)       VITAMIN D (CHOLECALCIFEROL) PO      Take 2,000 Units by mouth daily Reported on 5/3/2017

## 2018-10-08 NOTE — PATIENT INSTRUCTIONS
Today's Plan of Care:  -Steroid injections performed today.  Take it easy over the next few days. Keep in mind that the steroid may take up to 3 days to start working and up to 2 weeks to reach maximal effect.  Ice 15-20 minutes as needed for soreness.  Patient's preferred over the counter medication as needed for pain as directed on packaging.    Follow Up: As needed if symptoms fail to improve or worsen. Please call with any questions or concerns.

## 2018-10-08 NOTE — LETTER
10/8/2018         RE: Bud Merritt  [address] [facility] [id number]        Dear Colleague,    Thank you for referring your patient, Bud Mreritt, to the [facility]. Please see a copy of my visit note below.    Sports Medicine Clinic Visit - Interim History October 8, 2018    Initial Visit Date 7/24/2018    PCP: Camron Aragon    Bud Merritt is a 89 year old male who is seen in follow up for bilateral shoulder pain. Since last visit on 8/9/2018 patient has had a return of bilateral shoulder pain. He states that the right shoulder is worse than the left. He states the steroid injections were helpful. He rates the pain at a 3/10 currently, increasing with movements.  Symptoms are relieved with CSI and pain medication.  Symptoms are worsened by movement of the arm. He is hoping to get repeat injections today.     Last CSI of right shoulder: 7/24/2018  Last CSI of left shoulder: 8/3/2018    Of note, he saw Dr. Mckeon on 8/10/18 who did nt recommend surgery given his age and multiple medical comorbidities.  She suggested Dr. Kearney at the  if he wanted to consider surgery.  He does not want to go all the way down to the [place] so he would like to continue with conservative care.        Review of Systems  Musculoskeletal: as above  Remainder of review of systems is negative including constitutional, eyes, ENT, CV, pulmonary, GI, , endocrine, skin, hematologic, and neurologic except as noted in HPI or medical history.    History reviewed. No pertinent past surgical/medical/family/social history other than as mentioned in HPI.    Patient Active Problem List   Diagnosis     Hypertrophy of prostate without urinary obstruction     Rheumatoid arthritis involving multiple sites with positive rheumatoid factor (H)     Hyperlipidemia LDL goal <130     Osteoporosis     Encounter for long-term current use of medication     Elevated prostate specific antigen (PSA)     Rheumatoid  arthritis involving multiple sites, unspecified rheumatoid factor presence (H)     Status post total left knee replacement     Mitral regurgitation and aortic stenosis - AS severe by echo on 3/2017.  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     Postoperative delirium     Infected superficial injury of knee, left, subsequent encounter     Essential hypertension with goal blood pressure less than 140/90     Paroxysmal atrial fibrillation (H)     Visit for monitoring Tikosyn therapy     Diarrhea     Weakness generalized     Acute cystitis without hematuria     COPD exacerbation (H)     Influenza A     Acute respiratory failure with hypoxia (H)     Atrial fibrillation with rapid ventricular response (H)     Shock (H)     Atrial fibrillation (H) - s/p AVN ablation and pacemaker     Malignant neoplasm of prostate (H)     Cellulitis of right lower extremity     Venous stasis ulcers of both lower extremities (H)     Anemia     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, Bi-Ventricular- dependent     Dilated cardiomyopathy (H) dilated LV, EF 55% after TAVR      Chronic systolic congestive heart failure (H)     Immunosuppression (H)     S/P AV lisandro ablation     Aortic stenosis, severe     S/P TAVR (transcatheter aortic valve replacement)     Weakness     Nausea     Leukopenia, unspecified type     Hyponatremia     Viral URI with cough     Constipation     Rotator cuff tear arthropathy of both shoulders     Past Medical History:   Diagnosis Date     Anemia      Atrial fibrillation (H) 07/01/2016     Cardiac pacemaker 03/10/2017     Cardiomyopathy (H)      CHF (congestive heart failure) (H)      COPD exacerbation (H) 3/2/2017     Depressive disorder      History of prostate cancer      Paroxysmal atrial fibrillation (H) 7/6/2016     Pure hypercholesterolemia      Rheumatoid arthritis(714.0)      Unspecified essential hypertension      Weakness generalized 3/2/2017     Past Surgical History:   Procedure Laterality Date     ARTHROPLASTY KNEE Left 1/12/2016     Procedure: ARTHROPLASTY KNEE;  Surgeon: Cesar Walker DO;  [facility]     COLONOSCOPY  08/24/09     EXCISE LESION HEAD N/A 4/9/2018    Procedure: EXCISE LESION HEAD;  Excision Left Scalp Lesion;  Surgeon: Connor Nichole DO;  [facility]     HC COLONOSCOPY THRU STOMA W BIOPSY/CAUTERY TUMOR/POLYP/LESION  8/31/2004     HC REPAIR ING HERNIA,5+Y/O,REDUCIBL  1996    Marlex mesh repair of bilateral inguinal hernias and drainage of bilateral scrotal hydroceles.     HEART CATH FEMORAL CANNULIZATION WITH OPEN STANDBY REPAIR AORTIC VALVE N/A 9/27/2017    Procedure: HEART CATH FEMORAL CANNULIZATION WITH OPEN STANDBY REPAIR AORTIC VALVE;;  Surgeon: Jaron Alejandra MD;  Location: [facility]     IMPLANT PACEMAKER  03/10/2017     IRRIGATION AND DEBRIDEMENT SOFT TISSUE LOWER EXTREMITY, COMBINED Left 3/15/2016    Procedure: COMBINED IRRIGATION AND DEBRIDEMENT SOFT TISSUE LOWER EXTREMITY;  Surgeon: Cesar Walker DO;  [facility]     TRANSCATHETER AORTIC VALVE IMPLANT ANESTHESIA N/A 9/27/2017    Procedure: TRANSCATHETER AORTIC VALVE IMPLANT ANESTHESIA;  Right Transfemoral Approach (Harman Ramsey 3 29mm) Aortic Valve Implant,  Cardiopulmonary Bypass Standby, Transthoracic Echocardiogram by Derian Queen(Echo Tech);  Surgeon: GENERIC ANESTHESIA PROVIDER;  Location: [facility] OR     Family History   Problem Relation Age of Onset     Cancer Mother      Cancer Son      Unknown/Adopted Father      Alcoholism Brother      Social History     Social History     Marital status:      Spouse name: N/A     Number of children: N/A     Years of education: N/A     Occupational History     Not on file.     Social History Main Topics     Smoking status: Former Smoker     Packs/day: 0.50     Years: 15.00     Types: Cigarettes     Quit date: 11/12/1998     Smokeless tobacco: Never Used      Comment: quit 15 yrs ago     Alcohol use No     Drug use: No     Sexual activity: Yes     Other Topics Concern      "Caffeine Concern Yes     8 cups coffee day     Sleep Concern Yes     Weight Concern Yes     weight loss     Special Diet No     Exercise Yes     Clicks [employer] a [employer] daily     Social History Narrative       Current Outpatient Prescriptions   Medication     HYDROcodone-acetaminophen (NORCO) 5-325 MG per tablet     metoprolol (LOPRESSOR) 25 MG tablet     predniSONE (DELTASONE) 5 MG tablet     traMADol (ULTRAM) 50 MG tablet     acetaminophen (TYLENOL) 325 MG tablet     apixaban ANTICOAGULANT (ELIQUIS) 2.5 MG tablet     ascorbic acid (VITAMIN C) 500 MG CPCR     calcium carbonate (OS-SHELIA 500 MG [place]. [place]) 500 MG tablet     Leuprolide Acetate (LUPRON DEPOT IM)     lisinopril (PRINIVIL/ZESTRIL) 5 MG tablet     ondansetron (ZOFRAN-ODT) 4 MG ODT tab     polyethylene glycol (MIRALAX/GLYCOLAX) Packet     SPIRONOLACTONE PO     tamsulosin (FLOMAX) 0.4 MG capsule     VITAMIN D, CHOLECALCIFEROL, PO     No current facility-administered medications for this visit.      Allergies   Allergen Reactions     Amiodarone Other (See Comments)     Drop in DLCO     Lasix [Furosemide] Blisters     Blisters and sores in his mouth.          Objective:  BP 90/68  Ht 5' 7\" (1.702 m)  Wt 149 lb 9.6 oz (67.9 kg)  BMI 23.43 kg/m2    General: Alert and in no distress, accompanied by his wife  Head: Normocephalic, atraumatic  Eyes: no scleral icterus or conjunctival erythema   Oropharynx:  Mucous membranes moist  Skin: no erythema, petechiae, or jaundice  Resp: normal respiratory effort without conversational dyspnea   Psych: normal mood and affect    Gait: Sitting in wheelchair    Radiology:  No new imaging today    Large Joint Injection/Arthocentesis  Date/Time: 10/8/2018 12:57 PM  Performed by: AMANDA CASTILLO  Authorized by: AMANDA CASTILLO     Indications:  Pain  Needle Size:  25 G  Guidance: landmark guided    Approach:  Posterior  Location:  Shoulder  Laterality:  Bilateral  Site:  Bilateral subacromial bursa  Medications " (Right):  40 mg triamcinolone acetonide 40 MG/ML  Medications (Left):  40 mg triamcinolone acetonide 40 MG/ML  Procedure discussed: discussed risks, benefits, and alternatives    Consent Given by:  Patient   MEDICATION: Triamcinolone Acetonide 40 ROUTE: sub acromial injection  SITE: bilateral shoulder  DOSE: 40mg each site LOT #: [id number] : Amneal EXPIRATION DATE:  04/2020 NDC: [address]-5            Assessment:  1. Primary osteoarthritis of both shoulders    2. Chronic pain of both shoulders        Plan:  Discussed the assessment with the patient and developed a plan together:  -Steroid injections performed today.  Take it easy over the next few days. Keep in mind that the steroid may take up to 3 days to start working and up to 2 weeks to reach maximal effect.  Ice 15-20 minutes as needed for soreness.  Patient's preferred over the counter medication as needed for pain as directed on packaging.    Follow Up: As needed if symptoms fail to improve or worsen. Please call with any questions or concerns.       Suzy Garcia MD, [facility]  [facility]        Again, thank you for allowing me to participate in the care of your patient.        Sincerely,        Sejal Garcia MD

## 2018-10-15 NOTE — TELEPHONE ENCOUNTER
"Requested Prescriptions   Pending Prescriptions Disp Refills     HYDROcodone-acetaminophen (NORCO) 5-325 MG per tablet 30 tablet 0     Sig: Take 1 tablet by mouth every 6 hours as needed for pain For moderate to severe pain    There is no refill protocol information for this order        traMADol (ULTRAM) 50 MG tablet 90 tablet 0     Sig: TAKE 1 TABLET 3 TIMES DAILY AS NEEDED FOR MODERATE PAIN    There is no refill protocol information for this order        metoprolol tartrate (LOPRESSOR) 25 MG tablet 180 tablet 3     Sig: Take 1 tablet (25 mg) by mouth 2 times daily    Beta-Blockers Protocol Passed    10/14/2018 10:10 AM       Passed - Blood pressure under 140/90 in past 12 months    BP Readings from Last 3 Encounters:   10/08/18 90/68   09/05/18 94/62   08/30/18 109/73                Passed - Patient is age 6 or older       Passed - Recent (12 mo) or future (30 days) visit within the authorizing provider's specialty    Patient had office visit in the last 12 months or has a visit in the next 30 days with authorizing provider or within the authorizing provider's specialty.  See \"Patient Info\" tab in inbasket, or \"Choose Columns\" in Meds & Orders section of the refill encounter.                Tramadol  Last Written Prescription Date:  9/14/18  Last Fill Quantity: 90,   # refills: 0  Last Office Visit: 10/8/18  Future Office visit:       Routing refill request to provider for review/approval because:  Drug not on the [facility], [facility] or [facility] refill protocol or controlled substance        Norco      Last Written Prescription Date:  9/14/18  Last Fill Quantity: 30,   # refills: 0  Last Office Visit: 10/8/18  Future Office visit:       Routing refill request to provider for review/approval because:  Drug not on the [facility], [facility] or [facility] refill protocol or controlled substance      Metoprolol      Last Written Prescription Date:  9/29/17  Last Fill Quantity: 60,  # refills: 11   Last office visit: 10/8/2018 with prescribing " provider:     Future Office Visit:

## 2018-10-24 NOTE — TELEPHONE ENCOUNTER
"spironolactone      Requested Prescriptions   Pending Prescriptions Disp Refills     spironolactone (ALDACTONE) 25 MG tablet [Pharmacy Med Name: SPIRONOLACTONE 25MG TABS] 30 tablet 6     Sig: TAKE ONE TABLET BY MOUTH EVERY DAY    Diuretics (Including Combos) Protocol Passed    10/23/2018  3:27 PM       Passed - Blood pressure under 140/90 in past 12 months    BP Readings from Last 3 Encounters:   10/08/18 90/68   09/05/18 94/62   08/30/18 109/73                Passed - Recent (12 mo) or future (30 days) visit within the authorizing provider's specialty    Patient had office visit in the last 12 months or has a visit in the next 30 days with authorizing provider or within the authorizing provider's specialty.  See \"Patient Info\" tab in inbasket, or \"Choose Columns\" in Meds & Orders section of the refill encounter.             Passed - Patient is age 18 or older       Passed - Normal serum creatinine on file in past 12 months    Recent Labs   Lab Test  08/30/18   1202   CR  1.14             Passed - Normal serum potassium on file in past 12 months    Recent Labs   Lab Test  08/30/18   1202   POTASSIUM  4.9                   Passed - Normal serum sodium on file in past 12 months    Recent Labs   Lab Test  08/30/18   1202   NA  135                Routing refill request to provider for review/approval because:  Medication is reported/historical  Naomi Aragon RN on 10/24/2018 at 11:25 AM    "

## 2018-11-12 NOTE — TELEPHONE ENCOUNTER
Norco 5-325 MG       Last Written Prescription Date:  10/17/18  Last Fill Quantity: 30,   # refills: 0  Last Office Visit: 8/15/18  Future Office visit:       Routing refill request to provider for review/approval because:  Drug not on the [facility], [facility] or [facility] refill protocol or controlled substance  Tramadol 50 MG       Last Written Prescription Date:  10/17/18  Last Fill Quantity: 90,   # refills: 0  Last Office Visit: 8/15/18  Future Office visit:       Routing refill request to provider for review/approval because:  Drug not on the [facility], [facility] or [facility] refill protocol or controlled substance

## 2018-12-05 NOTE — PROGRESS NOTES
SUBJECTIVE:   Bud Merritt is a 89 year old male who presents to clinic today for the following health issues:      Joint Pain    Onset: x one year    Description:   Location: every where  Character: Sharp    Intensity: moderate    Progression of Symptoms: intermittent and worse    Accompanying Signs & Symptoms:   Other symptoms: chills and sweats     History:   Previous similar pain: YES      Precipitating factors:   Trauma or overuse: no     Alleviating factors:  Improved by: nothing    Therapies Tried and outcome: pain pills            Problem list and histories reviewed & adjusted, as indicated.  Additional history: as documented        Reviewed and updated as needed this visit by clinical staff       Reviewed and updated as needed this visit by Provider        SUBJECTIVE:  Bud  is a 89 year old male who presents for: Discussion of use of different pain medications for his osteoarthritis and rheumatoid arthritis it is really bad has hard time sleeping at night.  He is alert and the medications do not make him on.  He very rarely takes Vicodin and takes some tramadol.  Feels guilty about taking too much.  He does not.  Very takes the edge of the pain.  Takes 1 or 2 Vicodin a day at the most.    Past Medical History:   Diagnosis Date     Anemia      Atrial fibrillation (H) 07/01/2016     Cardiac pacemaker 03/10/2017     Cardiomyopathy (H)      CHF (congestive heart failure) (H)      COPD exacerbation (H) 3/2/2017     Depressive disorder      History of prostate cancer      Paroxysmal atrial fibrillation (H) 7/6/2016     Pure hypercholesterolemia      Rheumatoid arthritis(714.0)      Unspecified essential hypertension      Weakness generalized 3/2/2017     Past Surgical History:   Procedure Laterality Date     ARTHROPLASTY KNEE Left 1/12/2016    Procedure: ARTHROPLASTY KNEE;  Surgeon: Cesar Walker DO;  [facility]     COLONOSCOPY  08/24/09     EXCISE LESION HEAD N/A 4/9/2018    Procedure:  EXCISE LESION HEAD;  Excision Left Scalp Lesion;  Surgeon: Connor Nichole DO;  [facility]: [facility]     HC COLONOSCOPY THRU STOMA W BIOPSY/CAUTERY TUMOR/POLYP/LESION  8/31/2004     HC REPAIR ING HERNIA,5+Y/O,REDUCIBL  1996    Marlex mesh repair of bilateral inguinal hernias and drainage of bilateral scrotal hydroceles.     HEART CATH FEMORAL CANNULIZATION WITH OPEN STANDBY REPAIR AORTIC VALVE N/A 9/27/2017    Procedure: HEART CATH FEMORAL CANNULIZATION WITH OPEN STANDBY REPAIR AORTIC VALVE;;  Surgeon: Jaron Alejandra MD;  Location: [facility] OR     IMPLANT PACEMAKER  03/10/2017     IRRIGATION AND DEBRIDEMENT SOFT TISSUE LOWER EXTREMITY, COMBINED Left 3/15/2016    Procedure: COMBINED IRRIGATION AND DEBRIDEMENT SOFT TISSUE LOWER EXTREMITY;  Surgeon: Cesar Walker DO;  [facility]     TRANSCATHETER AORTIC VALVE IMPLANT ANESTHESIA N/A 9/27/2017    Procedure: TRANSCATHETER AORTIC VALVE IMPLANT ANESTHESIA;  Right Transfemoral Approach (Harman Ramsey 3 29mm) Aortic Valve Implant,  Cardiopulmonary Bypass Standby, Transthoracic Echocardiogram by Derian Queen(Echo Tech);  Surgeon: GENERIC ANESTHESIA PROVIDER;  Location: [facility] OR     Social History   Substance Use Topics     Smoking status: Former Smoker     Packs/day: 0.50     Years: 15.00     Types: Cigarettes     Quit date: 11/12/1998     Smokeless tobacco: Never Used      Comment: quit 15 yrs ago     Alcohol use No     Current Outpatient Prescriptions   Medication Sig Dispense Refill     acetaminophen (TYLENOL) 325 MG tablet Take 2 tablets (650 mg) by mouth every 4 hours as needed for mild pain, fever or headaches 100 tablet      apixaban ANTICOAGULANT (ELIQUIS) 2.5 MG tablet Take 1 tablet (2.5 mg) by mouth 2 times daily 180 tablet 3     ascorbic acid (VITAMIN C) 500 MG CPCR Take 500 mg by mouth daily       calcium carbonate (OS-SHELIA 500 MG [place]. [place]) 500 MG tablet Take 1,250 mg by mouth daily        HYDROcodone-acetaminophen (NORCO) 5-325 MG tablet Take 1  tablet by mouth every 6 hours as needed for pain For moderate to severe pain 30 tablet 0     lisinopril (PRINIVIL/ZESTRIL) 5 MG tablet Take 1 tablet (5 mg) by mouth daily 90 tablet 3     metoprolol tartrate (LOPRESSOR) 25 MG tablet Take 1 tablet (25 mg) by mouth 2 times daily 180 tablet 3     ondansetron (ZOFRAN-ODT) 4 MG ODT tab Take 1 tablet (4 mg) by mouth every 6 hours as needed for nausea or vomiting 10 tablet 0     polyethylene glycol (MIRALAX/GLYCOLAX) Packet Take 17 g by mouth daily 30 packet 0     predniSONE (DELTASONE) 5 MG tablet Take 1 tablet (5 mg) by mouth daily 100 tablet 4     spironolactone (ALDACTONE) 25 MG tablet TAKE ONE TABLET BY MOUTH EVERY DAY 30 tablet 6     tamsulosin (FLOMAX) 0.4 MG capsule Take 1 capsule (0.4 mg) by mouth daily 60 capsule 0     traMADol (ULTRAM) 50 MG tablet TAKE 1 TABLET 3 TIMES DAILY AS NEEDED FOR MODERATE PAIN 90 tablet 0     VITAMIN D, CHOLECALCIFEROL, PO Take 2,000 Units by mouth daily Reported on 5/3/2017       Leuprolide Acetate (LUPRON DEPOT IM) Inject into the muscle every 6 months Reported on 5/3/2017         REVIEW OF SYSTEMS:   5 point ROS negative except as noted above in HPI, including Gen., Resp, CV, GI &  system review.     OBJECTIVE:  Vitals: /64  Pulse 70  Temp 97.9  F (36.6  C) (Temporal)  Resp 14  SpO2 100%  BMI= There is no height or weight on file to calculate BMI.  Very pleasant alert and oriented.  Hard of hearing.  Sitting in a wheelchair.  Lungs are clear.  Has some dependent edema especially on the left side.  Joints show deformity of osteoarthritis in the fingers especially.  Crepitance in the knees to range of motion and deformity noted.  Skin is clear.    ASSESSMENT:  #1 rheumatoid arthritis involving multiple joints #2 osteoarthritis #3 paroxysmal atrial fibrillation #4 hypertension #5 history of TVAR    PLAN:  Renewed Vicodin and tramadol.  He can take a few more as needed.  He is been very concerned about this.  He does not need  to suffer.  He is not abusing these.  They are not affecting his mental health.  Had a discussion with him and his wife about possibly glucosamine  chondroitin complex.  They will look into this.  He follows up with multiple specialists.  We did see him back in about 3 months.        Camron Aragon MD  [facility]

## 2018-12-05 NOTE — MR AVS SNAPSHOT
After Visit Summary   12/5/2018    Bud Merritt    MRN: [id number]           Patient Information     Date Of Birth          5/9/1927        Visit Information        Provider Department      12/5/2018 2:20 PM Camron Aragon MD [facility]        Today's Diagnoses     Rheumatoid arthritis involving multiple sites, unspecified rheumatoid factor presence (H)    -  1    Age-related osteoporosis without current pathological fracture        Paroxysmal atrial fibrillation (H)        Essential hypertension with goal blood pressure less than 140/90           Follow-ups after your visit        Follow-up notes from your care team     Return in about 3 months (around 3/5/2019) for Routine Visit.      Your next 10 appointments already scheduled     Dec 26, 2018 12:00 AM CST   CARDIAC DEVICE CHECK - REMOTE with MORENO TECH1   [facility]    [address] [place] [id number] [phone]              Who to contact     If you have questions or need follow up information about today's clinic visit or your schedule please contact [facility] directly at [phone].  Normal or non-critical lab and imaging results will be communicated to you by MyChart, letter or phone within 4 business days after the clinic has received the results. If you do not hear from us within 7 days, please contact the clinic through MyChart or phone. If you have a critical or abnormal lab result, we will notify you by phone as soon as possible.  Submit refill requests through [employer] or call your pharmacy and they will forward the refill request to us. Please allow 3 business days for your refill to be completed.          Additional Information About Your Visit        Care EveryWhere ID     This is your Care EveryWhere ID. This could be used by other organizations to access your [facility] medical records  [id number]        Your  Vitals Were     Pulse Temperature Respirations Pulse Oximetry          70 97.9  F (36.6  C) (Temporal) 14 100%         Blood Pressure from Last 3 Encounters:   12/05/18 102/64   10/08/18 90/68   09/05/18 94/62    Weight from Last 3 Encounters:   10/08/18 149 lb 9.6 oz (67.9 kg)   09/05/18 149 lb 9.6 oz (67.9 kg)   08/30/18 148 lb (67.1 kg)              We Performed the Following     CBC with platelets differential     Comprehensive metabolic panel (BMP + Alb, Alk Phos, ALT, AST, Total. Bili, TP)          Where to get your medicines      These medications were sent to [facility], [place] - [address]   [address] , [facility] [address]     Phone:  [phone]     apixaban ANTICOAGULANT 2.5 MG tablet         Some of these will need a paper prescription and others can be bought over the counter.  Ask your nurse if you have questions.     Bring a paper prescription for each of these medications     HYDROcodone-acetaminophen 5-325 MG tablet         Information about OPIOIDS     PRESCRIPTION OPIOIDS: WHAT YOU NEED TO KNOW   We gave you an opioid (narcotic) pain medicine. It is important to manage your pain, but opioids are not always the best choice. You should first try all the other options your care team gave you. Take this medicine for as short a time (and as few doses) as possible.    Some activities can increase your pain, such as bandage changes or therapy sessions. It may help to take your pain medicine 30 to 60 minutes before these activities. Reduce your stress by getting enough sleep, working on hobbies you enjoy and practicing relaxation or meditation. Talk to your care team about ways to manage your pain beyond prescription opioids.    These medicines have risks:    DO NOT drive when on new or higher doses of pain medicine. These medicines can affect your alertness and reaction times, and you could be arrested for driving under the influence (DUI). If you need to use opioids  long-term, talk to your care team about driving.    DO NOT operate heavy machinery    DO NOT do any other dangerous activities while taking these medicines.    DO NOT drink any alcohol while taking these medicines.     If the opioid prescribed includes acetaminophen, DO NOT take with any other medicines that contain acetaminophen. Read all labels carefully. Look for the word  acetaminophen  or  Tylenol.  Ask your pharmacist if you have questions or are unsure.    You can get addicted to pain medicines, especially if you have a history of addiction (chemical, alcohol or substance dependence). Talk to your care team about ways to reduce this risk.    All opioids tend to cause constipation. Drink plenty of water and eat foods that have a lot of fiber, such as fruits, vegetables, prune juice, apple juice and high-fiber cereal. Take a laxative (Miralax, milk of magnesia, Colace, Senna) if you don t move your bowels at least every other day. Other side effects include upset stomach, sleepiness, dizziness, throwing up, tolerance (needing more of the medicine to have the same effect), physical dependence and slowed breathing.    Store your pills in a secure place, locked if possible. We will not replace any lost or stolen medicine. If you don t finish your medicine, please throw away (dispose) as directed by your pharmacist. The [place] [employer]ution [employer] has more information about safe disposal: https://www.pca.[facility].[place].us/living-green/managing-unwanted-medications         Primary Care Provider Office Phone # Fax #    Camron Aragon [phone]       [address] [facility] [address]        Equal Access to Services     FATOUMATA CLINE : Hadii aad ku hadasho Soomaali, waaxda luqadaha, qaybta kaalmada adeegyada, ronel real . So [facility] [phone].    ATENCIÓN: Si habla español, tiene a mon disposición servicios gratuitos de asistencia lingüística. Llame al  [phone].    We comply with applicable federal civil rights laws and [place] laws. We do not discriminate on the basis of race, color, national origin, age, disability, sex, sexual orientation, or gender identity.            Thank you!     Thank you for choosing [facility]  for your care. Our goal is always to provide you with excellent care. Hearing back from our patients is one way we can continue to improve our services. Please take a few minutes to complete the written survey that you may receive in the mail after your visit with us. Thank you!             Your Updated Medication List - Protect others around you: Learn how to safely use, store and throw away your medicines at www.disposemymeds.org.          This list is accurate as of 12/5/18 11:59 PM.  Always use your most recent med list.                   Brand Name Dispense Instructions for use Diagnosis    acetaminophen 325 MG tablet    TYLENOL    100 tablet    Take 2 tablets (650 mg) by mouth every 4 hours as needed for mild pain, fever or headaches        apixaban ANTICOAGULANT 2.5 MG tablet    ELIQUIS    180 tablet    Take 1 tablet (2.5 mg) by mouth 2 times daily    Paroxysmal atrial fibrillation (H)       ascorbic acid 500 MG Cpcr CR capsule    vitamin C     Take 500 mg by mouth daily        calcium carbonate 500 MG tablet    OS-SHELIA     Take 1,250 mg by mouth daily        HYDROcodone-acetaminophen 5-325 MG tablet    NORCO    30 tablet    Take 1 tablet by mouth every 6 hours as needed for pain For moderate to severe pain    Rheumatoid arthritis involving multiple sites, unspecified rheumatoid factor presence (H)       lisinopril 5 MG tablet    PRINIVIL/ZESTRIL    90 tablet    Take 1 tablet (5 mg) by mouth daily    Acute on chronic systolic congestive heart failure (H)       LUPRON DEPOT IM      Inject into the muscle every 6 months Reported on 5/3/2017        metoprolol tartrate 25 MG tablet    LOPRESSOR    180 tablet    Take 1  tablet (25 mg) by mouth 2 times daily    S/P TAVR (transcatheter aortic valve replacement)       ondansetron 4 MG ODT tab    ZOFRAN-ODT    10 tablet    Take 1 tablet (4 mg) by mouth every 6 hours as needed for nausea or vomiting    Nausea       polyethylene glycol packet    MIRALAX/GLYCOLAX    30 packet    Take 17 g by mouth daily    Constipation, unspecified constipation type       predniSONE 5 MG tablet    DELTASONE    100 tablet    Take 1 tablet (5 mg) by mouth daily    Rheumatoid arthritis involving multiple sites with positive rheumatoid factor (H)       spironolactone 25 MG tablet    ALDACTONE    30 tablet    TAKE ONE TABLET BY MOUTH EVERY DAY    Dilated cardiomyopathy (H)       tamsulosin 0.4 MG capsule    FLOMAX    60 capsule    Take 1 capsule (0.4 mg) by mouth daily    S/P TAVR (transcatheter aortic valve replacement)       traMADol 50 MG tablet    ULTRAM    90 tablet    TAKE 1 TABLET 3 TIMES DAILY AS NEEDED FOR MODERATE PAIN    Rheumatoid arthritis involving multiple sites, unspecified rheumatoid factor presence (H)       VITAMIN D (CHOLECALCIFEROL) PO      Take 2,000 Units by mouth daily Reported on 5/3/2017

## 2018-12-05 NOTE — LETTER
December 11, 2018      Bud Merritt  [address] [facility] [address]        Dear ,    We are writing to inform you of your test results.  Liver kidney tests and blood count all normal.  Blood sugar normal.       Resulted Orders   Comprehensive metabolic panel (BMP + Alb, Alk Phos, ALT, AST, Total. Bili, TP)   Result Value Ref Range    Sodium 135 133 - 144 mmol/L    Potassium 4.8 3.4 - 5.3 mmol/L    Chloride 99 94 - 109 mmol/L    Carbon Dioxide 26 20 - 32 mmol/L    Anion Gap 10 3 - 14 mmol/L    Glucose 101 (H) 70 - 99 mg/dL    Urea Nitrogen 18 7 - 30 mg/dL    Creatinine 0.92 0.66 - 1.25 mg/dL    GFR Estimate 77 >60 mL/min/1.7m2      Comment:      Non  GFR Calc    GFR Estimate If Black >90 >60 mL/min/1.7m2      Comment:       GFR Calc    Calcium 8.7 8.5 - 10.1 mg/dL    Bilirubin Total 0.3 0.2 - 1.3 mg/dL    Albumin 3.3 (L) 3.4 - 5.0 g/dL    Protein Total 7.2 6.8 - 8.8 g/dL    Alkaline Phosphatase 90 40 - 150 U/L    ALT 22 0 - 70 U/L    AST 28 0 - 45 U/L   CBC with platelets differential   Result Value Ref Range    WBC 5.5 4.0 - 11.0 10e9/L    RBC Count 3.67 (L) 4.4 - 5.9 10e12/L    Hemoglobin 11.5 (L) 13.3 - 17.7 g/dL    Hematocrit 36.5 (L) 40.0 - 53.0 %     78 - 100 fl    MCH 31.3 26.5 - 33.0 pg    MCHC 31.5 31.5 - 36.5 g/dL    RDW 15.4 (H) 10.0 - 15.0 %    Platelet Count 229 150 - 450 10e9/L    Diff Method Automated Method     % Neutrophils 44.1 %    % Lymphocytes 38.7 %    % Monocytes 16.3 %    % Eosinophils 0.2 %    % Basophils 0.5 %    % Immature Granulocytes 0.2 %    Nucleated RBCs 0 0 /100    Absolute Neutrophil 2.4 1.6 - 8.3 10e9/L    Absolute Lymphocytes 2.1 0.8 - 5.3 10e9/L    Absolute Monocytes 0.9 0.0 - 1.3 10e9/L    Absolute Basophils 0.0 0.0 - 0.2 10e9/L    Abs Immature Granulocytes 0.0 0 - 0.4 10e9/L    Absolute Nucleated RBC 0.0        If you have any questions or concerns, please call the clinic at the number listed above.        Sincerely,        Camron Aragon MD

## 2018-12-10 NOTE — TELEPHONE ENCOUNTER
Tramadol    Last Written Prescription Date:  11/13/2018  Last Fill Quantity: 90,  # refills: 0   Last office visit: 12/5/2018 with prescribing provider:      Future Office Visit:      Requested Prescriptions   Pending Prescriptions Disp Refills     traMADol (ULTRAM) 50 MG tablet 90 tablet 0     Sig: TAKE 1 TABLET 3 TIMES DAILY AS NEEDED FOR MODERATE PAIN    There is no refill protocol information for this order          Routing refill request to provider for review/approval because:  Drug not on the [facility] refill protocol           Naomi Aragon RN on 12/10/2018 at 5:13 PM

## 2018-12-19 NOTE — TELEPHONE ENCOUNTER
Reason for Call:  Medication or medication refill:    Do you use a [facility]?  Name of the pharmacy and phone number for the current request:  [facility]- [phone]    Name of the medication requested: Norco    Other request: patients wife calling would like to talk to Dr. Aragon's nurse about patients [place], please call     Can we leave a detailed message on this number? Not Applicable    Phone number patient can be reached at: Home number on file [phone] (home)    Best Time: any    Call taken on 12/19/2018 at 3:34 PM by Jocelyn Storey

## 2018-12-19 NOTE — TELEPHONE ENCOUNTER
Hydrocodone      Last Written Prescription Date:  12/05/18  Last Fill Quantity: 30,   # refills: 0  Last Office Visit: 12/05/18  Future Office visit:       Routing refill request to provider for review/approval because:  Drug not on the [facility], [facility] or [facility] refill protocol or controlled substance

## 2018-12-20 NOTE — TELEPHONE ENCOUNTER
Reason for Call:  Other prescription    Detailed comments: Tierra calls this morning to let Dr Aragon know that Bud has had to take a few more pain pills that usual because his arthritis is getting so bad.  Bud states that every joint in his body hurts.  Tierra is asking if he can get more that just #30 each time.    Phone Number Patient can be reached at: Home number on file [phone] (home)    Best Time: any    Can we leave a detailed message on this number? YES    Call taken on 12/20/2018 at 9:18 AM by Usha Keller

## 2018-12-20 NOTE — TELEPHONE ENCOUNTER
Sent new Rx to PCP for approval of quantity of 45 called and informed patient's wife insurance may not cover amount of 30. Patient is aware and is fine with paying out of pocket.  Arlene Ortega MA

## 2018-12-26 NOTE — TELEPHONE ENCOUNTER
Prednisone       Last Written Prescription Date:  11/5/17  Last Fill Quantity: 100,   # refills: 4  Last Office Visit: 12/5/18  Future Office visit:       Routing refill request to provider for review/approval because:  Drug not on the [facility], [facility] or [facility] refill protocol or controlled substance

## 2018-12-28 NOTE — LETTER
"    12/28/2018         RE: Mike Merritt  [address] [facility] [address]        Dear Colleague,    Thank you for referring your patient, Mike Merritt, to the [facility]. Please see a copy of my visit note below.    General Surgery Follow Up    Pt returns for follow up visit for benign skin lesions of the face and neck    HPI:  I know mike well from his previous excisions with me which ended up being seborrheic keratosis. That has healed well. He has a small 4mm bump under his right nare that is not infected, draining or painful. He just noticed that is was new and was concerned. 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 No other concerns today.    Review Of Systems    Skin: as above  Ears/Nose/Throat: negative  Respiratory: No shortness of breath, dyspnea on exertion, cough, or hemoptysis  Cardiovascular: negative  Gastrointestinal: negative  Genitourinary: negative  Musculoskeletal: arthritis and wheelchair and walker assist  Neurologic: negative  Hematologic/Lymphatic/Immunologic: negative  Endocrine: negative      Past Medical History:   Diagnosis Date     Anemia      Atrial fibrillation (H) 07/01/2016     Cardiac pacemaker 03/10/2017     Cardiomyopathy (H)      CHF (congestive heart failure) (H)      COPD exacerbation (H) 3/2/2017     Depressive disorder      History of prostate cancer      Paroxysmal atrial fibrillation (H) 7/6/2016     Pure hypercholesterolemia      Rheumatoid arthritis(714.0)      Unspecified essential hypertension      Weakness generalized 3/2/2017       Past Surgical History:   Procedure Laterality Date     ARTHROPLASTY KNEE Left 1/12/2016    Procedure: ARTHROPLASTY KNEE;  Surgeon: Cesar Walker DO;  Location: [facility] OR     COLONOSCOPY  08/24/09     EXCISE LESION HEAD N/A 4/9/2018    Procedure: EXCISE LESION HEAD;  Excision Left Scalp Lesion;  Surgeon: Dionisio" Connor Richter DO;  Location: [facility] OR     HC COLONOSCOPY THRU STOMA W BIOPSY/CAUTERY TUMOR/POLYP/LESION  2004     HC REPAIR ING HERNIA,5+Y/O,REDUCIBL      Marlex mesh repair of bilateral inguinal hernias and drainage of bilateral scrotal hydroceles.     HEART CATH FEMORAL CANNULIZATION WITH OPEN STANDBY REPAIR AORTIC VALVE N/A 2017    Procedure: HEART CATH FEMORAL CANNULIZATION WITH OPEN STANDBY REPAIR AORTIC VALVE;;  Surgeon: Jaron Alejandra MD;  Location: [facility] OR     IMPLANT PACEMAKER  03/10/2017     IRRIGATION AND DEBRIDEMENT SOFT TISSUE LOWER EXTREMITY, COMBINED Left 3/15/2016    Procedure: COMBINED IRRIGATION AND DEBRIDEMENT SOFT TISSUE LOWER EXTREMITY;  Surgeon: Cesar Walker DO;  Location: [facility] OR     TRANSCATHETER AORTIC VALVE IMPLANT ANESTHESIA N/A 2017    Procedure: TRANSCATHETER AORTIC VALVE IMPLANT ANESTHESIA;  Right Transfemoral Approach (Harman Ramsey 3 29mm) Aortic Valve Implant,  Cardiopulmonary Bypass Standby, Transthoracic Echocardiogram by Derian Queen(Echo Tech);  Surgeon: GENERIC ANESTHESIA PROVIDER;  Location: [facility] OR       Social History     Socioeconomic History     Marital status:      Spouse name: Not on file     Number of children: Not on file     Years of education: Not on file     Highest education level: Not on file   Social Needs     Financial resource strain: Not on file     Food insecurity - worry: Not on file     Food insecurity - inability: Not on file     Transportation needs - medical: Not on file     Transportation needs - non-medical: Not on file   Occupational History     Not on file   Tobacco Use     Smoking status: Former Smoker     Packs/day: 0.50     Years: 15.00     Pack years: 7.50     Types: Cigarettes     Last attempt to quit: 1998     Years since quittin.1     Smokeless tobacco: Never Used     Tobacco comment: quit 15 yrs ago   Substance and Sexual Activity     Alcohol use: No     Alcohol/week: 0.0 oz     Drug use: No      Sexual activity: Yes   Other Topics Concern     Parent/sibling w/ CABG, MI or angioplasty before 65F 55M? Not Asked      Service Not Asked     Blood Transfusions Not Asked     Caffeine Concern Yes     Comment: 8 cups coffee day     Occupational Exposure Not Asked     Hobby Hazards Not Asked     Sleep Concern Yes     Stress Concern Not Asked     Weight Concern Yes     Comment: weight loss     Special Diet No     Back Care Not Asked     Exercise Yes     Comment: split firewood daily     Bike Helmet Not Asked     Seat Belt Not Asked     Self-Exams Not Asked   Social History Narrative     Not on file       Current Outpatient Medications   Medication Sig Dispense Refill     acetaminophen (TYLENOL) 325 MG tablet Take 2 tablets (650 mg) by mouth every 4 hours as needed for mild pain, fever or headaches 100 tablet      apixaban ANTICOAGULANT (ELIQUIS) 2.5 MG tablet Take 1 tablet (2.5 mg) by mouth 2 times daily 180 tablet 3     ascorbic acid (VITAMIN C) 500 MG CPCR Take 500 mg by mouth daily       calcium carbonate (OS-SHELIA 500 MG [place]) 500 MG tablet Take 1,250 mg by mouth daily        HYDROcodone-acetaminophen (NORCO) 5-325 MG tablet Take 1 tablet by mouth every 6 hours as needed for pain For moderate to severe pain 45 tablet 0     Leuprolide Acetate (LUPRON DEPOT IM) Inject into the muscle every 6 months Reported on 5/3/2017       lisinopril (PRINIVIL/ZESTRIL) 5 MG tablet Take 1 tablet (5 mg) by mouth daily 90 tablet 3     metoprolol tartrate (LOPRESSOR) 25 MG tablet Take 1 tablet (25 mg) by mouth 2 times daily 180 tablet 3     ondansetron (ZOFRAN-ODT) 4 MG ODT tab Take 1 tablet (4 mg) by mouth every 6 hours as needed for nausea or vomiting 10 tablet 0     polyethylene glycol (MIRALAX/GLYCOLAX) Packet Take 17 g by mouth daily 30 packet 0     predniSONE (DELTASONE) 5 MG tablet Take 5 mg by mouth daily. 100 tablet 4     spironolactone (ALDACTONE) 25 MG tablet TAKE ONE TABLET BY MOUTH EVERY DAY 30 tablet 6      "tamsulosin (FLOMAX) 0.4 MG capsule Take 1 capsule (0.4 mg) by mouth daily 60 capsule 0     traMADol (ULTRAM) 50 MG tablet TAKE 1 TABLET 3 TIMES DAILY AS NEEDED FOR MODERATE PAIN 90 tablet 0     VITAMIN D, CHOLECALCIFEROL, PO Take 2,000 Units by mouth daily Reported on 5/3/2017         Medications and history reviewed    Physical exam:  Vitals: Temp 97.9  F (36.6  C) (Temporal)   Ht 1.702 m (5' 7\")   BMI 23.43 kg/m     BMI= Body mass index is 23.43 kg/m .    Constitutional: Healthy, alert, non-distressed  Head: Normo-cephalic, atraumatic, no lesions, masses or tenderness  Cardiovascular: RRR, no new murmurs, +S1, +S2 heart sounds, no clicks, rubs or gallops  Respiratory: CTAB, no rales, rhonchi or wheezing, equal chest rise, good respiratory effort   Gastrointestinal: Soft, non-tender, non distended, no rebound rigidity or guarding, no masses or hernias palpated   : Deferred  Skin: small bump under right nare, no erythema or drainage, looks like small acne or cyst maybe but certainly benign. Left shoulder/neck with very soft, mobile likely lipoma. No pain at all with manipulation  Psychiatric: Mentation appears normal, affect appropriate   Patient able to get up on table with significant difficulty.      Assessment:     ICD-10-CM    1. Benign skin lesion of face L98.9      Plan: With the almost certain benign nature of his two spots I recommend watchful waiting. If they were to grow rapidly, become infected or tender, we could revisit excision. We need to be selective in his case due to his chronic prednisone use and eliquis. They are reassured and will return with any new issues.    Connor Nichole, DO      Again, thank you for allowing me to participate in the care of your patient.        Sincerely,        Connor Nichole, DO    "

## 2018-12-28 NOTE — PROGRESS NOTES
"General Surgery Follow Up    Pt returns for follow up visit for benign skin lesions of the face and neck    HPI:  I know mike well from his previous excisions with me which ended up being seborrheic keratosis. That has healed well. He has a small 4mm bump under his right nare that is not infected, draining or painful. He just noticed that is was new and was concerned. 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 No other concerns today.    Review Of Systems    Skin: as above  Ears/Nose/Throat: negative  Respiratory: No shortness of breath, dyspnea on exertion, cough, or hemoptysis  Cardiovascular: negative  Gastrointestinal: negative  Genitourinary: negative  Musculoskeletal: arthritis and wheelchair and walker assist  Neurologic: negative  Hematologic/Lymphatic/Immunologic: negative  Endocrine: negative      Past Medical History:   Diagnosis Date     Anemia      Atrial fibrillation (H) 07/01/2016     Cardiac pacemaker 03/10/2017     Cardiomyopathy (H)      CHF (congestive heart failure) (H)      COPD exacerbation (H) 3/2/2017     Depressive disorder      History of prostate cancer      Paroxysmal atrial fibrillation (H) 7/6/2016     Pure hypercholesterolemia      Rheumatoid arthritis(714.0)      Unspecified essential hypertension      Weakness generalized 3/2/2017       Past Surgical History:   Procedure Laterality Date     ARTHROPLASTY KNEE Left 1/12/2016    Procedure: ARTHROPLASTY KNEE;  Surgeon: Cesar Walker DO;  [facility]     COLONOSCOPY  08/24/09     EXCISE LESION HEAD N/A 4/9/2018    Procedure: EXCISE LESION HEAD;  Excision Left Scalp Lesion;  Surgeon: Connor Nichole DO;  [facility]: [facility]     HC COLONOSCOPY THRU STOMA W BIOPSY/CAUTERY TUMOR/POLYP/LESION  8/31/2004     HC REPAIR ING HERNIA,5+Y/O,REDUCIBL  1996    Marlex mesh repair of bilateral inguinal hernias and drainage of bilateral scrotal " hydroceles.     HEART CATH FEMORAL CANNULIZATION WITH OPEN STANDBY REPAIR AORTIC VALVE N/A 2017    Procedure: HEART CATH FEMORAL CANNULIZATION WITH OPEN STANDBY REPAIR AORTIC VALVE;;  Surgeon: Jaron Alejandra MD;  Location: [facility]     IMPLANT PACEMAKER  03/10/2017     IRRIGATION AND DEBRIDEMENT SOFT TISSUE LOWER EXTREMITY, COMBINED Left 3/15/2016    Procedure: COMBINED IRRIGATION AND DEBRIDEMENT SOFT TISSUE LOWER EXTREMITY;  Surgeon: Cesar Walker DO;  [facility]     TRANSCATHETER AORTIC VALVE IMPLANT ANESTHESIA N/A 2017    Procedure: TRANSCATHETER AORTIC VALVE IMPLANT ANESTHESIA;  Right Transfemoral Approach (Harman Ramsey 3 29mm) Aortic Valve Implant,  Cardiopulmonary Bypass Standby, Transthoracic Echocardiogram by Derian Queen(Echo Tech);  Surgeon: GENERIC ANESTHESIA PROVIDER;  Location: [facility] OR       Social History     Socioeconomic History     Marital status:      Spouse name: Not on file     Number of children: Not on file     Years of education: Not on file     Highest education level: Not on file   Social Needs     Financial resource strain: Not on file     Food insecurity - worry: Not on file     Food insecurity - inability: Not on file     Transportation needs - medical: Not on file     Transportation needs - non-medical: Not on file   Occupational History     Not on file   Tobacco Use     Smoking status: Former Smoker     Packs/day: 0.50     Years: 15.00     Pack years: 7.50     Types: Cigarettes     Last attempt to quit: 1998     Years since quittin.1     Smokeless tobacco: Never Used     Tobacco comment: quit 15 yrs ago   Substance and Sexual Activity     Alcohol use: No     Alcohol/week: 0.0 oz     Drug use: No     Sexual activity: Yes   Other Topics Concern     Parent/sibling w/ CABG, MI or angioplasty before 65F 55M? Not Asked      Service Not Asked     Blood Transfusions Not Asked     Caffeine Concern Yes     Comment: 8 cups coffee day      Occupational Exposure Not Asked     Hobby Hazards Not Asked     Sleep Concern Yes     Stress Concern Not Asked     Weight Concern Yes     Comment: weight loss     Special Diet No     Back Care Not Asked     Exercise Yes     Comment: split firewood daily     Bike Helmet Not Asked     Seat Belt Not Asked     Self-Exams Not Asked   Social History Narrative     Not on file       Current Outpatient Medications   Medication Sig Dispense Refill     acetaminophen (TYLENOL) 325 MG tablet Take 2 tablets (650 mg) by mouth every 4 hours as needed for mild pain, fever or headaches 100 tablet      apixaban ANTICOAGULANT (ELIQUIS) 2.5 MG tablet Take 1 tablet (2.5 mg) by mouth 2 times daily 180 tablet 3     ascorbic acid (VITAMIN C) 500 MG CPCR Take 500 mg by mouth daily       calcium carbonate (OS-SHELIA 500 MG [place]. [place]) 500 MG tablet Take 1,250 mg by mouth daily        HYDROcodone-acetaminophen (NORCO) 5-325 MG tablet Take 1 tablet by mouth every 6 hours as needed for pain For moderate to severe pain 45 tablet 0     Leuprolide Acetate (LUPRON DEPOT IM) Inject into the muscle every 6 months Reported on 5/3/2017       lisinopril (PRINIVIL/ZESTRIL) 5 MG tablet Take 1 tablet (5 mg) by mouth daily 90 tablet 3     metoprolol tartrate (LOPRESSOR) 25 MG tablet Take 1 tablet (25 mg) by mouth 2 times daily 180 tablet 3     ondansetron (ZOFRAN-ODT) 4 MG ODT tab Take 1 tablet (4 mg) by mouth every 6 hours as needed for nausea or vomiting 10 tablet 0     polyethylene glycol (MIRALAX/GLYCOLAX) Packet Take 17 g by mouth daily 30 packet 0     predniSONE (DELTASONE) 5 MG tablet Take 5 mg by mouth daily. 100 tablet 4     spironolactone (ALDACTONE) 25 MG tablet TAKE ONE TABLET BY MOUTH EVERY DAY 30 tablet 6     tamsulosin (FLOMAX) 0.4 MG capsule Take 1 capsule (0.4 mg) by mouth daily 60 capsule 0     traMADol (ULTRAM) 50 MG tablet TAKE 1 TABLET 3 TIMES DAILY AS NEEDED FOR MODERATE PAIN 90 tablet 0     VITAMIN D, CHOLECALCIFEROL, PO Take 2,000 Units by  "mouth daily Reported on 5/3/2017         Medications and history reviewed    Physical exam:  Vitals: Temp 97.9  F (36.6  C) (Temporal)   Ht 1.702 m (5' 7\")   BMI 23.43 kg/m    BMI= Body mass index is 23.43 kg/m .    Constitutional: Healthy, alert, non-distressed  Head: Normo-cephalic, atraumatic, no lesions, masses or tenderness  Cardiovascular: RRR, no new murmurs, +S1, +S2 heart sounds, no clicks, rubs or gallops  Respiratory: CTAB, no rales, rhonchi or wheezing, equal chest rise, good respiratory effort   Gastrointestinal: Soft, non-tender, non distended, no rebound rigidity or guarding, no masses or hernias palpated   : Deferred  Skin: small bump under right nare, no erythema or drainage, looks like small acne or cyst maybe but certainly benign. Left shoulder/neck with very soft, mobile likely lipoma. No pain at all with manipulation  Psychiatric: Mentation appears normal, affect appropriate   Patient able to get up on table with significant difficulty.      Assessment:     ICD-10-CM    1. Benign skin lesion of face L98.9      Plan: With the almost certain benign nature of his two spots I recommend watchful waiting. If they were to grow rapidly, become infected or tender, we could revisit excision. We need to be selective in his case due to his chronic prednisone use and eliquis. They are reassured and will return with any new issues.    Connor Nichole, DO    "

## 2019-01-01 ENCOUNTER — HOSPITAL ENCOUNTER (EMERGENCY)
Facility: CLINIC | Age: 84
Discharge: HOME | End: 2019-01-31
Attending: FAMILY MEDICINE | Admitting: FAMILY MEDICINE
Payer: MEDICARE

## 2019-01-01 ENCOUNTER — MEDICAL CORRESPONDENCE (OUTPATIENT)
Dept: HEALTH INFORMATION MANAGEMENT | Facility: CLINIC | Age: 84
End: 2019-01-01

## 2019-01-01 ENCOUNTER — ANCILLARY PROCEDURE (OUTPATIENT)
Dept: CARDIOLOGY | Facility: CLINIC | Age: 84
End: 2019-01-01
Attending: INTERNAL MEDICINE
Payer: MEDICARE

## 2019-01-01 ENCOUNTER — OFFICE VISIT (OUTPATIENT)
Dept: FAMILY MEDICINE | Facility: CLINIC | Age: 84
End: 2019-01-01
Payer: MEDICARE

## 2019-01-01 ENCOUNTER — NURSING HOME VISIT (OUTPATIENT)
Dept: GERIATRICS | Facility: CLINIC | Age: 84
End: 2019-01-01
Payer: MEDICARE

## 2019-01-01 ENCOUNTER — TELEPHONE (OUTPATIENT)
Dept: FAMILY MEDICINE | Facility: CLINIC | Age: 84
End: 2019-01-01

## 2019-01-01 ENCOUNTER — DOCUMENTATION ONLY (OUTPATIENT)
Dept: FAMILY MEDICINE | Facility: CLINIC | Age: 84
End: 2019-01-01

## 2019-01-01 ENCOUNTER — OFFICE VISIT (OUTPATIENT)
Dept: CARDIOLOGY | Facility: CLINIC | Age: 84
End: 2019-01-01
Payer: MEDICARE

## 2019-01-01 ENCOUNTER — DOCUMENTATION ONLY (OUTPATIENT)
Dept: CARE COORDINATION | Facility: CLINIC | Age: 84
End: 2019-01-01

## 2019-01-01 ENCOUNTER — OFFICE VISIT (OUTPATIENT)
Dept: ORTHOPEDICS | Facility: CLINIC | Age: 84
End: 2019-01-01
Payer: MEDICARE

## 2019-01-01 ENCOUNTER — HOSPITAL ENCOUNTER (OUTPATIENT)
Dept: GENERAL RADIOLOGY | Facility: CLINIC | Age: 84
Discharge: HOME | End: 2019-02-06
Attending: FAMILY MEDICINE | Admitting: FAMILY MEDICINE
Payer: MEDICARE

## 2019-01-01 ENCOUNTER — TRANSFERRED RECORDS (OUTPATIENT)
Dept: HEALTH INFORMATION MANAGEMENT | Facility: CLINIC | Age: 84
End: 2019-01-01

## 2019-01-01 ENCOUNTER — DOCUMENTATION ONLY (OUTPATIENT)
Facility: CLINIC | Age: 84
End: 2019-01-01

## 2019-01-01 ENCOUNTER — DOCUMENTATION ONLY (OUTPATIENT)
Dept: OTHER | Facility: CLINIC | Age: 84
End: 2019-01-01

## 2019-01-01 VITALS
WEIGHT: 148.6 LBS | TEMPERATURE: 97 F | BODY MASS INDEX: 26.33 KG/M2 | SYSTOLIC BLOOD PRESSURE: 129 MMHG | OXYGEN SATURATION: 96 % | HEIGHT: 63 IN | DIASTOLIC BLOOD PRESSURE: 78 MMHG | RESPIRATION RATE: 20 BRPM | HEART RATE: 70 BPM

## 2019-01-01 VITALS
SYSTOLIC BLOOD PRESSURE: 124 MMHG | DIASTOLIC BLOOD PRESSURE: 68 MMHG | TEMPERATURE: 97.8 F | OXYGEN SATURATION: 96 % | HEART RATE: 70 BPM | RESPIRATION RATE: 12 BRPM

## 2019-01-01 VITALS
SYSTOLIC BLOOD PRESSURE: 106 MMHG | DIASTOLIC BLOOD PRESSURE: 80 MMHG | BODY MASS INDEX: 24.07 KG/M2 | WEIGHT: 153.7 LBS | OXYGEN SATURATION: 93 % | HEART RATE: 70 BPM

## 2019-01-01 VITALS
HEART RATE: 71 BPM | BODY MASS INDEX: 22.87 KG/M2 | TEMPERATURE: 97.8 F | OXYGEN SATURATION: 98 % | WEIGHT: 146 LBS | RESPIRATION RATE: 18 BRPM | SYSTOLIC BLOOD PRESSURE: 127 MMHG | DIASTOLIC BLOOD PRESSURE: 79 MMHG

## 2019-01-01 VITALS
BODY MASS INDEX: 23.49 KG/M2 | OXYGEN SATURATION: 98 % | SYSTOLIC BLOOD PRESSURE: 137 MMHG | WEIGHT: 150 LBS | HEART RATE: 71 BPM | DIASTOLIC BLOOD PRESSURE: 82 MMHG

## 2019-01-01 DIAGNOSIS — Z95.2 S/P TAVR (TRANSCATHETER AORTIC VALVE REPLACEMENT): ICD-10-CM

## 2019-01-01 DIAGNOSIS — Z95.0 CARDIAC PACEMAKER IN SITU: ICD-10-CM

## 2019-01-01 DIAGNOSIS — I42.0 DILATED CARDIOMYOPATHY (H): ICD-10-CM

## 2019-01-01 DIAGNOSIS — M05.79 RHEUMATOID ARTHRITIS INVOLVING MULTIPLE SITES WITH POSITIVE RHEUMATOID FACTOR (H): ICD-10-CM

## 2019-01-01 DIAGNOSIS — M06.9 RHEUMATOID ARTHRITIS INVOLVING MULTIPLE SITES, UNSPECIFIED RHEUMATOID FACTOR PRESENCE: ICD-10-CM

## 2019-01-01 DIAGNOSIS — I48.91 ATRIAL FIBRILLATION WITH RAPID VENTRICULAR RESPONSE (H): ICD-10-CM

## 2019-01-01 DIAGNOSIS — I42.9 CARDIOMYOPATHY (H): ICD-10-CM

## 2019-01-01 DIAGNOSIS — Z53.9 DIAGNOSIS NOT YET DEFINED: Primary | ICD-10-CM

## 2019-01-01 DIAGNOSIS — C61 MALIGNANT NEOPLASM OF PROSTATE (H): Primary | ICD-10-CM

## 2019-01-01 DIAGNOSIS — I50.22 CHRONIC SYSTOLIC CONGESTIVE HEART FAILURE (H): ICD-10-CM

## 2019-01-01 DIAGNOSIS — M75.101 ROTATOR CUFF TEAR ARTHROPATHY OF BOTH SHOULDERS: ICD-10-CM

## 2019-01-01 DIAGNOSIS — G93.40 ENCEPHALOPATHY, UNSPECIFIED: ICD-10-CM

## 2019-01-01 DIAGNOSIS — I10 ESSENTIAL HYPERTENSION WITH GOAL BLOOD PRESSURE LESS THAN 140/90: ICD-10-CM

## 2019-01-01 DIAGNOSIS — D64.9 ANEMIA, UNSPECIFIED TYPE: Primary | ICD-10-CM

## 2019-01-01 DIAGNOSIS — M12.812 ROTATOR CUFF TEAR ARTHROPATHY OF BOTH SHOULDERS: Primary | ICD-10-CM

## 2019-01-01 DIAGNOSIS — M70.62 TROCHANTERIC BURSITIS OF LEFT HIP: ICD-10-CM

## 2019-01-01 DIAGNOSIS — D64.9 ANEMIA, UNSPECIFIED TYPE: ICD-10-CM

## 2019-01-01 DIAGNOSIS — M25.561 CHRONIC PAIN OF RIGHT KNEE: ICD-10-CM

## 2019-01-01 DIAGNOSIS — I48.0 PAROXYSMAL ATRIAL FIBRILLATION (H): ICD-10-CM

## 2019-01-01 DIAGNOSIS — C61 MALIGNANT NEOPLASM OF PROSTATE (H): ICD-10-CM

## 2019-01-01 DIAGNOSIS — S40.022A HEMATOMA OF ARM, LEFT, INITIAL ENCOUNTER: ICD-10-CM

## 2019-01-01 DIAGNOSIS — M25.512 ACUTE PAIN OF LEFT SHOULDER: Primary | ICD-10-CM

## 2019-01-01 DIAGNOSIS — M17.11 PRIMARY OSTEOARTHRITIS OF RIGHT KNEE: ICD-10-CM

## 2019-01-01 DIAGNOSIS — M75.102 ROTATOR CUFF TEAR ARTHROPATHY OF BOTH SHOULDERS: ICD-10-CM

## 2019-01-01 DIAGNOSIS — M12.812 ROTATOR CUFF TEAR ARTHROPATHY OF BOTH SHOULDERS: ICD-10-CM

## 2019-01-01 DIAGNOSIS — R07.89 CHEST PRESSURE: ICD-10-CM

## 2019-01-01 DIAGNOSIS — M12.811 ROTATOR CUFF TEAR ARTHROPATHY OF BOTH SHOULDERS: ICD-10-CM

## 2019-01-01 DIAGNOSIS — K59.01 SLOW TRANSIT CONSTIPATION: ICD-10-CM

## 2019-01-01 DIAGNOSIS — M25.512 ACUTE PAIN OF LEFT SHOULDER: ICD-10-CM

## 2019-01-01 DIAGNOSIS — R60.0 BILATERAL LOWER EXTREMITY EDEMA: ICD-10-CM

## 2019-01-01 DIAGNOSIS — K21.9 GASTROESOPHAGEAL REFLUX DISEASE, ESOPHAGITIS PRESENCE NOT SPECIFIED: ICD-10-CM

## 2019-01-01 DIAGNOSIS — M12.811 ROTATOR CUFF TEAR ARTHROPATHY OF BOTH SHOULDERS: Primary | ICD-10-CM

## 2019-01-01 DIAGNOSIS — G89.29 CHRONIC PAIN OF RIGHT KNEE: ICD-10-CM

## 2019-01-01 DIAGNOSIS — I35.0 AORTIC STENOSIS, SEVERE: Primary | ICD-10-CM

## 2019-01-01 DIAGNOSIS — R07.89 CHEST PRESSURE: Primary | ICD-10-CM

## 2019-01-01 DIAGNOSIS — M75.102 ROTATOR CUFF TEAR ARTHROPATHY OF BOTH SHOULDERS: Primary | ICD-10-CM

## 2019-01-01 DIAGNOSIS — N40.0 HYPERTROPHY OF PROSTATE WITHOUT URINARY OBSTRUCTION: ICD-10-CM

## 2019-01-01 DIAGNOSIS — M75.101 ROTATOR CUFF TEAR ARTHROPATHY OF BOTH SHOULDERS: Primary | ICD-10-CM

## 2019-01-01 DIAGNOSIS — Z51.5 HOSPICE CARE PATIENT: ICD-10-CM

## 2019-01-01 LAB
HGB BLD-MCNC: 10.6 G/DL (ref 13.3–17.7)
HGB BLD-MCNC: 10.9 G/DL (ref 13.3–17.7)
HGB BLD-MCNC: 8.9 G/DL (ref 13.3–17.7)
MDC_IDC_EPISODE_DTM: NORMAL
MDC_IDC_EPISODE_DURATION: 10 S
MDC_IDC_EPISODE_DURATION: 10 S
MDC_IDC_EPISODE_DURATION: 100 S
MDC_IDC_EPISODE_DURATION: 1009 S
MDC_IDC_EPISODE_DURATION: 104 S
MDC_IDC_EPISODE_DURATION: 107 S
MDC_IDC_EPISODE_DURATION: 1102 S
MDC_IDC_EPISODE_DURATION: 1113 S
MDC_IDC_EPISODE_DURATION: 114 S
MDC_IDC_EPISODE_DURATION: 116 S
MDC_IDC_EPISODE_DURATION: 118 S
MDC_IDC_EPISODE_DURATION: 12 S
MDC_IDC_EPISODE_DURATION: 1224 S
MDC_IDC_EPISODE_DURATION: 127 S
MDC_IDC_EPISODE_DURATION: 1306 S
MDC_IDC_EPISODE_DURATION: 139 S
MDC_IDC_EPISODE_DURATION: 1394 S
MDC_IDC_EPISODE_DURATION: 142 S
MDC_IDC_EPISODE_DURATION: 1461 S
MDC_IDC_EPISODE_DURATION: 149 S
MDC_IDC_EPISODE_DURATION: 150 S
MDC_IDC_EPISODE_DURATION: 155 S
MDC_IDC_EPISODE_DURATION: 16 S
MDC_IDC_EPISODE_DURATION: 172 S
MDC_IDC_EPISODE_DURATION: 1820 S
MDC_IDC_EPISODE_DURATION: 183 S
MDC_IDC_EPISODE_DURATION: 191 S
MDC_IDC_EPISODE_DURATION: 196 S
MDC_IDC_EPISODE_DURATION: 207 S
MDC_IDC_EPISODE_DURATION: 21 S
MDC_IDC_EPISODE_DURATION: 212 S
MDC_IDC_EPISODE_DURATION: 2189 S
MDC_IDC_EPISODE_DURATION: 23 S
MDC_IDC_EPISODE_DURATION: 2459 S
MDC_IDC_EPISODE_DURATION: 25 S
MDC_IDC_EPISODE_DURATION: 25 S
MDC_IDC_EPISODE_DURATION: 27 S
MDC_IDC_EPISODE_DURATION: 277 S
MDC_IDC_EPISODE_DURATION: 283 S
MDC_IDC_EPISODE_DURATION: 284 S
MDC_IDC_EPISODE_DURATION: 29 S
MDC_IDC_EPISODE_DURATION: 292 S
MDC_IDC_EPISODE_DURATION: 295 S
MDC_IDC_EPISODE_DURATION: 295 S
MDC_IDC_EPISODE_DURATION: 3 S
MDC_IDC_EPISODE_DURATION: 3 S
MDC_IDC_EPISODE_DURATION: 31 S
MDC_IDC_EPISODE_DURATION: 313 S
MDC_IDC_EPISODE_DURATION: 3359 S
MDC_IDC_EPISODE_DURATION: 354 S
MDC_IDC_EPISODE_DURATION: 37 S
MDC_IDC_EPISODE_DURATION: 371 S
MDC_IDC_EPISODE_DURATION: 3823 S
MDC_IDC_EPISODE_DURATION: 386 S
MDC_IDC_EPISODE_DURATION: 389 S
MDC_IDC_EPISODE_DURATION: 393 S
MDC_IDC_EPISODE_DURATION: 4 S
MDC_IDC_EPISODE_DURATION: 4421 S
MDC_IDC_EPISODE_DURATION: 443 S
MDC_IDC_EPISODE_DURATION: 45 S
MDC_IDC_EPISODE_DURATION: 4765 S
MDC_IDC_EPISODE_DURATION: 478 S
MDC_IDC_EPISODE_DURATION: 479 S
MDC_IDC_EPISODE_DURATION: 5 S
MDC_IDC_EPISODE_DURATION: 50 S
MDC_IDC_EPISODE_DURATION: 51 S
MDC_IDC_EPISODE_DURATION: 52 S
MDC_IDC_EPISODE_DURATION: 53 S
MDC_IDC_EPISODE_DURATION: 5302 S
MDC_IDC_EPISODE_DURATION: 539 S
MDC_IDC_EPISODE_DURATION: 55 S
MDC_IDC_EPISODE_DURATION: 57 S
MDC_IDC_EPISODE_DURATION: 58 S
MDC_IDC_EPISODE_DURATION: 59 S
MDC_IDC_EPISODE_DURATION: 591 S
MDC_IDC_EPISODE_DURATION: 6 S
MDC_IDC_EPISODE_DURATION: 60 S
MDC_IDC_EPISODE_DURATION: 60 S
MDC_IDC_EPISODE_DURATION: 602 S
MDC_IDC_EPISODE_DURATION: 617 S
MDC_IDC_EPISODE_DURATION: 62 S
MDC_IDC_EPISODE_DURATION: 636 S
MDC_IDC_EPISODE_DURATION: 641 S
MDC_IDC_EPISODE_DURATION: 68 S
MDC_IDC_EPISODE_DURATION: 68 S
MDC_IDC_EPISODE_DURATION: 696 S
MDC_IDC_EPISODE_DURATION: 6963 S
MDC_IDC_EPISODE_DURATION: 7 S
MDC_IDC_EPISODE_DURATION: 7323 S
MDC_IDC_EPISODE_DURATION: 735 S
MDC_IDC_EPISODE_DURATION: 7394 S
MDC_IDC_EPISODE_DURATION: 775 S
MDC_IDC_EPISODE_DURATION: 78 S
MDC_IDC_EPISODE_DURATION: 7916 S
MDC_IDC_EPISODE_DURATION: 82 S
MDC_IDC_EPISODE_DURATION: 83 S
MDC_IDC_EPISODE_DURATION: 85 S
MDC_IDC_EPISODE_DURATION: 9 S
MDC_IDC_EPISODE_DURATION: 9591 S
MDC_IDC_EPISODE_DURATION: 983 S
MDC_IDC_EPISODE_DURATION: 998 S
MDC_IDC_EPISODE_DURATION: NORMAL S
MDC_IDC_EPISODE_ID: 4162
MDC_IDC_EPISODE_ID: 4163
MDC_IDC_EPISODE_ID: 4164
MDC_IDC_EPISODE_ID: 4165
MDC_IDC_EPISODE_ID: 4173
MDC_IDC_EPISODE_ID: 4174
MDC_IDC_EPISODE_ID: 4175
MDC_IDC_EPISODE_ID: 4176
MDC_IDC_EPISODE_ID: 4177
MDC_IDC_EPISODE_ID: 4178
MDC_IDC_EPISODE_ID: 4179
MDC_IDC_EPISODE_ID: 8749
MDC_IDC_EPISODE_ID: 8750
MDC_IDC_EPISODE_ID: 8751
MDC_IDC_EPISODE_ID: 8752
MDC_IDC_EPISODE_ID: 8753
MDC_IDC_EPISODE_ID: 8754
MDC_IDC_EPISODE_ID: 8755
MDC_IDC_EPISODE_ID: 8756
MDC_IDC_EPISODE_ID: 8757
MDC_IDC_EPISODE_ID: 8758
MDC_IDC_EPISODE_ID: 8759
MDC_IDC_EPISODE_ID: 8760
MDC_IDC_EPISODE_ID: 8761
MDC_IDC_EPISODE_ID: 8762
MDC_IDC_EPISODE_ID: 8763
MDC_IDC_EPISODE_ID: 8764
MDC_IDC_EPISODE_ID: 8765
MDC_IDC_EPISODE_ID: 8766
MDC_IDC_EPISODE_ID: 8767
MDC_IDC_EPISODE_ID: 8768
MDC_IDC_EPISODE_ID: 8769
MDC_IDC_EPISODE_ID: 8770
MDC_IDC_EPISODE_ID: 8771
MDC_IDC_EPISODE_ID: 8772
MDC_IDC_EPISODE_ID: 8773
MDC_IDC_EPISODE_ID: 8774
MDC_IDC_EPISODE_ID: 8775
MDC_IDC_EPISODE_ID: 8776
MDC_IDC_EPISODE_ID: 8777
MDC_IDC_EPISODE_ID: 8778
MDC_IDC_EPISODE_ID: 8779
MDC_IDC_EPISODE_ID: 8780
MDC_IDC_EPISODE_ID: 8781
MDC_IDC_EPISODE_ID: 8782
MDC_IDC_EPISODE_ID: 8783
MDC_IDC_EPISODE_ID: 8784
MDC_IDC_EPISODE_ID: 8785
MDC_IDC_EPISODE_ID: 8786
MDC_IDC_EPISODE_ID: 8787
MDC_IDC_EPISODE_ID: 8788
MDC_IDC_EPISODE_ID: 8789
MDC_IDC_EPISODE_ID: 8790
MDC_IDC_EPISODE_ID: 8791
MDC_IDC_EPISODE_ID: 8792
MDC_IDC_EPISODE_ID: 8793
MDC_IDC_EPISODE_ID: 8794
MDC_IDC_EPISODE_ID: 8795
MDC_IDC_EPISODE_ID: 8796
MDC_IDC_EPISODE_ID: 8797
MDC_IDC_EPISODE_ID: 8798
MDC_IDC_EPISODE_ID: 8799
MDC_IDC_EPISODE_ID: 9931
MDC_IDC_EPISODE_ID: 9932
MDC_IDC_EPISODE_ID: 9933
MDC_IDC_EPISODE_ID: 9934
MDC_IDC_EPISODE_ID: 9935
MDC_IDC_EPISODE_ID: 9936
MDC_IDC_EPISODE_ID: 9937
MDC_IDC_EPISODE_ID: 9938
MDC_IDC_EPISODE_ID: 9939
MDC_IDC_EPISODE_ID: 9940
MDC_IDC_EPISODE_ID: 9941
MDC_IDC_EPISODE_ID: 9942
MDC_IDC_EPISODE_ID: 9943
MDC_IDC_EPISODE_ID: 9944
MDC_IDC_EPISODE_ID: 9945
MDC_IDC_EPISODE_ID: 9946
MDC_IDC_EPISODE_ID: 9947
MDC_IDC_EPISODE_ID: 9948
MDC_IDC_EPISODE_ID: 9949
MDC_IDC_EPISODE_ID: 9950
MDC_IDC_EPISODE_ID: 9951
MDC_IDC_EPISODE_ID: 9952
MDC_IDC_EPISODE_ID: 9953
MDC_IDC_EPISODE_ID: 9954
MDC_IDC_EPISODE_ID: 9955
MDC_IDC_EPISODE_ID: 9956
MDC_IDC_EPISODE_ID: 9957
MDC_IDC_EPISODE_ID: 9958
MDC_IDC_EPISODE_ID: 9959
MDC_IDC_EPISODE_ID: 9960
MDC_IDC_EPISODE_ID: 9961
MDC_IDC_EPISODE_ID: 9962
MDC_IDC_EPISODE_ID: 9963
MDC_IDC_EPISODE_ID: 9964
MDC_IDC_EPISODE_ID: 9965
MDC_IDC_EPISODE_ID: 9966
MDC_IDC_EPISODE_ID: 9967
MDC_IDC_EPISODE_ID: 9968
MDC_IDC_EPISODE_ID: 9969
MDC_IDC_EPISODE_ID: 9970
MDC_IDC_EPISODE_ID: 9971
MDC_IDC_EPISODE_ID: 9972
MDC_IDC_EPISODE_ID: 9973
MDC_IDC_EPISODE_ID: 9974
MDC_IDC_EPISODE_ID: 9975
MDC_IDC_EPISODE_ID: 9976
MDC_IDC_EPISODE_ID: 9977
MDC_IDC_EPISODE_ID: 9978
MDC_IDC_EPISODE_ID: 9979
MDC_IDC_EPISODE_ID: 9980
MDC_IDC_EPISODE_ID: 9981
MDC_IDC_EPISODE_TYPE: NORMAL
MDC_IDC_LEAD_IMPLANT_DT: NORMAL
MDC_IDC_LEAD_LOCATION: NORMAL
MDC_IDC_LEAD_LOCATION_DETAIL_1: NORMAL
MDC_IDC_LEAD_MFG: NORMAL
MDC_IDC_LEAD_MODEL: NORMAL
MDC_IDC_LEAD_POLARITY_TYPE: NORMAL
MDC_IDC_LEAD_SERIAL: NORMAL
MDC_IDC_LEAD_SPECIAL_FUNCTION: NORMAL
MDC_IDC_MSMT_BATTERY_DTM: NORMAL
MDC_IDC_MSMT_BATTERY_DTM: NORMAL
MDC_IDC_MSMT_BATTERY_REMAINING_LONGEVITY: 44 MO
MDC_IDC_MSMT_BATTERY_REMAINING_LONGEVITY: 47 MO
MDC_IDC_MSMT_BATTERY_RRT_TRIGGER: 2.77
MDC_IDC_MSMT_BATTERY_RRT_TRIGGER: 2.77
MDC_IDC_MSMT_BATTERY_STATUS: NORMAL
MDC_IDC_MSMT_BATTERY_STATUS: NORMAL
MDC_IDC_MSMT_BATTERY_VOLTAGE: 2.99 V
MDC_IDC_MSMT_BATTERY_VOLTAGE: 3 V
MDC_IDC_MSMT_LEADCHNL_LV_IMPEDANCE_VALUE: 456 OHM
MDC_IDC_MSMT_LEADCHNL_LV_IMPEDANCE_VALUE: 551 OHM
MDC_IDC_MSMT_LEADCHNL_LV_IMPEDANCE_VALUE: 551 OHM
MDC_IDC_MSMT_LEADCHNL_LV_IMPEDANCE_VALUE: 589 OHM
MDC_IDC_MSMT_LEADCHNL_LV_IMPEDANCE_VALUE: 608 OHM
MDC_IDC_MSMT_LEADCHNL_LV_IMPEDANCE_VALUE: 627 OHM
MDC_IDC_MSMT_LEADCHNL_LV_IMPEDANCE_VALUE: 684 OHM
MDC_IDC_MSMT_LEADCHNL_LV_IMPEDANCE_VALUE: 684 OHM
MDC_IDC_MSMT_LEADCHNL_LV_IMPEDANCE_VALUE: 817 OHM
MDC_IDC_MSMT_LEADCHNL_LV_IMPEDANCE_VALUE: 988 OHM
MDC_IDC_MSMT_LEADCHNL_LV_PACING_THRESHOLD_AMPLITUDE: 1.38 V
MDC_IDC_MSMT_LEADCHNL_LV_PACING_THRESHOLD_AMPLITUDE: 1.88 V
MDC_IDC_MSMT_LEADCHNL_LV_PACING_THRESHOLD_PULSEWIDTH: 0.5 MS
MDC_IDC_MSMT_LEADCHNL_LV_PACING_THRESHOLD_PULSEWIDTH: 0.5 MS
MDC_IDC_MSMT_LEADCHNL_RA_IMPEDANCE_VALUE: 361 OHM
MDC_IDC_MSMT_LEADCHNL_RA_IMPEDANCE_VALUE: 380 OHM
MDC_IDC_MSMT_LEADCHNL_RA_IMPEDANCE_VALUE: 475 OHM
MDC_IDC_MSMT_LEADCHNL_RA_IMPEDANCE_VALUE: 494 OHM
MDC_IDC_MSMT_LEADCHNL_RA_PACING_THRESHOLD_AMPLITUDE: 0.75 V
MDC_IDC_MSMT_LEADCHNL_RA_PACING_THRESHOLD_AMPLITUDE: 0.75 V
MDC_IDC_MSMT_LEADCHNL_RA_PACING_THRESHOLD_PULSEWIDTH: 0.4 MS
MDC_IDC_MSMT_LEADCHNL_RA_PACING_THRESHOLD_PULSEWIDTH: 0.4 MS
MDC_IDC_MSMT_LEADCHNL_RA_SENSING_INTR_AMPL: 0.25 MV
MDC_IDC_MSMT_LEADCHNL_RA_SENSING_INTR_AMPL: 0.25 MV
MDC_IDC_MSMT_LEADCHNL_RA_SENSING_INTR_AMPL: 0.38 MV
MDC_IDC_MSMT_LEADCHNL_RA_SENSING_INTR_AMPL: 0.38 MV
MDC_IDC_MSMT_LEADCHNL_RV_IMPEDANCE_VALUE: 361 OHM
MDC_IDC_MSMT_LEADCHNL_RV_IMPEDANCE_VALUE: 399 OHM
MDC_IDC_MSMT_LEADCHNL_RV_IMPEDANCE_VALUE: 418 OHM
MDC_IDC_MSMT_LEADCHNL_RV_IMPEDANCE_VALUE: 475 OHM
MDC_IDC_MSMT_LEADCHNL_RV_PACING_THRESHOLD_AMPLITUDE: 0.62 V
MDC_IDC_MSMT_LEADCHNL_RV_PACING_THRESHOLD_AMPLITUDE: 0.75 V
MDC_IDC_MSMT_LEADCHNL_RV_PACING_THRESHOLD_PULSEWIDTH: 0.4 MS
MDC_IDC_MSMT_LEADCHNL_RV_PACING_THRESHOLD_PULSEWIDTH: 0.4 MS
MDC_IDC_MSMT_LEADCHNL_RV_SENSING_INTR_AMPL: 10.25 MV
MDC_IDC_MSMT_LEADCHNL_RV_SENSING_INTR_AMPL: 10.25 MV
MDC_IDC_MSMT_LEADCHNL_RV_SENSING_INTR_AMPL: 8.62 MV
MDC_IDC_MSMT_LEADCHNL_RV_SENSING_INTR_AMPL: 8.62 MV
MDC_IDC_PG_IMPLANT_DTM: NORMAL
MDC_IDC_PG_IMPLANT_DTM: NORMAL
MDC_IDC_PG_MFG: NORMAL
MDC_IDC_PG_MFG: NORMAL
MDC_IDC_PG_MODEL: NORMAL
MDC_IDC_PG_MODEL: NORMAL
MDC_IDC_PG_SERIAL: NORMAL
MDC_IDC_PG_SERIAL: NORMAL
MDC_IDC_PG_TYPE: NORMAL
MDC_IDC_PG_TYPE: NORMAL
MDC_IDC_SESS_CLINIC_NAME: NORMAL
MDC_IDC_SESS_CLINIC_NAME: NORMAL
MDC_IDC_SESS_DTM: NORMAL
MDC_IDC_SESS_DTM: NORMAL
MDC_IDC_SESS_TYPE: NORMAL
MDC_IDC_SESS_TYPE: NORMAL
MDC_IDC_SET_BRADY_AT_MODE_SWITCH_RATE: 150 {BEATS}/MIN
MDC_IDC_SET_BRADY_AT_MODE_SWITCH_RATE: 150 {BEATS}/MIN
MDC_IDC_SET_BRADY_LOWRATE: 70 {BEATS}/MIN
MDC_IDC_SET_BRADY_LOWRATE: 70 {BEATS}/MIN
MDC_IDC_SET_BRADY_MAX_SENSOR_RATE: 120 {BEATS}/MIN
MDC_IDC_SET_BRADY_MAX_SENSOR_RATE: 120 {BEATS}/MIN
MDC_IDC_SET_BRADY_MAX_TRACKING_RATE: 130 {BEATS}/MIN
MDC_IDC_SET_BRADY_MAX_TRACKING_RATE: 130 {BEATS}/MIN
MDC_IDC_SET_BRADY_MODE: NORMAL
MDC_IDC_SET_BRADY_MODE: NORMAL
MDC_IDC_SET_BRADY_PAV_DELAY_LOW: 170 MS
MDC_IDC_SET_BRADY_PAV_DELAY_LOW: 170 MS
MDC_IDC_SET_BRADY_SAV_DELAY_LOW: 110 MS
MDC_IDC_SET_BRADY_SAV_DELAY_LOW: 110 MS
MDC_IDC_SET_CRT_LVRV_DELAY: 0 MS
MDC_IDC_SET_CRT_LVRV_DELAY: 0 MS
MDC_IDC_SET_CRT_PACED_CHAMBERS: NORMAL
MDC_IDC_SET_CRT_PACED_CHAMBERS: NORMAL
MDC_IDC_SET_LEADCHNL_LV_PACING_AMPLITUDE: 2.5 V
MDC_IDC_SET_LEADCHNL_LV_PACING_AMPLITUDE: 3 V
MDC_IDC_SET_LEADCHNL_LV_PACING_ANODE_ELECTRODE_1: NORMAL
MDC_IDC_SET_LEADCHNL_LV_PACING_ANODE_ELECTRODE_1: NORMAL
MDC_IDC_SET_LEADCHNL_LV_PACING_ANODE_LOCATION_1: NORMAL
MDC_IDC_SET_LEADCHNL_LV_PACING_ANODE_LOCATION_1: NORMAL
MDC_IDC_SET_LEADCHNL_LV_PACING_CAPTURE_MODE: NORMAL
MDC_IDC_SET_LEADCHNL_LV_PACING_CAPTURE_MODE: NORMAL
MDC_IDC_SET_LEADCHNL_LV_PACING_CATHODE_ELECTRODE_1: NORMAL
MDC_IDC_SET_LEADCHNL_LV_PACING_CATHODE_ELECTRODE_1: NORMAL
MDC_IDC_SET_LEADCHNL_LV_PACING_CATHODE_LOCATION_1: NORMAL
MDC_IDC_SET_LEADCHNL_LV_PACING_CATHODE_LOCATION_1: NORMAL
MDC_IDC_SET_LEADCHNL_LV_PACING_POLARITY: NORMAL
MDC_IDC_SET_LEADCHNL_LV_PACING_POLARITY: NORMAL
MDC_IDC_SET_LEADCHNL_LV_PACING_PULSEWIDTH: 0.5 MS
MDC_IDC_SET_LEADCHNL_LV_PACING_PULSEWIDTH: 0.5 MS
MDC_IDC_SET_LEADCHNL_RA_PACING_AMPLITUDE: 1.5 V
MDC_IDC_SET_LEADCHNL_RA_PACING_AMPLITUDE: 1.5 V
MDC_IDC_SET_LEADCHNL_RA_PACING_ANODE_ELECTRODE_1: NORMAL
MDC_IDC_SET_LEADCHNL_RA_PACING_ANODE_ELECTRODE_1: NORMAL
MDC_IDC_SET_LEADCHNL_RA_PACING_ANODE_LOCATION_1: NORMAL
MDC_IDC_SET_LEADCHNL_RA_PACING_ANODE_LOCATION_1: NORMAL
MDC_IDC_SET_LEADCHNL_RA_PACING_CAPTURE_MODE: NORMAL
MDC_IDC_SET_LEADCHNL_RA_PACING_CAPTURE_MODE: NORMAL
MDC_IDC_SET_LEADCHNL_RA_PACING_CATHODE_ELECTRODE_1: NORMAL
MDC_IDC_SET_LEADCHNL_RA_PACING_CATHODE_ELECTRODE_1: NORMAL
MDC_IDC_SET_LEADCHNL_RA_PACING_CATHODE_LOCATION_1: NORMAL
MDC_IDC_SET_LEADCHNL_RA_PACING_CATHODE_LOCATION_1: NORMAL
MDC_IDC_SET_LEADCHNL_RA_PACING_POLARITY: NORMAL
MDC_IDC_SET_LEADCHNL_RA_PACING_POLARITY: NORMAL
MDC_IDC_SET_LEADCHNL_RA_PACING_PULSEWIDTH: 0.4 MS
MDC_IDC_SET_LEADCHNL_RA_PACING_PULSEWIDTH: 0.4 MS
MDC_IDC_SET_LEADCHNL_RA_SENSING_ANODE_ELECTRODE_1: NORMAL
MDC_IDC_SET_LEADCHNL_RA_SENSING_ANODE_ELECTRODE_1: NORMAL
MDC_IDC_SET_LEADCHNL_RA_SENSING_ANODE_LOCATION_1: NORMAL
MDC_IDC_SET_LEADCHNL_RA_SENSING_ANODE_LOCATION_1: NORMAL
MDC_IDC_SET_LEADCHNL_RA_SENSING_CATHODE_ELECTRODE_1: NORMAL
MDC_IDC_SET_LEADCHNL_RA_SENSING_CATHODE_ELECTRODE_1: NORMAL
MDC_IDC_SET_LEADCHNL_RA_SENSING_CATHODE_LOCATION_1: NORMAL
MDC_IDC_SET_LEADCHNL_RA_SENSING_CATHODE_LOCATION_1: NORMAL
MDC_IDC_SET_LEADCHNL_RA_SENSING_POLARITY: NORMAL
MDC_IDC_SET_LEADCHNL_RA_SENSING_POLARITY: NORMAL
MDC_IDC_SET_LEADCHNL_RA_SENSING_SENSITIVITY: 0.3 MV
MDC_IDC_SET_LEADCHNL_RA_SENSING_SENSITIVITY: 0.3 MV
MDC_IDC_SET_LEADCHNL_RV_PACING_AMPLITUDE: 2 V
MDC_IDC_SET_LEADCHNL_RV_PACING_AMPLITUDE: 2 V
MDC_IDC_SET_LEADCHNL_RV_PACING_ANODE_ELECTRODE_1: NORMAL
MDC_IDC_SET_LEADCHNL_RV_PACING_ANODE_ELECTRODE_1: NORMAL
MDC_IDC_SET_LEADCHNL_RV_PACING_ANODE_LOCATION_1: NORMAL
MDC_IDC_SET_LEADCHNL_RV_PACING_ANODE_LOCATION_1: NORMAL
MDC_IDC_SET_LEADCHNL_RV_PACING_CAPTURE_MODE: NORMAL
MDC_IDC_SET_LEADCHNL_RV_PACING_CAPTURE_MODE: NORMAL
MDC_IDC_SET_LEADCHNL_RV_PACING_CATHODE_ELECTRODE_1: NORMAL
MDC_IDC_SET_LEADCHNL_RV_PACING_CATHODE_ELECTRODE_1: NORMAL
MDC_IDC_SET_LEADCHNL_RV_PACING_CATHODE_LOCATION_1: NORMAL
MDC_IDC_SET_LEADCHNL_RV_PACING_CATHODE_LOCATION_1: NORMAL
MDC_IDC_SET_LEADCHNL_RV_PACING_POLARITY: NORMAL
MDC_IDC_SET_LEADCHNL_RV_PACING_POLARITY: NORMAL
MDC_IDC_SET_LEADCHNL_RV_PACING_PULSEWIDTH: 0.4 MS
MDC_IDC_SET_LEADCHNL_RV_PACING_PULSEWIDTH: 0.4 MS
MDC_IDC_SET_LEADCHNL_RV_SENSING_ANODE_ELECTRODE_1: NORMAL
MDC_IDC_SET_LEADCHNL_RV_SENSING_ANODE_ELECTRODE_1: NORMAL
MDC_IDC_SET_LEADCHNL_RV_SENSING_ANODE_LOCATION_1: NORMAL
MDC_IDC_SET_LEADCHNL_RV_SENSING_ANODE_LOCATION_1: NORMAL
MDC_IDC_SET_LEADCHNL_RV_SENSING_CATHODE_ELECTRODE_1: NORMAL
MDC_IDC_SET_LEADCHNL_RV_SENSING_CATHODE_ELECTRODE_1: NORMAL
MDC_IDC_SET_LEADCHNL_RV_SENSING_CATHODE_LOCATION_1: NORMAL
MDC_IDC_SET_LEADCHNL_RV_SENSING_CATHODE_LOCATION_1: NORMAL
MDC_IDC_SET_LEADCHNL_RV_SENSING_POLARITY: NORMAL
MDC_IDC_SET_LEADCHNL_RV_SENSING_POLARITY: NORMAL
MDC_IDC_SET_LEADCHNL_RV_SENSING_SENSITIVITY: 0.9 MV
MDC_IDC_SET_LEADCHNL_RV_SENSING_SENSITIVITY: 0.9 MV
MDC_IDC_SET_ZONE_DETECTION_INTERVAL: 400 MS
MDC_IDC_SET_ZONE_DETECTION_INTERVAL: 400 MS
MDC_IDC_SET_ZONE_DETECTION_INTERVAL: 410 MS
MDC_IDC_SET_ZONE_DETECTION_INTERVAL: 410 MS
MDC_IDC_SET_ZONE_TYPE: NORMAL
MDC_IDC_STAT_AT_BURDEN_PERCENT: 82 %
MDC_IDC_STAT_AT_BURDEN_PERCENT: 99.7 %
MDC_IDC_STAT_AT_DTM_END: NORMAL
MDC_IDC_STAT_AT_DTM_END: NORMAL
MDC_IDC_STAT_AT_DTM_START: NORMAL
MDC_IDC_STAT_AT_DTM_START: NORMAL
MDC_IDC_STAT_BRADY_AP_VP_PERCENT: 23.06 %
MDC_IDC_STAT_BRADY_AP_VP_PERCENT: 6.13 %
MDC_IDC_STAT_BRADY_AP_VS_PERCENT: 0 %
MDC_IDC_STAT_BRADY_AP_VS_PERCENT: 0.01 %
MDC_IDC_STAT_BRADY_AS_VP_PERCENT: 76.05 %
MDC_IDC_STAT_BRADY_AS_VP_PERCENT: 93.07 %
MDC_IDC_STAT_BRADY_AS_VS_PERCENT: 0.79 %
MDC_IDC_STAT_BRADY_AS_VS_PERCENT: 0.88 %
MDC_IDC_STAT_BRADY_DTM_END: NORMAL
MDC_IDC_STAT_BRADY_DTM_END: NORMAL
MDC_IDC_STAT_BRADY_DTM_START: NORMAL
MDC_IDC_STAT_BRADY_DTM_START: NORMAL
MDC_IDC_STAT_BRADY_RA_PERCENT_PACED: 15.68 %
MDC_IDC_STAT_BRADY_RA_PERCENT_PACED: 3.79 %
MDC_IDC_STAT_BRADY_RV_PERCENT_PACED: 98.86 %
MDC_IDC_STAT_BRADY_RV_PERCENT_PACED: 99.29 %
MDC_IDC_STAT_CRT_DTM_END: NORMAL
MDC_IDC_STAT_CRT_DTM_END: NORMAL
MDC_IDC_STAT_CRT_DTM_START: NORMAL
MDC_IDC_STAT_CRT_DTM_START: NORMAL
MDC_IDC_STAT_CRT_LV_PERCENT_PACED: 98.8 %
MDC_IDC_STAT_CRT_LV_PERCENT_PACED: 99.25 %
MDC_IDC_STAT_CRT_PERCENT_PACED: 98.8 %
MDC_IDC_STAT_CRT_PERCENT_PACED: 99.25 %
MDC_IDC_STAT_EPISODE_RECENT_COUNT: 0
MDC_IDC_STAT_EPISODE_RECENT_COUNT: 1182
MDC_IDC_STAT_EPISODE_RECENT_COUNT: 7056
MDC_IDC_STAT_EPISODE_RECENT_COUNT_DTM_END: NORMAL
MDC_IDC_STAT_EPISODE_RECENT_COUNT_DTM_START: NORMAL
MDC_IDC_STAT_EPISODE_TOTAL_COUNT: 1023
MDC_IDC_STAT_EPISODE_TOTAL_COUNT: 1023
MDC_IDC_STAT_EPISODE_TOTAL_COUNT: 125
MDC_IDC_STAT_EPISODE_TOTAL_COUNT: 125
MDC_IDC_STAT_EPISODE_TOTAL_COUNT: 18
MDC_IDC_STAT_EPISODE_TOTAL_COUNT: 18
MDC_IDC_STAT_EPISODE_TOTAL_COUNT: 7480
MDC_IDC_STAT_EPISODE_TOTAL_COUNT: 8662
MDC_IDC_STAT_EPISODE_TOTAL_COUNT_DTM_END: NORMAL
MDC_IDC_STAT_EPISODE_TOTAL_COUNT_DTM_START: NORMAL
MDC_IDC_STAT_EPISODE_TYPE: NORMAL
PSA SERPL-MCNC: 1.8 UG/L (ref 0–4)

## 2019-01-01 PROCEDURE — 99283 EMERGENCY DEPT VISIT LOW MDM: CPT | Mod: Z6 | Performed by: FAMILY MEDICINE

## 2019-01-01 PROCEDURE — G0180 MD CERTIFICATION HHA PATIENT: HCPCS | Performed by: FAMILY MEDICINE

## 2019-01-01 PROCEDURE — 93294 REM INTERROG EVL PM/LDLS PM: CPT | Mod: GW | Performed by: INTERNAL MEDICINE

## 2019-01-01 PROCEDURE — 73030 X-RAY EXAM OF SHOULDER: CPT | Mod: TC,LT

## 2019-01-01 PROCEDURE — 93296 REM INTERROG EVL PM/IDS: CPT | Mod: GW | Performed by: INTERNAL MEDICINE

## 2019-01-01 PROCEDURE — 99283 EMERGENCY DEPT VISIT LOW MDM: CPT | Performed by: FAMILY MEDICINE

## 2019-01-01 PROCEDURE — 99213 OFFICE O/P EST LOW 20 MIN: CPT | Performed by: ORTHOPAEDIC SURGERY

## 2019-01-01 PROCEDURE — 36415 COLL VENOUS BLD VENIPUNCTURE: CPT | Performed by: FAMILY MEDICINE

## 2019-01-01 PROCEDURE — 99214 OFFICE O/P EST MOD 30 MIN: CPT | Performed by: NURSE PRACTITIONER

## 2019-01-01 PROCEDURE — 99310 SBSQ NF CARE HIGH MDM 45: CPT | Mod: GV | Performed by: NURSE PRACTITIONER

## 2019-01-01 PROCEDURE — 99214 OFFICE O/P EST MOD 30 MIN: CPT | Performed by: FAMILY MEDICINE

## 2019-01-01 PROCEDURE — 85018 HEMOGLOBIN: CPT | Performed by: FAMILY MEDICINE

## 2019-01-01 PROCEDURE — 84153 ASSAY OF PSA TOTAL: CPT | Performed by: FAMILY MEDICINE

## 2019-01-01 RX ORDER — TRAMADOL HYDROCHLORIDE 50 MG/1
TABLET ORAL
Qty: 90 TABLET | Refills: 0 | Status: SHIPPED | OUTPATIENT
Start: 2019-01-01 | End: 2019-01-01

## 2019-01-01 RX ORDER — ISOSORBIDE MONONITRATE 30 MG/1
15 TABLET, EXTENDED RELEASE ORAL DAILY
Qty: 15 TABLET | Refills: 11 | Status: SHIPPED | OUTPATIENT
Start: 2019-01-01 | End: 2019-01-01 | Stop reason: DRUGHIGH

## 2019-01-01 RX ORDER — LORAZEPAM 2 MG/ML
0.25 CONCENTRATE ORAL EVERY 4 HOURS PRN
COMMUNITY

## 2019-01-01 RX ORDER — HYDROCODONE BITARTRATE AND ACETAMINOPHEN 5; 325 MG/1; MG/1
1-2 TABLET ORAL EVERY 4 HOURS PRN
COMMUNITY
End: 2019-01-01

## 2019-01-01 RX ORDER — ATROPINE SULFATE 10 MG/ML
2 SOLUTION/ DROPS OPHTHALMIC
COMMUNITY

## 2019-01-01 RX ORDER — SPIRONOLACTONE 25 MG/1
12.5 TABLET ORAL DAILY
COMMUNITY

## 2019-01-01 RX ORDER — MORPHINE SULFATE 30 MG/1
5 TABLET ORAL
COMMUNITY

## 2019-01-01 RX ORDER — TROLAMINE SALICYLATE 10 G/100G
1 CREAM TOPICAL 3 TIMES DAILY PRN
COMMUNITY

## 2019-01-01 RX ORDER — HYDROCODONE BITARTRATE AND ACETAMINOPHEN 5; 325 MG/1; MG/1
1 TABLET ORAL EVERY 6 HOURS PRN
Qty: 60 TABLET | Refills: 0 | Status: SHIPPED | OUTPATIENT
Start: 2019-01-01 | End: 2019-01-01

## 2019-01-01 RX ORDER — CIPROFLOXACIN 500 MG/1
500 TABLET, FILM COATED ORAL 2 TIMES DAILY
COMMUNITY

## 2019-01-01 RX ORDER — PREDNISONE 5 MG/1
15 TABLET ORAL DAILY
COMMUNITY

## 2019-01-01 RX ORDER — ACETAMINOPHEN 500 MG
500 TABLET ORAL 3 TIMES DAILY
COMMUNITY

## 2019-01-01 RX ORDER — BISACODYL 10 MG
10 SUPPOSITORY, RECTAL RECTAL DAILY PRN
COMMUNITY

## 2019-01-01 RX ORDER — ACETAMINOPHEN 650 MG/1
650 SUPPOSITORY RECTAL EVERY 4 HOURS PRN
COMMUNITY

## 2019-01-01 RX ORDER — TAMSULOSIN HYDROCHLORIDE 0.4 MG/1
0.4 CAPSULE ORAL DAILY
COMMUNITY

## 2019-01-01 RX ORDER — HALOPERIDOL 2 MG/ML
.5-1 SOLUTION ORAL EVERY 6 HOURS
COMMUNITY
End: 2019-01-01

## 2019-01-01 RX ORDER — CITALOPRAM HYDROBROMIDE 10 MG/1
10 TABLET ORAL DAILY
COMMUNITY

## 2019-01-01 RX ORDER — QUETIAPINE FUMARATE 25 MG/1
12.5 TABLET, FILM COATED ORAL EVERY 8 HOURS
COMMUNITY

## 2019-01-01 RX ORDER — SENNOSIDES A AND B 8.6 MG/1
2 TABLET, FILM COATED ORAL 2 TIMES DAILY
COMMUNITY

## 2019-01-01 RX ORDER — METHADONE HYDROCHLORIDE 5 MG/1
7.5 TABLET ORAL AT BEDTIME
COMMUNITY

## 2019-01-01 RX ORDER — HYDROCODONE BITARTRATE AND ACETAMINOPHEN 5; 325 MG/1; MG/1
1 TABLET ORAL EVERY 6 HOURS PRN
Qty: 45 TABLET | Refills: 0 | Status: SHIPPED | OUTPATIENT
Start: 2019-01-01 | End: 2019-01-01

## 2019-01-01 RX ORDER — ISOSORBIDE MONONITRATE 30 MG/1
30 TABLET, EXTENDED RELEASE ORAL DAILY
COMMUNITY

## 2019-01-01 RX ORDER — METHADONE HYDROCHLORIDE 5 MG/1
5 TABLET ORAL EVERY 12 HOURS
COMMUNITY

## 2019-01-01 RX ORDER — HYDROCHLOROTHIAZIDE 12.5 MG/1
25 CAPSULE ORAL DAILY
COMMUNITY
End: 2019-01-01

## 2019-01-01 ASSESSMENT — ENCOUNTER SYMPTOMS
CHILLS: 0
COLOR CHANGE: 1
EYES NEGATIVE: 1
SHORTNESS OF BREATH: 1
FATIGUE: 1
APPETITE CHANGE: 0
FEVER: 0

## 2019-01-01 ASSESSMENT — PAIN SCALES - GENERAL: PAINLEVEL: EXTREME PAIN (8)

## 2019-01-01 ASSESSMENT — MIFFLIN-ST. JEOR: SCORE: 1219.18

## 2019-01-11 NOTE — TELEPHONE ENCOUNTER
Norco 5-325 MG       Last Written Prescription Date:  12/20/18  Last Fill Quantity: 45,   # refills: 0  Last Office Visit: 12/5/18  Future Office visit:       Routing refill request to provider for review/approval because:  Drug not on the [facility], [facility] or [facility] refill protocol or controlled substance  Tramadol 50 MG       Last Written Prescription Date:  12/11/18  Last Fill Quantity: 90,   # refills: 0  Last Office Visit: 12/5/18  Future Office visit:       Routing refill request to provider for review/approval because:  Drug not on the [facility], [facility] or [facility] refill protocol or controlled substance

## 2019-01-31 NOTE — ED PROVIDER NOTES
History     Chief Complaint   Patient presents with     Arm Injury     The history is provided by the patient.     Bud Merritt is a 89 year old male who presents to the emergency department with a large area of ecchymosis on his left arm, from his shoulder to his wrist. When the patient woke up two mornings ago he noticed that his left arm was black and blue. He says the bruising is getting progressively worse. His arm started to swell this morning. The patient does not remember falling or injuring his arm, but his wife notes that he occasionally bumps his arm on the bathroom door. Patient is on Eliquis. He is otherwise healthy today.       Component      Latest Ref Rng & Units 10/2/2017 10/26/2017 12/12/2017 12/13/2017   Hemoglobin      13.3 - 17.7 g/dL 8.6 (L) 9.8 (L) 10.0 (L) 9.1 (L)     Component      Latest Ref Rng & Units 12/5/2018   Hemoglobin      13.3 - 17.7 g/dL 11.5 (L)     Allergies:  Allergies   Allergen Reactions     Amiodarone Other (See Comments)     Drop in DLCO     Lasix [Furosemide] Blisters     Blisters and sores in his mouth.        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     Priority: Medium     Aortic stenosis, severe 09/27/2017     Priority: Medium     S/P AV lisandro ablation 06/30/2017     Priority: Medium     Venous stasis ulcers of both lower extremities (H) 03/28/2017     Priority: Medium     Anemia 03/28/2017     Priority: Medium     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[employer], Bi-Ventricular- dependent 03/28/2017     Priority: Medium     Dilated cardiomyopathy (H) dilated LV, EF 55% after TAVR  03/28/2017     Priority: Medium     Chronic systolic congestive heart failure (H) 03/28/2017     Priority: Medium     Immunosuppression (H) 03/28/2017     Priority: Medium     Cellulitis of right lower extremity 03/27/2017     Priority: Medium     Malignant neoplasm of prostate (H) 03/22/2017     Priority: Medium     Atrial fibrillation (H) - s/p AVN ablation and pacemaker 03/04/2017     Priority: Medium     Diarrhea 03/02/2017     Priority: Medium     Weakness generalized 03/02/2017     Priority: Medium     Acute cystitis without hematuria 03/02/2017     Priority: Medium     COPD exacerbation (H) 03/02/2017     Priority: Medium     Influenza A 03/02/2017     Priority: Medium     Acute respiratory failure with hypoxia (H) 03/02/2017     Priority: Medium     Atrial fibrillation with rapid ventricular response (H) 03/02/2017     Priority: Medium     Shock (H) 03/02/2017     Priority: Medium     Visit for monitoring Tikosyn therapy 01/23/2017     Priority: Medium     Paroxysmal atrial fibrillation (H) 07/06/2016     Priority: Medium     Essential hypertension with goal blood pressure less than 140/90 06/22/2016     Priority: Medium     Infected superficial injury of knee, left, subsequent encounter 03/24/2016     Priority: Medium     Postoperative delirium 01/15/2016     Priority: Medium     Mitral regurgitation and aortic stenosis - AS severe by echo on 3/2017.  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 01/13/2016     Priority: Medium     Status post total left knee replacement 01/12/2016     Priority: Medium     Rheumatoid arthritis involving multiple sites, unspecified rheumatoid factor presence (H) 12/02/2015     Priority: Medium     Elevated prostate specific antigen (PSA) 10/12/2015     Priority: Medium     Encounter for long-term current use of medication 06/02/2014     Priority: Medium     Problem list name  updated by automated process. Provider to review       Hyperlipidemia LDL goal <130 10/31/2010     Priority: Medium     Rheumatoid arthritis involving multiple sites with positive rheumatoid factor (H) 09/16/2010     Priority: Medium     Hypertrophy of prostate without urinary obstruction 11/13/2003     Priority: Medium     Problem list name updated by automated process. Provider to review          Past Medical History:    Past Medical History:   Diagnosis Date     Anemia      Atrial fibrillation (H) 07/01/2016     Cardiac pacemaker 03/10/2017     Cardiomyopathy (H)      CHF (congestive heart failure) (H)      COPD exacerbation (H) 3/2/2017     Depressive disorder      History of prostate cancer      Paroxysmal atrial fibrillation (H) 7/6/2016     Pure hypercholesterolemia      Rheumatoid arthritis(714.0)      Unspecified essential hypertension      Weakness generalized 3/2/2017       Past Surgical History:    Past Surgical History:   Procedure Laterality Date     ARTHROPLASTY KNEE Left 1/12/2016    Procedure: ARTHROPLASTY KNEE;  Surgeon: Cesar Walker DO;  [facility]     COLONOSCOPY  08/24/09     EXCISE LESION HEAD N/A 4/9/2018    Procedure: EXCISE LESION HEAD;  Excision Left Scalp Lesion;  Surgeon: Connor Nichole DO;  [facility]     HC COLONOSCOPY THRU STOMA W BIOPSY/CAUTERY TUMOR/POLYP/LESION  8/31/2004     HC REPAIR ING HERNIA,5+Y/O,REDUCIBL  1996    Marlex mesh repair of bilateral inguinal hernias and drainage of bilateral scrotal hydroceles.     HEART CATH FEMORAL CANNULIZATION WITH OPEN STANDBY REPAIR AORTIC VALVE N/A 9/27/2017    Procedure: HEART CATH FEMORAL CANNULIZATION WITH OPEN STANDBY REPAIR AORTIC VALVE;;  Surgeon: Jaron Alejandra MD;  Location: [facility]     IMPLANT PACEMAKER  03/10/2017     IRRIGATION AND DEBRIDEMENT SOFT TISSUE LOWER EXTREMITY, COMBINED Left 3/15/2016    Procedure: COMBINED IRRIGATION AND DEBRIDEMENT SOFT TISSUE LOWER EXTREMITY;  Surgeon: Denise  Cesar Thompson, ;  Location:  OR     TRANSCATHETER AORTIC VALVE IMPLANT ANESTHESIA N/A 2017    Procedure: TRANSCATHETER AORTIC VALVE IMPLANT ANESTHESIA;  Right Transfemoral Approach (Harman Ramsey 3 29mm) Aortic Valve Implant,  Cardiopulmonary Bypass Standby, Transthoracic Echocardiogram by Derian Queen(Echo Tech);  Surgeon: GENERIC ANESTHESIA PROVIDER;  Location: [facility] OR       Family History:    Family History   Problem Relation Age of Onset     Cancer Mother      Cancer Son      Unknown/Adopted Father      Alcoholism Brother        Social History:  Marital Status:   [2]  Social History     Tobacco Use     Smoking status: Former Smoker     Packs/day: 0.50     Years: 15.00     Pack years: 7.50     Types: Cigarettes     Last attempt to quit: 1998     Years since quittin.2     Smokeless tobacco: Never Used     Tobacco comment: quit 15 yrs ago   Substance Use Topics     Alcohol use: No     Alcohol/week: 0.0 oz     Drug use: No        Medications:      acetaminophen (TYLENOL) 325 MG tablet   apixaban ANTICOAGULANT (ELIQUIS) 2.5 MG tablet   ascorbic acid (VITAMIN C) 500 MG CPCR   calcium carbonate (OS-SHELIA 500 MG [place]. [place]) 500 MG tablet   HYDROcodone-acetaminophen (NORCO) 5-325 MG tablet   Leuprolide Acetate (LUPRON DEPOT IM)   lisinopril (PRINIVIL/ZESTRIL) 5 MG tablet   metoprolol tartrate (LOPRESSOR) 25 MG tablet   ondansetron (ZOFRAN-ODT) 4 MG ODT tab   polyethylene glycol (MIRALAX/GLYCOLAX) Packet   predniSONE (DELTASONE) 5 MG tablet   spironolactone (ALDACTONE) 25 MG tablet   tamsulosin (FLOMAX) 0.4 MG capsule   traMADol (ULTRAM) 50 MG tablet   VITAMIN D, CHOLECALCIFEROL, PO         Review of Systems   Constitutional: Positive for fatigue (He states he has beeen fatigued with exertion but not wrose this week than the last few months.). Negative for appetite change, chills and fever.   HENT: Negative.    Eyes: Negative.    Respiratory: Positive for shortness of breath (with exertion - not  wrose than typical.).    Cardiovascular: Negative for chest pain.        Left arm swelling     Skin: Positive for color change (right upper arm).   All other systems reviewed and are negative.      Physical Exam   BP: (!) 165/98  Pulse: 70  Temp: 97.8  F (36.6  C)  Resp: 18  Weight: 66.2 kg (146 lb)  SpO2: 99 %      Physical Exam   Constitutional: He appears well-developed and well-nourished. He appears distressed.   HENT:   Head: Normocephalic.   Cardiovascular: Normal rate, intact distal pulses and normal pulses. An irregularly irregular rhythm present.   Pulmonary/Chest: Effort normal. No stridor. No respiratory distress.   Musculoskeletal:        Right upper arm: He exhibits swelling (with ecchymosis to wrist area. See picture.). He exhibits no laceration.   Nursing note and vitals reviewed.          ED Course        Procedures               Critical Care time:  none               Results for orders placed or performed during the hospital encounter of 01/31/19 (from the past 24 hour(s))   Hemoglobin   Result Value Ref Range    Hemoglobin 8.9 (L) 13.3 - 17.7 g/dL       Assessments & Plan (with Medical Decision Making)  Patient with symptoms of left arm ecchymosis and swelling has been present for several days.  He is not aware of any specific injury but thinks he could have bumped it on the bathroom doorway.  Patient is on Eliquis.  Patient has had chronic fatigue issues especially with exertion but not necessarily more severe than usual this last week.  His exam reveals evidence for ecchymosis and hematoma to the left arm.  There is no vascular compromise nor signs of compartment syndrome on exam.  Patient's hemoglobin was checked and was low at 8.9.  Patient has been at this level in the past but have asked him to have the blood check re-examined tomorrow in his clinic.  We contacted his clinic scheduler and they are going to work him into Dr. Aragon's clinic.  His wife is warned that if they are not able to  contact the clinic or if they have not heard from the clinic by noon tomorrow to come back to the ER to have the hemoglobin rechecked.  We instructed him to elevate the arm on a pillow as much as possible until being rechecked in the clinic or ER.  They both felt comfortable with return to home at this time.     I have reviewed the nursing notes.    I have reviewed the findings, diagnosis, plan and need for follow up with the patient.       Final diagnoses:   Hematoma of arm, left, initial encounter   Anemia, unspecified type     This document serves as a record of services personally performed by Jerome Vogel,*. It was created on their behalf by Sydnie Gonzalez, a trained medical scribe. The creation of this record is based on the provider's personal observations and the statements of the patient. This document has been checked and approved by the attending provider.  Note: Chart documentation done in part with Dragon Voice Recognition software. Although reviewed after completion, some word and grammatical errors may remain.    1/31/2019   [facility] EMERGENCY DEPARTMENT     Jerome Vogel,   01/31/19 1802

## 2019-01-31 NOTE — DISCHARGE INSTRUCTIONS
Please read and follow the handout(s) instructions. Return, if needed, for increased or worsening symptoms and as directed by the handout(s).    Elevate the arm on a pillow to keep level to or above the heart. This will help the swelling.     I left a message with Dr. Aragon to have your red blood cell count rechecked in his clinic tomorrow. If you do not hear from them or connect with them before noon, then return to the ER for recheck of the blood count.

## 2019-01-31 NOTE — ED AVS SNAPSHOT
[facility] Emergency Department  [address] [place] [address]  Phone:  [phone]  Fax:  [phone]                                    Bud Merritt   MRN: [id number]    Department:  [facility] Emergency Department   Date of Visit:  1/31/2019           After Visit Summary Signature Page    I have received my discharge instructions, and my questions have been answered. I have discussed any challenges I see with this plan with the nurse or doctor.    ..........................................................................................................................................  Patient/Patient Representative Signature      ..........................................................................................................................................  Patient Representative Print Name and Relationship to Patient    ..................................................               ................................................  Date                                   Time    ..........................................................................................................................................  Reviewed by Signature/Title    ...................................................              ..............................................  Date                                               Time          22EPIC Rev 08/18

## 2019-01-31 NOTE — ED TRIAGE NOTES
He has a bruise from his shoulder to his fingers on his L arm.  He denies any injury and says both of his arms are painful from arthritis. The bruise hasn't caused more pain.

## 2019-02-01 NOTE — TELEPHONE ENCOUNTER
Spoke with pt and wife, they were advised of the results. Pt wife is questioning why his Hemoglobin went so low and what can she do the keep it from getting low again. Routed to Dr. Aragon to advise.

## 2019-02-01 NOTE — TELEPHONE ENCOUNTER
Bud's wife called to find out what time she can bring him in and would like to know as soon as possible so she can get him ready.

## 2019-02-01 NOTE — TELEPHONE ENCOUNTER
----- Message from Camron Aragon MD sent at 2/1/2019  1:25 PM CST -----  Hemoglobin is now 10.9 this is okay

## 2019-02-01 NOTE — TELEPHONE ENCOUNTER
Called and informed patient after huddling with Dr. Aragon to make sure patient is well hydrated, is eating and that Dr. Aragon would like to have rechecked no later than next Wednesday. Orders placed for future  Arlene Ortega MA

## 2019-02-06 NOTE — LETTER
"    2/6/2019         RE: Bud Merritt  [address] [facility] [address]        Dear Colleague,    Thank you for referring your patient, Bud Merritt, to the [facility]. Please see a copy of my visit note below.    ORTHOPEDIC CONSULT      Chief Complaint: Bud Merritt is a 89 year old male who is being seen for Chief Complaint   Patient presents with     Musculoskeletal Problem     bilateral shoulder pain     Consult     ref: Dr. Mckeon       History of Present Illness:   Bud Merritt is a 89 year old male who is seen in consultation at the request of Dr. Mckeon and Dr. Aragon for evaluation of bilateral shoulder pain and left arm hematoma. Patient seen in the past for knees, first time seeing him for his shoulders.  Presents with his wife.  Patient questionable historian.    #1: long history of many years of bilateral shoulder pain, has seen Dr. Mckeon and Dr. Garcia in the past for this, was told he has bad shoulder arthritis and previous gotten steroid injections which help.  The pain has been getting progressively bad again over the last few months.Has limited mobility baseline and uses a walker but this has been difficult due to shoulder pain. Does not take NSAIDs. Has had physical therapy in the past which helps during physical therapy but no lasting benefit. Very difficult to leave the house for therapy.        #2: left arm/shoulder hematoma: states about 1 to 1.5 weeks ago, woke up and had bruising swelling from this shoulder all the way down to his hand.  No recalled trauma or incident. Possibly \"bumped it on the door\".  Take Eliquis.  Was seen in ED hemoglobin was 8.9 recommended sling, elevation, ice, and compression and f/u/.  Did have hemoglobin checked the next day was up to 10.9 and recheck today 10.6.  Per patient and spouse the bruising is now mostly in the forearm and hand but the swelling is not improved.  No previous history of this.  Also having " chronic baseline shoulder pain.  States the hand and arm feels ok and pain is at the shoulder. No other injury.      Patient's past medical, surgical, social and family histories reviewed.     Past Medical History:   Diagnosis Date     Anemia      Atrial fibrillation (H) 07/01/2016     Cardiac pacemaker 03/10/2017     Cardiomyopathy (H)      CHF (congestive heart failure) (H)      COPD exacerbation (H) 3/2/2017     Depressive disorder      History of prostate cancer      Paroxysmal atrial fibrillation (H) 7/6/2016     Pure hypercholesterolemia      Rheumatoid arthritis(714.0)      Unspecified essential hypertension      Weakness generalized 3/2/2017       Past Surgical History:   Procedure Laterality Date     ARTHROPLASTY KNEE Left 1/12/2016    Procedure: ARTHROPLASTY KNEE;  Surgeon: Cesar Walker DO;  [facility]: [facility]     COLONOSCOPY  08/24/09     EXCISE LESION HEAD N/A 4/9/2018    Procedure: EXCISE LESION HEAD;  Excision Left Scalp Lesion;  Surgeon: Connor Nichole DO;  Location: [facility]     HC COLONOSCOPY THRU STOMA W BIOPSY/CAUTERY TUMOR/POLYP/LESION  8/31/2004     HC REPAIR ING HERNIA,5+Y/O,REDUCIBL  1996    Marlex mesh repair of bilateral inguinal hernias and drainage of bilateral scrotal hydroceles.     HEART CATH FEMORAL CANNULIZATION WITH OPEN STANDBY REPAIR AORTIC VALVE N/A 9/27/2017    Procedure: HEART CATH FEMORAL CANNULIZATION WITH OPEN STANDBY REPAIR AORTIC VALVE;;  Surgeon: Jaron Alejandra MD;  Location: [facility] OR     IMPLANT PACEMAKER  03/10/2017     IRRIGATION AND DEBRIDEMENT SOFT TISSUE LOWER EXTREMITY, COMBINED Left 3/15/2016    Procedure: COMBINED IRRIGATION AND DEBRIDEMENT SOFT TISSUE LOWER EXTREMITY;  Surgeon: Cesar Walker DO;  Location: [facility]     TRANSCATHETER AORTIC VALVE IMPLANT ANESTHESIA N/A 9/27/2017    Procedure: TRANSCATHETER AORTIC VALVE IMPLANT ANESTHESIA;  Right Transfemoral Approach (Harman Ramsey 3 29mm) Aortic Valve Implant,  Cardiopulmonary  Bypass Standby, Transthoracic Echocardiogram by Derian Queen(Echo Tech);  Surgeon: GENERIC ANESTHESIA PROVIDER;  Location: [facility] OR       Medications:    Current Outpatient Medications on File Prior to Visit:  HYDROcodone-acetaminophen (NORCO) 5-325 MG tablet Take 1 tablet by mouth every 6 hours as needed for pain For moderate to severe pain   predniSONE (DELTASONE) 5 MG tablet Take 5 mg by mouth daily.   traMADol (ULTRAM) 50 MG tablet TAKE 1 TABLET 3 TIMES DAILY AS NEEDED FOR MODERATE PAIN   acetaminophen (TYLENOL) 325 MG tablet Take 2 tablets (650 mg) by mouth every 4 hours as needed for mild pain, fever or headaches   apixaban ANTICOAGULANT (ELIQUIS) 2.5 MG tablet Take 1 tablet (2.5 mg) by mouth 2 times daily   ascorbic acid (VITAMIN C) 500 MG CPCR Take 500 mg by mouth daily   calcium carbonate (OS-SHELIA 500 MG [employer]. [place]) 500 MG tablet Take 1,250 mg by mouth daily    Leuprolide Acetate (LUPRON DEPOT IM) Inject into the muscle every 6 months Reported on 5/3/2017   lisinopril (PRINIVIL/ZESTRIL) 5 MG tablet Take 1 tablet (5 mg) by mouth daily   metoprolol tartrate (LOPRESSOR) 25 MG tablet Take 1 tablet (25 mg) by mouth 2 times daily   ondansetron (ZOFRAN-ODT) 4 MG ODT tab Take 1 tablet (4 mg) by mouth every 6 hours as needed for nausea or vomiting   polyethylene glycol (MIRALAX/GLYCOLAX) Packet Take 17 g by mouth daily   spironolactone (ALDACTONE) 25 MG tablet TAKE ONE TABLET BY MOUTH EVERY DAY   tamsulosin (FLOMAX) 0.4 MG capsule Take 1 capsule (0.4 mg) by mouth daily   [] triamcinolone acetonide (KENALOG) 40 MG/ML injection 1 mL (40 mg) by INTRA-ARTICULAR route once for 1 dose   [] triamcinolone acetonide (KENALOG) 40 MG/ML injection 1 mL (40 mg) by INTRA-ARTICULAR route once for 1 dose   [] triamcinolone acetonide (KENALOG) 40 MG/ML injection 1 mL (40 mg) by INTRA-ARTICULAR route once for 1 dose   VITAMIN D, CHOLECALCIFEROL, PO Take 2,000 Units by mouth daily Reported on 5/3/2017     Current  Facility-Administered Medications on File Prior to Visit:  triamcinolone acetonide (KENALOG-40) injection 40 mg   triamcinolone acetonide (KENALOG-40) injection 40 mg       Allergies   Allergen Reactions     Amiodarone Other (See Comments)     Drop in DLCO     Lasix [Furosemide] Blisters     Blisters and sores in his mouth.        Social History     Occupational History     Not on file   Tobacco Use     Smoking status: Former Smoker     Packs/day: 0.50     Years: 15.00     Pack years: 7.50     Types: Cigarettes     Last attempt to quit: 1998     Years since quittin.2     Smokeless tobacco: Never Used     Tobacco comment: quit 15 yrs ago   Substance and Sexual Activity     Alcohol use: No     Alcohol/week: 0.0 oz     Drug use: No     Sexual activity: Yes       Family History   Problem Relation Age of Onset     Cancer Mother      Cancer Son      Unknown/Adopted Father      Alcoholism Brother        REVIEW OF SYSTEMS  10 point review systems performed otherwise negative as noted as per history of present illness.    Physical Exam:  Vitals: /82 (BP Location: Right arm, Patient Position: Sitting, Cuff Size: Adult Large)   Pulse 71   Wt 68 kg (150 lb)   SpO2 98%   BMI 23.49 kg/m     BMI= Body mass index is 23.49 kg/m .  Constitutional: healthy, alert and no acute distress   Psychiatric: mentation appears normal and affect normal/bright  NEURO: no focal deficits  RESP: Normal with easy respirations and no use of accessory muscles to breathe, no audible wheezing or retractions  CV: bilateral upper extemity:  No edema to right arm, swelling, bruising throughout the left arm.   SKIN: Right shoulder :No erythema, rashes, excoriation, or breakdown. No evidence of infection.   Left shoulder: No erythema, rashes, excoriation, or breakdown. No evidence of infection. Bruising throughout Left upper extremity  JOINT/EXTREMITIES:bilateral shoulder:  Elbow, wrist, hand motion intact.  Has very limited AROM less 90  degrees. Swelling and bruising from mid humerus down to hand, compartments are compressible and soft. Does have some residual bruising yet to shoulder.  Did not test ROM past 90 degrees passively due to stiffness, tightness, and increasing pain.  Unable to test RTC strength due to pain. Sensation intact to bilateral hands.  Cap refill <2.  Radial pulse 2+ bilateral.     Lymph: no appreciated lymphedema  GAIT: not tested     Diagnostic Modalities:  None today.  Previous imaging reviewed of 7/24/18 reviewed.  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   Independent visualization of the images was performed.      Impression: bilateral shoulder glenohumeral rotator cuff arthropathy  Left shoulder spontaneous hematoma - improving.     Plan:  All of the above pertinent physical exam and imaging modalities findings was reviewed with Bud and his spouse.  Given how soon he is to the spontaneous bleed to the left shoulder and there is still bruising, swelling to the left arm, did not recommend any injections today. He is unable to attend physical therapy.  Discussed with spouse and patient, recommend 2 more weeks and with some elevation, compression, icing, hematoma should be resolved by then, will consider injections at that time.    Return to clinic 2, week(s), or sooner as needed for changes.  Re-x-ray on return: No    Scribed by:  POLLO Dinh, CNP  11:42 AM  2/6/2019    I attest I have seen and evaluated the patient.  I agree with above impression and plan.  Paul Walker D.O.    Again, thank you for allowing me to participate in the care of your patient.        Sincerely,        Cesar Walker,

## 2019-02-06 NOTE — PROGRESS NOTES
Appropriate assistive devices provided during their visit. yes (Yes, No, N/A) wc (list device)    Exam table and/or cart  placed in the lowest position. yes (Yes, No, N/A)    Brakes on tables/carts/wheelchairs used at all times. yes (Yes, No, N/A)    Non slip footwear applied. na (Yes, No, NA)    Patient was accompanied by staff throughout visit. yes (Yes, No, N/A)    Equipment safety straps used. na (Yes, No, N/A)    Assist with toileting. na (Yes, No, N/A)

## 2019-02-06 NOTE — PROGRESS NOTES
"ORTHOPEDIC CONSULT      Chief Complaint: Bud Merritt is a 89 year old male who is being seen for Chief Complaint   Patient presents with     Musculoskeletal Problem     bilateral shoulder pain     Consult     ref: Dr. Mckeon       History of Present Illness:   Bud Merritt is a 89 year old male who is seen in consultation at the request of Dr. Mckeon and Dr. Aragon for evaluation of bilateral shoulder pain and left arm hematoma. Patient seen in the past for knees, first time seeing him for his shoulders.  Presents with his wife.  Patient questionable historian.    #1: long history of many years of bilateral shoulder pain, has seen Dr. Mckeon and Dr. Garcia in the past for this, was told he has bad shoulder arthritis and previous gotten steroid injections which help.  The pain has been getting progressively bad again over the last few months.Has limited mobility baseline and uses a walker but this has been difficult due to shoulder pain. Does not take NSAIDs. Has had physical therapy in the past which helps during physical therapy but no lasting benefit. Very difficult to leave the house for therapy.        #2: left arm/shoulder hematoma: states about 1 to 1.5 weeks ago, woke up and had bruising swelling from this shoulder all the way down to his hand.  No recalled trauma or incident. Possibly \"bumped it on the door\".  Take Eliquis.  Was seen in ED hemoglobin was 8.9 recommended sling, elevation, ice, and compression and f/u/.  Did have hemoglobin checked the next day was up to 10.9 and recheck today 10.6.  Per patient and spouse the bruising is now mostly in the forearm and hand but the swelling is not improved.  No previous history of this.  Also having chronic baseline shoulder pain.  States the hand and arm feels ok and pain is at the shoulder. No other injury.      Patient's past medical, surgical, social and family histories reviewed.     Past Medical History:   Diagnosis Date     Anemia      " Atrial fibrillation (H) 07/01/2016     Cardiac pacemaker 03/10/2017     Cardiomyopathy (H)      CHF (congestive heart failure) (H)      COPD exacerbation (H) 3/2/2017     Depressive disorder      History of prostate cancer      Paroxysmal atrial fibrillation (H) 7/6/2016     Pure hypercholesterolemia      Rheumatoid arthritis(714.0)      Unspecified essential hypertension      Weakness generalized 3/2/2017       Past Surgical History:   Procedure Laterality Date     ARTHROPLASTY KNEE Left 1/12/2016    Procedure: ARTHROPLASTY KNEE;  Surgeon: Cesar Walker DO;  [facility]     COLONOSCOPY  08/24/09     EXCISE LESION HEAD N/A 4/9/2018    Procedure: EXCISE LESION HEAD;  Excision Left Scalp Lesion;  Surgeon: Connor Nichole DO;  [facility]     HC COLONOSCOPY THRU STOMA W BIOPSY/CAUTERY TUMOR/POLYP/LESION  8/31/2004     HC REPAIR ING HERNIA,5+Y/O,REDUCIBL  1996    Marlex mesh repair of bilateral inguinal hernias and drainage of bilateral scrotal hydroceles.     HEART CATH FEMORAL CANNULIZATION WITH OPEN STANDBY REPAIR AORTIC VALVE N/A 9/27/2017    Procedure: HEART CATH FEMORAL CANNULIZATION WITH OPEN STANDBY REPAIR AORTIC VALVE;;  Surgeon: Jaron Alejandra MD;  Location: [facility] OR     IMPLANT PACEMAKER  03/10/2017     IRRIGATION AND DEBRIDEMENT SOFT TISSUE LOWER EXTREMITY, COMBINED Left 3/15/2016    Procedure: COMBINED IRRIGATION AND DEBRIDEMENT SOFT TISSUE LOWER EXTREMITY;  Surgeon: Cesar Walker DO;  [facility]     TRANSCATHETER AORTIC VALVE IMPLANT ANESTHESIA N/A 9/27/2017    Procedure: TRANSCATHETER AORTIC VALVE IMPLANT ANESTHESIA;  Right Transfemoral Approach (Harman Ramsey 3 29mm) Aortic Valve Implant,  Cardiopulmonary Bypass Standby, Transthoracic Echocardiogram by Derian Queen(Echo Tech);  Surgeon: GENERIC ANESTHESIA PROVIDER;  Location: [facility] OR       Medications:    Current Outpatient Medications on File Prior to Visit:  HYDROcodone-acetaminophen (NORCO) 5-325 MG  tablet Take 1 tablet by mouth every 6 hours as needed for pain For moderate to severe pain   predniSONE (DELTASONE) 5 MG tablet Take 5 mg by mouth daily.   traMADol (ULTRAM) 50 MG tablet TAKE 1 TABLET 3 TIMES DAILY AS NEEDED FOR MODERATE PAIN   acetaminophen (TYLENOL) 325 MG tablet Take 2 tablets (650 mg) by mouth every 4 hours as needed for mild pain, fever or headaches   apixaban ANTICOAGULANT (ELIQUIS) 2.5 MG tablet Take 1 tablet (2.5 mg) by mouth 2 times daily   ascorbic acid (VITAMIN C) 500 MG CPCR Take 500 mg by mouth daily   calcium carbonate (OS-SHELIA 500 MG "[employer]. [place]) 500 MG tablet Take 1,250 mg by mouth daily    Leuprolide Acetate (LUPRON DEPOT IM) Inject into the muscle every 6 months Reported on 5/3/2017   lisinopril (PRINIVIL/ZESTRIL) 5 MG tablet Take 1 tablet (5 mg) by mouth daily   metoprolol tartrate (LOPRESSOR) 25 MG tablet Take 1 tablet (25 mg) by mouth 2 times daily   ondansetron (ZOFRAN-ODT) 4 MG ODT tab Take 1 tablet (4 mg) by mouth every 6 hours as needed for nausea or vomiting   polyethylene glycol (MIRALAX/GLYCOLAX) Packet Take 17 g by mouth daily   spironolactone (ALDACTONE) 25 MG tablet TAKE ONE TABLET BY MOUTH EVERY DAY   tamsulosin (FLOMAX) 0.4 MG capsule Take 1 capsule (0.4 mg) by mouth daily   [] triamcinolone acetonide (KENALOG) 40 MG/ML injection 1 mL (40 mg) by INTRA-ARTICULAR route once for 1 dose   [] triamcinolone acetonide (KENALOG) 40 MG/ML injection 1 mL (40 mg) by INTRA-ARTICULAR route once for 1 dose   [] triamcinolone acetonide (KENALOG) 40 MG/ML injection 1 mL (40 mg) by INTRA-ARTICULAR route once for 1 dose   VITAMIN D, CHOLECALCIFEROL, PO Take 2,000 Units by mouth daily Reported on 5/3/2017     Current Facility-Administered Medications on File Prior to Visit:  triamcinolone acetonide (KENALOG-40) injection 40 mg   triamcinolone acetonide (KENALOG-40) injection 40 mg       Allergies   Allergen Reactions     Amiodarone Other (See Comments)     Drop in DLCO      Lasix [Furosemide] Blisters     Blisters and sores in his mouth.        Social History     Occupational History     Not on file   Tobacco Use     Smoking status: Former Smoker     Packs/day: 0.50     Years: 15.00     Pack years: 7.50     Types: Cigarettes     Last attempt to quit: 1998     Years since quittin.2     Smokeless tobacco: Never Used     Tobacco comment: quit 15 yrs ago   Substance and Sexual Activity     Alcohol use: No     Alcohol/week: 0.0 oz     Drug use: No     Sexual activity: Yes       Family History   Problem Relation Age of Onset     Cancer Mother      Cancer Son      Unknown/Adopted Father      Alcoholism Brother        REVIEW OF SYSTEMS  10 point review systems performed otherwise negative as noted as per history of present illness.    Physical Exam:  Vitals: /82 (BP Location: Right arm, Patient Position: Sitting, Cuff Size: Adult Large)   Pulse 71   Wt 68 kg (150 lb)   SpO2 98%   BMI 23.49 kg/m    BMI= Body mass index is 23.49 kg/m .  Constitutional: healthy, alert and no acute distress   Psychiatric: mentation appears normal and affect normal/bright  NEURO: no focal deficits  RESP: Normal with easy respirations and no use of accessory muscles to breathe, no audible wheezing or retractions  CV: bilateral upper extemity:  No edema to right arm, swelling, bruising throughout the left arm.   SKIN: Right shoulder :No erythema, rashes, excoriation, or breakdown. No evidence of infection.   Left shoulder: No erythema, rashes, excoriation, or breakdown. No evidence of infection. Bruising throughout Left upper extremity  JOINT/EXTREMITIES:bilateral shoulder:  Elbow, wrist, hand motion intact.  Has very limited AROM less 90 degrees. Swelling and bruising from mid humerus down to hand, compartments are compressible and soft. Does have some residual bruising yet to shoulder.  Did not test ROM past 90 degrees passively due to stiffness, tightness, and increasing pain.  Unable to  test RTC strength due to pain. Sensation intact to bilateral hands.  Cap refill <2.  Radial pulse 2+ bilateral.     Lymph: no appreciated lymphedema  GAIT: not tested     Diagnostic Modalities:  None today.  Previous imaging reviewed of 7/24/18 reviewed.  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   Independent visualization of the images was performed.      Impression: bilateral shoulder glenohumeral rotator cuff arthropathy  Left shoulder spontaneous hematoma - improving.     Plan:  All of the above pertinent physical exam and imaging modalities findings was reviewed with Bud and his spouse.  Given how soon he is to the spontaneous bleed to the left shoulder and there is still bruising, swelling to the left arm, did not recommend any injections today. He is unable to attend physical therapy.  Discussed with spouse and patient, recommend 2 more weeks and with some elevation, compression, icing, hematoma should be resolved by then, will consider injections at that time.    Return to clinic 2, week(s), or sooner as needed for changes.  Re-x-ray on return: No    Scribed by:  POLLO Dinh, CNP  11:42 AM  2/6/2019    I attest I have seen and evaluated the patient.  I agree with above impression and plan.  Paul Walker D.O.

## 2019-02-06 NOTE — PROGRESS NOTES
SUBJECTIVE:   Bud Merritt is a 89 year old male who presents to clinic today for the following health issues:      ED/UC Followup:    Facility:  [facility]  Date of visit: 1/31/19  Reason for visit: Hematoma  Current Status: states still has bruising patient states before bruising felt something in heart and then bruising started             Problem list and histories reviewed & adjusted, as indicated.  Additional history: as documented        Reviewed and updated as needed this visit by clinical staff       Reviewed and updated as needed this visit by Provider        SUBJECTIVE:  Bud  is a 89 year old male who presents for: Pleasant gentleman with multiple medical problems follow-up of his total left arm bruising.  He was seen in the emergency room.  Left arm pain.  They did some cardiac workup.  He states he can barely move his left shoulder.  He has known osteoarthritis here.  He does not remember injuring himself but it started with ecchymosis in his upper arm and is now gone down to totally encompass his lower arm and his hand in a glove like fashion.  Does not seem to have much discomfort but not much more than usual.  Wife is very concerned now.  He is on Eliquis.    Past Medical History:   Diagnosis Date     Anemia      Atrial fibrillation (H) 07/01/2016     Cardiac pacemaker 03/10/2017     Cardiomyopathy (H)      CHF (congestive heart failure) (H)      COPD exacerbation (H) 3/2/2017     Depressive disorder      History of prostate cancer      Paroxysmal atrial fibrillation (H) 7/6/2016     Pure hypercholesterolemia      Rheumatoid arthritis(714.0)      Unspecified essential hypertension      Weakness generalized 3/2/2017     Past Surgical History:   Procedure Laterality Date     ARTHROPLASTY KNEE Left 1/12/2016    Procedure: ARTHROPLASTY KNEE;  Surgeon: Cesar Walker DO;  Location: [facility] OR     COLONOSCOPY  08/24/09     EXCISE LESION HEAD N/A 4/9/2018    Procedure: EXCISE LESION HEAD;   Excision Left Scalp Lesion;  Surgeon: Connor Nichole DO;  Location: [facility] OR     HC COLONOSCOPY THRU STOMA W BIOPSY/CAUTERY TUMOR/POLYP/LESION  2004     HC REPAIR ING HERNIA,5+Y/O,REDUCIBL      Marlex mesh repair of bilateral inguinal hernias and drainage of bilateral scrotal hydroceles.     HEART CATH FEMORAL CANNULIZATION WITH OPEN STANDBY REPAIR AORTIC VALVE N/A 2017    Procedure: HEART CATH FEMORAL CANNULIZATION WITH OPEN STANDBY REPAIR AORTIC VALVE;;  Surgeon: Jaron Alejandra MD;  Location: [facility] OR     IMPLANT PACEMAKER  03/10/2017     IRRIGATION AND DEBRIDEMENT SOFT TISSUE LOWER EXTREMITY, COMBINED Left 3/15/2016    Procedure: COMBINED IRRIGATION AND DEBRIDEMENT SOFT TISSUE LOWER EXTREMITY;  Surgeon: Cesar Walker DO;  Location: [facility] OR     TRANSCATHETER AORTIC VALVE IMPLANT ANESTHESIA N/A 2017    Procedure: TRANSCATHETER AORTIC VALVE IMPLANT ANESTHESIA;  Right Transfemoral Approach (Harman Ramsey 3 29mm) Aortic Valve Implant,  Cardiopulmonary Bypass Standby, Transthoracic Echocardiogram by Derian Queen(Echo Tech);  Surgeon: GENERIC ANESTHESIA PROVIDER;  Location: [facility] OR     Social History     Tobacco Use     Smoking status: Former Smoker     Packs/day: 0.50     Years: 15.00     Pack years: 7.50     Types: Cigarettes     Last attempt to quit: 1998     Years since quittin.2     Smokeless tobacco: Never Used     Tobacco comment: quit 15 yrs ago   Substance Use Topics     Alcohol use: No     Alcohol/week: 0.0 oz     Current Outpatient Medications   Medication Sig Dispense Refill     acetaminophen (TYLENOL) 325 MG tablet Take 2 tablets (650 mg) by mouth every 4 hours as needed for mild pain, fever or headaches 100 tablet      apixaban ANTICOAGULANT (ELIQUIS) 2.5 MG tablet Take 1 tablet (2.5 mg) by mouth 2 times daily 180 tablet 3     ascorbic acid (VITAMIN C) 500 MG CPCR Take 500 mg by mouth daily       calcium carbonate (OS-SHELIA 500 MG [place]) 500 MG tablet  Take 1,250 mg by mouth daily        HYDROcodone-acetaminophen (NORCO) 5-325 MG tablet Take 1 tablet by mouth every 6 hours as needed for pain For moderate to severe pain 45 tablet 0     Leuprolide Acetate (LUPRON DEPOT IM) Inject into the muscle every 6 months Reported on 5/3/2017       lisinopril (PRINIVIL/ZESTRIL) 5 MG tablet Take 1 tablet (5 mg) by mouth daily 90 tablet 3     metoprolol tartrate (LOPRESSOR) 25 MG tablet Take 1 tablet (25 mg) by mouth 2 times daily 180 tablet 3     ondansetron (ZOFRAN-ODT) 4 MG ODT tab Take 1 tablet (4 mg) by mouth every 6 hours as needed for nausea or vomiting 10 tablet 0     polyethylene glycol (MIRALAX/GLYCOLAX) Packet Take 17 g by mouth daily 30 packet 0     predniSONE (DELTASONE) 5 MG tablet Take 5 mg by mouth daily. 100 tablet 4     spironolactone (ALDACTONE) 25 MG tablet TAKE ONE TABLET BY MOUTH EVERY DAY 30 tablet 6     tamsulosin (FLOMAX) 0.4 MG capsule Take 1 capsule (0.4 mg) by mouth daily 60 capsule 0     traMADol (ULTRAM) 50 MG tablet TAKE 1 TABLET 3 TIMES DAILY AS NEEDED FOR MODERATE PAIN 90 tablet 0     VITAMIN D, CHOLECALCIFEROL, PO Take 2,000 Units by mouth daily Reported on 5/3/2017         REVIEW OF SYSTEMS:   5 point ROS negative except as noted above in HPI, including Gen., Resp, CV, GI &  system review.     OBJECTIVE:  Vitals: /68   Pulse 70   Temp 97.8  F (36.6  C) (Temporal)   Resp 12   SpO2 96%   BMI= There is no height or weight on file to calculate BMI.  He is alert does not appear to be in any real distress.  His lungs with good air exchange.  His entire left arm has ecchymosis.  Down to his hand and fingertips.  But he has range of motion.  Left shoulder shows some swelling minimal range of motion.  X-ray shows severe osteoarthritis.  No fracture.    ASSESSMENT:  Left arm injury with ecchymosis #2 paroxysmal atrial fibrillation on anticoagulation #3 rheumatoid arthritis    PLAN:  Reassured that there is no fracture here.  Refilled his pain  medications.  This really helps him.  They are concerned that he would be running out.  They have had good workup on this arm and appears to be no fracture and obviously the bleeding is large because of his anticoagulant therapy.  Follow-up if further problems or shortness of breath.        Camron Aragon MD  [facility]

## 2019-02-18 NOTE — TELEPHONE ENCOUNTER
Reason for Call:  Other     Detailed comments: home care nurse is calling stating that the patient's wife said it was discussed at his last appt to start home care. Orders have not been placed. If you would like there to be in home skilled nursing please call [phone]    Phone Number Patient can be reached at: Other phone number:  [phone]    Best Time: any    Can we leave a detailed message on this number? YES    Call taken on 2/18/2019 at 3:10 PM by Neelam Ponce

## 2019-02-18 NOTE — TELEPHONE ENCOUNTER
Huddled with Dr. Aragon and verbal ok to place home care orders. Called and spoke to Ashley at home care to give orders.   Arlene Ortega MA

## 2019-02-19 NOTE — TELEPHONE ENCOUNTER
Home Care wanted to let Dr. Aragon know that they will not be out at the patients house tomorrow due to the weather and will be returning on 2/21/18. If you have any questions please call Ashley with Home care at [phone].       Thank you,  Oriana Cash   for [facility]

## 2019-02-21 NOTE — PROGRESS NOTES
[facility] now requests orders and shares plan of care/discharge summaries for some patients through [employer].  Please REPLY TO THIS MESSAGE OR ROUTE BACK TO THE AUTHOR in order to give authorization for orders when needed.  This is considered a verbal order, you will still receive a faxed copy of orders for signature.  Thank you for your assistance in improving collaboration for our patients.    Can these problems be addressed as well?    SYMPTOMS     During visit pt c/o of shoulder pain in bilat shoulder and RLE, states he runs out of his Percocet before the end of the month and insurance won't refill it any sooner.  Rx label states 45 tabs, but pt has script for up to 4x/day, which would be 120 tabs, could pt get a larger number of tabs filled monthly?     Also, c/o of depression, feeling down frequently and having  low energy, could pt take something for depression?      Thanks,   Yvonne Rogers RN  [phone]

## 2019-02-21 NOTE — PROGRESS NOTES
[facility] now requests orders and shares plan of care/discharge summaries for some patients through [employer].  Please REPLY TO THIS MESSAGE OR ROUTE BACK TO THE AUTHOR in order to give authorization for orders when needed.  This is considered a verbal order, you will still receive a faxed copy of orders for signature.  Thank you for your assistance in improving collaboration for our patients.    ORDER 2w2, 1w3, 3prns, PT/OT eval and treat, STEVIE nieves MD SUMMARY/PLAN OF CARE    SN for pain mgmt/education, safety education, CVP assessment/education, skin assessment, PU prevention education, nutrition/hydration, GI/ status.    PT eval for strengthening, balance/gait training, home safety.    OT eval for strengthening, ADL safety and equipment needs.    STEVIE for community resources for c/g relief, transportation services, POLST information.    SYMPTOMS    During visit pt c/o of shoulder pain in bilat shoulder and RLE, states he runs out of his Percocet before the end of the month and insurance won't refill it any sooner.  Rx label states 45 tabs, but pt has script for up to 4x/day, which would be 120 tabs, could pt get a larger number of tabs filled monthly?    Also, c/o of depression, feeling down frequently and having  low energy, could pt take something for depression?      Thanks,     Yvonne Rogers RN

## 2019-02-27 NOTE — PROGRESS NOTES
Verbal ok per Dr. Aragon, patient will have appointment to have face to face for hospital bed 3/1/19  Arlene Ortega MA

## 2019-03-05 NOTE — TELEPHONE ENCOUNTER
Reason for Call:  Form, our goal is to have forms completed with 72 hours, however, some forms may require a visit or additional information.    Type of letter, form or note:  Home Health Certification    Who is the form from?: Home care    Where did the form come from: form was faxed in    What clinic location was the form placed at?: [facility]    Where the form was placed: Given to MA/RN    What number is listed as a contact on the form?:        Additional comments:     Call taken on 3/5/2019 at 9:40 AM by Viji Vallejo

## 2019-03-07 NOTE — TELEPHONE ENCOUNTER
Norco 5-325 MG       Last Written Prescription Date:  2/22/19  Last Fill Quantity: 60,   # refills: 0  Last Office Visit: 2/6/19  Future Office visit:    Next 5 appointments (look out 90 days)    Mar 12, 2019 10:45 AM CDT  Return Visit with POLLO Garcia CNP  [facility] ([address] [id number]  [phone]           Routing refill request to provider for review/approval because:  Drug not on the [facility], [facility] or [facility] refill protocol or controlled substance  Tramadol 50 MG       Last Written Prescription Date:  2/6/19  Last Fill Quantity: 90,   # refills: 0  Last Office Visit: 2/6/19  Future Office visit:    Next 5 appointments (look out 90 days)    Mar 12, 2019 10:45 AM CDT  Return Visit with POLLO Garcia CNP  [facility] ([facility]) [address] [place] [id number]  [phone]           Routing refill request to provider for review/approval because:  Drug not on the [facility], [facility] or [facility] refill protocol or controlled substance

## 2019-03-11 NOTE — TELEPHONE ENCOUNTER
Medication reconciliation completed.  Placed in provider folder for review.  Will close encounter at this time.    Valentina Wynne RN

## 2019-03-12 NOTE — LETTER
3/12/2019    Camron Aragon MD  [address] [facility] [id number]    RE: Bud Merritt       Dear Colleague,    I had the pleasure of seeing Bud Merritt in the [facility].    Cardiology Clinic Progress Note  Bud Merritt MRN# [id number]   YOB: 1927 Age: 89 year old          Assessment and Plan:     1. Chest pressure at rest and history of non obstructive CAD    Per patient and wife's report chest pressure occurs almost daily in the afternoon. It is occasionally associated with meals. No exertional symptoms    Coronary angiogram in 2016 showed 40-50% RCA stenosis and 40% left main stenosis    Trial of Imdur 15 mg daily.    Hold Lisinopril due to soft blood pressure    Follow up in 2-3 weeks      2. Severe aortic stenosis    Status post TAVR with a Harman Ramsey 3 29 mm valve in September 2017.    Echocardiogram from 8/2018 showed normal gradients    3. Paroxysmal atrial fibrillation with controlled ventricular response     Status post AV node ablation and biventricular pacemaker    CVA prophylaxis with Eliquis 2.5 mg BID    4. Tachycardia mediated cardiomyopathy    Resolution of cardiomyopathy with LVEF 55-60% from 8/2018    Previously on Lisinopril 5 mg daily, metoprolol 25 mg BID, spironolactone 25 mg daily. Will hold lisinopril as above         History of Presenting Illness:    Bud Merritt is a very pleasant 89 year old patient of Dr. Colon who presents today for a routine 6 month follow up. He has a past medical history notable for aortic stenosis status post TAVR with a Harman Ramsey 3 29 mm valve in September 2017. He also has a history of nonobstructive coronary artery disease, paroxysmal atrial fibrillation (previously on Tikosyn but was discontinued in September 2018 due to breakthrough atrial fibrillation) and tachycardia mediated cardiomyopathy with a previously severely reduced LV systolic function that proved.  He also has a  history of PVCs in the past and was previously treated with amiodarone, but this was discontinued due to a decline in DLCO.  He is eventually seen by electrophysiology and underwent an AV lisandro ablation and a by BiV pacemaker.    Coronary angiography in 2016 showed 40-50% RCA stenosis and 40% left main stenosis. He had an echocardiogram in August 2018 that noted that his LVEF remains preserved at 55-60% with mild to moderate concentric LVH.  Descending aorta was mildly dilated and there was suggestion of a periprosthetic leak from the prosthetic valve with normal gradient.    His most recent pacemaker check was in December 2018 that showed he was atrially paced 13% of the time and biventricular paced 99% at the time.  His presenting rhythm was atrial fibrillation that was ventricularly paced.  His battery status was estimated for 3-1/2-5 years.  There were 84 mode switches comprising of 71% of the time with his longest episode being greater than 99 hours.  While in mode switches he had a controlled ventricular response.  He is due for repeat device check and is scheduled later in March.    Today in clinic he presents with his ceferino wife Tierra.  His biggest complaints today are ongoing nausea that has been chronic for several years and arthritis specifically in his right leg and left shoulder.  At times he feels unsteady on his feet due to his orthopedic limitations secondary to his arthritis, and he uses a walker for ambulation..  He denies any shortness of breath on exertion, palpitations, dizziness or lightheadedness.  He does note daily chest pressure that primarily occurs in the afternoon and occasionally is associated with meals.  The chest pressure is not associated with nausea or palpitations.               Review of Systems:   Review of Systems:  Skin:  Positive for bruising   Eyes:  Positive for glasses  ENT:  Positive for hearing loss  Respiratory:  Positive for shortness of breath;dyspnea on  exertion  Cardiovascular:  Negative for;palpitations Positive for;chest pain;edema;lightheadedness;dizziness;exercise intolerance;fatigue  Gastroenterology: Positive for poor appetite;nausea  Genitourinary:  Positive for nocturia;urinary frequency;decreased urinary stream;prostate problem  Musculoskeletal:  Positive for arthritis;joint pain;joint swelling;muscular weakness  Neurologic:  Positive for headaches  Psychiatric:  Positive for anxiety;sleep disturbances;depression  Heme/Lymph/Imm:  Positive for easy bruising  Endocrine:  Negative              Physical Exam:     Vitals: /80 (BP Location: Right arm, Patient Position: Fowlers, Cuff Size: Adult Regular)   Pulse 70   Wt 69.7 kg (153 lb 11.2 oz)   SpO2 93%   BMI 24.07 kg/m     Constitutional:  cooperative frail      Skin:  warm and dry to the touch   Bruising on left hand and arm    Head:  normocephalic, no masses or lesions        ENT:  no pallor or cyanosis        Neck:  no stiffness        Chest:  clear to auscultation        Cardiac: regular rhythm       systolic murmur;grade 2;LUSB          Abdomen:  abdomen soft        Vascular: pulses full and equal, no bruits auscultated                                      Extremities and Back:       R>L lower extremity edema    Neurological:  affect appropriate               Medications:     Current Outpatient Medications   Medication Sig Dispense Refill     acetaminophen (TYLENOL) 325 MG tablet Take 2 tablets (650 mg) by mouth every 4 hours as needed for mild pain, fever or headaches 100 tablet      apixaban ANTICOAGULANT (ELIQUIS) 2.5 MG tablet Take 1 tablet (2.5 mg) by mouth 2 times daily 180 tablet 3     ascorbic acid (VITAMIN C) 500 MG CPCR Take 500 mg by mouth daily       calcium carbonate (OS-SHELIA 500 MG [place]. [place]) 500 MG tablet Take 1,250 mg by mouth daily        HYDROcodone-acetaminophen (NORCO) 5-325 MG tablet Take 1 tablet by mouth every 6 hours as needed for pain For moderate to severe pain 60  tablet 0     isosorbide mononitrate (IMDUR) 30 MG 24 hr tablet Take 0.5 tablets (15 mg) by mouth daily 15 tablet 11     Leuprolide Acetate (LUPRON DEPOT IM) Inject into the muscle every 6 months Reported on 5/3/2017       metoprolol tartrate (LOPRESSOR) 25 MG tablet Take 1 tablet (25 mg) by mouth 2 times daily 180 tablet 3     ondansetron (ZOFRAN-ODT) 4 MG ODT tab Take 1 tablet (4 mg) by mouth every 6 hours as needed for nausea or vomiting 10 tablet 0     polyethylene glycol (MIRALAX/GLYCOLAX) Packet Take 17 g by mouth daily 30 packet 0     predniSONE (DELTASONE) 5 MG tablet Take 5 mg by mouth daily. 100 tablet 4     spironolactone (ALDACTONE) 25 MG tablet TAKE ONE TABLET BY MOUTH EVERY DAY 30 tablet 6     tamsulosin (FLOMAX) 0.4 MG capsule Take 1 capsule (0.4 mg) by mouth daily 60 capsule 0     traMADol (ULTRAM) 50 MG tablet TAKE 1 TABLET 3 TIMES DAILY AS NEEDED FOR MODERATE PAIN 90 tablet 0     VITAMIN D, CHOLECALCIFEROL, PO Take 2,000 Units by mouth daily Reported on 5/3/2017         Family History   Problem Relation Age of Onset     Cancer Mother      Cancer Son      Unknown/Adopted Father      Alcoholism Brother        Social History     Socioeconomic History     Marital status:      Spouse name: Not on file     Number of children: Not on file     Years of education: Not on file     Highest education level: Not on file   Occupational History     Not on file   Social Needs     Financial resource strain: Not on file     Food insecurity:     Worry: Not on file     Inability: Not on file     Transportation needs:     Medical: Not on file     Non-medical: Not on file   Tobacco Use     Smoking status: Former Smoker     Packs/day: 0.50     Years: 15.00     Pack years: 7.50     Types: Cigarettes     Last attempt to quit: 1998     Years since quittin.3     Smokeless tobacco: Never Used     Tobacco comment: quit 15 yrs ago   Substance and Sexual Activity     Alcohol use: No     Alcohol/week: 0.0 oz      Drug use: No     Sexual activity: Yes   Lifestyle     Physical activity:     Days per week: Not on file     Minutes per session: Not on file     Stress: Not on file   Relationships     Social connections:     Talks on phone: Not on file     Gets together: Not on file     Attends Yarsani service: Not on file     Active member of club or organization: Not on file     Attends meetings of clubs or organizations: Not on file     Relationship status: Not on file     Intimate partner violence:     Fear of current or ex partner: Not on file     Emotionally abused: Not on file     Physically abused: Not on file     Forced sexual activity: Not on file   Other Topics Concern     Parent/sibling w/ CABG, MI or angioplasty before 65F 55M? Not Asked      Service Not Asked     Blood Transfusions Not Asked     Caffeine Concern Yes     Comment: 8 cups coffee day     Occupational Exposure Not Asked     Hobby Hazards Not Asked     Sleep Concern Yes     Stress Concern Not Asked     Weight Concern Yes     Comment: weight loss     Special Diet No     Back Care Not Asked     Exercise Yes     Comment: split firewood daily     Bike Helmet Not Asked     Seat Belt Not Asked     Self-Exams Not Asked   Social History Narrative     Not on file            Past Medical History:     Past Medical History:   Diagnosis Date     Anemia      Atrial fibrillation (H) 07/01/2016     Cardiac pacemaker 03/10/2017     Cardiomyopathy (H)      CHF (congestive heart failure) (H)      COPD exacerbation (H) 3/2/2017     Depressive disorder      History of prostate cancer      Paroxysmal atrial fibrillation (H) 7/6/2016     Pure hypercholesterolemia      Rheumatoid arthritis(714.0)      Unspecified essential hypertension      Weakness generalized 3/2/2017              Past Surgical History:     Past Surgical History:   Procedure Laterality Date     ARTHROPLASTY KNEE Left 1/12/2016    Procedure: ARTHROPLASTY KNEE;  Surgeon: Cesar Walker DO;   Location: [facility] OR     COLONOSCOPY  08/24/09     EXCISE LESION HEAD N/A 4/9/2018    Procedure: EXCISE LESION HEAD;  Excision Left Scalp Lesion;  Surgeon: Connor Nichole DO;  Location: [facility] OR     HC COLONOSCOPY THRU STOMA W BIOPSY/CAUTERY TUMOR/POLYP/LESION  8/31/2004     HC REPAIR ING HERNIA,5+Y/O,REDUCIBL  1996    Marlex mesh repair of bilateral inguinal hernias and drainage of bilateral scrotal hydroceles.     HEART CATH FEMORAL CANNULIZATION WITH OPEN STANDBY REPAIR AORTIC VALVE N/A 9/27/2017    Procedure: HEART CATH FEMORAL CANNULIZATION WITH OPEN STANDBY REPAIR AORTIC VALVE;;  Surgeon: Jaron Alejandra MD;  Location: [facility] OR     IMPLANT PACEMAKER  03/10/2017     IRRIGATION AND DEBRIDEMENT SOFT TISSUE LOWER EXTREMITY, COMBINED Left 3/15/2016    Procedure: COMBINED IRRIGATION AND DEBRIDEMENT SOFT TISSUE LOWER EXTREMITY;  Surgeon: Cesar Walker DO;  Location: [facility] OR     TRANSCATHETER AORTIC VALVE IMPLANT ANESTHESIA N/A 9/27/2017    Procedure: TRANSCATHETER AORTIC VALVE IMPLANT ANESTHESIA;  Right Transfemoral Approach (Harman Ramsey 3 29mm) Aortic Valve Implant,  Cardiopulmonary Bypass Standby, Transthoracic Echocardiogram by Derian Queen(Echo Tech);  Surgeon: GENERIC ANESTHESIA PROVIDER;  Location: [facility] OR              Allergies:   Amiodarone and Lasix [furosemide]       Data:   All laboratory data reviewed:    Recent Labs   Lab Test 09/25/17  1622 03/06/17  0848 01/23/17  1338 08/11/16  0856 07/14/16  0934 07/07/16  1028  08/02/13  1345 02/10/12  0723   LDL  --  74  --   --   --   --   --  124 124   HDL  --  34*  --   --   --   --   --  68 69   NHDL  --  105  --   --   --   --   --   --   --    CHOL  --  139  --   --   --   --   --  204* 212*   TRIG  --  156*  --   --   --   --   --  58 97   TSH  --   --  1.07 2.23  --  1.37   < >  --   --    NTBNP 870*  --   --   --  2,365* 1,777*   < >  --   --     < > = values in this interval not displayed.       Lab Results   Component Value Date    WBC  5.5 12/05/2018    RBC 3.67 (L) 12/05/2018    HGB 10.6 (L) 02/06/2019    HCT 36.5 (L) 12/05/2018     12/05/2018    MCH 31.3 12/05/2018    MCHC 31.5 12/05/2018    RDW 15.4 (H) 12/05/2018     12/05/2018       Lab Results   Component Value Date     12/05/2018    POTASSIUM 4.8 12/05/2018    CHLORIDE 99 12/05/2018    CO2 26 12/05/2018    ANIONGAP 10 12/05/2018     (H) 12/05/2018    BUN 18 12/05/2018    CR 0.92 12/05/2018    GFRESTIMATED 77 12/05/2018    GFRESTBLACK >90 12/05/2018    SHELIA 8.7 12/05/2018      Lab Results   Component Value Date    AST 28 12/05/2018    ALT 22 12/05/2018       No results found for: A1C    Lab Results   Component Value Date    INR 0.97 09/27/2017    INR 1.08 06/30/2017         Thank you for allowing me to participate in the care of your patient.    Sincerely,     POLLO FUNES [facility]

## 2019-03-12 NOTE — LETTER
3/12/2019    Camron Aragon MD  [address] [facility] [id number]    RE: Bud Merritt       Dear Colleague,    I had the pleasure of seeing Bud Merritt in the [facility].    Cardiology Clinic Progress Note  Bud Merritt MRN# [id number]   YOB: 1927 Age: 89 year old          Assessment and Plan:     1. Chest pressure at rest and history of non obstructive CAD    Per patient and wife's report chest pressure occurs almost daily in the afternoon. It is occasionally associated with meals. No exertional symptoms    Coronary angiogram in 2016 showed 40-50% RCA stenosis and 40% left main stenosis    Trial of Imdur 15 mg daily.    Hold Lisinopril due to soft blood pressure    Follow up in 2-3 weeks      2. Severe aortic stenosis    Status post TAVR with a Harman Ramsey 3 29 mm valve in September 2017.    Echocardiogram from 8/2018 showed normal gradients    3. Paroxysmal atrial fibrillation with controlled ventricular response     Status post AV node ablation and biventricular pacemaker    CVA prophylaxis with Eliquis 2.5 mg BID    4. Tachycardia mediated cardiomyopathy    Resolution of cardiomyopathy with LVEF 55-60% from 8/2018    Previously on Lisinopril 5 mg daily, metoprolol 25 mg BID, spironolactone 25 mg daily. Will hold lisinopril as above         History of Presenting Illness:    Bud Merritt is a very pleasant 89 year old patient of Dr. Colon who presents today for a routine 6 month follow up. He has a past medical history notable for aortic stenosis status post TAVR with a Harman Ramsey 3 29 mm valve in September 2017. He also has a history of nonobstructive coronary artery disease, paroxysmal atrial fibrillation (previously on Tikosyn but was discontinued in September 2018 due to breakthrough atrial fibrillation) and tachycardia mediated cardiomyopathy with a previously severely reduced LV systolic function that proved.  He also has a  history of PVCs in the past and was previously treated with amiodarone, but this was discontinued due to a decline in DLCO.  He is eventually seen by electrophysiology and underwent an AV lisandro ablation and a by BiV pacemaker.    Coronary angiography in 2016 showed 40-50% RCA stenosis and 40% left main stenosis. He had an echocardiogram in August 2018 that noted that his LVEF remains preserved at 55-60% with mild to moderate concentric LVH.  Descending aorta was mildly dilated and there was suggestion of a periprosthetic leak from the prosthetic valve with normal gradient.    His most recent pacemaker check was in December 2018 that showed he was atrially paced 13% of the time and biventricular paced 99% at the time.  His presenting rhythm was atrial fibrillation that was ventricularly paced.  His battery status was estimated for 3-1/2-5 years.  There were 84 mode switches comprising of 71% of the time with his longest episode being greater than 99 hours.  While in mode switches he had a controlled ventricular response.  He is due for repeat device check and is scheduled later in March.    Today in clinic he presents with his ceferino wife Tierra.  His biggest complaints today are ongoing nausea that has been chronic for several years and arthritis specifically in his right leg and left shoulder.  At times he feels unsteady on his feet due to his orthopedic limitations secondary to his arthritis, and he uses a walker for ambulation..  He denies any shortness of breath on exertion, palpitations, dizziness or lightheadedness.  He does note daily chest pressure that primarily occurs in the afternoon and occasionally is associated with meals.  The chest pressure is not associated with nausea or palpitations.               Review of Systems:   Review of Systems:  Skin:  Positive for bruising   Eyes:  Positive for glasses  ENT:  Positive for hearing loss  Respiratory:  Positive for shortness of breath;dyspnea on  exertion  Cardiovascular:  Negative for;palpitations Positive for;chest pain;edema;lightheadedness;dizziness;exercise intolerance;fatigue  Gastroenterology: Positive for poor appetite;nausea  Genitourinary:  Positive for nocturia;urinary frequency;decreased urinary stream;prostate problem  Musculoskeletal:  Positive for arthritis;joint pain;joint swelling;muscular weakness  Neurologic:  Positive for headaches  Psychiatric:  Positive for anxiety;sleep disturbances;depression  Heme/Lymph/Imm:  Positive for easy bruising  Endocrine:  Negative              Physical Exam:     Vitals: /80 (BP Location: Right arm, Patient Position: Fowlers, Cuff Size: Adult Regular)   Pulse 70   Wt 69.7 kg (153 lb 11.2 oz)   SpO2 93%   BMI 24.07 kg/m     Constitutional:  cooperative frail      Skin:  warm and dry to the touch   Bruising on left hand and arm    Head:  normocephalic, no masses or lesions        ENT:  no pallor or cyanosis        Neck:  no stiffness        Chest:  clear to auscultation        Cardiac: regular rhythm       systolic murmur;grade 2;LUSB          Abdomen:  abdomen soft        Vascular: pulses full and equal, no bruits auscultated                                      Extremities and Back:       R>L lower extremity edema    Neurological:  affect appropriate               Medications:     Current Outpatient Medications   Medication Sig Dispense Refill     acetaminophen (TYLENOL) 325 MG tablet Take 2 tablets (650 mg) by mouth every 4 hours as needed for mild pain, fever or headaches 100 tablet      apixaban ANTICOAGULANT (ELIQUIS) 2.5 MG tablet Take 1 tablet (2.5 mg) by mouth 2 times daily 180 tablet 3     ascorbic acid (VITAMIN C) 500 MG CPCR Take 500 mg by mouth daily       calcium carbonate (OS-SHELIA 500 MG [place]. [place]) 500 MG tablet Take 1,250 mg by mouth daily        HYDROcodone-acetaminophen (NORCO) 5-325 MG tablet Take 1 tablet by mouth every 6 hours as needed for pain For moderate to severe pain 60  tablet 0     isosorbide mononitrate (IMDUR) 30 MG 24 hr tablet Take 0.5 tablets (15 mg) by mouth daily 15 tablet 11     Leuprolide Acetate (LUPRON DEPOT IM) Inject into the muscle every 6 months Reported on 5/3/2017       metoprolol tartrate (LOPRESSOR) 25 MG tablet Take 1 tablet (25 mg) by mouth 2 times daily 180 tablet 3     ondansetron (ZOFRAN-ODT) 4 MG ODT tab Take 1 tablet (4 mg) by mouth every 6 hours as needed for nausea or vomiting 10 tablet 0     polyethylene glycol (MIRALAX/GLYCOLAX) Packet Take 17 g by mouth daily 30 packet 0     predniSONE (DELTASONE) 5 MG tablet Take 5 mg by mouth daily. 100 tablet 4     spironolactone (ALDACTONE) 25 MG tablet TAKE ONE TABLET BY MOUTH EVERY DAY 30 tablet 6     tamsulosin (FLOMAX) 0.4 MG capsule Take 1 capsule (0.4 mg) by mouth daily 60 capsule 0     traMADol (ULTRAM) 50 MG tablet TAKE 1 TABLET 3 TIMES DAILY AS NEEDED FOR MODERATE PAIN 90 tablet 0     VITAMIN D, CHOLECALCIFEROL, PO Take 2,000 Units by mouth daily Reported on 5/3/2017         Family History   Problem Relation Age of Onset     Cancer Mother      Cancer Son      Unknown/Adopted Father      Alcoholism Brother        Social History     Socioeconomic History     Marital status:      Spouse name: Not on file     Number of children: Not on file     Years of education: Not on file     Highest education level: Not on file   Occupational History     Not on file   Social Needs     Financial resource strain: Not on file     Food insecurity:     Worry: Not on file     Inability: Not on file     Transportation needs:     Medical: Not on file     Non-medical: Not on file   Tobacco Use     Smoking status: Former Smoker     Packs/day: 0.50     Years: 15.00     Pack years: 7.50     Types: Cigarettes     Last attempt to quit: 1998     Years since quittin.3     Smokeless tobacco: Never Used     Tobacco comment: quit 15 yrs ago   Substance and Sexual Activity     Alcohol use: No     Alcohol/week: 0.0 oz      Drug use: No     Sexual activity: Yes   Lifestyle     Physical activity:     Days per week: Not on file     Minutes per session: Not on file     Stress: Not on file   Relationships     Social connections:     Talks on phone: Not on file     Gets together: Not on file     Attends Oriental orthodox service: Not on file     Active member of club or organization: Not on file     Attends meetings of clubs or organizations: Not on file     Relationship status: Not on file     Intimate partner violence:     Fear of current or ex partner: Not on file     Emotionally abused: Not on file     Physically abused: Not on file     Forced sexual activity: Not on file   Other Topics Concern     Parent/sibling w/ CABG, MI or angioplasty before 65F 55M? Not Asked      Service Not Asked     Blood Transfusions Not Asked     Caffeine Concern Yes     Comment: 8 cups coffee day     Occupational Exposure Not Asked     Hobby Hazards Not Asked     Sleep Concern Yes     Stress Concern Not Asked     Weight Concern Yes     Comment: weight loss     Special Diet No     Back Care Not Asked     Exercise Yes     Comment: split firewood daily     Bike Helmet Not Asked     Seat Belt Not Asked     Self-Exams Not Asked   Social History Narrative     Not on file            Past Medical History:     Past Medical History:   Diagnosis Date     Anemia      Atrial fibrillation (H) 07/01/2016     Cardiac pacemaker 03/10/2017     Cardiomyopathy (H)      CHF (congestive heart failure) (H)      COPD exacerbation (H) 3/2/2017     Depressive disorder      History of prostate cancer      Paroxysmal atrial fibrillation (H) 7/6/2016     Pure hypercholesterolemia      Rheumatoid arthritis(714.0)      Unspecified essential hypertension      Weakness generalized 3/2/2017              Past Surgical History:     Past Surgical History:   Procedure Laterality Date     ARTHROPLASTY KNEE Left 1/12/2016    Procedure: ARTHROPLASTY KNEE;  Surgeon: Cesar Walker DO;   Location: [facility] OR     COLONOSCOPY  08/24/09     EXCISE LESION HEAD N/A 4/9/2018    Procedure: EXCISE LESION HEAD;  Excision Left Scalp Lesion;  Surgeon: Connor Nichole DO;  Location: [facility] OR     HC COLONOSCOPY THRU STOMA W BIOPSY/CAUTERY TUMOR/POLYP/LESION  8/31/2004     HC REPAIR ING HERNIA,5+Y/O,REDUCIBL  1996    Marlex mesh repair of bilateral inguinal hernias and drainage of bilateral scrotal hydroceles.     HEART CATH FEMORAL CANNULIZATION WITH OPEN STANDBY REPAIR AORTIC VALVE N/A 9/27/2017    Procedure: HEART CATH FEMORAL CANNULIZATION WITH OPEN STANDBY REPAIR AORTIC VALVE;;  Surgeon: Jaron Alejandra MD;  Location: [facility] OR     IMPLANT PACEMAKER  03/10/2017     IRRIGATION AND DEBRIDEMENT SOFT TISSUE LOWER EXTREMITY, COMBINED Left 3/15/2016    Procedure: COMBINED IRRIGATION AND DEBRIDEMENT SOFT TISSUE LOWER EXTREMITY;  Surgeon: Cesar Walker DO;  Location: [facility] OR     TRANSCATHETER AORTIC VALVE IMPLANT ANESTHESIA N/A 9/27/2017    Procedure: TRANSCATHETER AORTIC VALVE IMPLANT ANESTHESIA;  Right Transfemoral Approach (Harman Ramsey 3 29mm) Aortic Valve Implant,  Cardiopulmonary Bypass Standby, Transthoracic Echocardiogram by Derian Queen(Echo Tech);  Surgeon: GENERIC ANESTHESIA PROVIDER;  Location: [facility] OR              Allergies:   Amiodarone and Lasix [furosemide]       Data:   All laboratory data reviewed:    Recent Labs   Lab Test 09/25/17  1622 03/06/17  0848 01/23/17  1338 08/11/16  0856 07/14/16  0934 07/07/16  1028  08/02/13  1345 02/10/12  0723   LDL  --  74  --   --   --   --   --  124 124   HDL  --  34*  --   --   --   --   --  68 69   NHDL  --  105  --   --   --   --   --   --   --    CHOL  --  139  --   --   --   --   --  204* 212*   TRIG  --  156*  --   --   --   --   --  58 97   TSH  --   --  1.07 2.23  --  1.37   < >  --   --    NTBNP 870*  --   --   --  2,365* 1,777*   < >  --   --     < > = values in this interval not displayed.       Lab Results   Component Value Date    WBC  5.5 12/05/2018    RBC 3.67 (L) 12/05/2018    HGB 10.6 (L) 02/06/2019    HCT 36.5 (L) 12/05/2018     12/05/2018    MCH 31.3 12/05/2018    MCHC 31.5 12/05/2018    RDW 15.4 (H) 12/05/2018     12/05/2018       Lab Results   Component Value Date     12/05/2018    POTASSIUM 4.8 12/05/2018    CHLORIDE 99 12/05/2018    CO2 26 12/05/2018    ANIONGAP 10 12/05/2018     (H) 12/05/2018    BUN 18 12/05/2018    CR 0.92 12/05/2018    GFRESTIMATED 77 12/05/2018    GFRESTBLACK >90 12/05/2018    SHELIA 8.7 12/05/2018      Lab Results   Component Value Date    AST 28 12/05/2018    ALT 22 12/05/2018       No results found for: A1C    Lab Results   Component Value Date    INR 0.97 09/27/2017    INR 1.08 06/30/2017         POLLO FUNES, CNP  [facility]    Thank you for allowing me to participate in the care of your patient.      Sincerely,     POLLO FUNES CNP     [facility]    cc:   Berhane Colon MD  [id number]  [place] [id number]

## 2019-03-12 NOTE — PATIENT INSTRUCTIONS
TODAY'S RECOMMENDATIONS    1. STOP lisinopril for now due to low blood pressure    2. START Imdur 15 mg (1/2 tablet) daily in the morning for chest pressure    3. Follow up in 2-3 weeks    If you have questions or concerns please call clinic at [phone].    Please call [phone] for scheduling.      It was a pleasure seeing you today!

## 2019-03-12 NOTE — PROGRESS NOTES
Cardiology Clinic Progress Note  Bud Merritt MRN# [id number]   YOB: 1927 Age: 89 year old          Assessment and Plan:     1. Chest pressure at rest and history of non obstructive CAD    Per patient and wife's report chest pressure occurs almost daily in the afternoon. It is occasionally associated with meals. No exertional symptoms    Coronary angiogram in 2016 showed 40-50% RCA stenosis and 40% left main stenosis    Trial of Imdur 15 mg daily.    Hold Lisinopril due to soft blood pressure    Follow up in 2-3 weeks      2. Severe aortic stenosis    Status post TAVR with a Harman Ramsey 3 29 mm valve in September 2017.    Echocardiogram from 8/2018 showed normal gradients    3. Paroxysmal atrial fibrillation with controlled ventricular response     Status post AV node ablation and biventricular pacemaker    CVA prophylaxis with Eliquis 2.5 mg BID    4. Tachycardia mediated cardiomyopathy    Resolution of cardiomyopathy with LVEF 55-60% from 8/2018    Previously on Lisinopril 5 mg daily, metoprolol 25 mg BID, spironolactone 25 mg daily. Will hold lisinopril as above         History of Presenting Illness:    Bud Merritt is a very pleasant 89 year old patient of Dr. Colon who presents today for a routine 6 month follow up. He has a past medical history notable for aortic stenosis status post TAVR with a Harman Ramsey 3 29 mm valve in September 2017. He also has a history of nonobstructive coronary artery disease, paroxysmal atrial fibrillation (previously on Tikosyn but was discontinued in September 2018 due to breakthrough atrial fibrillation) and tachycardia mediated cardiomyopathy with a previously severely reduced LV systolic function that proved.  He also has a history of PVCs in the past and was previously treated with amiodarone, but this was discontinued due to a decline in DLCO.  He is eventually seen by electrophysiology and underwent an AV lisandro ablation and a by BiV  pacemaker.    Coronary angiography in 2016 showed 40-50% RCA stenosis and 40% left main stenosis. He had an echocardiogram in August 2018 that noted that his LVEF remains preserved at 55-60% with mild to moderate concentric LVH.  Descending aorta was mildly dilated and there was suggestion of a periprosthetic leak from the prosthetic valve with normal gradient.    His most recent pacemaker check was in December 2018 that showed he was atrially paced 13% of the time and biventricular paced 99% at the time.  His presenting rhythm was atrial fibrillation that was ventricularly paced.  His battery status was estimated for 3-1/2-5 years.  There were 84 mode switches comprising of 71% of the time with his longest episode being greater than 99 hours.  While in mode switches he had a controlled ventricular response.  He is due for repeat device check and is scheduled later in March.    Today in clinic he presents with his ceferino wife Tierra.  His biggest complaints today are ongoing nausea that has been chronic for several years and arthritis specifically in his right leg and left shoulder.  At times he feels unsteady on his feet due to his orthopedic limitations secondary to his arthritis, and he uses a walker for ambulation..  He denies any shortness of breath on exertion, palpitations, dizziness or lightheadedness.  He does note daily chest pressure that primarily occurs in the afternoon and occasionally is associated with meals.  The chest pressure is not associated with nausea or palpitations.               Review of Systems:   Review of Systems:  Skin:  Positive for bruising   Eyes:  Positive for glasses  ENT:  Positive for hearing loss  Respiratory:  Positive for shortness of breath;dyspnea on exertion  Cardiovascular:  Negative for;palpitations Positive for;chest pain;edema;lightheadedness;dizziness;exercise intolerance;fatigue  Gastroenterology: Positive for poor appetite;nausea  Genitourinary:  Positive for  nocturia;urinary frequency;decreased urinary stream;prostate problem  Musculoskeletal:  Positive for arthritis;joint pain;joint swelling;muscular weakness  Neurologic:  Positive for headaches  Psychiatric:  Positive for anxiety;sleep disturbances;depression  Heme/Lymph/Imm:  Positive for easy bruising  Endocrine:  Negative              Physical Exam:     Vitals: /80 (BP Location: Right arm, Patient Position: Fowlers, Cuff Size: Adult Regular)   Pulse 70   Wt 69.7 kg (153 lb 11.2 oz)   SpO2 93%   BMI 24.07 kg/m    Constitutional:  cooperative frail      Skin:  warm and dry to the touch   Bruising on left hand and arm    Head:  normocephalic, no masses or lesions        ENT:  no pallor or cyanosis        Neck:  no stiffness        Chest:  clear to auscultation        Cardiac: regular rhythm       systolic murmur;grade 2;LUSB          Abdomen:  abdomen soft        Vascular: pulses full and equal, no bruits auscultated                                      Extremities and Back:       R>L lower extremity edema    Neurological:  affect appropriate               Medications:     Current Outpatient Medications   Medication Sig Dispense Refill     acetaminophen (TYLENOL) 325 MG tablet Take 2 tablets (650 mg) by mouth every 4 hours as needed for mild pain, fever or headaches 100 tablet      apixaban ANTICOAGULANT (ELIQUIS) 2.5 MG tablet Take 1 tablet (2.5 mg) by mouth 2 times daily 180 tablet 3     ascorbic acid (VITAMIN C) 500 MG CPCR Take 500 mg by mouth daily       calcium carbonate (OS-SHELIA 500 MG [place]. [place]) 500 MG tablet Take 1,250 mg by mouth daily        HYDROcodone-acetaminophen (NORCO) 5-325 MG tablet Take 1 tablet by mouth every 6 hours as needed for pain For moderate to severe pain 60 tablet 0     isosorbide mononitrate (IMDUR) 30 MG 24 hr tablet Take 0.5 tablets (15 mg) by mouth daily 15 tablet 11     Leuprolide Acetate (LUPRON DEPOT IM) Inject into the muscle every 6 months Reported on 5/3/2017        metoprolol tartrate (LOPRESSOR) 25 MG tablet Take 1 tablet (25 mg) by mouth 2 times daily 180 tablet 3     ondansetron (ZOFRAN-ODT) 4 MG ODT tab Take 1 tablet (4 mg) by mouth every 6 hours as needed for nausea or vomiting 10 tablet 0     polyethylene glycol (MIRALAX/GLYCOLAX) Packet Take 17 g by mouth daily 30 packet 0     predniSONE (DELTASONE) 5 MG tablet Take 5 mg by mouth daily. 100 tablet 4     spironolactone (ALDACTONE) 25 MG tablet TAKE ONE TABLET BY MOUTH EVERY DAY 30 tablet 6     tamsulosin (FLOMAX) 0.4 MG capsule Take 1 capsule (0.4 mg) by mouth daily 60 capsule 0     traMADol (ULTRAM) 50 MG tablet TAKE 1 TABLET 3 TIMES DAILY AS NEEDED FOR MODERATE PAIN 90 tablet 0     VITAMIN D, CHOLECALCIFEROL, PO Take 2,000 Units by mouth daily Reported on 5/3/2017         Family History   Problem Relation Age of Onset     Cancer Mother      Cancer Son      Unknown/Adopted Father      Alcoholism Brother        Social History     Socioeconomic History     Marital status:      Spouse name: Not on file     Number of children: Not on file     Years of education: Not on file     Highest education level: Not on file   Occupational History     Not on file   Social Needs     Financial resource strain: Not on file     Food insecurity:     Worry: Not on file     Inability: Not on file     Transportation needs:     Medical: Not on file     Non-medical: Not on file   Tobacco Use     Smoking status: Former Smoker     Packs/day: 0.50     Years: 15.00     Pack years: 7.50     Types: Cigarettes     Last attempt to quit: 1998     Years since quittin.3     Smokeless tobacco: Never Used     Tobacco comment: quit 15 yrs ago   Substance and Sexual Activity     Alcohol use: No     Alcohol/week: 0.0 oz     Drug use: No     Sexual activity: Yes   Lifestyle     Physical activity:     Days per week: Not on file     Minutes per session: Not on file     Stress: Not on file   Relationships     Social connections:     Talks on  phone: Not on file     Gets together: Not on file     Attends Congregation service: Not on file     Active member of club or organization: Not on file     Attends meetings of clubs or organizations: Not on file     Relationship status: Not on file     Intimate partner violence:     Fear of current or ex partner: Not on file     Emotionally abused: Not on file     Physically abused: Not on file     Forced sexual activity: Not on file   Other Topics Concern     Parent/sibling w/ CABG, MI or angioplasty before 65F 55M? Not Asked      Service Not Asked     Blood Transfusions Not Asked     Caffeine Concern Yes     Comment: 8 cups coffee day     Occupational Exposure Not Asked     Hobby Hazards Not Asked     Sleep Concern Yes     Stress Concern Not Asked     Weight Concern Yes     Comment: weight loss     Special Diet No     Back Care Not Asked     Exercise Yes     Comment: split firewood daily     Bike Helmet Not Asked     Seat Belt Not Asked     Self-Exams Not Asked   Social History Narrative     Not on file            Past Medical History:     Past Medical History:   Diagnosis Date     Anemia      Atrial fibrillation (H) 07/01/2016     Cardiac pacemaker 03/10/2017     Cardiomyopathy (H)      CHF (congestive heart failure) (H)      COPD exacerbation (H) 3/2/2017     Depressive disorder      History of prostate cancer      Paroxysmal atrial fibrillation (H) 7/6/2016     Pure hypercholesterolemia      Rheumatoid arthritis(714.0)      Unspecified essential hypertension      Weakness generalized 3/2/2017              Past Surgical History:     Past Surgical History:   Procedure Laterality Date     ARTHROPLASTY KNEE Left 1/12/2016    Procedure: ARTHROPLASTY KNEE;  Surgeon: Cesar Walker DO;  Location: [facility] OR     COLONOSCOPY  08/24/09     EXCISE LESION HEAD N/A 4/9/2018    Procedure: EXCISE LESION HEAD;  Excision Left Scalp Lesion;  Surgeon: Connor Nichole DO;  Location: [facility] OR      COLONOSCOPY THRU  STOMA W BIOPSY/CAUTERY TUMOR/POLYP/LESION  8/31/2004     HC REPAIR ING HERNIA,5+Y/O,REDUCIBL  1996    Marlex mesh repair of bilateral inguinal hernias and drainage of bilateral scrotal hydroceles.     HEART CATH FEMORAL CANNULIZATION WITH OPEN STANDBY REPAIR AORTIC VALVE N/A 9/27/2017    Procedure: HEART CATH FEMORAL CANNULIZATION WITH OPEN STANDBY REPAIR AORTIC VALVE;;  Surgeon: Jaron Alejandra MD;  Location: [facility] OR     IMPLANT PACEMAKER  03/10/2017     IRRIGATION AND DEBRIDEMENT SOFT TISSUE LOWER EXTREMITY, COMBINED Left 3/15/2016    Procedure: COMBINED IRRIGATION AND DEBRIDEMENT SOFT TISSUE LOWER EXTREMITY;  Surgeon: Cesar Walker DO;  Location: [facility]     TRANSCATHETER AORTIC VALVE IMPLANT ANESTHESIA N/A 9/27/2017    Procedure: TRANSCATHETER AORTIC VALVE IMPLANT ANESTHESIA;  Right Transfemoral Approach (Harman Ramsey 3 29mm) Aortic Valve Implant,  Cardiopulmonary Bypass Standby, Transthoracic Echocardiogram by Derian Queen(Echo Tech);  Surgeon: GENERIC ANESTHESIA PROVIDER;  Location: [facility] OR              Allergies:   Amiodarone and Lasix [furosemide]       Data:   All laboratory data reviewed:    Recent Labs   Lab Test 09/25/17  1622 03/06/17  0848 01/23/17  1338 08/11/16  0856 07/14/16  0934 07/07/16  1028  08/02/13  1345 02/10/12  0723   LDL  --  74  --   --   --   --   --  124 124   HDL  --  34*  --   --   --   --   --  68 69   NHDL  --  105  --   --   --   --   --   --   --    CHOL  --  139  --   --   --   --   --  204* 212*   TRIG  --  156*  --   --   --   --   --  58 97   TSH  --   --  1.07 2.23  --  1.37   < >  --   --    NTBNP 870*  --   --   --  2,365* 1,777*   < >  --   --     < > = values in this interval not displayed.       Lab Results   Component Value Date    WBC 5.5 12/05/2018    RBC 3.67 (L) 12/05/2018    HGB 10.6 (L) 02/06/2019    HCT 36.5 (L) 12/05/2018     12/05/2018    MCH 31.3 12/05/2018    MCHC 31.5 12/05/2018    RDW 15.4 (H) 12/05/2018     12/05/2018        Lab Results   Component Value Date     12/05/2018    POTASSIUM 4.8 12/05/2018    CHLORIDE 99 12/05/2018    CO2 26 12/05/2018    ANIONGAP 10 12/05/2018     (H) 12/05/2018    BUN 18 12/05/2018    CR 0.92 12/05/2018    GFRESTIMATED 77 12/05/2018    GFRESTBLACK >90 12/05/2018    SHELIA 8.7 12/05/2018      Lab Results   Component Value Date    AST 28 12/05/2018    ALT 22 12/05/2018       No results found for: A1C    Lab Results   Component Value Date    INR 0.97 09/27/2017    INR 1.08 06/30/2017         DAYRON LOPEZ, APRN, CNP  [facility]

## 2019-03-19 NOTE — PROGRESS NOTES
[facility] now requests orders and shares plan of care/discharge summaries for some patients through [employer].  Please REPLY TO THIS MESSAGE OR ROUTE BACK TO THE AUTHOR in order to give authorization for orders when needed.  This is considered a verbal order, you will still receive a faxed copy of orders for signature.  Thank you for your assistance in improving collaboration for our patients.    Patient elected hospice services on 3/19/19 for a terminal diagnosis of severe aortic stenosis.  Requesting the following orders for the hospice plan of care:    Skilled nurse 1-3x a week  Home Health aide 1x a week   1-3x a month  Spiritual care 1-2x a month  OK for hospice standard orders    Thank you.  Estelita Arnold RN  Admission Clinician

## 2019-03-22 NOTE — PLAN OF CARE
Problem: Goal Outcome Summary  Goal: Goal Outcome Summary  1) Monitor and tx to facilitate hemodynamic stability.  2) Monitor and tx pain to facilitate adequate pain control.  3) Educate and/or reinforce heart healthy habits.  4) Maintain fall and infection precautions.   PT 6C: PT orders received, eval and treat completed.  Ambulates 30ft with FWW and CGAx1.  STS with CGAx1.  Limited by fatigue and weakness from inactivity.  Encouraged to sit in chair instead of bed and amb with nursing.  PT anticipates d/c home with Ax1 as needed when medically stable.         No

## 2019-04-03 NOTE — TELEPHONE ENCOUNTER
Reason for Call:  Form, our goal is to have forms completed with 72 hours, however, some forms may require a visit or additional information.    Type of letter, form or note:  Home Health Certification    Who is the form from?: Home care    Where did the form come from: form was faxed in    What clinic location was the form placed at?: [facility]    Where the form was placed: Given to MA/RN    What number is listed as a contact on the form?:        Additional comments:     Call taken on 4/3/2019 at 4:00 PM by Enid Alfred

## 2019-04-19 NOTE — PROGRESS NOTES
Hospice physician visit  Location: Home  Reason for visit: Patient is a 89-year-old man admitted to hospice with severe aortic stenosis, atrial fibrillation, heart in the left megaly, nausea.After hospice admission he had a day where he had a lot of exertion and developed severe edema in both legs.  Following that diuretics were adjusted and he developed a large opening around the top of the right foot.  This has been improving with wound care, but the patient does complain of some electrical shock type pains into this area.  He is also had a recent fall, not reported by his family initially with a small contusion above his left eyebrow.  He was in the process of being converted from Eliquis to warfarin when this happened, but INR was subtherapeutic.  He has episodes of significant shortness of breath and chronic joint pain from rheumatoid arthritis.  He is currently being treated with methadone 5 mg 3 times daily and morphine 5 mg every 2 hours as needed.  He needed 4 doses of morphine today due to worsening pain, mostly in his foot.  His primary concern is of a feeling of blood rushing back and was feet after they have been up.  It is a numb and tingly feelings, slightly uncomfortable.  He is worried that means something is wrong.    He has been weaker, can use a walker for very short distances but slides out of a chair, trouble holding himself up.  He has a history of irritability, possible anxiety.  He is now on citalopram with good effect.  He has a lump on the back of the left neck that he would like me to look at, nonpainful.  PMH: Rheumatoid arthritis on chronic steroids, hyperlipidemia.  PSH: TAVR, pacemaker, hernia repair, status post left total knee.  Family history: Mother  of cancer, brother with alcoholism, son with cancer.  Social history: Lives with his wife in their own home.  12 point review systems negative except as per HPI.    Objective  BP 90/64, pulse 70 and regular, O2 sats 97% on room  air, temp 97.3.  Awake, alert, frail, elderly man sitting in a recliner.  Eyes: Nonicteric.  Oropharynx: Moist.  Neck: Supple, no LAD, thyromegaly, mass.  Chest: Scant bibasilar rales.  Heart: Irregularly irregular with distant tones.  Abdomen: Soft, nontender, nondistended.  Extremities: Left foot is cool, pulseless, purple in color.  Right foot without palpable pulses but not as cool.  3+ pitting edema on the left of the ankle, 2+ on the right.  Significant joint deformity in the hands due to rheumatoid arthritis skin: 6 x 6 cm round, shallow, ulcer at the top of the foot on the right.  No surrounding redness, no drainage.  Revisit dimensions 7 x 6.5 cm.  Subcutaneous irregular nodule in the back of the left neck which is nontender consistent with a sebaceous cyst.  Neuro: Equal  strength.  Alert and oriented to self and place.  Assessment: Patient is a 89-year-old man with advanced heart disease, intermittent lower extremity edema due to fluid retention now with a large wound on the top of the right foot.  In addition he has had increasing frailty, increasing pain symptoms, a recent fall.  His prognosis is poor, expected less than 6 months given his advanced heart and lung disease with functional decline and progressive symptoms.  Plan: We will increase methadone to 5, 5, and 7.5 mg to improve pain control overnight.  Patient does have oxygen present to use if feeling short of breath, does have a history of hypoxia in the past.  Continue current dose of prednisone 10 mg daily for RA related pain.  Discussion with patient and his wife about his medications.  Very concerned about his warfarin use and the risk of significant bleeding if he has another fall.  We will discontinue warfarin today with their permission.  He continues to be somewhat hypotensive.  He is no longer on HCTZ.  Will cut metoprolol in half to 12.5 mg twice daily and monitor.  At that point would discontinue spironolactone if remains  hypotensive.  Right foot wound: Is improving with wound care, WOC nurse is involved.  Continue to monitor carefully.  Evonne Jarquin MD

## 2019-06-06 NOTE — TELEPHONE ENCOUNTER
Reason for call:  Other   Patient called regarding (reason for call): call back  Additional comments: Reshma from home care called, they are requesting a verbal to continue Hopsice care for this patient. Once the verbal is given, they will send paperwork.     Phone number to reach patient:  Other phone number:    Reshma (HOME CARE) [phone]       Best Time:  ASAP     Can we leave a detailed message on this number?  YES -  Confidential voicemail.

## 2019-07-12 NOTE — LETTER
7/12/2019        RE: Bud Merritt  [address]  [facility] [address]        [facility]  PRIMARY CARE PROVIDER AND CLINIC:  Camron Aragon MD, [address] [facility] [address]  Chief Complaint   Patient presents with     Hospital F/U     [facility] Record Number:  [id number]  Place of Service where encounter took place:  [facility] [[id number]]    Bud Merritt  is a 89 year old  (5/9/1927), admitted to the above facility from home due to care needs Hospice. .  Admitted to this facility for  hospice.    HPI:    HPI information obtained from: facility chart records, facility staff, patient report and [facility] chart review.   Brief Summary of Hospital Course:   Patient is a  89 year old gentleman with PMH of severe aortic stenosis s/p TAVR, cardiomyopathy, a fib with history of RVR, s/p cardiac pacemaker, HTN, RA, Chronic pain, LE edema, GERD, abnormal PSA, BPH, History of prostate CA 2017 who is being f/b [facility], has had a decline in status recently and has been admitted to SNF for respite care with plan to figure out if this will be a long term plan or true respite stay.     Updates on Status Since Skilled nursing Admission:   Patient is met today in his SNF room where he is asleep.  Nursing reports he has been in bed since arrival and has been sleeping.  Patient awakes fairly easy to voice and touch but is minimally responsive to questioning, etc.  He is accepting of exam for the most part.  Hospice RN arrived later and POC discussed as well with her; notes patient is sensitive to touch.      Patient denies pain but has constant grimace on his face.  He denies SOB, noted to be tachypnic when speaking to him but apneic when he is asleep for up to 30 sec.  He does not/is not able to answer any more of my questions.  Family is not present at this time.  Family is planning to come in later today as his daughter is coming  from [place].  Patient previously residing at home with his wife and hospice providing care however as patient has declined, increased care needs have become a burden for his wife who is also elderly.      CODE STATUS/ADVANCE DIRECTIVES DISCUSSION:   DNR / DNI  Patient's living condition: lives with spouse  ALLERGIES: Amiodarone and Lasix [furosemide]  PAST MEDICAL HISTORY:  has a past medical history of Anemia, Atrial fibrillation (H) (07/01/2016), Cardiac pacemaker (03/10/2017), Cardiomyopathy (H), CHF (congestive heart failure) (H), COPD exacerbation (H) (3/2/2017), Depressive disorder, History of prostate cancer, Paroxysmal atrial fibrillation (H) (7/6/2016), Pure hypercholesterolemia, Rheumatoid arthritis(714.0), Unspecified essential hypertension, and Weakness generalized (3/2/2017). He also has no past medical history of Cerebral infarction (H), Diabetes (H), Difficult intubation, History of blood transfusion, Malignant hyperthermia, PONV (postoperative nausea and vomiting), Spinal headache, Thyroid disease, or Uncomplicated asthma.  PAST SURGICAL HISTORY:   has a past surgical history that includes REPAIR ING HERNIA,5+Y/O,REDUCIBL (1996); COLONOSCOPY THRU STOMA W BIOPSY/CAUTERY TUMOR/POLYP/LESION (8/31/2004); colonoscopy (08/24/09); Arthroplasty knee (Left, 1/12/2016); Irrigation and debridement soft tissue lower extremity, combined (Left, 3/15/2016); Implant pacemaker (03/10/2017); TRANSCATHETER AORTIC VALVE IMPLANT ANEST (N/A, 9/27/2017); Heart Cath Femoral Cannulization With Open Standby Repair Aortic Valve (N/A, 9/27/2017); and Excise lesion head (N/A, 4/9/2018).  FAMILY HISTORY: family history includes Alcoholism in his brother; Cancer in his mother and son; Unknown/Adopted in his father.  SOCIAL HISTORY:   reports that he quit smoking about 20 years ago. His smoking use included cigarettes. He has a 7.50 pack-year smoking history. He has never used smokeless tobacco. He reports that he does not drink  alcohol or use drugs.    Post Discharge Medication Reconciliation Status: discharge medications reconciled, continue medications without change    Current Outpatient Medications   Medication Sig Dispense Refill     acetaminophen (TYLENOL) 500 MG tablet Take 500 mg by mouth 3 times daily       acetaminophen (TYLENOL) 650 MG suppository Place 650 mg rectally every 4 hours as needed       atropine 1 % ophthalmic solution Apply 2 drops to eye every 2 hours as needed        bisacodyl (DULCOLAX) 10 MG suppository Place 10 mg rectally daily as needed       ciprofloxacin (CIPRO) 500 MG tablet Take 500 mg by mouth 2 times daily       citalopram (CELEXA) 10 MG tablet Take 10 mg by mouth daily       isosorbide mononitrate (IMDUR) 30 MG 24 hr tablet Take 30 mg by mouth daily       Leuprolide Acetate (LUPRON DEPOT IM) Inject into the muscle every 6 months Reported on 5/3/2017       LORazepam (ATIVAN) 2 MG/ML (HIGH CONC) solution Take 0.25 mg by mouth every 4 hours as needed        methadone (DOLOPHINE) 5 MG tablet Take 7.5 mg by mouth At Bedtime       methadone (DOLOPHINE) 5 MG tablet Take 5 mg by mouth every 12 hours       morphine 5 MG solu-tab Take 5 mg by mouth every 2 hours as needed        omeprazole (PRILOSEC) 20 MG DR capsule Take 20 mg by mouth daily       predniSONE (DELTASONE) 5 MG tablet Take 15 mg by mouth daily       QUEtiapine (SEROQUEL) 25 MG tablet Take 12.5 mg by mouth every 8 hours Also Q6H PRN       senna (SENOKOT) 8.6 MG tablet Take 2 tablets by mouth 2 times daily        spironolactone (ALDACTONE) 25 MG tablet Take 12.5 mg by mouth daily       tamsulosin (FLOMAX) 0.4 MG capsule Take 0.4 mg by mouth daily       trolamine salicylate (ASPERCREME) 10 % external cream Apply 1 Application topically 3 times daily as needed         ROS:  Limited secondary to cognitive impairment but today pt reports no pain or SOB (may be poor historian).     Vitals:  /78   Pulse 70   Temp 97  F (36.1  C)   Resp 20   Ht  "1.6 m (5' 3\")   Wt 67.4 kg (148 lb 9.6 oz)   SpO2 96%   BMI 26.32 kg/m     Exam:  GENERAL APPEARANCE:  Asleep, awakes easily but is confused and does not answer many questions, deconditioned, frail elder, in no distress  RESP:  respiratory effort and palpation of chest normal, auscultation of lungs clear at this time but difficult to hear as patient tachypnic with me touching him , no respiratory distress  CV:  Palpation and auscultation of heart done , rate and rhythm irregular, no murmur, mild LE peripheral edema with L>R  ABDOMEN:  Concave, hypoactive bowel sounds, soft, nontender, no hepatosplenomegaly or other masses  M/S:   Gait and station - in bed at today's visit, Digits and nails with arthritic changes, significantly reduced muscle mass  SKIN:  Inspection and Palpation of skin and subcutaneous tissue pale, generalized bruising (mild) noted, was not able to visualize whole body for assessment (defer to SNF)  PSYCH:  insight and judgement, memory with impairment, affect with grimace,  mood withdrawn, does not follow commands readily         Lab/Diagnostic data:  Deferred as is enrolled in hospice.    CBC RESULTS:   Recent Labs   Lab Test 02/06/19  0941 02/01/19  1043  12/05/18  1519 08/30/18  1202   WBC  --   --   --  5.5 4.2   RBC  --   --   --  3.67* 3.72*   HGB 10.6* 10.9*   < > 11.5* 11.5*   HCT  --   --   --  36.5* 35.4*   MCV  --   --   --  100 95   MCH  --   --   --  31.3 30.9   MCHC  --   --   --  31.5 32.5   RDW  --   --   --  15.4* 17.0*   PLT  --   --   --  229 201    < > = values in this interval not displayed.       Last Basic Metabolic Panel:  Recent Labs   Lab Test 12/05/18  1519 08/30/18  1202    135   POTASSIUM 4.8 4.9   CHLORIDE 99 99   SHELIA 8.7 9.0   CO2 26 29   BUN 18 18   CR 0.92 1.14   * 102*       Liver Function Studies -   Recent Labs   Lab Test 12/05/18  1519 12/12/17  1357   PROTTOTAL 7.2 6.2*   ALBUMIN 3.3* 2.9*   BILITOTAL 0.3 0.4   ALKPHOS 90 91   AST 28 30   ALT " 22 17       TSH   Date Value Ref Range Status   01/23/2017 1.07 0.40 - 4.00 mU/L Final   08/11/2016 2.23 0.40 - 4.00 mU/L Final   ]    No results found for: A1C    ASSESSMENT/PLAN:  Aortic stenosis, severe  S/P TAVR (transcatheter aortic valve replacement)  Dilated cardiomyopathy (H) dilated LV, EF 55% after TAVR   Atrial fibrillation with rapid ventricular response (H)  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, Bi-Ventricular- dependent  Chronic systolic congestive heart failure (H)  Essential hypertension with goal blood pressure less than 140/90  Hospice care patient  Enrolled with [facility] with terminal diagnoses of above  Admitted for respite stay (TBD if longer stay/official admission to SNF)  Last ECHO 8/30/18: EF 55-60%    Patient is on hospice comfort meds, isosorbide ER 30 mg daily, spironolactone 12.5 mg daily    No BPs or HRs on file (and may not d/t comfort goal).   Patient unable to report symptoms of CP, HA, lightheadedness  Patient appears euvolemic if not even dehydrated.    SNF EHR showing patient is drinking fluids to some extent. Can monitor for amounts.    Hospcie RN reporting that occasionally patient has LS that are wet sounding.     HR irregular today, noted LE edema L>R    PLAN:  - continue regimen as above  - monitor for POC in terms of respite vs admission to LTC.   - comfort is goal.     - may anticipate asking for BP and HR to determine if HoTN present and if present, recommend discontinue spironolactone.     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  Hospice RN reporting delusions and nonsensical speech recently; admitted to LTC SNF for 5 day respite stay which may turn into LTC admission.     PLAN:  - continue regimen as above but if admission to LTC will need 14 day stop date on PRN Seroquel, which can be evaluated for usage and  effectiveness.    - comfort is goal as is enrolled with hospice.     Rheumatoid arthritis involving multiple sites with positive rheumatoid factor (H)  Chronic pain of right knee  Primary osteoarthritis of right knee  Rotator cuff tear arthropathy of both shoulders  Trochanteric bursitis of left hip  Analgesia with acetaminophen 500 mg 3 times daily, acetaminophen supp as needed, Aspercreme 3 times daily as needed, methadone 5 mg every morning and afternoon, 7.5 mg nightly, morphine Solutab 5 mg every 2 hours as needed  Has Prednisone 15 mg daily for debilitating RA (per chart review).     Patient denies pain today but has grimace, overall has apnea as well.  Appears to be resting comfortably until touched.     PLAN:  - continue regimen as above  - comfort is goal  - monitor for POC (respite vs admission to LTC)    Hypertrophy of prostate without urinary obstruction  History of prostate CA with treatment including lupron q4 months (unknown last dose or if this is still occurring with recent decline noted).   Also on tamsulosin 0.4 mg daily.  Patient unable to describe if symptoms present.    PLAN:  - continue tamsulosin while monitoring for anticholinergic effects, may recommend discontinue of decline continues  - if admission to LTC, can ask hospice/family re: Lupron continuation vs discontinue  - monitor for POC (respite vs LTC admission)    Bilateral lower extremity edema  On spironolactone  History of cellulitis of foot   Today's exam L>R, tender to palpation.     PLAN:  - continue spironolactone at this time  - monitor for dehydration  - comfort is goal.     Gastroesophageal reflux disease, esophagitis presence not specified  On omeprazole 20 mg daily  Patient unable to report symptoms of heartburn or upset stomach  Patient is on hospice with comfort goal.    PLAN:  - continue PPI, monitor for S/Es  - comfort is goal.     Slow transit constipation  On methadone TID and morphine PRN  On bisacodyl supp PRN,  miralax prn, senna 2 tabs BID  Patient unable to report on symptoms of constpation    PLAN:  - monitor SNF EHR for BM routine.   - comfort is goal.        Total time spent with patient visit at the skilled nursing facility was 35 min including patient visit, review of past records and coordination of care with nursing and hospice RN. . Greater than 50% of total time spent with counseling and coordinating care due to coordinating care with nurse on admission orders, the plan of SNF stay and projected length of stay and current pain control plan and controlled substances ordered and taper plan of care.  Electronically signed by:  POLLO George CNP                       Sincerely,        POLLO George CNP

## 2019-07-12 NOTE — PROGRESS NOTES
[facility]  PRIMARY CARE PROVIDER AND CLINIC:  Camron Aragon MD, [address] [facility] / [facility] [address]  Chief Complaint   Patient presents with     Hospital F/U     [facility] Record Number:  [id number]  Place of Service where encounter took place:  [facility] ([facility] [[id number]]    Bud Merritt  is a 89 year old  (5/9/1927), admitted to the above facility from home due to care needs Hospice. .  Admitted to this facility for  hospice.    HPI:    HPI information obtained from: facility chart records, facility staff, patient report and [facility] chart review.   Brief Summary of Hospital Course:   Patient is a  89 year old gentleman with PMH of severe aortic stenosis s/p TAVR, cardiomyopathy, a fib with history of RVR, s/p cardiac pacemaker, HTN, RA, Chronic pain, LE edema, GERD, abnormal PSA, BPH, History of prostate CA 2017 who is being f/b [facility], has had a decline in status recently and has been admitted to SNF for respite care with plan to figure out if this will be a long term plan or true respite stay.     Updates on Status Since Skilled nursing Admission:   Patient is met today in his SNF room where he is asleep.  Nursing reports he has been in bed since arrival and has been sleeping.  Patient awakes fairly easy to voice and touch but is minimally responsive to questioning, etc.  He is accepting of exam for the most part.  Hospice RN arrived later and POC discussed as well with her; notes patient is sensitive to touch.      Patient denies pain but has constant grimace on his face.  He denies SOB, noted to be tachypnic when speaking to him but apneic when he is asleep for up to 30 sec.  He does not/is not able to answer any more of my questions.  Family is not present at this time.  Family is planning to come in later today as his daughter is coming from [place].  Patient previously residing at home with his wife and hospice providing care  however as patient has declined, increased care needs have become a burden for his wife who is also elderly.      CODE STATUS/ADVANCE DIRECTIVES DISCUSSION:   DNR / DNI  Patient's living condition: lives with spouse  ALLERGIES: Amiodarone and Lasix [furosemide]  PAST MEDICAL HISTORY:  has a past medical history of Anemia, Atrial fibrillation (H) (07/01/2016), Cardiac pacemaker (03/10/2017), Cardiomyopathy (H), CHF (congestive heart failure) (H), COPD exacerbation (H) (3/2/2017), Depressive disorder, History of prostate cancer, Paroxysmal atrial fibrillation (H) (7/6/2016), Pure hypercholesterolemia, Rheumatoid arthritis(714.0), Unspecified essential hypertension, and Weakness generalized (3/2/2017). He also has no past medical history of Cerebral infarction (H), Diabetes (H), Difficult intubation, History of blood transfusion, Malignant hyperthermia, PONV (postoperative nausea and vomiting), Spinal headache, Thyroid disease, or Uncomplicated asthma.  PAST SURGICAL HISTORY:   has a past surgical history that includes REPAIR ING HERNIA,5+Y/O,REDUCIBL (1996); COLONOSCOPY THRU STOMA W BIOPSY/CAUTERY TUMOR/POLYP/LESION (8/31/2004); colonoscopy (08/24/09); Arthroplasty knee (Left, 1/12/2016); Irrigation and debridement soft tissue lower extremity, combined (Left, 3/15/2016); Implant pacemaker (03/10/2017); TRANSCATHETER AORTIC VALVE IMPLANT ANEST (N/A, 9/27/2017); Heart Cath Femoral Cannulization With Open Standby Repair Aortic Valve (N/A, 9/27/2017); and Excise lesion head (N/A, 4/9/2018).  FAMILY HISTORY: family history includes Alcoholism in his brother; Cancer in his mother and son; Unknown/Adopted in his father.  SOCIAL HISTORY:   reports that he quit smoking about 20 years ago. His smoking use included cigarettes. He has a 7.50 pack-year smoking history. He has never used smokeless tobacco. He reports that he does not drink alcohol or use drugs.    Post Discharge Medication Reconciliation Status: discharge  "medications reconciled, continue medications without change    Current Outpatient Medications   Medication Sig Dispense Refill     acetaminophen (TYLENOL) 500 MG tablet Take 500 mg by mouth 3 times daily       acetaminophen (TYLENOL) 650 MG suppository Place 650 mg rectally every 4 hours as needed       atropine 1 % ophthalmic solution Apply 2 drops to eye every 2 hours as needed        bisacodyl (DULCOLAX) 10 MG suppository Place 10 mg rectally daily as needed       ciprofloxacin (CIPRO) 500 MG tablet Take 500 mg by mouth 2 times daily       citalopram (CELEXA) 10 MG tablet Take 10 mg by mouth daily       isosorbide mononitrate (IMDUR) 30 MG 24 hr tablet Take 30 mg by mouth daily       Leuprolide Acetate (LUPRON DEPOT IM) Inject into the muscle every 6 months Reported on 5/3/2017       LORazepam (ATIVAN) 2 MG/ML (HIGH CONC) solution Take 0.25 mg by mouth every 4 hours as needed        methadone (DOLOPHINE) 5 MG tablet Take 7.5 mg by mouth At Bedtime       methadone (DOLOPHINE) 5 MG tablet Take 5 mg by mouth every 12 hours       morphine 5 MG solu-tab Take 5 mg by mouth every 2 hours as needed        omeprazole (PRILOSEC) 20 MG DR capsule Take 20 mg by mouth daily       predniSONE (DELTASONE) 5 MG tablet Take 15 mg by mouth daily       QUEtiapine (SEROQUEL) 25 MG tablet Take 12.5 mg by mouth every 8 hours Also Q6H PRN       senna (SENOKOT) 8.6 MG tablet Take 2 tablets by mouth 2 times daily        spironolactone (ALDACTONE) 25 MG tablet Take 12.5 mg by mouth daily       tamsulosin (FLOMAX) 0.4 MG capsule Take 0.4 mg by mouth daily       trolamine salicylate (ASPERCREME) 10 % external cream Apply 1 Application topically 3 times daily as needed         ROS:  Limited secondary to cognitive impairment but today pt reports no pain or SOB (may be poor historian).     Vitals:  /78   Pulse 70   Temp 97  F (36.1  C)   Resp 20   Ht 1.6 m (5' 3\")   Wt 67.4 kg (148 lb 9.6 oz)   SpO2 96%   BMI 26.32 kg/m  "   Exam:  GENERAL APPEARANCE:  Asleep, awakes easily but is confused and does not answer many questions, deconditioned, frail elder, in no distress  RESP:  respiratory effort and palpation of chest normal, auscultation of lungs clear at this time but difficult to hear as patient tachypnic with me touching him , no respiratory distress  CV:  Palpation and auscultation of heart done , rate and rhythm irregular, no murmur, mild LE peripheral edema with L>R  ABDOMEN:  Concave, hypoactive bowel sounds, soft, nontender, no hepatosplenomegaly or other masses  M/S:   Gait and station - in bed at today's visit, Digits and nails with arthritic changes, significantly reduced muscle mass  SKIN:  Inspection and Palpation of skin and subcutaneous tissue pale, generalized bruising (mild) noted, was not able to visualize whole body for assessment (defer to SNF)  PSYCH:  insight and judgement, memory with impairment, affect with grimace,  mood withdrawn, does not follow commands readily         Lab/Diagnostic data:  Deferred as is enrolled in hospice.    CBC RESULTS:   Recent Labs   Lab Test 02/06/19  0941 02/01/19  1043  12/05/18  1519 08/30/18  1202   WBC  --   --   --  5.5 4.2   RBC  --   --   --  3.67* 3.72*   HGB 10.6* 10.9*   < > 11.5* 11.5*   HCT  --   --   --  36.5* 35.4*   MCV  --   --   --  100 95   MCH  --   --   --  31.3 30.9   MCHC  --   --   --  31.5 32.5   RDW  --   --   --  15.4* 17.0*   PLT  --   --   --  229 201    < > = values in this interval not displayed.       Last Basic Metabolic Panel:  Recent Labs   Lab Test 12/05/18  1519 08/30/18  1202    135   POTASSIUM 4.8 4.9   CHLORIDE 99 99   SHELIA 8.7 9.0   CO2 26 29   BUN 18 18   CR 0.92 1.14   * 102*       Liver Function Studies -   Recent Labs   Lab Test 12/05/18  1519 12/12/17  1357   PROTTOTAL 7.2 6.2*   ALBUMIN 3.3* 2.9*   BILITOTAL 0.3 0.4   ALKPHOS 90 91   AST 28 30   ALT 22 17       TSH   Date Value Ref Range Status   01/23/2017 1.07 0.40 - 4.00  mU/L Final   08/11/2016 2.23 0.40 - 4.00 mU/L Final   ]    No results found for: A1C    ASSESSMENT/PLAN:  Aortic stenosis, severe  S/P TAVR (transcatheter aortic valve replacement)  Dilated cardiomyopathy (H) dilated LV, EF 55% after TAVR   Atrial fibrillation with rapid ventricular response (H)  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, Bi-Ventricular- dependent  Chronic systolic congestive heart failure (H)  Essential hypertension with goal blood pressure less than 140/90  Hospice care patient  Enrolled with [facility] with terminal diagnoses of above  Admitted for respite stay (TBD if longer stay/official admission to SNF)  Last ECHO 8/30/18: EF 55-60%    Patient is on hospice comfort meds, isosorbide ER 30 mg daily, spironolactone 12.5 mg daily    No BPs or HRs on file (and may not d/t comfort goal).   Patient unable to report symptoms of CP, HA, lightheadedness  Patient appears euvolemic if not even dehydrated.    SNF EHR showing patient is drinking fluids to some extent. Can monitor for amounts.    Hospcie RN reporting that occasionally patient has LS that are wet sounding.     HR irregular today, noted LE edema L>R    PLAN:  - continue regimen as above  - monitor for POC in terms of respite vs admission to LTC.   - comfort is goal.     - may anticipate asking for BP and HR to determine if HoTN present and if present, recommend discontinue spironolactone.     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  Hospice RN reporting delusions and nonsensical speech recently; admitted to LTC SNF for 5 day respite stay which may turn into LTC admission.     PLAN:  - continue regimen as above but if admission to LTC will need 14 day stop date on PRN Seroquel, which can be evaluated for usage and effectiveness.    - comfort is goal as is enrolled with hospice.     Rheumatoid  arthritis involving multiple sites with positive rheumatoid factor (H)  Chronic pain of right knee  Primary osteoarthritis of right knee  Rotator cuff tear arthropathy of both shoulders  Trochanteric bursitis of left hip  Analgesia with acetaminophen 500 mg 3 times daily, acetaminophen supp as needed, Aspercreme 3 times daily as needed, methadone 5 mg every morning and afternoon, 7.5 mg nightly, morphine Solutab 5 mg every 2 hours as needed  Has Prednisone 15 mg daily for debilitating RA (per chart review).     Patient denies pain today but has grimace, overall has apnea as well.  Appears to be resting comfortably until touched.     PLAN:  - continue regimen as above  - comfort is goal  - monitor for POC (respite vs admission to LTC)    Hypertrophy of prostate without urinary obstruction  History of prostate CA with treatment including lupron q4 months (unknown last dose or if this is still occurring with recent decline noted).   Also on tamsulosin 0.4 mg daily.  Patient unable to describe if symptoms present.    PLAN:  - continue tamsulosin while monitoring for anticholinergic effects, may recommend discontinue of decline continues  - if admission to LTC, can ask hospice/family re: Lupron continuation vs discontinue  - monitor for POC (respite vs LTC admission)    Bilateral lower extremity edema  On spironolactone  History of cellulitis of foot   Today's exam L>R, tender to palpation.     PLAN:  - continue spironolactone at this time  - monitor for dehydration  - comfort is goal.     Gastroesophageal reflux disease, esophagitis presence not specified  On omeprazole 20 mg daily  Patient unable to report symptoms of heartburn or upset stomach  Patient is on hospice with comfort goal.    PLAN:  - continue PPI, monitor for S/Es  - comfort is goal.     Slow transit constipation  On methadone TID and morphine PRN  On bisacodyl supp PRN, miralax prn, senna 2 tabs BID  Patient unable to report on symptoms of  constpation    PLAN:  - monitor SNF EHR for BM routine.   - comfort is goal.        Total time spent with patient visit at the skilled nursing facility was 35 min including patient visit, review of past records and coordination of care with nursing and hospice RN. . Greater than 50% of total time spent with counseling and coordinating care due to coordinating care with nurse on admission orders, the plan of SNF stay and projected length of stay and current pain control plan and controlled substances ordered and taper plan of care.  Electronically signed by:  POLLO George CNP

## 2019-09-12 NOTE — TELEPHONE ENCOUNTER
Reason for Call: Request for an order or referral:    Order or referral being requested: re certification  to continue hospice services is needed, he is declining, actively dying, and will need daily nursing visits, if you can leave verbal order.     Date needed: as soon as possible    Has the patient been seen by the PCP for this problem? YES    Additional comments: none    Phone number Patient can be reached at:  Other phone number:  Please leave order on [facility] secure line [phone]    Best Time:  Any time    Can we leave a detailed message on this number?  YES    Call taken on 9/12/2019 at 8:06 AM by Ling Batres

## 2019-09-26 NOTE — PROGRESS NOTES
Received consult for right dorsum foot blister wound. Upon assessment, his wraps were just wrapped by a therapist and used adaptic and gauze (which is appropriate at this time). Will co-ordinate the visit with Lymphedema therapy on Thursday when it will get change again.  Educated pt and family regarding importance of compression wraps and floating heels. Verbalized understanding.  Follow up- Thursday if pt continues to be inpatient.   Statement Selected

## 2020-07-31 NOTE — PROGRESS NOTES
Patient seen in consultation for left scalp lesion by Camron Aragon    HPI:  Patient is a 89 year old male with enlarging and irritation left sided scalp lesion. He and his wife state that its been there for several months but it has been growing and is very irritating to his eyeglasses. Is has not been draining or bleeding. He is on Eliquis for afib. He is rate and rhythm controlled on Tikosyn and metoprolol. He also has a pacemaker. He has severe RA on prednisone chronically.    Review Of Systems    Skin: as above  Ears/Nose/Throat: negative  Respiratory: No shortness of breath, dyspnea on exertion, cough, or hemoptysis  Cardiovascular: CHF  Gastrointestinal: negative  Genitourinary: negative  Musculoskeletal: RA  Neurologic: dementia  Hematologic/Lymphatic/Immunologic: negative  Endocrine: negative      Past Medical History:   Diagnosis Date     Anemia      Atrial fibrillation (H) 07/01/2016     Cardiac pacemaker 03/10/2017     Cardiomyopathy (H)      CHF (congestive heart failure) (H)      COPD exacerbation (H) 3/2/2017     Depressive disorder      History of prostate cancer      Paroxysmal atrial fibrillation (H) 7/6/2016     Pure hypercholesterolemia      Rheumatoid arthritis(714.0)      Unspecified essential hypertension      Weakness generalized 3/2/2017       Past Surgical History:   Procedure Laterality Date     ARTHROPLASTY KNEE Left 1/12/2016    Procedure: ARTHROPLASTY KNEE;  Surgeon: Cesar Walker DO;  Location: [facility] OR     COLONOSCOPY  08/24/09      COLONOSCOPY THRU STOMA W BIOPSY/CAUTERY TUMOR/POLYP/LESION  8/31/2004      REPAIR ING HERNIA,5+Y/O,REDUCIBL  1996    Marlex mesh repair of bilateral inguinal hernias and drainage of bilateral scrotal hydroceles.     HEART CATH FEMORAL CANNULIZATION WITH OPEN STANDBY REPAIR AORTIC VALVE N/A 9/27/2017    Procedure: HEART CATH FEMORAL CANNULIZATION WITH OPEN STANDBY REPAIR AORTIC VALVE;;  Surgeon: Jaron Alejandra MD;  Location: [facility] OR      IMPLANT PACEMAKER  03/10/2017     IRRIGATION AND DEBRIDEMENT SOFT TISSUE LOWER EXTREMITY, COMBINED Left 3/15/2016    Procedure: COMBINED IRRIGATION AND DEBRIDEMENT SOFT TISSUE LOWER EXTREMITY;  Surgeon: Cesar Walker DO;  Location:  OR     TRANSCATHETER AORTIC VALVE IMPLANT ANESTHESIA N/A 9/27/2017    Procedure: TRANSCATHETER AORTIC VALVE IMPLANT ANESTHESIA;  Right Transfemoral Approach (Harman Ramsey 3 29mm) Aortic Valve Implant,  Cardiopulmonary Bypass Standby, Transthoracic Echocardiogram by Derian Queen(Echo Tech);  Surgeon: GENERIC ANESTHESIA PROVIDER;  Location: [facility] OR       Family History   Problem Relation Age of Onset     CANCER Mother      CANCER Son      Unknown/Adopted Father      Alcoholism Brother        Social History     Social History     Marital status:      Spouse name: N/A     Number of children: N/A     Years of education: N/A     Occupational History     Not on file.     Social History Main Topics     Smoking status: Former Smoker     Packs/day: 0.50     Years: 15.00     Types: Cigarettes     Quit date: 11/12/1998     Smokeless tobacco: Never Used      Comment: quit 15 yrs ago     Alcohol use No     Drug use: No     Sexual activity: Yes     Other Topics Concern     Caffeine Concern Yes     8 cups coffee day     Sleep Concern Yes     Weight Concern Yes     weight loss     Special Diet No     Exercise Yes     split firewood daily     Social History Narrative       Current Outpatient Prescriptions   Medication Sig Dispense Refill     lisinopril (PRINIVIL/ZESTRIL) 5 MG tablet Take 1 tablet (5 mg) by mouth daily 90 tablet 3     HYDROcodone-acetaminophen (NORCO) 5-325 MG per tablet Take 1 tablet by mouth every 6 hours as needed for pain For moderate to severe pain 18 tablet 0     traMADol (ULTRAM) 50 MG tablet TAKE 1 TABLET 3 TIMES DAILY AS NEEDED FOR MODERATE PAIN 90 tablet 0     alendronate (FOSAMAX) 70 MG tablet Take 1 tablet (70 mg) by mouth every 7 days Take with over 8  "ounces water and stay upright for at least 30 minutes after dose.  Take at least 60 minutes before breakfast 12 tablet 3     acetaminophen (TYLENOL) 325 MG tablet Take 2 tablets (650 mg) by mouth every 4 hours as needed for mild pain, fever or headaches 100 tablet      polyethylene glycol (MIRALAX/GLYCOLAX) Packet Take 17 g by mouth daily 30 packet 0     ondansetron (ZOFRAN-ODT) 4 MG ODT tab Take 1 tablet (4 mg) by mouth every 6 hours as needed for nausea or vomiting 10 tablet 0     apixaban ANTICOAGULANT (ELIQUIS) 2.5 MG tablet Take 1 tablet (2.5 mg) by mouth 2 times daily 180 tablet 3     predniSONE (DELTASONE) 5 MG tablet Take 1 tablet (5 mg) by mouth daily 100 tablet 4     SPIRONOLACTONE PO Take 25 mg by mouth daily       metoprolol (LOPRESSOR) 25 MG tablet Take 1 tablet (25 mg) by mouth 2 times daily (Patient taking differently: Take 12.5 mg by mouth 2 times daily ) 60 tablet 11     tamsulosin (FLOMAX) 0.4 MG capsule Take 1 capsule (0.4 mg) by mouth daily 60 capsule 0     dofetilide (TIKOSYN) 125 MCG capsule Take 1 capsule (125 mcg) by mouth 2 times daily 60 capsule 5     Leuprolide Acetate (LUPRON DEPOT IM) Inject into the muscle every 6 months Reported on 5/3/2017       ascorbic acid (VITAMIN C) 500 MG CPCR Take 500 mg by mouth daily       VITAMIN D, CHOLECALCIFEROL, PO Take 2,000 Units by mouth daily Reported on 5/3/2017       calcium carbonate (OS-SHELIA 500 MG [place]. [place]) 500 MG tablet Take 1,250 mg by mouth daily          Medications and history reviewed    Physical exam:  Vitals: /58  Temp 97.5  F (36.4  C) (Temporal)  Ht 1.702 m (5' 7\")  Wt 67.6 kg (149 lb)  BMI 23.34 kg/m2  BMI= Body mass index is 23.34 kg/(m^2).    Constitutional: alert, non-distressed  Head: Normo-cephalic, atraumatic, no tenderness   Cardiovascular: RRR, no new murmurs  Respiratory: CTAB, no rales, rhonchi or wheezing, equal chest rise, good respiratory effort  Gastrointestinal: Soft, non-tender, non distended, no rebound " rigidity or guarding, no masses or hernias palpated  : Deferred  Musculoskeletal: arthritic deformities. Unsteady gait, slow movements  Skin: Left scalp above ear within the hairline there is a raised but flattened wide based lesion. There is an area adjacent to this that is textured as well but not raised  Psychiatric: Mentation appears normal, affect appropriate   Hematologic/Lymphatic/Immunologic: Normal cervical and supraclavicular lymph nodes  Patient able to get up on table with moderate difficulty.    Labs show:  No results found for this or any previous visit (from the past 24 hour(s)).      Assessment:     ICD-10-CM    1. Lesion of skin of scalp L98.9      Plan: His lesion is enlarging and symptomatic and the would like it removed. I would preferably do this while his Eliquis was held. I will discuss with his PCP if this is ok to hold for 3 days prior to minor procedure. I would do the procedure in the OR to have more assistance and instruments but we could still do it with local anesthesia only. We will call him early next week to set up a time to have the procedure.    Connor Nichole, DO     none

## 2021-03-03 NOTE — TELEPHONE ENCOUNTER
Rx faxed to [facility]   Protopic Pregnancy And Lactation Text: This medication is Pregnancy Category C. It is unknown if this medication is excreted in breast milk when applied topically.

## 2021-08-13 NOTE — TELEPHONE ENCOUNTER
Dr. Aragon cannot work him in and he will need to wait until his 8/23/17 appointment or will need to see another provider. Left VM for patient to call back to notify him of this. [facility]   [Mother] : mother [Patient] : patient

## 2021-09-10 NOTE — PROGRESS NOTES
Interventional Cardiology Progress Note  Subjective and Interval:  Patient with labile BP. Has not had much PO intake due to lack of appetitie. QT prolongation has resolved from yesterday, though patient was given Zofran today for nausea, so we will monitor one more night. Patient denies pain, SOB, syncope or palpitations. Stable.    Past Medical History:  Past Medical History:   Diagnosis Date     Anemia      Atrial fibrillation (H) 07/01/2016     Cardiac pacemaker 03/10/2017     Cardiomyopathy (H)      CHF (congestive heart failure) (H)      COPD exacerbation (H) 3/2/2017     Depressive disorder      History of prostate cancer      Paroxysmal atrial fibrillation (H) 7/6/2016     Pure hypercholesterolemia      Rheumatoid arthritis(714.0)      Unspecified essential hypertension      Weakness generalized 3/2/2017       Past Surgical History:  Past Surgical History:   Procedure Laterality Date     ARTHROPLASTY KNEE Left 1/12/2016    Procedure: ARTHROPLASTY KNEE;  Surgeon: Cesar Walker DO;  Location: [facility]     COLONOSCOPY  08/24/09     HC COLONOSCOPY THRU STOMA W BIOPSY/CAUTERY TUMOR/POLYP/LESION  8/31/2004     HC REPAIR ING HERNIA,5+Y/O,REDUCIBL  1996    Marlex mesh repair of bilateral inguinal hernias and drainage of bilateral scrotal hydroceles.     HEART CATH FEMORAL CANNULIZATION WITH OPEN STANDBY REPAIR AORTIC VALVE N/A 9/27/2017    Procedure: HEART CATH FEMORAL CANNULIZATION WITH OPEN STANDBY REPAIR AORTIC VALVE;;  Surgeon: Jaron Alejandra MD;  Location: [facility] OR     IMPLANT PACEMAKER  03/10/2017     IRRIGATION AND DEBRIDEMENT SOFT TISSUE LOWER EXTREMITY, COMBINED Left 3/15/2016    Procedure: COMBINED IRRIGATION AND DEBRIDEMENT SOFT TISSUE LOWER EXTREMITY;  Surgeon: Cesar Walker DO;  Location: [facility]     TRANSCATHETER AORTIC VALVE IMPLANT ANESTHESIA N/A 9/27/2017    Procedure: TRANSCATHETER AORTIC VALVE IMPLANT ANESTHESIA;  Right Transfemoral Approach (Harman Ramsey 3 29mm)  Aortic Valve Implant,  Cardiopulmonary Bypass Standby, Transthoracic Echocardiogram by Derian Queen(Echo Tech);  Surgeon: GENERIC ANESTHESIA PROVIDER;  Location: [facility] OR       Allergies:     Allergies   Allergen Reactions     Amiodarone Other (See Comments)     Drop in DLCO     Lasix [Furosemide] Blisters     Blisters and sores in his mouth.        Medications:    No current facility-administered medications on file prior to encounter.   Current Outpatient Prescriptions on File Prior to Encounter:  HYDROcodone-acetaminophen (NORCO) 5-325 MG per tablet TAKE 1 TABLET BY MOUTH EVERY 6 HOURS AS NEEDED FOR MODERATE TO SEVERE PAIN   traMADol (ULTRAM) 50 MG tablet TAKE 1 TABLET 3 TIMES DAILY AS NEEDED FOR MODERATE PAIN   leflunomide (ARAVA) 10 MG tablet Take 1 tablet (10 mg) by mouth daily   dofetilide (TIKOSYN) 125 MCG capsule Take 1 capsule (125 mcg) by mouth 2 times daily   atorvastatin (LIPITOR) 40 MG tablet Take 1 tablet (40 mg) by mouth daily (Patient taking differently: Take 40 mg by mouth every evening )   predniSONE (DELTASONE) 5 MG tablet Take 1 tablet (5 mg) by mouth daily   ascorbic acid (VITAMIN C) 500 MG CPCR Take 500 mg by mouth daily   VITAMIN D, CHOLECALCIFEROL, PO Take 1 tablet by mouth daily Reported on 5/3/2017   calcium carbonate (OS-SHELIA 500 MG [place]. [place]) 500 MG tablet Take 1 tablet by mouth daily    [DISCONTINUED] metoprolol (LOPRESSOR) 50 MG tablet TAKE 1 TABLET (50 MG) BY MOUTH 2 TIMES DAILY   apixaban ANTICOAGULANT (ELIQUIS) 2.5 MG tablet Take 1 tablet (2.5 mg) by mouth 2 times daily (Patient not taking: Reported on 9/25/2017)   Leuprolide Acetate (LUPRON DEPOT IM) Inject into the muscle every 6 months Reported on 5/3/2017   [DISCONTINUED] lisinopril (PRINIVIL,ZESTRIL) 10 MG tablet Take 1 tablet (10 mg) by mouth 2 times daily   acetaminophen (TYLENOL) 650 MG CR tablet Take 650 mg by mouth every 8 hours as needed Reported on 4/5/2017   alendronate (FOSAMAX) 70 MG tablet Take 1 tablet (70 mg) by mouth  every 7 days Take with over 8 ounces water and stay upright for at least 30 minutes after dose.  Take at least 60 minutes before breakfast     Exam:  Temp:  [97.7  F (36.5  C)-98.4  F (36.9  C)] 98.2  F (36.8  C)  Heart Rate:  [] 68  Resp:  [14-16] 16  BP: ()/(53-76) 80/53  SpO2:  [93 %-99 %] 96 %  General: Alert, interactive, NAD  Eyes: sclera anicteric, EOMI  Resp: clear to auscultation bilaterally, no crackles or wheezes  Cardiovascular: regular rate and rhythm, no murmur  GI: Soft, nontender, nondistended. +BS.  No HSM or masses, no rebound or guarding.  : [place]   MSK: Generalized weakness  Skin: Warm and dry, no jaundice or rash  Neuro: Alert & oriented x 3, Cns 2-12 intact, moves all extremities equally, Extremely [place]  Psych: Approrpiate    Data:  CMP    Recent Labs  Lab 09/29/17  0513 09/28/17  0335 09/27/17  1500 09/27/17  1231 09/27/17  0923    140 137 137 138   POTASSIUM 3.9 3.6 4.0 4.0 4.7   CHLORIDE 105 109 108  --  105   CO2 22 21 22  --  26   ANIONGAP 10 10 8  --  7   GLC 73 77 105* 107* 109*   BUN 10 11 11  --  12   CR 0.85 0.82 0.82  --  1.01   GFRESTIMATED 85 88 88  --  69   GFRESTBLACK >90 >90 >90  --  84   SHELIA 8.2* 7.6* 7.7*  --  9.0   MAG  --  1.6 1.8  --   --    PHOS  --  3.1 3.2  --   --    PROTTOTAL 5.2* 4.8* 5.4*  --  6.7*   ALBUMIN 2.4* 2.3* 2.5*  --  3.1*   BILITOTAL 0.5 0.5 0.2  --  0.4   ALKPHOS 66 58 68  --  86   AST 37 33 30  --  32   ALT 14 15 18  --  21     CBC    Recent Labs  Lab 09/29/17  0656 09/28/17  0335 09/28/17  0012 09/27/17  1500   WBC 6.3 4.3 4.7 3.4*   RBC 2.82* 2.77* 2.81* 2.98*   HGB 8.4* 8.2* 8.4* 9.0*   HCT 26.2* 25.6* 25.8* 28.1*   MCV 93 92 92 94   MCH 29.8 29.6 29.9 30.2   MCHC 32.1 32.0 32.6 32.0   RDW 16.0* 15.9* 16.0* 15.8*    184 182 184       No results found for: TROPI, TROPONIN, TROPR, TROPN      EKG: Paced with rates 70's-80's, narrow QRS       SUMMARY:  1. Access was obtained in the right and left common femoral arteries and the  left common femoral vein by the interventional cardiology team.  The arterial site used for valve delivery was pre-closed with two Perclose Proglide devices.   2. A 5Fr PACEL temporary pacemaker was positioned in the right ventricle and tested for adequate capture.    3. Iliofemoral angiography was not done since the right CFA was big and without evidence of disease on ultrasound.   A Lunderquist wire was used to support the Harman eSheath insertion.    4. The 6Fr pigtail catheter was positioned in the right-coronary cusp and angiography was used to confirm the optimal implant angle.  The pigtail was then moved to the non-coronary cusp.    5. Access into the LV was obtained using an AL-2 catheter and a straight wire.  The AL-2 catheter was used to exchange the straight wire for a J-wire and a pigtail catheter was then positioned in the left ventricle.  The LVEDP was measured with a pressure of 15 mmHg.  The pigtail catheter was used to advance a Lunderquist wire into in the LV and the pigtail was removed.    6. Aortic valve balloon valvuloplasty was successfully completed using an Harman 25mm balloon during rapid pacing and resulted in no hemodynamic compromise.  The 29mm Harman Ramsey 3 prosthetic valve was then positioned across the native aortic valve and was attempted to be deployed under rapid pacing.  However the balloon inside the valve stent never inflated due to perforation of the balloon while the balloon was loaded up within the valve stent in the descending aorta.  The whole system including the delivery system and the 16Fr Harman sheath were removed along with the sheath to ensure that the whole system came out without harming the sheath due to bulky valve.  The Lunderquist wire was left in place in the LV and another 16Fr Harman sheath was inserted in the R CFA.  A 2nd #29 S3 valve was inserted and loaded onto the balloon more distally where the descending aorta was more vertical.  The valve was  crossed with the system and the valve was deployed under rapid pacing of 160bpm with a depth of 80/20 with nominal pressure (33cc).   7. Subsequent transesophageal and transthoracic echocardiography and an ascending aortogram showed trace paravalvular insufficiency.    8. The valve sheath access arteriotomy was successfully closed with the double suture closure devices with successful hemostasis.    9. A final iliofemoral angiogram showed no evidence of extravasation or stenosis.   10. The LCFA 6Fr arteriotomy was successfully closed with a 6Fr AngioSeal closure device.  11. The temporary pacemaker was then removed.           NOTABLE EVENTS:  1. Valve deployment failure due to suspected perforation of balloon likely during loading of balloon unto valve stent and requiring removal of whole delivery system along with valve and sheath.     COMPLICATIONS:  1. None     SUMMARY:    1. Lifestyle-limiting severe aortic stenosis.  2. Successful transfemoral transcatheter aortic valve replacement with a 29mm Harman Ramsey 3 valve.  3. Temporary pacemaker insertion.  4. Aortic balloon valvuloplasty with a 25mm balloon.  5. Left heart catheterization with LVEDP of 15 mmHg.  6. Right and left common femoral arteriotomies successfully closed with closure devices.     PLAN:    1. Change Aspirin to 81 mg po daily lifelong.  2. Restart home apixaban dose 2.5mg bid  3. Bedrest per protocol (6 hours).  4. Admit to the primary inpatient team for further evaluation and management.  5. Echocardiogram tomorrow.   6. Lifelong antibiotic prophylaxis prior to all dental procedures.     Assessment/ Plan: Bud Merritt is a 89 year old male with PMHx pertinent for Afib on apixiban, s/p AV lisandro ablation with BiV PPM, (3/2017) COPD, prostate cancer, RA and chronic pain and severe aortic stenosis severe characterized by an area of 0.9 cm2, mean gradient 36 mmHg and peak velocity of 3.78 m/sec with LVEF 20-25% by echocardiogram on 3/2017.  Patient presents to [facility] today for transcatheter aortic valve replacement.     # Severe aortic stenosis, now s/p 29 mm Harman Ramsey 3 Transcatheter Aortic Valve Replacement via RCFA.   # HF EF 20-25%   # NICM- Echo with improvement of EF to 65, no PVL, mean gradient of 8,  Denies any pain or SOB at this time. Groin sites CDI without hematoma, ecchymosis, bruit or decreased limb warmth, sensation.  Patient has had labile BP, prompting reduction of PTA anti HTN regimen.   - Abd/Pelvis CT was done to r/o dissection and RP bleed POD 1 given hypotension and slight hgb drop, which was negative, though there was an incidental finding of 10 mm soft tissue nodule in the RP.   -  Follow up echo as above.  - Tele until DC, EKG daily  - Apixiban and ASA for anti-platelet therapy.   - Cardiac rehab/PT/OT.  - Daily weights.   - Strict intake/output.     # Atrial Fibrillation, s/p ablation with BiV PPM  - Apixa and asa as above    #  RA and chronic pain  - Norco PRN    #Hypertension- Though hypotensive this afternoon with re-initiating home BP meds.   - Decreased Lisinopril to daily,  - DC Aldactone today given K levels, recovery of EF and hypotension. Can resume on admission if necessary  - Reduce Flomax to once daily instead of BID.   - Monitor overnight and DC tomorrow    FEN: Cardiac diet  PPx: DVT: ambulation   Lines: PIV  Code status: Full   Dispo: TCU tomorrow    POLLO Sims  Interventional Cardiology-I  Pager [phone]     10-Sep-2021 01:11

## 2022-05-04 NOTE — ADDENDUM NOTE
Encounter addended by: Carito Munroe EP on: 12/6/2017  3:49 PM<BR>     Actions taken: Charge Capture section accepted 84

## 2022-06-13 NOTE — PROGRESS NOTES
Shahid Ramos,  In Epic we don't see that Bud is on Dofetilide. You can call me if you want at [phone].  Thank You,  [place]  Patient Care Coordinator  [facility]  [phone]    Unable to reply to Rachel.  COLIN that Dr. Aragon said to discontinue the Chlorthalidone   n/a

## 2022-11-07 NOTE — H&P
[facility]    History and Physical  Hospitalist       Date of Admission:  3/27/2017  Date of Service (when I saw the patient): 03/27/17    Assessment & Plan   Bud Merritt is a 89 year old male who presents with increasing swelling, redness and pain involving his right lower extremity. He has been treated as an outpatient with oral Keflex, seen by wound clinic today, but despite these measures is becoming more painful and red. Risk factors include immunosuppression with Arava and prednisone that he takes for rheumatoid arthritis. And he has chronic lower extremity edema due to chronic atrial fibrillation and severe aortic stenosis. Due to worsening symptoms, he will be admitted to inpatient status with broad-spectrum antibiotics including Zosyn and vancomycin started. Will continue wound care with silver alginate dressing and we'll see if wound care provider can see him while here. At this point, do not think the patient will be able to return home and may need rehab placement.    Principal Problem:    Cellulitis of right lower extremity  Active Problems:    Rheumatoid arthritis involving multiple sites, unspecified rheumatoid factor presence (H)    Mitral regurgitation and aortic stenosis - AS severe by echo on 3/2017    Essential hypertension with goal blood pressure less than 140/90    Paroxysmal atrial fibrillation (H)    Hyperlipidemia LDL goal <130    Malignant neoplasm of prostate (H)    DVT Prophylaxis: Taking eliquis  Code Status: DNR / DNI    Disposition: Expected discharge in 2-3 days once legs improving.    Cullen Teran MD    Primary Care Physician   Camron Aragon    Chief Complaint   89-year-old male with increasing leg pain    History is obtained from the patient, electronic health record, emergency department physician and patient's family    History of Present Illness   Bud Merritt is a 89 year old male who presents with increased pain, swelling and  redness involving his legs especially the right leg. He has been seen as an outpatient for treatment of cellulitis, taking Keflex. Have been seen in the emergency department last evening with similar symptoms and by the wound care nurse today. Despite this, his legs are looking worse and are more painful. He has had chills, but denies any fevers. Have been recently hospitalized for uncontrolled atrial fibrillation with a transfer to the [facility]. He ended up with a pacemaker and will ultimately need an ablation. Also noted was severe aortic stenosis on TTE, and sometime the future may need a TA VR. He takes arava and prednisone with history of rheumatoid arthritis. Atrial fibrillation is taking Tikosyn, eliquis with Lopressor. He has chronic lower extremity edema currently taking Lasix and Aldactone. Because of his leg pain has been unable to stand and care for himself. He denies any increased shortness of breath or cough. Denies any abdominal pain. He has been having problems passing urine because he cannot stand. He does have history of prostate cancer with Lupron injections.    Past Medical History    I have reviewed this patient's medical history and updated it with pertinent information if needed.   Past Medical History:   Diagnosis Date     Atrial fibrillation (H) 07/01/2016     Cardiomyopathy (H)      COPD exacerbation (H) 3/2/2017     Paroxysmal atrial fibrillation (H) 7/6/2016     Pure hypercholesterolemia      Rheumatoid arthritis(714.0)      Unspecified essential hypertension      Weakness generalized 3/2/2017       Past Surgical History   I have reviewed this patient's surgical history and updated it with pertinent information if needed.  Past Surgical History:   Procedure Laterality Date     ARTHROPLASTY KNEE Left 1/12/2016    Procedure: ARTHROPLASTY KNEE;  Surgeon: Cesar Walker DO;  Location: [facility]     COLONOSCOPY  08/24/09      COLONOSCOPY THRU STOMA W BIOPSY/CAUTERY  TUMOR/POLYP/LESION  8/31/2004     HC REPAIR ING HERNIA,5+Y/O,REDUCIBL  1996    Marlex mesh repair of bilateral inguinal hernias and drainage of bilateral scrotal hydroceles.     IMPLANT PACEMAKER  03/10/2017     IRRIGATION AND DEBRIDEMENT SOFT TISSUE LOWER EXTREMITY, COMBINED Left 3/15/2016    Procedure: COMBINED IRRIGATION AND DEBRIDEMENT SOFT TISSUE LOWER EXTREMITY;  Surgeon: Cesar Walker DO;  Location: [facility] OR       Prior to Admission Medications   Prior to Admission Medications   Prescriptions Last Dose Informant Patient Reported? Taking?   HYDROcodone-acetaminophen (NORCO) 5-325 MG per tablet 3/27/2017 at 1500  No Yes   Sig: Take 1 tablet by mouth every 6 hours as needed for moderate to severe pain   Leuprolide Acetate (LUPRON DEPOT IM) More than a month at Unknown time  Yes No   Sig: Inject into the muscle every 4 months   VITAMIN D, CHOLECALCIFEROL, PO 3/27/2017 at 0330  Yes Yes   Sig: Take 400 Units by mouth daily   acetaminophen (TYLENOL) 650 MG CR tablet 3/27/2017 at 1600  Yes Yes   Sig: Take 650 mg by mouth 2 times daily   alendronate (FOSAMAX) 70 MG tablet 3/27/2017 at 0300  No Yes   Sig: Take 1 tablet (70 mg) by mouth every 7 days Take with over 8 ounces water and stay upright for at least 30 minutes after dose.  Take at least 60 minutes before breakfast   apixaban ANTICOAGULANT (ELIQUIS) 2.5 MG tablet 3/27/2017 at 0330  No Yes   Sig: Take 1 tablet (2.5 mg) by mouth 2 times daily   ascorbic acid (VITAMIN C) 500 MG CPCR 3/27/2017 at 0330  Yes Yes   Sig: Take 500 mg by mouth daily   atorvastatin (LIPITOR) 40 MG tablet 3/27/2017 at 0330  No Yes   Sig: Take 1 tablet (40 mg) by mouth daily   calcium carbonate (OS-SHELIA 500 MG [place]. [place]) 500 MG tablet 3/27/2017 at 0330  Yes Yes   Sig: Take 600 mg by mouth daily   calcium carbonate (TUMS) 500 MG chewable tablet Unknown at Unknown time  Yes No   Sig: Take 1 chew tab by mouth every 4 hours as needed for heartburn   cephALEXin (KEFLEX) 500 MG capsule  3/27/2017 at 1200  No Yes   Sig: Take 1 capsule (500 mg) by mouth 3 times daily   dofetilide (TIKOSYN) 125 MCG capsule 3/27/2017 at 1600  No Yes   Sig: Take 1 capsule (125 mcg) by mouth 2 times daily   fluticasone-vilanterol (BREO ELLIPTA) 100-25 MCG/INH oral inhaler More than a month at Unknown time  No No   Sig: Inhale 1 puff into the lungs daily   furosemide (LASIX) 20 MG tablet 3/27/2017 at 0330  No Yes   Sig: TAKE 1 TABLET (20 MG) BY MOUTH DAILY OR AS DIRECTED BY CARDIOLOGY   leflunomide (ARAVA) 10 MG tablet 3/27/2017 at 0330  No Yes   Sig: Take 1 tablet (10 mg) by mouth daily   lisinopril (PRINIVIL,ZESTRIL) 10 MG tablet 3/27/2017 at 1600  No Yes   Sig: Take 1 tablet (10 mg) by mouth 2 times daily   metoprolol (LOPRESSOR) 50 MG tablet 3/27/2017 at 1600  No Yes   Sig: Take 1 tablet (50 mg) by mouth 2 times daily   metoprolol (TOPROL-XL) 25 MG 24 hr tablet   Yes No   omeprazole (PRILOSEC) 20 MG CR capsule 3/27/2017 at 1600  No Yes   Sig: Take 1 capsule (20 mg) by mouth 2 times daily (before meals)   predniSONE (DELTASONE) 5 MG tablet 3/27/2017 at 0330  No Yes   Sig: Take 1 tablet (5 mg) by mouth daily   spironolactone (ALDACTONE) 25 MG tablet 3/27/2017 at 0330  No Yes   Sig: Take 1 tablet (25 mg) by mouth daily   tamsulosin (FLOMAX) 0.4 MG 24 hr capsule 3/27/2017 at 1600  Yes Yes   Sig: Take 1 capsule (0.4 mg) by mouth 2 times daily   traMADol (ULTRAM) 50 MG tablet 3/27/2017 at 1800  No Yes   Sig: TAKE 1 TABLET 3 TIMES DAILY AS NEEDED FOR MODERATE PAIN   umeclidinium (INCRUSE ELLIPTA) 62.5 MCG/INH oral inhaler More than a month at Unknown time  No No   Sig: Inhale 1 puff into the lungs daily      Facility-Administered Medications: None     Allergies   Allergies   Allergen Reactions     Amiodarone Other (See Comments)     Drop in DLCO       Social History   I have reviewed this patient's social history and updated it with pertinent information if needed. Bud Merritt  reports that he quit smoking about 18 years  ago. His smoking use included Cigarettes. He has a 7.50 pack-year smoking history. He has never used smokeless tobacco. He reports that he does not drink alcohol or use illicit drugs.    Family History   I have reviewed this patient's family history and updated it with pertinent information if needed.   Family History   Problem Relation Age of Onset     CANCER Mother      CANCER Son      Unknown/Adopted Father      Alcoholism Brother        Review of Systems   The 10 point Review of Systems is negative other than noted in the HPI or here.     Physical Exam   Temp: 99.6  F (37.6  C) Temp src: Oral BP: 110/75 Pulse: 79   Resp: 20 SpO2: 94 % O2 Device: None (Room air)    Vital Signs with Ranges  Temp:  [99.6  F (37.6  C)] 99.6  F (37.6  C)  Pulse:  [79] 79  Resp:  [20] 20  BP: (110)/(75) 110/75  SpO2:  [94 %] 94 %  162 lbs 0 oz    EXAM:  Constitutional: alert, mild distress, cooperative and complaining of pain   Cardiovascular: regular rate rhythm. He does have a soft systolic murmur at the aortic area.  Respiratory: Percussion normal. Good diaphragmatic excursion. Lungs clear  Psychiatric: mentation appears normal and fatigued  Head: Normocephalic. No masses, lesions, tenderness or abnormalities  Neck: Neck supple. No adenopathy. Thyroid symmetric, normal size,  ENT: ENT exam normal, no neck nodes or sinus tenderness  Abdomen: Abdomen soft, non-tender. BS normal. No masses, organomegaly  NEURO: non-focal, unable to really test  SKIN: open sores involving both legs, right leg worse than left. There's a number of silver looking dressings on some of the more open areas. The right leg is overall reddened, swollen and warm to touch.  LYMPH: Normal cervical lymph nodes  JOINT/EXTREMITIES: edema in both lower extremities. Wounds as noted above     Data   Data reviewed today:  I personally reviewed the chest x-ray image(s) showing No signs of infiltrate, done on 3/26.    Recent Labs  Lab 03/27/17  2110 03/26/17  3913  03/22/17  1051   WBC 5.7 5.5 6.4   HGB 8.9* 9.1* 9.3*   MCV 99 100 100    228 218   INR 1.01  --   --     140 141   POTASSIUM 3.7 3.9 3.9   CHLORIDE 101 107 105   CO2 26 25 27   BUN 27 27 20   CR 0.81 0.78 0.72   ANIONGAP 11 8 9   SHELIA 8.1* 8.2* 8.2*   GLC 94 92 102*   ALBUMIN 2.7* 2.6* 2.6*   PROTTOTAL 5.7* 5.9* 5.8*   BILITOTAL 0.6 0.3 0.3   ALKPHOS 74 90 91   ALT 22 22 20   AST 28 29 26       No results found for this or any previous visit (from the past 24 hour(s)).     Continue to Observe Discharge

## 2023-08-24 NOTE — PROGRESS NOTES
04/13/18 0908   General Information   Type of Visit Initial OP Ortho PT Evaluation   Start of Care Date 04/13/18   Referring Physician Dr. Camron Aragon   Patient/Family Goals Statement eliminate headaches   Orders Evaluate and Treat   Date of Order 03/29/18   Insurance Type Medicare;[employer]  (Platinum)   Medical Diagnosis Intractible tension - type Headaches unspecified chronicity pattern   Surgical/Medical history reviewed Yes   Weight-Bearing Status - LUE full weight-bearing   Weight-Bearing Status - RUE full weight-bearing   Weight-Bearing Status - LLE full weight-bearing   Weight-Bearing Status - RLE full weight-bearing       Present No   Body Part(s)   Body Part(s) Cervical Spine   Presentation and Etiology   Pertinent history of current problem (include personal factors and/or comorbidities that impact the POC) 89 yo male here with his wife Tierra. He has been noting headaches after getting out of the [place] - Labor day 2017. He feels the symptoms are coming from his neck. He had L wound surgery Monday 4-9-18 - skin growth. He has L TKA 3-4 yrs ago. He enjoys wood working and cannot get to his workshop. He is also concerned about ability to lift and handle the wood. 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    Impairments A. Pain;D. Decreased ROM;E. Decreased flexibility;F. Decreased strength and endurance;G. Impaired balance;H. Impaired gait;I. Impaired skin integrity;N. Headaches   Functional Limitations perform activities of daily living;perform desired leisure / sports activities   Symptom Location Start in the upper traps and radiate into the head, the pain is sharp and goes all over his head. Knife that shoots. Sudden onset.    How/Where did it occur From insidious onset   Onset date of current episode/exacerbation 09/04/17  (approximately)   Chronicity Chronic   Pain rating (0-10 point scale) Other   Pain rating comment now: 5-6/10 range: 3/10 after medications to  10+/10   Pain/symptoms exacerbated by M. Other   Pain exacerbation comment nothing - tried whiskey without assistance   Pain/symptoms eased by K. Other   Pain eased by comment Tramadol/Noco, tylenol 650mg   Progression of symptoms since onset: Unchanged   Prior Level of Function   Functional Level Prior Comment independently with Tierra   Current Level of Function   Current Community Support Family/friend caregiver   Patient role/employment history F. 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 Yes   Have you fallen and had an injury in the past year? No  (bruises and skin tears)   Is patient a fall risk? Yes;Department fall risk interventions implemented   Fall screen comments very flared today so did not complete the UP and GO Test   Cervical Spine   Observation no acute distress   Integumentary  Stitches along temporal L head x 7 stitches from skin removal surgery   Cervical Flexion %   Cervical Extension ROM 40% with shoulder soreness   Cervical Right Side Bending ROM 20% with increased L shoulder tightness and increased headache   Cervical Left Side Bending ROM 25% with slight increase in symptoms as noted above   Cervical Right Rotation ROM 50% with pulling   Cervical Left Rotation ROM 55% with pulling, but less than the R side.    Shoulder/Wrist/Hand Strength Comments Yrs since he has been able to lift his arms.    Planned Therapy Interventions   Planned Therapy Interventions Comment manual therapy and ROM exercises, strengthening (gentle)   Planned Modality Interventions   Planned Modality Interventions Comments as needed   Clinical Impression   Criteria for Skilled Therapeutic  Interventions Met yes, treatment indicated   PT Diagnosis cervical tightness and shoulder tightness, deconditioned   Influenced by the following impairments Osteoporosis, lack of activity, decreased shoulder and neck AROM   Functional limitations due to impairments walking, positional changes   Clinical Presentation Evolving/Changing   Clinical Presentation Rationale heart issues, COPD, Rheumatoid arthritis, osteoporosis, decreased shoulder and neck AROM   Clinical Decision Making (Complexity) Moderate complexity   Predicted Duration of Therapy Intervention (days/wks) 1- 2 times per week x 6 weeks   Risk & Benefits of therapy have been explained Yes   Patient, Family & other staff in agreement with plan of care Yes   Clinical Impression Comments 89 yr male who wants to be able to walk this summer. We discussed importatnce of being active over the winter to prepare for this. He had a recent L temporal skin surgery and area is healing well. He is here with his wife Sharon. He likes to do wood working and cannot always lift the wood. We discussed having him teach someone to do the wood working with him so they can do more difficult activities and he can do what he is able and teach. He plans to follow through wtih the recommendation from today. Suspect headaches are from lack of mobility in the shoulders adn neck. Improved symptoms with distraction.    Education Assessment   Preferred Learning Style Reading   Barriers to Learning No barriers   ORTHO GOALS   PT Ortho Eval Goals 1   Ortho Goal 1   Goal Identifier HEadaches   Goal Description Bud will be able to eliminate his headaches with positioning and ROM exercises   Target Date 05/04/18   Total Evaluation Time   Total Evaluation Time 18   Therapy Certification   Certification date from 04/13/18   Certification date to 05/25/18   Medical Diagnosis Intractible tension - type Headaches unspecified chronicity pattern     Thank you for referring Bud  to [place]  [facility] [place]    Sheila Petersen, PT  [phone]       Valtrex Pregnancy And Lactation Text: this medication is Pregnancy Category B and is considered safe during pregnancy. This medication is not directly found in breast milk but it's metabolite acyclovir is present.

## 2023-08-28 NOTE — PROGRESS NOTES
SUBJECTIVE:                                                    Bud Merritt is a 89 year old male who presents to clinic today for the following health issues:      Chief Complaint   Patient presents with     RECHECK     2 week recheck             Problem list and histories reviewed & adjusted, as indicated.  Additional history: as documented        Reviewed and updated as needed this visit by clinical staff  Tobacco  Allergies  Meds       Reviewed and updated as needed this visit by Provider        SUBJECTIVE:  uBd  is a 89 year old male who presents for:  Follow-up of a number of issues. Generally he has just become more weak and has unsteady gait and vertigo type symptoms with leaning forward and looking up. He states he has spells. He is under the care of cardiology and the EP doctors. He has a pacemaker and his paroxysmal atrial fibrillation. Just saw EP yesterday. They thought maybe it was inner ear problems. At the last visit he was noted to be on a high dose of metoprolol and this was cut in half. They think he feels better. Also noted his hemoglobin was 9.4 which has been steady for the last several months but still low. His wife has been feeding him liver.    Past Medical History:   Diagnosis Date     Atrial fibrillation (H) 07/01/2016     Cardiomyopathy (H)      COPD exacerbation (H) 3/2/2017     Paroxysmal atrial fibrillation (H) 7/6/2016     Pure hypercholesterolemia      Rheumatoid arthritis(714.0)      Unspecified essential hypertension      Weakness generalized 3/2/2017     Past Surgical History:   Procedure Laterality Date     ARTHROPLASTY KNEE Left 1/12/2016    Procedure: ARTHROPLASTY KNEE;  Surgeon: Cesar Walker DO;  Location: [facility] OR     COLONOSCOPY  08/24/09      COLONOSCOPY THRU STOMA W BIOPSY/CAUTERY TUMOR/POLYP/LESION  8/31/2004      REPAIR ING HERNIA,5+Y/O,REDUCIBL  1996    Marlex mesh repair of bilateral inguinal hernias and drainage of bilateral scrotal  Incoming fax from [address] for MRI Neck soft tissue without and with contrast     Request to place order in Epic     Placed in TO in basket for review hydroceles.     IMPLANT PACEMAKER  03/10/2017     IRRIGATION AND DEBRIDEMENT SOFT TISSUE LOWER EXTREMITY, COMBINED Left 3/15/2016    Procedure: COMBINED IRRIGATION AND DEBRIDEMENT SOFT TISSUE LOWER EXTREMITY;  Surgeon: Cesar Walker DO;  Location: [facility] OR     Social History   Substance Use Topics     Smoking status: Former Smoker     Packs/day: 0.50     Years: 15.00     Types: Cigarettes     Quit date: 11/12/1998     Smokeless tobacco: Never Used      Comment: quit 15 yrs ago     Alcohol use No     Current Outpatient Prescriptions   Medication Sig Dispense Refill     HYDROcodone-acetaminophen (NORCO) 5-325 MG per tablet TAKE 1 TABLET BY MOUTH EVERY 6 HOURS AS NEEDED FOR MODERATE TO SEVERE PAIN 30 tablet 0     traMADol (ULTRAM) 50 MG tablet TAKE 1 TABLET 3 TIMES DAILY AS NEEDED FOR MODERATE PAIN 90 tablet 0     metoprolol (LOPRESSOR) 50 MG tablet Take 1 tablet (50 mg) by mouth 2 times daily 60 tablet 1     furosemide (LASIX) 20 MG tablet Take one tab in the afternoon 90 tablet 1     fluticasone-vilanterol (BREO ELLIPTA) 100-25 MCG/INH oral inhaler Inhale 1 puff into the lungs daily 60 Inhaler 0     predniSONE (DELTASONE) 5 MG tablet Take 1 tablet (5 mg) by mouth daily 100 tablet 4     dofetilide (TIKOSYN) 125 MCG capsule Take 1 capsule (125 mcg) by mouth 2 times daily 60 capsule 5     apixaban ANTICOAGULANT (ELIQUIS) 2.5 MG tablet Take 1 tablet (2.5 mg) by mouth 2 times daily 180 tablet 3     Leuprolide Acetate (LUPRON DEPOT IM) Inject into the muscle every 4 months Reported on 5/3/2017       leflunomide (ARAVA) 10 MG tablet Take 1 tablet (10 mg) by mouth daily 30 tablet 11     lisinopril (PRINIVIL,ZESTRIL) 10 MG tablet Take 1 tablet (10 mg) by mouth 2 times daily 62 tablet 11     spironolactone (ALDACTONE) 25 MG tablet Take 1 tablet (25 mg) by mouth daily 30 tablet 11     acetaminophen (TYLENOL) 650 MG CR tablet Take 650 mg by mouth 2 times daily Reported on 4/5/2017       atorvastatin (LIPITOR) 40 MG  tablet Take 1 tablet (40 mg) by mouth daily 90 tablet 3     alendronate (FOSAMAX) 70 MG tablet Take 1 tablet (70 mg) by mouth every 7 days Take with over 8 ounces water and stay upright for at least 30 minutes after dose.  Take at least 60 minutes before breakfast 12 tablet 3     calcium carbonate (TUMS) 500 MG chewable tablet Take 1 chew tab by mouth every 4 hours as needed for heartburn Reported on 5/3/2017       tamsulosin (FLOMAX) 0.4 MG 24 hr capsule Take 1 capsule (0.4 mg) by mouth 2 times daily 60 capsule      ascorbic acid (VITAMIN C) 500 MG CPCR Take 500 mg by mouth daily       VITAMIN D, CHOLECALCIFEROL, PO Take 400 Units by mouth daily Reported on 5/3/2017       calcium carbonate (OS-SHELIA 500 MG [place]. CA) 500 MG tablet Take 600 mg by mouth daily         REVIEW OF SYSTEMS:   5 point ROS negative except as noted above in HPI, including Gen., Resp, CV, GI &  system review.     OBJECTIVE:  Vitals: /70  Pulse 62  Temp 96.9  F (36.1  C) (Temporal)  Resp 20  Wt 151 lb (68.5 kg)  SpO2 97%  BMI 25.13 kg/m2  BMI= Body mass index is 25.13 kg/(m^2).  He is alert very hard of hearing. Hearing aid in place in the left ear. This is noted to be packed with cerumen. It was irrigated clear. Right side is okay. Throat is clear. Neck is supple no JVD. Lungs are clear. Heart with me over 6 murmur. Extremities with dependent edema and some ulcerations which are being managed by wound care. Skin is otherwise clear.    ASSESSMENT:  #1 weakness #2 paroxysmal atrial fibrillation #3 cerumen impaction #4 anemia    PLAN:  He states he feels better on the lower dose of metoprolol and his pulse is still well controlled and his blood pressure is a little bit higher. They understand his age has a lot to do with the weakness. They want to stay at this in cardiology approved of this. Suggested they may want to take some supplemental over-the-counter iron or just stick with the liver diet. But I like to recheck a hemoglobin in  about a month. They are following with cardiology in 3 months.        Camron Aragon MD  [facility]

## (undated) DEVICE — SU VICRYL 3-0 SH 27" UND J416H

## (undated) DEVICE — ESU GROUND PAD UNIVERSAL W/O CORD

## (undated) DEVICE — GLOVE BIOGEL PI SZ 7.5 40875

## (undated) DEVICE — DEFIB PADPRO CONMED ADULT/CHILD 2001Z-C

## (undated) DEVICE — RAD KNIFE HANDLE W/11 BLADE DISPOSABLE 371611

## (undated) DEVICE — SU ETHILON 4-0 PS-2 18" 1667G

## (undated) DEVICE — Device

## (undated) DEVICE — WIRE GUIDE 0.035"X150CM EMERALD J TIP 502521

## (undated) DEVICE — GUIDEWIRE VASC SAFARI2 0.035X275CM H74939406XS1

## (undated) DEVICE — SOL NACL 0.9% IRRIG 1000ML BOTTLE 2F7124

## (undated) DEVICE — PACK MINOR PROCEDURE CUSTOM

## (undated) DEVICE — WIRE GUIDE 0.035"X260CM SAFE-T-J EXCHANGE TSCF-35-260-3-BH

## (undated) DEVICE — CATH ANGIO SUPERTORQUE PLUS AL2 6FRX100CM 533646

## (undated) DEVICE — DRSG TEGADERM 4X4 3/4" 1626W

## (undated) DEVICE — ESU GROUND PAD ADULT REM W/15' CORD E7507DB

## (undated) DEVICE — DRSG GAUZE 2X2" 8042

## (undated) DEVICE — CATH EP PACEL 5FRX110CM 1MM RIGHT HEART CURVE 401763

## (undated) DEVICE — LINEN TOWEL PACK X5 5464

## (undated) DEVICE — WIPES FOLEY CARE SURESTEP PROVON DFC100

## (undated) DEVICE — PROTECTOR ARM ONE-STEP TRENDELENBURG 40418

## (undated) DEVICE — CLOSURE DEVICE 6FR VASC PROGLIDE MEDICATED SUTURE 12673-03

## (undated) DEVICE — WIRE GUIDE LUNDERQUIST 0.035"X260CM DBL CVD

## (undated) DEVICE — KIT MANIFORD ACIST B200

## (undated) DEVICE — BOWL 32OZ STERILE 01232

## (undated) DEVICE — DRAPE STERI FLUOROSCOPE 35X43"1012 LATEX FREE

## (undated) DEVICE — SYR 50ML LL W/O NDL 309653

## (undated) DEVICE — SOL WATER IRRIG 1000ML BOTTLE 07139-09

## (undated) DEVICE — INTRO TERUMO 6FRX25CM W/MARKER RSB603

## (undated) DEVICE — LINEN TOWEL PACK X30 5481

## (undated) DEVICE — SU DERMABOND ADVANCED .7ML DNX12

## (undated) DEVICE — ANGIOTOUCH KIT

## (undated) DEVICE — GLOVE ESTEEM BLUE W/NEU-THERA 8.0  2D73PB80

## (undated) DEVICE — CONNECTOR STOPCOCK 3-WAY HIGH PRESSURE (NAMIC) H965700550091

## (undated) DEVICE — DRSG TELFA 3X8" 1238

## (undated) DEVICE — CLOSURE ANGIOSEAL 6FR 610130

## (undated) DEVICE — PACK GOWN 3/PK DISP XL SBA32GPFCB

## (undated) DEVICE — GLOVE PROTEXIS W/NEU-THERA 7.5  2D73TE75

## (undated) DEVICE — CATH TRAY FOLEY SURESTEP 16FR W/TMP PRB STLK LATEX A319416AM

## (undated) DEVICE — DRSG KERLIX 4 1/2"X4YDS ROLL 6730

## (undated) DEVICE — WIRE GUIDE 0.035"X150CM EMERALD STR 502542

## (undated) DEVICE — CATH ANGIO INFINITI PIGTAIL 145 6 SH 6FRX110CM  534-652S

## (undated) DEVICE — TUBING PRESSURE 30"

## (undated) DEVICE — SPONGE RAY-TEC 4X8" 7318

## (undated) DEVICE — DRAPE LAP PEDS 89209

## (undated) DEVICE — SYR EAR BULB 2OZ

## (undated) DEVICE — CATH ANGIO SUPERTORQUE AL1 6FRX100CM 532-645

## (undated) DEVICE — LINEN TOWEL PACK X6 WHITE 5487

## (undated) DEVICE — SOL NACL 0.9% IRRIG 1000ML BOTTLE 07138-09

## (undated) DEVICE — DRAPE IOBAN INCISE 23X17" 6650EZ

## (undated) DEVICE — DRAPE TIBURON CARDIOVASCULAR PERI-GROIN LF 9154

## (undated) RX ORDER — HYDRALAZINE HYDROCHLORIDE 20 MG/ML
INJECTION INTRAMUSCULAR; INTRAVENOUS
Status: DISPENSED
Start: 2017-09-27

## (undated) RX ORDER — CEFAZOLIN SODIUM 1 G/3ML
INJECTION, POWDER, FOR SOLUTION INTRAMUSCULAR; INTRAVENOUS
Status: DISPENSED
Start: 2017-03-10

## (undated) RX ORDER — PHENYLEPHRINE HCL IN 0.9% NACL 1 MG/10 ML
SYRINGE (ML) INTRAVENOUS
Status: DISPENSED
Start: 2017-09-27

## (undated) RX ORDER — BACITRACIN ZINC 500 [USP'U]/G
OINTMENT TOPICAL
Status: DISPENSED
Start: 2018-04-09

## (undated) RX ORDER — FENTANYL CITRATE 50 UG/ML
INJECTION, SOLUTION INTRAMUSCULAR; INTRAVENOUS
Status: DISPENSED
Start: 2017-09-27

## (undated) RX ORDER — EPHEDRINE SULFATE 50 MG/ML
INJECTION, SOLUTION INTRAMUSCULAR; INTRAVENOUS; SUBCUTANEOUS
Status: DISPENSED
Start: 2017-09-27

## (undated) RX ORDER — ALBUTEROL SULFATE 90 UG/1
AEROSOL, METERED RESPIRATORY (INHALATION)
Status: DISPENSED
Start: 2017-09-27

## (undated) RX ORDER — PROTAMINE SULFATE 10 MG/ML
INJECTION, SOLUTION INTRAVENOUS
Status: DISPENSED
Start: 2017-09-27

## (undated) RX ORDER — HEPARIN SODIUM 1000 [USP'U]/ML
INJECTION, SOLUTION INTRAVENOUS; SUBCUTANEOUS
Status: DISPENSED
Start: 2017-09-27

## (undated) RX ORDER — FENTANYL CITRATE 50 UG/ML
INJECTION, SOLUTION INTRAMUSCULAR; INTRAVENOUS
Status: DISPENSED
Start: 2017-03-10

## (undated) RX ORDER — LIDOCAINE HYDROCHLORIDE AND EPINEPHRINE 10; 10 MG/ML; UG/ML
INJECTION, SOLUTION INFILTRATION; PERINEURAL
Status: DISPENSED
Start: 2018-04-09

## (undated) RX ORDER — CEFAZOLIN SODIUM 1 G/3ML
INJECTION, POWDER, FOR SOLUTION INTRAMUSCULAR; INTRAVENOUS
Status: DISPENSED
Start: 2017-09-27

## (undated) RX ORDER — ROCURONIUM BROMIDE 50 MG/5 ML
SYRINGE (ML) INTRAVENOUS
Status: DISPENSED
Start: 2017-09-27

## (undated) RX ORDER — SODIUM CHLORIDE 9 MG/ML
INJECTION, SOLUTION INTRAVENOUS
Status: DISPENSED
Start: 2017-06-30

## (undated) RX ORDER — CEFAZOLIN SODIUM 2 G/100ML
INJECTION, SOLUTION INTRAVENOUS
Status: DISPENSED
Start: 2017-09-27

## (undated) RX ORDER — FENTANYL CITRATE 50 UG/ML
INJECTION, SOLUTION INTRAMUSCULAR; INTRAVENOUS
Status: DISPENSED
Start: 2017-06-30

## (undated) RX ORDER — ETOMIDATE 2 MG/ML
INJECTION INTRAVENOUS
Status: DISPENSED
Start: 2017-09-27